# Patient Record
Sex: MALE | Race: WHITE | NOT HISPANIC OR LATINO | Employment: OTHER | URBAN - METROPOLITAN AREA
[De-identification: names, ages, dates, MRNs, and addresses within clinical notes are randomized per-mention and may not be internally consistent; named-entity substitution may affect disease eponyms.]

---

## 2017-01-03 RX ORDER — DIAZEPAM 5 MG/1
TABLET ORAL
Qty: 60 TAB | Refills: 0 | Status: SHIPPED
Start: 2017-01-03 | End: 2017-02-24 | Stop reason: SDUPTHER

## 2017-01-12 ENCOUNTER — OFFICE VISIT (OUTPATIENT)
Dept: MEDICAL GROUP | Facility: CLINIC | Age: 62
End: 2017-01-12
Payer: MEDICARE

## 2017-01-12 VITALS
WEIGHT: 201 LBS | BODY MASS INDEX: 28.77 KG/M2 | HEIGHT: 70 IN | RESPIRATION RATE: 18 BRPM | DIASTOLIC BLOOD PRESSURE: 75 MMHG | HEART RATE: 80 BPM | SYSTOLIC BLOOD PRESSURE: 105 MMHG | TEMPERATURE: 98.1 F | OXYGEN SATURATION: 97 %

## 2017-01-12 DIAGNOSIS — M62.838 MUSCLE SPASM: ICD-10-CM

## 2017-01-12 PROCEDURE — 99213 OFFICE O/P EST LOW 20 MIN: CPT | Performed by: INTERNAL MEDICINE

## 2017-01-12 RX ORDER — CYCLOBENZAPRINE HCL 10 MG
TABLET ORAL
Qty: 90 TAB | Refills: 2 | Status: SHIPPED | OUTPATIENT
Start: 2017-01-12 | End: 2017-06-27 | Stop reason: SDUPTHER

## 2017-01-12 NOTE — MR AVS SNAPSHOT
"        Edgardo Ocampoer   2017 11:20 AM   Office Visit   MRN: [id number]    Department:  [facility]   Dept Phone:  [phone]    Description:  Male : 1955   Provider:  John Lao D.O.           Reason for Visit     Follow-Up           Allergies as of 2017     Allergen Noted Reactions    Penicillins 2008   Hives      You were diagnosed with     Muscle spasm   [521265]         Vital Signs     Blood Pressure Pulse Temperature Respirations Height Weight    105/75 mmHg 80 36.7 °C (98.1 °F) 18 1.778 m (5' 10\") 91.173 kg (201 lb)    Body Mass Index Oxygen Saturation Smoking Status             28.84 kg/m2 97% Never Smoker          Basic Information     Date Of Birth Sex Race Ethnicity Preferred Language    1955 Male White Non- English      Your appointments     2017  9:00 AM   FOLLOW UP with Stas Zabala M.D.   [facility] (--)    [address], [facility] [address]  [facility] [id number] [phone]              Problem List              ICD-10-CM Priority Class Noted - Resolved    Rheumatoid arthritis (HCC) M06.9   2009 - Present    Hypogonadism male E29.1   2009 - Present    Aortic insufficiency I35.1   2011 - Present    Essential hypertension I10   2011 - Present    Dyslipidemia E78.5   2011 - Present    Osteoarthrosis, unspecified whether generalized or localized, pelvic region and thigh M16.9   2013 - Present    Abnormal myocardial perfusion study    1/15/2014 - Present    Tachycardia R00.0   10/20/2014 - Present    Coronary artery disease due to calcified coronary lesion: Mildly cardiac catheterization in  I25.10, I25.84   2014 - Present    Anxiety F41.9   3/31/2015 - Present    Depression (Chronic) F32.9   2015 - Present    Elevated CPK R74.8   2016 - Present      Health Maintenance        Date Due Completion Dates    IMM ZOSTER VACCINE 2028 (Originally 10/25/2016) " ---    COLONOSCOPY 12/22/2021 12/22/2016    IMM DTaP/Tdap/Td Vaccine (2 - Td) 1/28/2023 1/28/2013            Current Immunizations     13-VALENT PCV PREVNAR 10/11/2016 11:27 AM    Influenza TIV (IM) 11/4/2013, 10/10/2012    Influenza Vaccine Quad Inj (Pf) 10/11/2016 12:03 PM    Influenza Vaccine Quad Inj (Preserved) 3/10/2015    Pneumococcal polysaccharide vaccine (PPSV-23) 11/4/2013, 10/15/2009    Tdap Vaccine 1/28/2013      Below and/or attached are the medications your provider expects you to take. Review all of your home medications and newly ordered medications with your provider and/or pharmacist. Follow medication instructions as directed by your provider and/or pharmacist. Please keep your medication list with you and share with your provider. Update the information when medications are discontinued, doses are changed, or new medications (including over-the-counter products) are added; and carry medication information at all times in the event of emergency situations     Allergies:  PENICILLINS - Hives               Medications  Valid as of: January 12, 2017 - 11:52 AM    Generic Name Brand Name Tablet Size Instructions for use    Atorvastatin Calcium (Tab) LIPITOR 40 MG Take 1 Tab by mouth every evening.        Bisoprolol Fumarate (Tab) ZEBETA 10 MG Take 1 Tab by mouth every day.        Cyclobenzaprine HCl (Tab) FLEXERIL 10 MG TAKE 1 TABLET BY MOUTH THREE TIMES DAILY AS NEEDED FOR MILD PAIN        DiazePAM (Tab) VALIUM 5 MG TAKE ONE TABLET BY MOUTH EVERY 12 HOURS AS NEEDED FOR ANXIETY        Folic Acid (Tab) FOLVITE 1 MG Take 3 mg by mouth every day.        Lisinopril (Tab) PRINIVIL 10 MG Take 1 Tab by mouth every day.        Meloxicam (Tab) MOBIC 15 MG TAKE ONE TABLET BY MOUTH ONCE DAILY        Methotrexate Sodium   Take 15 mg by mouth every 7 days.        Multiple Vitamins-Minerals   Take  by mouth.        Omeprazole (CAPSULE DELAYED RELEASE) PRILOSEC 20 MG Take 20 mg by mouth every day.         Oxycodone-Acetaminophen (Tab) PERCOCET  MG 2 Tabs by Oral route Q6HRS.         PARoxetine HCl (Tab) PAXIL 20 MG TAKE 1 TABLET BY MOUTH EVERY DAY        PredniSONE (Tab) DELTASONE 1 MG Take 2 mg by mouth.        RiTUXimab   by Intravenous route. Two every 6 months         Rosuvastatin Calcium (Tab) CRESTOR 10 MG Take 0.5 Tabs by mouth every evening.        Testosterone Cypionate (Solution) DEPO-TESTOSTERONE 200 MG/ML INJECT 1 ML (CC) INTRAMUSCULARLY EVERY 2 WEEKS AS DIRECTED        Vardenafil HCl (Tab) LEVITRA 20 MG Take 20 mg by mouth as needed.        .                 Medicines prescribed today were sent to:     [facility] [address] [place] [address], [place] - [address]    [address] [place] [address]    Phone: [phone] Fax: [phone]    Open 24 Hours?: No      Medication refill instructions:       If your prescription bottle indicates you have medication refills left, it is not necessary to call your provider’s office. Please contact your pharmacy and they will refill your medication.    If your prescription bottle indicates you do not have any refills left, you may request refills at any time through one of the following ways: The online [employer] system (except Urgent Care), by calling your provider’s office, or by asking your pharmacy to contact your provider’s office with a refill request. Medication refills are processed only during regular business hours and may not be available until the next business day. Your provider may request additional information or to have a follow-up visit with you prior to refilling your medication.   *Please Note: Medication refills are assigned a new Rx number when refilled electronically. Your pharmacy may indicate that no refills were authorized even though a new prescription for the same medication is available at the pharmacy. Please request the medicine by name with the pharmacy before contacting your provider for a refill.        Other Notes About Your Plan     Pt uses  [facility]  For PA  [phone]  [facility] ID #  [id number]  Depo-Testosterone  200 ML Pt uses 1 ml every two weeks he gets 1 x 10 ml Vial each time.  Marci good from 12/17/2012 thru 12/12/2013           [employer] Access Code: Activation code not generated  Current [employer] Status: Active

## 2017-01-13 NOTE — PROGRESS NOTES
CC: Edgardo Sims is a 61 y.o. male is suffering from   Chief Complaint   Patient presents with   • Follow-Up         SUBJECTIVE:  1. Muscle spasm  Nicolas's here for follow-up, continues to have problems with muscle skeletal spasm and pain patient is requesting refills of Flexeril. Patient suffers from severe rheumatoid arthritis is immunosuppressed and is receiving his pain medication through Dr. Schwab.         Past social, family, history:  2 daughters  Social History   Substance Use Topics   • Smoking status: Never Smoker    • Smokeless tobacco: Never Used   • Alcohol Use: 3.0 oz/week     6 Cans of beer per week         MEDICATIONS:    Current outpatient prescriptions:   •  cyclobenzaprine (FLEXERIL) 10 MG Tab, TAKE 1 TABLET BY MOUTH THREE TIMES DAILY AS NEEDED FOR MILD PAIN, Disp: 90 Tab, Rfl: 2  •  diazepam (VALIUM) 5 MG Tab, TAKE ONE TABLET BY MOUTH EVERY 12 HOURS AS NEEDED FOR ANXIETY, Disp: 60 Tab, Rfl: 0  •  Multiple Vitamins-Minerals (MULTI COMPLETE PO), Take  by mouth., Disp: , Rfl:   •  rosuvastatin (CRESTOR) 10 MG Tab, Take 0.5 Tabs by mouth every evening., Disp: 30 Tab, Rfl: 11  •  bisoprolol (ZEBETA) 10 MG tablet, Take 1 Tab by mouth every day., Disp: 90 Tab, Rfl: 3  •  testosterone cypionate (DEPO-TESTOSTERONE) 200 MG/ML Solution injection, INJECT 1 ML (CC) INTRAMUSCULARLY EVERY 2 WEEKS AS DIRECTED, Disp: 10 mL, Rfl: 3  •  lisinopril (PRINIVIL) 10 MG Tab, Take 1 Tab by mouth every day., Disp: 90 Tab, Rfl: 3  •  meloxicam (MOBIC) 15 MG tablet, TAKE ONE TABLET BY MOUTH ONCE DAILY, Disp: 30 Tab, Rfl: 6  •  vardenafil (LEVITRA) 20 MG tablet, Take 20 mg by mouth as needed., Disp: , Rfl:   •  paroxetine (PAXIL) 20 MG Tab, TAKE 1 TABLET BY MOUTH EVERY DAY, Disp: 90 Tab, Rfl: 3  •  Methotrexate Sodium (METHOTREXATE PO), Take 15 mg by mouth every 7 days., Disp: , Rfl:   •  folic acid (FOLVITE) 1 MG TABS, Take 3 mg by mouth every day., Disp: , Rfl:   •  omeprazole (PRILOSEC) 20 MG CPDR, Take 20  "mg by mouth every day., Disp: , Rfl:   •  PREDNISONE 1 MG PO TABS, Take 2 mg by mouth., Disp: , Rfl:   •  atorvastatin (LIPITOR) 40 MG Tab, Take 1 Tab by mouth every evening., Disp: 90 Tab, Rfl: 3  •  RiTUXimab (RITUXAN IV), by Intravenous route. Two every 6 months , Disp: , Rfl:   •  PERCOCET  MG PO TABS, 2 Tabs by Oral route Q6HRS. , Disp: , Rfl:     PROBLEMS:  Patient Active Problem List    Diagnosis Date Noted   • Elevated CPK 09/23/2016   • Depression 07/13/2015   • Anxiety 03/31/2015   • Coronary artery disease due to calcified coronary lesion: Mildly cardiac catheterization in 2014 12/05/2014   • Tachycardia 10/20/2014   • Abnormal myocardial perfusion study 01/15/2014   • Osteoarthrosis, unspecified whether generalized or localized, pelvic region and thigh 05/31/2013   • Dyslipidemia 07/12/2011   • Aortic insufficiency 07/05/2011   • Essential hypertension 07/05/2011   • Rheumatoid arthritis (HCC) 05/05/2009   • Hypogonadism male 05/05/2009       REVIEW OF SYSTEMS:  Gen.:  No Nausea, Vomiting, fever, Chills.  Heart: No chest pain.  Lungs:  No shortness of Breath.  Psychological: Kian unusual Anxiety depression     PHYSICAL EXAM   Constitutional: Alert, cooperative, not in acute distress.  Cardiovascular:  Rate Rhythm is regular without murmurs rubs clicks.     Thorax & Lungs: Clear to auscultation, no wheezing, rhonchi, or rales  HENT: Normocephalic, Atraumatic.  Eyes: PERRLA, EOMI, Conjunctiva normal.   Neck: Trachia is midline no swelling of the thyroid.   Lymphatic: No lymphadenopathy noted.   Neurologic: Alert & oriented x 3, cranial nerves II through XII are intact, Normal motor function, Normal sensory function, No focal deficits noted.   Psychiatric: Affect normal, Judgment normal, Mood normal.     VITAL SIGNS:/75 mmHg  Pulse 80  Temp(Src) 36.7 °C (98.1 °F)  Resp 18  Ht 1.778 m (5' 10\")  Wt 91.173 kg (201 lb)  BMI 28.84 kg/m2  SpO2 97%    Labs: Reviewed    Assessment:               "                                       Plan:    1. Muscle spasm  Continue Flexeril  - cyclobenzaprine (FLEXERIL) 10 MG Tab; TAKE 1 TABLET BY MOUTH THREE TIMES DAILY AS NEEDED FOR MILD PAIN  Dispense: 90 Tab; Refill: 2

## 2017-01-19 ENCOUNTER — OFFICE VISIT (OUTPATIENT)
Dept: CARDIOLOGY | Facility: MEDICAL CENTER | Age: 62
End: 2017-01-19
Payer: MEDICARE

## 2017-01-19 VITALS
HEART RATE: 70 BPM | DIASTOLIC BLOOD PRESSURE: 68 MMHG | HEIGHT: 70 IN | BODY MASS INDEX: 29.78 KG/M2 | WEIGHT: 208 LBS | OXYGEN SATURATION: 96 % | SYSTOLIC BLOOD PRESSURE: 122 MMHG

## 2017-01-19 DIAGNOSIS — I25.84 CORONARY ARTERY DISEASE DUE TO CALCIFIED CORONARY LESION: ICD-10-CM

## 2017-01-19 DIAGNOSIS — I10 ESSENTIAL HYPERTENSION: ICD-10-CM

## 2017-01-19 DIAGNOSIS — I25.10 CORONARY ARTERY DISEASE DUE TO CALCIFIED CORONARY LESION: ICD-10-CM

## 2017-01-19 DIAGNOSIS — E78.5 DYSLIPIDEMIA: ICD-10-CM

## 2017-01-19 PROCEDURE — 99214 OFFICE O/P EST MOD 30 MIN: CPT | Performed by: INTERNAL MEDICINE

## 2017-01-19 RX ORDER — ROSUVASTATIN CALCIUM 10 MG/1
10 TABLET, COATED ORAL EVERY EVENING
Qty: 90 TAB | Refills: 3 | Status: SHIPPED | OUTPATIENT
Start: 2017-01-19 | End: 2017-07-19

## 2017-01-19 NOTE — Clinical Note
[facility]   [address]  [place] [address]  Phone: [phone]  Fax: [phone]              Edgardo Sims  1955    Encounter Date: 1/19/2017    Stas Zabala M.D.          PROGRESS NOTE:  Subjective:   Edgardo Sims is a 61 y.o. male who presents today for followup of his hypertension, hyperlipidemia and abnormal myocardial perfusion scan. He underwent cardiac catheterization and had minimal plaque. 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 He had difficulty with an elevated CPK on atorvastatin therapy.    He has been taking low-dose rosuvastatin. He's noted no muscle complaints on this and he has had a follow-up CPK. His CPK from his rheumatologist was elevated at 750 last August. This is not in our chart probably because it was ordered by his rheumatologist.    He does have some mild edema. He denies any chest discomfort, dyspnea, palpitations or lightheadedness.    Past Medical History   Diagnosis Date   • Rheumatoid arthritis(714.0)      severe   • Hypertension    • Aortic insufficiency 7/5/2011   • HTN (hypertension) 7/5/2011   • Dyslipidemia 7/12/2011   • Cancer (CMS-HCC)      skin   • Heart murmur    • Hiatus hernia syndrome    • Pain      RA   • Abnormal myocardial perfusion study 1/15/2014   • Infectious disease    • Heart burn    • Indigestion    • Bronchitis    • Arthritis      RA, and osteo   • Cold      mid January 2014   • Coughing blood      none currently 2014   • Tachycardia 10/20/2014   • CAD (coronary artery disease) mild plaque at cath in 2/14 12/5/2014     Past Surgical History   Procedure Laterality Date   • Knee arthroscopy       right   • Other orthopedic surgery       awaiting right hip surgery   • Hip arthroplasty total  6/20/08     Performed by YONATAN HINSON at SURGERY [facility]   • Vein ligation radio frequency  12/8/2008     Performed by DEREJE MCCULLOUGH at SURGERY SAME DAY  [facility]S   • Knee arthroplasty total  6/1/2009     Performed by YONATAN HINSON at SURGERY [facility] ORS   • Shoulder surgery       RIGHT 01/2011   • Other       varicose vein stripping   • Other       heart murmur   • Lumbar laminectomy diskectomy  2000   • Tmj reconstruction bilateral  1994   • Hip arthroplasty total  5/31/2013     Performed by Burak Valles M.D. at SURGERY [facility] ORS   • Recovery  2/3/2014     Performed by Cath-Recovery Surgery at SURGERY SAME DAY [facility]     Family History   Problem Relation Age of Onset   • Hypertension Mother      History   Smoking status   • Never Smoker    Smokeless tobacco   • Never Used     Allergies   Allergen Reactions   • Penicillins Hives     Outpatient Encounter Prescriptions as of 1/19/2017   Medication Sig Dispense Refill   • cyclobenzaprine (FLEXERIL) 10 MG Tab TAKE 1 TABLET BY MOUTH THREE TIMES DAILY AS NEEDED FOR MILD PAIN 90 Tab 2   • diazepam (VALIUM) 5 MG Tab TAKE ONE TABLET BY MOUTH EVERY 12 HOURS AS NEEDED FOR ANXIETY 60 Tab 0   • Multiple Vitamins-Minerals (MULTI COMPLETE PO) Take  by mouth.     • rosuvastatin (CRESTOR) 10 MG Tab Take 0.5 Tabs by mouth every evening. 30 Tab 11   • bisoprolol (ZEBETA) 10 MG tablet Take 1 Tab by mouth every day. 90 Tab 3   • testosterone cypionate (DEPO-TESTOSTERONE) 200 MG/ML Solution injection INJECT 1 ML (CC) INTRAMUSCULARLY EVERY 2 WEEKS AS DIRECTED 10 mL 3   • lisinopril (PRINIVIL) 10 MG Tab Take 1 Tab by mouth every day. 90 Tab 3   • meloxicam (MOBIC) 15 MG tablet TAKE ONE TABLET BY MOUTH ONCE DAILY 30 Tab 6   • vardenafil (LEVITRA) 20 MG tablet Take 20 mg by mouth as needed.     • paroxetine (PAXIL) 20 MG Tab TAKE 1 TABLET BY MOUTH EVERY DAY 90 Tab 3   • Methotrexate Sodium (METHOTREXATE PO) Take 15 mg by mouth every 7 days.     • folic acid (FOLVITE) 1 MG TABS Take 3 mg by mouth every day.     • omeprazole (PRILOSEC) 20 MG CPDR Take 20 mg by mouth every day.     • RiTUXimab (RITUXAN IV) by  "Intravenous route. Two every 6 months      • PERCOCET  MG PO TABS 2 Tabs by Oral route Q6HRS.      • PREDNISONE 1 MG PO TABS Take 2 mg by mouth.     • atorvastatin (LIPITOR) 40 MG Tab Take 1 Tab by mouth every evening. 90 Tab 3     No facility-administered encounter medications on file as of 1/19/2017.     He is also on bisoprolol 5 mg daily.    ROS       Objective:   /68 mmHg  Pulse 70  Ht 1.778 m (5' 10\")  Wt 94.348 kg (208 lb)  BMI 29.84 kg/m2  SpO2 96%    Physical Exam   Neck: No JVD present.   Cardiovascular: Normal rate and regular rhythm.  Exam reveals no gallop.    Murmur (2-3/6 systolic at the base) heard.  Pulmonary/Chest: Effort normal. He has no rales.   Abdominal: Soft. There is no tenderness.   Musculoskeletal: He exhibits edema (1+ pretibial).         Echocardiogram:  CONCLUSIONS  Technically difficult study.   Normal left ventricular size, thickness, systolic function. Left   ventricular ejection fraction is 60% to 65%. Grade I diastolic   dysfunction - mitral inflow E/A is <1.0.  No pericardial effusion seen. Mild aortic insufficiency was noted.    Holter monitor from October: This showed a normal sinus rhythm with rare atrial and ventricular ectopy. There was no significant tachycardia or bradycardia.    Lab Results   Component Value Date/Time    CHOLESTEROL, 01/10/2017 07:24 AM    LDL 96 01/10/2017 07:24 AM    HDL 59 01/10/2017 07:24 AM    TRIGLYCERIDES 172* 01/10/2017 07:24 AM       Lab Results   Component Value Date/Time    SODIUM 141 09/21/2016 09:21 AM    POTASSIUM 4.7 09/21/2016 09:21 AM    CHLORIDE 99 09/21/2016 09:21 AM    CO2 25 09/21/2016 09:21 AM    GLUCOSE 82 09/21/2016 09:21 AM    BUN 13 09/21/2016 09:21 AM    CREATININE 1.02 09/21/2016 09:21 AM    BUN-CREATININE RATIO 13 09/21/2016 09:21 AM     Lab Results   Component Value Date/Time    ALKALINE PHOSPHATASE 77 01/10/2017 07:24 AM    AST(SGOT) 25 01/10/2017 07:24 AM    ALT(SGPT) 30 01/10/2017 07:24 AM    TOTAL " BILIRUBIN 0.5 01/10/2017 07:24 AM      Lab Results   Component Value Date/Time    B NATRIURETIC PEPTIDE 9.8 12/01/2014 03:13 PM      CPK: 65    Assessment:     1. Coronary artery disease due to calcified coronary lesion: Mildly cardiac catheterization in 2014     2. Essential hypertension     3. Dyslipidemia         Medical Decision Making:  Today's Assessment / Status / Plan:     Mr. Sims has had difficulty with a significant elevation in his CPK. He is now on Crestor instead of Lipitor. His CPK is not elevated. We will increase rosuvastatin to 10 mg daily. He will have a lipid panel, hepatic panel and CPK in about 6 weeks and prior to follow-up in 6 months.        John Lao D.O.  [address]  [address]  [facility] [address]  VIA In Basket

## 2017-01-19 NOTE — MR AVS SNAPSHOT
"        Edgardo Sims   2017 9:00 AM   Office Visit   MRN: [id number]    Department:  [facility]   Dept Phone:  [phone]    Description:  Male : 1955   Provider:  Stas Zabala M.D.           Allergies as of 2017     Allergen Noted Reactions    Penicillins 2008   Hives      You were diagnosed with     Coronary artery disease due to calcified coronary lesion   [2241151]       Essential hypertension   [8475355]       Dyslipidemia   [025359]         Vital Signs     Blood Pressure Pulse Height Weight Body Mass Index Oxygen Saturation    122/68 mmHg 70 1.778 m (5' 10\") 94.348 kg (208 lb) 29.84 kg/m2 96%    Smoking Status                   Never Smoker            Basic Information     Date Of Birth Sex Race Ethnicity Preferred Language    1955 Male White Non- English      Your appointments     2017 10:20 AM   Established Patient with John Lao D.O.   [address]  [facility] [id number] [phone]           You will be receiving a confirmation call a few days before your appointment from our automated call confirmation system.              Problem List              ICD-10-CM Priority Class Noted - Resolved    Rheumatoid arthritis (CMS-HCC) M06.9   2009 - Present    Hypogonadism male E29.1   2009 - Present    Aortic insufficiency I35.1   2011 - Present    Essential hypertension I10   2011 - Present    Dyslipidemia E78.5   2011 - Present    Osteoarthrosis, unspecified whether generalized or localized, pelvic region and thigh M16.9   2013 - Present    Abnormal myocardial perfusion study    1/15/2014 - Present    Tachycardia R00.0   10/20/2014 - Present    Coronary artery disease due to calcified coronary lesion: Mildly cardiac catheterization in  I25.10, I25.84   2014 - Present    Anxiety F41.9   3/31/2015 - Present    Depression (Chronic) F32.9   2015 - Present   "    Elevated CPK R74.8   9/23/2016 - Present      Health Maintenance        Date Due Completion Dates    IMM ZOSTER VACCINE 1/12/2028 (Originally 10/25/2016) ---    COLONOSCOPY 12/22/2021 12/22/2016    IMM DTaP/Tdap/Td Vaccine (2 - Td) 1/28/2023 1/28/2013            Current Immunizations     13-VALENT PCV PREVNAR 10/11/2016 11:27 AM    Influenza TIV (IM) 11/4/2013, 10/10/2012    Influenza Vaccine Quad Inj (Pf) 10/11/2016 12:03 PM    Influenza Vaccine Quad Inj (Preserved) 3/10/2015    Pneumococcal polysaccharide vaccine (PPSV-23) 11/4/2013, 10/15/2009    Tdap Vaccine 1/28/2013      Below and/or attached are the medications your provider expects you to take. Review all of your home medications and newly ordered medications with your provider and/or pharmacist. Follow medication instructions as directed by your provider and/or pharmacist. Please keep your medication list with you and share with your provider. Update the information when medications are discontinued, doses are changed, or new medications (including over-the-counter products) are added; and carry medication information at all times in the event of emergency situations     Allergies:  PENICILLINS - Hives               Medications  Valid as of: January 19, 2017 -  9:28 AM    Generic Name Brand Name Tablet Size Instructions for use    Atorvastatin Calcium (Tab) LIPITOR 40 MG Take 1 Tab by mouth every evening.        Bisoprolol Fumarate (Tab) ZEBETA 10 MG Take 1 Tab by mouth every day.        Cyclobenzaprine HCl (Tab) FLEXERIL 10 MG TAKE 1 TABLET BY MOUTH THREE TIMES DAILY AS NEEDED FOR MILD PAIN        DiazePAM (Tab) VALIUM 5 MG TAKE ONE TABLET BY MOUTH EVERY 12 HOURS AS NEEDED FOR ANXIETY        Folic Acid (Tab) FOLVITE 1 MG Take 3 mg by mouth every day.        Lisinopril (Tab) PRINIVIL 10 MG Take 1 Tab by mouth every day.        Meloxicam (Tab) MOBIC 15 MG TAKE ONE TABLET BY MOUTH ONCE DAILY        Methotrexate Sodium   Take 15 mg by mouth every 7 days.         Multiple Vitamins-Minerals   Take  by mouth.        Omeprazole (CAPSULE DELAYED RELEASE) PRILOSEC 20 MG Take 20 mg by mouth every day.        Oxycodone-Acetaminophen (Tab) PERCOCET  MG 2 Tabs by Oral route Q6HRS.         PARoxetine HCl (Tab) PAXIL 20 MG TAKE 1 TABLET BY MOUTH EVERY DAY        PredniSONE (Tab) DELTASONE 1 MG Take 2 mg by mouth.        RiTUXimab   by Intravenous route. Two every 6 months         Rosuvastatin Calcium (Tab) CRESTOR 10 MG Take 1 Tab by mouth every evening.        Testosterone Cypionate (Solution) DEPO-TESTOSTERONE 200 MG/ML INJECT 1 ML (CC) INTRAMUSCULARLY EVERY 2 WEEKS AS DIRECTED        Vardenafil HCl (Tab) LEVITRA 20 MG Take 20 mg by mouth as needed.        .                 Medicines prescribed today were sent to:     [facility] [address] [place] [address] - [address] [facility] [address]    Phone: [phone] Fax: [phone]    Open 24 Hours?: No      Medication refill instructions:       If your prescription bottle indicates you have medication refills left, it is not necessary to call your provider’s office. Please contact your pharmacy and they will refill your medication.    If your prescription bottle indicates you do not have any refills left, you may request refills at any time through one of the following ways: The online [employer] system (except Urgent Care), by calling your provider’s office, or by asking your pharmacy to contact your provider’s office with a refill request. Medication refills are processed only during regular business hours and may not be available until the next business day. Your provider may request additional information or to have a follow-up visit with you prior to refilling your medication.   *Please Note: Medication refills are assigned a new Rx number when refilled electronically. Your pharmacy may indicate that no refills were authorized even though a new prescription for the same medication is available at the pharmacy.  Please request the medicine by name with the pharmacy before contacting your provider for a refill.        Your To Do List     Future Labs/Procedures Complete By Expires    CREATINE KINASE  As directed 1/19/2018    HEPATIC FUNCTION PANEL  As directed 1/19/2018    HEPATIC FUNCTION PANEL  As directed 1/19/2018    LIPID PROFILE  As directed 1/19/2018    LIPID PROFILE  As directed 1/19/2018      Other Notes About Your Plan     Pt uses [facility]  For PA  [phone]  [facility] ID #  [id number]  Depo-Testosterone  200 ML Pt uses 1 ml every two weeks he gets 1 x 10 ml Vial each time.  Auth good from 12/17/2012 thru 12/12/2013           [employer] Access Code: Activation code not generated  Current [employer] Status: Active

## 2017-01-19 NOTE — PROGRESS NOTES
Subjective:   Edgardo Sims is a 61 y.o. male who presents today for followup of his hypertension, hyperlipidemia and abnormal myocardial perfusion scan. He underwent cardiac catheterization and had minimal plaque. 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 He had difficulty with an elevated CPK on atorvastatin therapy.    He has been taking low-dose rosuvastatin. He's noted no muscle complaints on this and he has had a follow-up CPK. His CPK from his rheumatologist was elevated at 750 last August. This is not in our chart probably because it was ordered by his rheumatologist.    He does have some mild edema. He denies any chest discomfort, dyspnea, palpitations or lightheadedness.    Past Medical History   Diagnosis Date   • Rheumatoid arthritis(714.0)      severe   • Hypertension    • Aortic insufficiency 7/5/2011   • HTN (hypertension) 7/5/2011   • Dyslipidemia 7/12/2011   • Cancer (CMS-HCC)      skin   • Heart murmur    • Hiatus hernia syndrome    • Pain      RA   • Abnormal myocardial perfusion study 1/15/2014   • Infectious disease    • Heart burn    • Indigestion    • Bronchitis    • Arthritis      RA, and osteo   • Cold      mid January 2014   • Coughing blood      none currently 2014   • Tachycardia 10/20/2014   • CAD (coronary artery disease) mild plaque at cath in 2/14 12/5/2014     Past Surgical History   Procedure Laterality Date   • Knee arthroscopy       right   • Other orthopedic surgery       awaiting right hip surgery   • Hip arthroplasty total  6/20/08     Performed by YONATAN HINSON at SURGERY [facility] ORS   • Vein ligation radio frequency  12/8/2008     Performed by DEREJE MCCULLOUGH at SURGERY SAME DAY [facility] ORS   • Knee arthroplasty total  6/1/2009     Performed by YONATAN HINSON at SURGERY [facility] ORS   • Shoulder surgery       RIGHT 01/2011   • Other       varicose vein stripping   • Other       heart murmur   • Lumbar laminectomy diskectomy  2000   • Tmj  reconstruction bilateral  1994   • Hip arthroplasty total  5/31/2013     Performed by Burak Valles M.D. at SURGERY [facility]   • Recovery  2/3/2014     Performed by Cath-Recovery Surgery at SURGERY SAME DAY [facility]     Family History   Problem Relation Age of Onset   • Hypertension Mother      History   Smoking status   • Never Smoker    Smokeless tobacco   • Never Used     Allergies   Allergen Reactions   • Penicillins Hives     Outpatient Encounter Prescriptions as of 1/19/2017   Medication Sig Dispense Refill   • cyclobenzaprine (FLEXERIL) 10 MG Tab TAKE 1 TABLET BY MOUTH THREE TIMES DAILY AS NEEDED FOR MILD PAIN 90 Tab 2   • diazepam (VALIUM) 5 MG Tab TAKE ONE TABLET BY MOUTH EVERY 12 HOURS AS NEEDED FOR ANXIETY 60 Tab 0   • Multiple Vitamins-Minerals (MULTI COMPLETE PO) Take  by mouth.     • rosuvastatin (CRESTOR) 10 MG Tab Take 0.5 Tabs by mouth every evening. 30 Tab 11   • bisoprolol (ZEBETA) 10 MG tablet Take 1 Tab by mouth every day. 90 Tab 3   • testosterone cypionate (DEPO-TESTOSTERONE) 200 MG/ML Solution injection INJECT 1 ML (CC) INTRAMUSCULARLY EVERY 2 WEEKS AS DIRECTED 10 mL 3   • lisinopril (PRINIVIL) 10 MG Tab Take 1 Tab by mouth every day. 90 Tab 3   • meloxicam (MOBIC) 15 MG tablet TAKE ONE TABLET BY MOUTH ONCE DAILY 30 Tab 6   • vardenafil (LEVITRA) 20 MG tablet Take 20 mg by mouth as needed.     • paroxetine (PAXIL) 20 MG Tab TAKE 1 TABLET BY MOUTH EVERY DAY 90 Tab 3   • Methotrexate Sodium (METHOTREXATE PO) Take 15 mg by mouth every 7 days.     • folic acid (FOLVITE) 1 MG TABS Take 3 mg by mouth every day.     • omeprazole (PRILOSEC) 20 MG CPDR Take 20 mg by mouth every day.     • RiTUXimab (RITUXAN IV) by Intravenous route. Two every 6 months      • PERCOCET  MG PO TABS 2 Tabs by Oral route Q6HRS.      • PREDNISONE 1 MG PO TABS Take 2 mg by mouth.     • atorvastatin (LIPITOR) 40 MG Tab Take 1 Tab by mouth every evening. 90 Tab 3     No facility-administered encounter  "medications on file as of 1/19/2017.     He is also on bisoprolol 5 mg daily.    ROS       Objective:   /68 mmHg  Pulse 70  Ht 1.778 m (5' 10\")  Wt 94.348 kg (208 lb)  BMI 29.84 kg/m2  SpO2 96%    Physical Exam   Neck: No JVD present.   Cardiovascular: Normal rate and regular rhythm.  Exam reveals no gallop.    Murmur (2-3/6 systolic at the base) heard.  Pulmonary/Chest: Effort normal. He has no rales.   Abdominal: Soft. There is no tenderness.   Musculoskeletal: He exhibits edema (1+ pretibial).         Echocardiogram:  CONCLUSIONS  Technically difficult study.   Normal left ventricular size, thickness, systolic function. Left   ventricular ejection fraction is 60% to 65%. Grade I diastolic   dysfunction - mitral inflow E/A is <1.0.  No pericardial effusion seen. Mild aortic insufficiency was noted.    Holter monitor from October: This showed a normal sinus rhythm with rare atrial and ventricular ectopy. There was no significant tachycardia or bradycardia.    Lab Results   Component Value Date/Time    CHOLESTEROL, 01/10/2017 07:24 AM    LDL 96 01/10/2017 07:24 AM    HDL 59 01/10/2017 07:24 AM    TRIGLYCERIDES 172* 01/10/2017 07:24 AM       Lab Results   Component Value Date/Time    SODIUM 141 09/21/2016 09:21 AM    POTASSIUM 4.7 09/21/2016 09:21 AM    CHLORIDE 99 09/21/2016 09:21 AM    CO2 25 09/21/2016 09:21 AM    GLUCOSE 82 09/21/2016 09:21 AM    BUN 13 09/21/2016 09:21 AM    CREATININE 1.02 09/21/2016 09:21 AM    BUN-CREATININE RATIO 13 09/21/2016 09:21 AM     Lab Results   Component Value Date/Time    ALKALINE PHOSPHATASE 77 01/10/2017 07:24 AM    AST(SGOT) 25 01/10/2017 07:24 AM    ALT(SGPT) 30 01/10/2017 07:24 AM    TOTAL BILIRUBIN 0.5 01/10/2017 07:24 AM      Lab Results   Component Value Date/Time    B NATRIURETIC PEPTIDE 9.8 12/01/2014 03:13 PM      CPK: 65    Assessment:     1. Coronary artery disease due to calcified coronary lesion: Mildly cardiac catheterization in 2014     2. " Essential hypertension     3. Dyslipidemia         Medical Decision Making:  Today's Assessment / Status / Plan:     Mr. Sims has had difficulty with a significant elevation in his CPK. He is now on Crestor instead of Lipitor. His CPK is not elevated. We will increase rosuvastatin to 10 mg daily. He will have a lipid panel, hepatic panel and CPK in about 6 weeks and prior to follow-up in 6 months.

## 2017-02-24 DIAGNOSIS — F41.9 ANXIETY: ICD-10-CM

## 2017-02-28 RX ORDER — DIAZEPAM 5 MG/1
TABLET ORAL
Qty: 60 TAB | Refills: 0 | Status: SHIPPED
Start: 2017-02-28 | End: 2017-04-14 | Stop reason: SDUPTHER

## 2017-03-09 RX ORDER — DIAZEPAM 5 MG/1
TABLET ORAL
Qty: 60 TAB | Refills: 0 | OUTPATIENT
Start: 2017-03-09

## 2017-03-10 NOTE — TELEPHONE ENCOUNTER
Telephone call return, patient has a follow-up appointment next month, has plenty of Valium until that time.

## 2017-03-15 ENCOUNTER — TELEPHONE (OUTPATIENT)
Dept: MEDICAL GROUP | Facility: CLINIC | Age: 62
End: 2017-03-15

## 2017-03-15 DIAGNOSIS — J06.9 UPPER RESPIRATORY TRACT INFECTION, UNSPECIFIED TYPE: ICD-10-CM

## 2017-03-15 RX ORDER — MOXIFLOXACIN HYDROCHLORIDE 400 MG/1
400 TABLET ORAL DAILY
Qty: 10 TAB | Refills: 0 | Status: SHIPPED | OUTPATIENT
Start: 2017-03-15 | End: 2017-03-15

## 2017-03-15 RX ORDER — FLUTICASONE PROPIONATE 50 MCG
1 SPRAY, SUSPENSION (ML) NASAL 2 TIMES DAILY
Qty: 1 BOTTLE | Refills: 3 | Status: SHIPPED | OUTPATIENT
Start: 2017-03-15 | End: 2017-09-18

## 2017-03-15 NOTE — TELEPHONE ENCOUNTER
1. Caller Name: Edgardo                      Call Back Number: [phone]    2. Message: Pt calling to ask for antibiotics for congestion and cough lastly 3 weeks. He has already taken a Z-pac and felt better but once he finished it he began to feel worse. 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     3. Patient approves office to leave a detailed voicemail/MyChart message: yes

## 2017-03-16 NOTE — TELEPHONE ENCOUNTER
Telephone call return patient is completing Keflex prescribed by his rheumatologist. Still feels he's having problems with excessive runny nose, recommended Clartin over-the-counter, Flonase which as been sent to the pharmacy.

## 2017-04-11 RX ORDER — PAROXETINE HYDROCHLORIDE 20 MG/1
TABLET, FILM COATED ORAL
Qty: 90 TAB | Refills: 3 | Status: SHIPPED | OUTPATIENT
Start: 2017-04-11 | End: 2018-06-04 | Stop reason: SDUPTHER

## 2017-04-12 ENCOUNTER — OFFICE VISIT (OUTPATIENT)
Dept: MEDICAL GROUP | Facility: CLINIC | Age: 62
End: 2017-04-12
Payer: MEDICARE

## 2017-04-12 VITALS
SYSTOLIC BLOOD PRESSURE: 126 MMHG | DIASTOLIC BLOOD PRESSURE: 82 MMHG | RESPIRATION RATE: 18 BRPM | HEART RATE: 76 BPM | TEMPERATURE: 98.8 F | WEIGHT: 199.4 LBS | HEIGHT: 70 IN | BODY MASS INDEX: 28.55 KG/M2 | OXYGEN SATURATION: 99 %

## 2017-04-12 DIAGNOSIS — Z79.891 CHRONIC USE OF OPIATE DRUGS THERAPEUTIC PURPOSES: ICD-10-CM

## 2017-04-12 DIAGNOSIS — J30.89 SEASONAL ALLERGIC RHINITIS DUE TO OTHER ALLERGIC TRIGGER: ICD-10-CM

## 2017-04-12 DIAGNOSIS — M05.9 RHEUMATOID ARTHRITIS WITH POSITIVE RHEUMATOID FACTOR, INVOLVING UNSPECIFIED SITE (HCC): ICD-10-CM

## 2017-04-12 DIAGNOSIS — Z79.899 CHRONIC USE OF BENZODIAZEPINE FOR THERAPEUTIC PURPOSE: ICD-10-CM

## 2017-04-12 PROCEDURE — G8419 CALC BMI OUT NRM PARAM NOF/U: HCPCS | Performed by: INTERNAL MEDICINE

## 2017-04-12 PROCEDURE — 99214 OFFICE O/P EST MOD 30 MIN: CPT | Performed by: INTERNAL MEDICINE

## 2017-04-12 PROCEDURE — G8432 DEP SCR NOT DOC, RNG: HCPCS | Performed by: INTERNAL MEDICINE

## 2017-04-12 PROCEDURE — 1036F TOBACCO NON-USER: CPT | Performed by: INTERNAL MEDICINE

## 2017-04-12 PROCEDURE — G8599 NO ASA/ANTIPLAT THER USE RNG: HCPCS | Performed by: INTERNAL MEDICINE

## 2017-04-12 RX ORDER — OXYCODONE AND ACETAMINOPHEN 10; 325 MG/1; MG/1
1 TABLET ORAL
Qty: 150 TAB | Refills: 0 | Status: SHIPPED | OUTPATIENT
Start: 2017-04-12 | End: 2017-04-12 | Stop reason: SDUPTHER

## 2017-04-12 RX ORDER — OXYCODONE AND ACETAMINOPHEN 10; 325 MG/1; MG/1
1 TABLET ORAL
Qty: 150 TAB | Refills: 0 | Status: SHIPPED | OUTPATIENT
Start: 2017-04-12 | End: 2017-07-13 | Stop reason: SDUPTHER

## 2017-04-12 RX ORDER — GUAIFENESIN 400 MG/1
1 TABLET ORAL 3 TIMES DAILY PRN
Qty: 90 TAB | Refills: 3 | Status: SHIPPED | OUTPATIENT
Start: 2017-04-12 | End: 2017-09-18

## 2017-04-12 RX ORDER — LORATADINE 10 MG/1
CAPSULE, LIQUID FILLED ORAL
COMMUNITY
End: 2017-09-18

## 2017-04-12 RX ORDER — FEXOFENADINE HCL 60 MG/1
60 TABLET, FILM COATED ORAL DAILY
Qty: 30 TAB | Refills: 3 | Status: SHIPPED | OUTPATIENT
Start: 2017-04-12 | End: 2017-09-18

## 2017-04-12 ASSESSMENT — ENCOUNTER SYMPTOMS: DEPRESSION: 1

## 2017-04-12 ASSESSMENT — LIFESTYLE VARIABLES: HISTORY_ALCOHOL_USE: 0

## 2017-04-12 NOTE — PROGRESS NOTES
CC: Edgardo Sims is a 61 y.o. male is suffering from No chief complaint on file.        SUBJECTIVE:  1. Rheumatoid arthritis with positive rheumatoid factor, involving unspecified site (CMS-[facility])  Edgardo is here for follow-up, is requesting that he change rheumatologists, referral written.     2. Chronic use of opiate drugs therapeutic purposes  Patient is using Percocet responsibly has been on this for years, agreed to  his prescription.     3. Chronic use of benzodiazepine for therapeutic purpose  Patient and I have discussed that benzodiazepines have been used in the past, is not using currently.     4. Seasonal allergic rhinitis due to other allergic trigger  Recommend patient use Flonase nasal spray, guaifenesin as a mucolytic, Zyrtec if necessary may use also Claritin/Allegra.     Chronic pain recheck:   Last dose of controlled substance: Today  Chronic pain treated with Percocet taken 5 times a day    He  reports that he drinks about 3.0 oz of alcohol per week.  He  reports that he does not use illicit drugs.    Consequences of Chronic Opiate therapy:  (5 A's)  Analgesia: Compared to no treatment or prior treatment, pain is currently improved  Activity: improved  Adverse Events: He denies constipation, dry mouth, itchy skin, nausea, sedation and none  Aberrant Behaviors: He reports he is taking medication as prescribed and is not veering from agreed treatment regimen. There have been no inappropriate refills or lost/stolen meds reported.   Affect/Mood: Pain is not impacting patient's mood.  Patient denies depression/anxiety.    Nonnarcotic treatments that are being used: muscle relaxers.   Treatment goals discussed.    Last order of CONTROLLED SUBSTANCE TREATMENT AGREEMENT was found on 4/12/2017 from Office Visit on 4/12/2017     UDS Summary     Patient has no health maintenance due at this time        Most recent UDS new  Opioid Risk Score: 1    Interpretation of Opioid Risk Score   Score 0-3  = Low risk of abuse. Do UDS at least once per year.  Score 4-7 = Moderate risk of abuse. Do UDS 1-4 times per year.  Score 8+ = High risk of abuse. Refer to specialist.      I have reviewed the medical records, the Prescription Monitoring Program and I have determined that controlled substance treatment is medically indicated.      Past social, family, history:   Social History   Substance Use Topics   • Smoking status: Never Smoker    • Smokeless tobacco: Never Used   • Alcohol Use: 3.0 oz/week     6 Cans of beer per week         MEDICATIONS:    Current outpatient prescriptions:   •  Loratadine (CLARITIN) 10 MG Cap, Take  by mouth., Disp: , Rfl:   •  oxycodone-acetaminophen (PERCOCET-10)  MG Tab, Take 1 Tab by mouth 5 Times a Day., Disp: 150 Tab, Rfl: 0  •  paroxetine (PAXIL) 20 MG Tab, TAKE ONE TABLET BY MOUTH ONCE DAILY, Disp: 90 Tab, Rfl: 3  •  fluticasone (FLONASE) 50 MCG/ACT nasal spray, Spray 1 Spray in nose 2 times a day., Disp: 1 Bottle, Rfl: 3  •  diazepam (VALIUM) 5 MG Tab, TAKE ONE TABLET BY MOUTH EVERY 12 HOURS AS NEEDED FOR ANXIETY, Disp: 60 Tab, Rfl: 0  •  cyclobenzaprine (FLEXERIL) 10 MG Tab, TAKE 1 TABLET BY MOUTH THREE TIMES DAILY AS NEEDED FOR MILD PAIN, Disp: 90 Tab, Rfl: 2  •  Multiple Vitamins-Minerals (MULTI COMPLETE PO), Take  by mouth., Disp: , Rfl:   •  bisoprolol (ZEBETA) 10 MG tablet, Take 1 Tab by mouth every day., Disp: 90 Tab, Rfl: 3  •  testosterone cypionate (DEPO-TESTOSTERONE) 200 MG/ML Solution injection, INJECT 1 ML (CC) INTRAMUSCULARLY EVERY 2 WEEKS AS DIRECTED, Disp: 10 mL, Rfl: 3  •  lisinopril (PRINIVIL) 10 MG Tab, Take 1 Tab by mouth every day., Disp: 90 Tab, Rfl: 3  •  meloxicam (MOBIC) 15 MG tablet, TAKE ONE TABLET BY MOUTH ONCE DAILY, Disp: 30 Tab, Rfl: 6  •  vardenafil (LEVITRA) 20 MG tablet, Take 20 mg by mouth as needed., Disp: , Rfl:   •  Methotrexate Sodium (METHOTREXATE PO), Take 15 mg by mouth every 7 days., Disp: , Rfl:   •  folic acid (FOLVITE) 1 MG  TABS, Take 3 mg by mouth every day., Disp: , Rfl:   •  omeprazole (PRILOSEC) 20 MG CPDR, Take 20 mg by mouth every day., Disp: , Rfl:   •  PREDNISONE 1 MG PO TABS, Take 2 mg by mouth., Disp: , Rfl:   •  rosuvastatin (CRESTOR) 10 MG Tab, Take 1 Tab by mouth every evening., Disp: 90 Tab, Rfl: 3  •  atorvastatin (LIPITOR) 40 MG Tab, Take 1 Tab by mouth every evening., Disp: 90 Tab, Rfl: 3  •  RiTUXimab (RITUXAN IV), by Intravenous route. Two every 6 months , Disp: , Rfl:     PROBLEMS:  Patient Active Problem List    Diagnosis Date Noted   • Elevated CPK 09/23/2016   • Depression 07/13/2015   • Anxiety 03/31/2015   • Coronary artery disease due to calcified coronary lesion: Mildly cardiac catheterization in 2014 12/05/2014   • Tachycardia 10/20/2014   • Abnormal myocardial perfusion study 01/15/2014   • Osteoarthrosis, unspecified whether generalized or localized, pelvic region and thigh 05/31/2013   • Dyslipidemia 07/12/2011   • Aortic insufficiency 07/05/2011   • Essential hypertension 07/05/2011   • Rheumatoid arthritis (CMS-[facility]) 05/05/2009   • Hypogonadism male 05/05/2009       REVIEW OF SYSTEMS:  Gen.:  No Nausea, Vomiting, fever, Chills.  Heart: No chest pain.  Lungs:  No shortness of Breath.  Psychological: Kian unusual Anxiety depression     PHYSICAL EXAM   Constitutional: Alert, cooperative, not in acute distress.  Cardiovascular:  Rate Rhythm is regular without murmurs rubs clicks.     Thorax & Lungs: Clear to auscultation, no wheezing, rhonchi, or rales  HENT: Normocephalic, Atraumatic.  Eyes: PERRLA, EOMI, Conjunctiva normal.   Neck: Trachia is midline no swelling of the thyroid.   Musculoskeletal: Patient with pain to palpation metacarpophalangeal joints but no obvious ulnar deviation or swelling  Neurologic: Alert & oriented x 3, cranial nerves II through XII are intact, Normal motor function, Normal sensory function, No focal deficits noted.   Psychiatric: Affect normal, Judgment normal, Mood normal.  "    VITAL SIGNS:/82 mmHg  Pulse 76  Temp(Src) 37.1 °C (98.8 °F)  Resp 18  Ht 1.778 m (5' 10\")  Wt 90.447 kg (199 lb 6.4 oz)  BMI 28.61 kg/m2  SpO2 99%    Labs: Reviewed    Assessment:                                                     Plan:    1. Rheumatoid arthritis with positive rheumatoid factor, involving unspecified site (CMS-[facility])  [facility]erral to [facility] rheumatology  - REFERRAL TO RHEUMATOLOGY  - oxycodone-acetaminophen (PERCOCET-10)  MG Tab; Take 1 Tab by mouth 5 Times a Day.  Dispense: 150 Tab; Refill: 0  - Controlled Substance Treatment Agreement  - [facility]IN [facility]NAGEMENT SCREEN; Future    2. Chronic use of opiate drugs therapeutic purposes  Drug agreement signed  - Controlled Substance Treatment Agreement  - [facility] PAIN MANAGEMENT SCREEN; Future    3. Chronic use of benzodiazepine for therapeutic purpose  Valium to be used intermittently    4. Seasonal allergic rhinitis due to other allergic trigger  Start guaifenesin/Allegra  - Guaifenesin 400 MG Tab; Take 1 Tab by mouth 3 times a day as needed.  Dispense: 90 Tab; Refill: 3  - fexofenadine (ALLEGRA) 60 MG Tab; Take 1 Tab by mouth every day.  Dispense: 30 Tab; Refill: 3              "

## 2017-04-12 NOTE — MR AVS SNAPSHOT
"Edgardo Sims   2017 10:20 AM   Office Visit   MRN: [id number]    Department:  [facility]   Dept Phone:  [phone]    Description:  Male : 1955   Provider:  John Lao D.O.           Allergies as of 2017     Allergen Noted Reactions    Penicillins 2008   Hives      You were diagnosed with     Rheumatoid arthritis with positive rheumatoid factor, involving unspecified site (CMS-HCC)   [9439832]       Chronic use of opiate drugs therapeutic purposes   [7668361]       Chronic use of benzodiazepine for therapeutic purpose   [8123634]       Seasonal allergic rhinitis due to other allergic trigger   [8967520]         Vital Signs     Blood Pressure Pulse Temperature Respirations Height Weight    126/82 mmHg 76 37.1 °C (98.8 °F) 18 1.778 m (5' 10\") 90.447 kg (199 lb 6.4 oz)    Body Mass Index Oxygen Saturation Smoking Status             28.61 kg/m2 99% Never Smoker          Basic Information     Date Of Birth Sex Race Ethnicity Preferred Language    1955 Male White Non- English      Your appointments     2017  8:40 AM   Established Patient with John Lao D.O.   [address]  [place] [address] [phone]           You will be receiving a confirmation call a few days before your appointment from our automated call confirmation system.            2017 10:15 AM   FOLLOW UP with Stas Zabala M.D.   [facility] (--)    [address], [facility] [address]  [place] [id number] [phone]              Problem List              ICD-10-CM Priority Class Noted - Resolved    Rheumatoid arthritis (CMS-HCC) M06.9   2009 - Present    Hypogonadism male E29.1   2009 - Present    Aortic insufficiency I35.1   2011 - Present    Essential hypertension I10   2011 - Present    Dyslipidemia E78.5   2011 - Present    Osteoarthrosis, unspecified " whether generalized or localized, pelvic region and thigh M16.9   5/31/2013 - Present    Abnormal myocardial perfusion study    1/15/2014 - Present    Tachycardia R00.0   10/20/2014 - Present    Coronary artery disease due to calcified coronary lesion: Mildly cardiac catheterization in 2014 I25.10, I25.84   12/5/2014 - Present    Anxiety F41.9   3/31/2015 - Present    Depression (Chronic) F32.9   7/13/2015 - Present    Elevated CPK R74.8   9/23/2016 - Present      Health Maintenance        Date Due Completion Dates    IMM ZOSTER VACCINE 1/12/2028 (Originally 10/25/2016) ---    COLONOSCOPY 12/22/2021 12/22/2016    IMM DTaP/Tdap/Td Vaccine (2 - Td) 1/28/2023 1/28/2013            Current Immunizations     13-VALENT PCV PREVNAR 10/11/2016 11:27 AM    Influenza TIV (IM) 11/4/2013, 10/10/2012    Influenza Vaccine Quad Inj (Pf) 10/11/2016 12:03 PM    Influenza Vaccine Quad Inj (Preserved) 3/10/2015    Pneumococcal polysaccharide vaccine (PPSV-23) 11/4/2013, 10/15/2009    Tdap Vaccine 1/28/2013      Below and/or attached are the medications your provider expects you to take. Review all of your home medications and newly ordered medications with your provider and/or pharmacist. Follow medication instructions as directed by your provider and/or pharmacist. Please keep your medication list with you and share with your provider. Update the information when medications are discontinued, doses are changed, or new medications (including over-the-counter products) are added; and carry medication information at all times in the event of emergency situations     Allergies:  PENICILLINS - Hives               Medications  Valid as of: April 12, 2017 - 11:17 AM    Generic Name Brand Name Tablet Size Instructions for use    Atorvastatin Calcium (Tab) LIPITOR 40 MG Take 1 Tab by mouth every evening.        Bisoprolol Fumarate (Tab) ZEBETA 10 MG Take 1 Tab by mouth every day.        Cyclobenzaprine HCl (Tab) FLEXERIL 10 MG TAKE 1 TABLET BY  MOUTH THREE TIMES DAILY AS NEEDED FOR MILD PAIN        DiazePAM (Tab) VALIUM 5 MG TAKE ONE TABLET BY MOUTH EVERY 12 HOURS AS NEEDED FOR ANXIETY        Fluticasone Propionate (Suspension) FLONASE 50 MCG/ACT Spray 1 Spray in nose 2 times a day.        Folic Acid (Tab) FOLVITE 1 MG Take 3 mg by mouth every day.        Lisinopril (Tab) PRINIVIL 10 MG Take 1 Tab by mouth every day.        Loratadine (Cap) Loratadine 10 MG Take  by mouth.        Meloxicam (Tab) MOBIC 15 MG TAKE ONE TABLET BY MOUTH ONCE DAILY        Methotrexate Sodium   Take 15 mg by mouth every 7 days.        Multiple Vitamins-Minerals   Take  by mouth.        Omeprazole (CAPSULE DELAYED RELEASE) PRILOSEC 20 MG Take 20 mg by mouth every day.        Oxycodone-Acetaminophen (Tab) PERCOCET-10  MG Take 1 Tab by mouth 5 Times a Day.        PARoxetine HCl (Tab) PAXIL 20 MG TAKE ONE TABLET BY MOUTH ONCE DAILY        PredniSONE (Tab) DELTASONE 1 MG Take 2 mg by mouth.        RiTUXimab   by Intravenous route. Two every 6 months         Rosuvastatin Calcium (Tab) CRESTOR 10 MG Take 1 Tab by mouth every evening.        Testosterone Cypionate (Solution) DEPO-TESTOSTERONE 200 MG/ML INJECT 1 ML (CC) INTRAMUSCULARLY EVERY 2 WEEKS AS DIRECTED        Vardenafil HCl (Tab) LEVITRA 20 MG Take 20 mg by mouth as needed.        .                 Medicines prescribed today were sent to:     [facility] [address] [place] [address] [address]     [address] [facility] [address]    Phone: [phone] Fax: [phone]    Open 24 Hours?: No      Medication refill instructions:       If your prescription bottle indicates you have medication refills left, it is not necessary to call your provider’s office. Please contact your pharmacy and they will refill your medication.    If your prescription bottle indicates you do not have any refills left, you may request refills at any time through one of the following ways: The online [employer] system (except Urgent Care), by calling your  provider’s office, or by asking your pharmacy to contact your provider’s office with a refill request. Medication refills are processed only during regular business hours and may not be available until the next business day. Your provider may request additional information or to have a follow-up visit with you prior to refilling your medication.   *Please Note: Medication refills are assigned a new Rx number when refilled electronically. Your pharmacy may indicate that no refills were authorized even though a new prescription for the same medication is available at the pharmacy. Please request the medicine by name with the pharmacy before contacting your provider for a refill.        Your To Do List     Future Labs/Procedures Complete By [email or web address] PAIN MANAGEMENT SCREEN  As directed 4/12/2018    Comments:    Current Meds (name, sig, last dose):   Current outpatient prescriptions:   •  Loratadine, Take  by mouth.  •  oxycodone-acetaminophen, 1 Tab, Oral, 5X/DAY  •  paroxetine, TAKE ONE TABLET BY MOUTH ONCE DAILY  •  fluticasone, 1 Spray, Nasal, BID  •  diazepam, TAKE ONE TABLET BY MOUTH EVERY 12 HOURS AS NEEDED FOR ANXIETY  •  cyclobenzaprine, TAKE 1 TABLET BY MOUTH THREE TIMES DAILY AS NEEDED FOR MILD PAIN  •  Multiple Vitamins-Minerals (MULTI COMPLETE PO), Take  by mouth.  •  bisoprolol, 10 mg, Oral, DAILY  •  testosterone cypionate, INJECT 1 ML (CC) INTRAMUSCULARLY EVERY 2 WEEKS AS DIRECTED  •  lisinopril, 10 mg, Oral, QDAY  •  meloxicam, TAKE ONE TABLET BY MOUTH ONCE DAILY  •  vardenafil, 20 mg, Oral, PRN  •  Methotrexate Sodium (METHOTREXATE PO), 15 mg, Oral, Q7 DAYS  •  folic acid, 3 mg, Oral, DAILY  •  omeprazole, 20 mg, Oral, DAILY  •  predniSONE, Take 2 mg by mouth.  •  rosuvastatin, 10 mg, Oral, Q EVENING  •  atorvastatin, 40 mg, Oral, Q EVENING, not taking  •  RiTUXimab (RITUXAN IV), by Intravenous route. Two every 6 months             Referral     A referral request has been sent to our patient  care coordination department. Please allow 3-5 business days for us to process this request and contact you either by phone or mail. If you do not hear from us by the 5th business day, please call us at [phone].        Other Notes About Your Plan     Pt uses [facility]  For PA  [phone]  [facility] Care ID #  [id number]  Depo-Testosterone  200 ML Pt uses 1 ml every two weeks he gets 1 x 10 ml Vial each time.  Auth good from 12/17/2012 thru 12/12/2013           [email or web address] Access Code: Activation code not generated  Current [email or web address] Status: Active

## 2017-04-17 RX ORDER — DIAZEPAM 5 MG/1
TABLET ORAL
Qty: 60 TAB | Refills: 5 | Status: SHIPPED | OUTPATIENT
Start: 2017-04-17 | End: 2018-02-02 | Stop reason: SDUPTHER

## 2017-05-26 ENCOUNTER — OFFICE VISIT (OUTPATIENT)
Dept: RHEUMATOLOGY | Facility: PHYSICIAN GROUP | Age: 62
End: 2017-05-26
Payer: MEDICARE

## 2017-05-26 VITALS
WEIGHT: 204 LBS | HEART RATE: 76 BPM | DIASTOLIC BLOOD PRESSURE: 80 MMHG | RESPIRATION RATE: 16 BRPM | TEMPERATURE: 98.6 F | BODY MASS INDEX: 30.92 KG/M2 | HEIGHT: 68 IN | SYSTOLIC BLOOD PRESSURE: 132 MMHG | OXYGEN SATURATION: 98 %

## 2017-05-26 DIAGNOSIS — M06.00 SERONEGATIVE RHEUMATOID ARTHRITIS (HCC): ICD-10-CM

## 2017-05-26 DIAGNOSIS — Z79.899 ENCOUNTER FOR LONG-TERM (CURRENT) USE OF HIGH-RISK MEDICATION: ICD-10-CM

## 2017-05-26 DIAGNOSIS — G89.29 OTHER CHRONIC PAIN: ICD-10-CM

## 2017-05-26 PROCEDURE — G8419 CALC BMI OUT NRM PARAM NOF/U: HCPCS | Performed by: INTERNAL MEDICINE

## 2017-05-26 PROCEDURE — G8432 DEP SCR NOT DOC, RNG: HCPCS | Performed by: INTERNAL MEDICINE

## 2017-05-26 PROCEDURE — 1036F TOBACCO NON-USER: CPT | Performed by: INTERNAL MEDICINE

## 2017-05-26 PROCEDURE — 99205 OFFICE O/P NEW HI 60 MIN: CPT | Performed by: INTERNAL MEDICINE

## 2017-05-26 PROCEDURE — G8599 NO ASA/ANTIPLAT THER USE RNG: HCPCS | Performed by: INTERNAL MEDICINE

## 2017-05-26 RX ORDER — PREDNISONE 1 MG/1
TABLET ORAL
Qty: 30 TAB | Refills: 0 | Status: SHIPPED | OUTPATIENT
Start: 2017-05-26 | End: 2017-06-23

## 2017-05-26 RX ORDER — GINSENG 100 MG
50 CAPSULE ORAL EVERY MORNING
COMMUNITY

## 2017-05-26 RX ORDER — CYANOCOBALAMIN (VITAMIN B-12) 5000 MCG
1 TABLET,DISINTEGRATING ORAL DAILY
COMMUNITY
End: 2021-06-30

## 2017-05-26 RX ORDER — CETIRIZINE HYDROCHLORIDE 10 MG/1
10 TABLET ORAL DAILY
COMMUNITY
End: 2017-09-18

## 2017-05-26 RX ORDER — NIACIN 500 MG
500 TABLET ORAL DAILY
COMMUNITY
End: 2020-09-29

## 2017-05-26 RX ORDER — FOLIC ACID 1 MG/1
3 TABLET ORAL DAILY
Qty: 90 TAB | Refills: 3 | Status: SHIPPED | OUTPATIENT
Start: 2017-05-26 | End: 2017-06-07 | Stop reason: SDUPTHER

## 2017-05-26 NOTE — MR AVS SNAPSHOT
"        Edgardo Sims   2017 3:00 PM   Office Visit   MRN: [id number]    Department:  [facility]   Dept Phone:  [phone]    Description:  Male : 1955   Provider:  Antonieta Canales M.D.           Reason for Visit     New Patient new patient       Allergies as of 2017     Allergen Noted Reactions    Penicillins 2008   Hives      You were diagnosed with     Seronegative rheumatoid arthritis (CMS-HCC)   [634987]       Encounter for long-term (current) use of high-risk medication   [254291]       Other chronic pain   [338.29.ICD-9-CM]         Vital Signs     Blood Pressure Pulse Temperature Respirations Height Weight    132/80 mmHg 76 37 °C (98.6 °F) 16 1.727 m (5' 8\") 92.534 kg (204 lb)    Body Mass Index Oxygen Saturation Smoking Status             31.03 kg/m2 98% Never Smoker          Basic Information     Date Of Birth Sex Race Ethnicity Preferred Language    1955 Male White Non- English      Your appointments     2017  1:30 PM   Follow Up Visit with Antonieta Canales M.D.   [facility] (--)    [address], [address]  [place] [address] [phone]           You will be receiving a confirmation call a few days before your appointment from our automated call confirmation system.            2017  8:40 AM   Established Patient with John Lao D.O.   [facility] ([facility])    [address] [facility] [address]  [place] [address] [phone]           You will be receiving a confirmation call a few days before your appointment from our automated call confirmation system.            2017 10:15 AM   FOLLOW UP with Stas Zabala M.D.   [facility] (--)    [address], [address]  [place] [address] [phone]              Problem List              ICD-10-CM Priority Class Noted - Resolved    Rheumatoid arthritis (CMS-[facility]) M06.9   2009 - Present    Hypogonadism male E29.1   " 5/5/2009 - Present    Aortic insufficiency I35.1   7/5/2011 - Present    Essential hypertension I10   7/5/2011 - Present    Dyslipidemia E78.5   7/12/2011 - Present    Osteoarthrosis, unspecified whether generalized or localized, pelvic region and thigh M16.9   5/31/2013 - Present    Abnormal myocardial perfusion study    1/15/2014 - Present    Tachycardia R00.0   10/20/2014 - Present    Coronary artery disease due to calcified coronary lesion: Mildly cardiac catheterization in 2014 I25.10, I25.84   12/5/2014 - Present    Anxiety F41.9   3/31/2015 - Present    Depression (Chronic) F32.9   7/13/2015 - Present    Elevated CPK R74.8   9/23/2016 - Present      Health Maintenance        Date Due Completion Dates    IMM ZOSTER VACCINE 1/12/2028 (Originally 10/25/2016) ---    COLONOSCOPY 12/22/2021 12/22/2016    IMM DTaP/Tdap/Td Vaccine (2 - Td) 1/28/2023 1/28/2013            Current Immunizations     13-VALENT PCV PREVNAR 10/11/2016 11:27 AM    Influenza TIV (IM) 11/4/2013, 10/10/2012    Influenza Vaccine Quad Inj (Pf) 10/11/2016 12:03 PM    Influenza Vaccine Quad Inj (Preserved) 3/10/2015    Pneumococcal polysaccharide vaccine (PPSV-23) 11/4/2013, 10/15/2009    Tdap Vaccine 1/28/2013      Below and/or attached are the medications your provider expects you to take. Review all of your home medications and newly ordered medications with your provider and/or pharmacist. Follow medication instructions as directed by your provider and/or pharmacist. Please keep your medication list with you and share with your provider. Update the information when medications are discontinued, doses are changed, or new medications (including over-the-counter products) are added; and carry medication information at all times in the event of emergency situations     Allergies:  PENICILLINS - Hives               Medications  Valid as of: May 26, 2017 -  4:59 PM    Generic Name Brand Name Tablet Size Instructions for use    Atorvastatin Calcium  (Tab) LIPITOR 40 MG Take 1 Tab by mouth every evening.        Bisoprolol Fumarate (Tab) ZEBETA 10 MG Take 1 Tab by mouth every day.        Calcium Carbonate-Vitamin D (Tab) Calcium Carbonate-Vitamin D 600-400 MG-UNIT Take 1 Tab by mouth 2 times a day, with meals.        Cetirizine HCl (Tab) ZYRTEC 10 MG Take 10 mg by mouth every day.        Cyanocobalamin (TABLET DISPERSIBLE) Vitamin B-12 5000 MCG Take  by mouth.        Cyclobenzaprine HCl (Tab) FLEXERIL 10 MG TAKE 1 TABLET BY MOUTH THREE TIMES DAILY AS NEEDED FOR MILD PAIN        DiazePAM (Tab) VALIUM 5 MG TAKE ONE TABLET BY MOUTH EVERY 12 HOURS AS NEEDED FOR ANXIETY        Fexofenadine HCl (Tab) ALLEGRA 60 MG Take 1 Tab by mouth every day.        Fluticasone Propionate (Suspension) FLONASE 50 MCG/ACT Spray 1 Spray in nose 2 times a day.        Folic Acid (Tab) FOLVITE 1 MG Take 3 Tabs by mouth every day.        GuaiFENesin (Tab) Guaifenesin 400 MG Take 1 Tab by mouth 3 times a day as needed.        Lisinopril (Tab) PRINIVIL 10 MG Take 1 Tab by mouth every day.        Loratadine (Cap) Loratadine 10 MG Take  by mouth.        Meloxicam (Tab) MOBIC 15 MG TAKE ONE TABLET BY MOUTH ONCE DAILY        Methotrexate Sodium   Take 15 mg by mouth every 7 days.        Multiple Vitamins-Minerals   Take  by mouth.        Niacin (Tab) niacin 500 MG Take 500 mg by mouth every day.        Omeprazole (CAPSULE DELAYED RELEASE) PRILOSEC 20 MG Take 20 mg by mouth every day.        Oxycodone-Acetaminophen (Tab) PERCOCET-10  MG Take 1 Tab by mouth 5 Times a Day.        PARoxetine HCl (Tab) PAXIL 20 MG TAKE ONE TABLET BY MOUTH ONCE DAILY        PredniSONE (Tab) DELTASONE 1 MG Take 2 mg daily after completing 5 mg prednisone daily        RiTUXimab   by Intravenous route. Two every 6 months         Rosuvastatin Calcium (Tab) CRESTOR 10 MG Take 1 Tab by mouth every evening.        Testosterone Cypionate (Solution) DEPO-TESTOSTERONE 200 MG/ML INJECT 1 ML (CC) INTRAMUSCULARLY EVERY 2  WEEKS AS DIRECTED        Vardenafil HCl (Tab) LEVITRA 20 MG Take 20 mg by mouth as needed.        Zinc (Cap) Zinc 50 MG Take 50 mg by mouth every day.        .                 Medicines prescribed today were sent to:     [facility] - [place], [place] - [address] [place] [address]    Phone: [phone] Fax: [phone]    Open 24 Hours?: No      Medication refill instructions:       If your prescription bottle indicates you have medication refills left, it is not necessary to call your provider’s office. Please contact your pharmacy and they will refill your medication.    If your prescription bottle indicates you do not have any refills left, you may request refills at any time through one of the following ways: The online [employer] system (except Urgent Care), by calling your provider’s office, or by asking your pharmacy to contact your provider’s office with a refill request. Medication refills are processed only during regular business hours and may not be available until the next business day. Your provider may request additional information or to have a follow-up visit with you prior to refilling your medication.   *Please Note: Medication refills are assigned a new Rx number when refilled electronically. Your pharmacy may indicate that no refills were authorized even though a new prescription for the same medication is available at the pharmacy. Please request the medicine by name with the pharmacy before contacting your provider for a refill.        Your To Do List     Future Labs/Procedures Complete By Expires    DX-HAND 3+ LEFT  As directed 5/26/2018    DX-HAND 3+ RIGHT  As directed 5/26/2018    HEP B CORE AB TOTAL  As directed 5/26/2018    HEP B SURFACE ANTIGEN  As directed 5/26/2018    HEP C VIRUS ANTIBODY  As directed 5/26/2018      Referral     A referral request has been sent to our patient care coordination department. Please allow 3-5 business days for us to process this request  and contact you either by phone or mail. If you do not hear from us by the 5th business day, please call us at [phone].        Other Notes About Your Plan     Pt uses [facility]  For PA  [phone]  [facility] Care ID #  [id number]  Depo-Testosterone  200 ML Pt uses 1 ml every two weeks he gets 1 x 10 ml Vial each time.  Auth good from 12/17/2012 thru 12/12/2013           Readmill Access Code: Activation code not generated  Current Readmill Status: Active

## 2017-05-26 NOTE — Clinical Note
[facility]   [address], [address]  [place] [address]  Phone: [phone]  Fax: [phone]              Encounter Date: 5/26/2017    Dear Dr. Lao.    It was a pleasure seeing your patient, Edgardo Sims, on 5/26/2017. Diagnoses of Seronegative rheumatoid arthritis (CMS-[facility]), Encounter for long-term (current) use of high-risk medication, and Other chronic pain were pertinent to this visit.     Please find attached progress note which includes the history I obtained from Mr. Sims, my physical examination findings, my impression and recommendations.      Once again, it was a pleasure participating in your patient's care.  Please feel free to contact me if you have any questions or if I can be of any further assistance to your patients.      Sincerely,    Antonieta Canales M.D.  Electronically Signed          PROGRESS NOTE:  Chief Complaint- seropositive Rheumatoid Arthritis    Chief Complaint   Patient presents with   • New Patient     new patient      Edgardo Sims is a 61 y.o. male here as a new Rheumatology Consult referred by John Lao D.O. for consultation regarding rheumatoid arthritis    HPI:     Mr. Edgardo Sims presents with a history of Rheumatoid Arthritis diagnosed late 1999 and recent RF and CCP were negative.  He was previously a patient of Dr. Mcknight and then followed by a community rheumatology.  He is here today to establish care. In review of Dr. Echavarria that it seems he has been on Rituxan as far back as 2009. At that time he was on a regimen of azathioprine 150 mg methotrexate 15 mg, prednisone 7.5 mg and rituximab with 125 mg of Solu-Medrol he seemed to have been maintained on this regimen and was tapered down on the Imuran to 50 mg twice a day. Notes from May 24, 2012 indicates that he discontinue the Imuran. At that time he was on rituximab and every 6 months 15 mg of methotrexate weekly and 2 mg of prednisone. Notes from June 8,  2015 does remark that his RA was only manageable he's had limited response. He did suffer a consultation with methotrexate developing stomatitis. Methotrexate was held March 6, 2014 and was reinstituted January 5, 2015.     Dr. Echavarria's notes it does comment that he may benefit from rituximab every 5 months however given cost restrictions he can only get it every 6 months. His last infusion was in March 2017 for rituximab.  He reports he is not doing as well on the rituximab.  He feels his activity level is not as active.  He feels increase stiffness.  His feet xrays     Complications to his care include intolerance to medications  And stomatitis with methotrexate.  Methotrexate was stopped briefly and subsequently was restarted but the patient reports it is not working as well as before.     Today he reports morning stiffness 1 hour to all day.  When he gets up he reports feet pain.  He reports hand pain in the different joints particularly the MCP. Of the 2nd and 3rd joint.  He also has held methotrexate x 3 weeks due to concern for infection while he is treating his allergies and a cough.  He has had these symptoms for 2 months.  He is currently taking fluticasone as well as cetrizine with no improvement of his symptoms.  He denies any fevers or chills. He attributes some of the swelling he isn't experiencing as well as the stiffness to being off the methotrexate.      Recent labs to show 1/2017  Metamyelocytes and myelocytes and elevated monocytes and eosinophils and basophils.  MCV was elevated and has been at 106    Medications  Long term was on prednisone 2 mg but has varied in the past.    Previous medications include   azathioprine, methotrexate  Plaquenil and Arava both intolerance not allergies  Remicade  Gold shots (had a bad reaction)  Enbrel  Not sure Humira  Not sure about orencia  Methylprednisolone did not work as well as prednisone  Never been on Xeljanz  rituximab    Chronic Pain  On percocet  ranging from 30-40 mg daily    Past medical HIstory: bilateral hip replacment and left knee replacement, tonsillitis, varicose vein in the left leg, TMJ, tonsillitis in 1964, broken right ankle in 1980, ruptured disc of L5-S1 noted in 2000 with discectomy and laminectomy, rotator cuff with anterior labral repair in 2001, basal cell carcinoma in 2005 from the nose, meniscal tear of the left knee in 2008 with scope repair, scope repair and biceps tendon repair as well as rotator cuff surgery in 2011 of the right shoulder. Dupuytren's contracture right hand little finger, Dupuytren's contracture right foot, arthritis of the right foot, plantar fasciitis bilateral, aortic insufficiency, hypertension, dyslipidemia, heart murmur, tachycardia, coronary artery disease with mild plaque at catheter. Hiatal hernia and GERD, scoliosis, kyphosis of the upper back/compensating lordosis of the neck . Vein ligation radiofrequency      Family HIStory: lymphoma     Social History: light bookeeping mainly disability, NEVER smoker, occasional alcohol (once a week)    He denies headaches, no rashes, no diarrhea or constipation, no eye inflammation, no oral ulcers, no chest pain no shortness of breath.  But sometimes night sweats.   He does have a cough from the allergies.  No fevers no chils and occasional night sweats.  He reports sometimes fingers will turn white or purple (Raynauds) aggravated with cold weather.  He does dry mouth no dry eyes.  He repots significant caviteis he has upper denture and a partial on the lower.  He also reports faitigue.  After TMJ surgery it loosened his front four teeth in braces for few years.     Current medications he's provided a list includes : methotrexate 15 mg weekly, testosterone every 14 days 200 mg IM injection, Levitra as needed 20 mg tablets, rituximab every 6 months 1000 mg ×2, Percocet he takes 10 mg which she reports taking doses of 3-5 per day. Flexeril 10 mg takes 1-3 tablets a day.  Valium 5 mg takes 0-2 a day. Lisinopril takes 10 mg a day. Bisoprolol 10 mg a day. Paxil 20 mg daily. Folic acid 2 mg daily. Prednisone 2 mg daily. Prilosec over-the-counter 20 mg daily. Meloxicam 15 mg daily. Cetirizine 10 mg daily. Zyrtec is on 50 µg 2 sprays to each nostril. B12 5000 µg. Niacin 500 mg. Zinc 50 mg. And one multivitamin    Current medicines (including changes today)  Current Outpatient Prescriptions   Medication Sig Dispense Refill   • cetirizine (ZYRTEC) 10 MG Tab Take 10 mg by mouth every day.     • Cyanocobalamin (VITAMIN B-12) 5000 MCG TABLET DISPERSIBLE Take  by mouth.     • niacin 500 MG Tab Take 500 mg by mouth every day.     • Zinc 50 MG Cap Take 50 mg by mouth every day.     • Calcium Carbonate-Vitamin D (CALCIUM 600 + D) 600-400 MG-UNIT Tab Take 1 Tab by mouth 2 times a day, with meals. 60 Tab 3   • predniSONE (DELTASONE) 1 MG Tab Take 2 mg daily after completing 5 mg prednisone daily 30 Tab 0   • folic acid (FOLVITE) 1 MG Tab Take 3 Tabs by mouth every day. 90 Tab 3   • diazepam (VALIUM) 5 MG Tab TAKE ONE TABLET BY MOUTH EVERY 12 HOURS AS NEEDED FOR ANXIETY 60 Tab 5   • oxycodone-acetaminophen (PERCOCET-10)  MG Tab Take 1 Tab by mouth 5 Times a Day. 150 Tab 0   • paroxetine (PAXIL) 20 MG Tab TAKE ONE TABLET BY MOUTH ONCE DAILY 90 Tab 3   • fluticasone (FLONASE) 50 MCG/ACT nasal spray Spray 1 Spray in nose 2 times a day. 1 Bottle 3   • cyclobenzaprine (FLEXERIL) 10 MG Tab TAKE 1 TABLET BY MOUTH THREE TIMES DAILY AS NEEDED FOR MILD PAIN 90 Tab 2   • Multiple Vitamins-Minerals (MULTI COMPLETE PO) Take  by mouth.     • bisoprolol (ZEBETA) 10 MG tablet Take 1 Tab by mouth every day. 90 Tab 3   • testosterone cypionate (DEPO-TESTOSTERONE) 200 MG/ML Solution injection INJECT 1 ML (CC) INTRAMUSCULARLY EVERY 2 WEEKS AS DIRECTED 10 mL 3   • lisinopril (PRINIVIL) 10 MG Tab Take 1 Tab by mouth every day. 90 Tab 3   • meloxicam (MOBIC) 15 MG tablet TAKE ONE TABLET BY MOUTH ONCE DAILY 30 Tab 6      • vardenafil (LEVITRA) 20 MG tablet Take 20 mg by mouth as needed.     • Methotrexate Sodium (METHOTREXATE PO) Take 15 mg by mouth every 7 days.     • omeprazole (PRILOSEC) 20 MG CPDR Take 20 mg by mouth every day.     • Loratadine (CLARITIN) 10 MG Cap Take  by mouth.     • Guaifenesin 400 MG Tab Take 1 Tab by mouth 3 times a day as needed. 90 Tab 3   • fexofenadine (ALLEGRA) 60 MG Tab Take 1 Tab by mouth every day. 30 Tab 3   • rosuvastatin (CRESTOR) 10 MG Tab Take 1 Tab by mouth every evening. 90 Tab 3   • atorvastatin (LIPITOR) 40 MG Tab Take 1 Tab by mouth every evening. 90 Tab 3   • RiTUXimab (RITUXAN IV) by Intravenous route. Two every 6 months        No current facility-administered medications for this visit.     He  has a past medical history of Rheumatoid arthritis (CMS-HCC); Hypertension; Aortic insufficiency (7/5/2011); HTN (hypertension) (7/5/2011); Dyslipidemia (7/12/2011); Cancer (CMS-HCC); Heart murmur; Hiatus hernia syndrome; Pain; Abnormal myocardial perfusion study (1/15/2014); Infectious disease; Heart burn; Indigestion; Bronchitis; Arthritis; Cold; Coughing blood; Tachycardia (10/20/2014); and CAD (coronary artery disease) mild plaque at cath in 2/14 (12/5/2014).  He  has past surgical history that includes knee arthroscopy; other orthopedic surgery; hip arthroplasty total (6/20/08); vein ligation radio frequency (12/8/2008); knee arthroplasty total (6/1/2009); shoulder surgery; other; other; lumbar laminectomy diskectomy (2000); tmj reconstruction bilateral (1994); hip arthroplasty total (5/31/2013); and recovery (2/3/2014).  Family History   Problem Relation Age of Onset   • Hypertension Mother      No family status information on file.     Social History   Substance Use Topics   • Smoking status: Never Smoker    • Smokeless tobacco: Never Used   • Alcohol Use: 3.0 oz/week     6 Cans of beer per week     Social History     Social History Narrative         ROS  Constitutional ROS: Positive  "for fatigue   Eye ROS: No eye pain, redness, discharge  Ear ROS: No recent change in hearing  Mouth/Throat ROS: No recent change in voice or hoarseness  Neck ROS: No lumps or masses  Pulmonary ROS: Positive for cough productive and rhinitis  Cardiovascular ROS: No chest pain, No shortness of breath  Gastrointestinal ROS: No abdominal pain  Musculoskeletal/Extremities ROS: Positive for arthritis   Hematologic/Lymphatic ROS: sometimes night sweats  Skin/Integumentary ROS: Raynauds  Neurologic ROS: normal       Objective:     Blood pressure 132/80, pulse 76, temperature 37 °C (98.6 °F), resp. rate 16, height 1.727 m (5' 8\"), weight 92.534 kg (204 lb), SpO2 98 %. Body mass index is 31.03 kg/(m^2).  Physical Exam:    Filed Vitals:    05/26/17 1520   BP: 132/80   Pulse: 76   Temp: 37 °C (98.6 °F)   Resp: 16   Height: 1.727 m (5' 8\")   Weight: 92.534 kg (204 lb)   SpO2: 98%    Body mass index is 31.03 kg/(m^2).    General/Constitutional: NAD not diaphoretic, comfortable  Eyes: clear conjunctiva, no scleral icterus, EOMI, PERRL  Ears, Nose, Mouth,Throat: no oral ulcers, good dentition, moist mucous membranes, no discharge from ears bilaterally  Cardiovascular: regular rate and rhythm.  No murmurs, gallops, rubs  Respiratory: normal effort, coughs with deep inspiration On auscultation no wheezes, rales, rhonchi.  Coarse breath sounds.  No crackles.  GI: soft, NTTP no hepatosplenomegaly, nondistended  Musculoskeletal  Axial:  Thoracic: kyphosis  Upper Extremities:  Tenderness in the PIP.  swelliing bilateral wrists L>>R nodule on the palmar side of right ahnd 5th digit.  midl periarituclar swelling of the MCP of the 2nd and 3rd.  Heberbon nodes appreciated at the DIP  Wrists and Elbows have good ROM  Muscle Strength: 5/5 in deltoids, biceps, triceps, finger , wrists extension bilateral  Lower Extremities:  There is bilateral MTP tenderness.  Hammertoe of the left foot 2nd digit.    Gait is normal  Skin: Limited skin " exam.  no rashes, no digital ulcerations, no alopecia, no tophi, no skin thickening, no nodules  Neuro: CN II-XII grossly intact, Alert, Oriented x 3, moves all four extremities  Psych: normal affect, normal mood, judgement appropriate, follows commands, responses are appropritae  Heme/Lymph: no cervical adenopathy        Lab Results   Component Value Date/Time    QUANTIFERON TB GOLD Negative 08/19/2015 03:20 PM     No results found for: HEPBCORTOT, HEPBCORIGM, HEPBSAG  No results found for: HEPCAB  Lab Results   Component Value Date/Time    SODIUM 141 09/21/2016 09:21 AM    POTASSIUM 4.7 09/21/2016 09:21 AM    CHLORIDE 99 09/21/2016 09:21 AM    CO2 25 09/21/2016 09:21 AM    GLUCOSE 82 09/21/2016 09:21 AM    BUN 13 09/21/2016 09:21 AM    CREATININE 1.02 09/21/2016 09:21 AM    BUN-CREATININE RATIO 13 09/21/2016 09:21 AM      Lab Results   Component Value Date/Time    WBC 8.6 08/26/2016 11:19 AM    RBC 4.41 08/26/2016 11:19 AM    HEMOGLOBIN 15.5 08/26/2016 11:19 AM    HEMATOCRIT 46.7 08/26/2016 11:19 AM    * 08/26/2016 11:19 AM    MCH 35.1* 08/26/2016 11:19 AM    MCHC 33.2 08/26/2016 11:19 AM    MPV 9.9 08/19/2015 03:20 PM    NEUTROPHILS-POLYS 61 08/26/2016 11:19 AM    LYMPHOCYTES 18 08/26/2016 11:19 AM    MONOCYTES 13 08/26/2016 11:19 AM    EOSINOPHILS 7 08/26/2016 11:19 AM    BASOPHILS 1 08/26/2016 11:19 AM    HYPOCHROMIA 1+ 03/05/2014 04:43 PM    ANISOCYTOSIS 1+ 01/02/2015 05:02 PM      Lab Results   Component Value Date/Time    CALCIUM 8.8 09/21/2016 09:21 AM    AST(SGOT) 25 01/10/2017 07:24 AM    ALT(SGPT) 30 01/10/2017 07:24 AM    ALKALINE PHOSPHATASE 77 01/10/2017 07:24 AM    TOTAL BILIRUBIN 0.5 01/10/2017 07:24 AM    ALBUMIN 4.5 01/10/2017 07:24 AM    TOTAL PROTEIN 6.9 01/10/2017 07:24 AM     No results found for: URICACID, RHEUMFACTN, CCPANTIBODY, ANTINUCAB  Lab Results   Component Value Date/Time    SED RATE JEFERSON 7 03/05/2014 04:43 PM     No results found for: DENYS WILLAMS  No  results found for: C5KKDDSGPCP, D6IBNCEJGPX, IGGCARDIOLI, IGMCARDIOLI, IGACARDIOLI, CRYOGLOBULIN  No results found for: ANADIRECT, ANTIDNADS, RNPAB, SMITHAB, RVGJPZZ21, SSAROAB, SSBLAAB, CIBAZX8PT, CENTROMBAB  No results found for: ANTIDNADS, DSDNA, AGBMAB, GBMABA, ANCAIGG, [id number], JO1AB, RNPAB, ANTISSASJ, ANTISSBSJ  Lab Results   Component Value Date/Time    COLOR Lt. Yellow 08/19/2015 03:20 PM    SPECIFIC GRAVITY 1.008 08/19/2015 03:20 PM    PH 6.5 08/19/2015 03:20 PM    GLUCOSE Negative 08/19/2015 03:20 PM    KETONES Negative 08/19/2015 03:20 PM    PROTEIN Negative 08/19/2015 03:20 PM     Lab Results   Component Value Date/Time    TOTAL VOLUME 2750 08/18/2015 07:30 AM    TOTAL VOLUME 2750 08/18/2015 07:30 AM     No results found for: SSA60, SSA52  No results found for: HBA1C  Lab Results   Component Value Date/Time    CPK TOTAL 65 01/10/2017 07:24 AM     No results found for: G6PD  No results found for: [id number]  No results found for: ACESERUM  Lab Results   Component Value Date/Time    25-HYDROXY   VITAMIN D 25 42 03/07/2013 11:20 AM     Lab Results   Component Value Date/Time    FREE T-4 1.11 12/01/2014 03:13 PM     No results found for: TSHULTRASEN, FREET4  No results found for: MICROSOMALA, ANTITHYROGL  No results found for: IGGLYMEABS  No results found for: ANTIMITOCHO, FACTIN  No results found for: IGA, TTRANSIGA, ENDOIGA  No results found for: FLTYPE, CRYSTALSBDF  No results found for: ISTATICAL  No results found for: ISTATCREAT  No results found for: CTELOPEP  No results found for: GBMABG  Lab Results   Component Value Date/Time    PTH, INTACT 55 10/10/2008 10:42 AM             Results for orders placed during the hospital encounter of 08/17/11   DS-BONE DENSITY STUDY (DEXA)    Impression According to the World Health Organization classification, bone mineral density of this patient is normal.              Results for orders placed during the hospital encounter of 05/31/13   DX-PELVIS-1 OR 2 VIEWS     Impression Interval performance of a left hip arthroplasty.  Right hip arthroplasty is unchanged.              INTERPRETING LOCATION: [id number] [facility], [facility], [id number]     Results for orders placed during the hospital encounter of 01/30/09   DX-KNEE COMPLETE 4+    Impression IMPRESSION:     1. NEW FINDINGS CONSISTENT WITH OSTEOCHONDROSIS DISSECANS OF THE MEDIAL   FEMORAL CONDYLE.    2. NEW MODERATE JOINT SPACE NARROWING OF THE MEDIAL TIBIOFEMORAL   COMPARTMENT, CONSISTENT WITH CARTILAGE THINNING.    3. NEW MODERATE JOINT EFFUSION.      SST/llw      Read By BLANCA ORTIZ MD on Jan 30 2009  3:39PM  : LLW Transcription Date: Feb 2 2009  6:08AM  THIS DOCUMENT HAS BEEN ELECTRONICALLY SIGNED BY: BLANCA ORTIZ MD on   Feb  3 2009 11:43AM                                   Results for orders placed during the hospital encounter of 10/27/14   Echocardiogram Comp w/o Cont              Results for orders placed during the hospital encounter of 12/21/07   MR-KNEE-W/O    Impression IMPRESSION:    1. NON-DISPLACED OBLIQUE TEAR OF THE POSTERIOR HORN MEDIAL MENISCUS   COMMUNICATING WITH SUPERIOR AND INFERIOR ARTICULAR SURFACES WITHOUT   EVIDENCE OF PARAMENISCAL CYST.  UNDERLYING CHONDROMALACIA IS SEEN WITHIN   THE MEDIAL FEMORAL CONDYLE POSTERIOR WEIGHTBEARING SURFACE OF   NON-FULL-THICKNESS SEVERITY.        2. MENISCAL DEGENERATION AND PARTIAL PERIPHERAL EXTRUSION INVOLVING THE   MID-BODY OF THE MEDIAL MENISCUS.        GE[facility]        Read By KEITH NETTLES MD on Dec 21 2007 12:14PM  : AJAY Transcription Date: Dec 22 2007 10:16AM  THIS DOCUMENT HAS BEEN ELECTRONICALLY SIGNED BY: KEITH NETTLES MD on   Dec 24 2007 12:21PM        Results for orders placed during the hospital encounter of 02/14/05   MR-CERVICAL SPINE-W/O    Impression IMPRESSION:    1. DEGENERATIVE DISC DISEASE C5-6 AND C6-7 WITH SOME MINOR POSTERIOR   SPURRING BUT WITHOUT ANY REAL CANAL STENOSIS AND WITH NO EVIDENCE OF  AN   ACUTE DISC EXTRUSION.  2. MILD TO MODERATE MIDCERVICAL NEURAL FORAMINAL NARROWING DUE TO   UNCINATE AND FACET HYPERTROPHY, WITH MULTILEVEL DISEASE PRESENT AND WITH   SOME MILD INTERVAL PROGRESSION COMPARED WITH THE PRIOR MRI.  THE AXIAL   IMAGES OVERESTIMATE THE DEGREE OF NEURAL FORAMINAL STENOSIS DUE TO   ARTIFACTS, WITH THE SAGITTAL IMAGES DEMONSTRATING MILD NEURAL FORAMINAL   STENOTIC CHANGES IN THE MIDCERVICAL LEVELS.    3. NO EVIDENCE OF ACUTE TRAUMA.         Read By KEITH NETLTES MD on Feb 14 2005  1:10PM  : SHRADDHA Transcription Date: Feb 15 2005 11:24AM  THIS DOCUMENT HAS BEEN ELECTRONICALLY SIGNED BY: KEITH NETTLES MD on   Feb 15 2005 12:06PM        Results for orders placed during the hospital encounter of 02/14/05   DX-CERVICAL SPINE-2 OR 3 VIEWS    Impression IMPRESSION:    MULTILEVEL CERVICAL SPINE DEGENERATIVE CHANGE, WITHOUT INTERVAL CHANGE.          Read By MIKKI RODRIGUEZ MD on Feb 14 2005  3:04PM  : EMMIE Transcription Date: Feb 14 2005  3:10PM  THIS DOCUMENT HAS BEEN ELECTRONICALLY SIGNED BY: MIKKI RODRIGUEZ MD on Feb 14 2005  3:17PM            Assessment and Plan:  Rheumatoid Arthritis, seronegative  On exam today the patient is demonstrating synovitis and tenderness at the PIPs as well as the MCPs. And also synovitis in the wrists and MTP tenderness on squeeze test. He is about 3 months from his last Rituxan dose. He will be due about September 2017.  Difficult to ascertain if the regimen of rituximab +2 mg of prednisone plus methotrexate is working as he's been off methotrexate. Per his history it seems that the rituximab plus methotrexate combination has not been as efficacious for him and he is willing and interested in changing and considering Xelmeganz.  This is my first time meeting Mr. Sims. We will check hepatitis panel and a QuantiFERON. I am open to considering Salagen as it seems that the rituximab may not be as effective even with the addition of  methotrexate. In review of his records it seems in the past he has been on a regimen of methotrexate plus azathioprine plus rituximab. We could consider returning to this regimen as an alternative.    Today he does seem to have mild disease but I am concerned that there may be more than just rhinitis. I agree to hold the methotrexate I will recheck the labs. He has been off methotrexate at this point for 3 weeks. I'll consider a course of prednisone 5 mg to see if this can alleviate some of the joint pains and stiffness.    Rhinitis + cough  Lungs are clear   Will follow-up with blood work  Holding methotrexate  Will consider doxycycline course pending blood work      Abnormal CBC  Will repeat  If abnormal will refer to Hematology  His MCV is elevated.  We will increase his folic acid.    Chronic Pain  Will refer to pain management for assistance in monitoring use of percocet.  He has been well maintained on his regimen.      1. Seronegative rheumatoid arthritis (CMS-HCC)  DX-HAND 3+ LEFT    CBC WITH DIFFERENTIAL    COMP METABOLIC PANEL    HEP B CORE AB TOTAL    HEP B SURFACE ANTIGEN    HEP B SURFACE AB    HEP C VIRUS ANTIBODY    DX-HAND 3+ RIGHT    WESTERGREN SED RATE    CRP HIGH SENSITIVE    VITAMIN D 25-HYDROXY    Calcium Carbonate-Vitamin D (CALCIUM 600 + D) 600-400 MG-UNIT Tab    predniSONE (DELTASONE) 1 MG Tab    folic acid (FOLVITE) 1 MG Tab    QUANTIFERON TB GOLD (IN TUBE)   2. Encounter for long-term (current) use of high-risk medication  VITAMIN D 25-HYDROXY    Calcium Carbonate-Vitamin D (CALCIUM 600 + D) 600-400 MG-UNIT Tab    predniSONE (DELTASONE) 1 MG Tab    folic acid (FOLVITE) 1 MG Tab    QUANTIFERON TB GOLD (IN TUBE)   3. Other chronic pain  REFERRAL TO PAIN CLINIC           Records requested.  Followup: Return in about 3 weeks (around 6/16/2017). or sooner prn  Patient was seen 60 minutes face-to-face (excluding time for procedures)  of which more than 50% the time was spent counseling the patient  regarding  rheumatological conditions and care. Therapy was discussed in detail.  Thank you for this referral.

## 2017-05-26 NOTE — PROGRESS NOTES
Chief Complaint- seropositive Rheumatoid Arthritis    Chief Complaint   Patient presents with   • New Patient     new patient      Edgardo Sims is a 61 y.o. male here as a new Rheumatology Consult referred by John Lao D.O. for consultation regarding rheumatoid arthritis    HPI:     Mr. Edgardo Sims presents with a history of Rheumatoid Arthritis diagnosed late 1999 and recent RF and CCP were negative.  He was previously a patient of Dr. Mcknight and then followed by a community rheumatology.  He is here today to establish care. In review of Dr. Echavarria that it seems he has been on Rituxan as far back as 2009. At that time he was on a regimen of azathioprine 150 mg methotrexate 15 mg, prednisone 7.5 mg and rituximab with 125 mg of Solu-Medrol he seemed to have been maintained on this regimen and was tapered down on the Imuran to 50 mg twice a day. Notes from May 24, 2012 indicates that he discontinue the Imuran. At that time he was on rituximab and every 6 months 15 mg of methotrexate weekly and 2 mg of prednisone. Notes from June 8, 2015 does remark that his RA was only manageable he's had limited response. He did suffer a consultation with methotrexate developing stomatitis. Methotrexate was held March 6, 2014 and was reinstituted January 5, 2015.     Dr. Echavarria's notes it does comment that he may benefit from rituximab every 5 months however given cost restrictions he can only get it every 6 months. His last infusion was in March 2017 for rituximab.  He reports he is not doing as well on the rituximab.  He feels his activity level is not as active.  He feels increase stiffness.  His feet xrays     Complications to his care include intolerance to medications  And stomatitis with methotrexate.  Methotrexate was stopped briefly and subsequently was restarted but the patient reports it is not working as well as before.     Today he reports morning stiffness 1 hour to all day.  When he gets up  he reports feet pain.  He reports hand pain in the different joints particularly the MCP. Of the 2nd and 3rd joint.  He also has held methotrexate x 3 weeks due to concern for infection while he is treating his allergies and a cough.  He has had these symptoms for 2 months.  He is currently taking fluticasone as well as cetrizine with no improvement of his symptoms.  He denies any fevers or chills. He attributes some of the swelling he isn't experiencing as well as the stiffness to being off the methotrexate.      Recent labs to show 1/2017  Metamyelocytes and myelocytes and elevated monocytes and eosinophils and basophils.  MCV was elevated and has been at 106    Medications  Long term was on prednisone 2 mg but has varied in the past.    Previous medications include   azathioprine, methotrexate  Plaquenil and Arava both intolerance not allergies  Remicade  Gold shots (had a bad reaction)  Enbrel  Not sure Humira  Not sure about orencia  Methylprednisolone did not work as well as prednisone  Never been on Xeljanz  rituximab    Chronic Pain  On percocet ranging from 30-40 mg daily    Past medical HIstory: bilateral hip replacment and left knee replacement, tonsillitis, varicose vein in the left leg, TMJ, tonsillitis in 1964, broken right ankle in 1980, ruptured disc of L5-S1 noted in 2000 with discectomy and laminectomy, rotator cuff with anterior labral repair in 2001, basal cell carcinoma in 2005 from the nose, meniscal tear of the left knee in 2008 with scope repair, scope repair and biceps tendon repair as well as rotator cuff surgery in 2011 of the right shoulder. Dupuytren's contracture right hand little finger, Dupuytren's contracture right foot, arthritis of the right foot, plantar fasciitis bilateral, aortic insufficiency, hypertension, dyslipidemia, heart murmur, tachycardia, coronary artery disease with mild plaque at catheter. Hiatal hernia and GERD, scoliosis, kyphosis of the upper back/compensating  lordosis of the neck . Vein ligation radiofrequency      Family HIStory: lymphoma     Social History: light bookeeping mainly disability, NEVER smoker, occasional alcohol (once a week)    He denies headaches, no rashes, no diarrhea or constipation, no eye inflammation, no oral ulcers, no chest pain no shortness of breath.  But sometimes night sweats.   He does have a cough from the allergies.  No fevers no chils and occasional night sweats.  He reports sometimes fingers will turn white or purple (Raynauds) aggravated with cold weather.  He does dry mouth no dry eyes.  He repots significant caviteis he has upper denture and a partial on the lower.  He also reports faitigue.  After TMJ surgery it loosened his front four teeth in braces for few years.     Current medications he's provided a list includes : methotrexate 15 mg weekly, testosterone every 14 days 200 mg IM injection, Levitra as needed 20 mg tablets, rituximab every 6 months 1000 mg ×2, Percocet he takes 10 mg which she reports taking doses of 3-5 per day. Flexeril 10 mg takes 1-3 tablets a day. Valium 5 mg takes 0-2 a day. Lisinopril takes 10 mg a day. Bisoprolol 10 mg a day. Paxil 20 mg daily. Folic acid 2 mg daily. Prednisone 2 mg daily. Prilosec over-the-counter 20 mg daily. Meloxicam 15 mg daily. Cetirizine 10 mg daily. Zyrtec is on 50 µg 2 sprays to each nostril. B12 5000 µg. Niacin 500 mg. Zinc 50 mg. And one multivitamin    Current medicines (including changes today)  Current Outpatient Prescriptions   Medication Sig Dispense Refill   • cetirizine (ZYRTEC) 10 MG Tab Take 10 mg by mouth every day.     • Cyanocobalamin (VITAMIN B-12) 5000 MCG TABLET DISPERSIBLE Take  by mouth.     • niacin 500 MG Tab Take 500 mg by mouth every day.     • Zinc 50 MG Cap Take 50 mg by mouth every day.     • Calcium Carbonate-Vitamin D (CALCIUM 600 + D) 600-400 MG-UNIT Tab Take 1 Tab by mouth 2 times a day, with meals. 60 Tab 3   • predniSONE (DELTASONE) 1 MG Tab Take 2  mg daily after completing 5 mg prednisone daily 30 Tab 0   • folic acid (FOLVITE) 1 MG Tab Take 3 Tabs by mouth every day. 90 Tab 3   • diazepam (VALIUM) 5 MG Tab TAKE ONE TABLET BY MOUTH EVERY 12 HOURS AS NEEDED FOR ANXIETY 60 Tab 5   • oxycodone-acetaminophen (PERCOCET-10)  MG Tab Take 1 Tab by mouth 5 Times a Day. 150 Tab 0   • paroxetine (PAXIL) 20 MG Tab TAKE ONE TABLET BY MOUTH ONCE DAILY 90 Tab 3   • fluticasone (FLONASE) 50 MCG/ACT nasal spray Spray 1 Spray in nose 2 times a day. 1 Bottle 3   • cyclobenzaprine (FLEXERIL) 10 MG Tab TAKE 1 TABLET BY MOUTH THREE TIMES DAILY AS NEEDED FOR MILD PAIN 90 Tab 2   • Multiple Vitamins-Minerals (MULTI COMPLETE PO) Take  by mouth.     • bisoprolol (ZEBETA) 10 MG tablet Take 1 Tab by mouth every day. 90 Tab 3   • testosterone cypionate (DEPO-TESTOSTERONE) 200 MG/ML Solution injection INJECT 1 ML (CC) INTRAMUSCULARLY EVERY 2 WEEKS AS DIRECTED 10 mL 3   • lisinopril (PRINIVIL) 10 MG Tab Take 1 Tab by mouth every day. 90 Tab 3   • meloxicam (MOBIC) 15 MG tablet TAKE ONE TABLET BY MOUTH ONCE DAILY 30 Tab 6   • vardenafil (LEVITRA) 20 MG tablet Take 20 mg by mouth as needed.     • Methotrexate Sodium (METHOTREXATE PO) Take 15 mg by mouth every 7 days.     • omeprazole (PRILOSEC) 20 MG CPDR Take 20 mg by mouth every day.     • Loratadine (CLARITIN) 10 MG Cap Take  by mouth.     • Guaifenesin 400 MG Tab Take 1 Tab by mouth 3 times a day as needed. 90 Tab 3   • fexofenadine (ALLEGRA) 60 MG Tab Take 1 Tab by mouth every day. 30 Tab 3   • rosuvastatin (CRESTOR) 10 MG Tab Take 1 Tab by mouth every evening. 90 Tab 3   • atorvastatin (LIPITOR) 40 MG Tab Take 1 Tab by mouth every evening. 90 Tab 3   • RiTUXimab (RITUXAN IV) by Intravenous route. Two every 6 months        No current facility-administered medications for this visit.     He  has a past medical history of Rheumatoid arthritis (CMS-HCC); Hypertension; Aortic insufficiency (7/5/2011); HTN (hypertension) (7/5/2011);  "Dyslipidemia (7/12/2011); Cancer (CMS-[facility]); Heart murmur; Hiatus hernia syndrome; Pain; Abnormal myocardial perfusion study (1/15/2014); Infectious disease; Heart burn; Indigestion; Bronchitis; Arthritis; Cold; Coughing blood; Tachycardia (10/20/2014); and CAD (coronary artery disease) mild plaque at cath in 2/14 (12/5/2014).  He  has past surgical history that includes knee arthroscopy; other orthopedic surgery; hip arthroplasty total (6/20/08); vein ligation radio frequency (12/8/2008); knee arthroplasty total (6/1/2009); shoulder surgery; other; other; lumbar laminectomy diskectomy (2000); tmj reconstruction bilateral (1994); hip arthroplasty total (5/31/2013); and recovery (2/3/2014).  Family History   Problem Relation Age of Onset   • Hypertension Mother      No family status information on file.     Social History   Substance Use Topics   • Smoking status: Never Smoker    • Smokeless tobacco: Never Used   • Alcohol Use: 3.0 oz/week     6 Cans of beer per week     Social History     Social History Narrative         ROS  Constitutional ROS: Positive for fatigue   Eye ROS: No eye pain, redness, discharge  Ear ROS: No recent change in hearing  Mouth/Throat ROS: No recent change in voice or hoarseness  Neck ROS: No lumps or masses  Pulmonary ROS: Positive for cough productive and rhinitis  Cardiovascular ROS: No chest pain, No shortness of breath  Gastrointestinal ROS: No abdominal pain  Musculoskeletal/Extremities ROS: Positive for arthritis   Hematologic/Lymphatic ROS: sometimes night sweats  Skin/Integumentary ROS: Raynauds  Neurologic ROS: normal       Objective:     Blood pressure 132/80, pulse 76, temperature 37 °C (98.6 °F), resp. rate 16, height 1.727 m (5' 8\"), weight 92.534 kg (204 lb), SpO2 98 %. Body mass index is 31.03 kg/(m^2).  Physical Exam:    Filed Vitals:    05/26/17 1520   BP: 132/80   Pulse: 76   Temp: 37 °C (98.6 °F)   Resp: 16   Height: 1.727 m (5' 8\")   Weight: 92.534 kg (204 lb)   SpO2: 98% "    Body mass index is 31.03 kg/(m^2).    General/Constitutional: NAD not diaphoretic, comfortable  Eyes: clear conjunctiva, no scleral icterus, EOMI, PERRL  Ears, Nose, Mouth,Throat: no oral ulcers, good dentition, moist mucous membranes, no discharge from ears bilaterally  Cardiovascular: regular rate and rhythm.  No murmurs, gallops, rubs  Respiratory: normal effort, coughs with deep inspiration On auscultation no wheezes, rales, rhonchi.  Coarse breath sounds.  No crackles.  GI: soft, NTTP no hepatosplenomegaly, nondistended  Musculoskeletal  Axial:  Thoracic: kyphosis  Upper Extremities:  Tenderness in the PIP.  swelliing bilateral wrists L>>R nodule on the palmar side of right ahnd 5th digit.  midl periarituclar swelling of the MCP of the 2nd and 3rd.  Heberbon nodes appreciated at the DIP  Wrists and Elbows have good ROM  Muscle Strength: 5/5 in deltoids, biceps, triceps, finger , wrists extension bilateral  Lower Extremities:  There is bilateral MTP tenderness.  Hammertoe of the left foot 2nd digit.    Gait is normal  Skin: Limited skin exam.  no rashes, no digital ulcerations, no alopecia, no tophi, no skin thickening, no nodules  Neuro: CN II-XII grossly intact, Alert, Oriented x 3, moves all four extremities  Psych: normal affect, normal mood, judgement appropriate, follows commands, responses are appropritae  Heme/Lymph: no cervical adenopathy        Lab Results   Component Value Date/Time    QUANTIFERON TB GOLD Negative 08/19/2015 03:20 PM     No results found for: HEPBCORTOT, HEPBCORIGM, HEPBSAG  No results found for: HEPCAB  Lab Results   Component Value Date/Time    SODIUM 141 09/21/2016 09:21 AM    POTASSIUM 4.7 09/21/2016 09:21 AM    CHLORIDE 99 09/21/2016 09:21 AM    CO2 25 09/21/2016 09:21 AM    GLUCOSE 82 09/21/2016 09:21 AM    BUN 13 09/21/2016 09:21 AM    CREATININE 1.02 09/21/2016 09:21 AM    BUN-CREATININE RATIO 13 09/21/2016 09:21 AM      Lab Results   Component Value Date/Time    WBC  8.6 08/26/2016 11:19 AM    RBC 4.41 08/26/2016 11:19 AM    HEMOGLOBIN 15.5 08/26/2016 11:19 AM    HEMATOCRIT 46.7 08/26/2016 11:19 AM    * 08/26/2016 11:19 AM    MCH 35.1* 08/26/2016 11:19 AM    MCHC 33.2 08/26/2016 11:19 AM    MPV 9.9 08/19/2015 03:20 PM    NEUTROPHILS-POLYS 61 08/26/2016 11:19 AM    LYMPHOCYTES 18 08/26/2016 11:19 AM    MONOCYTES 13 08/26/2016 11:19 AM    EOSINOPHILS 7 08/26/2016 11:19 AM    BASOPHILS 1 08/26/2016 11:19 AM    HYPOCHROMIA 1+ 03/05/2014 04:43 PM    ANISOCYTOSIS 1+ 01/02/2015 05:02 PM      Lab Results   Component Value Date/Time    CALCIUM 8.8 09/21/2016 09:21 AM    AST(SGOT) 25 01/10/2017 07:24 AM    ALT(SGPT) 30 01/10/2017 07:24 AM    ALKALINE PHOSPHATASE 77 01/10/2017 07:24 AM    TOTAL BILIRUBIN 0.5 01/10/2017 07:24 AM    ALBUMIN 4.5 01/10/2017 07:24 AM    TOTAL PROTEIN 6.9 01/10/2017 07:24 AM     No results found for: URICACID, RHEUMFACTN, CCPANTIBODY, ANTINUCAB  Lab Results   Component Value Date/Time    SED RATE WESTERGREN 7 03/05/2014 04:43 PM     No results found for: RUSSELVIPER, DRVVTINTERP  No results found for: X5HKMZBSTIG, B7VNWQCGGBB, IGGCARDIOLI, IGMCARDIOLI, IGACARDIOLI, CRYOGLOBULIN  No results found for: ANADIRECT, ANTIDNADS, RNPAB, SMITHAB, ERTUHTB60, SSAROAB, SSBLAAB, ZLUPCX0JD, CENTROMBAB  No results found for: ANTIDNADS, DSDNA, AGBMAB, GBMABA, ANCAIGG, X1PHTCVNPAB, JO1AB, RNPAB, ANTISSASJ, ANTISSBSJ  Lab Results   Component Value Date/Time    COLOR Lt. Yellow 08/19/2015 03:20 PM    SPECIFIC GRAVITY 1.008 08/19/2015 03:20 PM    PH 6.5 08/19/2015 03:20 PM    GLUCOSE Negative 08/19/2015 03:20 PM    KETONES Negative 08/19/2015 03:20 PM    PROTEIN Negative 08/19/2015 03:20 PM     Lab Results   Component Value Date/Time    TOTAL VOLUME 2750 08/18/2015 07:30 AM    TOTAL VOLUME 2750 08/18/2015 07:30 AM     No results found for: SSA60, SSA52  No results found for: HBA1C  Lab Results   Component Value Date/Time    CPK TOTAL 65 01/10/2017 07:24 AM     No results  found for: G6PD  No results found for: BVLN76HVAM  No results found for: ACESERUM  Lab Results   Component Value Date/Time    25-HYDROXY   VITAMIN D 25 42 03/07/2013 11:20 AM     Lab Results   Component Value Date/Time    FREE T-4 1.11 12/01/2014 03:13 PM     No results found for: TSHULTRASEN, FREET4  No results found for: MICROSOMALA, ANTITHYROGL  No results found for: IGGLYMEABS  No results found for: ANTIMITOCHO, FACTIN  No results found for: IGA, TTRANSIGA, ENDOIGA  No results found for: FLTYPE, CRYSTALSBDF  No results found for: ISTATICAL  No results found for: ISTATCREAT  No results found for: CTELOPEP  No results found for: GBMABG  Lab Results   Component Value Date/Time    PTH, INTACT 55 10/10/2008 10:42 AM             Results for orders placed during the hospital encounter of 08/17/11   DS-BONE DENSITY STUDY (DEXA)    Impression According to the World Health Organization classification, bone mineral density of this patient is normal.              Results for orders placed during the hospital encounter of 05/31/13   DX-PELVIS-1 OR 2 VIEWS    Impression Interval performance of a left hip arthroplasty.  Right hip arthroplasty is unchanged.              INTERPRETING LOCATION: [address] [place] [address] [place], [facility]     Results for orders placed during the hospital encounter of 01/30/09   DX-KNEE COMPLETE 4+    Impression IMPRESSION:     1. NEW FINDINGS CONSISTENT WITH OSTEOCHONDROSIS DISSECANS OF THE MEDIAL   FEMORAL CONDYLE.    2. NEW MODERATE JOINT SPACE NARROWING OF THE MEDIAL TIBIOFEMORAL   COMPARTMENT, CONSISTENT WITH CARTILAGE THINNING.    3. NEW MODERATE JOINT EFFUSION.      SST/llw      Read By BLANCA ORTIZ MD on Jan 30 2009  3:39PM  : JAXON Transcription Date: Feb 2 2009  6:08AM  THIS DOCUMENT HAS BEEN ELECTRONICALLY SIGNED BY: BLANCA ORTIZ MD on   Feb  3 2009 11:43AM                                   Results for orders placed during the hospital encounter of 10/27/14    Echocardiogram Comp w/o Cont              Results for orders placed during the hospital encounter of 12/21/07   MR-KNEE-W/O    Impression IMPRESSION:    1. NON-DISPLACED OBLIQUE TEAR OF THE POSTERIOR HORN MEDIAL MENISCUS   COMMUNICATING WITH SUPERIOR AND INFERIOR ARTICULAR SURFACES WITHOUT   EVIDENCE OF PARAMENISCAL CYST.  UNDERLYING CHONDROMALACIA IS SEEN WITHIN   THE MEDIAL FEMORAL CONDYLE POSTERIOR WEIGHTBEARING SURFACE OF   NON-FULL-THICKNESS SEVERITY.        2. MENISCAL DEGENERATION AND PARTIAL PERIPHERAL EXTRUSION INVOLVING THE   MID-BODY OF THE MEDIAL MENISCUS.        [facility]        Read By KEITH NETTLES MD on Dec 21 2007 12:14PM  : AJAY Transcription Date: Dec 22 2007 10:16AM  THIS DOCUMENT HAS BEEN ELECTRONICALLY SIGNED BY: KEITH NETTLES MD on   Dec 24 2007 12:21PM        Results for orders placed during the hospital encounter of 02/14/05   MR-CERVICAL SPINE-W/O    Impression IMPRESSION:    1. DEGENERATIVE DISC DISEASE C5-6 AND C6-7 WITH SOME MINOR POSTERIOR   SPURRING BUT WITHOUT ANY REAL CANAL STENOSIS AND WITH NO EVIDENCE OF AN   ACUTE DISC EXTRUSION.  2. MILD TO MODERATE MIDCERVICAL NEURAL FORAMINAL NARROWING DUE TO   UNCINATE AND FACET HYPERTROPHY, WITH MULTILEVEL DISEASE PRESENT AND WITH   SOME MILD INTERVAL PROGRESSION COMPARED WITH THE PRIOR MRI.  THE AXIAL   IMAGES OVERESTIMATE THE DEGREE OF NEURAL FORAMINAL STENOSIS DUE TO   ARTIFACTS, WITH THE SAGITTAL IMAGES DEMONSTRATING MILD NEURAL FORAMINAL   STENOTIC CHANGES IN THE MIDCERVICAL LEVELS.    3. NO EVIDENCE OF ACUTE TRAUMA.         Read By KEITH NETTLES MD on Feb 14 2005  1:10PM  : SHRADDHA Transcription Date: Feb 15 2005 11:24AM  THIS DOCUMENT HAS BEEN ELECTRONICALLY SIGNED BY: KEITH NETTLES MD on   Feb 15 2005 12:06PM        Results for orders placed during the hospital encounter of 02/14/05   DX-CERVICAL SPINE-2 OR 3 VIEWS    Impression IMPRESSION:    MULTILEVEL CERVICAL SPINE DEGENERATIVE CHANGE,  WITHOUT INTERVAL CHANGE.          Read By MIKKI RODRIGUEZ MD on Feb 14 2005  3:04PM  : EMMIE Transcription Date: Feb 14 2005  3:10PM  THIS DOCUMENT HAS BEEN ELECTRONICALLY SIGNED BY: MIKKI RODRIGUEZ MD on Feb 14 2005  3:17PM            Assessment and Plan:  Rheumatoid Arthritis, seronegative  On exam today the patient is demonstrating synovitis and tenderness at the PIPs as well as the MCPs. And also synovitis in the wrists and MTP tenderness on squeeze test. He is about 3 months from his last Rituxan dose. He will be due about September 2017.  Difficult to ascertain if the regimen of rituximab +2 mg of prednisone plus methotrexate is working as he's been off methotrexate. Per his history it seems that the rituximab plus methotrexate combination has not been as efficacious for him and he is willing and interested in changing and considering Xeljanz.  This is my first time meeting Mr. Sims. We will check hepatitis panel and a QuantiFERON. I am open to considering Salagen as it seems that the rituximab may not be as effective even with the addition of methotrexate. In review of his records it seems in the past he has been on a regimen of methotrexate plus azathioprine plus rituximab. We could consider returning to this regimen as an alternative.    Today he does seem to have mild disease but I am concerned that there may be more than just rhinitis. I agree to hold the methotrexate I will recheck the labs. He has been off methotrexate at this point for 3 weeks. I'll consider a course of prednisone 5 mg to see if this can alleviate some of the joint pains and stiffness.    Rhinitis + cough  Lungs are clear   Will follow-up with blood work  Holding methotrexate  Will consider doxycycline course pending blood work      Abnormal CBC  Will repeat  If abnormal will refer to Hematology  His MCV is elevated.  We will increase his folic acid.    Chronic Pain  Will refer to pain management for assistance in  monitoring use of percocet.  He has been well maintained on his regimen.      1. Seronegative rheumatoid arthritis (CMS-HCC)  DX-HAND 3+ LEFT    CBC WITH DIFFERENTIAL    COMP METABOLIC PANEL    HEP B CORE AB TOTAL    HEP B SURFACE ANTIGEN    HEP B SURFACE AB    HEP C VIRUS ANTIBODY    DX-HAND 3+ RIGHT    WESTERGREN SED RATE    CRP HIGH SENSITIVE    VITAMIN D 25-HYDROXY    Calcium Carbonate-Vitamin D (CALCIUM 600 + D) 600-400 MG-UNIT Tab    predniSONE (DELTASONE) 1 MG Tab    folic acid (FOLVITE) 1 MG Tab    QUANTIFERON TB GOLD (IN TUBE)   2. Encounter for long-term (current) use of high-risk medication  VITAMIN D 25-HYDROXY    Calcium Carbonate-Vitamin D (CALCIUM 600 + D) 600-400 MG-UNIT Tab    predniSONE (DELTASONE) 1 MG Tab    folic acid (FOLVITE) 1 MG Tab    QUANTIFERON TB GOLD (IN TUBE)   3. Other chronic pain  REFERRAL TO PAIN CLINIC           Records requested.  Followup: Return in about 3 weeks (around 6/16/2017). or sooner prn  Patient was seen 60 minutes face-to-face (excluding time for procedures)  of which more than 50% the time was spent counseling the patient regarding  rheumatological conditions and care. Therapy was discussed in detail.  Thank you for this referral.

## 2017-05-31 ENCOUNTER — HOSPITAL ENCOUNTER (OUTPATIENT)
Dept: RADIOLOGY | Facility: MEDICAL CENTER | Age: 62
End: 2017-05-31
Attending: INTERNAL MEDICINE
Payer: MEDICARE

## 2017-05-31 ENCOUNTER — TELEPHONE (OUTPATIENT)
Dept: RHEUMATOLOGY | Facility: PHYSICIAN GROUP | Age: 62
End: 2017-05-31

## 2017-05-31 DIAGNOSIS — M06.9 RHEUMATOID ARTHRITIS, INVOLVING UNSPECIFIED SITE, UNSPECIFIED RHEUMATOID FACTOR PRESENCE: ICD-10-CM

## 2017-05-31 DIAGNOSIS — M06.00 SERONEGATIVE RHEUMATOID ARTHRITIS (HCC): ICD-10-CM

## 2017-05-31 PROCEDURE — 73130 X-RAY EXAM OF HAND: CPT | Mod: LT

## 2017-05-31 PROCEDURE — 73130 X-RAY EXAM OF HAND: CPT | Mod: RT

## 2017-05-31 RX ORDER — PREDNISONE 5 MG/1
TABLET ORAL
Qty: 21 TAB | Refills: 0 | Status: SHIPPED | OUTPATIENT
Start: 2017-05-31 | End: 2017-06-23

## 2017-05-31 NOTE — TELEPHONE ENCOUNTER
Patient stated that you wanted him to be on Pred. 5MG tabs, but the only RX that was sent in was for the 1MG tab. If you would like the patient to be on the Prednisone 5MG tabs for a taper will you please send it in, if not let me know and i will call the patient. Thank you!

## 2017-06-01 ENCOUNTER — HOSPITAL ENCOUNTER (OUTPATIENT)
Dept: LAB | Facility: MEDICAL CENTER | Age: 62
End: 2017-06-01
Attending: INTERNAL MEDICINE
Payer: MEDICARE

## 2017-06-01 DIAGNOSIS — M06.00 SERONEGATIVE RHEUMATOID ARTHRITIS (HCC): ICD-10-CM

## 2017-06-01 LAB
25(OH)D3 SERPL-MCNC: 18 NG/ML (ref 30–100)
ALBUMIN SERPL BCP-MCNC: 4.2 G/DL (ref 3.2–4.9)
ALBUMIN/GLOB SERPL: 1.6 G/DL
ALP SERPL-CCNC: 93 U/L (ref 30–99)
ALT SERPL-CCNC: 19 U/L (ref 2–50)
ANION GAP SERPL CALC-SCNC: 9 MMOL/L (ref 0–11.9)
AST SERPL-CCNC: 20 U/L (ref 12–45)
BASOPHILS # BLD AUTO: 1.2 % (ref 0–1.8)
BASOPHILS # BLD: 0.19 K/UL (ref 0–0.12)
BILIRUB SERPL-MCNC: 0.4 MG/DL (ref 0.1–1.5)
BUN SERPL-MCNC: 12 MG/DL (ref 8–22)
CALCIUM SERPL-MCNC: 10 MG/DL (ref 8.5–10.5)
CHLORIDE SERPL-SCNC: 101 MMOL/L (ref 96–112)
CO2 SERPL-SCNC: 29 MMOL/L (ref 20–33)
CREAT SERPL-MCNC: 0.87 MG/DL (ref 0.5–1.4)
CRP SERPL HS-MCNC: 4.5 MG/L (ref 0–7.5)
EOSINOPHIL # BLD AUTO: 0.07 K/UL (ref 0–0.51)
EOSINOPHIL NFR BLD: 0.5 % (ref 0–6.9)
ERYTHROCYTE [DISTWIDTH] IN BLOOD BY AUTOMATED COUNT: 53.1 FL (ref 35.9–50)
ERYTHROCYTE [SEDIMENTATION RATE] IN BLOOD BY WESTERGREN METHOD: 6 MM/HOUR (ref 0–20)
GFR SERPL CREATININE-BSD FRML MDRD: >60 ML/MIN/1.73 M 2
GLOBULIN SER CALC-MCNC: 2.6 G/DL (ref 1.9–3.5)
GLUCOSE SERPL-MCNC: 84 MG/DL (ref 65–99)
HBV CORE AB SERPL QL IA: NEGATIVE
HBV SURFACE AB SERPL IA-ACNC: <3.1 MIU/ML (ref 0–10)
HBV SURFACE AG SER QL: NEGATIVE
HCT VFR BLD AUTO: 44.6 % (ref 42–52)
HCV AB SER QL: NEGATIVE
HGB BLD-MCNC: 15.3 G/DL (ref 14–18)
IMM GRANULOCYTES # BLD AUTO: 0.21 K/UL (ref 0–0.11)
IMM GRANULOCYTES NFR BLD AUTO: 1.4 % (ref 0–0.9)
LYMPHOCYTES # BLD AUTO: 1.2 K/UL (ref 1–4.8)
LYMPHOCYTES NFR BLD: 7.9 % (ref 22–41)
MCH RBC QN AUTO: 33.9 PG (ref 27–33)
MCHC RBC AUTO-ENTMCNC: 34.3 G/DL (ref 33.7–35.3)
MCV RBC AUTO: 98.9 FL (ref 81.4–97.8)
MONOCYTES # BLD AUTO: 1.67 K/UL (ref 0–0.85)
MONOCYTES NFR BLD AUTO: 11 % (ref 0–13.4)
NEUTROPHILS # BLD AUTO: 11.89 K/UL (ref 1.82–7.42)
NEUTROPHILS NFR BLD: 78 % (ref 44–72)
NRBC # BLD AUTO: 0 K/UL
NRBC BLD AUTO-RTO: 0 /100 WBC
PLATELET # BLD AUTO: 284 K/UL (ref 164–446)
PMV BLD AUTO: 10.1 FL (ref 9–12.9)
POTASSIUM SERPL-SCNC: 4.8 MMOL/L (ref 3.6–5.5)
PROT SERPL-MCNC: 6.8 G/DL (ref 6–8.2)
RBC # BLD AUTO: 4.51 M/UL (ref 4.7–6.1)
SODIUM SERPL-SCNC: 139 MMOL/L (ref 135–145)
WBC # BLD AUTO: 15.2 K/UL (ref 4.8–10.8)

## 2017-06-01 PROCEDURE — 86706 HEP B SURFACE ANTIBODY: CPT

## 2017-06-01 PROCEDURE — 87340 HEPATITIS B SURFACE AG IA: CPT

## 2017-06-01 PROCEDURE — 85652 RBC SED RATE AUTOMATED: CPT

## 2017-06-01 PROCEDURE — 86480 TB TEST CELL IMMUN MEASURE: CPT

## 2017-06-01 PROCEDURE — 82306 VITAMIN D 25 HYDROXY: CPT

## 2017-06-01 PROCEDURE — 86141 C-REACTIVE PROTEIN HS: CPT | Mod: GA

## 2017-06-01 PROCEDURE — 86704 HEP B CORE ANTIBODY TOTAL: CPT

## 2017-06-01 PROCEDURE — 85025 COMPLETE CBC W/AUTO DIFF WBC: CPT

## 2017-06-01 PROCEDURE — 80053 COMPREHEN METABOLIC PANEL: CPT

## 2017-06-01 PROCEDURE — 86803 HEPATITIS C AB TEST: CPT

## 2017-06-01 PROCEDURE — 36415 COLL VENOUS BLD VENIPUNCTURE: CPT

## 2017-06-02 LAB
M TB TUBERC IFN-G BLD QL: NEGATIVE
M TB TUBERC IFN-G/MITOGEN IGNF BLD: -0.01
M TB TUBERC IGNF/MITOGEN IGNF CONTROL: 60.76 [IU]/ML
MITOGEN IGNF BCKGRD COR BLD-ACNC: 0.03 [IU]/ML

## 2017-06-07 ENCOUNTER — TELEPHONE (OUTPATIENT)
Dept: RHEUMATOLOGY | Facility: PHYSICIAN GROUP | Age: 62
End: 2017-06-07

## 2017-06-07 DIAGNOSIS — M06.00 SERONEGATIVE RHEUMATOID ARTHRITIS (HCC): ICD-10-CM

## 2017-06-07 DIAGNOSIS — Z79.899 ENCOUNTER FOR LONG-TERM (CURRENT) USE OF HIGH-RISK MEDICATION: ICD-10-CM

## 2017-06-07 RX ORDER — ERGOCALCIFEROL 1.25 MG/1
50000 CAPSULE ORAL
Qty: 12 CAP | Refills: 0 | Status: SHIPPED | OUTPATIENT
Start: 2017-06-07 | End: 2018-02-07

## 2017-06-07 RX ORDER — FOLIC ACID 1 MG/1
3 TABLET ORAL DAILY
Qty: 90 TAB | Refills: 2 | Status: SHIPPED | OUTPATIENT
Start: 2017-06-07 | End: 2018-02-07

## 2017-06-07 NOTE — TELEPHONE ENCOUNTER
Called patient and he is feeling better.  He is not coughing as much.  I did provide a course of prednisone.  He is improving.  We will refill methotrexate at 6 tabs weekly.    Vitamin D is low will send replacement.

## 2017-06-23 ENCOUNTER — OFFICE VISIT (OUTPATIENT)
Dept: RHEUMATOLOGY | Facility: PHYSICIAN GROUP | Age: 62
End: 2017-06-23
Payer: MEDICARE

## 2017-06-23 VITALS
SYSTOLIC BLOOD PRESSURE: 114 MMHG | WEIGHT: 202 LBS | HEART RATE: 84 BPM | BODY MASS INDEX: 30.72 KG/M2 | TEMPERATURE: 98.8 F | DIASTOLIC BLOOD PRESSURE: 68 MMHG | RESPIRATION RATE: 16 BRPM | OXYGEN SATURATION: 98 %

## 2017-06-23 DIAGNOSIS — M06.00 SERONEGATIVE RHEUMATOID ARTHRITIS (HCC): ICD-10-CM

## 2017-06-23 DIAGNOSIS — M05.9 SEROPOSITIVE RHEUMATOID ARTHRITIS (HCC): ICD-10-CM

## 2017-06-23 DIAGNOSIS — Z79.899 ENCOUNTER FOR LONG-TERM (CURRENT) USE OF HIGH-RISK MEDICATION: ICD-10-CM

## 2017-06-23 PROCEDURE — 99214 OFFICE O/P EST MOD 30 MIN: CPT | Performed by: INTERNAL MEDICINE

## 2017-06-23 RX ORDER — PREDNISONE 5 MG/1
5 TABLET ORAL DAILY
Qty: 30 TAB | Refills: 1 | Status: SHIPPED | OUTPATIENT
Start: 2017-06-23 | End: 2017-09-29

## 2017-06-23 ASSESSMENT — ENCOUNTER SYMPTOMS
FEVER: 0
COUGH: 0
CHILLS: 0
BACK PAIN: 1

## 2017-06-23 NOTE — MR AVS SNAPSHOT
Edgardo Sims   2017 1:30 PM   Office Visit   MRN: [id number]    Department:  Rheumatology Med [facility]   Dept Phone:  [phone]    Description:  Male : 1955   Provider:  Antonieta Canales M.D.           Reason for Visit     Follow-Up follow up      Allergies as of 2017     Allergen Noted Reactions    Penicillins 2008   Hives      You were diagnosed with     Seropositive rheumatoid arthritis (CMS-HCC)   [988237]       Seronegative rheumatoid arthritis (CMS-[facility])   [204167]       Encounter for long-term (current) use of high-risk medication   [398367]         Vital Signs     Blood Pressure Pulse Temperature Respirations Weight Oxygen Saturation    114/68 mmHg 84 37.1 °C (98.8 °F) 16 91.627 kg (202 lb) 98%    Smoking Status                   Never Smoker            Basic Information     Date Of Birth Sex Race Ethnicity Preferred Language    1955 Male White Non- English      Your appointments     2017  8:40 AM   Established Patient with John Lao D.O.   [place] [id number] [facility] [address]  [place] [address] [phone]           You will be receiving a confirmation call a few days before your appointment from our automated call confirmation system.            2017 10:15 AM   FOLLOW UP with Stas Zabala M.D.   [facility] (--)    [address] , [address]  [place] [id number] [phone]            Aug 04, 2017 10:00 AM   Follow Up Visit with Antonieta Canales M.D.   [facility] (--)    [address] , [address]  [place] [address] [phone]           You will be receiving a confirmation call a few days before your appointment from our automated call confirmation system.            Oct 23, 2017  1:30 PM   New Patient with Hector Brewer M.D.   [facility] (--)    [address]  [place] [id number] [phone]           Please bring  Photo ID, Insurance Cards, All Medication Bottles and copies of any legal documents (such as Living Will, Power of ) If speaking a language besides English please bring an adult . Please arrive 30 minutes prior for check in and registration. You will be receiving a confirmation call a few days before your appointment from our automated call confirmation system.              Problem List              ICD-10-CM Priority Class Noted - Resolved    Rheumatoid arthritis (CMS-HCC) M06.9   5/5/2009 - Present    Hypogonadism male E29.1   5/5/2009 - Present    Aortic insufficiency I35.1   7/5/2011 - Present    Essential hypertension I10   7/5/2011 - Present    Dyslipidemia E78.5   7/12/2011 - Present    Osteoarthrosis, unspecified whether generalized or localized, pelvic region and thigh M16.9   5/31/2013 - Present    Abnormal myocardial perfusion study    1/15/2014 - Present    Tachycardia R00.0   10/20/2014 - Present    Coronary artery disease due to calcified coronary lesion: Mildly cardiac catheterization in 2014 I25.10, I25.84   12/5/2014 - Present    Anxiety F41.9   3/31/2015 - Present    Depression (Chronic) F32.9   7/13/2015 - Present    Elevated CPK R74.8   9/23/2016 - Present      Health Maintenance        Date Due Completion Dates    IMM ZOSTER VACCINE 1/12/2028 (Originally 10/25/2016) ---    COLONOSCOPY 12/22/2021 12/22/2016    IMM DTaP/Tdap/Td Vaccine (2 - Td) 1/28/2023 1/28/2013            Current Immunizations     13-VALENT PCV PREVNAR 10/11/2016 11:27 AM    Influenza TIV (IM) 11/4/2013, 10/10/2012    Influenza Vaccine Quad Inj (Pf) 10/11/2016 12:03 PM    Influenza Vaccine Quad Inj (Preserved) 3/10/2015    Pneumococcal polysaccharide vaccine (PPSV-23) 11/4/2013, 10/15/2009    Tdap Vaccine 1/28/2013      Below and/or attached are the medications your provider expects you to take. Review all of your home medications and newly ordered medications with your provider and/or pharmacist. Follow  medication instructions as directed by your provider and/or pharmacist. Please keep your medication list with you and share with your provider. Update the information when medications are discontinued, doses are changed, or new medications (including over-the-counter products) are added; and carry medication information at all times in the event of emergency situations     Allergies:  PENICILLINS - Hives               Medications  Valid as of: June 23, 2017 -  2:25 PM    Generic Name Brand Name Tablet Size Instructions for use    Atorvastatin Calcium (Tab) LIPITOR 40 MG Take 1 Tab by mouth every evening.        Bisoprolol Fumarate (Tab) ZEBETA 10 MG Take 1 Tab by mouth every day.        Calcium Carbonate-Vitamin D (Tab) Calcium Carbonate-Vitamin D 600-400 MG-UNIT Take 1 Tab by mouth 2 times a day, with meals.        Cetirizine HCl (Tab) ZYRTEC 10 MG Take 10 mg by mouth every day.        Cyanocobalamin (TABLET DISPERSIBLE) Vitamin B-12 5000 MCG Take  by mouth.        Cyclobenzaprine HCl (Tab) FLEXERIL 10 MG TAKE 1 TABLET BY MOUTH THREE TIMES DAILY AS NEEDED FOR MILD PAIN        DiazePAM (Tab) VALIUM 5 MG TAKE ONE TABLET BY MOUTH EVERY 12 HOURS AS NEEDED FOR ANXIETY        Ergocalciferol (Cap) DRISDOL 87158 UNIT Take 1 Cap by mouth every 7 days.        Fexofenadine HCl (Tab) ALLEGRA 60 MG Take 1 Tab by mouth every day.        Fluticasone Propionate (Suspension) FLONASE 50 MCG/ACT Spray 1 Spray in nose 2 times a day.        Folic Acid (Tab) FOLVITE 1 MG Take 3 Tabs by mouth every day.        GuaiFENesin (Tab) Guaifenesin 400 MG Take 1 Tab by mouth 3 times a day as needed.        Lisinopril (Tab) PRINIVIL 10 MG Take 1 Tab by mouth every day.        Loratadine (Cap) Loratadine 10 MG Take  by mouth.        Meloxicam (Tab) MOBIC 15 MG TAKE ONE TABLET BY MOUTH ONCE DAILY        Methotrexate Sodium (Tab) methotrexate 2.5 MG Take 6 Tabs by mouth every 7 days.        Multiple Vitamins-Minerals   Take  by mouth.        Niacin  (Tab) niacin 500 MG Take 500 mg by mouth every day.        Omeprazole (CAPSULE DELAYED RELEASE) PRILOSEC 20 MG Take 20 mg by mouth every day.        Oxycodone-Acetaminophen (Tab) PERCOCET-10  MG Take 1 Tab by mouth 5 Times a Day.        PARoxetine HCl (Tab) PAXIL 20 MG TAKE ONE TABLET BY MOUTH ONCE DAILY        PredniSONE (Tab) DELTASONE 5 MG Take 1 Tab by mouth every day.        RiTUXimab   by Intravenous route. Two every 6 months         Rosuvastatin Calcium (Tab) CRESTOR 10 MG Take 1 Tab by mouth every evening.        Testosterone Cypionate (Solution) DEPO-TESTOSTERONE 200 MG/ML INJECT 1 ML (CC) INTRAMUSCULARLY EVERY 2 WEEKS AS DIRECTED        Vardenafil HCl (Tab) LEVITRA 20 MG Take 20 mg by mouth as needed.        Zinc (Cap) Zinc 50 MG Take 50 mg by mouth every day.        .                 Medicines prescribed today were sent to:     [facility] [address] [place] [address] - [address]     [address] [facility] [address]    Phone: [phone] Fax: [phone]    Open 24 Hours?: No      Medication refill instructions:       If your prescription bottle indicates you have medication refills left, it is not necessary to call your provider’s office. Please contact your pharmacy and they will refill your medication.    If your prescription bottle indicates you do not have any refills left, you may request refills at any time through one of the following ways: The online [employer] system (except Urgent Care), by calling your provider’s office, or by asking your pharmacy to contact your provider’s office with a refill request. Medication refills are processed only during regular business hours and may not be available until the next business day. Your provider may request additional information or to have a follow-up visit with you prior to refilling your medication.   *Please Note: Medication refills are assigned a new Rx number when refilled electronically. Your pharmacy may indicate that no refills were authorized  even though a new prescription for the same medication is available at the pharmacy. Please request the medicine by name with the pharmacy before contacting your provider for a refill.        Your To Do List     Future Labs/Procedures Complete By Expires    CBC WITH DIFFERENTIAL  As directed 6/23/2018    COMP METABOLIC PANEL  As directed 6/23/2018      Other Notes About Your Plan     Pt uses [facility]  For [place]  [phone]  [facility] Care ID #  [id number]  Depo-Testosterone  200 ML Pt uses 1 ml every two weeks he gets 1 x 10 ml Vial each time.  Auth good from 12/17/2012 thru 12/12/2013           [employer] Access Code: Activation code not generated  Current CompareAway Status: Active

## 2017-06-23 NOTE — PROGRESS NOTES
Subjective:   Date of Consultation:6/23/2017  1:38 PM  Primary care physician:John Lao D.O.      Reason for Consultation:  Mr. Sims  is a pleasant 61 y.o. year old male who presented with follow-up for Rheumatoid Arthritis    Rheumatoid Arthritis   1 hour morning stiffness  He is on methotrexate 6 tabs q Wednesday and restarted this 3 weeks ago. He reports that he has morning stiffness x 1 month and his feet are constantly in pain. Feet pain will wake him up      RHEUM HISTORY  Mr. Edgardo Sims presented with a history of Rheumatoid Arthritis diagnosed late 1999 and recent RF and CCP were negative.  He was previously a patient of Dr. Mcknight and then followed by a community rheumatology.  He is here today to establish care. In review of Dr. Echavarria that it seems he has been on Rituxan as far back as 2009. At that time he was on a regimen of azathioprine 150 mg methotrexate 15 mg, prednisone 7.5 mg and rituximab with 125 mg of Solu-Medrol he seemed to have been maintained on this regimen and was tapered down on the Imuran to 50 mg twice a day. Notes from May 24, 2012 indicates that he discontinue the Imuran. At that time he was on rituximab and every 6 months 15 mg of methotrexate weekly and 2 mg of prednisone. Notes from June 8, 2015 does remark that his RA was only manageable he's had limited response. He did suffer a consultation with methotrexate developing stomatitis. Methotrexate was held March 6, 2014 and was reinstituted January 5, 2015.     Dr. Echavarria's notes it does comment that he may benefit from rituximab every 5 months however given cost restrictions he can only get it every 6 months. His last infusion was in March 2017 for rituximab.  He reports he is not doing as well on the rituximab.  He feels his activity level is not as active.  He feels increase stiffness.  His feet xrays     Complications to his care include intolerance to medications  And stomatitis with methotrexate.   Methotrexate was stopped briefly and subsequently was restarted but the patient reports it is not working as well as before.             Medications  Long term was on prednisone 2 mg but has varied in the past.    Previous medications include   azathioprine, methotrexate  Plaquenil and Arava both intolerance not allergies  Remicade loss efficacyt  Gold shots (had a bad reaction)  Enbrel  Not sure Humira  Not sure about orencia  Methylprednisolone did not work as well as prednisone  Never been on Xeljanz  rituximab    Chronic Pain  On percocet ranging from 30-40 mg daily    Past medical HIstory: bilateral hip replacment and left knee replacement, tonsillitis, varicose vein in the left leg, TMJ, tonsillitis in 1964, broken right ankle in 1980, ruptured disc of L5-S1 noted in 2000 with discectomy and laminectomy, rotator cuff with anterior labral repair in 2001, basal cell carcinoma in 2005 from the nose, meniscal tear of the left knee in 2008 with scope repair, scope repair and biceps tendon repair as well as rotator cuff surgery in 2011 of the right shoulder. Dupuytren's contracture right hand little finger, Dupuytren's contracture right foot, arthritis of the right foot, plantar fasciitis bilateral, aortic insufficiency, hypertension, dyslipidemia, heart murmur, tachycardia, coronary artery disease with mild plaque at catheter. Hiatal hernia and GERD, scoliosis, kyphosis of the upper back/compensating lordosis of the neck . Vein ligation radiofrequency      Family HIStory: lymphoma     Social History: light bookeeping mainly disability, NEVER smoker, occasional alcohol (once a week)    Past Medical History   Diagnosis Date   • Rheumatoid arthritis (CMS-HCC)      severe   • Hypertension    • Aortic insufficiency 7/5/2011   • HTN (hypertension) 7/5/2011   • Dyslipidemia 7/12/2011   • Cancer (CMS-HCC)      skin   • Heart murmur    • Hiatus hernia syndrome    • Pain      RA   • Abnormal myocardial perfusion study  1/15/2014   • Infectious disease    • Heart burn    • Indigestion    • Bronchitis    • Arthritis      RA, and osteo   • Cold      mid January 2014   • Coughing blood      none currently 2014   • Tachycardia 10/20/2014   • CAD (coronary artery disease) mild plaque at cath in 2/14 12/5/2014     Past Surgical History   Procedure Laterality Date   • Knee arthroscopy       right   • Other orthopedic surgery       awaiting right hip surgery   • Hip arthroplasty total  6/20/08     Performed by YONATAN HINSON at SURGERY [facility] ORS   • Vein ligation radio frequency  12/8/2008     Performed by DEREJE MCCULLOUGH at SURGERY SAME DAY [facility] ORS   • Knee arthroplasty total  6/1/2009     Performed by YONATAN HINSON at SURGERY [facility] ORS   • Shoulder surgery       RIGHT 01/2011   • Other       varicose vein stripping   • Other       heart murmur   • Lumbar laminectomy diskectomy  2000   • Tmj reconstruction bilateral  1994   • Hip arthroplasty total  5/31/2013     Performed by Burak Valles M.D. at SURGERY [facility] ORS   • Recovery  2/3/2014     Performed by Cath-Recovery Surgery at SURGERY SAME DAY [facility] ORS     Allergies   Allergen Reactions   • Penicillins Hives     Outpatient Encounter Prescriptions as of 6/23/2017   Medication Sig Dispense Refill   • methotrexate 2.5 MG Tab Take 6 Tabs by mouth every 7 days. 24 Tab 2   • ergocalciferol (DRISDOL) 53992 UNIT capsule Take 1 Cap by mouth every 7 days. 12 Cap 0   • Cyanocobalamin (VITAMIN B-12) 5000 MCG TABLET DISPERSIBLE Take  by mouth.     • niacin 500 MG Tab Take 500 mg by mouth every day.     • Zinc 50 MG Cap Take 50 mg by mouth every day.     • predniSONE (DELTASONE) 1 MG Tab Take 2 mg daily after completing 5 mg prednisone daily 30 Tab 0   • diazepam (VALIUM) 5 MG Tab TAKE ONE TABLET BY MOUTH EVERY 12 HOURS AS NEEDED FOR ANXIETY 60 Tab 5   • oxycodone-acetaminophen (PERCOCET-10)  MG Tab Take 1 Tab by mouth 5 Times a Day. 150 Tab 0   •  paroxetine (PAXIL) 20 MG Tab TAKE ONE TABLET BY MOUTH ONCE DAILY 90 Tab 3   • cyclobenzaprine (FLEXERIL) 10 MG Tab TAKE 1 TABLET BY MOUTH THREE TIMES DAILY AS NEEDED FOR MILD PAIN 90 Tab 2   • Multiple Vitamins-Minerals (MULTI COMPLETE PO) Take  by mouth.     • bisoprolol (ZEBETA) 10 MG tablet Take 1 Tab by mouth every day. 90 Tab 3   • testosterone cypionate (DEPO-TESTOSTERONE) 200 MG/ML Solution injection INJECT 1 ML (CC) INTRAMUSCULARLY EVERY 2 WEEKS AS DIRECTED 10 mL 3   • lisinopril (PRINIVIL) 10 MG Tab Take 1 Tab by mouth every day. 90 Tab 3   • meloxicam (MOBIC) 15 MG tablet TAKE ONE TABLET BY MOUTH ONCE DAILY 30 Tab 6   • vardenafil (LEVITRA) 20 MG tablet Take 20 mg by mouth as needed.     • omeprazole (PRILOSEC) 20 MG CPDR Take 20 mg by mouth every day.     • folic acid (FOLVITE) 1 MG Tab Take 3 Tabs by mouth every day. 90 Tab 2   • predniSONE (DELTASONE) 5 MG Tab Start 10 mg po q day x 7 days then 5 mg po q day x 7 days 21 Tab 0   • cetirizine (ZYRTEC) 10 MG Tab Take 10 mg by mouth every day.     • Calcium Carbonate-Vitamin D (CALCIUM 600 + D) 600-400 MG-UNIT Tab Take 1 Tab by mouth 2 times a day, with meals. 60 Tab 3   • Loratadine (CLARITIN) 10 MG Cap Take  by mouth.     • Guaifenesin 400 MG Tab Take 1 Tab by mouth 3 times a day as needed. 90 Tab 3   • fexofenadine (ALLEGRA) 60 MG Tab Take 1 Tab by mouth every day. 30 Tab 3   • fluticasone (FLONASE) 50 MCG/ACT nasal spray Spray 1 Spray in nose 2 times a day. 1 Bottle 3   • rosuvastatin (CRESTOR) 10 MG Tab Take 1 Tab by mouth every evening. 90 Tab 3   • atorvastatin (LIPITOR) 40 MG Tab Take 1 Tab by mouth every evening. 90 Tab 3   • RiTUXimab (RITUXAN IV) by Intravenous route. Two every 6 months        No facility-administered encounter medications on file as of 6/23/2017.     @[facility]@  Social History     Social History   • Marital Status:      Spouse Name: N/A   • Number of Children: N/A   • Years of Education: N/A     Occupational History   • Not on  file.     Social History Main Topics   • Smoking status: Never Smoker    • Smokeless tobacco: Never Used   • Alcohol Use: 3.0 oz/week     6 Cans of beer per week   • Drug Use: No   • Sexual Activity:     Partners: Female     Other Topics Concern   • Not on file     Social History Narrative      History   Smoking status   • Never Smoker    Smokeless tobacco   • Never Used     History   Alcohol Use   • 3.0 oz/week   • 6 Cans of beer per week     History   Drug Use No         Family History   Problem Relation Age of Onset   • Hypertension Mother        Review of Systems   Constitutional: Negative for fever and chills.   Respiratory: Negative for cough.    Cardiovascular: Negative for chest pain.   Musculoskeletal: Positive for back pain and joint pain.   Skin: Negative for rash.        Objective:   /68 mmHg  Pulse 84  Temp(Src) 37.1 °C (98.8 °F)  Resp 16  Wt 91.627 kg (202 lb)  SpO2 98%    Physical Exam   Constitutional: He is oriented to person, place, and time. He appears well-developed and well-nourished. No distress.   HENT:   Head: Normocephalic and atraumatic.   Right Ear: External ear normal.   Left Ear: External ear normal.   Eyes: Conjunctivae are normal. Right eye exhibits no discharge. Left eye exhibits no discharge. No scleral icterus.   Cardiovascular:   No murmur heard.  Pulmonary/Chest: Effort normal and breath sounds normal. No stridor. No respiratory distress.   Abdominal: Soft. Bowel sounds are normal.   No hepatomegaly   Musculoskeletal: He exhibits no edema.   bialteral MT tenderness  Tenderness and periaricutlar swelling at the MCP of the 2nd and 3rd bilateral R>>L  Right sided dupytren cotnracture   Neurological: He is alert and oriented to person, place, and time.   Skin: Skin is warm and dry. No rash noted. He is not diaphoretic. No erythema. No pallor.   Nodules on feet on right   Psychiatric: He has a normal mood and affect. His behavior is normal. Judgment and thought content  normal.       Assessment:   No diagnosis found.  Labs:  [unfilled]    Lab Results   Component Value Date/Time    QUANTIFERON TB GOLD Negative 06/01/2017 01:13 PM     Lab Results   Component Value Date/Time    HEPATITIS B CCORE AB, TOTAL Negative 06/01/2017 01:13 PM    HEPATITIS B SURFACE ANTIGEN Negative 06/01/2017 01:13 PM     Lab Results   Component Value Date/Time    HEPATITIS C ANTIBODY Negative 06/01/2017 01:13 PM     Lab Results   Component Value Date/Time    SODIUM 139 06/01/2017 01:13 PM    POTASSIUM 4.8 06/01/2017 01:13 PM    CHLORIDE 101 06/01/2017 01:13 PM    CO2 29 06/01/2017 01:13 PM    GLUCOSE 84 06/01/2017 01:13 PM    BUN 12 06/01/2017 01:13 PM    CREATININE 0.87 06/01/2017 01:13 PM    BUN-CREATININE RATIO 13 09/21/2016 09:21 AM      Lab Results   Component Value Date/Time    WBC 15.2* 06/01/2017 01:13 PM    RBC 4.51* 06/01/2017 01:13 PM    HEMOGLOBIN 15.3 06/01/2017 01:13 PM    HEMATOCRIT 44.6 06/01/2017 01:13 PM    MCV 98.9* 06/01/2017 01:13 PM    MCH 33.9* 06/01/2017 01:13 PM    MCHC 34.3 06/01/2017 01:13 PM    MPV 10.1 06/01/2017 01:13 PM    NEUTROPHILS-POLYS 78.00* 06/01/2017 01:13 PM    LYMPHOCYTES 7.90* 06/01/2017 01:13 PM    MONOCYTES 11.00 06/01/2017 01:13 PM    EOSINOPHILS 0.50 06/01/2017 01:13 PM    BASOPHILS 1.20 06/01/2017 01:13 PM    HYPOCHROMIA 1+ 03/05/2014 04:43 PM    ANISOCYTOSIS 1+ 01/02/2015 05:02 PM      Lab Results   Component Value Date/Time    CALCIUM 10.0 06/01/2017 01:13 PM    AST(SGOT) 20 06/01/2017 01:13 PM    ALT(SGPT) 19 06/01/2017 01:13 PM    ALKALINE PHOSPHATASE 93 06/01/2017 01:13 PM    TOTAL BILIRUBIN 0.4 06/01/2017 01:13 PM    ALBUMIN 4.2 06/01/2017 01:13 PM    TOTAL PROTEIN 6.8 06/01/2017 01:13 PM     No results found for: URICACID, RHEUMFACTN, CCPANTIBODY, ANTINUCAB  Lab Results   Component Value Date/Time    SED RATE JEFERSON 6 06/01/2017 01:13 PM     No results found for: RUSSELVIPER, DRVVTINTERP  No results found for: P3SZVTVMLZD, O2XAOASKYZX, IGGCARDIOLI,  IGMCARDIOLI, IGACARDIOLI, CRYOGLOBULIN  No results found for: ANADIRECT, ANTIDNADS, RNPAB, SMITHAB, ZUKWGEL53, SSAROAB, SSBLAAB, MJDSMG1YR, CENTROMBAB  No results found for: ANTIDNADS, DSDNA, AGBMAB, GBMABA, ANCAIGG, K6BUZAFOBZN, JO1AB, RNPAB, ANTISSASJ, ANTISSBSJ  Lab Results   Component Value Date/Time    COLOR Lt. Yellow 08/19/2015 03:20 PM    SPECIFIC GRAVITY 1.008 08/19/2015 03:20 PM    PH 6.5 08/19/2015 03:20 PM    GLUCOSE Negative 08/19/2015 03:20 PM    KETONES Negative 08/19/2015 03:20 PM    PROTEIN Negative 08/19/2015 03:20 PM     Lab Results   Component Value Date/Time    TOTAL VOLUME 2750 08/18/2015 07:30 AM    TOTAL VOLUME 2750 08/18/2015 07:30 AM     No results found for: SSA60, SSA52  No results found for: HBA1C  Lab Results   Component Value Date/Time    CPK TOTAL 65 01/10/2017 07:24 AM     No results found for: G6PD  No results found for: [id number]  No results found for: ACESERUM  Lab Results   Component Value Date/Time    25-HYDROXY   VITAMIN D 25 18* 06/01/2017 01:13 PM     Lab Results   Component Value Date/Time    FREE T-4 1.11 12/01/2014 03:13 PM     No results found for: TSHULTRASEN, FREET4  No results found for: MICROSOMALA, ANTITHYROGL  No results found for: IGGLYMEABS  No results found for: ANTIMITOCHO, FACTIN  No results found for: IGA, TTRANSIGA, ENDOIGA  No results found for: FLTYPE, CRYSTALSBDF  No results found for: ISTATICAL  No results found for: ISTATCREAT  No results found for: CTELOPEP  No results found for: GBMABG  Lab Results   Component Value Date/Time    PTH, INTACT 55 10/10/2008 10:42 AM         Medical Decision Making:  Today's Assessment / Status / Plan:     Rheumatoid Arthritis  Not well controlled  We will increase his prednisone to 5 mg daily. We will continue his methotrexate. We will consider as xeljanz.  I did discuss this with the patient.  I reviewed the side effects including but not limited to bowel perforation, infection, malignancy, anemias and  cytopenias.    The patient did receive information to read and will consider this and discuss. As an alternative to see if he can confirm that he has not tried Orencia this would be a medication that would twice a day L and would have less side effects.    Results for CANDI UMANA (MRN [id number]) as of 6/23/2017 18:44   Ref. Range 6/1/2017 13:13   Hep B Surface Antibody Quant Latest Ref Range: 0.00-10.00 mIU/mL <3.10   Hepatitis B Surface Antigen Latest Ref Range: Negative  Negative   Hepatitis B Ccore Ab, Total Latest Ref Range: Negative  Negative   Hepatitis C Antibody Latest Ref Range: Negative  Negative   Mitogen-Nil Unknown 60.763   Nil Unknown 0.029   Quantiferon TB Gold Latest Ref Range: Negative  Negative   TB Ag-Nil Unknown -0.010       1. Seropositive rheumatoid arthritis (CMS-HCC)  predniSONE (DELTASONE) 5 MG Tab    CBC WITH DIFFERENTIAL    COMP METABOLIC PANEL   2. Seronegative rheumatoid arthritis (CMS-HCC)  Calcium Carbonate-Vitamin D (CALCIUM 600 + D) 600-400 MG-UNIT Tab    CBC WITH DIFFERENTIAL    COMP METABOLIC PANEL   3. Encounter for long-term (current) use of high-risk medication  Calcium Carbonate-Vitamin D (CALCIUM 600 + D) 600-400 MG-UNIT Tab    CBC WITH DIFFERENTIAL    COMP METABOLIC PANEL       I have spent greater than 50% of this 30 minute visit in counseling/coordination of care with the patient regarding a discussion about different types of rheumatoid arthritis treatments and the options that we have left. I also discussed the risks and side effects of the medications options.    He agreed and verbalized his agreement and understanding with the current plan. I answered all questions and concerns he has at this time and advised him to call at any time in there interim with questions or concerns in regards to his care.    Thank you for allowing me to participate in his care, I will continue to follow closely.     Please note that this dictation was created using voice recognition  software. I have made every reasonable attempt to correct obvious errors, but I expect that there are errors of grammar and possibly content that I did not discover before finalizing the note.

## 2017-06-26 DIAGNOSIS — M05.9 RHEUMATOID ARTHRITIS WITH POSITIVE RHEUMATOID FACTOR, INVOLVING UNSPECIFIED SITE (HCC): ICD-10-CM

## 2017-06-26 RX ORDER — MELOXICAM 15 MG/1
TABLET ORAL
Qty: 30 TAB | Refills: 6 | Status: SHIPPED | OUTPATIENT
Start: 2017-06-26 | End: 2018-03-16 | Stop reason: SDUPTHER

## 2017-06-27 RX ORDER — CYCLOBENZAPRINE HCL 10 MG
TABLET ORAL
Qty: 90 TAB | Refills: 5 | Status: ON HOLD | OUTPATIENT
Start: 2017-06-27 | End: 2018-08-04

## 2017-07-13 ENCOUNTER — OFFICE VISIT (OUTPATIENT)
Dept: MEDICAL GROUP | Facility: CLINIC | Age: 62
End: 2017-07-13
Payer: MEDICARE

## 2017-07-13 VITALS
TEMPERATURE: 98.2 F | DIASTOLIC BLOOD PRESSURE: 88 MMHG | WEIGHT: 203.5 LBS | OXYGEN SATURATION: 94 % | HEIGHT: 70 IN | BODY MASS INDEX: 29.13 KG/M2 | SYSTOLIC BLOOD PRESSURE: 138 MMHG | RESPIRATION RATE: 18 BRPM | HEART RATE: 64 BPM

## 2017-07-13 DIAGNOSIS — S40.812A ABRASION OF ARM, LEFT, INITIAL ENCOUNTER: ICD-10-CM

## 2017-07-13 DIAGNOSIS — M24.541 FLEXION CONTRACTURE OF JOINT OF RIGHT HAND: ICD-10-CM

## 2017-07-13 DIAGNOSIS — M05.9 RHEUMATOID ARTHRITIS WITH POSITIVE RHEUMATOID FACTOR, INVOLVING UNSPECIFIED SITE (HCC): ICD-10-CM

## 2017-07-13 PROCEDURE — 99214 OFFICE O/P EST MOD 30 MIN: CPT | Performed by: INTERNAL MEDICINE

## 2017-07-13 RX ORDER — OXYCODONE AND ACETAMINOPHEN 10; 325 MG/1; MG/1
1 TABLET ORAL
Qty: 150 TAB | Refills: 0 | Status: SHIPPED | OUTPATIENT
Start: 2017-07-13 | End: 2017-09-11 | Stop reason: SDUPTHER

## 2017-07-13 RX ORDER — OXYCODONE AND ACETAMINOPHEN 10; 325 MG/1; MG/1
1 TABLET ORAL
Qty: 150 TAB | Refills: 0 | Status: SHIPPED | OUTPATIENT
Start: 2017-07-13 | End: 2017-07-13 | Stop reason: SDUPTHER

## 2017-07-13 RX ORDER — CEPHALEXIN 500 MG/1
500 CAPSULE ORAL 4 TIMES DAILY
Qty: 40 CAP | Refills: 0 | Status: SHIPPED | OUTPATIENT
Start: 2017-07-13 | End: 2018-04-30

## 2017-07-13 NOTE — MR AVS SNAPSHOT
"        Edgardo Platausher   2017 10:00 AM   Office Visit   MRN: [id number]    Department:  [facility]   Dept Phone:  [phone]    Description:  Male : 1955   Provider:  John Lao D.O.           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   [facility] (--)    [address], [address]  [place] [id number] [phone]            Aug 04, 2017 10:00 AM   Follow Up Visit with Antonieta Canales M.D.   [facility] (--)    [address], [address]  [place] [id number] [phone]           You will be receiving a confirmation call a few days before your appointment from our automated call confirmation system.            Oct 18, 2017 10:00 AM   [facility]    [address] [facility] [address]  [place] [id number] [phone]           You will be receiving a confirmation call a few days before your appointment from our automated call confirmation system.            Oct 23, 2017  1:30 PM   New Patient with Hector Brewer M.D.   [facility] (--)    [address]  [facility] [address]   [phone]           Please bring Photo ID, Insurance Cards, All Medication Bottles and copies of any legal documents (such as Living Will, Power of ) If speaking a language besides English please bring an adult . Please arrive 30 minutes prior for check in and registration. You will be receiving a confirmation call a few days before your appointment from our automated call confirmation system.              Problem List              ICD-10-CM Priority Class Noted - Resolved    Rheumatoid arthritis (CMS-HCC) M06.9   5/5/2009 - Present    Hypogonadism male E29.1   5/5/2009 - Present    Aortic insufficiency I35.1   7/5/2011 - Present    Essential hypertension I10   7/5/2011 - Present    Dyslipidemia E78.5   7/12/2011 - Present    Osteoarthrosis, unspecified whether generalized or localized, pelvic region and thigh M16.9   5/31/2013 - Present    Abnormal myocardial perfusion study    1/15/2014 - Present    Tachycardia R00.0   10/20/2014 - Present    Coronary artery disease due to calcified coronary lesion: Mildly cardiac catheterization in 2014 I25.10, I25.84   12/5/2014 - Present    Anxiety F41.9   3/31/2015 - Present    Depression (Chronic) F32.9   7/13/2015 - Present    Elevated CPK R74.8   9/23/2016 - Present      Health Maintenance        Date Due Completion Dates    IMM ZOSTER VACCINE 1/12/2028 (Originally 10/25/2016) ---    IMM INFLUENZA (1) 9/1/2017 10/11/2016, 3/10/2015, 11/4/2013, 10/10/2012    COLONOSCOPY 12/22/2021 12/22/2016    IMM DTaP/Tdap/Td Vaccine (2 - Td) 1/28/2023 1/28/2013            Current Immunizations     13-VALENT PCV PREVNAR 10/11/2016 11:27 AM    Influenza TIV (IM) 11/4/2013, 10/10/2012    Influenza Vaccine Quad Inj (Pf) 10/11/2016 12:03 PM    Influenza Vaccine Quad Inj (Preserved) 3/10/2015    Pneumococcal polysaccharide vaccine (PPSV-23) 11/4/2013, 10/15/2009    Tdap Vaccine 1/28/2013      Below and/or attached are the medications  your provider expects you to take. Review all of your home medications and newly ordered medications with your provider and/or pharmacist. Follow medication instructions as directed by your provider and/or pharmacist. Please keep your medication list with you and share with your provider. Update the information when medications are discontinued, doses are changed, or new medications (including over-the-counter products) are added; and carry medication information at all times in the event of emergency situations     Allergies:  PENICILLINS - Hives               Medications  Valid as of: July 13, 2017 - 10:45 AM    Generic Name Brand Name Tablet Size Instructions for use    Atorvastatin Calcium (Tab) LIPITOR 40 MG Take 1 Tab by mouth every evening.        Bisoprolol Fumarate (Tab) ZEBETA 10 MG Take 1 Tab by mouth every day.        Calcium Carbonate-Vitamin D (Tab) Calcium Carbonate-Vitamin D 600-400 MG-UNIT Take 1 Tab by mouth 2 times a day, with meals.        Cephalexin (Cap) KEFLEX 500 MG Take 1 Cap by mouth 4 times a day.        Cetirizine HCl (Tab) ZYRTEC 10 MG Take 10 mg by mouth every day.        Cyanocobalamin (TABLET DISPERSIBLE) Vitamin B-12 5000 MCG Take  by mouth.        Cyclobenzaprine HCl (Tab) FLEXERIL 10 MG TAKE ONE TABLET BY MOUTH THREE TIMES DAILY AS NEEDED FOR MILD PAIN        DiazePAM (Tab) VALIUM 5 MG TAKE ONE TABLET BY MOUTH EVERY 12 HOURS AS NEEDED FOR ANXIETY        Ergocalciferol (Cap) DRISDOL 78867 UNIT Take 1 Cap by mouth every 7 days.        Fexofenadine HCl (Tab) ALLEGRA 60 MG Take 1 Tab by mouth every day.        Fluticasone Propionate (Suspension) FLONASE 50 MCG/ACT Spray 1 Spray in nose 2 times a day.        Folic Acid (Tab) FOLVITE 1 MG Take 3 Tabs by mouth every day.        GuaiFENesin (Tab) Guaifenesin 400 MG Take 1 Tab by mouth 3 times a day as needed.        Lisinopril (Tab) PRINIVIL 10 MG Take 1 Tab by mouth every day.        Loratadine (Cap) Loratadine 10 MG Take  by mouth.           Meloxicam (Tab) MOBIC 15 MG TAKE ONE TABLET BY MOUTH ONCE DAILY        Methotrexate Sodium (Tab) methotrexate 2.5 MG Take 6 Tabs by mouth every 7 days.        Multiple Vitamins-Minerals   Take  by mouth.        Niacin (Tab) niacin 500 MG Take 500 mg by mouth every day.        Omeprazole (CAPSULE DELAYED RELEASE) PRILOSEC 20 MG Take 20 mg by mouth every day.        Oxycodone-Acetaminophen (Tab) PERCOCET-10  MG Take 1 Tab by mouth 5 Times a Day.        PARoxetine HCl (Tab) PAXIL 20 MG TAKE ONE TABLET BY MOUTH ONCE DAILY        PredniSONE (Tab) DELTASONE 5 MG Take 1 Tab by mouth every day.        RiTUXimab   by Intravenous route. Two every 6 months         Rosuvastatin Calcium (Tab) CRESTOR 10 MG Take 1 Tab by mouth every evening.        Testosterone Cypionate (Solution) DEPO-TESTOSTERONE 200 MG/ML INJECT 1 ML (CC) INTRAMUSCULARLY EVERY 2 WEEKS AS DIRECTED        Vardenafil HCl (Tab) LEVITRA 20 MG Take 20 mg by mouth as needed.        Zinc (Cap) Zinc 50 MG Take 50 mg by mouth every day.        .                 Medicines prescribed today were sent to:     [facility] [address] [place] [address] [address]     [address] [place] [address]    Phone: [phone] Fax: [phone]    Open 24 Hours?: No      Medication refill instructions:       If your prescription bottle indicates you have medication refills left, it is not necessary to call your provider’s office. Please contact your pharmacy and they will refill your medication.    If your prescription bottle indicates you do not have any refills left, you may request refills at any time through one of the following ways: The online [employer] system (except Urgent Care), by calling your provider’s office, or by asking your pharmacy to contact your provider’s office with a refill request. Medication refills are processed only during regular business hours and may not be available until the next business day. Your provider may request additional information or to  have a follow-up visit with you prior to refilling your medication.   *Please Note: Medication refills are assigned a new Rx number when refilled electronically. Your pharmacy may indicate that no refills were authorized even though a new prescription for the same medication is available at the pharmacy. Please request the medicine by name with the pharmacy before contacting your provider for a refill.        Referral     A referral request has been sent to our patient care coordination department. Please allow 3-5 business days for us to process this request and contact you either by phone or mail. If you do not hear from us by the 5th business day, please call us at [phone].        Other Notes About Your Plan     Pt uses [facility]  For [place]  [phone]  [facility] ID #  [id number]  Depo-Testosterone  200 ML Pt uses 1 ml every two weeks he gets 1 x 10 ml Vial each time.  Auth good from 12/17/2012 thru 12/12/2013           [employer] Access Code: Activation code not generated  Current [employer] Status: Active

## 2017-07-13 NOTE — PROGRESS NOTES
CC: Edgardo Sims is a 61 y.o. male is suffering from   Chief Complaint   Patient presents with   • Follow-Up         SUBJECTIVE:  1. Rheumatoid arthritis with positive rheumatoid factor, involving unspecified site (CMS-HCC)  Remnants here for follow-up has a history of seropositive rheumatoid arthritis, is being followed by rheumatology.  Patient is requesting refills of his pain medication previous urine drug screens been completed along with a drug contract.     2. 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 Bro.     3. Abrasion of arm, left, initial encounter  Patient will abrasion of his left arm, patient is on a DMARD, Rituxan, because of his being immunosuppressed patient was given a prescription for Lasix with instructions to use it only should there be signs symptoms of infection        Past social, family, history:   Social History   Substance Use Topics   • Smoking status: Never Smoker    • Smokeless tobacco: Never Used   • Alcohol Use: 3.0 oz/week     6 Cans of beer per week         MEDICATIONS:    Current outpatient prescriptions:   •  oxycodone-acetaminophen (PERCOCET-10)  MG Tab, Take 1 Tab by mouth 5 Times a Day., Disp: 150 Tab, Rfl: 0  •  cephALEXin (KEFLEX) 500 MG Cap, Take 1 Cap by mouth 4 times a day., Disp: 40 Cap, Rfl: 0  •  cyclobenzaprine (FLEXERIL) 10 MG Tab, TAKE ONE TABLET BY MOUTH THREE TIMES DAILY AS NEEDED FOR MILD PAIN, Disp: 90 Tab, Rfl: 5  •  meloxicam (MOBIC) 15 MG tablet, TAKE ONE TABLET BY MOUTH ONCE DAILY, Disp: 30 Tab, Rfl: 6  •  predniSONE (DELTASONE) 5 MG Tab, Take 1 Tab by mouth every day., Disp: 30 Tab, Rfl: 1  •  Calcium Carbonate-Vitamin D (CALCIUM 600 + D) 600-400 MG-UNIT Tab, Take 1 Tab by mouth 2 times a day, with meals., Disp: 60 Tab, Rfl: 3  •  folic acid (FOLVITE) 1 MG Tab, Take 3 Tabs by mouth every day., Disp: 90 Tab, Rfl: 2  •  methotrexate 2.5 MG  Tab, Take 6 Tabs by mouth every 7 days., Disp: 24 Tab, Rfl: 2  •  Cyanocobalamin (VITAMIN B-12) 5000 MCG TABLET DISPERSIBLE, Take  by mouth., Disp: , Rfl:   •  niacin 500 MG Tab, Take 500 mg by mouth every day., Disp: , Rfl:   •  Zinc 50 MG Cap, Take 50 mg by mouth every day., Disp: , Rfl:   •  diazepam (VALIUM) 5 MG Tab, TAKE ONE TABLET BY MOUTH EVERY 12 HOURS AS NEEDED FOR ANXIETY, Disp: 60 Tab, Rfl: 5  •  paroxetine (PAXIL) 20 MG Tab, TAKE ONE TABLET BY MOUTH ONCE DAILY, Disp: 90 Tab, Rfl: 3  •  Multiple Vitamins-Minerals (MULTI COMPLETE PO), Take  by mouth., Disp: , Rfl:   •  bisoprolol (ZEBETA) 10 MG tablet, Take 1 Tab by mouth every day., Disp: 90 Tab, Rfl: 3  •  testosterone cypionate (DEPO-TESTOSTERONE) 200 MG/ML Solution injection, INJECT 1 ML (CC) INTRAMUSCULARLY EVERY 2 WEEKS AS DIRECTED, Disp: 10 mL, Rfl: 3  •  lisinopril (PRINIVIL) 10 MG Tab, Take 1 Tab by mouth every day., Disp: 90 Tab, Rfl: 3  •  vardenafil (LEVITRA) 20 MG tablet, Take 20 mg by mouth as needed., Disp: , Rfl:   •  omeprazole (PRILOSEC) 20 MG CPDR, Take 20 mg by mouth every day., Disp: , Rfl:   •  RiTUXimab (RITUXAN IV), by Intravenous route. Two every 6 months , Disp: , Rfl:   •  ergocalciferol (DRISDOL) 14436 UNIT capsule, Take 1 Cap by mouth every 7 days., Disp: 12 Cap, Rfl: 0  •  cetirizine (ZYRTEC) 10 MG Tab, Take 10 mg by mouth every day., Disp: , Rfl:   •  Loratadine (CLARITIN) 10 MG Cap, Take  by mouth., Disp: , Rfl:   •  Guaifenesin 400 MG Tab, Take 1 Tab by mouth 3 times a day as needed., Disp: 90 Tab, Rfl: 3  •  fexofenadine (ALLEGRA) 60 MG Tab, Take 1 Tab by mouth every day., Disp: 30 Tab, Rfl: 3  •  fluticasone (FLONASE) 50 MCG/ACT nasal spray, Spray 1 Spray in nose 2 times a day., Disp: 1 Bottle, Rfl: 3  •  rosuvastatin (CRESTOR) 10 MG Tab, Take 1 Tab by mouth every evening., Disp: 90 Tab, Rfl: 3  •  atorvastatin (LIPITOR) 40 MG Tab, Take 1 Tab by mouth every evening., Disp: 90 Tab, Rfl: 3    PROBLEMS:  Patient Active  "Problem List    Diagnosis Date Noted   • Elevated CPK 09/23/2016   • Depression 07/13/2015   • Anxiety 03/31/2015   • Coronary artery disease due to calcified coronary lesion: Mildly cardiac catheterization in 2014 12/05/2014   • Tachycardia 10/20/2014   • Abnormal myocardial perfusion study 01/15/2014   • Osteoarthrosis, unspecified whether generalized or localized, pelvic region and thigh 05/31/2013   • Dyslipidemia 07/12/2011   • Aortic insufficiency 07/05/2011   • Essential hypertension 07/05/2011   • Rheumatoid arthritis (CMS-[facility]) 05/05/2009   • Hypogonadism male 05/05/2009       REVIEW OF SYSTEMS:  Gen.:  No Nausea, Vomiting, fever, Chills.  Heart: No chest pain.  Lungs:  No shortness of Breath.  Psychological: Kian unusual Anxiety depression     PHYSICAL EXAM   Constitutional: Alert, cooperative, not in acute distress.  Cardiovascular:  Rate Rhythm is regular without murmurs rubs clicks.     Thorax & Lungs: Clear to auscultation, no wheezing, rhonchi, or rales  HENT: Normocephalic, Atraumatic.  Eyes: PERRLA, EOMI, Conjunctiva normal.   Neck: Trachia is midline no swelling of the thyroid.   Lymphatic: No lymphadenopathy noted.   Musculoskeletal: Abrasion approximately 1.5 cm x 3 cm left lateral elbow no sign symptoms of infection.   Neurologic: Alert & oriented x 3, cranial nerves II through XII are intact, Normal motor function, Normal sensory function, No focal deficits noted.   Psychiatric: Affect normal, Judgment normal, Mood normal.     VITAL SIGNS:/88 mmHg  Pulse 64  Temp(Src) 36.8 °C (98.2 °F)  Resp 18  Ht 1.778 m (5' 10\")  Wt 92.307 kg (203 lb 8 oz)  BMI 29.20 kg/m2  SpO2 94%    Labs: Reviewed    Assessment:                                                     Plan:    1. Rheumatoid arthritis with positive rheumatoid factor, involving unspecified site (CMS-HCC)  Rheumatoid arthritis seropositive continue Percocet  - oxycodone-acetaminophen (PERCOCET-10)  MG Tab; Take 1 Tab by mouth 5 " Times a Day.  Dispense: 150 Tab; Refill: 0    2. 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 Ehsan Lopez  - REFERRAL TO ORTHOPEDICS    3. Abrasion of arm, left, initial encounter  Abrasion left arm Keflex was given to the patient with instructions to use it should there be signs symptoms of infection patient's notify our office immediately if antibiotics are started.   - cephALEXin (KEFLEX) 500 MG Cap; Take 1 Cap by mouth 4 times a day.  Dispense: 40 Cap; Refill: 0

## 2017-07-17 ENCOUNTER — HOSPITAL ENCOUNTER (OUTPATIENT)
Dept: LAB | Facility: MEDICAL CENTER | Age: 62
End: 2017-07-17
Attending: INTERNAL MEDICINE
Payer: MEDICARE

## 2017-07-17 DIAGNOSIS — M05.9 SEROPOSITIVE RHEUMATOID ARTHRITIS (HCC): ICD-10-CM

## 2017-07-17 DIAGNOSIS — E78.5 DYSLIPIDEMIA: ICD-10-CM

## 2017-07-17 DIAGNOSIS — I25.84 CORONARY ARTERY DISEASE DUE TO CALCIFIED CORONARY LESION: ICD-10-CM

## 2017-07-17 DIAGNOSIS — M06.00 SERONEGATIVE RHEUMATOID ARTHRITIS (HCC): ICD-10-CM

## 2017-07-17 DIAGNOSIS — Z79.899 ENCOUNTER FOR LONG-TERM (CURRENT) USE OF HIGH-RISK MEDICATION: ICD-10-CM

## 2017-07-17 DIAGNOSIS — I25.10 CORONARY ARTERY DISEASE DUE TO CALCIFIED CORONARY LESION: ICD-10-CM

## 2017-07-17 LAB
ALBUMIN SERPL BCP-MCNC: 4.3 G/DL (ref 3.2–4.9)
ALBUMIN SERPL BCP-MCNC: 4.4 G/DL (ref 3.2–4.9)
ALBUMIN/GLOB SERPL: 1.9 G/DL
ALP SERPL-CCNC: 65 U/L (ref 30–99)
ALP SERPL-CCNC: 65 U/L (ref 30–99)
ALT SERPL-CCNC: 28 U/L (ref 2–50)
ALT SERPL-CCNC: 29 U/L (ref 2–50)
ANION GAP SERPL CALC-SCNC: 4 MMOL/L (ref 0–11.9)
AST SERPL-CCNC: 21 U/L (ref 12–45)
AST SERPL-CCNC: 23 U/L (ref 12–45)
BASOPHILS # BLD AUTO: 1.6 % (ref 0–1.8)
BASOPHILS # BLD: 0.18 K/UL (ref 0–0.12)
BILIRUB CONJ SERPL-MCNC: 0.1 MG/DL (ref 0.1–0.5)
BILIRUB INDIRECT SERPL-MCNC: 0.5 MG/DL (ref 0–1)
BILIRUB SERPL-MCNC: 0.6 MG/DL (ref 0.1–1.5)
BILIRUB SERPL-MCNC: 0.6 MG/DL (ref 0.1–1.5)
BUN SERPL-MCNC: 21 MG/DL (ref 8–22)
CALCIUM SERPL-MCNC: 9.7 MG/DL (ref 8.5–10.5)
CHLORIDE SERPL-SCNC: 100 MMOL/L (ref 96–112)
CHOLEST SERPL-MCNC: 176 MG/DL (ref 100–199)
CK SERPL-CCNC: 86 U/L (ref 0–154)
CO2 SERPL-SCNC: 31 MMOL/L (ref 20–33)
CREAT SERPL-MCNC: 0.96 MG/DL (ref 0.5–1.4)
EOSINOPHIL # BLD AUTO: 0.11 K/UL (ref 0–0.51)
EOSINOPHIL NFR BLD: 1 % (ref 0–6.9)
ERYTHROCYTE [DISTWIDTH] IN BLOOD BY AUTOMATED COUNT: 49.8 FL (ref 35.9–50)
GFR SERPL CREATININE-BSD FRML MDRD: >60 ML/MIN/1.73 M 2
GLOBULIN SER CALC-MCNC: 2.3 G/DL (ref 1.9–3.5)
GLUCOSE SERPL-MCNC: 100 MG/DL (ref 65–99)
HCT VFR BLD AUTO: 48.7 % (ref 42–52)
HDLC SERPL-MCNC: 60 MG/DL
HGB BLD-MCNC: 16.3 G/DL (ref 14–18)
IMM GRANULOCYTES # BLD AUTO: 0.12 K/UL (ref 0–0.11)
IMM GRANULOCYTES NFR BLD AUTO: 1.1 % (ref 0–0.9)
LDLC SERPL CALC-MCNC: 98 MG/DL
LYMPHOCYTES # BLD AUTO: 1.46 K/UL (ref 1–4.8)
LYMPHOCYTES NFR BLD: 13.1 % (ref 22–41)
MCH RBC QN AUTO: 32.9 PG (ref 27–33)
MCHC RBC AUTO-ENTMCNC: 33.5 G/DL (ref 33.7–35.3)
MCV RBC AUTO: 98.4 FL (ref 81.4–97.8)
MONOCYTES # BLD AUTO: 1.48 K/UL (ref 0–0.85)
MONOCYTES NFR BLD AUTO: 13.3 % (ref 0–13.4)
NEUTROPHILS # BLD AUTO: 7.81 K/UL (ref 1.82–7.42)
NEUTROPHILS NFR BLD: 69.9 % (ref 44–72)
NRBC # BLD AUTO: 0 K/UL
NRBC BLD AUTO-RTO: 0 /100 WBC
PLATELET # BLD AUTO: 263 K/UL (ref 164–446)
PMV BLD AUTO: 10 FL (ref 9–12.9)
POTASSIUM SERPL-SCNC: 5.6 MMOL/L (ref 3.6–5.5)
PROT SERPL-MCNC: 6.7 G/DL (ref 6–8.2)
PROT SERPL-MCNC: 6.8 G/DL (ref 6–8.2)
RBC # BLD AUTO: 4.95 M/UL (ref 4.7–6.1)
SODIUM SERPL-SCNC: 135 MMOL/L (ref 135–145)
TRIGL SERPL-MCNC: 89 MG/DL (ref 0–149)
WBC # BLD AUTO: 11.2 K/UL (ref 4.8–10.8)

## 2017-07-17 PROCEDURE — 82550 ASSAY OF CK (CPK): CPT

## 2017-07-17 PROCEDURE — 80061 LIPID PANEL: CPT

## 2017-07-17 PROCEDURE — 80076 HEPATIC FUNCTION PANEL: CPT

## 2017-07-17 PROCEDURE — 36415 COLL VENOUS BLD VENIPUNCTURE: CPT

## 2017-07-19 ENCOUNTER — OFFICE VISIT (OUTPATIENT)
Dept: CARDIOLOGY | Facility: MEDICAL CENTER | Age: 62
End: 2017-07-19
Payer: MEDICARE

## 2017-07-19 VITALS
BODY MASS INDEX: 29.06 KG/M2 | SYSTOLIC BLOOD PRESSURE: 118 MMHG | HEIGHT: 70 IN | HEART RATE: 67 BPM | WEIGHT: 203 LBS | DIASTOLIC BLOOD PRESSURE: 84 MMHG | OXYGEN SATURATION: 99 %

## 2017-07-19 DIAGNOSIS — I10 ESSENTIAL HYPERTENSION: ICD-10-CM

## 2017-07-19 DIAGNOSIS — I25.10 CORONARY ARTERY DISEASE DUE TO CALCIFIED CORONARY LESION: ICD-10-CM

## 2017-07-19 DIAGNOSIS — I25.84 CORONARY ARTERY DISEASE DUE TO CALCIFIED CORONARY LESION: ICD-10-CM

## 2017-07-19 DIAGNOSIS — E78.5 DYSLIPIDEMIA: ICD-10-CM

## 2017-07-19 PROCEDURE — 99214 OFFICE O/P EST MOD 30 MIN: CPT | Performed by: INTERNAL MEDICINE

## 2017-07-19 RX ORDER — PRAVASTATIN SODIUM 20 MG
20 TABLET ORAL
Qty: 30 TAB | Refills: 11 | Status: SHIPPED | OUTPATIENT
Start: 2017-07-19 | End: 2017-12-20

## 2017-07-19 NOTE — PROGRESS NOTES
Subjective:   Edgardo Sims is a 61 y.o. male who presents today for followup of his hypertension, hyperlipidemia and abnormal myocardial perfusion scan. He underwent cardiac catheterization and had minimal plaque. 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     He has had difficulty with statin therapy. We tried him on low-dose rosuvastatin. He had difficulty with muscle tenderness on this medication and discontinued after a 2nd attempt to use it. He had elevation in his CPK on atorvastatin therapy.    His blood pressures at home and been running from approximately 115-140/70-80. They're probably averaging about 120 systolic.    He's had no chest discomfort, dyspnea, edema, palpitations or lightheadedness.    Past Medical History   Diagnosis Date   • Rheumatoid arthritis (CMS-HCC)      severe   • Hypertension    • Aortic insufficiency 7/5/2011   • HTN (hypertension) 7/5/2011   • Dyslipidemia 7/12/2011   • Cancer (CMS-HCC)      skin   • Heart murmur    • Hiatus hernia syndrome    • Pain      RA   • Abnormal myocardial perfusion study 1/15/2014   • Infectious disease    • Heart burn    • Indigestion    • Bronchitis    • Arthritis      RA, and osteo   • Cold      mid January 2014   • Coughing blood      none currently 2014   • Tachycardia 10/20/2014   • CAD (coronary artery disease) mild plaque at cath in 2/14 12/5/2014     Past Surgical History   Procedure Laterality Date   • Knee arthroscopy       right   • Other orthopedic surgery       awaiting right hip surgery   • Hip arthroplasty total  6/20/08     Performed by YONATAN HINSON at SURGERY [facility] ORS   • Vein ligation radio frequency  12/8/2008     Performed by DEREJE MCCULLOUGH at SURGERY SAME DAY [facility] ORS   • Knee arthroplasty total  6/1/2009     Performed by YONATAN HINSON at SURGERY [facility] ORS   • Shoulder surgery       RIGHT 01/2011   • Other       varicose vein stripping   • Other       heart murmur   • Lumbar laminectomy  diskectomy  2000   • Tmj reconstruction bilateral  1994   • Hip arthroplasty total  5/31/2013     Performed by Burak Valles M.D. at SURGERY [facility]   • Recovery  2/3/2014     Performed by [facility] at SURGERY SAME DAY [facility]     Family History   Problem Relation Age of Onset   • Hypertension Mother      History   Smoking status   • Never Smoker    Smokeless tobacco   • Never Used     Allergies   Allergen Reactions   • Crestor [Rosuvastatin Calcium] Myalgia   • Penicillins Hives     Outpatient Encounter Prescriptions as of 7/19/2017   Medication Sig Dispense Refill   • oxycodone-acetaminophen (PERCOCET-10)  MG Tab Take 1 Tab by mouth 5 Times a Day. 150 Tab 0   • cyclobenzaprine (FLEXERIL) 10 MG Tab TAKE ONE TABLET BY MOUTH THREE TIMES DAILY AS NEEDED FOR MILD PAIN 90 Tab 5   • meloxicam (MOBIC) 15 MG tablet TAKE ONE TABLET BY MOUTH ONCE DAILY 30 Tab 6   • predniSONE (DELTASONE) 5 MG Tab Take 1 Tab by mouth every day. 30 Tab 1   • Calcium Carbonate-Vitamin D (CALCIUM 600 + D) 600-400 MG-UNIT Tab Take 1 Tab by mouth 2 times a day, with meals. 60 Tab 3   • folic acid (FOLVITE) 1 MG Tab Take 3 Tabs by mouth every day. 90 Tab 2   • methotrexate 2.5 MG Tab Take 6 Tabs by mouth every 7 days. 24 Tab 2   • ergocalciferol (DRISDOL) 05202 UNIT capsule Take 1 Cap by mouth every 7 days. 12 Cap 0   • Cyanocobalamin (VITAMIN B-12) 5000 MCG TABLET DISPERSIBLE Take  by mouth.     • niacin 500 MG Tab Take 500 mg by mouth every day.     • Zinc 50 MG Cap Take 50 mg by mouth every day.     • diazepam (VALIUM) 5 MG Tab TAKE ONE TABLET BY MOUTH EVERY 12 HOURS AS NEEDED FOR ANXIETY 60 Tab 5   • paroxetine (PAXIL) 20 MG Tab TAKE ONE TABLET BY MOUTH ONCE DAILY 90 Tab 3   • Multiple Vitamins-Minerals (MULTI COMPLETE PO) Take  by mouth.     • bisoprolol (ZEBETA) 10 MG tablet Take 1 Tab by mouth every day. 90 Tab 3   • testosterone cypionate (DEPO-TESTOSTERONE) 200 MG/ML Solution injection INJECT 1 ML (CC)  "INTRAMUSCULARLY EVERY 2 WEEKS AS DIRECTED 10 mL 3   • lisinopril (PRINIVIL) 10 MG Tab Take 1 Tab by mouth every day. 90 Tab 3   • vardenafil (LEVITRA) 20 MG tablet Take 20 mg by mouth as needed.     • omeprazole (PRILOSEC) 20 MG CPDR Take 20 mg by mouth every day.     • RiTUXimab (RITUXAN IV) by Intravenous route. Two every 6 months      • cephALEXin (KEFLEX) 500 MG Cap Take 1 Cap by mouth 4 times a day. 40 Cap 0   • cetirizine (ZYRTEC) 10 MG Tab Take 10 mg by mouth every day.     • Loratadine (CLARITIN) 10 MG Cap Take  by mouth.     • Guaifenesin 400 MG Tab Take 1 Tab by mouth 3 times a day as needed. 90 Tab 3   • fexofenadine (ALLEGRA) 60 MG Tab Take 1 Tab by mouth every day. 30 Tab 3   • fluticasone (FLONASE) 50 MCG/ACT nasal spray Spray 1 Spray in nose 2 times a day. 1 Bottle 3   • rosuvastatin (CRESTOR) 10 MG Tab Take 1 Tab by mouth every evening. 90 Tab 3   • atorvastatin (LIPITOR) 40 MG Tab Take 1 Tab by mouth every evening. 90 Tab 3     No facility-administered encounter medications on file as of 7/19/2017.     He is also on bisoprolol 5 mg daily.    ROS       Objective:   /84 mmHg  Pulse 67  Ht 1.778 m (5' 10\")  Wt 92.08 kg (203 lb)  BMI 29.13 kg/m2  SpO2 99%    Physical Exam   Neck: No JVD present.   Cardiovascular: Normal rate and regular rhythm.  Exam reveals no gallop.    Murmur (2-3/6 systolic at the base) heard.  Pulmonary/Chest: Effort normal. He has no rales.   Abdominal: Soft. There is no tenderness.   Musculoskeletal: He exhibits no edema.         Echocardiogram:  CONCLUSIONS  Technically difficult study.   Normal left ventricular size, thickness, systolic function. Left   ventricular ejection fraction is 60% to 65%. Grade I diastolic   dysfunction - mitral inflow E/A is <1.0.  No pericardial effusion seen. Mild aortic insufficiency was noted.    Holter monitor from October: This showed a normal sinus rhythm with rare atrial and ventricular ectopy. There was no significant tachycardia " or bradycardia.    Lab Results   Component Value Date/Time    CHOLESTEROL, 07/17/2017 09:34 AM    LDL 98 07/17/2017 09:34 AM    HDL 60 07/17/2017 09:34 AM    TRIGLYCERIDES 89 07/17/2017 09:34 AM       Lab Results   Component Value Date/Time    SODIUM 135 07/17/2017 09:34 AM    POTASSIUM 5.6* 07/17/2017 09:34 AM    CHLORIDE 100 07/17/2017 09:34 AM    CO2 31 07/17/2017 09:34 AM    GLUCOSE 100* 07/17/2017 09:34 AM    BUN 21 07/17/2017 09:34 AM    CREATININE 0.96 07/17/2017 09:34 AM    BUN-CREATININE RATIO 13 09/21/2016 09:21 AM     Lab Results   Component Value Date/Time    ALKALINE PHOSPHATASE 65 07/17/2017 09:34 AM    ALKALINE PHOSPHATASE 65 07/17/2017 09:34 AM    AST(SGOT) 23 07/17/2017 09:34 AM    AST(SGOT) 21 07/17/2017 09:34 AM    ALT(SGPT) 28 07/17/2017 09:34 AM    ALT(SGPT) 29 07/17/2017 09:34 AM    TOTAL BILIRUBIN 0.6 07/17/2017 09:34 AM    TOTAL BILIRUBIN 0.6 07/17/2017 09:34 AM      Lab Results   Component Value Date/Time    B NATRIURETIC PEPTIDE 9.8 12/01/2014 03:13 PM      CPK: 65    Assessment:     1. Coronary artery disease due to calcified coronary lesion: Mildly cardiac catheterization in 2014     2. Essential hypertension     3. Dyslipidemia         Medical Decision Making:  Today's Assessment / Status / Plan:     Mr. Sims is clinically stable. He has had difficulty with both atorvastatin and rosuvastatin. The rosuvastatin caused muscle tenderness. He had an elevated CPK on atorvastatin but no significant tenderness. At this time, we will try him on low-dose pravastatin. If he cannot tolerate that we may need to consider a PCSK-9 inhibitor. He'll have lab work in about 6 weeks and follow-up post. We will obtain a CPK in about a week and prior to follow-up in 6 weeks.

## 2017-07-19 NOTE — Clinical Note
[facility]   [address]  [place] [address]  Phone: [phone]  Fax: [phone]              Edgardo Sims  1955    Encounter Date: 7/19/2017    Stas Zabala M.D.          PROGRESS NOTE:  Subjective:   Edgardo Sims is a 61 y.o. male who presents today for followup of his hypertension, hyperlipidemia and abnormal myocardial perfusion scan. He underwent cardiac catheterization and had minimal plaque. 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     He has had difficulty with statin therapy. We tried him on low-dose rosuvastatin. He had difficulty with muscle tenderness on this medication and discontinued after a 2nd attempt to use it. He had elevation in his CPK on atorvastatin therapy.    His blood pressures at home and been running from approximately 115-140/70-80. They're probably averaging about 120 systolic.    He's had no chest discomfort, dyspnea, edema, palpitations or lightheadedness.    Past Medical History   Diagnosis Date   • Rheumatoid arthritis (CMS-HCC)      severe   • Hypertension    • Aortic insufficiency 7/5/2011   • HTN (hypertension) 7/5/2011   • Dyslipidemia 7/12/2011   • Cancer (CMS-HCC)      skin   • Heart murmur    • Hiatus hernia syndrome    • Pain      RA   • Abnormal myocardial perfusion study 1/15/2014   • Infectious disease    • Heart burn    • Indigestion    • Bronchitis    • Arthritis      RA, and osteo   • Cold      mid January 2014   • Coughing blood      none currently 2014   • Tachycardia 10/20/2014   • CAD (coronary artery disease) mild plaque at cath in 2/14 12/5/2014     Past Surgical History   Procedure Laterality Date   • Knee arthroscopy       right   • Other orthopedic surgery       awaiting right hip surgery   • Hip arthroplasty total  6/20/08     Performed by YONATAN HINSON at SURGERY [facility]   • Vein ligation radio frequency  12/8/2008     Performed by DEREJE MCCULLOUGH  at SURGERY SAME DAY [facility]   • Knee arthroplasty total  6/1/2009     Performed by YONATAN HINSON at SURGERY [facility]   • Shoulder surgery       RIGHT 01/2011   • Other       varicose vein stripping   • Other       heart murmur   • Lumbar laminectomy diskectomy  2000   • Tmj reconstruction bilateral  1994   • Hip arthroplasty total  5/31/2013     Performed by Burak Valles M.D. at SURGERY [facility]S   • Recovery  2/3/2014     Performed by Cath-Recovery Surgery at SURGERY SAME DAY [facility]     Family History   Problem Relation Age of Onset   • Hypertension Mother      History   Smoking status   • Never Smoker    Smokeless tobacco   • Never Used     Allergies   Allergen Reactions   • Crestor [Rosuvastatin Calcium] Myalgia   • Penicillins Hives     Outpatient Encounter Prescriptions as of 7/19/2017   Medication Sig Dispense Refill   • oxycodone-acetaminophen (PERCOCET-10)  MG Tab Take 1 Tab by mouth 5 Times a Day. 150 Tab 0   • cyclobenzaprine (FLEXERIL) 10 MG Tab TAKE ONE TABLET BY MOUTH THREE TIMES DAILY AS NEEDED FOR MILD PAIN 90 Tab 5   • meloxicam (MOBIC) 15 MG tablet TAKE ONE TABLET BY MOUTH ONCE DAILY 30 Tab 6   • predniSONE (DELTASONE) 5 MG Tab Take 1 Tab by mouth every day. 30 Tab 1   • Calcium Carbonate-Vitamin D (CALCIUM 600 + D) 600-400 MG-UNIT Tab Take 1 Tab by mouth 2 times a day, with meals. 60 Tab 3   • folic acid (FOLVITE) 1 MG Tab Take 3 Tabs by mouth every day. 90 Tab 2   • methotrexate 2.5 MG Tab Take 6 Tabs by mouth every 7 days. 24 Tab 2   • ergocalciferol (DRISDOL) 22738 UNIT capsule Take 1 Cap by mouth every 7 days. 12 Cap 0   • Cyanocobalamin (VITAMIN B-12) 5000 MCG TABLET DISPERSIBLE Take  by mouth.     • niacin 500 MG Tab Take 500 mg by mouth every day.     • Zinc 50 MG Cap Take 50 mg by mouth every day.     • diazepam (VALIUM) 5 MG Tab TAKE ONE TABLET BY MOUTH EVERY 12 HOURS AS NEEDED FOR ANXIETY 60 Tab 5   • paroxetine (PAXIL) 20 MG Tab TAKE ONE TABLET BY  "MOUTH ONCE DAILY 90 Tab 3   • Multiple Vitamins-Minerals (MULTI COMPLETE PO) Take  by mouth.     • bisoprolol (ZEBETA) 10 MG tablet Take 1 Tab by mouth every day. 90 Tab 3   • testosterone cypionate (DEPO-TESTOSTERONE) 200 MG/ML Solution injection INJECT 1 ML (CC) INTRAMUSCULARLY EVERY 2 WEEKS AS DIRECTED 10 mL 3   • lisinopril (PRINIVIL) 10 MG Tab Take 1 Tab by mouth every day. 90 Tab 3   • vardenafil (LEVITRA) 20 MG tablet Take 20 mg by mouth as needed.     • omeprazole (PRILOSEC) 20 MG CPDR Take 20 mg by mouth every day.     • RiTUXimab (RITUXAN IV) by Intravenous route. Two every 6 months      • cephALEXin (KEFLEX) 500 MG Cap Take 1 Cap by mouth 4 times a day. 40 Cap 0   • cetirizine (ZYRTEC) 10 MG Tab Take 10 mg by mouth every day.     • Loratadine (CLARITIN) 10 MG Cap Take  by mouth.     • Guaifenesin 400 MG Tab Take 1 Tab by mouth 3 times a day as needed. 90 Tab 3   • fexofenadine (ALLEGRA) 60 MG Tab Take 1 Tab by mouth every day. 30 Tab 3   • fluticasone (FLONASE) 50 MCG/ACT nasal spray Spray 1 Spray in nose 2 times a day. 1 Bottle 3   • rosuvastatin (CRESTOR) 10 MG Tab Take 1 Tab by mouth every evening. 90 Tab 3   • atorvastatin (LIPITOR) 40 MG Tab Take 1 Tab by mouth every evening. 90 Tab 3     No facility-administered encounter medications on file as of 7/19/2017.     He is also on bisoprolol 5 mg daily.    ROS       Objective:   /84 mmHg  Pulse 67  Ht 1.778 m (5' 10\")  Wt 92.08 kg (203 lb)  BMI 29.13 kg/m2  SpO2 99%    Physical Exam   Neck: No JVD present.   Cardiovascular: Normal rate and regular rhythm.  Exam reveals no gallop.    Murmur (2-3/6 systolic at the base) heard.  Pulmonary/Chest: Effort normal. He has no rales.   Abdominal: Soft. There is no tenderness.   Musculoskeletal: He exhibits no edema.         Echocardiogram:  CONCLUSIONS  Technically difficult study.   Normal left ventricular size, thickness, systolic function. Left   ventricular ejection fraction is 60% to 65%. Grade I " diastolic   dysfunction - mitral inflow E/A is <1.0.  No pericardial effusion seen. Mild aortic insufficiency was noted.    Holter monitor from October: This showed a normal sinus rhythm with rare atrial and ventricular ectopy. There was no significant tachycardia or bradycardia.    Lab Results   Component Value Date/Time    CHOLESTEROL, 07/17/2017 09:34 AM    LDL 98 07/17/2017 09:34 AM    HDL 60 07/17/2017 09:34 AM    TRIGLYCERIDES 89 07/17/2017 09:34 AM       Lab Results   Component Value Date/Time    SODIUM 135 07/17/2017 09:34 AM    POTASSIUM 5.6* 07/17/2017 09:34 AM    CHLORIDE 100 07/17/2017 09:34 AM    CO2 31 07/17/2017 09:34 AM    GLUCOSE 100* 07/17/2017 09:34 AM    BUN 21 07/17/2017 09:34 AM    CREATININE 0.96 07/17/2017 09:34 AM    BUN-CREATININE RATIO 13 09/21/2016 09:21 AM     Lab Results   Component Value Date/Time    ALKALINE PHOSPHATASE 65 07/17/2017 09:34 AM    ALKALINE PHOSPHATASE 65 07/17/2017 09:34 AM    AST(SGOT) 23 07/17/2017 09:34 AM    AST(SGOT) 21 07/17/2017 09:34 AM    ALT(SGPT) 28 07/17/2017 09:34 AM    ALT(SGPT) 29 07/17/2017 09:34 AM    TOTAL BILIRUBIN 0.6 07/17/2017 09:34 AM    TOTAL BILIRUBIN 0.6 07/17/2017 09:34 AM      Lab Results   Component Value Date/Time    B NATRIURETIC PEPTIDE 9.8 12/01/2014 03:13 PM      CPK: 65    Assessment:     1. Coronary artery disease due to calcified coronary lesion: Mildly cardiac catheterization in 2014     2. Essential hypertension     3. Dyslipidemia         Medical Decision Making:  Today's Assessment / Status / Plan:     Mr. Sims is clinically stable. He has had difficulty with both atorvastatin and rosuvastatin. The rosuvastatin caused muscle tenderness. He had an elevated CPK on atorvastatin but no significant tenderness. At this time, we will try him on low-dose pravastatin. If he cannot tolerate that we may need to consider a PCSK-9 inhibitor. He'll have lab work in about 6 weeks and follow-up post. We will obtain a CPK in about a  week and prior to follow-up in 6 weeks.          John Lao D.O.  [address]  [address]  [place] [address]  VIA In Basket

## 2017-07-19 NOTE — MR AVS SNAPSHOT
"        Edgardo MURPHY Bethany   2017 10:15 AM   Office Visit   MRN: [id number]    Department:  [facility]   Dept Phone:  [phone]    Description:  Male : 1955   Provider:  Stas Zabala M.D.           Reason for Visit     Follow-Up           Allergies as of 2017     Allergen Noted Reactions    Crestor [Rosuvastatin Calcium] 2017   Myalgia    Penicillins 2008   Hives      You were diagnosed with     Coronary artery disease due to calcified coronary lesion   [2926476]       Essential hypertension   [0182969]       Dyslipidemia   [891119]         Vital Signs     Blood Pressure Pulse Height Weight Body Mass Index Oxygen Saturation    118/84 mmHg 67 1.778 m (5' 10\") 92.08 kg (203 lb) 29.13 kg/m2 99%    Smoking Status                   Never Smoker            Basic Information     Date Of Birth Sex Race Ethnicity Preferred Language    1955 Male White Non- English      Your appointments     Aug 04, 2017 10:00 AM   Follow Up Visit with Antonieta Canales M.D.   [facility] (--)    [address]  [place] [id number] [phone]           You will be receiving a confirmation call a few days before your appointment from our automated call confirmation system.            Oct 18, 2017 10:00 AM   Established Patient with John Lao D.O.   [facility])    [address]  [place] [id number] [phone]           You will be receiving a confirmation call a few days before your appointment from our automated call confirmation system.            Oct 23, 2017  1:30 PM   New Patient with Hector Brewer M.D.   [facility] (--)    [address]  [place] [id number] [phone]           Please bring Photo ID, Insurance Cards, All Medication Bottles and copies of any legal documents (such as Living Will, Power of ) If speaking a language besides English please bring an adult " . Please arrive 30 minutes prior for check in and registration. You will be receiving a confirmation call a few days before your appointment from our automated call confirmation system.              Problem List              ICD-10-CM Priority Class Noted - Resolved    Rheumatoid arthritis (CMS-HCC) M06.9   5/5/2009 - Present    Hypogonadism male E29.1   5/5/2009 - Present    Aortic insufficiency I35.1   7/5/2011 - Present    Essential hypertension I10   7/5/2011 - Present    Dyslipidemia E78.5   7/12/2011 - Present    Osteoarthrosis, unspecified whether generalized or localized, pelvic region and thigh M16.9   5/31/2013 - Present    Abnormal myocardial perfusion study    1/15/2014 - Present    Tachycardia R00.0   10/20/2014 - Present    Coronary artery disease due to calcified coronary lesion: Mildly cardiac catheterization in 2014 I25.10, I25.84   12/5/2014 - Present    Anxiety F41.9   3/31/2015 - Present    Depression (Chronic) F32.9   7/13/2015 - Present    Elevated CPK R74.8   9/23/2016 - Present      Health Maintenance        Date Due Completion Dates    IMM ZOSTER VACCINE 1/12/2028 (Originally 10/25/2016) ---    IMM INFLUENZA (1) 9/1/2017 10/11/2016, 3/10/2015, 11/4/2013, 10/10/2012    COLONOSCOPY 12/22/2021 12/22/2016    IMM DTaP/Tdap/Td Vaccine (2 - Td) 1/28/2023 1/28/2013            Current Immunizations     13-VALENT PCV PREVNAR 10/11/2016 11:27 AM    Influenza TIV (IM) 11/4/2013, 10/10/2012    Influenza Vaccine Quad Inj (Pf) 10/11/2016 12:03 PM    Influenza Vaccine Quad Inj (Preserved) 3/10/2015    Pneumococcal polysaccharide vaccine (PPSV-23) 11/4/2013, 10/15/2009    Tdap Vaccine 1/28/2013      Below and/or attached are the medications your provider expects you to take. Review all of your home medications and newly ordered medications with your provider and/or pharmacist. Follow medication instructions as directed by your provider and/or pharmacist. Please keep your medication list with you  and share with your provider. Update the information when medications are discontinued, doses are changed, or new medications (including over-the-counter products) are added; and carry medication information at all times in the event of emergency situations     Allergies:  CRESTOR - Myalgia     PENICILLINS - Hives               Medications  Valid as of: July 19, 2017 - 10:53 AM    Generic Name Brand Name Tablet Size Instructions for use    Bisoprolol Fumarate (Tab) ZEBETA 10 MG Take 1 Tab by mouth every day.        Calcium Carbonate-Vitamin D (Tab) Calcium Carbonate-Vitamin D 600-400 MG-UNIT Take 1 Tab by mouth 2 times a day, with meals.        Cephalexin (Cap) KEFLEX 500 MG Take 1 Cap by mouth 4 times a day.        Cetirizine HCl (Tab) ZYRTEC 10 MG Take 10 mg by mouth every day.        Cyanocobalamin (TABLET DISPERSIBLE) Vitamin B-12 5000 MCG Take  by mouth.        Cyclobenzaprine HCl (Tab) FLEXERIL 10 MG TAKE ONE TABLET BY MOUTH THREE TIMES DAILY AS NEEDED FOR MILD PAIN        DiazePAM (Tab) VALIUM 5 MG TAKE ONE TABLET BY MOUTH EVERY 12 HOURS AS NEEDED FOR ANXIETY        Ergocalciferol (Cap) DRISDOL 32852 UNIT Take 1 Cap by mouth every 7 days.        Fexofenadine HCl (Tab) ALLEGRA 60 MG Take 1 Tab by mouth every day.        Fluticasone Propionate (Suspension) FLONASE 50 MCG/ACT Spray 1 Spray in nose 2 times a day.        Folic Acid (Tab) FOLVITE 1 MG Take 3 Tabs by mouth every day.        GuaiFENesin (Tab) Guaifenesin 400 MG Take 1 Tab by mouth 3 times a day as needed.        Lisinopril (Tab) PRINIVIL 10 MG Take 1 Tab by mouth every day.        Loratadine (Cap) Loratadine 10 MG Take  by mouth.        Meloxicam (Tab) MOBIC 15 MG TAKE ONE TABLET BY MOUTH ONCE DAILY        Methotrexate Sodium (Tab) methotrexate 2.5 MG Take 6 Tabs by mouth every 7 days.        Multiple Vitamins-Minerals   Take  by mouth.        Niacin (Tab) niacin 500 MG Take 500 mg by mouth every day.        Omeprazole (CAPSULE DELAYED RELEASE)  PRILOSEC 20 MG Take 20 mg by mouth every day.        Oxycodone-Acetaminophen (Tab) PERCOCET-10  MG Take 1 Tab by mouth 5 Times a Day.        PARoxetine HCl (Tab) PAXIL 20 MG TAKE ONE TABLET BY MOUTH ONCE DAILY        Pravastatin Sodium (Tab) PRAVACHOL 20 MG Take 1 Tab by mouth every bedtime.        PredniSONE (Tab) DELTASONE 5 MG Take 1 Tab by mouth every day.        RiTUXimab   by Intravenous route. Two every 6 months         Testosterone Cypionate (Solution) DEPO-TESTOSTERONE 200 MG/ML INJECT 1 ML (CC) INTRAMUSCULARLY EVERY 2 WEEKS AS DIRECTED        Vardenafil HCl (Tab) LEVITRA 20 MG Take 20 mg by mouth as needed.        Zinc (Cap) Zinc 50 MG Take 50 mg by mouth every day.        .                 Medicines prescribed today were sent to:     [facility] [address] [place] [address], [place] - [address] [facility] [address]    Phone: [phone] Fax: [phone]    Open 24 Hours?: No      Medication refill instructions:       If your prescription bottle indicates you have medication refills left, it is not necessary to call your provider’s office. Please contact your pharmacy and they will refill your medication.    If your prescription bottle indicates you do not have any refills left, you may request refills at any time through one of the following ways: The online [employer] system (except Urgent Care), by calling your provider’s office, or by asking your pharmacy to contact your provider’s office with a refill request. Medication refills are processed only during regular business hours and may not be available until the next business day. Your provider may request additional information or to have a follow-up visit with you prior to refilling your medication.   *Please Note: Medication refills are assigned a new Rx number when refilled electronically. Your pharmacy may indicate that no refills were authorized even though a new prescription for the same medication is available at the pharmacy. Please request  the medicine by name with the pharmacy before contacting your provider for a refill.        Your To Do List     Future Labs/Procedures Complete By Expires    COMP METABOLIC PANEL  As directed 7/19/2018    CPK - TOTAL SERUM  As directed 7/19/2018    CPK - TOTAL SERUM  As directed 7/19/2018    LIPID PROFILE  As directed 7/19/2018      Other Notes About Your Plan     Pt uses [facility]  For PA  [phone]  [facility] Care ID #  [id number]  Depo-Testosterone  200 ML Pt uses 1 ml every two weeks he gets 1 x 10 ml Vial each time.  Auth good from 12/17/2012 thru 12/12/2013           [employer] Access Code: Activation code not generated  Current [employer] Status: Active

## 2017-08-04 ENCOUNTER — HOSPITAL ENCOUNTER (OUTPATIENT)
Dept: LAB | Facility: MEDICAL CENTER | Age: 62
End: 2017-08-04
Attending: INTERNAL MEDICINE
Payer: MEDICARE

## 2017-08-04 ENCOUNTER — OFFICE VISIT (OUTPATIENT)
Dept: RHEUMATOLOGY | Facility: PHYSICIAN GROUP | Age: 62
End: 2017-08-04
Payer: MEDICARE

## 2017-08-04 VITALS
OXYGEN SATURATION: 100 % | HEART RATE: 60 BPM | SYSTOLIC BLOOD PRESSURE: 132 MMHG | RESPIRATION RATE: 12 BRPM | TEMPERATURE: 98.2 F | WEIGHT: 203 LBS | DIASTOLIC BLOOD PRESSURE: 72 MMHG | BODY MASS INDEX: 29.13 KG/M2

## 2017-08-04 DIAGNOSIS — R01.1 MURMUR: ICD-10-CM

## 2017-08-04 DIAGNOSIS — M06.9 RHEUMATOID ARTHRITIS INVOLVING MULTIPLE SITES, UNSPECIFIED RHEUMATOID FACTOR PRESENCE: ICD-10-CM

## 2017-08-04 DIAGNOSIS — Z79.52 CURRENT CHRONIC USE OF SYSTEMIC STEROIDS: ICD-10-CM

## 2017-08-04 DIAGNOSIS — I25.84 CORONARY ARTERY DISEASE DUE TO CALCIFIED CORONARY LESION: ICD-10-CM

## 2017-08-04 DIAGNOSIS — Z79.899 ENCOUNTER FOR LONG-TERM (CURRENT) USE OF HIGH-RISK MEDICATION: ICD-10-CM

## 2017-08-04 DIAGNOSIS — L60.8 SPLINTER HEMORRHAGE OF TOENAIL: ICD-10-CM

## 2017-08-04 DIAGNOSIS — M06.9 RHEUMATOID ARTHRITIS FLARE (HCC): ICD-10-CM

## 2017-08-04 DIAGNOSIS — I25.10 CORONARY ARTERY DISEASE DUE TO CALCIFIED CORONARY LESION: ICD-10-CM

## 2017-08-04 DIAGNOSIS — E78.5 DYSLIPIDEMIA: ICD-10-CM

## 2017-08-04 LAB
CK SERPL-CCNC: 62 U/L (ref 0–154)
RHEUMATOID FACT SER IA-ACNC: <10 IU/ML (ref 0–14)

## 2017-08-04 PROCEDURE — 36415 COLL VENOUS BLD VENIPUNCTURE: CPT

## 2017-08-04 PROCEDURE — 86215 DEOXYRIBONUCLEASE ANTIBODY: CPT

## 2017-08-04 PROCEDURE — 99214 OFFICE O/P EST MOD 30 MIN: CPT | Performed by: INTERNAL MEDICINE

## 2017-08-04 PROCEDURE — 86060 ANTISTREPTOLYSIN O TITER: CPT

## 2017-08-04 PROCEDURE — 86431 RHEUMATOID FACTOR QUANT: CPT

## 2017-08-04 PROCEDURE — 82595 ASSAY OF CRYOGLOBULIN: CPT

## 2017-08-04 RX ORDER — PREDNISONE 5 MG/1
TABLET ORAL
Qty: 50 TAB | Refills: 0 | Status: SHIPPED | OUTPATIENT
Start: 2017-08-04 | End: 2017-09-18

## 2017-08-04 ASSESSMENT — ENCOUNTER SYMPTOMS
CHILLS: 0
BACK PAIN: 1
FEVER: 0
COUGH: 0

## 2017-08-04 NOTE — PROGRESS NOTES
Subjective:   Date of Consultation:8/4/2017 10:02 AM  Primary care physician:John Lao D.O.      Reason for Consultation:  Mr. Sims  is a pleasant 61 y.o. year old male who presented with follow-up for Rheumatoid Arthritis    Rheumatoid Arthritis   1 hour morning stiffness and joint swelling  Prednisone 2 mg po q day      Current Medications  Methotrexate 6 tabs q Wednesday       RHEUM HISTORY  Mr. Edgardo Sims presented with a history of Rheumatoid Arthritis diagnosed late 1999 and recent RF and CCP were negative.  He was previously a patient of Dr. Mcknight and then followed by a community rheumatology.  He is here today to establish care. In review of Dr. Echavarria that it seems he has been on Rituxan as far back as 2009. At that time he was on a regimen of azathioprine 150 mg methotrexate 15 mg, prednisone 7.5 mg and rituximab with 125 mg of Solu-Medrol he seemed to have been maintained on this regimen and was tapered down on the Imuran to 50 mg twice a day. Notes from May 24, 2012 indicates that he discontinue the Imuran. At that time he was on rituximab and every 6 months 15 mg of methotrexate weekly and 2 mg of prednisone. Notes from June 8, 2015 does remark that his RA was only manageable he's had limited response. He did suffer a consultation with methotrexate developing stomatitis. Methotrexate was held March 6, 2014 and was reinstituted January 5, 2015.     Dr. Echavarria's notes it does comment that he may benefit from rituximab every 5 months however given cost restrictions he can only get it every 6 months. His last infusion was in March 2017 for rituximab.  He reports he is not doing as well on the rituximab.  He feels his activity level is not as active.  He feels increase stiffness.  His feet xrays     Complications to his care include intolerance to medications  And stomatitis with methotrexate.  Methotrexate was stopped briefly and subsequently was restarted but the patient  reports it is not working as well as before.     Previous medications include   azathioprine, methotrexate  Plaquenil and Arava both intolerance not allergies  Remicade loss efficacyt  Gold shots (had a bad reaction)  Enbrel  Not sure Humira  Methylprednisolone did not work as well as prednisone  Never been on Xeljanz   Has not tried Actemra  rituximab (lost efficacy last infusion was 3/2017)  Orencia (not sure why she stopped)    Chronic Pain  On percocet ranging from 30-40 mg daily    Past medical HIstory: bilateral hip replacment and left knee replacement, tonsillitis, varicose vein in the left leg, TMJ, tonsillitis in 1964, broken right ankle in 1980, ruptured disc of L5-S1 noted in 2000 with discectomy and laminectomy, rotator cuff with anterior labral repair in 2001, basal cell carcinoma in 2005 from the nose, meniscal tear of the left knee in 2008 with scope repair, scope repair and biceps tendon repair as well as rotator cuff surgery in 2011 of the right shoulder. Dupuytren's contracture right hand little finger, Dupuytren's contracture right foot, arthritis of the right foot, plantar fasciitis bilateral, aortic insufficiency, hypertension, dyslipidemia, heart murmur, tachycardia, coronary artery disease with mild plaque at catheter. Hiatal hernia and GERD, scoliosis, kyphosis of the upper back/compensating lordosis of the neck . Vein ligation radiofrequency      Family History: lymphoma     Social History: light bookeeping mainly disability, NEVER smoker, occasional alcohol (once a week)    Past Medical History   Diagnosis Date   • Rheumatoid arthritis (CMS-HCC)      severe   • Hypertension    • Aortic insufficiency 7/5/2011   • HTN (hypertension) 7/5/2011   • Dyslipidemia 7/12/2011   • Cancer (CMS-HCC)      skin   • Heart murmur    • Hiatus hernia syndrome    • Pain      RA   • Abnormal myocardial perfusion study 1/15/2014   • Infectious disease    • Heart burn    • Indigestion    • Bronchitis    •  Arthritis      RA, and osteo   • Cold      mid January 2014   • Coughing blood      none currently 2014   • Tachycardia 10/20/2014   • CAD (coronary artery disease) mild plaque at cath in 2/14 12/5/2014     Past Surgical History   Procedure Laterality Date   • Knee arthroscopy       right   • Other orthopedic surgery       awaiting right hip surgery   • Hip arthroplasty total  6/20/08     Performed by YONATAN HINSON at SURGERY [facility] ORS   • Vein ligation radio frequency  12/8/2008     Performed by DEREJE MCCULLOUGH at SURGERY SAME DAY [facility] ORS   • Knee arthroplasty total  6/1/2009     Performed by YONATAN HINSON at SURGERY [facility] ORS   • Shoulder surgery       RIGHT 01/2011   • Other       varicose vein stripping   • Other       heart murmur   • Lumbar laminectomy diskectomy  2000   • Tmj reconstruction bilateral  1994   • Hip arthroplasty total  5/31/2013     Performed by Burak Valles M.D. at SURGERY [facility] ORS   • Recovery  2/3/2014     Performed by Cath-Recovery Surgery at SURGERY SAME DAY [facility] ORS     Allergies   Allergen Reactions   • Crestor [Rosuvastatin Calcium] Myalgia   • Penicillins Hives     Outpatient Encounter Prescriptions as of 8/4/2017   Medication Sig Dispense Refill   • predniSONE (DELTASONE) 5 MG Tab 20 mg po q day x 5 days and decrease q 5 day by 5 mg utnil 5 mg po q day 50 Tab 0   • Tofacitinib Citrate (XELJANZ) 5 MG Tab Take 5 mg by mouth 2 Times a Day. 60 Tab 0   • pravastatin (PRAVACHOL) 20 MG Tab Take 1 Tab by mouth every bedtime. 30 Tab 11   • oxycodone-acetaminophen (PERCOCET-10)  MG Tab Take 1 Tab by mouth 5 Times a Day. 150 Tab 0   • cyclobenzaprine (FLEXERIL) 10 MG Tab TAKE ONE TABLET BY MOUTH THREE TIMES DAILY AS NEEDED FOR MILD PAIN 90 Tab 5   • meloxicam (MOBIC) 15 MG tablet TAKE ONE TABLET BY MOUTH ONCE DAILY 30 Tab 6   • predniSONE (DELTASONE) 5 MG Tab Take 1 Tab by mouth every day. 30 Tab 1   • Calcium Carbonate-Vitamin D (CALCIUM 600 +  D) 600-400 MG-UNIT Tab Take 1 Tab by mouth 2 times a day, with meals. 60 Tab 3   • folic acid (FOLVITE) 1 MG Tab Take 3 Tabs by mouth every day. 90 Tab 2   • methotrexate 2.5 MG Tab Take 6 Tabs by mouth every 7 days. 24 Tab 2   • ergocalciferol (DRISDOL) 39661 UNIT capsule Take 1 Cap by mouth every 7 days. 12 Cap 0   • Cyanocobalamin (VITAMIN B-12) 5000 MCG TABLET DISPERSIBLE Take  by mouth.     • niacin 500 MG Tab Take 500 mg by mouth every day.     • Zinc 50 MG Cap Take 50 mg by mouth every day.     • diazepam (VALIUM) 5 MG Tab TAKE ONE TABLET BY MOUTH EVERY 12 HOURS AS NEEDED FOR ANXIETY 60 Tab 5   • paroxetine (PAXIL) 20 MG Tab TAKE ONE TABLET BY MOUTH ONCE DAILY 90 Tab 3   • Multiple Vitamins-Minerals (MULTI COMPLETE PO) Take  by mouth.     • bisoprolol (ZEBETA) 10 MG tablet Take 1 Tab by mouth every day. 90 Tab 3   • testosterone cypionate (DEPO-TESTOSTERONE) 200 MG/ML Solution injection INJECT 1 ML (CC) INTRAMUSCULARLY EVERY 2 WEEKS AS DIRECTED 10 mL 3   • lisinopril (PRINIVIL) 10 MG Tab Take 1 Tab by mouth every day. 90 Tab 3   • vardenafil (LEVITRA) 20 MG tablet Take 20 mg by mouth as needed.     • omeprazole (PRILOSEC) 20 MG CPDR Take 20 mg by mouth every day.     • RiTUXimab (RITUXAN IV) by Intravenous route. Two every 6 months      • cephALEXin (KEFLEX) 500 MG Cap Take 1 Cap by mouth 4 times a day. 40 Cap 0   • cetirizine (ZYRTEC) 10 MG Tab Take 10 mg by mouth every day.     • Loratadine (CLARITIN) 10 MG Cap Take  by mouth.     • Guaifenesin 400 MG Tab Take 1 Tab by mouth 3 times a day as needed. 90 Tab 3   • fexofenadine (ALLEGRA) 60 MG Tab Take 1 Tab by mouth every day. 30 Tab 3   • fluticasone (FLONASE) 50 MCG/ACT nasal spray Spray 1 Spray in nose 2 times a day. 1 Bottle 3     No facility-administered encounter medications on file as of 8/4/2017.     @[facility]@  Social History     Social History   • Marital Status:      Spouse Name: N/A   • Number of Children: N/A   • Years of Education: N/A      Occupational History   • Not on file.     Social History Main Topics   • Smoking status: Never Smoker    • Smokeless tobacco: Never Used   • Alcohol Use: 3.0 oz/week     6 Cans of beer per week   • Drug Use: No   • Sexual Activity:     Partners: Female     Other Topics Concern   • Not on file     Social History Narrative      History   Smoking status   • Never Smoker    Smokeless tobacco   • Never Used     History   Alcohol Use   • 3.0 oz/week   • 6 Cans of beer per week     History   Drug Use No         Family History   Problem Relation Age of Onset   • Hypertension Mother        Review of Systems   Constitutional: Negative for fever and chills.   Respiratory: Negative for cough.    Cardiovascular: Negative for chest pain.   Musculoskeletal: Positive for back pain and joint pain.   Skin: Negative for rash.        Objective:   /72 mmHg  Pulse 60  Temp(Src) 36.8 °C (98.2 °F)  Resp 12  Wt 92.08 kg (203 lb)  SpO2 100%    Physical Exam   Constitutional: He is oriented to person, place, and time. He appears well-developed and well-nourished. No distress.   HENT:   Head: Normocephalic and atraumatic.   Right Ear: External ear normal.   Left Ear: External ear normal.   Eyes: Conjunctivae are normal. Right eye exhibits no discharge. Left eye exhibits no discharge. No scleral icterus.   Cardiovascular: Normal rate.    Murmur heard.  Grade III systolic murmur   Pulmonary/Chest: Effort normal and breath sounds normal. No stridor. No respiratory distress. He has no wheezes. He has no rales.   No crackles   Abdominal: Soft. Bowel sounds are normal.   No hepatomegaly   Musculoskeletal: He exhibits no edema.   bialteral MT tenderness  Tenderness and periaricutlar swelling at the MCP of the 2nd and 3rd bilateral R>>L.    On the right hand there is PIP swelling and 4th PIP.  Bilateral wrist swelling.  Hammter toes and mild hallux valgus.    Right sided dupytren cotnracture   Neurological: He is alert and oriented to  person, place, and time.   Skin: Skin is warm and dry. No rash noted. He is not diaphoretic. No erythema. No pallor.   Nodules on feet on right; great toe on the left has splinter hemorrhages   Psychiatric: He has a normal mood and affect. His behavior is normal. Judgment and thought content normal.       Assessment:     1. Current chronic use of systemic steroids  ANTISTREPTOLYSIN O    ANTI-DNASE B AB    RHEUMATOID ARTHRITIS FACTOR    CRYOGLOBULIN QL SERUM RFLX    DS-BONE DENSITY STUDY (DEXA)    predniSONE (DELTASONE) 5 MG Tab    CBC WITH DIFFERENTIAL    HEPATIC FUNCTION PANEL    CREATININE    BUN    LIPID PROFILE    ECHOCARDIOGRAM COMP W/O CONT    Tofacitinib Citrate (XELJANZ) 5 MG Tab   2. Rheumatoid arthritis involving multiple sites, unspecified rheumatoid factor presence (CMS-HCC)  ANTISTREPTOLYSIN O    ANTI-DNASE B AB    RHEUMATOID ARTHRITIS FACTOR    CRYOGLOBULIN QL SERUM RFLX    DS-BONE DENSITY STUDY (DEXA)    predniSONE (DELTASONE) 5 MG Tab    CBC WITH DIFFERENTIAL    HEPATIC FUNCTION PANEL    CREATININE    BUN    LIPID PROFILE    ECHOCARDIOGRAM COMP W/O CONT    Tofacitinib Citrate (XELJANZ) 5 MG Tab   3. Rheumatoid arthritis flare (CMS-HCC)  ANTISTREPTOLYSIN O    ANTI-DNASE B AB    RHEUMATOID ARTHRITIS FACTOR    CRYOGLOBULIN QL SERUM RFLX    DS-BONE DENSITY STUDY (DEXA)    predniSONE (DELTASONE) 5 MG Tab    CBC WITH DIFFERENTIAL    HEPATIC FUNCTION PANEL    CREATININE    BUN    LIPID PROFILE    ECHOCARDIOGRAM COMP W/O CONT    Tofacitinib Citrate (XELJANZ) 5 MG Tab   4. Encounter for long-term (current) use of high-risk medication  ANTISTREPTOLYSIN O    ANTI-DNASE B AB    RHEUMATOID ARTHRITIS FACTOR    CRYOGLOBULIN QL SERUM RFLX    DS-BONE DENSITY STUDY (DEXA)    predniSONE (DELTASONE) 5 MG Tab    CBC WITH DIFFERENTIAL    HEPATIC FUNCTION PANEL    CREATININE    BUN    LIPID PROFILE    ECHOCARDIOGRAM COMP W/O CONT    Tofacitinib Citrate (XELJANZ) 5 MG Tab   5. Murmur  ANTISTREPTOLYSIN O    ANTI-DNASE B AB     RHEUMATOID ARTHRITIS FACTOR    CRYOGLOBULIN QL SERUM RFLX    DS-BONE DENSITY STUDY (DEXA)    predniSONE (DELTASONE) 5 MG Tab    CBC WITH DIFFERENTIAL    HEPATIC FUNCTION PANEL    CREATININE    BUN    LIPID PROFILE    ECHOCARDIOGRAM COMP W/O CONT   6. Splinter hemorrhage of toenail  ANTISTREPTOLYSIN O    ANTI-DNASE B AB    RHEUMATOID ARTHRITIS FACTOR    CRYOGLOBULIN QL SERUM RFLX    DS-BONE DENSITY STUDY (DEXA)    predniSONE (DELTASONE) 5 MG Tab    CBC WITH DIFFERENTIAL    HEPATIC FUNCTION PANEL    CREATININE    BUN    LIPID PROFILE    ECHOCARDIOGRAM COMP W/O CONT     Labs:  [unfilled]    Lab Results   Component Value Date/Time    QUANTIFERON TB GOLD Negative 06/01/2017 01:13 PM     Lab Results   Component Value Date/Time    HEPATITIS B CCORE AB, TOTAL Negative 06/01/2017 01:13 PM    HEPATITIS B SURFACE ANTIGEN Negative 06/01/2017 01:13 PM     Lab Results   Component Value Date/Time    HEPATITIS C ANTIBODY Negative 06/01/2017 01:13 PM     Lab Results   Component Value Date/Time    SODIUM 135 07/17/2017 09:34 AM    POTASSIUM 5.6* 07/17/2017 09:34 AM    CHLORIDE 100 07/17/2017 09:34 AM    CO2 31 07/17/2017 09:34 AM    GLUCOSE 100* 07/17/2017 09:34 AM    BUN 21 07/17/2017 09:34 AM    CREATININE 0.96 07/17/2017 09:34 AM    BUN-CREATININE RATIO 13 09/21/2016 09:21 AM      Lab Results   Component Value Date/Time    WBC 11.2* 07/17/2017 09:34 AM    RBC 4.95 07/17/2017 09:34 AM    HEMOGLOBIN 16.3 07/17/2017 09:34 AM    HEMATOCRIT 48.7 07/17/2017 09:34 AM    MCV 98.4* 07/17/2017 09:34 AM    MCH 32.9 07/17/2017 09:34 AM    MCHC 33.5* 07/17/2017 09:34 AM    MPV 10.0 07/17/2017 09:34 AM    NEUTROPHILS-POLYS 69.90 07/17/2017 09:34 AM    LYMPHOCYTES 13.10* 07/17/2017 09:34 AM    MONOCYTES 13.30 07/17/2017 09:34 AM    EOSINOPHILS 1.00 07/17/2017 09:34 AM    BASOPHILS 1.60 07/17/2017 09:34 AM    HYPOCHROMIA 1+ 03/05/2014 04:43 PM    ANISOCYTOSIS 1+ 01/02/2015 05:02 PM      Lab Results   Component Value Date/Time    CALCIUM 9.7  07/17/2017 09:34 AM    AST(SGOT) 23 07/17/2017 09:34 AM    AST(SGOT) 21 07/17/2017 09:34 AM    ALT(SGPT) 28 07/17/2017 09:34 AM    ALT(SGPT) 29 07/17/2017 09:34 AM    ALKALINE PHOSPHATASE 65 07/17/2017 09:34 AM    ALKALINE PHOSPHATASE 65 07/17/2017 09:34 AM    TOTAL BILIRUBIN 0.6 07/17/2017 09:34 AM    TOTAL BILIRUBIN 0.6 07/17/2017 09:34 AM    ALBUMIN 4.3 07/17/2017 09:34 AM    ALBUMIN 4.4 07/17/2017 09:34 AM    TOTAL PROTEIN 6.8 07/17/2017 09:34 AM    TOTAL PROTEIN 6.7 07/17/2017 09:34 AM     No results found for: URICACID, RHEUMFACTN, CCPANTIBODY, ANTINUCAB  Lab Results   Component Value Date/Time    SED RATE WESTERGREN 6 06/01/2017 01:13 PM     No results found for: RUSSELVIPER, DRVVTINTERP  No results found for: L4MRRILCCQD, Z4UQXTIJYNX, IGGCARDIOLI, IGMCARDIOLI, IGACARDIOLI, CRYOGLOBULIN  No results found for: ANADIRECT, ANTIDNADS, RNPAB, SMITHAB, XKBRYKD59, SSAROAB, SSBLAAB, QRJTPX8IC, CENTROMBAB  No results found for: ANTIDNADS, DSDNA, AGBMAB, GBMABA, ANCAIGG, H8LCOUFRHNC, JO1AB, RNPAB, ANTISSASJ, ANTISSBSJ  Lab Results   Component Value Date/Time    COLOR Lt. Yellow 08/19/2015 03:20 PM    SPECIFIC GRAVITY 1.008 08/19/2015 03:20 PM    PH 6.5 08/19/2015 03:20 PM    GLUCOSE Negative 08/19/2015 03:20 PM    KETONES Negative 08/19/2015 03:20 PM    PROTEIN Negative 08/19/2015 03:20 PM     Lab Results   Component Value Date/Time    TOTAL VOLUME 2750 08/18/2015 07:30 AM    TOTAL VOLUME 2750 08/18/2015 07:30 AM     No results found for: SSA60, SSA52  No results found for: HBA1C  Lab Results   Component Value Date/Time    CPK TOTAL 86 07/17/2017 09:34 AM     No results found for: G6PD  No results found for: WUOB04BKOF  No results found for: ACESERUM  Lab Results   Component Value Date/Time    25-HYDROXY   VITAMIN D 25 18* 06/01/2017 01:13 PM     Lab Results   Component Value Date/Time    FREE T-4 1.11 12/01/2014 03:13 PM     No results found for: TSHULTRASEN, FREET4  No results found for: MICROSOMALA, ANTITHYROGL  No  results found for: IGGLYMEABS  No results found for: ANTIMITOCHO, FACTIN  No results found for: IGA, TTRANSIGA, ENDOIGA  No results found for: FLTYPE, CRYSTALSBDF  No results found for: ISTATICAL  No results found for: ISTATCREAT  No results found for: CTELOPEP  No results found for: GBMABG  Lab Results   Component Value Date/Time    PTH, INTACT 55 10/10/2008 10:42 AM         Medical Decision Making:  Today's Assessment / Status / Plan:     Rheumatoid Arthritis  Not well controlled  We will increase his prednisone to 5 mg daily. We will continue his methotrexate. We will consider as xeljanz.  I did discuss this with the patient.  I reviewed the side effects including but not limited to bowel perforation, infection, malignancy, anemias and cytopenias.    The patient did receive information to read and will consider this and discuss. As an alternative to see if he can confirm that he has not tried Orencia this would be a medication that would twice a day L and would have less side effects.    Results for CANDI UMANA (MRN [id number]) as of 6/23/2017 18:44   Ref. Range 6/1/2017 13:13   Hep B Surface Antibody Quant Latest Ref Range: 0.00-10.00 mIU/mL <3.10   Hepatitis B Surface Antigen Latest Ref Range: Negative  Negative   Hepatitis B Ccore Ab, Total Latest Ref Range: Negative  Negative   Hepatitis C Antibody Latest Ref Range: Negative  Negative   Mitogen-Nil Unknown 60.763   Nil Unknown 0.029   Quantiferon TB Gold Latest Ref Range: Negative  Negative   TB Ag-Nil Unknown -0.010     Splinter hemorrhage noted on left great toe and murmur   Will check ASO, anti streptolysin O, and echocardiogram  Less likely cryo as his lesions don't appear to be nailfold infarcts but will check RF and cryoglobulins.     Chronic steroid use  Calcium and vitamin D  DEXA    1. Current chronic use of systemic steroids  ANTISTREPTOLYSIN O    ANTI-DNASE B AB    RHEUMATOID ARTHRITIS FACTOR    CRYOGLOBULIN QL SERUM RFLX    DS-BONE DENSITY  STUDY (DEXA)    predniSONE (DELTASONE) 5 MG Tab    CBC WITH DIFFERENTIAL    HEPATIC FUNCTION PANEL    CREATININE    BUN    LIPID PROFILE    ECHOCARDIOGRAM COMP W/O CONT    Tofacitinib Citrate (XELJANZ) 5 MG Tab   2. Rheumatoid arthritis involving multiple sites, unspecified rheumatoid factor presence (CMS-HCC)  ANTISTREPTOLYSIN O    ANTI-DNASE B AB    RHEUMATOID ARTHRITIS FACTOR    CRYOGLOBULIN QL SERUM RFLX    DS-BONE DENSITY STUDY (DEXA)    predniSONE (DELTASONE) 5 MG Tab    CBC WITH DIFFERENTIAL    HEPATIC FUNCTION PANEL    CREATININE    BUN    LIPID PROFILE    ECHOCARDIOGRAM COMP W/O CONT    Tofacitinib Citrate (XELJANZ) 5 MG Tab   3. Rheumatoid arthritis flare (CMS-HCC)  ANTISTREPTOLYSIN O    ANTI-DNASE B AB    RHEUMATOID ARTHRITIS FACTOR    CRYOGLOBULIN QL SERUM RFLX    DS-BONE DENSITY STUDY (DEXA)    predniSONE (DELTASONE) 5 MG Tab    CBC WITH DIFFERENTIAL    HEPATIC FUNCTION PANEL    CREATININE    BUN    LIPID PROFILE    ECHOCARDIOGRAM COMP W/O CONT    Tofacitinib Citrate (XELJANZ) 5 MG Tab   4. Encounter for long-term (current) use of high-risk medication  ANTISTREPTOLYSIN O    ANTI-DNASE B AB    RHEUMATOID ARTHRITIS FACTOR    CRYOGLOBULIN QL SERUM RFLX    DS-BONE DENSITY STUDY (DEXA)    predniSONE (DELTASONE) 5 MG Tab    CBC WITH DIFFERENTIAL    HEPATIC FUNCTION PANEL    CREATININE    BUN    LIPID PROFILE    ECHOCARDIOGRAM COMP W/O CONT    Tofacitinib Citrate (XELJANZ) 5 MG Tab   5. Murmur  ANTISTREPTOLYSIN O    ANTI-DNASE B AB    RHEUMATOID ARTHRITIS FACTOR    CRYOGLOBULIN QL SERUM RFLX    DS-BONE DENSITY STUDY (DEXA)    predniSONE (DELTASONE) 5 MG Tab    CBC WITH DIFFERENTIAL    HEPATIC FUNCTION PANEL    CREATININE    BUN    LIPID PROFILE    ECHOCARDIOGRAM COMP W/O CONT   6. Splinter hemorrhage of toenail  ANTISTREPTOLYSIN O    ANTI-DNASE B AB    RHEUMATOID ARTHRITIS FACTOR    CRYOGLOBULIN QL SERUM RFLX    DS-BONE DENSITY STUDY (DEXA)    predniSONE (DELTASONE) 5 MG Tab    CBC WITH DIFFERENTIAL    HEPATIC  FUNCTION PANEL    CREATININE    BUN    LIPID PROFILE    ECHOCARDIOGRAM COMP W/O CONT         Return in about 8 weeks (around 9/29/2017).    I have spent greater than 50% of this 30 minute visit in counseling/coordination of care with the patient regarding a discussion about different types of rheumatoid arthritis treatments and the options that we have left. I also discussed the risks and side effects of the medications options.    He agreed and verbalized his agreement and understanding with the current plan. I answered all questions and concerns he has at this time and advised him to call at any time in there interim with questions or concerns in regards to his care.    Thank you for allowing me to participate in his care, I will continue to follow closely.     Please note that this dictation was created using voice recognition software. I have made every reasonable attempt to correct obvious errors, but I expect that there are errors of grammar and possibly content that I did not discover before finalizing the note.

## 2017-08-04 NOTE — MR AVS SNAPSHOT
Edgardo MURPHY Bethany   2017 10:00 AM   Office Visit   MRN: [id number]    Department:  Rheumatology Med [facility]   Dept Phone:  [phone]    Description:  Male : 1955   Provider:  Antonieta Canales M.D.           Reason for Visit     Follow-Up           Allergies as of 2017     Allergen Noted Reactions    Crestor [Rosuvastatin Calcium] 2017   Myalgia    Penicillins 2008   Hives      You were diagnosed with     Current chronic use of systemic steroids   [5654632]       Rheumatoid arthritis involving multiple sites, unspecified rheumatoid factor presence (CMS-HCC)   [7310716]       Rheumatoid arthritis flare (CMS-HCC)   [911859]       Encounter for long-term (current) use of high-risk medication   [065658]       Murmur   [331376]       Splinter hemorrhage of toenail   [965597]         Vital Signs     Blood Pressure Pulse Temperature Respirations Weight Oxygen Saturation    132/72 mmHg 60 36.8 °C (98.2 °F) 12 92.08 kg (203 lb) 100%    Smoking Status                   Never Smoker            Basic Information     Date Of Birth Sex Race Ethnicity Preferred Language    1955 Male White Non- English      Your appointments     Sep 29, 2017  9:00 AM   Follow Up Visit with Antonieta Canales M.D.   [facility] (--)    [address], [address]  [place] [id number] [phone]           You will be receiving a confirmation call a few days before your appointment from our automated call confirmation system.            Oct 04, 2017  9:45 AM   FOLLOW UP with Stas Zabala M.D.   [facility] (--)    [address], [address]  [place] [id number] [phone]            Oct 18, 2017 10:00 AM   Established Patient with John Lao D.O.   [facility])    [address]  [place] [id number] [phone]           You will be receiving a confirmation call a few days before your appointment from our automated  call confirmation system.            Oct 23, 2017  1:30 PM   New Patient with Hector Brewer M.D.   [facility] (--)    [address]  [place] [address] [phone]           Please bring Photo ID, Insurance Cards, All Medication Bottles and copies of any legal documents (such as Living Will, Power of ) If speaking a language besides English please bring an adult . Please arrive 30 minutes prior for check in and registration. You will be receiving a confirmation call a few days before your appointment from our automated call confirmation system.              Problem List              ICD-10-CM Priority Class Noted - Resolved    Rheumatoid arthritis (CMS-HCC) M06.9   5/5/2009 - Present    Hypogonadism male E29.1   5/5/2009 - Present    Aortic insufficiency I35.1   7/5/2011 - Present    Essential hypertension I10   7/5/2011 - Present    Dyslipidemia E78.5   7/12/2011 - Present    Osteoarthrosis, unspecified whether generalized or localized, pelvic region and thigh M16.9   5/31/2013 - Present    Abnormal myocardial perfusion study    1/15/2014 - Present    Tachycardia R00.0   10/20/2014 - Present    Coronary artery disease due to calcified coronary lesion: Mildly cardiac catheterization in 2014 I25.10, I25.84   12/5/2014 - Present    Anxiety F41.9   3/31/2015 - Present    Depression (Chronic) F32.9   7/13/2015 - Present    Elevated CPK R74.8   9/23/2016 - Present      Health Maintenance        Date Due Completion Dates    IMM ZOSTER VACCINE 1/12/2028 (Originally 10/25/2016) ---    IMM INFLUENZA (1) 9/1/2017 10/11/2016, 3/10/2015, 11/4/2013, 10/10/2012    COLONOSCOPY 12/22/2021 12/22/2016    IMM DTaP/Tdap/Td Vaccine (2 - Td) 1/28/2023 1/28/2013            Current Immunizations     13-VALENT PCV PREVNAR 10/11/2016 11:27 AM    Influenza TIV (IM) 11/4/2013, 10/10/2012    Influenza Vaccine Quad Inj (Pf) 10/11/2016 12:03 PM    Influenza Vaccine Quad Inj (Preserved)  3/10/2015    Pneumococcal polysaccharide vaccine (PPSV-23) 11/4/2013, 10/15/2009    Tdap Vaccine 1/28/2013      Below and/or attached are the medications your provider expects you to take. Review all of your home medications and newly ordered medications with your provider and/or pharmacist. Follow medication instructions as directed by your provider and/or pharmacist. Please keep your medication list with you and share with your provider. Update the information when medications are discontinued, doses are changed, or new medications (including over-the-counter products) are added; and carry medication information at all times in the event of emergency situations     Allergies:  CRESTOR - Myalgia     PENICILLINS - Hives               Medications  Valid as of: August 04, 2017 - 10:38 AM    Generic Name Brand Name Tablet Size Instructions for use    Bisoprolol Fumarate (Tab) ZEBETA 10 MG Take 1 Tab by mouth every day.        Calcium Carbonate-Vitamin D (Tab) Calcium Carbonate-Vitamin D 600-400 MG-UNIT Take 1 Tab by mouth 2 times a day, with meals.        Cephalexin (Cap) KEFLEX 500 MG Take 1 Cap by mouth 4 times a day.        Cetirizine HCl (Tab) ZYRTEC 10 MG Take 10 mg by mouth every day.        Cyanocobalamin (TABLET DISPERSIBLE) Vitamin B-12 5000 MCG Take  by mouth.        Cyclobenzaprine HCl (Tab) FLEXERIL 10 MG TAKE ONE TABLET BY MOUTH THREE TIMES DAILY AS NEEDED FOR MILD PAIN        DiazePAM (Tab) VALIUM 5 MG TAKE ONE TABLET BY MOUTH EVERY 12 HOURS AS NEEDED FOR ANXIETY        Ergocalciferol (Cap) DRISDOL 32364 UNIT Take 1 Cap by mouth every 7 days.        Fexofenadine HCl (Tab) ALLEGRA 60 MG Take 1 Tab by mouth every day.        Fluticasone Propionate (Suspension) FLONASE 50 MCG/ACT Spray 1 Spray in nose 2 times a day.        Folic Acid (Tab) FOLVITE 1 MG Take 3 Tabs by mouth every day.        GuaiFENesin (Tab) Guaifenesin 400 MG Take 1 Tab by mouth 3 times a day as needed.        Lisinopril (Tab) PRINIVIL 10  MG Take 1 Tab by mouth every day.        Loratadine (Cap) Loratadine 10 MG Take  by mouth.        Meloxicam (Tab) MOBIC 15 MG TAKE ONE TABLET BY MOUTH ONCE DAILY        Methotrexate Sodium (Tab) methotrexate 2.5 MG Take 6 Tabs by mouth every 7 days.        Multiple Vitamins-Minerals   Take  by mouth.        Niacin (Tab) niacin 500 MG Take 500 mg by mouth every day.        Omeprazole (CAPSULE DELAYED RELEASE) PRILOSEC 20 MG Take 20 mg by mouth every day.        Oxycodone-Acetaminophen (Tab) PERCOCET-10  MG Take 1 Tab by mouth 5 Times a Day.        PARoxetine HCl (Tab) PAXIL 20 MG TAKE ONE TABLET BY MOUTH ONCE DAILY        Pravastatin Sodium (Tab) PRAVACHOL 20 MG Take 1 Tab by mouth every bedtime.        PredniSONE (Tab) DELTASONE 5 MG Take 1 Tab by mouth every day.        PredniSONE (Tab) DELTASONE 5 MG 20 mg po q day x 5 days and decrease q 5 day by 5 mg utnil 5 mg po q day        RiTUXimab   by Intravenous route. Two every 6 months         Testosterone Cypionate (Solution) DEPO-TESTOSTERONE 200 MG/ML INJECT 1 ML (CC) INTRAMUSCULARLY EVERY 2 WEEKS AS DIRECTED        Tofacitinib Citrate (Tab) Tofacitinib Citrate 5 MG Take 5 mg by mouth 2 Times a Day.        Vardenafil HCl (Tab) LEVITRA 20 MG Take 20 mg by mouth as needed.        Zinc (Cap) Zinc 50 MG Take 50 mg by mouth every day.        .                 Medicines prescribed today were sent to:     [facility] [address] [place] [address] [address] [facility] [address]    Phone: [phone] Fax: [phone]    Open 24 Hours?: No      Medication refill instructions:       If your prescription bottle indicates you have medication refills left, it is not necessary to call your provider’s office. Please contact your pharmacy and they will refill your medication.    If your prescription bottle indicates you do not have any refills left, you may request refills at any time through one of the following ways: The online [employer] system (except Urgent Care),  by calling your provider’s office, or by asking your pharmacy to contact your provider’s office with a refill request. Medication refills are processed only during regular business hours and may not be available until the next business day. Your provider may request additional information or to have a follow-up visit with you prior to refilling your medication.   *Please Note: Medication refills are assigned a new Rx number when refilled electronically. Your pharmacy may indicate that no refills were authorized even though a new prescription for the same medication is available at the pharmacy. Please request the medicine by name with the pharmacy before contacting your provider for a refill.        Your To Do List     Future Labs/Procedures Complete By Expires    ANTI-DNASE B AB  As directed 8/4/2018    ANTISTREPTOLYSIN O  As directed 8/4/2018    BUN  As directed 8/4/2018    CBC WITH DIFFERENTIAL  As directed 8/4/2018    CREATININE  As directed 8/4/2018    CRYOGLOBULIN QL SERUM RFLX  As directed 8/4/2018    DS-BONE DENSITY STUDY (DEXA)  As directed 8/4/2018    ECHOCARDIOGRAM COMP W/O CONT  As directed 8/4/2018    Scheduling Instructions:    61 y.o. male with history of RA.  On exam great toe nail noted concern for splinter hemorrhages and murmur appreciated on exam.    HEPATIC FUNCTION PANEL  As directed 8/4/2018    LIPID PROFILE  As directed 8/4/2018    RHEUMATOID ARTHRITIS FACTOR  As directed 8/4/2018      Other Notes About Your Plan     Pt uses [facility]  For PA  [phone]  [facility] ID #  [id number]  Depo-Testosterone  200 ML Pt uses 1 ml every two weeks he gets 1 x 10 ml Vial each time.  Auth good from 12/17/2012 thru 12/12/2013           MyChart Access Code: Activation code not generated  Current [employer] Status: Active

## 2017-08-04 NOTE — Clinical Note
[facility]   [address], [address]  [place] [address]  Phone: [phone]  Fax: [phone]              Encounter Date: 8/4/2017    Dear Dr. Lao,    It was a pleasure seeing your patient, Edgardo Sims, on 8/4/2017. Diagnoses of Current chronic use of systemic steroids, Rheumatoid arthritis involving multiple sites, unspecified rheumatoid factor presence (CMS-HCC), Rheumatoid arthritis flare (CMS-[facility]), Encounter for long-term (current) use of high-risk medication, Murmur, and Splinter hemorrhage of toenail were pertinent to this visit.     Please find attached progress note which includes the history I obtained from Mr. Sims, my physical examination findings, my impression and recommendations.      Once again, it was a pleasure participating in your patient's care.  Please feel free to contact me if you have any questions or if I can be of any further assistance to your patients.      Sincerely,    Antonieta Canales M.D.  Electronically Signed          PROGRESS NOTE:  Subjective:   Date of Consultation:8/4/2017 10:02 AM  Primary care physician:John Lao D.O.      Reason for Consultation:  Mr. Sims  is a pleasant 61 y.o. year old male who presented with follow-up for Rheumatoid Arthritis    Rheumatoid Arthritis   1 hour morning stiffness and joint swelling  Prednisone 2 mg po q day      Current Medications  Methotrexate 6 tabs q Wednesday       RHEUM HISTORY  Mr. Edgardo Sims presented with a history of Rheumatoid Arthritis diagnosed late 1999 and recent RF and CCP were negative.  He was previously a patient of Dr. Mcknight and then followed by a community rheumatology.  He is here today to establish care. In review of Dr. Echavarria that it seems he has been on Rituxan as far back as 2009. At that time he was on a regimen of azathioprine 150 mg methotrexate 15 mg, prednisone 7.5 mg and rituximab with 125 mg of Solu-Medrol he seemed to have been maintained on  this regimen and was tapered down on the Imuran to 50 mg twice a day. Notes from May 24, 2012 indicates that he discontinue the Imuran. At that time he was on rituximab and every 6 months 15 mg of methotrexate weekly and 2 mg of prednisone. Notes from June 8, 2015 does remark that his RA was only manageable he's had limited response. He did suffer a consultation with methotrexate developing stomatitis. Methotrexate was held March 6, 2014 and was reinstituted January 5, 2015.     Dr. Echavarria's notes it does comment that he may benefit from rituximab every 5 months however given cost restrictions he can only get it every 6 months. His last infusion was in March 2017 for rituximab.  He reports he is not doing as well on the rituximab.  He feels his activity level is not as active.  He feels increase stiffness.  His feet xrays     Complications to his care include intolerance to medications  And stomatitis with methotrexate.  Methotrexate was stopped briefly and subsequently was restarted but the patient reports it is not working as well as before.     Previous medications include   azathioprine, methotrexate  Plaquenil and Arava both intolerance not allergies  Remicade loss efficacyt  Gold shots (had a bad reaction)  Enbrel  Not sure Humira  Methylprednisolone did not work as well as prednisone  Never been on Xeljanz   Has not tried Actemra  rituximab (lost efficacy last infusion was 3/2017)  Orencia (not sure why she stopped)    Chronic Pain  On percocet ranging from 30-40 mg daily    Past medical HIstory: bilateral hip replacment and left knee replacement, tonsillitis, varicose vein in the left leg, TMJ, tonsillitis in 1964, broken right ankle in 1980, ruptured disc of L5-S1 noted in 2000 with discectomy and laminectomy, rotator cuff with anterior labral repair in 2001, basal cell carcinoma in 2005 from the nose, meniscal tear of the left knee in 2008 with scope repair, scope repair and biceps tendon repair as well  as rotator cuff surgery in 2011 of the right shoulder. Dupuytren's contracture right hand little finger, Dupuytren's contracture right foot, arthritis of the right foot, plantar fasciitis bilateral, aortic insufficiency, hypertension, dyslipidemia, heart murmur, tachycardia, coronary artery disease with mild plaque at catheter. Hiatal hernia and GERD, scoliosis, kyphosis of the upper back/compensating lordosis of the neck . Vein ligation radiofrequency      Family History: lymphoma     Social History: light bookeeping mainly disability, NEVER smoker, occasional alcohol (once a week)    Past Medical History   Diagnosis Date   • Rheumatoid arthritis (CMS-HCC)      severe   • Hypertension    • Aortic insufficiency 7/5/2011   • HTN (hypertension) 7/5/2011   • Dyslipidemia 7/12/2011   • Cancer (CMS-HCC)      skin   • Heart murmur    • Hiatus hernia syndrome    • Pain      RA   • Abnormal myocardial perfusion study 1/15/2014   • Infectious disease    • Heart burn    • Indigestion    • Bronchitis    • Arthritis      RA, and osteo   • Cold      mid January 2014   • Coughing blood      none currently 2014   • Tachycardia 10/20/2014   • CAD (coronary artery disease) mild plaque at cath in 2/14 12/5/2014     Past Surgical History   Procedure Laterality Date   • Knee arthroscopy       right   • Other orthopedic surgery       awaiting right hip surgery   • Hip arthroplasty total  6/20/08     Performed by YONATAN HINSON at SURGERY [facility] ORS   • Vein ligation radio frequency  12/8/2008     Performed by DEREJE MCCULLOUGH at SURGERY SAME DAY [facility] ORS   • Knee arthroplasty total  6/1/2009     Performed by YONATAN HINSON at SURGERY [facility] ORS   • Shoulder surgery       RIGHT 01/2011   • Other       varicose vein stripping   • Other       heart murmur   • Lumbar laminectomy diskectomy  2000   • Tmj reconstruction bilateral  1994   • Hip arthroplasty total  5/31/2013     Performed by Burak Valles M.D. at SURGERY  [facility] ORS   • Recovery  2/3/2014     Performed by Cath-Recovery Surgery at SURGERY SAME DAY [facility] ORS     Allergies   Allergen Reactions   • Crestor [Rosuvastatin Calcium] Myalgia   • Penicillins Hives     Outpatient Encounter Prescriptions as of 8/4/2017   Medication Sig Dispense Refill   • predniSONE (DELTASONE) 5 MG Tab 20 mg po q day x 5 days and decrease q 5 day by 5 mg utnil 5 mg po q day 50 Tab 0   • Tofacitinib Citrate (XELJANZ) 5 MG Tab Take 5 mg by mouth 2 Times a Day. 60 Tab 0   • pravastatin (PRAVACHOL) 20 MG Tab Take 1 Tab by mouth every bedtime. 30 Tab 11   • oxycodone-acetaminophen (PERCOCET-10)  MG Tab Take 1 Tab by mouth 5 Times a Day. 150 Tab 0   • cyclobenzaprine (FLEXERIL) 10 MG Tab TAKE ONE TABLET BY MOUTH THREE TIMES DAILY AS NEEDED FOR MILD PAIN 90 Tab 5   • meloxicam (MOBIC) 15 MG tablet TAKE ONE TABLET BY MOUTH ONCE DAILY 30 Tab 6   • predniSONE (DELTASONE) 5 MG Tab Take 1 Tab by mouth every day. 30 Tab 1   • Calcium Carbonate-Vitamin D (CALCIUM 600 + D) 600-400 MG-UNIT Tab Take 1 Tab by mouth 2 times a day, with meals. 60 Tab 3   • folic acid (FOLVITE) 1 MG Tab Take 3 Tabs by mouth every day. 90 Tab 2   • methotrexate 2.5 MG Tab Take 6 Tabs by mouth every 7 days. 24 Tab 2   • ergocalciferol (DRISDOL) 59399 UNIT capsule Take 1 Cap by mouth every 7 days. 12 Cap 0   • Cyanocobalamin (VITAMIN B-12) 5000 MCG TABLET DISPERSIBLE Take  by mouth.     • niacin 500 MG Tab Take 500 mg by mouth every day.     • Zinc 50 MG Cap Take 50 mg by mouth every day.     • diazepam (VALIUM) 5 MG Tab TAKE ONE TABLET BY MOUTH EVERY 12 HOURS AS NEEDED FOR ANXIETY 60 Tab 5   • paroxetine (PAXIL) 20 MG Tab TAKE ONE TABLET BY MOUTH ONCE DAILY 90 Tab 3   • Multiple Vitamins-Minerals (MULTI COMPLETE PO) Take  by mouth.     • bisoprolol (ZEBETA) 10 MG tablet Take 1 Tab by mouth every day. 90 Tab 3   • testosterone cypionate (DEPO-TESTOSTERONE) 200 MG/ML Solution injection INJECT 1 ML (CC) INTRAMUSCULARLY  EVERY 2 WEEKS AS DIRECTED 10 mL 3   • lisinopril (PRINIVIL) 10 MG Tab Take 1 Tab by mouth every day. 90 Tab 3   • vardenafil (LEVITRA) 20 MG tablet Take 20 mg by mouth as needed.     • omeprazole (PRILOSEC) 20 MG CPDR Take 20 mg by mouth every day.     • RiTUXimab (RITUXAN IV) by Intravenous route. Two every 6 months      • cephALEXin (KEFLEX) 500 MG Cap Take 1 Cap by mouth 4 times a day. 40 Cap 0   • cetirizine (ZYRTEC) 10 MG Tab Take 10 mg by mouth every day.     • Loratadine (CLARITIN) 10 MG Cap Take  by mouth.     • Guaifenesin 400 MG Tab Take 1 Tab by mouth 3 times a day as needed. 90 Tab 3   • fexofenadine (ALLEGRA) 60 MG Tab Take 1 Tab by mouth every day. 30 Tab 3   • fluticasone (FLONASE) 50 MCG/ACT nasal spray Spray 1 Spray in nose 2 times a day. 1 Bottle 3     No facility-administered encounter medications on file as of 8/4/2017.     @[facility]@  Social History     Social History   • Marital Status:      Spouse Name: N/A   • Number of Children: N/A   • Years of Education: N/A     Occupational History   • Not on file.     Social History Main Topics   • Smoking status: Never Smoker    • Smokeless tobacco: Never Used   • Alcohol Use: 3.0 oz/week     6 Cans of beer per week   • Drug Use: No   • Sexual Activity:     Partners: Female     Other Topics Concern   • Not on file     Social History Narrative      History   Smoking status   • Never Smoker    Smokeless tobacco   • Never Used     History   Alcohol Use   • 3.0 oz/week   • 6 Cans of beer per week     History   Drug Use No         Family History   Problem Relation Age of Onset   • Hypertension Mother        Review of Systems   Constitutional: Negative for fever and chills.   Respiratory: Negative for cough.    Cardiovascular: Negative for chest pain.   Musculoskeletal: Positive for back pain and joint pain.   Skin: Negative for rash.        Objective:   /72 mmHg  Pulse 60  Temp(Src) 36.8 °C (98.2 °F)  Resp 12  Wt 92.08 kg (203 lb)  SpO2  100%    Physical Exam   Constitutional: He is oriented to person, place, and time. He appears well-developed and well-nourished. No distress.   HENT:   Head: Normocephalic and atraumatic.   Right Ear: External ear normal.   Left Ear: External ear normal.   Eyes: Conjunctivae are normal. Right eye exhibits no discharge. Left eye exhibits no discharge. No scleral icterus.   Cardiovascular: Normal rate.    Murmur heard.  Grade III systolic murmur   Pulmonary/Chest: Effort normal and breath sounds normal. No stridor. No respiratory distress. He has no wheezes. He has no rales.   No crackles   Abdominal: Soft. Bowel sounds are normal.   No hepatomegaly   Musculoskeletal: He exhibits no edema.   bialteral MT tenderness  Tenderness and periaricutlar swelling at the MCP of the 2nd and 3rd bilateral R>>L.    On the right hand there is PIP swelling and 4th PIP.  Bilateral wrist swelling.  Hammter toes and mild hallux valgus.    Right sided dupytren cotnracture   Neurological: He is alert and oriented to person, place, and time.   Skin: Skin is warm and dry. No rash noted. He is not diaphoretic. No erythema. No pallor.   Nodules on feet on right   Psychiatric: He has a normal mood and affect. His behavior is normal. Judgment and thought content normal.       Assessment:     1. Current chronic use of systemic steroids  ANTISTREPTOLYSIN O    ANTI-DNASE B AB    RHEUMATOID ARTHRITIS FACTOR    CRYOGLOBULIN QL SERUM RFLX    DS-BONE DENSITY STUDY (DEXA)    predniSONE (DELTASONE) 5 MG Tab    CBC WITH DIFFERENTIAL    HEPATIC FUNCTION PANEL    CREATININE    BUN    LIPID PROFILE    ECHOCARDIOGRAM COMP W/O CONT    Tofacitinib Citrate (XELJANZ) 5 MG Tab   2. Rheumatoid arthritis involving multiple sites, unspecified rheumatoid factor presence (CMS-HCC)  ANTISTREPTOLYSIN O    ANTI-DNASE B AB    RHEUMATOID ARTHRITIS FACTOR    CRYOGLOBULIN QL SERUM RFLX    DS-BONE DENSITY STUDY (DEXA)    predniSONE (DELTASONE) 5 MG Tab    CBC WITH  DIFFERENTIAL    HEPATIC FUNCTION PANEL    CREATININE    BUN    LIPID PROFILE    ECHOCARDIOGRAM COMP W/O CONT    Tofacitinib Citrate (XELJANZ) 5 MG Tab   3. Rheumatoid arthritis flare (CMS-HCC)  ANTISTREPTOLYSIN O    ANTI-DNASE B AB    RHEUMATOID ARTHRITIS FACTOR    CRYOGLOBULIN QL SERUM RFLX    DS-BONE DENSITY STUDY (DEXA)    predniSONE (DELTASONE) 5 MG Tab    CBC WITH DIFFERENTIAL    HEPATIC FUNCTION PANEL    CREATININE    BUN    LIPID PROFILE    ECHOCARDIOGRAM COMP W/O CONT    Tofacitinib Citrate (XELJANZ) 5 MG Tab   4. Encounter for long-term (current) use of high-risk medication  ANTISTREPTOLYSIN O    ANTI-DNASE B AB    RHEUMATOID ARTHRITIS FACTOR    CRYOGLOBULIN QL SERUM RFLX    DS-BONE DENSITY STUDY (DEXA)    predniSONE (DELTASONE) 5 MG Tab    CBC WITH DIFFERENTIAL    HEPATIC FUNCTION PANEL    CREATININE    BUN    LIPID PROFILE    ECHOCARDIOGRAM COMP W/O CONT    Tofacitinib Citrate (XELJANZ) 5 MG Tab   5. Murmur  ANTISTREPTOLYSIN O    ANTI-DNASE B AB    RHEUMATOID ARTHRITIS FACTOR    CRYOGLOBULIN QL SERUM RFLX    DS-BONE DENSITY STUDY (DEXA)    predniSONE (DELTASONE) 5 MG Tab    CBC WITH DIFFERENTIAL    HEPATIC FUNCTION PANEL    CREATININE    BUN    LIPID PROFILE    ECHOCARDIOGRAM COMP W/O CONT   6. Splinter hemorrhage of toenail  ANTISTREPTOLYSIN O    ANTI-DNASE B AB    RHEUMATOID ARTHRITIS FACTOR    CRYOGLOBULIN QL SERUM RFLX    DS-BONE DENSITY STUDY (DEXA)    predniSONE (DELTASONE) 5 MG Tab    CBC WITH DIFFERENTIAL    HEPATIC FUNCTION PANEL    CREATININE    BUN    LIPID PROFILE    ECHOCARDIOGRAM COMP W/O CONT     Labs:  [unfilled]    Lab Results   Component Value Date/Time    QUANTIFERON TB GOLD Negative 06/01/2017 01:13 PM     Lab Results   Component Value Date/Time    HEPATITIS B CCORE AB, TOTAL Negative 06/01/2017 01:13 PM    HEPATITIS B SURFACE ANTIGEN Negative 06/01/2017 01:13 PM     Lab Results   Component Value Date/Time    HEPATITIS C ANTIBODY Negative 06/01/2017 01:13 PM     Lab Results   Component  Value Date/Time    SODIUM 135 07/17/2017 09:34 AM    POTASSIUM 5.6* 07/17/2017 09:34 AM    CHLORIDE 100 07/17/2017 09:34 AM    CO2 31 07/17/2017 09:34 AM    GLUCOSE 100* 07/17/2017 09:34 AM    BUN 21 07/17/2017 09:34 AM    CREATININE 0.96 07/17/2017 09:34 AM    BUN-CREATININE RATIO 13 09/21/2016 09:21 AM      Lab Results   Component Value Date/Time    WBC 11.2* 07/17/2017 09:34 AM    RBC 4.95 07/17/2017 09:34 AM    HEMOGLOBIN 16.3 07/17/2017 09:34 AM    HEMATOCRIT 48.7 07/17/2017 09:34 AM    MCV 98.4* 07/17/2017 09:34 AM    MCH 32.9 07/17/2017 09:34 AM    MCHC 33.5* 07/17/2017 09:34 AM    MPV 10.0 07/17/2017 09:34 AM    NEUTROPHILS-POLYS 69.90 07/17/2017 09:34 AM    LYMPHOCYTES 13.10* 07/17/2017 09:34 AM    MONOCYTES 13.30 07/17/2017 09:34 AM    EOSINOPHILS 1.00 07/17/2017 09:34 AM    BASOPHILS 1.60 07/17/2017 09:34 AM    HYPOCHROMIA 1+ 03/05/2014 04:43 PM    ANISOCYTOSIS 1+ 01/02/2015 05:02 PM      Lab Results   Component Value Date/Time    CALCIUM 9.7 07/17/2017 09:34 AM    AST(SGOT) 23 07/17/2017 09:34 AM    AST(SGOT) 21 07/17/2017 09:34 AM    ALT(SGPT) 28 07/17/2017 09:34 AM    ALT(SGPT) 29 07/17/2017 09:34 AM    ALKALINE PHOSPHATASE 65 07/17/2017 09:34 AM    ALKALINE PHOSPHATASE 65 07/17/2017 09:34 AM    TOTAL BILIRUBIN 0.6 07/17/2017 09:34 AM    TOTAL BILIRUBIN 0.6 07/17/2017 09:34 AM    ALBUMIN 4.3 07/17/2017 09:34 AM    ALBUMIN 4.4 07/17/2017 09:34 AM    TOTAL PROTEIN 6.8 07/17/2017 09:34 AM    TOTAL PROTEIN 6.7 07/17/2017 09:34 AM     No results found for: URICACID, RHEUMFACTN, CCPANTIBODY, ANTINUCAB  Lab Results   Component Value Date/Time    SED RATE JEFERSON 6 06/01/2017 01:13 PM     No results found for: RUSSELVIPER, DRVVTINTERP  No results found for: C7QISIUJTLW, C0UVCBYNPOR, IGGCARDIOLI, IGMCARDIOLI, IGACARDIOLI, CRYOGLOBULIN  No results found for: ANADIRECT, ANTIDNADS, RNPAB, SMITHAB, BWQJCTN72, SSAROAB, SSBLAAB, QXZDBC2SD, CENTROMBAB  No results found for: ANTIDNADS, DSDNA, AGBMAB, GBMABA,  ANCAIGG, O3KBJDOLWZZ, JO1AB, RNPAB, ANTISSASJ, ANTISSBSJ  Lab Results   Component Value Date/Time    COLOR Lt. Yellow 08/19/2015 03:20 PM    SPECIFIC GRAVITY 1.008 08/19/2015 03:20 PM    PH 6.5 08/19/2015 03:20 PM    GLUCOSE Negative 08/19/2015 03:20 PM    KETONES Negative 08/19/2015 03:20 PM    PROTEIN Negative 08/19/2015 03:20 PM     Lab Results   Component Value Date/Time    TOTAL VOLUME 2750 08/18/2015 07:30 AM    TOTAL VOLUME 2750 08/18/2015 07:30 AM     No results found for: SSA60, SSA52  No results found for: HBA1C  Lab Results   Component Value Date/Time    CPK TOTAL 86 07/17/2017 09:34 AM     No results found for: G6PD  No results found for: [id number]  No results found for: ACESERUM  Lab Results   Component Value Date/Time    25-HYDROXY   VITAMIN D 25 18* 06/01/2017 01:13 PM     Lab Results   Component Value Date/Time    FREE T-4 1.11 12/01/2014 03:13 PM     No results found for: TSHULTRASEN, FREET4  No results found for: MICROSOMALA, ANTITHYROGL  No results found for: IGGLYMEABS  No results found for: ANTIMITOCHO, FACTIN  No results found for: IGA, TTRANSIGA, ENDOIGA  No results found for: FLTYPE, CRYSTALSBDF  No results found for: ISTATICAL  No results found for: ISTATCREAT  No results found for: CTELOPEP  No results found for: GBMABG  Lab Results   Component Value Date/Time    PTH, INTACT 55 10/10/2008 10:42 AM         Medical Decision Making:  Today's Assessment / Status / Plan:     Rheumatoid Arthritis  Not well controlled  We will increase his prednisone to 5 mg daily. We will continue his methotrexate. We will consider as xeljanz.  I did discuss this with the patient.  I reviewed the side effects including but not limited to bowel perforation, infection, malignancy, anemias and cytopenias.    The patient did receive information to read and will consider this and discuss. As an alternative to see if he can confirm that he has not tried Orencia this would be a medication that would twice a day L and  would have less side effects.    Results for CANDI UMANA (MRN [id number]) as of 6/23/2017 18:44   Ref. Range 6/1/2017 13:13   Hep B Surface Antibody Quant Latest Ref Range: 0.00-10.00 mIU/mL <3.10   Hepatitis B Surface Antigen Latest Ref Range: Negative  Negative   Hepatitis B Ccore Ab, Total Latest Ref Range: Negative  Negative   Hepatitis C Antibody Latest Ref Range: Negative  Negative   Mitogen-Nil Unknown 60.763   Nil Unknown 0.029   Quantiferon TB Gold Latest Ref Range: Negative  Negative   TB Ag-Nil Unknown -0.010     Splinter hemorrhage noted on left great toe and murmur   Will check ASO, anti streptolysin O, and echocardiogram  Less likely cryo as his lesions don't appear to be nailfold infarcts but will check RF and cryoglobulins.     Chronic steroid use  Calcium and vitamin D  DEXA    1. Current chronic use of systemic steroids  ANTISTREPTOLYSIN O    ANTI-DNASE B AB    RHEUMATOID ARTHRITIS FACTOR    CRYOGLOBULIN QL SERUM RFLX    DS-BONE DENSITY STUDY (DEXA)    predniSONE (DELTASONE) 5 MG Tab    CBC WITH DIFFERENTIAL    HEPATIC FUNCTION PANEL    CREATININE    BUN    LIPID PROFILE    ECHOCARDIOGRAM COMP W/O CONT    Tofacitinib Citrate (XELJANZ) 5 MG Tab   2. Rheumatoid arthritis involving multiple sites, unspecified rheumatoid factor presence (CMS-HCC)  ANTISTREPTOLYSIN O    ANTI-DNASE B AB    RHEUMATOID ARTHRITIS FACTOR    CRYOGLOBULIN QL SERUM RFLX    DS-BONE DENSITY STUDY (DEXA)    predniSONE (DELTASONE) 5 MG Tab    CBC WITH DIFFERENTIAL    HEPATIC FUNCTION PANEL    CREATININE    BUN    LIPID PROFILE    ECHOCARDIOGRAM COMP W/O CONT    Tofacitinib Citrate (XELJANZ) 5 MG Tab   3. Rheumatoid arthritis flare (CMS-HCC)  ANTISTREPTOLYSIN O    ANTI-DNASE B AB    RHEUMATOID ARTHRITIS FACTOR    CRYOGLOBULIN QL SERUM RFLX    DS-BONE DENSITY STUDY (DEXA)    predniSONE (DELTASONE) 5 MG Tab    CBC WITH DIFFERENTIAL    HEPATIC FUNCTION PANEL    CREATININE    BUN    LIPID PROFILE    ECHOCARDIOGRAM COMP W/O CONT     Tofacitinib Citrate (XELJANZ) 5 MG Tab   4. Encounter for long-term (current) use of high-risk medication  ANTISTREPTOLYSIN O    ANTI-DNASE B AB    RHEUMATOID ARTHRITIS FACTOR    CRYOGLOBULIN QL SERUM RFLX    DS-BONE DENSITY STUDY (DEXA)    predniSONE (DELTASONE) 5 MG Tab    CBC WITH DIFFERENTIAL    HEPATIC FUNCTION PANEL    CREATININE    BUN    LIPID PROFILE    ECHOCARDIOGRAM COMP W/O CONT    Tofacitinib Citrate (XELJANZ) 5 MG Tab   5. Murmur  ANTISTREPTOLYSIN O    ANTI-DNASE B AB    RHEUMATOID ARTHRITIS FACTOR    CRYOGLOBULIN QL SERUM RFLX    DS-BONE DENSITY STUDY (DEXA)    predniSONE (DELTASONE) 5 MG Tab    CBC WITH DIFFERENTIAL    HEPATIC FUNCTION PANEL    CREATININE    BUN    LIPID PROFILE    ECHOCARDIOGRAM COMP W/O CONT   6. Splinter hemorrhage of toenail  ANTISTREPTOLYSIN O    ANTI-DNASE B AB    RHEUMATOID ARTHRITIS FACTOR    CRYOGLOBULIN QL SERUM RFLX    DS-BONE DENSITY STUDY (DEXA)    predniSONE (DELTASONE) 5 MG Tab    CBC WITH DIFFERENTIAL    HEPATIC FUNCTION PANEL    CREATININE    BUN    LIPID PROFILE    ECHOCARDIOGRAM COMP W/O CONT         Return in about 8 weeks (around 9/29/2017).    I have spent greater than 50% of this 30 minute visit in counseling/coordination of care with the patient regarding a discussion about different types of rheumatoid arthritis treatments and the options that we have left. I also discussed the risks and side effects of the medications options.    He agreed and verbalized his agreement and understanding with the current plan. I answered all questions and concerns he has at this time and advised him to call at any time in there interim with questions or concerns in regards to his care.    Thank you for allowing me to participate in his care, I will continue to follow closely.     Please note that this dictation was created using voice recognition software. I have made every reasonable attempt to correct obvious errors, but I expect that there are errors of grammar and possibly  content that I did not discover before finalizing the note.

## 2017-08-06 LAB
ASO AB SERPL-ACNC: 120 IU/ML (ref 0–330)
STREP DNASE B TITR SER: 151 U/ML (ref 0–260)

## 2017-08-09 LAB — CRYOGLOB SER QL 3D COLD INC: NORMAL

## 2017-08-12 DIAGNOSIS — Z79.891 CHRONIC USE OF OPIATE DRUGS THERAPEUTIC PURPOSES: ICD-10-CM

## 2017-08-18 RX ORDER — TESTOSTERONE CYPIONATE 200 MG/ML
INJECTION, SOLUTION INTRAMUSCULAR
Refills: 0 | OUTPATIENT
Start: 2017-08-18

## 2017-08-23 ENCOUNTER — HOSPITAL ENCOUNTER (OUTPATIENT)
Dept: CARDIOLOGY | Facility: MEDICAL CENTER | Age: 62
End: 2017-08-23
Attending: INTERNAL MEDICINE
Payer: MEDICARE

## 2017-08-23 ENCOUNTER — TELEPHONE (OUTPATIENT)
Dept: CARDIOLOGY | Facility: MEDICAL CENTER | Age: 62
End: 2017-08-23

## 2017-08-23 DIAGNOSIS — R01.1 MURMUR: ICD-10-CM

## 2017-08-23 DIAGNOSIS — Z79.899 ENCOUNTER FOR LONG-TERM (CURRENT) USE OF HIGH-RISK MEDICATION: ICD-10-CM

## 2017-08-23 DIAGNOSIS — M06.9 RHEUMATOID ARTHRITIS INVOLVING MULTIPLE SITES, UNSPECIFIED RHEUMATOID FACTOR PRESENCE: ICD-10-CM

## 2017-08-23 DIAGNOSIS — M06.9 RHEUMATOID ARTHRITIS FLARE (HCC): ICD-10-CM

## 2017-08-23 DIAGNOSIS — L60.8 SPLINTER HEMORRHAGE OF TOENAIL: ICD-10-CM

## 2017-08-23 DIAGNOSIS — Z79.52 CURRENT CHRONIC USE OF SYSTEMIC STEROIDS: ICD-10-CM

## 2017-08-23 LAB
LV EJECT FRACT  99904: 65
LV EJECT FRACT MOD 2C 99903: 70.69
LV EJECT FRACT MOD 4C 99902: 67.61
LV EJECT FRACT MOD BP 99901: 67.47

## 2017-08-23 PROCEDURE — 93306 TTE W/DOPPLER COMPLETE: CPT

## 2017-08-23 PROCEDURE — 93306 TTE W/DOPPLER COMPLETE: CPT | Mod: 26 | Performed by: INTERNAL MEDICINE

## 2017-08-23 NOTE — TELEPHONE ENCOUNTER
Dr. Bro at [facility] requesting cardiac clearance for finger dupuytren's release, not yet scheduled. Last seen 7/19. To Dr. Zabala to advise    (fax [phone] attn: Ellen)

## 2017-08-24 ENCOUNTER — TELEPHONE (OUTPATIENT)
Dept: CARDIOLOGY | Facility: MEDICAL CENTER | Age: 62
End: 2017-08-24

## 2017-08-24 NOTE — TELEPHONE ENCOUNTER
Called patient, advised him of abnormal echocardiogram result and Dr. Zabala's note.    Patient scheduled for an appointment with Dr. Zabala on Monday 8/28/2017 at 10:45am.    CHRISTIE WEISS

## 2017-08-24 NOTE — TELEPHONE ENCOUNTER
----- Message from Stas Zabala M.D. sent at 8/24/2017 12:23 PM PDT -----  Please move up appointment, patient may have severe AI.

## 2017-08-28 ENCOUNTER — OFFICE VISIT (OUTPATIENT)
Dept: CARDIOLOGY | Facility: MEDICAL CENTER | Age: 62
End: 2017-08-28
Payer: MEDICARE

## 2017-08-28 VITALS
HEART RATE: 88 BPM | SYSTOLIC BLOOD PRESSURE: 126 MMHG | DIASTOLIC BLOOD PRESSURE: 70 MMHG | HEIGHT: 70 IN | BODY MASS INDEX: 29.35 KG/M2 | WEIGHT: 205 LBS

## 2017-08-28 DIAGNOSIS — E78.5 DYSLIPIDEMIA: ICD-10-CM

## 2017-08-28 DIAGNOSIS — I35.1 NONRHEUMATIC AORTIC VALVE INSUFFICIENCY: ICD-10-CM

## 2017-08-28 DIAGNOSIS — I25.10 CORONARY ARTERY DISEASE DUE TO CALCIFIED CORONARY LESION: ICD-10-CM

## 2017-08-28 DIAGNOSIS — I10 ESSENTIAL HYPERTENSION: ICD-10-CM

## 2017-08-28 DIAGNOSIS — I25.84 CORONARY ARTERY DISEASE DUE TO CALCIFIED CORONARY LESION: ICD-10-CM

## 2017-08-28 PROCEDURE — 99214 OFFICE O/P EST MOD 30 MIN: CPT | Performed by: INTERNAL MEDICINE

## 2017-08-28 NOTE — PROGRESS NOTES
Subjective:   Edgardo Sims is a 61 y.o. male who presents today for followup of his hypertension, hyperlipidemia and abnormal myocardial perfusion scan. He underwent cardiac catheterization and had minimal plaque. 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     He has had difficulty with statin therapy. We tried him on low-dose rosuvastatin. He had difficulty with muscle tenderness on this medication and discontinued after a 2nd attempt to use it. He had elevation in his CPK on atorvastatin therapy.    He's had no chest discomfort, dyspnea, edema, palpitations or lightheadedness.    Past Medical History:   Diagnosis Date   • Abnormal myocardial perfusion study 1/15/2014   • Aortic insufficiency 7/5/2011   • Arthritis     RA, and osteo   • Bronchitis    • CAD (coronary artery disease) mild plaque at cath in 2/14 12/5/2014   • Cancer (CMS-[facility])     skin   • Chronic use of opiate drugs therapeutic purposes 8/12/2017   • Cold     mid January 2014   • Coughing blood     none currently 2014   • Dyslipidemia 7/12/2011   • Heart burn    • Heart murmur    • Hiatus hernia syndrome    • HTN (hypertension) 7/5/2011   • Hypertension    • Indigestion    • Infectious disease    • Pain     RA   • Rheumatoid arthritis (CMS-HCC)     severe   • Tachycardia 10/20/2014     Past Surgical History:   Procedure Laterality Date   • HIP ARTHROPLASTY TOTAL  6/20/08    Performed by OYNATAN HINSON at SURGERY [facility] ORS   • HIP ARTHROPLASTY TOTAL  5/31/2013    Performed by Burak Valles M.D. at SURGERY [facility] ORS   • KNEE ARTHROPLASTY TOTAL  6/1/2009    Performed by YONATAN HINSON at SURGERY [facility] ORS   • KNEE ARTHROSCOPY      right   • LUMBAR LAMINECTOMY DISKECTOMY  2000   • OTHER      varicose vein stripping   • OTHER      heart murmur   • OTHER ORTHOPEDIC SURGERY      awaiting right hip surgery   • RECOVERY  2/3/2014    Performed by Cath-Recovery Surgery at SURGERY SAME DAY [facility] ORS   • SHOULDER  SURGERY      RIGHT 01/2011   • TMJ RECONSTRUCTION BILATERAL  1994   • VEIN LIGATION RADIO FREQUENCY  12/8/2008    Performed by DEREJE MCCULLOUGH at SURGERY SAME DAY [facility] ORS     Family History   Problem Relation Age of Onset   • Hypertension Mother      History   Smoking Status   • Never Smoker   Smokeless Tobacco   • Never Used     Allergies   Allergen Reactions   • Crestor [Rosuvastatin Calcium] Myalgia   • Penicillins Hives     Outpatient Encounter Prescriptions as of 8/28/2017   Medication Sig Dispense Refill   • predniSONE (DELTASONE) 5 MG Tab 20 mg po q day x 5 days and decrease q 5 day by 5 mg utnil 5 mg po q day 50 Tab 0   • Tofacitinib Citrate (XELJANZ) 5 MG Tab Take 5 mg by mouth 2 Times a Day. 60 Tab 0   • pravastatin (PRAVACHOL) 20 MG Tab Take 1 Tab by mouth every bedtime. 30 Tab 11   • oxycodone-acetaminophen (PERCOCET-10)  MG Tab Take 1 Tab by mouth 5 Times a Day. 150 Tab 0   • cyclobenzaprine (FLEXERIL) 10 MG Tab TAKE ONE TABLET BY MOUTH THREE TIMES DAILY AS NEEDED FOR MILD PAIN 90 Tab 5   • meloxicam (MOBIC) 15 MG tablet TAKE ONE TABLET BY MOUTH ONCE DAILY 30 Tab 6   • predniSONE (DELTASONE) 5 MG Tab Take 1 Tab by mouth every day. 30 Tab 1   • Calcium Carbonate-Vitamin D (CALCIUM 600 + D) 600-400 MG-UNIT Tab Take 1 Tab by mouth 2 times a day, with meals. 60 Tab 3   • folic acid (FOLVITE) 1 MG Tab Take 3 Tabs by mouth every day. 90 Tab 2   • methotrexate 2.5 MG Tab Take 6 Tabs by mouth every 7 days. 24 Tab 2   • ergocalciferol (DRISDOL) 61478 UNIT capsule Take 1 Cap by mouth every 7 days. 12 Cap 0   • cetirizine (ZYRTEC) 10 MG Tab Take 10 mg by mouth every day.     • Cyanocobalamin (VITAMIN B-12) 5000 MCG TABLET DISPERSIBLE Take  by mouth.     • niacin 500 MG Tab Take 500 mg by mouth every day.     • Zinc 50 MG Cap Take 50 mg by mouth every day.     • diazepam (VALIUM) 5 MG Tab TAKE ONE TABLET BY MOUTH EVERY 12 HOURS AS NEEDED FOR ANXIETY 60 Tab 5   • Loratadine (CLARITIN) 10 MG Cap Take  by  "mouth.     • Guaifenesin 400 MG Tab Take 1 Tab by mouth 3 times a day as needed. 90 Tab 3   • fexofenadine (ALLEGRA) 60 MG Tab Take 1 Tab by mouth every day. 30 Tab 3   • paroxetine (PAXIL) 20 MG Tab TAKE ONE TABLET BY MOUTH ONCE DAILY 90 Tab 3   • fluticasone (FLONASE) 50 MCG/ACT nasal spray Spray 1 Spray in nose 2 times a day. 1 Bottle 3   • Multiple Vitamins-Minerals (MULTI COMPLETE PO) Take  by mouth.     • bisoprolol (ZEBETA) 10 MG tablet Take 1 Tab by mouth every day. 90 Tab 3   • testosterone cypionate (DEPO-TESTOSTERONE) 200 MG/ML Solution injection INJECT 1 ML (CC) INTRAMUSCULARLY EVERY 2 WEEKS AS DIRECTED 10 mL 3   • lisinopril (PRINIVIL) 10 MG Tab Take 1 Tab by mouth every day. 90 Tab 3   • vardenafil (LEVITRA) 20 MG tablet Take 20 mg by mouth as needed.     • omeprazole (PRILOSEC) 20 MG CPDR Take 20 mg by mouth every day.     • cephALEXin (KEFLEX) 500 MG Cap Take 1 Cap by mouth 4 times a day. 40 Cap 0   • RiTUXimab (RITUXAN IV) by Intravenous route. Two every 6 months        No facility-administered encounter medications on file as of 8/28/2017.      He is also on bisoprolol 5 mg daily.    ROS       Objective:   /70   Pulse 88   Ht 1.778 m (5' 10\")   Wt 93 kg (205 lb)   BMI 29.41 kg/m²     Physical Exam   Neck: No JVD present.   Cardiovascular: Normal rate and regular rhythm.  Exam reveals no gallop.    Murmur (2-3/6 systolic at the base) heard.  Pulmonary/Chest: Effort normal. He has no rales.   Abdominal: Soft. There is no tenderness.   Musculoskeletal: He exhibits no edema.         Echocardiogram:  CONCLUSIONS  Technically difficult study.   Normal left ventricular size, thickness, systolic function. Left   ventricular ejection fraction is 60% to 65%. Grade I diastolic   dysfunction - mitral inflow E/A is <1.0.  No pericardial effusion seen. Mild aortic insufficiency was noted.    Echocardiogram:  CONCLUSIONS  Normal left ventricular systolic function.  Left ventricular ejection fraction is " visually estimated to be 65%.  Normal diastolic function.  The right ventricle was normal in size and function.  Possible severe aortic insufficiency.  Diastolic flow reversal in the descending aorta.  Compared to the images of the prior study done 10- -  there has   been progression of aortic regurgitation, previously mild.    Consider a transesophageal echocardiogram for better characterization   of aortic insufficiency.    Exam Date:         08/23/2017     Lab Results   Component Value Date/Time    CHOLSTRLTOT 176 07/17/2017 09:34 AM    LDL 98 07/17/2017 09:34 AM    HDL 60 07/17/2017 09:34 AM    TRIGLYCERIDE 89 07/17/2017 09:34 AM       Lab Results   Component Value Date/Time    SODIUM 135 07/17/2017 09:34 AM    POTASSIUM 5.6 (H) 07/17/2017 09:34 AM    CHLORIDE 100 07/17/2017 09:34 AM    CO2 31 07/17/2017 09:34 AM    GLUCOSE 100 (H) 07/17/2017 09:34 AM    BUN 21 07/17/2017 09:34 AM    CREATININE 0.96 07/17/2017 09:34 AM    CREATININE 1.1 04/21/2009 03:50 PM    BUNCREATRAT 13 09/21/2016 09:21 AM     Lab Results   Component Value Date/Time    ALKPHOSPHAT 65 07/17/2017 09:34 AM    ALKPHOSPHAT 65 07/17/2017 09:34 AM    ASTSGOT 23 07/17/2017 09:34 AM    ASTSGOT 21 07/17/2017 09:34 AM    ALTSGPT 28 07/17/2017 09:34 AM    ALTSGPT 29 07/17/2017 09:34 AM    TBILIRUBIN 0.6 07/17/2017 09:34 AM    TBILIRUBIN 0.6 07/17/2017 09:34 AM      Lab Results   Component Value Date/Time    BNPBTYPENAT 9.8 12/01/2014 03:13 PM    BNPBTYPENAT 5 11/09/2012 04:17 PM      CPK: 65    Assessment:     1. Coronary artery disease due to calcified coronary lesion: Mildly cardiac catheterization in 2014     2. Dyslipidemia     3. Essential hypertension     4. Nonrheumatic aortic valve insufficiency         Medical Decision Making:  Today's Assessment / Status / Plan:     Mr. Sims is clinically stable. He is asymptomatic from a cardiovascular standpoint. He does have more aortic insufficiency than previously noted. However, it is probably  in the moderate range. In addition, his LV size is normal and his left atrial pressure also appears to be normal based on his normal diastolic function. We will obtain a BNP. However, I feel he can proceed to orthopedic surgery for his Dupuytren's contracture without any further evaluation.

## 2017-08-28 NOTE — LETTER
[facility]   [address]  [place] [address]  Phone: [phone]  Fax: [phone]              Edgardo Sims  1955    Encounter Date: 8/28/2017    Stas Zabala M.D.          PROGRESS NOTE:  Subjective:   Edgardo Sims is a 61 y.o. male who presents today for followup of his hypertension, hyperlipidemia and abnormal myocardial perfusion scan. He underwent cardiac catheterization and had minimal plaque. 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     He has had difficulty with statin therapy. We tried him on low-dose rosuvastatin. He had difficulty with muscle tenderness on this medication and discontinued after a 2nd attempt to use it. He had elevation in his CPK on atorvastatin therapy.    He's had no chest discomfort, dyspnea, edema, palpitations or lightheadedness.    Past Medical History:   Diagnosis Date   • Abnormal myocardial perfusion study 1/15/2014   • Aortic insufficiency 7/5/2011   • Arthritis     RA, and osteo   • Bronchitis    • CAD (coronary artery disease) mild plaque at cath in 2/14 12/5/2014   • Cancer (CMS-[facility])     skin   • Chronic use of opiate drugs therapeutic purposes 8/12/2017   • Cold     mid January 2014   • Coughing blood     none currently 2014   • Dyslipidemia 7/12/2011   • Heart burn    • Heart murmur    • Hiatus hernia syndrome    • HTN (hypertension) 7/5/2011   • Hypertension    • Indigestion    • Infectious disease    • Pain     RA   • Rheumatoid arthritis (CMS-HCC)     severe   • Tachycardia 10/20/2014     Past Surgical History:   Procedure Laterality Date   • HIP ARTHROPLASTY TOTAL  6/20/08    Performed by YONATAN HINSON at SURGERY [facility] ORS   • HIP ARTHROPLASTY TOTAL  5/31/2013    Performed by Burak Valles M.D. at SURGERY [facility] ORS   • KNEE ARTHROPLASTY TOTAL  6/1/2009    Performed by YONATAN HINSON at SURGERY [facility] ORS   • KNEE ARTHROSCOPY      right   • LUMBAR  LAMINECTOMY DISKECTOMY  2000   • OTHER      varicose vein stripping   • OTHER      heart murmur   • OTHER ORTHOPEDIC SURGERY      awaiting right hip surgery   • RECOVERY  2/3/2014    Performed by Cath-Recovery Surgery at SURGERY SAME DAY [facility] ORS   • SHOULDER SURGERY      RIGHT 01/2011   • TMJ RECONSTRUCTION BILATERAL  1994   • VEIN LIGATION RADIO FREQUENCY  12/8/2008    Performed by DEREJE MCCULLOUGH at SURGERY SAME DAY [facility] ORS     Family History   Problem Relation Age of Onset   • Hypertension Mother      History   Smoking Status   • Never Smoker   Smokeless Tobacco   • Never Used     Allergies   Allergen Reactions   • Crestor [Rosuvastatin Calcium] Myalgia   • Penicillins Hives     Outpatient Encounter Prescriptions as of 8/28/2017   Medication Sig Dispense Refill   • predniSONE (DELTASONE) 5 MG Tab 20 mg po q day x 5 days and decrease q 5 day by 5 mg utnil 5 mg po q day 50 Tab 0   • Tofacitinib Citrate (XELJANZ) 5 MG Tab Take 5 mg by mouth 2 Times a Day. 60 Tab 0   • pravastatin (PRAVACHOL) 20 MG Tab Take 1 Tab by mouth every bedtime. 30 Tab 11   • oxycodone-acetaminophen (PERCOCET-10)  MG Tab Take 1 Tab by mouth 5 Times a Day. 150 Tab 0   • cyclobenzaprine (FLEXERIL) 10 MG Tab TAKE ONE TABLET BY MOUTH THREE TIMES DAILY AS NEEDED FOR MILD PAIN 90 Tab 5   • meloxicam (MOBIC) 15 MG tablet TAKE ONE TABLET BY MOUTH ONCE DAILY 30 Tab 6   • predniSONE (DELTASONE) 5 MG Tab Take 1 Tab by mouth every day. 30 Tab 1   • Calcium Carbonate-Vitamin D (CALCIUM 600 + D) 600-400 MG-UNIT Tab Take 1 Tab by mouth 2 times a day, with meals. 60 Tab 3   • folic acid (FOLVITE) 1 MG Tab Take 3 Tabs by mouth every day. 90 Tab 2   • methotrexate 2.5 MG Tab Take 6 Tabs by mouth every 7 days. 24 Tab 2   • ergocalciferol (DRISDOL) 75743 UNIT capsule Take 1 Cap by mouth every 7 days. 12 Cap 0   • cetirizine (ZYRTEC) 10 MG Tab Take 10 mg by mouth every day.     • Cyanocobalamin (VITAMIN B-12) 5000 MCG TABLET DISPERSIBLE Take  by  "mouth.     • niacin 500 MG Tab Take 500 mg by mouth every day.     • Zinc 50 MG Cap Take 50 mg by mouth every day.     • diazepam (VALIUM) 5 MG Tab TAKE ONE TABLET BY MOUTH EVERY 12 HOURS AS NEEDED FOR ANXIETY 60 Tab 5   • Loratadine (CLARITIN) 10 MG Cap Take  by mouth.     • Guaifenesin 400 MG Tab Take 1 Tab by mouth 3 times a day as needed. 90 Tab 3   • fexofenadine (ALLEGRA) 60 MG Tab Take 1 Tab by mouth every day. 30 Tab 3   • paroxetine (PAXIL) 20 MG Tab TAKE ONE TABLET BY MOUTH ONCE DAILY 90 Tab 3   • fluticasone (FLONASE) 50 MCG/ACT nasal spray Spray 1 Spray in nose 2 times a day. 1 Bottle 3   • Multiple Vitamins-Minerals (MULTI COMPLETE PO) Take  by mouth.     • bisoprolol (ZEBETA) 10 MG tablet Take 1 Tab by mouth every day. 90 Tab 3   • testosterone cypionate (DEPO-TESTOSTERONE) 200 MG/ML Solution injection INJECT 1 ML (CC) INTRAMUSCULARLY EVERY 2 WEEKS AS DIRECTED 10 mL 3   • lisinopril (PRINIVIL) 10 MG Tab Take 1 Tab by mouth every day. 90 Tab 3   • vardenafil (LEVITRA) 20 MG tablet Take 20 mg by mouth as needed.     • omeprazole (PRILOSEC) 20 MG CPDR Take 20 mg by mouth every day.     • cephALEXin (KEFLEX) 500 MG Cap Take 1 Cap by mouth 4 times a day. 40 Cap 0   • RiTUXimab (RITUXAN IV) by Intravenous route. Two every 6 months        No facility-administered encounter medications on file as of 8/28/2017.      He is also on bisoprolol 5 mg daily.    ROS       Objective:   /70   Pulse 88   Ht 1.778 m (5' 10\")   Wt 93 kg (205 lb)   BMI 29.41 kg/m²      Physical Exam   Neck: No JVD present.   Cardiovascular: Normal rate and regular rhythm.  Exam reveals no gallop.    Murmur (2-3/6 systolic at the base) heard.  Pulmonary/Chest: Effort normal. He has no rales.   Abdominal: Soft. There is no tenderness.   Musculoskeletal: He exhibits no edema.         Echocardiogram:  CONCLUSIONS  Technically difficult study.   Normal left ventricular size, thickness, systolic function. Left   ventricular ejection " fraction is 60% to 65%. Grade I diastolic   dysfunction - mitral inflow E/A is <1.0.  No pericardial effusion seen. Mild aortic insufficiency was noted.    Echocardiogram:  CONCLUSIONS  Normal left ventricular systolic function.  Left ventricular ejection fraction is visually estimated to be 65%.  Normal diastolic function.  The right ventricle was normal in size and function.  Possible severe aortic insufficiency.  Diastolic flow reversal in the descending aorta.  Compared to the images of the prior study done 10- -  there has   been progression of aortic regurgitation, previously mild.    Consider a transesophageal echocardiogram for better characterization   of aortic insufficiency.    Exam Date:         08/23/2017     Lab Results   Component Value Date/Time    CHOLSTRLTOT 176 07/17/2017 09:34 AM    LDL 98 07/17/2017 09:34 AM    HDL 60 07/17/2017 09:34 AM    TRIGLYCERIDE 89 07/17/2017 09:34 AM       Lab Results   Component Value Date/Time    SODIUM 135 07/17/2017 09:34 AM    POTASSIUM 5.6 (H) 07/17/2017 09:34 AM    CHLORIDE 100 07/17/2017 09:34 AM    CO2 31 07/17/2017 09:34 AM    GLUCOSE 100 (H) 07/17/2017 09:34 AM    BUN 21 07/17/2017 09:34 AM    CREATININE 0.96 07/17/2017 09:34 AM    CREATININE 1.1 04/21/2009 03:50 PM    BUNCREATRAT 13 09/21/2016 09:21 AM     Lab Results   Component Value Date/Time    ALKPHOSPHAT 65 07/17/2017 09:34 AM    ALKPHOSPHAT 65 07/17/2017 09:34 AM    ASTSGOT 23 07/17/2017 09:34 AM    ASTSGOT 21 07/17/2017 09:34 AM    ALTSGPT 28 07/17/2017 09:34 AM    ALTSGPT 29 07/17/2017 09:34 AM    TBILIRUBIN 0.6 07/17/2017 09:34 AM    TBILIRUBIN 0.6 07/17/2017 09:34 AM      Lab Results   Component Value Date/Time    BNPBTYPENAT 9.8 12/01/2014 03:13 PM    BNPBTYPENAT 5 11/09/2012 04:17 PM      CPK: 65    Assessment:     1. Coronary artery disease due to calcified coronary lesion: Mildly cardiac catheterization in 2014     2. Dyslipidemia     3. Essential hypertension     4. Nonrheumatic aortic  valve insufficiency         Medical Decision Making:  Today's Assessment / Status / Plan:     Mr. Sims is clinically stable. He is asymptomatic from a cardiovascular standpoint. He does have more aortic insufficiency than previously noted. However, it is probably in the moderate range. In addition, his LV size is normal and his left atrial pressure also appears to be normal based on his normal diastolic function. We will obtain a BNP. However, I feel he can proceed to orthopedic surgery for his Dupuytren's contracture without any further evaluation.          John Lao D.O.  [address]  [address]  [place] [address]  VIA In Basket     Mk Bro M.D.  [address]  [address]  [place] [address]  VIA Facsimile: [phone]

## 2017-08-31 ENCOUNTER — TELEPHONE (OUTPATIENT)
Dept: MEDICAL GROUP | Facility: CLINIC | Age: 62
End: 2017-08-31

## 2017-08-31 NOTE — TELEPHONE ENCOUNTER
Future Appointments       Provider Department Center    9/1/2017 10:40 AM John Lao D.O. [facility]    9/1/2017 12:30 PM [facility] BD [facility] E [address] [place] [address]    9/29/2017 9:00 AM Antonieta Canales M.D. [facility]     10/4/2017 9:45 AM Stas Zabala M.D. [facility]     10/18/2017 10:00 AM John Lao D.O. [facility]    10/23/2017 1:30 PM Hector Brewer M.D. [facility] PHYSIAT[facility]           ESTABLISHED PATIENT PRE-VISIT PLANNING     Note: Patient will not be contacted if there is no indication to call. PT was not Contacted.    1.    Reviewed note from last office visit with PCP: YES Last office visit: 7/13/17    2.  If any orders were placed at last visit, do we have Results/Consult Notes?        •  Labs - Labs were not ordered at last office visit.        •  Imaging - Imaging was not ordered at last office visit.        •  Referrals - Referral ordered, patient was seen and consult notes are in chart. Care Teams updated  YES. Orthopaedics    3.  Immunizations were updated in Epic using WebIZ? WEB IZ WAS DOWN, UNABLE TO VERIFY        •  Web Iz Recommendations:    4.  Patient is due for the following Health Maintenance Topics:   Health Maintenance Due   Topic Date Due   • Annual Wellness Visit  07/13/2016   • IMM INFLUENZA (1) 09/01/2017           5.  Patient was not informed to arrive 15 min prior to their scheduled appointment and bring in their medication bottles.

## 2017-09-01 ENCOUNTER — APPOINTMENT (OUTPATIENT)
Dept: RADIOLOGY | Facility: MEDICAL CENTER | Age: 62
End: 2017-09-01
Attending: INTERNAL MEDICINE
Payer: MEDICARE

## 2017-09-05 ENCOUNTER — HOSPITAL ENCOUNTER (OUTPATIENT)
Dept: RADIOLOGY | Facility: MEDICAL CENTER | Age: 62
End: 2017-09-05
Attending: INTERNAL MEDICINE
Payer: MEDICARE

## 2017-09-05 DIAGNOSIS — L60.8 SPLINTER HEMORRHAGE OF TOENAIL: ICD-10-CM

## 2017-09-05 DIAGNOSIS — R01.1 MURMUR: ICD-10-CM

## 2017-09-05 DIAGNOSIS — M06.9 RHEUMATOID ARTHRITIS FLARE (HCC): ICD-10-CM

## 2017-09-05 DIAGNOSIS — M06.9 RHEUMATOID ARTHRITIS INVOLVING MULTIPLE SITES, UNSPECIFIED RHEUMATOID FACTOR PRESENCE: ICD-10-CM

## 2017-09-05 DIAGNOSIS — Z79.899 ENCOUNTER FOR LONG-TERM (CURRENT) USE OF HIGH-RISK MEDICATION: ICD-10-CM

## 2017-09-05 DIAGNOSIS — Z79.52 CURRENT CHRONIC USE OF SYSTEMIC STEROIDS: ICD-10-CM

## 2017-09-05 PROCEDURE — 77080 DXA BONE DENSITY AXIAL: CPT

## 2017-09-07 ENCOUNTER — HOSPITAL ENCOUNTER (OUTPATIENT)
Dept: LAB | Facility: MEDICAL CENTER | Age: 62
End: 2017-09-07
Attending: ORTHOPAEDIC SURGERY
Payer: MEDICARE

## 2017-09-07 LAB
ANION GAP SERPL CALC-SCNC: 7 MMOL/L (ref 0–11.9)
BUN SERPL-MCNC: 17 MG/DL (ref 8–22)
CALCIUM SERPL-MCNC: 9.5 MG/DL (ref 8.5–10.5)
CHLORIDE SERPL-SCNC: 106 MMOL/L (ref 96–112)
CO2 SERPL-SCNC: 26 MMOL/L (ref 20–33)
CREAT SERPL-MCNC: 0.96 MG/DL (ref 0.5–1.4)
GFR SERPL CREATININE-BSD FRML MDRD: >60 ML/MIN/1.73 M 2
GLUCOSE SERPL-MCNC: 92 MG/DL (ref 65–99)
POTASSIUM SERPL-SCNC: 5.1 MMOL/L (ref 3.6–5.5)
SODIUM SERPL-SCNC: 139 MMOL/L (ref 135–145)

## 2017-09-07 PROCEDURE — 36415 COLL VENOUS BLD VENIPUNCTURE: CPT

## 2017-09-07 PROCEDURE — 80048 BASIC METABOLIC PNL TOTAL CA: CPT

## 2017-09-08 DIAGNOSIS — E29.1 HYPOGONADISM MALE: ICD-10-CM

## 2017-09-08 RX ORDER — TESTOSTERONE CYPIONATE 200 MG/ML
INJECTION, SOLUTION INTRAMUSCULAR
Qty: 10 ML | Refills: 0 | Status: SHIPPED
Start: 2017-09-08 | End: 2017-09-11 | Stop reason: SDUPTHER

## 2017-09-08 NOTE — TELEPHONE ENCOUNTER
VOICEMAIL  2 messages   1. Caller Name: Edgardo Sims                      Call Back Number: [phone] (home)     2. Message: pt called he has his 35 weeding aniversary this weekend and his wife has been planning this for a long time and not having the testosteron is just ruining the plans. Can you please give him just 1 ml until he comes on Wednesday please I am begging you here. Thank you     3. Patient approves office to leave a detailed voicemail/Senict message: yes                              Was the patient seen in the last year in this department? Yes     Does patient have an active prescription for medications requested? No     Received Request Via: Patient

## 2017-09-11 ENCOUNTER — OFFICE VISIT (OUTPATIENT)
Dept: MEDICAL GROUP | Facility: CLINIC | Age: 62
End: 2017-09-11
Payer: MEDICARE

## 2017-09-11 VITALS
TEMPERATURE: 97.8 F | HEIGHT: 70 IN | RESPIRATION RATE: 18 BRPM | DIASTOLIC BLOOD PRESSURE: 70 MMHG | SYSTOLIC BLOOD PRESSURE: 134 MMHG | OXYGEN SATURATION: 97 % | BODY MASS INDEX: 29.18 KG/M2 | WEIGHT: 203.8 LBS | HEART RATE: 66 BPM

## 2017-09-11 DIAGNOSIS — Z23 NEED FOR IMMUNIZATION AGAINST INFLUENZA: ICD-10-CM

## 2017-09-11 DIAGNOSIS — M05.9 RHEUMATOID ARTHRITIS WITH POSITIVE RHEUMATOID FACTOR, INVOLVING UNSPECIFIED SITE (HCC): ICD-10-CM

## 2017-09-11 PROCEDURE — G0008 ADMIN INFLUENZA VIRUS VAC: HCPCS | Performed by: INTERNAL MEDICINE

## 2017-09-11 PROCEDURE — 99213 OFFICE O/P EST LOW 20 MIN: CPT | Mod: 25 | Performed by: INTERNAL MEDICINE

## 2017-09-11 PROCEDURE — 90686 IIV4 VACC NO PRSV 0.5 ML IM: CPT | Performed by: INTERNAL MEDICINE

## 2017-09-11 RX ORDER — OXYCODONE AND ACETAMINOPHEN 10; 325 MG/1; MG/1
1 TABLET ORAL
Qty: 150 TAB | Refills: 0 | Status: SHIPPED | OUTPATIENT
Start: 2017-09-11 | End: 2017-09-11 | Stop reason: SDUPTHER

## 2017-09-11 RX ORDER — OXYCODONE AND ACETAMINOPHEN 10; 325 MG/1; MG/1
1 TABLET ORAL
Qty: 150 TAB | Refills: 0 | Status: SHIPPED | OUTPATIENT
Start: 2017-09-11 | End: 2017-12-11 | Stop reason: SDUPTHER

## 2017-09-11 ASSESSMENT — PATIENT HEALTH QUESTIONNAIRE - PHQ9: CLINICAL INTERPRETATION OF PHQ2 SCORE: 0

## 2017-09-12 ENCOUNTER — TELEPHONE (OUTPATIENT)
Dept: RHEUMATOLOGY | Facility: PHYSICIAN GROUP | Age: 62
End: 2017-09-12

## 2017-09-12 NOTE — PROGRESS NOTES
CC: Edgardo Sims is a 61 y.o. male is suffering from   Chief Complaint   Patient presents with   • Follow-Up   • Medication Refill         SUBJECTIVE:  1. Rheumatoid arthritis with positive rheumatoid factor, involving unspecified site (CMS-[facility])  Edgardo continues to struggle with rheumatoid arthritis cannot afford his disease modifying drugs outside of methotrexate, [facility] social workers been written    2. Need for immunization against influenza  Vaccination given    Chronic pain recheck:   Last dose of controlled substance: Today  Chronic pain treated with Percocet taken 5 times a day    He  reports that he drinks about 3.0 oz of alcohol per week .  He  reports that he does not use drugs.    Consequences of Chronic Opiate therapy:  (5 A's)  Analgesia: Compared to no treatment or prior treatment, pain is currently improved  Activity: improved  Adverse Events: He denies constipation, dry mouth, itchy skin, nausea, sedation and none  Aberrant Behaviors: He reports he is taking medication as prescribed and is not veering from agreed treatment regimen. There have been no inappropriate refills or lost/stolen meds reported.   Affect/Mood: Pain is not impacting patient's mood.  Patient reports depression/anxiety.    Nonnarcotic treatments that are being used: NSAIDs/CRONIN-2.   Treatment goals discussed.    Last order of CONTROLLED SUBSTANCE TREATMENT AGREEMENT was found on 4/12/2017 from Office Visit on 4/12/2017     UDS Summary                URINE DRUG SCREEN Next Due 4/7/2018      Done 4/12/2017 [facility] PAIN MANAGEMENT SCREEN        Most recent UDS reviewed today and is consistent with prescribed medications.   Opioid Risk Score: 1    Interpretation of Opioid Risk Score   Score 0-3 = Low risk of abuse. Do UDS at least once per year.  Score 4-7 = Moderate risk of abuse. Do UDS 1-4 times per year.  Score 8+ = High risk of abuse. Refer to specialist.      I have reviewed the medical records, the Prescription  Monitoring Program and I have determined that controlled substance treatment is medically indicated.      Past social, family, history:   Social History   Substance Use Topics   • Smoking status: Never Smoker   • Smokeless tobacco: Never Used   • Alcohol use 3.0 oz/week     6 Cans of beer per week         MEDICATIONS:    Current Outpatient Prescriptions:   •  oxycodone-acetaminophen (PERCOCET-10)  MG Tab, Take 1 Tab by mouth 5 Times a Day., Disp: 150 Tab, Rfl: 0  •  methotrexate 2.5 MG Tab, TAKE SIX TABLETS BY MOUTH ONCE A WEEK, Disp: 24 Tab, Rfl: 2  •  pravastatin (PRAVACHOL) 20 MG Tab, Take 1 Tab by mouth every bedtime., Disp: 30 Tab, Rfl: 11  •  cyclobenzaprine (FLEXERIL) 10 MG Tab, TAKE ONE TABLET BY MOUTH THREE TIMES DAILY AS NEEDED FOR MILD PAIN, Disp: 90 Tab, Rfl: 5  •  meloxicam (MOBIC) 15 MG tablet, TAKE ONE TABLET BY MOUTH ONCE DAILY, Disp: 30 Tab, Rfl: 6  •  predniSONE (DELTASONE) 5 MG Tab, Take 1 Tab by mouth every day., Disp: 30 Tab, Rfl: 1  •  Calcium Carbonate-Vitamin D (CALCIUM 600 + D) 600-400 MG-UNIT Tab, Take 1 Tab by mouth 2 times a day, with meals., Disp: 60 Tab, Rfl: 3  •  folic acid (FOLVITE) 1 MG Tab, Take 3 Tabs by mouth every day., Disp: 90 Tab, Rfl: 2  •  ergocalciferol (DRISDOL) 95414 UNIT capsule, Take 1 Cap by mouth every 7 days., Disp: 12 Cap, Rfl: 0  •  Cyanocobalamin (VITAMIN B-12) 5000 MCG TABLET DISPERSIBLE, Take  by mouth., Disp: , Rfl:   •  niacin 500 MG Tab, Take 500 mg by mouth every day., Disp: , Rfl:   •  Zinc 50 MG Cap, Take 50 mg by mouth every day., Disp: , Rfl:   •  diazepam (VALIUM) 5 MG Tab, TAKE ONE TABLET BY MOUTH EVERY 12 HOURS AS NEEDED FOR ANXIETY, Disp: 60 Tab, Rfl: 5  •  paroxetine (PAXIL) 20 MG Tab, TAKE ONE TABLET BY MOUTH ONCE DAILY, Disp: 90 Tab, Rfl: 3  •  Multiple Vitamins-Minerals (MULTI COMPLETE PO), Take  by mouth., Disp: , Rfl:   •  bisoprolol (ZEBETA) 10 MG tablet, Take 1 Tab by mouth every day., Disp: 90 Tab, Rfl: 3  •  lisinopril (PRINIVIL) 10  MG Tab, Take 1 Tab by mouth every day., Disp: 90 Tab, Rfl: 3  •  vardenafil (LEVITRA) 20 MG tablet, Take 20 mg by mouth as needed., Disp: , Rfl:   •  omeprazole (PRILOSEC) 20 MG CPDR, Take 20 mg by mouth every day., Disp: , Rfl:   •  testosterone cypionate (DEPO-TESTOSTERONE) 200 MG/ML Solution injection, INJECT 1 ML (CC) INTRAMUSCULARLY ONCE EVERY TWO WEEKS AS DIRECTED, Disp: 10 mL, Rfl: 0  •  predniSONE (DELTASONE) 5 MG Tab, 20 mg po q day x 5 days and decrease q 5 day by 5 mg utnil 5 mg po q day, Disp: 50 Tab, Rfl: 0  •  Tofacitinib Citrate (XELJANZ) 5 MG Tab, Take 5 mg by mouth 2 Times a Day., Disp: 60 Tab, Rfl: 0  •  cephALEXin (KEFLEX) 500 MG Cap, Take 1 Cap by mouth 4 times a day., Disp: 40 Cap, Rfl: 0  •  cetirizine (ZYRTEC) 10 MG Tab, Take 10 mg by mouth every day., Disp: , Rfl:   •  Loratadine (CLARITIN) 10 MG Cap, Take  by mouth., Disp: , Rfl:   •  Guaifenesin 400 MG Tab, Take 1 Tab by mouth 3 times a day as needed., Disp: 90 Tab, Rfl: 3  •  fexofenadine (ALLEGRA) 60 MG Tab, Take 1 Tab by mouth every day., Disp: 30 Tab, Rfl: 3  •  fluticasone (FLONASE) 50 MCG/ACT nasal spray, Spray 1 Spray in nose 2 times a day., Disp: 1 Bottle, Rfl: 3  •  RiTUXimab (RITUXAN IV), by Intravenous route. Two every 6 months , Disp: , Rfl:     PROBLEMS:  Patient Active Problem List    Diagnosis Date Noted   • Chronic use of opiate drugs therapeutic purposes 08/12/2017   • Elevated CPK 09/23/2016   • Depression 07/13/2015   • Anxiety 03/31/2015   • Coronary artery disease due to calcified coronary lesion: Mildly cardiac catheterization in 2014 12/05/2014   • Tachycardia 10/20/2014   • Abnormal myocardial perfusion study 01/15/2014   • Osteoarthrosis, unspecified whether generalized or localized, pelvic region and thigh 05/31/2013   • Dyslipidemia 07/12/2011   • Aortic insufficiency 07/05/2011   • Essential hypertension 07/05/2011   • Rheumatoid arthritis (CMS-HCC) 05/05/2009   • Hypogonadism male 05/05/2009       REVIEW OF  "SYSTEMS:  Gen.:  No Nausea, Vomiting, fever, Chills.  Heart: No chest pain.  Lungs:  No shortness of Breath.  Psychological: Kian unusual Anxiety depression     PHYSICAL EXAM   Constitutional: Alert, cooperative, not in acute distress.  Cardiovascular:  Rate Rhythm is regular without murmurs rubs clicks.     Thorax & Lungs: Clear to auscultation, no wheezing, rhonchi, or rales  HENT: Normocephalic, Atraumatic.  Eyes: PERRLA, EOMI, Conjunctiva normal.   Neck: Trachia is midline no swelling of the thyroid.   Lymphatic: No lymphadenopathy noted.   Abdomin: Soft non-tender, no rebound, no guarding.   Skin: Warm, Dry, No erythema, No rash.   Extremities: Atraumatic with symmetric distal pulses, No edema, No tenderness, No cyanosis, No clubbing.   Musculoskeletal: Severe joint pain right hand  Neurologic: Alert & oriented x 3, cranial nerves II through XII are intact, Normal motor function, Normal sensory function, No focal deficits noted.   Psychiatric: Affect normal, Judgment normal, Mood normal.     VITAL SIGNS:/70   Pulse 66   Temp 36.6 °C (97.8 °F)   Resp 18   Ht 1.778 m (5' 10\")   Wt 92.4 kg (203 lb 12.8 oz)   SpO2 97%   BMI 29.24 kg/m²     Labs: Reviewed    Assessment:                                                     Plan:    1. Rheumatoid arthritis with positive rheumatoid factor, involving unspecified site (CMS-[facility])  Continue Percocet, referral to social work  - oxycodone-acetaminophen (PERCOCET-10)  MG Tab; Take 1 Tab by mouth 5 Times a Day.  Dispense: 150 Tab; Refill: 0  - REFERRAL TO COMPLEX CARE MANAGEMENT Services Requested: Care Manager to Evaluate and Recommend    2. Need for immunization against influenza  Vaccination given  - Flu Quad Inj >3 Year Pre-Filled (Preservative Free)        "

## 2017-09-12 NOTE — TELEPHONE ENCOUNTER
Pt RYC he wanted you to know that he is having hand surgery next Thursday, and was advised by surgeon to stop all his meds. He can't start something new right now. Please call back.

## 2017-09-13 NOTE — TELEPHONE ENCOUNTER
Called patient back.    Noted that his prednisone 8/4/2017 prescription.  He titrated weekly instead of q 5 days   He is on prednisone 5 mg po q day now.  He has been recommended to stop prednisone for 9/21/2017 with Dr. Mk Bro pre-op coordinator was Sarah  [phone]    He was on prednisone 2 mg for a long period for maintenance.  He has been on prednisone since 1999.   He will change to prednisone 2.5 mg po q other day  Advised patient to start his methotrexate once he was cleared by surgery    -----------------------

## 2017-09-14 ENCOUNTER — PATIENT OUTREACH (OUTPATIENT)
Dept: HEALTH INFORMATION MANAGEMENT | Facility: OTHER | Age: 62
End: 2017-09-14

## 2017-09-14 DIAGNOSIS — Z59.9 INADEQUATE COMMUNITY RESOURCES: ICD-10-CM

## 2017-09-14 PROBLEM — Z75.4 INADEQUATE COMMUNITY RESOURCES: Status: ACTIVE | Noted: 2017-09-14

## 2017-09-14 SDOH — ECONOMIC STABILITY - INCOME SECURITY: PROBLEM RELATED TO HOUSING AND ECONOMIC CIRCUMSTANCES, UNSPECIFIED: Z59.9

## 2017-09-14 NOTE — PROGRESS NOTES
"1st attempt: New referral \"Medicare ACO pt referred to CC by PCP for needing Rx cost assistance for his rheumatological medications. Referred to MICHELLE oLgan for review.\"    Outreach to pt to discuss above referral. Pt reports that his Rituxan and Xeljanz medications are very expensive for him. Pt reports that he is not able to afford any of these medications as their costs are extremely high. Previously, pt's uncle was assisting him in paying for these medications, but he is no longer assisting. Pt reports receiving assistance from the Patient Assistance Network (PAN) around 15 years ago and reports that he may need to do this again. Pt is aware that these funds run out quickly and states he anticipates that this may potentially happen later on in the future. Pt reports that he has not applied for any federal or state programs as he does not believe that he will qualify, due to his age. LSW informed pt that as long as he Medicare he can apply for any of these programs. Pt verbalized understanding.     LSW discussed that there may be some manufacturing assistance applications for these two medications, but that this LSW will research this information and get back to him. Pt agreeable. LSW inquired what pt's gross monthly combined income is, as all programs have income guidelines. Pt unsure of income, but states he will let this LSW know once he has this information.     Inquired if pt had any other social determinants to health LSW could assist with. Pt reports that the only other social determinant pt is hoping to get assistance with is his Dental and Vision costs. Pt reports not having any supplemental plans for Dental/Vision through Medicare. LSW discussed Access to Healthcare and SHIP as potential resources pt could utilize. Pt agreeable to being sent this resources as well.     LSW outreach to pt to inform him of above resources being available to him. Inquired if pt would like to meet with this LSW to complete " above applications. Pt reports that he would like to look over them and then call this LSW if he needs assistance/has any questions for this LSW. LSW inquired if pt would be agreeable to this LSW making a referral to CC PharmD to do a medication reconciliation as he is on 5 or more medications. Pt agreeable to referral being made.     Email to pt as follows:     Chris Reynoso,     Attached are some of the resources we discussed over the phone, as well as some resources that I feel may be beneficial to you.   1) Access to Healthcare: If you qualify, this program will provide discounted dental and vision services. Details about the program are on their website: http://www.accesstohealthcare.org/services-individuals/dental-vision-program  2) SHIP: This program is offered through the [facility]. It is made up of trained individuals who can assist in signing up for Medicare plans (including Vision and Dental coverage) based on your needs and desires for healthcare. (attached is their brochure).  3) State Pharmacy Assistance Program (SPAP): This program is Medicare Gap coverage offered through the [facility]. If you qualify income-wise, this coverage will come into effect, should you ever hit the Medicare Gap or “donut hole.” There is no age limit on this program-you just need to have Medicare. (application attached)  4) Extra Help through the Social Security Administration: This program assists with significantly reducing your out-of-pocket expenses towards medications (as long as they are on their formulary list and you qualify income-wise). This most likely will not save you money on the Rituxan or Xeljanz (they are most likely not on their formulary list); however, it will reduce your out-of-pocket towards any other medications you are on. There is no age limit on this program-you just need to have Medicare. The application to apply is: https://www.ssa.gov/  5) Rituxan Resources: 1) Manufacturing  assistance application and Statement of Medical Necessity for: Rituxan (application attached), Patient Access Network (PAN): Here is the website to apply for assistance through the PAN Network. They potentially may be accepting applications for next year’s funding and it may be a good idea to apply now for next year. https://www.Be Spotted.org/  6) Xeljanz Resources: 1) Manufacturing assistance website for: Xeljanz (provides a phone number to call to see if you qualify for assistance) http://www.Mibio/    Please give me a call if you would like to meet to complete some of the above applications or if you have any questions/concerns. I will be checking in with you monthly to assist as much as I can, unless I hear from you sooner.     Sincerely,   Doreen Corley, LSW, MSW  [phone]    Plan:  LSW to add pt to care team and initiate care plan  Pt to reach out to LSW with any questions/concerns  LSW to make referral to CC PharmD for Medication reconciliation

## 2017-09-14 NOTE — TELEPHONE ENCOUNTER
Called pt. And relayed your message. Pt. Stated that he does need a refill on his 1MG tab Prednisone. Thank you!

## 2017-09-14 NOTE — TELEPHONE ENCOUNTER
Please call patient. I did speak with Sarah and she felt it was fine to taper Mr. Edgardo Sims steroids to his baseline dose.  His baseline dose should be 2 mg po q day of prednisone.  Please confirm and ask him if he needs a refill on 1 mg of prednisone.    I asked him to taper to 5 mg po q other day and if he is tolerating we will decrease to 2 mg q day starting Saturday.

## 2017-09-18 ENCOUNTER — PATIENT OUTREACH (OUTPATIENT)
Dept: HEALTH INFORMATION MANAGEMENT | Facility: OTHER | Age: 62
End: 2017-09-18

## 2017-09-18 NOTE — PROGRESS NOTES
Outbound call to patient for medication reconciliation.  Updated allergy and medication lists.    Patient demonstrates adherence to medication schedule and understanding of indications for medications.    Meds that meet Beer's criteria for potentially inappropriate use in patients over 65 include:  Paroxetine, Mobic, diazepam, Flexeril, and Percocet.  Patient has been taking all medications for a long period of time.  Patient denies any constipation, daytime drowsiness, cognitive impairment, or recent falls.  He does report dry mouth, however he states this is tolerable. Recommend Biotene products; patient is amenable. Omeprazole is also helping for GI protection since patient takes Mobic.  He states he is amenable to discontinuing his Paxil, however I advised him to discuss his symptoms of depression and anxiety with PCP before doing so.      Patient experiences bothersome GI side effects from his methotrexate.  He is able to tolerate the medication by eating a special diet before each weekly dose.  He also states this medication is not working as well as he would like it to for his RA symptoms.  He found the most relief from Rituxan, however his uncle is no longer able to help with copays and patient discontinued medication.    Patient's dose of prednisone has been tapered down due to an upcoming hand surgery.  This may be causing his RA flare and increased discomfort in his joints.  Patient believes his dose of prednisone will be tapered up to prior maintenance dose after surgery.  Will reevaluate medication therapy after surgery.    Has appropriate medication regimen for current disease states.  Patient states his BP is usually around 120s/70s-80s.    Patient feels satisfied with medication regimen.  He states he is doing the best he can to stay positive and deal with one thing at a time.     Patient can afford medications.  He cannot afford to be on Rituxan, which he states worked best for his RA.  CINDI Logan  is working on patient assistance programs as well as qualifying patient for any state funded assistance.  Advised patient that I will call sample pharmacy to see if they have Xeljanz.  Will follow up with patient regarding these medications.    Patient is up to date with his vaccines.  He has received Pneumovax and annual flu shot.  He is not a candidate for Zostavax as he is immunocompromised and it is a live vaccine.    Educated patient on importance of adherence.      Plan:    Will follow up with patient regarding Xeljanz samples.  Collaborate with CCSW regarding Rituxan infusion coverage.

## 2017-09-20 ENCOUNTER — PATIENT MESSAGE (OUTPATIENT)
Dept: HEALTH INFORMATION MANAGEMENT | Facility: OTHER | Age: 62
End: 2017-09-20

## 2017-09-23 ASSESSMENT — ENCOUNTER SYMPTOMS
COUGH: 0
CHILLS: 0
FEVER: 0
BACK PAIN: 1

## 2017-09-23 NOTE — PROGRESS NOTES
Subjective:   Date of Consultation:9/29/2017  8:53 AM  Primary care physician:John Lao D.O.      Reason for Consultation:  Mr. Sims  is a pleasant 61 y.o. year old male who presented with follow-up for Rheumatoid Arthritis      Recent Surgery  Hand surgery 9/21  With [facility]  Will see hand surgeon today  Doing well    Rheumatoid Arthritis   At last visit we agreed to start xeljanz but he was limited by his insurance.  We have maintained him on methotrexate and prednisone but he has uncontrolled disease.  Left hand (nonsurgical hand) notes increase swelling.        Current Medications  Methotrexate 6 tabs q Wednesday   Prednisone 2 mg po q day  (he has been on long term prednisone)      RHEUM HISTORY  Mr. Edgardo Sims presented with a history of Rheumatoid Arthritis diagnosed late 1999 and recent RF and CCP were negative.  He was previously a patient of Dr. Mcknight and then followed by a community rheumatology.  He is here today to establish care. In review of Dr. Echavarria that it seems he has been on Rituxan as far back as 2009. At that time he was on a regimen of azathioprine 150 mg methotrexate 15 mg, prednisone 7.5 mg and rituximab with 125 mg of Solu-Medrol he seemed to have been maintained on this regimen and was tapered down on the Imuran to 50 mg twice a day. Notes from May 24, 2012 indicates that he discontinue the Imuran. At that time he was on rituximab and every 6 months 15 mg of methotrexate weekly and 2 mg of prednisone. Notes from June 8, 2015 does remark that his RA was only manageable he's had limited response. He did suffer a consultation with methotrexate developing stomatitis. Methotrexate was held March 6, 2014 and was reinstituted January 5, 2015.     Dr. Echavarria's notes it does comment that he may benefit from rituximab every 5 months however given cost restrictions he can only get it every 6 months. His last infusion was in March 2017 for rituximab.  He reports he is  not doing as well on the rituximab.  He feels his activity level is not as active.  He feels increase stiffness.  His feet xrays     Complications to his care include intolerance to medications  And stomatitis with methotrexate.  Methotrexate was stopped briefly and subsequently was restarted but the patient reports it is not working as well as before.     Previous medications include   azathioprine, methotrexate  Plaquenil and Arava both intolerance not allergies  Remicade loss efficacyt  Gold shots (had a bad reaction)  Enbrel  Not sure Humira  Methylprednisolone did not work as well as prednisone  Never been on Xeljanz   Has not tried Actemra  rituximab (lost efficacy last infusion was 3/2017)  Orencia (not sure why he stopped)    Chronic Pain  On percocet ranging from 30-40 mg daily    Past medical HIstory: bilateral hip replacment and left knee replacement, tonsillitis, varicose vein in the left leg, TMJ, tonsillitis in 1964, broken right ankle in 1980, ruptured disc of L5-S1 noted in 2000 with discectomy and laminectomy, rotator cuff with anterior labral repair in 2001, basal cell carcinoma in 2005 from the nose, meniscal tear of the left knee in 2008 with scope repair, scope repair and biceps tendon repair as well as rotator cuff surgery in 2011 of the right shoulder. Dupuytren's contracture right hand little finger, Dupuytren's contracture right foot, arthritis of the right foot, plantar fasciitis bilateral, aortic insufficiency, hypertension, dyslipidemia, heart murmur, tachycardia, coronary artery disease with mild plaque at catheter. Hiatal hernia and GERD, scoliosis, kyphosis of the upper back/compensating lordosis of the neck . Vein ligation radiofrequency      Family History: lymphoma     Social History: light bookeeping mainly disability, NEVER smoker, occasional alcohol (once a week)    Past Medical History:   Diagnosis Date   • Chronic use of opiate drugs therapeutic purposes 8/12/2017   • CAD  (coronary artery disease) mild plaque at cath in 2/14 12/5/2014   • Tachycardia 10/20/2014   • Abnormal myocardial perfusion study 1/15/2014   • Dyslipidemia 7/12/2011   • Aortic insufficiency 7/5/2011   • HTN (hypertension) 7/5/2011   • Arthritis     RA, and osteo   • Bronchitis    • Cancer (CMS-HCC)     skin   • Cold     mid January 2014   • Coughing blood     none currently 2014   • Heart burn    • Heart murmur    • Hiatus hernia syndrome    • Hypertension    • Indigestion    • Infectious disease    • Pain     RA   • Rheumatoid arthritis (CMS-HCC)     severe     Past Surgical History:   Procedure Laterality Date   • RECOVERY  2/3/2014    Performed by Cath-Recovery Surgery at SURGERY SAME DAY [facility]   • HIP ARTHROPLASTY TOTAL  5/31/2013    Performed by Burak Valles M.D. at SURGERY [facility]   • KNEE ARTHROPLASTY TOTAL  6/1/2009    Performed by YONATAN HINSON at SURGERY [facility] ORS   • VEIN LIGATION RADIO FREQUENCY  12/8/2008    Performed by DEREJE MCCULLOUGH at SURGERY SAME DAY [facility]   • HIP ARTHROPLASTY TOTAL  6/20/08    Performed by YONATAN HINSON at SURGERY [facility] ORS   • LUMBAR LAMINECTOMY DISKECTOMY  2000   • TMJ RECONSTRUCTION BILATERAL  1994   • KNEE ARTHROSCOPY      right   • OTHER      varicose vein stripping   • OTHER      heart murmur   • OTHER ORTHOPEDIC SURGERY      awaiting right hip surgery   • SHOULDER SURGERY      RIGHT 01/2011     Allergies   Allergen Reactions   • Crestor [Rosuvastatin Calcium] Myalgia   • Penicillins Hives     Outpatient Encounter Prescriptions as of 9/29/2017   Medication Sig Dispense Refill   • predniSONE (DELTASONE) 1 MG Tab 2 mg po q day 60 Tab 1   • oxycodone-acetaminophen (PERCOCET-10)  MG Tab Take 1 Tab by mouth 5 Times a Day. 150 Tab 0   • pravastatin (PRAVACHOL) 20 MG Tab Take 1 Tab by mouth every bedtime. 30 Tab 11   • cyclobenzaprine (FLEXERIL) 10 MG Tab TAKE ONE TABLET BY MOUTH THREE TIMES DAILY AS NEEDED FOR MILD PAIN  90 Tab 5   • meloxicam (MOBIC) 15 MG tablet TAKE ONE TABLET BY MOUTH ONCE DAILY 30 Tab 6   • Calcium Carbonate-Vitamin D (CALCIUM 600 + D) 600-400 MG-UNIT Tab Take 1 Tab by mouth 2 times a day, with meals. 60 Tab 3   • folic acid (FOLVITE) 1 MG Tab Take 3 Tabs by mouth every day. 90 Tab 2   • Cyanocobalamin (VITAMIN B-12) 5000 MCG TABLET DISPERSIBLE Take  by mouth.     • niacin 500 MG Tab Take 500 mg by mouth every day.     • Zinc 50 MG Cap Take 50 mg by mouth every day.     • diazepam (VALIUM) 5 MG Tab TAKE ONE TABLET BY MOUTH EVERY 12 HOURS AS NEEDED FOR ANXIETY 60 Tab 5   • Multiple Vitamins-Minerals (MULTI COMPLETE PO) Take  by mouth.     • bisoprolol (ZEBETA) 10 MG tablet Take 1 Tab by mouth every day. 90 Tab 3   • lisinopril (PRINIVIL) 10 MG Tab Take 1 Tab by mouth every day. 90 Tab 3   • vardenafil (LEVITRA) 20 MG tablet Take 20 mg by mouth as needed.     • omeprazole (PRILOSEC) 20 MG CPDR Take 20 mg by mouth every day.     • testosterone cypionate (DEPO-TESTOSTERONE) 200 MG/ML Solution injection INJECT 1 ML (CC) INTRAMUSCULARLY ONCE EVERY TWO WEEKS AS DIRECTED 10 mL 0   • methotrexate 2.5 MG Tab TAKE SIX TABLETS BY MOUTH ONCE A WEEK 24 Tab 2   • cephALEXin (KEFLEX) 500 MG Cap Take 1 Cap by mouth 4 times a day. 40 Cap 0   • [DISCONTINUED] predniSONE (DELTASONE) 5 MG Tab Take 1 Tab by mouth every day. 30 Tab 1   • ergocalciferol (DRISDOL) 63625 UNIT capsule Take 1 Cap by mouth every 7 days. 12 Cap 0   • paroxetine (PAXIL) 20 MG Tab TAKE ONE TABLET BY MOUTH ONCE DAILY 90 Tab 3   • RiTUXimab (RITUXAN IV) by Intravenous route. Two every 6 months        No facility-administered encounter medications on file as of 9/29/2017.        Social History     Social History   • Marital status:      Spouse name: N/A   • Number of children: N/A   • Years of education: N/A     Occupational History   • Not on file.     Social History Main Topics   • Smoking status: Never Smoker   • Smokeless tobacco: Never Used   •  Alcohol use 3.0 oz/week     6 Cans of beer per week   • Drug use: No   • Sexual activity: Yes     Partners: Female     Other Topics Concern   • Not on file     Social History Narrative   • No narrative on file      History   Smoking Status   • Never Smoker   Smokeless Tobacco   • Never Used     History   Alcohol Use   • 3.0 oz/week   • 6 Cans of beer per week     History   Drug Use No         Family History   Problem Relation Age of Onset   • Hypertension Mother        Review of Systems   Constitutional: Negative for chills and fever.   Respiratory: Negative for cough.    Cardiovascular: Negative for chest pain.   Musculoskeletal: Positive for back pain and joint pain.   Skin: Negative for rash.        Objective:   /70   Pulse 72   Temp 36.9 °C (98.4 °F)   Resp 16   Wt 94.8 kg (209 lb)   SpO2 99%   BMI 29.99 kg/m²     Physical Exam   Constitutional: He is oriented to person, place, and time. He appears well-developed and well-nourished. No distress.   HENT:   Head: Normocephalic and atraumatic.   Right Ear: External ear normal.   Left Ear: External ear normal.   Eyes: Conjunctivae are normal. Right eye exhibits no discharge. Left eye exhibits no discharge. No scleral icterus.   Cardiovascular:   Grade III systolic murmur (not examined today)   Pulmonary/Chest: Effort normal. No stridor. No respiratory distress.   Abdominal: Soft. Bowel sounds are normal.   No hepatomegaly   Musculoskeletal: He exhibits no edema.   Right hand with palmar stiches and some areas of the surgical site are still open.  Bilateral wrist swelling.     Neurological: He is alert and oriented to person, place, and time.   Skin: Skin is warm and dry. No rash noted. He is not diaphoretic. No erythema. No pallor.   Nodules on feet on right; great toe on the left has splinter hemorrhages (not examined today)   Psychiatric: He has a normal mood and affect. His behavior is normal. Judgment and thought content normal.       Assessment:      1. Rheumatoid arthritis involving multiple sites, unspecified rheumatoid factor presence (CMS-[facility])  CBC WITH DIFFERENTIAL    COMP METABOLIC PANEL    CRP QUANTITIVE (NON-CARDIAC)    WESTERGREN SED RATE     Labs:  [unfilled]    Lab Results   Component Value Date/Time    QNTTBGOLD Negative 06/01/2017 01:13 PM     Lab Results   Component Value Date/Time    HEPBCORTOT Negative 06/01/2017 01:13 PM    HEPBSAG Negative 06/01/2017 01:13 PM     Lab Results   Component Value Date/Time    HEPCAB Negative 06/01/2017 01:13 PM     Lab Results   Component Value Date/Time    SODIUM 139 09/07/2017 04:57 PM    POTASSIUM 5.1 09/07/2017 04:57 PM    CHLORIDE 106 09/07/2017 04:57 PM    CO2 26 09/07/2017 04:57 PM    GLUCOSE 92 09/07/2017 04:57 PM    BUN 17 09/07/2017 04:57 PM    CREATININE 0.96 09/07/2017 04:57 PM    CREATININE 1.1 04/21/2009 03:50 PM    BUNCREATRAT 13 09/21/2016 09:21 AM      Lab Results   Component Value Date/Time    WBC 11.2 (H) 07/17/2017 09:34 AM    WBC 7.5 07/01/2011 10:30 AM    RBC 4.95 07/17/2017 09:34 AM    RBC 4.72 07/01/2011 10:30 AM    HEMOGLOBIN 16.3 07/17/2017 09:34 AM    HEMATOCRIT 48.7 07/17/2017 09:34 AM    MCV 98.4 (H) 07/17/2017 09:34 AM     (H) 07/01/2011 10:30 AM    MCH 32.9 07/17/2017 09:34 AM    MCH 36.0 (H) 07/01/2011 10:30 AM    MCHC 33.5 (L) 07/17/2017 09:34 AM    MPV 10.0 07/17/2017 09:34 AM    NEUTSPOLYS 69.90 07/17/2017 09:34 AM    LYMPHOCYTES 13.10 (L) 07/17/2017 09:34 AM    MONOCYTES 13.30 07/17/2017 09:34 AM    EOSINOPHILS 1.00 07/17/2017 09:34 AM    BASOPHILS 1.60 07/17/2017 09:34 AM    HYPOCHROMIA 1+ 03/05/2014 04:43 PM    ANISOCYTOSIS 1+ 01/02/2015 05:02 PM      Lab Results   Component Value Date/Time    CALCIUM 9.5 09/07/2017 04:57 PM    ASTSGOT 23 07/17/2017 09:34 AM    ASTSGOT 21 07/17/2017 09:34 AM    ALTSGPT 28 07/17/2017 09:34 AM    ALTSGPT 29 07/17/2017 09:34 AM    ALKPHOSPHAT 65 07/17/2017 09:34 AM    ALKPHOSPHAT 65 07/17/2017 09:34 AM    TBILIRUBIN 0.6 07/17/2017 09:34  AM    TBILIRUBIN 0.6 07/17/2017 09:34 AM    ALBUMIN 4.3 07/17/2017 09:34 AM    ALBUMIN 4.4 07/17/2017 09:34 AM    TOTPROTEIN 6.8 07/17/2017 09:34 AM    TOTPROTEIN 6.7 07/17/2017 09:34 AM     Lab Results   Component Value Date/Time    RHEUMFACTN <10 08/04/2017 02:32 PM     Lab Results   Component Value Date/Time    SEDRATEWES 6 06/01/2017 01:13 PM     No results found for: RUSSELVIPER, DRVVTINTERP  Lab Results   Component Value Date/Time    CRYOGLOBULIN NEG 72Hour 08/04/2017 02:32 PM     No results found for: ANADIRECT, ANTIDNADS, RNPAB, SMITHAB, EKGEAIT57, SSAROAB, SSBLAAB, APTFEK0RC, CENTROMBAB  No results found for: ANTIDNADS, DSDNA, AGBMAB, GBMABA, ANCAIGG, [id number], JO1AB, RNPAB, ANTISSASJ, ANTISSBSJ  Lab Results   Component Value Date/Time    COLORURINE Lt. Yellow 08/19/2015 03:20 PM    SPECGRAVITY 1.008 08/19/2015 03:20 PM    PHURINE 6.5 08/19/2015 03:20 PM    GLUCOSEUR Negative 08/19/2015 03:20 PM    KETONES Negative 08/19/2015 03:20 PM    PROTEINURIN Negative 08/19/2015 03:20 PM     Lab Results   Component Value Date/Time    TOTALVOLUME 2,750 08/18/2015 07:30 AM    TOTALVOLUME 2,750 08/18/2015 07:30 AM     No results found for: SSA60, SSA52  No results found for: HBA1C  Lab Results   Component Value Date/Time    CPKTOTAL 62 08/04/2017 02:28 PM     No results found for: G6PD  No results found for: HJCB62WERS  No results found for: ACESERUM  Lab Results   Component Value Date/Time    25HYDROXY 18 (L) 06/01/2017 01:13 PM     Lab Results   Component Value Date/Time    FREEDIR 1.11 12/01/2014 03:13 PM     No results found for: TSHULTRASEN, FREET4  No results found for: MICROSOMALA, ANTITHYROGL  No results found for: IGGLYMEABS  No results found for: ANTIMITOCHO, FACTIN  No results found for: IGA, TTRANSIGA, ENDOIGA  No results found for: FLTYPE, CRYSTALSBDF  No results found for: ISTATICAL  No results found for: ISTATCREAT  No results found for: CTELOPEP  No results found for: GBMABG  Lab Results   Component  Value Date/Time    PTHINTACT 55 10/10/2008 10:42 AM         Medical Decision Making:  Today's Assessment / Status / Plan:     Rheumatoid Arthritis  Not well controlled but currently s/p surgery.  He is on predniseon 2 mg po q day.  It's been at least 2 weeks. I discussed within extensively the concern for infection especially if some of his areas of surgical center still open. We will continue prednisone 2 mg daily asked currently he is tolerating this.    As for xeljanz he is going to apply for a different foundation to help support/supplement cost to make this more cost affordable.      Results for CANDI UMANA (MRN [id number]) as of 6/23/2017 18:44   Ref. Range 6/1/2017 13:13   Hep B Surface Antibody Quant Latest Ref Range: 0.00-10.00 mIU/mL <3.10   Hepatitis B Surface Antigen Latest Ref Range: Negative  Negative   Hepatitis B Ccore Ab, Total Latest Ref Range: Negative  Negative   Hepatitis C Antibody Latest Ref Range: Negative  Negative   Mitogen-Nil Unknown 60.763   Nil Unknown 0.029   Quantiferon TB Gold Latest Ref Range: Negative  Negative   TB Ag-Nil Unknown -0.010     Splinter hemorrhage noted on left great toe and murmur   Resolved and work-up is negative.     Aortic insufficiency  Not symptomatic  Followed by cardiology    Chronic steroid use  Calcium and vitamin D  DEXA showed osteopenia  Today we discussed treatment and side effects of alendronate  At next visit we will calculate his FRAX.  Currently prednisone dose is < 7.5mg so we will not need to add a correction factor to the FRAX. Pending the results will start fosamax.      S/p hand surgery  One week from surgery  Will see hand surgeon today  Wounds still open  Advise to hold  methotrexate one more week.    1. Rheumatoid arthritis involving multiple sites, unspecified rheumatoid factor presence (CMS-[facility])  CBC WITH DIFFERENTIAL    COMP METABOLIC PANEL    CRP QUANTITIVE (NON-CARDIAC)    WESTERGREN SED RATE         Return in about 3 months (around  12/29/2017).    I have spent greater than 50% of this 30 minute visit in counseling/coordination of care with the patient regarding a discussion about therapy plan    He agreed and verbalized his agreement and understanding with the current plan. I answered all questions and concerns he has at this time and advised him to call at any time in there interim with questions or concerns in regards to his care.    Thank you for allowing me to participate in his care, I will continue to follow closely.     Please note that this dictation was created using voice recognition software. I have made every reasonable attempt to correct obvious errors, but I expect that there are errors of grammar and possibly content that I did not discover before finalizing the note.

## 2017-09-29 ENCOUNTER — OFFICE VISIT (OUTPATIENT)
Dept: RHEUMATOLOGY | Facility: PHYSICIAN GROUP | Age: 62
End: 2017-09-29
Payer: MEDICARE

## 2017-09-29 VITALS
HEART RATE: 72 BPM | TEMPERATURE: 98.4 F | BODY MASS INDEX: 29.99 KG/M2 | RESPIRATION RATE: 16 BRPM | OXYGEN SATURATION: 99 % | WEIGHT: 209 LBS | DIASTOLIC BLOOD PRESSURE: 70 MMHG | SYSTOLIC BLOOD PRESSURE: 116 MMHG

## 2017-09-29 DIAGNOSIS — M06.9 RHEUMATOID ARTHRITIS INVOLVING MULTIPLE SITES, UNSPECIFIED RHEUMATOID FACTOR PRESENCE: ICD-10-CM

## 2017-09-29 PROCEDURE — 99214 OFFICE O/P EST MOD 30 MIN: CPT | Performed by: INTERNAL MEDICINE

## 2017-09-29 RX ORDER — PREDNISONE 1 MG/1
TABLET ORAL
Qty: 60 TAB | Refills: 1 | Status: SHIPPED | OUTPATIENT
Start: 2017-09-29 | End: 2018-02-26 | Stop reason: SDUPTHER

## 2017-09-29 NOTE — LETTER
[facility]   [address], [address]  [place] [address]  Phone: [phone]  Fax: [phone]              Encounter Date: 9/29/2017    Dear Dr. Matta ref. provider found,    It was a pleasure seeing your patient, Edgardo Sims, on 9/29/2017. The encounter diagnosis was Rheumatoid arthritis involving multiple sites, unspecified rheumatoid factor presence (CMS-[facility]).     Please find attached progress note which includes the history I obtained from Mr. Sims, my physical examination findings, my impression and recommendations.      Once again, it was a pleasure participating in your patient's care.  Please feel free to contact me if you have any questions or if I can be of any further assistance to your patients.      Sincerely,    Antonieta Canales M.D.  Electronically Signed          PROGRESS NOTE:  Subjective:   Date of Consultation:9/29/2017  8:53 AM  Primary care physician:John Lao D.O.      Reason for Consultation:  Mr. Sims  is a pleasant 61 y.o. year old male who presented with follow-up for Rheumatoid Arthritis      Recent Surgery  Hand surgery 9/21  With [facility]  Will see hand surgeon today  Doing well    Rheumatoid Arthritis   At last visit we agreed to start xeljanz but he was limited by his insurance.  We have maintained him on methotrexate and prednisone but he has uncontrolled disease.  Left hand (nonsurgical hand) notes increase swelling.        Current Medications  Methotrexate 6 tabs q Wednesday   Prednisone 2 mg po q day  (he has been on long term prednisone)      RHEUM HISTORY  Mr. Edgardo Sims presented with a history of Rheumatoid Arthritis diagnosed late 1999 and recent RF and CCP were negative.  He was previously a patient of Dr. Mcknight and then followed by a community rheumatology.  He is here today to establish care. In review of Dr. Echavarria that it seems he has been on Rituxan as far back as 2009. At that time he was on a regimen of  azathioprine 150 mg methotrexate 15 mg, prednisone 7.5 mg and rituximab with 125 mg of Solu-Medrol he seemed to have been maintained on this regimen and was tapered down on the Imuran to 50 mg twice a day. Notes from May 24, 2012 indicates that he discontinue the Imuran. At that time he was on rituximab and every 6 months 15 mg of methotrexate weekly and 2 mg of prednisone. Notes from June 8, 2015 does remark that his RA was only manageable he's had limited response. He did suffer a consultation with methotrexate developing stomatitis. Methotrexate was held March 6, 2014 and was reinstituted January 5, 2015.     Dr. Echavarria's notes it does comment that he may benefit from rituximab every 5 months however given cost restrictions he can only get it every 6 months. His last infusion was in March 2017 for rituximab.  He reports he is not doing as well on the rituximab.  He feels his activity level is not as active.  He feels increase stiffness.  His feet xrays     Complications to his care include intolerance to medications  And stomatitis with methotrexate.  Methotrexate was stopped briefly and subsequently was restarted but the patient reports it is not working as well as before.     Previous medications include   azathioprine, methotrexate  Plaquenil and Arava both intolerance not allergies  Remicade loss efficacyt  Gold shots (had a bad reaction)  Enbrel  Not sure Humira  Methylprednisolone did not work as well as prednisone  Never been on Xeljanz   Has not tried Actemra  rituximab (lost efficacy last infusion was 3/2017)  Orencia (not sure why he stopped)    Chronic Pain  On percocet ranging from 30-40 mg daily    Past medical HIstory: bilateral hip replacment and left knee replacement, tonsillitis, varicose vein in the left leg, TMJ, tonsillitis in 1964, broken right ankle in 1980, ruptured disc of L5-S1 noted in 2000 with discectomy and laminectomy, rotator cuff with anterior labral repair in 2001, basal cell  carcinoma in 2005 from the nose, meniscal tear of the left knee in 2008 with scope repair, scope repair and biceps tendon repair as well as rotator cuff surgery in 2011 of the right shoulder. Dupuytren's contracture right hand little finger, Dupuytren's contracture right foot, arthritis of the right foot, plantar fasciitis bilateral, aortic insufficiency, hypertension, dyslipidemia, heart murmur, tachycardia, coronary artery disease with mild plaque at catheter. Hiatal hernia and GERD, scoliosis, kyphosis of the upper back/compensating lordosis of the neck . Vein ligation radiofrequency      Family History: lymphoma     Social History: light bookeeping mainly disability, NEVER smoker, occasional alcohol (once a week)    Past Medical History:   Diagnosis Date   • Chronic use of opiate drugs therapeutic purposes 8/12/2017   • CAD (coronary artery disease) mild plaque at cath in 2/14 12/5/2014   • Tachycardia 10/20/2014   • Abnormal myocardial perfusion study 1/15/2014   • Dyslipidemia 7/12/2011   • Aortic insufficiency 7/5/2011   • HTN (hypertension) 7/5/2011   • Arthritis     RA, and osteo   • Bronchitis    • Cancer (CMS-HCC)     skin   • Cold     mid January 2014   • Coughing blood     none currently 2014   • Heart burn    • Heart murmur    • Hiatus hernia syndrome    • Hypertension    • Indigestion    • Infectious disease    • Pain     RA   • Rheumatoid arthritis (CMS-HCC)     severe     Past Surgical History:   Procedure Laterality Date   • RECOVERY  2/3/2014    Performed by Cath-Recovery Surgery at SURGERY SAME DAY [facility]   • HIP ARTHROPLASTY TOTAL  5/31/2013    Performed by Burak Valles M.D. at SURGERY [facility]   • KNEE ARTHROPLASTY TOTAL  6/1/2009    Performed by YONATAN HINSON at SURGERY [facility]   • VEIN LIGATION RADIO FREQUENCY  12/8/2008    Performed by DEREJE MCCULLOUGH at SURGERY SAME DAY [facility]   • HIP ARTHROPLASTY TOTAL  6/20/08    Performed by YONATAN HINSON at  SURGERY [facility] ORS   • LUMBAR LAMINECTOMY DISKECTOMY  2000   • TMJ RECONSTRUCTION BILATERAL  1994   • KNEE ARTHROSCOPY      right   • OTHER      varicose vein stripping   • OTHER      heart murmur   • OTHER ORTHOPEDIC SURGERY      awaiting right hip surgery   • SHOULDER SURGERY      RIGHT 01/2011     Allergies   Allergen Reactions   • Crestor [Rosuvastatin Calcium] Myalgia   • Penicillins Hives     Outpatient Encounter Prescriptions as of 9/29/2017   Medication Sig Dispense Refill   • predniSONE (DELTASONE) 1 MG Tab 2 mg po q day 60 Tab 1   • oxycodone-acetaminophen (PERCOCET-10)  MG Tab Take 1 Tab by mouth 5 Times a Day. 150 Tab 0   • pravastatin (PRAVACHOL) 20 MG Tab Take 1 Tab by mouth every bedtime. 30 Tab 11   • cyclobenzaprine (FLEXERIL) 10 MG Tab TAKE ONE TABLET BY MOUTH THREE TIMES DAILY AS NEEDED FOR MILD PAIN 90 Tab 5   • meloxicam (MOBIC) 15 MG tablet TAKE ONE TABLET BY MOUTH ONCE DAILY 30 Tab 6   • Calcium Carbonate-Vitamin D (CALCIUM 600 + D) 600-400 MG-UNIT Tab Take 1 Tab by mouth 2 times a day, with meals. 60 Tab 3   • folic acid (FOLVITE) 1 MG Tab Take 3 Tabs by mouth every day. 90 Tab 2   • Cyanocobalamin (VITAMIN B-12) 5000 MCG TABLET DISPERSIBLE Take  by mouth.     • niacin 500 MG Tab Take 500 mg by mouth every day.     • Zinc 50 MG Cap Take 50 mg by mouth every day.     • diazepam (VALIUM) 5 MG Tab TAKE ONE TABLET BY MOUTH EVERY 12 HOURS AS NEEDED FOR ANXIETY 60 Tab 5   • Multiple Vitamins-Minerals (MULTI COMPLETE PO) Take  by mouth.     • bisoprolol (ZEBETA) 10 MG tablet Take 1 Tab by mouth every day. 90 Tab 3   • lisinopril (PRINIVIL) 10 MG Tab Take 1 Tab by mouth every day. 90 Tab 3   • vardenafil (LEVITRA) 20 MG tablet Take 20 mg by mouth as needed.     • omeprazole (PRILOSEC) 20 MG CPDR Take 20 mg by mouth every day.     • testosterone cypionate (DEPO-TESTOSTERONE) 200 MG/ML Solution injection INJECT 1 ML (CC) INTRAMUSCULARLY ONCE EVERY TWO WEEKS AS DIRECTED 10 mL 0   •  methotrexate 2.5 MG Tab TAKE SIX TABLETS BY MOUTH ONCE A WEEK 24 Tab 2   • cephALEXin (KEFLEX) 500 MG Cap Take 1 Cap by mouth 4 times a day. 40 Cap 0   • [DISCONTINUED] predniSONE (DELTASONE) 5 MG Tab Take 1 Tab by mouth every day. 30 Tab 1   • ergocalciferol (DRISDOL) 68505 UNIT capsule Take 1 Cap by mouth every 7 days. 12 Cap 0   • paroxetine (PAXIL) 20 MG Tab TAKE ONE TABLET BY MOUTH ONCE DAILY 90 Tab 3   • RiTUXimab (RITUXAN IV) by Intravenous route. Two every 6 months        No facility-administered encounter medications on file as of 9/29/2017.        Social History     Social History   • Marital status:      Spouse name: N/A   • Number of children: N/A   • Years of education: N/A     Occupational History   • Not on file.     Social History Main Topics   • Smoking status: Never Smoker   • Smokeless tobacco: Never Used   • Alcohol use 3.0 oz/week     6 Cans of beer per week   • Drug use: No   • Sexual activity: Yes     Partners: Female     Other Topics Concern   • Not on file     Social History Narrative   • No narrative on file      History   Smoking Status   • Never Smoker   Smokeless Tobacco   • Never Used     History   Alcohol Use   • 3.0 oz/week   • 6 Cans of beer per week     History   Drug Use No         Family History   Problem Relation Age of Onset   • Hypertension Mother        Review of Systems   Constitutional: Negative for chills and fever.   Respiratory: Negative for cough.    Cardiovascular: Negative for chest pain.   Musculoskeletal: Positive for back pain and joint pain.   Skin: Negative for rash.        Objective:   /70   Pulse 72   Temp 36.9 °C (98.4 °F)   Resp 16   Wt 94.8 kg (209 lb)   SpO2 99%   BMI 29.99 kg/m²      Physical Exam   Constitutional: He is oriented to person, place, and time. He appears well-developed and well-nourished. No distress.   HENT:   Head: Normocephalic and atraumatic.   Right Ear: External ear normal.   Left Ear: External ear normal.   Eyes:  Conjunctivae are normal. Right eye exhibits no discharge. Left eye exhibits no discharge. No scleral icterus.   Cardiovascular:   Grade III systolic murmur (not examined today)   Pulmonary/Chest: Effort normal. No stridor. No respiratory distress.   Abdominal: Soft. Bowel sounds are normal.   No hepatomegaly   Musculoskeletal: He exhibits no edema.   Right hand with palmar stiches and some areas of the surgical site are still open.  Bilateral wrist swelling.     Neurological: He is alert and oriented to person, place, and time.   Skin: Skin is warm and dry. No rash noted. He is not diaphoretic. No erythema. No pallor.   Nodules on feet on right; great toe on the left has splinter hemorrhages (not examined today)   Psychiatric: He has a normal mood and affect. His behavior is normal. Judgment and thought content normal.       Assessment:     1. Rheumatoid arthritis involving multiple sites, unspecified rheumatoid factor presence (CMS-[facility])  CBC WITH DIFFERENTIAL    COMP METABOLIC PANEL    CRP QUANTITIVE (NON-CARDIAC)    WESTERGREN SED RATE     Labs:  [unfilled]    Lab Results   Component Value Date/Time    QNTTBGOLD Negative 06/01/2017 01:13 PM     Lab Results   Component Value Date/Time    HEPBCORTOT Negative 06/01/2017 01:13 PM    HEPBSAG Negative 06/01/2017 01:13 PM     Lab Results   Component Value Date/Time    HEPCAB Negative 06/01/2017 01:13 PM     Lab Results   Component Value Date/Time    SODIUM 139 09/07/2017 04:57 PM    POTASSIUM 5.1 09/07/2017 04:57 PM    CHLORIDE 106 09/07/2017 04:57 PM    CO2 26 09/07/2017 04:57 PM    GLUCOSE 92 09/07/2017 04:57 PM    BUN 17 09/07/2017 04:57 PM    CREATININE 0.96 09/07/2017 04:57 PM    CREATININE 1.1 04/21/2009 03:50 PM    BUNCREATRAT 13 09/21/2016 09:21 AM      Lab Results   Component Value Date/Time    WBC 11.2 (H) 07/17/2017 09:34 AM    WBC 7.5 07/01/2011 10:30 AM    RBC 4.95 07/17/2017 09:34 AM    RBC 4.72 07/01/2011 10:30 AM    HEMOGLOBIN 16.3 07/17/2017 09:34 AM     HEMATOCRIT 48.7 07/17/2017 09:34 AM    MCV 98.4 (H) 07/17/2017 09:34 AM     (H) 07/01/2011 10:30 AM    MCH 32.9 07/17/2017 09:34 AM    MCH 36.0 (H) 07/01/2011 10:30 AM    MCHC 33.5 (L) 07/17/2017 09:34 AM    MPV 10.0 07/17/2017 09:34 AM    NEUTSPOLYS 69.90 07/17/2017 09:34 AM    LYMPHOCYTES 13.10 (L) 07/17/2017 09:34 AM    MONOCYTES 13.30 07/17/2017 09:34 AM    EOSINOPHILS 1.00 07/17/2017 09:34 AM    BASOPHILS 1.60 07/17/2017 09:34 AM    HYPOCHROMIA 1+ 03/05/2014 04:43 PM    ANISOCYTOSIS 1+ 01/02/2015 05:02 PM      Lab Results   Component Value Date/Time    CALCIUM 9.5 09/07/2017 04:57 PM    ASTSGOT 23 07/17/2017 09:34 AM    ASTSGOT 21 07/17/2017 09:34 AM    ALTSGPT 28 07/17/2017 09:34 AM    ALTSGPT 29 07/17/2017 09:34 AM    ALKPHOSPHAT 65 07/17/2017 09:34 AM    ALKPHOSPHAT 65 07/17/2017 09:34 AM    TBILIRUBIN 0.6 07/17/2017 09:34 AM    TBILIRUBIN 0.6 07/17/2017 09:34 AM    ALBUMIN 4.3 07/17/2017 09:34 AM    ALBUMIN 4.4 07/17/2017 09:34 AM    TOTPROTEIN 6.8 07/17/2017 09:34 AM    TOTPROTEIN 6.7 07/17/2017 09:34 AM     Lab Results   Component Value Date/Time    RHEUMFACTN <10 08/04/2017 02:32 PM     Lab Results   Component Value Date/Time    SEDRATEWES 6 06/01/2017 01:13 PM     No results found for: RUSSELVIPER, DRVVTINTERP  Lab Results   Component Value Date/Time    CRYOGLOBULIN NEG 72Hour 08/04/2017 02:32 PM     No results found for: ANADIRECT, ANTIDNADS, RNPAB, SMITHAB, DLDWKWM23, SSAROAB, SSBLAAB, LAPPCH4GE, CENTROMBAB  No results found for: ANTIDNADS, DSDNA, AGBMAB, GBMABA, ANCAIGG, T9DATXDCAIS, JO1AB, RNPAB, ANTISSASJ, ANTISSBSJ  Lab Results   Component Value Date/Time    COLORURINE Lt. Yellow 08/19/2015 03:20 PM    SPECGRAVITY 1.008 08/19/2015 03:20 PM    PHURINE 6.5 08/19/2015 03:20 PM    GLUCOSEUR Negative 08/19/2015 03:20 PM    KETONES Negative 08/19/2015 03:20 PM    PROTEINURIN Negative 08/19/2015 03:20 PM     Lab Results   Component Value Date/Time    TOTALVOLUME 2,750 08/18/2015 07:30 AM     TOTALVOLUME 2,750 08/18/2015 07:30 AM     No results found for: SSA60, SSA52  No results found for: HBA1C  Lab Results   Component Value Date/Time    CPKTOTAL 62 08/04/2017 02:28 PM     No results found for: G6PD  No results found for: EZTX25YIJV  No results found for: ACESERUM  Lab Results   Component Value Date/Time    25HYDROXY 18 (L) 06/01/2017 01:13 PM     Lab Results   Component Value Date/Time    FREEDIR 1.11 12/01/2014 03:13 PM     No results found for: TSHULTRASEN, FREET4  No results found for: MICROSOMALA, ANTITHYROGL  No results found for: IGGLYMEABS  No results found for: ANTIMITOCHO, FACTIN  No results found for: IGA, TTRANSIGA, ENDOIGA  No results found for: FLTYPE, CRYSTALSBDF  No results found for: ISTATICAL  No results found for: ISTATCREAT  No results found for: CTELOPEP  No results found for: GBMABG  Lab Results   Component Value Date/Time    PTHINTACT 55 10/10/2008 10:42 AM         Medical Decision Making:  Today's Assessment / Status / Plan:     Rheumatoid Arthritis  Not well controlled but currently s/p surgery.  He is on predniseon 2 mg po q day.  It's been at least 2 weeks. I discussed within extensively the concern for infection especially if some of his areas of surgical center still open. We will continue prednisone 2 mg daily asked currently he is tolerating this.    As for xeljanz he is going to apply for a different foundation to help support/supplement cost to make this more cost affordable.      Results for CANDI UMANA (MRN [id number]) as of 6/23/2017 18:44   Ref. Range 6/1/2017 13:13   Hep B Surface Antibody Quant Latest Ref Range: 0.00-10.00 mIU/mL <3.10   Hepatitis B Surface Antigen Latest Ref Range: Negative  Negative   Hepatitis B Ccore Ab, Total Latest Ref Range: Negative  Negative   Hepatitis C Antibody Latest Ref Range: Negative  Negative   Mitogen-Nil Unknown 60.763   Nil Unknown 0.029   Quantiferon TB Gold Latest Ref Range: Negative  Negative   TB Ag-Nil Unknown -0.010        Splinter hemorrhage noted on left great toe and murmur   Resolved and work-up is negative.     Aortic insufficiency  Not symptomatic  Followed by cardiology    Chronic steroid use  Calcium and vitamin D  DEXA showed osteopenia  Today we discussed treatment and side effects of alendronate  At next visit we will calculate his FRAX.  Currently prednisone dose is < 7.5mg so we will not need to add a correction factor to the FRAX. Pending the results will start fosamax.      S/p hand surgery  One week from surgery  Will see hand surgeon today  Wounds still open  Advise to hold  methotrexate one more week.    1. Rheumatoid arthritis involving multiple sites, unspecified rheumatoid factor presence (CMS-[facility])  CBC WITH DIFFERENTIAL    COMP METABOLIC PANEL    CRP QUANTITIVE (NON-CARDIAC)    WESTERGREN SED RATE         Return in about 3 months (around 12/29/2017).    I have spent greater than 50% of this 30 minute visit in counseling/coordination of care with the patient regarding a discussion about therapy plan    He agreed and verbalized his agreement and understanding with the current plan. I answered all questions and concerns he has at this time and advised him to call at any time in there interim with questions or concerns in regards to his care.    Thank you for allowing me to participate in his care, I will continue to follow closely.     Please note that this dictation was created using voice recognition software. I have made every reasonable attempt to correct obvious errors, but I expect that there are errors of grammar and possibly content that I did not discover before finalizing the note.

## 2017-10-09 ENCOUNTER — OFFICE VISIT (OUTPATIENT)
Dept: PHYSICAL MEDICINE AND REHAB | Facility: MEDICAL CENTER | Age: 62
End: 2017-10-09
Payer: MEDICARE

## 2017-10-09 VITALS
HEIGHT: 70 IN | BODY MASS INDEX: 29.78 KG/M2 | TEMPERATURE: 97.9 F | WEIGHT: 208 LBS | HEART RATE: 76 BPM | DIASTOLIC BLOOD PRESSURE: 82 MMHG | SYSTOLIC BLOOD PRESSURE: 132 MMHG | OXYGEN SATURATION: 96 %

## 2017-10-09 DIAGNOSIS — M54.9 SPINAL PAIN: ICD-10-CM

## 2017-10-09 DIAGNOSIS — Z98.890 HISTORY OF LUMBAR SURGERY: ICD-10-CM

## 2017-10-09 DIAGNOSIS — M25.50 ARTHRALGIA, UNSPECIFIED JOINT: ICD-10-CM

## 2017-10-09 DIAGNOSIS — Z98.890 HISTORY OF HIP SURGERY: ICD-10-CM

## 2017-10-09 DIAGNOSIS — M79.18 MUSCULOSKELETAL PAIN: ICD-10-CM

## 2017-10-09 DIAGNOSIS — M05.749 RHEUMATOID ARTHRITIS INVOLVING HAND WITH POSITIVE RHEUMATOID FACTOR, UNSPECIFIED LATERALITY (HCC): ICD-10-CM

## 2017-10-09 PROCEDURE — 99204 OFFICE O/P NEW MOD 45 MIN: CPT | Performed by: PHYSICAL MEDICINE & REHABILITATION

## 2017-10-09 RX ORDER — PREDNISONE 5 MG/1
TABLET ORAL
COMMUNITY
Start: 2017-09-08 | End: 2017-12-08

## 2017-10-09 RX ORDER — CLINDAMYCIN HYDROCHLORIDE 300 MG/1
CAPSULE ORAL
COMMUNITY
Start: 2017-09-11 | End: 2018-04-30

## 2017-10-09 ASSESSMENT — ENCOUNTER SYMPTOMS
TINGLING: 1
FEVER: 0
PALPITATIONS: 0
SPUTUM PRODUCTION: 0
EYE PAIN: 0
ORTHOPNEA: 0
NERVOUS/ANXIOUS: 1
BACK PAIN: 1
PHOTOPHOBIA: 0
DEPRESSION: 1
MYALGIAS: 1
SENSORY CHANGE: 1
DIARRHEA: 0
NECK PAIN: 1
INSOMNIA: 1
ABDOMINAL PAIN: 0
HEMOPTYSIS: 0
CHILLS: 0

## 2017-10-09 NOTE — PROGRESS NOTES
Subjective:      Edgardo Sims is a 61 y.o. male who presents with New Patient      Chief complaint: Joint pain        HPI   The patient notes long history of spinal/joints/musculoskeletal pain, diagnosed with rheumatoid arthritis in 1999, rheumatology consulted.     The patient notes he underwent right hand Dupuytren's contracture surgery in 09/2017, with care of orthopedics, AGUILA, notes residual hand pain, improving, although persisting.    The patient notes overall flare of joint/musculoskeletal pain since he has gone off Biologics due to insurance issue. Prior Biologics have been helpful, tolerated. Rheumatology has also been tapering oral steroids, patient notes flare pain associated with this.    The patient notes flares of joint pain, including shoulders, hips, knees. The patient has undergone prior right shoulder surgery ×2, status post bilateral hip replacements, and status post left knee replacement.    The patient notes bilateral foot/ankle pain, primarily in the plantar aspect, right greater than left, this history of plantar fasciitis and flat feet. He has tried adjustments with footwear, orthotics. He is previously seen podiatry.    The patient has had prior treatment with medications. He has been to physical therapy. The patient notes local injections for pain flares, with benefit. No acute changes with bowel/bladder noted. No acute changes with strength noted. He is making an effort with home exercise program as tolerated. The pain flares limit his ability to function.      MEDICAL RECORDS REVIEW/DATA REVIEW: Reviewed in epic.    Records Reviewed: Reviewed referring provider notes. Review primary care provider notes. Reviewed orthopedic records most recently from 8/2017.    I reviewed medications. Pain/symptomatic medications per primary care provider and rheumatology    I reviewed  profile 10/9/2017     I reviewed diagnostic studies:     I reviewed radiographs. Reviewed DEXA 9/2017, revealed  osteopenia. Reviewed bilateral hand x-rays 5/2017. Reviewed right foot x-rays 7/2016. Reviewed right hip x-rays 7/2016. Reviewed lumbar spine x-rays 1/2013.    I reviewed lab studies. Reviewed labs 8/2017, including rheumatologic screen. Reviewed lab 7/2017, cleaning CMP, CBC, CK. Reviewed labs 6/2017 including CRP, ESR.     I reviewed medical issues.     I reviewed family history: No neuromuscular disorders noted.    I reviewed social issues.       PAST MEDICAL HISTORY:   Past Medical History:   Diagnosis Date   • Chronic use of opiate drugs therapeutic purposes 8/12/2017   • CAD (coronary artery disease) mild plaque at cath in 2/14 12/5/2014   • Tachycardia 10/20/2014   • Abnormal myocardial perfusion study 1/15/2014   • Dyslipidemia 7/12/2011   • Aortic insufficiency 7/5/2011   • HTN (hypertension) 7/5/2011   • Arthritis     RA, and osteo   • Bronchitis    • Cancer ([facility])     skin   • Cold     mid January 2014   • Coughing blood     none currently 2014   • Heart burn    • Heart murmur    • Hiatus hernia syndrome    • Hypertension    • Indigestion    • Infectious disease    • Pain     RA   • Rheumatoid arthritis(714.0)     severe       PAST SURGICAL HISTORY:    Past Surgical History:   Procedure Laterality Date   • RECOVERY  2/3/2014    Performed by Cath-Recovery Surgery at SURGERY SAME DAY [facility] ORS   • HIP ARTHROPLASTY TOTAL  5/31/2013    Performed by Burak Valles M.D. at SURGERY [facility] ORS   • KNEE ARTHROPLASTY TOTAL  6/1/2009    Performed by YONATAN HINSON at SURGERY [facility] ORS   • VEIN LIGATION RADIO FREQUENCY  12/8/2008    Performed by DEREJE MCCULLOUGH at SURGERY SAME DAY [facility] ORS   • HIP ARTHROPLASTY TOTAL  6/20/08    Performed by YONATAN HINSON at SURGERY [facility] ORS   • LUMBAR LAMINECTOMY DISKECTOMY  2000   • TMJ RECONSTRUCTION BILATERAL  1994   • KNEE ARTHROSCOPY      right   • OTHER      varicose vein stripping   • OTHER      heart murmur   • OTHER ORTHOPEDIC SURGERY       awaiting right hip surgery   • SHOULDER SURGERY      RIGHT 01/2011       ALLERGIES:  Crestor [rosuvastatin calcium] and Penicillins    MEDICATIONS:    Outpatient Encounter Prescriptions as of 10/9/2017   Medication Sig Dispense Refill   • predniSONE (DELTASONE) 1 MG Tab 2 mg po q day 60 Tab 1   • oxycodone-acetaminophen (PERCOCET-10)  MG Tab Take 1 Tab by mouth 5 Times a Day. 150 Tab 0   • testosterone cypionate (DEPO-TESTOSTERONE) 200 MG/ML Solution injection INJECT 1 ML (CC) INTRAMUSCULARLY ONCE EVERY TWO WEEKS AS DIRECTED 10 mL 0   • methotrexate 2.5 MG Tab TAKE SIX TABLETS BY MOUTH ONCE A WEEK 24 Tab 2   • pravastatin (PRAVACHOL) 20 MG Tab Take 1 Tab by mouth every bedtime. 30 Tab 11   • cyclobenzaprine (FLEXERIL) 10 MG Tab TAKE ONE TABLET BY MOUTH THREE TIMES DAILY AS NEEDED FOR MILD PAIN 90 Tab 5   • meloxicam (MOBIC) 15 MG tablet TAKE ONE TABLET BY MOUTH ONCE DAILY 30 Tab 6   • Calcium Carbonate-Vitamin D (CALCIUM 600 + D) 600-400 MG-UNIT Tab Take 1 Tab by mouth 2 times a day, with meals. 60 Tab 3   • Cyanocobalamin (VITAMIN B-12) 5000 MCG TABLET DISPERSIBLE Take  by mouth.     • niacin 500 MG Tab Take 500 mg by mouth every day.     • Zinc 50 MG Cap Take 50 mg by mouth every day.     • diazepam (VALIUM) 5 MG Tab TAKE ONE TABLET BY MOUTH EVERY 12 HOURS AS NEEDED FOR ANXIETY 60 Tab 5   • paroxetine (PAXIL) 20 MG Tab TAKE ONE TABLET BY MOUTH ONCE DAILY 90 Tab 3   • Multiple Vitamins-Minerals (MULTI COMPLETE PO) Take  by mouth.     • bisoprolol (ZEBETA) 10 MG tablet Take 1 Tab by mouth every day. 90 Tab 3   • lisinopril (PRINIVIL) 10 MG Tab Take 1 Tab by mouth every day. 90 Tab 3   • vardenafil (LEVITRA) 20 MG tablet Take 20 mg by mouth as needed.     • omeprazole (PRILOSEC) 20 MG CPDR Take 20 mg by mouth every day.     • MELOXICAM PO      • clindamycin (CLEOCIN) 300 MG Cap      • predniSONE (DELTASONE) 5 MG Tab      • cephALEXin (KEFLEX) 500 MG Cap Take 1 Cap by mouth 4 times a day. 40 Cap 0   • folic  acid (FOLVITE) 1 MG Tab Take 3 Tabs by mouth every day. 90 Tab 2   • ergocalciferol (DRISDOL) 71688 UNIT capsule Take 1 Cap by mouth every 7 days. 12 Cap 0   • RiTUXimab (RITUXAN IV) by Intravenous route. Two every 6 months        No facility-administered encounter medications on file as of 10/9/2017.        SOCIAL HISTORY:    Social History     Social History   • Marital status:      Spouse name: N/A   • Number of children: N/A   • Years of education: N/A     Social History Main Topics   • Smoking status: Never Smoker   • Smokeless tobacco: Never Used   • Alcohol use 3.0 oz/week     6 Cans of beer per week   • Drug use: No   • Sexual activity: Yes     Partners: Female     Other Topics Concern   •  Service No   • Blood Transfusions No   • Caffeine Concern No   • Occupational Exposure No   • Hobby Hazards Yes     rides motorcyle   • Sleep Concern No   • Stress Concern No   • Weight Concern Yes   • Special Diet Yes     does not eat red meat    • Back Care Yes   • Exercise Yes   • Bike Helmet Yes   • Seat Belt Yes   • Self-Exams Yes     Social History Narrative   • No narrative on file         Review of Systems   Constitutional: Negative for chills and fever.   HENT: Negative for hearing loss and tinnitus.    Eyes: Negative for photophobia and pain.   Respiratory: Negative for hemoptysis and sputum production.    Cardiovascular: Negative for palpitations and orthopnea.   Gastrointestinal: Negative for abdominal pain and diarrhea.   Genitourinary: Negative for frequency and urgency.   Musculoskeletal: Positive for back pain, joint pain, myalgias and neck pain.   Skin: Negative for itching.   Neurological: Positive for tingling and sensory change.   Endo/Heme/Allergies: Negative.    Psychiatric/Behavioral: Positive for depression. The patient is nervous/anxious and has insomnia.    All other systems reviewed and are negative.        Objective:     /82   Pulse 76   Temp 36.6 °C (97.9 °F)   Ht 1.778  "m (5' 10\")   Wt 94.3 kg (208 lb)   SpO2 96%   BMI 29.84 kg/m²      Physical Exam  Constitutional: oriented to person, place, and time, appears well-developed and well-nourished.   HEENT: Normocephalic atraumatic, neck supple, no JVD noted, no masses noted, no meningeal signs noted  Lymphadenopathy: no cervical, supraclavicular, or inguinal lymphadenopathy noted  Cardiovascular: Intact distal pulses, including at wrists and ankles, no limb swelling noted  Pulmonary: No tachypnea noted, no accessory muscle use noted, no dyspnea noted  Abdominal: Soft, nontender, exhibits no distension, no peritoneal signs, no HSM  Musculoskeletal:   Right shoulder: exhibits  tenderness.  pain with range of motion testing  Left shoulder: exhibits tenderness.  pain with range of motion testing  Right hip: exhibits only mild tenderness. Minimal pain with range of motion testing  Left hip: exhibits only mild tenderness. Minimal pain with range of motion testing  Cervical back: exhibits only mild decreased range of motion, mild tenderness and mild pain. Spurling's testing produces mild axial pain, trigger points noted  Lumbar back: exhibits mild decreased range of motion, mild tenderness and mild pain. negative straight leg testing, trigger points noted, healed lumbar scar  Wrist/hand: Right hand with healing scar consistent with relatively recent surgery for Dupuytren's contracture, arthritis deformities noted, mild tennis palpation, mild pain with range of motion testing, negative tinel's at wrist, negative tinel's at elbows  Neurological: oriented to person, place, and time. Cranial nerves grossly intact, normal strength. Sensation intact distally. Reflexes 1+ in upper and lower limbs, Gait mildly antalgic, reciprocal, No upper motor neuron signs evident  Skin: Skin is intact. no rashes or lesions noted  Psychiatric: normal mood and affect. speech is normal and behavior is normal. Judgment and thought content normal. Cognition and " memory are normal.        Assessment/Plan:       ASSESSMENT:    1. Joint/musculoskeletal pain, arthritis, rheumatoid arthritis, rheumatology consulted    - Note patient on prednisone and pending restart of biologics per rheumatology  - Reviewed injection therapy for pain flares, if not responding to more conservative care    2. Status post right hand surgery [id number]/201742 Dupuytren's contracture, with care of orthopedics    3. Status post bilateral total hip arthroplasties, left total knee arthroplasty, prior right shoulder surgeries × 2, prior lumbar spine surgery    4. Bilateral foot/ankle pain, sprain strain, arthritis/rheumatoid arthritis, plantar fasciitis, pes planus    - Reviewed orthotics/footwear    5. Comorbid medical issues, with care per primary care provider      DISCUSSION/PLAN:    - I discussed management options. I reviewed symptomatic care    - I would like to obtain/review additional records, including most recent orthopedic records    - I reviewed home exercise program, with medical precautions, and orthopedic and rheumatology activity/restrictions    - When medically cleared, the patient can consider complementary trials with acupuncture, superficial massage therapy, or TENS unit    - I reviewed medication monitoring. Pain/symptomatic medications per primary care provider and rheumatology. I reviewed further symptomatic medications. I did not prescribe any medications today.    - I reviewed additional diagnostic options, including further/advanced imaging, electrodiagnostic testing, vascular studies, and further lab screen    - I reviewed additional therapeutic options, including injection/interventional therapy and additional consultative input     - I reviewed psychosocial interventions    - Return on an as-needed basis for persisting or worsening pain      Please note that this dictation was created using voice recognition software. I have made every reasonable attempt to correct obvious errors but  there may be errors of grammar and content that I may have overlooked prior to finalization of this note.

## 2017-10-31 ENCOUNTER — PATIENT OUTREACH (OUTPATIENT)
Dept: HEALTH INFORMATION MANAGEMENT | Facility: OTHER | Age: 62
End: 2017-10-31

## 2017-10-31 ENCOUNTER — TELEPHONE (OUTPATIENT)
Dept: HEALTH INFORMATION MANAGEMENT | Facility: OTHER | Age: 62
End: 2017-10-31

## 2017-10-31 NOTE — PROGRESS NOTES
One month follow-up: LSW outreach to pt to follow-up with resources provided to pt via email and to inquire if he had any questions or needed assistance with any resources provided to him.     Edgardo reports that everything is going well. He recently had hand surgery and is reporting that everything is healing well. Inquired if he had any questions on the resources provided to him by this LSW. He reports no questions, but states he has a plan with using some of them. He plans to set up a time to meet with a SHIP counselor to potentially obtain some dental/vision coverage. Edgardo also plans to turn in the manufacturing assistance for Xeljanz soon. He reports that this medication is what his provider would like for pt to be on, so he would like to pursue this manufacturing assistance program. LSW encouraged pt to reach out to this LSW should he have any questions or need assistance with this application. Pt agreeable to plan. He reports that should the manufacturing assistance program for Xeljanz not work out, he will try and obtain funding through the [email or web address].     Inquired if pt was ever provided any samples for the Xeljanz to assist in the mean time, while he is waiting on potential acceptance to the manufacturing assistance program. Pt reports never receiving samples, but expressed interest. LSW informed pt that this LSW would check with CC PharmD to see if she could assist in obtaining samples through [facility] for this medication. Pt agreeable to plan.     Plan:  -Edgardo to reach out to LSW with any questions/concerns   -LSW to reach out to Edgardo in approx one month to inquire if pt is in need of any further resources or assistance

## 2017-10-31 NOTE — TELEPHONE ENCOUNTER
"Dear Dr. Canales,    Med review completed with patient. He is having trouble affording medications. If appropriate, please consider writing a prescription for a month's worth of samples of Xeljanz.     The sample pharmacy is \"[facility]\" located at [address] [place] [address]. Guidelines for sample prescriptions are as follows:    1. Prescription needs to say \"USE SAMPLE\" on SIG.   2. Pharmacy will only dispense one month at a time.    I checked the inventory for Xeljanz 5mg and there is enough in stock to cover this month's supply.    Thanks for your time,  Clint Quevedo, PharmD  Clinical Pharmacist Population Health Management           "

## 2017-11-06 ENCOUNTER — TELEPHONE (OUTPATIENT)
Dept: HEALTH INFORMATION MANAGEMENT | Facility: OTHER | Age: 62
End: 2017-11-06

## 2017-11-06 DIAGNOSIS — M06.9 RHEUMATOID ARTHRITIS INVOLVING MULTIPLE SITES, UNSPECIFIED RHEUMATOID FACTOR PRESENCE: ICD-10-CM

## 2017-11-06 DIAGNOSIS — Z79.52 CURRENT CHRONIC USE OF SYSTEMIC STEROIDS: ICD-10-CM

## 2017-11-06 DIAGNOSIS — Z79.899 ENCOUNTER FOR LONG-TERM (CURRENT) USE OF HIGH-RISK MEDICATION: ICD-10-CM

## 2017-11-06 DIAGNOSIS — M06.9 RHEUMATOID ARTHRITIS FLARE (HCC): ICD-10-CM

## 2017-11-06 NOTE — TELEPHONE ENCOUNTER
"Dr. Canales,     Pt is having a difficult time affording his medications, specifically his Xeljanz. Please consider writing a prescription for a months worth of samples for this medications.     The sample pharmacy is \"[facility]\" located at [address] [place] [address]. Guidelines for sample prescriptions are as follows:     1. Prescription needs to say \"USE SAMPLE\" on SIG.   2. Pharmacy will only dispense one month at a time.    Thanks,   Doreen Corley, LSW, MSW  "

## 2017-11-07 DIAGNOSIS — M06.9 RHEUMATOID ARTHRITIS FLARE (HCC): ICD-10-CM

## 2017-11-07 DIAGNOSIS — M06.9 RHEUMATOID ARTHRITIS INVOLVING MULTIPLE SITES, UNSPECIFIED RHEUMATOID FACTOR PRESENCE: ICD-10-CM

## 2017-11-07 DIAGNOSIS — Z79.52 CURRENT CHRONIC USE OF SYSTEMIC STEROIDS: ICD-10-CM

## 2017-11-07 DIAGNOSIS — Z79.899 ENCOUNTER FOR LONG-TERM (CURRENT) USE OF HIGH-RISK MEDICATION: ICD-10-CM

## 2017-11-07 NOTE — TELEPHONE ENCOUNTER
Patient informed that medication will be at [address]. He has confirmed that he can  the medications today or tomorrow. Thank you.

## 2017-11-07 NOTE — TELEPHONE ENCOUNTER
"Dear Dr. Lao,     Med review completed with patient. He is having trouble affording medications. If appropriate, please consider writing a prescription for a month's worth of samples of Xeljanz.      The sample pharmacy has this medication in stock.     1. Prescription needs to say \"USE SAMPLE\" on SIG.   2. Pharmacy will only dispense one month at a time.    Please advise.     Thanks for your time,  Clint Quevedo, PharmD  Clinical Pharmacist Population Health Management   "

## 2017-11-08 ENCOUNTER — HOSPITAL ENCOUNTER (OUTPATIENT)
Dept: LAB | Facility: MEDICAL CENTER | Age: 62
End: 2017-11-08
Attending: INTERNAL MEDICINE
Payer: MEDICARE

## 2017-11-08 DIAGNOSIS — E78.5 DYSLIPIDEMIA: ICD-10-CM

## 2017-11-08 DIAGNOSIS — Z79.52 CURRENT CHRONIC USE OF SYSTEMIC STEROIDS: ICD-10-CM

## 2017-11-08 DIAGNOSIS — Z79.899 ENCOUNTER FOR LONG-TERM (CURRENT) USE OF HIGH-RISK MEDICATION: ICD-10-CM

## 2017-11-08 DIAGNOSIS — R01.1 MURMUR: ICD-10-CM

## 2017-11-08 DIAGNOSIS — I25.10 CORONARY ARTERY DISEASE DUE TO CALCIFIED CORONARY LESION: ICD-10-CM

## 2017-11-08 DIAGNOSIS — M06.9 RHEUMATOID ARTHRITIS FLARE (HCC): ICD-10-CM

## 2017-11-08 DIAGNOSIS — I35.1 NONRHEUMATIC AORTIC VALVE INSUFFICIENCY: ICD-10-CM

## 2017-11-08 DIAGNOSIS — L60.8 SPLINTER HEMORRHAGE OF TOENAIL: ICD-10-CM

## 2017-11-08 DIAGNOSIS — M06.9 RHEUMATOID ARTHRITIS INVOLVING MULTIPLE SITES, UNSPECIFIED RHEUMATOID FACTOR PRESENCE: ICD-10-CM

## 2017-11-08 DIAGNOSIS — I25.84 CORONARY ARTERY DISEASE DUE TO CALCIFIED CORONARY LESION: ICD-10-CM

## 2017-11-08 LAB
ALBUMIN SERPL BCP-MCNC: 4.1 G/DL (ref 3.2–4.9)
ALBUMIN SERPL BCP-MCNC: 4.2 G/DL (ref 3.2–4.9)
ALBUMIN SERPL BCP-MCNC: 4.3 G/DL (ref 3.2–4.9)
ALBUMIN/GLOB SERPL: 1.5 G/DL
ALBUMIN/GLOB SERPL: 1.6 G/DL
ALP SERPL-CCNC: 59 U/L (ref 30–99)
ALP SERPL-CCNC: 60 U/L (ref 30–99)
ALP SERPL-CCNC: 61 U/L (ref 30–99)
ALT SERPL-CCNC: 38 U/L (ref 2–50)
ALT SERPL-CCNC: 39 U/L (ref 2–50)
ALT SERPL-CCNC: 39 U/L (ref 2–50)
ANION GAP SERPL CALC-SCNC: 6 MMOL/L (ref 0–11.9)
ANION GAP SERPL CALC-SCNC: 8 MMOL/L (ref 0–11.9)
AST SERPL-CCNC: 33 U/L (ref 12–45)
AST SERPL-CCNC: 33 U/L (ref 12–45)
AST SERPL-CCNC: 34 U/L (ref 12–45)
BASOPHILS # BLD AUTO: 1.6 % (ref 0–1.8)
BASOPHILS # BLD: 0.12 K/UL (ref 0–0.12)
BILIRUB CONJ SERPL-MCNC: 0.2 MG/DL (ref 0.1–0.5)
BILIRUB CONJ SERPL-MCNC: 0.2 MG/DL (ref 0.1–0.5)
BILIRUB INDIRECT SERPL-MCNC: 0.6 MG/DL (ref 0–1)
BILIRUB INDIRECT SERPL-MCNC: 0.7 MG/DL (ref 0–1)
BILIRUB SERPL-MCNC: 0.8 MG/DL (ref 0.1–1.5)
BILIRUB SERPL-MCNC: 0.9 MG/DL (ref 0.1–1.5)
BILIRUB SERPL-MCNC: 0.9 MG/DL (ref 0.1–1.5)
BNP SERPL-MCNC: 55 PG/ML (ref 0–100)
BUN SERPL-MCNC: 15 MG/DL (ref 8–22)
CALCIUM SERPL-MCNC: 9.6 MG/DL (ref 8.5–10.5)
CALCIUM SERPL-MCNC: 9.7 MG/DL (ref 8.5–10.5)
CHLORIDE SERPL-SCNC: 101 MMOL/L (ref 96–112)
CHLORIDE SERPL-SCNC: 103 MMOL/L (ref 96–112)
CHOLEST SERPL-MCNC: 167 MG/DL (ref 100–199)
CHOLEST SERPL-MCNC: 180 MG/DL (ref 100–199)
CK SERPL-CCNC: 172 U/L (ref 0–154)
CO2 SERPL-SCNC: 30 MMOL/L (ref 20–33)
CO2 SERPL-SCNC: 30 MMOL/L (ref 20–33)
CREAT SERPL-MCNC: 1.03 MG/DL (ref 0.5–1.4)
CREAT SERPL-MCNC: 1.08 MG/DL (ref 0.5–1.4)
CREAT SERPL-MCNC: 1.11 MG/DL (ref 0.5–1.4)
CRP SERPL HS-MCNC: 0.06 MG/DL (ref 0–0.75)
EOSINOPHIL # BLD AUTO: 0.4 K/UL (ref 0–0.51)
EOSINOPHIL NFR BLD: 5.2 % (ref 0–6.9)
ERYTHROCYTE [DISTWIDTH] IN BLOOD BY AUTOMATED COUNT: 51.5 FL (ref 35.9–50)
ERYTHROCYTE [SEDIMENTATION RATE] IN BLOOD BY WESTERGREN METHOD: 5 MM/HOUR (ref 0–20)
GFR SERPL CREATININE-BSD FRML MDRD: >60 ML/MIN/1.73 M 2
GLOBULIN SER CALC-MCNC: 2.6 G/DL (ref 1.9–3.5)
GLOBULIN SER CALC-MCNC: 2.7 G/DL (ref 1.9–3.5)
GLUCOSE SERPL-MCNC: 86 MG/DL (ref 65–99)
GLUCOSE SERPL-MCNC: 87 MG/DL (ref 65–99)
HCT VFR BLD AUTO: 50.9 % (ref 42–52)
HDLC SERPL-MCNC: 46 MG/DL
HDLC SERPL-MCNC: 46 MG/DL
HGB BLD-MCNC: 16.5 G/DL (ref 14–18)
IMM GRANULOCYTES # BLD AUTO: 0.1 K/UL (ref 0–0.11)
IMM GRANULOCYTES NFR BLD AUTO: 1.3 % (ref 0–0.9)
LDLC SERPL CALC-MCNC: 103 MG/DL
LDLC SERPL CALC-MCNC: 89 MG/DL
LYMPHOCYTES # BLD AUTO: 1.63 K/UL (ref 1–4.8)
LYMPHOCYTES NFR BLD: 21.1 % (ref 22–41)
MCH RBC QN AUTO: 31 PG (ref 27–33)
MCHC RBC AUTO-ENTMCNC: 32.4 G/DL (ref 33.7–35.3)
MCV RBC AUTO: 95.5 FL (ref 81.4–97.8)
MONOCYTES # BLD AUTO: 1.42 K/UL (ref 0–0.85)
MONOCYTES NFR BLD AUTO: 18.4 % (ref 0–13.4)
NEUTROPHILS # BLD AUTO: 4.06 K/UL (ref 1.82–7.42)
NEUTROPHILS NFR BLD: 52.4 % (ref 44–72)
NRBC # BLD AUTO: 0 K/UL
NRBC BLD AUTO-RTO: 0 /100 WBC
PLATELET # BLD AUTO: 212 K/UL (ref 164–446)
PMV BLD AUTO: 11 FL (ref 9–12.9)
POTASSIUM SERPL-SCNC: 5 MMOL/L (ref 3.6–5.5)
POTASSIUM SERPL-SCNC: 5 MMOL/L (ref 3.6–5.5)
PROT SERPL-MCNC: 6.5 G/DL (ref 6–8.2)
PROT SERPL-MCNC: 6.8 G/DL (ref 6–8.2)
PROT SERPL-MCNC: 6.8 G/DL (ref 6–8.2)
RBC # BLD AUTO: 5.33 M/UL (ref 4.7–6.1)
SODIUM SERPL-SCNC: 139 MMOL/L (ref 135–145)
SODIUM SERPL-SCNC: 139 MMOL/L (ref 135–145)
TRIGL SERPL-MCNC: 157 MG/DL (ref 0–149)
TRIGL SERPL-MCNC: 158 MG/DL (ref 0–149)
WBC # BLD AUTO: 7.7 K/UL (ref 4.8–10.8)

## 2017-11-08 PROCEDURE — 80053 COMPREHEN METABOLIC PANEL: CPT

## 2017-11-08 PROCEDURE — 82248 BILIRUBIN DIRECT: CPT

## 2017-11-08 PROCEDURE — 36415 COLL VENOUS BLD VENIPUNCTURE: CPT

## 2017-11-08 PROCEDURE — 85652 RBC SED RATE AUTOMATED: CPT

## 2017-11-08 PROCEDURE — 83880 ASSAY OF NATRIURETIC PEPTIDE: CPT | Mod: GA

## 2017-11-08 PROCEDURE — 80076 HEPATIC FUNCTION PANEL: CPT

## 2017-11-08 PROCEDURE — 84520 ASSAY OF UREA NITROGEN: CPT

## 2017-11-08 PROCEDURE — 80061 LIPID PANEL: CPT | Mod: 91

## 2017-11-08 PROCEDURE — 85025 COMPLETE CBC W/AUTO DIFF WBC: CPT

## 2017-11-08 PROCEDURE — 86140 C-REACTIVE PROTEIN: CPT

## 2017-11-08 PROCEDURE — 82550 ASSAY OF CK (CPK): CPT

## 2017-11-08 PROCEDURE — 80053 COMPREHEN METABOLIC PANEL: CPT | Mod: 91

## 2017-11-08 PROCEDURE — 82565 ASSAY OF CREATININE: CPT

## 2017-11-08 PROCEDURE — 80061 LIPID PANEL: CPT

## 2017-11-09 ENCOUNTER — PATIENT OUTREACH (OUTPATIENT)
Dept: HEALTH INFORMATION MANAGEMENT | Facility: OTHER | Age: 62
End: 2017-11-09

## 2017-11-09 ENCOUNTER — TELEPHONE (OUTPATIENT)
Dept: RHEUMATOLOGY | Facility: PHYSICIAN GROUP | Age: 62
End: 2017-11-09

## 2017-11-09 DIAGNOSIS — Z79.899 ENCOUNTER FOR LONG-TERM (CURRENT) USE OF HIGH-RISK MEDICATION: ICD-10-CM

## 2017-11-09 NOTE — TELEPHONE ENCOUNTER
Patient called in today wanting to know if you plan to order the Xeljanz sample for him, or are you going to change the RX to something different. He had his labs done yesterday. Pt. Stated that he will only be using the samples until [facility] approved for patient assistance. He has a member of his insurance team helping him with that. Thank you! Please advise.

## 2017-11-09 NOTE — PROGRESS NOTES
Outbound call to patient to notify that Rx is ready for  at $0 copay in the [facility] pharmacy. Patient expressed concerns for future fills, stating he was told he needed an advantage plan in order to qualify for  patient assistance programs and for fills to be covered in the future. Advised patient that patient assistance programs are based on income and samples may be filled once per month as long as there is medication in stock. Patient verbalizes understanding.  CCSW to follow up with status of patient assistance program.

## 2017-11-13 NOTE — TELEPHONE ENCOUNTER
1. Caller Name: self                                         Call Back Number: [phone] (home)       Patient approves a detailed voicemail message: N\A    Edgardo called asking if the patient can start taking his medications today, Pt sated he left you a message about this last Thursday please advised

## 2017-11-14 NOTE — TELEPHONE ENCOUNTER
Called patient back.    Results for CANDI UMANA (MRN [id number]) as of 11/13/2017 17:24   Ref. Range 6/1/2017 13:13   Hepatitis B Surface Antigen Latest Ref Range: Negative  Negative   Hepatitis B Ccore Ab, Total Latest Ref Range: Negative  Negative   Hepatitis C Antibody Latest Ref Range: Negative  Negative   Results for CANDI UMANA (MRN [id number]) as of 11/13/2017 17:24   Ref. Range 6/1/2017 13:13   Quantiferon TB Gold Latest Ref Range: Negative  Negative       He has a , Doreen, and is working with the .    Ok to start xeljanz 5 mg BID  And recheck in 4 weeks  CBC CMP lipid profile ordered        I reviewed the risk and side effects of being on xeljanz that include but is not limited to infections, risk of malignancy and GI perforation.

## 2017-11-28 DIAGNOSIS — I10 ESSENTIAL HYPERTENSION: ICD-10-CM

## 2017-11-29 RX ORDER — LISINOPRIL 10 MG/1
TABLET ORAL
Qty: 90 TAB | Refills: 2 | Status: SHIPPED | OUTPATIENT
Start: 2017-11-29 | End: 2018-09-02 | Stop reason: SDUPTHER

## 2017-12-07 ASSESSMENT — ENCOUNTER SYMPTOMS
CHILLS: 0
BACK PAIN: 1
FEVER: 0
COUGH: 0

## 2017-12-08 ENCOUNTER — OFFICE VISIT (OUTPATIENT)
Dept: RHEUMATOLOGY | Facility: PHYSICIAN GROUP | Age: 62
End: 2017-12-08
Payer: MEDICARE

## 2017-12-08 ENCOUNTER — PATIENT OUTREACH (OUTPATIENT)
Dept: HEALTH INFORMATION MANAGEMENT | Facility: OTHER | Age: 62
End: 2017-12-08

## 2017-12-08 VITALS
OXYGEN SATURATION: 96 % | RESPIRATION RATE: 16 BRPM | HEART RATE: 76 BPM | TEMPERATURE: 99.2 F | BODY MASS INDEX: 30.28 KG/M2 | SYSTOLIC BLOOD PRESSURE: 124 MMHG | DIASTOLIC BLOOD PRESSURE: 76 MMHG | WEIGHT: 211 LBS

## 2017-12-08 DIAGNOSIS — Z79.899 ENCOUNTER FOR LONG-TERM (CURRENT) USE OF HIGH-RISK MEDICATION: ICD-10-CM

## 2017-12-08 DIAGNOSIS — M06.9 RHEUMATOID ARTHRITIS INVOLVING MULTIPLE SITES, UNSPECIFIED RHEUMATOID FACTOR PRESENCE: ICD-10-CM

## 2017-12-08 PROCEDURE — 99214 OFFICE O/P EST MOD 30 MIN: CPT | Performed by: INTERNAL MEDICINE

## 2017-12-08 RX ORDER — PREDNISONE 5 MG/1
TABLET ORAL
Qty: 50 TAB | Refills: 0 | Status: SHIPPED | OUTPATIENT
Start: 2017-12-08 | End: 2018-04-30

## 2017-12-08 RX ORDER — VITAMIN E 268 MG
400 CAPSULE ORAL DAILY
Status: ON HOLD | COMMUNITY
End: 2019-04-01

## 2017-12-08 NOTE — PROGRESS NOTES
Subjective:   Date of Consultation:   Primary care physician:John Lao D.O.      Reason for Consultation:  Mr. Sims  is a pleasant 61 y.o. year old male who presented with follow-up for Rheumatoid Arthritis      Recent Surgery  Doing well and recovered    Rheumatoid Arthritis  Since last visit we started xeljanz.  He has been on this for 3 weeks.  He is noticing some improvement and has been able to cut down pain medication.        Osteopenia noted in wrist  Previous fracture: no  Parent fractured hip: no  Current smoking: no  Glucocorticoids use > 3 months: yes  Rheumatoid arthritis: yes  Secondary osteoporosis : no  Alcohol 3 or more units/day: no    DEXA 9/5/2017  The mean bone mineral density for the lumbar spine is 1.196 g/cm2, which corresponds to a T score of -0.2 and a Z score of 0.2.  The distal left forearm has a mean bone mineral density of 0.837 g/cm2, with a T score of -1.5 and a Z score of -1.1.    Current Medications  Methotrexate 6 tabs q Wednesday  (stopped)  xeljanz 5 mg BID  Prednisone 2 mg po q day  (he has been on long term prednisone)      RHEUM HISTORY  Mr. Edgardo Sims presented with a history of Rheumatoid Arthritis diagnosed late 1999 and recent RF and CCP were negative.  He was previously a patient of Dr. Mcknight and then followed by a community rheumatology.  He is here today to establish care. In review of Dr. Echavarria that it seems he has been on Rituxan as far back as 2009. At that time he was on a regimen of azathioprine 150 mg methotrexate 15 mg, prednisone 7.5 mg and rituximab with 125 mg of Solu-Medrol he seemed to have been maintained on this regimen and was tapered down on the Imuran to 50 mg twice a day. Notes from May 24, 2012 indicates that he discontinue the Imuran. At that time he was on rituximab and every 6 months 15 mg of methotrexate weekly and 2 mg of prednisone. Notes from June 8, 2015 does remark that his RA was only manageable he's had limited  response. He did suffer a consultation with methotrexate developing stomatitis. Methotrexate was held March 6, 2014 and was reinstituted January 5, 2015.     Dr. Echavarria's notes it does comment that he may benefit from rituximab every 5 months however given cost restrictions he can only get it every 6 months. His last infusion was in March 2017 for rituximab.  He reports he is not doing as well on the rituximab.  He feels his activity level is not as active.  He feels increase stiffness.  His feet xrays     Complications to his care include intolerance to medications  And stomatitis with methotrexate.  Methotrexate was stopped briefly and subsequently was restarted but the patient reports it is not working as well as before.     Previous medications include   azathioprine, methotrexate  Plaquenil and Arava both intolerance not allergies  Remicade loss efficacyt  Gold shots (had a bad reaction)  Enbrel  Not sure Humira  Methylprednisolone did not work as well as prednisone  Never been on Xeljanz   Has not tried Actemra  rituximab (lost efficacy last infusion was 3/2017)  Orencia (not sure why he stopped)    Chronic Pain  On percocet ranging from 30-40 mg daily    Past medical HIstory: bilateral hip replacment and left knee replacement, tonsillitis, varicose vein in the left leg, TMJ, tonsillitis in 1964, broken right ankle in 1980, ruptured disc of L5-S1 noted in 2000 with discectomy and laminectomy, rotator cuff with anterior labral repair in 2001, basal cell carcinoma in 2005 from the nose, meniscal tear of the left knee in 2008 with scope repair, scope repair and biceps tendon repair as well as rotator cuff surgery in 2011 of the right shoulder. Dupuytren's contracture right hand little finger, Dupuytren's contracture right foot, arthritis of the right foot, plantar fasciitis bilateral, aortic insufficiency, hypertension, dyslipidemia, heart murmur, tachycardia, coronary artery disease with mild plaque at  catheter. Hiatal hernia and GERD, scoliosis, kyphosis of the upper back/compensating lordosis of the neck . Vein ligation radiofrequency      Family History: lymphoma     Social History: light bookeeping mainly disability, NEVER smoker, occasional alcohol (once a week)    Past Medical History:   Diagnosis Date   • Abnormal myocardial perfusion study 1/15/2014   • Aortic insufficiency 7/5/2011   • Arthritis     RA, and osteo   • Bronchitis    • CAD (coronary artery disease) mild plaque at cath in 2/14 12/5/2014   • Cancer (CMS-HCC)     skin   • Chronic use of opiate drugs therapeutic purposes 8/12/2017   • Cold     mid January 2014   • Coughing blood     none currently 2014   • Dyslipidemia 7/12/2011   • Heart burn    • Heart murmur    • Hiatus hernia syndrome    • HTN (hypertension) 7/5/2011   • Hypertension    • Indigestion    • Infectious disease    • Pain     RA   • Rheumatoid arthritis(714.0)     severe   • Tachycardia 10/20/2014     Past Surgical History:   Procedure Laterality Date   • RECOVERY  2/3/2014    Performed by Cath-Recovery Surgery at SURGERY SAME DAY [facility]   • HIP ARTHROPLASTY TOTAL  5/31/2013    Performed by Burak Valles M.D. at SURGERY [facility]   • KNEE ARTHROPLASTY TOTAL  6/1/2009    Performed by YONATAN HINSON at SURGERY [facility] ORS   • VEIN LIGATION RADIO FREQUENCY  12/8/2008    Performed by DEREJE MCCULLOUGH at SURGERY SAME DAY [facility]   • HIP ARTHROPLASTY TOTAL  6/20/08    Performed by YONATAN HINSON at SURGERY [facility] ORS   • LUMBAR LAMINECTOMY DISKECTOMY  2000   • TMJ RECONSTRUCTION BILATERAL  1994   • KNEE ARTHROSCOPY      right   • OTHER      varicose vein stripping   • OTHER      heart murmur   • OTHER ORTHOPEDIC SURGERY      awaiting right hip surgery   • SHOULDER SURGERY      RIGHT 01/2011     Allergies   Allergen Reactions   • Crestor [Rosuvastatin Calcium] Myalgia   • Penicillins Hives     Outpatient Encounter Prescriptions as of 12/8/2017    Medication Sig Dispense Refill   • lisinopril (PRINIVIL) 10 MG Tab TAKE ONE TABLET BY MOUTH ONCE DAILY 90 Tab 2   • Tofacitinib Citrate (XELJANZ) 5 MG Tab Take 5 mg by mouth 2 Times a Day. USE SAMPLES 60 Tab 6   • MELOXICAM PO      • clindamycin (CLEOCIN) 300 MG Cap      • predniSONE (DELTASONE) 5 MG Tab      • predniSONE (DELTASONE) 1 MG Tab 2 mg po q day 60 Tab 1   • oxycodone-acetaminophen (PERCOCET-10)  MG Tab Take 1 Tab by mouth 5 Times a Day. 150 Tab 0   • testosterone cypionate (DEPO-TESTOSTERONE) 200 MG/ML Solution injection INJECT 1 ML (CC) INTRAMUSCULARLY ONCE EVERY TWO WEEKS AS DIRECTED 10 mL 0   • methotrexate 2.5 MG Tab TAKE SIX TABLETS BY MOUTH ONCE A WEEK 24 Tab 2   • pravastatin (PRAVACHOL) 20 MG Tab Take 1 Tab by mouth every bedtime. 30 Tab 11   • cephALEXin (KEFLEX) 500 MG Cap Take 1 Cap by mouth 4 times a day. 40 Cap 0   • cyclobenzaprine (FLEXERIL) 10 MG Tab TAKE ONE TABLET BY MOUTH THREE TIMES DAILY AS NEEDED FOR MILD PAIN 90 Tab 5   • meloxicam (MOBIC) 15 MG tablet TAKE ONE TABLET BY MOUTH ONCE DAILY 30 Tab 6   • Calcium Carbonate-Vitamin D (CALCIUM 600 + D) 600-400 MG-UNIT Tab Take 1 Tab by mouth 2 times a day, with meals. 60 Tab 3   • folic acid (FOLVITE) 1 MG Tab Take 3 Tabs by mouth every day. 90 Tab 2   • ergocalciferol (DRISDOL) 15210 UNIT capsule Take 1 Cap by mouth every 7 days. 12 Cap 0   • Cyanocobalamin (VITAMIN B-12) 5000 MCG TABLET DISPERSIBLE Take  by mouth.     • niacin 500 MG Tab Take 500 mg by mouth every day.     • Zinc 50 MG Cap Take 50 mg by mouth every day.     • diazepam (VALIUM) 5 MG Tab TAKE ONE TABLET BY MOUTH EVERY 12 HOURS AS NEEDED FOR ANXIETY 60 Tab 5   • paroxetine (PAXIL) 20 MG Tab TAKE ONE TABLET BY MOUTH ONCE DAILY 90 Tab 3   • Multiple Vitamins-Minerals (MULTI COMPLETE PO) Take  by mouth.     • bisoprolol (ZEBETA) 10 MG tablet Take 1 Tab by mouth every day. 90 Tab 3   • vardenafil (LEVITRA) 20 MG tablet Take 20 mg by mouth as needed.     • omeprazole  (PRILOSEC) 20 MG CPDR Take 20 mg by mouth every day.     • RiTUXimab (RITUXAN IV) by Intravenous route. Two every 6 months        No facility-administered encounter medications on file as of 12/8/2017.        Social History     Social History   • Marital status:      Spouse name: N/A   • Number of children: N/A   • Years of education: N/A     Occupational History   • Not on file.     Social History Main Topics   • Smoking status: Never Smoker   • Smokeless tobacco: Never Used   • Alcohol use 3.0 oz/week     6 Cans of beer per week   • Drug use: No   • Sexual activity: Yes     Partners: Female     Other Topics Concern   •  Service No   • Blood Transfusions No   • Caffeine Concern No   • Occupational Exposure No   • Hobby Hazards Yes     rides motorcyle   • Sleep Concern No   • Stress Concern No   • Weight Concern Yes   • Special Diet Yes     does not eat red meat    • Back Care Yes   • Exercise Yes   • Bike Helmet Yes   • Seat Belt Yes   • Self-Exams Yes     Social History Narrative   • No narrative on file      History   Smoking Status   • Never Smoker   Smokeless Tobacco   • Never Used     History   Alcohol Use   • 3.0 oz/week   • 6 Cans of beer per week     History   Drug Use No         Family History   Problem Relation Age of Onset   • Hypertension Mother        Review of Systems   Constitutional: Negative for chills and fever.   Respiratory: Negative for cough.    Cardiovascular: Negative for chest pain.   Musculoskeletal: Positive for back pain and joint pain.   Skin: Negative for rash.        Objective:   There were no vitals taken for this visit.    Physical Exam   Constitutional: He is oriented to person, place, and time. He appears well-developed and well-nourished. No distress.   HENT:   Head: Normocephalic and atraumatic.   Right Ear: External ear normal.   Left Ear: External ear normal.   Eyes: Conjunctivae are normal. Right eye exhibits no discharge. Left eye exhibits no discharge. No  scleral icterus.   Cardiovascular: Normal rate, regular rhythm and normal heart sounds.    Grade III systolic murmur   Pulmonary/Chest: Effort normal. No stridor. No respiratory distress. He has no wheezes. He has no rales.   Abdominal: Soft. Bowel sounds are normal.   Musculoskeletal: He exhibits no edema.   Right hand with palmar stiches and some areas of the surgical site are still open.  Right wrist swelling.  Bilateral MTP tenderness on squeeze test.   Neurological: He is alert and oriented to person, place, and time.   Skin: Skin is warm and dry. No rash noted. He is not diaphoretic. No erythema. No pallor.   Psychiatric: He has a normal mood and affect. His behavior is normal. Judgment and thought content normal.       Assessment:     No diagnosis found.  Labs:      Lab Results   Component Value Date/Time    QNTTBGOLD Negative 06/01/2017 01:13 PM     Lab Results   Component Value Date/Time    HEPBCORTOT Negative 06/01/2017 01:13 PM    HEPBSAG Negative 06/01/2017 01:13 PM     Lab Results   Component Value Date/Time    HEPCAB Negative 06/01/2017 01:13 PM     Lab Results   Component Value Date/Time    SODIUM 139 11/08/2017 09:28 AM    POTASSIUM 5.0 11/08/2017 09:28 AM    CHLORIDE 103 11/08/2017 09:28 AM    CO2 30 11/08/2017 09:28 AM    GLUCOSE 87 11/08/2017 09:28 AM    BUN 15 11/08/2017 09:28 AM    CREATININE 1.11 11/08/2017 09:28 AM    CREATININE 1.1 04/21/2009 03:50 PM    BUNCREATRAT 13 09/21/2016 09:21 AM      Lab Results   Component Value Date/Time    WBC 7.7 11/08/2017 09:21 AM    WBC 7.5 07/01/2011 10:30 AM    RBC 5.33 11/08/2017 09:21 AM    RBC 4.72 07/01/2011 10:30 AM    HEMOGLOBIN 16.5 11/08/2017 09:21 AM    HEMATOCRIT 50.9 11/08/2017 09:21 AM    MCV 95.5 11/08/2017 09:21 AM     (H) 07/01/2011 10:30 AM    MCH 31.0 11/08/2017 09:21 AM    MCH 36.0 (H) 07/01/2011 10:30 AM    MCHC 32.4 (L) 11/08/2017 09:21 AM    MPV 11.0 11/08/2017 09:21 AM    NEUTSPOLYS 52.40 11/08/2017 09:21 AM    LYMPHOCYTES 21.10  (L) 11/08/2017 09:21 AM    MONOCYTES 18.40 (H) 11/08/2017 09:21 AM    EOSINOPHILS 5.20 11/08/2017 09:21 AM    BASOPHILS 1.60 11/08/2017 09:21 AM    HYPOCHROMIA 1+ 03/05/2014 04:43 PM    ANISOCYTOSIS 1+ 01/02/2015 05:02 PM      Lab Results   Component Value Date/Time    CALCIUM 9.6 11/08/2017 09:28 AM    ASTSGOT 33 11/08/2017 09:28 AM    ALTSGPT 39 11/08/2017 09:28 AM    ALKPHOSPHAT 59 11/08/2017 09:28 AM    TBILIRUBIN 0.9 11/08/2017 09:28 AM    ALBUMIN 4.1 11/08/2017 09:28 AM    TOTPROTEIN 6.8 11/08/2017 09:28 AM     Lab Results   Component Value Date/Time    RHEUMFACTN <10 08/04/2017 02:32 PM     Lab Results   Component Value Date/Time    SEDRATEWES 5 11/08/2017 09:21 AM    CREACTPROT 0.06 11/08/2017 09:21 AM     No results found for: RUSSELVIPER, DRVVTINTERP  Lab Results   Component Value Date/Time    CRYOGLOBULIN NEG 72Hour 08/04/2017 02:32 PM     No results found for: ANADIRECT, ANTIDNADS, RNPAB, SMITHAB, FOWGORS88, SSAROAB, SSBLAAB, LCNLUM5BN, CENTROMBAB  No results found for: ANTIDNADS, DSDNA, AGBMAB, GBMABA, ANCAIGG, H2VFPNGMIFL, JO1AB, RNPAB, ANTISSASJ, ANTISSBSJ  Lab Results   Component Value Date/Time    COLORURINE Lt. Yellow 08/19/2015 03:20 PM    SPECGRAVITY 1.008 08/19/2015 03:20 PM    PHURINE 6.5 08/19/2015 03:20 PM    GLUCOSEUR Negative 08/19/2015 03:20 PM    KETONES Negative 08/19/2015 03:20 PM    PROTEINURIN Negative 08/19/2015 03:20 PM     Lab Results   Component Value Date/Time    TOTALVOLUME 2,750 08/18/2015 07:30 AM    TOTALVOLUME 2,750 08/18/2015 07:30 AM     No results found for: SSA60, SSA52  No results found for: HBA1C  Lab Results   Component Value Date/Time    CPKTOTAL 172 (H) 11/08/2017 09:28 AM     No results found for: G6PD  No results found for: SRQA04PTBO  No results found for: ACESERUM  Lab Results   Component Value Date/Time    25HYDROXY 18 (L) 06/01/2017 01:13 PM     Lab Results   Component Value Date/Time    FREEDIR 1.11 12/01/2014 03:13 PM     No results found for: TSHULTRASEN,  FREET4  No results found for: MICROSOMALA, ANTITHYROGL  No results found for: IGGLYMEABS  No results found for: ANTIMITOCHO, FACTIN  No results found for: IGA, TTRANSIGA, ENDOIGA  No results found for: FLTYPE, CRYSTALSBDF  No results found for: ISTATICAL  No results found for: ISTATCREAT  No results found for: CTELOPEP  No results found for: GBMABG  Lab Results   Component Value Date/Time    PTHINTACT 55 10/10/2008 10:42 AM         Medical Decision Making:  Today's Assessment / Status / Plan:     Rheumatoid Arthritis  Not well controlled.  We will provide a prednisone course  Continue xeljanz 5 mg BID.  I have provided lab slips to recheck lab at 4 and 8 weeks of therapy.    As for xeljanz he is going to apply for a different foundation to help support/supplement cost to make this more cost affordable. He has tried and has not been able to make headway with [employer].  I have reached out to his  for further assistance and follow-up.        Results for CANDI UMANA KATHERINE (MRN [id number]) as of 6/23/2017 18:44   Ref. Range 6/1/2017 13:13   Hep B Surface Antibody Quant Latest Ref Range: 0.00-10.00 mIU/mL <3.10   Hepatitis B Surface Antigen Latest Ref Range: Negative  Negative   Hepatitis B Ccore Ab, Total Latest Ref Range: Negative  Negative   Hepatitis C Antibody Latest Ref Range: Negative  Negative   Mitogen-Nil Unknown 60.763   Nil Unknown 0.029   Quantiferon TB Gold Latest Ref Range: Negative  Negative   TB Ag-Nil Unknown -0.010       Aortic insufficiency  Not symptomatic  Followed by cardiology    Chronic steroid use  Calcium and vitamin D  DEXA showed osteopenia  Today we discussed treatment and side effects of alendronate  At next visit we will calculate his FRAX.  Currently prednisone dose is < 7.5mg so we will not need to add a correction factor to the FRAX. Pending the results will start fosamax.      S/p hand surgery  Doing well    No diagnosis found.      No Follow-up on file.    I have spent  greater than 50% of this 30 minute visit in counseling/coordination of care with the patient regarding a discussion about therapy plan    He agreed and verbalized his agreement and understanding with the current plan. I answered all questions and concerns he has at this time and advised him to call at any time in there interim with questions or concerns in regards to his care.    Thank you for allowing me to participate in his care, I will continue to follow closely.     Please note that this dictation was created using voice recognition software. I have made every reasonable attempt to correct obvious errors, but I expect that there are errors of grammar and possibly content that I did not discover before finalizing the note.

## 2017-12-08 NOTE — LETTER
[facility]   [address], [address]  [place] [address]  Phone: [phone]  Fax: [phone]              Encounter Date: 12/8/2017    Dear Dr. Lao,      It was a pleasure seeing your patient, Edgardo Sims, on 12/8/2017. Diagnoses of Encounter for long-term (current) use of high-risk medication and Rheumatoid arthritis involving multiple sites, unspecified rheumatoid factor presence (CMS-[facility]) were pertinent to this visit.     Please find attached progress note which includes the history I obtained from Mr. Sims, my physical examination findings, my impression and recommendations.      Once again, it was a pleasure participating in your patient's care.  Please feel free to contact me if you have any questions or if I can be of any further assistance to your patients.      Sincerely,    Antonieta Canales M.D.  Electronically Signed          PROGRESS NOTE:  Subjective:   Date of Consultation:   Primary care physician:John Lao D.O.      Reason for Consultation:  Mr. Sims  is a pleasant 61 y.o. year old male who presented with follow-up for Rheumatoid Arthritis      Recent Surgery  Doing well and recovered    Rheumatoid Arthritis  Since last visit we started xeljanz.  He has been on this for 3 weeks.  He is noticing some improvement and has been able to cut down pain medication.        Osteopenia noted in wrist  Previous fracture: no  Parent fractured hip: no  Current smoking: no  Glucocorticoids use > 3 months: yes  Rheumatoid arthritis: yes  Secondary osteoporosis : no  Alcohol 3 or more units/day: no    DEXA 9/5/2017  The mean bone mineral density for the lumbar spine is 1.196 g/cm2, which corresponds to a T score of -0.2 and a Z score of 0.2.  The distal left forearm has a mean bone mineral density of 0.837 g/cm2, with a T score of -1.5 and a Z score of -1.1.    Current Medications  Methotrexate 6 tabs q Wednesday  (stopped)  xeljanz 5 mg BID  Prednisone 2 mg po  q day  (he has been on long term prednisone)      RHEUM HISTORY  Mr. Edgardo Sims presented with a history of Rheumatoid Arthritis diagnosed late 1999 and recent RF and CCP were negative.  He was previously a patient of Dr. Mcknight and then followed by a community rheumatology.  He is here today to establish care. In review of Dr. Echavarria that it seems he has been on Rituxan as far back as 2009. At that time he was on a regimen of azathioprine 150 mg methotrexate 15 mg, prednisone 7.5 mg and rituximab with 125 mg of Solu-Medrol he seemed to have been maintained on this regimen and was tapered down on the Imuran to 50 mg twice a day. Notes from May 24, 2012 indicates that he discontinue the Imuran. At that time he was on rituximab and every 6 months 15 mg of methotrexate weekly and 2 mg of prednisone. Notes from June 8, 2015 does remark that his RA was only manageable he's had limited response. He did suffer a consultation with methotrexate developing stomatitis. Methotrexate was held March 6, 2014 and was reinstituted January 5, 2015.     Dr. Echavarria's notes it does comment that he may benefit from rituximab every 5 months however given cost restrictions he can only get it every 6 months. His last infusion was in March 2017 for rituximab.  He reports he is not doing as well on the rituximab.  He feels his activity level is not as active.  He feels increase stiffness.  His feet xrays     Complications to his care include intolerance to medications  And stomatitis with methotrexate.  Methotrexate was stopped briefly and subsequently was restarted but the patient reports it is not working as well as before.     Previous medications include   azathioprine, methotrexate  Plaquenil and Arava both intolerance not allergies  Remicade loss efficacyt  Gold shots (had a bad reaction)  Enbrel  Not sure Humira  Methylprednisolone did not work as well as prednisone  Never been on Xeljanz   Has not tried  Actemra  rituximab (lost efficacy last infusion was 3/2017)  Orencia (not sure why he stopped)    Chronic Pain  On percocet ranging from 30-40 mg daily    Past medical HIstory: bilateral hip replacment and left knee replacement, tonsillitis, varicose vein in the left leg, TMJ, tonsillitis in 1964, broken right ankle in 1980, ruptured disc of L5-S1 noted in 2000 with discectomy and laminectomy, rotator cuff with anterior labral repair in 2001, basal cell carcinoma in 2005 from the nose, meniscal tear of the left knee in 2008 with scope repair, scope repair and biceps tendon repair as well as rotator cuff surgery in 2011 of the right shoulder. Dupuytren's contracture right hand little finger, Dupuytren's contracture right foot, arthritis of the right foot, plantar fasciitis bilateral, aortic insufficiency, hypertension, dyslipidemia, heart murmur, tachycardia, coronary artery disease with mild plaque at catheter. Hiatal hernia and GERD, scoliosis, kyphosis of the upper back/compensating lordosis of the neck . Vein ligation radiofrequency      Family History: lymphoma     Social History: light bookeeping mainly disability, NEVER smoker, occasional alcohol (once a week)    Past Medical History:   Diagnosis Date   • Abnormal myocardial perfusion study 1/15/2014   • Aortic insufficiency 7/5/2011   • Arthritis     RA, and osteo   • Bronchitis    • CAD (coronary artery disease) mild plaque at cath in 2/14 12/5/2014   • Cancer (CMS-HCC)     skin   • Chronic use of opiate drugs therapeutic purposes 8/12/2017   • Cold     mid January 2014   • Coughing blood     none currently 2014   • Dyslipidemia 7/12/2011   • Heart burn    • Heart murmur    • Hiatus hernia syndrome    • HTN (hypertension) 7/5/2011   • Hypertension    • Indigestion    • Infectious disease    • Pain     RA   • Rheumatoid arthritis(714.0)     severe   • Tachycardia 10/20/2014     Past Surgical History:   Procedure Laterality Date   • RECOVERY  2/3/2014     Performed by Cath-Recovery Surgery at SURGERY SAME DAY [facility] ORS   • HIP ARTHROPLASTY TOTAL  5/31/2013    Performed by Burak Valles M.D. at SURGERY [facility] ORS   • KNEE ARTHROPLASTY TOTAL  6/1/2009    Performed by YONATAN HINSON at SURGERY [facility] ORS   • VEIN LIGATION RADIO FREQUENCY  12/8/2008    Performed by DEREJE MCCULLOUGH at SURGERY SAME DAY [facility] ORS   • HIP ARTHROPLASTY TOTAL  6/20/08    Performed by YONATAN HINSON at SURGERY [facility] ORS   • LUMBAR LAMINECTOMY DISKECTOMY  2000   • TMJ RECONSTRUCTION BILATERAL  1994   • KNEE ARTHROSCOPY      right   • OTHER      varicose vein stripping   • OTHER      heart murmur   • OTHER ORTHOPEDIC SURGERY      awaiting right hip surgery   • SHOULDER SURGERY      RIGHT 01/2011     Allergies   Allergen Reactions   • Crestor [Rosuvastatin Calcium] Myalgia   • Penicillins Hives     Outpatient Encounter Prescriptions as of 12/8/2017   Medication Sig Dispense Refill   • lisinopril (PRINIVIL) 10 MG Tab TAKE ONE TABLET BY MOUTH ONCE DAILY 90 Tab 2   • Tofacitinib Citrate (XELJANZ) 5 MG Tab Take 5 mg by mouth 2 Times a Day. USE SAMPLES 60 Tab 6   • MELOXICAM PO      • clindamycin (CLEOCIN) 300 MG Cap      • predniSONE (DELTASONE) 5 MG Tab      • predniSONE (DELTASONE) 1 MG Tab 2 mg po q day 60 Tab 1   • oxycodone-acetaminophen (PERCOCET-10)  MG Tab Take 1 Tab by mouth 5 Times a Day. 150 Tab 0   • testosterone cypionate (DEPO-TESTOSTERONE) 200 MG/ML Solution injection INJECT 1 ML (CC) INTRAMUSCULARLY ONCE EVERY TWO WEEKS AS DIRECTED 10 mL 0   • methotrexate 2.5 MG Tab TAKE SIX TABLETS BY MOUTH ONCE A WEEK 24 Tab 2   • pravastatin (PRAVACHOL) 20 MG Tab Take 1 Tab by mouth every bedtime. 30 Tab 11   • cephALEXin (KEFLEX) 500 MG Cap Take 1 Cap by mouth 4 times a day. 40 Cap 0   • cyclobenzaprine (FLEXERIL) 10 MG Tab TAKE ONE TABLET BY MOUTH THREE TIMES DAILY AS NEEDED FOR MILD PAIN 90 Tab 5   • meloxicam (MOBIC) 15 MG tablet TAKE ONE TABLET BY MOUTH  ONCE DAILY 30 Tab 6   • Calcium Carbonate-Vitamin D (CALCIUM 600 + D) 600-400 MG-UNIT Tab Take 1 Tab by mouth 2 times a day, with meals. 60 Tab 3   • folic acid (FOLVITE) 1 MG Tab Take 3 Tabs by mouth every day. 90 Tab 2   • ergocalciferol (DRISDOL) 41575 UNIT capsule Take 1 Cap by mouth every 7 days. 12 Cap 0   • Cyanocobalamin (VITAMIN B-12) 5000 MCG TABLET DISPERSIBLE Take  by mouth.     • niacin 500 MG Tab Take 500 mg by mouth every day.     • Zinc 50 MG Cap Take 50 mg by mouth every day.     • diazepam (VALIUM) 5 MG Tab TAKE ONE TABLET BY MOUTH EVERY 12 HOURS AS NEEDED FOR ANXIETY 60 Tab 5   • paroxetine (PAXIL) 20 MG Tab TAKE ONE TABLET BY MOUTH ONCE DAILY 90 Tab 3   • Multiple Vitamins-Minerals (MULTI COMPLETE PO) Take  by mouth.     • bisoprolol (ZEBETA) 10 MG tablet Take 1 Tab by mouth every day. 90 Tab 3   • vardenafil (LEVITRA) 20 MG tablet Take 20 mg by mouth as needed.     • omeprazole (PRILOSEC) 20 MG CPDR Take 20 mg by mouth every day.     • RiTUXimab (RITUXAN IV) by Intravenous route. Two every 6 months        No facility-administered encounter medications on file as of 12/8/2017.        Social History     Social History   • Marital status:      Spouse name: N/A   • Number of children: N/A   • Years of education: N/A     Occupational History   • Not on file.     Social History Main Topics   • Smoking status: Never Smoker   • Smokeless tobacco: Never Used   • Alcohol use 3.0 oz/week     6 Cans of beer per week   • Drug use: No   • Sexual activity: Yes     Partners: Female     Other Topics Concern   •  Service No   • Blood Transfusions No   • Caffeine Concern No   • Occupational Exposure No   • Hobby Hazards Yes     rides motorcyle   • Sleep Concern No   • Stress Concern No   • Weight Concern Yes   • Special Diet Yes     does not eat red meat    • Back Care Yes   • Exercise Yes   • Bike Helmet Yes   • Seat Belt Yes   • Self-Exams Yes     Social History Narrative   • No narrative on file       History   Smoking Status   • Never Smoker   Smokeless Tobacco   • Never Used     History   Alcohol Use   • 3.0 oz/week   • 6 Cans of beer per week     History   Drug Use No         Family History   Problem Relation Age of Onset   • Hypertension Mother        Review of Systems   Constitutional: Negative for chills and fever.   Respiratory: Negative for cough.    Cardiovascular: Negative for chest pain.   Musculoskeletal: Positive for back pain and joint pain.   Skin: Negative for rash.        Objective:   There were no vitals taken for this visit.    Physical Exam   Constitutional: He is oriented to person, place, and time. He appears well-developed and well-nourished. No distress.   HENT:   Head: Normocephalic and atraumatic.   Right Ear: External ear normal.   Left Ear: External ear normal.   Eyes: Conjunctivae are normal. Right eye exhibits no discharge. Left eye exhibits no discharge. No scleral icterus.   Cardiovascular: Normal rate, regular rhythm and normal heart sounds.    Grade III systolic murmur   Pulmonary/Chest: Effort normal. No stridor. No respiratory distress. He has no wheezes. He has no rales.   Abdominal: Soft. Bowel sounds are normal.   Musculoskeletal: He exhibits no edema.   Right hand with palmar stiches and some areas of the surgical site are still open.  Right wrist swelling.  Bilateral MTP tenderness on squeeze test.   Neurological: He is alert and oriented to person, place, and time.   Skin: Skin is warm and dry. No rash noted. He is not diaphoretic. No erythema. No pallor.   Psychiatric: He has a normal mood and affect. His behavior is normal. Judgment and thought content normal.       Assessment:     No diagnosis found.  Labs:      Lab Results   Component Value Date/Time    QNTTBGOLD Negative 06/01/2017 01:13 PM     Lab Results   Component Value Date/Time    HEPBCORTOT Negative 06/01/2017 01:13 PM    HEPBSAG Negative 06/01/2017 01:13 PM     Lab Results   Component Value Date/Time     HEPCAB Negative 06/01/2017 01:13 PM     Lab Results   Component Value Date/Time    SODIUM 139 11/08/2017 09:28 AM    POTASSIUM 5.0 11/08/2017 09:28 AM    CHLORIDE 103 11/08/2017 09:28 AM    CO2 30 11/08/2017 09:28 AM    GLUCOSE 87 11/08/2017 09:28 AM    BUN 15 11/08/2017 09:28 AM    CREATININE 1.11 11/08/2017 09:28 AM    CREATININE 1.1 04/21/2009 03:50 PM    BUNCREATRAT 13 09/21/2016 09:21 AM      Lab Results   Component Value Date/Time    WBC 7.7 11/08/2017 09:21 AM    WBC 7.5 07/01/2011 10:30 AM    RBC 5.33 11/08/2017 09:21 AM    RBC 4.72 07/01/2011 10:30 AM    HEMOGLOBIN 16.5 11/08/2017 09:21 AM    HEMATOCRIT 50.9 11/08/2017 09:21 AM    MCV 95.5 11/08/2017 09:21 AM     (H) 07/01/2011 10:30 AM    MCH 31.0 11/08/2017 09:21 AM    MCH 36.0 (H) 07/01/2011 10:30 AM    MCHC 32.4 (L) 11/08/2017 09:21 AM    MPV 11.0 11/08/2017 09:21 AM    NEUTSPOLYS 52.40 11/08/2017 09:21 AM    LYMPHOCYTES 21.10 (L) 11/08/2017 09:21 AM    MONOCYTES 18.40 (H) 11/08/2017 09:21 AM    EOSINOPHILS 5.20 11/08/2017 09:21 AM    BASOPHILS 1.60 11/08/2017 09:21 AM    HYPOCHROMIA 1+ 03/05/2014 04:43 PM    ANISOCYTOSIS 1+ 01/02/2015 05:02 PM      Lab Results   Component Value Date/Time    CALCIUM 9.6 11/08/2017 09:28 AM    ASTSGOT 33 11/08/2017 09:28 AM    ALTSGPT 39 11/08/2017 09:28 AM    ALKPHOSPHAT 59 11/08/2017 09:28 AM    TBILIRUBIN 0.9 11/08/2017 09:28 AM    ALBUMIN 4.1 11/08/2017 09:28 AM    TOTPROTEIN 6.8 11/08/2017 09:28 AM     Lab Results   Component Value Date/Time    RHEUMFACTN <10 08/04/2017 02:32 PM     Lab Results   Component Value Date/Time    SEDRATEWES 5 11/08/2017 09:21 AM    CREACTPROT 0.06 11/08/2017 09:21 AM     No results found for: RUSSELVIPER, DRVVTINTERP  Lab Results   Component Value Date/Time    CRYOGLOBULIN NEG 72Hour 08/04/2017 02:32 PM     No results found for: ANADIRECT, ANTIDNADS, RNPAB, SMITHAB, WIZKBEX34, SSAROAB, SSBLAAB, ZDLEME3VK, CENTROMBAB  No results found for: ANTIDNADS, DSDNA, AGBMAB, GBMABA, ANCAIGG,  [id number], JO1AB, RNPAB, ANTISSASJ, ANTISSBSJ  Lab Results   Component Value Date/Time    COLORURINE Lt. Yellow 08/19/2015 03:20 PM    SPECGRAVITY 1.008 08/19/2015 03:20 PM    PHURINE 6.5 08/19/2015 03:20 PM    GLUCOSEUR Negative 08/19/2015 03:20 PM    KETONES Negative 08/19/2015 03:20 PM    PROTEINURIN Negative 08/19/2015 03:20 PM     Lab Results   Component Value Date/Time    TOTALVOLUME 2,750 08/18/2015 07:30 AM    TOTALVOLUME 2,750 08/18/2015 07:30 AM     No results found for: SSA60, SSA52  No results found for: HBA1C  Lab Results   Component Value Date/Time    CPKTOTAL 172 (H) 11/08/2017 09:28 AM     No results found for: G6PD  No results found for: LLAS81MAOW  No results found for: ACESERUM  Lab Results   Component Value Date/Time    25HYDROXY 18 (L) 06/01/2017 01:13 PM     Lab Results   Component Value Date/Time    FREEDIR 1.11 12/01/2014 03:13 PM     No results found for: TSHULTRASEN, FREET4  No results found for: MICROSOMALA, ANTITHYROGL  No results found for: IGGLYMEABS  No results found for: ANTIMITOCHO, FACTIN  No results found for: IGA, TTRANSIGA, ENDOIGA  No results found for: FLTYPE, CRYSTALSBDF  No results found for: ISTATICAL  No results found for: ISTATCREAT  No results found for: CTELOPEP  No results found for: GBMABG  Lab Results   Component Value Date/Time    PTHINTACT 55 10/10/2008 10:42 AM         Medical Decision Making:  Today's Assessment / Status / Plan:     Rheumatoid Arthritis  Not well controlled.  We will provide a prednisone course  Continue xeljanz 5 mg BID.  I have provided lab slips to recheck lab at 4 and 8 weeks of therapy.    As for xeljanz he is going to apply for a different foundation to help support/supplement cost to make this more cost affordable. He has tried and has not been able to make headway with [employer].  I have reached out to his  for further assistance and follow-up.        Results for CANDI UMANA (MRN [id number]) as of 6/23/2017 18:44   Ref.  Range 6/1/2017 13:13   Hep B Surface Antibody Quant Latest Ref Range: 0.00-10.00 mIU/mL <3.10   Hepatitis B Surface Antigen Latest Ref Range: Negative  Negative   Hepatitis B Ccore Ab, Total Latest Ref Range: Negative  Negative   Hepatitis C Antibody Latest Ref Range: Negative  Negative   Mitogen-Nil Unknown 60.763   Nil Unknown 0.029   Quantiferon TB Gold Latest Ref Range: Negative  Negative   TB Ag-Nil Unknown -0.010       Aortic insufficiency  Not symptomatic  Followed by cardiology    Chronic steroid use  Calcium and vitamin D  DEXA showed osteopenia  Today we discussed treatment and side effects of alendronate  At next visit we will calculate his FRAX.  Currently prednisone dose is < 7.5mg so we will not need to add a correction factor to the FRAX. Pending the results will start fosamax.      S/p hand surgery  Doing well    No diagnosis found.      No Follow-up on file.    I have spent greater than 50% of this 30 minute visit in counseling/coordination of care with the patient regarding a discussion about therapy plan    He agreed and verbalized his agreement and understanding with the current plan. I answered all questions and concerns he has at this time and advised him to call at any time in there interim with questions or concerns in regards to his care.    Thank you for allowing me to participate in his care, I will continue to follow closely.     Please note that this dictation was created using voice recognition software. I have made every reasonable attempt to correct obvious errors, but I expect that there are errors of grammar and possibly content that I did not discover before finalizing the note.

## 2017-12-09 NOTE — PROGRESS NOTES
One month follow-up:   3:00pm: Outbound call to pt's Part D prescription plan ([employer]) to inquire what Tier Xeljanz is. Representative reports that Xeljanz is a Tier 5 Medication, which is why this is costing pt so much money. LSW discussed that pt has reported that this medication works best for him compared to similar drugs that are more affordable. Inquired what their Tier Exemption process looks like. Representative reports that pt's provider would need to call [phone] to request Tier Exemption or complete form faxed to this LSW. LSW to start telephone encounter with above information to pt's Rheumatology provider to either complete form or call pt's part D insurance to request a Tier Exemption, if provider deems this medically appropriate.     4:00pm: Outbound call to pt to follow-up with his ability to apply for Xeljanz Manufacturing Assistance program. Pt reports that he received LSW's email with information to apply for program. He reports that there are no forms online to complete, but that they require the patient to call. Pt reports that he has called [email or web address] and they are reporting that there are no forms that pt can download or fill out online. Instead, pt will need to obtain a print-out form from his part D prescription plan that states how much his co-pay amount will be for his Xeljanx along with proof of income. After pt receives this information he is to send it to [email or web address] to process and determine if he qualifies for their assistance program. Pt reports that is not having any difficulty completing this process, but is planning to call his Part D prescription plan to send this information soon. Encouraged pt to call this LSW if he is having any difficulty with this process. Pt is requesting to obtain a sample from the [facility] in the mean time as he is working on completing the manufacturing assistance process for his Xeljanz. Pt reports that he plans to ask  Dr. Lao to write a prescription for this during his Monday apt with him. LSW informed pt that as of today 12/08/17, [facility] in stock as a sample.     LSW also inquired what pt's gross monthly income to determine if he qualifies for any additional assistance, such as Extra Help, as pt was unsure when LSW initially completed assessment. Pt reports him and his wife make around $3,300/monthly. LSW confirmed that pt is over the income guidelines to qualify for Extra Help. Pt verbalized understanding.     Plan:  -LSW to request Rheumatology provider complete Tier Exemption, should provider deem this medically appropriate.   -Pt to continue to work with Xeljanx manufacturing assistance program to potentially obtain this medication free of charge  -Pt to request provider [facility] an [facility] to [facility] a sample of his Xeljanx Rx on 12/11/17  -Pt to reach out to LSW with any questions/concerns

## 2017-12-11 ENCOUNTER — OFFICE VISIT (OUTPATIENT)
Dept: MEDICAL GROUP | Facility: CLINIC | Age: 62
End: 2017-12-11
Payer: MEDICARE

## 2017-12-11 VITALS
SYSTOLIC BLOOD PRESSURE: 128 MMHG | TEMPERATURE: 97.8 F | WEIGHT: 214.5 LBS | OXYGEN SATURATION: 98 % | RESPIRATION RATE: 16 BRPM | DIASTOLIC BLOOD PRESSURE: 72 MMHG | BODY MASS INDEX: 30.71 KG/M2 | HEIGHT: 70 IN | HEART RATE: 80 BPM

## 2017-12-11 DIAGNOSIS — Z79.52 CURRENT CHRONIC USE OF SYSTEMIC STEROIDS: ICD-10-CM

## 2017-12-11 DIAGNOSIS — Z79.899 ENCOUNTER FOR LONG-TERM (CURRENT) USE OF HIGH-RISK MEDICATION: ICD-10-CM

## 2017-12-11 DIAGNOSIS — M06.9 RHEUMATOID ARTHRITIS FLARE (HCC): ICD-10-CM

## 2017-12-11 DIAGNOSIS — M06.9 RHEUMATOID ARTHRITIS INVOLVING MULTIPLE SITES, UNSPECIFIED RHEUMATOID FACTOR PRESENCE: ICD-10-CM

## 2017-12-11 PROCEDURE — 99214 OFFICE O/P EST MOD 30 MIN: CPT | Performed by: INTERNAL MEDICINE

## 2017-12-11 RX ORDER — OXYCODONE AND ACETAMINOPHEN 10; 325 MG/1; MG/1
1 TABLET ORAL
Qty: 150 TAB | Refills: 0 | Status: SHIPPED | OUTPATIENT
Start: 2017-12-11 | End: 2018-03-12

## 2017-12-11 RX ORDER — OXYCODONE AND ACETAMINOPHEN 10; 325 MG/1; MG/1
1 TABLET ORAL
Qty: 150 TAB | Refills: 0 | Status: SHIPPED | OUTPATIENT
Start: 2017-12-11 | End: 2017-12-11 | Stop reason: SDUPTHER

## 2017-12-12 ENCOUNTER — TELEPHONE (OUTPATIENT)
Dept: CARDIOLOGY | Facility: MEDICAL CENTER | Age: 62
End: 2017-12-12

## 2017-12-12 NOTE — TELEPHONE ENCOUNTER
----- Message from Stas Zabala M.D. sent at 12/12/2017  8:27 AM PST -----  Is pt currently taking a statin?

## 2017-12-12 NOTE — TELEPHONE ENCOUNTER
Called patient. He states that he is still taking Pravastatin 20 mg daily, but he is also taking Xeljanz 5mg BID (for arthritis). The patient was told that Xeljanz can increase his cholesterol, and he is going to have his cholesterol checked again on Friday.    The patient states that he is aware that he needs to make another appointment with Dr. Zabala, he states that he will call the office to schedule after he has his labs drawn.    CHRISTIE WEISS

## 2017-12-12 NOTE — PROGRESS NOTES
CC: Edgardo Sims is a 62 y.o. male is suffering from No chief complaint on file.        SUBJECTIVE:  1. Current chronic use of systemic steroids  Patient's here for follow-up is using steroids on a regular basis, we've discussed that this causes significant problems with osteopenia or osteoporosis. Is also risk for adrenal suppression    2. Rheumatoid arthritis involving multiple sites, unspecified rheumatoid factor presence (CMS-Formerly [facility])  Patient with documented rheumatoid arthritis being followed by Dr. Jolley.     3. Encounter for long-term (current) use of high-risk medication  Medication written Xeljanz and hydrocodone.         Past social, family, history:   Social History   Substance Use Topics   • Smoking status: Never Smoker   • Smokeless tobacco: Never Used   • Alcohol use 3.0 oz/week     6 Cans of beer per week         MEDICATIONS:    Current Outpatient Prescriptions:   •  Tofacitinib Citrate (XELJANZ) 5 MG Tab, Take 5 mg by mouth 2 Times a Day. USE SAMPLES, Disp: 60 Tab, Rfl: 6  •  oxycodone-acetaminophen (PERCOCET-10)  MG Tab, Take 1 Tab by mouth 5 Times a Day. ICD -10 M06.9, Disp: 150 Tab, Rfl: 0  •  Garlic 1000 MG Cap, Take  by mouth., Disp: , Rfl:   •  predniSONE (DELTASONE) 5 MG Tab, 20 mg po q day x 5 days and decrease every 5 days by 5 mg when done resume prednisone 2 mg daily, Disp: 50 Tab, Rfl: 0  •  lisinopril (PRINIVIL) 10 MG Tab, TAKE ONE TABLET BY MOUTH ONCE DAILY, Disp: 90 Tab, Rfl: 2  •  testosterone cypionate (DEPO-TESTOSTERONE) 200 MG/ML Solution injection, INJECT 1 ML (CC) INTRAMUSCULARLY ONCE EVERY TWO WEEKS AS DIRECTED, Disp: 10 mL, Rfl: 0  •  pravastatin (PRAVACHOL) 20 MG Tab, Take 1 Tab by mouth every bedtime., Disp: 30 Tab, Rfl: 11  •  cyclobenzaprine (FLEXERIL) 10 MG Tab, TAKE ONE TABLET BY MOUTH THREE TIMES DAILY AS NEEDED FOR MILD PAIN, Disp: 90 Tab, Rfl: 5  •  meloxicam (MOBIC) 15 MG tablet, TAKE ONE TABLET BY MOUTH ONCE DAILY, Disp: 30 Tab, Rfl: 6  •   Cyanocobalamin (VITAMIN B-12) 5000 MCG TABLET DISPERSIBLE, Take  by mouth., Disp: , Rfl:   •  niacin 500 MG Tab, Take 500 mg by mouth every day., Disp: , Rfl:   •  diazepam (VALIUM) 5 MG Tab, TAKE ONE TABLET BY MOUTH EVERY 12 HOURS AS NEEDED FOR ANXIETY, Disp: 60 Tab, Rfl: 5  •  paroxetine (PAXIL) 20 MG Tab, TAKE ONE TABLET BY MOUTH ONCE DAILY, Disp: 90 Tab, Rfl: 3  •  Multiple Vitamins-Minerals (MULTI COMPLETE PO), Take  by mouth., Disp: , Rfl:   •  bisoprolol (ZEBETA) 10 MG tablet, Take 1 Tab by mouth every day., Disp: 90 Tab, Rfl: 3  •  vardenafil (LEVITRA) 20 MG tablet, Take 20 mg by mouth as needed., Disp: , Rfl:   •  vitamin e (VITAMIN E) 400 UNIT Cap, Take 400 Units by mouth every day., Disp: , Rfl:   •  MELOXICAM PO, , Disp: , Rfl:   •  clindamycin (CLEOCIN) 300 MG Cap, , Disp: , Rfl:   •  predniSONE (DELTASONE) 1 MG Tab, 2 mg po q day, Disp: 60 Tab, Rfl: 1  •  methotrexate 2.5 MG Tab, TAKE SIX TABLETS BY MOUTH ONCE A WEEK, Disp: 24 Tab, Rfl: 2  •  cephALEXin (KEFLEX) 500 MG Cap, Take 1 Cap by mouth 4 times a day., Disp: 40 Cap, Rfl: 0  •  Calcium Carbonate-Vitamin D (CALCIUM 600 + D) 600-400 MG-UNIT Tab, Take 1 Tab by mouth 2 times a day, with meals., Disp: 60 Tab, Rfl: 3  •  folic acid (FOLVITE) 1 MG Tab, Take 3 Tabs by mouth every day., Disp: 90 Tab, Rfl: 2  •  ergocalciferol (DRISDOL) 56047 UNIT capsule, Take 1 Cap by mouth every 7 days., Disp: 12 Cap, Rfl: 0  •  Zinc 50 MG Cap, Take 50 mg by mouth every day., Disp: , Rfl:   •  omeprazole (PRILOSEC) 20 MG CPDR, Take 20 mg by mouth every day., Disp: , Rfl:     PROBLEMS:  Patient Active Problem List    Diagnosis Date Noted   • Inadequate community resources 09/14/2017   • Chronic use of opiate drugs therapeutic purposes 08/12/2017   • Elevated CPK 09/23/2016   • Depression 07/13/2015   • Anxiety 03/31/2015   • Coronary artery disease due to calcified coronary lesion: Mildly cardiac catheterization in 2014 12/05/2014   • Tachycardia 10/20/2014   • Abnormal  "myocardial perfusion study 01/15/2014   • Osteoarthrosis, unspecified whether generalized or localized, pelvic region and thigh 05/31/2013   • Dyslipidemia 07/12/2011   • Aortic insufficiency 07/05/2011   • Essential hypertension 07/05/2011   • Rheumatoid arthritis (CMS-HCC) 05/05/2009   • Hypogonadism male 05/05/2009       REVIEW OF SYSTEMS:  Gen.:  No Nausea, Vomiting, fever, Chills.  Heart: No chest pain.  Lungs:  No shortness of Breath.  Psychological: Kian unusual Anxiety depression     PHYSICAL EXAM   Constitutional: Alert, cooperative, not in acute distress.  Cardiovascular:  Rate Rhythm is regular without murmurs rubs clicks.     Thorax & Lungs: Clear to auscultation, no wheezing, rhonchi, or rales  HENT: Normocephalic, Atraumatic.  Eyes: PERRLA, EOMI, Conjunctiva normal.   Neck: Trachia is midline no swelling of the thyroid.   Lymphatic: No lymphadenopathy noted.   Neurologic: Alert & oriented x 3, cranial nerves II through XII are intact, Normal motor function, Normal sensory function, No focal deficits noted.   Psychiatric: Affect normal, Judgment normal, Mood normal.     VITAL SIGNS:/72   Pulse 80   Temp 36.6 °C (97.8 °F)   Resp 16   Ht 1.778 m (5' 10\")   Wt 97.3 kg (214 lb 8 oz)   SpO2 98%   BMI 30.78 kg/m²     Labs: Reviewed    Assessment:                                                     Plan:    1. Current chronic use of systemic steroids  Patient and I have discussed that he is at significant risk for having problems with adrenal insufficiency and osteoporosis or osteopenia because of the use of steroids. At this juncture the patient has very little in the way of  alternatives.  -  2. Rheumatoid arthritis involving multiple sites, unspecified rheumatoid factor presence (CMS-HCC)  Continue Xeljanz followed by rheumatology Dr. Canales.   - Tofacitinib Citrate (XELJANZ) 5 MG Tab; Take 5 mg by mouth 2 Times a Day. USE SAMPLES  Dispense: 60 Tab; Refill: 6    3. Encounter for long-term " (current) use of high-risk medication  Prescriptions written for Xeljanz and Percocet  - Tofacitinib Citrate (XELJANZ) 5 MG Tab; Take 5 mg by mouth 2 Times a Day. USE SAMPLES  Dispense: 60 Tab; Refill: 6  - oxycodone-acetaminophen (PERCOCET-10)  MG Tab; Take 1 Tab by mouth 5 Times a Day. ICD -10 M06.9  Dispense: 150 Tab; Refill: 0

## 2017-12-18 ENCOUNTER — TELEPHONE (OUTPATIENT)
Dept: CARDIOLOGY | Facility: MEDICAL CENTER | Age: 62
End: 2017-12-18

## 2017-12-18 DIAGNOSIS — R74.8 ELEVATED CPK: ICD-10-CM

## 2017-12-18 NOTE — TELEPHONE ENCOUNTER
----- Message -----  From: Stas Zabala M.D.  Sent: 12/12/2017   8:28 PM  To: Carola Mayorga R.N.    DC statin if pt is still taking and repeat CPK in a month.      ========================================================================    Attempted to call pt, no answer, left vm to call back

## 2017-12-19 ENCOUNTER — HOSPITAL ENCOUNTER (OUTPATIENT)
Dept: LAB | Facility: MEDICAL CENTER | Age: 62
End: 2017-12-19
Attending: INTERNAL MEDICINE
Payer: MEDICARE

## 2017-12-19 ENCOUNTER — TELEPHONE (OUTPATIENT)
Dept: RHEUMATOLOGY | Facility: PHYSICIAN GROUP | Age: 62
End: 2017-12-19

## 2017-12-19 DIAGNOSIS — I25.84 CORONARY ARTERY DISEASE DUE TO CALCIFIED CORONARY LESION: ICD-10-CM

## 2017-12-19 DIAGNOSIS — I25.10 CORONARY ARTERY DISEASE DUE TO CALCIFIED CORONARY LESION: ICD-10-CM

## 2017-12-19 DIAGNOSIS — Z79.899 ENCOUNTER FOR LONG-TERM (CURRENT) USE OF HIGH-RISK MEDICATION: ICD-10-CM

## 2017-12-19 DIAGNOSIS — E78.5 DYSLIPIDEMIA: ICD-10-CM

## 2017-12-19 DIAGNOSIS — I10 ESSENTIAL HYPERTENSION: ICD-10-CM

## 2017-12-19 DIAGNOSIS — D72.819 LEUKOPENIA, UNSPECIFIED TYPE: ICD-10-CM

## 2017-12-19 LAB
ALBUMIN SERPL BCP-MCNC: 4.5 G/DL (ref 3.2–4.9)
ALBUMIN SERPL BCP-MCNC: 4.5 G/DL (ref 3.2–4.9)
ALBUMIN/GLOB SERPL: 1.7 G/DL
ALBUMIN/GLOB SERPL: 1.7 G/DL
ALP SERPL-CCNC: 61 U/L (ref 30–99)
ALP SERPL-CCNC: 63 U/L (ref 30–99)
ALT SERPL-CCNC: 28 U/L (ref 2–50)
ALT SERPL-CCNC: 29 U/L (ref 2–50)
ANION GAP SERPL CALC-SCNC: 11 MMOL/L (ref 0–11.9)
ANION GAP SERPL CALC-SCNC: 7 MMOL/L (ref 0–11.9)
AST SERPL-CCNC: 31 U/L (ref 12–45)
AST SERPL-CCNC: 32 U/L (ref 12–45)
BASOPHILS # BLD AUTO: 3.6 % (ref 0–1.8)
BASOPHILS # BLD: 0.08 K/UL (ref 0–0.12)
BILIRUB SERPL-MCNC: 0.6 MG/DL (ref 0.1–1.5)
BILIRUB SERPL-MCNC: 0.6 MG/DL (ref 0.1–1.5)
BUN SERPL-MCNC: 11 MG/DL (ref 8–22)
BUN SERPL-MCNC: 11 MG/DL (ref 8–22)
CALCIUM SERPL-MCNC: 9.4 MG/DL (ref 8.5–10.5)
CALCIUM SERPL-MCNC: 9.5 MG/DL (ref 8.5–10.5)
CHLORIDE SERPL-SCNC: 101 MMOL/L (ref 96–112)
CHLORIDE SERPL-SCNC: 99 MMOL/L (ref 96–112)
CHOLEST SERPL-MCNC: 183 MG/DL (ref 100–199)
CHOLEST SERPL-MCNC: 191 MG/DL (ref 100–199)
CK SERPL-CCNC: 146 U/L (ref 0–154)
CO2 SERPL-SCNC: 29 MMOL/L (ref 20–33)
CO2 SERPL-SCNC: 29 MMOL/L (ref 20–33)
CREAT SERPL-MCNC: 0.97 MG/DL (ref 0.5–1.4)
CREAT SERPL-MCNC: 0.99 MG/DL (ref 0.5–1.4)
CRP SERPL HS-MCNC: 0.07 MG/DL (ref 0–0.75)
EOSINOPHIL # BLD AUTO: 0 K/UL (ref 0–0.51)
EOSINOPHIL NFR BLD: 0 % (ref 0–6.9)
ERYTHROCYTE [DISTWIDTH] IN BLOOD BY AUTOMATED COUNT: 52.4 FL (ref 35.9–50)
ERYTHROCYTE [SEDIMENTATION RATE] IN BLOOD BY WESTERGREN METHOD: 0 MM/HOUR (ref 0–20)
GFR SERPL CREATININE-BSD FRML MDRD: >60 ML/MIN/1.73 M 2
GFR SERPL CREATININE-BSD FRML MDRD: >60 ML/MIN/1.73 M 2
GLOBULIN SER CALC-MCNC: 2.6 G/DL (ref 1.9–3.5)
GLOBULIN SER CALC-MCNC: 2.6 G/DL (ref 1.9–3.5)
GLUCOSE SERPL-MCNC: 117 MG/DL (ref 65–99)
GLUCOSE SERPL-MCNC: 118 MG/DL (ref 65–99)
HCT VFR BLD AUTO: 50.2 % (ref 42–52)
HDLC SERPL-MCNC: 66 MG/DL
HDLC SERPL-MCNC: 67 MG/DL
HGB BLD-MCNC: 16.7 G/DL (ref 14–18)
LDLC SERPL CALC-MCNC: 100 MG/DL
LDLC SERPL CALC-MCNC: 107 MG/DL
LYMPHOCYTES # BLD AUTO: 1.14 K/UL (ref 1–4.8)
LYMPHOCYTES NFR BLD: 51.8 % (ref 22–41)
MANUAL DIFF BLD: NORMAL
MCH RBC QN AUTO: 30.5 PG (ref 27–33)
MCHC RBC AUTO-ENTMCNC: 33.3 G/DL (ref 33.7–35.3)
MCV RBC AUTO: 91.6 FL (ref 81.4–97.8)
METAMYELOCYTES NFR BLD MANUAL: 0.9 %
MONOCYTES # BLD AUTO: 0.8 K/UL (ref 0–0.85)
MONOCYTES NFR BLD AUTO: 36.4 % (ref 0–13.4)
MORPHOLOGY BLD-IMP: NORMAL
NEUTROPHILS # BLD AUTO: 0.16 K/UL (ref 1.82–7.42)
NEUTROPHILS NFR BLD: 7.3 % (ref 44–72)
NRBC # BLD AUTO: 0 K/UL
NRBC BLD-RTO: 0 /100 WBC
PLATELET # BLD AUTO: 232 K/UL (ref 164–446)
PLATELET BLD QL SMEAR: NORMAL
PMV BLD AUTO: 10.4 FL (ref 9–12.9)
POTASSIUM SERPL-SCNC: 4.3 MMOL/L (ref 3.6–5.5)
POTASSIUM SERPL-SCNC: 4.3 MMOL/L (ref 3.6–5.5)
PROT SERPL-MCNC: 7.1 G/DL (ref 6–8.2)
PROT SERPL-MCNC: 7.1 G/DL (ref 6–8.2)
RBC # BLD AUTO: 5.48 M/UL (ref 4.7–6.1)
RBC BLD AUTO: NORMAL
SODIUM SERPL-SCNC: 137 MMOL/L (ref 135–145)
SODIUM SERPL-SCNC: 139 MMOL/L (ref 135–145)
TRIGL SERPL-MCNC: 83 MG/DL (ref 0–149)
TRIGL SERPL-MCNC: 84 MG/DL (ref 0–149)
WBC # BLD AUTO: 2.2 K/UL (ref 4.8–10.8)

## 2017-12-19 PROCEDURE — 85027 COMPLETE CBC AUTOMATED: CPT

## 2017-12-19 PROCEDURE — 86140 C-REACTIVE PROTEIN: CPT

## 2017-12-19 PROCEDURE — 82550 ASSAY OF CK (CPK): CPT

## 2017-12-19 PROCEDURE — 80053 COMPREHEN METABOLIC PANEL: CPT | Mod: 91

## 2017-12-19 PROCEDURE — 80061 LIPID PANEL: CPT | Mod: 91

## 2017-12-19 PROCEDURE — 80053 COMPREHEN METABOLIC PANEL: CPT

## 2017-12-19 PROCEDURE — 80061 LIPID PANEL: CPT

## 2017-12-19 PROCEDURE — 36415 COLL VENOUS BLD VENIPUNCTURE: CPT

## 2017-12-19 PROCEDURE — 85007 BL SMEAR W/DIFF WBC COUNT: CPT

## 2017-12-19 PROCEDURE — 85652 RBC SED RATE AUTOMATED: CPT

## 2017-12-20 NOTE — TELEPHONE ENCOUNTER
Called pt, discussed lab result and Dr Zabala recommendations, pt is just concerned because pt reports he was just recently started on a meds that supposedly could increase his Cholesterol so he would schedule an appt w/ Dr Zabala to discussed this, pt transferred to scheduling dept, direct phone number provided to pt.    MAR updated, repeat CPK ordered, lab slip mailed to pt.

## 2017-12-20 NOTE — TELEPHONE ENCOUNTER
Please notify patient we got the results of his blood tests he did today and it shows that his white blood cell count which is the cells that fight off infection are quite low  This isn't the usual case for this patient, I believe this might be a laboratory error but we need to recheck  I have reordered the CBC in the computer, patient can do when he has time this week, does not need to be fasting

## 2017-12-24 ENCOUNTER — TELEPHONE (OUTPATIENT)
Dept: RHEUMATOLOGY | Facility: PHYSICIAN GROUP | Age: 62
End: 2017-12-24

## 2017-12-26 ENCOUNTER — HOSPITAL ENCOUNTER (OUTPATIENT)
Dept: LAB | Facility: MEDICAL CENTER | Age: 62
End: 2017-12-26
Attending: INTERNAL MEDICINE
Payer: MEDICARE

## 2017-12-26 LAB
BASOPHILS # BLD AUTO: 0.9 % (ref 0–1.8)
BASOPHILS # BLD: 0.09 K/UL (ref 0–0.12)
EOSINOPHIL # BLD AUTO: 0 K/UL (ref 0–0.51)
EOSINOPHIL NFR BLD: 0 % (ref 0–6.9)
ERYTHROCYTE [DISTWIDTH] IN BLOOD BY AUTOMATED COUNT: 51.8 FL (ref 35.9–50)
HCT VFR BLD AUTO: 48.6 % (ref 42–52)
HGB BLD-MCNC: 16.4 G/DL (ref 14–18)
LYMPHOCYTES # BLD AUTO: 4.23 K/UL (ref 1–4.8)
LYMPHOCYTES NFR BLD: 41.5 % (ref 22–41)
MANUAL DIFF BLD: NORMAL
MCH RBC QN AUTO: 30.3 PG (ref 27–33)
MCHC RBC AUTO-ENTMCNC: 33.7 G/DL (ref 33.7–35.3)
MCV RBC AUTO: 89.8 FL (ref 81.4–97.8)
MONOCYTES # BLD AUTO: 1.56 K/UL (ref 0–0.85)
MONOCYTES NFR BLD AUTO: 15.3 % (ref 0–13.4)
MORPHOLOGY BLD-IMP: NORMAL
NEUTROPHILS # BLD AUTO: 4.31 K/UL (ref 1.82–7.42)
NEUTROPHILS NFR BLD: 38.7 % (ref 44–72)
NEUTS BAND NFR BLD MANUAL: 3.6 % (ref 0–10)
NRBC # BLD AUTO: 0.04 K/UL
NRBC BLD-RTO: 0.4 /100 WBC
PLATELET # BLD AUTO: 277 K/UL (ref 164–446)
PLATELET BLD QL SMEAR: NORMAL
PMV BLD AUTO: 10 FL (ref 9–12.9)
RBC # BLD AUTO: 5.41 M/UL (ref 4.7–6.1)
RBC BLD AUTO: NORMAL
WBC # BLD AUTO: 10.2 K/UL (ref 4.8–10.8)

## 2017-12-26 PROCEDURE — 85027 COMPLETE CBC AUTOMATED: CPT

## 2017-12-26 PROCEDURE — 85007 BL SMEAR W/DIFF WBC COUNT: CPT

## 2017-12-26 PROCEDURE — 36415 COLL VENOUS BLD VENIPUNCTURE: CPT

## 2017-12-27 ENCOUNTER — TELEPHONE (OUTPATIENT)
Dept: RHEUMATOLOGY | Facility: PHYSICIAN GROUP | Age: 62
End: 2017-12-27

## 2017-12-29 ENCOUNTER — TELEPHONE (OUTPATIENT)
Dept: CARDIOLOGY | Facility: MEDICAL CENTER | Age: 62
End: 2017-12-29

## 2017-12-29 NOTE — TELEPHONE ENCOUNTER
----- Message -----   From: Stas Zabala M.D.   Sent: 12/28/2017  12:14 PM   To: Carola Mayorga R.N.     Increase pravastatin to 40 mg/d. Lipid/cmp in 6 weeks        =======================================================    Upon chart review Dr Zabala would like to dc the statin on 12/18/17 because of elevated CPK.     To Dr Zabala to please clarify, Thank You.

## 2017-12-29 NOTE — TELEPHONE ENCOUNTER
----- Message from Siena Bishop R.N. sent at 12/29/2017  2:41 PM PST -----      ----- Message -----  From: Stas Zabala M.D.  Sent: 12/28/2017  12:14 PM  To: Carola Mayorga R.N.    Increase pravastatin to 40 mg/d. Lipid/cmp in 6 weeks

## 2018-01-13 DIAGNOSIS — E29.1 HYPOGONADISM MALE: ICD-10-CM

## 2018-01-15 RX ORDER — TESTOSTERONE CYPIONATE 200 MG/ML
200 INJECTION, SOLUTION INTRAMUSCULAR
Qty: 10 ML | Refills: 1 | Status: SHIPPED | OUTPATIENT
Start: 2018-01-15 | End: 2018-01-19 | Stop reason: SDUPTHER

## 2018-01-15 NOTE — TELEPHONE ENCOUNTER
From: Edgardo Sims  Sent: 1/13/2018 11:46 AM PST  Subject: Medication Renewal Request    Edgardo Sims would like a refill of the following medications:     testosterone cypionate (DEPO-TESTOSTERONE) 200 MG/ML Solution injection [John Lao, D.O.]    Preferred pharmacy: Other - [facility]    

## 2018-01-19 DIAGNOSIS — I10 ESSENTIAL HYPERTENSION: ICD-10-CM

## 2018-01-19 DIAGNOSIS — E29.1 HYPOGONADISM MALE: ICD-10-CM

## 2018-01-19 DIAGNOSIS — R00.0 TACHYCARDIA: ICD-10-CM

## 2018-01-19 RX ORDER — TESTOSTERONE CYPIONATE 200 MG/ML
200 INJECTION, SOLUTION INTRAMUSCULAR
Qty: 10 ML | Refills: 1 | Status: SHIPPED
Start: 2018-01-19 | End: 2018-06-20 | Stop reason: SDUPTHER

## 2018-01-19 RX ORDER — BISOPROLOL FUMARATE 10 MG/1
10 TABLET, FILM COATED ORAL DAILY
Qty: 90 TAB | Refills: 1 | Status: SHIPPED | OUTPATIENT
Start: 2018-01-19 | End: 2018-04-30

## 2018-01-22 ENCOUNTER — TELEPHONE (OUTPATIENT)
Dept: RHEUMATOLOGY | Facility: PHYSICIAN GROUP | Age: 63
End: 2018-01-22

## 2018-01-22 DIAGNOSIS — M05.9 RHEUMATOID ARTHRITIS WITH POSITIVE RHEUMATOID FACTOR, INVOLVING UNSPECIFIED SITE (HCC): ICD-10-CM

## 2018-01-22 DIAGNOSIS — Z79.899 ENCOUNTER FOR LONG-TERM (CURRENT) USE OF HIGH-RISK MEDICATION: ICD-10-CM

## 2018-01-26 ENCOUNTER — HOSPITAL ENCOUNTER (OUTPATIENT)
Dept: LAB | Facility: MEDICAL CENTER | Age: 63
End: 2018-01-26
Attending: INTERNAL MEDICINE
Payer: MEDICARE

## 2018-01-26 DIAGNOSIS — M05.9 RHEUMATOID ARTHRITIS WITH POSITIVE RHEUMATOID FACTOR, INVOLVING UNSPECIFIED SITE (HCC): ICD-10-CM

## 2018-01-26 DIAGNOSIS — R74.8 ELEVATED CPK: ICD-10-CM

## 2018-01-26 DIAGNOSIS — E29.1 HYPOGONADISM MALE: ICD-10-CM

## 2018-01-26 DIAGNOSIS — Z79.899 ENCOUNTER FOR LONG-TERM (CURRENT) USE OF HIGH-RISK MEDICATION: ICD-10-CM

## 2018-01-26 LAB
ALBUMIN SERPL BCP-MCNC: 4 G/DL (ref 3.2–4.9)
ALBUMIN/GLOB SERPL: 1.6 G/DL
ALP SERPL-CCNC: 56 U/L (ref 30–99)
ALT SERPL-CCNC: 24 U/L (ref 2–50)
ANION GAP SERPL CALC-SCNC: 7 MMOL/L (ref 0–11.9)
AST SERPL-CCNC: 26 U/L (ref 12–45)
BASOPHILS # BLD AUTO: 0.9 % (ref 0–1.8)
BASOPHILS # BLD: 0.07 K/UL (ref 0–0.12)
BILIRUB SERPL-MCNC: 0.8 MG/DL (ref 0.1–1.5)
BUN SERPL-MCNC: 13 MG/DL (ref 8–22)
CALCIUM SERPL-MCNC: 9.8 MG/DL (ref 8.5–10.5)
CHLORIDE SERPL-SCNC: 100 MMOL/L (ref 96–112)
CHOLEST SERPL-MCNC: 153 MG/DL (ref 100–199)
CK SERPL-CCNC: 141 U/L (ref 0–154)
CO2 SERPL-SCNC: 28 MMOL/L (ref 20–33)
CREAT SERPL-MCNC: 0.93 MG/DL (ref 0.5–1.4)
CRP SERPL HS-MCNC: 0.13 MG/DL (ref 0–0.75)
EOSINOPHIL # BLD AUTO: 0.29 K/UL (ref 0–0.51)
EOSINOPHIL NFR BLD: 3.9 % (ref 0–6.9)
ERYTHROCYTE [DISTWIDTH] IN BLOOD BY AUTOMATED COUNT: 55.8 FL (ref 35.9–50)
ERYTHROCYTE [SEDIMENTATION RATE] IN BLOOD BY WESTERGREN METHOD: 0 MM/HOUR (ref 0–20)
GLOBULIN SER CALC-MCNC: 2.5 G/DL (ref 1.9–3.5)
GLUCOSE SERPL-MCNC: 90 MG/DL (ref 65–99)
HCT VFR BLD AUTO: 46 % (ref 42–52)
HDLC SERPL-MCNC: 56 MG/DL
HGB BLD-MCNC: 15.2 G/DL (ref 14–18)
IMM GRANULOCYTES # BLD AUTO: 0.16 K/UL (ref 0–0.11)
IMM GRANULOCYTES NFR BLD AUTO: 2.2 % (ref 0–0.9)
LDLC SERPL CALC-MCNC: 80 MG/DL
LYMPHOCYTES # BLD AUTO: 1.93 K/UL (ref 1–4.8)
LYMPHOCYTES NFR BLD: 25.9 % (ref 22–41)
MCH RBC QN AUTO: 30.5 PG (ref 27–33)
MCHC RBC AUTO-ENTMCNC: 33 G/DL (ref 33.7–35.3)
MCV RBC AUTO: 92.4 FL (ref 81.4–97.8)
MONOCYTES # BLD AUTO: 1.09 K/UL (ref 0–0.85)
MONOCYTES NFR BLD AUTO: 14.7 % (ref 0–13.4)
NEUTROPHILS # BLD AUTO: 3.9 K/UL (ref 1.82–7.42)
NEUTROPHILS NFR BLD: 52.4 % (ref 44–72)
NRBC # BLD AUTO: 0 K/UL
NRBC BLD-RTO: 0 /100 WBC
PLATELET # BLD AUTO: 197 K/UL (ref 164–446)
PMV BLD AUTO: 10.6 FL (ref 9–12.9)
POTASSIUM SERPL-SCNC: 4.9 MMOL/L (ref 3.6–5.5)
PROT SERPL-MCNC: 6.5 G/DL (ref 6–8.2)
PSA SERPL-MCNC: 3.26 NG/ML (ref 0–4)
RBC # BLD AUTO: 4.98 M/UL (ref 4.7–6.1)
SODIUM SERPL-SCNC: 135 MMOL/L (ref 135–145)
TESTOST SERPL-MCNC: 935 NG/DL (ref 175–781)
TRIGL SERPL-MCNC: 84 MG/DL (ref 0–149)
WBC # BLD AUTO: 7.4 K/UL (ref 4.8–10.8)

## 2018-01-26 PROCEDURE — 84403 ASSAY OF TOTAL TESTOSTERONE: CPT

## 2018-01-26 PROCEDURE — 82550 ASSAY OF CK (CPK): CPT

## 2018-01-26 PROCEDURE — 86140 C-REACTIVE PROTEIN: CPT

## 2018-01-26 PROCEDURE — 85025 COMPLETE CBC W/AUTO DIFF WBC: CPT

## 2018-01-26 PROCEDURE — 85652 RBC SED RATE AUTOMATED: CPT

## 2018-01-26 PROCEDURE — 84153 ASSAY OF PSA TOTAL: CPT | Mod: GA

## 2018-01-26 PROCEDURE — 80053 COMPREHEN METABOLIC PANEL: CPT

## 2018-01-26 PROCEDURE — 80061 LIPID PANEL: CPT

## 2018-01-26 PROCEDURE — 36415 COLL VENOUS BLD VENIPUNCTURE: CPT

## 2018-01-29 ENCOUNTER — PATIENT OUTREACH (OUTPATIENT)
Dept: HEALTH INFORMATION MANAGEMENT | Facility: OTHER | Age: 63
End: 2018-01-29

## 2018-01-29 NOTE — PROGRESS NOTES
One month follow-up scheduled on this date with pt to discuss his ability to get funding for Xeljanz through the manufacturing assistance program. No answer, LVM with contact information requesting TC back.     Plan:  Will attempt to reach pt for one month follow-up 2nd attempt on 02/14/18 at 2:30, unless LSW hears from pt sooner.

## 2018-01-30 ENCOUNTER — PATIENT OUTREACH (OUTPATIENT)
Dept: HEALTH INFORMATION MANAGEMENT | Facility: OTHER | Age: 63
End: 2018-01-30

## 2018-01-30 ENCOUNTER — TELEPHONE (OUTPATIENT)
Dept: RHEUMATOLOGY | Facility: PHYSICIAN GROUP | Age: 63
End: 2018-01-30

## 2018-01-30 DIAGNOSIS — F41.9 ANXIETY: ICD-10-CM

## 2018-01-30 PROBLEM — Z75.4 INADEQUATE COMMUNITY RESOURCES: Status: RESOLVED | Noted: 2017-09-14 | Resolved: 2018-01-30

## 2018-01-30 PROBLEM — Z59.9 INADEQUATE COMMUNITY RESOURCES: Status: RESOLVED | Noted: 2017-09-14 | Resolved: 2018-01-30

## 2018-01-30 NOTE — TELEPHONE ENCOUNTER
X 2 days pt has had a severe sore throat, bilateral ear pain, low grade fever (100.3 today).  He has an Rx for Keflex on hand and is wondering if this would be appropriate.  Please advise.

## 2018-01-30 NOTE — PROGRESS NOTES
Incoming call from pt returning this LSW's VM. Pt reports that he was approved for the Manufacturing Assistance program for his Xeljanz medication. He reports that he was approved through [employer] beginning February 1, 2018 through the rest of the year. Congratulated pt on his hard work in getting approved for this medication.     Pt reports that he has the resources provided to him by this LSW for dental and vision resources as well. He reports that he will look over these things and call this LSW with any questions/concerns. No further social determinants identified during outreach call. LSW discussed graduation with pt as he has met his goals/interventions. Pt agreed. Encouraged him to call with any questions/concerns. Inquired if pt wanted this LSW to inform his providers (Dr. Lao and Dr. Canales) re: approval through manufacturing program. Pt requested LSW notify his providers.     Plan:   LSW to graduate pt from  , due to meeting goals/interventions.

## 2018-01-30 NOTE — TELEPHONE ENCOUNTER
Pt is due to take his Xeljanz today, but he is sick. Pt c/o sorethroat, and his ears hurt. Pt wants to know if he should take or skip his Xeljanz dose for today? Please, call ASAP.

## 2018-02-02 RX ORDER — DIAZEPAM 5 MG/1
5 TABLET ORAL EVERY 12 HOURS PRN
Qty: 60 TAB | Refills: 5 | Status: SHIPPED
Start: 2018-02-02 | End: 2018-03-04

## 2018-02-02 NOTE — TELEPHONE ENCOUNTER
Pt called today complaining of a worsening sore throat.  He cannot eat, it is very hard to swallow.  He is wondering if an Rx of Magic Mouthwash would help him.  Please advise.  Also requesting Rx for Valium.    Was the patient seen in the last year in this department? Yes     Does patient have an active prescription for medications requested? No     Received Request Via: Patient

## 2018-02-07 ENCOUNTER — OFFICE VISIT (OUTPATIENT)
Dept: RHEUMATOLOGY | Facility: PHYSICIAN GROUP | Age: 63
End: 2018-02-07
Payer: MEDICARE

## 2018-02-07 VITALS
RESPIRATION RATE: 14 BRPM | DIASTOLIC BLOOD PRESSURE: 68 MMHG | BODY MASS INDEX: 29.13 KG/M2 | HEART RATE: 90 BPM | TEMPERATURE: 97.2 F | SYSTOLIC BLOOD PRESSURE: 140 MMHG | OXYGEN SATURATION: 97 % | WEIGHT: 203 LBS

## 2018-02-07 DIAGNOSIS — M05.9 RHEUMATOID ARTHRITIS WITH POSITIVE RHEUMATOID FACTOR, INVOLVING UNSPECIFIED SITE (HCC): ICD-10-CM

## 2018-02-07 DIAGNOSIS — Z79.899 ENCOUNTER FOR LONG-TERM (CURRENT) USE OF HIGH-RISK MEDICATION: ICD-10-CM

## 2018-02-07 PROCEDURE — 99214 OFFICE O/P EST MOD 30 MIN: CPT | Performed by: INTERNAL MEDICINE

## 2018-02-07 ASSESSMENT — ENCOUNTER SYMPTOMS
COUGH: 1
LOSS OF CONSCIOUSNESS: 0
SEIZURES: 0
PHOTOPHOBIA: 0
EYE REDNESS: 0
PALPITATIONS: 0
ABDOMINAL PAIN: 0
CHILLS: 1
DEPRESSION: 0
VOMITING: 0
NAUSEA: 0
EYE PAIN: 0
STRIDOR: 0
BACK PAIN: 1
SORE THROAT: 1
SHORTNESS OF BREATH: 0
FEVER: 0
SPUTUM PRODUCTION: 1
WHEEZING: 0
INSOMNIA: 0

## 2018-02-07 NOTE — PROGRESS NOTES
Subjective:   Date of Consultation:2/7/2018  9:06 AM  Primary care physician:John Lao D.O.    Reason for Consultation:  Mr. Sims  is a pleasant 61 y.o. year old male who presented with follow-up for Rheumatoid Arthritis. Is on the xeljanz (tofacitinib) for the past two and a half months. He stopped taking it last week because he was sick [coughing, shortness of breath, chest congestion, temp of ~ 102.6]. Was started on Keflex by his PCP and he will finish by Friday. He feels better now, and he will restart taking xeljanz (tofacitinib) after he completely recovers.  Has morning stiffness lasting  60 minutes in the morning. Mild pain in the left MCP finger 4 & 3. HE did hold his medication when he was ill.    Rheumatoid Arthritis  Since last visit we started xeljanz (tofacitinib).  He has been on this for 2.5 months .  He is noticing some improvement and has been able to cut down pain medication.      Osteopenia noted in wrist  Previous fracture: no  Parent fractured hip: no  Current smoking: no  Glucocorticoids use > 3 months: yes  Rheumatoid arthritis: yes  Secondary osteoporosis : no  Alcohol 3 or more units/day: no    DEXA 9/5/2017  The mean bone mineral density for the lumbar spine is 1.196 g/cm2, which corresponds to a T score of -0.2 and a Z score of 0.2.  The distal left forearm has a mean bone mineral density of 0.837 g/cm2, with a T score of -1.5 and a Z score of -1.1.  FRAX scores are 14% & 2.6%    Current Medications  Methotrexate 6 tabs q Wednesday  (stopped)  xeljanz 5 mg BID  Prednisone 2 mg po q day  (he has been on long term prednisone)      RHEUM HISTORY  Mr. Edgardo Sims presented with a history of Rheumatoid Arthritis diagnosed late 1999 and recent RF and CCP were negative.  He was previously a patient of Dr. Mcknight and then followed by a community rheumatology.  He is here today to establish care. In review of Dr. Echavarria that it seems he has been on Rituxan as far back as  2009. At that time he was on a regimen of azathioprine 150 mg methotrexate 15 mg, prednisone 7.5 mg and rituximab with 125 mg of Solu-Medrol he seemed to have been maintained on this regimen and was tapered down on the Imuran to 50 mg twice a day. Notes from May 24, 2012 indicates that he discontinue the Imuran. At that time he was on rituximab and every 6 months 15 mg of methotrexate weekly and 2 mg of prednisone. Notes from June 8, 2015 does remark that his RA was only manageable he's had limited response. He did suffer a consultation with methotrexate developing stomatitis. Methotrexate was held March 6, 2014 and was reinstituted January 5, 2015.     Dr. Mcknight's notes it does comment that he may benefit from rituximab every 5 months however given cost restrictions he can only get it every 6 months. His last infusion was in March 2017 for rituximab.  He reports he is not doing as well on the rituximab.  He feels his activity level is not as active.  He feels increase stiffness.  His feet xrays     Complications to his care include intolerance to medications  And stomatitis with methotrexate.  Methotrexate was stopped briefly and subsequently was restarted but the patient reports it is not working as well as before.     Previous medications include   azathioprine, methotrexate  Plaquenil and Arava both intolerance not allergies  Remicade loss efficacyt  Gold shots (had a bad reaction)  Enbrel  Not sure Humira  Methylprednisolone did not work as well as prednisone  Never been on Xeljanz   Has not tried Actemra  rituximab (lost efficacy last infusion was 3/2017)  Orencia (not sure why he stopped)    Chronic Pain  On percocet ranging from 30-40 mg daily    Past medical HIstory: bilateral hip replacment and left knee replacement, tonsillitis, varicose vein in the left leg, TMJ, tonsillitis in 1964, broken right ankle in 1980, ruptured disc of L5-S1 noted in 2000 with discectomy and laminectomy, rotator cuff with  anterior labral repair in 2001, basal cell carcinoma in 2005 from the nose, meniscal tear of the left knee in 2008 with scope repair, scope repair and biceps tendon repair as well as rotator cuff surgery in 2011 of the right shoulder. Dupuytren's contracture right hand little finger, Dupuytren's contracture right foot, arthritis of the right foot, plantar fasciitis bilateral, aortic insufficiency, hypertension, dyslipidemia, heart murmur, tachycardia, coronary artery disease with mild plaque at catheter. Hiatal hernia and GERD, scoliosis, kyphosis of the upper back/compensating lordosis of the neck . Vein ligation radiofrequency      Family History: lymphoma     Social History: light bookeeping mainly disability, NEVER smoker, occasional alcohol (once a week)    Past Medical History:   Diagnosis Date   • Abnormal myocardial perfusion study 1/15/2014   • Aortic insufficiency 7/5/2011   • Arthritis     RA, and osteo   • Bronchitis    • CAD (coronary artery disease) mild plaque at cath in 2/14 12/5/2014   • Cancer (CMS-HCC)     skin   • Chronic use of opiate drugs therapeutic purposes 8/12/2017   • Cold     mid January 2014   • Coughing blood     none currently 2014   • Dyslipidemia 7/12/2011   • Heart burn    • Heart murmur    • Hiatus hernia syndrome    • HTN (hypertension) 7/5/2011   • Hypertension    • Indigestion    • Infectious disease    • Pain     RA   • Rheumatoid arthritis(714.0)     severe   • Tachycardia 10/20/2014     Past Surgical History:   Procedure Laterality Date   • RECOVERY  2/3/2014    Performed by Cath-Recovery Surgery at SURGERY SAME DAY [facility] ORS   • HIP ARTHROPLASTY TOTAL  5/31/2013    Performed by Burak Valles M.D. at SURGERY [facility] ORS   • KNEE ARTHROPLASTY TOTAL  6/1/2009    Performed by YONATAN HINSON at SURGERY [facility] ORS   • VEIN LIGATION RADIO FREQUENCY  12/8/2008    Performed by DEREJE MCCULLOUGH at SURGERY SAME DAY [facility] ORS   • HIP ARTHROPLASTY TOTAL  6/20/08     Performed by YONATAN HINSON at SURGERY [facility] ORS   • LUMBAR LAMINECTOMY DISKECTOMY  2000   • TMJ RECONSTRUCTION BILATERAL  1994   • KNEE ARTHROSCOPY      right   • OTHER      varicose vein stripping   • OTHER      heart murmur   • OTHER ORTHOPEDIC SURGERY      awaiting right hip surgery   • SHOULDER SURGERY      RIGHT 01/2011     Allergies   Allergen Reactions   • Crestor [Rosuvastatin Calcium] Myalgia   • Penicillins Hives     Outpatient Encounter Prescriptions as of 2/7/2018   Medication Sig Dispense Refill   • diazePAM (VALIUM) 5 MG Tab Take 1 Tab by mouth every 12 hours as needed for up to 30 days. FOR ANXIETY 60 Tab 5   • testosterone cypionate (DEPO-TESTOSTERONE) 200 MG/ML Solution injection 1 mL by Intramuscular route every 14 days for 130 days. 10 mL 1   • bisoprolol (ZEBETA) 10 MG tablet Take 1 Tab by mouth every day. 90 Tab 1   • oxycodone-acetaminophen (PERCOCET-10)  MG Tab Take 1 Tab by mouth 5 Times a Day. ICD -10 M06.9 150 Tab 0   • Garlic 1000 MG Cap Take  by mouth.     • vitamin e (VITAMIN E) 400 UNIT Cap Take 400 Units by mouth every day.     • lisinopril (PRINIVIL) 10 MG Tab TAKE ONE TABLET BY MOUTH ONCE DAILY 90 Tab 2   • MELOXICAM PO      • predniSONE (DELTASONE) 1 MG Tab 2 mg po q day 60 Tab 1   • cephALEXin (KEFLEX) 500 MG Cap Take 1 Cap by mouth 4 times a day. 40 Cap 0   • cyclobenzaprine (FLEXERIL) 10 MG Tab TAKE ONE TABLET BY MOUTH THREE TIMES DAILY AS NEEDED FOR MILD PAIN 90 Tab 5   • meloxicam (MOBIC) 15 MG tablet TAKE ONE TABLET BY MOUTH ONCE DAILY 30 Tab 6   • Calcium Carbonate-Vitamin D (CALCIUM 600 + D) 600-400 MG-UNIT Tab Take 1 Tab by mouth 2 times a day, with meals. 60 Tab 3   • Cyanocobalamin (VITAMIN B-12) 5000 MCG TABLET DISPERSIBLE Take  by mouth.     • niacin 500 MG Tab Take 500 mg by mouth every day.     • Zinc 50 MG Cap Take 50 mg by mouth every day.     • paroxetine (PAXIL) 20 MG Tab TAKE ONE TABLET BY MOUTH ONCE DAILY 90 Tab 3   • Multiple  Vitamins-Minerals (MULTI COMPLETE PO) Take  by mouth.     • vardenafil (LEVITRA) 20 MG tablet Take 20 mg by mouth as needed.     • omeprazole (PRILOSEC) 20 MG CPDR Take 20 mg by mouth every day.     • Tofacitinib Citrate (XELJANZ) 5 MG Tab Take 5 mg by mouth 2 Times a Day. SAMPLE 60 Tab 0   • predniSONE (DELTASONE) 5 MG Tab 20 mg po q day x 5 days and decrease every 5 days by 5 mg when done resume prednisone 2 mg daily 50 Tab 0   • clindamycin (CLEOCIN) 300 MG Cap      • [DISCONTINUED] methotrexate 2.5 MG Tab TAKE SIX TABLETS BY MOUTH ONCE A WEEK 24 Tab 2   • [DISCONTINUED] folic acid (FOLVITE) 1 MG Tab Take 3 Tabs by mouth every day. 90 Tab 2   • [DISCONTINUED] ergocalciferol (DRISDOL) 96054 UNIT capsule Take 1 Cap by mouth every 7 days. 12 Cap 0     No facility-administered encounter medications on file as of 2/7/2018.        Social History     Social History   • Marital status:      Spouse name: N/A   • Number of children: N/A   • Years of education: N/A     Occupational History   • Not on file.     Social History Main Topics   • Smoking status: Never Smoker   • Smokeless tobacco: Never Used   • Alcohol use 3.0 oz/week     6 Cans of beer per week   • Drug use: No   • Sexual activity: Yes     Partners: Female     Other Topics Concern   •  Service No   • Blood Transfusions No   • Caffeine Concern No   • Occupational Exposure No   • Hobby Hazards Yes     rides motorcyle   • Sleep Concern No   • Stress Concern No   • Weight Concern Yes   • Special Diet Yes     does not eat red meat    • Back Care Yes   • Exercise Yes   • Bike Helmet Yes   • Seat Belt Yes   • Self-Exams Yes     Social History Narrative   • No narrative on file      History   Smoking Status   • Never Smoker   Smokeless Tobacco   • Never Used     History   Alcohol Use   • 3.0 oz/week   • 6 Cans of beer per week     History   Drug Use No         Family History   Problem Relation Age of Onset   • Hypertension Mother        Review of Systems    Constitutional: Positive for chills. Negative for fever.   HENT: Positive for sore throat.    Eyes: Negative for photophobia, pain and redness.   Respiratory: Positive for cough and sputum production (yellow). Negative for shortness of breath, wheezing and stridor.    Cardiovascular: Negative for chest pain and palpitations.   Gastrointestinal: Negative for abdominal pain, nausea and vomiting.   Musculoskeletal: Positive for back pain and joint pain.   Skin: Negative for rash.   Neurological: Negative for seizures and loss of consciousness.   Psychiatric/Behavioral: Negative for depression. The patient does not have insomnia.         Objective:   /68   Pulse 90   Temp 36.2 °C (97.2 °F)   Resp 14   Wt 92.1 kg (203 lb)   SpO2 97%   BMI 29.13 kg/m²     Physical Exam   Constitutional: He is oriented to person, place, and time. He appears well-developed and well-nourished. No distress.   HENT:   Head: Normocephalic and atraumatic.   Right Ear: External ear normal.   Left Ear: External ear normal.   Eyes: Conjunctivae are normal. Right eye exhibits no discharge. Left eye exhibits no discharge. No scleral icterus.   Cardiovascular: Normal rate and regular rhythm.    Murmur heard.  Grade III systolic murmur   Pulmonary/Chest: Effort normal. No stridor. No respiratory distress. He has no wheezes. He has no rales.   Abdominal: Soft. Bowel sounds are normal.   Musculoskeletal: He exhibits no edema.   Kyphoses of the thoracic spine, lordosis of the cervical spine .  Right hand MCP, 3, 4 digits, edema, and mild tenderness   Scare on right little finger for duputren contraction   Neurological: He is alert and oriented to person, place, and time.   Skin: Skin is warm and dry. No rash noted. He is not diaphoretic. No erythema. No pallor.   Psychiatric: He has a normal mood and affect. His behavior is normal. Judgment and thought content normal.       Assessment:     1. Encounter for long-term (current) use of  high-risk medication  CBC WITH DIFFERENTIAL    COMP METABOLIC PANEL    CRP QUANTITIVE (NON-CARDIAC)    WESTERGREN SED RATE   2. Rheumatoid arthritis with positive rheumatoid factor, involving unspecified site (CMS-HCC)       Labs:      Lab Results   Component Value Date/Time    QNTTBGOLD Negative 06/01/2017 01:13 PM     Lab Results   Component Value Date/Time    HEPBCORTOT Negative 06/01/2017 01:13 PM    HEPBSAG Negative 06/01/2017 01:13 PM     Lab Results   Component Value Date/Time    HEPCAB Negative 06/01/2017 01:13 PM     Lab Results   Component Value Date/Time    SODIUM 135 01/26/2018 07:49 AM    POTASSIUM 4.9 01/26/2018 07:49 AM    CHLORIDE 100 01/26/2018 07:49 AM    CO2 28 01/26/2018 07:49 AM    GLUCOSE 90 01/26/2018 07:49 AM    BUN 13 01/26/2018 07:49 AM    CREATININE 0.93 01/26/2018 07:49 AM    CREATININE 1.1 04/21/2009 03:50 PM    BUNCREATRAT 13 09/21/2016 09:21 AM      Lab Results   Component Value Date/Time    WBC 7.4 01/26/2018 07:49 AM    WBC 7.5 07/01/2011 10:30 AM    RBC 4.98 01/26/2018 07:49 AM    RBC 4.72 07/01/2011 10:30 AM    HEMOGLOBIN 15.2 01/26/2018 07:49 AM    HEMATOCRIT 46.0 01/26/2018 07:49 AM    MCV 92.4 01/26/2018 07:49 AM     (H) 07/01/2011 10:30 AM    MCH 30.5 01/26/2018 07:49 AM    MCH 36.0 (H) 07/01/2011 10:30 AM    MCHC 33.0 (L) 01/26/2018 07:49 AM    MPV 10.6 01/26/2018 07:49 AM    NEUTSPOLYS 52.40 01/26/2018 07:49 AM    LYMPHOCYTES 25.90 01/26/2018 07:49 AM    MONOCYTES 14.70 (H) 01/26/2018 07:49 AM    EOSINOPHILS 3.90 01/26/2018 07:49 AM    BASOPHILS 0.90 01/26/2018 07:49 AM    HYPOCHROMIA 1+ 03/05/2014 04:43 PM    ANISOCYTOSIS 1+ 01/02/2015 05:02 PM      Lab Results   Component Value Date/Time    CALCIUM 9.8 01/26/2018 07:49 AM    ASTSGOT 26 01/26/2018 07:49 AM    ALTSGPT 24 01/26/2018 07:49 AM    ALKPHOSPHAT 56 01/26/2018 07:49 AM    TBILIRUBIN 0.8 01/26/2018 07:49 AM    ALBUMIN 4.0 01/26/2018 07:49 AM    TOTPROTEIN 6.5 01/26/2018 07:49 AM     Lab Results   Component  Value Date/Time    RHEUMFACTN <10 08/04/2017 02:32 PM     Lab Results   Component Value Date/Time    SEDRATEWES 0 01/26/2018 07:49 AM    CREACTPROT 0.13 01/26/2018 07:49 AM     No results found for: RUSSELVIPER, DRVVTINTERP  Lab Results   Component Value Date/Time    CRYOGLOBULIN NEG 72Hour 08/04/2017 02:32 PM     No results found for: ANADIRECT, ANTIDNADS, RNPAB, SMITHAB, BFXBRJI21, SSAROAB, SSBLAAB, EYSUHD8DX, CENTROMBAB  No results found for: ANTIDNADS, DSDNA, AGBMAB, GBMABA, ANCAIGG, [id number], JO1AB, RNPAB, ANTISSASJ, ANTISSBSJ  Lab Results   Component Value Date/Time    COLORURINE Lt. Yellow 08/19/2015 03:20 PM    SPECGRAVITY 1.008 08/19/2015 03:20 PM    PHURINE 6.5 08/19/2015 03:20 PM    GLUCOSEUR Negative 08/19/2015 03:20 PM    KETONES Negative 08/19/2015 03:20 PM    PROTEINURIN Negative 08/19/2015 03:20 PM     Lab Results   Component Value Date/Time    TOTALVOLUME 2,750 08/18/2015 07:30 AM    TOTALVOLUME 2,750 08/18/2015 07:30 AM     No results found for: SSA60, SSA52  No results found for: HBA1C  Lab Results   Component Value Date/Time    CPKTOTAL 141 01/26/2018 07:47 AM     No results found for: G6PD  No results found for: [id number]  No results found for: ACESERUM  Lab Results   Component Value Date/Time    25HYDROXY 18 (L) 06/01/2017 01:13 PM     Lab Results   Component Value Date/Time    FREEDIR 1.11 12/01/2014 03:13 PM     No results found for: TSHULTRASEN, FREET4  No results found for: MICROSOMALA, ANTITHYROGL  No results found for: IGGLYMEABS  No results found for: ANTIMITOCHO, FACTIN  No results found for: IGA, TTRANSIGA, ENDOIGA  No results found for: FLTYPE, CRYSTALSBDF  No results found for: ISTATICAL  No results found for: ISTATCREAT  No results found for: CTELOPEP  No results found for: GBMABG  Lab Results   Component Value Date/Time    PTHINTACT 55 10/10/2008 10:42 AM         Medical Decision Making:  Today's Assessment / Status / Plan:     Rheumatoid Arthritis       Restart taking xeljanz  (tofacitinib) after he completely recovers from his acute illness. HOwever he is improved from using xeljanz    Chronic steroid use  DEXA showed osteopenia  FRAX scores are 14% & 2.6%, do not warrant treatment at this point, will repeat DEXA scan in two years. Sep 2019   Recheck lab at 3 months.  Continue Calcium and vitamin D    Results for CANDI UMANA (MRN [id number]) as of 6/23/2017 18:44   Ref. Range 6/1/2017 13:13   Hep B Surface Antibody Quant Latest Ref Range: 0.00-10.00 mIU/mL <3.10   Hepatitis B Surface Antigen Latest Ref Range: Negative  Negative   Hepatitis B Ccore Ab, Total Latest Ref Range: Negative  Negative   Hepatitis C Antibody Latest Ref Range: Negative  Negative   Mitogen-Nil Unknown 60.763   Nil Unknown 0.029   Quantiferon TB Gold Latest Ref Range: Negative  Negative   TB Ag-Nil Unknown -0.010       Aortic insufficiency  Not symptomatic  Followed by cardiology    S/p hand surgery  Doing well    1. Encounter for long-term (current) use of high-risk medication  CBC WITH DIFFERENTIAL    COMP METABOLIC PANEL    CRP QUANTITIVE (NON-CARDIAC)    WESTERGREN SED RATE   2. Rheumatoid arthritis with positive rheumatoid factor, involving unspecified site (CMS-HCC)           No Follow-up on file.

## 2018-02-07 NOTE — LETTER
[facility]   [address], [address]  [place] [address]  Phone: [phone]  Fax: [phone]              Encounter Date: 2/7/2018    Dear Dr. Matta ref. provider found,    It was a pleasure seeing your patient, Edgardo Sims, on 2/7/2018. Diagnoses of Encounter for long-term (current) use of high-risk medication and Rheumatoid arthritis with positive rheumatoid factor, involving unspecified site (CMS-Former[facility]) were pertinent to this visit.     Please find attached progress note which includes the history I obtained from Mr. Sims, my physical examination findings, my impression and recommendations.      Once again, it was a pleasure participating in your patient's care.  Please feel free to contact me if you have any questions or if I can be of any further assistance to your patients.      Sincerely,    Antonieta Canales M.D.  Electronically Signed          PROGRESS NOTE:  Subjective:   Date of Consultation:2/7/2018  9:06 AM  Primary care physician:John Lao D.O.    Reason for Consultation:  Mr. Sims  is a pleasant 61 y.o. year old male who presented with follow-up for Rheumatoid Arthritis. Is on the xeljanz (tofacitinib) for the past two and a half months. He stopped taking it last week because he was sick [coughing, shortness of breath, chest congestion, temp of ~ 102.6]. Was started on Keflex by his PCP and he will finish by Friday. He feels better now, and he will restart taking xeljanz (tofacitinib) after he completely recovers.  Has morning stiffness lasting  60 minutes in the morning. Mild pain in the left MCP finger 4 & 3. HE did hold his medication when he was ill.    Rheumatoid Arthritis  Since last visit we started xeljanz (tofacitinib).  He has been on this for 2.5 months .  He is noticing some improvement and has been able to cut down pain medication.      Osteopenia noted in wrist  Previous fracture: no  Parent fractured hip: no  Current smoking:  no  Glucocorticoids use > 3 months: yes  Rheumatoid arthritis: yes  Secondary osteoporosis : no  Alcohol 3 or more units/day: no    DEXA 9/5/2017  The mean bone mineral density for the lumbar spine is 1.196 g/cm2, which corresponds to a T score of -0.2 and a Z score of 0.2.  The distal left forearm has a mean bone mineral density of 0.837 g/cm2, with a T score of -1.5 and a Z score of -1.1.  FRAX scores are 14% & 2.6%    Current Medications  Methotrexate 6 tabs q Wednesday  (stopped)  xeljanz 5 mg BID  Prednisone 2 mg po q day  (he has been on long term prednisone)      RHEUM HISTORY  Mr. Edgardo Sims presented with a history of Rheumatoid Arthritis diagnosed late 1999 and recent RF and CCP were negative.  He was previously a patient of Dr. Mcknight and then followed by a community rheumatology.  He is here today to establish care. In review of Dr. Echavarria that it seems he has been on Rituxan as far back as 2009. At that time he was on a regimen of azathioprine 150 mg methotrexate 15 mg, prednisone 7.5 mg and rituximab with 125 mg of Solu-Medrol he seemed to have been maintained on this regimen and was tapered down on the Imuran to 50 mg twice a day. Notes from May 24, 2012 indicates that he discontinue the Imuran. At that time he was on rituximab and every 6 months 15 mg of methotrexate weekly and 2 mg of prednisone. Notes from June 8, 2015 does remark that his RA was only manageable he's had limited response. He did suffer a consultation with methotrexate developing stomatitis. Methotrexate was held March 6, 2014 and was reinstituted January 5, 2015.     Dr. Mcknight's notes it does comment that he may benefit from rituximab every 5 months however given cost restrictions he can only get it every 6 months. His last infusion was in March 2017 for rituximab.  He reports he is not doing as well on the rituximab.  He feels his activity level is not as active.  He feels increase stiffness.  His feet xrays      Complications to his care include intolerance to medications  And stomatitis with methotrexate.  Methotrexate was stopped briefly and subsequently was restarted but the patient reports it is not working as well as before.     Previous medications include   azathioprine, methotrexate  Plaquenil and Arava both intolerance not allergies  Remicade loss efficacyt  Gold shots (had a bad reaction)  Enbrel  Not sure Humira  Methylprednisolone did not work as well as prednisone  Never been on Xeljanz   Has not tried Actemra  rituximab (lost efficacy last infusion was 3/2017)  Orencia (not sure why he stopped)    Chronic Pain  On percocet ranging from 30-40 mg daily    Past medical HIstory: bilateral hip replacment and left knee replacement, tonsillitis, varicose vein in the left leg, TMJ, tonsillitis in 1964, broken right ankle in 1980, ruptured disc of L5-S1 noted in 2000 with discectomy and laminectomy, rotator cuff with anterior labral repair in 2001, basal cell carcinoma in 2005 from the nose, meniscal tear of the left knee in 2008 with scope repair, scope repair and biceps tendon repair as well as rotator cuff surgery in 2011 of the right shoulder. Dupuytren's contracture right hand little finger, Dupuytren's contracture right foot, arthritis of the right foot, plantar fasciitis bilateral, aortic insufficiency, hypertension, dyslipidemia, heart murmur, tachycardia, coronary artery disease with mild plaque at catheter. Hiatal hernia and GERD, scoliosis, kyphosis of the upper back/compensating lordosis of the neck . Vein ligation radiofrequency      Family History: lymphoma     Social History: light bookeeping mainly disability, NEVER smoker, occasional alcohol (once a week)    Past Medical History:   Diagnosis Date   • Abnormal myocardial perfusion study 1/15/2014   • Aortic insufficiency 7/5/2011   • Arthritis     RA, and osteo   • Bronchitis    • CAD (coronary artery disease) mild plaque at cath in 2/14 12/5/2014    • Cancer (CMS-HCC)     skin   • Chronic use of opiate drugs therapeutic purposes 8/12/2017   • Cold     mid January 2014   • Coughing blood     none currently 2014   • Dyslipidemia 7/12/2011   • Heart burn    • Heart murmur    • Hiatus hernia syndrome    • HTN (hypertension) 7/5/2011   • Hypertension    • Indigestion    • Infectious disease    • Pain     RA   • Rheumatoid arthritis(714.0)     severe   • Tachycardia 10/20/2014     Past Surgical History:   Procedure Laterality Date   • RECOVERY  2/3/2014    Performed by Cath-Recovery Surgery at SURGERY SAME DAY [facility] ORS   • HIP ARTHROPLASTY TOTAL  5/31/2013    Performed by Burak Valles M.D. at SURGERY [facility] ORS   • KNEE ARTHROPLASTY TOTAL  6/1/2009    Performed by YONATAN HINSON at SURGERY [facility] ORS   • VEIN LIGATION RADIO FREQUENCY  12/8/2008    Performed by DEREJE MCCULLOUGH at SURGERY SAME DAY [facility] ORS   • HIP ARTHROPLASTY TOTAL  6/20/08    Performed by YONATAN HINSON at SURGERY [facility] ORS   • LUMBAR LAMINECTOMY DISKECTOMY  2000   • TMJ RECONSTRUCTION BILATERAL  1994   • KNEE ARTHROSCOPY      right   • OTHER      varicose vein stripping   • OTHER      heart murmur   • OTHER ORTHOPEDIC SURGERY      awaiting right hip surgery   • SHOULDER SURGERY      RIGHT 01/2011     Allergies   Allergen Reactions   • Crestor [Rosuvastatin Calcium] Myalgia   • Penicillins Hives     Outpatient Encounter Prescriptions as of 2/7/2018   Medication Sig Dispense Refill   • diazePAM (VALIUM) 5 MG Tab Take 1 Tab by mouth every 12 hours as needed for up to 30 days. FOR ANXIETY 60 Tab 5   • testosterone cypionate (DEPO-TESTOSTERONE) 200 MG/ML Solution injection 1 mL by Intramuscular route every 14 days for 130 days. 10 mL 1   • bisoprolol (ZEBETA) 10 MG tablet Take 1 Tab by mouth every day. 90 Tab 1   • oxycodone-acetaminophen (PERCOCET-10)  MG Tab Take 1 Tab by mouth 5 Times a Day. ICD -10 M06.9 150 Tab 0   • Garlic 1000 MG Cap Take  by mouth.      • vitamin e (VITAMIN E) 400 UNIT Cap Take 400 Units by mouth every day.     • lisinopril (PRINIVIL) 10 MG Tab TAKE ONE TABLET BY MOUTH ONCE DAILY 90 Tab 2   • MELOXICAM PO      • predniSONE (DELTASONE) 1 MG Tab 2 mg po q day 60 Tab 1   • cephALEXin (KEFLEX) 500 MG Cap Take 1 Cap by mouth 4 times a day. 40 Cap 0   • cyclobenzaprine (FLEXERIL) 10 MG Tab TAKE ONE TABLET BY MOUTH THREE TIMES DAILY AS NEEDED FOR MILD PAIN 90 Tab 5   • meloxicam (MOBIC) 15 MG tablet TAKE ONE TABLET BY MOUTH ONCE DAILY 30 Tab 6   • Calcium Carbonate-Vitamin D (CALCIUM 600 + D) 600-400 MG-UNIT Tab Take 1 Tab by mouth 2 times a day, with meals. 60 Tab 3   • Cyanocobalamin (VITAMIN B-12) 5000 MCG TABLET DISPERSIBLE Take  by mouth.     • niacin 500 MG Tab Take 500 mg by mouth every day.     • Zinc 50 MG Cap Take 50 mg by mouth every day.     • paroxetine (PAXIL) 20 MG Tab TAKE ONE TABLET BY MOUTH ONCE DAILY 90 Tab 3   • Multiple Vitamins-Minerals (MULTI COMPLETE PO) Take  by mouth.     • vardenafil (LEVITRA) 20 MG tablet Take 20 mg by mouth as needed.     • omeprazole (PRILOSEC) 20 MG CPDR Take 20 mg by mouth every day.     • Tofacitinib Citrate (XELJANZ) 5 MG Tab Take 5 mg by mouth 2 Times a Day. SAMPLE 60 Tab 0   • predniSONE (DELTASONE) 5 MG Tab 20 mg po q day x 5 days and decrease every 5 days by 5 mg when done resume prednisone 2 mg daily 50 Tab 0   • clindamycin (CLEOCIN) 300 MG Cap      • [DISCONTINUED] methotrexate 2.5 MG Tab TAKE SIX TABLETS BY MOUTH ONCE A WEEK 24 Tab 2   • [DISCONTINUED] folic acid (FOLVITE) 1 MG Tab Take 3 Tabs by mouth every day. 90 Tab 2   • [DISCONTINUED] ergocalciferol (DRISDOL) 43063 UNIT capsule Take 1 Cap by mouth every 7 days. 12 Cap 0     No facility-administered encounter medications on file as of 2/7/2018.        Social History     Social History   • Marital status:      Spouse name: N/A   • Number of children: N/A   • Years of education: N/A     Occupational History   • Not on file.     Social  History Main Topics   • Smoking status: Never Smoker   • Smokeless tobacco: Never Used   • Alcohol use 3.0 oz/week     6 Cans of beer per week   • Drug use: No   • Sexual activity: Yes     Partners: Female     Other Topics Concern   •  Service No   • Blood Transfusions No   • Caffeine Concern No   • Occupational Exposure No   • Hobby Hazards Yes     rides motorcyle   • Sleep Concern No   • Stress Concern No   • Weight Concern Yes   • Special Diet Yes     does not eat red meat    • Back Care Yes   • Exercise Yes   • Bike Helmet Yes   • Seat Belt Yes   • Self-Exams Yes     Social History Narrative   • No narrative on file      History   Smoking Status   • Never Smoker   Smokeless Tobacco   • Never Used     History   Alcohol Use   • 3.0 oz/week   • 6 Cans of beer per week     History   Drug Use No         Family History   Problem Relation Age of Onset   • Hypertension Mother        Review of Systems   Constitutional: Positive for chills. Negative for fever.   HENT: Positive for sore throat.    Eyes: Negative for photophobia, pain and redness.   Respiratory: Positive for cough and sputum production (yellow). Negative for shortness of breath, wheezing and stridor.    Cardiovascular: Negative for chest pain and palpitations.   Gastrointestinal: Negative for abdominal pain, nausea and vomiting.   Musculoskeletal: Positive for back pain and joint pain.   Skin: Negative for rash.   Neurological: Negative for seizures and loss of consciousness.   Psychiatric/Behavioral: Negative for depression. The patient does not have insomnia.         Objective:   /68   Pulse 90   Temp 36.2 °C (97.2 °F)   Resp 14   Wt 92.1 kg (203 lb)   SpO2 97%   BMI 29.13 kg/m²      Physical Exam   Constitutional: He is oriented to person, place, and time. He appears well-developed and well-nourished. No distress.   HENT:   Head: Normocephalic and atraumatic.   Right Ear: External ear normal.   Left Ear: External ear normal.   Eyes:  Conjunctivae are normal. Right eye exhibits no discharge. Left eye exhibits no discharge. No scleral icterus.   Cardiovascular: Normal rate and regular rhythm.    Murmur heard.  Grade III systolic murmur   Pulmonary/Chest: Effort normal. No stridor. No respiratory distress. He has no wheezes. He has no rales.   Abdominal: Soft. Bowel sounds are normal.   Musculoskeletal: He exhibits no edema.   Kyphoses of the thoracic spine, lordosis of the cervical spine .  Right hand MCP, 3, 4 digits, edema, and mild tenderness   Scare on right little finger for duputren contraction   Neurological: He is alert and oriented to person, place, and time.   Skin: Skin is warm and dry. No rash noted. He is not diaphoretic. No erythema. No pallor.   Psychiatric: He has a normal mood and affect. His behavior is normal. Judgment and thought content normal.       Assessment:     1. Encounter for long-term (current) use of high-risk medication  CBC WITH DIFFERENTIAL    COMP METABOLIC PANEL    CRP QUANTITIVE (NON-CARDIAC)    WESTERGREN SED RATE   2. Rheumatoid arthritis with positive rheumatoid factor, involving unspecified site (CMS-HCC)       Labs:      Lab Results   Component Value Date/Time    QNTTBGOLD Negative 06/01/2017 01:13 PM     Lab Results   Component Value Date/Time    HEPBCORTOT Negative 06/01/2017 01:13 PM    HEPBSAG Negative 06/01/2017 01:13 PM     Lab Results   Component Value Date/Time    HEPCAB Negative 06/01/2017 01:13 PM     Lab Results   Component Value Date/Time    SODIUM 135 01/26/2018 07:49 AM    POTASSIUM 4.9 01/26/2018 07:49 AM    CHLORIDE 100 01/26/2018 07:49 AM    CO2 28 01/26/2018 07:49 AM    GLUCOSE 90 01/26/2018 07:49 AM    BUN 13 01/26/2018 07:49 AM    CREATININE 0.93 01/26/2018 07:49 AM    CREATININE 1.1 04/21/2009 03:50 PM    BUNCREATRAT 13 09/21/2016 09:21 AM      Lab Results   Component Value Date/Time    WBC 7.4 01/26/2018 07:49 AM    WBC 7.5 07/01/2011 10:30 AM    RBC 4.98 01/26/2018 07:49 AM    RBC  4.72 07/01/2011 10:30 AM    HEMOGLOBIN 15.2 01/26/2018 07:49 AM    HEMATOCRIT 46.0 01/26/2018 07:49 AM    MCV 92.4 01/26/2018 07:49 AM     (H) 07/01/2011 10:30 AM    MCH 30.5 01/26/2018 07:49 AM    MCH 36.0 (H) 07/01/2011 10:30 AM    MCHC 33.0 (L) 01/26/2018 07:49 AM    MPV 10.6 01/26/2018 07:49 AM    NEUTSPOLYS 52.40 01/26/2018 07:49 AM    LYMPHOCYTES 25.90 01/26/2018 07:49 AM    MONOCYTES 14.70 (H) 01/26/2018 07:49 AM    EOSINOPHILS 3.90 01/26/2018 07:49 AM    BASOPHILS 0.90 01/26/2018 07:49 AM    HYPOCHROMIA 1+ 03/05/2014 04:43 PM    ANISOCYTOSIS 1+ 01/02/2015 05:02 PM      Lab Results   Component Value Date/Time    CALCIUM 9.8 01/26/2018 07:49 AM    ASTSGOT 26 01/26/2018 07:49 AM    ALTSGPT 24 01/26/2018 07:49 AM    ALKPHOSPHAT 56 01/26/2018 07:49 AM    TBILIRUBIN 0.8 01/26/2018 07:49 AM    ALBUMIN 4.0 01/26/2018 07:49 AM    TOTPROTEIN 6.5 01/26/2018 07:49 AM     Lab Results   Component Value Date/Time    RHEUMFACTN <10 08/04/2017 02:32 PM     Lab Results   Component Value Date/Time    SEDRATEWES 0 01/26/2018 07:49 AM    CREACTPROT 0.13 01/26/2018 07:49 AM     No results found for: RUSSELVIPER, DRVVTINTERP  Lab Results   Component Value Date/Time    CRYOGLOBULIN NEG 72Hour 08/04/2017 02:32 PM     No results found for: ANADIRECT, ANTIDNADS, RNPAB, SMITHAB, FYPQWLE85, SSAROAB, SSBLAAB, QHASEL9WN, CENTROMBAB  No results found for: ANTIDNADS, DSDNA, AGBMAB, GBMABA, ANCAIGG, C4HCSNRBIUQ, JO1AB, RNPAB, ANTISSASJ, ANTISSBSJ  Lab Results   Component Value Date/Time    COLORURINE Lt. Yellow 08/19/2015 03:20 PM    SPECGRAVITY 1.008 08/19/2015 03:20 PM    PHURINE 6.5 08/19/2015 03:20 PM    GLUCOSEUR Negative 08/19/2015 03:20 PM    KETONES Negative 08/19/2015 03:20 PM    PROTEINURIN Negative 08/19/2015 03:20 PM     Lab Results   Component Value Date/Time    TOTALVOLUME 2,750 08/18/2015 07:30 AM    TOTALVOLUME 2,750 08/18/2015 07:30 AM     No results found for: SSA60, SSA52  No results found for: HBA1C  Lab Results      Component Value Date/Time    CPKTOTAL 141 01/26/2018 07:47 AM     No results found for: G6PD  No results found for: QEGZ20QNUJ  No results found for: ACESERUM  Lab Results   Component Value Date/Time    25HYDROXY 18 (L) 06/01/2017 01:13 PM     Lab Results   Component Value Date/Time    FREEDIR 1.11 12/01/2014 03:13 PM     No results found for: TSHULTRASEN, FREET4  No results found for: MICROSOMALA, ANTITHYROGL  No results found for: IGGLYMEABS  No results found for: ANTIMITOCHO, FACTIN  No results found for: IGA, TTRANSIGA, ENDOIGA  No results found for: FLTYPE, CRYSTALSBDF  No results found for: ISTATICAL  No results found for: ISTATCREAT  No results found for: CTELOPEP  No results found for: GBMABG  Lab Results   Component Value Date/Time    PTHINTACT 55 10/10/2008 10:42 AM         Medical Decision Making:  Today's Assessment / Status / Plan:     Rheumatoid Arthritis       Restart taking xeljanz (tofacitinib) after he completely recovers from his acute illness. HOwever he is improved from using xeljanz    Chronic steroid use  DEXA showed osteopenia  FRAX scores are 14% & 2.6%, do not warrant treatment at this point, will repeat DEXA scan in two years. Sep 2019   Recheck lab at 3 months.  Continue Calcium and vitamin D    Results for CANDI UMANA (MRN [id number]) as of 6/23/2017 18:44   Ref. Range 6/1/2017 13:13   Hep B Surface Antibody Quant Latest Ref Range: 0.00-10.00 mIU/mL <3.10   Hepatitis B Surface Antigen Latest Ref Range: Negative  Negative   Hepatitis B Ccore Ab, Total Latest Ref Range: Negative  Negative   Hepatitis C Antibody Latest Ref Range: Negative  Negative   Mitogen-Nil Unknown 60.763   Nil Unknown 0.029   Quantiferon TB Gold Latest Ref Range: Negative  Negative   TB Ag-Nil Unknown -0.010       Aortic insufficiency  Not symptomatic  Followed by cardiology    S/p hand surgery  Doing well    1. Encounter for long-term (current) use of high-risk medication  CBC WITH DIFFERENTIAL    COMP  METABOLIC PANEL    CRP QUANTITIVE (NON-CARDIAC)    WESTERGREN SED RATE   2. Rheumatoid arthritis with positive rheumatoid factor, involving unspecified site (CMS-HCC)           No Follow-up on file.        Attestation signed by Antonieta Canales M.D. at 2/19/2018  6:16 PM:  At the time of the visit, I personally examined the patient and evaluated the patient's medical history, physical examination, laboratory results/studies, and assessment and I discussed the findings and formulated the care plan documented with the resident or fellow physician.

## 2018-02-26 ENCOUNTER — TELEPHONE (OUTPATIENT)
Dept: RHEUMATOLOGY | Facility: PHYSICIAN GROUP | Age: 63
End: 2018-02-26

## 2018-02-26 DIAGNOSIS — M05.9 RHEUMATOID ARTHRITIS WITH POSITIVE RHEUMATOID FACTOR, INVOLVING UNSPECIFIED SITE (HCC): ICD-10-CM

## 2018-02-26 RX ORDER — PREDNISONE 1 MG/1
TABLET ORAL
Qty: 60 TAB | Refills: 0 | Status: SHIPPED | OUTPATIENT
Start: 2018-02-26 | End: 2018-03-26 | Stop reason: SDUPTHER

## 2018-02-26 NOTE — TELEPHONE ENCOUNTER
This is a pt of Dr. Canales's who called to request a refill for his RX. He is completely out of Prednisone-1mg takes (1) po BID #60. Needs it to go to new pharmacy-[facility] on [address]. I explained that Dr. Canales out until Wed. But, that I would send you a message to see if you could do it this one time for him.

## 2018-03-12 ENCOUNTER — OFFICE VISIT (OUTPATIENT)
Dept: MEDICAL GROUP | Facility: CLINIC | Age: 63
End: 2018-03-12
Payer: MEDICARE

## 2018-03-12 VITALS
BODY MASS INDEX: 29.75 KG/M2 | DIASTOLIC BLOOD PRESSURE: 68 MMHG | SYSTOLIC BLOOD PRESSURE: 122 MMHG | WEIGHT: 207.8 LBS | OXYGEN SATURATION: 92 % | HEART RATE: 68 BPM | TEMPERATURE: 97.2 F | RESPIRATION RATE: 14 BRPM | HEIGHT: 70 IN

## 2018-03-12 DIAGNOSIS — Z79.899 ENCOUNTER FOR LONG-TERM (CURRENT) USE OF HIGH-RISK MEDICATION: ICD-10-CM

## 2018-03-12 DIAGNOSIS — J30.2 CHRONIC SEASONAL ALLERGIC RHINITIS, UNSPECIFIED TRIGGER: ICD-10-CM

## 2018-03-12 DIAGNOSIS — M05.9 RHEUMATOID ARTHRITIS WITH POSITIVE RHEUMATOID FACTOR, INVOLVING UNSPECIFIED SITE (HCC): ICD-10-CM

## 2018-03-12 PROCEDURE — 99214 OFFICE O/P EST MOD 30 MIN: CPT | Performed by: INTERNAL MEDICINE

## 2018-03-12 RX ORDER — FLUTICASONE PROPIONATE 50 MCG
2 SPRAY, SUSPENSION (ML) NASAL DAILY
Qty: 16 G | Refills: 11 | Status: SHIPPED | OUTPATIENT
Start: 2018-03-12 | End: 2018-04-30

## 2018-03-12 RX ORDER — DIAZEPAM 5 MG/1
5 TABLET ORAL EVERY 6 HOURS PRN
Status: ON HOLD | COMMUNITY
End: 2018-08-04

## 2018-03-12 RX ORDER — OXYCODONE AND ACETAMINOPHEN 10; 325 MG/1; MG/1
1 TABLET ORAL
Qty: 150 TAB | Refills: 0 | Status: SHIPPED | OUTPATIENT
Start: 2018-04-09 | End: 2018-03-12 | Stop reason: SDUPTHER

## 2018-03-12 RX ORDER — PRAVASTATIN SODIUM 20 MG
20 TABLET ORAL NIGHTLY
COMMUNITY
End: 2018-04-30

## 2018-03-12 RX ORDER — OXYCODONE AND ACETAMINOPHEN 10; 325 MG/1; MG/1
1 TABLET ORAL
Qty: 150 TAB | Refills: 0 | Status: SHIPPED | OUTPATIENT
Start: 2018-05-10 | End: 2018-03-12 | Stop reason: SDUPTHER

## 2018-03-12 RX ORDER — OXYCODONE AND ACETAMINOPHEN 10; 325 MG/1; MG/1
1 TABLET ORAL
Qty: 150 TAB | Refills: 0 | Status: SHIPPED | OUTPATIENT
Start: 2018-06-10 | End: 2018-06-13 | Stop reason: SDUPTHER

## 2018-03-12 RX ORDER — MULTIVIT WITH MINERALS/LUTEIN
1000 TABLET ORAL EVERY MORNING
COMMUNITY

## 2018-03-12 NOTE — PROGRESS NOTES
"CC: Edgardo Sims is a 62 y.o. male is suffering from   Chief Complaint   Patient presents with   • Follow-Up   • Arthritis     \"getting better with Xeljanz\"         SUBJECTIVE:  1. Rheumatoid arthritis with positive rheumatoid factor, involving unspecified site (CMS-HCC)  Patient's here for follow-up has a history of rheumatoid arthritis, is improving with the use of Xeljanz.      2. Encounter for long-term (current) use of high-risk medication  Patient and I have discussed the fact he continues on Percocet. State drug task force urine drug screen sign drug contract all been completed    3. Chronic seasonal allergic rhinitis, unspecified trigger  Patient has a history of seasonal allergic rhinitis etiology is uncertain        Past social, family, history:   Social History   Substance Use Topics   • Smoking status: Never Smoker   • Smokeless tobacco: Never Used   • Alcohol use 3.0 oz/week     6 Cans of beer per week         MEDICATIONS:    Current Outpatient Prescriptions:   •  diazePAM (VALIUM) 5 MG Tab, Take 5 mg by mouth every 6 hours as needed for Anxiety., Disp: , Rfl:   •  pravastatin (PRAVACHOL) 20 MG Tab, Take 20 mg by mouth every evening., Disp: , Rfl:   •  CALCIUM-VITAMIN D PO, Take  by mouth., Disp: , Rfl:   •  ascorbic acid (ASCORBIC ACID) 500 MG Tab, Take 500 mg by mouth every day., Disp: , Rfl:   •  [START ON 6/10/2018] oxyCODONE-acetaminophen (PERCOCET-10)  MG Tab, Take 1 Tab by mouth 5 Times a Day for 30 days., Disp: 150 Tab, Rfl: 0  •  fluticasone (FLONASE) 50 MCG/ACT nasal spray, Spray 2 Sprays in nose every day., Disp: 16 g, Rfl: 11  •  predniSONE (DELTASONE) 1 MG Tab, 2 mg po q day, Disp: 60 Tab, Rfl: 0  •  Tofacitinib Citrate (XELJANZ) 5 MG Tab, Take 5 mg by mouth 2 Times a Day. SAMPLE, Disp: 60 Tab, Rfl: 0  •  bisoprolol (ZEBETA) 10 MG tablet, Take 1 Tab by mouth every day., Disp: 90 Tab, Rfl: 1  •  Garlic 1000 MG Cap, Take  by mouth., Disp: , Rfl:   •  vitamin e (VITAMIN E) " 400 UNIT Cap, Take 400 Units by mouth every day., Disp: , Rfl:   •  lisinopril (PRINIVIL) 10 MG Tab, TAKE ONE TABLET BY MOUTH ONCE DAILY, Disp: 90 Tab, Rfl: 2  •  cyclobenzaprine (FLEXERIL) 10 MG Tab, TAKE ONE TABLET BY MOUTH THREE TIMES DAILY AS NEEDED FOR MILD PAIN, Disp: 90 Tab, Rfl: 5  •  meloxicam (MOBIC) 15 MG tablet, TAKE ONE TABLET BY MOUTH ONCE DAILY, Disp: 30 Tab, Rfl: 6  •  Cyanocobalamin (VITAMIN B-12) 5000 MCG TABLET DISPERSIBLE, Take  by mouth., Disp: , Rfl:   •  niacin 500 MG Tab, Take 500 mg by mouth every day., Disp: , Rfl:   •  Zinc 50 MG Cap, Take 50 mg by mouth every day., Disp: , Rfl:   •  paroxetine (PAXIL) 20 MG Tab, TAKE ONE TABLET BY MOUTH ONCE DAILY, Disp: 90 Tab, Rfl: 3  •  Multiple Vitamins-Minerals (MULTI COMPLETE PO), Take  by mouth., Disp: , Rfl:   •  omeprazole (PRILOSEC) 20 MG CPDR, Take 20 mg by mouth every day., Disp: , Rfl:   •  testosterone cypionate (DEPO-TESTOSTERONE) 200 MG/ML Solution injection, 1 mL by Intramuscular route every 14 days for 130 days., Disp: 10 mL, Rfl: 1  •  predniSONE (DELTASONE) 5 MG Tab, 20 mg po q day x 5 days and decrease every 5 days by 5 mg when done resume prednisone 2 mg daily, Disp: 50 Tab, Rfl: 0  •  MELOXICAM PO, , Disp: , Rfl:   •  clindamycin (CLEOCIN) 300 MG Cap, , Disp: , Rfl:   •  cephALEXin (KEFLEX) 500 MG Cap, Take 1 Cap by mouth 4 times a day., Disp: 40 Cap, Rfl: 0  •  Calcium Carbonate-Vitamin D (CALCIUM 600 + D) 600-400 MG-UNIT Tab, Take 1 Tab by mouth 2 times a day, with meals., Disp: 60 Tab, Rfl: 3  •  vardenafil (LEVITRA) 20 MG tablet, Take 20 mg by mouth as needed., Disp: , Rfl:     PROBLEMS:  Patient Active Problem List    Diagnosis Date Noted   • Chronic use of opiate drugs therapeutic purposes 08/12/2017   • Elevated CPK 09/23/2016   • Depression 07/13/2015   • Anxiety 03/31/2015   • Coronary artery disease due to calcified coronary lesion: Mildly cardiac catheterization in 2014 12/05/2014   • Tachycardia 10/20/2014   • Abnormal  "myocardial perfusion study 01/15/2014   • Osteoarthrosis, unspecified whether generalized or localized, pelvic region and thigh 05/31/2013   • Dyslipidemia 07/12/2011   • Aortic insufficiency 07/05/2011   • Essential hypertension 07/05/2011   • Rheumatoid arthritis (CMS-HCC) 05/05/2009   • Hypogonadism male 05/05/2009       REVIEW OF SYSTEMS:  Gen.:  No Nausea, Vomiting, fever, Chills.  Heart: No chest pain.  Lungs:  No shortness of Breath.  Psychological: Kian unusual Anxiety depression     PHYSICAL EXAM   Constitutional: Alert, cooperative, not in acute distress.  Cardiovascular:  Rate Rhythm is regular without murmurs rubs clicks.     Thorax & Lungs: Clear to auscultation, no wheezing, rhonchi, or rales  HENT: Normocephalic, Atraumatic.  Eyes: PERRLA, EOMI, Conjunctiva normal.   Neck: Trachia is midline no swelling of the thyroid.   Lymphatic: No lymphadenopathy noted.   Musculoskeletal: Ongoing complaints of musculoskeletal pain  Neurologic: Alert & oriented x 3, cranial nerves II through XII are intact, Normal motor function, Normal sensory function, No focal deficits noted.   Psychiatric: Affect normal, Judgment normal, Mood normal.     VITAL SIGNS:/68   Pulse 68   Temp 36.2 °C (97.2 °F)   Resp 14   Ht 1.778 m (5' 10\")   Wt 94.3 kg (207 lb 12.8 oz)   SpO2 92%   BMI 29.82 kg/m²     Labs: Reviewed    Assessment:                                                     Plan:    1. Rheumatoid arthritis with positive rheumatoid factor, involving unspecified site (CMS-HCC)  Patient with improvement with use of DMARD for his rheumatoid arthritis  - [facility] PAIN MANAGEMENT SCREEN; Future  - CONSENT FOR OPIATE PRESCRIPTION  - oxyCODONE-acetaminophen (PERCOCET-10)  MG Tab; Take 1 Tab by mouth 5 Times a Day for 30 days.  Dispense: 150 Tab; Refill: 0    2. Encounter for long-term (current) use of high-risk medication  Patient continues on Percocet  - CONSENT FOR OPIATE PRESCRIPTION  - " oxyCODONE-acetaminophen (PERCOCET-10)  MG Tab; Take 1 Tab by mouth 5 Times a Day for 30 days.  Dispense: 150 Tab; Refill: 0    3. Chronic seasonal allergic rhinitis, unspecified trigger  Ongoing issues with seasonal allergies is to continue on Flonase nasal spray  - fluticasone (FLONASE) 50 MCG/ACT nasal spray; Spray 2 Sprays in nose every day.  Dispense: 16 g; Refill: 11

## 2018-03-16 DIAGNOSIS — M05.9 RHEUMATOID ARTHRITIS WITH POSITIVE RHEUMATOID FACTOR, INVOLVING UNSPECIFIED SITE (HCC): ICD-10-CM

## 2018-03-16 RX ORDER — MELOXICAM 15 MG/1
TABLET ORAL
Qty: 30 TAB | Refills: 11 | Status: SHIPPED | OUTPATIENT
Start: 2018-03-16 | End: 2019-01-26 | Stop reason: CLARIF

## 2018-04-26 ENCOUNTER — HOSPITAL ENCOUNTER (OUTPATIENT)
Dept: LAB | Facility: MEDICAL CENTER | Age: 63
End: 2018-04-26
Attending: INTERNAL MEDICINE
Payer: MEDICARE

## 2018-04-26 DIAGNOSIS — Z79.899 ENCOUNTER FOR LONG-TERM (CURRENT) USE OF HIGH-RISK MEDICATION: ICD-10-CM

## 2018-04-26 LAB
ALBUMIN SERPL BCP-MCNC: 4.2 G/DL (ref 3.2–4.9)
ALBUMIN/GLOB SERPL: 1.4 G/DL
ALP SERPL-CCNC: 88 U/L (ref 30–99)
ALT SERPL-CCNC: 19 U/L (ref 2–50)
ANION GAP SERPL CALC-SCNC: 6 MMOL/L (ref 0–11.9)
AST SERPL-CCNC: 24 U/L (ref 12–45)
BASOPHILS # BLD AUTO: 0.8 % (ref 0–1.8)
BASOPHILS # BLD: 0.13 K/UL (ref 0–0.12)
BILIRUB SERPL-MCNC: 0.6 MG/DL (ref 0.1–1.5)
BUN SERPL-MCNC: 10 MG/DL (ref 8–22)
CALCIUM SERPL-MCNC: 9.3 MG/DL (ref 8.5–10.5)
CHLORIDE SERPL-SCNC: 99 MMOL/L (ref 96–112)
CO2 SERPL-SCNC: 27 MMOL/L (ref 20–33)
CREAT SERPL-MCNC: 1.02 MG/DL (ref 0.5–1.4)
CRP SERPL HS-MCNC: 5.73 MG/DL (ref 0–0.75)
EOSINOPHIL # BLD AUTO: 0.31 K/UL (ref 0–0.51)
EOSINOPHIL NFR BLD: 1.9 % (ref 0–6.9)
ERYTHROCYTE [DISTWIDTH] IN BLOOD BY AUTOMATED COUNT: 50.6 FL (ref 35.9–50)
ERYTHROCYTE [SEDIMENTATION RATE] IN BLOOD BY WESTERGREN METHOD: 13 MM/HOUR (ref 0–20)
GLOBULIN SER CALC-MCNC: 2.9 G/DL (ref 1.9–3.5)
GLUCOSE SERPL-MCNC: 119 MG/DL (ref 65–99)
HCT VFR BLD AUTO: 46.3 % (ref 42–52)
HGB BLD-MCNC: 15.7 G/DL (ref 14–18)
IMM GRANULOCYTES # BLD AUTO: 0.3 K/UL (ref 0–0.11)
IMM GRANULOCYTES NFR BLD AUTO: 1.8 % (ref 0–0.9)
LYMPHOCYTES # BLD AUTO: 2.16 K/UL (ref 1–4.8)
LYMPHOCYTES NFR BLD: 13 % (ref 22–41)
MCH RBC QN AUTO: 33.3 PG (ref 27–33)
MCHC RBC AUTO-ENTMCNC: 33.9 G/DL (ref 33.7–35.3)
MCV RBC AUTO: 98.3 FL (ref 81.4–97.8)
MONOCYTES # BLD AUTO: 1.83 K/UL (ref 0–0.85)
MONOCYTES NFR BLD AUTO: 11.1 % (ref 0–13.4)
NEUTROPHILS # BLD AUTO: 11.83 K/UL (ref 1.82–7.42)
NEUTROPHILS NFR BLD: 71.4 % (ref 44–72)
NRBC # BLD AUTO: 0 K/UL
NRBC BLD-RTO: 0 /100 WBC
PLATELET # BLD AUTO: 317 K/UL (ref 164–446)
PMV BLD AUTO: 9.9 FL (ref 9–12.9)
POTASSIUM SERPL-SCNC: 4.7 MMOL/L (ref 3.6–5.5)
PROT SERPL-MCNC: 7.1 G/DL (ref 6–8.2)
RBC # BLD AUTO: 4.71 M/UL (ref 4.7–6.1)
SODIUM SERPL-SCNC: 132 MMOL/L (ref 135–145)
WBC # BLD AUTO: 16.6 K/UL (ref 4.8–10.8)

## 2018-04-26 PROCEDURE — 85652 RBC SED RATE AUTOMATED: CPT

## 2018-04-26 PROCEDURE — 85025 COMPLETE CBC W/AUTO DIFF WBC: CPT

## 2018-04-26 PROCEDURE — 36415 COLL VENOUS BLD VENIPUNCTURE: CPT

## 2018-04-26 PROCEDURE — 80053 COMPREHEN METABOLIC PANEL: CPT

## 2018-04-26 PROCEDURE — 86140 C-REACTIVE PROTEIN: CPT

## 2018-04-30 ENCOUNTER — HOSPITAL ENCOUNTER (INPATIENT)
Facility: MEDICAL CENTER | Age: 63
LOS: 3 days | DRG: 689 | End: 2018-05-03
Attending: EMERGENCY MEDICINE | Admitting: INTERNAL MEDICINE
Payer: MEDICARE

## 2018-04-30 ENCOUNTER — APPOINTMENT (OUTPATIENT)
Dept: RADIOLOGY | Facility: MEDICAL CENTER | Age: 63
DRG: 689 | End: 2018-04-30
Attending: EMERGENCY MEDICINE
Payer: MEDICARE

## 2018-04-30 ENCOUNTER — TELEPHONE (OUTPATIENT)
Dept: MEDICAL GROUP | Facility: CLINIC | Age: 63
End: 2018-04-30

## 2018-04-30 ENCOUNTER — OFFICE VISIT (OUTPATIENT)
Dept: CARDIOLOGY | Facility: MEDICAL CENTER | Age: 63
End: 2018-04-30
Payer: MEDICARE

## 2018-04-30 VITALS
BODY MASS INDEX: 30.64 KG/M2 | SYSTOLIC BLOOD PRESSURE: 128 MMHG | WEIGHT: 214 LBS | HEART RATE: 98 BPM | OXYGEN SATURATION: 96 % | DIASTOLIC BLOOD PRESSURE: 70 MMHG | HEIGHT: 70 IN

## 2018-04-30 DIAGNOSIS — E78.5 DYSLIPIDEMIA: ICD-10-CM

## 2018-04-30 DIAGNOSIS — I25.10 CORONARY ARTERY DISEASE DUE TO CALCIFIED CORONARY LESION: ICD-10-CM

## 2018-04-30 DIAGNOSIS — K85.90 ACUTE PANCREATITIS, UNSPECIFIED COMPLICATION STATUS, UNSPECIFIED PANCREATITIS TYPE: ICD-10-CM

## 2018-04-30 DIAGNOSIS — N12 PYELONEPHRITIS: ICD-10-CM

## 2018-04-30 DIAGNOSIS — A41.9 SEPSIS, DUE TO UNSPECIFIED ORGANISM: ICD-10-CM

## 2018-04-30 DIAGNOSIS — I35.1 NONRHEUMATIC AORTIC VALVE INSUFFICIENCY: ICD-10-CM

## 2018-04-30 DIAGNOSIS — I10 ESSENTIAL HYPERTENSION: ICD-10-CM

## 2018-04-30 DIAGNOSIS — I25.84 CORONARY ARTERY DISEASE DUE TO CALCIFIED CORONARY LESION: ICD-10-CM

## 2018-04-30 PROBLEM — D72.829 LEUKOCYTOSIS: Status: ACTIVE | Noted: 2018-04-30

## 2018-04-30 PROBLEM — K85.00 IDIOPATHIC ACUTE PANCREATITIS: Status: ACTIVE | Noted: 2018-04-30

## 2018-04-30 PROBLEM — E87.20 LACTIC ACIDOSIS: Status: ACTIVE | Noted: 2018-04-30

## 2018-04-30 PROBLEM — N10 ACUTE PYELONEPHRITIS: Status: ACTIVE | Noted: 2018-04-30

## 2018-04-30 LAB
ALBUMIN SERPL BCP-MCNC: 4.5 G/DL (ref 3.2–4.9)
ALBUMIN/GLOB SERPL: 1.3 G/DL
ALP SERPL-CCNC: 96 U/L (ref 30–99)
ALT SERPL-CCNC: 32 U/L (ref 2–50)
ANION GAP SERPL CALC-SCNC: 8 MMOL/L (ref 0–11.9)
APPEARANCE UR: ABNORMAL
AST SERPL-CCNC: 34 U/L (ref 12–45)
BACTERIA #/AREA URNS HPF: ABNORMAL /HPF
BASOPHILS # BLD AUTO: 0.7 % (ref 0–1.8)
BASOPHILS # BLD: 0.1 K/UL (ref 0–0.12)
BILIRUB SERPL-MCNC: 0.8 MG/DL (ref 0.1–1.5)
BILIRUB UR QL STRIP.AUTO: NEGATIVE
BNP SERPL-MCNC: 61 PG/ML (ref 0–100)
BUN SERPL-MCNC: 10 MG/DL (ref 8–22)
CALCIUM SERPL-MCNC: 10 MG/DL (ref 8.4–10.2)
CHLORIDE SERPL-SCNC: 98 MMOL/L (ref 96–112)
CO2 SERPL-SCNC: 28 MMOL/L (ref 20–33)
COLOR UR: YELLOW
CREAT SERPL-MCNC: 0.94 MG/DL (ref 0.5–1.4)
EKG IMPRESSION: NORMAL
EOSINOPHIL # BLD AUTO: 0.34 K/UL (ref 0–0.51)
EOSINOPHIL NFR BLD: 2.3 % (ref 0–6.9)
EPI CELLS #/AREA URNS HPF: ABNORMAL /HPF
ERYTHROCYTE [DISTWIDTH] IN BLOOD BY AUTOMATED COUNT: 47.5 FL (ref 35.9–50)
GLOBULIN SER CALC-MCNC: 3.6 G/DL (ref 1.9–3.5)
GLUCOSE SERPL-MCNC: 76 MG/DL (ref 65–99)
GLUCOSE UR STRIP.AUTO-MCNC: NEGATIVE MG/DL
HCT VFR BLD AUTO: 48.9 % (ref 42–52)
HGB BLD-MCNC: 17 G/DL (ref 14–18)
IMM GRANULOCYTES # BLD AUTO: 0.18 K/UL (ref 0–0.11)
IMM GRANULOCYTES NFR BLD AUTO: 1.2 % (ref 0–0.9)
KETONES UR STRIP.AUTO-MCNC: NEGATIVE MG/DL
LACTATE BLD-SCNC: 0.86 MMOL/L (ref 0.5–2)
LACTATE BLD-SCNC: 3.18 MMOL/L (ref 0.5–2)
LEUKOCYTE ESTERASE UR QL STRIP.AUTO: ABNORMAL
LIPASE SERPL-CCNC: 78 U/L (ref 7–58)
LYMPHOCYTES # BLD AUTO: 1.66 K/UL (ref 1–4.8)
LYMPHOCYTES NFR BLD: 11.2 % (ref 22–41)
MCH RBC QN AUTO: 33.4 PG (ref 27–33)
MCHC RBC AUTO-ENTMCNC: 34.8 G/DL (ref 33.7–35.3)
MCV RBC AUTO: 96.1 FL (ref 81.4–97.8)
MICRO URNS: ABNORMAL
MONOCYTES # BLD AUTO: 2.41 K/UL (ref 0–0.85)
MONOCYTES NFR BLD AUTO: 16.2 % (ref 0–13.4)
NEUTROPHILS # BLD AUTO: 10.15 K/UL (ref 1.82–7.42)
NEUTROPHILS NFR BLD: 68.4 % (ref 44–72)
NITRITE UR QL STRIP.AUTO: NEGATIVE
NRBC # BLD AUTO: 0 K/UL
NRBC BLD-RTO: 0 /100 WBC
PH UR STRIP.AUTO: 7.5 [PH]
PLATELET # BLD AUTO: 325 K/UL (ref 164–446)
PMV BLD AUTO: 8.7 FL (ref 9–12.9)
POTASSIUM SERPL-SCNC: 5 MMOL/L (ref 3.6–5.5)
PROT SERPL-MCNC: 8.1 G/DL (ref 6–8.2)
PROT UR QL STRIP: NEGATIVE MG/DL
RBC # BLD AUTO: 5.09 M/UL (ref 4.7–6.1)
RBC # URNS HPF: ABNORMAL /HPF
RBC UR QL AUTO: ABNORMAL
SODIUM SERPL-SCNC: 134 MMOL/L (ref 135–145)
SP GR UR STRIP.AUTO: <=1.005
TROPONIN I SERPL-MCNC: <0.02 NG/ML (ref 0–0.04)
WBC # BLD AUTO: 14.8 K/UL (ref 4.8–10.8)
WBC #/AREA URNS HPF: ABNORMAL /HPF

## 2018-04-30 PROCEDURE — 93005 ELECTROCARDIOGRAM TRACING: CPT | Performed by: EMERGENCY MEDICINE

## 2018-04-30 PROCEDURE — 99222 1ST HOSP IP/OBS MODERATE 55: CPT | Mod: AI | Performed by: INTERNAL MEDICINE

## 2018-04-30 PROCEDURE — 99285 EMERGENCY DEPT VISIT HI MDM: CPT

## 2018-04-30 PROCEDURE — 99214 OFFICE O/P EST MOD 30 MIN: CPT | Performed by: INTERNAL MEDICINE

## 2018-04-30 PROCEDURE — 36415 COLL VENOUS BLD VENIPUNCTURE: CPT

## 2018-04-30 PROCEDURE — 83880 ASSAY OF NATRIURETIC PEPTIDE: CPT

## 2018-04-30 PROCEDURE — 700102 HCHG RX REV CODE 250 W/ 637 OVERRIDE(OP): Performed by: INTERNAL MEDICINE

## 2018-04-30 PROCEDURE — 700105 HCHG RX REV CODE 258: Performed by: EMERGENCY MEDICINE

## 2018-04-30 PROCEDURE — 83605 ASSAY OF LACTIC ACID: CPT

## 2018-04-30 PROCEDURE — 87086 URINE CULTURE/COLONY COUNT: CPT

## 2018-04-30 PROCEDURE — 700105 HCHG RX REV CODE 258: Performed by: INTERNAL MEDICINE

## 2018-04-30 PROCEDURE — A9270 NON-COVERED ITEM OR SERVICE: HCPCS | Performed by: INTERNAL MEDICINE

## 2018-04-30 PROCEDURE — 84484 ASSAY OF TROPONIN QUANT: CPT

## 2018-04-30 PROCEDURE — 83690 ASSAY OF LIPASE: CPT

## 2018-04-30 PROCEDURE — 770006 HCHG ROOM/CARE - MED/SURG/GYN SEMI*

## 2018-04-30 PROCEDURE — 74177 CT ABD & PELVIS W/CONTRAST: CPT

## 2018-04-30 PROCEDURE — 85025 COMPLETE CBC W/AUTO DIFF WBC: CPT

## 2018-04-30 PROCEDURE — 700111 HCHG RX REV CODE 636 W/ 250 OVERRIDE (IP): Performed by: EMERGENCY MEDICINE

## 2018-04-30 PROCEDURE — 80053 COMPREHEN METABOLIC PANEL: CPT

## 2018-04-30 PROCEDURE — 81001 URINALYSIS AUTO W/SCOPE: CPT

## 2018-04-30 PROCEDURE — 700117 HCHG RX CONTRAST REV CODE 255: Performed by: EMERGENCY MEDICINE

## 2018-04-30 PROCEDURE — 96365 THER/PROPH/DIAG IV INF INIT: CPT

## 2018-04-30 RX ORDER — SODIUM CHLORIDE 9 MG/ML
INJECTION, SOLUTION INTRAVENOUS CONTINUOUS
Status: DISCONTINUED | OUTPATIENT
Start: 2018-04-30 | End: 2018-05-03 | Stop reason: HOSPADM

## 2018-04-30 RX ORDER — CYCLOBENZAPRINE HCL 10 MG
5 TABLET ORAL EVERY EVENING
Status: DISCONTINUED | OUTPATIENT
Start: 2018-04-30 | End: 2018-05-03 | Stop reason: HOSPADM

## 2018-04-30 RX ORDER — PROMETHAZINE HYDROCHLORIDE 25 MG/1
12.5-25 TABLET ORAL EVERY 4 HOURS PRN
Status: DISCONTINUED | OUTPATIENT
Start: 2018-04-30 | End: 2018-05-03 | Stop reason: HOSPADM

## 2018-04-30 RX ORDER — BISACODYL 10 MG
10 SUPPOSITORY, RECTAL RECTAL
Status: DISCONTINUED | OUTPATIENT
Start: 2018-04-30 | End: 2018-05-03 | Stop reason: HOSPADM

## 2018-04-30 RX ORDER — AMOXICILLIN 250 MG
2 CAPSULE ORAL 2 TIMES DAILY
Status: DISCONTINUED | OUTPATIENT
Start: 2018-04-30 | End: 2018-05-03 | Stop reason: HOSPADM

## 2018-04-30 RX ORDER — SODIUM CHLORIDE, SODIUM LACTATE, POTASSIUM CHLORIDE, CALCIUM CHLORIDE 600; 310; 30; 20 MG/100ML; MG/100ML; MG/100ML; MG/100ML
INJECTION, SOLUTION INTRAVENOUS ONCE
Status: ACTIVE | OUTPATIENT
Start: 2018-04-30 | End: 2018-05-01

## 2018-04-30 RX ORDER — PREDNISONE 1 MG/1
2 TABLET ORAL DAILY
Status: DISCONTINUED | OUTPATIENT
Start: 2018-04-30 | End: 2018-05-03 | Stop reason: HOSPADM

## 2018-04-30 RX ORDER — CEFTRIAXONE 2 G/1
2 INJECTION, POWDER, FOR SOLUTION INTRAMUSCULAR; INTRAVENOUS ONCE
Status: COMPLETED | OUTPATIENT
Start: 2018-04-30 | End: 2018-04-30

## 2018-04-30 RX ORDER — ONDANSETRON 2 MG/ML
4 INJECTION INTRAMUSCULAR; INTRAVENOUS EVERY 4 HOURS PRN
Status: DISCONTINUED | OUTPATIENT
Start: 2018-04-30 | End: 2018-05-03 | Stop reason: HOSPADM

## 2018-04-30 RX ORDER — OXYCODONE AND ACETAMINOPHEN 10; 325 MG/1; MG/1
1 TABLET ORAL EVERY 6 HOURS PRN
Status: DISCONTINUED | OUTPATIENT
Start: 2018-04-30 | End: 2018-05-03 | Stop reason: HOSPADM

## 2018-04-30 RX ORDER — FLUTICASONE PROPIONATE 50 MCG
1 SPRAY, SUSPENSION (ML) NASAL PRN
COMMUNITY
End: 2018-07-26

## 2018-04-30 RX ORDER — ONDANSETRON 4 MG/1
4 TABLET, ORALLY DISINTEGRATING ORAL EVERY 4 HOURS PRN
Status: DISCONTINUED | OUTPATIENT
Start: 2018-04-30 | End: 2018-05-03 | Stop reason: HOSPADM

## 2018-04-30 RX ORDER — PRAVASTATIN SODIUM 40 MG
40 TABLET ORAL DAILY
Qty: 30 TAB | Refills: 11 | Status: ON HOLD | OUTPATIENT
Start: 2018-04-30 | End: 2019-04-01

## 2018-04-30 RX ORDER — DIAZEPAM 5 MG/1
5 TABLET ORAL EVERY 6 HOURS PRN
Status: DISCONTINUED | OUTPATIENT
Start: 2018-04-30 | End: 2018-05-03 | Stop reason: HOSPADM

## 2018-04-30 RX ORDER — PAROXETINE HYDROCHLORIDE 20 MG/1
20 TABLET, FILM COATED ORAL DAILY
Status: DISCONTINUED | OUTPATIENT
Start: 2018-04-30 | End: 2018-05-03 | Stop reason: HOSPADM

## 2018-04-30 RX ORDER — PROMETHAZINE HYDROCHLORIDE 25 MG/1
12.5-25 SUPPOSITORY RECTAL EVERY 4 HOURS PRN
Status: DISCONTINUED | OUTPATIENT
Start: 2018-04-30 | End: 2018-05-03 | Stop reason: HOSPADM

## 2018-04-30 RX ORDER — OMEPRAZOLE 20 MG/1
20 CAPSULE, DELAYED RELEASE ORAL DAILY
Status: DISCONTINUED | OUTPATIENT
Start: 2018-05-01 | End: 2018-05-03 | Stop reason: HOSPADM

## 2018-04-30 RX ORDER — HEPARIN SODIUM 5000 [USP'U]/ML
5000 INJECTION, SOLUTION INTRAVENOUS; SUBCUTANEOUS EVERY 8 HOURS
Status: DISCONTINUED | OUTPATIENT
Start: 2018-04-30 | End: 2018-05-03 | Stop reason: HOSPADM

## 2018-04-30 RX ORDER — LISINOPRIL 5 MG/1
10 TABLET ORAL
Status: DISCONTINUED | OUTPATIENT
Start: 2018-05-01 | End: 2018-05-03 | Stop reason: HOSPADM

## 2018-04-30 RX ORDER — SODIUM CHLORIDE 9 MG/ML
1000 INJECTION, SOLUTION INTRAVENOUS ONCE
Status: COMPLETED | OUTPATIENT
Start: 2018-04-30 | End: 2018-04-30

## 2018-04-30 RX ORDER — PRAVASTATIN SODIUM 20 MG
40 TABLET ORAL NIGHTLY
Status: DISCONTINUED | OUTPATIENT
Start: 2018-04-30 | End: 2018-05-03 | Stop reason: HOSPADM

## 2018-04-30 RX ORDER — MELOXICAM 7.5 MG/1
15 TABLET ORAL DAILY
Status: DISCONTINUED | OUTPATIENT
Start: 2018-04-30 | End: 2018-05-03 | Stop reason: HOSPADM

## 2018-04-30 RX ORDER — POLYETHYLENE GLYCOL 3350 17 G/17G
1 POWDER, FOR SOLUTION ORAL
Status: DISCONTINUED | OUTPATIENT
Start: 2018-04-30 | End: 2018-05-03 | Stop reason: HOSPADM

## 2018-04-30 RX ADMIN — IOHEXOL 100 ML: 350 INJECTION, SOLUTION INTRAVENOUS at 11:54

## 2018-04-30 RX ADMIN — OXYCODONE HYDROCHLORIDE AND ACETAMINOPHEN 1 TABLET: 10; 325 TABLET ORAL at 21:43

## 2018-04-30 RX ADMIN — CEFTRIAXONE SODIUM 2 G: 2 INJECTION, POWDER, FOR SOLUTION INTRAMUSCULAR; INTRAVENOUS at 13:32

## 2018-04-30 RX ADMIN — SODIUM CHLORIDE: 9 INJECTION, SOLUTION INTRAVENOUS at 15:35

## 2018-04-30 RX ADMIN — SODIUM CHLORIDE 1000 ML: 9 INJECTION, SOLUTION INTRAVENOUS at 10:57

## 2018-04-30 RX ADMIN — SODIUM CHLORIDE: 9 INJECTION, SOLUTION INTRAVENOUS at 22:56

## 2018-04-30 RX ADMIN — CYCLOBENZAPRINE HYDROCHLORIDE 5 MG: 10 TABLET, FILM COATED ORAL at 21:08

## 2018-04-30 RX ADMIN — OXYCODONE HYDROCHLORIDE AND ACETAMINOPHEN 1 TABLET: 10; 325 TABLET ORAL at 15:34

## 2018-04-30 RX ADMIN — PRAVASTATIN SODIUM 40 MG: 20 TABLET ORAL at 21:08

## 2018-04-30 ASSESSMENT — ENCOUNTER SYMPTOMS
HEADACHES: 0
COUGH: 0
INSOMNIA: 0
EYE DISCHARGE: 0
FLANK PAIN: 1
ABDOMINAL PAIN: 1
PALPITATIONS: 0
WEIGHT LOSS: 0
SPUTUM PRODUCTION: 0
HEARTBURN: 0
SEIZURES: 0
NAUSEA: 0
DIZZINESS: 0
BACK PAIN: 0
FEVER: 0
ORTHOPNEA: 0
DIARRHEA: 0
SHORTNESS OF BREATH: 0
NECK PAIN: 0
STRIDOR: 0
EYE REDNESS: 0
VOMITING: 0
NERVOUS/ANXIOUS: 0
FOCAL WEAKNESS: 0
MYALGIAS: 0
CHILLS: 0
EYE PAIN: 0
BLURRED VISION: 0
DEPRESSION: 0

## 2018-04-30 ASSESSMENT — PAIN SCALES - GENERAL
PAINLEVEL_OUTOF10: 3
PAINLEVEL_OUTOF10: 5
PAINLEVEL_OUTOF10: 6
PAINLEVEL_OUTOF10: 0

## 2018-04-30 ASSESSMENT — COPD QUESTIONNAIRES
IN THE PAST 12 MONTHS DO YOU DO LESS THAN YOU USED TO BECAUSE OF YOUR BREATHING PROBLEMS: DISAGREE/UNSURE
HAVE YOU SMOKED AT LEAST 100 CIGARETTES IN YOUR ENTIRE LIFE: NO/DON'T KNOW
DO YOU EVER COUGH UP ANY MUCUS OR PHLEGM?: NO/ONLY WITH OCCASIONAL COLDS OR INFECTIONS
DURING THE PAST 4 WEEKS HOW MUCH DID YOU FEEL SHORT OF BREATH: NONE/LITTLE OF THE TIME
COPD SCREENING SCORE: 2

## 2018-04-30 ASSESSMENT — PATIENT HEALTH QUESTIONNAIRE - PHQ9
SUM OF ALL RESPONSES TO PHQ9 QUESTIONS 1 AND 2: 0
2. FEELING DOWN, DEPRESSED, IRRITABLE, OR HOPELESS: NOT AT ALL
1. LITTLE INTEREST OR PLEASURE IN DOING THINGS: NOT AT ALL

## 2018-04-30 ASSESSMENT — LIFESTYLE VARIABLES
EVER_SMOKED: NEVER
ALCOHOL_USE: NO

## 2018-04-30 NOTE — PROGRESS NOTES
Chief Complaint   Patient presents with   • Coronary Artery Disease       Subjective:   Edgardo Sims is a 62 y.o. male who presents today for followup of his hypertension, hyperlipidemia and abnormal myocardial perfusion scan. He underwent cardiac catheterization and had minimal plaque. 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     He has had difficulty with statin therapy. We tried him on low-dose rosuvastatin. He had difficulty with muscle tenderness on this medication and discontinued after a 2nd attempt to use it. He had elevation in his CPK on atorvastatin therapy.    He has had a few episodes of chest discomfort in the left pectoral region.  This is fairly localized in about a 3 cm diameter area.  He describes it as a tightness which lasts a minute or so.  It is nonexertional.    He has noted no dyspnea on exertion, PND, orthopnea or edema.  He has had no palpitations or lightheadedness.    He is feeling some cramping type sensation in his back, flank area.  In addition, he notices that his urine is cloudy and that he has some mild dysuria.  This all started a few days ago.  He is on immunosuppressive drugs for his rheumatoid arthritis.  He has had no fevers or chills.    Past Medical History:   Diagnosis Date   • Abnormal myocardial perfusion study 1/15/2014   • Aortic insufficiency 7/5/2011   • Arthritis     RA, and osteo   • Bronchitis    • CAD (coronary artery disease) mild plaque at cath in 2/14 12/5/2014   • Cancer (HCC)     skin   • Chronic use of opiate drugs therapeutic purposes 8/12/2017   • Cold     mid January 2014   • Coughing blood     none currently 2014   • Dyslipidemia 7/12/2011   • Heart burn    • Heart murmur    • Hiatus hernia syndrome    • HTN (hypertension) 7/5/2011   • Hypertension    • Indigestion    • Infectious disease    • Pain     RA   • Rheumatoid arthritis(714.0)     severe   • Tachycardia 10/20/2014     Past Surgical History:   Procedure Laterality Date   •  RECOVERY  2/3/2014    Performed by Cath-Recovery Surgery at SURGERY SAME DAY [facility] ORS   • HIP ARTHROPLASTY TOTAL  5/31/2013    Performed by Burak Valles M.D. at SURGERY [facility] ORS   • KNEE ARTHROPLASTY TOTAL  6/1/2009    Performed by YONATAN HINSON at SURGERY [facility] ORS   • VEIN LIGATION RADIO FREQUENCY  12/8/2008    Performed by DEREJE MCCULLOUGH at SURGERY SAME DAY [facility] ORS   • HIP ARTHROPLASTY TOTAL  6/20/08    Performed by YONATAN HINSON at SURGERY [facility] ORS   • LUMBAR LAMINECTOMY DISKECTOMY  2000   • TMJ RECONSTRUCTION BILATERAL  1994   • KNEE ARTHROSCOPY      right   • OTHER      varicose vein stripping   • OTHER      heart murmur   • OTHER ORTHOPEDIC SURGERY      awaiting right hip surgery   • SHOULDER SURGERY      RIGHT 01/2011     Family History   Problem Relation Age of Onset   • Hypertension Mother      Social History     Social History   • Marital status:      Spouse name: N/A   • Number of children: N/A   • Years of education: N/A     Occupational History   • Not on file.     Social History Main Topics   • Smoking status: Never Smoker   • Smokeless tobacco: Never Used   • Alcohol use 3.0 oz/week     6 Cans of beer per week   • Drug use: No   • Sexual activity: Yes     Partners: Female     Other Topics Concern   •  Service No   • Blood Transfusions No   • Caffeine Concern No   • Occupational Exposure No   • Hobby Hazards Yes     rides motorcyle   • Sleep Concern No   • Stress Concern No   • Weight Concern Yes   • Special Diet Yes     does not eat red meat    • Back Care Yes   • Exercise Yes   • Bike Helmet Yes   • Seat Belt Yes   • Self-Exams Yes     Social History Narrative   • No narrative on file     Allergies   Allergen Reactions   • Crestor [Rosuvastatin Calcium] Myalgia   • Penicillins Hives     Outpatient Encounter Prescriptions as of 4/30/2018   Medication Sig Dispense Refill   • predniSONE (DELTASONE) 1 MG Tab TAKE 2 TABLETS BY MOUTH EVERY DAY  60 Tab 1   • meloxicam (MOBIC) 15 MG tablet TAKE 1 TABLET BY MOUTH EVERY DAY 30 Tab 11   • diazePAM (VALIUM) 5 MG Tab Take 5 mg by mouth every 6 hours as needed for Anxiety.     • pravastatin (PRAVACHOL) 20 MG Tab Take 20 mg by mouth every evening.     • CALCIUM-VITAMIN D PO Take  by mouth.     • ascorbic acid (ASCORBIC ACID) 500 MG Tab Take 500 mg by mouth every day.     • [START ON 6/10/2018] oxyCODONE-acetaminophen (PERCOCET-10)  MG Tab Take 1 Tab by mouth 5 Times a Day for 30 days. 150 Tab 0   • fluticasone (FLONASE) 50 MCG/ACT nasal spray Spray 2 Sprays in nose every day. 16 g 11   • Tofacitinib Citrate (XELJANZ) 5 MG Tab Take 5 mg by mouth 2 Times a Day. SAMPLE 60 Tab 0   • testosterone cypionate (DEPO-TESTOSTERONE) 200 MG/ML Solution injection 1 mL by Intramuscular route every 14 days for 130 days. 10 mL 1   • bisoprolol (ZEBETA) 10 MG tablet Take 1 Tab by mouth every day. 90 Tab 1   • Garlic 1000 MG Cap Take  by mouth.     • vitamin e (VITAMIN E) 400 UNIT Cap Take 400 Units by mouth every day.     • lisinopril (PRINIVIL) 10 MG Tab TAKE ONE TABLET BY MOUTH ONCE DAILY 90 Tab 2   • cyclobenzaprine (FLEXERIL) 10 MG Tab TAKE ONE TABLET BY MOUTH THREE TIMES DAILY AS NEEDED FOR MILD PAIN 90 Tab 5   • Calcium Carbonate-Vitamin D (CALCIUM 600 + D) 600-400 MG-UNIT Tab Take 1 Tab by mouth 2 times a day, with meals. 60 Tab 3   • Cyanocobalamin (VITAMIN B-12) 5000 MCG TABLET DISPERSIBLE Take  by mouth.     • niacin 500 MG Tab Take 500 mg by mouth every day.     • Zinc 50 MG Cap Take 50 mg by mouth every day.     • paroxetine (PAXIL) 20 MG Tab TAKE ONE TABLET BY MOUTH ONCE DAILY 90 Tab 3   • Multiple Vitamins-Minerals (MULTI COMPLETE PO) Take  by mouth.     • vardenafil (LEVITRA) 20 MG tablet Take 20 mg by mouth as needed.     • omeprazole (PRILOSEC) 20 MG CPDR Take 20 mg by mouth every day.     • predniSONE (DELTASONE) 5 MG Tab 20 mg po q day x 5 days and decrease every 5 days by 5 mg when done resume prednisone 2  "mg daily 50 Tab 0   • MELOXICAM PO      • clindamycin (CLEOCIN) 300 MG Cap      • cephALEXin (KEFLEX) 500 MG Cap Take 1 Cap by mouth 4 times a day. 40 Cap 0     No facility-administered encounter medications on file as of 4/30/2018.      ROS     Objective:   /70   Pulse 98   Ht 1.778 m (5' 10\")   Wt 97.1 kg (214 lb)   SpO2 96%   BMI 30.71 kg/m²     Physical Exam   Constitutional: He appears well-developed and well-nourished.   Neck: No JVD present.   Cardiovascular: Normal rate and regular rhythm.    No murmur heard.  Pulmonary/Chest: Effort normal and breath sounds normal. He has no rales.   Abdominal: Soft. There is no tenderness.   Musculoskeletal: He exhibits no edema.     Lab Results   Component Value Date/Time    CHOLSTRLTOT 153 01/26/2018 07:49 AM    LDL 80 01/26/2018 07:49 AM    HDL 56 01/26/2018 07:49 AM    TRIGLYCERIDE 84 01/26/2018 07:49 AM       Lab Results   Component Value Date/Time    SODIUM 132 (L) 04/26/2018 03:03 PM    POTASSIUM 4.7 04/26/2018 03:03 PM    CHLORIDE 99 04/26/2018 03:03 PM    CO2 27 04/26/2018 03:03 PM    GLUCOSE 119 (H) 04/26/2018 03:03 PM    BUN 10 04/26/2018 03:03 PM    CREATININE 1.02 04/26/2018 03:03 PM    CREATININE 1.1 04/21/2009 03:50 PM    BUNCREATRAT 13 09/21/2016 09:21 AM     Lab Results   Component Value Date/Time    ALKPHOSPHAT 88 04/26/2018 03:03 PM    ASTSGOT 24 04/26/2018 03:03 PM    ALTSGPT 19 04/26/2018 03:03 PM    TBILIRUBIN 0.6 04/26/2018 03:03 PM      Lab Results   Component Value Date/Time    BNPBTYPENAT 55 11/08/2017 09:28 AM      Results for CANDI UMNAA (MRN [id number]) as of 4/30/2018 08:57   Ref. Range 4/26/2018 15:03   WBC Latest Ref Range: 4.8 - 10.8 K/uL 16.6 (H)   RBC Latest Ref Range: 4.70 - 6.10 M/uL 4.71   Hemoglobin Latest Ref Range: 14.0 - 18.0 g/dL 15.7   Hematocrit Latest Ref Range: 42.0 - 52.0 % 46.3   MCV Latest Ref Range: 81.4 - 97.8 fL 98.3 (H)   MCH Latest Ref Range: 27.0 - 33.0 pg 33.3 (H)   MCHC Latest Ref Range: 33.7 - " 35.3 g/dL 33.9   RDW Latest Ref Range: 35.9 - 50.0 fL 50.6 (H)   Platelet Count Latest Ref Range: 164 - 446 K/uL 317   MPV Latest Ref Range: 9.0 - 12.9 fL 9.9       Assessment:     1. Coronary artery disease due to calcified coronary lesion: Mildly cardiac catheterization in 2014     2. Essential hypertension     3. Nonrheumatic aortic valve insufficiency     4. Dyslipidemia         Medical Decision Making:  Today's Assessment / Status / Plan:     Mr. Sims is clinically stable from a cardiovascular standpoint.  However, his lipid status is not under optimal control.  He has been intolerant of other statins but is tolerating low-dose pravastatin.  We will try increasing this to 40 mg daily.  We could also try adding ezetimibe if this is not efficacious.    At the present time, he may have pyelonephritis.  He is on immunosuppressive therapy.  He will be referred to the emergency room for further evaluation.

## 2018-04-30 NOTE — ED NOTES
Assist primary RN with pt care. Pt's IV site changed. Initial IV site clogged and will not flush. Rocephin infusing without difficulty. Pt with call light in reach and altagraciarney in low position for pt safety. Pt given warm blanket for comfort.

## 2018-04-30 NOTE — TELEPHONE ENCOUNTER
1. Caller Name: Kwasi                                         Call Back Number: [phone]      Patient approves a detailed voicemail message: N\A    Pt called this morning with stomach cramps, low back pain, dysuria.  He thought it might be from some jason lettuce he ate.  No diarrhea.  I asked him to come to urgent care because there was no regular openings.  He went to his cardiologist for a follow up today and that doc suggested him to go straight to ER.  Kwasi is being admitted now at [facility] for IV abx for UTI.

## 2018-04-30 NOTE — ED NOTES
Report called to ABHISHEK Shay.  Aware transport is on the way and that I am available for any questions/concerns regarding pt on arrival to floor.  No change in condition at this time.  To be transferred via gurney.

## 2018-04-30 NOTE — ASSESSMENT & PLAN NOTE
Continue IV ceftriaxone  Follow cultures negative still to date  Pain control, pain is improving  Continue IV fluids

## 2018-04-30 NOTE — ED NOTES
Med rec updated and complete  Allergies reviewed  Pt reports no antibiotics in the last 30 days.  Pt reports that he takes XELJANZ 5MG and MELOXICAM 15MG every day.

## 2018-04-30 NOTE — ED NOTES
"Chief Complaint   Patient presents with   • Abdominal Cramps     pt c/o abd cramps x several days. pt states 1 day of diarrhea 2 days ago. pt c/o painful urination.    • Painful Urination     /74   Pulse 87   Temp 36.8 °C (98.3 °F)   Resp 18   Ht 1.778 m (5' 10\")   Wt 91.8 kg (202 lb 6.1 oz)   SpO2 97%   BMI 29.04 kg/m²     "

## 2018-04-30 NOTE — ED PROVIDER NOTES
ED Provider Note    CHIEF COMPLAINT  Chief Complaint   Patient presents with   • Abdominal Cramps     pt c/o abd cramps x several days. pt states 1 day of diarrhea 2 days ago. pt c/o painful urination.    • Painful Urination        HPI  Edgardo Sims is a 62 y.o. male who presents to the ED with complaints of abdominal cramps, dysuria and frequency. Patient states that 2 days ago started having a bout of some abdominal cramps with diarrhea. He did have some Stuart lettuce so is concerned it may have been tainted Stuart lettuce, but then last night started having more frequency of urine and today when he urinates. He started noticing cloudy urine and I can a stone when he was urinating. Patient describes the abdominal pain is more crampy type pain with radiation to his back on both sides. Patient denies any overt fevers, chills, vomiting, had one bout of diarrhea yesterday, but now describes frequency and now increasing pain, pretty much in the upper abdominal and right back area but bilateral it seems. Patient does have a history of rheumatoid arthritis, is currently on    REVIEW OF SYSTEMS  See HPI for further details. All other systems are negative.     PAST MEDICAL HISTORY  Past Medical History:   Diagnosis Date   • Abnormal myocardial perfusion study 1/15/2014   • Aortic insufficiency 7/5/2011   • Arthritis     RA, and osteo   • Bronchitis    • CAD (coronary artery disease) mild plaque at cath in 2/14 12/5/2014   • Cancer (HCC)     skin   • Chronic use of opiate drugs therapeutic purposes 8/12/2017   • Cold     mid January 2014   • Coughing blood     none currently 2014   • Dyslipidemia 7/12/2011   • Heart burn    • Heart murmur    • Hiatus hernia syndrome    • HTN (hypertension) 7/5/2011   • Hypertension    • Indigestion    • Infectious disease    • Pain     RA   • Rheumatoid arthritis(714.0)     severe   • Tachycardia 10/20/2014       FAMILY HISTORY  Family History   Problem Relation Age of Onset   •  Hypertension Mother      Patient's family history has been discussed and is been found to be noncontributory to his present illness  SOCIAL HISTORY  Social History     Social History   • Marital status:      Spouse name: N/A   • Number of children: N/A   • Years of education: N/A     Social History Main Topics   • Smoking status: Never Smoker   • Smokeless tobacco: Never Used   • Alcohol use 3.0 oz/week     6 Cans of beer per week   • Drug use: No   • Sexual activity: Yes     Partners: Female     Other Topics Concern   •  Service No   • Blood Transfusions No   • Caffeine Concern No   • Occupational Exposure No   • Hobby Hazards Yes     rides motorcyle   • Sleep Concern No   • Stress Concern No   • Weight Concern Yes   • Special Diet Yes     does not eat red meat    • Back Care Yes   • Exercise Yes   • Bike Helmet Yes   • Seat Belt Yes   • Self-Exams Yes     Social History Narrative   • No narrative on file      John Lao D.O.  The patient denies any significant tobacco, alcohol or drug use      SURGICAL HISTORY  Past Surgical History:   Procedure Laterality Date   • RECOVERY  2/3/2014    Performed by Cath-Recovery Surgery at SURGERY SAME DAY [facility] ORS   • HIP ARTHROPLASTY TOTAL  5/31/2013    Performed by Burak Valles M.D. at SURGERY [facility]   • KNEE ARTHROPLASTY TOTAL  6/1/2009    Performed by YONATAN HINSON at SURGERY [facility]   • VEIN LIGATION RADIO FREQUENCY  12/8/2008    Performed by DEREJE MCCULLOUGH at SURGERY SAME DAY [facility] ORS   • HIP ARTHROPLASTY TOTAL  6/20/08    Performed by YONATAN HINSON at SURGERY [facility]S   • LUMBAR LAMINECTOMY DISKECTOMY  2000   • TMJ RECONSTRUCTION BILATERAL  1994   • KNEE ARTHROSCOPY      right   • OTHER      varicose vein stripping   • OTHER      heart murmur   • OTHER ORTHOPEDIC SURGERY      awaiting right hip surgery   • SHOULDER SURGERY      RIGHT 01/2011       CURRENT MEDICATIONS  Home Medications     Reviewed by  Kelli Cortez R.N. (Registered Nurse) on 04/30/18 at 1521  Med List Status: Complete   Medication Last Dose Status   ascorbic acid (ASCORBIC ACID) 500 MG Tab 4/29/2018 Active   CALCIUM-VITAMIN D PO 4/29/2018 Active   Cyanocobalamin (VITAMIN B-12) 5000 MCG TABLET DISPERSIBLE 4/29/2018 Active   cyclobenzaprine (FLEXERIL) 10 MG Tab 4/30/2018 Active   diazePAM (VALIUM) 5 MG Tab 4/29/2018 Active   fluticasone (FLONASE) 50 MCG/ACT nasal spray > 1 week Active   Garlic 1000 MG Cap 4/29/2018 Active   lisinopril (PRINIVIL) 10 MG Tab 4/30/2018 Active   meloxicam (MOBIC) 15 MG tablet 4/28/2018 Active   Multiple Vitamins-Minerals (MULTI COMPLETE PO) 4/29/2018 Active   niacin 500 MG Tab 4/29/2018 Active   omeprazole (PRILOSEC) 20 MG CPDR 4/30/2018 Active   oxyCODONE-acetaminophen (PERCOCET-10)  MG Tab 4/30/2018 Active   paroxetine (PAXIL) 20 MG Tab 4/29/2018 Active   pravastatin (PRAVACHOL) 40 MG tablet 4/28/2018 Active   predniSONE (DELTASONE) 1 MG Tab 4/29/2018 Active   testosterone cypionate (DEPO-TESTOSTERONE) 200 MG/ML Solution injection 4/25/2018 Active   Tofacitinib Citrate (XELJANZ) 5 MG Tab 4/27/2018 Active   vardenafil (LEVITRA) 20 MG tablet > 1 week Active   vitamin e (VITAMIN E) 400 UNIT Cap 4/29/2018 Active   Zinc 50 MG Cap 4/29/2018 Active              No current facility-administered medications on file prior to encounter.      Current Outpatient Prescriptions on File Prior to Encounter   Medication Sig Dispense Refill   • pravastatin (PRAVACHOL) 40 MG tablet Take 1 Tab by mouth every day. 30 Tab 11   • predniSONE (DELTASONE) 1 MG Tab TAKE 2 TABLETS BY MOUTH EVERY DAY 60 Tab 1   • meloxicam (MOBIC) 15 MG tablet TAKE 1 TABLET BY MOUTH EVERY DAY 30 Tab 11   • diazePAM (VALIUM) 5 MG Tab Take 5 mg by mouth every 6 hours as needed for Anxiety or Sleep.     • CALCIUM-VITAMIN D PO Take 1 Tab by mouth 2 Times a Day.     • ascorbic acid (ASCORBIC ACID) 500 MG Tab Take 500 mg by mouth every day.     • [START ON  "6/10/2018] oxyCODONE-acetaminophen (PERCOCET-10)  MG Tab Take 1 Tab by mouth 5 Times a Day for 30 days. 150 Tab 0   • Tofacitinib Citrate (XELJANZ) 5 MG Tab Take 5 mg by mouth 2 Times a Day. SAMPLE 60 Tab 0   • testosterone cypionate (DEPO-TESTOSTERONE) 200 MG/ML Solution injection 1 mL by Intramuscular route every 14 days for 130 days. 10 mL 1   • Garlic 1000 MG Cap Take 1 Cap by mouth every day.     • vitamin e (VITAMIN E) 400 UNIT Cap Take 400 Units by mouth every day.     • lisinopril (PRINIVIL) 10 MG Tab TAKE ONE TABLET BY MOUTH ONCE DAILY 90 Tab 2   • cyclobenzaprine (FLEXERIL) 10 MG Tab TAKE ONE TABLET BY MOUTH THREE TIMES DAILY AS NEEDED FOR MILD PAIN 90 Tab 5   • Cyanocobalamin (VITAMIN B-12) 5000 MCG TABLET DISPERSIBLE Take 1 Tab by mouth every day.     • niacin 500 MG Tab Take 500 mg by mouth every day.     • Zinc 50 MG Cap Take 50 mg by mouth every day.     • paroxetine (PAXIL) 20 MG Tab TAKE ONE TABLET BY MOUTH ONCE DAILY 90 Tab 3   • Multiple Vitamins-Minerals (MULTI COMPLETE PO) Take 1 Tab by mouth every day.     • vardenafil (LEVITRA) 20 MG tablet Take 20 mg by mouth as needed.     • omeprazole (PRILOSEC) 20 MG CPDR Take 20 mg by mouth every day.         ALLERGIES  Allergies   Allergen Reactions   • Crestor [Rosuvastatin Calcium] Myalgia   • Penicillins Hives, Itching and Vomiting       PHYSICAL EXAM  VITAL SIGNS: /76   Pulse 88   Temp 36.5 °C (97.7 °F)   Resp 20   Ht 1.778 m (5' 10\")   Wt 89.4 kg (197 lb 1.5 oz)   SpO2 100%   BMI 28.28 kg/m²    Pulse Oximetry was obtained. It showed a reading of Pulse Oximetry: 97 %.  I interpreted this as nonhypoxic.     Constitutional: Well developed, Well nourished, No acute distress, Non-toxic appearance.   HENT: Normocephalic, Atraumatic, Bilateral external ears normal, bilateral tympanic membranes normal, Oropharynx mucous membranes, No oral exudates, Nose normal.   Eyes: Pupils are equal round and react to light, extraocular motions are " intact, conjunctiva is normal, there are no signs of exudate.   Neck: Supple, no cervical lymphadenopathy, no meningeal signs..   Lymphatic: No lymphadenopathy noted.   Cardiovascular: Regular rate and rhythm without murmurs gallops or rubs.   Thorax & Lungs: Lungs are clear to auscultation bilaterally, there are no wheezes no rales. Chest wall is nontender.  Abdomen: Soft, mild tenderness diffusely, but essentially benign examination. No rebound tenderness  Skin: Warm, Dry, No erythema,   Back: No tenderness, No CVA tenderness.   Extremities: Intact distal pulses, no clubbing, no cyanosis, no edema, nontender.  Neurologic: Alert & oriented x 3, Normal motor function, Normal sensory function, No focal deficits noted.     Twelve-lead EKG interpreted by myself below    RADIOLOGY/PROCEDURES  CT-ABDOMEN-PELVIS WITH   Final Result      1.  Bladder distention and wall thickening with some trabeculation indicating likely chronic outlet obstruction. Superimposed cystitis is not excluded      2.  Prostate gland is obscured due to shadowing from bilateral hip arthroplasties      3.  No acute bowel or other abnormality is detected      4.  Moderate to severe atherosclerosis      US-ABDOMEN COMPLETE SURVEY    (Results Pending)       Results for orders placed or performed during the hospital encounter of 04/30/18   CBC WITH DIFFERENTIAL   Result Value Ref Range    WBC 14.8 (H) 4.8 - 10.8 K/uL    RBC 5.09 4.70 - 6.10 M/uL    Hemoglobin 17.0 14.0 - 18.0 g/dL    Hematocrit 48.9 42.0 - 52.0 %    MCV 96.1 81.4 - 97.8 fL    MCH 33.4 (H) 27.0 - 33.0 pg    MCHC 34.8 33.7 - 35.3 g/dL    RDW 47.5 35.9 - 50.0 fL    Platelet Count 325 164 - 446 K/uL    MPV 8.7 (L) 9.0 - 12.9 fL    Neutrophils-Polys 68.40 44.00 - 72.00 %    Lymphocytes 11.20 (L) 22.00 - 41.00 %    Monocytes 16.20 (H) 0.00 - 13.40 %    Eosinophils 2.30 0.00 - 6.90 %    Basophils 0.70 0.00 - 1.80 %    Immature Granulocytes 1.20 (H) 0.00 - 0.90 %    Nucleated RBC 0.00 /100 WBC     Neutrophils (Absolute) 10.15 (H) 1.82 - 7.42 K/uL    Lymphs (Absolute) 1.66 1.00 - 4.80 K/uL    Monos (Absolute) 2.41 (H) 0.00 - 0.85 K/uL    Eos (Absolute) 0.34 0.00 - 0.51 K/uL    Baso (Absolute) 0.10 0.00 - 0.12 K/uL    Immature Granulocytes (abs) 0.18 (H) 0.00 - 0.11 K/uL    NRBC (Absolute) 0.00 K/uL   COMP METABOLIC PANEL   Result Value Ref Range    Sodium 134 (L) 135 - 145 mmol/L    Potassium 5.0 3.6 - 5.5 mmol/L    Chloride 98 96 - 112 mmol/L    Co2 28 20 - 33 mmol/L    Anion Gap 8.0 0.0 - 11.9    Glucose 76 65 - 99 mg/dL    Bun 10 8 - 22 mg/dL    Creatinine 0.94 0.50 - 1.40 mg/dL    Calcium 10.0 8.4 - 10.2 mg/dL    AST(SGOT) 34 12 - 45 U/L    ALT(SGPT) 32 2 - 50 U/L    Alkaline Phosphatase 96 30 - 99 U/L    Total Bilirubin 0.8 0.1 - 1.5 mg/dL    Albumin 4.5 3.2 - 4.9 g/dL    Total Protein 8.1 6.0 - 8.2 g/dL    Globulin 3.6 (H) 1.9 - 3.5 g/dL    A-G Ratio 1.3 g/dL   LIPASE   Result Value Ref Range    Lipase 78 (H) 7 - 58 U/L   TROPONIN   Result Value Ref Range    Troponin I <0.02 0.00 - 0.04 ng/mL   BTYPE NATRIURETIC PEPTIDE   Result Value Ref Range    B Natriuretic Peptide 61 0 - 100 pg/mL   LACTIC ACID   Result Value Ref Range    Lactic Acid 3.18 (H) 0.50 - 2.00 mmol/L   URINALYSIS CULTURE, IF INDICATED   Result Value Ref Range    Color Yellow     Character Cloudy (A)     Specific Gravity <=1.005 <1.035    Ph 7.5 5.0 - 8.0    Glucose Negative Negative mg/dL    Ketones Negative Negative mg/dL    Protein Negative Negative mg/dL    Bilirubin Negative Negative    Nitrite Negative Negative    Leukocyte Esterase Moderate (A) Negative    Occult Blood Moderate (A) Negative    Micro Urine Req Microscopic    ESTIMATED GFR   Result Value Ref Range    GFR If African American >60 >60 mL/min/1.73 m 2    GFR If Non African American >60 >60 mL/min/1.73 m 2   URINE MICROSCOPIC (W/UA)   Result Value Ref Range    WBC Packed (A) /hpf    RBC 10-20 (A) /hpf    Bacteria Moderate (A) None /hpf    Epithelial Cells Rare Few /hpf    LACTIC ACID   Result Value Ref Range    Lactic Acid 0.86 0.50 - 2.00 mmol/L   EKG (NOW)   Result Value Ref Range    Report       [facility]    Test Date:  2018  Pt Name:    CANDI UMANA              Department: [facility]  MRN:        [id number]                      [facility]  Gender:     Male                         Technician: CAROLYN  :        1955                   Requested By:ALEXY VILLALOBOS  Order #:    [id number]                    Reading MD: ALEXY VILLALOBOS MD    Measurements  Intervals                                Axis  Rate:       77                           P:          51  LA:         172                          QRS:        27  QRSD:       76                           T:          49  QT:         352  QTc:        399    Interpretive Statements  SINUS RHYTHM  EARLY PRECORDIAL R/S TRANSITION  Compared to ECG 10/09/2015 10:49:54  ST (T wave) deviation no longer present  otherwise normal appearing ECG  Electronically Signed On 2018 11:46:50 PDT by ALEXY VILLALOBOS MD           COURSE & MEDICAL DECISION MAKING  Pertinent Labs & Imaging studies reviewed. (See chart for details)  Presents for evaluation. Clinically, the patient does appear well, but describes above symptoms. IV was established. Urine output was obtained. Does appear the patient does have urinary tract infection clinically difficult to pyelonephritis. Patient has been on immunomodulating drugs for his rheumatoid arthritis. I'm concerned about sepsis that is not being visualized. The patient's lactic acid was elevated, so I started the patient with a 2 L bolus of normal saline and is started on Rocephin empirically for this infection. At this point because of his history, I do feel the patient should be admitted the hospital for overnight IV antibiotics and observation. I spoke to hospitalist for this admission.    FINAL IMPRESSION  1. Pyelonephritis    2. Sepsis, due to  unspecified organism (HCC)    3. Acute pancreatitis, unspecified complication status, unspecified pancreatitis type            Electronically signed by: Pedro Cuellar, 4/30/2018 10:30 AM

## 2018-04-30 NOTE — LETTER
[facility]   [address]  [place] [address]  Phone: [phone]  Fax: [phone]              Edgardo Sims  1955    Encounter Date: 4/30/2018    Stas Zabala M.D.          PROGRESS NOTE:  Chief Complaint   Patient presents with   • Coronary Artery Disease       Subjective:   Edgardo Sims is a 62 y.o. male who presents today for followup of his hypertension, hyperlipidemia and abnormal myocardial perfusion scan. He underwent cardiac catheterization and had minimal plaque. 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     He has had difficulty with statin therapy. We tried him on low-dose rosuvastatin. He had difficulty with muscle tenderness on this medication and discontinued after a 2nd attempt to use it. He had elevation in his CPK on atorvastatin therapy.    He has had a few episodes of chest discomfort in the left pectoral region.  This is fairly localized in about a 3 cm diameter area.  He describes it as a tightness which lasts a minute or so.  It is nonexertional.    He has noted no dyspnea on exertion, PND, orthopnea or edema.  He has had no palpitations or lightheadedness.    He is feeling some cramping type sensation in his back flank area.  In addition, he notices that his urine is cloudy and that he has some mild dysuria.  This all started a few days ago.  He is on immunosuppressive drugs for his rheumatoid arthritis.  He has had no fevers or chills.    Past Medical History:   Diagnosis Date   • Abnormal myocardial perfusion study 1/15/2014   • Aortic insufficiency 7/5/2011   • Arthritis     RA, and osteo   • Bronchitis    • CAD (coronary artery disease) mild plaque at cath in 2/14 12/5/2014   • Cancer (HCC)     skin   • Chronic use of opiate drugs therapeutic purposes 8/12/2017   • Cold     mid January 2014   • Coughing blood     none currently 2014   • Dyslipidemia 7/12/2011   • Heart burn    • Heart murmur     • Hiatus hernia syndrome    • HTN (hypertension) 7/5/2011   • Hypertension    • Indigestion    • Infectious disease    • Pain     RA   • Rheumatoid arthritis(714.0)     severe   • Tachycardia 10/20/2014     Past Surgical History:   Procedure Laterality Date   • RECOVERY  2/3/2014    Performed by Cath-Recovery Surgery at SURGERY SAME DAY [facility] ORS   • HIP ARTHROPLASTY TOTAL  5/31/2013    Performed by Burak Valles M.D. at SURGERY [facility] ORS   • KNEE ARTHROPLASTY TOTAL  6/1/2009    Performed by YONATAN HINSON at SURGERY [facility] ORS   • VEIN LIGATION RADIO FREQUENCY  12/8/2008    Performed by DEREJE MCCULLOUGH at SURGERY SAME DAY [facility] ORS   • HIP ARTHROPLASTY TOTAL  6/20/08    Performed by YONATAN HINSON at SURGERY [facility] ORS   • LUMBAR LAMINECTOMY DISKECTOMY  2000   • TMJ RECONSTRUCTION BILATERAL  1994   • KNEE ARTHROSCOPY      right   • OTHER      varicose vein stripping   • OTHER      heart murmur   • OTHER ORTHOPEDIC SURGERY      awaiting right hip surgery   • SHOULDER SURGERY      RIGHT 01/2011     Family History   Problem Relation Age of Onset   • Hypertension Mother      Social History     Social History   • Marital status:      Spouse name: N/A   • Number of children: N/A   • Years of education: N/A     Occupational History   • Not on file.     Social History Main Topics   • Smoking status: Never Smoker   • Smokeless tobacco: Never Used   • Alcohol use 3.0 oz/week     6 Cans of beer per week   • Drug use: No   • Sexual activity: Yes     Partners: Female     Other Topics Concern   •  Service No   • Blood Transfusions No   • Caffeine Concern No   • Occupational Exposure No   • Hobby Hazards Yes     rides motorcyle   • Sleep Concern No   • Stress Concern No   • Weight Concern Yes   • Special Diet Yes     does not eat red meat    • Back Care Yes   • Exercise Yes   • Bike Helmet Yes   • Seat Belt Yes   • Self-Exams Yes     Social History Narrative   • No narrative on  file     Allergies   Allergen Reactions   • Crestor [Rosuvastatin Calcium] Myalgia   • Penicillins Hives     Outpatient Encounter Prescriptions as of 4/30/2018   Medication Sig Dispense Refill   • predniSONE (DELTASONE) 1 MG Tab TAKE 2 TABLETS BY MOUTH EVERY DAY 60 Tab 1   • meloxicam (MOBIC) 15 MG tablet TAKE 1 TABLET BY MOUTH EVERY DAY 30 Tab 11   • diazePAM (VALIUM) 5 MG Tab Take 5 mg by mouth every 6 hours as needed for Anxiety.     • pravastatin (PRAVACHOL) 20 MG Tab Take 20 mg by mouth every evening.     • CALCIUM-VITAMIN D PO Take  by mouth.     • ascorbic acid (ASCORBIC ACID) 500 MG Tab Take 500 mg by mouth every day.     • [START ON 6/10/2018] oxyCODONE-acetaminophen (PERCOCET-10)  MG Tab Take 1 Tab by mouth 5 Times a Day for 30 days. 150 Tab 0   • fluticasone (FLONASE) 50 MCG/ACT nasal spray Spray 2 Sprays in nose every day. 16 g 11   • Tofacitinib Citrate (XELJANZ) 5 MG Tab Take 5 mg by mouth 2 Times a Day. SAMPLE 60 Tab 0   • testosterone cypionate (DEPO-TESTOSTERONE) 200 MG/ML Solution injection 1 mL by Intramuscular route every 14 days for 130 days. 10 mL 1   • bisoprolol (ZEBETA) 10 MG tablet Take 1 Tab by mouth every day. 90 Tab 1   • Garlic 1000 MG Cap Take  by mouth.     • vitamin e (VITAMIN E) 400 UNIT Cap Take 400 Units by mouth every day.     • lisinopril (PRINIVIL) 10 MG Tab TAKE ONE TABLET BY MOUTH ONCE DAILY 90 Tab 2   • cyclobenzaprine (FLEXERIL) 10 MG Tab TAKE ONE TABLET BY MOUTH THREE TIMES DAILY AS NEEDED FOR MILD PAIN 90 Tab 5   • Calcium Carbonate-Vitamin D (CALCIUM 600 + D) 600-400 MG-UNIT Tab Take 1 Tab by mouth 2 times a day, with meals. 60 Tab 3   • Cyanocobalamin (VITAMIN B-12) 5000 MCG TABLET DISPERSIBLE Take  by mouth.     • niacin 500 MG Tab Take 500 mg by mouth every day.     • Zinc 50 MG Cap Take 50 mg by mouth every day.     • paroxetine (PAXIL) 20 MG Tab TAKE ONE TABLET BY MOUTH ONCE DAILY 90 Tab 3   • Multiple Vitamins-Minerals (MULTI COMPLETE PO) Take  by mouth.      "  • vardenafil (LEVITRA) 20 MG tablet Take 20 mg by mouth as needed.     • omeprazole (PRILOSEC) 20 MG CPDR Take 20 mg by mouth every day.     • predniSONE (DELTASONE) 5 MG Tab 20 mg po q day x 5 days and decrease every 5 days by 5 mg when done resume prednisone 2 mg daily 50 Tab 0   • MELOXICAM PO      • clindamycin (CLEOCIN) 300 MG Cap      • cephALEXin (KEFLEX) 500 MG Cap Take 1 Cap by mouth 4 times a day. 40 Cap 0     No facility-administered encounter medications on file as of 4/30/2018.      ROS     Objective:   /70   Pulse 98   Ht 1.778 m (5' 10\")   Wt 97.1 kg (214 lb)   SpO2 96%   BMI 30.71 kg/m²      Physical Exam   Constitutional: He appears well-developed and well-nourished.   Neck: No JVD present.   Cardiovascular: Normal rate and regular rhythm.    No murmur heard.  Pulmonary/Chest: Effort normal and breath sounds normal. He has no rales.   Abdominal: Soft. There is no tenderness.   Musculoskeletal: He exhibits no edema.     Lab Results   Component Value Date/Time    CHOLSTRLTOT 153 01/26/2018 07:49 AM    LDL 80 01/26/2018 07:49 AM    HDL 56 01/26/2018 07:49 AM    TRIGLYCERIDE 84 01/26/2018 07:49 AM       Lab Results   Component Value Date/Time    SODIUM 132 (L) 04/26/2018 03:03 PM    POTASSIUM 4.7 04/26/2018 03:03 PM    CHLORIDE 99 04/26/2018 03:03 PM    CO2 27 04/26/2018 03:03 PM    GLUCOSE 119 (H) 04/26/2018 03:03 PM    BUN 10 04/26/2018 03:03 PM    CREATININE 1.02 04/26/2018 03:03 PM    CREATININE 1.1 04/21/2009 03:50 PM    BUNCREATRAT 13 09/21/2016 09:21 AM     Lab Results   Component Value Date/Time    ALKPHOSPHAT 88 04/26/2018 03:03 PM    ASTSGOT 24 04/26/2018 03:03 PM    ALTSGPT 19 04/26/2018 03:03 PM    TBILIRUBIN 0.6 04/26/2018 03:03 PM      Lab Results   Component Value Date/Time    BNPBTYPENAT 55 11/08/2017 09:28 AM      Results for DONGCANDIOY (MRN [id number]) as of 4/30/2018 08:57   Ref. Range 4/26/2018 15:03   WBC Latest Ref Range: 4.8 - 10.8 K/uL 16.6 (H)   RBC Latest " Ref Range: 4.70 - 6.10 M/uL 4.71   Hemoglobin Latest Ref Range: 14.0 - 18.0 g/dL 15.7   Hematocrit Latest Ref Range: 42.0 - 52.0 % 46.3   MCV Latest Ref Range: 81.4 - 97.8 fL 98.3 (H)   MCH Latest Ref Range: 27.0 - 33.0 pg 33.3 (H)   MCHC Latest Ref Range: 33.7 - 35.3 g/dL 33.9   RDW Latest Ref Range: 35.9 - 50.0 fL 50.6 (H)   Platelet Count Latest Ref Range: 164 - 446 K/uL 317   MPV Latest Ref Range: 9.0 - 12.9 fL 9.9       Assessment:     1. Coronary artery disease due to calcified coronary lesion: Mildly cardiac catheterization in 2014     2. Essential hypertension     3. Nonrheumatic aortic valve insufficiency     4. Dyslipidemia         Medical Decision Making:  Today's Assessment / Status / Plan:     Mr. Sims is clinically stable from a cardiovascular standpoint.  However, his lipid status is not under optimal control.  He has been intolerant of other statins but is tolerating low-dose pravastatin.  We will try increasing this to 40 mg daily.  We could also try adding acetamide if this is not efficacious.    At the present time, he may have pyelonephritis.  He is on immunosuppressive therapy.  He will be referred to the emergency room for further evaluation.         John Lao D.O.  [address]  [place] [address]  VIA In Basket

## 2018-04-30 NOTE — ED NOTES
Labs/imaging in process.  EDT to do EKG.  Aware urine sample needed asap.  Call light in reach, aware to call for any needs/changes.

## 2018-04-30 NOTE — H&P
Hospital Medicine History and Physical      Date of Service  4/30/2018    Chief Complaint  Chief Complaint   Patient presents with   • Abdominal Cramps     pt c/o abd cramps x several days. pt states 1 day of diarrhea 2 days ago. pt c/o painful urination.    • Painful Urination       History of Presenting Illness  Bethany is a 62 y.o. male PMH of rheumatoid arthritis, who presents with abdominal cramps and dysuria since Friday. He stated that over the past day or so he started having bilateral flank pain as well. In the ER he was found to have acute pyelonephritis. CT scan of abdomen was done without any acute finding except bladder wall thickening. The patient denied any fever, chills. He said that he stopped taking rheumatoid arthritis medication except prednisone.    Primary Care Physician  John Lao D.O.      Code Status  Full code    Review of Systems  Review of Systems   Constitutional: Negative for chills, fever and weight loss.   HENT: Negative for congestion and nosebleeds.    Eyes: Negative for blurred vision, pain, discharge and redness.   Respiratory: Negative for cough, sputum production, shortness of breath and stridor.    Cardiovascular: Negative for chest pain, palpitations and orthopnea.   Gastrointestinal: Positive for abdominal pain. Negative for diarrhea, heartburn, nausea and vomiting.   Genitourinary: Positive for dysuria and flank pain. Negative for frequency and urgency.   Musculoskeletal: Negative for back pain, myalgias and neck pain.   Skin: Negative for itching and rash.   Neurological: Negative for dizziness, focal weakness, seizures and headaches.   Psychiatric/Behavioral: Negative for depression. The patient is not nervous/anxious and does not have insomnia.      Please see HPI, all other systems were reviewed and are negative (AMA/CMS criteria)     Past Medical History  Past Medical History:   Diagnosis Date   • Chronic use of opiate drugs therapeutic purposes 8/12/2017   •  CAD (coronary artery disease) mild plaque at cath in 2/14 12/5/2014   • Tachycardia 10/20/2014   • Abnormal myocardial perfusion study 1/15/2014   • Dyslipidemia 7/12/2011   • Aortic insufficiency 7/5/2011   • HTN (hypertension) 7/5/2011   • Arthritis     RA, and osteo   • Bronchitis    • Cancer (HCC)     skin   • Cold     mid January 2014   • Coughing blood     none currently 2014   • Heart burn    • Heart murmur    • Hiatus hernia syndrome    • Hypertension    • Indigestion    • Infectious disease    • Pain     RA   • Rheumatoid arthritis(714.0)     severe       Surgical History  Past Surgical History:   Procedure Laterality Date   • RECOVERY  2/3/2014    Performed by Cath-Recovery Surgery at SURGERY SAME DAY [facility] ORS   • HIP ARTHROPLASTY TOTAL  5/31/2013    Performed by Burak Valles M.D. at SURGERY [facility] ORS   • KNEE ARTHROPLASTY TOTAL  6/1/2009    Performed by YONATAN HINSON at SURGERY [facility] ORS   • VEIN LIGATION RADIO FREQUENCY  12/8/2008    Performed by DEREJE MCCULLOUGH at SURGERY SAME DAY [facility] ORS   • HIP ARTHROPLASTY TOTAL  6/20/08    Performed by YONATAN HINSON at SURGERY [facility] ORS   • LUMBAR LAMINECTOMY DISKECTOMY  2000   • TMJ RECONSTRUCTION BILATERAL  1994   • KNEE ARTHROSCOPY      right   • OTHER      varicose vein stripping   • OTHER      heart murmur   • OTHER ORTHOPEDIC SURGERY      awaiting right hip surgery   • SHOULDER SURGERY      RIGHT 01/2011       Medications  No current facility-administered medications on file prior to encounter.      Current Outpatient Prescriptions on File Prior to Encounter   Medication Sig Dispense Refill   • pravastatin (PRAVACHOL) 40 MG tablet Take 1 Tab by mouth every day. 30 Tab 11   • predniSONE (DELTASONE) 1 MG Tab TAKE 2 TABLETS BY MOUTH EVERY DAY 60 Tab 1   • meloxicam (MOBIC) 15 MG tablet TAKE 1 TABLET BY MOUTH EVERY DAY 30 Tab 11   • diazePAM (VALIUM) 5 MG Tab Take 5 mg by mouth every 6 hours as needed for Anxiety or  Sleep.     • CALCIUM-VITAMIN D PO Take 1 Tab by mouth 2 Times a Day.     • ascorbic acid (ASCORBIC ACID) 500 MG Tab Take 500 mg by mouth every day.     • [START ON 6/10/2018] oxyCODONE-acetaminophen (PERCOCET-10)  MG Tab Take 1 Tab by mouth 5 Times a Day for 30 days. 150 Tab 0   • Tofacitinib Citrate (XELJANZ) 5 MG Tab Take 5 mg by mouth 2 Times a Day. SAMPLE 60 Tab 0   • testosterone cypionate (DEPO-TESTOSTERONE) 200 MG/ML Solution injection 1 mL by Intramuscular route every 14 days for 130 days. 10 mL 1   • Garlic 1000 MG Cap Take 1 Cap by mouth every day.     • vitamin e (VITAMIN E) 400 UNIT Cap Take 400 Units by mouth every day.     • lisinopril (PRINIVIL) 10 MG Tab TAKE ONE TABLET BY MOUTH ONCE DAILY 90 Tab 2   • cyclobenzaprine (FLEXERIL) 10 MG Tab TAKE ONE TABLET BY MOUTH THREE TIMES DAILY AS NEEDED FOR MILD PAIN 90 Tab 5   • Cyanocobalamin (VITAMIN B-12) 5000 MCG TABLET DISPERSIBLE Take 1 Tab by mouth every day.     • niacin 500 MG Tab Take 500 mg by mouth every day.     • Zinc 50 MG Cap Take 50 mg by mouth every day.     • paroxetine (PAXIL) 20 MG Tab TAKE ONE TABLET BY MOUTH ONCE DAILY 90 Tab 3   • Multiple Vitamins-Minerals (MULTI COMPLETE PO) Take 1 Tab by mouth every day.     • vardenafil (LEVITRA) 20 MG tablet Take 20 mg by mouth as needed.     • omeprazole (PRILOSEC) 20 MG CPDR Take 20 mg by mouth every day.       Family History  Family History   Problem Relation Age of Onset   • Hypertension Mother          Social History  Social History   Substance Use Topics   • Smoking status: Never Smoker   • Smokeless tobacco: Never Used   • Alcohol use 3.0 oz/week     6 Cans of beer per week       Allergies  Allergies   Allergen Reactions   • Crestor [Rosuvastatin Calcium] Myalgia   • Penicillins Hives, Itching and Vomiting        Physical Exam  Laboratory   Hemodynamics  Temp (24hrs), Av.8 °C (98.3 °F), Min:36.8 °C (98.3 °F), Max:36.8 °C (98.3 °F)   Temperature: 36.8 °C (98.3 °F)  Pulse  Av.6   Min: 83  Max: 87    Blood Pressure: 134/74, NIBP: 148/79      Respiratory      Respiration: 18, Pulse Oximetry: 97 %             Physical Exam   Constitutional: He is oriented to person, place, and time. No distress.   HENT:   Head: Normocephalic and atraumatic.   Mouth/Throat: Oropharynx is clear and moist.   Eyes: Conjunctivae and EOM are normal. Pupils are equal, round, and reactive to light.   Neck: Normal range of motion. Neck supple. No tracheal deviation present. No thyromegaly present.   Cardiovascular: Normal rate and regular rhythm.    No murmur heard.  Pulmonary/Chest: Effort normal and breath sounds normal. No respiratory distress. He has no wheezes.   Abdominal: Soft. Bowel sounds are normal. He exhibits no distension. There is tenderness.   Musculoskeletal: He exhibits no edema or tenderness.   Neurological: He is alert and oriented to person, place, and time. No cranial nerve deficit.   Skin: Skin is warm and dry. He is not diaphoretic. No erythema.   Psychiatric: He has a normal mood and affect. His behavior is normal. Thought content normal.       Recent Labs      04/30/18   1052   WBC  14.8*   RBC  5.09   HEMOGLOBIN  17.0   HEMATOCRIT  48.9   MCV  96.1   MCH  33.4*   MCHC  34.8   RDW  47.5   PLATELETCT  325   MPV  8.7*     Recent Labs      04/30/18   1052   SODIUM  134*   POTASSIUM  5.0   CHLORIDE  98   CO2  28   GLUCOSE  76   BUN  10   CREATININE  0.94   CALCIUM  10.0     Recent Labs      04/30/18   1052   ALTSGPT  32   ASTSGOT  34   ALKPHOSPHAT  96   TBILIRUBIN  0.8   LIPASE  78*   GLUCOSE  76         Recent Labs      04/30/18   1052   BNPBTYPENAT  61         Lab Results   Component Value Date    TROPONINI <0.02 04/30/2018       Imaging  CT-ABDOMEN-PELVIS WITH   Final Result      1.  Bladder distention and wall thickening with some trabeculation indicating likely chronic outlet obstruction. Superimposed cystitis is not excluded      2.  Prostate gland is obscured due to shadowing from bilateral  hip arthroplasties      3.  No acute bowel or other abnormality is detected      4.  Moderate to severe atherosclerosis             Assessment/Plan     I anticipate this patient will require at least two midnights for appropriate medical management, necessitating inpatient admission.    Acute pyelonephritis- (present on admission)   Assessment & Plan    On IV ceftriaxone  Follow culture  Pain control        Idiopathic acute pancreatitis   Assessment & Plan    Will get ultrasound abdomen  Clear liquid diet, advance slowly as tolerated  Pain control        Lactic acidosis- (present on admission)   Assessment & Plan    On IV fluids  Follow lactic        Leukocytosis- (present on admission)   Assessment & Plan    From above        Rheumatoid arthritis (HCC)- (present on admission)   Assessment & Plan    Currently only taking prednisone  Follow up with rheumatologist            Prophylaxis:  heparin

## 2018-05-01 ENCOUNTER — APPOINTMENT (OUTPATIENT)
Dept: RADIOLOGY | Facility: MEDICAL CENTER | Age: 63
DRG: 689 | End: 2018-05-01
Attending: INTERNAL MEDICINE
Payer: MEDICARE

## 2018-05-01 PROBLEM — N32.0 BLADDER OUTLET OBSTRUCTION: Status: ACTIVE | Noted: 2018-05-01

## 2018-05-01 LAB
ANION GAP SERPL CALC-SCNC: 5 MMOL/L (ref 0–11.9)
BUN SERPL-MCNC: 7 MG/DL (ref 8–22)
CALCIUM SERPL-MCNC: 8.6 MG/DL (ref 8.4–10.2)
CHLORIDE SERPL-SCNC: 104 MMOL/L (ref 96–112)
CO2 SERPL-SCNC: 27 MMOL/L (ref 20–33)
CREAT SERPL-MCNC: 0.72 MG/DL (ref 0.5–1.4)
ERYTHROCYTE [DISTWIDTH] IN BLOOD BY AUTOMATED COUNT: 48.8 FL (ref 35.9–50)
GLUCOSE SERPL-MCNC: 99 MG/DL (ref 65–99)
HCT VFR BLD AUTO: 42.5 % (ref 42–52)
HGB BLD-MCNC: 14.8 G/DL (ref 14–18)
MCH RBC QN AUTO: 33.6 PG (ref 27–33)
MCHC RBC AUTO-ENTMCNC: 34.8 G/DL (ref 33.7–35.3)
MCV RBC AUTO: 96.4 FL (ref 81.4–97.8)
PLATELET # BLD AUTO: 256 K/UL (ref 164–446)
PMV BLD AUTO: 9.4 FL (ref 9–12.9)
POTASSIUM SERPL-SCNC: 4.6 MMOL/L (ref 3.6–5.5)
RBC # BLD AUTO: 4.41 M/UL (ref 4.7–6.1)
SODIUM SERPL-SCNC: 136 MMOL/L (ref 135–145)
WBC # BLD AUTO: 10.1 K/UL (ref 4.8–10.8)

## 2018-05-01 PROCEDURE — 85027 COMPLETE CBC AUTOMATED: CPT

## 2018-05-01 PROCEDURE — 700101 HCHG RX REV CODE 250: Performed by: INTERNAL MEDICINE

## 2018-05-01 PROCEDURE — 76705 ECHO EXAM OF ABDOMEN: CPT

## 2018-05-01 PROCEDURE — 700111 HCHG RX REV CODE 636 W/ 250 OVERRIDE (IP): Performed by: INTERNAL MEDICINE

## 2018-05-01 PROCEDURE — 770006 HCHG ROOM/CARE - MED/SURG/GYN SEMI*

## 2018-05-01 PROCEDURE — 80048 BASIC METABOLIC PNL TOTAL CA: CPT

## 2018-05-01 PROCEDURE — 700105 HCHG RX REV CODE 258: Performed by: INTERNAL MEDICINE

## 2018-05-01 PROCEDURE — 99232 SBSQ HOSP IP/OBS MODERATE 35: CPT | Performed by: HOSPITALIST

## 2018-05-01 PROCEDURE — A9270 NON-COVERED ITEM OR SERVICE: HCPCS | Performed by: INTERNAL MEDICINE

## 2018-05-01 PROCEDURE — 700102 HCHG RX REV CODE 250 W/ 637 OVERRIDE(OP): Performed by: INTERNAL MEDICINE

## 2018-05-01 RX ADMIN — CYCLOBENZAPRINE HYDROCHLORIDE 5 MG: 10 TABLET, FILM COATED ORAL at 20:39

## 2018-05-01 RX ADMIN — CEFTRIAXONE SODIUM 2 G: 10 INJECTION, POWDER, FOR SOLUTION INTRAVENOUS at 09:28

## 2018-05-01 RX ADMIN — PAROXETINE HYDROCHLORIDE 20 MG: 20 TABLET, FILM COATED ORAL at 09:30

## 2018-05-01 RX ADMIN — MELOXICAM 15 MG: 7.5 TABLET ORAL at 14:44

## 2018-05-01 RX ADMIN — PRAVASTATIN SODIUM 40 MG: 20 TABLET ORAL at 20:38

## 2018-05-01 RX ADMIN — OXYCODONE HYDROCHLORIDE AND ACETAMINOPHEN 1 TABLET: 10; 325 TABLET ORAL at 11:49

## 2018-05-01 RX ADMIN — OXYCODONE HYDROCHLORIDE AND ACETAMINOPHEN 1 TABLET: 10; 325 TABLET ORAL at 05:11

## 2018-05-01 RX ADMIN — OXYCODONE HYDROCHLORIDE AND ACETAMINOPHEN 1 TABLET: 10; 325 TABLET ORAL at 18:03

## 2018-05-01 RX ADMIN — PREDNISONE 2 MG: 1 TABLET ORAL at 09:43

## 2018-05-01 RX ADMIN — OMEPRAZOLE 20 MG: 20 CAPSULE, DELAYED RELEASE ORAL at 09:28

## 2018-05-01 RX ADMIN — LISINOPRIL 10 MG: 5 TABLET ORAL at 09:28

## 2018-05-01 RX ADMIN — SODIUM CHLORIDE: 9 INJECTION, SOLUTION INTRAVENOUS at 07:42

## 2018-05-01 RX ADMIN — SODIUM CHLORIDE: 9 INJECTION, SOLUTION INTRAVENOUS at 16:16

## 2018-05-01 ASSESSMENT — ENCOUNTER SYMPTOMS
DIZZINESS: 0
WEIGHT LOSS: 0
NERVOUS/ANXIOUS: 0
BACK PAIN: 0
SHORTNESS OF BREATH: 0
VOMITING: 0
FEVER: 0
CARDIOVASCULAR NEGATIVE: 1
NAUSEA: 1
LOSS OF CONSCIOUSNESS: 0
NEUROLOGICAL NEGATIVE: 1
COUGH: 0
FLANK PAIN: 1
MYALGIAS: 0
CHILLS: 0
CONSTITUTIONAL NEGATIVE: 1
PSYCHIATRIC NEGATIVE: 1
WEAKNESS: 0
BRUISES/BLEEDS EASILY: 0
RESPIRATORY NEGATIVE: 1
DEPRESSION: 0
ABDOMINAL PAIN: 0

## 2018-05-01 ASSESSMENT — PAIN SCALES - GENERAL
PAINLEVEL_OUTOF10: 3
PAINLEVEL_OUTOF10: 2
PAINLEVEL_OUTOF10: 1
PAINLEVEL_OUTOF10: 1
PAINLEVEL_OUTOF10: 3

## 2018-05-01 ASSESSMENT — PATIENT HEALTH QUESTIONNAIRE - PHQ9
1. LITTLE INTEREST OR PLEASURE IN DOING THINGS: NOT AT ALL
2. FEELING DOWN, DEPRESSED, IRRITABLE, OR HOPELESS: NOT AT ALL
SUM OF ALL RESPONSES TO PHQ9 QUESTIONS 1 AND 2: 0

## 2018-05-01 NOTE — CARE PLAN
Problem: Infection  Goal: Will remain free from infection  Outcome: PROGRESSING AS EXPECTED  Discussed hand hygiene and others measures to prevent infection. Pt's wbc is within normal ranges and remains fever free.     Problem: Pain Management  Goal: Pain level will decrease to patient's comfort goal  Outcome: PROGRESSING AS EXPECTED  Pt explains pain is in lower back area. Pain control per MAR. Medications for pain have been given.

## 2018-05-01 NOTE — PROGRESS NOTES
Pt pain level 3/10. Medication per MAR given. Pt resting in bed with safety precautions in place.

## 2018-05-01 NOTE — PROGRESS NOTES
1500Report received, plan of care reviewed and discussed, assessment complete, oriented to room, bed alarm on, nonskid socks applied, advised to call for assistance.   1700 Discussed plan of care, encouraged and answered all questions, will continue to monitor.  1900 Report given to next shift.

## 2018-05-01 NOTE — CARE PLAN
Problem: Knowledge Deficit  Goal: Knowledge of the prescribed therapeutic regimen will improve  Outcome: PROGRESSING AS EXPECTED    Intervention: Discuss information regarding therpeutic regimen and document in education  Discuss information regarding therapeutic regimen and document in education.  Implement medication teaching.  Pt verbalize understanding.      Problem: Pain Management  Goal: Pain level will decrease to patient's comfort goal  Outcome: PROGRESSING AS EXPECTED    Intervention: Follow pain managment plan developed in collaboration with patient and Interdisciplinary Team  Pain assessment q4h or q2h after medication intervention.  Encourage patient to report pain, verbalize understanding. Medicate PRN

## 2018-05-01 NOTE — ASSESSMENT & PLAN NOTE
Most likely secondary to enlarged prostate.  Recommend that he see urology as an outpatient after completing treatment of the current pyelonephritis.

## 2018-05-01 NOTE — PROGRESS NOTES
[facility] Hospitalist Progress Note    Date of Service: 2018    Chief Complaint  62 y.o. male admitted 2018 with pyelonephritis and mild pancreatitis.    Interval Problem Update  Cultures remain pending. He feels queasy but does want to advance his diet, the abdominal pain is improved, persistent back pain. He feels generally weak.  Complains of dysuria as well.  He has no history of prostate problems and has never had to see a urologist.  Consultants/Specialty  none    Disposition  tbd        Review of Systems   Constitutional: Negative.  Negative for chills, fever, malaise/fatigue and weight loss.   HENT: Negative.    Respiratory: Negative.  Negative for cough and shortness of breath.    Cardiovascular: Negative.  Negative for chest pain and leg swelling.   Gastrointestinal: Positive for nausea. Negative for abdominal pain and vomiting.   Genitourinary: Positive for flank pain and urgency. Negative for dysuria, frequency and hematuria.   Musculoskeletal: Negative for back pain and myalgias.   Neurological: Negative.  Negative for dizziness, loss of consciousness and weakness.   Endo/Heme/Allergies: Negative.  Does not bruise/bleed easily.   Psychiatric/Behavioral: Negative.  Negative for depression. The patient is not nervous/anxious.    All other systems reviewed and are negative.     Physical Exam  Laboratory/Imaging   Hemodynamics  Temp (24hrs), Av °C (98.6 °F), Min:36.8 °C (98.3 °F), Max:37.3 °C (99.1 °F)   Temperature: 36.8 °C (98.3 °F)  Pulse  Av.8  Min: 79  Max: 88    Blood Pressure: 120/67      Respiratory      Respiration: 18, Pulse Oximetry: 97 %        RUL Breath Sounds: Clear, RML Breath Sounds: Clear, RLL Breath Sounds: Diminished, JEANNE Breath Sounds: Clear, LLL Breath Sounds: Diminished    Fluids    Intake/Output Summary (Last 24 hours) at 18 1444  Last data filed at 18 1200   Gross per 24 hour   Intake             1715 ml   Output                0 ml   Net             1715         Nutrition  Orders Placed This Encounter   Procedures   • Diet Order     Standing Status:   Standing     Number of Occurrences:   1     Order Specific Question:   Diet:     Answer:   Low Fiber(GI Soft) [2]     Physical Exam   Constitutional: He appears well-developed and well-nourished. No distress.   HENT:   Nose: Nose normal.   Mouth/Throat: Oropharynx is clear and moist. No oropharyngeal exudate.   Eyes: Conjunctivae are normal. Right eye exhibits no discharge. Left eye exhibits no discharge. No scleral icterus.   Neck: No JVD present. No tracheal deviation present.   Cardiovascular: Normal rate, regular rhythm and normal heart sounds.    Pulmonary/Chest: Effort normal and breath sounds normal. No stridor. No respiratory distress. He has no wheezes. He has no rales. He exhibits no tenderness.   Abdominal: Soft. Bowel sounds are normal. He exhibits no distension. There is no tenderness.   Musculoskeletal: He exhibits no edema or tenderness.   Neurological: He is alert. No cranial nerve deficit. He exhibits normal muscle tone.   Skin: Skin is warm and dry. He is not diaphoretic. There is pallor.   Psychiatric: He has a normal mood and affect. His behavior is normal.   Nursing note and vitals reviewed.      Recent Labs      04/30/18   1052  05/01/18   0502   WBC  14.8*  10.1   RBC  5.09  4.41*   HEMOGLOBIN  17.0  14.8   HEMATOCRIT  48.9  42.5   MCV  96.1  96.4   MCH  33.4*  33.6*   MCHC  34.8  34.8   RDW  47.5  48.8   PLATELETCT  325  256   MPV  8.7*  9.4     Recent Labs      04/30/18   1052  05/01/18   0503   SODIUM  134*  136   POTASSIUM  5.0  4.6   CHLORIDE  98  104   CO2  28  27   GLUCOSE  76  99   BUN  10  7*   CREATININE  0.94  0.72   CALCIUM  10.0  8.6         Recent Labs      04/30/18   1052   BNPBTYPENAT  61              Assessment/Plan     Acute pyelonephritis- (present on admission)   Assessment & Plan    Continue IV ceftriaxone  Follow cultures  Pain control  Continue IV fluids        Bladder  outlet obstruction- (present on admission)   Assessment & Plan    Most likely secondary to enlarged prostate.  Recommend that he see urology as an outpatient after completing treatment of the current pyelonephritis.        Idiopathic acute pancreatitis- (present on admission)   Assessment & Plan    ultrasound abdomen benign  Advance to GI soft diet  Pain control, pain improved        Lactic acidosis- (present on admission)   Assessment & Plan    On IV fluids  Follow lactic        Leukocytosis- (present on admission)   Assessment & Plan    From above        Rheumatoid arthritis (HCC)- (present on admission)   Assessment & Plan    Currently only taking prednisone  Follow up with rheumatologist          Quality-Core Measures

## 2018-05-01 NOTE — PROGRESS NOTES
Pt is A&Ox4, resting comfortably in bed. Assessment completed and pain level 1/10. Due medication have been given. Pt is up for meal, bed is in lowest postion, and side rails up x2, pt calls when needs assistance and call light within reach.

## 2018-05-01 NOTE — PROGRESS NOTES
Received report from Tenzin WEISS. Discussed plan of care and safety measures in place. No further needs at this time.

## 2018-05-02 LAB
BACTERIA UR CULT: NORMAL
SIGNIFICANT IND 70042: NORMAL
SITE SITE: NORMAL
SOURCE SOURCE: NORMAL

## 2018-05-02 PROCEDURE — 700105 HCHG RX REV CODE 258: Performed by: INTERNAL MEDICINE

## 2018-05-02 PROCEDURE — 99232 SBSQ HOSP IP/OBS MODERATE 35: CPT | Performed by: HOSPITALIST

## 2018-05-02 PROCEDURE — A9270 NON-COVERED ITEM OR SERVICE: HCPCS | Performed by: INTERNAL MEDICINE

## 2018-05-02 PROCEDURE — 700101 HCHG RX REV CODE 250: Performed by: INTERNAL MEDICINE

## 2018-05-02 PROCEDURE — 770006 HCHG ROOM/CARE - MED/SURG/GYN SEMI*

## 2018-05-02 PROCEDURE — 700111 HCHG RX REV CODE 636 W/ 250 OVERRIDE (IP): Performed by: INTERNAL MEDICINE

## 2018-05-02 PROCEDURE — 700102 HCHG RX REV CODE 250 W/ 637 OVERRIDE(OP): Performed by: INTERNAL MEDICINE

## 2018-05-02 RX ORDER — BISOPROLOL FUMARATE 10 MG/1
10 TABLET, FILM COATED ORAL DAILY
Status: DISCONTINUED | OUTPATIENT
Start: 2018-05-02 | End: 2018-05-03 | Stop reason: HOSPADM

## 2018-05-02 RX ORDER — BISOPROLOL FUMARATE 10 MG/1
10 TABLET, FILM COATED ORAL DAILY
COMMUNITY
End: 2019-03-27

## 2018-05-02 RX ADMIN — OXYCODONE HYDROCHLORIDE AND ACETAMINOPHEN 1 TABLET: 10; 325 TABLET ORAL at 00:12

## 2018-05-02 RX ADMIN — STANDARDIZED SENNA CONCENTRATE AND DOCUSATE SODIUM 1 TABLET: 8.6; 5 TABLET, FILM COATED ORAL at 08:20

## 2018-05-02 RX ADMIN — OMEPRAZOLE 20 MG: 20 CAPSULE, DELAYED RELEASE ORAL at 08:20

## 2018-05-02 RX ADMIN — OXYCODONE HYDROCHLORIDE AND ACETAMINOPHEN 1 TABLET: 10; 325 TABLET ORAL at 06:44

## 2018-05-02 RX ADMIN — MELOXICAM 15 MG: 7.5 TABLET ORAL at 08:21

## 2018-05-02 RX ADMIN — PRAVASTATIN SODIUM 40 MG: 20 TABLET ORAL at 20:31

## 2018-05-02 RX ADMIN — STANDARDIZED SENNA CONCENTRATE AND DOCUSATE SODIUM 2 TABLET: 8.6; 5 TABLET, FILM COATED ORAL at 20:31

## 2018-05-02 RX ADMIN — OXYCODONE HYDROCHLORIDE AND ACETAMINOPHEN 1 TABLET: 10; 325 TABLET ORAL at 12:47

## 2018-05-02 RX ADMIN — PAROXETINE HYDROCHLORIDE 20 MG: 20 TABLET, FILM COATED ORAL at 08:21

## 2018-05-02 RX ADMIN — CYCLOBENZAPRINE HYDROCHLORIDE 5 MG: 10 TABLET, FILM COATED ORAL at 20:30

## 2018-05-02 RX ADMIN — CEFTRIAXONE SODIUM 2 G: 10 INJECTION, POWDER, FOR SOLUTION INTRAVENOUS at 08:19

## 2018-05-02 RX ADMIN — SODIUM CHLORIDE: 9 INJECTION, SOLUTION INTRAVENOUS at 16:20

## 2018-05-02 RX ADMIN — PREDNISONE 2 MG: 1 TABLET ORAL at 08:20

## 2018-05-02 RX ADMIN — SODIUM CHLORIDE: 9 INJECTION, SOLUTION INTRAVENOUS at 00:13

## 2018-05-02 RX ADMIN — SODIUM CHLORIDE: 9 INJECTION, SOLUTION INTRAVENOUS at 06:45

## 2018-05-02 RX ADMIN — OXYCODONE HYDROCHLORIDE AND ACETAMINOPHEN 1 TABLET: 10; 325 TABLET ORAL at 19:14

## 2018-05-02 RX ADMIN — LISINOPRIL 10 MG: 5 TABLET ORAL at 08:19

## 2018-05-02 ASSESSMENT — ENCOUNTER SYMPTOMS
MYALGIAS: 0
LOSS OF CONSCIOUSNESS: 0
CARDIOVASCULAR NEGATIVE: 1
BACK PAIN: 0
NAUSEA: 0
WEIGHT LOSS: 0
WEAKNESS: 0
BRUISES/BLEEDS EASILY: 0
VOMITING: 0
SHORTNESS OF BREATH: 0
COUGH: 0
FLANK PAIN: 1
CHILLS: 0
CONSTITUTIONAL NEGATIVE: 1
RESPIRATORY NEGATIVE: 1
NEUROLOGICAL NEGATIVE: 1
FEVER: 0
ABDOMINAL PAIN: 0
NERVOUS/ANXIOUS: 0
PSYCHIATRIC NEGATIVE: 1
DIZZINESS: 0
DEPRESSION: 0

## 2018-05-02 ASSESSMENT — PAIN SCALES - GENERAL
PAINLEVEL_OUTOF10: 3
PAINLEVEL_OUTOF10: 1
PAINLEVEL_OUTOF10: 4
PAINLEVEL_OUTOF10: 3

## 2018-05-02 ASSESSMENT — PATIENT HEALTH QUESTIONNAIRE - PHQ9
2. FEELING DOWN, DEPRESSED, IRRITABLE, OR HOPELESS: NOT AT ALL
SUM OF ALL RESPONSES TO PHQ9 QUESTIONS 1 AND 2: 0
1. LITTLE INTEREST OR PLEASURE IN DOING THINGS: NOT AT ALL

## 2018-05-02 NOTE — CARE PLAN
Problem: Knowledge Deficit  Goal: Knowledge of disease process/condition, treatment plan, diagnostic tests, and medications will improve  Outcome: PROGRESSING AS EXPECTED  Pt updated on POC. IV fluids/abx and pain management. All questions answered. Pt verbalized understanding.    Problem: Pain Management  Goal: Pain level will decrease to patient's comfort goal  Outcome: PROGRESSING AS EXPECTED  Pain assessed throughout shift. Pt utilizing 0-10 pain scale. PRN pain medication and nonpharmacologic pain interventions in use.

## 2018-05-02 NOTE — PROGRESS NOTES
[facility] Hospitalist Progress Note    Date of Service: 2018    Chief Complaint  62 y.o. male admitted 2018 with pyelonephritis and mild pancreatitis.    Interval Problem Update  Back pain only on left side now, feels better and reports urine is clearer, no fevers or chills.  Tolerating food no vomiting  Consultants/Specialty  none    Disposition  tbd        Review of Systems   Constitutional: Negative.  Negative for chills, fever, malaise/fatigue and weight loss.   HENT: Negative.    Respiratory: Negative.  Negative for cough and shortness of breath.    Cardiovascular: Negative.  Negative for chest pain and leg swelling.   Gastrointestinal: Negative for abdominal pain, nausea and vomiting.   Genitourinary: Positive for flank pain. Negative for dysuria, frequency, hematuria and urgency.   Musculoskeletal: Negative for back pain and myalgias.   Neurological: Negative.  Negative for dizziness, loss of consciousness and weakness.   Endo/Heme/Allergies: Negative.  Does not bruise/bleed easily.   Psychiatric/Behavioral: Negative.  Negative for depression. The patient is not nervous/anxious.    All other systems reviewed and are negative.     Physical Exam  Laboratory/Imaging   Hemodynamics  Temp (24hrs), Av.6 °C (97.8 °F), Min:36.3 °C (97.3 °F), Max:36.9 °C (98.4 °F)   Temperature: 36.9 °C (98.4 °F)  Pulse  Av.3  Min: 79  Max: 101    Blood Pressure: 152/88      Respiratory      Respiration: 18, Pulse Oximetry: 97 %        RUL Breath Sounds: Clear, RML Breath Sounds: Clear, RLL Breath Sounds: Diminished, JEANNE Breath Sounds: Clear, LLL Breath Sounds: Diminished    Fluids    Intake/Output Summary (Last 24 hours) at 18 1547  Last data filed at 18 0845   Gross per 24 hour   Intake             3480 ml   Output                0 ml   Net             3480 ml       Nutrition  Orders Placed This Encounter   Procedures   • Diet Order     Standing Status:   Standing     Number of Occurrences:   1     Order  Specific Question:   Diet:     Answer:   Low Fiber(GI Soft) [2]     Physical Exam   Constitutional: He appears well-developed and well-nourished. No distress.   HENT:   Nose: Nose normal.   Mouth/Throat: Oropharynx is clear and moist. No oropharyngeal exudate.   Eyes: Conjunctivae are normal. Right eye exhibits no discharge. Left eye exhibits no discharge. No scleral icterus.   Neck: No JVD present. No tracheal deviation present.   Cardiovascular: Normal rate, regular rhythm and normal heart sounds.    Pulmonary/Chest: Effort normal and breath sounds normal. No stridor. No respiratory distress. He has no wheezes. He has no rales. He exhibits no tenderness.   Abdominal: Soft. Bowel sounds are normal. He exhibits no distension. There is no tenderness.   Musculoskeletal: He exhibits no edema or tenderness.   Neurological: He is alert. No cranial nerve deficit. He exhibits normal muscle tone.   Skin: Skin is warm and dry. He is not diaphoretic. There is pallor.   Psychiatric: He has a normal mood and affect. His behavior is normal.   Nursing note and vitals reviewed.      Recent Labs      04/30/18   1052  05/01/18   0502   WBC  14.8*  10.1   RBC  5.09  4.41*   HEMOGLOBIN  17.0  14.8   HEMATOCRIT  48.9  42.5   MCV  96.1  96.4   MCH  33.4*  33.6*   MCHC  34.8  34.8   RDW  47.5  48.8   PLATELETCT  325  256   MPV  8.7*  9.4     Recent Labs      04/30/18   1052  05/01/18   0503   SODIUM  134*  136   POTASSIUM  5.0  4.6   CHLORIDE  98  104   CO2  28  27   GLUCOSE  76  99   BUN  10  7*   CREATININE  0.94  0.72   CALCIUM  10.0  8.6         Recent Labs      04/30/18   1052   BNPBTYPENAT  61              Assessment/Plan     Acute pyelonephritis- (present on admission)   Assessment & Plan    Continue IV ceftriaxone  Follow cultures negative still to date  Pain control, pain is improving  Continue IV fluids        Bladder outlet obstruction- (present on admission)   Assessment & Plan    Most likely secondary to enlarged  prostate.  Recommend that he see urology as an outpatient after completing treatment of the current pyelonephritis.        Idiopathic acute pancreatitis- (present on admission)   Assessment & Plan    ultrasound abdomen benign  Tolerating GI soft diet  Pain control, pain improved        Lactic acidosis- (present on admission)   Assessment & Plan    On IV fluids  Follow lactic        Leukocytosis- (present on admission)   Assessment & Plan    From above        Rheumatoid arthritis (HCC)- (present on admission)   Assessment & Plan    Currently only taking prednisone  Follow up with rheumatologist          Quality-Core Measures

## 2018-05-02 NOTE — PROGRESS NOTES
Received report from Sarah/Jakob WEISS. Discussed plan of care and safety measures in place. No further needs at this time.

## 2018-05-02 NOTE — PROGRESS NOTES
Bedside report given to Jadyn WEISS/ Rhonda WEISS. POC discussed. Pt sleeping, unlabored breathing.

## 2018-05-02 NOTE — PROGRESS NOTES
Pain med given per MAR. Resting in bed watching Tv.Pt concerned about Bp and heart meds. MD updated. No other needs at this time.

## 2018-05-02 NOTE — PROGRESS NOTES
Pt is A&Ox4, resting comfortably in bed. Assessment completed and pain level 3/10. Due medication have been given. Pt is up for meal, bed is in lowest postion, and side rails up x2, pt calls when needs assistance and call light within reach.

## 2018-05-02 NOTE — PROGRESS NOTES
Bedside report received from day shift RNRhonda/Jadyn. POC discussed. Safety precautions in place.

## 2018-05-03 ENCOUNTER — TELEPHONE (OUTPATIENT)
Dept: MEDICAL GROUP | Facility: CLINIC | Age: 63
End: 2018-05-03

## 2018-05-03 VITALS
SYSTOLIC BLOOD PRESSURE: 160 MMHG | RESPIRATION RATE: 18 BRPM | TEMPERATURE: 97.7 F | OXYGEN SATURATION: 98 % | HEIGHT: 70 IN | WEIGHT: 197.09 LBS | DIASTOLIC BLOOD PRESSURE: 82 MMHG | HEART RATE: 84 BPM | BODY MASS INDEX: 28.22 KG/M2

## 2018-05-03 DIAGNOSIS — N40.1 BENIGN PROSTATIC HYPERPLASIA WITH INCOMPLETE BLADDER EMPTYING: ICD-10-CM

## 2018-05-03 DIAGNOSIS — R39.14 BENIGN PROSTATIC HYPERPLASIA WITH INCOMPLETE BLADDER EMPTYING: ICD-10-CM

## 2018-05-03 PROCEDURE — 700111 HCHG RX REV CODE 636 W/ 250 OVERRIDE (IP): Performed by: INTERNAL MEDICINE

## 2018-05-03 PROCEDURE — 700102 HCHG RX REV CODE 250 W/ 637 OVERRIDE(OP): Performed by: INTERNAL MEDICINE

## 2018-05-03 PROCEDURE — A9270 NON-COVERED ITEM OR SERVICE: HCPCS | Performed by: INTERNAL MEDICINE

## 2018-05-03 PROCEDURE — 99239 HOSP IP/OBS DSCHRG MGMT >30: CPT | Performed by: HOSPITALIST

## 2018-05-03 PROCEDURE — 700105 HCHG RX REV CODE 258: Performed by: INTERNAL MEDICINE

## 2018-05-03 PROCEDURE — A9270 NON-COVERED ITEM OR SERVICE: HCPCS | Performed by: HOSPITALIST

## 2018-05-03 PROCEDURE — 700102 HCHG RX REV CODE 250 W/ 637 OVERRIDE(OP): Performed by: HOSPITALIST

## 2018-05-03 PROCEDURE — 700101 HCHG RX REV CODE 250: Performed by: INTERNAL MEDICINE

## 2018-05-03 RX ORDER — CIPROFLOXACIN 500 MG/1
500 TABLET, FILM COATED ORAL 2 TIMES DAILY
Qty: 14 TAB | Refills: 0 | Status: SHIPPED | OUTPATIENT
Start: 2018-05-03 | End: 2018-05-10

## 2018-05-03 RX ADMIN — SODIUM CHLORIDE: 9 INJECTION, SOLUTION INTRAVENOUS at 09:04

## 2018-05-03 RX ADMIN — SODIUM CHLORIDE: 9 INJECTION, SOLUTION INTRAVENOUS at 00:30

## 2018-05-03 RX ADMIN — PREDNISONE 2 MG: 1 TABLET ORAL at 09:04

## 2018-05-03 RX ADMIN — MELOXICAM 15 MG: 7.5 TABLET ORAL at 09:05

## 2018-05-03 RX ADMIN — LISINOPRIL 10 MG: 5 TABLET ORAL at 09:04

## 2018-05-03 RX ADMIN — CEFTRIAXONE SODIUM 2 G: 10 INJECTION, POWDER, FOR SOLUTION INTRAVENOUS at 09:04

## 2018-05-03 RX ADMIN — PAROXETINE HYDROCHLORIDE 20 MG: 20 TABLET, FILM COATED ORAL at 09:05

## 2018-05-03 RX ADMIN — OXYCODONE HYDROCHLORIDE AND ACETAMINOPHEN 1 TABLET: 10; 325 TABLET ORAL at 03:42

## 2018-05-03 RX ADMIN — BISOPROLOL FUMARATE 10 MG: 10 TABLET, COATED ORAL at 09:05

## 2018-05-03 RX ADMIN — OMEPRAZOLE 20 MG: 20 CAPSULE, DELAYED RELEASE ORAL at 09:05

## 2018-05-03 ASSESSMENT — PATIENT HEALTH QUESTIONNAIRE - PHQ9
1. LITTLE INTEREST OR PLEASURE IN DOING THINGS: NOT AT ALL
SUM OF ALL RESPONSES TO PHQ9 QUESTIONS 1 AND 2: 0
2. FEELING DOWN, DEPRESSED, IRRITABLE, OR HOPELESS: NOT AT ALL

## 2018-05-03 ASSESSMENT — PAIN SCALES - GENERAL
PAINLEVEL_OUTOF10: 0
PAINLEVEL_OUTOF10: 4

## 2018-05-03 NOTE — PROGRESS NOTES
Discharged paperwork and education completed. Pt has all personal belongings. Pt is being taken down with Mariela WYATT.

## 2018-05-03 NOTE — CARE PLAN
Problem: Safety  Goal: Will remain free from injury  Outcome: PROGRESSING AS EXPECTED  Remind patient to use call light and provide assistance. Bed in low position, bed locked, and appropriate alarms set. Patient wearing non-slip socks. Call light and personal belongings are within reach.     Problem: Infection  Goal: Will remain free from infection  Outcome: PROGRESSING AS EXPECTED  Collaborate with patient and interdisciplinary team to manage pain and keep near or at comfortable level goal. Educate on and incorporate non-pharmacologic interventions to reduce pain.

## 2018-05-03 NOTE — PROGRESS NOTES
Gave bedside report to ABHISHEK Ellis. Plan of care discussed. Safety precautions in place. Personal belongings and call light are with in reach. Patient has no additional needs at this time.

## 2018-05-03 NOTE — DISCHARGE SUMMARY
CHIEF COMPLAINT ON ADMISSION  Chief Complaint   Patient presents with   • Abdominal Cramps     pt c/o abd cramps x several days. pt states 1 day of diarrhea 2 days ago. pt c/o painful urination.    • Painful Urination       CODE STATUS  Full Code    HPI & HOSPITAL COURSE  This is a 62 y.o. male here with pyelonephritis. He presented with low back pain and painful urination, the urine was packed with white blood cells. CT scan of the kidneys showed evidence of bladder outlet obstruction probably by the prostate gland. The patient has never been seen by a urologist in the past. He has no known history of prostate issues. I treated him with ceftriaxone 2 g IV daily with resolution of his symptoms. At the time of this dictation the urine cultures remain negative. It is possible that his pyuria and discomfort may have be secondary to a bladder tumor or abscess therefore he is going to follow-up with urology. He is very spoken with his primary care who is arranging for a rapid referral to urology.    The patient met 2-midnight criteria for an inpatient stay at the time of discharge.    Therefore, he is discharged in guarded and stable condition with close outpatient follow-up.    SPECIFIC OUTPATIENT FOLLOW-UP  Primary care.  Urology.    DISCHARGE PROBLEM LIST  Active Problems:    Acute pyelonephritis POA: Yes    Rheumatoid arthritis (HCC) POA: Yes      Overview: ICD-10 transition    Leukocytosis POA: Yes    Lactic acidosis POA: Yes    Idiopathic acute pancreatitis POA: Yes    Bladder outlet obstruction POA: Yes  Resolved Problems:    * No resolved hospital problems. *      FOLLOW UP  Future Appointments  Date Time Provider Department Center   5/7/2018 8:20 AM CARE MANAGER [facility]   5/8/2018 1:00 PM MELI Jarvis [facility]   5/9/2018 1:30 PM VIRAL [place] None   6/13/2018 9:20 AM John Lao D.O. [facility]   9/19/2018 11:15 AM VIRAL [place] None     No follow-up provider  specified.    MEDICATIONS ON DISCHARGE   Edgardo Sims   Home Medication Instructions [id number]    Printed on:05/03/18 6447   Medication Information                      ascorbic acid (ASCORBIC ACID) 500 MG Tab  Take 500 mg by mouth every day.             bisoprolol (ZEBETA) 10 MG tablet  Take 10 mg by mouth every day.             CALCIUM-VITAMIN D PO  Take 1 Tab by mouth 2 Times a Day.             ciprofloxacin (CIPRO) 500 MG Tab  Take 1 Tab by mouth 2 times a day for 7 days.             Cyanocobalamin (VITAMIN B-12) 5000 MCG TABLET DISPERSIBLE  Take 1 Tab by mouth every day.             cyclobenzaprine (FLEXERIL) 10 MG Tab  TAKE ONE TABLET BY MOUTH THREE TIMES DAILY AS NEEDED FOR MILD PAIN             diazePAM (VALIUM) 5 MG Tab  Take 5 mg by mouth every 6 hours as needed for Anxiety or Sleep.             fluticasone (FLONASE) 50 MCG/ACT nasal spray  Spray 1 Spray in nose as needed.             Garlic 1000 MG Cap  Take 1 Cap by mouth every day.             lisinopril (PRINIVIL) 10 MG Tab  TAKE ONE TABLET BY MOUTH ONCE DAILY             meloxicam (MOBIC) 15 MG tablet  TAKE 1 TABLET BY MOUTH EVERY DAY             Multiple Vitamins-Minerals (MULTI COMPLETE PO)  Take 1 Tab by mouth every day.             niacin 500 MG Tab  Take 500 mg by mouth every day.             omeprazole (PRILOSEC) 20 MG CPDR  Take 20 mg by mouth every day.             oxyCODONE-acetaminophen (PERCOCET-10)  MG Tab  Take 1 Tab by mouth 5 Times a Day for 30 days.             paroxetine (PAXIL) 20 MG Tab  TAKE ONE TABLET BY MOUTH ONCE DAILY             pravastatin (PRAVACHOL) 40 MG tablet  Take 1 Tab by mouth every day.             predniSONE (DELTASONE) 1 MG Tab  TAKE 2 TABLETS BY MOUTH EVERY DAY             testosterone cypionate (DEPO-TESTOSTERONE) 200 MG/ML Solution injection  1 mL by Intramuscular route every 14 days for 130 days.             Tofacitinib Citrate (XELJANZ) 5 MG Tab  Take 5 mg by mouth 2 Times a Day. SAMPLE              vardenafil (LEVITRA) 20 MG tablet  Take 20 mg by mouth as needed.             vitamin e (VITAMIN E) 400 UNIT Cap  Take 400 Units by mouth every day.             Zinc 50 MG Cap  Take 50 mg by mouth every day.                 DIET  Orders Placed This Encounter   Procedures   • Diet Order     Standing Status:   Standing     Number of Occurrences:   1     Order Specific Question:   Diet:     Answer:   Low Fiber(GI Soft) [2]       ACTIVITY  As tolerated.  Weight bearing as tolerated      CONSULTATIONS  None.    PROCEDURES  None.    LABORATORY  Lab Results   Component Value Date/Time    SODIUM 136 05/01/2018 05:03 AM    POTASSIUM 4.6 05/01/2018 05:03 AM    CHLORIDE 104 05/01/2018 05:03 AM    CO2 27 05/01/2018 05:03 AM    GLUCOSE 99 05/01/2018 05:03 AM    BUN 7 (L) 05/01/2018 05:03 AM    CREATININE 0.72 05/01/2018 05:03 AM    CREATININE 1.1 04/21/2009 03:50 PM        Lab Results   Component Value Date/Time    WBC 10.1 05/01/2018 05:02 AM    WBC 7.5 07/01/2011 10:30 AM    HEMOGLOBIN 14.8 05/01/2018 05:02 AM    HEMATOCRIT 42.5 05/01/2018 05:02 AM    PLATELETCT 256 05/01/2018 05:02 AM        Total time of the discharge process exceeds 32 minutes

## 2018-05-03 NOTE — PROGRESS NOTES
Received report from [facility]. Discussed plan of care and safety measures in place. No further needs at this time.

## 2018-05-03 NOTE — PROGRESS NOTES
Completed assessment and administered PRN medication - see MAR. Bed in low position, locked, and appropriate alarms set. Personal belongings and call light are within reach. Patient has no additional needs at this time.

## 2018-05-03 NOTE — PROGRESS NOTES
Pt is A&Ox4, resting comfortably in bed. Assessment completed and denies any pain at this time. Due medication have been given. Pt is up for meal, bed is in lowest postion, and side rails up x2, pt calls when needs assistance and call light within reach.

## 2018-05-03 NOTE — PROGRESS NOTES
Received bedside report from ABHISHEK Ellis. Plan of care discussed. Safety precautions in place. Call light and personal belongings within reach. Patient has no needs at this time.

## 2018-05-03 NOTE — TELEPHONE ENCOUNTER
1. Caller Name: Kwasi                                         Call Back Number: [phone]      Patient approves a detailed voicemail message: yes    Kwasi is still in the hospital for a severe UTI.  The hospital ist is wanting him to see a urologist very soon because of an enlarged prostate.  Can you place an urgent referral?  He is due to be released from the hospital today.

## 2018-05-03 NOTE — DISCHARGE INSTRUCTIONS
Discharge Instructions    Discharged to home by car with self. Discharged via walking, hospital escort: Yes.  Special equipment needed: Not Applicable    Be sure to schedule a follow-up appointment with your primary care doctor or any specialists as instructed.     Discharge Plan:   Diet Plan: Discussed  Activity Level: Discussed  Confirmed Follow up Appointment: Appointment Scheduled  Confirmed Symptoms Management: Discussed  Medication Reconciliation Updated: Yes  Influenza Vaccine Indication: Not indicated: Previously immunized this influenza season and > 8 years of age    I understand that a diet low in cholesterol, fat, and sodium is recommended for good health. Unless I have been given specific instructions below for another diet, I accept this instruction as my diet prescription.   Other diet: Regular    Special Instructions: None    · Is patient discharged on Warfarin / Coumadin?   No       Pyelonephritis, Adult  Introduction  Pyelonephritis is a kidney infection. The kidneys are organs that help clean your blood by moving waste out of your blood and into your pee (urine). This infection can happen quickly, or it can last for a long time. In most cases, it clears up with treatment and does not cause other problems.  Follow these instructions at home:  Medicines  · Take over-the-counter and prescription medicines only as told by your doctor.  · Take your antibiotic medicine as told by your doctor. Do not stop taking the medicine even if you start to feel better.  General instructions  · Drink enough fluid to keep your pee clear or pale yellow.  · Avoid caffeine, tea, and carbonated drinks.  · Pee (urinate) often. Avoid holding in pee for long periods of time.  · Pee before and after sex.  · After pooping (having a bowel movement), women should wipe from front to back. Use each tissue only once.  · Keep all follow-up visits as told by your doctor. This is important.  Contact a doctor if:  · You do not feel  better after 2 days.  · Your symptoms get worse.  · You have a fever.  Get help right away if:  · You cannot take your medicine or drink fluids as told.  · You have chills and shaking.  · You throw up (vomit).  · You have very bad pain in your side (flank) or back.  · You feel very weak or you pass out (faint).  This information is not intended to replace advice given to you by your health care provider. Make sure you discuss any questions you have with your health care provider.  Document Released: 01/25/2006 Document Revised: 05/25/2017 Document Reviewed: 04/11/2016 © 2017 Elsevier    Urinary Tract Infection, Adult  Introduction  A urinary tract infection (UTI) is an infection of any part of the urinary tract. The urinary tract includes the:  · Kidneys.  · Ureters.  · Bladder.  · Urethra.  These organs make, store, and get rid of pee (urine) in the body.  Follow these instructions at home:  · Take over-the-counter and prescription medicines only as told by your doctor.  · If you were prescribed an antibiotic medicine, take it as told by your doctor. Do not stop taking the antibiotic even if you start to feel better.  · Avoid the following drinks:  ¨ Alcohol.  ¨ Caffeine.  ¨ Tea.  ¨ Carbonated drinks.  · Drink enough fluid to keep your pee clear or pale yellow.  · Keep all follow-up visits as told by your doctor. This is important.  · Make sure to:  ¨ Empty your bladder often and completely. Do not to hold pee for long periods of time.  ¨ Empty your bladder before and after sex.  ¨ Wipe from front to back after a bowel movement if you are female. Use each tissue one time when you wipe.  Contact a doctor if:  · You have back pain.  · You have a fever.  · You feel sick to your stomach (nauseous).  · You throw up (vomit).  · Your symptoms do not get better after 3 days.  · Your symptoms go away and then come back.  Get help right away if:  · You have very bad back pain.  · You have very bad lower belly (abdominal)  pain.  · You are throwing up and cannot keep down any medicines or water.  This information is not intended to replace advice given to you by your health care provider. Make sure you discuss any questions you have with your health care provider.  Document Released: 06/05/2009 Document Revised: 05/25/2017 Document Reviewed: 11/07/2016  © 2017 Elsevier    Ciprofloxacin tablets  What is this medicine?  CIPROFLOXACIN (sip lillian FLOX a sin) is a quinolone antibiotic. It is used to treat certain kinds of bacterial infections. It will not work for colds, flu, or other viral infections.  This medicine may be used for other purposes; ask your health care provider or pharmacist if you have questions.  COMMON BRAND NAME(S): Cipro  What should I tell my health care provider before I take this medicine?  They need to know if you have any of these conditions:  -bone problems  -history of low levels of potassium in the blood  -joint problems  -irregular heartbeat  -kidney disease  -myasthenia gravis  -seizures  -tendon problems  -tingling of the fingers or toes, or other nerve disorder  -an unusual or allergic reaction to ciprofloxacin, other antibiotics or medicines, foods, dyes, or preservatives  -pregnant or trying to get pregnant  -breast-feeding  How should I use this medicine?  Take this medicine by mouth with a glass of water. Follow the directions on the prescription label. Take your medicine at regular intervals. Do not take your medicine more often than directed. Take all of your medicine as directed even if you think your are better. Do not skip doses or stop your medicine early.  You can take this medicine with food or on an empty stomach. It can be taken with a meal that contains dairy or calcium, but do not take it alone with a dairy product, like milk or yogurt or calcium-fortified juice.  A special MedGuide will be given to you by the pharmacist with each prescription and refill. Be sure to read this information  carefully each time.  Talk to your pediatrician regarding the use of this medicine in children. Special care may be needed.  Overdosage: If you think you have taken too much of this medicine contact a poison control center or emergency room at once.  NOTE: This medicine is only for you. Do not share this medicine with others.  What if I miss a dose?  If you miss a dose, take it as soon as you can. If it is almost time for your next dose, take only that dose. Do not take double or extra doses.  What may interact with this medicine?  Do not take this medicine with any of the following medications:  -cisapride  -dofetilide  -dronedarone  -flibanserin  -lomitapide  -pimozide  -thioridazine  -tizanidine  -ziprasidone  This medicine may also interact with the following medications:  -antacids  -birth control pills  -caffeine  -certain medicines for diabetes, like glipizide or glyburide  -certain medicines that treat or prevent blood clots like warfarin  -clozapine  -cyclosporine  -didanosine (ddI) buffered tablets or powder  -duloxetine  -lanthanum carbonate  -lidocaine  -methotrexate  -multivitamins  -NSAIDS, medicines for pain and inflammation, like ibuprofen or naproxen  -olanzapine  -omeprazole  -other medicines that prolong the QT interval (cause an abnormal heart rhythm)  -phenytoin  -probenecid  -ropinirole  -sevelamer  -sildenafil  -sucralfate  -theophylline  -zolpidem  This list may not describe all possible interactions. Give your health care provider a list of all the medicines, herbs, non-prescription drugs, or dietary supplements you use. Also tell them if you smoke, drink alcohol, or use illegal drugs. Some items may interact with your medicine.  What should I watch for while using this medicine?  Tell your doctor or health care professional if your symptoms do not improve.  Do not treat diarrhea with over the counter products. Contact your doctor if you have diarrhea that lasts more than 2 days or if it is  severe and watery.  You may get drowsy or dizzy. Do not drive, use machinery, or do anything that needs mental alertness until you know how this medicine affects you. Do not stand or sit up quickly, especially if you are an older patient. This reduces the risk of dizzy or fainting spells.  This medicine can make you more sensitive to the sun. Keep out of the sun. If you cannot avoid being in the sun, wear protective clothing and use sunscreen. Do not use sun lamps or tanning beds/booths.  Avoid antacids, aluminum, calcium, iron, magnesium, and zinc products for 6 hours before and 2 hours after taking a dose of this medicine.  What side effects may I notice from receiving this medicine?  Side effects that you should report to your doctor or health care professional as soon as possible:  -allergic reactions like skin rash or hives, swelling of the face, lips, or tongue  -anxious  -confusion  -depressed mood  -diarrhea  -fast, irregular heartbeat  -hallucination, loss of contact with reality  -joint, muscle, or tendon pain or swelling  -pain, tingling, numbness in the hands or feet  -suicidal thoughts or other mood changes  -sunburn  -unusually weak or tired  Side effects that usually do not require medical attention (report to your doctor or health care professional if they continue or are bothersome):  -dry mouth  -headache  -nausea  -trouble sleeping  This list may not describe all possible side effects. Call your doctor for medical advice about side effects. You may report side effects to FDA at 3-818-FDA-1604.  Where should I keep my medicine?  Keep out of the reach of children.  Store at room temperature below 30 degrees C (86 degrees F). Keep container tightly closed. Throw away any unused medicine after the expiration date.  NOTE: This sheet is a summary. It may not cover all possible information. If you have questions about this medicine, talk to your doctor, pharmacist, or health care provider.  © 2018  [employer] (2017-07-28 14:42:02)      Depression / Suicide Risk    As you are discharged from this [facility] facility, it is important to learn how to keep safe from harming yourself.    Recognize the warning signs:  · Abrupt changes in personality, positive or negative- including increase in energy   · Giving away possessions  · Change in eating patterns- significant weight changes-  positive or negative  · Change in sleeping patterns- unable to sleep or sleeping all the time   · Unwillingness or inability to communicate  · Depression  · Unusual sadness, discouragement and loneliness  · Talk of wanting to die  · Neglect of personal appearance   · Rebelliousness- reckless behavior  · Withdrawal from people/activities they love  · Confusion- inability to concentrate     If you or a loved one observes any of these behaviors or has concerns about self-harm, here's what you can do:  · Talk about it- your feelings and reasons for harming yourself  · Remove any means that you might use to hurt yourself (examples: pills, rope, extension cords, firearm)  · Get professional help from the community (Mental Health, Substance Abuse, psychological counseling)  · Do not be alone:Call your Safe Contact- someone whom you trust who will be there for you.  · Call your local CRISIS HOTLINE 400-7071 or 686-185-1911  · Call your local Children's Mobile Crisis Response Team [place] (459) 171-9308 or www.[employer]  · Call the toll free National Suicide Prevention Hotlines   · National Suicide Prevention Lifeline 933-235-MVCE (6643)  · National Hope Line Network 800-SUICIDE (429-1808)

## 2018-05-04 ENCOUNTER — TELEPHONE (OUTPATIENT)
Dept: RHEUMATOLOGY | Facility: PHYSICIAN GROUP | Age: 63
End: 2018-05-04

## 2018-05-04 ENCOUNTER — TELEPHONE (OUTPATIENT)
Dept: MEDICAL GROUP | Facility: CLINIC | Age: 63
End: 2018-05-04

## 2018-05-04 ENCOUNTER — PATIENT OUTREACH (OUTPATIENT)
Dept: HEALTH INFORMATION MANAGEMENT | Facility: OTHER | Age: 63
End: 2018-05-04

## 2018-05-04 NOTE — TELEPHONE ENCOUNTER
I would recommend the patient delay any testosterone injections, fighting an infection is on a disease modifying drug in the past. As a class of drugs steroids decreased the ability to fight off infection.

## 2018-05-04 NOTE — TELEPHONE ENCOUNTER
Pt. Notified.  He is scheduled for Thursday.  He is asking if he should take his testosterone inj, will any of his meds interact with the cipro he's on?

## 2018-05-04 NOTE — TELEPHONE ENCOUNTER
Pt was in hospital for a week with a severe Urinary Tract Infection. Pt was sent home with antibiotics and referral to Urology.     Spoke with Pt per MD advised to stop the Xeljanz until done with antibiotics and infection and symptoms have resolved.     Any questions advised to call us

## 2018-05-04 NOTE — TELEPHONE ENCOUNTER
1. Caller Name: Kwasi                                         Call Back Number: [phone]      Patient approves a detailed voicemail message: yes

## 2018-05-07 ENCOUNTER — PATIENT OUTREACH (OUTPATIENT)
Dept: HEALTH INFORMATION MANAGEMENT | Facility: OTHER | Age: 63
End: 2018-05-07

## 2018-05-07 NOTE — TELEPHONE ENCOUNTER
Spoke with the patient and he is holding xeljanz.  He has an appointment with me next week as well as Urology the day after.  We will discuss our plan at the next office visit.

## 2018-05-08 ENCOUNTER — PATIENT OUTREACH (OUTPATIENT)
Dept: HEALTH INFORMATION MANAGEMENT | Facility: OTHER | Age: 63
End: 2018-05-08

## 2018-05-09 ENCOUNTER — OFFICE VISIT (OUTPATIENT)
Dept: RHEUMATOLOGY | Facility: PHYSICIAN GROUP | Age: 63
End: 2018-05-09
Payer: MEDICARE

## 2018-05-09 VITALS
TEMPERATURE: 98 F | OXYGEN SATURATION: 98 % | HEART RATE: 68 BPM | BODY MASS INDEX: 28.55 KG/M2 | RESPIRATION RATE: 14 BRPM | WEIGHT: 199 LBS | SYSTOLIC BLOOD PRESSURE: 140 MMHG | DIASTOLIC BLOOD PRESSURE: 90 MMHG

## 2018-05-09 DIAGNOSIS — N39.0 URINARY TRACT INFECTION WITHOUT HEMATURIA, SITE UNSPECIFIED: ICD-10-CM

## 2018-05-09 DIAGNOSIS — Z79.899 ENCOUNTER FOR LONG-TERM (CURRENT) USE OF HIGH-RISK MEDICATION: ICD-10-CM

## 2018-05-09 DIAGNOSIS — M05.9 RHEUMATOID ARTHRITIS WITH POSITIVE RHEUMATOID FACTOR, INVOLVING UNSPECIFIED SITE (HCC): ICD-10-CM

## 2018-05-09 DIAGNOSIS — N40.0 ENLARGED PROSTATE: ICD-10-CM

## 2018-05-09 PROCEDURE — 99214 OFFICE O/P EST MOD 30 MIN: CPT | Performed by: INTERNAL MEDICINE

## 2018-05-09 ASSESSMENT — ENCOUNTER SYMPTOMS
DEPRESSION: 0
PHOTOPHOBIA: 0
EYE REDNESS: 0
SHORTNESS OF BREATH: 0
VOMITING: 0
PALPITATIONS: 0
INSOMNIA: 0
ABDOMINAL PAIN: 0
NAUSEA: 0
SEIZURES: 0
BACK PAIN: 1
CHILLS: 0
LOSS OF CONSCIOUSNESS: 0
FEVER: 0
SORE THROAT: 0
STRIDOR: 0
EYE PAIN: 0
WHEEZING: 0

## 2018-05-09 ASSESSMENT — PAIN SCALES - GENERAL: PAINLEVEL: NO PAIN

## 2018-05-09 NOTE — LETTER
[facility]   [address], [address]  [place] [address]  Phone: [phone]  Fax: [phone]              Encounter Date: 5/9/2018    Dear Dr. Matta ref. provider found,    It was a pleasure seeing your patient, Edgardo Sims, on 5/9/2018. Diagnoses of Encounter for long-term (current) use of high-risk medication, Rheumatoid arthritis with positive rheumatoid factor, involving unspecified site (HCC), Urinary tract infection without hematuria, site unspecified, and Enlarged prostate were pertinent to this visit.     Please find attached progress note which includes the history I obtained from Mr. Sims, my physical examination findings, my impression and recommendations.      Once again, it was a pleasure participating in your patient's care.  Please feel free to contact me if you have any questions or if I can be of any further assistance to your patients.      Sincerely,    Antonieta Canales M.D.  Electronically Signed          PROGRESS NOTE:  Subjective:   Date of Consultation:5/9/2018  1:32 PM  Primary care physician:John Lao D.O.    Reason for Consultation:  Mr. Sims  is a pleasant 61 y.o. year old male who presented with follow-up for Rheumatoid Arthritis. Is on the xeljanz (tofacitinib) for the past two and a half months. He stopped taking it last week because he was sick [coughing, shortness of breath, chest congestion, temp of ~ 102.6]. Was started on Keflex by his PCP and he will finish by Friday. He feels better now, and he will restart taking xeljanz (tofacitinib) after he completely recovers.  Has morning stiffness lasting  60 minutes in the morning. Mild pain in the left MCP finger 4 & 3. HE did hold his medication when he was ill.        Rheumatoid Arthritis  He developed a UTI from an enlarged prostate and has been of the xeljanz for 2 weeks at least.  He notes increasing pain in his feet. While on xeljanz he experience a positive result with his joints.  He  was able to take off his ring.    Enlarged prostate  Noted to found this  Will see     Osteopenia noted in wrist  Previous fracture: no  Parent fractured hip: no  Current smoking: no  Glucocorticoids use > 3 months: yes  Rheumatoid arthritis: yes  Secondary osteoporosis : no  Alcohol 3 or more units/day: no    DEXA 9/5/2017  The mean bone mineral density for the lumbar spine is 1.196 g/cm2, which corresponds to a T score of -0.2 and a Z score of 0.2.  The distal left forearm has a mean bone mineral density of 0.837 g/cm2, with a T score of -1.5 and a Z score of -1.1.  FRAX scores are 14% & 2.6%    Current Medications  Methotrexate 6 tabs q Wednesday  (stopped)  xeljanz 5 mg BID  Prednisone 2 mg po q day  (he has been on long term prednisone)  meloxicam      RHEUM HISTORY  Mr. Edgardo Sims presented with a history of Rheumatoid Arthritis diagnosed late 1999 and recent RF and CCP were negative.  He was previously a patient of Dr. Mcknight and then followed by a community rheumatology.  He is here today to establish care. In review of Dr. Echavarria that it seems he has been on Rituxan as far back as 2009. At that time he was on a regimen of azathioprine 150 mg methotrexate 15 mg, prednisone 7.5 mg and rituximab with 125 mg of Solu-Medrol he seemed to have been maintained on this regimen and was tapered down on the Imuran to 50 mg twice a day. Notes from May 24, 2012 indicates that he discontinue the Imuran. At that time he was on rituximab and every 6 months 15 mg of methotrexate weekly and 2 mg of prednisone. Notes from June 8, 2015 does remark that his RA was only manageable he's had limited response. He did suffer a consultation with methotrexate developing stomatitis. Methotrexate was held March 6, 2014 and was reinstituted January 5, 2015.     Dr. Mcknight's notes it does comment that he may benefit from rituximab every 5 months however given cost restrictions he can only get it every 6 months. His last  infusion was in March 2017 for rituximab.  He reports he is not doing as well on the rituximab.  He feels his activity level is not as active.  He feels increase stiffness.  His feet xrays     Complications to his care include intolerance to medications  And stomatitis with methotrexate.  Methotrexate was stopped briefly and subsequently was restarted but the patient reports it is not working as well as before.     Previous medications include   azathioprine, methotrexate  Plaquenil and Arava both intolerance not allergies  Remicade loss efficacyt  Gold shots (had a bad reaction)  Enbrel  Not sure Humira  Methylprednisolone did not work as well as prednisone  Never been on Xeljanz   Has not tried Actemra  rituximab (lost efficacy last infusion was 3/2017)  Orencia (not sure why he stopped)    Chronic Pain  On percocet ranging from 30-40 mg daily    Past medical HIstory: bilateral hip replacment and left knee replacement, tonsillitis, varicose vein in the left leg, TMJ, tonsillitis in 1964, broken right ankle in 1980, ruptured disc of L5-S1 noted in 2000 with discectomy and laminectomy, rotator cuff with anterior labral repair in 2001, basal cell carcinoma in 2005 from the nose, meniscal tear of the left knee in 2008 with scope repair, scope repair and biceps tendon repair as well as rotator cuff surgery in 2011 of the right shoulder. Dupuytren's contracture right hand little finger, Dupuytren's contracture right foot, arthritis of the right foot, plantar fasciitis bilateral, aortic insufficiency, hypertension, dyslipidemia, heart murmur, tachycardia, coronary artery disease with mild plaque at catheter. Hiatal hernia and GERD, scoliosis, kyphosis of the upper back/compensating lordosis of the neck . Vein ligation radiofrequency      Family History: lymphoma     Social History: light bookeeping mainly disability, NEVER smoker, occasional alcohol (once a week)    Past Medical History:   Diagnosis Date   • Abnormal  myocardial perfusion study 1/15/2014   • Aortic insufficiency 7/5/2011   • Arthritis     RA, and osteo   • Bronchitis    • CAD (coronary artery disease) mild plaque at cath in 2/14 12/5/2014   • Cancer (HCC)     skin   • Chronic use of opiate drugs therapeutic purposes 8/12/2017   • Cold     mid January 2014   • Coughing blood     none currently 2014   • Dyslipidemia 7/12/2011   • Heart burn    • Heart murmur    • Hiatus hernia syndrome    • HTN (hypertension) 7/5/2011   • Hypertension    • Indigestion    • Infectious disease    • Pain     RA   • Rheumatoid arthritis(714.0)     severe   • Tachycardia 10/20/2014     Past Surgical History:   Procedure Laterality Date   • RECOVERY  2/3/2014    Performed by Cath-Recovery Surgery at SURGERY SAME DAY [facility] ORS   • HIP ARTHROPLASTY TOTAL  5/31/2013    Performed by Burak Valles M.D. at SURGERY [facility] ORS   • KNEE ARTHROPLASTY TOTAL  6/1/2009    Performed by YONATAN HINSON at SURGERY [facility] ORS   • VEIN LIGATION RADIO FREQUENCY  12/8/2008    Performed by DEREJE MCCULLOUGH at SURGERY SAME DAY [facility] ORS   • HIP ARTHROPLASTY TOTAL  6/20/08    Performed by YONATAN HINSON at SURGERY [facility] ORS   • LUMBAR LAMINECTOMY DISKECTOMY  2000   • TMJ RECONSTRUCTION BILATERAL  1994   • KNEE ARTHROSCOPY      right   • OTHER      varicose vein stripping   • OTHER      heart murmur   • OTHER ORTHOPEDIC SURGERY      awaiting right hip surgery   • SHOULDER SURGERY      RIGHT 01/2011     Allergies   Allergen Reactions   • Crestor [Rosuvastatin Calcium] Myalgia   • Penicillins Hives, Itching and Vomiting     Outpatient Encounter Prescriptions as of 5/9/2018   Medication Sig Dispense Refill   • ciprofloxacin (CIPRO) 500 MG Tab Take 1 Tab by mouth 2 times a day for 7 days. 14 Tab 0   • bisoprolol (ZEBETA) 10 MG tablet Take 10 mg by mouth every day.     • pravastatin (PRAVACHOL) 40 MG tablet Take 1 Tab by mouth every day. 30 Tab 11   • fluticasone (FLONASE) 50 MCG/ACT  nasal spray Spray 1 Spray in nose as needed.     • predniSONE (DELTASONE) 1 MG Tab TAKE 2 TABLETS BY MOUTH EVERY DAY 60 Tab 1   • meloxicam (MOBIC) 15 MG tablet TAKE 1 TABLET BY MOUTH EVERY DAY 30 Tab 11   • diazePAM (VALIUM) 5 MG Tab Take 5 mg by mouth every 6 hours as needed for Anxiety or Sleep.     • CALCIUM-VITAMIN D PO Take 1 Tab by mouth 2 Times a Day.     • ascorbic acid (ASCORBIC ACID) 500 MG Tab Take 500 mg by mouth every day.     • [START ON 6/10/2018] oxyCODONE-acetaminophen (PERCOCET-10)  MG Tab Take 1 Tab by mouth 5 Times a Day for 30 days. 150 Tab 0   • Garlic 1000 MG Cap Take 1 Cap by mouth every day.     • vitamin e (VITAMIN E) 400 UNIT Cap Take 400 Units by mouth every day.     • lisinopril (PRINIVIL) 10 MG Tab TAKE ONE TABLET BY MOUTH ONCE DAILY 90 Tab 2   • cyclobenzaprine (FLEXERIL) 10 MG Tab TAKE ONE TABLET BY MOUTH THREE TIMES DAILY AS NEEDED FOR MILD PAIN 90 Tab 5   • Cyanocobalamin (VITAMIN B-12) 5000 MCG TABLET DISPERSIBLE Take 1 Tab by mouth every day.     • niacin 500 MG Tab Take 500 mg by mouth every day.     • paroxetine (PAXIL) 20 MG Tab TAKE ONE TABLET BY MOUTH ONCE DAILY 90 Tab 3   • Multiple Vitamins-Minerals (MULTI COMPLETE PO) Take 1 Tab by mouth every day.     • vardenafil (LEVITRA) 20 MG tablet Take 20 mg by mouth as needed.     • Tofacitinib Citrate (XELJANZ) 5 MG Tab Take 5 mg by mouth 2 Times a Day. SAMPLE 60 Tab 0   • testosterone cypionate (DEPO-TESTOSTERONE) 200 MG/ML Solution injection 1 mL by Intramuscular route every 14 days for 130 days. 10 mL 1   • Zinc 50 MG Cap Take 50 mg by mouth every day.     • omeprazole (PRILOSEC) 20 MG CPDR Take 20 mg by mouth every day.       No facility-administered encounter medications on file as of 5/9/2018.        Social History     Social History   • Marital status:      Spouse name: N/A   • Number of children: N/A   • Years of education: N/A     Occupational History   • Not on file.     Social History Main Topics   •  Smoking status: Never Smoker   • Smokeless tobacco: Never Used   • Alcohol use 3.0 oz/week     6 Cans of beer per week   • Drug use: No   • Sexual activity: Yes     Partners: Female     Other Topics Concern   •  Service No   • Blood Transfusions No   • Caffeine Concern No   • Occupational Exposure No   • Hobby Hazards Yes     rides motorcyle   • Sleep Concern No   • Stress Concern No   • Weight Concern Yes   • Special Diet Yes     does not eat red meat    • Back Care Yes   • Exercise Yes   • Bike Helmet Yes   • Seat Belt Yes   • Self-Exams Yes     Social History Narrative   • No narrative on file      History   Smoking Status   • Never Smoker   Smokeless Tobacco   • Never Used     History   Alcohol Use   • 3.0 oz/week   • 6 Cans of beer per week     History   Drug Use No         Family History   Problem Relation Age of Onset   • Hypertension Mother        Review of Systems   Constitutional: Negative for chills and fever.   HENT: Negative for sore throat.    Eyes: Negative for photophobia, pain and redness.   Respiratory: Negative for shortness of breath, wheezing and stridor.    Cardiovascular: Negative for chest pain and palpitations.   Gastrointestinal: Negative for abdominal pain, nausea and vomiting.   Musculoskeletal: Positive for back pain and joint pain.   Skin: Negative for rash.   Neurological: Negative for seizures and loss of consciousness.   Psychiatric/Behavioral: Negative for depression. The patient does not have insomnia.         Objective:   /90   Pulse 68   Temp 36.7 °C (98 °F)   Resp 14   Wt 90.3 kg (199 lb)   SpO2 98%   BMI 28.55 kg/m²      Physical Exam   Constitutional: He is oriented to person, place, and time. He appears well-developed and well-nourished. No distress.   HENT:   Head: Normocephalic and atraumatic.   Right Ear: External ear normal.   Left Ear: External ear normal.   Eyes: Conjunctivae are normal. Right eye exhibits no discharge. Left eye exhibits no  discharge. No scleral icterus.   Cardiovascular: Normal rate and regular rhythm.    Murmur heard.  Grade III systolic murmur   Pulmonary/Chest: Effort normal. No stridor. No respiratory distress. He has no wheezes. He has no rales.   Abdominal: Soft.   Musculoskeletal: He exhibits no edema.   Kyphoses of the thoracic spine, lordosis of the cervical spine .increase tenderness of the MTP bilateral.  Mild bogginess of the MCP with tenderness to palpation.  Scare on right little finger for duputren contraction   Lymphadenopathy:     He has no cervical adenopathy.   Neurological: He is alert and oriented to person, place, and time.   Skin: Skin is warm and dry. No rash noted. He is not diaphoretic. No erythema. No pallor.   Psychiatric: He has a normal mood and affect. His behavior is normal. Judgment and thought content normal.       Assessment:     1. Encounter for long-term (current) use of high-risk medication  CBC WITH DIFFERENTIAL    COMP METABOLIC PANEL    CRP QUANTITIVE (NON-CARDIAC)    WESTERGREN SED RATE    LIPID PROFILE   2. Rheumatoid arthritis with positive rheumatoid factor, involving unspecified site (HCC)     3. Urinary tract infection without hematuria, site unspecified     4. Enlarged prostate       Labs:      Lab Results   Component Value Date/Time    QNTTBGOLD Negative 06/01/2017 01:13 PM     Lab Results   Component Value Date/Time    HEPBCORTOT Negative 06/01/2017 01:13 PM    HEPBSAG Negative 06/01/2017 01:13 PM     Lab Results   Component Value Date/Time    HEPCAB Negative 06/01/2017 01:13 PM     Lab Results   Component Value Date/Time    SODIUM 136 05/01/2018 05:03 AM    POTASSIUM 4.6 05/01/2018 05:03 AM    CHLORIDE 104 05/01/2018 05:03 AM    CO2 27 05/01/2018 05:03 AM    GLUCOSE 99 05/01/2018 05:03 AM    BUN 7 (L) 05/01/2018 05:03 AM    CREATININE 0.72 05/01/2018 05:03 AM    CREATININE 1.1 04/21/2009 03:50 PM    BUNCREATRAT 13 09/21/2016 09:21 AM      Lab Results   Component Value Date/Time    WBC  10.1 05/01/2018 05:02 AM    WBC 7.5 07/01/2011 10:30 AM    RBC 4.41 (L) 05/01/2018 05:02 AM    RBC 4.72 07/01/2011 10:30 AM    HEMOGLOBIN 14.8 05/01/2018 05:02 AM    HEMATOCRIT 42.5 05/01/2018 05:02 AM    MCV 96.4 05/01/2018 05:02 AM     (H) 07/01/2011 10:30 AM    MCH 33.6 (H) 05/01/2018 05:02 AM    MCH 36.0 (H) 07/01/2011 10:30 AM    MCHC 34.8 05/01/2018 05:02 AM    MPV 9.4 05/01/2018 05:02 AM    NEUTSPOLYS 68.40 04/30/2018 10:52 AM    LYMPHOCYTES 11.20 (L) 04/30/2018 10:52 AM    MONOCYTES 16.20 (H) 04/30/2018 10:52 AM    EOSINOPHILS 2.30 04/30/2018 10:52 AM    BASOPHILS 0.70 04/30/2018 10:52 AM    HYPOCHROMIA 1+ 03/05/2014 04:43 PM    ANISOCYTOSIS 1+ 01/02/2015 05:02 PM      Lab Results   Component Value Date/Time    CALCIUM 8.6 05/01/2018 05:03 AM    ASTSGOT 34 04/30/2018 10:52 AM    ALTSGPT 32 04/30/2018 10:52 AM    ALKPHOSPHAT 96 04/30/2018 10:52 AM    TBILIRUBIN 0.8 04/30/2018 10:52 AM    ALBUMIN 4.5 04/30/2018 10:52 AM    TOTPROTEIN 8.1 04/30/2018 10:52 AM     Lab Results   Component Value Date/Time    RHEUMFACTN <10 08/04/2017 02:32 PM     Lab Results   Component Value Date/Time    SEDRATEWES 13 04/26/2018 03:03 PM    CREACTPROT 5.73 (H) 04/26/2018 03:03 PM     No results found for: RUSSELVIPER, DRVVTINTERP  Lab Results   Component Value Date/Time    CRYOGLOBULIN NEG 72Hour 08/04/2017 02:32 PM     No results found for: ANADIRECT, ANTIDNADS, RNPAB, SMITHAB, VBYCLDT99, SSAROAB, SSBLAAB, TCWNHA8FY, CENTROMBAB  No results found for: ANTIDNADS, DSDNA, AGBMAB, GBMABA, ANCAIGG, Q6QVPVYCWBM, JO1AB, RNPAB, ANTISSASJ, ANTISSBSJ  Lab Results   Component Value Date/Time    COLORURINE Yellow 04/30/2018 12:03 PM    SPECGRAVITY <=1.005 04/30/2018 12:03 PM    PHURINE 7.5 04/30/2018 12:03 PM    GLUCOSEUR Negative 04/30/2018 12:03 PM    KETONES Negative 04/30/2018 12:03 PM    PROTEINURIN Negative 04/30/2018 12:03 PM     Lab Results   Component Value Date/Time    TOTALVOLUME 2,750 08/18/2015 07:30 AM    TOTALVOLUME  2,750 08/18/2015 07:30 AM     No results found for: SSA60, SSA52  No results found for: HBA1C  Lab Results   Component Value Date/Time    CPKTOTAL 141 01/26/2018 07:47 AM     No results found for: G6PD  No results found for: IXNM83DBCQ  No results found for: ACESERUM  Lab Results   Component Value Date/Time    25HYDROXY 18 (L) 06/01/2017 01:13 PM     Lab Results   Component Value Date/Time    FREEDIR 1.11 12/01/2014 03:13 PM     No results found for: TSHULTRASEN, FREET4  No results found for: MICROSOMALA, ANTITHYROGL  No results found for: IGGLYMEABS  No results found for: ANTIMITOCHO, FACTIN  No results found for: IGA, TTRANSIGA, ENDOIGA  No results found for: FLTYPE, CRYSTALSBDF  No results found for: ISTATICAL  No results found for: ISTATCREAT  No results found for: CTELOPEP  No results found for: GBMABG  Lab Results   Component Value Date/Time    PTHINTACT 55 10/10/2008 10:42 AM         Medical Decision Making:  Today's Assessment / Status / Plan:     Rheumatoid Arthritis  Hold xeljanz until antibiotics are completed.  We will see what urology wants to do and if there is any concern for ongoing infection.  If not will restart xelajnz.  If there is a reason to hold xeljanz then consider increasing the prednisone to 5mg since his joints are beginning to flare.    Chronic steroid use  DEXA showed osteopenia  FRAX scores are 14% & 2.6%, do not warrant treatment at this point, will repeat DEXA scan in two years. Sep 2019   Recheck lab at 3 months.  Continue Calcium and vitamin D    Results for CANDI UMANA (MRN [id number]) as of 6/23/2017 18:44   Ref. Range 6/1/2017 13:13   Hep B Surface Antibody Quant Latest Ref Range: 0.00-10.00 mIU/mL <3.10   Hepatitis B Surface Antigen Latest Ref Range: Negative  Negative   Hepatitis B Ccore Ab, Total Latest Ref Range: Negative  Negative   Hepatitis C Antibody Latest Ref Range: Negative  Negative   Mitogen-Nil Unknown 60.763   Nil Unknown 0.029   Quantiferon TB Gold Latest Ref  Range: Negative  Negative   TB Ag-Nil Unknown -0.010     Enlarged prostate  Will see urology    Aortic insufficiency  Not symptomatic  Followed by cardiology    S/p hand surgery  Doing well    1. Encounter for long-term (current) use of high-risk medication  CBC WITH DIFFERENTIAL    COMP METABOLIC PANEL    CRP QUANTITIVE (NON-CARDIAC)    WESTERGREN SED RATE    LIPID PROFILE   2. Rheumatoid arthritis with positive rheumatoid factor, involving unspecified site (HCC)     3. Urinary tract infection without hematuria, site unspecified     4. Enlarged prostate           Return in about 3 months (around 8/9/2018).         Please note that this dictation was created using voice recognition software. I have made every reasonable attempt to correct obvious errors, but I expect that there are errors of grammar and possibly content that I did not discover before finalizing the note.

## 2018-05-11 NOTE — DOCUMENTATION QUERY
DOCUMENTATION QUERY    PROVIDERS: Please select “Cosign w/ note”to reply to query.    To better represent the severity of illness of your patient, please review the following information and exercise your independent professional judgment in responding to this query.     This patient was diagnosed with acute pyelonephritis, sepsis is documented by the ED provider and then never mentioned again in the rest of the chart. Can the sepsis be clarified?    -Sepsis ruled in  -Sepsis ruled out  -Other (please specify)  -Unable to determine      The medical record reflects the following:   Clinical Findings  acute pyelonephritis    Treatment  Continue IV ceftriaxone  Follow cultures  Pain control  Continue IV fluids   Risk Factors     Location within medical record  multiple notes, sepsis in ED note     Thank you,   Baylee Zabala

## 2018-05-11 NOTE — ADDENDUM NOTE
Encounter addended by: Baylee Zabala on: 5/11/2018  8:16 AM<BR>    Actions taken: Pend clinical note

## 2018-05-11 NOTE — ADDENDUM NOTE
Encounter addended by: Baylee Zabala on: 5/11/2018  9:08 AM<BR>    Actions taken: Sign clinical note

## 2018-05-15 NOTE — ADDENDUM NOTE
Encounter addended by: Mona Escobedo M.D. on: 5/15/2018  7:29 AM<BR>    Actions taken: Cosign clinical note with attestation

## 2018-05-16 ENCOUNTER — APPOINTMENT (OUTPATIENT)
Dept: RADIOLOGY | Facility: MEDICAL CENTER | Age: 63
DRG: 699 | End: 2018-05-16
Attending: UROLOGY
Payer: MEDICARE

## 2018-05-16 ENCOUNTER — HOSPITAL ENCOUNTER (INPATIENT)
Facility: MEDICAL CENTER | Age: 63
LOS: 5 days | DRG: 699 | End: 2018-05-24
Attending: UROLOGY | Admitting: UROLOGY
Payer: MEDICARE

## 2018-05-16 LAB
APTT PPP: 34.7 SEC (ref 24.7–36)
GLUCOSE BLD-MCNC: 91 MG/DL (ref 65–99)
INR PPP: 1.02 (ref 0.87–1.13)
PROTHROMBIN TIME: 13.1 SEC (ref 12–14.6)

## 2018-05-16 PROCEDURE — 85610 PROTHROMBIN TIME: CPT

## 2018-05-16 PROCEDURE — 160035 HCHG PACU - 1ST 60 MINS PHASE I: Performed by: UROLOGY

## 2018-05-16 PROCEDURE — 160048 HCHG OR STATISTICAL LEVEL 1-5: Performed by: UROLOGY

## 2018-05-16 PROCEDURE — 0TCB8ZZ EXTIRPATION OF MATTER FROM BLADDER, VIA NATURAL OR ARTIFICIAL OPENING ENDOSCOPIC: ICD-10-PCS | Performed by: UROLOGY

## 2018-05-16 PROCEDURE — 700101 HCHG RX REV CODE 250

## 2018-05-16 PROCEDURE — 71045 X-RAY EXAM CHEST 1 VIEW: CPT

## 2018-05-16 PROCEDURE — 500242 HCHG CATH, HEYMAN FOLLOWER: Performed by: UROLOGY

## 2018-05-16 PROCEDURE — 160002 HCHG RECOVERY MINUTES (STAT): Performed by: UROLOGY

## 2018-05-16 PROCEDURE — 160039 HCHG SURGERY MINUTES - EA ADDL 1 MIN LEVEL 3: Performed by: UROLOGY

## 2018-05-16 PROCEDURE — 85730 THROMBOPLASTIN TIME PARTIAL: CPT

## 2018-05-16 PROCEDURE — 500880 HCHG PACK, CYSTO W/SEP LEGGINGS: Performed by: UROLOGY

## 2018-05-16 PROCEDURE — 160028 HCHG SURGERY MINUTES - 1ST 30 MINS LEVEL 3: Performed by: UROLOGY

## 2018-05-16 PROCEDURE — 700111 HCHG RX REV CODE 636 W/ 250 OVERRIDE (IP)

## 2018-05-16 PROCEDURE — C1769 GUIDE WIRE: HCPCS | Performed by: UROLOGY

## 2018-05-16 PROCEDURE — 82962 GLUCOSE BLOOD TEST: CPT

## 2018-05-16 PROCEDURE — 0T7D8ZZ DILATION OF URETHRA, VIA NATURAL OR ARTIFICIAL OPENING ENDOSCOPIC: ICD-10-PCS | Performed by: UROLOGY

## 2018-05-16 PROCEDURE — A4357 BEDSIDE DRAINAGE BAG: HCPCS | Performed by: UROLOGY

## 2018-05-16 PROCEDURE — A4338 INDWELLING CATHETER LATEX: HCPCS | Performed by: UROLOGY

## 2018-05-16 PROCEDURE — 160009 HCHG ANES TIME/MIN: Performed by: UROLOGY

## 2018-05-16 PROCEDURE — G0378 HOSPITAL OBSERVATION PER HR: HCPCS

## 2018-05-16 PROCEDURE — 501329 HCHG SET, CYSTO IRRIG Y TUR: Performed by: UROLOGY

## 2018-05-16 PROCEDURE — A6404 STERILE GAUZE > 48 SQ IN: HCPCS | Performed by: UROLOGY

## 2018-05-16 RX ORDER — LIDOCAINE HYDROCHLORIDE 10 MG/ML
0.5 INJECTION, SOLUTION INFILTRATION; PERINEURAL
Status: ACTIVE | OUTPATIENT
Start: 2018-05-16 | End: 2018-05-17

## 2018-05-16 RX ORDER — LISINOPRIL 10 MG/1
10 TABLET ORAL
Status: DISCONTINUED | OUTPATIENT
Start: 2018-05-17 | End: 2018-05-24 | Stop reason: HOSPADM

## 2018-05-16 RX ORDER — CYCLOBENZAPRINE HCL 10 MG
10 TABLET ORAL 3 TIMES DAILY
Status: DISCONTINUED | OUTPATIENT
Start: 2018-05-16 | End: 2018-05-24 | Stop reason: HOSPADM

## 2018-05-16 RX ORDER — ONDANSETRON 2 MG/ML
4 INJECTION INTRAMUSCULAR; INTRAVENOUS EVERY 6 HOURS PRN
Status: DISCONTINUED | OUTPATIENT
Start: 2018-05-16 | End: 2018-05-24 | Stop reason: HOSPADM

## 2018-05-16 RX ORDER — PREDNISONE 1 MG/1
1 TABLET ORAL DAILY
Status: DISCONTINUED | OUTPATIENT
Start: 2018-05-17 | End: 2018-05-24 | Stop reason: HOSPADM

## 2018-05-16 RX ORDER — PRAVASTATIN SODIUM 20 MG
40 TABLET ORAL
Status: DISCONTINUED | OUTPATIENT
Start: 2018-05-17 | End: 2018-05-24 | Stop reason: HOSPADM

## 2018-05-16 RX ORDER — ACETAMINOPHEN 325 MG/1
650 TABLET ORAL EVERY 6 HOURS
Status: COMPLETED | OUTPATIENT
Start: 2018-05-17 | End: 2018-05-21

## 2018-05-16 RX ORDER — OMEPRAZOLE 20 MG/1
20 CAPSULE, DELAYED RELEASE ORAL DAILY
Status: DISCONTINUED | OUTPATIENT
Start: 2018-05-17 | End: 2018-05-24 | Stop reason: HOSPADM

## 2018-05-16 RX ORDER — MELOXICAM 7.5 MG/1
15 TABLET ORAL DAILY
Status: DISCONTINUED | OUTPATIENT
Start: 2018-05-17 | End: 2018-05-22

## 2018-05-16 RX ORDER — BISOPROLOL FUMARATE 5 MG/1
10 TABLET, FILM COATED ORAL DAILY
Status: DISCONTINUED | OUTPATIENT
Start: 2018-05-17 | End: 2018-05-24 | Stop reason: HOSPADM

## 2018-05-16 RX ORDER — MEPERIDINE HYDROCHLORIDE 50 MG/ML
INJECTION INTRAMUSCULAR; INTRAVENOUS; SUBCUTANEOUS
Status: COMPLETED
Start: 2018-05-16 | End: 2018-05-16

## 2018-05-16 RX ORDER — ATROPA BELLADONNA AND OPIUM 16.2; 6 MG/1; MG/1
60 SUPPOSITORY RECTAL EVERY 12 HOURS PRN
Status: DISCONTINUED | OUTPATIENT
Start: 2018-05-16 | End: 2018-05-24 | Stop reason: HOSPADM

## 2018-05-16 RX ORDER — DIPHENHYDRAMINE HCL 25 MG
25 TABLET ORAL NIGHTLY PRN
Status: DISCONTINUED | OUTPATIENT
Start: 2018-05-16 | End: 2018-05-24 | Stop reason: HOSPADM

## 2018-05-16 RX ORDER — CIPROFLOXACIN 500 MG/1
500 TABLET, FILM COATED ORAL EVERY 12 HOURS
Status: DISCONTINUED | OUTPATIENT
Start: 2018-05-16 | End: 2018-05-24 | Stop reason: HOSPADM

## 2018-05-16 RX ORDER — PAROXETINE HYDROCHLORIDE 20 MG/1
20 TABLET, FILM COATED ORAL DAILY
Status: DISCONTINUED | OUTPATIENT
Start: 2018-05-17 | End: 2018-05-24 | Stop reason: HOSPADM

## 2018-05-16 RX ORDER — HYDROMORPHONE HYDROCHLORIDE 2 MG/1
2 TABLET ORAL
Status: DISCONTINUED | OUTPATIENT
Start: 2018-05-16 | End: 2018-05-24 | Stop reason: HOSPADM

## 2018-05-16 RX ORDER — SODIUM CHLORIDE, SODIUM LACTATE, POTASSIUM CHLORIDE, CALCIUM CHLORIDE 600; 310; 30; 20 MG/100ML; MG/100ML; MG/100ML; MG/100ML
INJECTION, SOLUTION INTRAVENOUS CONTINUOUS
Status: DISCONTINUED | OUTPATIENT
Start: 2018-05-16 | End: 2018-05-19

## 2018-05-16 RX ORDER — DIAZEPAM 5 MG/1
5 TABLET ORAL EVERY 6 HOURS PRN
Status: DISCONTINUED | OUTPATIENT
Start: 2018-05-16 | End: 2018-05-24 | Stop reason: HOSPADM

## 2018-05-16 RX ADMIN — MEPERIDINE HYDROCHLORIDE 25 MG: 50 INJECTION INTRAMUSCULAR; INTRAVENOUS; SUBCUTANEOUS at 19:00

## 2018-05-16 ASSESSMENT — PAIN SCALES - GENERAL
PAINLEVEL_OUTOF10: 0
PAINLEVEL_OUTOF10: 0
PAINLEVEL_OUTOF10: 2
PAINLEVEL_OUTOF10: 0
PAINLEVEL_OUTOF10: 2
PAINLEVEL_OUTOF10: 0
PAINLEVEL_OUTOF10: 2
PAINLEVEL_OUTOF10: 5

## 2018-05-17 PROCEDURE — 700111 HCHG RX REV CODE 636 W/ 250 OVERRIDE (IP): Performed by: UROLOGY

## 2018-05-17 PROCEDURE — 700105 HCHG RX REV CODE 258: Performed by: UROLOGY

## 2018-05-17 PROCEDURE — 700102 HCHG RX REV CODE 250 W/ 637 OVERRIDE(OP): Performed by: UROLOGY

## 2018-05-17 PROCEDURE — G0378 HOSPITAL OBSERVATION PER HR: HCPCS

## 2018-05-17 PROCEDURE — A9270 NON-COVERED ITEM OR SERVICE: HCPCS | Performed by: UROLOGY

## 2018-05-17 RX ADMIN — HYDROMORPHONE HYDROCHLORIDE 2 MG: 2 TABLET ORAL at 06:19

## 2018-05-17 RX ADMIN — PAROXETINE HYDROCHLORIDE 20 MG: 20 TABLET, FILM COATED ORAL at 08:53

## 2018-05-17 RX ADMIN — ACETAMINOPHEN 650 MG: 325 TABLET, FILM COATED ORAL at 17:20

## 2018-05-17 RX ADMIN — BISOPROLOL FUMARATE 10 MG: 5 TABLET ORAL at 10:34

## 2018-05-17 RX ADMIN — SODIUM CHLORIDE, POTASSIUM CHLORIDE, SODIUM LACTATE AND CALCIUM CHLORIDE: 600; 310; 30; 20 INJECTION, SOLUTION INTRAVENOUS at 10:35

## 2018-05-17 RX ADMIN — CIPROFLOXACIN HYDROCHLORIDE 500 MG: 500 TABLET, FILM COATED ORAL at 08:53

## 2018-05-17 RX ADMIN — SODIUM CHLORIDE, POTASSIUM CHLORIDE, SODIUM LACTATE AND CALCIUM CHLORIDE: 600; 310; 30; 20 INJECTION, SOLUTION INTRAVENOUS at 17:20

## 2018-05-17 RX ADMIN — PREDNISONE 1 MG: 1 TABLET ORAL at 10:34

## 2018-05-17 RX ADMIN — MELOXICAM 15 MG: 7.5 TABLET ORAL at 08:53

## 2018-05-17 RX ADMIN — CYCLOBENZAPRINE 10 MG: 10 TABLET, FILM COATED ORAL at 14:48

## 2018-05-17 RX ADMIN — LISINOPRIL 10 MG: 10 TABLET ORAL at 08:53

## 2018-05-17 RX ADMIN — CIPROFLOXACIN HYDROCHLORIDE 500 MG: 500 TABLET, FILM COATED ORAL at 20:25

## 2018-05-17 RX ADMIN — HYDROMORPHONE HYDROCHLORIDE 2 MG: 2 TABLET ORAL at 00:12

## 2018-05-17 RX ADMIN — CIPROFLOXACIN HYDROCHLORIDE 500 MG: 500 TABLET, FILM COATED ORAL at 00:12

## 2018-05-17 RX ADMIN — ACETAMINOPHEN 650 MG: 325 TABLET, FILM COATED ORAL at 00:12

## 2018-05-17 RX ADMIN — CYCLOBENZAPRINE 10 MG: 10 TABLET, FILM COATED ORAL at 20:25

## 2018-05-17 RX ADMIN — SODIUM CHLORIDE, POTASSIUM CHLORIDE, SODIUM LACTATE AND CALCIUM CHLORIDE: 600; 310; 30; 20 INJECTION, SOLUTION INTRAVENOUS at 00:13

## 2018-05-17 RX ADMIN — HYDROMORPHONE HYDROCHLORIDE 2 MG: 2 TABLET ORAL at 14:48

## 2018-05-17 RX ADMIN — CYCLOBENZAPRINE 10 MG: 10 TABLET, FILM COATED ORAL at 08:53

## 2018-05-17 RX ADMIN — PRAVASTATIN SODIUM 40 MG: 20 TABLET ORAL at 00:12

## 2018-05-17 RX ADMIN — ACETAMINOPHEN 650 MG: 325 TABLET, FILM COATED ORAL at 06:19

## 2018-05-17 RX ADMIN — PRAVASTATIN SODIUM 40 MG: 20 TABLET ORAL at 20:25

## 2018-05-17 RX ADMIN — CYCLOBENZAPRINE 10 MG: 10 TABLET, FILM COATED ORAL at 00:12

## 2018-05-17 RX ADMIN — OMEPRAZOLE 20 MG: 20 CAPSULE, DELAYED RELEASE ORAL at 08:53

## 2018-05-17 RX ADMIN — ACETAMINOPHEN 650 MG: 325 TABLET, FILM COATED ORAL at 12:21

## 2018-05-17 ASSESSMENT — PAIN SCALES - GENERAL
PAINLEVEL_OUTOF10: 5
PAINLEVEL_OUTOF10: 2
PAINLEVEL_OUTOF10: 3
PAINLEVEL_OUTOF10: 5
PAINLEVEL_OUTOF10: 2
PAINLEVEL_OUTOF10: 5
PAINLEVEL_OUTOF10: 2

## 2018-05-17 ASSESSMENT — LIFESTYLE VARIABLES
TOTAL SCORE: 0
EVER FELT BAD OR GUILTY ABOUT YOUR DRINKING: NO
ON A TYPICAL DAY WHEN YOU DRINK ALCOHOL HOW MANY DRINKS DO YOU HAVE: 2
EVER_SMOKED: NEVER
ALCOHOL_USE: YES
HOW MANY TIMES IN THE PAST YEAR HAVE YOU HAD 5 OR MORE DRINKS IN A DAY: 6
EVER HAD A DRINK FIRST THING IN THE MORNING TO STEADY YOUR NERVES TO GET RID OF A HANGOVER: NO
HAVE YOU EVER FELT YOU SHOULD CUT DOWN ON YOUR DRINKING: NO
TOTAL SCORE: 0
CONSUMPTION TOTAL: POSITIVE
AVERAGE NUMBER OF DAYS PER WEEK YOU HAVE A DRINK CONTAINING ALCOHOL: 1
HAVE PEOPLE ANNOYED YOU BY CRITICIZING YOUR DRINKING: NO
TOTAL SCORE: 0

## 2018-05-17 ASSESSMENT — COPD QUESTIONNAIRES
COPD SCREENING SCORE: 2
HAVE YOU SMOKED AT LEAST 100 CIGARETTES IN YOUR ENTIRE LIFE: NO/DON'T KNOW
DO YOU EVER COUGH UP ANY MUCUS OR PHLEGM?: NO/ONLY WITH OCCASIONAL COLDS OR INFECTIONS
IN THE PAST 12 MONTHS DO YOU DO LESS THAN YOU USED TO BECAUSE OF YOUR BREATHING PROBLEMS: DISAGREE/UNSURE
DURING THE PAST 4 WEEKS HOW MUCH DID YOU FEEL SHORT OF BREATH: NONE/LITTLE OF THE TIME

## 2018-05-17 ASSESSMENT — ENCOUNTER SYMPTOMS
ABDOMINAL PAIN: 0
FEVER: 0

## 2018-05-17 NOTE — CARE PLAN
Problem: Nutritional:  Goal: Achieve adequate nutritional intake  Patient will consume 50% of meals and supplements  Outcome: PROGRESSING AS EXPECTED

## 2018-05-17 NOTE — PROGRESS NOTES
Surgical Progress Note    Author: Javi Chapman Date & Time created: 2018   11:56 AM     Interval Events:   POD #1  Patient seen and examined  Hematuria persists with intermittent clot passage. No pain.     Review of Systems   Constitutional: Negative for fever.   Gastrointestinal: Negative for abdominal pain.   Genitourinary: Positive for hematuria.     Hemodynamics:  Temp (24hrs), Av.7 °C (98.1 °F), Min:36.4 °C (97.5 °F), Max:36.9 °C (98.5 °F)  Temperature: 36.9 °C (98.5 °F)  Pulse  Av.3  Min: 62  Max: 81Heart Rate (Monitored): 77  Blood Pressure: 122/70, NIBP: 103/65     Respiratory:    Respiration: 16, Pulse Oximetry: 96 %           Neuro:  GCS       Fluids:    Intake/Output Summary (Last 24 hours) at 18 1156  Last data filed at 18 0800   Gross per 24 hour   Intake             8820 ml   Output             8975 ml   Net             -155 ml     Weight: 92.8 kg (204 lb 9.4 oz)  Current Diet Order   Procedures   • DIET ORDER     Physical Exam   Constitutional: He is oriented to person, place, and time. He appears well-developed and well-nourished. No distress.   Pulmonary/Chest: Effort normal.   Abdominal: Soft. He exhibits no distension. There is no tenderness.   Genitourinary:   Genitourinary Comments: 3 way willett in place draining watermelon colored urine.   Neurological: He is alert and oriented to person, place, and time.   Skin: Skin is warm and dry.   Psychiatric: He has a normal mood and affect.   Nursing note and vitals reviewed.    Labs:  Recent Results (from the past 24 hour(s))   Prothrombin Time (INR) (plasma)    Collection Time: 18  4:45 PM   Result Value Ref Range    PT 13.1 12.0 - 14.6 sec    INR 1.02 0.87 - 1.13   aPTT    Collection Time: 18  4:45 PM   Result Value Ref Range    APTT 34.7 24.7 - 36.0 sec   ACCU-CHEK GLUCOSE    Collection Time: 18  5:17 PM   Result Value Ref Range    Glucose - Accu-Ck 91 65 - 99 mg/dL     Medical Decision Making, by  Problem:  Urethral stricture and urethral false passage with gross hematuria s/p clot evacuation and complex catheterization 5/17/18      Plan:  Continue CBI at rate to keep urine clear to light pink  Hand irrigate willett prn clot obstruction  CBC in am    Quality Measures:  Quality-Core Measures   Reviewed items::  Labs reviewed and Medications reviewed  Willett catheter::  Gross Hematuria with Clots      Discussed patient condition with Patient and urology-Dr. Romo

## 2018-05-17 NOTE — OR SURGEON
Immediate Post OP Note    PreOp Diagnosis: Urine clot retetnion    PostOp Diagnosis: Urethral stricture; Traumatic catheter injury to urethra    Procedure(s):  CYSTOSCOPY-CLOT EVAC - Wound Class: Clean Contaminated  Dilation of urethral stricture - Wound Class: Clean Contaminated    Surgeon(s):  Jose Romo M.D.    Anesthesiologist/Type of Anesthesia:  Anesthesiologist: Haris Carreon Jr., M.D./General    Surgical Staff:  Circulator: Mercy Lewis R.N.  Relief Scrub: Natasha Orourke  Scrub Person: Tc Blackmon R.N.    Specimens removed if any:  * No specimens in log *    Estimated Blood Loss: 50 cc    Findings: Bulbar urethral stricture tight with severe cath trauma at stricture with urethral false passage.  Minimal clot in bladder; cystitis    Complications: none        5/16/2018 6:38 PM Jose Romo M.D.

## 2018-05-17 NOTE — PROGRESS NOTES
Bedside Report received   Assumed care of Mr Reynoso at 0700.    Pt is A&O x4.  Pain denied at this time  Nausea denied  Tolerating Regular Diet   CBI in place and running at high rate into high-output bag. Output clear, pinkish in color. Minimal clots noted. +6000 mL emptied between 0700 and 0900. Pt states slight discomfort, but no swelling noted to luis enrique-area.  + Urine output  - BM (PTA)    + Flatus  Pt currently on bed-rest.  SCD's in place.   Bed in lowest position and locked.  Bed alarm NA. Pt calls appropriately.   Pt resting comfortably now.  Review plan of care with patient  Call light within reach  Hourly rounds in place  All needs met at this time

## 2018-05-17 NOTE — CARE PLAN
Problem: Safety  Goal: Will remain free from injury  Outcome: PROGRESSING AS EXPECTED  Patient free from accidental injury at this time. Safety precautions in place. Hourly rounding in place.    Problem: Pain Management  Goal: Pain level will decrease to patient's comfort goal  Outcome: PROGRESSING AS EXPECTED  Patient experiencing pain relief with MAR medication. Patient encouraged to call if pain increases. Hourly rounding in place.

## 2018-05-17 NOTE — OR NURSING
Pt's willett patent.  CBI patent and draining.  At slower rate output is pale yellow/tan, no clots or pink tinge.  Pt tolerating.

## 2018-05-17 NOTE — OP REPORT
DATE OF SERVICE:  05/16/2018    PREOPERATIVE DIAGNOSIS:  Urinary clot retention.    POSTOPERATIVE DIAGNOSES:  1.  Bulbar urethral stricture.  2.  Catheter-inducted urethral trauma at the site of stricture.  3.  Urinary clot retention.    PROCEDURE:  Cystoscopy with dilation of stricture, evacuation of clots and   insertion of 3-way Villa catheter over a wire.    ANESTHESIA:  General.    ANESTHESIOLOGIST:  Haris Carreon MD    SURGEON:  Jose Romo MD    BRIEF HISTORY:  This 62-year-old male was seen in the office several weeks ago   with gross hematuria secondary to UTI.  That was corrected; however, he   started developing gross hematuria again couple days ago.  An attempt to get a   Villa catheter into his bladder in the office was unsuccessful, and after   ongoing problems with this, it was elected to bring him to the operating room   and do this acutely.  Plan will be to do cystoscopy with clot evacuation and   TUR of a possible bladder tumor.    REPORT OF OPERATION:  Under general anesthesia with the patient in the   lithotomy position, the genitalia were prepped and draped in a sterile manner.    The 22-Polish cystoscope was then passed into the mid urethra.  The distal   urethra did require the meatus to be dilated to 26 Polish with Lenard   sounds.  These went in easily.  The cystoscope was then passed into the   proximal urethra where there was a large amount of damage to the urethra.    There was one area that did show what looked like the true passage and it was   very tight.  There was a very large false passage in this area.  At this   point, I was able to slip a guidewire up through this narrow opening and it   did seem to go up into the bladder fairly easily.  Once done, I then used   Avtar dilators to dilate this up to 16 Polish.  Once done, I then was able to   get the 20-Polish cystoscope in through this opening up into the bladder.    Once in the bladder, a large amount of bloody  "urine was drained.  Once the   bladder had been irrigated multiple times, I did see a few fairly small clots   in the bladder that were then subsequently irrigated out using a Suzie   syringe.  At this point, the bladder was allowed to irrigate for a while and   eventually we did get all the clots out of the bladder.  Manipulation did   allow for dilation of this stricture.  At this point, I did create a small   hole in a 20-Belarusian 3-way Villa catheter and this was able to slide over the   guidewire fairly easily.  At this point, the cystoscope was removed and the   20-Belarusian 3-way catheter was then passed over the guidewire and this did go up   into the bladder fairly easily.  Once in the bladder, 30 mL of water was   placed in the balloon.  The wire was removed.  We did get clear drainage out   of the bladder.  I then hooked him up to continuous irrigation with saline and   the urine did remain clear.  In examining the bladder, I did not find any   evidence of any tumor, stones or other abnormalities.  The bladder did appear   to be very large and \"floppy.\"  There did appear to be some cystitis in the   bladder.  At this point, after the patient was cleaned up, he was woken up and   transferred to the recovery room in stable condition.       ____________________________________     MD JAZZ CHA / AMBREEN    DD:  05/16/2018 18:47:28  DT:  05/16/2018 21:49:41    D#:  [id number]  Job#:  [id number]  "

## 2018-05-17 NOTE — DIETARY
"Nutrition services: Day 0 of admit.  Edgardo Sims is a 62 y.o. male with admitting DX of gross hematuria.  Consult received for poor PO intake and supplements.  Pt appeared well nourished.  I was able to speak with pt and family at bedside.  Pt reports his appetite has been poor a few days leading up to admit - it remains poor.  Pt denies any current n/v/d.  Pt is unsure of his UBW however believes he has gained wt if anything.  Pt typically drinks Muscle Milk at home - [facility] does not carry Muscle Milk however RD asked pt if he would be interested in trying Boost while he is inpatient, which he is.  RD relayed this information to the RN.  Reiterated the importance of adequate nutrition to maintain wt and promote healing - pt understands.    Assessment:  Height: 177.8 cm (5' 10\")  Weight: 92.8 kg (204 lb 9.4 oz)  Body mass index is 29.36 kg/m².  - overweight  Diet/Intake: Regular.  Per ADLs, 1 meal 50-75% consumed.    Evaluation:   1. Pt noted with pyelonephritis, acute pancreatitis, CAD, osteoarthritis, and dyslipidemia.  2. Meds: Prednisone  3. Last BM: 5/16    Recommendations/Plan:  1. Encourage intake of meals and supplements  2. Document intake of all meals and supplements as % taken in ADL's to provide interdisciplinary communication across all shifts.   3. Monitor weight.  4. Nutrition rep will continue to see patient for ongoing meal and snack preferences.     RD following           "

## 2018-05-17 NOTE — PROGRESS NOTES
A/Ox 4.  VSS.  Reports moderate penile and HA pain 5 /10, medicated per MAR, ice and heat applied.    Three way willett in place, CBI.    + BARNES, denies numbness and tingling.  RA, satting >90%, denies SOB or difficulty breathing, IS used appropriately.  SCDs in place.  + normoactive BSx4, + flatus, no BM this shift, LBM pta, denies n/v.  Tolerated regular diet well.  + void, willett.  Bed rest, tolerates activity well.  Repositions self frequently.  IVF to PIV, PO intake encouraged.    Call light within reach, calls appropriately.  Bed in lowest locked position.

## 2018-05-17 NOTE — PROGRESS NOTES
Urology  Pt in office today with 2 days of gross hematuria and clot retention.  Unable to pass willett.  To Or emergently for cystoscopy, clot evacuation and possible TURBT if tumor cause and fulguration of bleeding areas.  Alejandra GOMEZ done.Patient agrees.    Jose Molina MD

## 2018-05-17 NOTE — PROGRESS NOTES
2 RN skin check completed:    B/l feet calloused.  Ingrown hair on groin.  Lt ear redness due to glasses, blanching.    No other signs of skin breakdown.

## 2018-05-17 NOTE — OR NURSING
Pt on room air.  No c/o nausea, tolerating PO fluids.  No c/o pain just discomfort and irritation due to the 3-way willett for the CBI.  Willett patent, adequately draining.  Clear drainage.  VSS, afebrile.  A/O x4.  Family at bedside.

## 2018-05-18 LAB
BASOPHILS # BLD AUTO: 1 % (ref 0–1.8)
BASOPHILS # BLD: 0.11 K/UL (ref 0–0.12)
EOSINOPHIL # BLD AUTO: 0.29 K/UL (ref 0–0.51)
EOSINOPHIL NFR BLD: 2.7 % (ref 0–6.9)
ERYTHROCYTE [DISTWIDTH] IN BLOOD BY AUTOMATED COUNT: 46.4 FL (ref 35.9–50)
HCT VFR BLD AUTO: 36.1 % (ref 42–52)
HGB BLD-MCNC: 12.2 G/DL (ref 14–18)
IMM GRANULOCYTES # BLD AUTO: 0.13 K/UL (ref 0–0.11)
IMM GRANULOCYTES NFR BLD AUTO: 1.2 % (ref 0–0.9)
LYMPHOCYTES # BLD AUTO: 2.07 K/UL (ref 1–4.8)
LYMPHOCYTES NFR BLD: 19.2 % (ref 22–41)
MCH RBC QN AUTO: 32.3 PG (ref 27–33)
MCHC RBC AUTO-ENTMCNC: 33.8 G/DL (ref 33.7–35.3)
MCV RBC AUTO: 95.5 FL (ref 81.4–97.8)
MONOCYTES # BLD AUTO: 1.13 K/UL (ref 0–0.85)
MONOCYTES NFR BLD AUTO: 10.5 % (ref 0–13.4)
NEUTROPHILS # BLD AUTO: 7.04 K/UL (ref 1.82–7.42)
NEUTROPHILS NFR BLD: 65.4 % (ref 44–72)
NRBC # BLD AUTO: 0 K/UL
NRBC BLD-RTO: 0 /100 WBC
PLATELET # BLD AUTO: 200 K/UL (ref 164–446)
PMV BLD AUTO: 9.7 FL (ref 9–12.9)
RBC # BLD AUTO: 3.78 M/UL (ref 4.7–6.1)
WBC # BLD AUTO: 10.8 K/UL (ref 4.8–10.8)

## 2018-05-18 PROCEDURE — 700105 HCHG RX REV CODE 258: Performed by: UROLOGY

## 2018-05-18 PROCEDURE — 85025 COMPLETE CBC W/AUTO DIFF WBC: CPT

## 2018-05-18 PROCEDURE — 700102 HCHG RX REV CODE 250 W/ 637 OVERRIDE(OP): Performed by: UROLOGY

## 2018-05-18 PROCEDURE — 36415 COLL VENOUS BLD VENIPUNCTURE: CPT

## 2018-05-18 PROCEDURE — A9270 NON-COVERED ITEM OR SERVICE: HCPCS | Performed by: UROLOGY

## 2018-05-18 PROCEDURE — 700111 HCHG RX REV CODE 636 W/ 250 OVERRIDE (IP): Performed by: UROLOGY

## 2018-05-18 PROCEDURE — G0378 HOSPITAL OBSERVATION PER HR: HCPCS

## 2018-05-18 RX ADMIN — ACETAMINOPHEN 650 MG: 325 TABLET, FILM COATED ORAL at 23:06

## 2018-05-18 RX ADMIN — MELOXICAM 15 MG: 7.5 TABLET ORAL at 08:02

## 2018-05-18 RX ADMIN — ACETAMINOPHEN 650 MG: 325 TABLET, FILM COATED ORAL at 06:22

## 2018-05-18 RX ADMIN — OMEPRAZOLE 20 MG: 20 CAPSULE, DELAYED RELEASE ORAL at 08:02

## 2018-05-18 RX ADMIN — ACETAMINOPHEN 650 MG: 325 TABLET, FILM COATED ORAL at 11:40

## 2018-05-18 RX ADMIN — SODIUM CHLORIDE, POTASSIUM CHLORIDE, SODIUM LACTATE AND CALCIUM CHLORIDE: 600; 310; 30; 20 INJECTION, SOLUTION INTRAVENOUS at 23:07

## 2018-05-18 RX ADMIN — PREDNISONE 1 MG: 1 TABLET ORAL at 08:02

## 2018-05-18 RX ADMIN — PRAVASTATIN SODIUM 40 MG: 20 TABLET ORAL at 19:52

## 2018-05-18 RX ADMIN — CIPROFLOXACIN HYDROCHLORIDE 500 MG: 500 TABLET, FILM COATED ORAL at 19:52

## 2018-05-18 RX ADMIN — SODIUM CHLORIDE, POTASSIUM CHLORIDE, SODIUM LACTATE AND CALCIUM CHLORIDE: 600; 310; 30; 20 INJECTION, SOLUTION INTRAVENOUS at 12:22

## 2018-05-18 RX ADMIN — PAROXETINE HYDROCHLORIDE 20 MG: 20 TABLET, FILM COATED ORAL at 08:03

## 2018-05-18 RX ADMIN — HYDROMORPHONE HYDROCHLORIDE 2 MG: 2 TABLET ORAL at 09:54

## 2018-05-18 RX ADMIN — CYCLOBENZAPRINE 10 MG: 10 TABLET, FILM COATED ORAL at 19:52

## 2018-05-18 RX ADMIN — LISINOPRIL 10 MG: 10 TABLET ORAL at 08:02

## 2018-05-18 RX ADMIN — ACETAMINOPHEN 650 MG: 325 TABLET, FILM COATED ORAL at 03:24

## 2018-05-18 RX ADMIN — HYDROMORPHONE HYDROCHLORIDE 2 MG: 2 TABLET ORAL at 21:06

## 2018-05-18 RX ADMIN — CYCLOBENZAPRINE 10 MG: 10 TABLET, FILM COATED ORAL at 08:02

## 2018-05-18 RX ADMIN — CIPROFLOXACIN HYDROCHLORIDE 500 MG: 500 TABLET, FILM COATED ORAL at 08:02

## 2018-05-18 RX ADMIN — SODIUM CHLORIDE, POTASSIUM CHLORIDE, SODIUM LACTATE AND CALCIUM CHLORIDE: 600; 310; 30; 20 INJECTION, SOLUTION INTRAVENOUS at 03:24

## 2018-05-18 RX ADMIN — ACETAMINOPHEN 650 MG: 325 TABLET, FILM COATED ORAL at 17:18

## 2018-05-18 RX ADMIN — CYCLOBENZAPRINE 10 MG: 10 TABLET, FILM COATED ORAL at 16:00

## 2018-05-18 RX ADMIN — BISOPROLOL FUMARATE 10 MG: 5 TABLET ORAL at 09:54

## 2018-05-18 ASSESSMENT — PAIN SCALES - GENERAL
PAINLEVEL_OUTOF10: 2
PAINLEVEL_OUTOF10: 6
PAINLEVEL_OUTOF10: 2
PAINLEVEL_OUTOF10: 0

## 2018-05-18 ASSESSMENT — ENCOUNTER SYMPTOMS
VOMITING: 0
NAUSEA: 0
FEVER: 0
ABDOMINAL PAIN: 0
CHILLS: 0

## 2018-05-18 NOTE — PROGRESS NOTES
Assume pt care. Pt a/o x3, VSS. Pt rounds Q1-2hr with assessment of 4p's. T/P Q2h if necessary, protocol, or risk of skin breakdown/present skin breakdown.  Villa care given if present. IV assessment and care given. Pt education provided base on admission, care plan, current medications, and PRN. Pt has CBI running s/p TURP and urethral trauma. titrate to pink, bags running at a high rate. Irrigated for clots present.     No recent H/H other than 5/1 Hgb 14.8  CBC ordered for morning.     Hgb 12.2

## 2018-05-18 NOTE — PROGRESS NOTES
Bedside report received.  Assessment complete.  A&O x 4. Patient calls appropriately.  Patient up with stand by assist and walker assist.   Patient denies pain medication at this time.  Denies N&V. Tolerating diet.  CBI running at high flow, urine color pink/red, no clots visible.  + flatus  Patient denies SOB.  SCD's in place.  Review plan with of care with patient. Call light and personal belongings with in reach. Hourly rounding in place. All needs met at this time.

## 2018-05-18 NOTE — PROGRESS NOTES
Surgical Progress Note    Author: Javi Chapman Date & Time created: 2018   9:37 AM     Interval Events:   POD #2  Patient seen and examined.  Clot obstruction last night. CBI maintaining urine to watermelon color otherwise. Denies pain. H/H stable.    Review of Systems   Constitutional: Negative for chills and fever.   Gastrointestinal: Negative for abdominal pain, nausea and vomiting.   Genitourinary: Positive for hematuria.     Hemodynamics:  Temp (24hrs), Av °C (98.6 °F), Min:36.4 °C (97.6 °F), Max:37.3 °C (99.2 °F)  Temperature: 36.9 °C (98.4 °F)  Pulse  Av.3  Min: 62  Max: 86   Blood Pressure: 136/71     Respiratory:    Respiration: 18, Pulse Oximetry: 97 %           Neuro:  GCS       Fluids:    Intake/Output Summary (Last 24 hours) at 18 0937  Last data filed at 18 0800   Gross per 24 hour   Intake             4900 ml   Output             5900 ml   Net            -1000 ml        Current Diet Order   Procedures   • DIET ORDER     Physical Exam   Constitutional: He is oriented to person, place, and time. He appears well-developed and well-nourished. No distress.   Pulmonary/Chest: Effort normal.   Abdominal: Soft. He exhibits no distension. There is no tenderness.   Genitourinary:   Genitourinary Comments: 3 way willett with CBI on moderate rate. Urine watermelon colored.   Neurological: He is alert and oriented to person, place, and time.   Skin: Skin is warm.   Psychiatric: He has a normal mood and affect.   Nursing note and vitals reviewed.    Labs:  Recent Results (from the past 24 hour(s))   CBC WITH DIFFERENTIAL    Collection Time: 18  2:58 AM   Result Value Ref Range    WBC 10.8 4.8 - 10.8 K/uL    RBC 3.78 (L) 4.70 - 6.10 M/uL    Hemoglobin 12.2 (L) 14.0 - 18.0 g/dL    Hematocrit 36.1 (L) 42.0 - 52.0 %    MCV 95.5 81.4 - 97.8 fL    MCH 32.3 27.0 - 33.0 pg    MCHC 33.8 33.7 - 35.3 g/dL    RDW 46.4 35.9 - 50.0 fL    Platelet Count 200 164 - 446 K/uL    MPV 9.7 9.0 - 12.9  fL    Neutrophils-Polys 65.40 44.00 - 72.00 %    Lymphocytes 19.20 (L) 22.00 - 41.00 %    Monocytes 10.50 0.00 - 13.40 %    Eosinophils 2.70 0.00 - 6.90 %    Basophils 1.00 0.00 - 1.80 %    Immature Granulocytes 1.20 (H) 0.00 - 0.90 %    Nucleated RBC 0.00 /100 WBC    Neutrophils (Absolute) 7.04 1.82 - 7.42 K/uL    Lymphs (Absolute) 2.07 1.00 - 4.80 K/uL    Monos (Absolute) 1.13 (H) 0.00 - 0.85 K/uL    Eos (Absolute) 0.29 0.00 - 0.51 K/uL    Baso (Absolute) 0.11 0.00 - 0.12 K/uL    Immature Granulocytes (abs) 0.13 (H) 0.00 - 0.11 K/uL    NRBC (Absolute) 0.00 K/uL     Medical Decision Making, by Problem:  Urethral stricture and urethral false passage with gross hematuria s/p clot evacuation and complex catheterization 5/17/18      Plan:  Continue CBI and wean when hematuria improves  Hand irrigate prn obstruction  CBC in am    Quality Measures:  Quality-Core Measures    Discussed patient condition with Patient and urology-Dr. Romo

## 2018-05-19 LAB
BASOPHILS # BLD AUTO: 1.2 % (ref 0–1.8)
BASOPHILS # BLD: 0.12 K/UL (ref 0–0.12)
EOSINOPHIL # BLD AUTO: 0.67 K/UL (ref 0–0.51)
EOSINOPHIL NFR BLD: 6.9 % (ref 0–6.9)
ERYTHROCYTE [DISTWIDTH] IN BLOOD BY AUTOMATED COUNT: 45.5 FL (ref 35.9–50)
HCT VFR BLD AUTO: 35.1 % (ref 42–52)
HGB BLD-MCNC: 12.1 G/DL (ref 14–18)
IMM GRANULOCYTES # BLD AUTO: 0.1 K/UL (ref 0–0.11)
IMM GRANULOCYTES NFR BLD AUTO: 1 % (ref 0–0.9)
LYMPHOCYTES # BLD AUTO: 2.51 K/UL (ref 1–4.8)
LYMPHOCYTES NFR BLD: 25.7 % (ref 22–41)
MCH RBC QN AUTO: 32.4 PG (ref 27–33)
MCHC RBC AUTO-ENTMCNC: 34.5 G/DL (ref 33.7–35.3)
MCV RBC AUTO: 94.1 FL (ref 81.4–97.8)
MONOCYTES # BLD AUTO: 1.29 K/UL (ref 0–0.85)
MONOCYTES NFR BLD AUTO: 13.2 % (ref 0–13.4)
NEUTROPHILS # BLD AUTO: 5.09 K/UL (ref 1.82–7.42)
NEUTROPHILS NFR BLD: 52 % (ref 44–72)
NRBC # BLD AUTO: 0 K/UL
NRBC BLD-RTO: 0 /100 WBC
PLATELET # BLD AUTO: 202 K/UL (ref 164–446)
PMV BLD AUTO: 9.8 FL (ref 9–12.9)
RBC # BLD AUTO: 3.73 M/UL (ref 4.7–6.1)
WBC # BLD AUTO: 9.8 K/UL (ref 4.8–10.8)

## 2018-05-19 PROCEDURE — 770006 HCHG ROOM/CARE - MED/SURG/GYN SEMI*

## 2018-05-19 PROCEDURE — 36415 COLL VENOUS BLD VENIPUNCTURE: CPT

## 2018-05-19 PROCEDURE — 700102 HCHG RX REV CODE 250 W/ 637 OVERRIDE(OP): Performed by: UROLOGY

## 2018-05-19 PROCEDURE — 85025 COMPLETE CBC W/AUTO DIFF WBC: CPT

## 2018-05-19 PROCEDURE — 700105 HCHG RX REV CODE 258: Performed by: UROLOGY

## 2018-05-19 PROCEDURE — 700111 HCHG RX REV CODE 636 W/ 250 OVERRIDE (IP): Performed by: UROLOGY

## 2018-05-19 PROCEDURE — A9270 NON-COVERED ITEM OR SERVICE: HCPCS | Performed by: UROLOGY

## 2018-05-19 RX ADMIN — CYCLOBENZAPRINE 10 MG: 10 TABLET, FILM COATED ORAL at 15:29

## 2018-05-19 RX ADMIN — OMEPRAZOLE 20 MG: 20 CAPSULE, DELAYED RELEASE ORAL at 08:33

## 2018-05-19 RX ADMIN — ACETAMINOPHEN 650 MG: 325 TABLET, FILM COATED ORAL at 06:09

## 2018-05-19 RX ADMIN — PRAVASTATIN SODIUM 40 MG: 20 TABLET ORAL at 20:27

## 2018-05-19 RX ADMIN — HYDROMORPHONE HYDROCHLORIDE 2 MG: 2 TABLET ORAL at 08:33

## 2018-05-19 RX ADMIN — HYDROMORPHONE HYDROCHLORIDE 2 MG: 2 TABLET ORAL at 20:29

## 2018-05-19 RX ADMIN — CYCLOBENZAPRINE 10 MG: 10 TABLET, FILM COATED ORAL at 08:33

## 2018-05-19 RX ADMIN — CIPROFLOXACIN HYDROCHLORIDE 500 MG: 500 TABLET, FILM COATED ORAL at 08:33

## 2018-05-19 RX ADMIN — LISINOPRIL 10 MG: 10 TABLET ORAL at 08:33

## 2018-05-19 RX ADMIN — BISOPROLOL FUMARATE 10 MG: 5 TABLET ORAL at 08:33

## 2018-05-19 RX ADMIN — SODIUM CHLORIDE, POTASSIUM CHLORIDE, SODIUM LACTATE AND CALCIUM CHLORIDE: 600; 310; 30; 20 INJECTION, SOLUTION INTRAVENOUS at 08:37

## 2018-05-19 RX ADMIN — HYDROMORPHONE HYDROCHLORIDE 2 MG: 2 TABLET ORAL at 01:31

## 2018-05-19 RX ADMIN — HYDROMORPHONE HYDROCHLORIDE 2 MG: 2 TABLET ORAL at 13:25

## 2018-05-19 RX ADMIN — ACETAMINOPHEN 650 MG: 325 TABLET, FILM COATED ORAL at 13:25

## 2018-05-19 RX ADMIN — ACETAMINOPHEN 650 MG: 325 TABLET, FILM COATED ORAL at 17:58

## 2018-05-19 RX ADMIN — PREDNISONE 1 MG: 1 TABLET ORAL at 08:33

## 2018-05-19 RX ADMIN — MELOXICAM 15 MG: 7.5 TABLET ORAL at 08:33

## 2018-05-19 RX ADMIN — CIPROFLOXACIN HYDROCHLORIDE 500 MG: 500 TABLET, FILM COATED ORAL at 20:27

## 2018-05-19 RX ADMIN — CYCLOBENZAPRINE 10 MG: 10 TABLET, FILM COATED ORAL at 20:27

## 2018-05-19 RX ADMIN — PAROXETINE HYDROCHLORIDE 20 MG: 20 TABLET, FILM COATED ORAL at 08:33

## 2018-05-19 RX ADMIN — ACETAMINOPHEN 650 MG: 325 TABLET, FILM COATED ORAL at 23:55

## 2018-05-19 ASSESSMENT — PAIN SCALES - GENERAL
PAINLEVEL_OUTOF10: 9
PAINLEVEL_OUTOF10: 7
PAINLEVEL_OUTOF10: 2
PAINLEVEL_OUTOF10: 5

## 2018-05-19 NOTE — PROGRESS NOTES
"Urology Progress Note    Post op Day #     Traumatic catheter injury to urethra   CYSTOSCOPY-CLOT EVAC - Wound Class: Clean Contaminated  Dilation of urethral stricture - Wound Class: Clean Contaminated    Interval Events: pt states he is feeling better however he feels at times that his bladder is not emptying.     S: No fevers, chills, nausea or vomiting.  +flatus, \"feel like sometimes my bladder is not emptying into Willett\"    O:   Blood pressure 145/87, pulse 76, temperature 36.3 °C (97.3 °F), resp. rate 17, height 1.778 m (5' 10\"), weight 92.8 kg (204 lb 9.4 oz), SpO2 97 %.      Recent Labs      05/18/18   0258  05/19/18   0205   WBC  10.8  9.8   RBC  3.78*  3.73*   HEMOGLOBIN  12.2*  12.1*   HEMATOCRIT  36.1*  35.1*   MCV  95.5  94.1   MCH  32.3  32.4   MCHC  33.8  34.5   RDW  46.4  45.5   PLATELETCT  200  202   MPV  9.7  9.8         Intake/Output Summary (Last 24 hours) at 05/19/18 1226  Last data filed at 05/19/18 0900   Gross per 24 hour   Intake             9690 ml   Output             2500 ml   Net             7190 ml       Physical Exam   Constitutional: He is well-developed, well-nourished, and in no distress. No distress.   HENT:   Head: Normocephalic and atraumatic.   Eyes: Pupils are equal, round, and reactive to light.   Neck: Normal range of motion. Neck supple.   Cardiovascular: Normal rate and regular rhythm.    No murmur heard.  Pulmonary/Chest: Effort normal and breath sounds normal. No respiratory distress. He has no wheezes.   Abdominal: Soft. Bowel sounds are normal. There is no rebound and no guarding.   Slight distended   Genitourinary:   Genitourinary Comments: 3-way willett to down drain. reddish urine.    Musculoskeletal: Normal range of motion.   Neurological: He is alert.   Skin: Skin is warm. He is not diaphoretic.          Medical Decision Making, by Problem:  Urethral stricture and urethral false passage with gross hematuria s/p clot evacuation and complex catheterization " 5/17/18        Plan:  Continue CBI at rate to keep urine clear to light pink  Hand irrigate willett prn clot obstruction  CBC stable  Bladder scan to assess for any retention.    Ambulate, IS.    Discussed patient condition with Patient, RN.  Care guided by  MÓNICA Avendano.

## 2018-05-19 NOTE — PROGRESS NOTES
Assume pt care. Pt a/o x3, VSS. Pt rounds Q1-2hr with assessment of 4p's. T/P Q2h if necessary, protocol, or risk of skin breakdown/present skin breakdown.  Villa care given if present. IV assessment and care given. Pt education provided base on admission, care plan, current medications, and PRN. Pt has CBI running s/p TURP and urethral trauma. titrate to pink, bags running at a moderate rate. Irrigated PRN.        Hgb 12.2

## 2018-05-19 NOTE — PROGRESS NOTES
Pt alert and oriented  Medicated with dilaudid po for jaw pain, pt had TMJ surgery and it always hurts after getting intubated  Iv patent to right forearm  hrr, no edema, pulses 2+  Lungs clear, room air  Bs+, no n/v, +flatus, no bm  Villa to dd with CBI running, turned up a little due to turning reddish in color  Felt he needed to be irrigated, irrigated but no clots noted, checked down drain bag and it was not draining correctly, noted the clamp had created a vacuum seal, released clamp and immediate return of urine noted  Up with standby assist, up to chair  Tolerating diet  VSS

## 2018-05-19 NOTE — CARE PLAN
Problem: Safety  Goal: Will remain free from injury  Up with assist, calls for assistance as needed    Problem: Pain Management  Goal: Pain level will decrease to patient's comfort goal  Po dilaudid as needed for jaw pain    Problem: Mobility  Goal: Risk for activity intolerance will decrease  Steady on feet, calls for assistance as needed

## 2018-05-19 NOTE — PROGRESS NOTES
Patient called RN into room, IV had pulled out on accident. Patient also stated that he felt his catheter was clogging again, so he irrigated it himself and now it is flowing great. Instructed the patient to call prior next time.

## 2018-05-20 PROCEDURE — 770006 HCHG ROOM/CARE - MED/SURG/GYN SEMI*

## 2018-05-20 PROCEDURE — A9270 NON-COVERED ITEM OR SERVICE: HCPCS | Performed by: NURSE PRACTITIONER

## 2018-05-20 PROCEDURE — 302108 TAPE SECURITY OSTOMY (PINK): Performed by: UROLOGY

## 2018-05-20 PROCEDURE — 700102 HCHG RX REV CODE 250 W/ 637 OVERRIDE(OP): Performed by: UROLOGY

## 2018-05-20 PROCEDURE — 700102 HCHG RX REV CODE 250 W/ 637 OVERRIDE(OP): Performed by: NURSE PRACTITIONER

## 2018-05-20 PROCEDURE — 700111 HCHG RX REV CODE 636 W/ 250 OVERRIDE (IP): Performed by: UROLOGY

## 2018-05-20 PROCEDURE — A9270 NON-COVERED ITEM OR SERVICE: HCPCS | Performed by: UROLOGY

## 2018-05-20 PROCEDURE — 700112 HCHG RX REV CODE 229: Performed by: NURSE PRACTITIONER

## 2018-05-20 RX ORDER — POLYETHYLENE GLYCOL 3350 17 G/17G
1 POWDER, FOR SOLUTION ORAL DAILY
Status: DISCONTINUED | OUTPATIENT
Start: 2018-05-20 | End: 2018-05-24 | Stop reason: HOSPADM

## 2018-05-20 RX ORDER — DOCUSATE SODIUM 100 MG/1
100 CAPSULE, LIQUID FILLED ORAL 2 TIMES DAILY
Status: DISCONTINUED | OUTPATIENT
Start: 2018-05-20 | End: 2018-05-24 | Stop reason: HOSPADM

## 2018-05-20 RX ADMIN — MELOXICAM 15 MG: 7.5 TABLET ORAL at 08:43

## 2018-05-20 RX ADMIN — ACETAMINOPHEN 650 MG: 325 TABLET, FILM COATED ORAL at 06:08

## 2018-05-20 RX ADMIN — LISINOPRIL 10 MG: 10 TABLET ORAL at 08:43

## 2018-05-20 RX ADMIN — ATROPA BELLADONNA AND OPIUM 60 MG: 16.2; 6 SUPPOSITORY RECTAL at 15:40

## 2018-05-20 RX ADMIN — HYDROMORPHONE HYDROCHLORIDE 2 MG: 2 TABLET ORAL at 15:37

## 2018-05-20 RX ADMIN — PRAVASTATIN SODIUM 40 MG: 20 TABLET ORAL at 20:52

## 2018-05-20 RX ADMIN — HYDROMORPHONE HYDROCHLORIDE 2 MG: 2 TABLET ORAL at 11:51

## 2018-05-20 RX ADMIN — OMEPRAZOLE 20 MG: 20 CAPSULE, DELAYED RELEASE ORAL at 08:43

## 2018-05-20 RX ADMIN — CYCLOBENZAPRINE 10 MG: 10 TABLET, FILM COATED ORAL at 20:52

## 2018-05-20 RX ADMIN — CYCLOBENZAPRINE 10 MG: 10 TABLET, FILM COATED ORAL at 15:37

## 2018-05-20 RX ADMIN — POLYETHYLENE GLYCOL 3350 1 PACKET: 17 POWDER, FOR SOLUTION ORAL at 15:37

## 2018-05-20 RX ADMIN — ACETAMINOPHEN 650 MG: 325 TABLET, FILM COATED ORAL at 11:51

## 2018-05-20 RX ADMIN — CYCLOBENZAPRINE 10 MG: 10 TABLET, FILM COATED ORAL at 08:43

## 2018-05-20 RX ADMIN — DOCUSATE SODIUM 100 MG: 100 CAPSULE ORAL at 20:52

## 2018-05-20 RX ADMIN — PAROXETINE HYDROCHLORIDE 20 MG: 20 TABLET, FILM COATED ORAL at 08:43

## 2018-05-20 RX ADMIN — PREDNISONE 1 MG: 1 TABLET ORAL at 08:43

## 2018-05-20 RX ADMIN — CIPROFLOXACIN HYDROCHLORIDE 500 MG: 500 TABLET, FILM COATED ORAL at 08:43

## 2018-05-20 RX ADMIN — BISOPROLOL FUMARATE 10 MG: 5 TABLET ORAL at 08:43

## 2018-05-20 RX ADMIN — ACETAMINOPHEN 650 MG: 325 TABLET, FILM COATED ORAL at 18:34

## 2018-05-20 RX ADMIN — HYDROMORPHONE HYDROCHLORIDE 2 MG: 2 TABLET ORAL at 06:08

## 2018-05-20 RX ADMIN — CIPROFLOXACIN HYDROCHLORIDE 500 MG: 500 TABLET, FILM COATED ORAL at 20:52

## 2018-05-20 ASSESSMENT — PAIN SCALES - GENERAL
PAINLEVEL_OUTOF10: 3
PAINLEVEL_OUTOF10: 4
PAINLEVEL_OUTOF10: 4
PAINLEVEL_OUTOF10: 7
PAINLEVEL_OUTOF10: 4
PAINLEVEL_OUTOF10: 3

## 2018-05-20 NOTE — PROGRESS NOTES
Pt alert and oriented  No complaints of pain at this time, dilaudid po in use for pain  No iv access  hrr, no edema, pulses 2+  Lungs clear, room air  Bs+, no n/v, +flatus, no bm  cbi to down drain willett bag with pinkish urine, small flecks noted, no blood  Up ad alda, steady on feet, calls for assistance as needed   Tolerating diet  VSS

## 2018-05-20 NOTE — CARE PLAN
Problem: Safety  Goal: Will remain free from injury  Updated about POC. Reinforce call light use. Pt acknowledged understanding    Problem: Bowel/Gastric:  Goal: Normal bowel function is maintained or improved  No bvm yet. +flatus.     Problem: Pain Management  Goal: Pain level will decrease to patient's comfort goal  Pain controlled with dilaudid 2 mg po prn

## 2018-05-20 NOTE — CARE PLAN
Problem: Safety  Goal: Will remain free from injury  Up ad alda, steady on feet, calls for assistance as needed    Problem: Pain Management  Goal: Pain level will decrease to patient's comfort goal  Po dilaudid as needed for pain    Problem: Mobility  Goal: Risk for activity intolerance will decrease  Up self, steady on feet

## 2018-05-20 NOTE — PROGRESS NOTES
Assumed care at 1845. Pt resting in bed. A&ox 4  Ambulating independently  Tolerating reg diet.   CBi infusing. Clots noted. Irrigate as needed  Pt able to irrigate own willett  No bm yet since admit. Will get bowel protocol in AM if no bm through the night  Pain controlled. Hx of jaw pain.   O2 on Ra.   Call light within reach. Hourly rounding in place

## 2018-05-20 NOTE — PROGRESS NOTES
"Urology Progress Note    Post op Day # 4  Traumatic catheter injury to urethra  CYSTOSCOPY-CLOT EVAC -   Dilation of urethral stricture -        Interval Events: Pt denies pain.     S: No fevers, chills, nausea or vomiting.  + flatus.  Denies pain.    O:   Blood pressure 148/90, pulse 92, temperature 36.9 °C (98.4 °F), resp. rate 17, height 1.778 m (5' 10\"), weight 92.8 kg (204 lb 9.4 oz), SpO2 98 %.      Recent Labs      05/18/18   0258  05/19/18   0205   WBC  10.8  9.8   RBC  3.78*  3.73*   HEMOGLOBIN  12.2*  12.1*   HEMATOCRIT  36.1*  35.1*   MCV  95.5  94.1   MCH  32.3  32.4   MCHC  33.8  34.5   RDW  46.4  45.5   PLATELETCT  200  202   MPV  9.7  9.8         Intake/Output Summary (Last 24 hours) at 05/20/18 1435  Last data filed at 05/20/18 1200   Gross per 24 hour   Intake              820 ml   Output             7500 ml   Net            -6680 ml       Physical Exam   Constitutional:  well-developed, well-nourished, and in no distress.   HENT:   Head: Normocephalic and atraumatic.   Eyes: Pupils are equal, round, and reactive to light.   Neck: Normal range of motion. Neck supple.   Cardiovascular: Normal rate and regular rhythm.    No murmur heard.  Pulmonary/Chest: Effort normal and breath sounds normal. No respiratory distress. He has no wheezes.   Abdominal: Soft. Bowel sounds are normal. There is no rebound and no guarding. No distention   Genitourinary Comments: 3-way willett to down drain. Red urine with clots   Musculoskeletal: Normal range of motion.   Neurological: He is alert.   Skin: Skin is warm. He is not diaphoretic.      Willett hadn irrigated with NSS approx 1000 Liters. Pt experienced some  pain with irrigation.  Medium  clots removed. Bladder scan done, showed no retention.  CBI restarted.  Catheter placed to traction with tape to catheter and leg     A/P: Gross hematuria  Traumatic catheter injury to urethra  CYSTOSCOPY-CLOT EVAC -   Dilation of urethral stricture -     Stable.   Ambulate, " IS.  Continue CBI at rate to keep urine clear to light pink  Hand irrigate willett prn clot obstruction  CBC and BNP ordered for AM  Bladder scan to assess for any retention.  Willett placed to slight traction         Discussed patient condition with Patient, RN. And MD  Care guided by  Dr. Romo and MD Gonzales.   MÓNICA Zambrano.    MÓNICA Zambrano.

## 2018-05-21 LAB
ANION GAP SERPL CALC-SCNC: 4 MMOL/L (ref 0–11.9)
BASOPHILS # BLD AUTO: 1.1 % (ref 0–1.8)
BASOPHILS # BLD: 0.12 K/UL (ref 0–0.12)
BUN SERPL-MCNC: 10 MG/DL (ref 8–22)
CALCIUM SERPL-MCNC: 9 MG/DL (ref 8.5–10.5)
CHLORIDE SERPL-SCNC: 103 MMOL/L (ref 96–112)
CO2 SERPL-SCNC: 29 MMOL/L (ref 20–33)
CREAT SERPL-MCNC: 0.87 MG/DL (ref 0.5–1.4)
EOSINOPHIL # BLD AUTO: 0.86 K/UL (ref 0–0.51)
EOSINOPHIL NFR BLD: 8 % (ref 0–6.9)
ERYTHROCYTE [DISTWIDTH] IN BLOOD BY AUTOMATED COUNT: 46.5 FL (ref 35.9–50)
GLUCOSE SERPL-MCNC: 97 MG/DL (ref 65–99)
HCT VFR BLD AUTO: 37.1 % (ref 42–52)
HGB BLD-MCNC: 12.6 G/DL (ref 14–18)
IMM GRANULOCYTES # BLD AUTO: 0.1 K/UL (ref 0–0.11)
IMM GRANULOCYTES NFR BLD AUTO: 0.9 % (ref 0–0.9)
LYMPHOCYTES # BLD AUTO: 2.62 K/UL (ref 1–4.8)
LYMPHOCYTES NFR BLD: 24.4 % (ref 22–41)
MCH RBC QN AUTO: 32.4 PG (ref 27–33)
MCHC RBC AUTO-ENTMCNC: 34 G/DL (ref 33.7–35.3)
MCV RBC AUTO: 95.4 FL (ref 81.4–97.8)
MONOCYTES # BLD AUTO: 1.52 K/UL (ref 0–0.85)
MONOCYTES NFR BLD AUTO: 14.2 % (ref 0–13.4)
NEUTROPHILS # BLD AUTO: 5.52 K/UL (ref 1.82–7.42)
NEUTROPHILS NFR BLD: 51.4 % (ref 44–72)
NRBC # BLD AUTO: 0 K/UL
NRBC BLD-RTO: 0 /100 WBC
PLATELET # BLD AUTO: 233 K/UL (ref 164–446)
PMV BLD AUTO: 9.9 FL (ref 9–12.9)
POTASSIUM SERPL-SCNC: 4.4 MMOL/L (ref 3.6–5.5)
RBC # BLD AUTO: 3.89 M/UL (ref 4.7–6.1)
SODIUM SERPL-SCNC: 136 MMOL/L (ref 135–145)
WBC # BLD AUTO: 10.7 K/UL (ref 4.8–10.8)

## 2018-05-21 PROCEDURE — A9270 NON-COVERED ITEM OR SERVICE: HCPCS | Performed by: UROLOGY

## 2018-05-21 PROCEDURE — 700102 HCHG RX REV CODE 250 W/ 637 OVERRIDE(OP): Performed by: UROLOGY

## 2018-05-21 PROCEDURE — 700111 HCHG RX REV CODE 636 W/ 250 OVERRIDE (IP): Performed by: UROLOGY

## 2018-05-21 PROCEDURE — 700102 HCHG RX REV CODE 250 W/ 637 OVERRIDE(OP): Performed by: NURSE PRACTITIONER

## 2018-05-21 PROCEDURE — 770006 HCHG ROOM/CARE - MED/SURG/GYN SEMI*

## 2018-05-21 PROCEDURE — 700112 HCHG RX REV CODE 229: Performed by: NURSE PRACTITIONER

## 2018-05-21 PROCEDURE — A9270 NON-COVERED ITEM OR SERVICE: HCPCS | Performed by: NURSE PRACTITIONER

## 2018-05-21 PROCEDURE — 85025 COMPLETE CBC W/AUTO DIFF WBC: CPT

## 2018-05-21 PROCEDURE — 36415 COLL VENOUS BLD VENIPUNCTURE: CPT

## 2018-05-21 PROCEDURE — 80048 BASIC METABOLIC PNL TOTAL CA: CPT

## 2018-05-21 RX ADMIN — PAROXETINE HYDROCHLORIDE 20 MG: 20 TABLET, FILM COATED ORAL at 07:31

## 2018-05-21 RX ADMIN — HYDROMORPHONE HYDROCHLORIDE 2 MG: 2 TABLET ORAL at 13:01

## 2018-05-21 RX ADMIN — CYCLOBENZAPRINE 10 MG: 10 TABLET, FILM COATED ORAL at 20:12

## 2018-05-21 RX ADMIN — ACETAMINOPHEN 650 MG: 325 TABLET, FILM COATED ORAL at 00:14

## 2018-05-21 RX ADMIN — ACETAMINOPHEN 650 MG: 325 TABLET, FILM COATED ORAL at 06:25

## 2018-05-21 RX ADMIN — ACETAMINOPHEN 650 MG: 325 TABLET, FILM COATED ORAL at 11:35

## 2018-05-21 RX ADMIN — HYDROMORPHONE HYDROCHLORIDE 2 MG: 2 TABLET ORAL at 07:29

## 2018-05-21 RX ADMIN — CYCLOBENZAPRINE 10 MG: 10 TABLET, FILM COATED ORAL at 07:32

## 2018-05-21 RX ADMIN — DIAZEPAM 5 MG: 5 TABLET ORAL at 20:13

## 2018-05-21 RX ADMIN — CIPROFLOXACIN HYDROCHLORIDE 500 MG: 500 TABLET, FILM COATED ORAL at 07:31

## 2018-05-21 RX ADMIN — OMEPRAZOLE 20 MG: 20 CAPSULE, DELAYED RELEASE ORAL at 07:31

## 2018-05-21 RX ADMIN — BISOPROLOL FUMARATE 10 MG: 5 TABLET ORAL at 07:33

## 2018-05-21 RX ADMIN — LISINOPRIL 10 MG: 10 TABLET ORAL at 07:32

## 2018-05-21 RX ADMIN — MELOXICAM 15 MG: 7.5 TABLET ORAL at 07:32

## 2018-05-21 RX ADMIN — POLYETHYLENE GLYCOL 3350 1 PACKET: 17 POWDER, FOR SOLUTION ORAL at 07:31

## 2018-05-21 RX ADMIN — DOCUSATE SODIUM 100 MG: 100 CAPSULE ORAL at 07:32

## 2018-05-21 RX ADMIN — CIPROFLOXACIN HYDROCHLORIDE 500 MG: 500 TABLET, FILM COATED ORAL at 20:13

## 2018-05-21 RX ADMIN — ACETAMINOPHEN 650 MG: 325 TABLET, FILM COATED ORAL at 18:20

## 2018-05-21 RX ADMIN — CYCLOBENZAPRINE 10 MG: 10 TABLET, FILM COATED ORAL at 14:22

## 2018-05-21 RX ADMIN — DOCUSATE SODIUM 100 MG: 100 CAPSULE ORAL at 20:13

## 2018-05-21 RX ADMIN — PRAVASTATIN SODIUM 40 MG: 20 TABLET ORAL at 20:13

## 2018-05-21 RX ADMIN — PREDNISONE 1 MG: 1 TABLET ORAL at 07:33

## 2018-05-21 ASSESSMENT — PAIN SCALES - GENERAL
PAINLEVEL_OUTOF10: 5
PAINLEVEL_OUTOF10: 6
PAINLEVEL_OUTOF10: 2
PAINLEVEL_OUTOF10: 3
PAINLEVEL_OUTOF10: 7
PAINLEVEL_OUTOF10: 3
PAINLEVEL_OUTOF10: 1
PAINLEVEL_OUTOF10: 3

## 2018-05-21 NOTE — PROGRESS NOTES
Bedside report received.  Assessment complete.  A&O x 4. Patient calls appropriately.  Patient up with no assist. Bed alarm NI.   Patient has 7/10 pain. Medicated.  Denies N&V. Tolerating refular diet.  CBI irrigated, bag changed.  + void to CBI, + flatus  Patient denies SOB.  SCD's in place.  Review plan with of care with patient. Call light and personal belongings with in reach. Hourly rounding in place. All needs met at this time.

## 2018-05-21 NOTE — CARE PLAN
Problem: Safety  Goal: Will remain free from injury  Updated about POC. Reinforce call light use. Pt acknowledged understanding     Problem: Bowel/Gastric:  Goal: Normal bowel function is maintained or improved  No bm yet. +flatus. Stool softener and miralax scheduled.      Problem: Pain Management  Goal: Pain level will decrease to patient's comfort goal  Pain controlled with dilaudid 2 mg po prn

## 2018-05-21 NOTE — PROGRESS NOTES
"Urology Progress Note    Post op Day # 5    Overnight Events: None    S: No fevers, chills, nausea or vomiting.  +flatus.  No BM    O:   Blood pressure 127/86, pulse 79, temperature 36.7 °C (98 °F), resp. rate 16, height 1.778 m (5' 10\"), weight 92.8 kg (204 lb 9.4 oz), SpO2 98 %.  Recent Labs      05/21/18   0300   SODIUM  136   POTASSIUM  4.4   CHLORIDE  103   CO2  29   GLUCOSE  97   BUN  10   CREATININE  0.87   CALCIUM  9.0     Recent Labs      05/19/18   0205  05/21/18   0300   WBC  9.8  10.7   RBC  3.73*  3.89*   HEMOGLOBIN  12.1*  12.6*   HEMATOCRIT  35.1*  37.1*   MCV  94.1  95.4   MCH  32.4  32.4   MCHC  34.5  34.0   RDW  45.5  46.5   PLATELETCT  202  233   MPV  9.8  9.9         Intake/Output Summary (Last 24 hours) at 05/21/18 1230  Last data filed at 05/21/18 1200   Gross per 24 hour   Intake              780 ml   Output             3950 ml   Net            -3170 ml       Exam:  Abdomen soft, benign.     Urine: pink tinged on drip CBI      A/P:  There are no active hospital problems to display for this patient.      PLAN:  1.  I trial clamped CBI and urine returned to lanza color.  Wean CBI to clear to pink tinged urine.    2.  Repeat am labs  3.  Continue willett x2 weeks.  "

## 2018-05-21 NOTE — PROGRESS NOTES
Villa catheter hand irrigated with approx 300 ml of NS. Pt expressed pain with irrigation. Moderate amount of clots removed. Villa draining and patent. CBi restarted

## 2018-05-21 NOTE — CARE PLAN
Problem: Safety  Goal: Will remain free from injury  Outcome: PROGRESSING AS EXPECTED  Room near nursing station    Problem: Fluid Volume:  Goal: Will maintain balanced intake and output    Intervention: Monitor, educate, and encourage compliance with therapeutic intake of liquids  I/O closely monitored      Problem: Mobility  Goal: Risk for activity intolerance will decrease  Outcome: PROGRESSING AS EXPECTED

## 2018-05-21 NOTE — CARE PLAN
Problem: Safety  Goal: Will remain free from injury  Outcome: PROGRESSING AS EXPECTED  Bed alarm on   Intervention: Collaborate with Interdisciplinary Team for safe transfer and mobilization techniques  Room close to the nursing station

## 2018-05-21 NOTE — PROGRESS NOTES
Assumed care at 1845. Pt resting in bed. A&ox 4  Ambulating independently  Tolerating reg diet.   3 way willett in place with CBI infusing. Pink tinged clear output  Pt able to irrigate own willett. irrigate qshift and as needed  No bm yet since admit. Stool softener given.  Pain controlled. Hx of jaw pain.   O2 on Ra.   Call light within reach. Hourly rounding in place

## 2018-05-22 LAB
HCT VFR BLD AUTO: 37.4 % (ref 42–52)
HGB BLD-MCNC: 12.5 G/DL (ref 14–18)

## 2018-05-22 PROCEDURE — 36415 COLL VENOUS BLD VENIPUNCTURE: CPT

## 2018-05-22 PROCEDURE — 85018 HEMOGLOBIN: CPT

## 2018-05-22 PROCEDURE — A9270 NON-COVERED ITEM OR SERVICE: HCPCS | Performed by: UROLOGY

## 2018-05-22 PROCEDURE — 700102 HCHG RX REV CODE 250 W/ 637 OVERRIDE(OP): Performed by: NURSE PRACTITIONER

## 2018-05-22 PROCEDURE — A9270 NON-COVERED ITEM OR SERVICE: HCPCS | Performed by: NURSE PRACTITIONER

## 2018-05-22 PROCEDURE — 700111 HCHG RX REV CODE 636 W/ 250 OVERRIDE (IP): Performed by: UROLOGY

## 2018-05-22 PROCEDURE — 700102 HCHG RX REV CODE 250 W/ 637 OVERRIDE(OP): Performed by: UROLOGY

## 2018-05-22 PROCEDURE — 85014 HEMATOCRIT: CPT

## 2018-05-22 PROCEDURE — 770006 HCHG ROOM/CARE - MED/SURG/GYN SEMI*

## 2018-05-22 PROCEDURE — 700112 HCHG RX REV CODE 229: Performed by: NURSE PRACTITIONER

## 2018-05-22 RX ORDER — OXYCODONE AND ACETAMINOPHEN 7.5; 325 MG/1; MG/1
1 TABLET ORAL EVERY 4 HOURS PRN
Status: DISCONTINUED | OUTPATIENT
Start: 2018-05-22 | End: 2018-05-24 | Stop reason: HOSPADM

## 2018-05-22 RX ADMIN — OXYCODONE HYDROCHLORIDE AND ACETAMINOPHEN 1 TABLET: 7.5; 325 TABLET ORAL at 20:21

## 2018-05-22 RX ADMIN — OXYCODONE HYDROCHLORIDE AND ACETAMINOPHEN 1 TABLET: 7.5; 325 TABLET ORAL at 14:41

## 2018-05-22 RX ADMIN — POLYETHYLENE GLYCOL 3350 1 PACKET: 17 POWDER, FOR SOLUTION ORAL at 09:44

## 2018-05-22 RX ADMIN — PRAVASTATIN SODIUM 40 MG: 20 TABLET ORAL at 20:21

## 2018-05-22 RX ADMIN — CYCLOBENZAPRINE 10 MG: 10 TABLET, FILM COATED ORAL at 20:20

## 2018-05-22 RX ADMIN — PREDNISONE 1 MG: 1 TABLET ORAL at 09:44

## 2018-05-22 RX ADMIN — CYCLOBENZAPRINE 10 MG: 10 TABLET, FILM COATED ORAL at 09:44

## 2018-05-22 RX ADMIN — PAROXETINE HYDROCHLORIDE 20 MG: 20 TABLET, FILM COATED ORAL at 09:44

## 2018-05-22 RX ADMIN — CYCLOBENZAPRINE 10 MG: 10 TABLET, FILM COATED ORAL at 14:41

## 2018-05-22 RX ADMIN — DOCUSATE SODIUM 100 MG: 100 CAPSULE ORAL at 20:20

## 2018-05-22 RX ADMIN — CIPROFLOXACIN HYDROCHLORIDE 500 MG: 500 TABLET, FILM COATED ORAL at 09:44

## 2018-05-22 RX ADMIN — MELOXICAM 15 MG: 7.5 TABLET ORAL at 09:44

## 2018-05-22 RX ADMIN — BISOPROLOL FUMARATE 10 MG: 5 TABLET ORAL at 09:43

## 2018-05-22 RX ADMIN — HYDROMORPHONE HYDROCHLORIDE 1 MG: 2 TABLET ORAL at 09:51

## 2018-05-22 RX ADMIN — OMEPRAZOLE 20 MG: 20 CAPSULE, DELAYED RELEASE ORAL at 09:44

## 2018-05-22 RX ADMIN — LISINOPRIL 10 MG: 10 TABLET ORAL at 09:44

## 2018-05-22 RX ADMIN — CIPROFLOXACIN HYDROCHLORIDE 500 MG: 500 TABLET, FILM COATED ORAL at 20:21

## 2018-05-22 ASSESSMENT — PAIN SCALES - GENERAL
PAINLEVEL_OUTOF10: 4
PAINLEVEL_OUTOF10: 5
PAINLEVEL_OUTOF10: 6
PAINLEVEL_OUTOF10: 5
PAINLEVEL_OUTOF10: 3
PAINLEVEL_OUTOF10: 4
PAINLEVEL_OUTOF10: 1

## 2018-05-22 NOTE — PROGRESS NOTES
Pt aao x 4, on room air, up ambulating around unit ad alda with steady gait. Villa to dd with CBI infusing at slow rate. Light pink urine noted. No clots at this time. Tolerating regular diet. Pain controlled. Plan of care discussed. Call light within reach.

## 2018-05-22 NOTE — PROGRESS NOTES
"Patient states PRN pain medication Dilaudid is \"too strong\" and \"makes him loopy\" and he is \"unable to function on them.\" Patient states Percoset 10mg has worked for him in the past and he is able to function appropriately on that specific pain medication.     Patient has NOT had a bowel movement since 5/16/18.   "

## 2018-05-22 NOTE — CARE PLAN
Problem: Nutritional:  Goal: Achieve adequate nutritional intake  Patient will consume 50% of meals and supplements   Outcome: MET Date Met: 05/22/18    Intervention: Monitor PO intake, weights, and laboratory values  Per review of ADLs pt appears to be consistently consuming >50% of meals, with occasional intake of >50% of Boost Plus supplement.   No further nutrition concerns at this time, please re-consult prn.     RD to monitor per dept policy

## 2018-05-22 NOTE — CARE PLAN
Problem: Bowel/Gastric:  Goal: Normal bowel function is maintained or improved  Pt with +BM today    Problem: Knowledge Deficit  Goal: Knowledge of disease process/condition, treatment plan, diagnostic tests, and medications will improve  Plan of care for shift discussed with pt. Pt receptive.    Problem: Pain Management  Goal: Pain level will decrease to patient's comfort goal  Pt requesting to be switched back to his home pain medication regime of Percocet 10/325. Pt stating dilaudid is too strong. Pt provided with half a tablet per his request.

## 2018-05-22 NOTE — PROGRESS NOTES
"Urology Progress Note    Post op Day # 6    Overnight Events: None    S: No fevers, chills, nausea or vomiting.  +flatus. +BM.  Urine still bloody with CBI clamped.  Clears with irrigation.    O:   Blood pressure 131/82, pulse 94, temperature 36.4 °C (97.6 °F), resp. rate 14, height 1.778 m (5' 10\"), weight 92.8 kg (204 lb 9.4 oz), SpO2 98 %.  Recent Labs      05/21/18   0300   SODIUM  136   POTASSIUM  4.4   CHLORIDE  103   CO2  29   GLUCOSE  97   BUN  10   CREATININE  0.87   CALCIUM  9.0     Recent Labs      05/21/18   0300  05/22/18   0353   WBC  10.7   --    RBC  3.89*   --    HEMOGLOBIN  12.6*  12.5*   HEMATOCRIT  37.1*  37.4*   MCV  95.4   --    MCH  32.4   --    MCHC  34.0   --    RDW  46.5   --    PLATELETCT  233   --    MPV  9.9   --          Intake/Output Summary (Last 24 hours) at 05/22/18 1329  Last data filed at 05/22/18 1148   Gross per 24 hour   Intake              800 ml   Output             1430 ml   Net             -630 ml       Exam:  Abdomen soft, benign.   Urine: bloody clear without clot     A/P:  There are no active hospital problems to display for this patient.      PLAN:  1.  Continue CBI titrate to light pink/clear  2.  NPO p breakfast  3.  Consent for cystoscopy clot evacuation with fulguration of bleed to be done 5/23 with Dr Romo.  "

## 2018-05-22 NOTE — PROGRESS NOTES
Report received from night shift RN, assumed Care.   Patient is AOx4, responds appropriately.      Pain controlled at this time with PO pain medication per MAR.   Patient is tolerating regular diet, denies nausea/vomiting. + flatus  Up self with steady gait.  3 way CBI in place with red tinged clear output.   Bowel sounds active x4 quads, no bowel movement during this admission, stool softener given per MAR.  Plan of care discussed, all questions answered.    Educated on use of call light. Pt verbalizes understanding.    Call light and belongings within reach, treaded slipper socks on, SCDs refused (pt ambulates frequently), bed in lowest locked position.  Hourly rounding in place, all needs met at this time.

## 2018-05-23 LAB
HCT VFR BLD AUTO: 40 % (ref 42–52)
HGB BLD-MCNC: 13.4 G/DL (ref 14–18)

## 2018-05-23 PROCEDURE — 700102 HCHG RX REV CODE 250 W/ 637 OVERRIDE(OP): Performed by: UROLOGY

## 2018-05-23 PROCEDURE — 85018 HEMOGLOBIN: CPT

## 2018-05-23 PROCEDURE — A9270 NON-COVERED ITEM OR SERVICE: HCPCS | Performed by: UROLOGY

## 2018-05-23 PROCEDURE — 700111 HCHG RX REV CODE 636 W/ 250 OVERRIDE (IP): Performed by: UROLOGY

## 2018-05-23 PROCEDURE — 700112 HCHG RX REV CODE 229: Performed by: NURSE PRACTITIONER

## 2018-05-23 PROCEDURE — 85014 HEMATOCRIT: CPT

## 2018-05-23 PROCEDURE — 36415 COLL VENOUS BLD VENIPUNCTURE: CPT

## 2018-05-23 PROCEDURE — 700111 HCHG RX REV CODE 636 W/ 250 OVERRIDE (IP)

## 2018-05-23 PROCEDURE — 770006 HCHG ROOM/CARE - MED/SURG/GYN SEMI*

## 2018-05-23 PROCEDURE — A9270 NON-COVERED ITEM OR SERVICE: HCPCS | Performed by: NURSE PRACTITIONER

## 2018-05-23 RX ADMIN — CYCLOBENZAPRINE 10 MG: 10 TABLET, FILM COATED ORAL at 08:34

## 2018-05-23 RX ADMIN — CYCLOBENZAPRINE 10 MG: 10 TABLET, FILM COATED ORAL at 14:46

## 2018-05-23 RX ADMIN — OXYCODONE HYDROCHLORIDE AND ACETAMINOPHEN 1 TABLET: 7.5; 325 TABLET ORAL at 21:33

## 2018-05-23 RX ADMIN — DOCUSATE SODIUM 100 MG: 100 CAPSULE ORAL at 21:32

## 2018-05-23 RX ADMIN — CIPROFLOXACIN HYDROCHLORIDE 500 MG: 500 TABLET, FILM COATED ORAL at 08:34

## 2018-05-23 RX ADMIN — PRAVASTATIN SODIUM 40 MG: 20 TABLET ORAL at 21:32

## 2018-05-23 RX ADMIN — CIPROFLOXACIN HYDROCHLORIDE 500 MG: 500 TABLET, FILM COATED ORAL at 21:33

## 2018-05-23 RX ADMIN — OXYCODONE HYDROCHLORIDE AND ACETAMINOPHEN 1 TABLET: 7.5; 325 TABLET ORAL at 12:34

## 2018-05-23 RX ADMIN — LISINOPRIL 10 MG: 10 TABLET ORAL at 08:34

## 2018-05-23 RX ADMIN — PREDNISONE 1 MG: 1 TABLET ORAL at 08:34

## 2018-05-23 RX ADMIN — OMEPRAZOLE 20 MG: 20 CAPSULE, DELAYED RELEASE ORAL at 08:34

## 2018-05-23 RX ADMIN — CYCLOBENZAPRINE 10 MG: 10 TABLET, FILM COATED ORAL at 21:33

## 2018-05-23 RX ADMIN — BISOPROLOL FUMARATE 10 MG: 5 TABLET ORAL at 08:34

## 2018-05-23 RX ADMIN — DOCUSATE SODIUM 100 MG: 100 CAPSULE ORAL at 08:34

## 2018-05-23 RX ADMIN — OXYCODONE HYDROCHLORIDE AND ACETAMINOPHEN 1 TABLET: 7.5; 325 TABLET ORAL at 06:33

## 2018-05-23 RX ADMIN — PAROXETINE HYDROCHLORIDE 20 MG: 20 TABLET, FILM COATED ORAL at 08:34

## 2018-05-23 ASSESSMENT — PAIN SCALES - GENERAL
PAINLEVEL_OUTOF10: 5
PAINLEVEL_OUTOF10: 1
PAINLEVEL_OUTOF10: 3
PAINLEVEL_OUTOF10: 6
PAINLEVEL_OUTOF10: 5
PAINLEVEL_OUTOF10: 2
PAINLEVEL_OUTOF10: 3

## 2018-05-23 NOTE — PROGRESS NOTES
"Urology Progress Note    Overnight Events: willett irrigated with small clots removed.    S: No fevers, chills, nausea or vomiting.  +flatus.  +BM.  Ambulating.  He is scheduled for cysto clot evacuation and fulguration this evening with Dr Romo.    O:   Blood pressure 125/85, pulse 96, temperature 36.6 °C (97.8 °F), resp. rate 14, height 1.778 m (5' 10\"), weight 92.8 kg (204 lb 9.4 oz), SpO2 97 %.  Recent Labs      05/21/18   0300   SODIUM  136   POTASSIUM  4.4   CHLORIDE  103   CO2  29   GLUCOSE  97   BUN  10   CREATININE  0.87   CALCIUM  9.0     Recent Labs      05/21/18   0300  05/22/18   0353  05/23/18   0938   WBC  10.7   --    --    RBC  3.89*   --    --    HEMOGLOBIN  12.6*  12.5*  13.4*   HEMATOCRIT  37.1*  37.4*  40.0*   MCV  95.4   --    --    MCH  32.4   --    --    MCHC  34.0   --    --    RDW  46.5   --    --    PLATELETCT  233   --    --    MPV  9.9   --    --          Intake/Output Summary (Last 24 hours) at 05/23/18 1011  Last data filed at 05/23/18 0845   Gross per 24 hour   Intake             1080 ml   Output             2650 ml   Net            -1570 ml       Exam:  Abdomen soft, benign.    Urine: pink tinged on drip CBI      A/P:  There are no active hospital problems to display for this patient.      PLAN:   Ambulate, IS.  NPO for surgery this evening  "

## 2018-05-23 NOTE — PROGRESS NOTES
Bedside report completed.  A&O x4. Resting comfortably in bed.  VSS.  Ambulating with no assistance.  C/o 5/10 pain, medicated per MAR.  CBI, clear yellow output.  Small amount of sediment in tubing.  CBI turns light pink with ambulation.   + BM, + flatus.  Call light and personal belongings within reach.  POC discussed and all questions answered.  No additional needs at this time.

## 2018-05-23 NOTE — CARE PLAN
Problem: Knowledge Deficit  Goal: Knowledge of disease process/condition, treatment plan, diagnostic tests, and medications will improve  Plan of care discussed with pt this am. Plan for surgery tonight at 1900.    Problem: Pain Management  Goal: Pain level will decrease to patient's comfort goal  Percocet prn pain     Problem: Mobility  Goal: Risk for activity intolerance will decrease  Pt up ambulating frequently around the unit.

## 2018-05-24 VITALS
OXYGEN SATURATION: 99 % | SYSTOLIC BLOOD PRESSURE: 129 MMHG | RESPIRATION RATE: 18 BRPM | WEIGHT: 204.59 LBS | HEIGHT: 70 IN | HEART RATE: 75 BPM | TEMPERATURE: 97.5 F | BODY MASS INDEX: 29.29 KG/M2 | DIASTOLIC BLOOD PRESSURE: 82 MMHG

## 2018-05-24 PROCEDURE — 700112 HCHG RX REV CODE 229: Performed by: NURSE PRACTITIONER

## 2018-05-24 PROCEDURE — 700111 HCHG RX REV CODE 636 W/ 250 OVERRIDE (IP): Performed by: UROLOGY

## 2018-05-24 PROCEDURE — A9270 NON-COVERED ITEM OR SERVICE: HCPCS | Performed by: UROLOGY

## 2018-05-24 PROCEDURE — A9270 NON-COVERED ITEM OR SERVICE: HCPCS | Performed by: NURSE PRACTITIONER

## 2018-05-24 PROCEDURE — 700102 HCHG RX REV CODE 250 W/ 637 OVERRIDE(OP): Performed by: UROLOGY

## 2018-05-24 RX ORDER — CIPROFLOXACIN 500 MG/1
500 TABLET, FILM COATED ORAL EVERY 12 HOURS
Qty: 20 TAB | Refills: 0 | Status: SHIPPED | OUTPATIENT
Start: 2018-05-24 | End: 2018-07-26

## 2018-05-24 RX ADMIN — CYCLOBENZAPRINE 10 MG: 10 TABLET, FILM COATED ORAL at 07:39

## 2018-05-24 RX ADMIN — OXYCODONE HYDROCHLORIDE AND ACETAMINOPHEN 1 TABLET: 7.5; 325 TABLET ORAL at 07:39

## 2018-05-24 RX ADMIN — OMEPRAZOLE 20 MG: 20 CAPSULE, DELAYED RELEASE ORAL at 07:39

## 2018-05-24 RX ADMIN — BISOPROLOL FUMARATE 10 MG: 5 TABLET ORAL at 07:39

## 2018-05-24 RX ADMIN — CIPROFLOXACIN HYDROCHLORIDE 500 MG: 500 TABLET, FILM COATED ORAL at 07:39

## 2018-05-24 RX ADMIN — LISINOPRIL 10 MG: 10 TABLET ORAL at 07:39

## 2018-05-24 RX ADMIN — PAROXETINE HYDROCHLORIDE 20 MG: 20 TABLET, FILM COATED ORAL at 07:39

## 2018-05-24 RX ADMIN — DOCUSATE SODIUM 100 MG: 100 CAPSULE ORAL at 07:39

## 2018-05-24 RX ADMIN — PREDNISONE 1 MG: 1 TABLET ORAL at 07:39

## 2018-05-24 ASSESSMENT — PAIN SCALES - GENERAL
PAINLEVEL_OUTOF10: 6
PAINLEVEL_OUTOF10: 3

## 2018-05-24 NOTE — PROGRESS NOTES
Pre-op called and reported that surgery was cancelled because patient has no hematuria. Patient returned to unit via bed. Villa in place draining to gravity clear yellow urine.

## 2018-05-24 NOTE — PROGRESS NOTES
Bedside report received.  Assessment complete.  A&O x 4. Patient calls appropriately.  Patient up self and steady.   Patient has 6/10 pain. Medicated per MAR.  Denies N&V. Tolerating regular diet.  Villa catheter in place, urine in pale yellow to straw colored.  + flatus  Patient denies SOB.  SCD's in place.  Patient anticipating discharge.  Review plan with of care with patient. Call light and personal belongings with in reach. Hourly rounding in place. All needs met at this time.

## 2018-05-24 NOTE — PROGRESS NOTES
Patient discharged to home. All lines removed. Discharge instructions and prescription reviewed and understanding verbalized. Patient states they will fill prescriptions. Patient states they will return to emergency if discussed symptoms arise. Patient left walking with wife. Medications from drawer removed and sent giordano to pharmacy or disposed of per protocol. Chart given to unit clerk.

## 2018-05-24 NOTE — DISCHARGE SUMMARY
CHIEF COMPLAINT ON ADMISSION  No chief complaint on file.      CODE STATUS  Full Code    HPI & HOSPITAL COURSE  This is a 62 y.o. male here with urinary clot retention.  He underwent Cystoscopy with dilation of stricture, evacuation of clots and insertion of 3-way Willett catheter over a wire on 5/16/18.  He has been on CBI with persistent hematuria that finally resolved on 5/23/18.  CBI has been clamped for 24hrs and urine remains clear.     Therefore, he is discharged in good and stable condition with willett catheter in place.    SPECIFIC OUTPATIENT FOLLOW-UP      DISCHARGE PROBLEM LIST  Active Problems:    Acute pyelonephritis POA: Yes    Bladder outlet obstruction POA: Yes  Resolved Problems:    * No resolved hospital problems. *      FOLLOW UP  Future Appointments  Date Time Provider Department Center   6/13/2018 9:20 AM MEGHA Guevara   8/9/2018 1:00 PM VIRAL [facility] None   9/19/2018 11:15 AM Stas Zabala M.D. [facility] None     Dr Romo's office will contact patient to arrange f/u in 2 weeks for VT in clinic.       MEDICATIONS ON DISCHARGE   Edgardo Sims   Home Medication Instructions UYE:[id number]    Printed on:05/24/18 1118   Medication Information                      ascorbic acid (ASCORBIC ACID) 500 MG Tab  Take 500 mg by mouth every day.             bisoprolol (ZEBETA) 10 MG tablet  Take 10 mg by mouth every day.             CALCIUM-VITAMIN D PO  Take 1 Tab by mouth 2 Times a Day.             ciprofloxacin (CIPRO) 500 MG Tab  Take 1 Tab by mouth every 12 hours.             Cyanocobalamin (VITAMIN B-12) 5000 MCG TABLET DISPERSIBLE  Take 1 Tab by mouth every day.             cyclobenzaprine (FLEXERIL) 10 MG Tab  TAKE ONE TABLET BY MOUTH THREE TIMES DAILY AS NEEDED FOR MILD PAIN             diazePAM (VALIUM) 5 MG Tab  Take 5 mg by mouth every 6 hours as needed for Anxiety or Sleep.             fluticasone (FLONASE) 50 MCG/ACT nasal spray  Spray 1 Spray in nose as  needed.             Garlic 1000 MG Cap  Take 1 Cap by mouth every day.             lisinopril (PRINIVIL) 10 MG Tab  TAKE ONE TABLET BY MOUTH ONCE DAILY             meloxicam (MOBIC) 15 MG tablet  TAKE 1 TABLET BY MOUTH EVERY DAY             Multiple Vitamins-Minerals (MULTI COMPLETE PO)  Take 1 Tab by mouth every day.             niacin 500 MG Tab  Take 500 mg by mouth every day.             omeprazole (PRILOSEC) 20 MG CPDR  Take 20 mg by mouth every day.             oxyCODONE-acetaminophen (PERCOCET-10)  MG Tab  Take 1 Tab by mouth 5 Times a Day for 30 days.             paroxetine (PAXIL) 20 MG Tab  TAKE ONE TABLET BY MOUTH ONCE DAILY             pravastatin (PRAVACHOL) 40 MG tablet  Take 1 Tab by mouth every day.             predniSONE (DELTASONE) 1 MG Tab  TAKE 2 TABLETS BY MOUTH EVERY DAY             testosterone cypionate (DEPO-TESTOSTERONE) 200 MG/ML Solution injection  1 mL by Intramuscular route every 14 days for 130 days.             Tofacitinib Citrate (XELJANZ) 5 MG Tab  Take 5 mg by mouth 2 Times a Day. SAMPLE             vardenafil (LEVITRA) 20 MG tablet  Take 20 mg by mouth as needed.             vitamin e (VITAMIN E) 400 UNIT Cap  Take 400 Units by mouth every day.             Zinc 50 MG Cap  Take 50 mg by mouth every day.                 DIET  Orders Placed This Encounter   Procedures   • DIET ORDER     Standing Status:   Standing     Number of Occurrences:   1     Order Specific Question:   Diet:     Answer:   Regular [1]       ACTIVITY  Light duty.        CONSULTATIONS  none    PROCEDURES  Cystoscopy with dilation of stricture, evacuation of clots and   insertion of 3-way Villa catheter over a wire    LABORATORY  Lab Results   Component Value Date/Time    SODIUM 136 05/21/2018 03:00 AM    POTASSIUM 4.4 05/21/2018 03:00 AM    CHLORIDE 103 05/21/2018 03:00 AM    CO2 29 05/21/2018 03:00 AM    GLUCOSE 97 05/21/2018 03:00 AM    BUN 10 05/21/2018 03:00 AM    CREATININE 0.87 05/21/2018 03:00 AM     CREATININE 1.1 04/21/2009 03:50 PM        Lab Results   Component Value Date/Time    WBC 10.7 05/21/2018 03:00 AM    WBC 7.5 07/01/2011 10:30 AM    HEMOGLOBIN 13.4 (L) 05/23/2018 09:38 AM    HEMATOCRIT 40.0 (L) 05/23/2018 09:38 AM    PLATELETCT 233 05/21/2018 03:00 AM        Total time of the discharge process exceeds 31 minutes

## 2018-05-24 NOTE — PROGRESS NOTES
URology    Urine now clear after no CBI for 2 hours.  Pt reports clear urine all day even when ambulating.  Surgery cancelled.    Will hold CBI and if clear in am then home with willett. Also home on antibiotics. F/U in 2 weeks with Dr Romo for void trial.    Jose Romo MD

## 2018-05-24 NOTE — CARE PLAN
Problem: Safety  Goal: Will remain free from injury  Outcome: PROGRESSING AS EXPECTED  Patient free from accidental injury at this time. Safety precautions in place. Hourly rounding in place.     Problem: Pain Management  Goal: Pain level will decrease to patient's comfort goal  Outcome: PROGRESSING AS EXPECTED  Patient experiencing pain relief with MAR medication. Patient encouraged to call if pain worsens. Hourly rounding in place.

## 2018-05-24 NOTE — CARE PLAN
Problem: Knowledge Deficit  Goal: Knowledge of disease process/condition, treatment plan, diagnostic tests, and medications will improve  Outcome: PROGRESSING AS EXPECTED  Patient educated on treatment plan.    Problem: Pain Management  Goal: Pain level will decrease to patient's comfort goal  Outcome: PROGRESSING AS EXPECTED  Prn pain medication administered for pain.

## 2018-05-24 NOTE — PROGRESS NOTES
"Urology Progress Note    Overnight Events: None    S: No fevers, chills, nausea or vomiting.  No complaints.  Surgery was cancelled as urine remained clear off CBI.    O:   Blood pressure 129/82, pulse 75, temperature 36.4 °C (97.5 °F), resp. rate 18, height 1.778 m (5' 10\"), weight 92.8 kg (204 lb 9.4 oz), SpO2 99 %.      Recent Labs      05/22/18   0353  05/23/18   0938   HEMOGLOBIN  12.5*  13.4*   HEMATOCRIT  37.4*  40.0*         Intake/Output Summary (Last 24 hours) at 05/24/18 1111  Last data filed at 05/24/18 0900   Gross per 24 hour   Intake             1940 ml   Output             3950 ml   Net            -2010 ml       Exam:  Abdomen soft, benign.     Urine: clear with CBI clamped      A/P:  There are no active hospital problems to display for this patient.      PLAN:  1.  DC home with willett and Cipro 500mg BID #20  2.  F/u with Dr Romo in 2 weeks for VT.   "

## 2018-05-24 NOTE — DISCHARGE INSTRUCTIONS
Discharge Instructions    Discharged to home by car with relative. Discharged via wheelchair, hospital escort: Yes.  Special equipment needed: Not Applicable    Be sure to schedule a follow-up appointment with your primary care doctor or any specialists as instructed.     Discharge Plan:   Influenza Vaccine Indication: Patient Refuses    I understand that a diet low in cholesterol, fat, and sodium is recommended for good health. Unless I have been given specific instructions below for another diet, I accept this instruction as my diet prescription.   Other diet: Regular    Special Instructions: None    · Is patient discharged on Warfarin / Coumadin?   No     Villa Catheter Care, Adult  A Villa catheter is a soft, flexible tube. This tube is placed into your bladder to drain pee (urine). If you go home with this catheter in place, follow the instructions below.  TAKING CARE OF THE CATHETER  1. Wash your hands with soap and water.  2. Put soap and water on a clean washcloth.  ¨ Clean the skin where the tube goes into your body.  § Clean away from the tube site.  § Never wipe toward the tube.  § Clean the area using a circular motion.  ¨ Remove all the soap. Pat the area dry with a clean towel. For males, reposition the skin that covers the end of the penis (foreskin).  3. Attach the tube to your leg with tape or a leg strap. Do not stretch the tube tight. If you are using tape, remove any stickiness left behind by past tape you used.  4. Keep the drainage bag below your hips. Keep it off the floor.  5. Check your tube during the day. Make sure it is working and draining. Make sure the tube does not curl, twist, or bend.  6. Do not pull on the tube or try to take it out.  TAKING CARE OF THE DRAINAGE BAGS  You will have a large overnight drainage bag and a small leg bag. You may wear the overnight bag any time. Never wear the small bag at night. Follow the directions below.  Emptying the Drainage Bag  Empty your  drainage bag when it is  -½ full or at least 2-3 times a day.  1. Wash your hands with soap and water.  2. Keep the drainage bag below your hips.  3. Hold the dirty bag over the toilet or clean container.  4. Open the pour spout at the bottom of the bag. Empty the pee into the toilet or container. Do not let the pour spout touch anything.  5. Clean the pour spout with a gauze pad or cotton ball that has rubbing alcohol on it.  6. Close the pour spout.  7. Attach the bag to your leg with tape or a leg strap.  8. Wash your hands well.  Changing the Drainage Bag  Change your bag once a month or sooner if it starts to smell or look dirty.   1. Wash your hands with soap and water.  2. Pinch the rubber tube so that pee does not spill out.  3. Disconnect the catheter tube from the drainage tube at the connection valve. Do not let the tubes touch anything.  4. Clean the end of the catheter tube with an alcohol wipe. Clean the end of a the drainage tube with a different alcohol wipe.  5. Connect the catheter tube to the drainage tube of the clean drainage bag.  6. Attach the new bag to the leg with tape or a leg strap. Avoid attaching the new bag too tightly.  7. Wash your hands well.  Cleaning the Drainage Bag  2. Wash your hands with soap and water.  3. Wash the bag in warm, soapy water.  4. Rinse the bag with warm water.  5. Fill the bag with a mixture of white vinegar and water (1 cup vinegar to 1 quart warm water [.2 liter vinegar to 1 liter warm water]). Close the bag and soak it for 30 minutes in the solution.  6. Rinse the bag with warm water.  7. Hang the bag to dry with the pour spout open and hanging downward.  8. Store the clean bag (once it is dry) in a clean plastic bag.  9. Wash your hands well.  PREVENT INFECTION  · Wash your hands before and after touching your tube.  · Take showers every day. Wash the skin where the tube enters your body. Do not take baths. Replace wet leg straps with dry ones, if this  applies.  · Do not use powders, sprays, or lotions on the genital area. Only use creams, lotions, or ointments as told by your doctor.  · For females, wipe from front to back after going to the bathroom.  · Drink enough fluids to keep your pee clear or pale yellow unless you are told not to have too much fluid (fluid restriction).  · Do not let the drainage bag or tubing touch or lie on the floor.  · Wear cotton underwear to keep the area dry.  GET HELP IF:  · Your pee is cloudy or smells unusually bad.  · Your tube becomes clogged.  · You are not draining pee into the bag or your bladder feels full.  · Your tube starts to leak.  GET HELP RIGHT AWAY IF:  · You have pain, puffiness (swelling), redness, or yellowish-white fluid (pus) where the tube enters the body.  · You have pain in the belly (abdomen), legs, lower back, or bladder.  · You have a fever.  · You see blood fill the tube, or your pee is pink or red.  · You feel sick to your stomach (nauseous), throw up (vomit), or have chills.  · Your tube gets pulled out.  MAKE SURE YOU:   · Understand these instructions.  · Will watch your condition.  · Will get help right away if you are not doing well or get worse.     This information is not intended to replace advice given to you by your health care provider. Make sure you discuss any questions you have with your health care provider.     Document Released: 04/14/2014 Document Revised: 01/08/2016 Document Reviewed: 04/14/2014  [employer] Patient Education ©2016 [employer]      Depression / Suicide Risk    As you are discharged from this [facility] facility, it is important to learn how to keep safe from harming yourself.    Recognize the warning signs:  · Abrupt changes in personality, positive or negative- including increase in energy   · Giving away possessions  · Change in eating patterns- significant weight changes-  positive or negative  · Change in sleeping patterns- unable to sleep or  sleeping all the time   · Unwillingness or inability to communicate  · Depression  · Unusual sadness, discouragement and loneliness  · Talk of wanting to die  · Neglect of personal appearance   · Rebelliousness- reckless behavior  · Withdrawal from people/activities they love  · Confusion- inability to concentrate     If you or a loved one observes any of these behaviors or has concerns about self-harm, here's what you can do:  · Talk about it- your feelings and reasons for harming yourself  · Remove any means that you might use to hurt yourself (examples: pills, rope, extension cords, firearm)  · Get professional help from the community (Mental Health, Substance Abuse, psychological counseling)  · Do not be alone:Call your Safe Contact- someone whom you trust who will be there for you.  · Call your local CRISIS HOTLINE 861-1924 or 780-995-3255  · Call your local Children's Mobile Crisis Response Team [place] (505) 416-6282 or www.[employer]  · Call the toll free National Suicide Prevention Hotlines   · National Suicide Prevention Lifeline 748-359-XDAQ (6794)  · National Hope Line Network 800-SUICIDE (874-6206)

## 2018-05-24 NOTE — PROGRESS NOTES
Education provided to patient regarding leg bag, willett care. Supplies provided to patient. Piston syringe and sterile irrigation given to patient in the event of a clot, patient to call doctor immediately if suspicion of clot occurs.

## 2018-05-25 ENCOUNTER — PATIENT OUTREACH (OUTPATIENT)
Dept: HEALTH INFORMATION MANAGEMENT | Facility: OTHER | Age: 63
End: 2018-05-25

## 2018-06-04 RX ORDER — PAROXETINE HYDROCHLORIDE 20 MG/1
TABLET, FILM COATED ORAL
Qty: 90 TAB | Refills: 0 | Status: SHIPPED | OUTPATIENT
Start: 2018-06-04 | End: 2018-09-03 | Stop reason: SDUPTHER

## 2018-06-07 ENCOUNTER — TELEPHONE (OUTPATIENT)
Dept: RHEUMATOLOGY | Facility: PHYSICIAN GROUP | Age: 63
End: 2018-06-07

## 2018-06-07 DIAGNOSIS — Z79.899 ENCOUNTER FOR LONG-TERM (CURRENT) USE OF HIGH-RISK MEDICATION: ICD-10-CM

## 2018-06-07 DIAGNOSIS — M05.9 RHEUMATOID ARTHRITIS WITH POSITIVE RHEUMATOID FACTOR, INVOLVING UNSPECIFIED SITE (HCC): ICD-10-CM

## 2018-06-07 NOTE — TELEPHONE ENCOUNTER
Was released from the hospital due to urinary clot retention and had a cystoscopy with dilation of stricture.  He suffered a complication with a perforated uretha.   He was jsut discharged.  WIll have catheter removed tomorrow.      Needs xelsource prescription and need labs

## 2018-06-07 NOTE — TELEPHONE ENCOUNTER
Pt got released from hospital wants to know when or if its ok to start Xeljanz again    Please advice

## 2018-06-13 ENCOUNTER — OFFICE VISIT (OUTPATIENT)
Dept: MEDICAL GROUP | Facility: CLINIC | Age: 63
End: 2018-06-13
Payer: MEDICARE

## 2018-06-13 VITALS
SYSTOLIC BLOOD PRESSURE: 118 MMHG | WEIGHT: 202 LBS | HEIGHT: 70 IN | BODY MASS INDEX: 28.92 KG/M2 | OXYGEN SATURATION: 98 % | HEART RATE: 70 BPM | RESPIRATION RATE: 12 BRPM | DIASTOLIC BLOOD PRESSURE: 68 MMHG | TEMPERATURE: 98.4 F

## 2018-06-13 DIAGNOSIS — Z79.899 ENCOUNTER FOR LONG-TERM (CURRENT) USE OF HIGH-RISK MEDICATION: ICD-10-CM

## 2018-06-13 DIAGNOSIS — M05.9 RHEUMATOID ARTHRITIS WITH POSITIVE RHEUMATOID FACTOR, INVOLVING UNSPECIFIED SITE (HCC): ICD-10-CM

## 2018-06-13 DIAGNOSIS — N30.00 ACUTE CYSTITIS WITHOUT HEMATURIA: ICD-10-CM

## 2018-06-13 PROCEDURE — 99214 OFFICE O/P EST MOD 30 MIN: CPT | Performed by: INTERNAL MEDICINE

## 2018-06-13 RX ORDER — OXYCODONE AND ACETAMINOPHEN 10; 325 MG/1; MG/1
1 TABLET ORAL
Qty: 150 TAB | Refills: 0 | Status: SHIPPED | OUTPATIENT
Start: 2018-07-11 | End: 2018-06-13 | Stop reason: SDUPTHER

## 2018-06-13 RX ORDER — OXYCODONE AND ACETAMINOPHEN 10; 325 MG/1; MG/1
1 TABLET ORAL
Qty: 150 TAB | Refills: 0 | Status: ON HOLD | OUTPATIENT
Start: 2018-09-09 | End: 2018-08-03

## 2018-06-13 RX ORDER — OXYCODONE AND ACETAMINOPHEN 10; 325 MG/1; MG/1
1 TABLET ORAL
Qty: 150 TAB | Refills: 0 | Status: SHIPPED | OUTPATIENT
Start: 2018-08-10 | End: 2018-06-13 | Stop reason: SDUPTHER

## 2018-06-13 NOTE — PROGRESS NOTES
CC: Edgardo Sims is a 62 y.o. male is suffering from   Chief Complaint   Patient presents with   • Follow-Up     3 months         SUBJECTIVE:  1. Acute cystitis without hematuria  Edgardo is here for follow-up after being hospitalized because of a urinary tract infection.  He appears to be doing better continues on antibiotics is being followed by urology.    2. Rheumatoid arthritis with positive rheumatoid factor, involving unspecified site (HCC)  Patient on disease modifying medication prior to hospitalization this was stopped.  Patient's DMARD has not been restarted.      3. Encounter for long-term (current) use of high-risk medication  Patient and I have discussed that he continues on Percocet for pain        Past social, family, history:   Social History   Substance Use Topics   • Smoking status: Never Smoker   • Smokeless tobacco: Never Used   • Alcohol use 3.0 oz/week     6 Cans of beer per week      Comment: sometimes         MEDICATIONS:    Current Outpatient Prescriptions:   •  [START ON 9/9/2018] oxyCODONE-acetaminophen (PERCOCET-10)  MG Tab, Take 1 Tab by mouth 5 Times a Day for 30 days., Disp: 150 Tab, Rfl: 0  •  bisoprolol (ZEBETA) 10 MG tablet, Take 10 mg by mouth every day., Disp: , Rfl:   •  pravastatin (PRAVACHOL) 40 MG tablet, Take 1 Tab by mouth every day., Disp: 30 Tab, Rfl: 11  •  fluticasone (FLONASE) 50 MCG/ACT nasal spray, Spray 1 Spray in nose as needed., Disp: , Rfl:   •  meloxicam (MOBIC) 15 MG tablet, TAKE 1 TABLET BY MOUTH EVERY DAY, Disp: 30 Tab, Rfl: 11  •  diazePAM (VALIUM) 5 MG Tab, Take 5 mg by mouth every 6 hours as needed for Anxiety or Sleep., Disp: , Rfl:   •  CALCIUM-VITAMIN D PO, Take 1 Tab by mouth 2 Times a Day., Disp: , Rfl:   •  ascorbic acid (ASCORBIC ACID) 500 MG Tab, Take 500 mg by mouth every day., Disp: , Rfl:   •  Garlic 1000 MG Cap, Take 1 Cap by mouth every day., Disp: , Rfl:   •  vitamin e (VITAMIN E) 400 UNIT Cap, Take 400 Units by mouth every  day., Disp: , Rfl:   •  lisinopril (PRINIVIL) 10 MG Tab, TAKE ONE TABLET BY MOUTH ONCE DAILY, Disp: 90 Tab, Rfl: 2  •  cyclobenzaprine (FLEXERIL) 10 MG Tab, TAKE ONE TABLET BY MOUTH THREE TIMES DAILY AS NEEDED FOR MILD PAIN, Disp: 90 Tab, Rfl: 5  •  Cyanocobalamin (VITAMIN B-12) 5000 MCG TABLET DISPERSIBLE, Take 1 Tab by mouth every day., Disp: , Rfl:   •  niacin 500 MG Tab, Take 500 mg by mouth every day., Disp: , Rfl:   •  Zinc 50 MG Cap, Take 50 mg by mouth every day., Disp: , Rfl:   •  Multiple Vitamins-Minerals (MULTI COMPLETE PO), Take 1 Tab by mouth every day., Disp: , Rfl:   •  omeprazole (PRILOSEC) 20 MG CPDR, Take 20 mg by mouth every day., Disp: , Rfl:   •  Tofacitinib Citrate (XELJANZ) 5 MG Tab, Take 5 mg by mouth 2 Times a Day., Disp: 60 Tab, Rfl: 0  •  PARoxetine (PAXIL) 20 MG Tab, TAKE 1 TABLET BY MOUTH EVERY DAY, Disp: 90 Tab, Rfl: 0  •  predniSONE (DELTASONE) 1 MG Tab, TAKE 2 TABLETS BY MOUTH EVERY DAY, Disp: 60 Tab, Rfl: 2  •  ciprofloxacin (CIPRO) 500 MG Tab, Take 1 Tab by mouth every 12 hours., Disp: 20 Tab, Rfl: 0  •  vardenafil (LEVITRA) 20 MG tablet, Take 20 mg by mouth as needed., Disp: , Rfl:     PROBLEMS:  Patient Active Problem List    Diagnosis Date Noted   • Acute pyelonephritis 04/30/2018     Priority: High   • Bladder outlet obstruction 05/01/2018   • Leukocytosis 04/30/2018   • Lactic acidosis 04/30/2018   • Idiopathic acute pancreatitis 04/30/2018   • Chronic use of opiate drugs therapeutic purposes 08/12/2017   • Elevated CPK 09/23/2016   • Depression 07/13/2015   • Anxiety 03/31/2015   • Coronary artery disease due to calcified coronary lesion: Mildly cardiac catheterization in 2014 12/05/2014   • Tachycardia 10/20/2014   • Abnormal myocardial perfusion study 01/15/2014   • Osteoarthrosis, unspecified whether generalized or localized, pelvic region and thigh 05/31/2013   • Dyslipidemia 07/12/2011   • Aortic insufficiency 07/05/2011   • Essential hypertension 07/05/2011   •  "Rheumatoid arthritis (HCC) 05/05/2009   • Hypogonadism male 05/05/2009       REVIEW OF SYSTEMS:  Gen.:  No Nausea, Vomiting, fever, Chills.  Heart: No chest pain.  Lungs:  No shortness of Breath.  Psychological: Kian unusual Anxiety depression     PHYSICAL EXAM   Constitutional: Alert, cooperative, not in acute distress.  Cardiovascular:  Rate Rhythm is regular without murmurs rubs clicks.     Thorax & Lungs: Clear to auscultation, no wheezing, rhonchi, or rales  HENT: Normocephalic, Atraumatic.  Eyes: PERRLA, EOMI, Conjunctiva normal.   Neck: Trachia is midline no swelling of the thyroid.   neurologic: Alert & oriented x 3, cranial nerves II through XII are intact, Normal motor function, Normal sensory function, No focal deficits noted.   Psychiatric: Affect normal, Judgment normal, Mood normal.     VITAL SIGNS:/68   Pulse 70   Temp 36.9 °C (98.4 °F)   Resp 12   Ht 1.778 m (5' 10\")   Wt 91.6 kg (202 lb)   SpO2 98%   BMI 28.98 kg/m²     Labs: Reviewed    Assessment:                                                     Plan:    1. Acute cystitis without hematuria  Recheck urinalysis 1 week after completing antibiotics  - URINALYSIS,CULTURE IF INDICATED; Future    2. Rheumatoid arthritis with positive rheumatoid factor, involving unspecified site ([facility])  Clinically stable continue on oxycodone  - oxyCODONE-acetaminophen (PERCOCET-10)  MG Tab; Take 1 Tab by mouth 5 Times a Day for 30 days.  Dispense: 150 Tab; Refill: 0  - CONTROLLED SUBSTANCE TREATMENT AGREEMENT    3. Encounter for long-term (current) use of high-risk medication  State drug task force urine drug screen all reviewed  - oxyCODONE-acetaminophen (PERCOCET-10)  MG Tab; Take 1 Tab by mouth 5 Times a Day for 30 days.  Dispense: 150 Tab; Refill: 0  - CONTROLLED SUBSTANCE TREATMENT AGREEMENT        "

## 2018-06-20 ENCOUNTER — PATIENT MESSAGE (OUTPATIENT)
Dept: MEDICAL GROUP | Facility: CLINIC | Age: 63
End: 2018-06-20

## 2018-06-20 DIAGNOSIS — E29.1 HYPOGONADISM MALE: ICD-10-CM

## 2018-06-20 RX ORDER — TESTOSTERONE CYPIONATE 200 MG/ML
200 INJECTION, SOLUTION INTRAMUSCULAR
Qty: 10 ML | Refills: 1 | Status: SHIPPED | OUTPATIENT
Start: 2018-06-20 | End: 2018-07-26

## 2018-07-09 ENCOUNTER — PATIENT OUTREACH (OUTPATIENT)
Dept: HEALTH INFORMATION MANAGEMENT | Facility: OTHER | Age: 63
End: 2018-07-09

## 2018-07-09 NOTE — PROGRESS NOTES
Patient Edgardo Sims admitted for urinary clot retention on 5/16/18, and was discharged on 5/24/18. [facility] Patient Advocate assisted with discharge orders including one urology appointment with Dr. Romo and one PCP appointment with Dr. Lao. Patient is scheduled for a rheumatology appointment on 8/9/18, a urology appointment on 8/22/18, a PCP appointment on 9/13/18, and a cardiology appointment with Dr. Zabala on 9/19/18. PPS score is 90%.

## 2018-07-10 ENCOUNTER — TELEPHONE (OUTPATIENT)
Dept: MEDICAL GROUP | Facility: CLINIC | Age: 63
End: 2018-07-10

## 2018-07-10 NOTE — TELEPHONE ENCOUNTER
1. Caller Name: Ray                                         Call Back Number: [phone]      Patient approves a detailed voicemail message: N\A    Ray is having a left knee arthroscopy with Dr Michael Rodriguez.  He is needing a pre op clearance please.

## 2018-07-11 NOTE — TELEPHONE ENCOUNTER
Telephone call returned, advised the patient stop Xeljanz and testosterone 2 weeks before his surgery. I've explained to testosterone theoretically increases his risk of clotting, and with lower extremities surgery this increases this significantly. Patient to have an appointment in our office as soon as possible.

## 2018-07-12 ENCOUNTER — OFFICE VISIT (OUTPATIENT)
Dept: MEDICAL GROUP | Facility: CLINIC | Age: 63
End: 2018-07-12
Payer: MEDICARE

## 2018-07-12 VITALS
TEMPERATURE: 99 F | WEIGHT: 202 LBS | SYSTOLIC BLOOD PRESSURE: 120 MMHG | DIASTOLIC BLOOD PRESSURE: 74 MMHG | OXYGEN SATURATION: 97 % | HEART RATE: 100 BPM | BODY MASS INDEX: 28.92 KG/M2 | RESPIRATION RATE: 12 BRPM | HEIGHT: 70 IN

## 2018-07-12 DIAGNOSIS — M25.562 CHRONIC PAIN OF LEFT KNEE: ICD-10-CM

## 2018-07-12 DIAGNOSIS — G89.29 CHRONIC PAIN OF LEFT KNEE: ICD-10-CM

## 2018-07-12 PROCEDURE — 93000 ELECTROCARDIOGRAM COMPLETE: CPT | Performed by: INTERNAL MEDICINE

## 2018-07-12 PROCEDURE — 99213 OFFICE O/P EST LOW 20 MIN: CPT | Mod: 25 | Performed by: INTERNAL MEDICINE

## 2018-07-12 NOTE — LETTER
July 12, 2018        Patient: Edgardo Sims   YOB: 1955   Date of Visit: 7/12/2018     Dr. Michael Rodriguez    Dear Dr. Rodriguez:    It is my opinion that Edgardo Sims is medically cleared for left knee surgery August 2, 2018.    If you have any questions or concerns, please don't hesitate to call.        Sincerely,          John Lao D.O.  Board Certified General Internal Medicine.     [address] [id number] [phone] (Phone)  [phone] (Fax)

## 2018-07-12 NOTE — PROGRESS NOTES
CC: Edgardo Sims is a 62 y.o. male is suffering from   Chief Complaint   Patient presents with   • Pre-op Exam     left artificial knee problem         SUBJECTIVE:  1. Chronic pain of left knee  Edgardo is here for follow-up, has a history of a malfunctioning left prosthetic knee joint. Patient states these been evaluated by Dr. Michael Rodriguez who has recommended that he undergo a spacer replacement if possible if not a complete redo of his left total knee.    Patient denies any history of any chest pain or tightness, no history of myocardial infarction, has no history of strokes or seizures or renal failure. Patient is not a diabetic.  Edgardo does suffer from rheumatoid arthritis        Past social, family, history:   Social History   Substance Use Topics   • Smoking status: Never Smoker   • Smokeless tobacco: Never Used   • Alcohol use 3.0 oz/week     6 Cans of beer per week      Comment: sometimes         MEDICATIONS:    Current Outpatient Prescriptions:   •  bisoprolol (ZEBETA) 10 MG tablet, Take 10 mg by mouth every day., Disp: , Rfl:   •  pravastatin (PRAVACHOL) 40 MG tablet, Take 1 Tab by mouth every day., Disp: 30 Tab, Rfl: 11  •  fluticasone (FLONASE) 50 MCG/ACT nasal spray, Spray 1 Spray in nose as needed., Disp: , Rfl:   •  meloxicam (MOBIC) 15 MG tablet, TAKE 1 TABLET BY MOUTH EVERY DAY, Disp: 30 Tab, Rfl: 11  •  diazePAM (VALIUM) 5 MG Tab, Take 5 mg by mouth every 6 hours as needed for Anxiety or Sleep., Disp: , Rfl:   •  CALCIUM-VITAMIN D PO, Take 1 Tab by mouth 2 Times a Day., Disp: , Rfl:   •  ascorbic acid (ASCORBIC ACID) 500 MG Tab, Take 500 mg by mouth every day., Disp: , Rfl:   •  Garlic 1000 MG Cap, Take 1 Cap by mouth every day., Disp: , Rfl:   •  vitamin e (VITAMIN E) 400 UNIT Cap, Take 400 Units by mouth every day., Disp: , Rfl:   •  lisinopril (PRINIVIL) 10 MG Tab, TAKE ONE TABLET BY MOUTH ONCE DAILY, Disp: 90 Tab, Rfl: 2  •  cyclobenzaprine (FLEXERIL) 10 MG Tab, TAKE ONE TABLET BY  MOUTH THREE TIMES DAILY AS NEEDED FOR MILD PAIN, Disp: 90 Tab, Rfl: 5  •  Cyanocobalamin (VITAMIN B-12) 5000 MCG TABLET DISPERSIBLE, Take 1 Tab by mouth every day., Disp: , Rfl:   •  niacin 500 MG Tab, Take 500 mg by mouth every day., Disp: , Rfl:   •  Zinc 50 MG Cap, Take 50 mg by mouth every day., Disp: , Rfl:   •  Multiple Vitamins-Minerals (MULTI COMPLETE PO), Take 1 Tab by mouth every day., Disp: , Rfl:   •  omeprazole (PRILOSEC) 20 MG CPDR, Take 20 mg by mouth every day., Disp: , Rfl:   •  testosterone cypionate (DEPO-TESTOSTERONE) 200 MG/ML Solution injection, 1 mL by Intramuscular route every 14 days for 130 days., Disp: 10 mL, Rfl: 1  •  [START ON 9/9/2018] oxyCODONE-acetaminophen (PERCOCET-10)  MG Tab, Take 1 Tab by mouth 5 Times a Day for 30 days., Disp: 150 Tab, Rfl: 0  •  Tofacitinib Citrate (XELJANZ) 5 MG Tab, Take 5 mg by mouth 2 Times a Day., Disp: 60 Tab, Rfl: 0  •  PARoxetine (PAXIL) 20 MG Tab, TAKE 1 TABLET BY MOUTH EVERY DAY, Disp: 90 Tab, Rfl: 0  •  predniSONE (DELTASONE) 1 MG Tab, TAKE 2 TABLETS BY MOUTH EVERY DAY, Disp: 60 Tab, Rfl: 2  •  ciprofloxacin (CIPRO) 500 MG Tab, Take 1 Tab by mouth every 12 hours., Disp: 20 Tab, Rfl: 0  •  vardenafil (LEVITRA) 20 MG tablet, Take 20 mg by mouth as needed., Disp: , Rfl:     PROBLEMS:  Patient Active Problem List    Diagnosis Date Noted   • Acute pyelonephritis 04/30/2018     Priority: High   • Bladder outlet obstruction 05/01/2018   • Leukocytosis 04/30/2018   • Lactic acidosis 04/30/2018   • Idiopathic acute pancreatitis 04/30/2018   • Chronic use of opiate drugs therapeutic purposes 08/12/2017   • Elevated CPK 09/23/2016   • Depression 07/13/2015   • Anxiety 03/31/2015   • Coronary artery disease due to calcified coronary lesion: Mildly cardiac catheterization in 2014 12/05/2014   • Tachycardia 10/20/2014   • Abnormal myocardial perfusion study 01/15/2014   • Osteoarthrosis, unspecified whether generalized or localized, pelvic region and thigh  "05/31/2013   • Dyslipidemia 07/12/2011   • Aortic insufficiency 07/05/2011   • Essential hypertension 07/05/2011   • Rheumatoid arthritis (HCC) 05/05/2009   • Hypogonadism male 05/05/2009       REVIEW OF SYSTEMS:  Gen.:  No Nausea, Vomiting, fever, Chills.  Heart: No chest pain.  Lungs:  No shortness of Breath.  Psychological: Kian unusual Anxiety depression     PHYSICAL EXAM   Constitutional: Alert, cooperative, not in acute distress.  Cardiovascular:  Rate Rhythm is regular without murmurs rubs clicks.     Thorax & Lungs: Clear to auscultation, no wheezing, rhonchi, or rales  HENT: Normocephalic, Atraumatic.  Eyes: PERRLA, EOMI, Conjunctiva normal.   Neck: Trachia is midline no swelling of the thyroid.   Lymphatic: No lymphadenopathy noted.   Musculoskeletal: Complaints of malfunctioning left knee prosthesis with crepitance  Neurologic: Alert & oriented x 3, cranial nerves II through XII are intact, Normal motor function, Normal sensory function, No focal deficits noted.   Psychiatric: Affect normal, Judgment normal, Mood normal.     VITAL SIGNS:/74   Pulse 100   Temp 37.2 °C (99 °F)   Resp 12   Ht 1.778 m (5' 10\")   Wt 91.6 kg (202 lb)   SpO2 97%   BMI 28.98 kg/m²     Labs: Reviewed    Assessment:                                                     Plan:    1. Chronic pain of left knee  Left knee total joint malfunction, need spacer replacement. States that his knee was originally placed in 2009. Surgical clearance was dictated today and faxed to Dr. Rodriguez office.        "

## 2018-07-26 ENCOUNTER — HOSPITAL ENCOUNTER (OUTPATIENT)
Dept: RADIOLOGY | Facility: MEDICAL CENTER | Age: 63
DRG: 465 | End: 2018-07-26
Attending: ORTHOPAEDIC SURGERY
Payer: MEDICARE

## 2018-07-26 DIAGNOSIS — Z01.812 PRE-OPERATIVE LABORATORY EXAMINATION: ICD-10-CM

## 2018-07-26 DIAGNOSIS — Z01.818 PREOP EXAMINATION: ICD-10-CM

## 2018-07-26 LAB
ABO GROUP BLD: NORMAL
ANION GAP SERPL CALC-SCNC: 7 MMOL/L (ref 0–11.9)
APTT PPP: 26.2 SEC (ref 24.7–36)
BUN SERPL-MCNC: 13 MG/DL (ref 8–22)
CALCIUM SERPL-MCNC: 9.3 MG/DL (ref 8.5–10.5)
CHLORIDE SERPL-SCNC: 101 MMOL/L (ref 96–112)
CO2 SERPL-SCNC: 27 MMOL/L (ref 20–33)
CREAT SERPL-MCNC: 1.06 MG/DL (ref 0.5–1.4)
ERYTHROCYTE [DISTWIDTH] IN BLOOD BY AUTOMATED COUNT: 55.4 FL (ref 35.9–50)
GLUCOSE SERPL-MCNC: 75 MG/DL (ref 65–99)
HCT VFR BLD AUTO: 42.6 % (ref 42–52)
HGB BLD-MCNC: 13.7 G/DL (ref 14–18)
INR PPP: 0.95 (ref 0.87–1.13)
MCH RBC QN AUTO: 29.3 PG (ref 27–33)
MCHC RBC AUTO-ENTMCNC: 32.2 G/DL (ref 33.7–35.3)
MCV RBC AUTO: 91.2 FL (ref 81.4–97.8)
PLATELET # BLD AUTO: 242 K/UL (ref 164–446)
PMV BLD AUTO: 10.3 FL (ref 9–12.9)
POTASSIUM SERPL-SCNC: 4.8 MMOL/L (ref 3.6–5.5)
PROTHROMBIN TIME: 12.4 SEC (ref 12–14.6)
RBC # BLD AUTO: 4.67 M/UL (ref 4.7–6.1)
RH BLD: NORMAL
SCCMEC + MECA PNL NOSE NAA+PROBE: NEGATIVE
SCCMEC + MECA PNL NOSE NAA+PROBE: NEGATIVE
SODIUM SERPL-SCNC: 135 MMOL/L (ref 135–145)
WBC # BLD AUTO: 9.4 K/UL (ref 4.8–10.8)

## 2018-07-26 PROCEDURE — 85027 COMPLETE CBC AUTOMATED: CPT

## 2018-07-26 PROCEDURE — 87641 MR-STAPH DNA AMP PROBE: CPT

## 2018-07-26 PROCEDURE — 86900 BLOOD TYPING SEROLOGIC ABO: CPT

## 2018-07-26 PROCEDURE — 87640 STAPH A DNA AMP PROBE: CPT

## 2018-07-26 PROCEDURE — 80048 BASIC METABOLIC PNL TOTAL CA: CPT

## 2018-07-26 PROCEDURE — 85610 PROTHROMBIN TIME: CPT

## 2018-07-26 PROCEDURE — 86901 BLOOD TYPING SEROLOGIC RH(D): CPT

## 2018-07-26 PROCEDURE — 83036 HEMOGLOBIN GLYCOSYLATED A1C: CPT

## 2018-07-26 PROCEDURE — 36415 COLL VENOUS BLD VENIPUNCTURE: CPT

## 2018-07-26 PROCEDURE — 72040 X-RAY EXAM NECK SPINE 2-3 VW: CPT

## 2018-07-26 PROCEDURE — 85730 THROMBOPLASTIN TIME PARTIAL: CPT

## 2018-07-26 RX ORDER — VARDENAFIL HYDROCHLORIDE 20 MG/1
20 TABLET ORAL PRN
COMMUNITY
End: 2018-07-26

## 2018-07-26 NOTE — DISCHARGE PLANNING
DISCHARGE PLANNING NOTE - TOTAL JOINT     Procedure: Procedure(s):  KNEE REVISION TOTAL  Procedure Date: 8/3/2018  Insurance:  Payor: MEDICARE / Plan: MEDICARE PART A & B / Product Type: *No Product type* /   Equipment currently available at home? front-wheel walker  Steps into the home? 0  Steps within the home? 2  Toilet height? Standard  Type of shower? walk-in shower  Who will be with you during your recovery? spouse  Is Outpatient Physical Therapy set up after surgery? Yes  Did you take the Total Joint Class and where? No     Plan: Prior left knee replacement in 2009

## 2018-07-26 NOTE — OR NURSING
Hx and meds reviewed, pre op instructions given. Pt aware may take the listed meds morning of surgery; zebeta, omeprazole, prednisone and oxycodone and valium if needed . Anesthesia fasting guidelines reviewed with pt. Total joint instructions given.

## 2018-07-27 LAB
EST. AVERAGE GLUCOSE BLD GHB EST-MCNC: 105 MG/DL
HBA1C MFR BLD: 5.3 % (ref 0–5.6)

## 2018-08-03 ENCOUNTER — HOSPITAL ENCOUNTER (INPATIENT)
Facility: MEDICAL CENTER | Age: 63
LOS: 1 days | DRG: 465 | End: 2018-08-04
Attending: ORTHOPAEDIC SURGERY | Admitting: ORTHOPAEDIC SURGERY
Payer: MEDICARE

## 2018-08-03 DIAGNOSIS — M05.9 RHEUMATOID ARTHRITIS WITH POSITIVE RHEUMATOID FACTOR, INVOLVING UNSPECIFIED SITE (HCC): ICD-10-CM

## 2018-08-03 DIAGNOSIS — F41.9 ANXIETY: ICD-10-CM

## 2018-08-03 DIAGNOSIS — Z79.899 ENCOUNTER FOR LONG-TERM (CURRENT) USE OF HIGH-RISK MEDICATION: ICD-10-CM

## 2018-08-03 DIAGNOSIS — T84.093D FAILED TOTAL KNEE, LEFT, SUBSEQUENT ENCOUNTER: ICD-10-CM

## 2018-08-03 PROCEDURE — A9270 NON-COVERED ITEM OR SERVICE: HCPCS | Performed by: ORTHOPAEDIC SURGERY

## 2018-08-03 PROCEDURE — 87205 SMEAR GRAM STAIN: CPT | Mod: 91

## 2018-08-03 PROCEDURE — 160035 HCHG PACU - 1ST 60 MINS PHASE I: Performed by: ORTHOPAEDIC SURGERY

## 2018-08-03 PROCEDURE — 770001 HCHG ROOM/CARE - MED/SURG/GYN PRIV*

## 2018-08-03 PROCEDURE — 87015 SPECIMEN INFECT AGNT CONCNTJ: CPT | Mod: 91

## 2018-08-03 PROCEDURE — 700111 HCHG RX REV CODE 636 W/ 250 OVERRIDE (IP)

## 2018-08-03 PROCEDURE — 700102 HCHG RX REV CODE 250 W/ 637 OVERRIDE(OP): Performed by: ORTHOPAEDIC SURGERY

## 2018-08-03 PROCEDURE — 160029 HCHG SURGERY MINUTES - 1ST 30 MINS LEVEL 4: Performed by: ORTHOPAEDIC SURGERY

## 2018-08-03 PROCEDURE — 502579 HCHG PACK, TOTAL KNEE: Performed by: ORTHOPAEDIC SURGERY

## 2018-08-03 PROCEDURE — 160009 HCHG ANES TIME/MIN: Performed by: ORTHOPAEDIC SURGERY

## 2018-08-03 PROCEDURE — 700101 HCHG RX REV CODE 250

## 2018-08-03 PROCEDURE — 700111 HCHG RX REV CODE 636 W/ 250 OVERRIDE (IP): Performed by: ORTHOPAEDIC SURGERY

## 2018-08-03 PROCEDURE — 94760 N-INVAS EAR/PLS OXIMETRY 1: CPT

## 2018-08-03 PROCEDURE — 500002 HCHG ADHESIVE, DERMABOND: Performed by: ORTHOPAEDIC SURGERY

## 2018-08-03 PROCEDURE — 160022 HCHG BLOCK: Performed by: ORTHOPAEDIC SURGERY

## 2018-08-03 PROCEDURE — 700112 HCHG RX REV CODE 229: Performed by: ORTHOPAEDIC SURGERY

## 2018-08-03 PROCEDURE — 700105 HCHG RX REV CODE 258

## 2018-08-03 PROCEDURE — 160048 HCHG OR STATISTICAL LEVEL 1-5: Performed by: ORTHOPAEDIC SURGERY

## 2018-08-03 PROCEDURE — 160041 HCHG SURGERY MINUTES - EA ADDL 1 MIN LEVEL 4: Performed by: ORTHOPAEDIC SURGERY

## 2018-08-03 PROCEDURE — A9270 NON-COVERED ITEM OR SERVICE: HCPCS

## 2018-08-03 PROCEDURE — 87070 CULTURE OTHR SPECIMN AEROBIC: CPT | Mod: 91

## 2018-08-03 PROCEDURE — 0SPD09Z REMOVAL OF LINER FROM LEFT KNEE JOINT, OPEN APPROACH: ICD-10-PCS | Performed by: ORTHOPAEDIC SURGERY

## 2018-08-03 PROCEDURE — 700105 HCHG RX REV CODE 258: Performed by: ORTHOPAEDIC SURGERY

## 2018-08-03 PROCEDURE — 87075 CULTR BACTERIA EXCEPT BLOOD: CPT | Mod: 91

## 2018-08-03 PROCEDURE — 0SUD09Z SUPPLEMENT LEFT KNEE JOINT WITH LINER, OPEN APPROACH: ICD-10-PCS | Performed by: ORTHOPAEDIC SURGERY

## 2018-08-03 PROCEDURE — 160002 HCHG RECOVERY MINUTES (STAT): Performed by: ORTHOPAEDIC SURGERY

## 2018-08-03 PROCEDURE — 0SBD0ZZ EXCISION OF LEFT KNEE JOINT, OPEN APPROACH: ICD-10-PCS | Performed by: ORTHOPAEDIC SURGERY

## 2018-08-03 PROCEDURE — 501838 HCHG SUTURE GENERAL: Performed by: ORTHOPAEDIC SURGERY

## 2018-08-03 PROCEDURE — 700102 HCHG RX REV CODE 250 W/ 637 OVERRIDE(OP)

## 2018-08-03 PROCEDURE — 502000 HCHG MISC OR IMPLANTS RC 0278: Performed by: ORTHOPAEDIC SURGERY

## 2018-08-03 DEVICE — IMPLANTABLE DEVICE: Type: IMPLANTABLE DEVICE | Site: KNEE | Status: FUNCTIONAL

## 2018-08-03 RX ORDER — AMOXICILLIN 250 MG
1 CAPSULE ORAL NIGHTLY
Status: DISCONTINUED | OUTPATIENT
Start: 2018-08-03 | End: 2018-08-04 | Stop reason: HOSPADM

## 2018-08-03 RX ORDER — GABAPENTIN 300 MG/1
CAPSULE ORAL
Status: COMPLETED
Start: 2018-08-03 | End: 2018-08-03

## 2018-08-03 RX ORDER — ROPIVACAINE HYDROCHLORIDE 5 MG/ML
INJECTION, SOLUTION EPIDURAL; INFILTRATION; PERINEURAL
Status: DISCONTINUED | OUTPATIENT
Start: 2018-08-03 | End: 2018-08-03 | Stop reason: HOSPADM

## 2018-08-03 RX ORDER — CELECOXIB 200 MG/1
200 CAPSULE ORAL 2 TIMES DAILY WITH MEALS
Status: DISCONTINUED | OUTPATIENT
Start: 2018-08-05 | End: 2018-08-04 | Stop reason: HOSPADM

## 2018-08-03 RX ORDER — DEXAMETHASONE SODIUM PHOSPHATE 4 MG/ML
4 INJECTION, SOLUTION INTRA-ARTICULAR; INTRALESIONAL; INTRAMUSCULAR; INTRAVENOUS; SOFT TISSUE
Status: DISCONTINUED | OUTPATIENT
Start: 2018-08-03 | End: 2018-08-04 | Stop reason: HOSPADM

## 2018-08-03 RX ORDER — PAROXETINE HYDROCHLORIDE 20 MG/1
20 TABLET, FILM COATED ORAL DAILY
Status: DISCONTINUED | OUTPATIENT
Start: 2018-08-04 | End: 2018-08-04 | Stop reason: HOSPADM

## 2018-08-03 RX ORDER — KETOROLAC TROMETHAMINE 30 MG/ML
30 INJECTION, SOLUTION INTRAMUSCULAR; INTRAVENOUS EVERY 6 HOURS
Status: DISCONTINUED | OUTPATIENT
Start: 2018-08-03 | End: 2018-08-04 | Stop reason: HOSPADM

## 2018-08-03 RX ORDER — DIPHENHYDRAMINE HYDROCHLORIDE 50 MG/ML
25 INJECTION INTRAMUSCULAR; INTRAVENOUS EVERY 6 HOURS PRN
Status: DISCONTINUED | OUTPATIENT
Start: 2018-08-03 | End: 2018-08-04 | Stop reason: HOSPADM

## 2018-08-03 RX ORDER — HYDROMORPHONE HYDROCHLORIDE 2 MG/ML
0.5 INJECTION, SOLUTION INTRAMUSCULAR; INTRAVENOUS; SUBCUTANEOUS
Status: DISCONTINUED | OUTPATIENT
Start: 2018-08-03 | End: 2018-08-04 | Stop reason: HOSPADM

## 2018-08-03 RX ORDER — OMEPRAZOLE 20 MG/1
20 CAPSULE, DELAYED RELEASE ORAL DAILY
Status: DISCONTINUED | OUTPATIENT
Start: 2018-08-04 | End: 2018-08-04 | Stop reason: HOSPADM

## 2018-08-03 RX ORDER — OXYCODONE HYDROCHLORIDE 5 MG/1
5 TABLET ORAL
Status: DISCONTINUED | OUTPATIENT
Start: 2018-08-03 | End: 2018-08-04 | Stop reason: HOSPADM

## 2018-08-03 RX ORDER — KETOROLAC TROMETHAMINE 30 MG/ML
INJECTION, SOLUTION INTRAMUSCULAR; INTRAVENOUS
Status: COMPLETED
Start: 2018-08-03 | End: 2018-08-03

## 2018-08-03 RX ORDER — SODIUM CHLORIDE, SODIUM LACTATE, POTASSIUM CHLORIDE, CALCIUM CHLORIDE 600; 310; 30; 20 MG/100ML; MG/100ML; MG/100ML; MG/100ML
INJECTION, SOLUTION INTRAVENOUS CONTINUOUS
Status: DISCONTINUED | OUTPATIENT
Start: 2018-08-03 | End: 2018-08-04 | Stop reason: HOSPADM

## 2018-08-03 RX ORDER — ACETAMINOPHEN 500 MG
TABLET ORAL
Status: COMPLETED
Start: 2018-08-03 | End: 2018-08-03

## 2018-08-03 RX ORDER — ENEMA 19; 7 G/133ML; G/133ML
1 ENEMA RECTAL
Status: DISCONTINUED | OUTPATIENT
Start: 2018-08-03 | End: 2018-08-04 | Stop reason: HOSPADM

## 2018-08-03 RX ORDER — SODIUM CHLORIDE, SODIUM LACTATE, POTASSIUM CHLORIDE, CALCIUM CHLORIDE 600; 310; 30; 20 MG/100ML; MG/100ML; MG/100ML; MG/100ML
1000 INJECTION, SOLUTION INTRAVENOUS
Status: DISCONTINUED | OUTPATIENT
Start: 2018-08-03 | End: 2018-08-03

## 2018-08-03 RX ORDER — OXYCODONE AND ACETAMINOPHEN 10; 325 MG/1; MG/1
1-2 TABLET ORAL EVERY 4 HOURS PRN
Qty: 60 TAB | Refills: 0 | Status: SHIPPED | OUTPATIENT
Start: 2018-08-03 | End: 2018-08-08

## 2018-08-03 RX ORDER — TRAMADOL HYDROCHLORIDE 50 MG/1
50-100 TABLET ORAL EVERY 4 HOURS PRN
Qty: 80 TAB | Refills: 0 | Status: SHIPPED | OUTPATIENT
Start: 2018-08-03 | End: 2018-08-13

## 2018-08-03 RX ORDER — OXYCODONE HYDROCHLORIDE 10 MG/1
10 TABLET ORAL
Status: DISCONTINUED | OUTPATIENT
Start: 2018-08-03 | End: 2018-08-04 | Stop reason: HOSPADM

## 2018-08-03 RX ORDER — BISACODYL 10 MG
10 SUPPOSITORY, RECTAL RECTAL
Status: DISCONTINUED | OUTPATIENT
Start: 2018-08-03 | End: 2018-08-04 | Stop reason: HOSPADM

## 2018-08-03 RX ORDER — AMOXICILLIN 250 MG
1 CAPSULE ORAL
Status: DISCONTINUED | OUTPATIENT
Start: 2018-08-03 | End: 2018-08-04 | Stop reason: HOSPADM

## 2018-08-03 RX ORDER — ACETAMINOPHEN 500 MG
1000 TABLET ORAL EVERY 6 HOURS
Status: DISCONTINUED | OUTPATIENT
Start: 2018-08-03 | End: 2018-08-04 | Stop reason: HOSPADM

## 2018-08-03 RX ORDER — TRAMADOL HYDROCHLORIDE 50 MG/1
50-100 TABLET ORAL EVERY 4 HOURS PRN
Qty: 80 TAB | Refills: 0 | Status: SHIPPED | OUTPATIENT
Start: 2018-08-03 | End: 2018-08-03

## 2018-08-03 RX ORDER — POLYETHYLENE GLYCOL 3350 17 G/17G
1 POWDER, FOR SOLUTION ORAL 2 TIMES DAILY PRN
Status: DISCONTINUED | OUTPATIENT
Start: 2018-08-03 | End: 2018-08-04 | Stop reason: HOSPADM

## 2018-08-03 RX ORDER — HALOPERIDOL 5 MG/ML
1 INJECTION INTRAMUSCULAR EVERY 6 HOURS PRN
Status: DISCONTINUED | OUTPATIENT
Start: 2018-08-03 | End: 2018-08-04 | Stop reason: HOSPADM

## 2018-08-03 RX ORDER — ONDANSETRON 2 MG/ML
4 INJECTION INTRAMUSCULAR; INTRAVENOUS EVERY 4 HOURS PRN
Status: DISCONTINUED | OUTPATIENT
Start: 2018-08-03 | End: 2018-08-04 | Stop reason: HOSPADM

## 2018-08-03 RX ORDER — TRAMADOL HYDROCHLORIDE 50 MG/1
50 TABLET ORAL EVERY 4 HOURS PRN
Status: DISCONTINUED | OUTPATIENT
Start: 2018-08-03 | End: 2018-08-04 | Stop reason: HOSPADM

## 2018-08-03 RX ORDER — MELOXICAM 7.5 MG/1
7.5 TABLET ORAL DAILY
Qty: 30 TAB | Refills: 0 | Status: SHIPPED | OUTPATIENT
Start: 2018-08-03 | End: 2018-08-09

## 2018-08-03 RX ORDER — KETOROLAC TROMETHAMINE 30 MG/ML
INJECTION, SOLUTION INTRAMUSCULAR; INTRAVENOUS
Status: DISCONTINUED | OUTPATIENT
Start: 2018-08-03 | End: 2018-08-03 | Stop reason: HOSPADM

## 2018-08-03 RX ORDER — DIAZEPAM 5 MG/1
5 TABLET ORAL EVERY 6 HOURS PRN
Status: DISCONTINUED | OUTPATIENT
Start: 2018-08-03 | End: 2018-08-04 | Stop reason: HOSPADM

## 2018-08-03 RX ORDER — CELECOXIB 200 MG/1
CAPSULE ORAL
Status: COMPLETED
Start: 2018-08-03 | End: 2018-08-03

## 2018-08-03 RX ORDER — CYCLOBENZAPRINE HCL 10 MG
10 TABLET ORAL 3 TIMES DAILY PRN
Status: DISCONTINUED | OUTPATIENT
Start: 2018-08-03 | End: 2018-08-04 | Stop reason: HOSPADM

## 2018-08-03 RX ORDER — SCOLOPAMINE TRANSDERMAL SYSTEM 1 MG/1
1 PATCH, EXTENDED RELEASE TRANSDERMAL
Status: DISCONTINUED | OUTPATIENT
Start: 2018-08-03 | End: 2018-08-04 | Stop reason: HOSPADM

## 2018-08-03 RX ORDER — PRAVASTATIN SODIUM 20 MG
40 TABLET ORAL
Status: DISCONTINUED | OUTPATIENT
Start: 2018-08-03 | End: 2018-08-04 | Stop reason: HOSPADM

## 2018-08-03 RX ORDER — DOCUSATE SODIUM 100 MG/1
100 CAPSULE, LIQUID FILLED ORAL 2 TIMES DAILY
Status: DISCONTINUED | OUTPATIENT
Start: 2018-08-03 | End: 2018-08-04 | Stop reason: HOSPADM

## 2018-08-03 RX ORDER — DEXAMETHASONE SODIUM PHOSPHATE 4 MG/ML
10 INJECTION, SOLUTION INTRA-ARTICULAR; INTRALESIONAL; INTRAMUSCULAR; INTRAVENOUS; SOFT TISSUE ONCE
Status: COMPLETED | OUTPATIENT
Start: 2018-08-04 | End: 2018-08-04

## 2018-08-03 RX ORDER — LISINOPRIL 5 MG/1
10 TABLET ORAL
Status: DISCONTINUED | OUTPATIENT
Start: 2018-08-03 | End: 2018-08-04 | Stop reason: HOSPADM

## 2018-08-03 RX ORDER — PREDNISONE 1 MG/1
1 TABLET ORAL DAILY
Status: DISCONTINUED | OUTPATIENT
Start: 2018-08-03 | End: 2018-08-04 | Stop reason: HOSPADM

## 2018-08-03 RX ORDER — BISOPROLOL FUMARATE 10 MG/1
10 TABLET, FILM COATED ORAL DAILY
Status: DISCONTINUED | OUTPATIENT
Start: 2018-08-04 | End: 2018-08-04 | Stop reason: HOSPADM

## 2018-08-03 RX ADMIN — PRAVASTATIN SODIUM 40 MG: 20 TABLET ORAL at 21:04

## 2018-08-03 RX ADMIN — ACETAMINOPHEN 1000 MG: 500 TABLET, COATED ORAL at 13:15

## 2018-08-03 RX ADMIN — DOCUSATE SODIUM AND SENNOSIDES 1 TABLET: 8.6; 5 TABLET, FILM COATED ORAL at 21:04

## 2018-08-03 RX ADMIN — HYDROCODONE BITARTRATE AND ACETAMINOPHEN 15 ML: 7.5; 325 SOLUTION ORAL at 16:14

## 2018-08-03 RX ADMIN — OXYCODONE HYDROCHLORIDE 10 MG: 10 TABLET ORAL at 17:57

## 2018-08-03 RX ADMIN — KETOROLAC TROMETHAMINE 30 MG: 30 INJECTION, SOLUTION INTRAMUSCULAR at 21:04

## 2018-08-03 RX ADMIN — LISINOPRIL 10 MG: 5 TABLET ORAL at 17:56

## 2018-08-03 RX ADMIN — FENTANYL CITRATE 25 MCG: 50 INJECTION, SOLUTION INTRAMUSCULAR; INTRAVENOUS at 16:15

## 2018-08-03 RX ADMIN — PREDNISONE 1 MG: 1 TABLET ORAL at 17:56

## 2018-08-03 RX ADMIN — CELECOXIB 200 MG: 200 CAPSULE ORAL at 13:15

## 2018-08-03 RX ADMIN — SODIUM CHLORIDE, POTASSIUM CHLORIDE, SODIUM LACTATE AND CALCIUM CHLORIDE: 600; 310; 30; 20 INJECTION, SOLUTION INTRAVENOUS at 17:56

## 2018-08-03 RX ADMIN — SODIUM CHLORIDE, SODIUM LACTATE, POTASSIUM CHLORIDE, CALCIUM CHLORIDE 1000 ML: 600; 310; 30; 20 INJECTION, SOLUTION INTRAVENOUS at 13:35

## 2018-08-03 RX ADMIN — SODIUM CHLORIDE 2 G: 9 INJECTION, SOLUTION INTRAVENOUS at 23:56

## 2018-08-03 RX ADMIN — DOCUSATE SODIUM 100 MG: 100 CAPSULE, LIQUID FILLED ORAL at 17:56

## 2018-08-03 RX ADMIN — GABAPENTIN 300 MG: 300 CAPSULE ORAL at 13:15

## 2018-08-03 RX ADMIN — KETOROLAC TROMETHAMINE 15 MG: 30 INJECTION, SOLUTION INTRAMUSCULAR at 16:15

## 2018-08-03 RX ADMIN — ACETAMINOPHEN 1000 MG: 500 TABLET, FILM COATED ORAL at 23:56

## 2018-08-03 RX ADMIN — ACETAMINOPHEN 1000 MG: 500 TABLET, FILM COATED ORAL at 17:57

## 2018-08-03 ASSESSMENT — LIFESTYLE VARIABLES
EVER HAD A DRINK FIRST THING IN THE MORNING TO STEADY YOUR NERVES TO GET RID OF A HANGOVER: NO
TOTAL SCORE: 0
TOTAL SCORE: 0
HAVE PEOPLE ANNOYED YOU BY CRITICIZING YOUR DRINKING: NO
ON A TYPICAL DAY WHEN YOU DRINK ALCOHOL HOW MANY DRINKS DO YOU HAVE: 1
ALCOHOL_USE: YES
HAVE YOU EVER FELT YOU SHOULD CUT DOWN ON YOUR DRINKING: NO
CONSUMPTION TOTAL: NEGATIVE
HOW MANY TIMES IN THE PAST YEAR HAVE YOU HAD 5 OR MORE DRINKS IN A DAY: 0
TOTAL SCORE: 0
EVER_SMOKED: NEVER
EVER FELT BAD OR GUILTY ABOUT YOUR DRINKING: NO
AVERAGE NUMBER OF DAYS PER WEEK YOU HAVE A DRINK CONTAINING ALCOHOL: 3
EVER_SMOKED: NEVER

## 2018-08-03 ASSESSMENT — PAIN SCALES - GENERAL
PAINLEVEL_OUTOF10: 5
PAINLEVEL_OUTOF10: 5
PAINLEVEL_OUTOF10: 4
PAINLEVEL_OUTOF10: 5
PAINLEVEL_OUTOF10: 7
PAINLEVEL_OUTOF10: 4

## 2018-08-03 ASSESSMENT — COGNITIVE AND FUNCTIONAL STATUS - GENERAL
SUGGESTED CMS G CODE MODIFIER DAILY ACTIVITY: CH
SUGGESTED CMS G CODE MODIFIER MOBILITY: CH
DAILY ACTIVITIY SCORE: 24
MOBILITY SCORE: 24

## 2018-08-03 NOTE — OR NURSING
1557 Arrived to PACU via bed awake and alert, resp unlabored and spontaneous, left knee dressing c/d/i and noted vac on and in place to suction. Pillow under left foot, pedal pulses strong bilaterally and cap refill q 3 sec return both feet. Pt stated has slight discomfort but tolerable in left knee at this time.   1610 pt c/o pain in left knee  1615 medicated pain hydrocodone oral 7.5 and fent 25 mcg iv and tordol 15 mg iv.   1630 pt states more comfortable, tolerating water without incident, awaiting a room at [facility] as pt meets criteria for dc pacu.   1649 To room

## 2018-08-03 NOTE — OR NURSING
1300 Pt. Allergies and NPO status verified, home medications reconciled. Belongings secured. Pt. Verbalizes understanding of pain scale, expected course of stay and plan of care. Surgical site verified with pt. Pt. And family given home care instructions for discharge planning. IV access established. Sequentials placed on legs.

## 2018-08-03 NOTE — OP REPORT
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 Michael Rodriguez MD  Assistant: Na Sanchez PA-C  Anesthesia: Dr. Chance.  General + Adductor canal  Estimated Blood Loss: 50cc  Drains: None  Complications: None  Tourniquet time: 23 minutes    Implants removed: [employer] NexGen size 5 x10mm CR poly  Implants placed: [employer] NexGen size 5 x14mm anterior lipped CR poly       Indications: He is a 62 year old male who had his left knee replaced by Dr. Chatterjee about 10 years ago.  For the past six months, he's had lateral knee popping and mechanical symptoms and some giving way of the left knee without any identifiable aggravating factor.  On exam, he had lateral knee laxity and some anterior translation with a small effusion and excellent range of motion.  He had a workup negative for infection.  Imaging showed significant wear of the lateral poly without any evidence of osteolysis or implant loosening.  He had significant pain and debility from this. We discussed the options and he was indicated for a revision knee replacement.  I told him we'd need to change at least the poly, but potentially additional components based on intraoperative findings.  We discussed the risks including bleeding, transfusion, pain, neurovascular injury, stiffness, fracture, infection, wound complication, loosening of the parts over time, blood clots, and medical complication.  He elected to proceed.       He was identified in the preoperative holding area and informed consent and site marking were confirmed.  An adductor canal block had been performed by the anesthesiologist.  He was then brought back to the OR where anesthesia was performed.  He was positioned supine with all bony prominences well padded with a padded tourniquet on the thigh.  The extremity was prepped and draped in the usual sterile fashion. I  performed a pre-procedure timeout to confirm we had the correct patient, side, site, procedure, and presence of necessary personal and equipment.  I confirmed that Ancef and TXA had been given.  The tourniquet was then inflated.    His midline incision was opened and a parapatellar arthrotomy performed.  There was a small clear yellow effusion present without any purulence or sign of infection.  There was some synovitis present consistent with poly wear.  A synovectomy was performed and tissue sent for culture.  The knee was flexed up and the poly removed.  The poly was well fixed within the locking mechanism.  The posterior lateral poly was essentially worn through with cracking and delamination of the poly.  The femoral and tibial and patellar implants were inspected closely.  They were well fixed and there was no evidence of significant metal on metal wear or mechanical complication that would compromise the locking mechanism.  The PCL was intact as were the collaterals.  Trial polys were inserted.  The knee was a bit lax on the lateral side.  The knee was flexed up and the medial and posterior medial soft tissues were released off the tibia.  This improved the balance of the gaps significantly.  A 14 mm trial poly gave excellent restoration of the gaps, came to full extension, and flexed fully with good AP stability.  The tourniquet was let down and hemostasis ensured.  The knee was irrigated and the real poly was inserted.  It fit solidly without any motion.  Stability and patellar tracking were excellent. The knee was then copiously irrigated. Periarticular tissues were injected with local cocktail.  A betadine soak was performed.  Vancomycin powder was left in the wound.  The arthrotomy was closed with number 2 running barbed suture, and the wound was closed in layers.  A pravena dressing was applied and the patient was turned over to anesthesia in stable condition without any apparent intraoperative  complications.     Plan:  WBAT, PT/OT evaluation, Aspirin 81mg BID for 4 weeks for DVT prophylaxis, Leave dressing in place until followup.

## 2018-08-04 VITALS
HEART RATE: 85 BPM | HEIGHT: 70 IN | BODY MASS INDEX: 29.45 KG/M2 | SYSTOLIC BLOOD PRESSURE: 164 MMHG | TEMPERATURE: 98.2 F | RESPIRATION RATE: 18 BRPM | WEIGHT: 205.69 LBS | DIASTOLIC BLOOD PRESSURE: 79 MMHG | OXYGEN SATURATION: 99 %

## 2018-08-04 LAB
ANION GAP SERPL CALC-SCNC: 5 MMOL/L (ref 0–11.9)
BUN SERPL-MCNC: 17 MG/DL (ref 8–22)
CALCIUM SERPL-MCNC: 8.2 MG/DL (ref 8.4–10.2)
CHLORIDE SERPL-SCNC: 104 MMOL/L (ref 96–112)
CO2 SERPL-SCNC: 24 MMOL/L (ref 20–33)
CREAT SERPL-MCNC: 0.99 MG/DL (ref 0.5–1.4)
GLUCOSE SERPL-MCNC: 109 MG/DL (ref 65–99)
GRAM STN SPEC: NORMAL
GRAM STN SPEC: NORMAL
HCT VFR BLD AUTO: 34.2 % (ref 42–52)
HGB BLD-MCNC: 11.3 G/DL (ref 14–18)
POTASSIUM SERPL-SCNC: 4.3 MMOL/L (ref 3.6–5.5)
SIGNIFICANT IND 70042: NORMAL
SIGNIFICANT IND 70042: NORMAL
SITE SITE: NORMAL
SITE SITE: NORMAL
SODIUM SERPL-SCNC: 133 MMOL/L (ref 135–145)
SOURCE SOURCE: NORMAL
SOURCE SOURCE: NORMAL

## 2018-08-04 PROCEDURE — 80048 BASIC METABOLIC PNL TOTAL CA: CPT

## 2018-08-04 PROCEDURE — 700112 HCHG RX REV CODE 229: Performed by: ORTHOPAEDIC SURGERY

## 2018-08-04 PROCEDURE — A9270 NON-COVERED ITEM OR SERVICE: HCPCS | Performed by: ORTHOPAEDIC SURGERY

## 2018-08-04 PROCEDURE — 36415 COLL VENOUS BLD VENIPUNCTURE: CPT

## 2018-08-04 PROCEDURE — 700105 HCHG RX REV CODE 258: Performed by: ORTHOPAEDIC SURGERY

## 2018-08-04 PROCEDURE — 97165 OT EVAL LOW COMPLEX 30 MIN: CPT

## 2018-08-04 PROCEDURE — 97161 PT EVAL LOW COMPLEX 20 MIN: CPT

## 2018-08-04 PROCEDURE — G8978 MOBILITY CURRENT STATUS: HCPCS | Mod: CI

## 2018-08-04 PROCEDURE — 85014 HEMATOCRIT: CPT

## 2018-08-04 PROCEDURE — 700102 HCHG RX REV CODE 250 W/ 637 OVERRIDE(OP): Performed by: ORTHOPAEDIC SURGERY

## 2018-08-04 PROCEDURE — G8988 SELF CARE GOAL STATUS: HCPCS | Mod: CI

## 2018-08-04 PROCEDURE — G8989 SELF CARE D/C STATUS: HCPCS | Mod: CI

## 2018-08-04 PROCEDURE — G8987 SELF CARE CURRENT STATUS: HCPCS | Mod: CI

## 2018-08-04 PROCEDURE — 85018 HEMOGLOBIN: CPT

## 2018-08-04 PROCEDURE — 700111 HCHG RX REV CODE 636 W/ 250 OVERRIDE (IP): Performed by: ORTHOPAEDIC SURGERY

## 2018-08-04 PROCEDURE — G8979 MOBILITY GOAL STATUS: HCPCS | Mod: CI

## 2018-08-04 RX ORDER — DIAZEPAM 5 MG/1
5 TABLET ORAL EVERY 6 HOURS PRN
Qty: 30 TAB | Refills: 0 | Status: SHIPPED | OUTPATIENT
Start: 2018-08-04 | End: 2018-08-14

## 2018-08-04 RX ORDER — CYCLOBENZAPRINE HCL 10 MG
TABLET ORAL
Qty: 90 TAB | Refills: 5 | Status: SHIPPED | OUTPATIENT
Start: 2018-08-04 | End: 2018-08-20 | Stop reason: SDUPTHER

## 2018-08-04 RX ADMIN — DEXAMETHASONE SODIUM PHOSPHATE 10 MG: 4 INJECTION, SOLUTION INTRAMUSCULAR; INTRAVENOUS at 06:08

## 2018-08-04 RX ADMIN — BISOPROLOL FUMARATE 10 MG: 10 TABLET, COATED ORAL at 06:15

## 2018-08-04 RX ADMIN — DOCUSATE SODIUM 100 MG: 100 CAPSULE, LIQUID FILLED ORAL at 06:04

## 2018-08-04 RX ADMIN — LISINOPRIL 10 MG: 5 TABLET ORAL at 06:05

## 2018-08-04 RX ADMIN — PAROXETINE HYDROCHLORIDE 20 MG: 20 TABLET, FILM COATED ORAL at 06:04

## 2018-08-04 RX ADMIN — SODIUM CHLORIDE 2 G: 9 INJECTION, SOLUTION INTRAVENOUS at 06:09

## 2018-08-04 RX ADMIN — PREDNISONE 1 MG: 1 TABLET ORAL at 06:05

## 2018-08-04 RX ADMIN — OXYCODONE HYDROCHLORIDE 10 MG: 10 TABLET ORAL at 08:34

## 2018-08-04 RX ADMIN — OMEPRAZOLE 20 MG: 20 CAPSULE, DELAYED RELEASE ORAL at 06:04

## 2018-08-04 RX ADMIN — KETOROLAC TROMETHAMINE 30 MG: 30 INJECTION, SOLUTION INTRAMUSCULAR at 11:12

## 2018-08-04 RX ADMIN — KETOROLAC TROMETHAMINE 30 MG: 30 INJECTION, SOLUTION INTRAMUSCULAR at 06:08

## 2018-08-04 RX ADMIN — ACETAMINOPHEN 500 MG: 500 TABLET, FILM COATED ORAL at 11:12

## 2018-08-04 RX ADMIN — ASPIRIN 81 MG: 81 TABLET, COATED ORAL at 04:08

## 2018-08-04 ASSESSMENT — PAIN SCALES - GENERAL
PAINLEVEL_OUTOF10: 4
PAINLEVEL_OUTOF10: 2

## 2018-08-04 ASSESSMENT — GAIT ASSESSMENTS
GAIT LEVEL OF ASSIST: SUPERVISED
DISTANCE (FEET): 150
ASSISTIVE DEVICE: FRONT WHEEL WALKER

## 2018-08-04 ASSESSMENT — ACTIVITIES OF DAILY LIVING (ADL): TOILETING: INDEPENDENT

## 2018-08-04 NOTE — PROGRESS NOTES
" Subjective:      Patient reports doing well. Patient denies chest pain, calf pain, shortness of breath.  Pain is currently under control. Patient is ambulating well with the use of an assistive device.    Objective:    Blood pressure 126/66, pulse 75, temperature 36.5 °C (97.7 °F), resp. rate 20, height 1.778 m (5' 10\"), weight 93.3 kg (205 lb 11 oz), SpO2 98 %.    Recent Labs      08/04/18   0500   HEMOGLOBIN  11.3*   HEMATOCRIT  34.2*     Recent Labs      08/04/18   0500   SODIUM  133*   POTASSIUM  4.3   CHLORIDE  104   CO2  24   GLUCOSE  109*   BUN  17   CREATININE  0.99   CALCIUM  8.2*         Intake/Output Summary (Last 24 hours) at 08/04/18 0847  Last data filed at 08/04/18 0400   Gross per 24 hour   Intake             1920 ml   Output              770 ml   Net             1150 ml       Comfortable, no distress  Neurologically and vascularly intact with palpable pedal pulses bilaterally.  Dressing C/D/I      Assessment:    Doing well s/p total knee arthroplasty for valgus deformity.    Plan:      Diet as tolerated  Mobilize today - WBAT  OT/PT  DVT ppx - ASA BID + Sequential Compression Devices  Plan to discharge to home  Follow-up with Dr. Rodriguez' office approximately 2 weeks post-op.    "

## 2018-08-04 NOTE — DISCHARGE SUMMARY
Patient was admitted for a total knee arthroplasty.  There were no complications during the surgery. Did well post-operatively.     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     Medication List      START taking these medications      Instructions   tramadol 50 MG Tabs  Commonly known as:  ULTRAM   Take 1-2 Tabs by mouth every four hours as needed (Moderate Pain (NRS Pain Scale 4-6; CPOT Pain Scale 3-5) if opiates not ordered or tolerated) for up to 10 days.  Dose:   mg        CHANGE how you take these medications      Instructions   * meloxicam 15 MG tablet  What changed:  Another medication with the same name was added. Make sure you understand how and when to take each.  Commonly known as:  MOBIC   TAKE 1 TABLET BY MOUTH EVERY DAY     * meloxicam 7.5 MG Tabs  What changed:  You were already taking a medication with the same name, and this prescription was added. Make sure you understand how and when to take each.  Commonly known as:  MOBIC   Take 1 Tab by mouth every day for 30 days.  Dose:  7.5 mg     oxyCODONE-acetaminophen  MG Tabs  What changed:  · how much to take  · when to take this  · reasons to take this  Commonly known as:  PERCOCET-10   Take 1-2 Tabs by mouth every four hours as needed for Severe Pain for up to 5 days.  Dose:  1-2 Tab        * This list has 2 medication(s) that are the same as other medications prescribed for you. Read the directions carefully, and ask your doctor or other care provider to review them with you.            CONTINUE taking these medications      Instructions   ascorbic acid 500 MG Tabs  Commonly known as:  ascorbic acid   Take 500 mg by  mouth every day.  Dose:  500 mg     bisoprolol 10 MG tablet  Commonly known as:  ZEBETA   Take 10 mg by mouth every day.  Dose:  10 mg     CALCIUM-VITAMIN D PO   Take 1 Tab by mouth 2 Times a Day. 1200/1000 mg  Dose:  1 Tab     cyclobenzaprine 10 MG Tabs  Commonly known as:  FLEXERIL   TAKE ONE TABLET BY MOUTH THREE TIMES DAILY AS NEEDED FOR MILD PAIN     diazePAM 5 MG Tabs  Commonly known as:  VALIUM   Take 5 mg by mouth every 6 hours as needed for Anxiety or Sleep. 5-10 mg  Dose:  5 mg     Garlic 1000 MG Caps   Take 1 Cap by mouth every day.  Dose:  1 Cap     lisinopril 10 MG Tabs  Commonly known as:  PRINIVIL   TAKE ONE TABLET BY MOUTH ONCE DAILY     MULTI COMPLETE PO   Take 1 Tab by mouth every day.  Dose:  1 Tab     niacin 500 MG Tabs   Take 500 mg by mouth every day.  Dose:  500 mg     omeprazole 20 MG delayed-release capsule  Commonly known as:  PRILOSEC   Take 20 mg by mouth every day.  Dose:  20 mg     PARoxetine 20 MG Tabs  Commonly known as:  PAXIL   TAKE 1 TABLET BY MOUTH EVERY DAY     pravastatin 40 MG tablet  Commonly known as:  PRAVACHOL   Take 1 Tab by mouth every day.  Dose:  40 mg     predniSONE 1 MG Tabs  Commonly known as:  DELTASONE   TAKE 2 TABLETS BY MOUTH EVERY DAY     Tofacitinib Citrate 5 MG Tabs  Commonly known as:  XELJANZ   Take 5 mg by mouth 2 Times a Day.  Dose:  5 mg     vardenafil 20 MG tablet  Commonly known as:  LEVITRA   Take 20 mg by mouth as needed.  Dose:  20 mg     Vitamin B-12 5000 MCG Tbdp   Take 1 Tab by mouth every day.  Dose:  1 Tab     vitamin e 400 UNIT Caps  Commonly known as:  VITAMIN E   Take 400 Units by mouth every day.  Dose:  400 Units     Zinc 50 MG Caps   Take 50 mg by mouth every day.  Dose:  50 mg              Patient will be discharged home and follow up with Dr. Rodriguez in 2 weeks, for which the patient already has scheduled. [facility] office phone number is [phone].  Patient to begin Physical Therapy as currently scheduled.  Encourage ROM of the knee.      Instructions:  -Leave the bandage in place until your followup appointment in 2 weeks.  -Call if there is drainage beneath the bandage  -Use ice and elevation frequently to help with pain and swelling control  -Put as much weight as comfortable on the operative leg.  Use a walker to assist with balance.  -Take your blood clot prevention medication for 4 weeks after surgery.  -FOCUS ON GETTING THE KNEE STRAIGHT.  THERE SHOULD BE A COUPLE PILLOWS BEHIND YOUR ANKLE AT ALL TIMES WHEN SEDENTARY TO HELP GET THE KNEE STRAIGHT.

## 2018-08-04 NOTE — PROGRESS NOTES
Per Dr. Rodriguez, pt does not need RX for valium or flexeril, pt should have these at home, RX shredded.

## 2018-08-04 NOTE — THERAPY
"Physical Therapy Evaluation completed.   Bed Mobility:  Supine to Sit: Independent  Transfers: Sit to Stand: Supervised  Gait: Level Of Assist: Supervised with Front-Wheel Walker       Plan of Care: Patient with no further skilled PT needs in the acute care setting at this time  Discharge Recommendations: Equipment: No Equipment Needed. Post-acute therapy Discharge to home with outpatient or home health for additional skilled therapy services.    See \"Rehab Therapy-Acute\" Patient Summary Report for complete documentation.   Pt is a 62 y.o.m. referred to PT s/p left TKA revision.  Pt had his original TKA 9 years ago.  Pt has had bilat AUTUMN's.  Pt is alert and able to follow commands.  Pt educated in ice and elevation at home.  Pt educated in HEP and handout provided.  Pt eduated in gait on level surfaces and on stairs.  Pt demonstrated safe gait and veralized understanding of HEP.  No further skilled PT indicated in acute care setting.  "

## 2018-08-04 NOTE — PROGRESS NOTES
Discharge instructions given and discussed. Pt discharged home in stable condition, RX given. Pt has FWW for home use.

## 2018-08-04 NOTE — PROGRESS NOTES
Patient arrived to unit on hospital bed.  VSS.  AOx4.  Reports 4/10 pain at this time, will medicate per MAR.  Safety precautions in place.  Skin check and admit complete.  No needs expressed.  Will continue to monitor.

## 2018-08-04 NOTE — DISCHARGE INSTRUCTIONS
Discharge Instructions  Instructions:  -Leave the bandage in place until your followup appointment in 2 weeks.  -Call if there is drainage beneath the bandage  -Use ice and elevation frequently to help with pain and swelling control  -Put as much weight as comfortable on the operative leg.  Use a walker to assist with balance.  -Take your blood clot prevention medication for 4 weeks after surgery.  -FOCUS ON GETTING THE KNEE STRAIGHT.  THERE SHOULD BE A COUPLE PILLOWS BEHIND YOUR ANKLE AT ALL TIMES WHEN SEDENTARY TO HELP GET THE KNEE STRAIGHT.    Discharged to home by car with relative. Discharged via wheelchair, hospital escort: Yes.  Special equipment needed: Walker    Be sure to schedule a follow-up appointment with your primary care doctor or any specialists as instructed.     Discharge Plan:   Diet Plan: Discussed  Activity Level: Discussed  Confirmed Follow up Appointment: Patient to Call and Schedule Appointment  Confirmed Symptoms Management: Discussed  Medication Reconciliation Updated: Yes  Influenza Vaccine Indication: Not indicated: Previously immunized this influenza season and > 8 years of age    I understand that a diet low in cholesterol, fat, and sodium is recommended for good health. Unless I have been given specific instructions below for another diet, I accept this instruction as my diet prescription.   Other diet: regular    Special Instructions: Discharge instructions for the Orthopedic Patient    Follow up with Primary Care Physician within 2 weeks of discharge to home, regarding:  Review of medications and diagnostic testing.  Surveillance for medical complications.  Workup and treatment of osteoporosis, if appropriate.     -Is this a Joint Replacement patient? Yes Total Joint Knee Replacement Discharge Instructions    Pain  - The goal is to slowly wean off the prescription pain medicine.  - Ice can be used for pain control.  20 minutes at a time is recommended, and never directly against  your skin or incision.  - Most patients are off the pain pills by 3 weeks; others may require a low level of pain medications for many months.  If your pain continues to be severe, follow up with your physician.  Infection    Knee joint infections; occur in fewer than 2% of patients. The most common causes of infection following total knee replacement surgery are from bacteria that enter the bloodstream during dental procedures, urinary tract infections, or skin infections. These bacteria can lodge around your knee replacement and cause an infection.  - Keep the incision as clean and dry as possible.  - Always wash your hands before touching your incision.  - Skin infections tend to develop around 7-10 days after surgery; most can be treated with oral antibiotics.  - Dental Care should be delayed for 3 months after surgery, your surgeon recommends taking a dose of antibiotics 1 hour prior to any dental procedure. After 2 years, most surgeons recommend antibiotics only before an extensive procedure.  Ask your surgeon what he recommends.  - Signs and symptoms of infection can include:  low grade fever, redness, pain, swelling and drainage from your incision.  Notify your surgeon immediately if you develop any of these symptoms.  Other instructions  - Bowel habits - constipation is extremely common and is caused by a combination of anesthesia, lack of mobility and pain medicine.  Use stool softeners or laxatives if necessary. It is important not to ignore this problem, as bowel obstructions can be a serious complication after joint replacement surgery.  - Mood/Energy Level - Many patients experience a lack of energy and endurance for up to 2-3 months after surgery.  Some may also feel down and can even become depressed.  This is likely due to the postoperative anemia, change in activity level, lack of sleep, pain medicine and just the emotional reaction to the surgery itself that is a big disruption in a person’s life.   This usually passes.  If symptoms persist, follow up with your primary physician.  - Returning to work - Your surgeon will give you more specific instructions. Depending on the type of activities you perform, it may be 6 to 8 weeks before you return to work.   Generally, if you work a sedentary job requiring little standing or walking, most patients may return within 2-6 weeks.  Manual labor jobs involving walking, lifting and standing may take longer. Your surgeon’s office can provide a release to part-time or light duty work early on in your recovery and progress you to full duty as able.    - Driving - If your left knee was replaced and you have an automatic transmission, you may be able to begin driving in a week or so, provided you are no longer taking narcotic pain medication. If your right knee was replaced, avoid driving for 6 to 8 weeks. Remember that your reflexes may not be as sharp as before your surgery. Ask your surgeon for specific instructions.   - Avoiding falls - A fall during the first few weeks after surgery can damage your new knee and may result in a need for further surgery.   throw rugs and tack down loose carpeting.  Be aware of floor hazards such as pets, small objects or uneven surfaces.    - [employer] Metal Detectors - The sensitivity of metal detectors varies and it is likely that your prosthesis will cause an alarm.  Inform the  of your artificial joint.  Diet  - Resume your normal diet as tolerated.  - It is important to achieve a healthy nutritional status by eating a well balanced diet on a regular basis.  - Your physician may recommend that you take iron and vitamin supplements.   - Continue to drink plenty of fluids.  Shower/Bathing  - You may shower as soon as you get home from the hospital unless otherwise instructed.  - Keep your incision out of water.  To keep the incision dry when showering, cover it with a plastic bag or plastic wrap.  - Pat incision dry  if it gets wet.  Don’t rub.  - Do not submerge in a bath until staples are out and the incision is completely healed. (Approximately 6-8 weeks)  Dressing Change:  Procedure (if recommended by your physician)  - Wash hands.  - Open all new dressing change materials.  - Remove old dressing and discard.  - Inspect incision for redness, increase in clear drainage, yellow/green drainage, odor and surrounding skin hot to touch.  -  ABD (large gauze) pad or “island dressing” by one corner and lay over the incision.  Be careful not to touch the inside of the dressing that will lay over the incision.  - Secure in place as instructed (Ace wrap or tape).    Swelling/Bruising    - Swelling can last from 3-6 months.  - Elevate your leg higher than your heart while reclining.   The first week you are home you should elevate your leg an equal amount of time, as you are active.    - Anti-inflammatory pills can be taken once you have stopped the blood thinners.  - The swelling is usually worse after you go home since you are upright for longer periods of time.  - Bruising is common and can involve the entire leg including the thigh, calf and even foot. Bruising often does not appear until after you arrive home and it can be quite dramatic- purple, black, and green.  The bruising you can see is not usually concerning and will subside without any treatment.      Blood Clot Prevention  Blood clots in the legs and the less common, but frightening, clots that travel to the lungs are a real focus of our preventative. Most patients are at standard risk for them, but those patients who are at higher risk include people who have had previous clots, a family history of clotting, smoking, diabetes, obesity, advanced age, use of estrogen and a sedentary lifestyle.    - Signs of blood clots in legs - Swelling in thigh, calf or ankle that does not go down with elevation.  Pain, heat and tenderness in calf, back of calf or groin area.   NOTE: blood clots can occur in either leg.  - You have been receiving anticoagulant therapy (blood thinners) in the hospital and you may be instructed to continue at home depending on your risk factors.  - Your risk for developing a clot continues for up to 2-3 months after surgery.  You should avoid prolonged sitting and dehydration during that time (long air trips and car trips).  If you do take a trip during this time, please get up and move around every 1- 1.5 hours.  - If you are prescribed blood-thinning medication for home, follow instructions as directed. (Handouts provided if applicable).      Activity  Once home, you should continue to stay active. The key is to remember not to overdo it! While you can expect some good days and some bad days, you should notice a gradual improvement and a gradual increase in your endurance over the next 6 to 12 months. Exercise is a critical component of home care, particularly during the first few weeks after surgery.     - Normal activities of daily living You should be able to resume most within 3 to 6 weeks following surgery. Some pain with activity and at night is common for several weeks after surgery  -  Physical Therapy Exercises - Continue to do the exercises prescribed for at least two months after surgery. Riding a stationary bicycle can help maintain muscle tone and keep your knee flexible. Try to achieve the maximum degree of bending and extension possible. (handout provided by Therapist).  - Sexual Activity -. Your surgeon can tell you when it safe to resume sexual activity.    - Sleeping Positions - You can safely sleep on your back, on either side, or on your stomach.   - Other Activities - Walk as much as you like, but remember that walking is no substitute for the exercises your doctor and physical therapist will prescribe. Lower impact activities are preferred.  If you have specific questions, consult your Surgeon.    When to Call the Doctor   Call the  physician if:   - Fever over 100.5? F  - Increased pain, drainage, redness, odor or heat around the incision area  - Shaking chills  - Increased knee pain with activity and rest  - Increased pain in calf, tenderness or redness above or below the knee  - Increased swelling of calf, ankle, foot  - Sudden increased shortness of breath, sudden onset of chest pain, localized chest pain with coughing  - Incision opening  Or, if there are any questions or concerns about medications or care.       -Is this patient being discharged with medication to prevent blood clots?  Yes, Aspirin Aspirin, ASA oral tablets  What is this medicine?  ASPIRIN (AS pir in) is a pain reliever. It is used to treat mild pain and fever. This medicine is also used as directed by a doctor to prevent and to treat heart attacks, to prevent strokes, and to treat arthritis or inflammation.  This medicine may be used for other purposes; ask your health care provider or pharmacist if you have questions.  COMMON BRAND NAME(S): Aspir-Low, Aspir-Ivone, Aspirtab, Hillary Advanced Aspirin, Hillary Aspirin, Hillary Aspirin Extra Strength, Hillary Aspirin Plus, Hillary Extra Strength, Hillary Extra Strength Plus, Hillary Genuine Aspirin, Hillary Womens Aspirin, Bufferin, Bufferin Extra Strength, Bufferin Low Dose  What should I tell my health care provider before I take this medicine?  They need to know if you have any of these conditions:  -anemia  -asthma  -bleeding problems  -child with chickenpox, the flu, or other viral infection  -diabetes  -gout  -if you frequently drink alcohol containing drinks  -kidney disease  -liver disease  -low level of vitamin K  -lupus  -smoke tobacco  -stomach ulcers or other problems  -an unusual or allergic reaction to aspirin, tartrazine dye, other medicines, dyes, or preservatives  -pregnant or trying to get pregnant  -breast-feeding  How should I use this medicine?  Take this medicine by mouth with a glass of water. Follow the directions on  the package or prescription label. You can take this medicine with or without food. If it upsets your stomach, take it with food. Do not take your medicine more often than directed.  Talk to your pediatrician regarding the use of this medicine in children. While this drug may be prescribed for children as young as 12 years of age for selected conditions, precautions do apply. Children and teenagers should not use this medicine to treat chicken pox or flu symptoms unless directed by a doctor.  Patients over 65 years old may have a stronger reaction and need a smaller dose.  Overdosage: If you think you have taken too much of this medicine contact a poison control center or emergency room at once.  NOTE: This medicine is only for you. Do not share this medicine with others.  What if I miss a dose?  If you are taking this medicine on a regular schedule and miss a dose, take it as soon as you can. If it is almost time for your next dose, take only that dose. Do not take double or extra doses.  What may interact with this medicine?  Do not take this medicine with any of the following medications:  -cidofovir  -ketorolac  -probenecid  This medicine may also interact with the following medications:  -alcohol  -alendronate  -bismuth subsalicylate  -flavocoxid  -herbal supplements like feverfew, garlic, markus, ginkgo biloba, horse chestnut  -medicines for diabetes or glaucoma like acetazolamide, methazolamide  -medicines for gout  -medicines that treat or prevent blood clots like enoxaparin, heparin, ticlopidine, warfarin  -other aspirin and aspirin-like medicines  -NSAIDs, medicines for pain and inflammation, like ibuprofen or naproxen  -pemetrexed  -sulfinpyrazone  -varicella live vaccine  This list may not describe all possible interactions. Give your health care provider a list of all the medicines, herbs, non-prescription drugs, or dietary supplements you use. Also tell them if you smoke, drink alcohol, or use illegal  drugs. Some items may interact with your medicine.  What should I watch for while using this medicine?  If you are treating yourself for pain, tell your doctor or health care professional if the pain lasts more than 10 days, if it gets worse, or if there is a new or different kind of pain. Tell your doctor if you see redness or swelling. Also, check with your doctor if you have a fever that lasts for more than 3 days. Only take this medicine to prevent heart attacks or blood clotting if prescribed by your doctor or health care professional.  Do not take aspirin or aspirin-like medicines with this medicine. Too much aspirin can be dangerous. Always read the labels carefully.  This medicine can irritate your stomach or cause bleeding problems. Do not smoke cigarettes or drink alcohol while taking this medicine. Do not lie down for 30 minutes after taking this medicine to prevent irritation to your throat.  If you are scheduled for any medical or dental procedure, tell your healthcare provider that you are taking this medicine. You may need to stop taking this medicine before the procedure.  This medicine may be used to treat migraines. If you take migraine medicines for 10 or more days a month, your migraines may get worse. Keep a diary of headache days and medicine use. Contact your healthcare professional if your migraine attacks occur more frequently.  What side effects may I notice from receiving this medicine?  Side effects that you should report to your doctor or health care professional as soon as possible:  -allergic reactions like skin rash, itching or hives, swelling of the face, lips, or tongue  -breathing problems  -changes in hearing, ringing in the ears  -confusion  -general ill feeling or flu-like symptoms  -pain on swallowing  -redness, blistering, peeling or loosening of the skin, including inside the mouth or nose  -signs and symptoms of bleeding such as bloody or black, tarry stools; red or  dark-brown urine; spitting up blood or brown material that looks like coffee grounds; red spots on the skin; unusual bruising or bleeding from the eye, gums, or nose  -trouble passing urine or change in the amount of urine  -unusually weak or tired  -yellowing of the eyes or skin  Side effects that usually do not require medical attention (report to your doctor or health care professional if they continue or are bothersome):  -diarrhea or constipation  -headache  -nausea, vomiting  -stomach gas, heartburn  This list may not describe all possible side effects. Call your doctor for medical advice about side effects. You may report side effects to FDA at 6-747-FDA-2284.  Where should I keep my medicine?  Keep out of the reach of children.  Store at room temperature between 15 and 30 degrees C (59 and 86 degrees F). Protect from heat and moisture. Do not use this medicine if it has a strong vinegar smell. Throw away any unused medicine after the expiration date.  NOTE: This sheet is a summary. It may not cover all possible information. If you have questions about this medicine, talk to your doctor, pharmacist, or health care provider.  © 2018 [employer] (2014-08-19 11:30:31)      · Is patient discharged on Warfarin / Coumadin?   No     Oxycodone tablets or capsules  What is this medicine?  OXYCODONE (ox i KOE done) is a pain reliever. It is used to treat moderate to severe pain.  This medicine may be used for other purposes; ask your health care provider or pharmacist if you have questions.  COMMON BRAND NAME(S): Dazidox, Endocodone, Oxaydo, OXECTA, OxyIR, Percolone, Roxicodone, ROXYBOND  What should I tell my health care provider before I take this medicine?  They need to know if you have any of these conditions:  -Rakesh's disease  -brain tumor  -head injury  -heart disease  -history of drug or alcohol abuse problem  -if you often drink alcohol  -kidney disease  -liver disease  -lung or breathing disease,  like asthma  -mental illness  -pancreatic disease  -seizures  -thyroid disease  -an unusual or allergic reaction to oxycodone, codeine, hydrocodone, morphine, other medicines, foods, dyes, or preservatives  -pregnant or trying to get pregnant  -breast-feeding  How should I use this medicine?  Take this medicine by mouth with a glass of water. Follow the directions on the prescription label. You can take it with or without food. If it upsets your stomach, take it with food. Take your medicine at regular intervals. Do not take it more often than directed. Do not stop taking except on your doctor's advice.  Some brands of this medicine, like Oxecta, have special instructions. Ask your doctor or pharmacist if these directions are for you: Do not cut, crush or chew this medicine. Swallow only one tablet at a time. Do not wet, soak, or lick the tablet before you take it.  A special MedGuide will be given to you by the pharmacist with each prescription and refill. Be sure to read this information carefully each time.  Talk to your pediatrician regarding the use of this medicine in children. Special care may be needed.  Overdosage: If you think you have taken too much of this medicine contact a poison control center or emergency room at once.  NOTE: This medicine is only for you. Do not share this medicine with others.  What if I miss a dose?  If you miss a dose, take it as soon as you can. If it is almost time for your next dose, take only that dose. Do not take double or extra doses.  What may interact with this medicine?  This medicine may interact with the following medications:  -alcohol  -antihistamines for allergy, cough and cold  -antiviral medicines for HIV or AIDS  -atropine  -certain antibiotics like clarithromycin, erythromycin, linezolid, rifampin  -certain medicines for anxiety or sleep  -certain medicines for bladder problems like oxybutynin, tolterodine  -certain medicines for depression like amitriptyline,  fluoxetine, sertraline  -certain medicines for fungal infections like ketoconazole, itraconazole, voriconazole  -certain medicines for migraine headache like almotriptan, eletriptan, frovatriptan, naratriptan, rizatriptan, sumatriptan, zolmitriptan  -certain medicines for nausea or vomiting like dolasetron, ondansetron, palonosetron  -certain medicines for Parkinson's disease like benztropine, trihexyphenidyl  -certain medicines for seizures like phenobarbital, phenytoin, primidone  -certain medicines for stomach problems like dicyclomine, hyoscyamine  -certain medicines for travel sickness like scopolamine  -diuretics  -general anesthetics like halothane, isoflurane, methoxyflurane, propofol  -ipratropium  -local anesthetics like lidocaine, pramoxine, tetracaine  -MAOIs like Carbex, Eldepryl, Marplan, Nardil, and Parnate  -medicines that relax muscles for surgery  -methylene blue  -nilotinib  -other narcotic medicines for pain or cough  -phenothiazines like chlorpromazine, mesoridazine, prochlorperazine, thioridazine  This list may not describe all possible interactions. Give your health care provider a list of all the medicines, herbs, non-prescription drugs, or dietary supplements you use. Also tell them if you smoke, drink alcohol, or use illegal drugs. Some items may interact with your medicine.  What should I watch for while using this medicine?  Tell your doctor or health care professional if your pain does not go away, if it gets worse, or if you have new or a different type of pain. You may develop tolerance to the medicine. Tolerance means that you will need a higher dose of the medicine for pain relief. Tolerance is normal and is expected if you take this medicine for a long time.  Do not suddenly stop taking your medicine because you may develop a severe reaction. Your body becomes used to the medicine. This does NOT mean you are addicted. Addiction is a behavior related to getting and using a drug for a  non-medical reason. If you have pain, you have a medical reason to take pain medicine. Your doctor will tell you how much medicine to take. If your doctor wants you to stop the medicine, the dose will be slowly lowered over time to avoid any side effects.  There are different types of narcotic medicines (opiates). If you take more than one type at the same time or if you are taking another medicine that also causes drowsiness, you may have more side effects. Give your health care provider a list of all medicines you use. Your doctor will tell you how much medicine to take. Do not take more medicine than directed. Call emergency for help if you have problems breathing or unusual sleepiness.  You may get drowsy or dizzy. Do not drive, use machinery, or do anything that needs mental alertness until you know how the medicine affects you. Do not stand or sit up quickly, especially if you are an older patient. This reduces the risk of dizzy or fainting spells. Alcohol may interfere with the effect of this medicine. Avoid alcoholic drinks.  This medicine will cause constipation. Try to have a bowel movement at least every 2 to 3 days. If you do not have a bowel movement for 3 days, call your doctor or health care professional.  Your mouth may get dry. Chewing sugarless gum or sucking hard candy, and drinking plenty of water may help. Contact your doctor if the problem does not go away or is severe.  What side effects may I notice from receiving this medicine?  Side effects that you should report to your doctor or health care professional as soon as possible:  -allergic reactions like skin rash, itching or hives, swelling of the face, lips, or tongue  -breathing problems  -confusion  -signs and symptoms of low blood pressure like dizziness; feeling faint or lightheaded, falls; unusually weak or tired  -trouble passing urine or change in the amount of urine  -trouble swallowing  Side effects that usually do not require  medical attention (report to your doctor or health care professional if they continue or are bothersome):  -constipation  -dry mouth  -nausea, vomiting  -tiredness  This list may not describe all possible side effects. Call your doctor for medical advice about side effects. You may report side effects to FDA at 0-930-FDA-5145.  Where should I keep my medicine?  Keep out of the reach of children. This medicine can be abused. Keep your medicine in a safe place to protect it from theft. Do not share this medicine with anyone. Selling or giving away this medicine is dangerous and against the law.  Store at room temperature between 15 and 30 degrees C (59 and 86 degrees F). Protect from light. Keep container tightly closed.  This medicine may cause accidental overdose and death if it is taken by other adults, children, or pets. Flush any unused medicine down the toilet to reduce the chance of harm. Do not use the medicine after the expiration date.  NOTE: This sheet is a summary. It may not cover all possible information. If you have questions about this medicine, talk to your doctor, pharmacist, or health care provider.  © 2018 [employer]/Gold Standard (2016-11-01 16:55:57)  Tramadol tablets  What is this medicine?  TRAMADOL (TRA ma dole) is a pain reliever. It is used to treat moderate to severe pain in adults.  This medicine may be used for other purposes; ask your health care provider or pharmacist if you have questions.  COMMON BRAND NAME(S): Ultram  What should I tell my health care provider before I take this medicine?  They need to know if you have any of these conditions:  -brain tumor  -depression  -drug abuse or addiction  -head injury  -if you frequently drink alcohol containing drinks  -kidney disease or trouble passing urine  -liver disease  -lung disease, asthma, or breathing problems  -seizures or epilepsy  -suicidal thoughts, plans, or attempt; a previous suicide attempt by you or a family member  -an  unusual or allergic reaction to tramadol, codeine, other medicines, foods, dyes, or preservatives  -pregnant or trying to get pregnant  -breast-feeding  How should I use this medicine?  Take this medicine by mouth with a full glass of water. Follow the directions on the prescription label. You can take it with or without food. If it upsets your stomach, take it with food. Do not take your medicine more often than directed.  A special MedGuide will be given to you by the pharmacist with each prescription and refill. Be sure to read this information carefully each time.  Talk to your pediatrician regarding the use of this medicine in children. Special care may be needed.  Overdosage: If you think you have taken too much of this medicine contact a poison control center or emergency room at once.  NOTE: This medicine is only for you. Do not share this medicine with others.  What if I miss a dose?  If you miss a dose, take it as soon as you can. If it is almost time for your next dose, take only that dose. Do not take double or extra doses.  What may interact with this medicine?  Do not take this medication with any of the following medicines:  -MAOIs like Carbex, Eldepryl, Marplan, Nardil, and Parnate  This medicine may also interact with the following medications:  -alcohol  -antihistamines for allergy, cough and cold  -certain medicines for anxiety or sleep  -certain medicines for depression like amitriptyline, fluoxetine, sertraline  -certain medicines for migraine headache like almotriptan, eletriptan, frovatriptan, naratriptan, rizatriptan, sumatriptan, zolmitriptan  -certain medicines for seizures like carbamazepine, oxcarbazepine, phenobarbital, primidone  -certain medicines that treat or prevent blood clots like warfarin  -digoxin  -furazolidone  -general anesthetics like halothane, isoflurane, methoxyflurane, propofol  -linezolid  -local anesthetics like lidocaine, pramoxine, tetracaine  -medicines that relax  muscles for surgery  -other narcotic medicines for pain or cough  -phenothiazines like chlorpromazine, mesoridazine, prochlorperazine, thioridazine  -procarbazine  This list may not describe all possible interactions. Give your health care provider a list of all the medicines, herbs, non-prescription drugs, or dietary supplements you use. Also tell them if you smoke, drink alcohol, or use illegal drugs. Some items may interact with your medicine.  What should I watch for while using this medicine?  Tell your doctor or health care professional if your pain does not go away, if it gets worse, or if you have new or a different type of pain. You may develop tolerance to the medicine. Tolerance means that you will need a higher dose of the medicine for pain relief. Tolerance is normal and is expected if you take this medicine for a long time.  Do not suddenly stop taking your medicine because you may develop a severe reaction. Your body becomes used to the medicine. This does NOT mean you are addicted. Addiction is a behavior related to getting and using a drug for a non-medical reason. If you have pain, you have a medical reason to take pain medicine. Your doctor will tell you how much medicine to take. If your doctor wants you to stop the medicine, the dose will be slowly lowered over time to avoid any side effects.  There are different types of narcotic medicines (opiates). If you take more than one type at the same time or if you are taking another medicine that also causes drowsiness, you may have more side effects. Give your health care provider a list of all medicines you use. Your doctor will tell you how much medicine to take. Do not take more medicine than directed. Call emergency for help if you have problems breathing or unusual sleepiness.  You may get drowsy or dizzy. Do not drive, use machinery, or do anything that needs mental alertness until you know how this medicine affects you. Do not stand or sit up  quickly, especially if you are an older patient. This reduces the risk of dizzy or fainting spells. Alcohol can increase or decrease the effects of this medicine. Avoid alcoholic drinks.  You may have constipation. Try to have a bowel movement at least every 2 to 3 days. If you do not have a bowel movement for 3 days, call your doctor or health care professional.  Your mouth may get dry. Chewing sugarless gum or sucking hard candy, and drinking plenty of water may help. Contact your doctor if the problem does not go away or is severe.  What side effects may I notice from receiving this medicine?  Side effects that you should report to your doctor or health care professional as soon as possible:  -allergic reactions like skin rash, itching or hives, swelling of the face, lips, or tongue  -breathing problems  -confusion  -seizures  -signs and symptoms of low blood pressure like dizziness; feeling faint or lightheaded, falls; unusually weak or tired  -trouble passing urine or change in the amount of urine  Side effects that usually do not require medical attention (report to your doctor or health care professional if they continue or are bothersome):  -constipation  -dry mouth  -nausea, vomiting  -tiredness  This list may not describe all possible side effects. Call your doctor for medical advice about side effects. You may report side effects to FDA at 7-698-FDA-5183.  Where should I keep my medicine?  Keep out of the reach of children.  This medicine may cause accidental overdose and death if it taken by other adults, children, or pets. Mix any unused medicine with a substance like cat litter or coffee grounds. Then throw the medicine away in a sealed container like a sealed bag or a coffee can with a lid. Do not use the medicine after the expiration date.  Store at room temperature between 15 and 30 degrees C (59 and 86 degrees F).  NOTE: This sheet is a summary. It may not cover all possible information. If you  have questions about this medicine, talk to your doctor, pharmacist, or health care provider.  © 2018 [employer]/Gold Standard (2016-09-11 09:00:04)          Depression / Suicide Risk    As you are discharged from this [facility] facility, it is important to learn how to keep safe from harming yourself.    Recognize the warning signs:  · Abrupt changes in personality, positive or negative- including increase in energy   · Giving away possessions  · Change in eating patterns- significant weight changes-  positive or negative  · Change in sleeping patterns- unable to sleep or sleeping all the time   · Unwillingness or inability to communicate  · Depression  · Unusual sadness, discouragement and loneliness  · Talk of wanting to die  · Neglect of personal appearance   · Rebelliousness- reckless behavior  · Withdrawal from people/activities they love  · Confusion- inability to concentrate     If you or a loved one observes any of these behaviors or has concerns about self-harm, here's what you can do:  · Talk about it- your feelings and reasons for harming yourself  · Remove any means that you might use to hurt yourself (examples: pills, rope, extension cords, firearm)  · Get professional help from the community (Mental Health, Substance Abuse, psychological counseling)  · Do not be alone:Call your Safe Contact- someone whom you trust who will be there for you.  · Call your local CRISIS HOTLINE 068-9262 or 102-607-7964  · Call your local Children's Mobile Crisis Response Team [place] (244) 277-6776 or www.[employer]  · Call the toll free National Suicide Prevention Hotlines   · National Suicide Prevention Lifeline 214-101-TLYS (4830)  · National Hope Line Network 800-SUICIDE (014-8040)

## 2018-08-04 NOTE — CARE PLAN
Problem: Safety  Goal: Will remain free from falls  Outcome: PROGRESSING AS EXPECTED    Intervention: Implement fall precautions   08/03/18 1841   OTHER   Environmental Precautions Treaded Slipper Socks on Patient;Personal Belongings, Wastebasket, Call [employer] etc. in Easy Reach;Report Given to Other Health Care Providers Regarding Fall Risk;Bed in Low Position;Communication Sign for Patients & Families   IV Pole on Same Side of Bed as Bathroom Yes   Bedrails Bedrails Closest to Bathroom Down       Patient educated and understands the fall precautions in place to prevent falls.  Bed alarm is off, patient calls appropriately, IV pole closest to bathroom, treaded slipper socks on, and bedrails closest to bathroom down.  Patient also educated and understands the use of the call button for any assistance with mobility.      Problem: Venous Thromboembolism (VTW)/Deep Vein Thrombosis (DVT) Prevention:  Goal: Patient will participate in Venous Thrombosis (VTE)/Deep Vein Thrombosis (DVT)Prevention Measures  Outcome: PROGRESSING AS EXPECTED    Intervention: Ensure patient wears graduated elastic stockings (AUGUSTIN hose) and/or SCDs, if ordered, when in bed or chair (Remove at least once per shift for skin check)   08/03/18 1833   Mechanical/VTE Prophylaxis   Mechanical Prophylaxis  SCDs, Sequential Compression Device   SCDs, Sequential Compression Device On     Patient is wearing SCDs and will begin ASA therapy 12 hours post op.  Educated and understands the importance of DVT prophylaxis.

## 2018-08-04 NOTE — FLOWSHEET NOTE
08/03/18 1900   Oxygen   Home O2 Use Prior To Admission? No   Pulse Oximetry 92 %   O2 (LPM) 0   O2 Daily Delivery Respiratory  Room Air with O2 Available

## 2018-08-04 NOTE — FLOWSHEET NOTE
08/03/18 1722   Events/Summary/Plan   Events/Summary/Plan IS and 02   Interdisciplinary Plan of Care-Goals (Indications)   Hyperinflation Protocol Indications (post surgery. Repair to left femur)   Interdisciplinary Plan of Care-Outcomes    Hyperinflation Protocol Goals/Outcome Stable Vital Capacity x24 hrs and Patient Understands / uses I.S.   Education   Education Yes - Pt. / Family has been Instructed in use of Respiratory Equipment   Incentive Spirometry Group   Incentive Spirometry Instruction Yes   Breathing Exercises Yes   Incentive Spirometer Volume 3250 mL   Respiratory WDL   Respiratory (WDL) X   Chest Exam   Work Of Breathing / Effort Mild   Respiration 18   Pulse 85   Heart Rate (Monitored) 85   Breath Sounds   Pre/Post Intervention Post Intervention Assessment   RUL Breath Sounds Clear   RML Breath Sounds Clear   RLL Breath Sounds Diminished   JEANNE Breath Sounds Clear   LLL Breath Sounds Diminished   Secretions   Cough Dry   Oximetry   #Pulse Oximetry (Single Determination) Yes   Oxygen   Home O2 Use Prior To Admission? No   Pulse Oximetry 96 %   O2 (LPM) 0   O2 Daily Delivery Respiratory  Room Air with O2 Available

## 2018-08-06 ENCOUNTER — PATIENT OUTREACH (OUTPATIENT)
Dept: HEALTH INFORMATION MANAGEMENT | Facility: OTHER | Age: 63
End: 2018-08-06

## 2018-08-06 LAB
BACTERIA TISS AEROBE CULT: NORMAL
BACTERIA TISS AEROBE CULT: NORMAL
GRAM STN SPEC: NORMAL
GRAM STN SPEC: NORMAL
SIGNIFICANT IND 70042: NORMAL
SIGNIFICANT IND 70042: NORMAL
SITE SITE: NORMAL
SITE SITE: NORMAL
SOURCE SOURCE: NORMAL
SOURCE SOURCE: NORMAL

## 2018-08-08 LAB
BACTERIA SPEC ANAEROBE CULT: NORMAL
BACTERIA SPEC ANAEROBE CULT: NORMAL
SIGNIFICANT IND 70042: NORMAL
SIGNIFICANT IND 70042: NORMAL
SITE SITE: NORMAL
SITE SITE: NORMAL
SOURCE SOURCE: NORMAL
SOURCE SOURCE: NORMAL

## 2018-08-09 ENCOUNTER — OFFICE VISIT (OUTPATIENT)
Dept: RHEUMATOLOGY | Facility: PHYSICIAN GROUP | Age: 63
End: 2018-08-09
Payer: MEDICARE

## 2018-08-09 VITALS
DIASTOLIC BLOOD PRESSURE: 50 MMHG | WEIGHT: 212 LBS | HEART RATE: 74 BPM | BODY MASS INDEX: 30.42 KG/M2 | SYSTOLIC BLOOD PRESSURE: 90 MMHG | RESPIRATION RATE: 14 BRPM | OXYGEN SATURATION: 97 % | TEMPERATURE: 96 F

## 2018-08-09 DIAGNOSIS — M05.9 RHEUMATOID ARTHRITIS WITH POSITIVE RHEUMATOID FACTOR, INVOLVING UNSPECIFIED SITE (HCC): ICD-10-CM

## 2018-08-09 DIAGNOSIS — Z79.52 CURRENT CHRONIC USE OF SYSTEMIC STEROIDS: ICD-10-CM

## 2018-08-09 DIAGNOSIS — Z79.622 LONG-TERM CURRENT USE OF TOFACITINIB: ICD-10-CM

## 2018-08-09 PROCEDURE — 99213 OFFICE O/P EST LOW 20 MIN: CPT | Performed by: INTERNAL MEDICINE

## 2018-08-09 ASSESSMENT — ENCOUNTER SYMPTOMS
SORE THROAT: 0
EYE REDNESS: 0
ABDOMINAL PAIN: 0
DEPRESSION: 0
STRIDOR: 0
SEIZURES: 0
PALPITATIONS: 0
CHILLS: 0
BACK PAIN: 1
LOSS OF CONSCIOUSNESS: 0
WHEEZING: 0
PHOTOPHOBIA: 0
FEVER: 0
NAUSEA: 0
INSOMNIA: 0
SHORTNESS OF BREATH: 0
VOMITING: 0
EYE PAIN: 0

## 2018-08-09 NOTE — PROGRESS NOTES
Subjective:   Date of Consultation:8/9/2018  1:00 PM  Primary care physician:John Lao D.O.    Reason for Consultation:  Mr. Sims  is a pleasant 61 y.o. year old male who presented with follow-up for Rheumatoid Arthritis. Is on the xeljanz (tofacitinib) for the past two and a half months. He stopped taking it last week because he was sick [coughing, shortness of breath, chest congestion, temp of ~ 102.6]. Was started on Keflex by his PCP and he will finish by Friday. He feels better now, and he will restart taking xeljanz (tofacitinib) after he completely recovers.  Has morning stiffness lasting  60 minutes in the morning. Mild pain in the left MCP finger 4 & 3. HE did hold his medication when he was ill.        Rheumatoid Arthritis  He has been off xeljanz since  April.  He had a UTI then followed by a knee surgry.  JACKIE ruiz his follow-up with surgery  August 15th.    Had knee surgery August 4th  He notes joints are swollen and tight.  Still on prednisone 2 mg daily    Enlarged prostate  Has a follow-up with urology    Osteopenia noted in wrist  Previous fracture: no  Parent fractured hip: no  Current smoking: no  Glucocorticoids use > 3 months: yes  Rheumatoid arthritis: yes  Secondary osteoporosis : no  Alcohol 3 or more units/day: no    DEXA 9/5/2017  The mean bone mineral density for the lumbar spine is 1.196 g/cm2, which corresponds to a T score of -0.2 and a Z score of 0.2.  The distal left forearm has a mean bone mineral density of 0.837 g/cm2, with a T score of -1.5 and a Z score of -1.1.  FRAX scores are 14% & 2.6%    Current Medications  Methotrexate 6 tabs q Wednesday  (stopped)  xeljanz 5 mg BID (stopped)  Prednisone 2 mg po q day  (he has been on long term prednisone)  meloxicam      RHEUM HISTORY  Mr. Edgardo Sims presented with a history of Rheumatoid Arthritis diagnosed late 1999 and recent RF and CCP were negative.  He was previously a patient of Dr. Mcknight and then followed by a  community rheumatology.  He is here today to establish care. In review of Dr. Echavarria that it seems he has been on Rituxan as far back as 2009. At that time he was on a regimen of azathioprine 150 mg methotrexate 15 mg, prednisone 7.5 mg and rituximab with 125 mg of Solu-Medrol he seemed to have been maintained on this regimen and was tapered down on the Imuran to 50 mg twice a day. Notes from May 24, 2012 indicates that he discontinue the Imuran. At that time he was on rituximab and every 6 months 15 mg of methotrexate weekly and 2 mg of prednisone. Notes from June 8, 2015 does remark that his RA was only manageable he's had limited response. He did suffer a consultation with methotrexate developing stomatitis. Methotrexate was held March 6, 2014 and was reinstituted January 5, 2015.     Dr. Mcknight's notes it does comment that he may benefit from rituximab every 5 months however given cost restrictions he can only get it every 6 months. His last infusion was in March 2017 for rituximab.  He reports he is not doing as well on the rituximab.  He feels his activity level is not as active.  He feels increase stiffness.  His feet xrays     Complications to his care include intolerance to medications  And stomatitis with methotrexate.  Methotrexate was stopped briefly and subsequently was restarted but the patient reports it is not working as well as before.     Previous medications include   azathioprine, methotrexate  Plaquenil and Arava both intolerance not allergies  Remicade loss efficacyt  Gold shots (had a bad reaction)  Enbrel  Not sure Humira  Methylprednisolone did not work as well as prednisone  Never been on Xeljanz   Has not tried Actemra  rituximab (lost efficacy last infusion was 3/2017)  Orencia (not sure why he stopped)    Chronic Pain  On percocet ranging from 30-40 mg daily    Past medical HIstory: bilateral hip replacment and left knee replacement, tonsillitis, varicose vein in the left leg, TMJ,  tonsillitis in 1964, broken right ankle in 1980, ruptured disc of L5-S1 noted in 2000 with discectomy and laminectomy, rotator cuff with anterior labral repair in 2001, basal cell carcinoma in 2005 from the nose, meniscal tear of the left knee in 2008 with scope repair, scope repair and biceps tendon repair as well as rotator cuff surgery in 2011 of the right shoulder. Dupuytren's contracture right hand little finger, Dupuytren's contracture right foot, arthritis of the right foot, plantar fasciitis bilateral, aortic insufficiency, hypertension, dyslipidemia, heart murmur, tachycardia, coronary artery disease with mild plaque at catheter. Hiatal hernia and GERD, scoliosis, kyphosis of the upper back/compensating lordosis of the neck . Vein ligation radiofrequency      Family History: lymphoma     Social History: light bookeeping mainly disability, NEVER smoker, occasional alcohol (once a week)    Past Medical History:   Diagnosis Date   • Abnormal myocardial perfusion study 1/15/2014   • Aortic insufficiency 7/5/2011   • Arthritis     RA, and osteo   • Bronchitis    • CAD (coronary artery disease) mild plaque at cath in 2/14 12/5/2014   • Cancer (HCC)     skin   • Chronic use of opiate drugs therapeutic purposes 8/12/2017   • Dyslipidemia 7/12/2011   • Heart burn    • Heart murmur    • Hiatus hernia syndrome    • High cholesterol    • HTN (hypertension) 7/5/2011   • Hypertension    • Indigestion    • Infectious disease    • Pain     RA   • Pain     hands, feet and jaw   • Rheumatoid arthritis(714.0)     severe   • Tachycardia 10/20/2014     Past Surgical History:   Procedure Laterality Date   • KNEE REVISION TOTAL Left 8/3/2018    Procedure: KNEE REVISION TOTAL;  Surgeon: Michael Rodriguez M.D.;  Location: SURGERY [facility];  Service: Orthopedics   • CYSTOSCOPY  5/16/2018    Procedure: CYSTOSCOPY-CLOT EVAC;  Surgeon: Jose Romo M.D.;  Location: SURGERY [facility];  Service: Urology   • TRANS  URETHRAL RESECTION BLADDER  5/16/2018    Procedure: TRANS URETHRAL RESECTION BLADDER;  Surgeon: Jose Romo M.D.;  Location: SURGERY [facility];  Service: Urology   • RECOVERY  2/3/2014    Performed by Cath-Recovery Surgery at SURGERY SAME DAY [facility]   • HIP ARTHROPLASTY TOTAL  5/31/2013    Performed by Burak Valles M.D. at SURGERY [facility]   • ROTATOR CUFF REPAIR Right 2011    x 2   • KNEE ARTHROPLASTY TOTAL  6/1/2009    LEFT-Performed by YONATAN HINSON at SURGERY [facility]   • VEIN LIGATION RADIO FREQUENCY  12/8/2008    LEFT leg-Performed by DEREJE MCCULLOUGH at SURGERY SAME DAY [facility]   • HIP ARTHROPLASTY TOTAL  6/20/08    Performed by YONATAN HINSON at SURGERY [facility]   • LUMBAR LAMINECTOMY DISKECTOMY  2000   • TMJ RECONSTRUCTION BILATERAL  1994   • ANKLE ORIF Right    • KNEE ARTHROSCOPY Left    • VEIN STRIPPING Left      Allergies   Allergen Reactions   • Crestor [Rosuvastatin Calcium] Myalgia   • Penicillins Hives, Itching and Vomiting     Outpatient Encounter Prescriptions as of 8/9/2018   Medication Sig Dispense Refill   • aspirin EC (ECOTRIN) 81 MG Tablet Delayed Response Take 81 mg by mouth every day.     • diazePAM (VALIUM) 5 MG Tab Take 1 Tab by mouth every 6 hours as needed for Anxiety or Sleep for up to 10 days. 5-10 mg 30 Tab 0   • cyclobenzaprine (FLEXERIL) 10 MG Tab TAKE ONE TABLET BY MOUTH THREE TIMES DAILY AS NEEDED FOR MILD PAIN 90 Tab 5   • tramadol (ULTRAM) 50 MG Tab Take 1-2 Tabs by mouth every four hours as needed (Moderate Pain (NRS Pain Scale 4-6; CPOT Pain Scale 3-5) if opiates not ordered or tolerated) for up to 10 days. 80 Tab 0   • PARoxetine (PAXIL) 20 MG Tab TAKE 1 TABLET BY MOUTH EVERY DAY 90 Tab 0   • predniSONE (DELTASONE) 1 MG Tab TAKE 2 TABLETS BY MOUTH EVERY DAY 60 Tab 2   • bisoprolol (ZEBETA) 10 MG tablet Take 10 mg by mouth every day.     • pravastatin (PRAVACHOL) 40 MG tablet Take 1 Tab by mouth every day. 30 Tab  11   • meloxicam (MOBIC) 15 MG tablet TAKE 1 TABLET BY MOUTH EVERY DAY 30 Tab 11   • CALCIUM-VITAMIN D PO Take 1 Tab by mouth 2 Times a Day. 1200/1000 mg     • ascorbic acid (ASCORBIC ACID) 500 MG Tab Take 500 mg by mouth every day.     • Garlic 1000 MG Cap Take 1 Cap by mouth every day.     • vitamin e (VITAMIN E) 400 UNIT Cap Take 400 Units by mouth every day.     • lisinopril (PRINIVIL) 10 MG Tab TAKE ONE TABLET BY MOUTH ONCE DAILY 90 Tab 2   • Cyanocobalamin (VITAMIN B-12) 5000 MCG TABLET DISPERSIBLE Take 1 Tab by mouth every day.     • niacin 500 MG Tab Take 500 mg by mouth every day.     • Zinc 50 MG Cap Take 50 mg by mouth every day.     • Multiple Vitamins-Minerals (MULTI COMPLETE PO) Take 1 Tab by mouth every day.     • vardenafil (LEVITRA) 20 MG tablet Take 20 mg by mouth as needed.     • omeprazole (PRILOSEC) 20 MG CPDR Take 20 mg by mouth every day.     • [DISCONTINUED] meloxicam (MOBIC) 7.5 MG Tab Take 1 Tab by mouth every day for 30 days. 30 Tab 0   • Tofacitinib Citrate (XELJANZ) 5 MG Tab Take 5 mg by mouth 2 Times a Day. 60 Tab 0     No facility-administered encounter medications on file as of 8/9/2018.        Social History     Social History   • Marital status:      Spouse name: N/A   • Number of children: N/A   • Years of education: N/A     Occupational History   • Not on file.     Social History Main Topics   • Smoking status: Never Smoker   • Smokeless tobacco: Never Used   • Alcohol use 1.8 oz/week     3 Cans of beer per week      Comment: 3 per week   • Drug use: No   • Sexual activity: Yes     Partners: Female     Other Topics Concern   •  Service No   • Blood Transfusions No   • Caffeine Concern No   • Occupational Exposure No   • Hobby Hazards Yes     rides motorcyle   • Sleep Concern No   • Stress Concern No   • Weight Concern Yes   • Special Diet Yes     does not eat red meat    • Back Care Yes   • Exercise Yes   • Bike Helmet Yes   • Seat Belt Yes   • Self-Exams Yes      Social History Narrative   • No narrative on file      History   Smoking Status   • Never Smoker   Smokeless Tobacco   • Never Used     History   Alcohol Use   • 1.8 oz/week   • 3 Cans of beer per week     Comment: 3 per week     History   Drug Use No         Family History   Problem Relation Age of Onset   • Hypertension Mother        Review of Systems   Constitutional: Negative for chills and fever.   HENT: Negative for sore throat.    Eyes: Negative for photophobia, pain and redness.   Respiratory: Negative for shortness of breath, wheezing and stridor.    Cardiovascular: Negative for chest pain and palpitations.   Gastrointestinal: Negative for abdominal pain, nausea and vomiting.   Musculoskeletal: Positive for back pain and joint pain.   Skin: Negative for rash.   Neurological: Negative for seizures and loss of consciousness.   Psychiatric/Behavioral: Negative for depression. The patient does not have insomnia.         Objective:   BP (!) 90/50   Pulse 74   Temp (!) 35.6 °C (96 °F)   Resp 14   Wt 96.2 kg (212 lb)   SpO2 97%   BMI 30.42 kg/m²     Physical Exam   Constitutional: He is oriented to person, place, and time. He appears well-developed and well-nourished. No distress.   HENT:   Head: Normocephalic and atraumatic.   Right Ear: External ear normal.   Left Ear: External ear normal.   Eyes: Conjunctivae are normal. Right eye exhibits no discharge. Left eye exhibits no discharge. No scleral icterus.   Cardiovascular:   No murmur heard.  Pulmonary/Chest: Effort normal. No stridor. No respiratory distress.   Musculoskeletal: He exhibits no edema.   R foot send toe active synovbitis.  bialteral hand MCP synovitis aidn low grade synovitis in the wrists and PIP  Kyphoses of the thoracic spine, lordosis of the cervical spine.   Lymphadenopathy:     He has no cervical adenopathy.   Neurological: He is alert and oriented to person, place, and time.   Skin: Skin is warm and dry. No rash noted. He is not  diaphoretic. No erythema. No pallor.   Psychiatric: He has a normal mood and affect. His behavior is normal. Judgment and thought content normal.   Vitals reviewed.      Assessment:     1. Long-term current use of tofacitinib  LIPID PROFILE    HEPATIC FUNCTION PANEL   2. Rheumatoid arthritis with positive rheumatoid factor, involving unspecified site (HCC)     3. Current chronic use of systemic steroids       Labs:      Lab Results   Component Value Date/Time    QNTTBGOLD Negative 06/01/2017 01:13 PM     Lab Results   Component Value Date/Time    HEPBCORTOT Negative 06/01/2017 01:13 PM    HEPBSAG Negative 06/01/2017 01:13 PM     Lab Results   Component Value Date/Time    HEPCAB Negative 06/01/2017 01:13 PM     Lab Results   Component Value Date/Time    SODIUM 133 (L) 08/04/2018 05:00 AM    POTASSIUM 4.3 08/04/2018 05:00 AM    CHLORIDE 104 08/04/2018 05:00 AM    CO2 24 08/04/2018 05:00 AM    GLUCOSE 109 (H) 08/04/2018 05:00 AM    BUN 17 08/04/2018 05:00 AM    CREATININE 0.99 08/04/2018 05:00 AM    CREATININE 1.1 04/21/2009 03:50 PM    BUNCREATRAT 13 09/21/2016 09:21 AM      Lab Results   Component Value Date/Time    WBC 9.4 07/26/2018 11:19 AM    WBC 7.5 07/01/2011 10:30 AM    RBC 4.67 (L) 07/26/2018 11:19 AM    RBC 4.72 07/01/2011 10:30 AM    HEMOGLOBIN 11.3 (L) 08/04/2018 05:00 AM    HEMATOCRIT 34.2 (L) 08/04/2018 05:00 AM    MCV 91.2 07/26/2018 11:19 AM     (H) 07/01/2011 10:30 AM    MCH 29.3 07/26/2018 11:19 AM    MCH 36.0 (H) 07/01/2011 10:30 AM    MCHC 32.2 (L) 07/26/2018 11:19 AM    MPV 10.3 07/26/2018 11:19 AM    NEUTSPOLYS 51.40 05/21/2018 03:00 AM    LYMPHOCYTES 24.40 05/21/2018 03:00 AM    MONOCYTES 14.20 (H) 05/21/2018 03:00 AM    EOSINOPHILS 8.00 (H) 05/21/2018 03:00 AM    BASOPHILS 1.10 05/21/2018 03:00 AM    HYPOCHROMIA 1+ 03/05/2014 04:43 PM    ANISOCYTOSIS 1+ 01/02/2015 05:02 PM      Lab Results   Component Value Date/Time    CALCIUM 8.2 (L) 08/04/2018 05:00 AM    ASTSGOT 34 04/30/2018 10:52  AM    ALTSGPT 32 04/30/2018 10:52 AM    ALKPHOSPHAT 96 04/30/2018 10:52 AM    TBILIRUBIN 0.8 04/30/2018 10:52 AM    ALBUMIN 4.5 04/30/2018 10:52 AM    TOTPROTEIN 8.1 04/30/2018 10:52 AM     Lab Results   Component Value Date/Time    RHEUMFACTN <10 08/04/2017 02:32 PM     Lab Results   Component Value Date/Time    SEDRATEWES 13 04/26/2018 03:03 PM    CREACTPROT 5.73 (H) 04/26/2018 03:03 PM     No results found for: RUSSELVIPER, DRVVTINTERP  Lab Results   Component Value Date/Time    CRYOGLOBULIN NEG 72Hour 08/04/2017 02:32 PM     No results found for: ANADIRECT, ANTIDNADS, RNPAB, SMITHAB, WKPURYP68, SSAROAB, SSBLAAB, SPVUCP3LI, CENTROMBAB  No results found for: ANTIDNADS, DSDNA, AGBMAB, GBMABA, ANCAIGG, [id number], JO1AB, RNPAB, ANTISSASJ, ANTISSBSJ  Lab Results   Component Value Date/Time    COLORURINE Yellow 04/30/2018 12:03 PM    SPECGRAVITY <=1.005 04/30/2018 12:03 PM    PHURINE 7.5 04/30/2018 12:03 PM    GLUCOSEUR Negative 04/30/2018 12:03 PM    KETONES Negative 04/30/2018 12:03 PM    PROTEINURIN Negative 04/30/2018 12:03 PM     Lab Results   Component Value Date/Time    TOTALVOLUME 2,750 08/18/2015 07:30 AM    TOTALVOLUME 2,750 08/18/2015 07:30 AM     No results found for: SSA60, SSA52  Lab Results   Component Value Date/Time    HBA1C 5.3 07/26/2018 11:19 AM     Lab Results   Component Value Date/Time    CPKTOTAL 141 01/26/2018 07:47 AM     No results found for: G6PD  No results found for: BBHS14BNMO  No results found for: ACESERUM  Lab Results   Component Value Date/Time    25HYDROXY 18 (L) 06/01/2017 01:13 PM     Lab Results   Component Value Date/Time    FREEDIR 1.11 12/01/2014 03:13 PM     No results found for: TSHULTRASEN, FREET4  No results found for: MICROSOMALA, ANTITHYROGL  No results found for: IGGLYMEABS  No results found for: ANTIMITOCHO, FACTIN  No results found for: IGA, TTRANSIGA, ENDOIGA  No results found for: FLTYPE, CRYSTALSBDF  No results found for: ISTATICAL  No results found for:  ISTATCREAT  No results found for: CTELOPEP  No results found for: GBMABG  Lab Results   Component Value Date/Time    PTHINTACT 55 10/10/2008 10:42 AM         Medical Decision Making:  Today's Assessment / Status / Plan:     Rheumatoid Arthritis  When cleared by surgery restart xeljanz and get baseline hepatic panel and lipid panel.  Will restart.  Will need to then write labs for 4 weeks and 8 weeks      Chronic steroid use  DEXA showed osteopenia  FRAX scores are 14% & 2.6%, do not warrant treatment at this point, will repeat DEXA scan in two years. Sep 2019   Recheck lab at 3 months.  Continue Calcium and vitamin D    Results for CANDI UMANA (MRN [id number]) as of 6/23/2017 18:44   Ref. Range 6/1/2017 13:13   Hep B Surface Antibody Quant Latest Ref Range: 0.00-10.00 mIU/mL <3.10   Hepatitis B Surface Antigen Latest Ref Range: Negative  Negative   Hepatitis B Ccore Ab, Total Latest Ref Range: Negative  Negative   Hepatitis C Antibody Latest Ref Range: Negative  Negative   Mitogen-Nil Unknown 60.763   Nil Unknown 0.029   Quantiferon TB Gold Latest Ref Range: Negative  Negative   TB Ag-Nil Unknown -0.010       Aortic insufficiency  Not symptomatic  Followed by cardiology    S/p knee surgery  Will follow-up Friday  Advise patient to get clearance from surgeon specifically no signs of infection, open wounds or any concerns regarding compromise of healing.  I would like our patient to wait at least 2 weeks post surgery before restarting.      1. Long-term current use of tofacitinib  LIPID PROFILE    HEPATIC FUNCTION PANEL   2. Rheumatoid arthritis with positive rheumatoid factor, involving unspecified site (HCC)     3. Current chronic use of systemic steroids           Return in about 3 months (around 11/9/2018).         Please note that this dictation was created using voice recognition software. I have made every reasonable attempt to correct obvious errors, but I expect that there are errors of grammar and  possibly content that I did not discover before finalizing the note.

## 2018-08-09 NOTE — LETTER
[facility]   [address], [address]  [place] [address]  Phone: [phone]  Fax: [phone]              Encounter Date: 8/9/2018    Dear Dr. Lao,    It was a pleasure seeing your patient, Edgardo Sims, on 8/9/2018. Diagnoses of Long-term current use of tofacitinib, Rheumatoid arthritis with positive rheumatoid factor, involving unspecified site (HCC), and Current chronic use of systemic steroids were pertinent to this visit.     Please find attached progress note which includes the history I obtained from Mr. Sims, my physical examination findings, my impression and recommendations.      Once again, it was a pleasure participating in your patient's care.  Please feel free to contact me if you have any questions or if I can be of any further assistance to your patients.      Sincerely,    Antonieta Canales M.D.  Electronically Signed          PROGRESS NOTE:  Subjective:   Date of Consultation:8/9/2018  1:00 PM  Primary care physician:John Lao D.O.    Reason for Consultation:  Mr. Sims  is a pleasant 61 y.o. year old male who presented with follow-up for Rheumatoid Arthritis. Is on the xeljanz (tofacitinib) for the past two and a half months. He stopped taking it last week because he was sick [coughing, shortness of breath, chest congestion, temp of ~ 102.6]. Was started on Keflex by his PCP and he will finish by Friday. He feels better now, and he will restart taking xeljanz (tofacitinib) after he completely recovers.  Has morning stiffness lasting  60 minutes in the morning. Mild pain in the left MCP finger 4 & 3. HE did hold his medication when he was ill.        Rheumatoid Arthritis  He has been off xeljanz since  April.  He had a UTI then followed by a knee surgry.  JACKIE ruiz his follow-up with surgery  August 15th.    Had knee surgery August 4th  He notes joints are swollen and tight.  Still on prednisone 2 mg daily    Enlarged prostate  Has a follow-up with  urology    Osteopenia noted in wrist  Previous fracture: no  Parent fractured hip: no  Current smoking: no  Glucocorticoids use > 3 months: yes  Rheumatoid arthritis: yes  Secondary osteoporosis : no  Alcohol 3 or more units/day: no    DEXA 9/5/2017  The mean bone mineral density for the lumbar spine is 1.196 g/cm2, which corresponds to a T score of -0.2 and a Z score of 0.2.  The distal left forearm has a mean bone mineral density of 0.837 g/cm2, with a T score of -1.5 and a Z score of -1.1.  FRAX scores are 14% & 2.6%    Current Medications  Methotrexate 6 tabs q Wednesday  (stopped)  xeljanz 5 mg BID (stopped)  Prednisone 2 mg po q day  (he has been on long term prednisone)  meloxicam      RHEUM HISTORY  Mr. Edgardo Sims presented with a history of Rheumatoid Arthritis diagnosed late 1999 and recent RF and CCP were negative.  He was previously a patient of Dr. Mcknight and then followed by a community rheumatology.  He is here today to establish care. In review of Dr. Echavarria that it seems he has been on Rituxan as far back as 2009. At that time he was on a regimen of azathioprine 150 mg methotrexate 15 mg, prednisone 7.5 mg and rituximab with 125 mg of Solu-Medrol he seemed to have been maintained on this regimen and was tapered down on the Imuran to 50 mg twice a day. Notes from May 24, 2012 indicates that he discontinue the Imuran. At that time he was on rituximab and every 6 months 15 mg of methotrexate weekly and 2 mg of prednisone. Notes from June 8, 2015 does remark that his RA was only manageable he's had limited response. He did suffer a consultation with methotrexate developing stomatitis. Methotrexate was held March 6, 2014 and was reinstituted January 5, 2015.     Dr. Mcknight's notes it does comment that he may benefit from rituximab every 5 months however given cost restrictions he can only get it every 6 months. His last infusion was in March 2017 for rituximab.  He reports he is not doing  as well on the rituximab.  He feels his activity level is not as active.  He feels increase stiffness.  His feet xrays     Complications to his care include intolerance to medications  And stomatitis with methotrexate.  Methotrexate was stopped briefly and subsequently was restarted but the patient reports it is not working as well as before.     Previous medications include   azathioprine, methotrexate  Plaquenil and Arava both intolerance not allergies  Remicade loss efficacyt  Gold shots (had a bad reaction)  Enbrel  Not sure Humira  Methylprednisolone did not work as well as prednisone  Never been on Xeljanz   Has not tried Actemra  rituximab (lost efficacy last infusion was 3/2017)  Orencia (not sure why he stopped)    Chronic Pain  On percocet ranging from 30-40 mg daily    Past medical HIstory: bilateral hip replacment and left knee replacement, tonsillitis, varicose vein in the left leg, TMJ, tonsillitis in 1964, broken right ankle in 1980, ruptured disc of L5-S1 noted in 2000 with discectomy and laminectomy, rotator cuff with anterior labral repair in 2001, basal cell carcinoma in 2005 from the nose, meniscal tear of the left knee in 2008 with scope repair, scope repair and biceps tendon repair as well as rotator cuff surgery in 2011 of the right shoulder. Dupuytren's contracture right hand little finger, Dupuytren's contracture right foot, arthritis of the right foot, plantar fasciitis bilateral, aortic insufficiency, hypertension, dyslipidemia, heart murmur, tachycardia, coronary artery disease with mild plaque at catheter. Hiatal hernia and GERD, scoliosis, kyphosis of the upper back/compensating lordosis of the neck . Vein ligation radiofrequency      Family History: lymphoma     Social History: light bookeeping mainly disability, NEVER smoker, occasional alcohol (once a week)    Past Medical History:   Diagnosis Date   • Abnormal myocardial perfusion study 1/15/2014   • Aortic insufficiency 7/5/2011      • Arthritis     RA, and osteo   • Bronchitis    • CAD (coronary artery disease) mild plaque at cath in 2/14 12/5/2014   • Cancer (HCC)     skin   • Chronic use of opiate drugs therapeutic purposes 8/12/2017   • Dyslipidemia 7/12/2011   • Heart burn    • Heart murmur    • Hiatus hernia syndrome    • High cholesterol    • HTN (hypertension) 7/5/2011   • Hypertension    • Indigestion    • Infectious disease    • Pain     RA   • Pain     hands, feet and jaw   • Rheumatoid arthritis(714.0)     severe   • Tachycardia 10/20/2014     Past Surgical History:   Procedure Laterality Date   • KNEE REVISION TOTAL Left 8/3/2018    Procedure: KNEE REVISION TOTAL;  Surgeon: Michael Rodriguez M.D.;  Location: SURGERY [facility];  Service: Orthopedics   • CYSTOSCOPY  5/16/2018    Procedure: CYSTOSCOPY-CLOT EVAC;  Surgeon: Jose Romo M.D.;  Location: SURGERY [facility];  Service: Urology   • TRANS URETHRAL RESECTION BLADDER  5/16/2018    Procedure: TRANS URETHRAL RESECTION BLADDER;  Surgeon: Jose Romo M.D.;  Location: SURGERY [facility];  Service: Urology   • RECOVERY  2/3/2014    Performed by Cath-Recovery Surgery at SURGERY SAME DAY [facility] [place]   • HIP ARTHROPLASTY TOTAL  5/31/2013    Performed by Burak Valles M.D. at SURGERY [facility]   • ROTATOR CUFF REPAIR Right 2011    x 2   • KNEE ARTHROPLASTY TOTAL  6/1/2009    LEFT-Performed by YONATAN HINSON at SURGERY [facility]   • VEIN LIGATION RADIO FREQUENCY  12/8/2008    LEFT leg-Performed by DEREJE MCCULLOUGH at SURGERY SAME DAY [facility] [place]   • HIP ARTHROPLASTY TOTAL  6/20/08    Performed by YONATAN HINSON at SURGERY [facility]   • LUMBAR LAMINECTOMY DISKECTOMY  2000   • TMJ RECONSTRUCTION BILATERAL  1994   • ANKLE ORIF Right    • KNEE ARTHROSCOPY Left    • VEIN STRIPPING Left      Allergies   Allergen Reactions   • Crestor [Rosuvastatin Calcium] Myalgia   • Penicillins Hives, Itching and Vomiting     Outpatient  Encounter Prescriptions as of 8/9/2018   Medication Sig Dispense Refill   • aspirin EC (ECOTRIN) 81 MG Tablet Delayed Response Take 81 mg by mouth every day.     • diazePAM (VALIUM) 5 MG Tab Take 1 Tab by mouth every 6 hours as needed for Anxiety or Sleep for up to 10 days. 5-10 mg 30 Tab 0   • cyclobenzaprine (FLEXERIL) 10 MG Tab TAKE ONE TABLET BY MOUTH THREE TIMES DAILY AS NEEDED FOR MILD PAIN 90 Tab 5   • tramadol (ULTRAM) 50 MG Tab Take 1-2 Tabs by mouth every four hours as needed (Moderate Pain (NRS Pain Scale 4-6; CPOT Pain Scale 3-5) if opiates not ordered or tolerated) for up to 10 days. 80 Tab 0   • PARoxetine (PAXIL) 20 MG Tab TAKE 1 TABLET BY MOUTH EVERY DAY 90 Tab 0   • predniSONE (DELTASONE) 1 MG Tab TAKE 2 TABLETS BY MOUTH EVERY DAY 60 Tab 2   • bisoprolol (ZEBETA) 10 MG tablet Take 10 mg by mouth every day.     • pravastatin (PRAVACHOL) 40 MG tablet Take 1 Tab by mouth every day. 30 Tab 11   • meloxicam (MOBIC) 15 MG tablet TAKE 1 TABLET BY MOUTH EVERY DAY 30 Tab 11   • CALCIUM-VITAMIN D PO Take 1 Tab by mouth 2 Times a Day. 1200/1000 mg     • ascorbic acid (ASCORBIC ACID) 500 MG Tab Take 500 mg by mouth every day.     • Garlic 1000 MG Cap Take 1 Cap by mouth every day.     • vitamin e (VITAMIN E) 400 UNIT Cap Take 400 Units by mouth every day.     • lisinopril (PRINIVIL) 10 MG Tab TAKE ONE TABLET BY MOUTH ONCE DAILY 90 Tab 2   • Cyanocobalamin (VITAMIN B-12) 5000 MCG TABLET DISPERSIBLE Take 1 Tab by mouth every day.     • niacin 500 MG Tab Take 500 mg by mouth every day.     • Zinc 50 MG Cap Take 50 mg by mouth every day.     • Multiple Vitamins-Minerals (MULTI COMPLETE PO) Take 1 Tab by mouth every day.     • vardenafil (LEVITRA) 20 MG tablet Take 20 mg by mouth as needed.     • omeprazole (PRILOSEC) 20 MG CPDR Take 20 mg by mouth every day.     • [DISCONTINUED] meloxicam (MOBIC) 7.5 MG Tab Take 1 Tab by mouth every day for 30 days. 30 Tab 0   • Tofacitinib Citrate (XELJANZ) 5 MG Tab Take 5 mg by  mouth 2 Times a Day. 60 Tab 0     No facility-administered encounter medications on file as of 8/9/2018.        Social History     Social History   • Marital status:      Spouse name: N/A   • Number of children: N/A   • Years of education: N/A     Occupational History   • Not on file.     Social History Main Topics   • Smoking status: Never Smoker   • Smokeless tobacco: Never Used   • Alcohol use 1.8 oz/week     3 Cans of beer per week      Comment: 3 per week   • Drug use: No   • Sexual activity: Yes     Partners: Female     Other Topics Concern   •  Service No   • Blood Transfusions No   • Caffeine Concern No   • Occupational Exposure No   • Hobby Hazards Yes     rides motorcyle   • Sleep Concern No   • Stress Concern No   • Weight Concern Yes   • Special Diet Yes     does not eat red meat    • Back Care Yes   • Exercise Yes   • Bike Helmet Yes   • Seat Belt Yes   • Self-Exams Yes     Social History Narrative   • No narrative on file      History   Smoking Status   • Never Smoker   Smokeless Tobacco   • Never Used     History   Alcohol Use   • 1.8 oz/week   • 3 Cans of beer per week     Comment: 3 per week     History   Drug Use No         Family History   Problem Relation Age of Onset   • Hypertension Mother        Review of Systems   Constitutional: Negative for chills and fever.   HENT: Negative for sore throat.    Eyes: Negative for photophobia, pain and redness.   Respiratory: Negative for shortness of breath, wheezing and stridor.    Cardiovascular: Negative for chest pain and palpitations.   Gastrointestinal: Negative for abdominal pain, nausea and vomiting.   Musculoskeletal: Positive for back pain and joint pain.   Skin: Negative for rash.   Neurological: Negative for seizures and loss of consciousness.   Psychiatric/Behavioral: Negative for depression. The patient does not have insomnia.         Objective:   BP (!) 90/50   Pulse 74   Temp (!) 35.6 °C (96 °F)   Resp 14   Wt 96.2 kg (212  lb)   SpO2 97%   BMI 30.42 kg/m²      Physical Exam   Constitutional: He is oriented to person, place, and time. He appears well-developed and well-nourished. No distress.   HENT:   Head: Normocephalic and atraumatic.   Right Ear: External ear normal.   Left Ear: External ear normal.   Eyes: Conjunctivae are normal. Right eye exhibits no discharge. Left eye exhibits no discharge. No scleral icterus.   Cardiovascular:   No murmur heard.  Pulmonary/Chest: Effort normal. No stridor. No respiratory distress.   Musculoskeletal: He exhibits no edema.   R foot send toe active synovbitis.  bialteral hand MCP synovitis aidn low grade synovitis in the wrists and PIP  Kyphoses of the thoracic spine, lordosis of the cervical spine.   Lymphadenopathy:     He has no cervical adenopathy.   Neurological: He is alert and oriented to person, place, and time.   Skin: Skin is warm and dry. No rash noted. He is not diaphoretic. No erythema. No pallor.   Psychiatric: He has a normal mood and affect. His behavior is normal. Judgment and thought content normal.   Vitals reviewed.      Assessment:     1. Long-term current use of tofacitinib  LIPID PROFILE    HEPATIC FUNCTION PANEL   2. Rheumatoid arthritis with positive rheumatoid factor, involving unspecified site (HCC)     3. Current chronic use of systemic steroids       Labs:      Lab Results   Component Value Date/Time    QNTTBGOLD Negative 06/01/2017 01:13 PM     Lab Results   Component Value Date/Time    HEPBCORTOT Negative 06/01/2017 01:13 PM    HEPBSAG Negative 06/01/2017 01:13 PM     Lab Results   Component Value Date/Time    HEPCAB Negative 06/01/2017 01:13 PM     Lab Results   Component Value Date/Time    SODIUM 133 (L) 08/04/2018 05:00 AM    POTASSIUM 4.3 08/04/2018 05:00 AM    CHLORIDE 104 08/04/2018 05:00 AM    CO2 24 08/04/2018 05:00 AM    GLUCOSE 109 (H) 08/04/2018 05:00 AM    BUN 17 08/04/2018 05:00 AM    CREATININE 0.99 08/04/2018 05:00 AM    CREATININE 1.1 04/21/2009  03:50 PM    BUNCREATRAT 13 09/21/2016 09:21 AM      Lab Results   Component Value Date/Time    WBC 9.4 07/26/2018 11:19 AM    WBC 7.5 07/01/2011 10:30 AM    RBC 4.67 (L) 07/26/2018 11:19 AM    RBC 4.72 07/01/2011 10:30 AM    HEMOGLOBIN 11.3 (L) 08/04/2018 05:00 AM    HEMATOCRIT 34.2 (L) 08/04/2018 05:00 AM    MCV 91.2 07/26/2018 11:19 AM     (H) 07/01/2011 10:30 AM    MCH 29.3 07/26/2018 11:19 AM    MCH 36.0 (H) 07/01/2011 10:30 AM    MCHC 32.2 (L) 07/26/2018 11:19 AM    MPV 10.3 07/26/2018 11:19 AM    NEUTSPOLYS 51.40 05/21/2018 03:00 AM    LYMPHOCYTES 24.40 05/21/2018 03:00 AM    MONOCYTES 14.20 (H) 05/21/2018 03:00 AM    EOSINOPHILS 8.00 (H) 05/21/2018 03:00 AM    BASOPHILS 1.10 05/21/2018 03:00 AM    HYPOCHROMIA 1+ 03/05/2014 04:43 PM    ANISOCYTOSIS 1+ 01/02/2015 05:02 PM      Lab Results   Component Value Date/Time    CALCIUM 8.2 (L) 08/04/2018 05:00 AM    ASTSGOT 34 04/30/2018 10:52 AM    ALTSGPT 32 04/30/2018 10:52 AM    ALKPHOSPHAT 96 04/30/2018 10:52 AM    TBILIRUBIN 0.8 04/30/2018 10:52 AM    ALBUMIN 4.5 04/30/2018 10:52 AM    TOTPROTEIN 8.1 04/30/2018 10:52 AM     Lab Results   Component Value Date/Time    RHEUMFACTN <10 08/04/2017 02:32 PM     Lab Results   Component Value Date/Time    SEDRATEWES 13 04/26/2018 03:03 PM    CREACTPROT 5.73 (H) 04/26/2018 03:03 PM     No results found for: RUSSELVIPER, DRVVTINTERP  Lab Results   Component Value Date/Time    CRYOGLOBULIN NEG 72Hour 08/04/2017 02:32 PM     No results found for: ANADIRECT, ANTIDNADS, RNPAB, SMITHAB, TOYZRNO70, SSAROAB, SSBLAAB, WDAPHO1OA, CENTROMBAB  No results found for: ANTIDNADS, DSDNA, AGBMAB, GBMABA, ANCAIGG, Y2UFJMUBXUZ, JO1AB, RNPAB, ANTISSASJ, ANTISSBSJ  Lab Results   Component Value Date/Time    COLORURINE Yellow 04/30/2018 12:03 PM    SPECGRAVITY <=1.005 04/30/2018 12:03 PM    PHURINE 7.5 04/30/2018 12:03 PM    GLUCOSEUR Negative 04/30/2018 12:03 PM    KETONES Negative 04/30/2018 12:03 PM    PROTEINURIN Negative 04/30/2018  12:03 PM     Lab Results   Component Value Date/Time    TOTALVOLUME 2,750 08/18/2015 07:30 AM    TOTALVOLUME 2,750 08/18/2015 07:30 AM     No results found for: SSA60, SSA52  Lab Results   Component Value Date/Time    HBA1C 5.3 07/26/2018 11:19 AM     Lab Results   Component Value Date/Time    CPKTOTAL 141 01/26/2018 07:47 AM     No results found for: G6PD  No results found for: RCEI64VLSQ  No results found for: ACESERUM  Lab Results   Component Value Date/Time    25HYDROXY 18 (L) 06/01/2017 01:13 PM     Lab Results   Component Value Date/Time    FREEDIR 1.11 12/01/2014 03:13 PM     No results found for: TSHULTRASEN, FREET4  No results found for: MICROSOMALA, ANTITHYROGL  No results found for: IGGLYMEABS  No results found for: ANTIMITOCHO, FACTIN  No results found for: IGA, TTRANSIGA, ENDOIGA  No results found for: FLTYPE, CRYSTALSBDF  No results found for: ISTATICAL  No results found for: ISTATCREAT  No results found for: CTELOPEP  No results found for: GBMABG  Lab Results   Component Value Date/Time    PTHINTACT 55 10/10/2008 10:42 AM         Medical Decision Making:  Today's Assessment / Status / Plan:     Rheumatoid Arthritis  When cleared by surgery restart xeljanz and get baseline hepatic panel and lipid panel.  Will restart.  Will need to then write labs for 4 weeks and 8 weeks      Chronic steroid use  DEXA showed osteopenia  FRAX scores are 14% & 2.6%, do not warrant treatment at this point, will repeat DEXA scan in two years. Sep 2019   Recheck lab at 3 months.  Continue Calcium and vitamin D    Results for CANDI UMANA (MRN [id number]) as of 6/23/2017 18:44   Ref. Range 6/1/2017 13:13   Hep B Surface Antibody Quant Latest Ref Range: 0.00-10.00 mIU/mL <3.10   Hepatitis B Surface Antigen Latest Ref Range: Negative  Negative   Hepatitis B Ccore Ab, Total Latest Ref Range: Negative  Negative   Hepatitis C Antibody Latest Ref Range: Negative  Negative   Mitogen-Nil Unknown 60.763   Nil Unknown 0.029      Quantiferon TB Gold Latest Ref Range: Negative  Negative   TB Ag-Nil Unknown -0.010       Aortic insufficiency  Not symptomatic  Followed by cardiology    S/p knee surgery  Will follow-up Friday  Advise patient to get clearance from surgeon specifically no signs of infection, open wounds or any concerns regarding compromise of healing.  I would like our patient to wait at least 2 weeks post surgery before restarting.      1. Long-term current use of tofacitinib  LIPID PROFILE    HEPATIC FUNCTION PANEL   2. Rheumatoid arthritis with positive rheumatoid factor, involving unspecified site (HCC)     3. Current chronic use of systemic steroids           Return in about 3 months (around 11/9/2018).         Please note that this dictation was created using voice recognition software. I have made every reasonable attempt to correct obvious errors, but I expect that there are errors of grammar and possibly content that I did not discover before finalizing the note.

## 2018-08-20 RX ORDER — CYCLOBENZAPRINE HCL 10 MG
TABLET ORAL
Refills: 2 | OUTPATIENT
Start: 2018-08-20

## 2018-08-20 RX ORDER — CYCLOBENZAPRINE HCL 10 MG
TABLET ORAL
Qty: 90 TAB | Refills: 5 | Status: SHIPPED | OUTPATIENT
Start: 2018-08-20 | End: 2019-06-10 | Stop reason: SDUPTHER

## 2018-08-20 NOTE — TELEPHONE ENCOUNTER
Was the patient seen in the last year in this department? Yes    Does patient have an active prescription for medications requested? No     Received Request Via: Patient     Pt states that he never received an Rx from Dr Michael Rodriguez for this, also the pharmacy never got anything either.  Can you fill this for him please?

## 2018-08-24 ENCOUNTER — PHYSICAL THERAPY (OUTPATIENT)
Dept: PHYSICAL THERAPY | Facility: REHABILITATION | Age: 63
End: 2018-08-24
Attending: ORTHOPAEDIC SURGERY
Payer: MEDICARE

## 2018-08-24 DIAGNOSIS — Z96.652 S/P REVISION OF TOTAL KNEE, LEFT: ICD-10-CM

## 2018-08-24 PROCEDURE — G8979 MOBILITY GOAL STATUS: HCPCS | Mod: CJ

## 2018-08-24 PROCEDURE — 97116 GAIT TRAINING THERAPY: CPT

## 2018-08-24 PROCEDURE — G8978 MOBILITY CURRENT STATUS: HCPCS | Mod: CL

## 2018-08-24 PROCEDURE — 97162 PT EVAL MOD COMPLEX 30 MIN: CPT

## 2018-08-24 PROCEDURE — 97110 THERAPEUTIC EXERCISES: CPT

## 2018-08-24 ASSESSMENT — ENCOUNTER SYMPTOMS
PAIN SCALE: 2
PAIN LOCATION: L. KNEE
PAIN SCALE AT LOWEST: 0
PAIN SCALE AT HIGHEST: 7

## 2018-08-24 NOTE — OP THERAPY EVALUATION
Outpatient Physical Therapy  INITIAL EVALUATION    [facility] [place] [address] [facility], [address]  [place] [address]  Phone:  [phone]    Date of Evaluation: 2018    Patient: Edgardo Sims  YOB: 1955  MRN: [id number]     Referring Provider: Michael Rodriguez M.D.  [address] [place] [address]   Referring Diagnosis Presence of left artificial knee joint [Z96.652]     Time Calculation  Start time: 0900  Stop time: 1008 Time Calculation (min): 68 minutes     Physical Therapy Occurrence Codes    Date of onset of impairment:  18   Date physical therapy care plan established or reviewed:  18   Date physical therapy treatment started:  18          Chief Complaint: No chief complaint on file.    Visit Diagnoses     ICD-10-CM   1. S/P revision of total knee, left Z96.652         Subjective:   History of Present Illness:     Date of surgery:  8/3/2018    Mechanism of injury:   original TKA, revision/replacement of spacer but did not have to put new hardware into bone on 8/3/18. Hx of RA with extensive surgical history for AUTUMN bilateral hips, Rot. Cuff repair on R. With residual complaint of mm spasm R. Shoulder. Hx of ORIF R. Ankle that he reports healed well without ongoing impairment, and lumbar laminectomy but currently has no complaint of LBP. Reporting a low level of pain in knee today but has taken pain medication this A.M. Only 2 steps at home. Has a home where some gardening/landscaping performed.   Pain:     Current pain ratin    At best pain ratin    At worst pain ratin    Location:  L. knee  Patient Goals:     Patient goals for therapy:  Improved balance, increased motion and return to sport/leisure activities    Other patient goals:  Get back motorcycle ()      Past Medical History:   Diagnosis Date   • Abnormal myocardial perfusion study 1/15/2014   • Aortic insufficiency 2011   • Arthritis     RA, and osteo   •  Bronchitis    • CAD (coronary artery disease) mild plaque at cath in 2/14 12/5/2014   • Cancer (HCC)     skin   • Chronic use of opiate drugs therapeutic purposes 8/12/2017   • Dyslipidemia 7/12/2011   • Heart burn    • Heart murmur    • Hiatus hernia syndrome    • High cholesterol    • HTN (hypertension) 7/5/2011   • Hypertension    • Indigestion    • Infectious disease    • Pain     RA   • Pain     hands, feet and jaw   • Rheumatoid arthritis(714.0)     severe   • Tachycardia 10/20/2014     Past Surgical History:   Procedure Laterality Date   • KNEE REVISION TOTAL Left 8/3/2018    Procedure: KNEE REVISION TOTAL;  Surgeon: Michael Rodriguez M.D.;  Location: SURGERY [facility];  Service: Orthopedics   • CYSTOSCOPY  5/16/2018    Procedure: CYSTOSCOPY-CLOT EVAC;  Surgeon: Jose Romo M.D.;  Location: SURGERY [facility];  Service: Urology   • TRANS URETHRAL RESECTION BLADDER  5/16/2018    Procedure: TRANS URETHRAL RESECTION BLADDER;  Surgeon: Jose Romo M.D.;  Location: SURGERY [facility];  Service: Urology   • RECOVERY  2/3/2014    Performed by Cath-Recovery Surgery at SURGERY SAME DAY [facility]   • HIP ARTHROPLASTY TOTAL  5/31/2013    Performed by Burak Valles M.D. at SURGERY [facility]   • ROTATOR CUFF REPAIR Right 2011    x 2   • KNEE ARTHROPLASTY TOTAL  6/1/2009    LEFT-Performed by YONATAN HINSON at SURGERY [facility]   • VEIN LIGATION RADIO FREQUENCY  12/8/2008    LEFT leg-Performed by DEREJE MCCULLOUGH at SURGERY SAME DAY [facility]   • HIP ARTHROPLASTY TOTAL  6/20/08    Performed by YONATAN HINSON at SURGERY [facility]   • LUMBAR LAMINECTOMY DISKECTOMY  2000   • TMJ RECONSTRUCTION BILATERAL  1994   • ANKLE ORIF Right    • KNEE ARTHROSCOPY Left    • VEIN STRIPPING Left      Social History   Substance Use Topics   • Smoking status: Never Smoker   • Smokeless tobacco: Never Used   • Alcohol use 1.8 oz/week     3 Cans of beer per week       Comment: 3 per week     Family and Occupational History     Social History   • Marital status:      Spouse name: N/A   • Number of children: N/A   • Years of education: N/A       Objective     Active Range of Motion   Left Knee   Flexion: 132 degrees   Extension: 0 degrees     Passive Range of Motion   Left Knee   Normal passive range of motion    Strength:      Left Knee   Flexion: 4-  Extension: 4-    Left Ankle/Foot   Eversion: 4-  Great toe flexion: 5    Right Ankle/Foot   Plantar flexion: 4-    Tests   Left Ankle/Foot   Positive for anterior drawer.     Right Ankle/Foot   Positive for anterior drawer.   Ambulation     Comments   R. Ankle lateral instability, repeated excessive inversion in initial contact to midstance        Therapeutic Exercises (CPT 57119):     1. Shuttle squat, 4 bands 4 min    2. Step up, 10 7'' step    3. Step down, 10 4'' step    4. Heel raise with eversion, 2 x 10    5. Bike, HEP     6. Sit to stand    Therapeutic Treatments and Modalities:     1. Manual Therapy (CPT 12465), R. ankle    Therapeutic Treatment and Modalities Summary: Taping for proprioception, lateral ankle facilitation     Time-based treatments/modalities:  Manual therapy minutes (CPT 66903): 5 minutes  Gait training minutes (CPT 45530): 10 minutes  Therapeutic exercise minutes (CPT 08183): 10 minutes  Functional training, self care minutes (CPT 78043): 5 minutes       Assessment, Response and Plan:   Assessment details:  Patient referred to PT S/P L. TKA revision of spacer in hardware. He has full knee flexion and extension without complaint of pain. Slight edema in lower extremity. Ambulating without an assistive device safely on level terrain, but he should continue with a SPC for balance assist on uneven terrain. Tolerance for weight bearing is good. He did have an abnormal gait pattern but it is due to an old R. Ankle ORIF, showing signs of significant lateral ankle instability. Patient will benefit from PT  for functional strength and balance training.   Prognosis: good    Goals:   Short Term Goals:   Squat to 60 deg. Knee flexion With good form  Step up and down standard step height for 1 flight of stairs, good balance without hand rail  Demo independence for HEP    Short term goal time span:  2-4 weeks      Long Term Goals:    LEFS score showing 30% or less disability   Able to squat 90 deg. Knee flexion good form and non symptomatic  Demo independence for Hep and all progressions  Long term goal time span:  4-6 weeks    Plan:   Planned therapy interventions:  E Stim Unattended (CPT 70140), Functional Training, Self Care (CPT 91231), Gait Training (CPT 66963), Manual Therapy (CPT 40372), Neuromuscular Re-education (CPT 56281), Therapeutic Activities (CPT 70286) and Therapeutic Exercise (CPT 41510)  Frequency:  2x week  Duration in weeks:  6  Discussed with:  Patient      Functional Limitation G-Codes and Severity Modifiers  PT Functional Assessment Tool Used: LEFS  PT Functional Assessment Score: 24  Lower Extremity Functional Scale Total: 23.75   Current: Mobility: Current (): CL   Goal: Mobility: Goal (): CJ     Referring provider co-signature:  I have reviewed this plan of care and my co-signature certifies the need for services.  Certification Dates:   From 8/24/18     To 10/15/18    Physician Signature: ________________________________ Date: ______________

## 2018-08-27 ENCOUNTER — PHYSICAL THERAPY (OUTPATIENT)
Dept: PHYSICAL THERAPY | Facility: REHABILITATION | Age: 63
End: 2018-08-27
Attending: ORTHOPAEDIC SURGERY
Payer: MEDICARE

## 2018-08-27 DIAGNOSIS — Z96.652 S/P REVISION OF TOTAL KNEE, LEFT: ICD-10-CM

## 2018-08-27 PROCEDURE — 97110 THERAPEUTIC EXERCISES: CPT

## 2018-08-27 PROCEDURE — 97112 NEUROMUSCULAR REEDUCATION: CPT

## 2018-08-27 NOTE — OP THERAPY DAILY TREATMENT
Outpatient Physical Therapy  DAILY TREATMENT     [facility] [address] [facility], [address]  [place] [address]  Phone:  [phone]    Date: 08/27/2018    Patient: Edgardo Sims  YOB: 1955  MRN: [id number]     Time Calculation  Start time: 0200  Stop time: 0245 Time Calculation (min): 30 minutes     Chief Complaint: No chief complaint on file.    Visit #: 2    SUBJECTIVE:  Taping ankle did not help too much, I think just being aware of the issue is enough and I will replace insoles and shoes in near future.     OBJECTIVE:  Current objective measures:           Therapeutic Exercises (CPT 30641):     1. Shuttle squat, 4 bands 4 min    2. 3 way hip , no time    3. Bridging , no time    4. Buble balance, no time    5. Heel raise w/ eversion, 2 x 15    6. Band resisted eversion    7. Step up, 15 w/ balance bilateral    8. Lateral step ups, 15 laps    9. Tandem balance eyes closed doorway, 10'' X 10, HEP    10. BIKE  , low resistance 15 min, not observed beyond 5 min by PT      Time-based treatments/modalities:  Therapeutic exercise minutes (CPT 86225): 25 minutes  Neuromusc re-ed, balance, coor, post minutes (CPT 16873): 10 minutes       ASSESSMENT  Response to treatment: Tolerated tx well.     PLAN/RECOMMENDATIONS:   Plan for treatment: therapy treatment to continue next visit.  Planned interventions for next visit: continue with current treatment.

## 2018-08-28 ENCOUNTER — APPOINTMENT (OUTPATIENT)
Dept: PHYSICAL THERAPY | Facility: REHABILITATION | Age: 63
End: 2018-08-28
Attending: ORTHOPAEDIC SURGERY
Payer: MEDICARE

## 2018-08-30 ENCOUNTER — PHYSICAL THERAPY (OUTPATIENT)
Dept: PHYSICAL THERAPY | Facility: REHABILITATION | Age: 63
End: 2018-08-30
Attending: ORTHOPAEDIC SURGERY
Payer: MEDICARE

## 2018-08-30 DIAGNOSIS — Z96.652 S/P REVISION OF TOTAL KNEE, LEFT: ICD-10-CM

## 2018-08-30 PROCEDURE — 97110 THERAPEUTIC EXERCISES: CPT

## 2018-08-30 PROCEDURE — 97140 MANUAL THERAPY 1/> REGIONS: CPT

## 2018-08-30 NOTE — OP THERAPY DAILY TREATMENT
Outpatient Physical Therapy  DAILY TREATMENT     [facility] [address], [address]  [place] [address]  Phone:  [phone]    Date: 08/30/2018    Patient: Edgardo Sims  YOB: 1955  MRN: [id number]     Time Calculation  Start time: 1130  Stop time: 1210 Time Calculation (min): 40 minutes     Chief Complaint: No chief complaint on file.    Visit #: 3    SUBJECTIVE:  Was up on feet a lot yesterday, knee is more warm, swollen and sore today.     OBJECTIVE:  Current objective measures:   Full knee AROM  Pitting edema in leg  Increased warmth around knee  Tape came off of incision site, it's healing well.           Therapeutic Exercises (CPT 34951):     1. Supine knee to chest on ball, 3 min    2. Supine knee extension on ball, 3 min    3. Gait analysis    4. Shuttle squat, 5 bands 5 min      Time-based treatments/modalities:  Manual therapy minutes (CPT 29000): 15 minutes  Therapeutic exercise minutes (CPT 95401): 15 minutes         ASSESSMENT:   Response to treatment: Reduction in edema and knee pain post tx.     PLAN/RECOMMENDATIONS:   Plan for treatment: therapy treatment to continue next visit.  Planned interventions for next visit: continue with current treatment.

## 2018-09-02 DIAGNOSIS — I10 ESSENTIAL HYPERTENSION: ICD-10-CM

## 2018-09-04 ENCOUNTER — PATIENT OUTREACH (OUTPATIENT)
Dept: HEALTH INFORMATION MANAGEMENT | Facility: OTHER | Age: 63
End: 2018-09-04

## 2018-09-04 ENCOUNTER — APPOINTMENT (OUTPATIENT)
Dept: PHYSICAL THERAPY | Facility: REHABILITATION | Age: 63
End: 2018-09-04
Attending: ORTHOPAEDIC SURGERY
Payer: MEDICARE

## 2018-09-04 RX ORDER — PAROXETINE HYDROCHLORIDE 20 MG/1
TABLET, FILM COATED ORAL
Qty: 90 TAB | Refills: 3 | Status: SHIPPED | OUTPATIENT
Start: 2018-09-04 | End: 2018-09-05

## 2018-09-04 RX ORDER — LISINOPRIL 10 MG/1
TABLET ORAL
Qty: 90 TAB | Refills: 3 | Status: SHIPPED | OUTPATIENT
Start: 2018-09-04 | End: 2019-09-28 | Stop reason: SDUPTHER

## 2018-09-05 RX ORDER — PAROXETINE HYDROCHLORIDE 20 MG/1
TABLET, FILM COATED ORAL
Qty: 90 TAB | Refills: 3 | Status: SHIPPED | OUTPATIENT
Start: 2018-09-05 | End: 2019-10-09 | Stop reason: SDUPTHER

## 2018-09-06 ENCOUNTER — PHYSICAL THERAPY (OUTPATIENT)
Dept: PHYSICAL THERAPY | Facility: REHABILITATION | Age: 63
End: 2018-09-06
Attending: ORTHOPAEDIC SURGERY
Payer: MEDICARE

## 2018-09-06 DIAGNOSIS — Z96.652 S/P REVISION OF TOTAL KNEE, LEFT: ICD-10-CM

## 2018-09-06 PROCEDURE — 97112 NEUROMUSCULAR REEDUCATION: CPT

## 2018-09-06 PROCEDURE — 97110 THERAPEUTIC EXERCISES: CPT

## 2018-09-06 NOTE — OP THERAPY DAILY TREATMENT
Outpatient Physical Therapy  DAILY TREATMENT     [facility] [place] [address] [facility], [address]  [place] [address]  Phone:  [phone]    Date: 09/06/2018    Patient: Edgardo Sims  YOB: 1955  MRN: [id number]     Time Calculation  Start time: 0235  Stop time: 0315 Time Calculation (min): 40 minutes     Chief Complaint: No chief complaint on file.    Visit #: 4    SUBJECTIVE:  L. Knee is starting to feel the same as R. Knee. Working on exercises independently. Slight pain in knee today, it spikes up higher if I descend stairs the wrong way.     OBJECTIVE:  Current objective measures:           Therapeutic Exercises (CPT 38042):     1. Shuttle squat, 5 bands 4 min    2. Standing hip abd, 15 bilat    3. Plank counter, w/ leg raise, 15 bilat    4. Thoracic ext str chair, 2 min, timed w/ breathing     5. Heel raise w/ eversion, 2 x 15 shuttle, 5 bands    6. Band resisted eversion, no time    7. Step up, 15 w/ balance bilateral    8. Lateral step ups, 15 laps    9. Tandem balance eyes closed corner, 10'' X 10, HEP    10. One step warm up, 5 min    11. Calf stretch, 1 min    12. HS stretch w/ strap supine, 1.5 min bilat      Time-based treatments/modalities:  Therapeutic exercise minutes (CPT 19776): 32 minutes  Neuromusc re-ed, balance, coor, post minutes (CPT 58870): 8 minutes         ASSESSMENT:   Response to treatment: Focus of tx was improving functional leg strength and balance with emphasis on lateral ankle stability. Tolerated tx well.     PLAN/RECOMMENDATIONS:   Plan for treatment: therapy treatment to continue next visit.  Planned interventions for next visit: continue with current treatment.

## 2018-09-10 ENCOUNTER — APPOINTMENT (OUTPATIENT)
Dept: PHYSICAL THERAPY | Facility: REHABILITATION | Age: 63
End: 2018-09-10
Attending: ORTHOPAEDIC SURGERY
Payer: MEDICARE

## 2018-09-11 ENCOUNTER — APPOINTMENT (OUTPATIENT)
Dept: PHYSICAL THERAPY | Facility: REHABILITATION | Age: 63
End: 2018-09-11
Attending: ORTHOPAEDIC SURGERY
Payer: MEDICARE

## 2018-09-12 ENCOUNTER — TELEPHONE (OUTPATIENT)
Dept: MEDICAL GROUP | Facility: LAB | Age: 63
End: 2018-09-12

## 2018-09-12 NOTE — TELEPHONE ENCOUNTER
ESTABLISHED PATIENT PRE-VISIT PLANNING     Note: Patient will not be contacted if there is no indication to call.     1.  Reviewed notes from the last few office visits within the medical group: Yes    2.  If any orders were placed at last visit or intended to be done for this visit (i.e. 6 mos follow-up), do we have Results/Consult Notes?        •  Labs - Labs were not ordered at last office visit.       •  Imaging - Imaging was not ordered at last office visit.       •  Referrals - No referrals were ordered at last office visit.    3. Is this appointment scheduled as a Hospital Follow-Up? No    4.  Immunizations were updated in Epic using WebIZ?: Epic matches WebIZ       •  Web Iz Recommendations: FLU and ZOSTAVAX (Shingles)    5.  Patient is due for the following Health Maintenance Topics:   Health Maintenance Due   Topic Date Due   • Annual Wellness Visit  07/13/2016   • IMM INFLUENZA (1) 09/01/2018       - Patient has completed PNEUMOVAX (PPSV23), PREVNAR (PCV13)  and TDAP Immunization(s) per WebIZ. Chart has been updated.    6.  MDX printed for Provider? NO    7.  Patient was NOT informed to arrive 15 min prior to their scheduled appointment and bring in their medication bottles.

## 2018-09-12 NOTE — PROGRESS NOTES
Patient Edgardo Sims discharged on 8/4/18. [facility] Patient Advocate assisted with discharge orders including one rheumatology appointment, and one orthopedics appointment. Patient is scheduled for an orthopedics appointment on 9/12/18, a PCP appointment on 9/13/18, a cardiology appointment on 9/19/18, and a rheumatology appointment on 11/15/18. Patient is also currently attending [facility]s outpatient physical therapy twice per week. Patient discharged with a high LACE score, therefore, a PPS screening was conducted and the patient scored 90%.

## 2018-09-13 ENCOUNTER — PHYSICAL THERAPY (OUTPATIENT)
Dept: PHYSICAL THERAPY | Facility: REHABILITATION | Age: 63
End: 2018-09-13
Attending: ORTHOPAEDIC SURGERY
Payer: MEDICARE

## 2018-09-13 ENCOUNTER — OFFICE VISIT (OUTPATIENT)
Dept: MEDICAL GROUP | Facility: LAB | Age: 63
End: 2018-09-13
Payer: MEDICARE

## 2018-09-13 VITALS
HEIGHT: 70 IN | SYSTOLIC BLOOD PRESSURE: 128 MMHG | WEIGHT: 208.11 LBS | BODY MASS INDEX: 29.79 KG/M2 | OXYGEN SATURATION: 100 % | HEART RATE: 76 BPM | DIASTOLIC BLOOD PRESSURE: 78 MMHG | TEMPERATURE: 98.6 F | RESPIRATION RATE: 12 BRPM

## 2018-09-13 DIAGNOSIS — S83.105D DISLOCATION OF LEFT KNEE, SUBSEQUENT ENCOUNTER: ICD-10-CM

## 2018-09-13 DIAGNOSIS — R33.9 URINARY RETENTION: ICD-10-CM

## 2018-09-13 DIAGNOSIS — Z96.652 S/P REVISION OF TOTAL KNEE, LEFT: ICD-10-CM

## 2018-09-13 DIAGNOSIS — M05.9 RHEUMATOID ARTHRITIS WITH POSITIVE RHEUMATOID FACTOR, INVOLVING UNSPECIFIED SITE (HCC): ICD-10-CM

## 2018-09-13 DIAGNOSIS — M40.14 OTHER SECONDARY KYPHOSIS, THORACIC REGION: ICD-10-CM

## 2018-09-13 DIAGNOSIS — Z79.899 ENCOUNTER FOR LONG-TERM (CURRENT) USE OF HIGH-RISK MEDICATION: ICD-10-CM

## 2018-09-13 DIAGNOSIS — Z23 NEED FOR IMMUNIZATION AGAINST INFLUENZA: ICD-10-CM

## 2018-09-13 PROCEDURE — 99214 OFFICE O/P EST MOD 30 MIN: CPT | Mod: 25 | Performed by: INTERNAL MEDICINE

## 2018-09-13 PROCEDURE — 97110 THERAPEUTIC EXERCISES: CPT

## 2018-09-13 PROCEDURE — 97014 ELECTRIC STIMULATION THERAPY: CPT

## 2018-09-13 PROCEDURE — 90686 IIV4 VACC NO PRSV 0.5 ML IM: CPT | Performed by: INTERNAL MEDICINE

## 2018-09-13 PROCEDURE — G0008 ADMIN INFLUENZA VIRUS VAC: HCPCS | Performed by: INTERNAL MEDICINE

## 2018-09-13 PROCEDURE — 97140 MANUAL THERAPY 1/> REGIONS: CPT

## 2018-09-13 RX ORDER — OXYCODONE AND ACETAMINOPHEN 10; 325 MG/1; MG/1
1 TABLET ORAL
Qty: 150 TAB | Refills: 0 | Status: SHIPPED | OUTPATIENT
Start: 2018-10-09 | End: 2018-09-13 | Stop reason: SDUPTHER

## 2018-09-13 RX ORDER — OXYCODONE AND ACETAMINOPHEN 10; 325 MG/1; MG/1
1 TABLET ORAL
Qty: 150 TAB | Refills: 0 | Status: SHIPPED | OUTPATIENT
Start: 2018-11-08 | End: 2018-09-13 | Stop reason: SDUPTHER

## 2018-09-13 RX ORDER — OXYCODONE AND ACETAMINOPHEN 10; 325 MG/1; MG/1
1 TABLET ORAL
Qty: 150 TAB | Refills: 0 | Status: SHIPPED | OUTPATIENT
Start: 2018-12-08 | End: 2018-12-19 | Stop reason: SDUPTHER

## 2018-09-13 ASSESSMENT — PAIN SCALES - GENERAL: PAINLEVEL: 2=MINIMAL-SLIGHT

## 2018-09-13 NOTE — OP THERAPY DAILY TREATMENT
Outpatient Physical Therapy  DAILY TREATMENT     [facility] [address], [address]  [place] [address]  Phone:  [phone]    Date: 09/13/2018    Patient: Edgardo Sims  YOB: 1955  MRN: [id number]     Time Calculation  Start time: 0230  Stop time: 0315 Time Calculation (min): 45 minutes     Chief Complaint: No chief complaint on file.    Visit #: 5    SUBJECTIVE:  Went to stay at an old residence, had steep stairs, flared up knee pain and swelling going up and downs stairs. Walked with SPC one day, it's starting to get better but knee is still sore and stiff at knee cap.     OBJECTIVE:  Current objective measures:           Therapeutic Exercises (CPT 78822):     1. Shuttle squats    2. Standing calf stretch    3. Supine HS stretch    Therapeutic Treatments and Modalities:     1. Manual Therapy (CPT 26946), L knee    Therapeutic Treatment and Modalities Summary: STW for edema reduction, scar tissue mobilization, and pain inhibition.     Time-based treatments/modalities:  Manual therapy minutes (CPT 74637): 20 minutes  Therapeutic exercise minutes (CPT 03261): 10 minutes       ASSESSMENT:   Response to treatment: Patient had signs of increased inflammation at knee so emphasis of tx was edema reduction, pain inhibition, stretching, and ROM based exercise. Tolerated tx well.     PLAN/RECOMMENDATIONS:   Plan for treatment: therapy treatment to continue next visit.  Planned interventions for next visit: continue with current treatment.

## 2018-09-14 ENCOUNTER — HOSPITAL ENCOUNTER (OUTPATIENT)
Dept: LAB | Facility: MEDICAL CENTER | Age: 63
End: 2018-09-14
Attending: INTERNAL MEDICINE
Payer: MEDICARE

## 2018-09-14 DIAGNOSIS — E78.5 DYSLIPIDEMIA: ICD-10-CM

## 2018-09-14 DIAGNOSIS — I25.84 CORONARY ARTERY DISEASE DUE TO CALCIFIED CORONARY LESION: ICD-10-CM

## 2018-09-14 DIAGNOSIS — I10 ESSENTIAL HYPERTENSION: ICD-10-CM

## 2018-09-14 DIAGNOSIS — I25.10 CORONARY ARTERY DISEASE DUE TO CALCIFIED CORONARY LESION: ICD-10-CM

## 2018-09-14 LAB
ALBUMIN SERPL BCP-MCNC: 4.1 G/DL (ref 3.2–4.9)
ALBUMIN/GLOB SERPL: 1.7 G/DL
ALP SERPL-CCNC: 91 U/L (ref 30–99)
ALT SERPL-CCNC: 13 U/L (ref 2–50)
ANION GAP SERPL CALC-SCNC: 7 MMOL/L (ref 0–11.9)
AST SERPL-CCNC: 17 U/L (ref 12–45)
BILIRUB SERPL-MCNC: 0.4 MG/DL (ref 0.1–1.5)
BUN SERPL-MCNC: 13 MG/DL (ref 8–22)
CALCIUM SERPL-MCNC: 9 MG/DL (ref 8.5–10.5)
CHLORIDE SERPL-SCNC: 102 MMOL/L (ref 96–112)
CHOLEST SERPL-MCNC: 166 MG/DL (ref 100–199)
CO2 SERPL-SCNC: 29 MMOL/L (ref 20–33)
CREAT SERPL-MCNC: 0.9 MG/DL (ref 0.5–1.4)
FASTING STATUS PATIENT QL REPORTED: NORMAL
GLOBULIN SER CALC-MCNC: 2.4 G/DL (ref 1.9–3.5)
GLUCOSE SERPL-MCNC: 90 MG/DL (ref 65–99)
HDLC SERPL-MCNC: 51 MG/DL
LDLC SERPL CALC-MCNC: 77 MG/DL
POTASSIUM SERPL-SCNC: 4.7 MMOL/L (ref 3.6–5.5)
PROT SERPL-MCNC: 6.5 G/DL (ref 6–8.2)
SODIUM SERPL-SCNC: 138 MMOL/L (ref 135–145)
TRIGL SERPL-MCNC: 188 MG/DL (ref 0–149)

## 2018-09-14 PROCEDURE — 80061 LIPID PANEL: CPT

## 2018-09-14 PROCEDURE — 80053 COMPREHEN METABOLIC PANEL: CPT

## 2018-09-14 PROCEDURE — 36415 COLL VENOUS BLD VENIPUNCTURE: CPT

## 2018-09-14 NOTE — PROGRESS NOTES
CC: Edgardo Sims is a 62 y.o. male is suffering from No chief complaint on file.        SUBJECTIVE:  1. Dislocation of left knee, subsequent encounter  Edgardo is here for follow-up, underwent an orthopedic procedure to go ahead and replace a broken worn out plastic  spacer.    2. Rheumatoid arthritis with positive rheumatoid factor, involving unspecified site (HCC)  Patient has a history of rheumatoid arthritis, is off of his disease modifying drug of recommended he restart it.    3. Urinary retention  Patient with a history of urinary retention etiology is uncertain.    4. Other secondary kyphosis, thoracic region  X-rays ordered    5. Encounter for long-term (current) use of high-risk medication  Discussed with the patient the risks benefits of restarting DMARD    6. Need for immunization against influenza  Vaccination given        Past social, family, history:   Social History   Substance Use Topics   • Smoking status: Never Smoker   • Smokeless tobacco: Never Used   • Alcohol use 1.8 oz/week     3 Cans of beer per week      Comment: 3 per week         MEDICATIONS:    Current Outpatient Prescriptions:   •  [START ON 12/8/2018] oxyCODONE-acetaminophen (PERCOCET-10)  MG Tab, Take 1 Tab by mouth 5 Times a Day for 30 days., Disp: 150 Tab, Rfl: 0  •  predniSONE (DELTASONE) 1 MG Tab, TAKE 2 TABLETS BY MOUTH EVERY DAY, Disp: 60 Tab, Rfl: 2  •  PARoxetine (PAXIL) 20 MG Tab, TAKE 1 TABLET BY MOUTH EVERY DAY, Disp: 90 Tab, Rfl: 3  •  lisinopril (PRINIVIL) 10 MG Tab, TAKE 1 TABLET BY MOUTH EVERY DAY, Disp: 90 Tab, Rfl: 3  •  cyclobenzaprine (FLEXERIL) 10 MG Tab, TAKE ONE TABLET BY MOUTH THREE TIMES DAILY AS NEEDED FOR MILD PAIN, Disp: 90 Tab, Rfl: 5  •  aspirin EC (ECOTRIN) 81 MG Tablet Delayed Response, Take 81 mg by mouth every day., Disp: , Rfl:   •  Tofacitinib Citrate (XELJANZ) 5 MG Tab, Take 5 mg by mouth 2 Times a Day., Disp: 60 Tab, Rfl: 0  •  bisoprolol (ZEBETA) 10 MG tablet, Take 10 mg by  mouth every day., Disp: , Rfl:   •  pravastatin (PRAVACHOL) 40 MG tablet, Take 1 Tab by mouth every day., Disp: 30 Tab, Rfl: 11  •  meloxicam (MOBIC) 15 MG tablet, TAKE 1 TABLET BY MOUTH EVERY DAY, Disp: 30 Tab, Rfl: 11  •  CALCIUM-VITAMIN D PO, Take 1 Tab by mouth 2 Times a Day. 1200/1000 mg, Disp: , Rfl:   •  ascorbic acid (ASCORBIC ACID) 500 MG Tab, Take 500 mg by mouth every day., Disp: , Rfl:   •  Garlic 1000 MG Cap, Take 1 Cap by mouth every day., Disp: , Rfl:   •  vitamin e (VITAMIN E) 400 UNIT Cap, Take 400 Units by mouth every day., Disp: , Rfl:   •  Cyanocobalamin (VITAMIN B-12) 5000 MCG TABLET DISPERSIBLE, Take 1 Tab by mouth every day., Disp: , Rfl:   •  niacin 500 MG Tab, Take 500 mg by mouth every day., Disp: , Rfl:   •  Zinc 50 MG Cap, Take 50 mg by mouth every day., Disp: , Rfl:   •  Multiple Vitamins-Minerals (MULTI COMPLETE PO), Take 1 Tab by mouth every day., Disp: , Rfl:   •  vardenafil (LEVITRA) 20 MG tablet, Take 20 mg by mouth as needed., Disp: , Rfl:   •  omeprazole (PRILOSEC) 20 MG CPDR, Take 20 mg by mouth every day., Disp: , Rfl:     PROBLEMS:  Patient Active Problem List    Diagnosis Date Noted   • Acute pyelonephritis 04/30/2018     Priority: High   • Failed total knee, left, subsequent encounter 08/03/2018   • Bladder outlet obstruction 05/01/2018   • Leukocytosis 04/30/2018   • Lactic acidosis 04/30/2018   • Idiopathic acute pancreatitis 04/30/2018   • Chronic use of opiate drugs therapeutic purposes 08/12/2017   • Elevated CPK 09/23/2016   • Depression 07/13/2015   • Anxiety 03/31/2015   • Coronary artery disease due to calcified coronary lesion: Mildly cardiac catheterization in 2014 12/05/2014   • Tachycardia 10/20/2014   • Abnormal myocardial perfusion study 01/15/2014   • Osteoarthrosis, unspecified whether generalized or localized, pelvic region and thigh 05/31/2013   • Dyslipidemia 07/12/2011   • Aortic insufficiency 07/05/2011   • Essential hypertension 07/05/2011   •  "Rheumatoid arthritis (HCC) 05/05/2009   • Hypogonadism male 05/05/2009       REVIEW OF SYSTEMS:  Gen.:  No Nausea, Vomiting, fever, Chills.  Heart: No chest pain.  Lungs:  No shortness of Breath.  Psychological: Kian unusual Anxiety depression     PHYSICAL EXAM   Constitutional: Alert, cooperative, not in acute distress.  Cardiovascular:  Rate Rhythm is regular without murmurs rubs clicks.     Thorax & Lungs: Clear to auscultation, no wheezing, rhonchi, or rales  HENT: Normocephalic, Atraumatic.  Eyes: PERRLA, EOMI, Conjunctiva normal.   Neck: Trachia is midline no swelling of the thyroid.   Lymphatic: No lymphadenopathy noted.   Abdomin: Soft non-tender, no rebound, no guarding.   Skin: Warm, Dry, No erythema, No rash.   Extremities: Atraumatic with symmetric distal pulses, No edema, No tenderness, No cyanosis, No clubbing.   Musculoskeletal:  well-healed incision left knee  Neurologic: Alert & oriented x 3, cranial nerves II through XII are intact, Normal motor function, Normal sensory function, No focal deficits noted.   Psychiatric: Affect normal, Judgment normal, Mood normal.     VITAL SIGNS:/78   Pulse 76   Temp 37 °C (98.6 °F)   Resp 12   Ht 1.778 m (5' 10\")   Wt 94.4 kg (208 lb 1.8 oz)   SpO2 100%   BMI 29.86 kg/m²     Labs: Reviewed    Assessment:                                                     Plan:    1. Dislocation of left knee, subsequent encounter  Surgically treated    2. Rheumatoid arthritis with positive rheumatoid factor, involving unspecified site ([facility])  Continue Percocet  - oxyCODONE-acetaminophen (PERCOCET-10)  MG Tab; Take 1 Tab by mouth 5 Times a Day for 30 days.  Dispense: 150 Tab; Refill: 0    3. Urinary retention  No change in medical therapy    4. Other secondary kyphosis, thoracic region  X-ray ordered  - DX-THORACIC SPINE-2 VIEWS; Future    5. Encounter for long-term (current) use of high-risk medication  Stable  - oxyCODONE-acetaminophen (PERCOCET-10)  MG " Tab; Take 1 Tab by mouth 5 Times a Day for 30 days.  Dispense: 150 Tab; Refill: 0    6. Need for immunization against influenza  Vaccination given  - INFLUENZA VACCINE QUAD INJ >3Y(PF)

## 2018-09-17 ENCOUNTER — PHYSICAL THERAPY (OUTPATIENT)
Dept: PHYSICAL THERAPY | Facility: REHABILITATION | Age: 63
End: 2018-09-17
Attending: ORTHOPAEDIC SURGERY
Payer: MEDICARE

## 2018-09-17 DIAGNOSIS — Z96.652 S/P REVISION OF TOTAL KNEE, LEFT: ICD-10-CM

## 2018-09-17 PROCEDURE — 97112 NEUROMUSCULAR REEDUCATION: CPT

## 2018-09-17 PROCEDURE — 97110 THERAPEUTIC EXERCISES: CPT

## 2018-09-17 NOTE — OP THERAPY DAILY TREATMENT
Outpatient Physical Therapy  DAILY TREATMENT     [facility] Outpatient Physical Therapy [address] [facility], [address]  [place] [address]  Phone:  [phone]    Date: 09/17/2018    Patient: Edgardo Sims  YOB: 1955  MRN: [id number]     Time Calculation  Start time: 0130  Stop time: 0200 Time Calculation (min): 30 minutes     Chief Complaint: No chief complaint on file.    Visit #: 6    SUBJECTIVE:  Sore, fatigued and a little stiff today due to walking around [employer] for an afternoon. No occurrences of sharp pain, no falls.     OBJECTIVE:  Current objective measures:     Slight edema and warmth to L. Knee  Decreased balance L. LE  Increased L. Lateral foot and ankle instability      Therapeutic Exercises (CPT 25514):     1. Shuttle squat, 3 bands, 3 x 12 single leg, bilateral    3. Plank counter, w/ leg raise, 15 bilat    4. Thoracic ext str chair, 2 min, timed w/ breathing     5. Heel raise w/ eversion, 2 x 15 shuttle, 5 bands, no time    7. Step up, 15 w/ balance bilateral    8. Lateral step ups, 15 laps    9. Tandem balance eyes closed corner, skipped, substituted 1 leg stance, HEP    10. Bike warm up, 5 min    11. Calf stretch, 1 min    12. HS stretch w/ strap supine, 1.5 min bilat    13. Single leg stance, 1/2 clocks taps, 4 bilat, L less stable than R.       Time-based treatments/modalities:  Therapeutic exercise minutes (CPT 57913): 15 minutes  Neuromusc re-ed, balance, coor, post minutes (CPT 21500): 15 minutes       Pain rating before treatment: 0  Pain rating after treatment: 0    ASSESSMENT:   Response to treatment: Slight lack of knee extension likely due to edema. Progressing knee strength and balance.     PLAN/RECOMMENDATIONS:   Plan for treatment: therapy treatment to continue next visit.  Planned interventions for next visit: continue with current treatment.

## 2018-09-19 ENCOUNTER — OFFICE VISIT (OUTPATIENT)
Dept: CARDIOLOGY | Facility: MEDICAL CENTER | Age: 63
End: 2018-09-19
Payer: MEDICARE

## 2018-09-19 VITALS
HEART RATE: 66 BPM | SYSTOLIC BLOOD PRESSURE: 140 MMHG | OXYGEN SATURATION: 97 % | DIASTOLIC BLOOD PRESSURE: 78 MMHG | WEIGHT: 209 LBS | HEIGHT: 70 IN | BODY MASS INDEX: 29.92 KG/M2

## 2018-09-19 DIAGNOSIS — I35.1 NONRHEUMATIC AORTIC VALVE INSUFFICIENCY: ICD-10-CM

## 2018-09-19 DIAGNOSIS — I25.10 CORONARY ARTERY DISEASE DUE TO CALCIFIED CORONARY LESION: ICD-10-CM

## 2018-09-19 DIAGNOSIS — I25.84 CORONARY ARTERY DISEASE DUE TO CALCIFIED CORONARY LESION: ICD-10-CM

## 2018-09-19 DIAGNOSIS — E78.5 DYSLIPIDEMIA: ICD-10-CM

## 2018-09-19 DIAGNOSIS — I10 ESSENTIAL HYPERTENSION: ICD-10-CM

## 2018-09-19 PROCEDURE — 99214 OFFICE O/P EST MOD 30 MIN: CPT | Performed by: INTERNAL MEDICINE

## 2018-09-19 RX ORDER — MELOXICAM 7.5 MG/1
7.5 TABLET ORAL
Refills: 0 | COMMUNITY
Start: 2018-09-04 | End: 2019-02-21 | Stop reason: SDUPTHER

## 2018-09-19 RX ORDER — TAMSULOSIN HYDROCHLORIDE 0.4 MG/1
0.4 CAPSULE ORAL
Refills: 12 | Status: ON HOLD | COMMUNITY
Start: 2018-09-17 | End: 2019-04-01

## 2018-09-19 RX ORDER — EZETIMIBE 10 MG/1
10 TABLET ORAL DAILY
Qty: 30 TAB | Refills: 11 | Status: ON HOLD | OUTPATIENT
Start: 2018-09-19 | End: 2019-04-01

## 2018-09-19 RX ORDER — FLUTICASONE PROPIONATE 50 MCG
SPRAY, SUSPENSION (ML) NASAL
Refills: 11 | Status: ON HOLD | COMMUNITY
Start: 2018-09-10 | End: 2019-04-01

## 2018-09-19 RX ORDER — NITROFURANTOIN MACROCRYSTALS 50 MG/1
50 CAPSULE ORAL
Refills: 3 | COMMUNITY
Start: 2018-07-11 | End: 2018-11-15

## 2018-09-19 NOTE — LETTER
[facility]   [address]  [place] [address]  Phone: [phone]  Fax: [phone]              Edgardo Sims  1955    Encounter Date: 9/19/2018    Stas Zabala M.D.          PROGRESS NOTE:  Chief Complaint   Patient presents with   • HTN (Controlled)       Subjective:   Edgardo Sims is a 62 y.o. male who presents today for followup of his hypertension, hyperlipidemia and abnormal myocardial perfusion scan. He underwent cardiac catheterization and had minimal plaque. 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     He has had difficulty with statin therapy. We tried him on low-dose rosuvastatin. He had difficulty with muscle tenderness on this medication and discontinued after a 2nd attempt to use it. He had elevation in his CPK on atorvastatin therapy.    He has been doing well clinically though he was hospitalized on a couple occasions for urinary tract infections.  He is being followed by urology.    He is trying to get back into his regular exercise program.  He denies any chest discomfort, dyspnea, palpitations or lightheadedness.  He does have some difficulty with mild dependent edema.    He brings in a record of his blood pressures and they are generally under excellent control running from approximately 110-130/65-80.  He had 1 blood pressure 145 systolic but that was the only elevated blood pressure.    Past Medical History:   Diagnosis Date   • Abnormal myocardial perfusion study 1/15/2014   • Aortic insufficiency 7/5/2011   • Arthritis     RA, and osteo   • Bronchitis    • CAD (coronary artery disease) mild plaque at cath in 2/14 12/5/2014   • Cancer (HCC)     skin   • Chronic use of opiate drugs therapeutic purposes 8/12/2017   • Dyslipidemia 7/12/2011   • Heart burn    • Heart murmur    • Hiatus hernia syndrome    • High cholesterol    • HTN (hypertension) 7/5/2011   • Hypertension    • Indigestion    •  Infectious disease    • Pain     RA   • Pain     hands, feet and jaw   • Rheumatoid arthritis(714.0)     severe   • Tachycardia 10/20/2014     Past Surgical History:   Procedure Laterality Date   • KNEE REVISION TOTAL Left 8/3/2018    Procedure: KNEE REVISION TOTAL;  Surgeon: Michael Rodriguez M.D.;  Location: [facility];  Service: Orthopedics   • CYSTOSCOPY  5/16/2018    Procedure: CYSTOSCOPY-CLOT EVAC;  Surgeon: Jose Romo M.D.;  Location: SURGERY [facility];  Service: Urology   • TRANS URETHRAL RESECTION BLADDER  5/16/2018    Procedure: TRANS URETHRAL RESECTION BLADDER;  Surgeon: Jose Romo M.D.;  Location: SURGERY [facility];  Service: Urology   • RECOVERY  2/3/2014    Performed by Cath-Recovery Surgery at SURGERY SAME DAY [facility]   • HIP ARTHROPLASTY TOTAL  5/31/2013    Performed by Burak Valles M.D. at [facility]   • ROTATOR CUFF REPAIR Right 2011    x 2   • KNEE ARTHROPLASTY TOTAL  6/1/2009    LEFT-Performed by YONATAN HINSON at SURGERY [facility]   • VEIN LIGATION RADIO FREQUENCY  12/8/2008    LEFT leg-Performed by DEREJE MCCULLOUGH at SURGERY SAME DAY [facility]   • HIP ARTHROPLASTY TOTAL  6/20/08    Performed by YONATAN HINSON at SURGERY [facility]   • LUMBAR LAMINECTOMY DISKECTOMY  2000   • TMJ RECONSTRUCTION BILATERAL  1994   • ANKLE ORIF Right    • KNEE ARTHROSCOPY Left    • VEIN STRIPPING Left      Family History   Problem Relation Age of Onset   • Hypertension Mother      Social History     Social History   • Marital status:      Spouse name: N/A   • Number of children: N/A   • Years of education: N/A     Occupational History   • Not on file.     Social History Main Topics   • Smoking status: Never Smoker   • Smokeless tobacco: Never Used   • Alcohol use 1.8 oz/week     3 Cans of beer per week      Comment: 3 per week   • Drug use: No   • Sexual activity: Yes     Partners: Female     Other Topics Concern   •   Service No   • Blood Transfusions No   • Caffeine Concern No   • Occupational Exposure No   • Hobby Hazards Yes     rides motorcyle   • Sleep Concern No   • Stress Concern No   • Weight Concern Yes   • Special Diet Yes     does not eat red meat    • Back Care Yes   • Exercise Yes   • Bike Helmet Yes   • Seat Belt Yes   • Self-Exams Yes     Social History Narrative   • No narrative on file     Allergies   Allergen Reactions   • Crestor [Rosuvastatin Calcium] Myalgia   • Penicillins Hives, Itching and Vomiting     Outpatient Encounter Prescriptions as of 9/19/2018   Medication Sig Dispense Refill   • tamsulosin (FLOMAX) 0.4 MG capsule Take 0.4 mg by mouth every day.  12   • fluticasone (FLONASE) 50 MCG/ACT nasal spray SPRAY 2 SPRAYS IN NOSE EVERY DAY.  11   • [START ON 12/8/2018] oxyCODONE-acetaminophen (PERCOCET-10)  MG Tab Take 1 Tab by mouth 5 Times a Day for 30 days. 150 Tab 0   • predniSONE (DELTASONE) 1 MG Tab TAKE 2 TABLETS BY MOUTH EVERY DAY 60 Tab 2   • PARoxetine (PAXIL) 20 MG Tab TAKE 1 TABLET BY MOUTH EVERY DAY 90 Tab 3   • lisinopril (PRINIVIL) 10 MG Tab TAKE 1 TABLET BY MOUTH EVERY DAY 90 Tab 3   • cyclobenzaprine (FLEXERIL) 10 MG Tab TAKE ONE TABLET BY MOUTH THREE TIMES DAILY AS NEEDED FOR MILD PAIN 90 Tab 5   • bisoprolol (ZEBETA) 10 MG tablet Take 10 mg by mouth every day.     • pravastatin (PRAVACHOL) 40 MG tablet Take 1 Tab by mouth every day. 30 Tab 11   • meloxicam (MOBIC) 15 MG tablet TAKE 1 TABLET BY MOUTH EVERY DAY 30 Tab 11   • CALCIUM-VITAMIN D PO Take 1 Tab by mouth 2 Times a Day. 1200/1000 mg     • ascorbic acid (ASCORBIC ACID) 500 MG Tab Take 500 mg by mouth every day.     • Garlic 1000 MG Cap Take 1 Cap by mouth every day.     • vitamin e (VITAMIN E) 400 UNIT Cap Take 400 Units by mouth every day.     • Cyanocobalamin (VITAMIN B-12) 5000 MCG TABLET DISPERSIBLE Take 1 Tab by mouth every day.     • niacin 500 MG Tab Take 500 mg by mouth every day.     • Zinc 50 MG Cap Take 50  "mg by mouth every day.     • Multiple Vitamins-Minerals (MULTI COMPLETE PO) Take 1 Tab by mouth every day.     • omeprazole (PRILOSEC) 20 MG CPDR Take 20 mg by mouth every day.     • nitrofurantoin (MACRODANTIN) 50 MG Cap Take 50 mg by mouth.  3   • meloxicam (MOBIC) 7.5 MG Tab Take 7.5 mg by mouth every day.  0   • aspirin EC (ECOTRIN) 81 MG Tablet Delayed Response Take 81 mg by mouth every day.     • Tofacitinib Citrate (XELJANZ) 5 MG Tab Take 5 mg by mouth 2 Times a Day. 60 Tab 0   • vardenafil (LEVITRA) 20 MG tablet Take 20 mg by mouth as needed.       No facility-administered encounter medications on file as of 9/19/2018.      ROS       Objective:   /78 (BP Location: Left arm, Patient Position: Sitting, BP Cuff Size: Adult)   Pulse 66   Ht 1.778 m (5' 10\")   Wt 94.8 kg (209 lb)   SpO2 97%   BMI 29.99 kg/m²      Physical Exam   Constitutional: He appears well-developed and well-nourished.   Neck: No JVD present.   Cardiovascular: Normal rate and regular rhythm.    Murmur (1/6 to 2/6 diastolic murmur noted at the base) heard.  Pulmonary/Chest: Effort normal and breath sounds normal. He has no rales.   Abdominal: Soft. There is no tenderness.   Musculoskeletal: He exhibits edema (Trace left pretibial).     Lab Results   Component Value Date/Time    CHOLSTRLTOT 166 09/14/2018 08:48 AM    LDL 77 09/14/2018 08:48 AM    HDL 51 09/14/2018 08:48 AM    TRIGLYCERIDE 188 (H) 09/14/2018 08:48 AM       Lab Results   Component Value Date/Time    SODIUM 138 09/14/2018 08:48 AM    POTASSIUM 4.7 09/14/2018 08:48 AM    CHLORIDE 102 09/14/2018 08:48 AM    CO2 29 09/14/2018 08:48 AM    GLUCOSE 90 09/14/2018 08:48 AM    BUN 13 09/14/2018 08:48 AM    CREATININE 0.90 09/14/2018 08:48 AM    CREATININE 1.1 04/21/2009 03:50 PM    BUNCREATRAT 13 09/21/2016 09:21 AM     Lab Results   Component Value Date/Time    ALKPHOSPHAT 91 09/14/2018 08:48 AM    ASTSGOT 17 09/14/2018 08:48 AM    ALTSGPT 13 09/14/2018 08:48 AM    TBILIRUBIN " 0.4 09/14/2018 08:48 AM      Lab Results   Component Value Date/Time    BNPBTYPENAT 61 04/30/2018 10:52 AM      Transthoracic  Echo Report    CONCLUSIONS  Normal left ventricular systolic function.  Left ventricular ejection fraction is visually estimated to be 65%.  Normal diastolic function.  The right ventricle was normal in size and function.  Possible severe aortic insufficiency.  Diastolic flow reversal in the descending aorta.  Compared to the images of the prior study done 10- -  there has   been progression of aortic regurgitation, previously mild.    Consider a transesophageal echocardiogram for better characterization   of aortic insufficiency.    These findings were communicated to the ordering physician and the   patient's cardiologist.     CANDI SIMS  Exam Date:         08/23/2017     Assessment:     1. Coronary artery disease due to calcified coronary lesion: Mildly cardiac catheterization in 2014     2. Dyslipidemia     3. Essential hypertension     4. Nonrheumatic aortic valve insufficiency         Medical Decision Making:  Today's Assessment / Status / Plan:     Mr. Sims is clinically stable from a cardiovascular standpoint.  His lipid status is still not under optimal control.  At this time, we will add ezetimibe 10 mg daily.  His blood pressure is under excellent control.  He does have significant aortic insufficiency.  He is asymptomatic from this at the present time.  He will follow-up in about 6 months with an echocardiogram and lab work prior.          John Lao D.O.  [id number] [address]  VIA In Basket

## 2018-09-19 NOTE — PROGRESS NOTES
Chief Complaint   Patient presents with   • HTN (Controlled)       Subjective:   Edgardo Sims is a 62 y.o. male who presents today for followup of his hypertension, hyperlipidemia and abnormal myocardial perfusion scan. He underwent cardiac catheterization and had minimal plaque. 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     He has had difficulty with statin therapy. We tried him on low-dose rosuvastatin. He had difficulty with muscle tenderness on this medication and discontinued after a 2nd attempt to use it. He had elevation in his CPK on atorvastatin therapy.    He has been doing well clinically though he was hospitalized on a couple occasions for urinary tract infections.  He is being followed by urology.    He is trying to get back into his regular exercise program.  He denies any chest discomfort, dyspnea, palpitations or lightheadedness.  He does have some difficulty with mild dependent edema.    He brings in a record of his blood pressures and they are generally under excellent control running from approximately 110-130/65-80.  He had 1 blood pressure 145 systolic but that was the only elevated blood pressure.    Past Medical History:   Diagnosis Date   • Abnormal myocardial perfusion study 1/15/2014   • Aortic insufficiency 7/5/2011   • Arthritis     RA, and osteo   • Bronchitis    • CAD (coronary artery disease) mild plaque at cath in 2/14 12/5/2014   • Cancer (HCC)     skin   • Chronic use of opiate drugs therapeutic purposes 8/12/2017   • Dyslipidemia 7/12/2011   • Heart burn    • Heart murmur    • Hiatus hernia syndrome    • High cholesterol    • HTN (hypertension) 7/5/2011   • Hypertension    • Indigestion    • Infectious disease    • Pain     RA   • Pain     hands, feet and jaw   • Rheumatoid arthritis(714.0)     severe   • Tachycardia 10/20/2014     Past Surgical History:   Procedure Laterality Date   • KNEE REVISION TOTAL Left 8/3/2018    Procedure: KNEE REVISION TOTAL;  Surgeon:  Michael Rodriguez M.D.;  Location: SURGERY [facility];  Service: Orthopedics   • CYSTOSCOPY  5/16/2018    Procedure: CYSTOSCOPY-CLOT EVAC;  Surgeon: Jose Romo M.D.;  Location: SURGERY [facility];  Service: Urology   • TRANS URETHRAL RESECTION BLADDER  5/16/2018    Procedure: TRANS URETHRAL RESECTION BLADDER;  Surgeon: Jose Romo M.D.;  Location: SURGERY [facility];  Service: Urology   • RECOVERY  2/3/2014    Performed by Cath-Recovery Surgery at SURGERY SAME DAY [facility]   • HIP ARTHROPLASTY TOTAL  5/31/2013    Performed by Burak Valles M.D. at SURGERY [facility]   • ROTATOR CUFF REPAIR Right 2011    x 2   • KNEE ARTHROPLASTY TOTAL  6/1/2009    LEFT-Performed by YONATAN HINSON at SURGERY [facility]   • VEIN LIGATION RADIO FREQUENCY  12/8/2008    LEFT leg-Performed by DEREJE MCCULLOUGH at SURGERY SAME DAY [facility]   • HIP ARTHROPLASTY TOTAL  6/20/08    Performed by YONATAN HINSON at SURGERY [facility]   • LUMBAR LAMINECTOMY DISKECTOMY  2000   • TMJ RECONSTRUCTION BILATERAL  1994   • ANKLE ORIF Right    • KNEE ARTHROSCOPY Left    • VEIN STRIPPING Left      Family History   Problem Relation Age of Onset   • Hypertension Mother      Social History     Social History   • Marital status:      Spouse name: N/A   • Number of children: N/A   • Years of education: N/A     Occupational History   • Not on file.     Social History Main Topics   • Smoking status: Never Smoker   • Smokeless tobacco: Never Used   • Alcohol use 1.8 oz/week     3 Cans of beer per week      Comment: 3 per week   • Drug use: No   • Sexual activity: Yes     Partners: Female     Other Topics Concern   •  Service No   • Blood Transfusions No   • Caffeine Concern No   • Occupational Exposure No   • Hobby Hazards Yes     rides motorcyle   • Sleep Concern No   • Stress Concern No   • Weight Concern Yes   • Special Diet Yes     does not eat red meat    • Back Care Yes   •  Exercise Yes   • Bike Helmet Yes   • Seat Belt Yes   • Self-Exams Yes     Social History Narrative   • No narrative on file     Allergies   Allergen Reactions   • Crestor [Rosuvastatin Calcium] Myalgia   • Penicillins Hives, Itching and Vomiting     Outpatient Encounter Prescriptions as of 9/19/2018   Medication Sig Dispense Refill   • tamsulosin (FLOMAX) 0.4 MG capsule Take 0.4 mg by mouth every day.  12   • fluticasone (FLONASE) 50 MCG/ACT nasal spray SPRAY 2 SPRAYS IN NOSE EVERY DAY.  11   • [START ON 12/8/2018] oxyCODONE-acetaminophen (PERCOCET-10)  MG Tab Take 1 Tab by mouth 5 Times a Day for 30 days. 150 Tab 0   • predniSONE (DELTASONE) 1 MG Tab TAKE 2 TABLETS BY MOUTH EVERY DAY 60 Tab 2   • PARoxetine (PAXIL) 20 MG Tab TAKE 1 TABLET BY MOUTH EVERY DAY 90 Tab 3   • lisinopril (PRINIVIL) 10 MG Tab TAKE 1 TABLET BY MOUTH EVERY DAY 90 Tab 3   • cyclobenzaprine (FLEXERIL) 10 MG Tab TAKE ONE TABLET BY MOUTH THREE TIMES DAILY AS NEEDED FOR MILD PAIN 90 Tab 5   • bisoprolol (ZEBETA) 10 MG tablet Take 10 mg by mouth every day.     • pravastatin (PRAVACHOL) 40 MG tablet Take 1 Tab by mouth every day. 30 Tab 11   • meloxicam (MOBIC) 15 MG tablet TAKE 1 TABLET BY MOUTH EVERY DAY 30 Tab 11   • CALCIUM-VITAMIN D PO Take 1 Tab by mouth 2 Times a Day. 1200/1000 mg     • ascorbic acid (ASCORBIC ACID) 500 MG Tab Take 500 mg by mouth every day.     • Garlic 1000 MG Cap Take 1 Cap by mouth every day.     • vitamin e (VITAMIN E) 400 UNIT Cap Take 400 Units by mouth every day.     • Cyanocobalamin (VITAMIN B-12) 5000 MCG TABLET DISPERSIBLE Take 1 Tab by mouth every day.     • niacin 500 MG Tab Take 500 mg by mouth every day.     • Zinc 50 MG Cap Take 50 mg by mouth every day.     • Multiple Vitamins-Minerals (MULTI COMPLETE PO) Take 1 Tab by mouth every day.     • omeprazole (PRILOSEC) 20 MG CPDR Take 20 mg by mouth every day.     • nitrofurantoin (MACRODANTIN) 50 MG Cap Take 50 mg by mouth.  3   • meloxicam (MOBIC) 7.5 MG  "Tab Take 7.5 mg by mouth every day.  0   • aspirin EC (ECOTRIN) 81 MG Tablet Delayed Response Take 81 mg by mouth every day.     • Tofacitinib Citrate (XELJANZ) 5 MG Tab Take 5 mg by mouth 2 Times a Day. 60 Tab 0   • vardenafil (LEVITRA) 20 MG tablet Take 20 mg by mouth as needed.       No facility-administered encounter medications on file as of 9/19/2018.      ROS       Objective:   /78 (BP Location: Left arm, Patient Position: Sitting, BP Cuff Size: Adult)   Pulse 66   Ht 1.778 m (5' 10\")   Wt 94.8 kg (209 lb)   SpO2 97%   BMI 29.99 kg/m²     Physical Exam   Constitutional: He appears well-developed and well-nourished.   Neck: No JVD present.   Cardiovascular: Normal rate and regular rhythm.    Murmur (1/6 to 2/6 diastolic murmur noted at the base) heard.  Pulmonary/Chest: Effort normal and breath sounds normal. He has no rales.   Abdominal: Soft. There is no tenderness.   Musculoskeletal: He exhibits edema (Trace left pretibial).     Lab Results   Component Value Date/Time    CHOLSTRLTOT 166 09/14/2018 08:48 AM    LDL 77 09/14/2018 08:48 AM    HDL 51 09/14/2018 08:48 AM    TRIGLYCERIDE 188 (H) 09/14/2018 08:48 AM       Lab Results   Component Value Date/Time    SODIUM 138 09/14/2018 08:48 AM    POTASSIUM 4.7 09/14/2018 08:48 AM    CHLORIDE 102 09/14/2018 08:48 AM    CO2 29 09/14/2018 08:48 AM    GLUCOSE 90 09/14/2018 08:48 AM    BUN 13 09/14/2018 08:48 AM    CREATININE 0.90 09/14/2018 08:48 AM    CREATININE 1.1 04/21/2009 03:50 PM    BUNCREATRAT 13 09/21/2016 09:21 AM     Lab Results   Component Value Date/Time    ALKPHOSPHAT 91 09/14/2018 08:48 AM    ASTSGOT 17 09/14/2018 08:48 AM    ALTSGPT 13 09/14/2018 08:48 AM    TBILIRUBIN 0.4 09/14/2018 08:48 AM      Lab Results   Component Value Date/Time    BNPBTYPENAT 61 04/30/2018 10:52 AM      Transthoracic  Echo Report    CONCLUSIONS  Normal left ventricular systolic function.  Left ventricular ejection fraction is visually estimated to be 65%.  Normal " diastolic function.  The right ventricle was normal in size and function.  Possible severe aortic insufficiency.  Diastolic flow reversal in the descending aorta.  Compared to the images of the prior study done 10- -  there has   been progression of aortic regurgitation, previously mild.    Consider a transesophageal echocardiogram for better characterization   of aortic insufficiency.    These findings were communicated to the ordering physician and the   patient's cardiologist.     CANDI SIMS  Exam Date:         08/23/2017     Assessment:     1. Coronary artery disease due to calcified coronary lesion: Mildly cardiac catheterization in 2014     2. Dyslipidemia     3. Essential hypertension     4. Nonrheumatic aortic valve insufficiency         Medical Decision Making:  Today's Assessment / Status / Plan:     Mr. Sims is clinically stable from a cardiovascular standpoint.  His lipid status is still not under optimal control.  At this time, we will add ezetimibe 10 mg daily.  His blood pressure is under excellent control.  He does have significant aortic insufficiency.  He is asymptomatic from this at the present time.  He will follow-up in about 6 months with an echocardiogram and lab work prior.

## 2018-09-20 ENCOUNTER — PHYSICAL THERAPY (OUTPATIENT)
Dept: PHYSICAL THERAPY | Facility: REHABILITATION | Age: 63
End: 2018-09-20
Attending: ORTHOPAEDIC SURGERY
Payer: MEDICARE

## 2018-09-20 DIAGNOSIS — Z96.652 S/P REVISION OF TOTAL KNEE, LEFT: ICD-10-CM

## 2018-09-20 PROCEDURE — 97110 THERAPEUTIC EXERCISES: CPT

## 2018-09-20 PROCEDURE — 97140 MANUAL THERAPY 1/> REGIONS: CPT

## 2018-09-20 PROCEDURE — 97014 ELECTRIC STIMULATION THERAPY: CPT

## 2018-09-20 NOTE — OP THERAPY DAILY TREATMENT
Outpatient Physical Therapy  DAILY TREATMENT     [facility] [address] [facility], [address]  [place] [address]  Phone:  [phone]    Date: 09/20/2018    Patient: Edgardo Sims  YOB: 1955  MRN: [id number]     Time Calculation  Start time: 0215  Stop time: 0300 Time Calculation (min): 45 minutes     Chief Complaint: No chief complaint on file.    Visit #: 7    SUBJECTIVE:  Complaint of pressure and tightness front of knee when descending stairs. Will be replacing clutch, requires getting on a 14 inch stool and scooting around garage.     OBJECTIVE:  Current objective measures:   0 deg. Knee extension  Knee warm and swollen  Myofascial restriction at distal quad, improved but not entirely removed with manual tx.           Therapeutic Exercises (CPT 90358):     1. Shuttle squat, 5 bands, 3 x 12 single leg, bilateral    3. Plank counter, w/ leg raise, 15 bilat    5. Heel raise w/ eversion, no time, no time    7. Step up, 15 w/ balance bilateral    8. Lateral step ups, 15 laps    9. Tandem balance eyes closed corner, no time, HEP    10. Bike warm up, 5 min    11. Calf stretch, no time    12. HS stretch w/ strap supine, no time    13. Single leg stance, 1/2 clocks taps, 4 bilat, L less stable than R.       Time-based treatments/modalities:  Manual therapy minutes (CPT 99081): 15 minutes  Therapeutic exercise minutes (CPT 35639): 10 minutes  Neuromusc re-ed, balance, coor, post minutes (CPT 73916): 5 minutes         ASSESSMENT:   Response to treatment: Lack of knee stability on affected side, loss of balance causes some report of pain. Significant myofascial restriction identified at distal quad, STW was effective in reducing but not entirely removing this restriction.     PLAN/RECOMMENDATIONS:   Plan for treatment: therapy treatment to continue next visit.  Planned interventions for next visit: continue with current treatment.

## 2018-09-27 ENCOUNTER — PHYSICAL THERAPY (OUTPATIENT)
Dept: PHYSICAL THERAPY | Facility: REHABILITATION | Age: 63
End: 2018-09-27
Attending: ORTHOPAEDIC SURGERY
Payer: MEDICARE

## 2018-09-27 DIAGNOSIS — Z96.652 S/P REVISION OF TOTAL KNEE, LEFT: ICD-10-CM

## 2018-09-27 PROCEDURE — 97014 ELECTRIC STIMULATION THERAPY: CPT

## 2018-09-27 PROCEDURE — 97140 MANUAL THERAPY 1/> REGIONS: CPT

## 2018-09-27 PROCEDURE — 97110 THERAPEUTIC EXERCISES: CPT

## 2018-09-27 NOTE — OP THERAPY DAILY TREATMENT
Outpatient Physical Therapy  DAILY TREATMENT     [facility] [address], [address]  [place] [address]  Phone:  [phone]    Date: 09/27/2018    Patient: Edgardo Sims  YOB: 1955  MRN: [id number]     Time Calculation  Start time: 0200  Stop time: 0300 Time Calculation (min): 60 minutes     Chief Complaint: No chief complaint on file.    Visit #: 8    SUBJECTIVE:  Knee is feeling swollen and warm today.     OBJECTIVE:  Current objective measures:     Myofascial restriction distal quad  Warm and swollen knee      Therapeutic Exercises (CPT 53906):     1. Step ups fwd and lateral, 15B    2. Blake test, 5 min     3. Prone hip flexor str, HEP    4. Reverse lunge, L, knee restriction for bending    Therapeutic Treatments and Modalities:     1. Manual Therapy (CPT 11669), L. knee    Therapeutic Treatment and Modalities Summary: STW for edema reduction and distal quad release    Time-based treatments/modalities:  Manual therapy minutes (CPT 28471): 15 minutes  Therapeutic exercise minutes (CPT 78703): 10 minutes  Functional training, self care minutes (CPT 38880): 5 minutes         ASSESSMENT:   Response to treatment: Quad restriction identified L > R, limiting tolerance for lunge and squat positions. Added prone hip flexor stretch into HEP.     PLAN/RECOMMENDATIONS:   Continue plan of care

## 2018-10-03 ENCOUNTER — PHYSICAL THERAPY (OUTPATIENT)
Dept: PHYSICAL THERAPY | Facility: REHABILITATION | Age: 63
End: 2018-10-03
Attending: ORTHOPAEDIC SURGERY
Payer: MEDICARE

## 2018-10-03 DIAGNOSIS — Z96.652 S/P REVISION OF TOTAL KNEE, LEFT: ICD-10-CM

## 2018-10-03 PROCEDURE — 97535 SELF CARE MNGMENT TRAINING: CPT

## 2018-10-03 PROCEDURE — 97110 THERAPEUTIC EXERCISES: CPT

## 2018-10-03 PROCEDURE — 97014 ELECTRIC STIMULATION THERAPY: CPT

## 2018-10-03 NOTE — OP THERAPY DAILY TREATMENT
"  Outpatient Physical Therapy  DAILY TREATMENT     [facility] Outpatient Physical Therapy [address], [address]  [place] [address]  Phone:  [phone]    Date: 10/03/2018    Patient: Edgardo Sims  YOB: 1955  MRN: [id number]     Time Calculation  Start time: 0800  Stop time: 0845 Time Calculation (min): 45 minutes     Chief Complaint: No chief complaint on file.    Visit #: 9    SUBJECTIVE:  No issues with knee this week. Knee swelling has gone down. Not experiencing any knee pain. I still need to be careful of \"over doing it\" and having knee become sore and swollen.     OBJECTIVE:  Current objective measures:           Therapeutic Exercises (CPT 91591):     1. Step ups fwd and lateral, 15B    2. Blake Str w/ STW quads/ITB, 4 min    3. Prone hip flexor str, HEP    4. Reverse lunge, 3 reps, decreased balance, pain, good strength for controlling depth    5. Heel raise w/ eversion, no time    8. Lateral step ups, 15 laps    9. Tandem balance eyes open, 10 sh flexion; 10 lateral reaches, HEP    10. Bike warm up, 5 min    11. Calf stretch, no time    12. HS stretch w/ strap supine, 1 min bilateral    13. Single leg stance, 1/2 clocks taps, 4 bilat, L less stable than R.     Therapeutic Treatments and Modalities:     1. Manual Therapy (CPT 21831), L. knee    Therapeutic Treatment and Modalities Summary: STW for edema reduction and distal quad release    Time-based treatments/modalities:  Manual therapy minutes (CPT 89763): 5 minutes  Therapeutic exercise minutes (CPT 45529): 20 minutes  Functional training, self care minutes (CPT 98009): 5 minutes       Pain rating before treatment: 0  Pain rating after treatment: 0    ASSESSMENT:   Response to treatment: Less edema at knee today than last week. Full knee AROM. Complaint of knee pain with lunge, functionally significant for getting off the floor.     PLAN/RECOMMENDATIONS:   Plan for treatment: therapy treatment to continue next " visit.  Planned interventions for next visit: continue with current treatment.

## 2018-10-05 ENCOUNTER — PHYSICAL THERAPY (OUTPATIENT)
Dept: PHYSICAL THERAPY | Facility: REHABILITATION | Age: 63
End: 2018-10-05
Attending: ORTHOPAEDIC SURGERY
Payer: MEDICARE

## 2018-10-05 DIAGNOSIS — Z96.652 S/P REVISION OF TOTAL KNEE, LEFT: ICD-10-CM

## 2018-10-05 PROCEDURE — G8979 MOBILITY GOAL STATUS: HCPCS | Mod: CI

## 2018-10-05 PROCEDURE — 97110 THERAPEUTIC EXERCISES: CPT

## 2018-10-05 PROCEDURE — G8978 MOBILITY CURRENT STATUS: HCPCS | Mod: CI

## 2018-10-05 PROCEDURE — 97535 SELF CARE MNGMENT TRAINING: CPT

## 2018-10-05 PROCEDURE — G8980 MOBILITY D/C STATUS: HCPCS | Mod: CI

## 2018-10-05 NOTE — OP THERAPY DAILY TREATMENT
Outpatient Physical Therapy  DAILY TREATMENT     [facility] Outpatient Physical Therapy [address], [address]  [place] [address]  Phone:  [phone]    Date: 10/05/2018    Patient: Edgardo Sims  YOB: 1955  MRN: [id number]     Time Calculation  Start time: 1100  Stop time: 1130 Time Calculation (min): 30 minutes     Chief Complaint: No chief complaint on file.    Visit #: 10    SUBJECTIVE:  I feel like warmth and edema in my leg is still and issue. Overall feel like knee is 85% normal.     OBJECTIVE:  Current objective measures:   Slightly warm and swollen   Myofascial restriction distal quad  Knee flexion 140  Knee extension 0           Therapeutic Exercises (CPT 88696):     1. Flora stretch    2. Prone quad stretch    3. Ball wall squat    4. Reverse lung doorway    5. Single leg stance, clock steps      Time-based treatments/modalities:  Therapeutic exercise minutes (CPT 14235): 15 minutes  Functional training, self care minutes (CPT 09498): 15 minutes       Pain rating before treatment: 0  Pain rating after treatment: 0    ASSESSMENT:   Response to treatment:     PLAN/RECOMMENDATIONS:   Plan for treatment: discharge patient due to accomplished goals.  Planned interventions for next visit: discharged from PT.

## 2018-10-11 ENCOUNTER — TELEPHONE (OUTPATIENT)
Dept: RHEUMATOLOGY | Facility: MEDICAL CENTER | Age: 63
End: 2018-10-11

## 2018-11-15 ENCOUNTER — OFFICE VISIT (OUTPATIENT)
Dept: RHEUMATOLOGY | Facility: MEDICAL CENTER | Age: 63
End: 2018-11-15
Payer: MEDICARE

## 2018-11-15 VITALS
OXYGEN SATURATION: 96 % | HEART RATE: 94 BPM | TEMPERATURE: 97.6 F | DIASTOLIC BLOOD PRESSURE: 80 MMHG | WEIGHT: 206 LBS | BODY MASS INDEX: 29.56 KG/M2 | RESPIRATION RATE: 16 BRPM | SYSTOLIC BLOOD PRESSURE: 150 MMHG

## 2018-11-15 DIAGNOSIS — I10 ESSENTIAL HYPERTENSION: ICD-10-CM

## 2018-11-15 DIAGNOSIS — I25.84 CORONARY ARTERY DISEASE DUE TO CALCIFIED CORONARY LESION: ICD-10-CM

## 2018-11-15 DIAGNOSIS — M05.9 RHEUMATOID ARTHRITIS WITH POSITIVE RHEUMATOID FACTOR, INVOLVING UNSPECIFIED SITE (HCC): ICD-10-CM

## 2018-11-15 DIAGNOSIS — I35.1 NONRHEUMATIC AORTIC VALVE INSUFFICIENCY: ICD-10-CM

## 2018-11-15 DIAGNOSIS — I25.10 CORONARY ARTERY DISEASE DUE TO CALCIFIED CORONARY LESION: ICD-10-CM

## 2018-11-15 PROCEDURE — 99214 OFFICE O/P EST MOD 30 MIN: CPT | Performed by: INTERNAL MEDICINE

## 2018-11-15 NOTE — LETTER
[facility]   [address] [place] [address], [address]  [place] [address]  Phone: [phone]  Fax: [phone]              Encounter Date: 11/15/2018    Dear Dr. Matta ref. provider found,    It was a pleasure seeing your patient, Edgardo Sims, on 11/15/2018. Diagnoses of Rheumatoid arthritis with positive rheumatoid factor, involving unspecified site (HCC), Coronary artery disease due to calcified coronary lesion: Mildly cardiac catheterization in 2014, Nonrheumatic aortic valve insufficiency, and Essential hypertension were pertinent to this visit.     Please find attached progress note which includes the history I obtained from Mr. Sims, my physical examination findings, my impression and recommendations.      Once again, it was a pleasure participating in your patient's care.  Please feel free to contact me if you have any questions or if I can be of any further assistance to your patients.      Sincerely,    Ju Azul M.D.  Electronically Signed          PROGRESS NOTE:  No notes on file

## 2018-11-16 NOTE — PROGRESS NOTES
Chief Complaint- joint pain    Subjective:   Edgardo Sims is a 63 y.o. male here today for follow up of rheumatological issues    This is a follow-up visit  for this patient for rheumatoid arthritis to this clinic, brand-new patient to me, previously followed by Dr. Canales.  Patient is currently on Xeljanz at 5 mg p.o. twice daily which is subsidized by  [employer] and continues to be on low-dose prednisone at 2 mg p.o. daily.  Patient states that he has been told that he cannot come off of prednisone because he has adrenal insufficiency.  Patient has had some problems in the past with a recurrent bladder infection but that seems to have resolved by now.  Patient states that they think that the recurrent bladder infections may be secondary to BPH and some urine retention which is currently getting worked up by urology. Otherwise,  Patient denies any side effects from the medication, denies any unexplained weight loss, denies any fevers of unknown etiology, denies any GI upset, denies any rashes, denies any new joint swelling, denies recurrent infections.     Additional comorbidities include osteopenia, BPH,    Bilateral AUTUMN  Left TKA    S/p Remicade-ineffective  S/p Orencia-ineffective  S/p Enbrel-ineffective  S/p humira-ineffective  S/p MTX-oral ulcers  S/p arava-bad reaction but patient doesn't recall specifics  S/p rituximab-helped but patient stopped because of methotrexate side effects per patient report....    Cryoglobulin neg 8/2017  RF neg 8/2017  HBsAg IgM/HBcAb neg 6/2017  HCV neg 6/2017  Quantiferon Gold neg 6/2017  DEXA 9/5/2017 T scores -0.2, -1.5  FRAX 9/5/2017 not done  Hand x-rays 5/2017-indicates erosive arthritis  Feet x-rays 5/2017-indicates erosive arthritis      Current medicines (including changes today)  Current Outpatient Prescriptions   Medication Sig Dispense Refill   • bisoprolol (ZEBETA) 10 MG tablet TAKE 1 TABLET BY MOUTH EVERY DAY 90 Tab 1   • meloxicam (MOBIC) 7.5 MG Tab Take 7.5  mg by mouth every day.  0   • ezetimibe (ZETIA) 10 MG Tab Take 1 Tab by mouth every day. 30 Tab 11   • Tofacitinib Citrate (XELJANZ) 5 MG Tab Take 5 mg by mouth 2 Times a Day. 60 Tab 0   • tamsulosin (FLOMAX) 0.4 MG capsule Take 0.4 mg by mouth every day.  12   • fluticasone (FLONASE) 50 MCG/ACT nasal spray SPRAY 2 SPRAYS IN NOSE EVERY DAY.  11   • [START ON 12/8/2018] oxyCODONE-acetaminophen (PERCOCET-10)  MG Tab Take 1 Tab by mouth 5 Times a Day for 30 days. 150 Tab 0   • predniSONE (DELTASONE) 1 MG Tab TAKE 2 TABLETS BY MOUTH EVERY DAY 60 Tab 2   • PARoxetine (PAXIL) 20 MG Tab TAKE 1 TABLET BY MOUTH EVERY DAY 90 Tab 3   • lisinopril (PRINIVIL) 10 MG Tab TAKE 1 TABLET BY MOUTH EVERY DAY 90 Tab 3   • cyclobenzaprine (FLEXERIL) 10 MG Tab TAKE ONE TABLET BY MOUTH THREE TIMES DAILY AS NEEDED FOR MILD PAIN 90 Tab 5   • aspirin EC (ECOTRIN) 81 MG Tablet Delayed Response Take 81 mg by mouth every day.     • bisoprolol (ZEBETA) 10 MG tablet Take 10 mg by mouth every day.     • pravastatin (PRAVACHOL) 40 MG tablet Take 1 Tab by mouth every day. 30 Tab 11   • meloxicam (MOBIC) 15 MG tablet TAKE 1 TABLET BY MOUTH EVERY DAY 30 Tab 11   • CALCIUM-VITAMIN D PO Take 1 Tab by mouth 2 Times a Day. 1200/1000 mg     • ascorbic acid (ASCORBIC ACID) 500 MG Tab Take 500 mg by mouth every day.     • Garlic 1000 MG Cap Take 1 Cap by mouth every day.     • vitamin e (VITAMIN E) 400 UNIT Cap Take 400 Units by mouth every day.     • Cyanocobalamin (VITAMIN B-12) 5000 MCG TABLET DISPERSIBLE Take 1 Tab by mouth every day.     • niacin 500 MG Tab Take 500 mg by mouth every day.     • Zinc 50 MG Cap Take 50 mg by mouth every day.     • Multiple Vitamins-Minerals (MULTI COMPLETE PO) Take 1 Tab by mouth every day.     • vardenafil (LEVITRA) 20 MG tablet Take 20 mg by mouth as needed.     • omeprazole (PRILOSEC) 20 MG CPDR Take 20 mg by mouth every day.       No current facility-administered medications for this visit.      He  has a past  medical history of Abnormal myocardial perfusion study (1/15/2014); Aortic insufficiency (7/5/2011); Arthritis; Bronchitis; CAD (coronary artery disease) mild plaque at cath in 2/14 (12/5/2014); Cancer (HCC); Chronic use of opiate drugs therapeutic purposes (8/12/2017); Dyslipidemia (7/12/2011); Heart burn; Heart murmur; Hiatus hernia syndrome; High cholesterol; HTN (hypertension) (7/5/2011); Hypertension; Indigestion; Infectious disease; Pain; Pain; Rheumatoid arthritis(714.0); and Tachycardia (10/20/2014).    ROS   Other than what is mentioned in HPI or physical exam, there is no history of headaches, double vision or blurred vision. No temporal tenderness or jaw claudication. No history of cataracts or glaucoma. No trouble swallowing difficulties or sore throats.  No chest complaints including chest pain, cough or sputum production. No GI complaints including nausea, vomiting, change in bowel habits, or past peptic ulcer disease. No history of blood in the stools. No urinary complaints including dysuria or frequency. No history of alopecia, photosensitivity, oral ulcerations, Raynaud's phenomena.       Objective:     Blood pressure 150/80, pulse 94, temperature 36.4 °C (97.6 °F), temperature source Temporal, resp. rate 16, weight 93.4 kg (206 lb), SpO2 96 %. Body mass index is 29.56 kg/m².   Physical Exam:    Constitutional: Alert and oriented X3, patient is talkative with good eye contact.Skin: Warm, dry, good turgor, no rashes in visible areas, no psoriatic lesions, no petechial lesions, no malar rashes  .Eye: Equal, round and reactive, conjunctiva clear, lids normal EOM intactENMT: Lips without lesions, good dentition, no oropharyngeal ulcers, moist buccal mucosa, pinna without deformityNeck: Trachea midline, no masses, no thyromegaly.Lymph:  No cervical lymphadenopathy, no axillary lymphadenopathy, no supraclavicular lymphadenopathyRespiratory: Unlabored respiratory effort, lungs clear to auscultation, no  wheezes, no ronchi.Cardiovascular: Normal S1, S2, no murmur, no edema.Abdomen: Soft, non-tender, no masses, no hepatosplenomegaly.Psych: Alert and oriented x3, normal affect and mood.Neuro: Cranial nerves 2-12 are grossly intact, no loss of sensation LEExt:no joint laxity noted in bilateral arms, no joint laxity noted in bilateral legs, there is some PIP joint hypertrophy on some of the fingers of both hands, there are bony hypertrophy and effusions on bilateral second and third MCP joints with right worse than left, there is some mild carpometacarpal subluxation both hands with right worse than left, there is crossover toes right second toe over great toe, gait without antalgia and without foot drop    Lab Results   Component Value Date/Time    QNTTBGOLD Negative 06/01/2017 01:13 PM     Lab Results   Component Value Date/Time    HEPBCORTOT Negative 06/01/2017 01:13 PM    HEPBSAG Negative 06/01/2017 01:13 PM     Lab Results   Component Value Date/Time    HEPCAB Negative 06/01/2017 01:13 PM     Lab Results   Component Value Date/Time    SODIUM 138 09/14/2018 08:48 AM    POTASSIUM 4.7 09/14/2018 08:48 AM    CHLORIDE 102 09/14/2018 08:48 AM    CO2 29 09/14/2018 08:48 AM    GLUCOSE 90 09/14/2018 08:48 AM    BUN 13 09/14/2018 08:48 AM    CREATININE 0.90 09/14/2018 08:48 AM    CREATININE 1.1 04/21/2009 03:50 PM    BUNCREATRAT 13 09/21/2016 09:21 AM      Lab Results   Component Value Date/Time    WBC 9.4 07/26/2018 11:19 AM    WBC 7.5 07/01/2011 10:30 AM    RBC 4.67 (L) 07/26/2018 11:19 AM    RBC 4.72 07/01/2011 10:30 AM    HEMOGLOBIN 11.3 (L) 08/04/2018 05:00 AM    HEMATOCRIT 34.2 (L) 08/04/2018 05:00 AM    MCV 91.2 07/26/2018 11:19 AM     (H) 07/01/2011 10:30 AM    MCH 29.3 07/26/2018 11:19 AM    MCH 36.0 (H) 07/01/2011 10:30 AM    MCHC 32.2 (L) 07/26/2018 11:19 AM    MPV 10.3 07/26/2018 11:19 AM    NEUTSPOLYS 51.40 05/21/2018 03:00 AM    LYMPHOCYTES 24.40 05/21/2018 03:00 AM    MONOCYTES 14.20 (H) 05/21/2018  03:00 AM    EOSINOPHILS 8.00 (H) 05/21/2018 03:00 AM    BASOPHILS 1.10 05/21/2018 03:00 AM    HYPOCHROMIA 1+ 03/05/2014 04:43 PM    ANISOCYTOSIS 1+ 01/02/2015 05:02 PM      Lab Results   Component Value Date/Time    CALCIUM 9.0 09/14/2018 08:48 AM    ASTSGOT 17 09/14/2018 08:48 AM    ALTSGPT 13 09/14/2018 08:48 AM    ALKPHOSPHAT 91 09/14/2018 08:48 AM    TBILIRUBIN 0.4 09/14/2018 08:48 AM    ALBUMIN 4.1 09/14/2018 08:48 AM    TOTPROTEIN 6.5 09/14/2018 08:48 AM     Lab Results   Component Value Date/Time    RHEUMFACTN <10 08/04/2017 02:32 PM     Lab Results   Component Value Date/Time    CRYOGLOBULIN NEG 72Hour 08/04/2017 02:32 PM     Lab Results   Component Value Date/Time    SEDRATEWES 13 04/26/2018 03:03 PM     Lab Results   Component Value Date/Time    HBA1C 5.3 07/26/2018 11:19 AM     Lab Results   Component Value Date/Time    CPKTOTAL 141 01/26/2018 07:47 AM     Lab Results   Component Value Date/Time    PTHINTACT 55 10/10/2008 10:42 AM     Results for orders placed during the hospital encounter of 09/05/17   DS-BONE DENSITY STUDY (DEXA)    Impression According to the World Health Organization classification, bone mineral density of this patient is osteopenic.        FRAX score not obtained on this patient.        INTERPRETING LOCATION:  [address] [place] [address], [place], [address]     Results for orders placed during the hospital encounter of 05/31/13   DX-PELVIS-1 OR 2 VIEWS    Impression Interval performance of a left hip arthroplasty.  Right hip arthroplasty is unchanged.              INTERPRETING LOCATION: [address], [place], [address]     Results for orders placed during the hospital encounter of 05/31/13   DX-PELVIS-1 OR 2 VIEWS    Impression Interval performance of a left hip arthroplasty.  Right hip arthroplasty is unchanged.              INTERPRETING LOCATION: [address], [place], [address]     Results for orders placed during the hospital encounter of 01/30/09   DX-KNEE COMPLETE 4+    Impression  IMPRESSION:     1. NEW FINDINGS CONSISTENT WITH OSTEOCHONDROSIS DISSECANS OF THE MEDIAL   FEMORAL CONDYLE.    2. NEW MODERATE JOINT SPACE NARROWING OF THE MEDIAL TIBIOFEMORAL   COMPARTMENT, CONSISTENT WITH CARTILAGE THINNING.    3. NEW MODERATE JOINT EFFUSION.      SST/llw      Read By BLANCA ORTIZ MD on Jan 30 2009  3:39PM  : [facility] Transcription Date: Feb 2 2009  6:08AM  THIS DOCUMENT HAS BEEN ELECTRONICALLY SIGNED BY: BLANCA ORTIZ MD on   Feb  3 2009 11:43AM        Results for orders placed during the hospital encounter of 05/31/17   DX-HAND 3+ RIGHT    Impression 1.  Question of small erosions or subchondral cysts in the 2nd and 3rd metacarpal heads.     Results for orders placed during the hospital encounter of 12/21/07   MR-KNEE-W/O    Impression IMPRESSION:    1. NON-DISPLACED OBLIQUE TEAR OF THE POSTERIOR HORN MEDIAL MENISCUS   COMMUNICATING WITH SUPERIOR AND INFERIOR ARTICULAR SURFACES WITHOUT   EVIDENCE OF PARAMENISCAL CYST.  UNDERLYING CHONDROMALACIA IS SEEN WITHIN   THE MEDIAL FEMORAL CONDYLE POSTERIOR WEIGHTBEARING SURFACE OF   NON-FULL-THICKNESS SEVERITY.        2. MENISCAL DEGENERATION AND PARTIAL PERIPHERAL EXTRUSION INVOLVING THE   MID-BODY OF THE MEDIAL MENISCUS.        GEK:cl        Read By KEITH NETTLES MD on Dec 21 2007 12:14PM  : [facility] Transcription Date: Dec 22 2007 10:16AM  THIS DOCUMENT HAS BEEN ELECTRONICALLY SIGNED BY: KEITH NETTLES MD on   Dec 24 2007 12:21PM     Results for orders placed during the hospital encounter of 02/14/05   MR-CERVICAL SPINE-W/O    Impression IMPRESSION:    1. DEGENERATIVE DISC DISEASE C5-6 AND C6-7 WITH SOME MINOR POSTERIOR   SPURRING BUT WITHOUT ANY REAL CANAL STENOSIS AND WITH NO EVIDENCE OF AN   ACUTE DISC EXTRUSION.  2. MILD TO MODERATE MIDCERVICAL NEURAL FORAMINAL NARROWING DUE TO   UNCINATE AND FACET HYPERTROPHY, WITH MULTILEVEL DISEASE PRESENT AND WITH   SOME MILD INTERVAL PROGRESSION COMPARED WITH THE PRIOR  MRI.  THE AXIAL   IMAGES OVERESTIMATE THE DEGREE OF NEURAL FORAMINAL STENOSIS DUE TO   ARTIFACTS, WITH THE SAGITTAL IMAGES DEMONSTRATING MILD NEURAL FORAMINAL   STENOTIC CHANGES IN THE MIDCERVICAL LEVELS.    3. NO EVIDENCE OF ACUTE TRAUMA.         Read By KEITH NETTLES MD on Feb 14 2005  1:10PM  : [facility] Transcription Date: Feb 15 2005 11:24AM  THIS DOCUMENT HAS BEEN ELECTRONICALLY SIGNED BY: KEITH NETTLES MD on   Feb 15 2005 12:06PM      Results for orders placed during the hospital encounter of 02/14/05   DX-CERVICAL SPINE-2 OR 3 VIEWS    Impression IMPRESSION:    MULTILEVEL CERVICAL SPINE DEGENERATIVE CHANGE, WITHOUT INTERVAL CHANGE.          Read By MIKKI RODRIGUEZ MD on Feb 14 2005  3:04PM  : [facility] Transcription Date: Feb 14 2005  3:10PM  THIS DOCUMENT HAS BEEN ELECTRONICALLY SIGNED BY: MIKKI RODRIGUEZ MD on Feb 14 2005  3:17PM       Assessment and Plan:     1. Rheumatoid arthritis with positive rheumatoid factor, involving unspecified site (HCC)  Patient currently on Xeljanz at 5 mg p.o. twice daily and low-dose prednisone at 2 mg p.o. daily, at this point patient doing well, we will check inflammatory markers and also recheck rheumatoid serologies as well as uric acid level to assure no complicated rheumatoid arthritis, labs due February 2019, labs ordered for patient  - COMP METABOLIC PANEL; Future  - CBC WITH DIFFERENTIAL; Future  - WESTERGREN SED RATE; Future  - RHEUMATOID ARTHRITIS FACTOR; Future  - CCP ANTIBODY; Future  - URIC ACID, SERUM    2. Coronary artery disease due to calcified coronary lesion: Mildly cardiac catheterization in 2014  Patient followed by cardiology  - COMP METABOLIC PANEL; Future  - CBC WITH DIFFERENTIAL; Future  - WESTERGREN SED RATE; Future  - RHEUMATOID ARTHRITIS FACTOR; Future  - CCP ANTIBODY; Future  - URIC ACID, SERUM    3. Nonrheumatic aortic valve insufficiency  Patient followed by cardiology  - COMP METABOLIC PANEL; Future  - CBC WITH  DIFFERENTIAL; Future  - WESTERGREN SED RATE; Future  - RHEUMATOID ARTHRITIS FACTOR; Future  - CCP ANTIBODY; Future  - URIC ACID, SERUM    4. Essential hypertension  May impact the type of medications we can use for this patient's arthritis. We will have to keep this under advisement.  - COMP METABOLIC PANEL; Future  - CBC WITH DIFFERENTIAL; Future  - WESTERGREN SED RATE; Future  - RHEUMATOID ARTHRITIS FACTOR; Future  - CCP ANTIBODY; Future  - URIC ACID, SERUM    Followup: Return in about 3 months (around 2/15/2019). or sooner peyman Sims was seen 30 minutes face-to-face of which more than 50% of the time was spent counseling the patient (excluding time for procedures)  regarding  rheumatological condition and care. Therapy was discussed in detail.    Please note that this dictation was created using voice recognition software. I have made every reasonable attempt to correct obvious errors, but I expect that there are errors of grammar and possibly content that I did not discover before finalizing the note.

## 2018-12-12 ENCOUNTER — HOSPITAL ENCOUNTER (OUTPATIENT)
Dept: RADIOLOGY | Facility: MEDICAL CENTER | Age: 63
End: 2018-12-12
Attending: INTERNAL MEDICINE
Payer: MEDICARE

## 2018-12-12 DIAGNOSIS — M40.14 OTHER SECONDARY KYPHOSIS, THORACIC REGION: ICD-10-CM

## 2018-12-12 PROCEDURE — 72072 X-RAY EXAM THORAC SPINE 3VWS: CPT

## 2018-12-18 ENCOUNTER — TELEPHONE (OUTPATIENT)
Dept: MEDICAL GROUP | Facility: LAB | Age: 63
End: 2018-12-18

## 2018-12-18 NOTE — TELEPHONE ENCOUNTER
ESTABLISHED PATIENT PRE-VISIT PLANNING     Patient was NOT contacted to complete PVP.     Note: Patient will not be contacted if there is no indication to call.     1.  Reviewed notes from the last few office visits within the medical group: Yes    2.  If any orders were placed at last visit or intended to be done for this visit (i.e. 6 mos follow-up), do we have Results/Consult Notes?        •  Labs - Labs were not ordered at last office visit.       •  Imaging - Imaging ordered, completed and results are in chart.       •  Referrals - No referrals were ordered at last office visit.    3. Is this appointment scheduled as a Hospital Follow-Up? No    4.  Immunizations were updated in Epic using WebIZ?: Epic matches WebIZ       •  Web Iz Recommendations: SHINGRIX (Shingles)    5.  Patient is due for the following Health Maintenance Topics:   Health Maintenance Due   Topic Date Due   • Annual Wellness Visit  07/13/2016   • URINE DRUG SCREEN  03/07/2019       - Patient has completed FLU, PNEUMOVAX (PPSV23), PREVNAR (PCV13)  and TDAP Immunization(s) per WebIZ. Chart has been updated.    6.  MDX printed for Provider? NO    7.  Patient was NOT informed to arrive 15 min prior to their scheduled appointment and bring in their medication bottles.

## 2018-12-19 ENCOUNTER — OFFICE VISIT (OUTPATIENT)
Dept: MEDICAL GROUP | Facility: LAB | Age: 63
End: 2018-12-19
Payer: MEDICARE

## 2018-12-19 ENCOUNTER — APPOINTMENT (RX ONLY)
Dept: URBAN - METROPOLITAN AREA CLINIC 4 | Facility: CLINIC | Age: 63
Setting detail: DERMATOLOGY
End: 2018-12-19

## 2018-12-19 VITALS
DIASTOLIC BLOOD PRESSURE: 60 MMHG | HEIGHT: 70 IN | TEMPERATURE: 97.8 F | OXYGEN SATURATION: 96 % | BODY MASS INDEX: 30.06 KG/M2 | SYSTOLIC BLOOD PRESSURE: 104 MMHG | WEIGHT: 210 LBS | HEART RATE: 71 BPM | RESPIRATION RATE: 12 BRPM

## 2018-12-19 DIAGNOSIS — L81.4 OTHER MELANIN HYPERPIGMENTATION: ICD-10-CM

## 2018-12-19 DIAGNOSIS — D18.0 HEMANGIOMA: ICD-10-CM

## 2018-12-19 DIAGNOSIS — Z87.440 HISTORY OF UTI: ICD-10-CM

## 2018-12-19 DIAGNOSIS — Z79.899 ENCOUNTER FOR LONG-TERM (CURRENT) USE OF HIGH-RISK MEDICATION: ICD-10-CM

## 2018-12-19 DIAGNOSIS — M40.04 POSTURAL KYPHOSIS OF THORACIC REGION: ICD-10-CM

## 2018-12-19 DIAGNOSIS — M05.9 RHEUMATOID ARTHRITIS WITH POSITIVE RHEUMATOID FACTOR, INVOLVING UNSPECIFIED SITE (HCC): ICD-10-CM

## 2018-12-19 DIAGNOSIS — L57.0 ACTINIC KERATOSIS: ICD-10-CM

## 2018-12-19 DIAGNOSIS — L82.1 OTHER SEBORRHEIC KERATOSIS: ICD-10-CM

## 2018-12-19 DIAGNOSIS — D22 MELANOCYTIC NEVI: ICD-10-CM

## 2018-12-19 DIAGNOSIS — Z85.828 PERSONAL HISTORY OF OTHER MALIGNANT NEOPLASM OF SKIN: ICD-10-CM

## 2018-12-19 PROBLEM — D22.5 MELANOCYTIC NEVI OF TRUNK: Status: ACTIVE | Noted: 2018-12-19

## 2018-12-19 PROBLEM — D18.01 HEMANGIOMA OF SKIN AND SUBCUTANEOUS TISSUE: Status: ACTIVE | Noted: 2018-12-19

## 2018-12-19 PROCEDURE — ? DIAGNOSIS COMMENT

## 2018-12-19 PROCEDURE — 99214 OFFICE O/P EST MOD 30 MIN: CPT | Performed by: INTERNAL MEDICINE

## 2018-12-19 PROCEDURE — 99213 OFFICE O/P EST LOW 20 MIN: CPT | Mod: 25

## 2018-12-19 PROCEDURE — ? LIQUID NITROGEN

## 2018-12-19 PROCEDURE — 17000 DESTRUCT PREMALG LESION: CPT

## 2018-12-19 RX ORDER — DIAZEPAM 5 MG/1
5 TABLET ORAL EVERY 6 HOURS PRN
COMMUNITY
End: 2018-12-20 | Stop reason: SDUPTHER

## 2018-12-19 RX ORDER — TESTOSTERONE CYPIONATE 200 MG/ML
INJECTION, SOLUTION INTRAMUSCULAR
Refills: 1 | COMMUNITY
Start: 2018-10-06 | End: 2019-02-07 | Stop reason: SDUPTHER

## 2018-12-19 RX ORDER — SULFAMETHOXAZOLE AND TRIMETHOPRIM 800; 160 MG/1; MG/1
1 TABLET ORAL 2 TIMES DAILY
Qty: 20 TAB | Refills: 0 | Status: SHIPPED | OUTPATIENT
Start: 2018-12-19 | End: 2019-01-26 | Stop reason: CLARIF

## 2018-12-19 RX ORDER — OXYCODONE AND ACETAMINOPHEN 10; 325 MG/1; MG/1
1 TABLET ORAL
Qty: 150 TAB | Refills: 0 | Status: SHIPPED | OUTPATIENT
Start: 2019-01-10 | End: 2018-12-19 | Stop reason: SDUPTHER

## 2018-12-19 RX ORDER — OXYCODONE AND ACETAMINOPHEN 10; 325 MG/1; MG/1
1 TABLET ORAL
Qty: 150 TAB | Refills: 0 | Status: SHIPPED | OUTPATIENT
Start: 2019-02-09 | End: 2018-12-19 | Stop reason: SDUPTHER

## 2018-12-19 RX ORDER — OXYCODONE AND ACETAMINOPHEN 10; 325 MG/1; MG/1
1 TABLET ORAL
Qty: 150 TAB | Refills: 0 | Status: SHIPPED | OUTPATIENT
Start: 2019-03-11 | End: 2019-01-26 | Stop reason: CLARIF

## 2018-12-19 ASSESSMENT — PATIENT HEALTH QUESTIONNAIRE - PHQ9
7. TROUBLE CONCENTRATING ON THINGS, SUCH AS READING THE NEWSPAPER OR WATCHING TELEVISION: NOT AT ALL
3. TROUBLE FALLING OR STAYING ASLEEP OR SLEEPING TOO MUCH: NOT AT ALL
6. FEELING BAD ABOUT YOURSELF - OR THAT YOU ARE A FAILURE OR HAVE LET YOURSELF OR YOUR FAMILY DOWN: NOT AL ALL
SUM OF ALL RESPONSES TO PHQ QUESTIONS 1-9: 1
1. LITTLE INTEREST OR PLEASURE IN DOING THINGS: NOT AT ALL
SUM OF ALL RESPONSES TO PHQ9 QUESTIONS 1 AND 2: 0
9. THOUGHTS THAT YOU WOULD BE BETTER OFF DEAD, OR OF HURTING YOURSELF: NOT AT ALL
8. MOVING OR SPEAKING SO SLOWLY THAT OTHER PEOPLE COULD HAVE NOTICED. OR THE OPPOSITE, BEING SO FIGETY OR RESTLESS THAT YOU HAVE BEEN MOVING AROUND A LOT MORE THAN USUAL: NOT AT ALL
2. FEELING DOWN, DEPRESSED, IRRITABLE, OR HOPELESS: NOT AT ALL
4. FEELING TIRED OR HAVING LITTLE ENERGY: NOT AT ALL
5. POOR APPETITE OR OVEREATING: SEVERAL DAYS

## 2018-12-19 ASSESSMENT — LOCATION DETAILED DESCRIPTION DERM
LOCATION DETAILED: INFERIOR THORACIC SPINE
LOCATION DETAILED: NASAL DORSUM
LOCATION DETAILED: RIGHT SUPERIOR MEDIAL MIDBACK
LOCATION DETAILED: RIGHT SUPERIOR MEDIAL LOWER BACK
LOCATION DETAILED: RIGHT LATERAL EYEBROW
LOCATION DETAILED: RIGHT SUPERIOR MEDIAL UPPER BACK

## 2018-12-19 ASSESSMENT — LOCATION ZONE DERM
LOCATION ZONE: TRUNK
LOCATION ZONE: FACE
LOCATION ZONE: NOSE

## 2018-12-19 ASSESSMENT — LOCATION SIMPLE DESCRIPTION DERM
LOCATION SIMPLE: RIGHT LOWER BACK
LOCATION SIMPLE: NOSE
LOCATION SIMPLE: RIGHT UPPER BACK
LOCATION SIMPLE: UPPER BACK
LOCATION SIMPLE: RIGHT EYEBROW

## 2018-12-19 NOTE — PROGRESS NOTES
CC: Edgardo Sims is a 63 y.o. male is suffering from   Chief Complaint   Patient presents with   • Follow-Up     3 months         SUBJECTIVE:  1. Rheumatoid arthritis with positive rheumatoid factor, involving unspecified site (HCC)  Edgardo is here for follow-up, continues to have rheumatoid arthritis.  Is being treated with a biologic and is therefore immunosuppressed.    2. Encounter for long-term (current) use of high-risk medication  Patient with a history of pyelonephritis which led to his being hospitalized for approximately 7 days.  Is having difficulty with getting into urology for some testing.    3. History of UTI  Patient with a history of UTI leading to pyelonephritis, will have the patient repeat a complete urinalysis today    4. Postural kyphosis of thoracic region  Patient and I have reviewed his x-ray of his kyphotic curve in his back.  Patient with questionable old compression fractures, status post DEXA scan with Dr. Canales        Past social, family, history:   Social History   Substance Use Topics   • Smoking status: Never Smoker   • Smokeless tobacco: Never Used   • Alcohol use 1.8 oz/week     3 Cans of beer per week      Comment: 3 per week         MEDICATIONS:    Current Outpatient Prescriptions:   •  testosterone cypionate (DEPO-TESTOSTERONE) 200 MG/ML Solution injection, INJECT 1ML INTRAMUSCULARLY EVERY 14DAYS [id number], Disp: , Rfl: 1  •  diazePAM (VALIUM) 5 MG Tab, Take 5 mg by mouth every 6 hours as needed for Anxiety., Disp: , Rfl:   •  [START ON 3/11/2019] oxyCODONE-acetaminophen (PERCOCET-10)  MG Tab, Take 1 Tab by mouth 5 Times a Day for 30 days., Disp: 150 Tab, Rfl: 0  •  sulfamethoxazole-trimethoprim (BACTRIM DS) 800-160 MG tablet, Take 1 Tab by mouth 2 times a day., Disp: 20 Tab, Rfl: 0  •  bisoprolol (ZEBETA) 10 MG tablet, TAKE 1 TABLET BY MOUTH EVERY DAY, Disp: 90 Tab, Rfl: 1  •  tamsulosin (FLOMAX) 0.4 MG capsule, Take 0.4 mg by mouth every day., Disp:  , Rfl: 12  •  meloxicam (MOBIC) 7.5 MG Tab, Take 7.5 mg by mouth every day., Disp: , Rfl: 0  •  ezetimibe (ZETIA) 10 MG Tab, Take 1 Tab by mouth every day., Disp: 30 Tab, Rfl: 11  •  predniSONE (DELTASONE) 1 MG Tab, TAKE 2 TABLETS BY MOUTH EVERY DAY, Disp: 60 Tab, Rfl: 2  •  PARoxetine (PAXIL) 20 MG Tab, TAKE 1 TABLET BY MOUTH EVERY DAY, Disp: 90 Tab, Rfl: 3  •  lisinopril (PRINIVIL) 10 MG Tab, TAKE 1 TABLET BY MOUTH EVERY DAY, Disp: 90 Tab, Rfl: 3  •  cyclobenzaprine (FLEXERIL) 10 MG Tab, TAKE ONE TABLET BY MOUTH THREE TIMES DAILY AS NEEDED FOR MILD PAIN, Disp: 90 Tab, Rfl: 5  •  Tofacitinib Citrate (XELJANZ) 5 MG Tab, Take 5 mg by mouth 2 Times a Day., Disp: 60 Tab, Rfl: 0  •  pravastatin (PRAVACHOL) 40 MG tablet, Take 1 Tab by mouth every day., Disp: 30 Tab, Rfl: 11  •  meloxicam (MOBIC) 15 MG tablet, TAKE 1 TABLET BY MOUTH EVERY DAY, Disp: 30 Tab, Rfl: 11  •  CALCIUM-VITAMIN D PO, Take 1 Tab by mouth 2 Times a Day. 1200/1000 mg, Disp: , Rfl:   •  ascorbic acid (ASCORBIC ACID) 500 MG Tab, Take 500 mg by mouth every day., Disp: , Rfl:   •  Garlic 1000 MG Cap, Take 1 Cap by mouth every day., Disp: , Rfl:   •  vitamin e (VITAMIN E) 400 UNIT Cap, Take 400 Units by mouth every day., Disp: , Rfl:   •  Cyanocobalamin (VITAMIN B-12) 5000 MCG TABLET DISPERSIBLE, Take 1 Tab by mouth every day., Disp: , Rfl:   •  niacin 500 MG Tab, Take 500 mg by mouth every day., Disp: , Rfl:   •  Zinc 50 MG Cap, Take 50 mg by mouth every day., Disp: , Rfl:   •  Multiple Vitamins-Minerals (MULTI COMPLETE PO), Take 1 Tab by mouth every day., Disp: , Rfl:   •  vardenafil (LEVITRA) 20 MG tablet, Take 20 mg by mouth as needed., Disp: , Rfl:   •  fluticasone (FLONASE) 50 MCG/ACT nasal spray, SPRAY 2 SPRAYS IN NOSE EVERY DAY., Disp: , Rfl: 11  •  aspirin EC (ECOTRIN) 81 MG Tablet Delayed Response, Take 81 mg by mouth every day., Disp: , Rfl:   •  bisoprolol (ZEBETA) 10 MG tablet, Take 10 mg by mouth every day., Disp: , Rfl:   •  omeprazole  (PRILOSEC) 20 MG CPDR, Take 20 mg by mouth every day., Disp: , Rfl:     PROBLEMS:  Patient Active Problem List    Diagnosis Date Noted   • Acute pyelonephritis 04/30/2018     Priority: High   • Failed total knee, left, subsequent encounter 08/03/2018   • Bladder outlet obstruction 05/01/2018   • Leukocytosis 04/30/2018   • Lactic acidosis 04/30/2018   • Idiopathic acute pancreatitis 04/30/2018   • Chronic use of opiate drugs therapeutic purposes 08/12/2017   • Elevated CPK 09/23/2016   • Depression 07/13/2015   • Anxiety 03/31/2015   • Coronary artery disease due to calcified coronary lesion: Mildly cardiac catheterization in 2014 12/05/2014   • Tachycardia 10/20/2014   • Abnormal myocardial perfusion study 01/15/2014   • Osteoarthrosis, unspecified whether generalized or localized, pelvic region and thigh 05/31/2013   • Dyslipidemia 07/12/2011   • Aortic insufficiency 07/05/2011   • Essential hypertension 07/05/2011   • Rheumatoid arthritis (HCC) 05/05/2009   • Hypogonadism male 05/05/2009       REVIEW OF SYSTEMS:  Gen.:  No Nausea, Vomiting, fever, Chills.  Heart: No chest pain.  Lungs:  No shortness of Breath.  Psychological: Kian unusual Anxiety depression     PHYSICAL EXAM   Constitutional: Alert, cooperative, not in acute distress.  Cardiovascular:  Rate Rhythm is regular without murmurs rubs clicks.     Thorax & Lungs: Clear to auscultation, no wheezing, rhonchi, or rales  HENT: Normocephalic, Atraumatic.  Eyes: PERRLA, EOMI, Conjunctiva normal.   Neck: Trachia is midline no swelling of the thyroid.   Lymphatic: No lymphadenopathy noted.   Musculoskeletal: Significant an exaggerated kyphotic curve, x-rays of the thorax reviewed with the patient  Neurologic: Alert & oriented x 3, cranial nerves II through XII are intact, Normal motor function, Normal sensory function, No focal deficits noted.   Psychiatric: Affect normal, Judgment normal, Mood normal.     VITAL SIGNS:/60   Pulse 71   Temp 36.6 °C  "(97.8 °F) (Temporal)   Resp 12   Ht 1.778 m (5' 10\")   Wt 95.3 kg (210 lb)   SpO2 96%   BMI 30.13 kg/m²     Labs: Reviewed    Assessment:                                                     Plan:    1. Rheumatoid arthritis with positive rheumatoid factor, involving unspecified site (HCC)  Continue Percocet  - oxyCODONE-acetaminophen (PERCOCET-10)  MG Tab; Take 1 Tab by mouth 5 Times a Day for 30 days.  Dispense: 150 Tab; Refill: 0    2. Encounter for long-term (current) use of high-risk medication  Urine drug screen state drug task force reviewed  - oxyCODONE-acetaminophen (PERCOCET-10)  MG Tab; Take 1 Tab by mouth 5 Times a Day for 30 days.  Dispense: 150 Tab; Refill: 0    3. History of UTI  Patient is to have available a prescription for Bactrim DS, if he experiences recurrent urinary tract symptoms or upper respiratory tract symptoms he is to start antibiotics and notify our office as soon as practical  - URINALYSIS,CULTURE IF INDICATED; Standing  - sulfamethoxazole-trimethoprim (BACTRIM DS) 800-160 MG tablet; Take 1 Tab by mouth 2 times a day.  Dispense: 20 Tab; Refill: 0  - REFERRAL TO UROLOGY    4. Postural kyphosis of thoracic region  's significant kyphotic curve increasing patient's risk for significant issues associated with his neck, patient's last DEXA scan was reviewed and appears to be consistent with osteopenia.    "

## 2018-12-19 NOTE — PROCEDURE: LIQUID NITROGEN
Consent: The patient's consent was obtained including but not limited to risks of crusting, scabbing, blistering, scarring, darker or lighter pigmentary change, recurrence, incomplete removal and infection.
Detail Level: Detailed
Render Post-Care Instructions In Note?: no
Number Of Freeze-Thaw Cycles: 1 freeze-thaw cycle
Post-Care Instructions: I reviewed with the patient in detail post-care instructions. Patient is to wear sunprotection, and avoid picking at any of the treated lesions. Pt may apply Vaseline to crusted or scabbing areas.
Duration Of Freeze Thaw-Cycle (Seconds): 5

## 2018-12-20 DIAGNOSIS — T84.093D FAILED TOTAL KNEE, LEFT, SUBSEQUENT ENCOUNTER: ICD-10-CM

## 2018-12-20 DIAGNOSIS — M06.9 RHEUMATOID ARTHRITIS, INVOLVING UNSPECIFIED SITE, UNSPECIFIED RHEUMATOID FACTOR PRESENCE: ICD-10-CM

## 2018-12-20 RX ORDER — DIAZEPAM 5 MG/1
TABLET ORAL
Qty: 60 TAB | Refills: 3 | Status: SHIPPED
Start: 2018-12-20 | End: 2019-01-19

## 2018-12-20 NOTE — TELEPHONE ENCOUNTER
Was the patient seen in the last year in this department? Yes [facility] 8/4/18 #60    Does patient have an active prescription for medications requested? No     Received Request Via: Pharmacy

## 2018-12-21 DIAGNOSIS — M05.9 RHEUMATOID ARTHRITIS WITH POSITIVE RHEUMATOID FACTOR, INVOLVING UNSPECIFIED SITE (HCC): Primary | ICD-10-CM

## 2018-12-21 NOTE — TELEPHONE ENCOUNTER
Was the patient seen in the last year in this department? Yes 11/15/18, next 2/21/19.  Labs 9/14/18.  Does patient have an active prescription for medications requested? Yes    Received Request Via: Pharmacy     Routed to Dr. Azul for review

## 2018-12-24 RX ORDER — PREDNISONE 1 MG/1
TABLET ORAL
Qty: 180 TAB | Refills: 0 | Status: SHIPPED | OUTPATIENT
Start: 2018-12-24 | End: 2019-03-23 | Stop reason: SDUPTHER

## 2019-01-26 ENCOUNTER — HOSPITAL ENCOUNTER (INPATIENT)
Facility: MEDICAL CENTER | Age: 64
LOS: 2 days | DRG: 862 | End: 2019-01-28
Attending: EMERGENCY MEDICINE | Admitting: HOSPITALIST
Payer: MEDICARE

## 2019-01-26 DIAGNOSIS — N32.89 BLADDER SPASM: ICD-10-CM

## 2019-01-26 DIAGNOSIS — N12 PYELONEPHRITIS: ICD-10-CM

## 2019-01-26 PROBLEM — N31.9 NEUROGENIC BLADDER: Status: ACTIVE | Noted: 2019-01-26

## 2019-01-26 PROBLEM — N39.0 COMPLICATED UTI (URINARY TRACT INFECTION): Status: ACTIVE | Noted: 2019-01-26

## 2019-01-26 PROBLEM — A41.9 SEPSIS (HCC): Status: ACTIVE | Noted: 2019-01-26

## 2019-01-26 LAB
ALBUMIN SERPL BCP-MCNC: 4.4 G/DL (ref 3.2–4.9)
ALBUMIN/GLOB SERPL: 1.5 G/DL
ALP SERPL-CCNC: 65 U/L (ref 30–99)
ALT SERPL-CCNC: 36 U/L (ref 2–50)
ANION GAP SERPL CALC-SCNC: 10 MMOL/L (ref 0–11.9)
APPEARANCE UR: ABNORMAL
APTT PPP: 29.4 SEC (ref 24.7–36)
AST SERPL-CCNC: 50 U/L (ref 12–45)
BACTERIA #/AREA URNS HPF: ABNORMAL /HPF
BASOPHILS # BLD AUTO: 0.3 % (ref 0–1.8)
BASOPHILS # BLD: 0.06 K/UL (ref 0–0.12)
BILIRUB SERPL-MCNC: 1.1 MG/DL (ref 0.1–1.5)
BILIRUB UR QL STRIP.AUTO: NEGATIVE
BUN SERPL-MCNC: 11 MG/DL (ref 8–22)
CALCIUM SERPL-MCNC: 9.1 MG/DL (ref 8.4–10.2)
CHLORIDE SERPL-SCNC: 97 MMOL/L (ref 96–112)
CO2 SERPL-SCNC: 21 MMOL/L (ref 20–33)
COLOR UR: ABNORMAL
CREAT SERPL-MCNC: 0.93 MG/DL (ref 0.5–1.4)
EOSINOPHIL # BLD AUTO: 0.06 K/UL (ref 0–0.51)
EOSINOPHIL NFR BLD: 0.3 % (ref 0–6.9)
ERYTHROCYTE [DISTWIDTH] IN BLOOD BY AUTOMATED COUNT: 53.6 FL (ref 35.9–50)
GLOBULIN SER CALC-MCNC: 2.9 G/DL (ref 1.9–3.5)
GLUCOSE SERPL-MCNC: 92 MG/DL (ref 65–99)
GLUCOSE UR STRIP.AUTO-MCNC: NEGATIVE MG/DL
HCT VFR BLD AUTO: 45.3 % (ref 42–52)
HGB BLD-MCNC: 15.5 G/DL (ref 14–18)
IMM GRANULOCYTES # BLD AUTO: 0.17 K/UL (ref 0–0.11)
IMM GRANULOCYTES NFR BLD AUTO: 0.9 % (ref 0–0.9)
INR PPP: 0.99 (ref 0.87–1.13)
KETONES UR STRIP.AUTO-MCNC: ABNORMAL MG/DL
LACTATE BLD-SCNC: 0.8 MMOL/L (ref 0.5–2)
LACTATE BLD-SCNC: 1.2 MMOL/L (ref 0.5–2)
LACTATE BLD-SCNC: 1.2 MMOL/L (ref 0.5–2)
LEUKOCYTE ESTERASE UR QL STRIP.AUTO: ABNORMAL
LYMPHOCYTES # BLD AUTO: 1.14 K/UL (ref 1–4.8)
LYMPHOCYTES NFR BLD: 6.3 % (ref 22–41)
MCH RBC QN AUTO: 30.5 PG (ref 27–33)
MCHC RBC AUTO-ENTMCNC: 34.2 G/DL (ref 33.7–35.3)
MCV RBC AUTO: 89.2 FL (ref 81.4–97.8)
MICRO URNS: ABNORMAL
MONOCYTES # BLD AUTO: 2.37 K/UL (ref 0–0.85)
MONOCYTES NFR BLD AUTO: 13 % (ref 0–13.4)
NEUTROPHILS # BLD AUTO: 14.38 K/UL (ref 1.82–7.42)
NEUTROPHILS NFR BLD: 79.2 % (ref 44–72)
NITRITE UR QL STRIP.AUTO: POSITIVE
NRBC # BLD AUTO: 0 K/UL
NRBC BLD-RTO: 0 /100 WBC
PH UR STRIP.AUTO: 8 [PH]
PLATELET # BLD AUTO: 179 K/UL (ref 164–446)
PMV BLD AUTO: 9.5 FL (ref 9–12.9)
POTASSIUM SERPL-SCNC: 4.2 MMOL/L (ref 3.6–5.5)
PROT SERPL-MCNC: 7.3 G/DL (ref 6–8.2)
PROT UR QL STRIP: >=300 MG/DL
PROTHROMBIN TIME: 13 SEC (ref 12–14.6)
RBC # BLD AUTO: 5.08 M/UL (ref 4.7–6.1)
RBC # URNS HPF: >150 /HPF
RBC UR QL AUTO: ABNORMAL
SODIUM SERPL-SCNC: 128 MMOL/L (ref 135–145)
SP GR UR STRIP.AUTO: 1.02
WBC # BLD AUTO: 18.2 K/UL (ref 4.8–10.8)
WBC #/AREA URNS HPF: ABNORMAL /HPF

## 2019-01-26 PROCEDURE — 700105 HCHG RX REV CODE 258: Performed by: HOSPITALIST

## 2019-01-26 PROCEDURE — 770006 HCHG ROOM/CARE - MED/SURG/GYN SEMI*

## 2019-01-26 PROCEDURE — 85025 COMPLETE CBC W/AUTO DIFF WBC: CPT

## 2019-01-26 PROCEDURE — 87086 URINE CULTURE/COLONY COUNT: CPT

## 2019-01-26 PROCEDURE — 700111 HCHG RX REV CODE 636 W/ 250 OVERRIDE (IP): Performed by: HOSPITALIST

## 2019-01-26 PROCEDURE — 87040 BLOOD CULTURE FOR BACTERIA: CPT | Mod: 91

## 2019-01-26 PROCEDURE — 80053 COMPREHEN METABOLIC PANEL: CPT

## 2019-01-26 PROCEDURE — 85730 THROMBOPLASTIN TIME PARTIAL: CPT

## 2019-01-26 PROCEDURE — 83605 ASSAY OF LACTIC ACID: CPT

## 2019-01-26 PROCEDURE — 36415 COLL VENOUS BLD VENIPUNCTURE: CPT

## 2019-01-26 PROCEDURE — 81001 URINALYSIS AUTO W/SCOPE: CPT

## 2019-01-26 PROCEDURE — 700102 HCHG RX REV CODE 250 W/ 637 OVERRIDE(OP): Performed by: HOSPITALIST

## 2019-01-26 PROCEDURE — 51702 INSERT TEMP BLADDER CATH: CPT

## 2019-01-26 PROCEDURE — 303105 HCHG CATHETER EXTRA

## 2019-01-26 PROCEDURE — A9270 NON-COVERED ITEM OR SERVICE: HCPCS | Performed by: HOSPITALIST

## 2019-01-26 PROCEDURE — 99285 EMERGENCY DEPT VISIT HI MDM: CPT

## 2019-01-26 PROCEDURE — 99223 1ST HOSP IP/OBS HIGH 75: CPT | Performed by: HOSPITALIST

## 2019-01-26 PROCEDURE — 85610 PROTHROMBIN TIME: CPT

## 2019-01-26 RX ORDER — SODIUM CHLORIDE 9 MG/ML
30 INJECTION, SOLUTION INTRAVENOUS
Status: DISCONTINUED | OUTPATIENT
Start: 2019-01-26 | End: 2019-01-26

## 2019-01-26 RX ORDER — SODIUM CHLORIDE 9 MG/ML
1000 INJECTION, SOLUTION INTRAVENOUS
Status: DISCONTINUED | OUTPATIENT
Start: 2019-01-26 | End: 2019-01-26

## 2019-01-26 RX ORDER — ASCORBIC ACID 500 MG
500 TABLET ORAL DAILY
Status: DISCONTINUED | OUTPATIENT
Start: 2019-01-27 | End: 2019-01-28 | Stop reason: HOSPADM

## 2019-01-26 RX ORDER — CYCLOBENZAPRINE HCL 10 MG
10 TABLET ORAL 3 TIMES DAILY PRN
Status: DISCONTINUED | OUTPATIENT
Start: 2019-01-26 | End: 2019-01-28 | Stop reason: HOSPADM

## 2019-01-26 RX ORDER — BISOPROLOL FUMARATE 10 MG/1
10 TABLET, FILM COATED ORAL DAILY
Status: DISCONTINUED | OUTPATIENT
Start: 2019-01-27 | End: 2019-01-28 | Stop reason: HOSPADM

## 2019-01-26 RX ORDER — FLUTICASONE PROPIONATE 50 MCG
2 SPRAY, SUSPENSION (ML) NASAL DAILY
Status: DISCONTINUED | OUTPATIENT
Start: 2019-01-26 | End: 2019-01-28 | Stop reason: HOSPADM

## 2019-01-26 RX ORDER — OMEPRAZOLE 20 MG/1
20 CAPSULE, DELAYED RELEASE ORAL DAILY
Status: DISCONTINUED | OUTPATIENT
Start: 2019-01-27 | End: 2019-01-28 | Stop reason: HOSPADM

## 2019-01-26 RX ORDER — ONDANSETRON 4 MG/1
4 TABLET, ORALLY DISINTEGRATING ORAL EVERY 4 HOURS PRN
Status: DISCONTINUED | OUTPATIENT
Start: 2019-01-26 | End: 2019-01-28 | Stop reason: HOSPADM

## 2019-01-26 RX ORDER — TAMSULOSIN HYDROCHLORIDE 0.4 MG/1
0.4 CAPSULE ORAL DAILY
Status: DISCONTINUED | OUTPATIENT
Start: 2019-01-27 | End: 2019-01-28 | Stop reason: HOSPADM

## 2019-01-26 RX ORDER — BISACODYL 10 MG
10 SUPPOSITORY, RECTAL RECTAL
Status: DISCONTINUED | OUTPATIENT
Start: 2019-01-26 | End: 2019-01-28 | Stop reason: HOSPADM

## 2019-01-26 RX ORDER — ZINC SULFATE 50(220)MG
220 CAPSULE ORAL DAILY
Status: DISCONTINUED | OUTPATIENT
Start: 2019-01-26 | End: 2019-01-28 | Stop reason: HOSPADM

## 2019-01-26 RX ORDER — SULFAMETHOXAZOLE AND TRIMETHOPRIM 800; 160 MG/1; MG/1
1 TABLET ORAL 2 TIMES DAILY
Status: ON HOLD | COMMUNITY
Start: 2019-01-25 | End: 2019-01-28

## 2019-01-26 RX ORDER — PROMETHAZINE HYDROCHLORIDE 25 MG/1
12.5-25 TABLET ORAL EVERY 4 HOURS PRN
Status: DISCONTINUED | OUTPATIENT
Start: 2019-01-26 | End: 2019-01-28 | Stop reason: HOSPADM

## 2019-01-26 RX ORDER — M-VIT,TX,IRON,MINS/CALC/FOLIC 27MG-0.4MG
1 TABLET ORAL DAILY
Status: DISCONTINUED | OUTPATIENT
Start: 2019-01-27 | End: 2019-01-28 | Stop reason: HOSPADM

## 2019-01-26 RX ORDER — AMOXICILLIN 250 MG
2 CAPSULE ORAL 2 TIMES DAILY
Status: DISCONTINUED | OUTPATIENT
Start: 2019-01-26 | End: 2019-01-28 | Stop reason: HOSPADM

## 2019-01-26 RX ORDER — MELOXICAM 7.5 MG/1
7.5 TABLET ORAL DAILY
Status: DISCONTINUED | OUTPATIENT
Start: 2019-01-27 | End: 2019-01-28 | Stop reason: HOSPADM

## 2019-01-26 RX ORDER — SODIUM CHLORIDE 9 MG/ML
30 INJECTION, SOLUTION INTRAVENOUS
Status: DISCONTINUED | OUTPATIENT
Start: 2019-01-26 | End: 2019-01-28 | Stop reason: HOSPADM

## 2019-01-26 RX ORDER — OXYCODONE AND ACETAMINOPHEN 10; 325 MG/1; MG/1
1-2 TABLET ORAL EVERY 4 HOURS PRN
COMMUNITY
End: 2019-03-20 | Stop reason: SDUPTHER

## 2019-01-26 RX ORDER — PROMETHAZINE HYDROCHLORIDE 25 MG/1
12.5-25 SUPPOSITORY RECTAL EVERY 4 HOURS PRN
Status: DISCONTINUED | OUTPATIENT
Start: 2019-01-26 | End: 2019-01-28 | Stop reason: HOSPADM

## 2019-01-26 RX ORDER — SODIUM CHLORIDE 9 MG/ML
INJECTION, SOLUTION INTRAVENOUS CONTINUOUS
Status: DISCONTINUED | OUTPATIENT
Start: 2019-01-26 | End: 2019-01-28 | Stop reason: HOSPADM

## 2019-01-26 RX ORDER — EZETIMIBE 10 MG/1
10 TABLET ORAL DAILY
Status: DISCONTINUED | OUTPATIENT
Start: 2019-01-27 | End: 2019-01-28 | Stop reason: HOSPADM

## 2019-01-26 RX ORDER — LISINOPRIL 5 MG/1
10 TABLET ORAL DAILY
Status: DISCONTINUED | OUTPATIENT
Start: 2019-01-26 | End: 2019-01-28 | Stop reason: HOSPADM

## 2019-01-26 RX ORDER — OXYCODONE AND ACETAMINOPHEN 10; 325 MG/1; MG/1
1-2 TABLET ORAL EVERY 4 HOURS PRN
Status: DISCONTINUED | OUTPATIENT
Start: 2019-01-26 | End: 2019-01-28 | Stop reason: HOSPADM

## 2019-01-26 RX ORDER — PRAVASTATIN SODIUM 20 MG
40 TABLET ORAL DAILY
Status: DISCONTINUED | OUTPATIENT
Start: 2019-01-27 | End: 2019-01-28 | Stop reason: HOSPADM

## 2019-01-26 RX ORDER — ONDANSETRON 2 MG/ML
4 INJECTION INTRAMUSCULAR; INTRAVENOUS EVERY 4 HOURS PRN
Status: DISCONTINUED | OUTPATIENT
Start: 2019-01-26 | End: 2019-01-28 | Stop reason: HOSPADM

## 2019-01-26 RX ORDER — POLYETHYLENE GLYCOL 3350 17 G/17G
1 POWDER, FOR SOLUTION ORAL
Status: DISCONTINUED | OUTPATIENT
Start: 2019-01-26 | End: 2019-01-28 | Stop reason: HOSPADM

## 2019-01-26 RX ORDER — NIACIN 500 MG
500 TABLET ORAL DAILY
Status: DISCONTINUED | OUTPATIENT
Start: 2019-01-27 | End: 2019-01-28 | Stop reason: HOSPADM

## 2019-01-26 RX ORDER — PAROXETINE HYDROCHLORIDE 20 MG/1
20 TABLET, FILM COATED ORAL DAILY
Status: DISCONTINUED | OUTPATIENT
Start: 2019-01-27 | End: 2019-01-28 | Stop reason: HOSPADM

## 2019-01-26 RX ORDER — PREDNISONE 1 MG/1
2 TABLET ORAL DAILY
Status: DISCONTINUED | OUTPATIENT
Start: 2019-01-27 | End: 2019-01-28 | Stop reason: HOSPADM

## 2019-01-26 RX ORDER — SODIUM CHLORIDE 9 MG/ML
500 INJECTION, SOLUTION INTRAVENOUS
Status: DISCONTINUED | OUTPATIENT
Start: 2019-01-26 | End: 2019-01-28 | Stop reason: HOSPADM

## 2019-01-26 RX ADMIN — Medication 220 MG: at 15:20

## 2019-01-26 RX ADMIN — OXYCODONE HYDROCHLORIDE AND ACETAMINOPHEN 2 TABLET: 10; 325 TABLET ORAL at 19:55

## 2019-01-26 RX ADMIN — CEFTRIAXONE SODIUM 2 G: 2 INJECTION, POWDER, FOR SOLUTION INTRAMUSCULAR; INTRAVENOUS at 15:20

## 2019-01-26 RX ADMIN — CYCLOBENZAPRINE HYDROCHLORIDE 10 MG: 10 TABLET, FILM COATED ORAL at 19:55

## 2019-01-26 RX ADMIN — SODIUM CHLORIDE: 9 INJECTION, SOLUTION INTRAVENOUS at 18:45

## 2019-01-26 ASSESSMENT — ENCOUNTER SYMPTOMS
RESPIRATORY NEGATIVE: 1
FLANK PAIN: 0
FEVER: 0
DEPRESSION: 0
LOSS OF CONSCIOUSNESS: 0
MUSCULOSKELETAL NEGATIVE: 1
NAUSEA: 0
WEIGHT LOSS: 0
BRUISES/BLEEDS EASILY: 0
BACK PAIN: 0
COUGH: 0
PSYCHIATRIC NEGATIVE: 1
GASTROINTESTINAL NEGATIVE: 1
DIZZINESS: 0
VOMITING: 0
NERVOUS/ANXIOUS: 0
SHORTNESS OF BREATH: 0
CHILLS: 0
ABDOMINAL PAIN: 0
CONSTITUTIONAL NEGATIVE: 1
CARDIOVASCULAR NEGATIVE: 1
WEAKNESS: 0
MYALGIAS: 0
NEUROLOGICAL NEGATIVE: 1

## 2019-01-26 ASSESSMENT — PAIN DESCRIPTION - DESCRIPTORS
DESCRIPTORS: ACHING;BURNING
DESCRIPTORS: ACHING;BURNING

## 2019-01-26 ASSESSMENT — LIFESTYLE VARIABLES
TOTAL SCORE: 0
ALCOHOL_USE: YES
TOTAL SCORE: 0
HAVE PEOPLE ANNOYED YOU BY CRITICIZING YOUR DRINKING: NO
HOW MANY TIMES IN THE PAST YEAR HAVE YOU HAD 5 OR MORE DRINKS IN A DAY: 0
HAVE YOU EVER FELT YOU SHOULD CUT DOWN ON YOUR DRINKING: NO
EVER FELT BAD OR GUILTY ABOUT YOUR DRINKING: NO
CONSUMPTION TOTAL: NEGATIVE
EVER HAD A DRINK FIRST THING IN THE MORNING TO STEADY YOUR NERVES TO GET RID OF A HANGOVER: NO
AVERAGE NUMBER OF DAYS PER WEEK YOU HAVE A DRINK CONTAINING ALCOHOL: 2
ON A TYPICAL DAY WHEN YOU DRINK ALCOHOL HOW MANY DRINKS DO YOU HAVE: 1
TOTAL SCORE: 0

## 2019-01-26 ASSESSMENT — COGNITIVE AND FUNCTIONAL STATUS - GENERAL
MOBILITY SCORE: 24
DAILY ACTIVITIY SCORE: 24
SUGGESTED CMS G CODE MODIFIER MOBILITY: CH
SUGGESTED CMS G CODE MODIFIER DAILY ACTIVITY: CH

## 2019-01-26 ASSESSMENT — PAIN SCALES - WONG BAKER: WONGBAKER_NUMERICALRESPONSE: HURTS A LITTLE MORE

## 2019-01-26 NOTE — ED NOTES
"Pt presents with a hx of recurring UTIs.  He C/O dysuria, and perineal pain worsening for the past 3 to 4 days.  He has observed cloudy urine with hematuria.   Chief Complaint   Patient presents with   • UTI   • Painful Urination     /74   Pulse 94   Temp 37.2 °C (98.9 °F) (Temporal)   Resp 18   Ht 1.778 m (5' 10\")   Wt 96 kg (211 lb 10.3 oz)   SpO2 99%   BMI 30.37 kg/m²     "

## 2019-01-26 NOTE — ED NOTES
Med Rec completed per patient  Allergies reviewed    Pt started a 10 day course of Bactrim DS last night 1/25/19

## 2019-01-26 NOTE — ED PROVIDER NOTES
ED Provider Note    CHIEF COMPLAINT  Chief Complaint   Patient presents with   • UTI   • Painful Urination       HPI  Edgardo Sims is a 63 y.o. male who presents for evaluation of painful urination.  Patient's had problems with recurrent urinary tract infections.  Patient is currently under the care of Dr. Romo.  Patient did undergo a recent procedure to evaluate his bladder function on Thursday which entailed inserting a catheter in his bladder as well as his rectum.  Patient is now developed hematuria and some dysuria.  Patient denies: Fever, chills, URI symptoms, cardiorespiratory symptoms, gastrointestinal symptoms.  No other acute symptomatology or complaints.    REVIEW OF SYSTEMS  See HPI for further details. All other systems negative.    PAST MEDICAL HISTORY  Past Medical History:   Diagnosis Date   • Abnormal myocardial perfusion study 1/15/2014   • Aortic insufficiency 7/5/2011   • Arthritis     RA, and osteo   • Bronchitis    • CAD (coronary artery disease) mild plaque at cath in 2/14 12/5/2014   • Cancer (HCC)     skin   • Chronic use of opiate drugs therapeutic purposes 8/12/2017   • Dyslipidemia 7/12/2011   • Heart burn    • Heart murmur    • Hiatus hernia syndrome    • High cholesterol    • HTN (hypertension) 7/5/2011   • Hypertension    • Indigestion    • Infectious disease    • Pain     RA   • Pain     hands, feet and jaw   • Rheumatoid arthritis(714.0)     severe   • Tachycardia 10/20/2014       FAMILY HISTORY  Family History   Problem Relation Age of Onset   • Hypertension Mother        SOCIAL HISTORY  Non-smoker; positive alcohol use;    SURGICAL HISTORY  Past Surgical History:   Procedure Laterality Date   • KNEE REVISION TOTAL Left 8/3/2018    Procedure: KNEE REVISION TOTAL;  Surgeon: Michael Rodriguez M.D.;  Location: SURGERY [facility];  Service: Orthopedics   • CYSTOSCOPY  5/16/2018    Procedure: CYSTOSCOPY-CLOT EVAC;  Surgeon: Jose Romo M.D.;  Location:  "SURGERY [facility];  Service: Urology   • TRANS URETHRAL RESECTION BLADDER  5/16/2018    Procedure: TRANS URETHRAL RESECTION BLADDER;  Surgeon: Jose Romo M.D.;  Location: [facility];  Service: Urology   • RECOVERY  2/3/2014    Performed by Cath-Recovery Surgery at SURGERY SAME DAY [facility]   • HIP ARTHROPLASTY TOTAL  5/31/2013    Performed by Burak Valles M.D. at SURGERY [facility]   • ROTATOR CUFF REPAIR Right 2011    x 2   • KNEE ARTHROPLASTY TOTAL  6/1/2009    LEFT-Performed by YONATAN HINSON at SURGERY [facility]   • VEIN LIGATION RADIO FREQUENCY  12/8/2008    LEFT leg-Performed by DEREJE MCCULLOUGH at SURGERY SAME DAY [facility]   • HIP ARTHROPLASTY TOTAL  6/20/08    Performed by YONATAN HINSON at SURGERY [facility]S   • LUMBAR LAMINECTOMY DISKECTOMY  2000   • TMJ RECONSTRUCTION BILATERAL  1994   • ANKLE ORIF Right    • KNEE ARTHROSCOPY Left    • VEIN STRIPPING Left        CURRENT MEDICATIONS  See nurse's note    ALLERGIES  Allergies   Allergen Reactions   • Crestor [Rosuvastatin Calcium] Myalgia   • Penicillins Hives, Itching and Vomiting       PHYSICAL EXAM  VITAL SIGNS: /74   Pulse 94   Temp 37.2 °C (98.9 °F) (Temporal)   Resp 18   Ht 1.778 m (5' 10\")   Wt 96 kg (211 lb 10.3 oz)   SpO2 99%   BMI 30.37 kg/m²    Constitutional: 63-year-old male, fairly well developed, Well nourished, No acute distress, Non-toxic appearance.   HENT: ,Atraumatic, Bilateral external ears normal, tympanic membranes clear, Oropharynx moist, No oral exudates, Nose normal.   Eyes: PERRL, EOMI, Conjunctiva normal, No discharge.   Neck: Normal range of motion, No tenderness, Supple, No stridor.   Lymphatic: No lymphadenopathy noted.   Cardiovascular: Normal heart rate, Normal rhythm, No murmurs, No rubs, No gallops.   Thorax & Lungs: Normal Equal breath sounds, No respiratory distress, No wheezing, no stridor, no rales. No chest tenderness.   Abdomen: Soft, " nontender, nondistended, no organomegaly, positive bowel sounds normal in quality. No guarding or rebound.  Skin: Good skin turgor, pink, warm, dry. No rashes, petechiae, purpura. Normal capillary refill.   Back: No tenderness, No CVA tenderness.   Genitalia: External genitalia appear normal, No masses or lesions. No discharge. Nontender  Extremities: Intact distal pulses, No edema, No tenderness, No cyanosis, No clubbing. Vascular: Pulses are 2+, symmetric in the upper and lower extremities.  Musculoskeletal: Diffuse arthritic changes. No tenderness to palpation or major deformities noted.   Neurologic: Alert & oriented x 3, Normal motor function, Normal sensory function, No gross focal deficits noted.   Psychiatric: Affect normal, Judgment normal, Mood normal.     COURSE & MEDICAL DECISION MAKING  Pertinent Labs & Imaging studies reviewed. (See chart for details)  1.  Saline lock  2.  Ceftriaxone    Bladder scan revealed post void residual of approximately 180 cc;    Laboratory studies: CBC shows white count of 18.2, 79% neutrophils, 6% lymphocytes, 13% monocytes, 0.17% immature granulocytes, hemoglobin 15.5, hematocrit 45.3; coags within normal; CMP shows sodium 128 AST 50 otherwise within normal; urinalysis positive for nitrite and leukocyte esterase, WBCs packed, RBCs greater than 150, bacteria moderate; lactic acid 1.2;    Discussion/consultation: At this time, the patient presents with recurrent urinary tract infection.  The patient does have significant leukocytosis with clear evidence of urinary tract infection.  Patient's been on 2 doses of oral antibiotics over the last 24 hours and has had worsening of symptoms.  Based on the findings the patient will need to be admitted for IV antibiotic therapy as he has had problems with recurrent urinary tract infections in the past requiring extensive evaluations.  I spoke with the hospitalist on call.  I also spoke with urology on call.  With patient's history of  gross hematuria as well as previous problems with change possible neurogenic bladder and retention, he recommended placement of Villa catheter.  A coudé catheter was placed.  Right calf the patient will be admitted for further monitoring, treatment, and care.    FINAL IMPRESSION  1. Pyelonephritis        PLAN  1.  Patient will be admitted for further monitoring, treatment, and care.    Electronically signed by: Guy G Gansert, 1/26/2019 11:33 AM

## 2019-01-27 PROBLEM — R31.9 HEMATURIA: Status: ACTIVE | Noted: 2019-01-27

## 2019-01-27 PROBLEM — E87.1 HYPONATREMIA: Status: ACTIVE | Noted: 2019-01-27

## 2019-01-27 LAB
ANION GAP SERPL CALC-SCNC: 6 MMOL/L (ref 0–11.9)
BASOPHILS # BLD AUTO: 0.6 % (ref 0–1.8)
BASOPHILS # BLD: 0.07 K/UL (ref 0–0.12)
BUN SERPL-MCNC: 8 MG/DL (ref 8–22)
CALCIUM SERPL-MCNC: 8.4 MG/DL (ref 8.4–10.2)
CHLORIDE SERPL-SCNC: 101 MMOL/L (ref 96–112)
CO2 SERPL-SCNC: 26 MMOL/L (ref 20–33)
CREAT SERPL-MCNC: 0.97 MG/DL (ref 0.5–1.4)
EOSINOPHIL # BLD AUTO: 0.11 K/UL (ref 0–0.51)
EOSINOPHIL NFR BLD: 0.9 % (ref 0–6.9)
ERYTHROCYTE [DISTWIDTH] IN BLOOD BY AUTOMATED COUNT: 57.9 FL (ref 35.9–50)
GLUCOSE SERPL-MCNC: 94 MG/DL (ref 65–99)
HCT VFR BLD AUTO: 43.4 % (ref 42–52)
HGB BLD-MCNC: 14.5 G/DL (ref 14–18)
IMM GRANULOCYTES # BLD AUTO: 0.11 K/UL (ref 0–0.11)
IMM GRANULOCYTES NFR BLD AUTO: 0.9 % (ref 0–0.9)
LACTATE BLD-SCNC: 1.2 MMOL/L (ref 0.5–2)
LACTATE BLD-SCNC: 1.2 MMOL/L (ref 0.5–2)
LYMPHOCYTES # BLD AUTO: 1.11 K/UL (ref 1–4.8)
LYMPHOCYTES NFR BLD: 9.6 % (ref 22–41)
MCH RBC QN AUTO: 30.5 PG (ref 27–33)
MCHC RBC AUTO-ENTMCNC: 33.4 G/DL (ref 33.7–35.3)
MCV RBC AUTO: 91.4 FL (ref 81.4–97.8)
MONOCYTES # BLD AUTO: 1.34 K/UL (ref 0–0.85)
MONOCYTES NFR BLD AUTO: 11.6 % (ref 0–13.4)
NEUTROPHILS # BLD AUTO: 8.84 K/UL (ref 1.82–7.42)
NEUTROPHILS NFR BLD: 76.4 % (ref 44–72)
NRBC # BLD AUTO: 0 K/UL
NRBC BLD-RTO: 0 /100 WBC
PLATELET # BLD AUTO: 162 K/UL (ref 164–446)
PMV BLD AUTO: 10 FL (ref 9–12.9)
POTASSIUM SERPL-SCNC: 4 MMOL/L (ref 3.6–5.5)
RBC # BLD AUTO: 4.75 M/UL (ref 4.7–6.1)
SODIUM SERPL-SCNC: 133 MMOL/L (ref 135–145)
WBC # BLD AUTO: 11.6 K/UL (ref 4.8–10.8)

## 2019-01-27 PROCEDURE — 700102 HCHG RX REV CODE 250 W/ 637 OVERRIDE(OP): Performed by: HOSPITALIST

## 2019-01-27 PROCEDURE — 700111 HCHG RX REV CODE 636 W/ 250 OVERRIDE (IP): Performed by: HOSPITALIST

## 2019-01-27 PROCEDURE — A9270 NON-COVERED ITEM OR SERVICE: HCPCS

## 2019-01-27 PROCEDURE — 85025 COMPLETE CBC W/AUTO DIFF WBC: CPT

## 2019-01-27 PROCEDURE — 83605 ASSAY OF LACTIC ACID: CPT

## 2019-01-27 PROCEDURE — 80048 BASIC METABOLIC PNL TOTAL CA: CPT

## 2019-01-27 PROCEDURE — A9270 NON-COVERED ITEM OR SERVICE: HCPCS | Performed by: HOSPITALIST

## 2019-01-27 PROCEDURE — 99232 SBSQ HOSP IP/OBS MODERATE 35: CPT | Performed by: INTERNAL MEDICINE

## 2019-01-27 PROCEDURE — 36415 COLL VENOUS BLD VENIPUNCTURE: CPT

## 2019-01-27 PROCEDURE — 700102 HCHG RX REV CODE 250 W/ 637 OVERRIDE(OP)

## 2019-01-27 PROCEDURE — 770006 HCHG ROOM/CARE - MED/SURG/GYN SEMI*

## 2019-01-27 PROCEDURE — 700105 HCHG RX REV CODE 258: Performed by: HOSPITALIST

## 2019-01-27 RX ORDER — DIPHENHYDRAMINE HCL 25 MG
25 TABLET ORAL ONCE
Status: COMPLETED | OUTPATIENT
Start: 2019-01-27 | End: 2019-01-27

## 2019-01-27 RX ORDER — DIPHENHYDRAMINE HCL 25 MG
TABLET ORAL
Status: COMPLETED
Start: 2019-01-27 | End: 2019-01-27

## 2019-01-27 RX ORDER — DIPHENHYDRAMINE HCL 25 MG
25 TABLET ORAL EVERY 6 HOURS PRN
Status: DISCONTINUED | OUTPATIENT
Start: 2019-01-27 | End: 2019-01-28 | Stop reason: HOSPADM

## 2019-01-27 RX ADMIN — MELOXICAM 7.5 MG: 7.5 TABLET ORAL at 05:44

## 2019-01-27 RX ADMIN — OXYCODONE HYDROCHLORIDE AND ACETAMINOPHEN 500 MG: 500 TABLET ORAL at 07:48

## 2019-01-27 RX ADMIN — OXYCODONE HYDROCHLORIDE AND ACETAMINOPHEN 1 TABLET: 10; 325 TABLET ORAL at 15:05

## 2019-01-27 RX ADMIN — MULTIPLE VITAMINS W/ MINERALS TAB 1 TABLET: TAB at 07:48

## 2019-01-27 RX ADMIN — OXYCODONE HYDROCHLORIDE AND ACETAMINOPHEN 2 TABLET: 10; 325 TABLET ORAL at 19:21

## 2019-01-27 RX ADMIN — FLUTICASONE PROPIONATE 100 MCG: 50 SPRAY, METERED NASAL at 05:45

## 2019-01-27 RX ADMIN — CEFTRIAXONE SODIUM 2 G: 2 INJECTION, POWDER, FOR SOLUTION INTRAMUSCULAR; INTRAVENOUS at 05:45

## 2019-01-27 RX ADMIN — DIPHENHYDRAMINE HCL 25 MG: 25 TABLET ORAL at 06:15

## 2019-01-27 RX ADMIN — OXYCODONE HYDROCHLORIDE AND ACETAMINOPHEN 2 TABLET: 10; 325 TABLET ORAL at 05:57

## 2019-01-27 RX ADMIN — PREDNISONE 2 MG: 1 TABLET ORAL at 05:44

## 2019-01-27 RX ADMIN — LISINOPRIL 10 MG: 5 TABLET ORAL at 05:44

## 2019-01-27 RX ADMIN — Medication 25 MG: at 06:15

## 2019-01-27 RX ADMIN — PAROXETINE HYDROCHLORIDE 20 MG: 20 TABLET, FILM COATED ORAL at 05:44

## 2019-01-27 RX ADMIN — EZETIMIBE 10 MG: 10 TABLET ORAL at 05:44

## 2019-01-27 RX ADMIN — CYCLOBENZAPRINE HYDROCHLORIDE 10 MG: 10 TABLET, FILM COATED ORAL at 19:21

## 2019-01-27 RX ADMIN — Medication 500 MG: at 05:44

## 2019-01-27 RX ADMIN — BISOPROLOL FUMARATE 10 MG: 10 TABLET ORAL at 05:44

## 2019-01-27 RX ADMIN — OMEPRAZOLE 20 MG: 20 CAPSULE, DELAYED RELEASE ORAL at 05:44

## 2019-01-27 RX ADMIN — CYCLOBENZAPRINE HYDROCHLORIDE 10 MG: 10 TABLET, FILM COATED ORAL at 05:56

## 2019-01-27 RX ADMIN — TAMSULOSIN HYDROCHLORIDE 0.4 MG: 0.4 CAPSULE ORAL at 05:44

## 2019-01-27 RX ADMIN — Medication 220 MG: at 07:48

## 2019-01-27 ASSESSMENT — ENCOUNTER SYMPTOMS
FALLS: 0
ABDOMINAL PAIN: 0
DIARRHEA: 0
SPUTUM PRODUCTION: 0
DIZZINESS: 0
HEADACHES: 0
VOMITING: 0
TINGLING: 0
NAUSEA: 0
CONSTIPATION: 0
FEVER: 0
MYALGIAS: 0
WEAKNESS: 0
SHORTNESS OF BREATH: 0
LOSS OF CONSCIOUSNESS: 0
STRIDOR: 0
COUGH: 0
PALPITATIONS: 0
CHILLS: 0
DEPRESSION: 0

## 2019-01-27 NOTE — CONSULTS
DATE OF SERVICE:  01/26/2019    UROLOGY CONSULTATION    REQUESTING PHYSICIAN:  Guy G. Gansert, MD, from the emergency room.    CONSULTING PHYSICIAN:  Michael Hamlin MD    REASON FOR CONSULTATION:  Urinary retention and urinary tract infection.    HISTORY OF PRESENT ILLNESS:  The patient is a 63-year-old gentleman, a patient   of Dr. Room with a history of urethral stricture and BPH.  Also, with   chronic incomplete emptying of the bladder.  The patient had increasing   difficulty urinating overnight and presented to the emergency room for further   evaluation.  Patient states that the urine flow diminished quite a bit after   he had urodynamic test done in the office just 2 days prior.    PAST MEDICAL HISTORY:  Significant for urethral stricture and BPH.  Also,   coronary artery disease, heartburn, high cholesterol, and rheumatoid   arthritis.    PAST SURGICAL HISTORY:  He had knee revision and also a transurethral   resection of the bladder, hip arthroplasty, lumbar laminectomy, and ankle   ORIF.    ALLERGIES:  TO CRESTOR AND PENICILLIN.    MEDICATIONS:  On admission, see list in the EMR.    REVIEW OF SYSTEMS:  See HPI, otherwise complete review of systems is negative.    PHYSICAL EXAMINATION:  VITAL SIGNS:  Showed temperature of 36.9, pulse 100, blood pressure 99/56,   respiratory rate 18, and saturation 94% on room air.  GENERAL:  Patient is alert.  He is warm to the touch.  His breathing is   nonlabored.  Pulse is tachycardic, but regular.  ABDOMEN:  Soft, nontender, nondistended.  No CVA tenderness.  GENITOURINARY:  The patient has a Villa catheter in place draining light pink   urine with no clots.    LABORATORY DATA:  Show white blood cell count of 18.2, hemoglobin of 15.5,   hematocrit 45%, and platelets 179.  Sodium 128, potassium 4.2, chloride 97,   bicarbonate 21, BUN 11, and creatinine 0.93 with a glucose of 92.  Urinalysis   shows positive nitrites and large leukocyte esterase as well as blood.   No   recent abdominal imaging.    ASSESSMENT AND PLAN:  This is a 63-year-old male with urinary tract infection   as well as incomplete bladder emptying.    RECOMMENDATIONS:  Continue Villa catheter.  Treat the urinary tract infection   with broad spectrum antibiotics.  Follow up urine cultures.       ____________________________________     MD RADHA Abraham / AMBREEN    DD:  01/26/2019 15:33:22  DT:  01/26/2019 16:10:48    D#:  [id number]  Job#:  [id number]

## 2019-01-27 NOTE — PROGRESS NOTES
Received report from NOC RN; assumed pt care. Urine bright red, flushed catheter with sterile water, no clots noted, urine now yellow. Pt notified to call for assistance, call light within reach.

## 2019-01-27 NOTE — CARE PLAN
Problem: Knowledge Deficit  Goal: Knowledge of disease process/condition, treatment plan, diagnostic tests, and medications will improve    Intervention: Explain information regarding disease process/condition, treatment plan, diagnostic tests, and medications and document in education  1630 admitted to room 203 from ER with willett draining large amts clear eliezer urine no clots no c/o bladder spasms nor fever/chills. Enc po fluids, monitor VS and lactic acids, and BC done and IV antibiotics initiated. Support with IV Hydration. Wife at bedside and pt and she very familiar with hospital protocol and treatment plan, as has had similar s/s over past month.

## 2019-01-27 NOTE — ASSESSMENT & PLAN NOTE
-This is sepsis (without associated acute organ dysfunction).   -Due to urinary tract infection  -Continue Rocephin  -Await urinary culture  -Continue IV fluids  -Lactic acid is normal  -Hemodynamically stable

## 2019-01-27 NOTE — ASSESSMENT & PLAN NOTE
-Unknown etiology  -Continue outpatient follow-up with urology  -At this time, will just try to get that hematuria under control, will likely need to go home with Villa

## 2019-01-27 NOTE — H&P
Hospital Medicine History & Physical Note    Date of Service  1/26/2019    Primary Care Physician  John Lao D.O.    Consultants  Urology, Dr. Hamlin.    Code Status  Full code.    Chief Complaint  Pain with urination.    History of Presenting Illness  63 y.o. male who presented 1/26/2019 with painful urination, incomplete bladder emptying and hematuria.  2 days ago he had urodynamic testing done in the office at [facility] [place].  Since that time he has had increasing difficulty urinating and now with pain and hematuria.  In the past year he was hospitalized repeatedly for urinary retention and had an episode of hematuria lasting about 9 days which required prolonged continuous bladder irrigation.  He was compliant with all medications given to him after the urodynamics including Septra.  He denies fever but he has been chills.    Review of Systems  Review of Systems   Constitutional: Negative.  Negative for chills, fever, malaise/fatigue and weight loss.   HENT: Negative.    Respiratory: Negative.  Negative for cough and shortness of breath.    Cardiovascular: Negative.  Negative for chest pain and leg swelling.   Gastrointestinal: Negative.  Negative for abdominal pain, nausea and vomiting.   Genitourinary: Positive for dysuria, frequency, hematuria and urgency. Negative for flank pain.   Musculoskeletal: Negative.  Negative for back pain and myalgias.   Neurological: Negative.  Negative for dizziness, loss of consciousness and weakness.   Endo/Heme/Allergies: Negative.  Does not bruise/bleed easily.   Psychiatric/Behavioral: Negative.  Negative for depression. The patient is not nervous/anxious.    All other systems reviewed and are negative.      Past Medical History   has a past medical history of Abnormal myocardial perfusion study (1/15/2014); Aortic insufficiency (7/5/2011); Arthritis; Bronchitis; CAD (coronary artery disease) mild plaque at cath in 2/14 (12/5/2014); Cancer (HCC); Chronic use of  opiate drugs therapeutic purposes (8/12/2017); Dyslipidemia (7/12/2011); Heart burn; Heart murmur; Hiatus hernia syndrome; High cholesterol; HTN (hypertension) (7/5/2011); Hypertension; Indigestion; Infectious disease; Pain; Pain; Rheumatoid arthritis(714.0); and Tachycardia (10/20/2014).    Surgical History   has a past surgical history that includes knee arthroscopy (Left); hip arthroplasty total (6/20/08); vein ligation radio frequency (12/8/2008); knee arthroplasty total (6/1/2009); lumbar laminectomy diskectomy (2000); tmj reconstruction bilateral (1994); hip arthroplasty total (5/31/2013); recovery (2/3/2014); cystoscopy (5/16/2018); trans urethral resection bladder (5/16/2018); rotator cuff repair (Right, 2011); vein stripping (Left); ankle orif (Right); and knee revision total (Left, 8/3/2018).     Family History  family history includes Hypertension in his mother.     Social History   reports that he has never smoked. He has never used smokeless tobacco. He reports that he drinks about 1.8 oz of alcohol per week . He reports that he does not use drugs.    Allergies  Allergies   Allergen Reactions   • Crestor [Rosuvastatin Calcium] Myalgia   • Penicillins Hives, Itching and Vomiting       Medications  Prior to Admission Medications   Prescriptions Last Dose Informant Patient Reported? Taking?   CALCIUM-VITAMIN D PO 1/26/2019 at AM Patient Yes No   Sig: Take 1 Tab by mouth 2 Times a Day.   Cyanocobalamin (VITAMIN B-12) 5000 MCG TABLET DISPERSIBLE 1/26/2019 at AM Patient Yes No   Sig: Take 1 Tab by mouth every day.   Multiple Vitamins-Minerals (MULTI COMPLETE PO) 1/26/2019 at AM Patient Yes No   Sig: Take 1 Tab by mouth every day.   PARoxetine (PAXIL) 20 MG Tab 1/26/2019 at AM Patient No No   Sig: TAKE 1 TABLET BY MOUTH EVERY DAY   Tofacitinib Citrate (XELJANZ) 5 MG Tab 1/25/2019 at AM Patient No No   Sig: Take 5 mg by mouth 2 Times a Day.   Zinc 50 MG Cap 1/26/2019 at AM Patient Yes No   Sig: Take 50 mg by  mouth every day.   ascorbic acid (ASCORBIC ACID) 500 MG Tab 1/26/2019 at AM Patient Yes No   Sig: Take 500 mg by mouth every day.   bisoprolol (ZEBETA) 10 MG tablet 1/26/2019 at AM Patient Yes No   Sig: Take 10 mg by mouth every day.   cyclobenzaprine (FLEXERIL) 10 MG Tab PRN at PRN Patient No No   Sig: TAKE ONE TABLET BY MOUTH THREE TIMES DAILY AS NEEDED FOR MILD PAIN   ezetimibe (ZETIA) 10 MG Tab 1/26/2019 at AM Patient No No   Sig: Take 1 Tab by mouth every day.   fluticasone (FLONASE) 50 MCG/ACT nasal spray PRN at PRN Patient Yes No   Sig: SPRAY 2 SPRAYS IN NOSE EVERY DAY AS NEEDED   lisinopril (PRINIVIL) 10 MG Tab 1/26/2019 at AM Patient No No   Sig: TAKE 1 TABLET BY MOUTH EVERY DAY   meloxicam (MOBIC) 7.5 MG Tab 1/26/2019 at AM Patient Yes No   Sig: Take 7.5 mg by mouth every day.   niacin 500 MG Tab 1/26/2019 at AM Patient Yes No   Sig: Take 500 mg by mouth every day.   omeprazole (PRILOSEC) 20 MG CPDR 1/26/2019 at AM Patient Yes No   Sig: Take 20 mg by mouth every day.   oxyCODONE-acetaminophen (PERCOCET-10)  MG Tab PRN at PRN Patient Yes Yes   Sig: Take 1-2 Tabs by mouth every four hours as needed for Severe Pain.   pravastatin (PRAVACHOL) 40 MG tablet 1/26/2019 at AM Patient No No   Sig: Take 1 Tab by mouth every day.   predniSONE (DELTASONE) 1 MG Tab 1/26/2019 at AM Patient No No   Sig: TAKE 2 TABLETS BY MOUTH EVERY DAY   sulfamethoxazole-trimethoprim (BACTRIM DS) 800-160 MG tablet 1/26/2019 at AM Patient Yes Yes   Sig: Take 1 Tab by mouth 2 times a day. 10 day course   tamsulosin (FLOMAX) 0.4 MG capsule 1/26/2019 at AM Patient Yes No   Sig: Take 0.4 mg by mouth every day.   testosterone cypionate (DEPO-TESTOSTERONE) 200 MG/ML Solution injection 1/23/2019 at AM Patient Yes No   Sig: INJECT 1ML INTRAMUSCULARLY EVERY 14DAYS [id number]--S92.1   vardenafil (LEVITRA) 20 MG tablet PRN at PRN Patient Yes No   Sig: Take 20 mg by mouth as needed.   vitamin e (VITAMIN E) 400 UNIT Cap 1/26/2019 at AM Patient Yes  No   Sig: Take 400 Units by mouth every day.      Facility-Administered Medications: None       Physical Exam  Temp:  [36.9 °C (98.5 °F)-37.2 °C (98.9 °F)] 36.9 °C (98.5 °F)  Pulse:  [] 87  Resp:  [18] 18  BP: ()/(56-74) 116/68  SpO2:  [94 %-99 %] 94 %    Physical Exam   Constitutional: He is oriented to person, place, and time. He appears well-developed and well-nourished. No distress.   Plan of care discussed with bedside RN.   HENT:   Nose: Nose normal.   Mouth/Throat: Oropharynx is clear and moist. No oropharyngeal exudate.   Eyes: Conjunctivae are normal. Right eye exhibits no discharge. Left eye exhibits no discharge. No scleral icterus.   Neck: No JVD present. No tracheal deviation present.   Cardiovascular: Normal rate, regular rhythm and normal heart sounds.    Pulmonary/Chest: Effort normal and breath sounds normal. No stridor. No respiratory distress. He has no wheezes. He has no rales. He exhibits no tenderness.   Abdominal: Soft. Bowel sounds are normal. He exhibits no distension. There is no tenderness.   Genitourinary:   Genitourinary Comments: Light red urine in willett bag, no clots   Musculoskeletal: He exhibits no edema or tenderness.   Neurological: He is alert and oriented to person, place, and time. No cranial nerve deficit. He exhibits normal muscle tone.   Skin: Skin is warm and dry. He is not diaphoretic. There is pallor.   Psychiatric: He has a normal mood and affect. His behavior is normal. Judgment and thought content normal.   Nursing note and vitals reviewed.      Laboratory:  Recent Labs      01/26/19   1148   WBC  18.2*   RBC  5.08   HEMOGLOBIN  15.5   HEMATOCRIT  45.3   MCV  89.2   MCH  30.5   MCHC  34.2   RDW  53.6*   PLATELETCT  179   MPV  9.5     Recent Labs      01/26/19   1148   SODIUM  128*   POTASSIUM  4.2   CHLORIDE  97   CO2  21   GLUCOSE  92   BUN  11   CREATININE  0.93   CALCIUM  9.1     Recent Labs      01/26/19   1148   ALTSGPT  36   ASTSGOT  50*   ALKPHOSPHAT   65   TBILIRUBIN  1.1   GLUCOSE  92     Recent Labs      01/26/19   1148   APTT  29.4   INR  0.99             No results for input(s): TROPONINI in the last 72 hours.    Urinalysis:    Recent Labs      01/26/19   1150   SPECGRAVITY  1.020   GLUCOSEUR  Negative   KETONES  Trace*   NITRITE  Positive*   LEUKESTERAS  Large*   WBCURINE  Packed*   RBCURINE  >150*   BACTERIA  Moderate*        Imaging:  No orders to display         Assessment/Plan:  I anticipate this patient will require at least two midnights for appropriate medical management, necessitating inpatient admission.    Neurogenic bladder- (present on admission)   Assessment & Plan    Unclear cause.  Being worked up by [facility] [place].  Possibly enlarged prostate but this was possibly ruled out by cystoscopy.  Urology consulting, Villa catheter placed.  Query whether he will need permanent Villa placement such as a suprapubic catheter.     Complicated UTI (urinary tract infection)- (present on admission)   Assessment & Plan    Secondary to neurogenic bladder and recent urodynamics procedure with hematuria.  Failed outpatient therapy.  With sepsis.  Place Villa catheter for continuous drainage.  Urology consultation, discussed with Dr. garza who has kindly seen the patient.  Start ceftriaxone 2 g IV daily.  Patient working with urology to develop a plan to treat his neurogenic bladder for the long-term.         Sepsis (HCC)   Assessment & Plan    This is sepsis (without associated acute organ dysfunction).   Due to urinary tract infection.  Sepsis order set completed.  Boluses as needed, follow lactic acid level.  Ceftriaxone to treat urinary tract infection.  Blood cultures done in the ER.  Urine culture sent as well.     Chronic use of opiate drugs therapeutic purposes- (present on admission)   Assessment & Plan    Continue home dosages of percocet and mobic.     Depression- (present on admission)   Assessment & Plan    Continue paroxetine     Rheumatoid  arthritis (HCC)- (present on admission)   Assessment & Plan    Continue cyclobenzaprine as needed, meloxicam and prednisone 2 mg daily.  Hold tofacitinib.         VTE prophylaxis: SCDs

## 2019-01-27 NOTE — PROGRESS NOTES
[facility] Medicine Daily Progress Note    Date of Service  1/27/2019    Chief Complaint  63 y.o. male admitted 1/26/2019 with pain with urination.    Hospital Course    Patient does have a history of urinary retention as well as hematuria, was hospitalized for this previously.  He does follow with urology as an outpatient, has been consulted during the stay to.  2 days prior to admission he did have urodynamic testing done, shortly thereafter he began having difficulty urinating, noted pain and then hematuria.      Interval Problem Update  Patient continues to have profound hematuria Via Villa.    Consultants/Specialty  Urology    Code Status  Full    Disposition  Patient requires additional treatment in the hospital, home at the time of discharge    Review of Systems  Review of Systems   Constitutional: Negative for chills, fever and malaise/fatigue.   HENT: Negative for congestion.    Respiratory: Negative for cough, sputum production, shortness of breath and stridor.    Cardiovascular: Negative for chest pain, palpitations and leg swelling.   Gastrointestinal: Negative for abdominal pain, constipation, diarrhea, nausea and vomiting.   Genitourinary: Positive for dysuria and hematuria. Negative for urgency.   Musculoskeletal: Negative for falls and myalgias.   Neurological: Negative for dizziness, tingling, loss of consciousness, weakness and headaches.   Psychiatric/Behavioral: Negative for depression and suicidal ideas.   All other systems reviewed and are negative.       Physical Exam  Temp:  [36.6 °C (97.9 °F)-37.2 °C (98.9 °F)] 36.6 °C (97.9 °F)  Pulse:  [] 78  Resp:  [18-20] 18  BP: ()/(56-76) 110/65  SpO2:  [94 %-99 %] 96 %    Physical Exam   Constitutional: He is oriented to person, place, and time. He appears well-developed and well-nourished.  Non-toxic appearance. No distress.   HENT:   Head: Normocephalic and atraumatic. Not macrocephalic and not microcephalic. Head is without raccoon's eyes  and without Hopkins's sign.   Right Ear: External ear normal.   Left Ear: External ear normal.   Mouth/Throat: Oropharynx is clear and moist. No oropharyngeal exudate.   Eyes: Conjunctivae are normal. Right eye exhibits no discharge. Left eye exhibits no discharge. No scleral icterus.   Neck: Normal range of motion. Neck supple. No tracheal deviation, no edema and no erythema present.   Cardiovascular: Normal rate, regular rhythm, normal heart sounds and intact distal pulses.  Exam reveals no gallop, no friction rub and no decreased pulses.    No murmur heard.  Pulmonary/Chest: Effort normal and breath sounds normal. No stridor. No respiratory distress. He has no decreased breath sounds. He has no wheezes. He has no rhonchi. He has no rales. He exhibits no tenderness.   Abdominal: Soft. Bowel sounds are normal. He exhibits no distension. There is no splenomegaly or hepatomegaly. There is no tenderness. There is no rebound and no guarding.   Genitourinary:   Genitourinary Comments: Hematuria    Musculoskeletal: Normal range of motion. He exhibits no edema, tenderness or deformity.   Lymphadenopathy:     He has no cervical adenopathy.   Neurological: He is alert and oriented to person, place, and time. No cranial nerve deficit. Coordination normal.   Skin: Skin is warm and dry. No rash noted. He is not diaphoretic. No cyanosis or erythema. No pallor. Nails show no clubbing.   Psychiatric: He has a normal mood and affect. His speech is normal and behavior is normal. Judgment and thought content normal. Cognition and memory are normal.   Nursing note and vitals reviewed.      Fluids    Intake/Output Summary (Last 24 hours) at 01/27/19 0732  Last data filed at 01/27/19 0300   Gross per 24 hour   Intake             2150 ml   Output             3500 ml   Net            -1350 ml       Laboratory  Recent Labs      01/26/19   1148  01/27/19   0549   WBC  18.2*  11.6*   RBC  5.08  4.75   HEMOGLOBIN  15.5  14.5   HEMATOCRIT   45.3  43.4   MCV  89.2  91.4   MCH  30.5  30.5   MCHC  34.2  33.4*   RDW  53.6*  57.9*   PLATELETCT  179  162*   MPV  9.5  10.0     Recent Labs      01/26/19   1148  01/27/19   0549   SODIUM  128*  133*   POTASSIUM  4.2  4.0   CHLORIDE  97  101   CO2  21  26   GLUCOSE  92  94   BUN  11  8   CREATININE  0.93  0.97   CALCIUM  9.1  8.4     Recent Labs      01/26/19   1148   APTT  29.4   INR  0.99               Imaging  No orders to display        Assessment/Plan  Sepsis (HCC)   Assessment & Plan    -This is sepsis (without associated acute organ dysfunction).   -Due to urinary tract infection  -Continue Rocephin  -Await urinary culture  -Continue IV fluids  -Lactic acid is normal  -Hemodynamically stable     Neurogenic bladder- (present on admission)   Assessment & Plan    -Unknown etiology  -Continue outpatient follow-up with urology  -At this time, will just try to get that hematuria under control, will likely need to go home with Villa     Complicated UTI (urinary tract infection)- (present on admission)   Assessment & Plan    -Due to urinary retention  -Continue Rocephin  -Await culture result         Hematuria   Assessment & Plan    -Profound, will initiate aggressive flushes  -Patient is at risk for requiring CBI, if that is required, will remove current Villa in place a three-way Villa  -I did discuss this with the patient and he is aware of this possibility     Hyponatremia   Assessment & Plan    -Worsening, increase IV fluids  -Repeat a BMP in the morning  -Patient is not fluid overloaded     Chronic use of opiate drugs therapeutic purposes- (present on admission)   Assessment & Plan    -Chronic, no significant pain at this time  -No increase in his home narcotics     Depression- (present on admission)   Assessment & Plan    -Continue Paxil  -Not depressed currently, frustrated with recurrence     Essential hypertension- (present on admission)   Assessment & Plan    -Controlled on lisinopril and  bisoprolol  -Adjust meds as needed     Rheumatoid arthritis (HCC)- (present on admission)   Assessment & Plan    -Continue home meds          VTE prophylaxis: SCDs

## 2019-01-27 NOTE — PROGRESS NOTES
At around 0545 pt was given his antibiotic, 15 mins later he was complaining of hot flushes. Pt was warm and red upon assessment,  Antibiotics was stopped, paged hospitalist, pt was given 25 mg of benadryl.     0615: Pt reports feeling much better. Will continue to monitor.

## 2019-01-27 NOTE — PROGRESS NOTES
"S: Pt feeling better - less confusion/weakness.  Urine was bloody this am, but clear yellow after irrigation.    O: Blood pressure 110/65, pulse 78, temperature 36.6 °C (97.9 °F), temperature source Oral, resp. rate 18, height 1.778 m (5' 10\"), weight 96 kg (211 lb 10.3 oz), SpO2 96 %.    Intake/Output Summary (Last 24 hours) at 01/27/19 1326  Last data filed at 01/27/19 1300   Gross per 24 hour   Intake             2510 ml   Output             5150 ml   Net            -2640 ml     Pt alert, NAD  abd soft  Urine clear yellow in willett    Recent Labs      01/26/19   1148  01/27/19   0549   WBC  18.2*  11.6*   RBC  5.08  4.75   HEMOGLOBIN  15.5  14.5   HEMATOCRIT  45.3  43.4   MCV  89.2  91.4   MCH  30.5  30.5   MCHC  34.2  33.4*   RDW  53.6*  57.9*   PLATELETCT  179  162*   MPV  9.5  10.0     Recent Labs      01/26/19   1148  01/27/19   0549   SODIUM  128*  133*   POTASSIUM  4.2  4.0   CHLORIDE  97  101   CO2  21  26   GLUCOSE  92  94   BUN  11  8   CREATININE  0.93  0.97   CALCIUM  9.1  8.4       Assessment:   64 yo M with chronic incomplete bladder emptying - admitted with UTI 2 days after urodynamics test - improving with abx.    Plan:   1.  Cont abx; f/u urine cx - abx per sensitivities  2.  Keep willett on admission - f/u with Dr. Romo as outpatient for further management  "

## 2019-01-28 ENCOUNTER — PATIENT OUTREACH (OUTPATIENT)
Dept: HEALTH INFORMATION MANAGEMENT | Facility: OTHER | Age: 64
End: 2019-01-28

## 2019-01-28 VITALS
SYSTOLIC BLOOD PRESSURE: 130 MMHG | OXYGEN SATURATION: 92 % | WEIGHT: 211.64 LBS | DIASTOLIC BLOOD PRESSURE: 71 MMHG | RESPIRATION RATE: 18 BRPM | BODY MASS INDEX: 30.3 KG/M2 | HEIGHT: 70 IN | TEMPERATURE: 97.3 F | HEART RATE: 81 BPM

## 2019-01-28 PROBLEM — A41.9 SEPSIS (HCC): Status: RESOLVED | Noted: 2019-01-26 | Resolved: 2019-01-28

## 2019-01-28 PROBLEM — R31.9 HEMATURIA: Status: RESOLVED | Noted: 2019-01-27 | Resolved: 2019-01-28

## 2019-01-28 PROBLEM — E87.1 HYPONATREMIA: Status: RESOLVED | Noted: 2019-01-27 | Resolved: 2019-01-28

## 2019-01-28 PROBLEM — N39.0 COMPLICATED UTI (URINARY TRACT INFECTION): Status: RESOLVED | Noted: 2019-01-26 | Resolved: 2019-01-28

## 2019-01-28 LAB
ANION GAP SERPL CALC-SCNC: 5 MMOL/L (ref 0–11.9)
BACTERIA UR CULT: NORMAL
BASOPHILS # BLD AUTO: 0.4 % (ref 0–1.8)
BASOPHILS # BLD: 0.04 K/UL (ref 0–0.12)
BUN SERPL-MCNC: 6 MG/DL (ref 8–22)
CALCIUM SERPL-MCNC: 8.2 MG/DL (ref 8.4–10.2)
CHLORIDE SERPL-SCNC: 106 MMOL/L (ref 96–112)
CO2 SERPL-SCNC: 25 MMOL/L (ref 20–33)
CREAT SERPL-MCNC: 0.86 MG/DL (ref 0.5–1.4)
EOSINOPHIL # BLD AUTO: 0.26 K/UL (ref 0–0.51)
EOSINOPHIL NFR BLD: 2.8 % (ref 0–6.9)
ERYTHROCYTE [DISTWIDTH] IN BLOOD BY AUTOMATED COUNT: 59.2 FL (ref 35.9–50)
GLUCOSE SERPL-MCNC: 91 MG/DL (ref 65–99)
HCT VFR BLD AUTO: 39.3 % (ref 42–52)
HGB BLD-MCNC: 13.2 G/DL (ref 14–18)
IMM GRANULOCYTES # BLD AUTO: 0.07 K/UL (ref 0–0.11)
IMM GRANULOCYTES NFR BLD AUTO: 0.7 % (ref 0–0.9)
LYMPHOCYTES # BLD AUTO: 1.02 K/UL (ref 1–4.8)
LYMPHOCYTES NFR BLD: 10.8 % (ref 22–41)
MCH RBC QN AUTO: 31 PG (ref 27–33)
MCHC RBC AUTO-ENTMCNC: 33.6 G/DL (ref 33.7–35.3)
MCV RBC AUTO: 92.3 FL (ref 81.4–97.8)
MONOCYTES # BLD AUTO: 1.19 K/UL (ref 0–0.85)
MONOCYTES NFR BLD AUTO: 12.6 % (ref 0–13.4)
NEUTROPHILS # BLD AUTO: 6.87 K/UL (ref 1.82–7.42)
NEUTROPHILS NFR BLD: 72.7 % (ref 44–72)
NRBC # BLD AUTO: 0 K/UL
NRBC BLD-RTO: 0 /100 WBC
PLATELET # BLD AUTO: 150 K/UL (ref 164–446)
PMV BLD AUTO: 10.2 FL (ref 9–12.9)
POTASSIUM SERPL-SCNC: 4.1 MMOL/L (ref 3.6–5.5)
RBC # BLD AUTO: 4.26 M/UL (ref 4.7–6.1)
SIGNIFICANT IND 70042: NORMAL
SITE SITE: NORMAL
SODIUM SERPL-SCNC: 136 MMOL/L (ref 135–145)
SOURCE SOURCE: NORMAL
WBC # BLD AUTO: 9.5 K/UL (ref 4.8–10.8)

## 2019-01-28 PROCEDURE — 700111 HCHG RX REV CODE 636 W/ 250 OVERRIDE (IP): Performed by: HOSPITALIST

## 2019-01-28 PROCEDURE — 700105 HCHG RX REV CODE 258: Performed by: HOSPITALIST

## 2019-01-28 PROCEDURE — 80048 BASIC METABOLIC PNL TOTAL CA: CPT

## 2019-01-28 PROCEDURE — 99239 HOSP IP/OBS DSCHRG MGMT >30: CPT | Performed by: INTERNAL MEDICINE

## 2019-01-28 PROCEDURE — 700102 HCHG RX REV CODE 250 W/ 637 OVERRIDE(OP): Performed by: HOSPITALIST

## 2019-01-28 PROCEDURE — 36415 COLL VENOUS BLD VENIPUNCTURE: CPT

## 2019-01-28 PROCEDURE — A9270 NON-COVERED ITEM OR SERVICE: HCPCS | Performed by: INTERNAL MEDICINE

## 2019-01-28 PROCEDURE — A9270 NON-COVERED ITEM OR SERVICE: HCPCS | Performed by: HOSPITALIST

## 2019-01-28 PROCEDURE — 700102 HCHG RX REV CODE 250 W/ 637 OVERRIDE(OP): Performed by: INTERNAL MEDICINE

## 2019-01-28 PROCEDURE — 85025 COMPLETE CBC W/AUTO DIFF WBC: CPT

## 2019-01-28 RX ORDER — CIPROFLOXACIN 500 MG/1
500 TABLET, FILM COATED ORAL 2 TIMES DAILY
Qty: 8 TAB | Refills: 0 | Status: SHIPPED | OUTPATIENT
Start: 2019-01-29 | End: 2019-02-02

## 2019-01-28 RX ADMIN — MELOXICAM 7.5 MG: 7.5 TABLET ORAL at 05:59

## 2019-01-28 RX ADMIN — MULTIPLE VITAMINS W/ MINERALS TAB 1 TABLET: TAB at 07:59

## 2019-01-28 RX ADMIN — CYCLOBENZAPRINE HYDROCHLORIDE 10 MG: 10 TABLET, FILM COATED ORAL at 05:58

## 2019-01-28 RX ADMIN — TAMSULOSIN HYDROCHLORIDE 0.4 MG: 0.4 CAPSULE ORAL at 06:01

## 2019-01-28 RX ADMIN — LISINOPRIL 10 MG: 5 TABLET ORAL at 06:01

## 2019-01-28 RX ADMIN — OMEPRAZOLE 20 MG: 20 CAPSULE, DELAYED RELEASE ORAL at 06:00

## 2019-01-28 RX ADMIN — EZETIMIBE 10 MG: 10 TABLET ORAL at 05:59

## 2019-01-28 RX ADMIN — PREDNISONE 2 MG: 1 TABLET ORAL at 06:01

## 2019-01-28 RX ADMIN — CEFTRIAXONE SODIUM 2 G: 2 INJECTION, POWDER, FOR SOLUTION INTRAMUSCULAR; INTRAVENOUS at 05:57

## 2019-01-28 RX ADMIN — OXYCODONE HYDROCHLORIDE AND ACETAMINOPHEN 1 TABLET: 10; 325 TABLET ORAL at 05:58

## 2019-01-28 RX ADMIN — DIPHENHYDRAMINE HCL 25 MG: 25 TABLET ORAL at 05:30

## 2019-01-28 RX ADMIN — FLUTICASONE PROPIONATE 100 MCG: 50 SPRAY, METERED NASAL at 06:08

## 2019-01-28 RX ADMIN — PAROXETINE HYDROCHLORIDE 20 MG: 20 TABLET, FILM COATED ORAL at 06:00

## 2019-01-28 RX ADMIN — PRAVASTATIN SODIUM 40 MG: 20 TABLET ORAL at 06:00

## 2019-01-28 RX ADMIN — Medication 220 MG: at 07:59

## 2019-01-28 RX ADMIN — BISOPROLOL FUMARATE 10 MG: 10 TABLET ORAL at 05:59

## 2019-01-28 RX ADMIN — OXYCODONE HYDROCHLORIDE AND ACETAMINOPHEN 500 MG: 500 TABLET ORAL at 07:59

## 2019-01-28 RX ADMIN — DOCUSATE SODIUM AND SENNOSIDES 2 TABLET: 8.6; 5 TABLET, FILM COATED ORAL at 06:00

## 2019-01-28 RX ADMIN — Medication 500 MG: at 05:59

## 2019-01-28 NOTE — DISCHARGE SUMMARY
Discharge Summary    CHIEF COMPLAINT ON ADMISSION  Chief Complaint   Patient presents with   • UTI   • Painful Urination       Reason for Admission  bladder infection     Admission Date  1/26/2019    CODE STATUS  Prior    HPI & HOSPITAL COURSE  This is a 63 y.o. male here with pain with urination.    Patient does have a history of urinary retention as well as hematuria, was hospitalized for this previously.  He does follow with urology as an outpatient, has been consulted during the stay to.  2 days prior to admission he did have urodynamic testing done, shortly thereafter he began having difficulty urinating, noted pain and then hematuria.    Initially patient continued to have profound hematuria via the Villa, this was flushed frequently with resolution of his hematuria.  Patient has been seen by urology, cleared for discharge with Villa, continue antibiotics.  Patient at this point in time is back to his baseline except for now he has a Villa again.  Leg bag will be given.  Patient currently has no pain, has been having some bladder spasms, was requesting a B and O suppository as an outpatient.  We did find a pharmacy that would have them on Wednesday, patient will be a prescription for this if he still needs them.  He does have Valium at home for similar symptoms in the past.    Therefore, he is discharged in good and stable condition to home with close outpatient follow-up.    The patient met 2-midnight criteria for an inpatient stay at the time of discharge.    Discharge Date  1/28/2019    FOLLOW UP ITEMS POST DISCHARGE  Follow-up with primary care provider within 1 week  Follow-up with urology as scheduled  Emergency department or 911 in case of emergency    DISCHARGE DIAGNOSES  Active Problems:    Neurogenic bladder POA: Yes    Rheumatoid arthritis (HCC) POA: Yes      Overview: ICD-10 transition    Essential hypertension POA: Yes    Depression (Chronic) POA: Yes    Chronic use of opiate drugs therapeutic  purposes POA: Yes  Resolved Problems:    Sepsis (HCC) POA: Unknown    Complicated UTI (urinary tract infection) POA: Yes    Hyponatremia POA: Unknown    Hematuria POA: Unknown      FOLLOW UP  Future Appointments  Date Time Provider Department Center   2/1/2019 3:20 PM John Lao D.O. [facility] None   2/21/2019 1:30 PM Ju Azul M.D. [facility] None   3/20/2019 10:40 AM John Lao D.O. [facility] None     Michael Hamlin M.D.  [address]  [place] [address]  [phone]      Follow up with [facility] [place] as scheduled.    John Lao D.O.  [address] [facility] [address]  [place] [id number] [phone]    In 1 week        MEDICATIONS ON DISCHARGE     Medication List      START taking these medications      Instructions   ciprofloxacin 500 MG Tabs  Start taking on:  1/29/2019  Commonly known as:  CIPRO   Take 1 Tab by mouth 2 times a day for 4 days.  Dose:  500 mg     opium-belladonna 16.2-30 MG Supp  Commonly known as:  B&O SUPPRETTES   Insert 1 Suppository in rectum 2 times a day as needed for up to 5 days.  Dose:  1 Suppository        CONTINUE taking these medications      Instructions   ascorbic acid 500 MG Tabs  Commonly known as:  ascorbic acid   Take 500 mg by mouth every day.  Dose:  500 mg     bisoprolol 10 MG tablet  Commonly known as:  ZEBETA   Take 10 mg by mouth every day.  Dose:  10 mg     CALCIUM-VITAMIN D PO   Take 1 Tab by mouth 2 Times a Day.  Dose:  1 Tab     cyclobenzaprine 10 MG Tabs  Commonly known as:  FLEXERIL   TAKE ONE TABLET BY MOUTH THREE TIMES DAILY AS NEEDED FOR MILD PAIN     ezetimibe 10 MG Tabs  Commonly known as:  ZETIA   Take 1 Tab by mouth every day.  Dose:  10 mg     fluticasone 50 MCG/ACT nasal spray  Commonly known as:  FLONASE   SPRAY 2 SPRAYS IN NOSE EVERY DAY AS NEEDED     lisinopril 10 MG Tabs  Commonly known as:  PRINIVIL   TAKE 1 TABLET BY MOUTH EVERY DAY     meloxicam 7.5 MG Tabs  Commonly known as:  MOBIC   Take 7.5 mg by mouth every day.  Dose:  7.5  mg     MULTI COMPLETE PO   Take 1 Tab by mouth every day.  Dose:  1 Tab     niacin 500 MG Tabs   Take 500 mg by mouth every day.  Dose:  500 mg     omeprazole 20 MG delayed-release capsule  Commonly known as:  PRILOSEC   Take 20 mg by mouth every day.  Dose:  20 mg     oxyCODONE-acetaminophen  MG Tabs  Commonly known as:  PERCOCET-10   Take 1-2 Tabs by mouth every four hours as needed for Severe Pain.  Dose:  1-2 Tab     PARoxetine 20 MG Tabs  Commonly known as:  PAXIL   TAKE 1 TABLET BY MOUTH EVERY DAY     pravastatin 40 MG tablet  Commonly known as:  PRAVACHOL   Take 1 Tab by mouth every day.  Dose:  40 mg     predniSONE 1 MG Tabs  Commonly known as:  DELTASONE   TAKE 2 TABLETS BY MOUTH EVERY DAY     tamsulosin 0.4 MG capsule  Commonly known as:  FLOMAX   Take 0.4 mg by mouth every day.  Dose:  0.4 mg     testosterone cypionate 200 MG/ML Soln injection  Commonly known as:  DEPO-TESTOSTERONE   INJECT 1ML INTRAMUSCULARLY EVERY 14DAYS [id number]     Tofacitinib Citrate 5 MG Tabs  Commonly known as:  XELJANZ   Take 5 mg by mouth 2 Times a Day.  Dose:  5 mg     vardenafil 20 MG tablet  Commonly known as:  LEVITRA   Take 20 mg by mouth as needed.  Dose:  20 mg     Vitamin B-12 5000 MCG Tbdp   Take 1 Tab by mouth every day.  Dose:  1 Tab     vitamin e 400 UNIT Caps  Commonly known as:  VITAMIN E   Take 400 Units by mouth every day.  Dose:  400 Units     Zinc 50 MG Caps   Take 50 mg by mouth every day.  Dose:  50 mg        STOP taking these medications    sulfamethoxazole-trimethoprim 800-160 MG tablet  Commonly known as:  BACTRIM DS            Allergies  Allergies   Allergen Reactions   • Crestor [Rosuvastatin Calcium] Myalgia   • Penicillins Hives, Itching and Vomiting   • Rocephin [Ceftriaxone] Rash     Redness and flushing all over body       DIET  No orders of the defined types were placed in this encounter.      ACTIVITY  As tolerated.  Weight bearing as  tolerated    CONSULTATIONS  Urology    PROCEDURES  None    LABORATORY  Lab Results   Component Value Date    SODIUM 136 01/28/2019    POTASSIUM 4.1 01/28/2019    CHLORIDE 106 01/28/2019    CO2 25 01/28/2019    GLUCOSE 91 01/28/2019    BUN 6 (L) 01/28/2019    CREATININE 0.86 01/28/2019    CREATININE 1.1 04/21/2009        Lab Results   Component Value Date    WBC 9.5 01/28/2019    WBC 7.5 07/01/2011    HEMOGLOBIN 13.2 (L) 01/28/2019    HEMATOCRIT 39.3 (L) 01/28/2019    PLATELETCT 150 (L) 01/28/2019        Total time of the discharge process exceeds 39 minutes.

## 2019-01-28 NOTE — PROGRESS NOTES
Pt discharged into care of spouse. Discussed discharge meds, activity, follow up and when to call his PCP, Urologist or return to the ED. Pt states understanding and asks no further questions. Escorted to private transportation via .

## 2019-01-28 NOTE — PROGRESS NOTES
Received report, alert and oriented, Discussed POC, catheter was flushed with eliezer/pink urine, no clots noted. Safety precautions in place, educated to use call light for assistance. Will continue to monitor.

## 2019-01-28 NOTE — PROGRESS NOTES
Pt resting in bed. Occasional bursts of blood in urine, no clots. No pain. No signs of distress noted. VSS. IV intact. Discussed POC for the day.

## 2019-01-28 NOTE — DISCHARGE INSTRUCTIONS
Discharge Instructions    Discharged to home by car with relative. Discharged via wheelchair, hospital escort: Yes.  Special equipment needed: Not Applicable    Be sure to schedule a follow-up appointment with your primary care doctor or any specialists as instructed.     Discharge Plan:   Influenza Vaccine Indication: Patient Refuses    I understand that a diet low in cholesterol, fat, and sodium is recommended for good health. Unless I have been given specific instructions below for another diet, I accept this instruction as my diet prescription.   Other diet: regular    Special Instructions: Villa Catheter Care, Adult  A Villa catheter is a soft, flexible tube that is placed into the bladder to drain urine. A Villa catheter may be inserted if:  · You leak urine or are not able to control when you urinate (urinary incontinence).  · You are not able to urinate when you need to (urinary retention).  · You had prostate surgery or surgery on the genitals.  · You have certain medical conditions, such as multiple sclerosis, dementia, or a spinal cord injury.  If you are going home with a Villa catheter in place, follow the instructions below.  TAKING CARE OF THE CATHETER  1. Wash your hands with soap and water.  2. Using mild soap and warm water on a clean washcloth:  ¨ Clean the area on your body closest to the catheter insertion site using a circular motion, moving away from the catheter. Never wipe toward the catheter because this could sweep bacteria up into the urethra and cause infection.  ¨ Remove all traces of soap. Pat the area dry with a clean towel. For males, reposition the foreskin.  3. Attach the catheter to your leg so there is no tension on the catheter. Use adhesive tape or a leg strap. If you are using adhesive tape, remove any sticky residue left behind by the previous tape you used.  4. Keep the drainage bag below the level of the bladder, but keep it off the floor.  5. Check throughout the day to be  sure the catheter is working and urine is draining freely. Make sure the tubing does not become kinked.  6. Do not pull on the catheter or try to remove it. Pulling could damage internal tissues.  TAKING CARE OF THE DRAINAGE BAGS  You will be given two drainage bags to take home. One is a large overnight drainage bag, and the other is a smaller leg bag that fits underneath clothing. You may wear the overnight bag at any time, but you should never wear the smaller leg bag at night. Follow the instructions below for how to empty, change, and clean your drainage bags.  Emptying the Drainage Bag  You must empty your drainage bag when it is  -½ full or at least 2-3 times a day.  1. Wash your hands with soap and water.  2. Keep the drainage bag below your hips, below the level of your bladder. This stops urine from going back into the tubing and into your bladder.  3. Hold the dirty bag over the toilet or a clean container.  4. Open the pour spout at the bottom of the bag and empty the urine into the toilet or container. Do not let the pour spout touch the toilet, container, or any other surface. Doing so can place bacteria on the bag, which can cause an infection.  5. Clean the pour spout with a gauze pad or cotton ball that has rubbing alcohol on it.  6. Close the pour spout.  7. Attach the bag to your leg with adhesive tape or a leg strap.  8. Wash your hands well.  Changing the Drainage Bag  Change your drainage bag once a month or sooner if it starts to smell bad or look dirty. Below are steps to follow when changing the drainage bag.  1. Wash your hands with soap and water.  2. Pinch off the rubber catheter so that urine does not spill out.  3. Disconnect the catheter tube from the drainage tube at the connection valve. Do not let the tubes touch any surface.  4. Clean the end of the catheter tube with an alcohol wipe. Use a different alcohol wipe to clean the end of the drainage tube.  5. Connect the catheter tube  to the drainage tube of the clean drainage bag.  6. Attach the new bag to the leg with adhesive tape or a leg strap. Avoid attaching the new bag too tightly.  7. Wash your hands well.  Cleaning the Drainage Bag  1. Wash your hands with soap and water.  2. Wash the bag in warm, soapy water.  3. Rinse the bag thoroughly with warm water.  4. Fill the bag with a solution of white vinegar and water (1 cup vinegar to 1 qt warm water [.2 L vinegar to 1 L warm water]). Close the bag and soak it for 30 minutes in the solution.  5. Rinse the bag with warm water.  6. Hang the bag to dry with the pour spout open and hanging downward.  7. Store the clean bag (once it is dry) in a clean plastic bag.  8. Wash your hands well.  PREVENTING INFECTION  · Wash your hands before and after handling your catheter.  · Take showers daily and wash the area where the catheter enters your body. Do not take baths. Replace wet leg straps with dry ones, if this applies.  · Do not use powders, sprays, or lotions on the genital area. Only use creams, lotions, or ointments as directed by your caregiver.  · For females, wipe from front to back after each bowel movement.  · Drink enough fluids to keep your urine clear or pale yellow unless you have a fluid restriction.  · Do not let the drainage bag or tubing touch or lie on the floor.  · Wear cotton underwear to absorb moisture and to keep your .  SEEK MEDICAL CARE IF:   · Your urine is cloudy or smells unusually bad.  · Your catheter becomes clogged.  · You are not draining urine into the bag or your bladder feels full.  · Your catheter starts to leak.  SEEK IMMEDIATE MEDICAL CARE IF:   · You have pain, swelling, redness, or pus where the catheter enters the body.  · You have pain in the abdomen, legs, lower back, or bladder.  · You have a fever.  · You see blood fill the catheter, or your urine is pink or red.  · You have nausea, vomiting, or chills.  · Your catheter gets pulled  out.  MAKE SURE YOU:   · Understand these instructions.  · Will watch your condition.  · Will get help right away if you are not doing well or get worse.     This information is not intended to replace advice given to you by your health care provider. Make sure you discuss any questions you have with your health care provider.     Document Released: 12/18/2006 Document Revised: 05/04/2015 Document Reviewed: 12/09/2013  [employer] Patient Education ©2016 [employer]      · Is patient discharged on Warfarin / Coumadin?   No     Depression / Suicide Risk    As you are discharged from this [facility] facility, it is important to learn how to keep safe from harming yourself.    Recognize the warning signs:  · Abrupt changes in personality, positive or negative- including increase in energy   · Giving away possessions  · Change in eating patterns- significant weight changes-  positive or negative  · Change in sleeping patterns- unable to sleep or sleeping all the time   · Unwillingness or inability to communicate  · Depression  · Unusual sadness, discouragement and loneliness  · Talk of wanting to die  · Neglect of personal appearance   · Rebelliousness- reckless behavior  · Withdrawal from people/activities they love  · Confusion- inability to concentrate     If you or a loved one observes any of these behaviors or has concerns about self-harm, here's what you can do:  · Talk about it- your feelings and reasons for harming yourself  · Remove any means that you might use to hurt yourself (examples: pills, rope, extension cords, firearm)  · Get professional help from the community (Mental Health, Substance Abuse, psychological counseling)  · Do not be alone:Call your Safe Contact- someone whom you trust who will be there for you.  · Call your local CRISIS HOTLINE 248-0252 or 281-019-6687  · Call your local Children's Mobile Crisis Response Team [place] (275) 662-7435 or www.Company  · Call the  toll free National Suicide Prevention Hotlines   · National Suicide Prevention Lifeline 357-687-YYWQ (1138)  · National Hope Line Network 800-SUICIDE (083-4049)

## 2019-01-28 NOTE — ASSESSMENT & PLAN NOTE
-Profound, will initiate aggressive flushes  -Patient is at risk for requiring CBI, if that is required, will remove current Villa in place a three-way Villa  -I did discuss this with the patient and he is aware of this possibility

## 2019-01-29 ENCOUNTER — PATIENT OUTREACH (OUTPATIENT)
Dept: HEALTH INFORMATION MANAGEMENT | Facility: OTHER | Age: 64
End: 2019-01-29

## 2019-01-31 LAB
BACTERIA BLD CULT: NORMAL
BACTERIA BLD CULT: NORMAL
SIGNIFICANT IND 70042: NORMAL
SIGNIFICANT IND 70042: NORMAL
SITE SITE: NORMAL
SITE SITE: NORMAL
SOURCE SOURCE: NORMAL
SOURCE SOURCE: NORMAL

## 2019-02-01 ENCOUNTER — OFFICE VISIT (OUTPATIENT)
Dept: MEDICAL GROUP | Facility: LAB | Age: 64
End: 2019-02-01
Payer: MEDICARE

## 2019-02-01 VITALS
SYSTOLIC BLOOD PRESSURE: 116 MMHG | WEIGHT: 212 LBS | HEART RATE: 80 BPM | OXYGEN SATURATION: 98 % | HEIGHT: 70 IN | RESPIRATION RATE: 14 BRPM | BODY MASS INDEX: 30.35 KG/M2 | DIASTOLIC BLOOD PRESSURE: 62 MMHG | TEMPERATURE: 97.3 F

## 2019-02-01 DIAGNOSIS — N32.89 BLADDER SPASM: ICD-10-CM

## 2019-02-01 DIAGNOSIS — R31.9 URINARY TRACT INFECTION WITH HEMATURIA, SITE UNSPECIFIED: ICD-10-CM

## 2019-02-01 DIAGNOSIS — M05.9 RHEUMATOID ARTHRITIS WITH POSITIVE RHEUMATOID FACTOR, INVOLVING UNSPECIFIED SITE (HCC): ICD-10-CM

## 2019-02-01 DIAGNOSIS — N39.0 URINARY TRACT INFECTION WITH HEMATURIA, SITE UNSPECIFIED: ICD-10-CM

## 2019-02-01 PROCEDURE — 99213 OFFICE O/P EST LOW 20 MIN: CPT | Performed by: INTERNAL MEDICINE

## 2019-02-01 RX ORDER — NITROFURANTOIN 25; 75 MG/1; MG/1
100 CAPSULE ORAL 2 TIMES DAILY
Qty: 10 CAP | Refills: 3 | Status: ON HOLD | OUTPATIENT
Start: 2019-02-01 | End: 2019-04-01

## 2019-02-02 NOTE — PROGRESS NOTES
CC: Edgardo Sims is a 63 y.o. male is suffering from   Chief Complaint   Patient presents with   • Hospital Follow-up     recurrent UTI's         SUBJECTIVE:  1. Urinary tract infection with hematuria, site unspecified  Edgardo is here for follow-up had a urinary tract infection leading to urosepsis.  Patient was hospitalized, hospital records reviewed.    2. Rheumatoid arthritis with positive rheumatoid factor, involving unspecified site (HCC)  Patient is to repeat a urinalysis on Wednesday of next week if this is negative then needs to restart his disease modifying drugs for treating his rheumatoid arthritis        Past social, family, history:   Social History   Substance Use Topics   • Smoking status: Never Smoker   • Smokeless tobacco: Never Used   • Alcohol use 1.8 oz/week     3 Cans of beer per week      Comment: 3 per week         MEDICATIONS:    Current Outpatient Prescriptions:   •  nitrofurantoin monohydr macro (MACROBID) 100 MG Cap, Take 1 Cap by mouth 2 times a day. (prn urinary tract infections), Disp: 10 Cap, Rfl: 3  •  ciprofloxacin (CIPRO) 500 MG Tab, Take 1 Tab by mouth 2 times a day for 4 days., Disp: 8 Tab, Rfl: 0  •  oxyCODONE-acetaminophen (PERCOCET-10)  MG Tab, Take 1-2 Tabs by mouth every four hours as needed for Severe Pain., Disp: , Rfl:   •  testosterone cypionate (DEPO-TESTOSTERONE) 200 MG/ML Solution injection, INJECT 1ML INTRAMUSCULARLY EVERY 14DAYS [id number]--E93.1, Disp: , Rfl: 1  •  tamsulosin (FLOMAX) 0.4 MG capsule, Take 0.4 mg by mouth every day., Disp: , Rfl: 12  •  meloxicam (MOBIC) 7.5 MG Tab, Take 7.5 mg by mouth every day., Disp: , Rfl: 0  •  fluticasone (FLONASE) 50 MCG/ACT nasal spray, SPRAY 2 SPRAYS IN NOSE EVERY DAY AS NEEDED, Disp: , Rfl: 11  •  ezetimibe (ZETIA) 10 MG Tab, Take 1 Tab by mouth every day., Disp: 30 Tab, Rfl: 11  •  PARoxetine (PAXIL) 20 MG Tab, TAKE 1 TABLET BY MOUTH EVERY DAY, Disp: 90 Tab, Rfl: 3  •  lisinopril (PRINIVIL) 10 MG Tab,  TAKE 1 TABLET BY MOUTH EVERY DAY, Disp: 90 Tab, Rfl: 3  •  cyclobenzaprine (FLEXERIL) 10 MG Tab, TAKE ONE TABLET BY MOUTH THREE TIMES DAILY AS NEEDED FOR MILD PAIN, Disp: 90 Tab, Rfl: 5  •  bisoprolol (ZEBETA) 10 MG tablet, Take 10 mg by mouth every day., Disp: , Rfl:   •  pravastatin (PRAVACHOL) 40 MG tablet, Take 1 Tab by mouth every day., Disp: 30 Tab, Rfl: 11  •  CALCIUM-VITAMIN D PO, Take 1 Tab by mouth 2 Times a Day., Disp: , Rfl:   •  ascorbic acid (ASCORBIC ACID) 500 MG Tab, Take 500 mg by mouth every day., Disp: , Rfl:   •  vitamin e (VITAMIN E) 400 UNIT Cap, Take 400 Units by mouth every day., Disp: , Rfl:   •  Cyanocobalamin (VITAMIN B-12) 5000 MCG TABLET DISPERSIBLE, Take 1 Tab by mouth every day., Disp: , Rfl:   •  niacin 500 MG Tab, Take 500 mg by mouth every day., Disp: , Rfl:   •  Zinc 50 MG Cap, Take 50 mg by mouth every day., Disp: , Rfl:   •  Multiple Vitamins-Minerals (MULTI COMPLETE PO), Take 1 Tab by mouth every day., Disp: , Rfl:   •  omeprazole (PRILOSEC) 20 MG CPDR, Take 20 mg by mouth every day., Disp: , Rfl:   •  opium-belladonna (B&O SUPPRETTES) 16.2-30 MG Suppos, Insert 1 Suppository in rectum 2 times a day as needed for up to 5 days., Disp: 10 Suppository, Rfl: 0  •  predniSONE (DELTASONE) 1 MG Tab, TAKE 2 TABLETS BY MOUTH EVERY DAY, Disp: 180 Tab, Rfl: 0  •  Tofacitinib Citrate (XELJANZ) 5 MG Tab, Take 5 mg by mouth 2 Times a Day., Disp: 60 Tab, Rfl: 0  •  vardenafil (LEVITRA) 20 MG tablet, Take 20 mg by mouth as needed., Disp: , Rfl:     PROBLEMS:  Patient Active Problem List    Diagnosis Date Noted   • Acute pyelonephritis 04/30/2018     Priority: High   • Neurogenic bladder 01/26/2019     Priority: Medium   • Failed total knee, left, subsequent encounter 08/03/2018   • Bladder outlet obstruction 05/01/2018   • Leukocytosis 04/30/2018   • Lactic acidosis 04/30/2018   • Idiopathic acute pancreatitis 04/30/2018   • Chronic use of opiate drugs therapeutic purposes 08/12/2017   •  "Elevated CPK 09/23/2016   • Depression 07/13/2015   • Anxiety 03/31/2015   • Coronary artery disease due to calcified coronary lesion: Mildly cardiac catheterization in 2014 12/05/2014   • Tachycardia 10/20/2014   • Abnormal myocardial perfusion study 01/15/2014   • Osteoarthrosis, unspecified whether generalized or localized, pelvic region and thigh 05/31/2013   • Dyslipidemia 07/12/2011   • Aortic insufficiency 07/05/2011   • Essential hypertension 07/05/2011   • Rheumatoid arthritis (HCC) 05/05/2009   • Hypogonadism male 05/05/2009       REVIEW OF SYSTEMS:  Gen.:  No Nausea, Vomiting, fever, Chills.  Heart: No chest pain.  Lungs:  No shortness of Breath.  Psychological: Kian unusual Anxiety depression     PHYSICAL EXAM   Constitutional: Alert, cooperative, not in acute distress.  Cardiovascular:  Rate Rhythm is regular without murmurs rubs clicks.     Thorax & Lungs: Clear to auscultation, no wheezing, rhonchi, or rales  HENT: Normocephalic, Atraumatic.  Eyes: PERRLA, EOMI, Conjunctiva normal.   Neck: Trachia is midline no swelling of the thyroid.   Lymphatic: No lymphadenopathy noted.   Neurologic: Alert & oriented x 3, cranial nerves II through XII are intact, Normal motor function, Normal sensory function, No focal deficits noted.   Psychiatric: Affect normal, Judgment normal, Mood normal.     VITAL SIGNS:/62   Pulse 80   Temp 36.3 °C (97.3 °F) (Temporal)   Resp 14   Ht 1.778 m (5' 10\")   Wt 96.2 kg (212 lb)   SpO2 98%   BMI 30.42 kg/m²     Labs: Reviewed    Assessment:                                                     Plan:    1. Urinary tract infection with hematuria, site unspecified  Patient with recurrent urinary tract infection after instrumentation.  Patient's to have Macrobid at home is to have standing orders for urinalysis  - URINALYSIS,CULTURE IF INDICATED; Standing  - nitrofurantoin monohydr macro (MACROBID) 100 MG Cap; Take 1 Cap by mouth 2 times a day. (prn urinary tract " infections)  Dispense: 10 Cap; Refill: 3    2. Rheumatoid arthritis with positive rheumatoid factor, involving unspecified site (HCC)  Rheumatoid arthritis patient is currently holding his disease modifying medication.  Is to repeat a urinalysis on this upcoming Wednesday if negative is to restart medication.

## 2019-02-02 NOTE — TELEPHONE ENCOUNTER
Received a call from Mary ([facility]) states that patient never took to the pharmacy opium-belladonna (B&O SUPPRETTES) 16.2-30 MG Suppos and wants to know if you would prescribe this for him?  Please advise

## 2019-02-04 ENCOUNTER — TELEPHONE (OUTPATIENT)
Dept: MEDICAL GROUP | Facility: LAB | Age: 64
End: 2019-02-04

## 2019-02-04 DIAGNOSIS — N32.89 BLADDER SPASM: ICD-10-CM

## 2019-02-04 NOTE — TELEPHONE ENCOUNTER
1. Caller Name: [facility]                                         Call Back Number: [phone]      Patient approves a detailed voicemail message: N\A    B and O Spuprettes Rx is unavailable from the .  Please change to different med.

## 2019-02-05 NOTE — TELEPHONE ENCOUNTER
Telephone call to Sarah pharmacist at [facility], discussed replacement for B and O suppositories will try hyoscyolime and atropine orally to see if this works

## 2019-02-19 ENCOUNTER — HOSPITAL ENCOUNTER (OUTPATIENT)
Dept: LAB | Facility: MEDICAL CENTER | Age: 64
End: 2019-02-19
Attending: INTERNAL MEDICINE
Payer: MEDICARE

## 2019-02-19 DIAGNOSIS — I25.10 CORONARY ARTERY DISEASE DUE TO CALCIFIED CORONARY LESION: ICD-10-CM

## 2019-02-19 DIAGNOSIS — I25.84 CORONARY ARTERY DISEASE DUE TO CALCIFIED CORONARY LESION: ICD-10-CM

## 2019-02-19 DIAGNOSIS — E78.5 DYSLIPIDEMIA: ICD-10-CM

## 2019-02-19 DIAGNOSIS — I10 ESSENTIAL HYPERTENSION: ICD-10-CM

## 2019-02-19 DIAGNOSIS — I35.1 NONRHEUMATIC AORTIC VALVE INSUFFICIENCY: ICD-10-CM

## 2019-02-19 DIAGNOSIS — M05.9 RHEUMATOID ARTHRITIS WITH POSITIVE RHEUMATOID FACTOR, INVOLVING UNSPECIFIED SITE (HCC): ICD-10-CM

## 2019-02-19 LAB
ALBUMIN SERPL BCP-MCNC: 4.3 G/DL (ref 3.2–4.9)
ALBUMIN SERPL BCP-MCNC: 4.5 G/DL (ref 3.2–4.9)
ALBUMIN/GLOB SERPL: 1.9 G/DL
ALBUMIN/GLOB SERPL: 2.1 G/DL
ALP SERPL-CCNC: 56 U/L (ref 30–99)
ALP SERPL-CCNC: 57 U/L (ref 30–99)
ALT SERPL-CCNC: 24 U/L (ref 2–50)
ALT SERPL-CCNC: 25 U/L (ref 2–50)
ANION GAP SERPL CALC-SCNC: 8 MMOL/L (ref 0–11.9)
ANION GAP SERPL CALC-SCNC: 8 MMOL/L (ref 0–11.9)
AST SERPL-CCNC: 28 U/L (ref 12–45)
AST SERPL-CCNC: 29 U/L (ref 12–45)
BASOPHILS # BLD AUTO: 1.1 % (ref 0–1.8)
BASOPHILS # BLD: 0.1 K/UL (ref 0–0.12)
BILIRUB SERPL-MCNC: 0.5 MG/DL (ref 0.1–1.5)
BILIRUB SERPL-MCNC: 0.5 MG/DL (ref 0.1–1.5)
BUN SERPL-MCNC: 17 MG/DL (ref 8–22)
BUN SERPL-MCNC: 18 MG/DL (ref 8–22)
CALCIUM SERPL-MCNC: 9 MG/DL (ref 8.5–10.5)
CALCIUM SERPL-MCNC: 9.1 MG/DL (ref 8.5–10.5)
CHLORIDE SERPL-SCNC: 101 MMOL/L (ref 96–112)
CHLORIDE SERPL-SCNC: 103 MMOL/L (ref 96–112)
CHOLEST SERPL-MCNC: 140 MG/DL (ref 100–199)
CO2 SERPL-SCNC: 28 MMOL/L (ref 20–33)
CO2 SERPL-SCNC: 28 MMOL/L (ref 20–33)
CREAT SERPL-MCNC: 0.91 MG/DL (ref 0.5–1.4)
CREAT SERPL-MCNC: 0.94 MG/DL (ref 0.5–1.4)
EOSINOPHIL # BLD AUTO: 0.27 K/UL (ref 0–0.51)
EOSINOPHIL NFR BLD: 2.9 % (ref 0–6.9)
ERYTHROCYTE [DISTWIDTH] IN BLOOD BY AUTOMATED COUNT: 53.8 FL (ref 35.9–50)
ERYTHROCYTE [SEDIMENTATION RATE] IN BLOOD BY WESTERGREN METHOD: 1 MM/HOUR (ref 0–20)
FASTING STATUS PATIENT QL REPORTED: NORMAL
GLOBULIN SER CALC-MCNC: 2.1 G/DL (ref 1.9–3.5)
GLOBULIN SER CALC-MCNC: 2.3 G/DL (ref 1.9–3.5)
GLUCOSE SERPL-MCNC: 91 MG/DL (ref 65–99)
GLUCOSE SERPL-MCNC: 91 MG/DL (ref 65–99)
HCT VFR BLD AUTO: 47.2 % (ref 42–52)
HDLC SERPL-MCNC: 45 MG/DL
HGB BLD-MCNC: 15.3 G/DL (ref 14–18)
IMM GRANULOCYTES # BLD AUTO: 0.1 K/UL (ref 0–0.11)
IMM GRANULOCYTES NFR BLD AUTO: 1.1 % (ref 0–0.9)
LDLC SERPL CALC-MCNC: 74 MG/DL
LYMPHOCYTES # BLD AUTO: 2.67 K/UL (ref 1–4.8)
LYMPHOCYTES NFR BLD: 28.7 % (ref 22–41)
MCH RBC QN AUTO: 30.3 PG (ref 27–33)
MCHC RBC AUTO-ENTMCNC: 32.4 G/DL (ref 33.7–35.3)
MCV RBC AUTO: 93.5 FL (ref 81.4–97.8)
MONOCYTES # BLD AUTO: 1.23 K/UL (ref 0–0.85)
MONOCYTES NFR BLD AUTO: 13.2 % (ref 0–13.4)
NEUTROPHILS # BLD AUTO: 4.93 K/UL (ref 1.82–7.42)
NEUTROPHILS NFR BLD: 53 % (ref 44–72)
NRBC # BLD AUTO: 0 K/UL
NRBC BLD-RTO: 0 /100 WBC
PLATELET # BLD AUTO: 248 K/UL (ref 164–446)
PMV BLD AUTO: 10.4 FL (ref 9–12.9)
POTASSIUM SERPL-SCNC: 4.3 MMOL/L (ref 3.6–5.5)
POTASSIUM SERPL-SCNC: 4.5 MMOL/L (ref 3.6–5.5)
PROT SERPL-MCNC: 6.6 G/DL (ref 6–8.2)
PROT SERPL-MCNC: 6.6 G/DL (ref 6–8.2)
RBC # BLD AUTO: 5.05 M/UL (ref 4.7–6.1)
RHEUMATOID FACT SER IA-ACNC: 10 IU/ML (ref 0–14)
SODIUM SERPL-SCNC: 137 MMOL/L (ref 135–145)
SODIUM SERPL-SCNC: 139 MMOL/L (ref 135–145)
TRIGL SERPL-MCNC: 105 MG/DL (ref 0–149)
URATE SERPL-MCNC: 4.5 MG/DL (ref 2.5–8.3)
WBC # BLD AUTO: 9.3 K/UL (ref 4.8–10.8)

## 2019-02-19 PROCEDURE — 80053 COMPREHEN METABOLIC PANEL: CPT | Mod: 91

## 2019-02-19 PROCEDURE — 86200 CCP ANTIBODY: CPT

## 2019-02-19 PROCEDURE — 36415 COLL VENOUS BLD VENIPUNCTURE: CPT

## 2019-02-19 PROCEDURE — 80061 LIPID PANEL: CPT

## 2019-02-19 PROCEDURE — 85652 RBC SED RATE AUTOMATED: CPT

## 2019-02-19 PROCEDURE — 84550 ASSAY OF BLOOD/URIC ACID: CPT

## 2019-02-19 PROCEDURE — 86431 RHEUMATOID FACTOR QUANT: CPT

## 2019-02-19 PROCEDURE — 80053 COMPREHEN METABOLIC PANEL: CPT

## 2019-02-19 PROCEDURE — 85025 COMPLETE CBC W/AUTO DIFF WBC: CPT

## 2019-02-20 LAB — CCP IGG SERPL-ACNC: 2 UNITS (ref 0–19)

## 2019-02-21 ENCOUNTER — OFFICE VISIT (OUTPATIENT)
Dept: RHEUMATOLOGY | Facility: MEDICAL CENTER | Age: 64
End: 2019-02-21
Payer: MEDICARE

## 2019-02-21 VITALS
WEIGHT: 216 LBS | OXYGEN SATURATION: 98 % | BODY MASS INDEX: 30.99 KG/M2 | TEMPERATURE: 97.7 F | HEART RATE: 70 BPM | DIASTOLIC BLOOD PRESSURE: 80 MMHG | RESPIRATION RATE: 14 BRPM | SYSTOLIC BLOOD PRESSURE: 148 MMHG

## 2019-02-21 DIAGNOSIS — Z79.52 LONG TERM CURRENT USE OF SYSTEMIC STEROIDS: ICD-10-CM

## 2019-02-21 DIAGNOSIS — M05.9 RHEUMATOID ARTHRITIS WITH POSITIVE RHEUMATOID FACTOR, INVOLVING UNSPECIFIED SITE (HCC): ICD-10-CM

## 2019-02-21 DIAGNOSIS — I25.84 CORONARY ARTERY DISEASE DUE TO CALCIFIED CORONARY LESION: ICD-10-CM

## 2019-02-21 DIAGNOSIS — Z79.1 ENCOUNTER FOR LONG-TERM (CURRENT) USE OF NSAIDS: ICD-10-CM

## 2019-02-21 DIAGNOSIS — Z79.622 LONG-TERM CURRENT USE OF TOFACITINIB: ICD-10-CM

## 2019-02-21 DIAGNOSIS — I35.1 NONRHEUMATIC AORTIC VALVE INSUFFICIENCY: ICD-10-CM

## 2019-02-21 DIAGNOSIS — I25.10 CORONARY ARTERY DISEASE DUE TO CALCIFIED CORONARY LESION: ICD-10-CM

## 2019-02-21 DIAGNOSIS — I10 ESSENTIAL HYPERTENSION: ICD-10-CM

## 2019-02-21 PROCEDURE — 99214 OFFICE O/P EST MOD 30 MIN: CPT | Performed by: INTERNAL MEDICINE

## 2019-02-21 RX ORDER — DIAZEPAM 5 MG/1
5 TABLET ORAL EVERY 6 HOURS PRN
COMMUNITY
End: 2019-06-10 | Stop reason: SDUPTHER

## 2019-02-21 RX ORDER — MELOXICAM 7.5 MG/1
TABLET ORAL
Qty: 180 TAB | Refills: 0 | Status: ON HOLD | OUTPATIENT
Start: 2019-02-21 | End: 2019-04-01

## 2019-02-21 NOTE — LETTER
[facility]   [address] [facility], [address]  [place] [address]  Phone: [phone]  Fax: [phone]              Encounter Date: 2/21/2019    Dear Dr. Matta ref. provider found,    It was a pleasure seeing your patient, Edgardo Sims, on 2/21/2019. Diagnoses of Rheumatoid arthritis with positive rheumatoid factor, involving unspecified site (HCC), Long-term current use of tofacitinib, Long term current use of systemic steroids, Encounter for long-term (current) use of NSAIDs, Coronary artery disease due to calcified coronary lesion: Mildly cardiac catheterization in 2014, Essential hypertension, and Nonrheumatic aortic valve insufficiency were pertinent to this visit.     Please find attached progress note which includes the history I obtained from Mr. Sims, my physical examination findings, my impression and recommendations.      Once again, it was a pleasure participating in your patient's care.  Please feel free to contact me if you have any questions or if I can be of any further assistance to your patients.      Sincerely,    Ju Azul M.D.  Electronically Signed          PROGRESS NOTE:  Chief Complaint- joint pain    Subjective:   Edgardo Sims is a 63 y.o. male here today for follow up of rheumatological issues    This is a follow-up visit  for this patient for rheumatoid arthritis to this clinic, brand-new patient to me, previously followed by Dr. Canales.  Patient is currently on Xeljanz at 5 mg p.o. twice daily which is subsidized by  [employer] and continues to be on low-dose prednisone at 2 mg p.o. daily.  Patient states that he has been told that he cannot come off of prednisone because he has adrenal insufficiency.  Patient has had some problems in the past with a recurrent bladder infection but that seems to have resolved by now.  Patient states that they think that the recurrent bladder infections may be secondary to BPH and some urine retention  which is currently getting worked up by urology. Otherwise,  Patient denies any side effects from the medication, denies any unexplained weight loss, denies any fevers of unknown etiology, denies any GI upset, denies any rashes, denies any new joint swelling, denies recurrent infections.      Additional comorbidities include osteopenia, BPH,     Bilateral AUTUMN  Left TKA     S/p Remicade-ineffective  S/p Orencia-ineffective  S/p Enbrel-ineffective  S/p humira-ineffective  S/p MTX-oral ulcers  S/p arava-bad reaction but patient doesn't recall specifics  S/p rituximab-helped but patient stopped because of methotrexate side effects per patient report....     Cryoglobulin neg 8/2017  RF neg 8/2017  HBsAg IgM/HBcAb neg 6/2017  HCV neg 6/2017  Quantiferon Gold neg 6/2017  DEXA 9/5/2017 T scores -0.2, -1.5  FRAX 9/5/2017 not done  Hand x-rays 5/2017-indicates erosive arthritis  Feet x-rays 5/2017-indicates erosive arthritis     Current medicines (including changes today)  Current Outpatient Prescriptions   Medication Sig Dispense Refill   • diazePAM (VALIUM) 5 MG Tab Take 5 mg by mouth every 6 hours as needed for Anxiety.     • meloxicam (MOBIC) 7.5 MG Tab 1-2 tabs po qday for joint pain 180 Tab 0   • testosterone cypionate (DEPO-TESTOSTERONE) 200 MG/ML Solution injection INJECT 1ML INTRAMUSCULARLY EVERY 14DAYS U176EMNA--S48.1 10 mL 3   • PB-Hyoscy-Atropine-Scopol ER 48.6 MG Tab CR Take 1 Tab by mouth 4 times a day. 120 Tab 1   • oxyCODONE-acetaminophen (PERCOCET-10)  MG Tab Take 1-2 Tabs by mouth every four hours as needed for Severe Pain.     • predniSONE (DELTASONE) 1 MG Tab TAKE 2 TABLETS BY MOUTH EVERY  Tab 0   • tamsulosin (FLOMAX) 0.4 MG capsule Take 0.4 mg by mouth every day.  12   • fluticasone (FLONASE) 50 MCG/ACT nasal spray SPRAY 2 SPRAYS IN NOSE EVERY DAY AS NEEDED  11   • ezetimibe (ZETIA) 10 MG Tab Take 1 Tab by mouth every day. 30 Tab 11   • PARoxetine (PAXIL) 20 MG Tab TAKE 1 TABLET BY MOUTH  EVERY DAY 90 Tab 3   • lisinopril (PRINIVIL) 10 MG Tab TAKE 1 TABLET BY MOUTH EVERY DAY 90 Tab 3   • cyclobenzaprine (FLEXERIL) 10 MG Tab TAKE ONE TABLET BY MOUTH THREE TIMES DAILY AS NEEDED FOR MILD PAIN 90 Tab 5   • Tofacitinib Citrate (XELJANZ) 5 MG Tab Take 5 mg by mouth 2 Times a Day. 60 Tab 0   • bisoprolol (ZEBETA) 10 MG tablet Take 10 mg by mouth every day.     • pravastatin (PRAVACHOL) 40 MG tablet Take 1 Tab by mouth every day. 30 Tab 11   • CALCIUM-VITAMIN D PO Take 1 Tab by mouth 2 Times a Day.     • ascorbic acid (ASCORBIC ACID) 500 MG Tab Take 500 mg by mouth every day.     • vitamin e (VITAMIN E) 400 UNIT Cap Take 400 Units by mouth every day.     • Cyanocobalamin (VITAMIN B-12) 5000 MCG TABLET DISPERSIBLE Take 1 Tab by mouth every day.     • niacin 500 MG Tab Take 500 mg by mouth every day.     • Zinc 50 MG Cap Take 50 mg by mouth every day.     • Multiple Vitamins-Minerals (MULTI COMPLETE PO) Take 1 Tab by mouth every day.     • omeprazole (PRILOSEC) 20 MG CPDR Take 20 mg by mouth every day.     • nitrofurantoin monohydr macro (MACROBID) 100 MG Cap Take 1 Cap by mouth 2 times a day. (prn urinary tract infections) 10 Cap 3   • vardenafil (LEVITRA) 20 MG tablet Take 20 mg by mouth as needed.       No current facility-administered medications for this visit.      He  has a past medical history of Abnormal myocardial perfusion study (1/15/2014); Aortic insufficiency (7/5/2011); Arthritis; Bronchitis; CAD (coronary artery disease) mild plaque at cath in 2/14 (12/5/2014); Cancer (HCC); Chronic use of opiate drugs therapeutic purposes (8/12/2017); Dyslipidemia (7/12/2011); Heart burn; Heart murmur; Hiatus hernia syndrome; High cholesterol; HTN (hypertension) (7/5/2011); Hypertension; Indigestion; Infectious disease; Pain; Pain; Rheumatoid arthritis(714.0); and Tachycardia (10/20/2014).    ROS   Other than what is mentioned in HPI or physical exam, there is no history of headaches, double vision or blurred  vision. No temporal tenderness or jaw claudication. No history of cataracts or glaucoma. No trouble swallowing difficulties or sore throats.  No chest complaints including chest pain, cough or sputum production. No GI complaints including nausea, vomiting, change in bowel habits, or past peptic ulcer disease. No history of blood in the stools. No urinary complaints including dysuria or frequency. No history of alopecia, photosensitivity, oral ulcerations, Raynaud's phenomena.       Objective:     Blood pressure 148/80, pulse 70, temperature 36.5 °C (97.7 °F), temperature source Temporal, resp. rate 14, weight 98 kg (216 lb), SpO2 98 %. Body mass index is 30.99 kg/m².   Physical Exam:    Constitutional: Alert and oriented X3, patient is talkative with good eye contact.Skin: Warm, dry, good turgor, no rashes in visible areas.Eye: Equal, round and reactive, conjunctiva clear, lids normal EOM intactENMT: Lips without lesions, good dentition, no oropharyngeal ulcers, moist buccal mucosa, pinna without deformityNeck: Trachea midline, no masses, no thyromegaly.Lymph:  No cervical lymphadenopathy, no axillary lymphadenopathy, no supraclavicular lymphadenopathyRespiratory: Unlabored respiratory effort, lungs clear to auscultation, no wheezes, no ronchi.Cardiovascular: Normal S1, S2, no murmur, no edema.Abdomen: Soft, non-tender, no masses, no hepatosplenomegaly.Psych: Alert and oriented x3, normal affect and mood.Neuro: Cranial nerves 2-12 are grossly intact, no loss of sensation LEExt:no joint laxity noted in bilateral arms, no joint laxity noted in bilateral legs    Lab Results   Component Value Date/Time    QNTTBGOLD Negative 06/01/2017 01:13 PM     Lab Results   Component Value Date/Time    HEPBCORTOT Negative 06/01/2017 01:13 PM    HEPBSAG Negative 06/01/2017 01:13 PM     Lab Results   Component Value Date/Time    HEPCAB Negative 06/01/2017 01:13 PM     Lab Results   Component Value Date/Time    SODIUM 137 02/19/2019  08:36 AM    SODIUM 139 02/19/2019 08:36 AM    POTASSIUM 4.3 02/19/2019 08:36 AM    POTASSIUM 4.5 02/19/2019 08:36 AM    CHLORIDE 101 02/19/2019 08:36 AM    CHLORIDE 103 02/19/2019 08:36 AM    CO2 28 02/19/2019 08:36 AM    CO2 28 02/19/2019 08:36 AM    GLUCOSE 91 02/19/2019 08:36 AM    GLUCOSE 91 02/19/2019 08:36 AM    BUN 17 02/19/2019 08:36 AM    BUN 18 02/19/2019 08:36 AM    CREATININE 0.91 02/19/2019 08:36 AM    CREATININE 0.94 02/19/2019 08:36 AM    CREATININE 1.1 04/21/2009 03:50 PM    BUNCREATRAT 13 09/21/2016 09:21 AM      Lab Results   Component Value Date/Time    WBC 9.3 02/19/2019 08:36 AM    WBC 7.5 07/01/2011 10:30 AM    RBC 5.05 02/19/2019 08:36 AM    RBC 4.72 07/01/2011 10:30 AM    HEMOGLOBIN 15.3 02/19/2019 08:36 AM    HEMATOCRIT 47.2 02/19/2019 08:36 AM    MCV 93.5 02/19/2019 08:36 AM     (H) 07/01/2011 10:30 AM    MCH 30.3 02/19/2019 08:36 AM    MCH 36.0 (H) 07/01/2011 10:30 AM    MCHC 32.4 (L) 02/19/2019 08:36 AM    MPV 10.4 02/19/2019 08:36 AM    NEUTSPOLYS 53.00 02/19/2019 08:36 AM    LYMPHOCYTES 28.70 02/19/2019 08:36 AM    MONOCYTES 13.20 02/19/2019 08:36 AM    EOSINOPHILS 2.90 02/19/2019 08:36 AM    BASOPHILS 1.10 02/19/2019 08:36 AM    HYPOCHROMIA 1+ 03/05/2014 04:43 PM    ANISOCYTOSIS 1+ 01/02/2015 05:02 PM      Lab Results   Component Value Date/Time    CALCIUM 9.1 02/19/2019 08:36 AM    CALCIUM 9.0 02/19/2019 08:36 AM    ASTSGOT 29 02/19/2019 08:36 AM    ASTSGOT 28 02/19/2019 08:36 AM    ALTSGPT 25 02/19/2019 08:36 AM    ALTSGPT 24 02/19/2019 08:36 AM    ALKPHOSPHAT 57 02/19/2019 08:36 AM    ALKPHOSPHAT 56 02/19/2019 08:36 AM    TBILIRUBIN 0.5 02/19/2019 08:36 AM    TBILIRUBIN 0.5 02/19/2019 08:36 AM    ALBUMIN 4.5 02/19/2019 08:36 AM    ALBUMIN 4.3 02/19/2019 08:36 AM    TOTPROTEIN 6.6 02/19/2019 08:36 AM    TOTPROTEIN 6.6 02/19/2019 08:36 AM     Lab Results   Component Value Date/Time    URICACID 4.5 02/19/2019 08:36 AM    RHEUMFACTN 10 02/19/2019 08:36 AM    CCPANTIBODY 2  02/19/2019 08:36 AM     Lab Results   Component Value Date/Time    CRYOGLOBULIN NEG 72Hour 08/04/2017 02:32 PM     Lab Results   Component Value Date/Time    SEDRATEWES 1 02/19/2019 08:36 AM     Lab Results   Component Value Date/Time    HBA1C 5.3 07/26/2018 11:19 AM     Lab Results   Component Value Date/Time    CPKTOTAL 141 01/26/2018 07:47 AM     Lab Results   Component Value Date/Time    PTHINTACT 55 10/10/2008 10:42 AM     Results for orders placed during the hospital encounter of 09/05/17   DS-BONE DENSITY STUDY (DEXA)    Impression According to the World Health Organization classification, bone mineral density of this patient is osteopenic.        FRAX score not obtained on this patient.        INTERPRETING LOCATION:  [address] [place] [address], [place], [address]     Results for orders placed during the hospital encounter of 05/31/13   DX-PELVIS-1 OR 2 VIEWS    Impression Interval performance of a left hip arthroplasty.  Right hip arthroplasty is unchanged.              INTERPRETING LOCATION: [address], [place], [address]     Results for orders placed during the hospital encounter of 05/31/13   DX-PELVIS-1 OR 2 VIEWS    Impression Interval performance of a left hip arthroplasty.  Right hip arthroplasty is unchanged.              INTERPRETING LOCATION: [address], [place], [address]     Results for orders placed during the hospital encounter of 01/30/09   DX-KNEE COMPLETE 4+    Impression IMPRESSION:     1. NEW FINDINGS CONSISTENT WITH OSTEOCHONDROSIS DISSECANS OF THE MEDIAL   FEMORAL CONDYLE.    2. NEW MODERATE JOINT SPACE NARROWING OF THE MEDIAL TIBIOFEMORAL   COMPARTMENT, CONSISTENT WITH CARTILAGE THINNING.    3. NEW MODERATE JOINT EFFUSION.      SST/llw      Read By BLANCA ORTIZ MD on Jan 30 2009  3:39PM  : JAXON Transcription Date: Feb 2 2009  6:08AM  THIS DOCUMENT HAS BEEN ELECTRONICALLY SIGNED BY: BLANCA ORTIZ MD on   Feb  3 2009 11:43AM        Results for orders placed during the  hospital encounter of 05/31/17   DX-HAND 3+ RIGHT    Impression 1.  Question of small erosions or subchondral cysts in the 2nd and 3rd metacarpal heads.     Results for orders placed during the hospital encounter of 12/12/18   DX-THORACIC SPINE-WITH SWIMMERS VIEW    Impression 1.  Moderate to severe multilevel degenerative change of thoracic spine.  2.  Multilevel anterior wedge compression deformities with associated thoracic kyphosis, similar to prior exam.  3.  No gross acute fracture or segmental malalignment.     Results for orders placed during the hospital encounter of 12/21/07   MR-KNEE-W/O    Impression IMPRESSION:    1. NON-DISPLACED OBLIQUE TEAR OF THE POSTERIOR HORN MEDIAL MENISCUS   COMMUNICATING WITH SUPERIOR AND INFERIOR ARTICULAR SURFACES WITHOUT   EVIDENCE OF PARAMENISCAL CYST.  UNDERLYING CHONDROMALACIA IS SEEN WITHIN   THE MEDIAL FEMORAL CONDYLE POSTERIOR WEIGHTBEARING SURFACE OF   NON-FULL-THICKNESS SEVERITY.        2. MENISCAL DEGENERATION AND PARTIAL PERIPHERAL EXTRUSION INVOLVING THE   MID-BODY OF THE MEDIAL MENISCUS.        [facility]        Read By KEITH NETTLES MD on Dec 21 2007 12:14PM  : [facility] Transcription Date: Dec 22 2007 10:16AM  THIS DOCUMENT HAS BEEN ELECTRONICALLY SIGNED BY: KEITH NETTLES MD on   Dec 24 2007 12:21PM        Results for orders placed during the hospital encounter of 02/14/05   MR-CERVICAL SPINE-W/O    Impression IMPRESSION:    1. DEGENERATIVE DISC DISEASE C5-6 AND C6-7 WITH SOME MINOR POSTERIOR   SPURRING BUT WITHOUT ANY REAL CANAL STENOSIS AND WITH NO EVIDENCE OF AN   ACUTE DISC EXTRUSION.  2. MILD TO MODERATE MIDCERVICAL NEURAL FORAMINAL NARROWING DUE TO   UNCINATE AND FACET HYPERTROPHY, WITH MULTILEVEL DISEASE PRESENT AND WITH   SOME MILD INTERVAL PROGRESSION COMPARED WITH THE PRIOR MRI.  THE AXIAL   IMAGES OVERESTIMATE THE DEGREE OF NEURAL FORAMINAL STENOSIS DUE TO   ARTIFACTS, WITH THE SAGITTAL IMAGES DEMONSTRATING MILD NEURAL FORAMINAL      STENOTIC CHANGES IN THE MIDCERVICAL LEVELS.    3. NO EVIDENCE OF ACUTE TRAUMA.         Read By KEITH NETTLES MD on Feb 14 2005  1:10PM  : SHRADDHA Transcription Date: Feb 15 2005 11:24AM  THIS DOCUMENT HAS BEEN ELECTRONICALLY SIGNED BY: KEITH NETTLES MD on   Feb 15 2005 12:06PM     Results for orders placed during the hospital encounter of 02/14/05   DX-CERVICAL SPINE-2 OR 3 VIEWS    Impression IMPRESSION:    MULTILEVEL CERVICAL SPINE DEGENERATIVE CHANGE, WITHOUT INTERVAL CHANGE.          Read By MIKKI RODRIGUEZ MD on Feb 14 2005  3:04PM  : EMMIE Transcription Date: Feb 14 2005  3:10PM  THIS DOCUMENT HAS BEEN ELECTRONICALLY SIGNED BY: MIKKI RODRIGUEZ MD on Feb 14 2005  3:17PM     Assessment and Plan:     1. Rheumatoid arthritis with positive rheumatoid factor, involving unspecified site (HCC)  ***  - REFERRAL TO ENDOCRINOLOGY  - meloxicam (MOBIC) 7.5 MG Tab; 1-2 tabs po qday for joint pain  Dispense: 180 Tab; Refill: 0  - CBC WITH DIFFERENTIAL; Future  - Comp Metabolic Panel; Future  - WESTERGREN SED RATE; Future    2. Long-term current use of tofacitinib  ***  - REFERRAL TO ENDOCRINOLOGY  - meloxicam (MOBIC) 7.5 MG Tab; 1-2 tabs po qday for joint pain  Dispense: 180 Tab; Refill: 0  - CBC WITH DIFFERENTIAL; Future  - Comp Metabolic Panel; Future  - WESTERGREN SED RATE; Future    3. Long term current use of systemic steroids  ***  - REFERRAL TO ENDOCRINOLOGY  - meloxicam (MOBIC) 7.5 MG Tab; 1-2 tabs po qday for joint pain  Dispense: 180 Tab; Refill: 0  - CBC WITH DIFFERENTIAL; Future  - Comp Metabolic Panel; Future  - WESTERGREN SED RATE; Future    4. Encounter for long-term (current) use of NSAIDs  ***  - REFERRAL TO ENDOCRINOLOGY  - meloxicam (MOBIC) 7.5 MG Tab; 1-2 tabs po qday for joint pain  Dispense: 180 Tab; Refill: 0  - CBC WITH DIFFERENTIAL; Future  - Comp Metabolic Panel; Future  - WESTERGREN SED RATE; Future    5. Coronary artery disease due to calcified coronary lesion:  Mildly cardiac catheterization in 2014  ***    6. Essential hypertension  ***    7. Nonrheumatic aortic valve insufficiency  ***    Followup: No Follow-up on file. or sooner peyman Sims was seen 30 minutes face-to-face of which more than 50% of the time was spent counseling the patient (excluding time for procedures)  regarding  rheumatological condition and care. Therapy was discussed in detail.    Please note that this dictation was created using voice recognition software. I have made every reasonable attempt to correct obvious errors, but I expect that there are errors of grammar and possibly content that I did not discover before finalizing the note.

## 2019-02-21 NOTE — LETTER
[facility]   [address] [place] [address], [address]  [facility] [place] [address]  Phone: [phone]  Fax: [phone]              Encounter Date: 2/21/2019    Dear Dr. Matta ref. provider found,    It was a pleasure seeing your patient, Edgardo Sims, on 2/21/2019. Diagnoses of Rheumatoid arthritis with positive rheumatoid factor, involving unspecified site (HCC), Long-term current use of tofacitinib, Long term current use of systemic steroids, Encounter for long-term (current) use of NSAIDs, Coronary artery disease due to calcified coronary lesion: Mildly cardiac catheterization in 2014, Essential hypertension, and Nonrheumatic aortic valve insufficiency were pertinent to this visit.     Please find attached progress note which includes the history I obtained from Mr. Sims, my physical examination findings, my impression and recommendations.      Once again, it was a pleasure participating in your patient's care.  Please feel free to contact me if you have any questions or if I can be of any further assistance to your patients.      Sincerely,    Ju Azul M.D.  Electronically Signed          PROGRESS NOTE:  Chief Complaint- joint pain    Subjective:   Edgardo Sims is a 63 y.o. male here today for follow up of rheumatological issues    This is a follow-up visit for this patient who we see in this clinic for rheumatoid arthritis, this is a second visit with me previously followed by Dr. Canales.patient is currently on Xeljanz at 5 mg p.o. twice daily which is subsidized by [employer] and continues to be on low-dose prednisone at 2 mg p.o. daily because patient was told in the remote past that he was adrenal deficient and needed to take the prednisone.  Patient denies any complaints states he feels really quite good is happy with his current regiment.   Patient denies any side effects from the medication, denies any unexplained weight loss, denies any fevers of unknown etiology,  denies any GI upset, denies any rashes, denies any new joint swelling, denies recurrent infections.  Of note recent sedimentation rate equals 1 February 2019    Additional comorbidities include ureteral blockage status post catheterization that did result in a urinary tract infection for which patient has recovered.  Patient states that they think that the recurrent bladder infections may be secondary to BPH and some urine retention which is currently getting worked up by urology.      Additional comorbidities include osteopenia     Bilateral AUTUMN  Left TKA     S/p Remicade-ineffective  S/p Orencia-ineffective  S/p Enbrel-ineffective  S/p humira-ineffective  S/p MTX-oral ulcers  S/p arava-bad reaction but patient doesn't recall specifics  S/p rituximab-helped but patient stopped because of methotrexate side effects per patient report....      Uric acid 4.5 nl 2/2019   Cryoglobulin neg 8/2017  RF neg 8/2017, RF neg 2/2019  CCP neg 2/2019  HBsAg IgM/HBcAb neg 6/2017  HCV neg 6/2017  Quantiferon Gold neg 6/2017  DEXA 9/5/2017 T scores -0.2, -1.5  FRAX 9/5/2017 not done  Hand x-rays 5/2017-indicates erosive arthritis  Feet x-rays 5/2017-indicates erosive arthritis   Corticosteroid Therapy Informed Consent signed 2/21/2019-copy given to patient      Current medicines (including changes today)  Current Outpatient Prescriptions   Medication Sig Dispense Refill   • diazePAM (VALIUM) 5 MG Tab Take 5 mg by mouth every 6 hours as needed for Anxiety.     • meloxicam (MOBIC) 7.5 MG Tab 1-2 tabs po qday for joint pain 180 Tab 0   • testosterone cypionate (DEPO-TESTOSTERONE) 200 MG/ML Solution injection INJECT 1ML INTRAMUSCULARLY EVERY 14DAYS [id number]--V57.1 10 mL 3   • PB-Hyoscy-Atropine-Scopol ER 48.6 MG Tab CR Take 1 Tab by mouth 4 times a day. 120 Tab 1   • oxyCODONE-acetaminophen (PERCOCET-10)  MG Tab Take 1-2 Tabs by mouth every four hours as needed for Severe Pain.     • predniSONE (DELTASONE) 1 MG Tab TAKE 2 TABLETS  BY MOUTH EVERY  Tab 0   • tamsulosin (FLOMAX) 0.4 MG capsule Take 0.4 mg by mouth every day.  12   • fluticasone (FLONASE) 50 MCG/ACT nasal spray SPRAY 2 SPRAYS IN NOSE EVERY DAY AS NEEDED  11   • ezetimibe (ZETIA) 10 MG Tab Take 1 Tab by mouth every day. 30 Tab 11   • PARoxetine (PAXIL) 20 MG Tab TAKE 1 TABLET BY MOUTH EVERY DAY 90 Tab 3   • lisinopril (PRINIVIL) 10 MG Tab TAKE 1 TABLET BY MOUTH EVERY DAY 90 Tab 3   • cyclobenzaprine (FLEXERIL) 10 MG Tab TAKE ONE TABLET BY MOUTH THREE TIMES DAILY AS NEEDED FOR MILD PAIN 90 Tab 5   • Tofacitinib Citrate (XELJANZ) 5 MG Tab Take 5 mg by mouth 2 Times a Day. 60 Tab 0   • bisoprolol (ZEBETA) 10 MG tablet Take 10 mg by mouth every day.     • pravastatin (PRAVACHOL) 40 MG tablet Take 1 Tab by mouth every day. 30 Tab 11   • CALCIUM-VITAMIN D PO Take 1 Tab by mouth 2 Times a Day.     • ascorbic acid (ASCORBIC ACID) 500 MG Tab Take 500 mg by mouth every day.     • vitamin e (VITAMIN E) 400 UNIT Cap Take 400 Units by mouth every day.     • Cyanocobalamin (VITAMIN B-12) 5000 MCG TABLET DISPERSIBLE Take 1 Tab by mouth every day.     • niacin 500 MG Tab Take 500 mg by mouth every day.     • Zinc 50 MG Cap Take 50 mg by mouth every day.     • Multiple Vitamins-Minerals (MULTI COMPLETE PO) Take 1 Tab by mouth every day.     • omeprazole (PRILOSEC) 20 MG CPDR Take 20 mg by mouth every day.     • nitrofurantoin monohydr macro (MACROBID) 100 MG Cap Take 1 Cap by mouth 2 times a day. (prn urinary tract infections) 10 Cap 3   • vardenafil (LEVITRA) 20 MG tablet Take 20 mg by mouth as needed.       No current facility-administered medications for this visit.      He  has a past medical history of Abnormal myocardial perfusion study (1/15/2014); Aortic insufficiency (7/5/2011); Arthritis; Bronchitis; CAD (coronary artery disease) mild plaque at cath in 2/14 (12/5/2014); Cancer (HCC); Chronic use of opiate drugs therapeutic purposes (8/12/2017); Dyslipidemia (7/12/2011); Heart  burn; Heart murmur; Hiatus hernia syndrome; High cholesterol; HTN (hypertension) (7/5/2011); Hypertension; Indigestion; Infectious disease; Pain; Pain; Rheumatoid arthritis(714.0); and Tachycardia (10/20/2014).    ROS   Other than what is mentioned in HPI or physical exam, there is no history of headaches, double vision or blurred vision. No temporal tenderness or jaw claudication. No history of cataracts or glaucoma. No trouble swallowing difficulties or sore throats.  No chest complaints including chest pain, cough or sputum production. No GI complaints including nausea, vomiting, change in bowel habits, or past peptic ulcer disease. No history of blood in the stools. No urinary complaints including dysuria or frequency. No history of alopecia, photosensitivity, oral ulcerations, Raynaud's phenomena.       Objective:     Blood pressure 148/80, pulse 70, temperature 36.5 °C (97.7 °F), temperature source Temporal, resp. rate 14, weight 98 kg (216 lb), SpO2 98 %. Body mass index is 30.99 kg/m².   Physical Exam:    Constitutional: Alert and oriented X3, patient is talkative with good eye contact.Skin: Warm, dry, good turgor, no rashes in visible areas.Eye: Equal, round and reactive, conjunctiva clear, lids normal EOM intactENMT: Lips without lesions, good dentition, no oropharyngeal ulcers, moist buccal mucosa, pinna without deformityNeck: Trachea midline, no masses, no thyromegaly.Lymph:  No cervical lymphadenopathy, no axillary lymphadenopathy, no supraclavicular lymphadenopathyRespiratory: Unlabored respiratory effort, lungs clear to auscultation, no wheezes, no ronchi.Cardiovascular: Normal S1, S2, no murmur, no edema.Abdomen: Soft, non-tender, no masses, no hepatosplenomegaly.Psych: Alert and oriented x3, normal affect and mood.Neuro: Cranial nerves 2-12 are grossly intact, no loss of sensation LEExt:no joint laxity noted in bilateral arms, no joint laxity noted in bilateral legs, there is some PIP joint  hypertrophy on some of the fingers of both hands, no lolita effusions no sausage digits no dactylitis there is crossover toes of the right second toe over great toe, ankles without effusions no Achilles inflammation gait without antalgia and without foot drop shoulders full range of motion including internal and external rotation    Lab Results   Component Value Date/Time    QNTTBGOLD Negative 06/01/2017 01:13 PM     Lab Results   Component Value Date/Time    HEPBCORTOT Negative 06/01/2017 01:13 PM    HEPBSAG Negative 06/01/2017 01:13 PM     Lab Results   Component Value Date/Time    HEPCAB Negative 06/01/2017 01:13 PM     Lab Results   Component Value Date/Time    SODIUM 137 02/19/2019 08:36 AM    SODIUM 139 02/19/2019 08:36 AM    POTASSIUM 4.3 02/19/2019 08:36 AM    POTASSIUM 4.5 02/19/2019 08:36 AM    CHLORIDE 101 02/19/2019 08:36 AM    CHLORIDE 103 02/19/2019 08:36 AM    CO2 28 02/19/2019 08:36 AM    CO2 28 02/19/2019 08:36 AM    GLUCOSE 91 02/19/2019 08:36 AM    GLUCOSE 91 02/19/2019 08:36 AM    BUN 17 02/19/2019 08:36 AM    BUN 18 02/19/2019 08:36 AM    CREATININE 0.91 02/19/2019 08:36 AM    CREATININE 0.94 02/19/2019 08:36 AM    CREATININE 1.1 04/21/2009 03:50 PM    BUNCREATRAT 13 09/21/2016 09:21 AM      Lab Results   Component Value Date/Time    WBC 9.3 02/19/2019 08:36 AM    WBC 7.5 07/01/2011 10:30 AM    RBC 5.05 02/19/2019 08:36 AM    RBC 4.72 07/01/2011 10:30 AM    HEMOGLOBIN 15.3 02/19/2019 08:36 AM    HEMATOCRIT 47.2 02/19/2019 08:36 AM    MCV 93.5 02/19/2019 08:36 AM     (H) 07/01/2011 10:30 AM    MCH 30.3 02/19/2019 08:36 AM    MCH 36.0 (H) 07/01/2011 10:30 AM    MCHC 32.4 (L) 02/19/2019 08:36 AM    MPV 10.4 02/19/2019 08:36 AM    NEUTSPOLYS 53.00 02/19/2019 08:36 AM    LYMPHOCYTES 28.70 02/19/2019 08:36 AM    MONOCYTES 13.20 02/19/2019 08:36 AM    EOSINOPHILS 2.90 02/19/2019 08:36 AM    BASOPHILS 1.10 02/19/2019 08:36 AM    HYPOCHROMIA 1+ 03/05/2014 04:43 PM    ANISOCYTOSIS 1+ 01/02/2015  05:02 PM      Lab Results   Component Value Date/Time    CALCIUM 9.1 02/19/2019 08:36 AM    CALCIUM 9.0 02/19/2019 08:36 AM    ASTSGOT 29 02/19/2019 08:36 AM    ASTSGOT 28 02/19/2019 08:36 AM    ALTSGPT 25 02/19/2019 08:36 AM    ALTSGPT 24 02/19/2019 08:36 AM    ALKPHOSPHAT 57 02/19/2019 08:36 AM    ALKPHOSPHAT 56 02/19/2019 08:36 AM    TBILIRUBIN 0.5 02/19/2019 08:36 AM    TBILIRUBIN 0.5 02/19/2019 08:36 AM    ALBUMIN 4.5 02/19/2019 08:36 AM    ALBUMIN 4.3 02/19/2019 08:36 AM    TOTPROTEIN 6.6 02/19/2019 08:36 AM    TOTPROTEIN 6.6 02/19/2019 08:36 AM     Lab Results   Component Value Date/Time    URICACID 4.5 02/19/2019 08:36 AM    RHEUMFACTN 10 02/19/2019 08:36 AM    CCPANTIBODY 2 02/19/2019 08:36 AM     Lab Results   Component Value Date/Time    CRYOGLOBULIN NEG 72Hour 08/04/2017 02:32 PM     Lab Results   Component Value Date/Time    SEDRATEWES 1 02/19/2019 08:36 AM     Lab Results   Component Value Date/Time    HBA1C 5.3 07/26/2018 11:19 AM     Lab Results   Component Value Date/Time    CPKTOTAL 141 01/26/2018 07:47 AM     Lab Results   Component Value Date/Time    PTHINTACT 55 10/10/2008 10:42 AM     Results for orders placed during the hospital encounter of 09/05/17   DS-BONE DENSITY STUDY (DEXA)    Impression According to the World Health Organization classification, bone mineral density of this patient is osteopenic.        FRAX score not obtained on this patient.        INTERPRETING LOCATION:  [address] [address], [place], [address]     Results for orders placed during the hospital encounter of 05/31/13   DX-PELVIS-1 OR 2 VIEWS    Impression Interval performance of a left hip arthroplasty.  Right hip arthroplasty is unchanged.              INTERPRETING LOCATION: [address], [place], [address]     Results for orders placed during the hospital encounter of 05/31/13   DX-PELVIS-1 OR 2 VIEWS    Impression Interval performance of a left hip arthroplasty.  Right hip arthroplasty is  unchanged.              INTERPRETING LOCATION: [address] [place], [address]     Results for orders placed during the hospital encounter of 01/30/09   DX-KNEE COMPLETE 4+    Impression IMPRESSION:     1. NEW FINDINGS CONSISTENT WITH OSTEOCHONDROSIS DISSECANS OF THE MEDIAL   FEMORAL CONDYLE.    2. NEW MODERATE JOINT SPACE NARROWING OF THE MEDIAL TIBIOFEMORAL   COMPARTMENT, CONSISTENT WITH CARTILAGE THINNING.    3. NEW MODERATE JOINT EFFUSION.      SST/llw      Read By BLANCA ORITZ MD on Jan 30 2009  3:39PM  : LLW Transcription Date: Feb 2 2009  6:08AM  THIS DOCUMENT HAS BEEN ELECTRONICALLY SIGNED BY: BLANCA ORTIZ MD on   Feb  3 2009 11:43AM        Results for orders placed during the hospital encounter of 05/31/17   DX-HAND 3+ RIGHT    Impression 1.  Question of small erosions or subchondral cysts in the 2nd and 3rd metacarpal heads.     Results for orders placed during the hospital encounter of 12/12/18   DX-THORACIC SPINE-WITH SWIMMERS VIEW    Impression 1.  Moderate to severe multilevel degenerative change of thoracic spine.  2.  Multilevel anterior wedge compression deformities with associated thoracic kyphosis, similar to prior exam.  3.  No gross acute fracture or segmental malalignment.     Results for orders placed during the hospital encounter of 12/21/07   MR-KNEE-W/O    Impression IMPRESSION:    1. NON-DISPLACED OBLIQUE TEAR OF THE POSTERIOR HORN MEDIAL MENISCUS   COMMUNICATING WITH SUPERIOR AND INFERIOR ARTICULAR SURFACES WITHOUT   EVIDENCE OF PARAMENISCAL CYST.  UNDERLYING CHONDROMALACIA IS SEEN WITHIN   THE MEDIAL FEMORAL CONDYLE POSTERIOR WEIGHTBEARING SURFACE OF   NON-FULL-THICKNESS SEVERITY.        2. MENISCAL DEGENERATION AND PARTIAL PERIPHERAL EXTRUSION INVOLVING THE   MID-BODY OF THE MEDIAL MENISCUS.        [facility]        Read By KEITH NETTLES MD on Dec 21 2007 12:14PM  : AGUSTIN Transcription Date: Dec 22 2007 10:16AM  THIS DOCUMENT HAS BEEN  ELECTRONICALLY SIGNED BY: KEITH NETTLES MD on   Dec 24 2007 12:21PM        Results for orders placed during the hospital encounter of 02/14/05   MR-CERVICAL SPINE-W/O    Impression IMPRESSION:    1. DEGENERATIVE DISC DISEASE C5-6 AND C6-7 WITH SOME MINOR POSTERIOR   SPURRING BUT WITHOUT ANY REAL CANAL STENOSIS AND WITH NO EVIDENCE OF AN   ACUTE DISC EXTRUSION.  2. MILD TO MODERATE MIDCERVICAL NEURAL FORAMINAL NARROWING DUE TO   UNCINATE AND FACET HYPERTROPHY, WITH MULTILEVEL DISEASE PRESENT AND WITH   SOME MILD INTERVAL PROGRESSION COMPARED WITH THE PRIOR MRI.  THE AXIAL   IMAGES OVERESTIMATE THE DEGREE OF NEURAL FORAMINAL STENOSIS DUE TO   ARTIFACTS, WITH THE SAGITTAL IMAGES DEMONSTRATING MILD NEURAL FORAMINAL   STENOTIC CHANGES IN THE MIDCERVICAL LEVELS.    3. NO EVIDENCE OF ACUTE TRAUMA.         Read By KEITH NETTLES MD on Feb 14 2005  1:10PM  : SHRADDHA Transcription Date: Feb 15 2005 11:24AM  THIS DOCUMENT HAS BEEN ELECTRONICALLY SIGNED BY: KEITH NETTLES MD on   Feb 15 2005 12:06PM     Results for orders placed during the hospital encounter of 02/14/05   DX-CERVICAL SPINE-2 OR 3 VIEWS    Impression IMPRESSION:    MULTILEVEL CERVICAL SPINE DEGENERATIVE CHANGE, WITHOUT INTERVAL CHANGE.          Read By MIKKI RODRIGUEZ MD on Feb 14 2005  3:04PM  : EMMIE Transcription Date: Feb 14 2005  3:10PM  THIS DOCUMENT HAS BEEN ELECTRONICALLY SIGNED BY: MIKKI RODRIGUEZ MD on Feb 14 2005  3:17PM     Assessment and Plan:     1. Rheumatoid arthritis with positive rheumatoid factor, involving unspecified site (HCC)  Currently on Xeljanz at 5 mg p.o. twice daily and prednisone at 2 mg p.o. daily, patient doing well with such, as patient has not had an official evaluation for adrenal insufficiency will refer patient to endocrinology for evaluation of adrenal insufficiency and if not adrenal insufficient then consider tapering off the prednisone.  - REFERRAL TO ENDOCRINOLOGY  - meloxicam (MOBIC)  7.5 MG Tab; 1-2 tabs po qday for joint pain  Dispense: 180 Tab; Refill: 0  - CBC WITH DIFFERENTIAL; Future  - Comp Metabolic Panel; Future  - WESTERGREN SED RATE; Future    2. Long-term current use of tofacitinib  Of note screening labs are up-to-date, patient will need monitoring labs every 6 months, next monitoring labs will be due August 2019.  Labs ordered for patient  - REFERRAL TO ENDOCRINOLOGY  - meloxicam (MOBIC) 7.5 MG Tab; 1-2 tabs po qday for joint pain  Dispense: 180 Tab; Refill: 0  - CBC WITH DIFFERENTIAL; Future  - Comp Metabolic Panel; Future  - WESTERGREN SED RATE; Future    3. Long term current use of systemic steroids  Patient continues to be on prednisone 2 mg p.o. daily, for presumed adrenal insufficiency L the patient has not had a true cortisol check, will refer to endocrinology for evaluation of true adrenal insufficiency if patient not adrenal insufficient then consider tapering off the prednisone.  Corticosteroid Therapy Informed Consent signed 2/21/2019-copy given to patient  - REFERRAL TO ENDOCRINOLOGY  - meloxicam (MOBIC) 7.5 MG Tab; 1-2 tabs po qday for joint pain  Dispense: 180 Tab; Refill: 0  - CBC WITH DIFFERENTIAL; Future  - Comp Metabolic Panel; Future  - WESTERGREN SED RATE; Future    4. Encounter for long-term (current) use of NSAIDs  Patient uses meloxicam as needed, will refill meloxicam, patient understands the risk of peptic ulcer disease/gastritis combination with the prednisone  - REFERRAL TO ENDOCRINOLOGY  - meloxicam (MOBIC) 7.5 MG Tab; 1-2 tabs po qday for joint pain  Dispense: 180 Tab; Refill: 0  - CBC WITH DIFFERENTIAL; Future  - Comp Metabolic Panel; Future  - WESTERGREN SED RATE; Future    5. Coronary artery disease due to calcified coronary lesion: Mildly cardiac catheterization in 2014  Patient followed by cardiology    6. Essential hypertension  May impact the type of medications we can use for this patient's arthritis. We will have to keep this under  advisement.    7. Nonrheumatic aortic valve insufficiency  Patient followed by cardiology    Followup: Return in about 6 months (around 8/21/2019). or sooner peyman Sims was seen 30 minutes face-to-face of which more than 50% of the time was spent counseling the patient (excluding time for procedures)  regarding  rheumatological condition and care. Therapy was discussed in detail.    Please note that this dictation was created using voice recognition software. I have made every reasonable attempt to correct obvious errors, but I expect that there are errors of grammar and possibly content that I did not discover before finalizing the note.

## 2019-02-21 NOTE — PROGRESS NOTES
Chief Complaint- joint pain    Subjective:   Edgardo Sims is a 63 y.o. male here today for follow up of rheumatological issues    This is a follow-up visit for this patient who we see in this clinic for rheumatoid arthritis, this is a second visit with me previously followed by Dr. Canales.patient is currently on Xeljanz at 5 mg p.o. twice daily which is subsidized by [employer] and continues to be on low-dose prednisone at 2 mg p.o. daily because patient was told in the remote past that he was adrenal deficient and needed to take the prednisone.  Patient denies any complaints states he feels really quite good is happy with his current regiment.   Patient denies any side effects from the medication, denies any unexplained weight loss, denies any fevers of unknown etiology, denies any GI upset, denies any rashes, denies any new joint swelling, denies recurrent infections.  Of note recent sedimentation rate equals 1 February 2019    Additional comorbidities include ureteral blockage status post catheterization that did result in a urinary tract infection for which patient has recovered.  Patient states that they think that the recurrent bladder infections may be secondary to BPH and some urine retention which is currently getting worked up by urology.      Additional comorbidities include osteopenia     Bilateral AUTUMN  Left TKA     S/p Remicade-ineffective  S/p Orencia-ineffective  S/p Enbrel-ineffective  S/p humira-ineffective  S/p MTX-oral ulcers  S/p arava-bad reaction but patient doesn't recall specifics  S/p rituximab-helped but patient stopped because of methotrexate side effects per patient report....      Uric acid 4.5 nl 2/2019   Cryoglobulin neg 8/2017  RF neg 8/2017, RF neg 2/2019  CCP neg 2/2019  HBsAg IgM/HBcAb neg 6/2017  HCV neg 6/2017  Quantiferon Gold neg 6/2017  DEXA 9/5/2017 T scores -0.2, -1.5  FRAX 9/5/2017 not done  Hand x-rays 5/2017-indicates erosive arthritis  Feet x-rays 5/2017-indicates  erosive arthritis   Corticosteroid Therapy Informed Consent signed 2/21/2019-copy given to patient      Current medicines (including changes today)  Current Outpatient Prescriptions   Medication Sig Dispense Refill   • diazePAM (VALIUM) 5 MG Tab Take 5 mg by mouth every 6 hours as needed for Anxiety.     • meloxicam (MOBIC) 7.5 MG Tab 1-2 tabs po qday for joint pain 180 Tab 0   • testosterone cypionate (DEPO-TESTOSTERONE) 200 MG/ML Solution injection INJECT 1ML INTRAMUSCULARLY EVERY 14DAYS [id number] 10 mL 3   • PB-Hyoscy-Atropine-Scopol ER 48.6 MG Tab CR Take 1 Tab by mouth 4 times a day. 120 Tab 1   • oxyCODONE-acetaminophen (PERCOCET-10)  MG Tab Take 1-2 Tabs by mouth every four hours as needed for Severe Pain.     • predniSONE (DELTASONE) 1 MG Tab TAKE 2 TABLETS BY MOUTH EVERY  Tab 0   • tamsulosin (FLOMAX) 0.4 MG capsule Take 0.4 mg by mouth every day.  12   • fluticasone (FLONASE) 50 MCG/ACT nasal spray SPRAY 2 SPRAYS IN NOSE EVERY DAY AS NEEDED  11   • ezetimibe (ZETIA) 10 MG Tab Take 1 Tab by mouth every day. 30 Tab 11   • PARoxetine (PAXIL) 20 MG Tab TAKE 1 TABLET BY MOUTH EVERY DAY 90 Tab 3   • lisinopril (PRINIVIL) 10 MG Tab TAKE 1 TABLET BY MOUTH EVERY DAY 90 Tab 3   • cyclobenzaprine (FLEXERIL) 10 MG Tab TAKE ONE TABLET BY MOUTH THREE TIMES DAILY AS NEEDED FOR MILD PAIN 90 Tab 5   • Tofacitinib Citrate (XELJANZ) 5 MG Tab Take 5 mg by mouth 2 Times a Day. 60 Tab 0   • bisoprolol (ZEBETA) 10 MG tablet Take 10 mg by mouth every day.     • pravastatin (PRAVACHOL) 40 MG tablet Take 1 Tab by mouth every day. 30 Tab 11   • CALCIUM-VITAMIN D PO Take 1 Tab by mouth 2 Times a Day.     • ascorbic acid (ASCORBIC ACID) 500 MG Tab Take 500 mg by mouth every day.     • vitamin e (VITAMIN E) 400 UNIT Cap Take 400 Units by mouth every day.     • Cyanocobalamin (VITAMIN B-12) 5000 MCG TABLET DISPERSIBLE Take 1 Tab by mouth every day.     • niacin 500 MG Tab Take 500 mg by mouth every day.     • Zinc 50  MG Cap Take 50 mg by mouth every day.     • Multiple Vitamins-Minerals (MULTI COMPLETE PO) Take 1 Tab by mouth every day.     • omeprazole (PRILOSEC) 20 MG CPDR Take 20 mg by mouth every day.     • nitrofurantoin monohydr macro (MACROBID) 100 MG Cap Take 1 Cap by mouth 2 times a day. (prn urinary tract infections) 10 Cap 3   • vardenafil (LEVITRA) 20 MG tablet Take 20 mg by mouth as needed.       No current facility-administered medications for this visit.      He  has a past medical history of Abnormal myocardial perfusion study (1/15/2014); Aortic insufficiency (7/5/2011); Arthritis; Bronchitis; CAD (coronary artery disease) mild plaque at cath in 2/14 (12/5/2014); Cancer (HCC); Chronic use of opiate drugs therapeutic purposes (8/12/2017); Dyslipidemia (7/12/2011); Heart burn; Heart murmur; Hiatus hernia syndrome; High cholesterol; HTN (hypertension) (7/5/2011); Hypertension; Indigestion; Infectious disease; Pain; Pain; Rheumatoid arthritis(714.0); and Tachycardia (10/20/2014).    ROS   Other than what is mentioned in HPI or physical exam, there is no history of headaches, double vision or blurred vision. No temporal tenderness or jaw claudication. No history of cataracts or glaucoma. No trouble swallowing difficulties or sore throats.  No chest complaints including chest pain, cough or sputum production. No GI complaints including nausea, vomiting, change in bowel habits, or past peptic ulcer disease. No history of blood in the stools. No urinary complaints including dysuria or frequency. No history of alopecia, photosensitivity, oral ulcerations, Raynaud's phenomena.       Objective:     Blood pressure 148/80, pulse 70, temperature 36.5 °C (97.7 °F), temperature source Temporal, resp. rate 14, weight 98 kg (216 lb), SpO2 98 %. Body mass index is 30.99 kg/m².   Physical Exam:    Constitutional: Alert and oriented X3, patient is talkative with good eye contact.Skin: Warm, dry, good turgor, no rashes in visible  areas.Eye: Equal, round and reactive, conjunctiva clear, lids normal EOM intactENMT: Lips without lesions, good dentition, no oropharyngeal ulcers, moist buccal mucosa, pinna without deformityNeck: Trachea midline, no masses, no thyromegaly.Lymph:  No cervical lymphadenopathy, no axillary lymphadenopathy, no supraclavicular lymphadenopathyRespiratory: Unlabored respiratory effort, lungs clear to auscultation, no wheezes, no ronchi.Cardiovascular: Normal S1, S2, no murmur, no edema.Abdomen: Soft, non-tender, no masses, no hepatosplenomegaly.Psych: Alert and oriented x3, normal affect and mood.Neuro: Cranial nerves 2-12 are grossly intact, no loss of sensation LEExt:no joint laxity noted in bilateral arms, no joint laxity noted in bilateral legs, there is some PIP joint hypertrophy on some of the fingers of both hands, no lolita effusions no sausage digits no dactylitis there is crossover toes of the right second toe over great toe, ankles without effusions no Achilles inflammation gait without antalgia and without foot drop shoulders full range of motion including internal and external rotation    Lab Results   Component Value Date/Time    QNTTBGOLD Negative 06/01/2017 01:13 PM     Lab Results   Component Value Date/Time    HEPBCORTOT Negative 06/01/2017 01:13 PM    HEPBSAG Negative 06/01/2017 01:13 PM     Lab Results   Component Value Date/Time    HEPCAB Negative 06/01/2017 01:13 PM     Lab Results   Component Value Date/Time    SODIUM 137 02/19/2019 08:36 AM    SODIUM 139 02/19/2019 08:36 AM    POTASSIUM 4.3 02/19/2019 08:36 AM    POTASSIUM 4.5 02/19/2019 08:36 AM    CHLORIDE 101 02/19/2019 08:36 AM    CHLORIDE 103 02/19/2019 08:36 AM    CO2 28 02/19/2019 08:36 AM    CO2 28 02/19/2019 08:36 AM    GLUCOSE 91 02/19/2019 08:36 AM    GLUCOSE 91 02/19/2019 08:36 AM    BUN 17 02/19/2019 08:36 AM    BUN 18 02/19/2019 08:36 AM    CREATININE 0.91 02/19/2019 08:36 AM    CREATININE 0.94 02/19/2019 08:36 AM    CREATININE 1.1  04/21/2009 03:50 PM    BUNCREATRAT 13 09/21/2016 09:21 AM      Lab Results   Component Value Date/Time    WBC 9.3 02/19/2019 08:36 AM    WBC 7.5 07/01/2011 10:30 AM    RBC 5.05 02/19/2019 08:36 AM    RBC 4.72 07/01/2011 10:30 AM    HEMOGLOBIN 15.3 02/19/2019 08:36 AM    HEMATOCRIT 47.2 02/19/2019 08:36 AM    MCV 93.5 02/19/2019 08:36 AM     (H) 07/01/2011 10:30 AM    MCH 30.3 02/19/2019 08:36 AM    MCH 36.0 (H) 07/01/2011 10:30 AM    MCHC 32.4 (L) 02/19/2019 08:36 AM    MPV 10.4 02/19/2019 08:36 AM    NEUTSPOLYS 53.00 02/19/2019 08:36 AM    LYMPHOCYTES 28.70 02/19/2019 08:36 AM    MONOCYTES 13.20 02/19/2019 08:36 AM    EOSINOPHILS 2.90 02/19/2019 08:36 AM    BASOPHILS 1.10 02/19/2019 08:36 AM    HYPOCHROMIA 1+ 03/05/2014 04:43 PM    ANISOCYTOSIS 1+ 01/02/2015 05:02 PM      Lab Results   Component Value Date/Time    CALCIUM 9.1 02/19/2019 08:36 AM    CALCIUM 9.0 02/19/2019 08:36 AM    ASTSGOT 29 02/19/2019 08:36 AM    ASTSGOT 28 02/19/2019 08:36 AM    ALTSGPT 25 02/19/2019 08:36 AM    ALTSGPT 24 02/19/2019 08:36 AM    ALKPHOSPHAT 57 02/19/2019 08:36 AM    ALKPHOSPHAT 56 02/19/2019 08:36 AM    TBILIRUBIN 0.5 02/19/2019 08:36 AM    TBILIRUBIN 0.5 02/19/2019 08:36 AM    ALBUMIN 4.5 02/19/2019 08:36 AM    ALBUMIN 4.3 02/19/2019 08:36 AM    TOTPROTEIN 6.6 02/19/2019 08:36 AM    TOTPROTEIN 6.6 02/19/2019 08:36 AM     Lab Results   Component Value Date/Time    URICACID 4.5 02/19/2019 08:36 AM    RHEUMFACTN 10 02/19/2019 08:36 AM    CCPANTIBODY 2 02/19/2019 08:36 AM     Lab Results   Component Value Date/Time    CRYOGLOBULIN NEG 72Hour 08/04/2017 02:32 PM     Lab Results   Component Value Date/Time    SEDRATEWES 1 02/19/2019 08:36 AM     Lab Results   Component Value Date/Time    HBA1C 5.3 07/26/2018 11:19 AM     Lab Results   Component Value Date/Time    CPKTOTAL 141 01/26/2018 07:47 AM     Lab Results   Component Value Date/Time    PTHINTACT 55 10/10/2008 10:42 AM     Results for orders placed during the hospital  encounter of 09/05/17   DS-BONE DENSITY STUDY (DEXA)    Impression According to the World Health Organization classification, bone mineral density of this patient is osteopenic.        FRAX score not obtained on this patient.        INTERPRETING LOCATION:  [address] [place], [address]     Results for orders placed during the hospital encounter of 05/31/13   DX-PELVIS-1 OR 2 VIEWS    Impression Interval performance of a left hip arthroplasty.  Right hip arthroplasty is unchanged.              INTERPRETING LOCATION: [address], [place], [address]     Results for orders placed during the hospital encounter of 05/31/13   DX-PELVIS-1 OR 2 VIEWS    Impression Interval performance of a left hip arthroplasty.  Right hip arthroplasty is unchanged.              INTERPRETING LOCATION: [address], [place], [address]     Results for orders placed during the hospital encounter of 01/30/09   DX-KNEE COMPLETE 4+    Impression IMPRESSION:     1. NEW FINDINGS CONSISTENT WITH OSTEOCHONDROSIS DISSECANS OF THE MEDIAL   FEMORAL CONDYLE.    2. NEW MODERATE JOINT SPACE NARROWING OF THE MEDIAL TIBIOFEMORAL   COMPARTMENT, CONSISTENT WITH CARTILAGE THINNING.    3. NEW MODERATE JOINT EFFUSION.      SST/llw      Read By BLANCA ORTIZ MD on Jan 30 2009  3:39PM  : JAXON Transcription Date: Feb 2 2009  6:08AM  THIS DOCUMENT HAS BEEN ELECTRONICALLY SIGNED BY: BLANCA ORTIZ MD on   Feb  3 2009 11:43AM        Results for orders placed during the hospital encounter of 05/31/17   DX-HAND 3+ RIGHT    Impression 1.  Question of small erosions or subchondral cysts in the 2nd and 3rd metacarpal heads.     Results for orders placed during the hospital encounter of 12/12/18   DX-THORACIC SPINE-WITH SWIMMERS VIEW    Impression 1.  Moderate to severe multilevel degenerative change of thoracic spine.  2.  Multilevel anterior wedge compression deformities with associated thoracic kyphosis, similar to prior exam.  3.  No gross  acute fracture or segmental malalignment.     Results for orders placed during the hospital encounter of 12/21/07   MR-KNEE-W/O    Impression IMPRESSION:    1. NON-DISPLACED OBLIQUE TEAR OF THE POSTERIOR HORN MEDIAL MENISCUS   COMMUNICATING WITH SUPERIOR AND INFERIOR ARTICULAR SURFACES WITHOUT   EVIDENCE OF PARAMENISCAL CYST.  UNDERLYING CHONDROMALACIA IS SEEN WITHIN   THE MEDIAL FEMORAL CONDYLE POSTERIOR WEIGHTBEARING SURFACE OF   NON-FULL-THICKNESS SEVERITY.        2. MENISCAL DEGENERATION AND PARTIAL PERIPHERAL EXTRUSION INVOLVING THE   MID-BODY OF THE MEDIAL MENISCUS.        GEK:sloan        Read By KEITH NETTLES MD on Dec 21 2007 12:14PM  : AJAY Transcription Date: Dec 22 2007 10:16AM  THIS DOCUMENT HAS BEEN ELECTRONICALLY SIGNED BY: KEITH NETTLES MD on   Dec 24 2007 12:21PM        Results for orders placed during the hospital encounter of 02/14/05   MR-CERVICAL SPINE-W/O    Impression IMPRESSION:    1. DEGENERATIVE DISC DISEASE C5-6 AND C6-7 WITH SOME MINOR POSTERIOR   SPURRING BUT WITHOUT ANY REAL CANAL STENOSIS AND WITH NO EVIDENCE OF AN   ACUTE DISC EXTRUSION.  2. MILD TO MODERATE MIDCERVICAL NEURAL FORAMINAL NARROWING DUE TO   UNCINATE AND FACET HYPERTROPHY, WITH MULTILEVEL DISEASE PRESENT AND WITH   SOME MILD INTERVAL PROGRESSION COMPARED WITH THE PRIOR MRI.  THE AXIAL   IMAGES OVERESTIMATE THE DEGREE OF NEURAL FORAMINAL STENOSIS DUE TO   ARTIFACTS, WITH THE SAGITTAL IMAGES DEMONSTRATING MILD NEURAL FORAMINAL   STENOTIC CHANGES IN THE MIDCERVICAL LEVELS.    3. NO EVIDENCE OF ACUTE TRAUMA.         Read By KEITH NETTLES MD on Feb 14 2005  1:10PM  : SHRADDHA Transcription Date: Feb 15 2005 11:24AM  THIS DOCUMENT HAS BEEN ELECTRONICALLY SIGNED BY: KEITH NETTLES MD on   Feb 15 2005 12:06PM     Results for orders placed during the hospital encounter of 02/14/05   DX-CERVICAL SPINE-2 OR 3 VIEWS    Impression IMPRESSION:    MULTILEVEL CERVICAL SPINE DEGENERATIVE CHANGE,  WITHOUT INTERVAL CHANGE.          Read By MIKKI RODRIGUEZ MD on Feb 14 2005  3:04PM  : EMMIE Transcription Date: Feb 14 2005  3:10PM  THIS DOCUMENT HAS BEEN ELECTRONICALLY SIGNED BY: MIKKI RODRIGUEZ MD on Feb 14 2005  3:17PM     Assessment and Plan:     1. Rheumatoid arthritis with positive rheumatoid factor, involving unspecified site (HCC)  Currently on Xeljanz at 5 mg p.o. twice daily and prednisone at 2 mg p.o. daily, patient doing well with such, as patient has not had an official evaluation for adrenal insufficiency will refer patient to endocrinology for evaluation of adrenal insufficiency and if not adrenal insufficient then consider tapering off the prednisone.  - REFERRAL TO ENDOCRINOLOGY  - meloxicam (MOBIC) 7.5 MG Tab; 1-2 tabs po qday for joint pain  Dispense: 180 Tab; Refill: 0  - CBC WITH DIFFERENTIAL; Future  - Comp Metabolic Panel; Future  - WESTERGREN SED RATE; Future    2. Long-term current use of tofacitinib  Of note screening labs are up-to-date, patient will need monitoring labs every 6 months, next monitoring labs will be due August 2019.  Labs ordered for patient  - REFERRAL TO ENDOCRINOLOGY  - meloxicam (MOBIC) 7.5 MG Tab; 1-2 tabs po qday for joint pain  Dispense: 180 Tab; Refill: 0  - CBC WITH DIFFERENTIAL; Future  - Comp Metabolic Panel; Future  - WESTERGREN SED RATE; Future    3. Long term current use of systemic steroids  Patient continues to be on prednisone 2 mg p.o. daily, for presumed adrenal insufficiency L the patient has not had a true cortisol check, will refer to endocrinology for evaluation of true adrenal insufficiency if patient not adrenal insufficient then consider tapering off the prednisone.  Corticosteroid Therapy Informed Consent signed 2/21/2019-copy given to patient  - REFERRAL TO ENDOCRINOLOGY  - meloxicam (MOBIC) 7.5 MG Tab; 1-2 tabs po qday for joint pain  Dispense: 180 Tab; Refill: 0  - CBC WITH DIFFERENTIAL; Future  - Comp Metabolic Panel; Future  -  WESTERGREN SED RATE; Future    4. Encounter for long-term (current) use of NSAIDs  Patient uses meloxicam as needed, will refill meloxicam, patient understands the risk of peptic ulcer disease/gastritis combination with the prednisone  - REFERRAL TO ENDOCRINOLOGY  - meloxicam (MOBIC) 7.5 MG Tab; 1-2 tabs po qday for joint pain  Dispense: 180 Tab; Refill: 0  - CBC WITH DIFFERENTIAL; Future  - Comp Metabolic Panel; Future  - WESTERGREN SED RATE; Future    5. Coronary artery disease due to calcified coronary lesion: Mildly cardiac catheterization in 2014  Patient followed by cardiology    6. Essential hypertension  May impact the type of medications we can use for this patient's arthritis. We will have to keep this under advisement.    7. Nonrheumatic aortic valve insufficiency  Patient followed by cardiology    Followup: Return in about 6 months (around 8/21/2019). or sooner peyman Sims was seen 30 minutes face-to-face of which more than 50% of the time was spent counseling the patient (excluding time for procedures)  regarding  rheumatological condition and care. Therapy was discussed in detail.    Please note that this dictation was created using voice recognition software. I have made every reasonable attempt to correct obvious errors, but I expect that there are errors of grammar and possibly content that I did not discover before finalizing the note.

## 2019-03-04 ENCOUNTER — TELEPHONE (OUTPATIENT)
Dept: MEDICAL GROUP | Facility: LAB | Age: 64
End: 2019-03-04

## 2019-03-04 NOTE — TELEPHONE ENCOUNTER
Patient called stating that his anxiety level was elevated and that the valium was not helping with this. (over his upcoming prostate surgery in April) patient was offered appt at 1pm tomorrow but states that he could not make it. Was hoping that you could give him something to help.

## 2019-03-05 NOTE — TELEPHONE ENCOUNTER
Telephone call returned to the patient, is anxious but appears that he understands that he needs to go through with the surgery.  He is to call back if there are any problems with the date

## 2019-03-18 DIAGNOSIS — Z01.812 PRE-PROCEDURAL LABORATORY EXAMINATION: ICD-10-CM

## 2019-03-18 DIAGNOSIS — Z01.810 PRE-OPERATIVE CARDIOVASCULAR EXAMINATION: ICD-10-CM

## 2019-03-18 LAB
ALBUMIN SERPL BCP-MCNC: 4.2 G/DL (ref 3.2–4.9)
ALBUMIN/GLOB SERPL: 1.6 G/DL
ALP SERPL-CCNC: 69 U/L (ref 30–99)
ALT SERPL-CCNC: 22 U/L (ref 2–50)
ANION GAP SERPL CALC-SCNC: 6 MMOL/L (ref 0–11.9)
APPEARANCE UR: CLEAR
APTT PPP: 30.3 SEC (ref 24.7–36)
AST SERPL-CCNC: 27 U/L (ref 12–45)
BASOPHILS # BLD AUTO: 1.1 % (ref 0–1.8)
BASOPHILS # BLD: 0.1 K/UL (ref 0–0.12)
BILIRUB SERPL-MCNC: 0.6 MG/DL (ref 0.1–1.5)
BILIRUB UR QL STRIP.AUTO: NEGATIVE
BUN SERPL-MCNC: 12 MG/DL (ref 8–22)
CALCIUM SERPL-MCNC: 9 MG/DL (ref 8.5–10.5)
CHLORIDE SERPL-SCNC: 98 MMOL/L (ref 96–112)
CO2 SERPL-SCNC: 29 MMOL/L (ref 20–33)
COLOR UR: YELLOW
CREAT SERPL-MCNC: 1.02 MG/DL (ref 0.5–1.4)
EKG IMPRESSION: NORMAL
EOSINOPHIL # BLD AUTO: 0.38 K/UL (ref 0–0.51)
EOSINOPHIL NFR BLD: 4.2 % (ref 0–6.9)
ERYTHROCYTE [DISTWIDTH] IN BLOOD BY AUTOMATED COUNT: 51.6 FL (ref 35.9–50)
GLOBULIN SER CALC-MCNC: 2.7 G/DL (ref 1.9–3.5)
GLUCOSE SERPL-MCNC: 90 MG/DL (ref 65–99)
GLUCOSE UR STRIP.AUTO-MCNC: NEGATIVE MG/DL
HCT VFR BLD AUTO: 41.8 % (ref 42–52)
HGB BLD-MCNC: 13.6 G/DL (ref 14–18)
IMM GRANULOCYTES # BLD AUTO: 0.1 K/UL (ref 0–0.11)
IMM GRANULOCYTES NFR BLD AUTO: 1.1 % (ref 0–0.9)
INR PPP: 0.98 (ref 0.87–1.13)
KETONES UR STRIP.AUTO-MCNC: NEGATIVE MG/DL
LEUKOCYTE ESTERASE UR QL STRIP.AUTO: NEGATIVE
LYMPHOCYTES # BLD AUTO: 1.53 K/UL (ref 1–4.8)
LYMPHOCYTES NFR BLD: 16.7 % (ref 22–41)
MCH RBC QN AUTO: 29.9 PG (ref 27–33)
MCHC RBC AUTO-ENTMCNC: 32.5 G/DL (ref 33.7–35.3)
MCV RBC AUTO: 91.9 FL (ref 81.4–97.8)
MICRO URNS: NORMAL
MONOCYTES # BLD AUTO: 1.68 K/UL (ref 0–0.85)
MONOCYTES NFR BLD AUTO: 18.4 % (ref 0–13.4)
NEUTROPHILS # BLD AUTO: 5.35 K/UL (ref 1.82–7.42)
NEUTROPHILS NFR BLD: 58.5 % (ref 44–72)
NITRITE UR QL STRIP.AUTO: NEGATIVE
NRBC # BLD AUTO: 0 K/UL
NRBC BLD-RTO: 0 /100 WBC
PH UR STRIP.AUTO: 7 [PH]
PLATELET # BLD AUTO: 234 K/UL (ref 164–446)
PMV BLD AUTO: 9.8 FL (ref 9–12.9)
POTASSIUM SERPL-SCNC: 4.2 MMOL/L (ref 3.6–5.5)
PROT SERPL-MCNC: 6.9 G/DL (ref 6–8.2)
PROT UR QL STRIP: NEGATIVE MG/DL
PROTHROMBIN TIME: 13.1 SEC (ref 12–14.6)
RBC # BLD AUTO: 4.55 M/UL (ref 4.7–6.1)
RBC UR QL AUTO: NEGATIVE
SODIUM SERPL-SCNC: 133 MMOL/L (ref 135–145)
SP GR UR STRIP.AUTO: 1.01
UROBILINOGEN UR STRIP.AUTO-MCNC: 0.2 MG/DL
WBC # BLD AUTO: 9.1 K/UL (ref 4.8–10.8)

## 2019-03-18 PROCEDURE — 85025 COMPLETE CBC W/AUTO DIFF WBC: CPT

## 2019-03-18 PROCEDURE — 80053 COMPREHEN METABOLIC PANEL: CPT

## 2019-03-18 PROCEDURE — 87086 URINE CULTURE/COLONY COUNT: CPT

## 2019-03-18 PROCEDURE — 93005 ELECTROCARDIOGRAM TRACING: CPT

## 2019-03-18 PROCEDURE — 85730 THROMBOPLASTIN TIME PARTIAL: CPT

## 2019-03-18 PROCEDURE — 85610 PROTHROMBIN TIME: CPT

## 2019-03-18 PROCEDURE — 81003 URINALYSIS AUTO W/O SCOPE: CPT

## 2019-03-18 PROCEDURE — 36415 COLL VENOUS BLD VENIPUNCTURE: CPT

## 2019-03-18 PROCEDURE — 93010 ELECTROCARDIOGRAM REPORT: CPT | Performed by: INTERNAL MEDICINE

## 2019-03-20 ENCOUNTER — OFFICE VISIT (OUTPATIENT)
Dept: MEDICAL GROUP | Facility: LAB | Age: 64
End: 2019-03-20
Payer: MEDICARE

## 2019-03-20 VITALS
DIASTOLIC BLOOD PRESSURE: 74 MMHG | HEART RATE: 78 BPM | OXYGEN SATURATION: 98 % | BODY MASS INDEX: 30.64 KG/M2 | WEIGHT: 214 LBS | RESPIRATION RATE: 18 BRPM | TEMPERATURE: 7.5 F | HEIGHT: 70 IN | SYSTOLIC BLOOD PRESSURE: 130 MMHG

## 2019-03-20 DIAGNOSIS — N40.1 BENIGN PROSTATIC HYPERPLASIA WITH LOWER URINARY TRACT SYMPTOMS, SYMPTOM DETAILS UNSPECIFIED: ICD-10-CM

## 2019-03-20 DIAGNOSIS — E87.1 HYPONATREMIA: ICD-10-CM

## 2019-03-20 DIAGNOSIS — M05.9 RHEUMATOID ARTHRITIS WITH POSITIVE RHEUMATOID FACTOR, INVOLVING UNSPECIFIED SITE (HCC): ICD-10-CM

## 2019-03-20 LAB
BACTERIA UR CULT: NORMAL
SIGNIFICANT IND 70042: NORMAL
SITE SITE: NORMAL
SOURCE SOURCE: NORMAL

## 2019-03-20 PROCEDURE — 99214 OFFICE O/P EST MOD 30 MIN: CPT | Performed by: INTERNAL MEDICINE

## 2019-03-20 RX ORDER — OXYCODONE AND ACETAMINOPHEN 10; 325 MG/1; MG/1
1-2 TABLET ORAL EVERY 4 HOURS PRN
Qty: 150 TAB | Refills: 0 | Status: SHIPPED | OUTPATIENT
Start: 2019-05-19 | End: 2019-05-08 | Stop reason: SDUPTHER

## 2019-03-20 RX ORDER — OXYCODONE AND ACETAMINOPHEN 10; 325 MG/1; MG/1
1-2 TABLET ORAL EVERY 4 HOURS PRN
Qty: 150 TAB | Refills: 0 | Status: SHIPPED | OUTPATIENT
Start: 2019-04-19 | End: 2019-03-20 | Stop reason: SDUPTHER

## 2019-03-20 RX ORDER — OXYCODONE AND ACETAMINOPHEN 10; 325 MG/1; MG/1
1-2 TABLET ORAL EVERY 4 HOURS PRN
Qty: 150 TAB | Refills: 0 | Status: SHIPPED | OUTPATIENT
Start: 2019-03-20 | End: 2019-03-20 | Stop reason: SDUPTHER

## 2019-03-21 NOTE — PROGRESS NOTES
CC: Egdardo Sims is a 63 y.o. male is suffering from   Chief Complaint   Patient presents with   • UTI     3 mos fv   • Arthritis     3 mos fv         SUBJECTIVE:  1. Rheumatoid arthritis with positive rheumatoid factor, involving unspecified site (HCC)  Patient is here for follow-up has a history of rheumatoid arthritis.  Is pending surgery.  We discussed cutting down on his Percocet.     2. Benign prostatic hyperplasia with lower urinary tract symptoms, symptom details unspecified  Patient with BPH is pending TURP with Dr. Jose Cifuentes    3. Hyponatremia  History of mild hyponatremia no change in medical therapy        Past social, family, history: , disabled  Social History   Substance Use Topics   • Smoking status: Never Smoker   • Smokeless tobacco: Never Used   • Alcohol use 1.8 oz/week     3 Cans of beer per week      Comment: 3 per week         MEDICATIONS:    Current Outpatient Prescriptions:   •  [START ON 5/19/2019] oxyCODONE-acetaminophen (PERCOCET-10)  MG Tab, Take 1-2 Tabs by mouth every four hours as needed for Severe Pain for up to 30 days., Disp: 150 Tab, Rfl: 0  •  diazePAM (VALIUM) 5 MG Tab, Take 5 mg by mouth every 6 hours as needed for Anxiety., Disp: , Rfl:   •  nitrofurantoin monohydr macro (MACROBID) 100 MG Cap, Take 1 Cap by mouth 2 times a day. (prn urinary tract infections), Disp: 10 Cap, Rfl: 3  •  predniSONE (DELTASONE) 1 MG Tab, TAKE 2 TABLETS BY MOUTH EVERY DAY, Disp: 180 Tab, Rfl: 0  •  tamsulosin (FLOMAX) 0.4 MG capsule, Take 0.4 mg by mouth every day., Disp: , Rfl: 12  •  ezetimibe (ZETIA) 10 MG Tab, Take 1 Tab by mouth every day. (Patient taking differently: Take 10 mg by mouth every bedtime.), Disp: 30 Tab, Rfl: 11  •  PARoxetine (PAXIL) 20 MG Tab, TAKE 1 TABLET BY MOUTH EVERY DAY, Disp: 90 Tab, Rfl: 3  •  lisinopril (PRINIVIL) 10 MG Tab, TAKE 1 TABLET BY MOUTH EVERY DAY, Disp: 90 Tab, Rfl: 3  •  cyclobenzaprine (FLEXERIL) 10 MG Tab, TAKE ONE TABLET BY  MOUTH THREE TIMES DAILY AS NEEDED FOR MILD PAIN, Disp: 90 Tab, Rfl: 5  •  bisoprolol (ZEBETA) 10 MG tablet, Take 10 mg by mouth every day., Disp: , Rfl:   •  pravastatin (PRAVACHOL) 40 MG tablet, Take 1 Tab by mouth every day. (Patient taking differently: Take 40 mg by mouth every bedtime.), Disp: 30 Tab, Rfl: 11  •  vardenafil (LEVITRA) 20 MG tablet, Take 20 mg by mouth as needed., Disp: , Rfl:   •  omeprazole (PRILOSEC) 20 MG CPDR, Take 20 mg by mouth every day., Disp: , Rfl:   •  meloxicam (MOBIC) 7.5 MG Tab, 1-2 tabs po qday for joint pain (Patient not taking: Reported on 3/20/2019), Disp: 180 Tab, Rfl: 0  •  fluticasone (FLONASE) 50 MCG/ACT nasal spray, SPRAY 2 SPRAYS IN NOSE EVERY DAY AS NEEDED, Disp: , Rfl: 11  •  Tofacitinib Citrate (XELJANZ) 5 MG Tab, Take 5 mg by mouth 2 Times a Day. (Patient not taking: Reported on 3/20/2019), Disp: 60 Tab, Rfl: 0  •  CALCIUM-VITAMIN D PO, Take 1 Tab by mouth 2 Times a Day., Disp: , Rfl:   •  ascorbic acid (ASCORBIC ACID) 500 MG Tab, Take 500 mg by mouth every day., Disp: , Rfl:   •  vitamin e (VITAMIN E) 400 UNIT Cap, Take 400 Units by mouth every day., Disp: , Rfl:   •  Cyanocobalamin (VITAMIN B-12) 5000 MCG TABLET DISPERSIBLE, Take 1 Tab by mouth every day., Disp: , Rfl:   •  niacin 500 MG Tab, Take 500 mg by mouth every day., Disp: , Rfl:   •  Zinc 50 MG Cap, Take 50 mg by mouth every day., Disp: , Rfl:   •  Multiple Vitamins-Minerals (MULTI COMPLETE PO), Take 1 Tab by mouth every day., Disp: , Rfl:     PROBLEMS:  Patient Active Problem List    Diagnosis Date Noted   • Acute pyelonephritis 04/30/2018     Priority: High   • Neurogenic bladder 01/26/2019     Priority: Medium   • Failed total knee, left, subsequent encounter 08/03/2018   • Bladder outlet obstruction 05/01/2018   • Leukocytosis 04/30/2018   • Lactic acidosis 04/30/2018   • Idiopathic acute pancreatitis 04/30/2018   • Chronic use of opiate drugs therapeutic purposes 08/12/2017   • Depression 07/13/2015   •  "Anxiety 03/31/2015   • Coronary artery disease due to calcified coronary lesion: Mildly cardiac catheterization in 2014 12/05/2014   • Tachycardia 10/20/2014   • Abnormal myocardial perfusion study 01/15/2014   • Osteoarthrosis, unspecified whether generalized or localized, pelvic region and thigh 05/31/2013   • Dyslipidemia 07/12/2011   • Aortic insufficiency 07/05/2011   • Essential hypertension 07/05/2011   • Rheumatoid arthritis (HCC) 05/05/2009   • Hypogonadism male 05/05/2009       REVIEW OF SYSTEMS:  Gen.:  No Nausea, Vomiting, fever, Chills.  Heart: No chest pain.  Lungs:  No shortness of Breath.  Psychological: Kian unusual Anxiety depression     PHYSICAL EXAM   Constitutional: Alert, cooperative, not in acute distress.  Cardiovascular:  Rate Rhythm is regular without murmurs rubs clicks.     Thorax & Lungs: Clear to auscultation, no wheezing, rhonchi, or rales  HENT: Normocephalic, Atraumatic.  Eyes: PERRLA, EOMI, Conjunctiva normal.   Neck: Trachia is midline no swelling of the thyroid.   Lymphatic: No lymphadenopathy noted.   Musculoskeletal: Exaggerated thoracic kyphosis  Neurologic: Alert & oriented x 3, cranial nerves II through XII are intact, Normal motor function, Normal sensory function, No focal deficits noted.   Psychiatric: Affect normal, Judgment normal, Mood normal.     VITAL SIGNS:/74 (BP Location: Left arm, Patient Position: Sitting, BP Cuff Size: Adult)   Pulse 78   Temp (!) -13.6 °C (7.5 °F) (Temporal)   Resp 18   Ht 1.778 m (5' 10\")   Wt 97.1 kg (214 lb)   SpO2 98%   BMI 30.71 kg/m²     Labs: Reviewed    Assessment:                                                     Plan:    1. Rheumatoid arthritis with positive rheumatoid factor, involving unspecified site (HCC)  Rheumatoid arthritis continue Percocet cut down on dosage prior to surgery  - oxyCODONE-acetaminophen (PERCOCET-10)  MG Tab; Take 1-2 Tabs by mouth every four hours as needed for Severe Pain for up to 30 " days.  Dispense: 150 Tab; Refill: 0  - Pain Management Screen; Future    2. Benign prostatic hyperplasia with lower urinary tract symptoms, symptom details unspecified  Medically stable for surgery    3. Hyponatremia  Recheck basic metabolic panel approximately 1 month  - Basic Metabolic Panel; Future

## 2019-03-27 RX ORDER — METOPROLOL TARTRATE 50 MG/1
50 TABLET, FILM COATED ORAL 2 TIMES DAILY
Qty: 60 TAB | Refills: 1 | Status: SHIPPED | OUTPATIENT
Start: 2019-03-27 | End: 2019-06-16 | Stop reason: SDUPTHER

## 2019-04-01 ENCOUNTER — ANESTHESIA (OUTPATIENT)
Dept: SURGERY | Facility: MEDICAL CENTER | Age: 64
End: 2019-04-01
Payer: MEDICARE

## 2019-04-01 ENCOUNTER — ANESTHESIA EVENT (OUTPATIENT)
Dept: SURGERY | Facility: MEDICAL CENTER | Age: 64
End: 2019-04-01
Payer: MEDICARE

## 2019-04-01 ENCOUNTER — HOSPITAL ENCOUNTER (OUTPATIENT)
Facility: MEDICAL CENTER | Age: 64
End: 2019-04-01
Attending: UROLOGY | Admitting: UROLOGY
Payer: MEDICARE

## 2019-04-01 VITALS
SYSTOLIC BLOOD PRESSURE: 138 MMHG | WEIGHT: 204.81 LBS | HEART RATE: 67 BPM | BODY MASS INDEX: 29.32 KG/M2 | DIASTOLIC BLOOD PRESSURE: 70 MMHG | RESPIRATION RATE: 18 BRPM | HEIGHT: 70 IN | OXYGEN SATURATION: 96 % | TEMPERATURE: 97.9 F

## 2019-04-01 PROCEDURE — 160041 HCHG SURGERY MINUTES - EA ADDL 1 MIN LEVEL 4: Performed by: UROLOGY

## 2019-04-01 PROCEDURE — 160035 HCHG PACU - 1ST 60 MINS PHASE I: Performed by: UROLOGY

## 2019-04-01 PROCEDURE — A4346 CATH INDW FOLEY 3 WAY: HCPCS | Performed by: UROLOGY

## 2019-04-01 PROCEDURE — 700111 HCHG RX REV CODE 636 W/ 250 OVERRIDE (IP): Performed by: ANESTHESIOLOGY

## 2019-04-01 PROCEDURE — 502240 HCHG MISC OR SUPPLY RC 0272: Performed by: UROLOGY

## 2019-04-01 PROCEDURE — 160048 HCHG OR STATISTICAL LEVEL 1-5: Performed by: UROLOGY

## 2019-04-01 PROCEDURE — 700101 HCHG RX REV CODE 250

## 2019-04-01 PROCEDURE — C1769 GUIDE WIRE: HCPCS | Performed by: UROLOGY

## 2019-04-01 PROCEDURE — 700102 HCHG RX REV CODE 250 W/ 637 OVERRIDE(OP): Performed by: ANESTHESIOLOGY

## 2019-04-01 PROCEDURE — 500880 HCHG PACK, CYSTO W/SEP LEGGINGS: Performed by: UROLOGY

## 2019-04-01 PROCEDURE — 160009 HCHG ANES TIME/MIN: Performed by: UROLOGY

## 2019-04-01 PROCEDURE — 501329 HCHG SET, CYSTO IRRIG Y TUR: Performed by: UROLOGY

## 2019-04-01 PROCEDURE — 700111 HCHG RX REV CODE 636 W/ 250 OVERRIDE (IP)

## 2019-04-01 PROCEDURE — A9270 NON-COVERED ITEM OR SERVICE: HCPCS | Performed by: UROLOGY

## 2019-04-01 PROCEDURE — 160046 HCHG PACU - 1ST 60 MINS PHASE II: Performed by: UROLOGY

## 2019-04-01 PROCEDURE — A4357 BEDSIDE DRAINAGE BAG: HCPCS | Performed by: UROLOGY

## 2019-04-01 PROCEDURE — 700101 HCHG RX REV CODE 250: Performed by: ANESTHESIOLOGY

## 2019-04-01 PROCEDURE — 700102 HCHG RX REV CODE 250 W/ 637 OVERRIDE(OP): Performed by: UROLOGY

## 2019-04-01 PROCEDURE — 160047 HCHG PACU  - EA ADDL 30 MINS PHASE II: Performed by: UROLOGY

## 2019-04-01 PROCEDURE — 160029 HCHG SURGERY MINUTES - 1ST 30 MINS LEVEL 4: Performed by: UROLOGY

## 2019-04-01 PROCEDURE — 501138 HCHG PLUG, FOLEY CATH: Performed by: UROLOGY

## 2019-04-01 PROCEDURE — 160002 HCHG RECOVERY MINUTES (STAT): Performed by: UROLOGY

## 2019-04-01 PROCEDURE — A9270 NON-COVERED ITEM OR SERVICE: HCPCS | Performed by: ANESTHESIOLOGY

## 2019-04-01 PROCEDURE — 88305 TISSUE EXAM BY PATHOLOGIST: CPT

## 2019-04-01 PROCEDURE — 160036 HCHG PACU - EA ADDL 30 MINS PHASE I: Performed by: UROLOGY

## 2019-04-01 PROCEDURE — 160025 RECOVERY II MINUTES (STATS): Performed by: UROLOGY

## 2019-04-01 RX ORDER — DIPHENHYDRAMINE HYDROCHLORIDE 50 MG/ML
12.5 INJECTION INTRAMUSCULAR; INTRAVENOUS
Status: DISCONTINUED | OUTPATIENT
Start: 2019-04-01 | End: 2019-04-01 | Stop reason: HOSPADM

## 2019-04-01 RX ORDER — EZETIMIBE 10 MG/1
10 TABLET ORAL EVERY EVENING
COMMUNITY
End: 2020-09-29 | Stop reason: SDUPTHER

## 2019-04-01 RX ORDER — HALOPERIDOL 5 MG/ML
1 INJECTION INTRAMUSCULAR
Status: DISCONTINUED | OUTPATIENT
Start: 2019-04-01 | End: 2019-04-01 | Stop reason: HOSPADM

## 2019-04-01 RX ORDER — OXYCODONE HCL 5 MG/5 ML
10 SOLUTION, ORAL ORAL
Status: COMPLETED | OUTPATIENT
Start: 2019-04-01 | End: 2019-04-01

## 2019-04-01 RX ORDER — OXYCODONE HCL 5 MG/5 ML
5 SOLUTION, ORAL ORAL
Status: COMPLETED | OUTPATIENT
Start: 2019-04-01 | End: 2019-04-01

## 2019-04-01 RX ORDER — SODIUM CHLORIDE, SODIUM LACTATE, POTASSIUM CHLORIDE, CALCIUM CHLORIDE 600; 310; 30; 20 MG/100ML; MG/100ML; MG/100ML; MG/100ML
INJECTION, SOLUTION INTRAVENOUS CONTINUOUS
Status: DISCONTINUED | OUTPATIENT
Start: 2019-04-01 | End: 2019-04-01 | Stop reason: HOSPADM

## 2019-04-01 RX ORDER — ATROPA BELLADONNA AND OPIUM 16.2; 6 MG/1; MG/1
60 SUPPOSITORY RECTAL
Status: COMPLETED | OUTPATIENT
Start: 2019-04-01 | End: 2019-04-01

## 2019-04-01 RX ORDER — LIDOCAINE HYDROCHLORIDE 10 MG/ML
INJECTION, SOLUTION INFILTRATION; PERINEURAL
Status: COMPLETED
Start: 2019-04-01 | End: 2019-04-01

## 2019-04-01 RX ORDER — DEXAMETHASONE SODIUM PHOSPHATE 4 MG/ML
INJECTION, SOLUTION INTRA-ARTICULAR; INTRALESIONAL; INTRAMUSCULAR; INTRAVENOUS; SOFT TISSUE PRN
Status: DISCONTINUED | OUTPATIENT
Start: 2019-04-01 | End: 2019-04-01 | Stop reason: SURG

## 2019-04-01 RX ORDER — ACETAMINOPHEN 500 MG
1000 TABLET ORAL ONCE
Status: COMPLETED | OUTPATIENT
Start: 2019-04-01 | End: 2019-04-01

## 2019-04-01 RX ORDER — MAGNESIUM HYDROXIDE 1200 MG/15ML
LIQUID ORAL
Status: COMPLETED | OUTPATIENT
Start: 2019-04-01 | End: 2019-04-01

## 2019-04-01 RX ORDER — CIPROFLOXACIN 2 MG/ML
INJECTION, SOLUTION INTRAVENOUS PRN
Status: DISCONTINUED | OUTPATIENT
Start: 2019-04-01 | End: 2019-04-01 | Stop reason: SURG

## 2019-04-01 RX ORDER — PRAVASTATIN SODIUM 40 MG
40 TABLET ORAL NIGHTLY
COMMUNITY
End: 2019-04-16

## 2019-04-01 RX ORDER — CEFAZOLIN SODIUM 1 G/3ML
INJECTION, POWDER, FOR SOLUTION INTRAMUSCULAR; INTRAVENOUS PRN
Status: DISCONTINUED | OUTPATIENT
Start: 2019-04-01 | End: 2019-04-01

## 2019-04-01 RX ORDER — ONDANSETRON 2 MG/ML
4 INJECTION INTRAMUSCULAR; INTRAVENOUS
Status: DISCONTINUED | OUTPATIENT
Start: 2019-04-01 | End: 2019-04-01 | Stop reason: HOSPADM

## 2019-04-01 RX ORDER — MELOXICAM 7.5 MG/1
7.5 TABLET ORAL DAILY
Status: ON HOLD | COMMUNITY
End: 2019-04-01

## 2019-04-01 RX ORDER — ONDANSETRON 2 MG/ML
INJECTION INTRAMUSCULAR; INTRAVENOUS PRN
Status: DISCONTINUED | OUTPATIENT
Start: 2019-04-01 | End: 2019-04-01 | Stop reason: SURG

## 2019-04-01 RX ORDER — MEPERIDINE HYDROCHLORIDE 25 MG/ML
6.25 INJECTION INTRAMUSCULAR; INTRAVENOUS; SUBCUTANEOUS
Status: DISCONTINUED | OUTPATIENT
Start: 2019-04-01 | End: 2019-04-01 | Stop reason: HOSPADM

## 2019-04-01 RX ADMIN — OXYCODONE HYDROCHLORIDE 10 MG: 5 SOLUTION ORAL at 18:34

## 2019-04-01 RX ADMIN — DEXAMETHASONE SODIUM PHOSPHATE 4 MG: 4 INJECTION, SOLUTION INTRAMUSCULAR; INTRAVENOUS at 17:09

## 2019-04-01 RX ADMIN — FENTANYL CITRATE 50 MCG: 50 INJECTION, SOLUTION INTRAMUSCULAR; INTRAVENOUS at 18:34

## 2019-04-01 RX ADMIN — CIPROFLOXACIN 400 MG: 2 INJECTION INTRAVENOUS at 17:09

## 2019-04-01 RX ADMIN — SODIUM CHLORIDE, SODIUM LACTATE, POTASSIUM CHLORIDE, CALCIUM CHLORIDE: 600; 310; 30; 20 INJECTION, SOLUTION INTRAVENOUS at 14:56

## 2019-04-01 RX ADMIN — ONDANSETRON 4 MG: 2 INJECTION INTRAMUSCULAR; INTRAVENOUS at 17:09

## 2019-04-01 RX ADMIN — ACETAMINOPHEN 1000 MG: 500 TABLET, FILM COATED ORAL at 14:52

## 2019-04-01 RX ADMIN — ATROPA BELLADONNA AND OPIUM 60 MG: 16.2; 6 SUPPOSITORY RECTAL at 19:27

## 2019-04-01 RX ADMIN — PROPOFOL 130 MG: 10 INJECTION, EMULSION INTRAVENOUS at 17:05

## 2019-04-01 RX ADMIN — FENTANYL CITRATE 100 MCG: 50 INJECTION, SOLUTION INTRAMUSCULAR; INTRAVENOUS at 17:09

## 2019-04-01 RX ADMIN — SODIUM CHLORIDE, SODIUM LACTATE, POTASSIUM CHLORIDE, CALCIUM CHLORIDE: 600; 310; 30; 20 INJECTION, SOLUTION INTRAVENOUS at 17:05

## 2019-04-01 RX ADMIN — MIDAZOLAM HYDROCHLORIDE 2 MG: 1 INJECTION, SOLUTION INTRAMUSCULAR; INTRAVENOUS at 17:09

## 2019-04-01 ASSESSMENT — PAIN SCALES - GENERAL: PAIN_LEVEL: 0

## 2019-04-01 NOTE — ANESTHESIA PREPROCEDURE EVALUATION
Relevant Problems   (+) Aortic insufficiency   (+) Coronary artery disease due to calcified coronary lesion: Mildly cardiac catheterization in 2014   (+) Essential hypertension   (+) Rheumatoid arthritis (HCC)       Physical Exam    Airway   Mallampati: II  TM distance: >3 FB  Neck ROM: full       Cardiovascular - normal exam  Rhythm: regular  Rate: normal  (-) murmur     Dental - normal exam         Pulmonary - normal exam  Breath sounds clear to auscultation     Abdominal    Neurological - normal exam                 Anesthesia Plan    ASA 3   ASA physical status 3 criteria: CAD/stents (> 3 months)    Plan - general       Airway plan will be LMA        Induction: intravenous    Postoperative Plan: Postoperative administration of opioids is intended.    Pertinent diagnostic labs and testing reviewed    Informed Consent:    Anesthetic plan and risks discussed with patient.    Use of blood products discussed with: patient whom consented to blood products.

## 2019-04-02 LAB — PATHOLOGY CONSULT NOTE: NORMAL

## 2019-04-02 NOTE — ANESTHESIA POSTPROCEDURE EVALUATION
Patient: Edgardo Sims    Procedure Summary     Date:  04/01/19 Room / Location:  [facility] 18 / SURGERY [facility]    Anesthesia Start:  1705 Anesthesia Stop:      Procedures:       BIPOLAR TRANSURETHRAL RESECTION OF PROSTATE (Penis)      CYSTOSCOPY (Bladder)      DIRECT VISION INTERNAL URETHROTOMY (Urethra) Diagnosis:  (BENIGN PROSTATIC HYPERPLASIA WITH OBSTRUCTION)    Surgeon:  Jose Romo M.D. Responsible Provider:  Andre Valentin M.D.    Anesthesia Type:  general ASA Status:  3          Final Anesthesia Type: general  Last vitals  BP   Blood Pressure: 143/73, NIBP: 160/81    Temp   36.5 °C (97.7 °F)    Pulse   Pulse: 99, Heart Rate (Monitored): 99   Resp   15    SpO2   88 %      Anesthesia Post Evaluation    Patient location during evaluation: PACU  Patient participation: complete - patient participated  Level of consciousness: awake and alert  Pain score: 0    Airway patency: patent  Anesthetic complications: no  Cardiovascular status: hemodynamically stable  Respiratory status: acceptable  Hydration status: euvolemic    PONV: none           Nurse Pain Score: 0 (NPRS)

## 2019-04-02 NOTE — DISCHARGE INSTRUCTIONS
ACTIVITY: Rest and take it easy for the first 24 hours.  A responsible adult is recommended to remain with you during that time.  It is normal to feel sleepy.  We encourage you to not do anything that requires balance, judgment or coordination.    MILD FLU-LIKE SYMPTOMS ARE NORMAL. YOU MAY EXPERIENCE GENERALIZED MUSCLE ACHES, THROAT IRRITATION, HEADACHE AND/OR SOME NAUSEA.    FOR 24 HOURS DO NOT:  Drive, operate machinery or run household appliances.  Drink beer or alcoholic beverages.   Make important decisions or sign legal documents.    SPECIAL INSTRUCTIONS: Home with [place]    DIET: To avoid nausea, slowly advance diet as tolerated, avoiding spicy or greasy foods for the first day.  Add more substantial food to your diet according to your physician's instructions.  Babies can be fed formula or breast milk as soon as they are hungry.  INCREASE FLUIDS AND FIBER TO AVOID CONSTIPATION.    SURGICAL DRESSING/BATHING: May shower. No hot tubs, tub baths or pools until cleared by MD.    FOLLOW-UP APPOINTMENT:  A follow-up appointment should be arranged with your doctor in 1-2 weeks; call to schedule.    You should CALL YOUR PHYSICIAN if you develop:  Fever greater than 101 degrees F.  Pain not relieved by medication, or persistent nausea or vomiting.  Excessive bleeding (blood soaking through dressing) or unexpected drainage from the wound.  Extreme redness or swelling around the incision site, drainage of pus or foul smelling drainage.  Inability to urinate or empty your bladder within 8 hours.  Problems with breathing or chest pain.    You should call 911 if you develop problems with breathing or chest pain.  If you are unable to contact your doctor or surgical center, you should go to the nearest emergency room or urgent care center.  Physician's telephone #: [phone]    If any questions arise, call your doctor.  If your doctor is not available, please feel free to call the Surgical Center at [phone].  The  Center is open Monday through Friday from 7AM to 7PM.  You can also call the HEALTH HOTLINE open 24 hours/day, 7 days/week and speak to a nurse at [phone], or toll free at [phone].    A registered nurse may call you a few days after your surgery to see how you are doing after your procedure.    MEDICATIONS: Resume taking daily medication.  Take prescribed pain medication with food.  If no medication is prescribed, you may take non-aspirin pain medication if needed.  PAIN MEDICATION CAN BE VERY CONSTIPATING.  Take a stool softener or laxative such as senokot, pericolace, or milk of magnesia if needed.      Last pain medication given at 6:34 pm.    If your physician has prescribed pain medication that includes Acetaminophen (Tylenol), do not take additional Acetaminophen (Tylenol) while taking the prescribed medication.    Depression / Suicide Risk    As you are discharged from this [facility] facility, it is important to learn how to keep safe from harming yourself.    Recognize the warning signs:  · Abrupt changes in personality, positive or negative- including increase in energy   · Giving away possessions  · Change in eating patterns- significant weight changes-  positive or negative  · Change in sleeping patterns- unable to sleep or sleeping all the time   · Unwillingness or inability to communicate  · Depression  · Unusual sadness, discouragement and loneliness  · Talk of wanting to die  · Neglect of personal appearance   · Rebelliousness- reckless behavior  · Withdrawal from people/activities they love  · Confusion- inability to concentrate     If you or a loved one observes any of these behaviors or has concerns about self-harm, here's what you can do:  · Talk about it- your feelings and reasons for harming yourself  · Remove any means that you might use to hurt yourself (examples: pills, rope, extension cords, firearm)  · Get professional help from the community (Mental Health, Substance  Abuse, psychological counseling)  · Do not be alone:Call your Safe Contact- someone whom you trust who will be there for you.  · Call your local CRISIS HOTLINE 477-5072 or 069-233-9519  · Call your local Children's Mobile Crisis Response Team [place] (351) 297-0645 or www.Basic-Fit  · Call the toll free National Suicide Prevention Hotlines   · National Suicide Prevention Lifeline 233-996-ANTU (9429)  · National Hope Line Network 800-SUICIDE (295-4146)

## 2019-04-02 NOTE — OR NURSING
Dr Dorinda Cifuentes updated on cherry red urine. MD at bedside, observed urine and cleared him for discharge home tonight. Pt instructed to drink lots of water and call with any concerns. States understanding.

## 2019-04-02 NOTE — ANESTHESIA QCDR
2019 [facility] Clinical Data Registry (for Quality Improvement)     Postoperative nausea/vomiting risk protocol (Adult = 18 yrs and Pediatric 3-17 yrs)- (430 and 463)  General inhalation anesthetic (NOT TIVA) with PONV risk factors: Yes  Provision of anti-emetic therapy with at least 2 different classes of agents: Yes   Patient DID NOT receive anti-emetic therapy and reason is documented in Medical Record:  N/A    Multimodal Pain Management- (AQI59)  Patient undergoing Elective Surgery (i.e. Outpatient, or ASC, or Prescheduled Surgery prior to Hospital Admission): Yes  Use of Multimodal Pain Management, two or more drugs and/or interventions, NOT including systemic opioids: Yes   Exception: Documented allergy to multiple classes of analgesics:  N/A    PACU assessment of acute postoperative pain prior to Anesthesia Care End- Applies to Patients Age = 18- (ABG7)  Initial PACU pain score is which of the following: < 7/10  Patient unable to report pain score: N/A    Post-anesthetic transfer of care checklist/protocol to PACU/ICU- (426 and 427)  Upon conclusion of case, patient transferred to which of the following locations: PACU/Non-ICU  Use of transfer checklist/protocol: Yes  Exclusion: Service Performed in Patient Hospital Room (and thus did not require transfer): N/A    PACU Reintubation- (AQI31)  General anesthesia requiring endotracheal intubation (ETT) along with subsequent extubation in OR or PACU: No  Required reintubation in the PACU: N/A  Extubation was a planned trial documented in the medical record prior to removal of the original airway device: N/A    Unplanned admission to ICU related to anesthesia service up through end of PACU care- (MD51)  Unplanned admission to ICU (not initially anticipated at anesthesia start time): No

## 2019-04-02 NOTE — OR SURGEON
Immediate Post OP Note    PreOp Diagnosis: BPH with urine retention    PostOp Diagnosis: same; Bulbar urethral stricture    Procedure(s):  BIPOLAR TRANSURETHRAL RESECTION OF PROSTATE - Wound Class: Clean Contaminated  CYSTOSCOPY - Wound Class: Clean Contaminated  DIRECT VISION INTERNAL URETHROTOMY - Wound Class: Clean Contaminated    Surgeon(s):  Jose Romo M.D.    Anesthesiologist/Type of Anesthesia:  Anesthesiologist: Andre Valentin M.D./General    Surgical Staff:  Circulator: Nela Orourke R.N.  Relief Circulator: Zina Redding R.N.  Scrub Person: Tc Blackmon R.N.    Specimens removed if any:  ID Type Source Tests Collected by Time Destination   A : PROSTATE CHIPS Tissue Prostate PATHOLOGY SPECIMEN Jose Romo M.D. 4/1/2019  6:03 PM        Estimated Blood Loss: minimal    Findings: tight bulbar urethral stricture; moderate BPH    Complications: none.  Minimal bleeding to PACU.  Home with [place]        4/1/2019 6:14 PM Jose Romo M.D.

## 2019-04-02 NOTE — OR NURSING
Daisy in place.     Assumed care of patient at approx 1952.  Patient alert and oriented x 4. See flowsheets for VS.  Pain is rated 0/10.     Call light and personal belongings within reach. Marlon in lowest position. Monitor alarms set appropriately.

## 2019-04-02 NOTE — ANESTHESIA TIME REPORT
Anesthesia Start and Stop Event Times     Date Time Event    4/1/2019 1705 Anesthesia Start     1823 Anesthesia Stop        Responsible Staff  04/01/19    Name Role Begin End    Andre Valentin M.D. Anesth 1705 1823        Preop Diagnosis (Free Text):  Pre-op Diagnosis     BENIGN PROSTATIC HYPERPLASIA WITH OBSTRUCTION        Preop Diagnosis (Codes):  Diagnosis Information     Diagnosis Code(s):         Post op Diagnosis  Benign prostatic hyperplasia      Premium Reason  A. 3PM - 7AM    Comments:

## 2019-04-02 NOTE — OP REPORT
DATE OF SERVICE:  04/01/2019    PREOPERATIVE DIAGNOSES:  Possible bulbar stricture and benign prostatic   hyperplasia.    POSTOPERATIVE DIAGNOSES:  Bulbar urethral stricture and benign prostatic   hyperplasia of prostate.    PROCEDURES:  Cystoscopy with direct visual internal urethrotomy and   transurethral resection of prostate.    ANESTHESIA:  General.    ANESTHESIOLOGIST:  Andre Valentin MD    SURGEON:  Jose Romo MD    BRIEF HISTORY:  This 63-year-old male presented with urinary retention.  He   has been doing intermittent catheterization, but also had a possible stricture   in his urethra on cystoscopy.  He had CMG that did show contractility of the   bladder with filling and during a voiding phase of that test.  He now presents   for possible direct visual internal urethrotomy with transurethral resection   of the prostate.    REPORT OF OPERATION:  Under general anesthesia with the patient in the   lithotomy position, the genitalia were prepped and draped in a sterile manner.    The 26-C[place]ican resectoscope was passed into the urethra after dilation of the   meatus to 28-C[place]ican.  It was a little tight getting the scope in through the   meatus, but once in, I was able to get up to the bulbar urethra where there   was a moderately tight stricture.  At this point, I removed the resectoscope   sheath.  I then got the internal urethrotome, and under direct vision, I did   pass a zebra wire up into the bladder without difficulty.  Once this was done,   I then used the blade on the urethrotome and incised the stricture at the 12   o'clock position.  I did get down to healthy fresh tissue.  Once this was   done, I was able to get into the bladder without difficulty.  The prostatic   urethra did show moderate BPH in the lateral lobes and a moderate to severe   trabeculation of the bladder.  At this point, the bladder was drained and the   urethrotome was removed.  I then passed the resectoscope under direct  vision   into the bladder without much difficulty.  Using the bipolar resectoscope, I   then resected the bladder neck circumferentially.  I cauterized areas of   bleeding.  I then resected lateral lobes with the distal margin of resection   being the verumontanum.  I did resect tissue on the floor of the prostatic   urethra and at the dome.  At the end of the procedure, the channel was wide   open.  All chips were removed from the bladder using the Ellik .    There was no active bleeding at the end of the procedure.  At this point, I   put a 22-Arabic 3-way catheter into the bladder using a stylet.  Once in,   drainage was clear.  A 30 mL of water placed in the balloon.  At this point, I   put a plug in the inflow port of the 3-way catheter and the catheter was then   hooked up to bag drainage.  He was woken up and transferred to the PACU in   stable condition.       ____________________________________     MD JAZZ CHA / AMBREEN    DD:  04/01/2019 18:20:30  DT:  04/01/2019 20:56:45    D#:  [id number]  Job#:  [id number]

## 2019-04-02 NOTE — ANESTHESIA PROCEDURE NOTES
Airway  Date/Time: 4/1/2019 5:10 PM  Performed by: BRIAN MANCIA  Authorized by: BRIAN MANCIA     Location:  OR  Urgency:  Elective  Indications for Airway Management:  Anesthesia  Spontaneous Ventilation: absent    Sedation Level:  Deep  Preoxygenated: Yes    Final Airway Type:  Supraglottic airway  Final Supraglottic Airway:  Standard LMA  SGA Size:  4  Cuff Pressure (cm H2O):  13  Number of Attempts at Approach:  1

## 2019-04-16 DIAGNOSIS — E78.5 DYSLIPIDEMIA: Primary | ICD-10-CM

## 2019-04-16 RX ORDER — PRAVASTATIN SODIUM 40 MG
TABLET ORAL
Qty: 90 TAB | Refills: 0 | Status: SHIPPED | OUTPATIENT
Start: 2019-04-16 | End: 2019-05-24

## 2019-05-08 ENCOUNTER — OFFICE VISIT (OUTPATIENT)
Dept: MEDICAL GROUP | Facility: LAB | Age: 64
End: 2019-05-08
Payer: MEDICARE

## 2019-05-08 VITALS
OXYGEN SATURATION: 99 % | WEIGHT: 202.2 LBS | HEIGHT: 70 IN | SYSTOLIC BLOOD PRESSURE: 138 MMHG | RESPIRATION RATE: 20 BRPM | TEMPERATURE: 98.3 F | HEART RATE: 87 BPM | DIASTOLIC BLOOD PRESSURE: 62 MMHG | BODY MASS INDEX: 28.95 KG/M2

## 2019-05-08 DIAGNOSIS — M40.294 OTHER KYPHOSIS OF THORACIC REGION: ICD-10-CM

## 2019-05-08 DIAGNOSIS — N31.9 NEUROGENIC BLADDER: ICD-10-CM

## 2019-05-08 DIAGNOSIS — M05.9 RHEUMATOID ARTHRITIS WITH POSITIVE RHEUMATOID FACTOR, INVOLVING UNSPECIFIED SITE (HCC): ICD-10-CM

## 2019-05-08 PROCEDURE — 99214 OFFICE O/P EST MOD 30 MIN: CPT | Performed by: INTERNAL MEDICINE

## 2019-05-08 RX ORDER — OXYCODONE AND ACETAMINOPHEN 10; 325 MG/1; MG/1
1-2 TABLET ORAL EVERY 4 HOURS PRN
Qty: 150 TAB | Refills: 0 | Status: SHIPPED | OUTPATIENT
Start: 2019-06-18 | End: 2019-05-08 | Stop reason: SDUPTHER

## 2019-05-08 RX ORDER — OXYCODONE AND ACETAMINOPHEN 10; 325 MG/1; MG/1
1-2 TABLET ORAL EVERY 4 HOURS PRN
Qty: 150 TAB | Refills: 0 | Status: SHIPPED | OUTPATIENT
Start: 2019-07-18 | End: 2019-08-14

## 2019-05-08 NOTE — PROGRESS NOTES
CC: Edgardo Sims is a 63 y.o. male is suffering from   Chief Complaint   Patient presents with   • Follow-Up     6 week FV         SUBJECTIVE:  1. Neurogenic bladder  Edgardo is here for follow-up has a neurogenic bladder after undergoing surgery, continues to cath himself 4 times a day.  Standing order for complete urinalysis is been written, patient has antibiotics at home.  Patient has been instructed to restart his disease modifying drug    2. Rheumatoid arthritis with positive rheumatoid factor, involving unspecified site (HCC)  Patient is being followed by rheumatology is to restart Xeljanz.         Past social, family, history:   Social History   Substance Use Topics   • Smoking status: Never Smoker   • Smokeless tobacco: Never Used   • Alcohol use 1.8 oz/week     3 Cans of beer per week      Comment: 3 per week         MEDICATIONS:    Current Outpatient Prescriptions:   •  [START ON 7/18/2019] oxyCODONE-acetaminophen (PERCOCET-10)  MG Tab, Take 1-2 Tabs by mouth every four hours as needed for Severe Pain for up to 30 days., Disp: 150 Tab, Rfl: 0  •  pravastatin (PRAVACHOL) 40 MG tablet, TAKE 1 TABLET BY MOUTH EVERY DAY, Disp: 90 Tab, Rfl: 0  •  meloxicam (MOBIC) 7.5 MG Tab, TAKE 1 TABLET BY MOUTH EVERY DAY, Disp: 90 Tab, Rfl: 0  •  ezetimibe (ZETIA) 10 MG Tab, Take 10 mg by mouth every evening., Disp: , Rfl:   •  metoprolol (LOPRESSOR) 50 MG Tab, Take 1 Tab by mouth 2 times a day., Disp: 60 Tab, Rfl: 1  •  predniSONE (DELTASONE) 1 MG Tab, TAKE 2 TABLETS BY MOUTH EVERY DAY, Disp: 180 Tab, Rfl: 0  •  diazePAM (VALIUM) 5 MG Tab, Take 5 mg by mouth every 6 hours as needed for Anxiety., Disp: , Rfl:   •  PARoxetine (PAXIL) 20 MG Tab, TAKE 1 TABLET BY MOUTH EVERY DAY, Disp: 90 Tab, Rfl: 3  •  lisinopril (PRINIVIL) 10 MG Tab, TAKE 1 TABLET BY MOUTH EVERY DAY, Disp: 90 Tab, Rfl: 3  •  cyclobenzaprine (FLEXERIL) 10 MG Tab, TAKE ONE TABLET BY MOUTH THREE TIMES DAILY AS NEEDED FOR MILD PAIN, Disp:  90 Tab, Rfl: 5  •  omeprazole (PRILOSEC) 20 MG CPDR, Take 20 mg by mouth every day., Disp: , Rfl:   •  Tofacitinib Citrate (XELJANZ) 5 MG Tab, Take 5 mg by mouth 2 Times a Day. (Patient not taking: Reported on 5/8/2019), Disp: 60 Tab, Rfl: 0  •  CALCIUM-VITAMIN D PO, Take 1 Tab by mouth 2 Times a Day., Disp: , Rfl:   •  ascorbic acid (ASCORBIC ACID) 500 MG Tab, Take 500 mg by mouth every day., Disp: , Rfl:   •  Cyanocobalamin (VITAMIN B-12) 5000 MCG TABLET DISPERSIBLE, Take 1 Tab by mouth every day., Disp: , Rfl:   •  niacin 500 MG Tab, Take 500 mg by mouth every day., Disp: , Rfl:   •  Zinc 50 MG Cap, Take 50 mg by mouth every day., Disp: , Rfl:     PROBLEMS:  Patient Active Problem List    Diagnosis Date Noted   • Acute pyelonephritis 04/30/2018     Priority: High   • Neurogenic bladder 01/26/2019     Priority: Medium   • Failed total knee, left, subsequent encounter 08/03/2018   • Bladder outlet obstruction 05/01/2018   • Leukocytosis 04/30/2018   • Lactic acidosis 04/30/2018   • Idiopathic acute pancreatitis 04/30/2018   • Chronic use of opiate drugs therapeutic purposes 08/12/2017   • Depression 07/13/2015   • Anxiety 03/31/2015   • Coronary artery disease due to calcified coronary lesion: Mildly cardiac catheterization in 2014 12/05/2014   • Tachycardia 10/20/2014   • Abnormal myocardial perfusion study 01/15/2014   • Osteoarthrosis, unspecified whether generalized or localized, pelvic region and thigh 05/31/2013   • Dyslipidemia 07/12/2011   • Aortic insufficiency 07/05/2011   • Essential hypertension 07/05/2011   • Rheumatoid arthritis (HCC) 05/05/2009   • Hypogonadism male 05/05/2009       REVIEW OF SYSTEMS:  Gen.:  No Nausea, Vomiting, fever, Chills.  Heart: No chest pain.  Lungs:  No shortness of Breath.  Psychological: Kian unusual Anxiety depression     PHYSICAL EXAM   Constitutional: Alert, cooperative, not in acute distress.  Cardiovascular:  Rate Rhythm is regular without murmurs rubs clicks.  "    Thorax & Lungs: Clear to auscultation, no wheezing, rhonchi, or rales  HENT: Normocephalic, Atraumatic.  Eyes: PERRLA, EOMI, Conjunctiva normal.   Neck: Trachia is midline no swelling of the thyroid.   Lymphatic: No lymphadenopathy noted.   Abdomin: Soft non-tender, no rebound, no guarding.   Skin: Warm, Dry, No erythema, No rash.   Extremities: Atraumatic with symmetric distal pulses, No edema, No tenderness, No cyanosis, No clubbing.   Musculoskeletal: Significant kyphotic curve thoracic spine  Neurologic: Alert & oriented x 3, cranial nerves II through XII are intact, Normal motor function, Normal sensory function, No focal deficits noted.   Psychiatric: Affect normal, Judgment normal, Mood normal.     VITAL SIGNS:/62 (BP Location: Left arm, Patient Position: Sitting, BP Cuff Size: Adult)   Pulse 87   Temp 36.8 °C (98.3 °F) (Temporal)   Resp 20   Ht 1.778 m (5' 10\")   Wt 91.7 kg (202 lb 3.2 oz)   SpO2 99%   BMI 29.01 kg/m²     Labs: Reviewed    Assessment:                                                     Plan:  1. Neurogenic bladder  Patient is to continue to self cath, prescription written for as needed UAs, containers given to the patient    2. Rheumatoid arthritis with positive rheumatoid factor, involving unspecified site (HCC)  History of rheumatoid arthritis instructed patient restart his disease modifying medication  - oxyCODONE-acetaminophen (PERCOCET-10)  MG Tab; Take 1-2 Tabs by mouth every four hours as needed for Severe Pain for up to 30 days.  Dispense: 150 Tab; Refill: 0    3. Other kyphosis of thoracic region  X-rays reviewed regarding patient's significant kyphotic curve associate with thoracic spine!.       "

## 2019-05-24 ENCOUNTER — OFFICE VISIT (OUTPATIENT)
Dept: CARDIOLOGY | Facility: MEDICAL CENTER | Age: 64
End: 2019-05-24
Payer: MEDICARE

## 2019-05-24 VITALS
SYSTOLIC BLOOD PRESSURE: 142 MMHG | BODY MASS INDEX: 29.49 KG/M2 | OXYGEN SATURATION: 98 % | WEIGHT: 206 LBS | HEIGHT: 70 IN | HEART RATE: 70 BPM | DIASTOLIC BLOOD PRESSURE: 80 MMHG

## 2019-05-24 DIAGNOSIS — E78.5 DYSLIPIDEMIA: ICD-10-CM

## 2019-05-24 DIAGNOSIS — I25.84 CORONARY ARTERY DISEASE DUE TO CALCIFIED CORONARY LESION: ICD-10-CM

## 2019-05-24 DIAGNOSIS — I25.10 CORONARY ARTERY DISEASE DUE TO CALCIFIED CORONARY LESION: ICD-10-CM

## 2019-05-24 DIAGNOSIS — I35.1 NONRHEUMATIC AORTIC VALVE INSUFFICIENCY: ICD-10-CM

## 2019-05-24 DIAGNOSIS — I10 ESSENTIAL HYPERTENSION: ICD-10-CM

## 2019-05-24 PROCEDURE — 99214 OFFICE O/P EST MOD 30 MIN: CPT | Performed by: INTERNAL MEDICINE

## 2019-05-24 RX ORDER — PRAVASTATIN SODIUM 80 MG/1
80 TABLET ORAL DAILY
Qty: 30 TAB | Refills: 11 | Status: SHIPPED | OUTPATIENT
Start: 2019-05-24 | End: 2020-04-03 | Stop reason: SDUPTHER

## 2019-05-24 NOTE — LETTER
[facility]   [address], [address]  [place] [address]  Phone: [phone]  Fax: [phone]              Edgardo Sims  1955    Encounter Date: 5/24/2019    Stas Zabala M.D.          PROGRESS NOTE:  Chief Complaint   Patient presents with   • Coronary Artery Disease       Subjective:   Edgardo Sims is a 63 y.o. male who presents today today for followup of his hypertension, hyperlipidemia and abnormal myocardial perfusion scan. He underwent cardiac catheterization and had minimal plaque. 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    He has had no chest discomfort, dyspnea, edema, palpitations or lightheadedness.    His blood pressures have been running about 120/60-70.  He feels it was up today because we had a patient had a seizure in the waiting room.    Past Medical History:   Diagnosis Date   • Abnormal myocardial perfusion study 1/15/2014   • Aortic insufficiency 7/5/2011   • Arthritis     RA, and osteo   • Bronchitis    • CAD (coronary artery disease) mild plaque at cath in 2/14 12/5/2014   • Cancer (HCC)     skin   • Chronic use of opiate drugs therapeutic purposes 8/12/2017   • Dental disorder     Upper dentures.   • Dyslipidemia 7/12/2011   • Heart burn    • Heart murmur    • Hiatus hernia syndrome    • High cholesterol    • HTN (hypertension) 7/5/2011   • Hypertension    • Indigestion    • Infectious disease    • Pain     RA   • Pain     hands, feet and jaw   • Rheumatoid arthritis(714.0)     severe   • Tachycardia 10/20/2014     Past Surgical History:   Procedure Laterality Date   • PB TRANSURETHRAL ELEC-SURG PBOSTATECTOM  4/1/2019    Procedure: BIPOLAR TRANSURETHRAL RESECTION OF PROSTATE;  Surgeon: Jose Romo M.D.;  Location: [facility];  Service: Urology   • CYSTOSCOPY  4/1/2019    Procedure: CYSTOSCOPY;  Surgeon: Jose Romo M.D.;  Location: [facility];  Service:  Urology   • URETHROTOMY INTERNAL  4/1/2019    Procedure: DIRECT VISION INTERNAL URETHROTOMY;  Surgeon: Jose Romo M.D.;  [place] [facility]  Service: Urology   • KNEE REVISION TOTAL Left 8/3/2018    Procedure: KNEE REVISION TOTAL;  Surgeon: Michael Rodriguez M.D.;  Location: SURGERY [facility];  Service: Orthopedics   • CYSTOSCOPY  5/16/2018    Procedure: CYSTOSCOPY-CLOT EVAC;  Surgeon: Jose Romo M.D.;  [place] [facility]  Service: Urology   • TRANS URETHRAL RESECTION BLADDER  5/16/2018    Procedure: TRANS URETHRAL RESECTION BLADDER;  Surgeon: Jose Romo M.D.;  [place] [facility]  Service: Urology   • RECOVERY  2/3/2014    Performed by Cath-Recovery Surgery at SURGERY SAME DAY [facility]   • HIP ARTHROPLASTY TOTAL  5/31/2013    Performed by Burak Valles M.D. at SURGERY [facility]   • ROTATOR CUFF REPAIR Right 2011    x 2   • KNEE ARTHROPLASTY TOTAL  6/1/2009    LEFT-Performed by YONATAN HINSON at [place] [facility]   • VEIN LIGATION RADIO FREQUENCY  12/8/2008    LEFT leg-Performed by DEREJE MCUCLLOUGH at SURGERY SAME DAY [facility]   • HIP ARTHROPLASTY TOTAL  6/20/08    Performed by YONATAN HINSON [place] [facility]   • LUMBAR LAMINECTOMY DISKECTOMY  2000   • TMJ RECONSTRUCTION BILATERAL  1994   • ANKLE ORIF Right    • KNEE ARTHROSCOPY Left    • VEIN STRIPPING Left      Family History   Problem Relation Age of Onset   • Hypertension Mother      Social History     Social History   • Marital status:      Spouse name: N/A   • Number of children: N/A   • Years of education: N/A     Occupational History   • Not on file.     Social History Main Topics   • Smoking status: Never Smoker   • Smokeless tobacco: Never Used   • Alcohol use 1.8 oz/week     3 Cans of beer per week      Comment: 3 per week   • Drug use: No   • Sexual activity: Yes     Partners: Female     Other Topics Concern   •   Service No   • Blood Transfusions No   • Caffeine Concern No   • Occupational Exposure No   • Hobby Hazards Yes     rides motorcyle   • Sleep Concern No   • Stress Concern No   • Weight Concern Yes   • Special Diet Yes     does not eat red meat    • Back Care Yes   • Exercise Yes   • Bike Helmet Yes   • Seat Belt Yes   • Self-Exams Yes     Social History Narrative   • No narrative on file     Allergies   Allergen Reactions   • Crestor [Rosuvastatin Calcium] Myalgia   • Penicillins Hives, Itching and Vomiting   • Rocephin [Ceftriaxone] Rash     Redness and flushing all over body     Outpatient Encounter Prescriptions as of 5/24/2019   Medication Sig Dispense Refill   • [START ON 7/18/2019] oxyCODONE-acetaminophen (PERCOCET-10)  MG Tab Take 1-2 Tabs by mouth every four hours as needed for Severe Pain for up to 30 days. 150 Tab 0   • pravastatin (PRAVACHOL) 40 MG tablet TAKE 1 TABLET BY MOUTH EVERY DAY 90 Tab 0   • meloxicam (MOBIC) 7.5 MG Tab TAKE 1 TABLET BY MOUTH EVERY DAY 90 Tab 0   • ezetimibe (ZETIA) 10 MG Tab Take 10 mg by mouth every evening.     • metoprolol (LOPRESSOR) 50 MG Tab Take 1 Tab by mouth 2 times a day. 60 Tab 1   • predniSONE (DELTASONE) 1 MG Tab TAKE 2 TABLETS BY MOUTH EVERY  Tab 0   • PARoxetine (PAXIL) 20 MG Tab TAKE 1 TABLET BY MOUTH EVERY DAY 90 Tab 3   • lisinopril (PRINIVIL) 10 MG Tab TAKE 1 TABLET BY MOUTH EVERY DAY 90 Tab 3   • cyclobenzaprine (FLEXERIL) 10 MG Tab TAKE ONE TABLET BY MOUTH THREE TIMES DAILY AS NEEDED FOR MILD PAIN 90 Tab 5   • Tofacitinib Citrate (XELJANZ) 5 MG Tab Take 5 mg by mouth 2 Times a Day. 60 Tab 0   • CALCIUM-VITAMIN D PO Take 1 Tab by mouth 2 Times a Day.     • ascorbic acid (ASCORBIC ACID) 500 MG Tab Take 500 mg by mouth every day.     • Cyanocobalamin (VITAMIN B-12) 5000 MCG TABLET DISPERSIBLE Take 1 Tab by mouth every day.     • niacin 500 MG Tab Take 500 mg by mouth every day.     • Zinc 50 MG Cap Take 50 mg by mouth every day.     • omeprazole  "(PRILOSEC) 20 MG CPDR Take 20 mg by mouth every day.     • diazePAM (VALIUM) 5 MG Tab Take 5 mg by mouth every 6 hours as needed for Anxiety.       No facility-administered encounter medications on file as of 5/24/2019.      ROS     Objective:   /80 (BP Location: Left arm, Patient Position: Sitting, BP Cuff Size: Adult)   Pulse 70   Ht 1.778 m (5' 10\")   Wt 93.4 kg (206 lb)   SpO2 98%   BMI 29.56 kg/m²      Physical Exam   Constitutional: He appears well-developed and well-nourished.   Neck: No JVD present.   Cardiovascular: Normal rate and regular rhythm.    No murmur heard.  Pulmonary/Chest: Effort normal and breath sounds normal. He has no rales.   Abdominal: Soft. There is no tenderness.   Musculoskeletal: He exhibits no edema.     Lab Results   Component Value Date/Time    CHOLSTRLTOT 140 02/19/2019 08:36 AM    LDL 74 02/19/2019 08:36 AM    HDL 45 02/19/2019 08:36 AM    TRIGLYCERIDE 105 02/19/2019 08:36 AM       Lab Results   Component Value Date/Time    SODIUM 133 (L) 03/18/2019 11:20 AM    POTASSIUM 4.2 03/18/2019 11:20 AM    CHLORIDE 98 03/18/2019 11:20 AM    CO2 29 03/18/2019 11:20 AM    GLUCOSE 90 03/18/2019 11:20 AM    BUN 12 03/18/2019 11:20 AM    CREATININE 1.02 03/18/2019 11:20 AM    CREATININE 1.1 04/21/2009 03:50 PM    BUNCREATRAT 13 09/21/2016 09:21 AM     Lab Results   Component Value Date/Time    ALKPHOSPHAT 69 03/18/2019 11:20 AM    ASTSGOT 27 03/18/2019 11:20 AM    ALTSGPT 22 03/18/2019 11:20 AM    TBILIRUBIN 0.6 03/18/2019 11:20 AM      Lab Results   Component Value Date/Time    BNPBTYPENAT 61 04/30/2018 10:52 AM      Echocardiography Laboratory    CONCLUSIONS  Normal left ventricular systolic function.  Left ventricular ejection fraction is visually estimated to be 65%.  Normal diastolic function.  The right ventricle was normal in size and function.  Possible severe aortic insufficiency.  Diastolic flow reversal in the descending aorta.  Compared to the images of the prior study " done 10- -  there has   been progression of aortic regurgitation, previously mild.    Consider a transesophageal echocardiogram for better characterization   of aortic insufficiency.    These findings were communicated to the ordering physician and the   patient's cardiologist.     CANDI SIMS  Exam Date:         08/23/2017    Assessment:     1. Coronary artery disease due to calcified coronary lesion: Mildly cardiac catheterization in 2014     2. Essential hypertension     3. Nonrheumatic aortic valve insufficiency     4. Dyslipidemia         Medical Decision Making:  Today's Assessment / Status / Plan:     Mr. Sims is clinically stable.  He is asymptomatic from a cardiovascular standpoint.  His blood pressures have been under good control.  He does have significant aortic insufficiency but his LV size and BNP have been normal.  His lipid status is not under optimal control.  He has had difficulty with atorvastatin and rosuvastatin in the past.  We will try increasing pravastatin to 80 mg daily.  He will have lab work in about 6 weeks.  We will reevaluate his aortic insufficiency in about 6 months and he will follow-up post.      John Lao, KYLEO.  [id number] [address]  VIA In Basket

## 2019-05-24 NOTE — PROGRESS NOTES
Chief Complaint   Patient presents with   • Coronary Artery Disease       Subjective:   Edgardo Sims is a 63 y.o. male who presents today today for followup of his hypertension, hyperlipidemia and abnormal myocardial perfusion scan. He underwent cardiac catheterization in 2014 and had minimal plaque. 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    He has had no chest discomfort, dyspnea, edema, palpitations or lightheadedness.    His blood pressures have been running about 120/60-70.  He feels it was up today because we had a patient had a seizure in the waiting room.    Past Medical History:   Diagnosis Date   • Abnormal myocardial perfusion study 1/15/2014   • Aortic insufficiency 7/5/2011   • Arthritis     RA, and osteo   • Bronchitis    • CAD (coronary artery disease) mild plaque at cath in 2/14 12/5/2014   • Cancer (HCC)     skin   • Chronic use of opiate drugs therapeutic purposes 8/12/2017   • Dental disorder     Upper dentures.   • Dyslipidemia 7/12/2011   • Heart burn    • Heart murmur    • Hiatus hernia syndrome    • High cholesterol    • HTN (hypertension) 7/5/2011   • Hypertension    • Indigestion    • Infectious disease    • Pain     RA   • Pain     hands, feet and jaw   • Rheumatoid arthritis(714.0)     severe   • Tachycardia 10/20/2014     Past Surgical History:   Procedure Laterality Date   • PB TRANSURETHRAL ELEC-SURG PBOSTATECTOM  4/1/2019    Procedure: BIPOLAR TRANSURETHRAL RESECTION OF PROSTATE;  Surgeon: Jose Romo M.D.;  Location: [facility];  Service: Urology   • CYSTOSCOPY  4/1/2019    Procedure: CYSTOSCOPY;  Surgeon: Jose Romo M.D.;  Location: SURGERY [facility];  Service: Urology   • URETHROTOMY INTERNAL  4/1/2019    Procedure: DIRECT VISION INTERNAL URETHROTOMY;  Surgeon: Jose Romo M.D.;  Location: SURGERY [facility];  Service: Urology   • KNEE REVISION TOTAL Left 8/3/2018    Procedure: KNEE REVISION TOTAL;   Surgeon: Michael Rodriguez M.D.;  Location: SURGERY [facility];  Service: Orthopedics   • CYSTOSCOPY  5/16/2018    Procedure: CYSTOSCOPY-CLOT EVAC;  Surgeon: Jose Romo M.D.;  Location: SURGERY [facility];  Service: Urology   • TRANS URETHRAL RESECTION BLADDER  5/16/2018    Procedure: TRANS URETHRAL RESECTION BLADDER;  Surgeon: Jose Romo M.D.;  Location: SURGERY [facility];  Service: Urology   • RECOVERY  2/3/2014    Performed by Cath-Recovery Surgery at SURGERY SAME DAY [facility]   • HIP ARTHROPLASTY TOTAL  5/31/2013    Performed by Burak Valles M.D. at SURGERY [facility]   • ROTATOR CUFF REPAIR Right 2011    x 2   • KNEE ARTHROPLASTY TOTAL  6/1/2009    LEFT-Performed by YONATAN HINSON at SURGERY [facility]   • VEIN LIGATION RADIO FREQUENCY  12/8/2008    LEFT leg-Performed by DEREJE MCCULLOUGH at SURGERY SAME DAY [facility]   • HIP ARTHROPLASTY TOTAL  6/20/08    Performed by YONATAN HINSON at SURGERY [facility]   • LUMBAR LAMINECTOMY DISKECTOMY  2000   • TMJ RECONSTRUCTION BILATERAL  1994   • ANKLE ORIF Right    • KNEE ARTHROSCOPY Left    • VEIN STRIPPING Left      Family History   Problem Relation Age of Onset   • Hypertension Mother      Social History     Social History   • Marital status:      Spouse name: N/A   • Number of children: N/A   • Years of education: N/A     Occupational History   • Not on file.     Social History Main Topics   • Smoking status: Never Smoker   • Smokeless tobacco: Never Used   • Alcohol use 1.8 oz/week     3 Cans of beer per week      Comment: 3 per week   • Drug use: No   • Sexual activity: Yes     Partners: Female     Other Topics Concern   •  Service No   • Blood Transfusions No   • Caffeine Concern No   • Occupational Exposure No   • Hobby Hazards Yes     rides motorcyle   • Sleep Concern No   • Stress Concern No   • Weight Concern Yes   • Special Diet Yes     does not eat red meat    • Back Care  Yes   • Exercise Yes   • Bike Helmet Yes   • Seat Belt Yes   • Self-Exams Yes     Social History Narrative   • No narrative on file     Allergies   Allergen Reactions   • Crestor [Rosuvastatin Calcium] Myalgia   • Penicillins Hives, Itching and Vomiting   • Rocephin [Ceftriaxone] Rash     Redness and flushing all over body     Outpatient Encounter Prescriptions as of 5/24/2019   Medication Sig Dispense Refill   • [START ON 7/18/2019] oxyCODONE-acetaminophen (PERCOCET-10)  MG Tab Take 1-2 Tabs by mouth every four hours as needed for Severe Pain for up to 30 days. 150 Tab 0   • pravastatin (PRAVACHOL) 40 MG tablet TAKE 1 TABLET BY MOUTH EVERY DAY 90 Tab 0   • meloxicam (MOBIC) 7.5 MG Tab TAKE 1 TABLET BY MOUTH EVERY DAY 90 Tab 0   • ezetimibe (ZETIA) 10 MG Tab Take 10 mg by mouth every evening.     • metoprolol (LOPRESSOR) 50 MG Tab Take 1 Tab by mouth 2 times a day. 60 Tab 1   • predniSONE (DELTASONE) 1 MG Tab TAKE 2 TABLETS BY MOUTH EVERY  Tab 0   • PARoxetine (PAXIL) 20 MG Tab TAKE 1 TABLET BY MOUTH EVERY DAY 90 Tab 3   • lisinopril (PRINIVIL) 10 MG Tab TAKE 1 TABLET BY MOUTH EVERY DAY 90 Tab 3   • cyclobenzaprine (FLEXERIL) 10 MG Tab TAKE ONE TABLET BY MOUTH THREE TIMES DAILY AS NEEDED FOR MILD PAIN 90 Tab 5   • Tofacitinib Citrate (XELJANZ) 5 MG Tab Take 5 mg by mouth 2 Times a Day. 60 Tab 0   • CALCIUM-VITAMIN D PO Take 1 Tab by mouth 2 Times a Day.     • ascorbic acid (ASCORBIC ACID) 500 MG Tab Take 500 mg by mouth every day.     • Cyanocobalamin (VITAMIN B-12) 5000 MCG TABLET DISPERSIBLE Take 1 Tab by mouth every day.     • niacin 500 MG Tab Take 500 mg by mouth every day.     • Zinc 50 MG Cap Take 50 mg by mouth every day.     • omeprazole (PRILOSEC) 20 MG CPDR Take 20 mg by mouth every day.     • diazePAM (VALIUM) 5 MG Tab Take 5 mg by mouth every 6 hours as needed for Anxiety.       No facility-administered encounter medications on file as of 5/24/2019.      ROS     Objective:   /80 (BP  "Location: Left arm, Patient Position: Sitting, BP Cuff Size: Adult)   Pulse 70   Ht 1.778 m (5' 10\")   Wt 93.4 kg (206 lb)   SpO2 98%   BMI 29.56 kg/m²     Physical Exam   Constitutional: He appears well-developed and well-nourished.   Neck: No JVD present.   Cardiovascular: Normal rate and regular rhythm.    No murmur heard.  Pulmonary/Chest: Effort normal and breath sounds normal. He has no rales.   Abdominal: Soft. There is no tenderness.   Musculoskeletal: He exhibits no edema.     Lab Results   Component Value Date/Time    CHOLSTRLTOT 140 02/19/2019 08:36 AM    LDL 74 02/19/2019 08:36 AM    HDL 45 02/19/2019 08:36 AM    TRIGLYCERIDE 105 02/19/2019 08:36 AM       Lab Results   Component Value Date/Time    SODIUM 133 (L) 03/18/2019 11:20 AM    POTASSIUM 4.2 03/18/2019 11:20 AM    CHLORIDE 98 03/18/2019 11:20 AM    CO2 29 03/18/2019 11:20 AM    GLUCOSE 90 03/18/2019 11:20 AM    BUN 12 03/18/2019 11:20 AM    CREATININE 1.02 03/18/2019 11:20 AM    CREATININE 1.1 04/21/2009 03:50 PM    BUNCREATRAT 13 09/21/2016 09:21 AM     Lab Results   Component Value Date/Time    ALKPHOSPHAT 69 03/18/2019 11:20 AM    ASTSGOT 27 03/18/2019 11:20 AM    ALTSGPT 22 03/18/2019 11:20 AM    TBILIRUBIN 0.6 03/18/2019 11:20 AM      Lab Results   Component Value Date/Time    BNPBTYPENAT 61 04/30/2018 10:52 AM      Echocardiography Laboratory    CONCLUSIONS  Normal left ventricular systolic function.  Left ventricular ejection fraction is visually estimated to be 65%.  Normal diastolic function.  The right ventricle was normal in size and function.  Possible severe aortic insufficiency.  Diastolic flow reversal in the descending aorta.  Compared to the images of the prior study done 10- -  there has   been progression of aortic regurgitation, previously mild.    Consider a transesophageal echocardiogram for better characterization   of aortic insufficiency.    These findings were communicated to the ordering physician and the "   patient's cardiologist.     CANDI SIMS  Exam Date:         08/23/2017    Assessment:     1. Coronary artery disease due to calcified coronary lesion: Mildly cardiac catheterization in 2014     2. Essential hypertension     3. Nonrheumatic aortic valve insufficiency     4. Dyslipidemia         Medical Decision Making:  Today's Assessment / Status / Plan:     Mr. Sims is clinically stable.  He is asymptomatic from a cardiovascular standpoint.  His blood pressures have been under good control.  He does have significant aortic insufficiency but his LV size and BNP have been normal.  His lipid status is not under optimal control.  He has had difficulty with atorvastatin and rosuvastatin in the past.  We will try increasing pravastatin to 80 mg daily.  He will have lab work in about 6 weeks.  We will reevaluate his aortic insufficiency in about 6 months with an echocardiogram and he will follow-up post.

## 2019-06-16 DIAGNOSIS — I10 ESSENTIAL HYPERTENSION: Primary | ICD-10-CM

## 2019-06-18 RX ORDER — METOPROLOL TARTRATE 50 MG/1
TABLET, FILM COATED ORAL
Qty: 180 TAB | Refills: 3 | Status: SHIPPED | OUTPATIENT
Start: 2019-06-18 | End: 2020-08-14 | Stop reason: SDUPTHER

## 2019-07-11 DIAGNOSIS — E78.5 DYSLIPIDEMIA: Primary | ICD-10-CM

## 2019-07-11 RX ORDER — EZETIMIBE 10 MG/1
TABLET ORAL
Qty: 90 TAB | Refills: 3 | Status: SHIPPED | OUTPATIENT
Start: 2019-07-11 | End: 2019-12-04

## 2019-08-08 ENCOUNTER — APPOINTMENT (OUTPATIENT)
Dept: MEDICAL GROUP | Facility: LAB | Age: 64
End: 2019-08-08
Payer: MEDICARE

## 2019-08-14 ENCOUNTER — OFFICE VISIT (OUTPATIENT)
Dept: MEDICAL GROUP | Facility: LAB | Age: 64
End: 2019-08-14
Payer: MEDICARE

## 2019-08-14 VITALS
HEART RATE: 64 BPM | HEIGHT: 70 IN | TEMPERATURE: 98 F | SYSTOLIC BLOOD PRESSURE: 150 MMHG | OXYGEN SATURATION: 99 % | WEIGHT: 210 LBS | DIASTOLIC BLOOD PRESSURE: 65 MMHG | BODY MASS INDEX: 30.06 KG/M2 | RESPIRATION RATE: 12 BRPM

## 2019-08-14 DIAGNOSIS — M05.9 RHEUMATOID ARTHRITIS WITH POSITIVE RHEUMATOID FACTOR, INVOLVING UNSPECIFIED SITE (HCC): ICD-10-CM

## 2019-08-14 DIAGNOSIS — R33.9 URINARY RETENTION: ICD-10-CM

## 2019-08-14 PROCEDURE — 99213 OFFICE O/P EST LOW 20 MIN: CPT | Performed by: INTERNAL MEDICINE

## 2019-08-14 RX ORDER — OXYCODONE AND ACETAMINOPHEN 10; 325 MG/1; MG/1
1-2 TABLET ORAL EVERY 4 HOURS PRN
Qty: 150 TAB | Refills: 0 | Status: SHIPPED | OUTPATIENT
Start: 2019-09-20 | End: 2019-08-14 | Stop reason: SDUPTHER

## 2019-08-14 RX ORDER — OXYCODONE AND ACETAMINOPHEN 10; 325 MG/1; MG/1
1-2 TABLET ORAL EVERY 4 HOURS PRN
Qty: 150 TAB | Refills: 0 | Status: SHIPPED | OUTPATIENT
Start: 2019-10-20 | End: 2019-11-13 | Stop reason: SDUPTHER

## 2019-08-14 RX ORDER — OXYCODONE AND ACETAMINOPHEN 10; 325 MG/1; MG/1
1-2 TABLET ORAL EVERY 4 HOURS PRN
Qty: 150 TAB | Refills: 0 | Status: SHIPPED | OUTPATIENT
Start: 2019-08-14 | End: 2019-08-14

## 2019-08-14 RX ORDER — OXYCODONE AND ACETAMINOPHEN 10; 325 MG/1; MG/1
1-2 TABLET ORAL EVERY 4 HOURS PRN
Qty: 150 TAB | Refills: 0 | Status: SHIPPED | OUTPATIENT
Start: 2019-08-21 | End: 2019-08-14 | Stop reason: SDUPTHER

## 2019-08-15 NOTE — PROGRESS NOTES
CC: Edgardo Sims is a 63 y.o. male is suffering from   Chief Complaint   Patient presents with   • Other     3 months follow up         SUBJECTIVE:  1. Rheumatoid arthritis with positive rheumatoid factor, involving unspecified site (HCC)  Ray is here for follow-up has a history of rheumatoid arthritis, is in need of urine drug screening because of narcotics    2. 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  Patient now is needing to self cath himself        Past social, family, history:   Social History     Tobacco Use   • Smoking status: Never Smoker   • Smokeless tobacco: Never Used   Substance Use Topics   • Alcohol use: Yes     Alcohol/week: 1.8 oz     Types: 3 Cans of beer per week     Comment: 3 per week   • Drug use: No         MEDICATIONS:    Current Outpatient Medications:   •  [START ON 10/20/2019] oxyCODONE-acetaminophen (PERCOCET-10)  MG Tab, Take 1-2 Tabs by mouth every four hours as needed for Severe Pain for up to 30 days., Disp: 150 Tab, Rfl: 0  •  meloxicam (MOBIC) 7.5 MG Tab, 1 tab po qday prn severe joint pin, taking in combination significantly increases the risk of gastriv ulcers, Disp: 90 Tab, Rfl: 0  •  predniSONE (DELTASONE) 1 MG Tab, TAKE 2 TABLETS BY MOUTH EVERY DAY, Disp: 180 Tab, Rfl: 0  •  ezetimibe (ZETIA) 10 MG Tab, TAKE 1 TABLET BY MOUTH EVERY DAY, Disp: 90 Tab, Rfl: 3  •  metoprolol (LOPRESSOR) 50 MG Tab, TAKE 1 TABLET BY MOUTH TWICE A DAY, Disp: 180 Tab, Rfl: 3  •  cyclobenzaprine (FLEXERIL) 10 MG Tab, TAKE ONE TABLET BY MOUTH THREE TIMES DAILY AS NEEDED FOR MILD PAIN, Disp: 90 Tab, Rfl: 5  •  pravastatin (PRAVACHOL) 80 MG tablet, Take 1 Tab by mouth every day., Disp: 30 Tab, Rfl: 11  •  ezetimibe (ZETIA) 10 MG Tab, Take 10 mg by mouth every evening., Disp: , Rfl:   •  PARoxetine (PAXIL) 20 MG Tab, TAKE 1 TABLET BY MOUTH EVERY DAY, Disp: 90 Tab, Rfl: 3  •  lisinopril (PRINIVIL) 10  MG Tab, TAKE 1 TABLET BY MOUTH EVERY DAY, Disp: 90 Tab, Rfl: 3  •  Tofacitinib Citrate (XELJANZ) 5 MG Tab, Take 5 mg by mouth 2 Times a Day., Disp: 60 Tab, Rfl: 0  •  CALCIUM-VITAMIN D PO, Take 1 Tab by mouth 2 Times a Day., Disp: , Rfl:   •  ascorbic acid (ASCORBIC ACID) 500 MG Tab, Take 500 mg by mouth every day., Disp: , Rfl:   •  Cyanocobalamin (VITAMIN B-12) 5000 MCG TABLET DISPERSIBLE, Take 1 Tab by mouth every day., Disp: , Rfl:   •  niacin 500 MG Tab, Take 500 mg by mouth every day., Disp: , Rfl:   •  Zinc 50 MG Cap, Take 50 mg by mouth every day., Disp: , Rfl:   •  omeprazole (PRILOSEC) 20 MG CPDR, Take 20 mg by mouth every day., Disp: , Rfl:     PROBLEMS:  Patient Active Problem List    Diagnosis Date Noted   • Acute pyelonephritis 04/30/2018     Priority: High   • Neurogenic bladder 01/26/2019     Priority: Medium   • Failed total knee, left, subsequent encounter 08/03/2018   • Bladder outlet obstruction 05/01/2018   • Leukocytosis 04/30/2018   • Lactic acidosis 04/30/2018   • Idiopathic acute pancreatitis 04/30/2018   • Chronic use of opiate drugs therapeutic purposes 08/12/2017   • Depression 07/13/2015   • Anxiety 03/31/2015   • Coronary artery disease due to calcified coronary lesion: Mildly cardiac catheterization in 2014 12/05/2014   • Tachycardia 10/20/2014   • Abnormal myocardial perfusion study 01/15/2014   • Osteoarthrosis, unspecified whether generalized or localized, pelvic region and thigh 05/31/2013   • Dyslipidemia 07/12/2011   • Aortic insufficiency 07/05/2011   • Essential hypertension 07/05/2011   • Rheumatoid arthritis (HCC) 05/05/2009   • Hypogonadism male 05/05/2009       REVIEW OF SYSTEMS:  Gen.:  No Nausea, Vomiting, fever, Chills.  Heart: No chest pain.  Lungs:  No shortness of Breath.  Psychological: Kian unusual Anxiety depression     PHYSICAL EXAM   Constitutional: Alert, cooperative, not in acute distress.  Cardiovascular:  Rate Rhythm is regular without murmurs rubs clicks.  "    Thorax & Lungs: Clear to auscultation, no wheezing, rhonchi, or rales  HENT: Normocephalic, Atraumatic.  Eyes: PERRLA, EOMI, Conjunctiva normal.   Neck: Trachia is midline no swelling of the thyroid.   Lymphatic: No lymphadenopathy noted.   Neurologic: Alert & oriented x 3, cranial nerves II through XII are intact, Normal motor function, Normal sensory function, No focal deficits noted.   Psychiatric: Affect normal, Judgment normal, Mood normal.     VITAL SIGNS:/65   Pulse 64   Temp 36.7 °C (98 °F) (Temporal)   Resp 12   Ht 1.778 m (5' 10\")   Wt 95.3 kg (210 lb)   SpO2 99%   BMI 30.13 kg/m²     Labs: Reviewed    Assessment:                                                     Plan:    1. Rheumatoid arthritis with positive rheumatoid factor, involving unspecified site (HCC)  Continue Percocet state drug task force urine drug screen completed  - oxyCODONE-acetaminophen (PERCOCET-10)  MG Tab; Take 1-2 Tabs by mouth every four hours as needed for Severe Pain for up to 30 days.  Dispense: 150 Tab; Refill: 0  - Pain Management Screen; Future    2. Urinary retention  Urine retention followed by urology        "

## 2019-08-19 ENCOUNTER — APPOINTMENT (OUTPATIENT)
Dept: RHEUMATOLOGY | Facility: MEDICAL CENTER | Age: 64
End: 2019-08-19
Payer: MEDICARE

## 2019-08-20 DIAGNOSIS — M05.9 RHEUMATOID ARTHRITIS WITH POSITIVE RHEUMATOID FACTOR, INVOLVING UNSPECIFIED SITE (HCC): ICD-10-CM

## 2019-08-20 RX ORDER — DIAZEPAM 5 MG/1
TABLET ORAL
Qty: 60 TAB | Refills: 1 | Status: CANCELLED
Start: 2019-08-20

## 2019-08-20 NOTE — TELEPHONE ENCOUNTER
Was the patient seen in the last year in this department? Yes8/14/2019    Does patient have an active prescription for medications requested? No     Received Request Via: Patient

## 2019-08-21 ENCOUNTER — TELEPHONE (OUTPATIENT)
Dept: RHEUMATOLOGY | Facility: MEDICAL CENTER | Age: 64
End: 2019-08-21

## 2019-08-21 NOTE — TELEPHONE ENCOUNTER
Please notify patient that we refilled 1 month of his Medrol, patient states he takes Medrol because he has adrenal insufficiency, patient has not had endocrinology evaluation for adrenal insufficiency, referral to endocrine was submitted February 2019, does not look like patient followed through.  Please notify patient that we will not be able to refill Medrol any further until we can see endocrinology to confirm adrenal insufficiency.

## 2019-08-22 ENCOUNTER — HOSPITAL ENCOUNTER (OUTPATIENT)
Dept: LAB | Facility: MEDICAL CENTER | Age: 64
End: 2019-08-22
Attending: INTERNAL MEDICINE
Payer: MEDICARE

## 2019-08-22 DIAGNOSIS — I25.10 CORONARY ARTERY DISEASE DUE TO CALCIFIED CORONARY LESION: ICD-10-CM

## 2019-08-22 DIAGNOSIS — M05.9 RHEUMATOID ARTHRITIS WITH POSITIVE RHEUMATOID FACTOR, INVOLVING UNSPECIFIED SITE (HCC): ICD-10-CM

## 2019-08-22 DIAGNOSIS — I10 ESSENTIAL HYPERTENSION: ICD-10-CM

## 2019-08-22 DIAGNOSIS — Z79.622 LONG-TERM CURRENT USE OF TOFACITINIB: ICD-10-CM

## 2019-08-22 DIAGNOSIS — Z79.52 LONG TERM CURRENT USE OF SYSTEMIC STEROIDS: ICD-10-CM

## 2019-08-22 DIAGNOSIS — Z79.1 ENCOUNTER FOR LONG-TERM (CURRENT) USE OF NSAIDS: ICD-10-CM

## 2019-08-22 DIAGNOSIS — I25.84 CORONARY ARTERY DISEASE DUE TO CALCIFIED CORONARY LESION: ICD-10-CM

## 2019-08-22 DIAGNOSIS — E78.5 DYSLIPIDEMIA: ICD-10-CM

## 2019-08-22 LAB
ALBUMIN SERPL BCP-MCNC: 4.5 G/DL (ref 3.2–4.9)
ALBUMIN SERPL BCP-MCNC: 4.6 G/DL (ref 3.2–4.9)
ALBUMIN/GLOB SERPL: 1.8 G/DL
ALBUMIN/GLOB SERPL: 1.9 G/DL
ALP SERPL-CCNC: 65 U/L (ref 30–99)
ALP SERPL-CCNC: 66 U/L (ref 30–99)
ALT SERPL-CCNC: 30 U/L (ref 2–50)
ALT SERPL-CCNC: 31 U/L (ref 2–50)
ANION GAP SERPL CALC-SCNC: 4 MMOL/L (ref 0–11.9)
ANION GAP SERPL CALC-SCNC: 7 MMOL/L (ref 0–11.9)
AST SERPL-CCNC: 29 U/L (ref 12–45)
AST SERPL-CCNC: 30 U/L (ref 12–45)
BASOPHILS # BLD AUTO: 0.9 % (ref 0–1.8)
BASOPHILS # BLD: 0.1 K/UL (ref 0–0.12)
BILIRUB SERPL-MCNC: 0.9 MG/DL (ref 0.1–1.5)
BILIRUB SERPL-MCNC: 0.9 MG/DL (ref 0.1–1.5)
BUN SERPL-MCNC: 13 MG/DL (ref 8–22)
BUN SERPL-MCNC: 14 MG/DL (ref 8–22)
CALCIUM SERPL-MCNC: 10 MG/DL (ref 8.5–10.5)
CALCIUM SERPL-MCNC: 9.8 MG/DL (ref 8.5–10.5)
CHLORIDE SERPL-SCNC: 102 MMOL/L (ref 96–112)
CHLORIDE SERPL-SCNC: 102 MMOL/L (ref 96–112)
CHOLEST SERPL-MCNC: 136 MG/DL (ref 100–199)
CO2 SERPL-SCNC: 29 MMOL/L (ref 20–33)
CO2 SERPL-SCNC: 30 MMOL/L (ref 20–33)
CREAT SERPL-MCNC: 1.01 MG/DL (ref 0.5–1.4)
CREAT SERPL-MCNC: 1.02 MG/DL (ref 0.5–1.4)
EOSINOPHIL # BLD AUTO: 0.37 K/UL (ref 0–0.51)
EOSINOPHIL NFR BLD: 3.2 % (ref 0–6.9)
ERYTHROCYTE [DISTWIDTH] IN BLOOD BY AUTOMATED COUNT: 51.6 FL (ref 35.9–50)
ERYTHROCYTE [SEDIMENTATION RATE] IN BLOOD BY WESTERGREN METHOD: 1 MM/HOUR (ref 0–20)
GLOBULIN SER CALC-MCNC: 2.4 G/DL (ref 1.9–3.5)
GLOBULIN SER CALC-MCNC: 2.5 G/DL (ref 1.9–3.5)
GLUCOSE SERPL-MCNC: 80 MG/DL (ref 65–99)
GLUCOSE SERPL-MCNC: 80 MG/DL (ref 65–99)
HCT VFR BLD AUTO: 49.3 % (ref 42–52)
HDLC SERPL-MCNC: 45 MG/DL
HGB BLD-MCNC: 15.5 G/DL (ref 14–18)
IMM GRANULOCYTES # BLD AUTO: 0.13 K/UL (ref 0–0.11)
IMM GRANULOCYTES NFR BLD AUTO: 1.1 % (ref 0–0.9)
LDLC SERPL CALC-MCNC: 73 MG/DL
LYMPHOCYTES # BLD AUTO: 1.91 K/UL (ref 1–4.8)
LYMPHOCYTES NFR BLD: 16.6 % (ref 22–41)
MCH RBC QN AUTO: 29.4 PG (ref 27–33)
MCHC RBC AUTO-ENTMCNC: 31.4 G/DL (ref 33.7–35.3)
MCV RBC AUTO: 93.5 FL (ref 81.4–97.8)
MONOCYTES # BLD AUTO: 1.52 K/UL (ref 0–0.85)
MONOCYTES NFR BLD AUTO: 13.2 % (ref 0–13.4)
NEUTROPHILS # BLD AUTO: 7.51 K/UL (ref 1.82–7.42)
NEUTROPHILS NFR BLD: 65 % (ref 44–72)
NRBC # BLD AUTO: 0 K/UL
NRBC BLD-RTO: 0 /100 WBC
PLATELET # BLD AUTO: 234 K/UL (ref 164–446)
PMV BLD AUTO: 10.1 FL (ref 9–12.9)
POTASSIUM SERPL-SCNC: 4.7 MMOL/L (ref 3.6–5.5)
POTASSIUM SERPL-SCNC: 4.7 MMOL/L (ref 3.6–5.5)
PROT SERPL-MCNC: 6.9 G/DL (ref 6–8.2)
PROT SERPL-MCNC: 7.1 G/DL (ref 6–8.2)
RBC # BLD AUTO: 5.27 M/UL (ref 4.7–6.1)
SODIUM SERPL-SCNC: 136 MMOL/L (ref 135–145)
SODIUM SERPL-SCNC: 138 MMOL/L (ref 135–145)
TRIGL SERPL-MCNC: 91 MG/DL (ref 0–149)
WBC # BLD AUTO: 11.5 K/UL (ref 4.8–10.8)

## 2019-08-22 PROCEDURE — 36415 COLL VENOUS BLD VENIPUNCTURE: CPT

## 2019-08-22 PROCEDURE — 85025 COMPLETE CBC W/AUTO DIFF WBC: CPT

## 2019-08-22 PROCEDURE — 85652 RBC SED RATE AUTOMATED: CPT

## 2019-08-22 PROCEDURE — 80061 LIPID PANEL: CPT

## 2019-08-22 PROCEDURE — 80053 COMPREHEN METABOLIC PANEL: CPT | Mod: 91

## 2019-08-22 PROCEDURE — 80053 COMPREHEN METABOLIC PANEL: CPT

## 2019-08-28 ENCOUNTER — OFFICE VISIT (OUTPATIENT)
Dept: RHEUMATOLOGY | Facility: MEDICAL CENTER | Age: 64
End: 2019-08-28
Payer: MEDICARE

## 2019-08-28 VITALS
HEART RATE: 74 BPM | WEIGHT: 210.98 LBS | TEMPERATURE: 97.5 F | DIASTOLIC BLOOD PRESSURE: 70 MMHG | RESPIRATION RATE: 14 BRPM | SYSTOLIC BLOOD PRESSURE: 142 MMHG | OXYGEN SATURATION: 97 % | BODY MASS INDEX: 30.27 KG/M2

## 2019-08-28 DIAGNOSIS — I25.10 CORONARY ARTERY DISEASE DUE TO CALCIFIED CORONARY LESION: ICD-10-CM

## 2019-08-28 DIAGNOSIS — I25.84 CORONARY ARTERY DISEASE DUE TO CALCIFIED CORONARY LESION: ICD-10-CM

## 2019-08-28 DIAGNOSIS — M05.9 RHEUMATOID ARTHRITIS WITH POSITIVE RHEUMATOID FACTOR, INVOLVING UNSPECIFIED SITE (HCC): ICD-10-CM

## 2019-08-28 DIAGNOSIS — Z79.622 LONG-TERM CURRENT USE OF TOFACITINIB: ICD-10-CM

## 2019-08-28 DIAGNOSIS — I10 ESSENTIAL HYPERTENSION: ICD-10-CM

## 2019-08-28 DIAGNOSIS — Z79.1 ENCOUNTER FOR LONG-TERM (CURRENT) USE OF NSAIDS: ICD-10-CM

## 2019-08-28 DIAGNOSIS — Z79.52 LONG TERM CURRENT USE OF SYSTEMIC STEROIDS: ICD-10-CM

## 2019-08-28 PROCEDURE — 99214 OFFICE O/P EST MOD 30 MIN: CPT | Performed by: INTERNAL MEDICINE

## 2019-09-06 ENCOUNTER — HOSPITAL ENCOUNTER (OUTPATIENT)
Dept: LAB | Facility: MEDICAL CENTER | Age: 64
End: 2019-09-06
Attending: INTERNAL MEDICINE
Payer: MEDICARE

## 2019-09-06 DIAGNOSIS — Z79.622 LONG-TERM CURRENT USE OF TOFACITINIB: ICD-10-CM

## 2019-09-06 DIAGNOSIS — Z79.52 LONG TERM CURRENT USE OF SYSTEMIC STEROIDS: ICD-10-CM

## 2019-09-06 DIAGNOSIS — M05.9 RHEUMATOID ARTHRITIS WITH POSITIVE RHEUMATOID FACTOR, INVOLVING UNSPECIFIED SITE (HCC): ICD-10-CM

## 2019-09-06 LAB
HBV CORE IGM SER QL: NEGATIVE
HBV SURFACE AG SER QL: NEGATIVE
HCV AB SER QL: NEGATIVE

## 2019-09-06 PROCEDURE — 36415 COLL VENOUS BLD VENIPUNCTURE: CPT

## 2019-09-06 PROCEDURE — 87340 HEPATITIS B SURFACE AG IA: CPT | Mod: GA

## 2019-09-06 PROCEDURE — 86705 HEP B CORE ANTIBODY IGM: CPT

## 2019-09-06 PROCEDURE — 86480 TB TEST CELL IMMUN MEASURE: CPT

## 2019-09-06 PROCEDURE — 86803 HEPATITIS C AB TEST: CPT

## 2019-09-08 LAB
GAMMA INTERFERON BACKGROUND BLD IA-ACNC: 0.02 IU/ML
M TB IFN-G BLD-IMP: NEGATIVE
M TB IFN-G CD4+ BCKGRND COR BLD-ACNC: 0 IU/ML
MITOGEN IGNF BCKGRD COR BLD-ACNC: >10 IU/ML
QFT TB2 - NIL TBQ2: -0.01 IU/ML

## 2019-09-15 DIAGNOSIS — E29.1 HYPOGONADISM MALE: ICD-10-CM

## 2019-09-16 RX ORDER — TESTOSTERONE CYPIONATE 200 MG/ML
INJECTION, SOLUTION INTRAMUSCULAR
Qty: 6 ML | Refills: 0 | Status: SHIPPED
Start: 2019-09-16 | End: 2019-10-09 | Stop reason: SDUPTHER

## 2019-09-16 NOTE — TELEPHONE ENCOUNTER
Was the patient seen in the last year in this department? Yes lov 8/14/19    Does patient have an active prescription for medications requested? No     Received Request Via: Pharmacy

## 2019-09-28 DIAGNOSIS — I10 ESSENTIAL HYPERTENSION: ICD-10-CM

## 2019-09-30 RX ORDER — LISINOPRIL 10 MG/1
TABLET ORAL
Qty: 90 TAB | Refills: 3 | Status: SHIPPED | OUTPATIENT
Start: 2019-09-30 | End: 2020-09-29 | Stop reason: SDUPTHER

## 2019-10-09 ENCOUNTER — OFFICE VISIT (OUTPATIENT)
Dept: MEDICAL GROUP | Facility: LAB | Age: 64
End: 2019-10-09
Payer: MEDICARE

## 2019-10-09 VITALS
WEIGHT: 208 LBS | RESPIRATION RATE: 12 BRPM | BODY MASS INDEX: 29.78 KG/M2 | HEART RATE: 66 BPM | HEIGHT: 70 IN | OXYGEN SATURATION: 95 % | TEMPERATURE: 97.9 F | DIASTOLIC BLOOD PRESSURE: 80 MMHG | SYSTOLIC BLOOD PRESSURE: 145 MMHG

## 2019-10-09 DIAGNOSIS — F32.A DEPRESSION, UNSPECIFIED DEPRESSION TYPE: Chronic | ICD-10-CM

## 2019-10-09 DIAGNOSIS — I35.1 NONRHEUMATIC AORTIC VALVE INSUFFICIENCY: ICD-10-CM

## 2019-10-09 DIAGNOSIS — F41.9 ANXIETY: ICD-10-CM

## 2019-10-09 DIAGNOSIS — M05.9 RHEUMATOID ARTHRITIS WITH POSITIVE RHEUMATOID FACTOR, INVOLVING UNSPECIFIED SITE (HCC): ICD-10-CM

## 2019-10-09 DIAGNOSIS — Z23 NEED FOR VACCINATION: ICD-10-CM

## 2019-10-09 DIAGNOSIS — E29.1 HYPOGONADISM MALE: ICD-10-CM

## 2019-10-09 PROBLEM — T84.093D FAILED TOTAL KNEE, LEFT, SUBSEQUENT ENCOUNTER: Status: RESOLVED | Noted: 2018-08-03 | Resolved: 2019-10-09

## 2019-10-09 PROBLEM — N10 ACUTE PYELONEPHRITIS: Status: RESOLVED | Noted: 2018-04-30 | Resolved: 2019-10-09

## 2019-10-09 PROCEDURE — G0008 ADMIN INFLUENZA VIRUS VAC: HCPCS | Performed by: INTERNAL MEDICINE

## 2019-10-09 PROCEDURE — 90686 IIV4 VACC NO PRSV 0.5 ML IM: CPT | Performed by: INTERNAL MEDICINE

## 2019-10-09 PROCEDURE — 99214 OFFICE O/P EST MOD 30 MIN: CPT | Mod: 25 | Performed by: INTERNAL MEDICINE

## 2019-10-09 RX ORDER — TESTOSTERONE CYPIONATE 200 MG/ML
200 INJECTION, SOLUTION INTRAMUSCULAR ONCE
Qty: 10 ML | Refills: 1 | Status: SHIPPED | OUTPATIENT
Start: 2019-10-09 | End: 2019-10-09

## 2019-10-09 RX ORDER — PAROXETINE HYDROCHLORIDE 20 MG/1
20 TABLET, FILM COATED ORAL
Qty: 90 TAB | Refills: 3 | Status: SHIPPED | OUTPATIENT
Start: 2019-10-09 | End: 2020-09-29 | Stop reason: SDUPTHER

## 2019-10-09 ASSESSMENT — PATIENT HEALTH QUESTIONNAIRE - PHQ9: CLINICAL INTERPRETATION OF PHQ2 SCORE: 0

## 2019-10-09 ASSESSMENT — ENCOUNTER SYMPTOMS: GENERAL WELL-BEING: FAIR

## 2019-10-09 ASSESSMENT — ACTIVITIES OF DAILY LIVING (ADL): BATHING_REQUIRES_ASSISTANCE: 0

## 2019-10-09 NOTE — PROGRESS NOTES
Chief Complaint   Patient presents with   • Annual Exam         HPI:  Kwasi is a 63 y.o. here for Medicare Annual Wellness Visit    Also discussed today her issues associate with his rheumatoid arthritis    Patient Active Problem List    Diagnosis Date Noted   • Acute pyelonephritis 04/30/2018     Priority: High   • Neurogenic bladder 01/26/2019     Priority: Medium   • Failed total knee, left, subsequent encounter 08/03/2018   • Bladder outlet obstruction 05/01/2018   • Leukocytosis 04/30/2018   • Lactic acidosis 04/30/2018   • Idiopathic acute pancreatitis 04/30/2018   • Chronic use of opiate drugs therapeutic purposes 08/12/2017   • Depression 07/13/2015   • Anxiety 03/31/2015   • Coronary artery disease due to calcified coronary lesion: Mildly cardiac catheterization in 2014 12/05/2014   • Tachycardia 10/20/2014   • Abnormal myocardial perfusion study 01/15/2014   • Osteoarthrosis, unspecified whether generalized or localized, pelvic region and thigh 05/31/2013   • Dyslipidemia 07/12/2011   • Aortic insufficiency 07/05/2011   • Essential hypertension 07/05/2011   • Rheumatoid arthritis (HCC) 05/05/2009   • Hypogonadism male 05/05/2009       Current Outpatient Medications   Medication Sig Dispense Refill   • lisinopril (PRINIVIL) 10 MG Tab TAKE 1 TABLET BY MOUTH EVERY DAY 90 Tab 3   • testosterone cypionate (DEPO-TESTOSTERONE) 200 MG/ML Solution injection INJECT 1ML INTRAMUSCULARLY EVERY 14DAYS [id number] 6 mL 0   • [START ON 10/20/2019] oxyCODONE-acetaminophen (PERCOCET-10)  MG Tab Take 1-2 Tabs by mouth every four hours as needed for Severe Pain for up to 30 days. 150 Tab 0   • meloxicam (MOBIC) 7.5 MG Tab 1 tab po qday prn severe joint pin, taking in combination significantly increases the risk of gastriv ulcers 90 Tab 0   • predniSONE (DELTASONE) 1 MG Tab TAKE 2 TABLETS BY MOUTH EVERY  Tab 0   • metoprolol (LOPRESSOR) 50 MG Tab TAKE 1 TABLET BY MOUTH TWICE A  Tab 3   • cyclobenzaprine  (FLEXERIL) 10 MG Tab TAKE ONE TABLET BY MOUTH THREE TIMES DAILY AS NEEDED FOR MILD PAIN 90 Tab 5   • pravastatin (PRAVACHOL) 80 MG tablet Take 1 Tab by mouth every day. 30 Tab 11   • ezetimibe (ZETIA) 10 MG Tab Take 10 mg by mouth every evening.     • PARoxetine (PAXIL) 20 MG Tab TAKE 1 TABLET BY MOUTH EVERY DAY 90 Tab 3   • Tofacitinib Citrate (XELJANZ) 5 MG Tab Take 5 mg by mouth 2 Times a Day. 60 Tab 0   • CALCIUM-VITAMIN D PO Take 1 Tab by mouth 2 Times a Day.     • ascorbic acid (ASCORBIC ACID) 500 MG Tab Take 500 mg by mouth every day.     • Cyanocobalamin (VITAMIN B-12) 5000 MCG TABLET DISPERSIBLE Take 1 Tab by mouth every day.     • niacin 500 MG Tab Take 500 mg by mouth every day.     • Zinc 50 MG Cap Take 50 mg by mouth every day.     • omeprazole (PRILOSEC) 20 MG CPDR Take 20 mg by mouth every day.     • meloxicam (MOBIC) 7.5 MG Tab TAKE 1 TABLET BY MOUTH EVERY DAY 30 Tab 0   • predniSONE (DELTASONE) 1 MG Tab TAKE 2 TABLETS BY MOUTH EVERY DAY 60 Tab 0   • ezetimibe (ZETIA) 10 MG Tab TAKE 1 TABLET BY MOUTH EVERY DAY (Patient not taking: Reported on 8/28/2019) 90 Tab 3     No current facility-administered medications for this visit.         Patient is taking medications as noted in medication list.  Current supplements as per medication list.     Allergies: Crestor [rosuvastatin calcium]; Penicillins; and Rocephin [ceftriaxone]    Current social contact/activities: ride motorcycles, play with grand kids, shooting firearms      Is patient current with immunizations? No, due for FLU. Patient is interested in receiving FLU today.    He  reports that he has never smoked. He has never used smokeless tobacco. He reports that he drinks about 1.8 oz of alcohol per week. He reports that he does not use drugs.  Counseling given: Not Answered        DPA/Advanced directive: Patient has Advanced Directive on file.     ROS:    Gait: Uses no assistive device   Ostomy: No   Other tubes: No   Amputations: No   Chronic  oxygen use No   Last eye exam 2019   Wears hearing aids: No   : Denies any urinary leakage during the last 6 months      Screening:         Depression Screening    Little interest or pleasure in doing things?  0 - not at all  Feeling down, depressed, or hopeless? 0 - not at all  Patient Health Questionnaire Score: 0    If depressive symptoms identified deferred to follow up visit unless specifically addressed in assessment and plan.    Interpretation of PHQ-9 Total Score   Score Severity   1-4 No Depression   5-9 Mild Depression   10-14 Moderate Depression   15-19 Moderately Severe Depression   20-27 Severe Depression    Screening for Cognitive Impairment    Three Minute Recall (village, kitchen, baby)  2/3    Iván clock face with all 12 numbers and set the hands to show 10 past 10.  Yes    If cognitive concerns identified, deferred for follow up unless specifically addressed in assessment and plan.    Fall Risk Assessment    Has the patient had two or more falls in the last year or any fall with injury in the last year?  No  If fall risk identified, deferred for follow up unless specifically addressed in assessment and plan.    Safety Assessment    Throw rugs on floor.  Yes  Handrails on all stairs.  No  Good lighting in all hallways.  Yes  Difficulty hearing.  No  Patient counseled about all safety risks that were identified.    Functional Assessment ADLs    Are there any barriers preventing you from cooking for yourself or meeting nutritional needs?  No.    Are there any barriers preventing you from driving safely or obtaining transportation?  No.    Are there any barriers preventing you from using a telephone or calling for help?  No.    Are there any barriers preventing you from shopping?  No.    Are there any barriers preventing you from taking care of your own finances?  No.    Are there any barriers preventing you from managing your medications?  No.    Are there any barriers preventing you from showering,  bathing or dressing yourself?  No.    Are you currently engaging in any exercise or physical activity?  Yes.     What is your perception of your health?  Fair.    Health Maintenance Summary                Annual Wellness Visit Overdue 7/13/2016      Done 7/13/2015 Visit Dx: Medicare annual wellness visit, subsequent    IMM INFLUENZA Overdue 9/1/2019      Done 9/13/2018 Imm Admin: Influenza Vaccine Quad Inj (Pf)     Patient has more history with this topic...    COLONOSCOPY Next Due 12/22/2021      Done 12/22/2016 REFERRAL TO GI FOR COLONOSCOPY    IMM DTaP/Tdap/Td Vaccine Next Due 1/28/2023      Done 1/28/2013 Imm Admin: Tdap Vaccine          Patient Care Team:  John Lao D.O. as PCP - General  Mk Bro M.D. as Consulting Physician (Orthopaedics)  Kanu Pride, PT as Physical Therapy (Physical Therapy)    Social History     Tobacco Use   • Smoking status: Never Smoker   • Smokeless tobacco: Never Used   Substance Use Topics   • Alcohol use: Yes     Alcohol/week: 1.8 oz     Types: 3 Cans of beer per week     Comment: 3 per week   • Drug use: No     Family History   Problem Relation Age of Onset   • Hypertension Mother      He  has a past medical history of Abnormal myocardial perfusion study (1/15/2014), Aortic insufficiency (7/5/2011), Arthritis, Bronchitis, CAD (coronary artery disease) mild plaque at cath in 2/14 (12/5/2014), Cancer (HCC), Chronic use of opiate drugs therapeutic purposes (8/12/2017), Dental disorder, Dyslipidemia (7/12/2011), Heart burn, Heart murmur, Hiatus hernia syndrome, High cholesterol, HTN (hypertension) (7/5/2011), Hypertension, Indigestion, Infectious disease, Pain, Pain, Rheumatoid arthritis(714.0), and Tachycardia (10/20/2014).   Past Surgical History:   Procedure Laterality Date   • PB TRANSURETHRAL ELEC-SURG PBOSTATECTOM  4/1/2019    Procedure: BIPOLAR TRANSURETHRAL RESECTION OF PROSTATE;  Surgeon: Jose Romo M.D.;  Location: SURGERY [facility];   "Service: Urology   • CYSTOSCOPY  4/1/2019    Procedure: CYSTOSCOPY;  Surgeon: Jose Romo M.D.;  Location: SURGERY [facility];  Service: Urology   • URETHROTOMY INTERNAL  4/1/2019    Procedure: DIRECT VISION INTERNAL URETHROTOMY;  Surgeon: Jose Romo M.D.;  Location: SURGERY [facility];  Service: Urology   • KNEE REVISION TOTAL Left 8/3/2018    Procedure: KNEE REVISION TOTAL;  Surgeon: Michael Rodriguez M.D.;  Location: [facility];  Service: Orthopedics   • CYSTOSCOPY  5/16/2018    Procedure: CYSTOSCOPY-CLOT EVAC;  Surgeon: Jose Romo M.D.;  Location: [facility];  Service: Urology   • TRANS URETHRAL RESECTION BLADDER  5/16/2018    Procedure: TRANS URETHRAL RESECTION BLADDER;  Surgeon: Jose Romo M.D.;  Location: [facility];  Service: Urology   • RECOVERY  2/3/2014    Performed by Cath-Recovery Surgery at SURGERY SAME DAY [facility]   • HIP ARTHROPLASTY TOTAL  5/31/2013    Performed by Burak Valles M.D. at [facility]   • ROTATOR CUFF REPAIR Right 2011    x 2   • KNEE ARTHROPLASTY TOTAL  6/1/2009    LEFT-Performed by YONATAN HINSON at [facility]   • VEIN LIGATION RADIO FREQUENCY  12/8/2008    LEFT leg-Performed by DEREJE MCCULLOUGH at SURGERY SAME DAY [facility]   • HIP ARTHROPLASTY TOTAL  6/20/08    Performed by YONATAN HINSON at [facility]   • LUMBAR LAMINECTOMY DISKECTOMY  2000   • TMJ RECONSTRUCTION BILATERAL  1994   • ANKLE ORIF Right    • KNEE ARTHROSCOPY Left    • VEIN STRIPPING Left            Exam:     /60   Pulse 66   Temp 36.6 °C (97.9 °F) (Temporal)   Resp 12   Ht 1.778 m (5' 10\")   Wt 94.3 kg (208 lb)   SpO2 95%  Body mass index is 29.84 kg/m².    Hearing good.    Dentition not asked  Alert, oriented in no acute distress.  Eye contact is good, speech goal directed, affect calm     Assessment and Plan. The following treatment and monitoring plan is " recommended:     1. Need for vaccination  Vaccination given  - Influenza Vaccine Quad Injection (PF)    2. Rheumatoid arthritis with positive rheumatoid factor, involving unspecified site (HCC)  Ongoing issues with rheumatoid arthritis, is being followed by rheumatology.  May need to change in his biologic medication.  Has concerns regarding the cost    3. Depression, unspecified depression type  Ongoing treated    4. Anxiety  Ongoing treated    5. Nonrheumatic aortic valve insufficiency  Check B type natruretic peptide  - proBrain Natriuretic Peptide, NT; Future    6. Failed total knee, left, subsequent encounter  Stable, status post surgery    7. Hypogonadism male  Continue testosterone  - testosterone cypionate (DEPO-TESTOSTERONE) 200 MG/ML Solution injection; 1 mL by Intramuscular route Once for 1 dose. Inject every 14 days.  Dispense: 10 mL; Refill: 1  - CBC WITH DIFFERENTIAL; Future      Services suggested: No services needed at this time  Health Care Screening recommendations as per orders if indicated.  Referrals offered: PT/OT/Nutrition counseling/Behavioral Health/Smoking cessation as per orders if indicated.    Discussion today about general wellness and lifestyle habits:    · Prevent falls and reduce trip hazards; Cautioned about securing or removing rugs.  · Have a working fire alarm and carbon monoxide detector;   · Engage in regular physical activity and social activities       Follow-up: No follow-ups on file.   CC: Edgardo Sims is a 63 y.o. male is suffering from   Chief Complaint   Patient presents with   • Annual Exam         SUBJECTIVE:  1. Need for vaccination  Edgardo is here for annual Medicare exam, we discussed his need for influenza vaccination which was given    2. Rheumatoid arthritis with positive rheumatoid factor, involving unspecified site (HCC)  Patient continues to struggle with rheumatoid arthritis, is being followed by Dr. Tucker, is looking at possibly having a change  in his biologic that is being used.  Patient is concerned about the extreme cost    3. Depression, unspecified depression type  Ongoing issues with depression, treated with Paxil    4. Anxiety  Treated as above ongoing    5. Nonrheumatic aortic valve insufficiency  Patient with a previous echocardiogram demonstrating aortic valve insufficiency, is being followed yearly by cardiology.  Recheck B type natruretic peptide    6. Failed total knee, left, subsequent encounter  Status post surgery, issues resolved    7. Hypogonadism male  Ongoing, continue testosterone        Past social, family, history:   Social History     Tobacco Use   • Smoking status: Never Smoker   • Smokeless tobacco: Never Used   Substance Use Topics   • Alcohol use: Yes     Alcohol/week: 1.8 oz     Types: 3 Cans of beer per week     Comment: 3 per week   • Drug use: No         MEDICATIONS:    Current Outpatient Medications:   •  PARoxetine (PAXIL) 20 MG Tab, Take 1 Tab by mouth every day., Disp: 90 Tab, Rfl: 3  •  testosterone cypionate (DEPO-TESTOSTERONE) 200 MG/ML Solution injection, 1 mL by Intramuscular route Once for 1 dose. Inject every 14 days., Disp: 10 mL, Rfl: 1  •  lisinopril (PRINIVIL) 10 MG Tab, TAKE 1 TABLET BY MOUTH EVERY DAY, Disp: 90 Tab, Rfl: 3  •  [START ON 10/20/2019] oxyCODONE-acetaminophen (PERCOCET-10)  MG Tab, Take 1-2 Tabs by mouth every four hours as needed for Severe Pain for up to 30 days., Disp: 150 Tab, Rfl: 0  •  predniSONE (DELTASONE) 1 MG Tab, TAKE 2 TABLETS BY MOUTH EVERY DAY, Disp: 180 Tab, Rfl: 0  •  metoprolol (LOPRESSOR) 50 MG Tab, TAKE 1 TABLET BY MOUTH TWICE A DAY, Disp: 180 Tab, Rfl: 3  •  cyclobenzaprine (FLEXERIL) 10 MG Tab, TAKE ONE TABLET BY MOUTH THREE TIMES DAILY AS NEEDED FOR MILD PAIN, Disp: 90 Tab, Rfl: 5  •  pravastatin (PRAVACHOL) 80 MG tablet, Take 1 Tab by mouth every day., Disp: 30 Tab, Rfl: 11  •  ezetimibe (ZETIA) 10 MG Tab, Take 10 mg by mouth every evening., Disp: , Rfl:   •   Tofacitinib Citrate (XELJANZ) 5 MG Tab, Take 5 mg by mouth 2 Times a Day., Disp: 60 Tab, Rfl: 0  •  CALCIUM-VITAMIN D PO, Take 1 Tab by mouth 2 Times a Day., Disp: , Rfl:   •  ascorbic acid (ASCORBIC ACID) 500 MG Tab, Take 500 mg by mouth every day., Disp: , Rfl:   •  Cyanocobalamin (VITAMIN B-12) 5000 MCG TABLET DISPERSIBLE, Take 1 Tab by mouth every day., Disp: , Rfl:   •  niacin 500 MG Tab, Take 500 mg by mouth every day., Disp: , Rfl:   •  Zinc 50 MG Cap, Take 50 mg by mouth every day., Disp: , Rfl:   •  omeprazole (PRILOSEC) 20 MG CPDR, Take 20 mg by mouth every day., Disp: , Rfl:   •  predniSONE (DELTASONE) 1 MG Tab, TAKE 2 TABLETS BY MOUTH EVERY DAY, Disp: 60 Tab, Rfl: 0  •  ezetimibe (ZETIA) 10 MG Tab, TAKE 1 TABLET BY MOUTH EVERY DAY (Patient not taking: Reported on 8/28/2019), Disp: 90 Tab, Rfl: 3    PROBLEMS:  Patient Active Problem List    Diagnosis Date Noted   • Neurogenic bladder 01/26/2019     Priority: Medium   • Bladder outlet obstruction 05/01/2018   • Leukocytosis 04/30/2018   • Lactic acidosis 04/30/2018   • Idiopathic acute pancreatitis 04/30/2018   • Chronic use of opiate drugs therapeutic purposes 08/12/2017   • Depression 07/13/2015   • Anxiety 03/31/2015   • Coronary artery disease due to calcified coronary lesion: Mildly cardiac catheterization in 2014 12/05/2014   • Tachycardia 10/20/2014   • Abnormal myocardial perfusion study 01/15/2014   • Osteoarthrosis, unspecified whether generalized or localized, pelvic region and thigh 05/31/2013   • Dyslipidemia 07/12/2011   • Aortic insufficiency 07/05/2011   • Essential hypertension 07/05/2011   • Rheumatoid arthritis (HCC) 05/05/2009   • Hypogonadism male 05/05/2009       REVIEW OF SYSTEMS:  Gen.:  No Nausea, Vomiting, fever, Chills.  Heart: No chest pain.  Lungs:  No shortness of Breath.  Psychological: Kian unusual Anxiety depression     PHYSICAL EXAM   Constitutional: Alert, cooperative, not in acute distress.  Cardiovascular:  Rate Rhythm  "is regular without murmurs rubs clicks.     Thorax & Lungs: Clear to auscultation, no wheezing, rhonchi, or rales  HENT: Normocephalic, Atraumatic.  Eyes: PERRLA, EOMI, Conjunctiva normal.   Neck: Trachia is midline no swelling of the thyroid.   Lymphatic: No lymphadenopathy noted.   Neurologic: Alert & oriented x 3, cranial nerves II through XII are intact, Normal motor function, Normal sensory function, No focal deficits noted.   Psychiatric: Affect normal, Judgment normal, Mood normal.     VITAL SIGNS:/80   Pulse 66   Temp 36.6 °C (97.9 °F) (Temporal)   Resp 12   Ht 1.778 m (5' 10\")   Wt 94.3 kg (208 lb)   SpO2 95%   BMI 29.84 kg/m²     Labs: Reviewed    Assessment:                                                     Plan:  CC: Edgardo Sims is a 63 y.o. male is suffering from   Chief Complaint   Patient presents with   • Annual Exam         SUBJECTIVE:  1. Need for vaccination  Vaccination given    2. Rheumatoid arthritis with positive rheumatoid factor, involving unspecified site (HCC)  Continue close follow-up with rheumatology.  Significant concerns about the ability to afford possible change in biologic medication.    3. Depression, unspecified depression type  Ongoing depression treated with Paxil    4. Anxiety  Associated with financial issues    5. Nonrheumatic aortic valve insufficiency  Recheck B type natruretic peptide    6. Hypogonadism male  Testosterone rewritten        Past social, family, history:   Social History     Tobacco Use   • Smoking status: Never Smoker   • Smokeless tobacco: Never Used   Substance Use Topics   • Alcohol use: Yes     Alcohol/week: 1.8 oz     Types: 3 Cans of beer per week     Comment: 3 per week   • Drug use: No         MEDICATIONS:    Current Outpatient Medications:   •  PARoxetine (PAXIL) 20 MG Tab, Take 1 Tab by mouth every day., Disp: 90 Tab, Rfl: 3  •  testosterone cypionate (DEPO-TESTOSTERONE) 200 MG/ML Solution injection, 1 mL by Intramuscular " route Once for 1 dose. Inject every 14 days., Disp: 10 mL, Rfl: 1  •  lisinopril (PRINIVIL) 10 MG Tab, TAKE 1 TABLET BY MOUTH EVERY DAY, Disp: 90 Tab, Rfl: 3  •  [START ON 10/20/2019] oxyCODONE-acetaminophen (PERCOCET-10)  MG Tab, Take 1-2 Tabs by mouth every four hours as needed for Severe Pain for up to 30 days., Disp: 150 Tab, Rfl: 0  •  predniSONE (DELTASONE) 1 MG Tab, TAKE 2 TABLETS BY MOUTH EVERY DAY, Disp: 180 Tab, Rfl: 0  •  metoprolol (LOPRESSOR) 50 MG Tab, TAKE 1 TABLET BY MOUTH TWICE A DAY, Disp: 180 Tab, Rfl: 3  •  cyclobenzaprine (FLEXERIL) 10 MG Tab, TAKE ONE TABLET BY MOUTH THREE TIMES DAILY AS NEEDED FOR MILD PAIN, Disp: 90 Tab, Rfl: 5  •  pravastatin (PRAVACHOL) 80 MG tablet, Take 1 Tab by mouth every day., Disp: 30 Tab, Rfl: 11  •  ezetimibe (ZETIA) 10 MG Tab, Take 10 mg by mouth every evening., Disp: , Rfl:   •  Tofacitinib Citrate (XELJANZ) 5 MG Tab, Take 5 mg by mouth 2 Times a Day., Disp: 60 Tab, Rfl: 0  •  CALCIUM-VITAMIN D PO, Take 1 Tab by mouth 2 Times a Day., Disp: , Rfl:   •  ascorbic acid (ASCORBIC ACID) 500 MG Tab, Take 500 mg by mouth every day., Disp: , Rfl:   •  Cyanocobalamin (VITAMIN B-12) 5000 MCG TABLET DISPERSIBLE, Take 1 Tab by mouth every day., Disp: , Rfl:   •  niacin 500 MG Tab, Take 500 mg by mouth every day., Disp: , Rfl:   •  Zinc 50 MG Cap, Take 50 mg by mouth every day., Disp: , Rfl:   •  omeprazole (PRILOSEC) 20 MG CPDR, Take 20 mg by mouth every day., Disp: , Rfl:   •  predniSONE (DELTASONE) 1 MG Tab, TAKE 2 TABLETS BY MOUTH EVERY DAY, Disp: 60 Tab, Rfl: 0  •  ezetimibe (ZETIA) 10 MG Tab, TAKE 1 TABLET BY MOUTH EVERY DAY (Patient not taking: Reported on 8/28/2019), Disp: 90 Tab, Rfl: 3    PROBLEMS:  Patient Active Problem List    Diagnosis Date Noted   • Neurogenic bladder 01/26/2019     Priority: Medium   • Bladder outlet obstruction 05/01/2018   • Leukocytosis 04/30/2018   • Lactic acidosis 04/30/2018   • Idiopathic acute pancreatitis 04/30/2018   • Chronic  "use of opiate drugs therapeutic purposes 08/12/2017   • Depression 07/13/2015   • Anxiety 03/31/2015   • Coronary artery disease due to calcified coronary lesion: Mildly cardiac catheterization in 2014 12/05/2014   • Tachycardia 10/20/2014   • Abnormal myocardial perfusion study 01/15/2014   • Osteoarthrosis, unspecified whether generalized or localized, pelvic region and thigh 05/31/2013   • Dyslipidemia 07/12/2011   • Aortic insufficiency 07/05/2011   • Essential hypertension 07/05/2011   • Rheumatoid arthritis (HCC) 05/05/2009   • Hypogonadism male 05/05/2009       REVIEW OF SYSTEMS:  Gen.:  No Nausea, Vomiting, fever, Chills.  Heart: No chest pain.  Lungs:  No shortness of Breath.  Psychological: Kian unusual Anxiety depression     PHYSICAL EXAM   Constitutional: Alert, cooperative, not in acute distress.  Cardiovascular:  Rate Rhythm is regular without murmurs rubs clicks.     Thorax & Lungs: Clear to auscultation, no wheezing, rhonchi, or rales  HENT: Normocephalic, Atraumatic.  Eyes: PERRLA, EOMI, Conjunctiva normal.   Neck: Trachia is midline no swelling of the thyroid.   Lymphatic: No lymphadenopathy noted.   Neurologic: Alert & oriented x 3, cranial nerves II through XII are intact, Normal motor function, Normal sensory function, No focal deficits noted.   Psychiatric: Affect normal, Judgment normal, Mood normal.     VITAL SIGNS:/80   Pulse 66   Temp 36.6 °C (97.9 °F) (Temporal)   Resp 12   Ht 1.778 m (5' 10\")   Wt 94.3 kg (208 lb)   SpO2 95%   BMI 29.84 kg/m²     Labs: Reviewed    Assessment:                                                     Plan:    1. Need for vaccination  Vaccination given  - Influenza Vaccine Quad Injection (PF)    2. Rheumatoid arthritis with positive rheumatoid factor, involving unspecified site (HCC)  Follow-up with rheumatology    3. Depression, unspecified depression type  Treated with Paxil    4. Anxiety  Stable    5. Nonrheumatic aortic valve " insufficiency  Check B type natruretic peptide  - proBrain Natriuretic Peptide, NT; Future    6. Hypogonadism male  Recheck testosterone as needed refilled patient's testosterone check CBC.  - testosterone cypionate (DEPO-TESTOSTERONE) 200 MG/ML Solution injection; 1 mL by Intramuscular route Once for 1 dose. Inject every 14 days.  Dispense: 10 mL; Refill: 1  - CBC WITH DIFFERENTIAL; Future

## 2019-10-16 ENCOUNTER — HOSPITAL ENCOUNTER (OUTPATIENT)
Dept: LAB | Facility: MEDICAL CENTER | Age: 64
End: 2019-10-16
Attending: INTERNAL MEDICINE
Payer: MEDICARE

## 2019-10-16 ENCOUNTER — OFFICE VISIT (OUTPATIENT)
Dept: ENDOCRINOLOGY | Facility: MEDICAL CENTER | Age: 64
End: 2019-10-16
Payer: MEDICARE

## 2019-10-16 VITALS
DIASTOLIC BLOOD PRESSURE: 76 MMHG | BODY MASS INDEX: 30.35 KG/M2 | WEIGHT: 212 LBS | HEIGHT: 70 IN | SYSTOLIC BLOOD PRESSURE: 120 MMHG | OXYGEN SATURATION: 97 % | HEART RATE: 76 BPM

## 2019-10-16 DIAGNOSIS — Z79.52 CURRENT CHRONIC USE OF SYSTEMIC STEROIDS: ICD-10-CM

## 2019-10-16 DIAGNOSIS — Z79.899 HIGH RISK MEDICATION USE: ICD-10-CM

## 2019-10-16 DIAGNOSIS — Z86.39 HISTORY OF ADRENAL INSUFFICIENCY: ICD-10-CM

## 2019-10-16 LAB
ALBUMIN SERPL BCP-MCNC: 4 G/DL (ref 3.2–4.9)
ALBUMIN/GLOB SERPL: 1.7 G/DL
ALP SERPL-CCNC: 58 U/L (ref 30–99)
ALT SERPL-CCNC: 28 U/L (ref 2–50)
ANION GAP SERPL CALC-SCNC: 7 MMOL/L (ref 0–11.9)
AST SERPL-CCNC: 32 U/L (ref 12–45)
BILIRUB SERPL-MCNC: 0.5 MG/DL (ref 0.1–1.5)
BUN SERPL-MCNC: 14 MG/DL (ref 8–22)
CALCIUM SERPL-MCNC: 9 MG/DL (ref 8.5–10.5)
CHLORIDE SERPL-SCNC: 102 MMOL/L (ref 96–112)
CO2 SERPL-SCNC: 23 MMOL/L (ref 20–33)
CREAT SERPL-MCNC: 1.09 MG/DL (ref 0.5–1.4)
GLOBULIN SER CALC-MCNC: 2.3 G/DL (ref 1.9–3.5)
GLUCOSE SERPL-MCNC: 79 MG/DL (ref 65–99)
POTASSIUM SERPL-SCNC: 4.5 MMOL/L (ref 3.6–5.5)
PROT SERPL-MCNC: 6.3 G/DL (ref 6–8.2)
SODIUM SERPL-SCNC: 132 MMOL/L (ref 135–145)

## 2019-10-16 PROCEDURE — 80053 COMPREHEN METABOLIC PANEL: CPT

## 2019-10-16 PROCEDURE — 36415 COLL VENOUS BLD VENIPUNCTURE: CPT

## 2019-10-16 PROCEDURE — 83519 RIA NONANTIBODY: CPT

## 2019-10-16 PROCEDURE — 99204 OFFICE O/P NEW MOD 45 MIN: CPT | Performed by: INTERNAL MEDICINE

## 2019-10-16 RX ORDER — TESTOSTERONE CYPIONATE 200 MG/ML
100 INJECTION, SOLUTION INTRAMUSCULAR
COMMUNITY
End: 2020-01-06

## 2019-10-16 SDOH — HEALTH STABILITY: MENTAL HEALTH: HOW MANY STANDARD DRINKS CONTAINING ALCOHOL DO YOU HAVE ON A TYPICAL DAY?: 1 OR 2

## 2019-10-16 SDOH — HEALTH STABILITY: MENTAL HEALTH: HOW OFTEN DO YOU HAVE A DRINK CONTAINING ALCOHOL?: 2-3 TIMES A WEEK

## 2019-10-16 NOTE — PATIENT INSTRUCTIONS
Please expect a call from the [facility] center     I want you to have a test to rule out adrenal insufficiency

## 2019-10-16 NOTE — PROGRESS NOTES
Chief Complaint: Consult requested by Thuy Azul MD for evaluation of history of adrenal insufficiency and current chronic use of systemic steroids.    HPI:     Edgardo Sims is a 63 y.o. male with here for initial evaluation of the above medical issues.  He was referred by her rheumatologist Dr. Thuy Azul.      She has a history of rheumatoid arthritis which was previously followed by his now retired rheumatologist, Dr. John Mcknight.   According to the patient he was started on steroids by his previous rheumatologist 20 years ago and was told that he has adrenal insufficiency.  He has never tried to taper his prednisone. He denies pulse steroid therapy.  He denies hospitalization for addisonian crises.    His primary care physician Dr. Lao is following him for osteopenia.   He states that he is currently taking vitamin D and calcium supplements but is very vague on details.  Dr. Lao is also managing his hypogonadism and he is receiving androgen replacement therapy with testosterone injections 100 mg every 2 weeks.      He is currently on prednisone 2 mg daily which has been his medication for the past 20 years.  He denies a history of diabetes, fragility fractures, cataracts, glaucoma, uncontrolled hypertension and other adverse effects from chronic steroid therapy.      He currently reports that his joint pains are well controlled and he is receiving biologic agents for his rheumatoid arthritis.  He has no hand deformities.   He denies experiencing unexplained weight loss, lack of appetite, dizziness, lightheadedness, and severe joint pains.  He denies having any severe depression or psychological dependence on steroid therapy.        He also denies a history of other autoimmune endocrine disorders and denies a history of vitiligo.    Patient's medications, allergies, and social histories were reviewed and updated as appropriate.      ROS:      CONS:     No fever, no chills,  no weight loss, no fatigue   EYES:      No diplopia, no blurry vision, no redness of eyes, no swelling of eyelids   ENT:    No hearing loss, No ear pain, No sore throat, no dysphagia, no neck swelling   CV:     No chest pain, no palpitations, no claudication, no orthopnea, no PND   PULM:    No SOB, no cough, no hemoptysis, no wheezing    GI:   No nausea, no vomiting, no diarrhea, no constipation, no bloody stools   :  Passing urine well, no dysuria, no hematuria   ENDO:   No polyuria, no polydipsia, no heat intolerance, no cold intolerance   NEURO: No headaches, no dizziness, no convulsions, no tremors   MUSC:  No joint swellings, reports mild arthralgias, no myalgias, no weakness   SKIN:   No rash, no ulcers, no dry skin   PSYCH:   No depression, no anxiety, no difficulty sleeping       Past Medical History:  Patient Active Problem List    Diagnosis Date Noted   • Neurogenic bladder 01/26/2019     Priority: Medium   • Current chronic use of systemic steroids 10/16/2019   • High risk medication use 10/16/2019   • History of adrenal insufficiency 10/16/2019   • Bladder outlet obstruction 05/01/2018   • Leukocytosis 04/30/2018   • Lactic acidosis 04/30/2018   • Idiopathic acute pancreatitis 04/30/2018   • Chronic use of opiate drugs therapeutic purposes 08/12/2017   • Depression 07/13/2015   • Anxiety 03/31/2015   • Coronary artery disease due to calcified coronary lesion: Mildly cardiac catheterization in 2014 12/05/2014   • Tachycardia 10/20/2014   • Abnormal myocardial perfusion study 01/15/2014   • Osteoarthrosis, unspecified whether generalized or localized, pelvic region and thigh 05/31/2013   • Dyslipidemia 07/12/2011   • Aortic insufficiency 07/05/2011   • Essential hypertension 07/05/2011   • Rheumatoid arthritis (HCC) 05/05/2009   • Hypogonadism male 05/05/2009       Past Surgical History:  Past Surgical History:   Procedure Laterality Date   • PB TRANSURETHRAL ELEC-SURG PBOSTATECTOM  4/1/2019     Procedure: BIPOLAR TRANSURETHRAL RESECTION OF PROSTATE;  Surgeon: Jose Romo M.D.;  Location: SURGERY [facility];  Service: Urology   • CYSTOSCOPY  4/1/2019    Procedure: CYSTOSCOPY;  Surgeon: Jose Romo M.D.;  Location: SURGERY [facility];  Service: Urology   • URETHROTOMY INTERNAL  4/1/2019    Procedure: DIRECT VISION INTERNAL URETHROTOMY;  Surgeon: Jose Romo M.D.;  Location: SURGERY [facility];  Service: Urology   • KNEE REVISION TOTAL Left 8/3/2018    Procedure: KNEE REVISION TOTAL;  Surgeon: Michael Rodriguez M.D.;  Location: [facility];  Service: Orthopedics   • CYSTOSCOPY  5/16/2018    Procedure: CYSTOSCOPY-CLOT EVAC;  Surgeon: Jose Romo M.D.;  Location: SURGERY [facility];  Service: Urology   • TRANS URETHRAL RESECTION BLADDER  5/16/2018    Procedure: TRANS URETHRAL RESECTION BLADDER;  Surgeon: Jose Romo M.D.;  Location: SURGERY [facility];  Service: Urology   • RECOVERY  2/3/2014    Performed by Cath-Recovery Surgery at SURGERY SAME DAY [facility]   • HIP ARTHROPLASTY TOTAL  5/31/2013    Performed by Burak Valles M.D. at [facility]   • ROTATOR CUFF REPAIR Right 2011    x 2   • KNEE ARTHROPLASTY TOTAL  6/1/2009    LEFT-Performed by YONATAN HINSON at SURGERY [facility]   • VEIN LIGATION RADIO FREQUENCY  12/8/2008    LEFT leg-Performed by DEREJE MCCULLOUGH at SURGERY SAME DAY [facility]   • HIP ARTHROPLASTY TOTAL  6/20/08    Performed by YONATAN HINSON at SURGERY [facility]   • LUMBAR LAMINECTOMY DISKECTOMY  2000   • TMJ RECONSTRUCTION BILATERAL  1994   • ANKLE ORIF Right    • KNEE ARTHROSCOPY Left    • VEIN STRIPPING Left         Allergies:  Crestor [rosuvastatin calcium]; Penicillins; and Rocephin [ceftriaxone]     Current Medications:    Current Outpatient Medications:   •  testosterone cypionate (DEPO-TESTOSTERONE) 200 MG/ML Solution injection, 100 mg by Intramuscular  route every 14 days., Disp: , Rfl:   •  diazePAM (VALIUM) 5 MG Tab, TAKE 1 TABLET BY MOUTH EVERY 12 HOURS AS NEEDED FOR UP TO 30 DAYS M06.9, Disp: 60 Tab, Rfl: 1  •  PARoxetine (PAXIL) 20 MG Tab, Take 1 Tab by mouth every day., Disp: 90 Tab, Rfl: 3  •  lisinopril (PRINIVIL) 10 MG Tab, TAKE 1 TABLET BY MOUTH EVERY DAY, Disp: 90 Tab, Rfl: 3  •  predniSONE (DELTASONE) 1 MG Tab, TAKE 2 TABLETS BY MOUTH EVERY DAY, Disp: 60 Tab, Rfl: 0  •  [START ON 10/20/2019] oxyCODONE-acetaminophen (PERCOCET-10)  MG Tab, Take 1-2 Tabs by mouth every four hours as needed for Severe Pain for up to 30 days., Disp: 150 Tab, Rfl: 0  •  metoprolol (LOPRESSOR) 50 MG Tab, TAKE 1 TABLET BY MOUTH TWICE A DAY, Disp: 180 Tab, Rfl: 3  •  cyclobenzaprine (FLEXERIL) 10 MG Tab, TAKE ONE TABLET BY MOUTH THREE TIMES DAILY AS NEEDED FOR MILD PAIN, Disp: 90 Tab, Rfl: 5  •  pravastatin (PRAVACHOL) 80 MG tablet, Take 1 Tab by mouth every day., Disp: 30 Tab, Rfl: 11  •  ezetimibe (ZETIA) 10 MG Tab, Take 10 mg by mouth every evening., Disp: , Rfl:   •  Tofacitinib Citrate (XELJANZ) 5 MG Tab, Take 5 mg by mouth 2 Times a Day., Disp: 60 Tab, Rfl: 0  •  CALCIUM-VITAMIN D PO, Take 1 Tab by mouth 2 Times a Day., Disp: , Rfl:   •  ascorbic acid (ASCORBIC ACID) 500 MG Tab, Take 500 mg by mouth every day., Disp: , Rfl:   •  Cyanocobalamin (VITAMIN B-12) 5000 MCG TABLET DISPERSIBLE, Take 1 Tab by mouth every day., Disp: , Rfl:   •  niacin 500 MG Tab, Take 500 mg by mouth every day., Disp: , Rfl:   •  Zinc 50 MG Cap, Take 50 mg by mouth every day., Disp: , Rfl:   •  omeprazole (PRILOSEC) 20 MG CPDR, Take 20 mg by mouth every day., Disp: , Rfl:   •  predniSONE (DELTASONE) 1 MG Tab, TAKE 1 TABLETS BY MOUTH EVERY DAY (Patient not taking: Reported on 10/16/2019), Disp: 90 Tab, Rfl: 0  •  ezetimibe (ZETIA) 10 MG Tab, TAKE 1 TABLET BY MOUTH EVERY DAY (Patient not taking: Reported on 8/28/2019), Disp: 90 Tab, Rfl: 3    Social History:  Social History     Socioeconomic  History   • Marital status:      Spouse name: Not on file   • Number of children: Not on file   • Years of education: Not on file   • Highest education level: Not on file   Occupational History   • Not on file   Social Needs   • Financial resource strain: Not on file   • Food insecurity:     Worry: Not on file     Inability: Not on file   • Transportation needs:     Medical: Not on file     Non-medical: Not on file   Tobacco Use   • Smoking status: Never Smoker   • Smokeless tobacco: Never Used   Substance and Sexual Activity   • Alcohol use: Yes     Alcohol/week: 1.8 oz     Types: 3 Cans of beer per week     Frequency: 2-3 times a week     Drinks per session: 1 or 2     Comment: 3 per week   • Drug use: No   • Sexual activity: Yes     Partners: Female   Lifestyle   • Physical activity:     Days per week: Not on file     Minutes per session: Not on file   • Stress: Not on file   Relationships   • Social connections:     Talks on phone: Not on file     Gets together: Not on file     Attends Rastafari service: Not on file     Active member of club or organization: Not on file     Attends meetings of clubs or organizations: Not on file     Relationship status: Not on file   • Intimate partner violence:     Fear of current or ex partner: Not on file     Emotionally abused: Not on file     Physically abused: Not on file     Forced sexual activity: Not on file   Other Topics Concern   •  Service No   • Blood Transfusions No   • Caffeine Concern No   • Occupational Exposure No   • Hobby Hazards Yes     Comment: rides motorcyle   • Sleep Concern No   • Stress Concern No   • Weight Concern Yes   • Special Diet Yes     Comment: does not eat red meat    • Back Care Yes   • Exercise Yes   • Bike Helmet Yes   • Seat Belt Yes   • Self-Exams Yes   Social History Narrative   • Not on file        Family History:   Family History   Problem Relation Age of Onset   • Hypertension Mother          PHYSICAL EXAM:   Vital  "signs: /76 (BP Location: Left arm, Patient Position: Sitting)   Pulse 76   Ht 1.778 m (5' 10\")   Wt 96.2 kg (212 lb)   SpO2 97%   BMI 30.42 kg/m²   GENERAL: Well-developed, well-nourished  in no apparent distress.   HEENT: Normocephalic, atraumatic. PERRL. Anicteric. Pink, moist mucous membranes. No exophthalmos or lidlag  NECK: Supple. Trachea midline.  Thyroid is not enlarged and there are no palpable nodules  CARDIOVASCULAR: Regular rate and rhythm. No murmurs, rubs, or gallops.   LUNGS: Clear to auscultation bilaterally   BACK: Marked kyphoscoliosis noted  ABDOMEN: Soft, nontender with positive bowel sounds.   EXTREMITIES: No clubbing, cyanosis, or edema.   NEUROLOGICAL: Cranial nerves II-XII are grossly intact   Symmetric reflexes at the patella no proximal muscle weakness, No visible tremor with both outstretched hands  LYMPH: No cervical, supraclavicular,  adenopathy palpated.   SKIN: No rashes, lesions. Turgor is normal.      Labs:  Lab Results   Component Value Date/Time    WBC 11.5 (H) 08/22/2019 08:56 AM    WBC 7.5 07/01/2011 10:30 AM    RBC 5.27 08/22/2019 08:56 AM    RBC 4.72 07/01/2011 10:30 AM    HEMOGLOBIN 15.5 08/22/2019 08:56 AM    MCV 93.5 08/22/2019 08:56 AM     (H) 07/01/2011 10:30 AM    MCH 29.4 08/22/2019 08:56 AM    MCH 36.0 (H) 07/01/2011 10:30 AM    MCHC 31.4 (L) 08/22/2019 08:56 AM    RDW 51.6 (H) 08/22/2019 08:56 AM    RDW 14.0 07/01/2011 10:30 AM    MPV 10.1 08/22/2019 08:56 AM       Lab Results   Component Value Date/Time    SODIUM 136 08/22/2019 09:02 AM    POTASSIUM 4.7 08/22/2019 09:02 AM    CHLORIDE 102 08/22/2019 09:02 AM    CO2 30 08/22/2019 09:02 AM    ANION 4.0 08/22/2019 09:02 AM    GLUCOSE 80 08/22/2019 09:02 AM    BUN 13 08/22/2019 09:02 AM    CREATININE 1.01 08/22/2019 09:02 AM    CREATININE 1.1 04/21/2009 03:50 PM    CALCIUM 10.0 08/22/2019 09:02 AM    ASTSGOT 29 08/22/2019 09:02 AM    ALTSGPT 30 08/22/2019 09:02 AM    TBILIRUBIN 0.9 08/22/2019 09:02 AM    " ALBUMIN 4.5 2019 09:02 AM    TOTPROTEIN 6.9 2019 09:02 AM    GLOBULIN 2.4 2019 09:02 AM    AGRATIO 1.9 2019 09:02 AM         Imagin2017 8:16 AM    HISTORY/REASON FOR EXAM:  History of osteoporosis, chronic steroids use, scoliosis, lumbar spine surgery, rheumatoid arthritis, hip replacement.      TECHNIQUE/EXAM DESCRIPTION AND NUMBER OF VIEWS:  Dual x-ray bone densitometry was performed from the L1 through L4 levels and from the distal left forearm utilizing the GE Prodigy unit.    COMPARISON:   None    FINDINGS:  The mean bone mineral density for the lumbar spine is 1.196 g/cm2, which corresponds to a T score of -0.2 and a Z score of 0.2.    The distal left forearm has a mean bone mineral density of 0.837 g/cm2, with a T score of -1.5 and a Z score of -1.1.      Impression       According to the World Health Organization classification, bone mineral density of this patient is osteopenic.        FRAX score not obtained on this patient.           ASSESSMENT/PLAN:     1. Current chronic use of systemic steroids  Reviewed the risk of secondary adrenal insufficiency and suppression of the hypothalamic pituitary adrenal axis with chronic steroid therapy.  Reviewed adverse effects of chronic steroid therapy such as the risk of bone loss, uncontrolled diabetes and uncontrolled hypertension among others.    I recommend conducting a low-dose ACTH stimulation test or Cosyntropin stimulation test to evaluate his adrenal function and rule out adrenal insufficiency.  I have a low degree of suspicion that he has autoimmune adrenal insufficiency or Pierce's disease based upon his physical exam and history.  But I am going to obtain a 21 hydroxylase antibody level today and a renin and aldosterone level to be sure.    We will plan for follow-up in a few weeks to go over the results of his Cosyntropin stimulation test.    If his tests shows no evidence of adrenal insufficiency, then we will plan for a  very gradual taper of his prednisone until he can be off steroids since his rheumatoid arthritis appears to be stable.        2. History of adrenal insufficiency  Patient has a reported history of adrenal insufficiency which was a diagnosis made by another provider however the work-up for this particular diagnosis is not available for review.  As discussed above we are evaluating him for adrenal insufficiency and confirming if he has this disorder.    3. High risk medication use  Patient is taking glucocorticoids or steroids which is a high risk medication       Return in about 3 weeks (around 11/6/2019).        Thank you kindly for allowing me to participate in the endocrine care plan for this patient.    Hector Cedillo MD, FACE, [facility]  10/16/19    CC:   John Lao D.O.

## 2019-10-19 LAB — TEST NAME 95000: NORMAL

## 2019-11-05 ENCOUNTER — TELEPHONE (OUTPATIENT)
Dept: MEDICAL GROUP | Facility: LAB | Age: 64
End: 2019-11-05

## 2019-11-05 DIAGNOSIS — R05.9 COUGH: ICD-10-CM

## 2019-11-05 RX ORDER — PROMETHAZINE HYDROCHLORIDE AND CODEINE PHOSPHATE 6.25; 1 MG/5ML; MG/5ML
5 SYRUP ORAL 4 TIMES DAILY PRN
Qty: 120 ML | Refills: 0 | Status: SHIPPED
Start: 2019-11-05 | End: 2019-11-11

## 2019-11-05 NOTE — TELEPHONE ENCOUNTER
Patient complaining of cough.  He states he is currently taking an antibiotic and doesn't believe it is working.  He is requesting a prescription for his cough. Please advise

## 2019-11-06 NOTE — TELEPHONE ENCOUNTER
Telephone call to the patient, states that he feels like he is getting better is having real problems with neck pain also jaw pain because of all the coughing.  States he has had problems with coughing over the past week.  Will fax over Phenergan with codeine.

## 2019-11-11 ENCOUNTER — HOSPITAL ENCOUNTER (OUTPATIENT)
Dept: CARDIOLOGY | Facility: MEDICAL CENTER | Age: 64
End: 2019-11-11
Attending: INTERNAL MEDICINE
Payer: MEDICARE

## 2019-11-11 DIAGNOSIS — I35.1 NONRHEUMATIC AORTIC VALVE INSUFFICIENCY: ICD-10-CM

## 2019-11-11 PROCEDURE — 93306 TTE W/DOPPLER COMPLETE: CPT

## 2019-11-11 PROCEDURE — 93306 TTE W/DOPPLER COMPLETE: CPT | Mod: 26 | Performed by: INTERNAL MEDICINE

## 2019-11-12 DIAGNOSIS — Z86.39 HISTORY OF ADRENAL INSUFFICIENCY: ICD-10-CM

## 2019-11-12 DIAGNOSIS — Z79.899 HIGH RISK MEDICATION USE: ICD-10-CM

## 2019-11-12 DIAGNOSIS — Z79.52 CURRENT CHRONIC USE OF SYSTEMIC STEROIDS: ICD-10-CM

## 2019-11-12 LAB
LV EJECT FRACT  99904: 65
LV EJECT FRACT MOD 2C 99903: 52.62
LV EJECT FRACT MOD 4C 99902: 64.11
LV EJECT FRACT MOD BP 99901: 59.69

## 2019-11-12 NOTE — PROGRESS NOTES
Please call patient and tell him I placed new orders for his labs      Do you mind calling the lab as well and find out why they did not draw the labs we ordered ?

## 2019-11-13 ENCOUNTER — HOSPITAL ENCOUNTER (OUTPATIENT)
Dept: LAB | Facility: MEDICAL CENTER | Age: 64
End: 2019-11-13
Attending: INTERNAL MEDICINE
Payer: MEDICARE

## 2019-11-13 ENCOUNTER — OFFICE VISIT (OUTPATIENT)
Dept: MEDICAL GROUP | Facility: LAB | Age: 64
End: 2019-11-13
Payer: MEDICARE

## 2019-11-13 VITALS
SYSTOLIC BLOOD PRESSURE: 140 MMHG | HEART RATE: 91 BPM | DIASTOLIC BLOOD PRESSURE: 80 MMHG | WEIGHT: 198.2 LBS | OXYGEN SATURATION: 100 % | RESPIRATION RATE: 14 BRPM | HEIGHT: 70 IN | BODY MASS INDEX: 28.37 KG/M2 | TEMPERATURE: 98.7 F

## 2019-11-13 DIAGNOSIS — Z79.899 HIGH RISK MEDICATION USE: ICD-10-CM

## 2019-11-13 DIAGNOSIS — Z79.52 CURRENT CHRONIC USE OF SYSTEMIC STEROIDS: ICD-10-CM

## 2019-11-13 DIAGNOSIS — Z86.39 HISTORY OF ADRENAL INSUFFICIENCY: ICD-10-CM

## 2019-11-13 DIAGNOSIS — M05.9 RHEUMATOID ARTHRITIS WITH POSITIVE RHEUMATOID FACTOR, INVOLVING UNSPECIFIED SITE (HCC): ICD-10-CM

## 2019-11-13 LAB
ALBUMIN SERPL BCP-MCNC: 4.4 G/DL (ref 3.2–4.9)
ALBUMIN/GLOB SERPL: 1.6 G/DL
ALP SERPL-CCNC: 85 U/L (ref 30–99)
ALT SERPL-CCNC: 25 U/L (ref 2–50)
ANION GAP SERPL CALC-SCNC: 10 MMOL/L (ref 0–11.9)
AST SERPL-CCNC: 25 U/L (ref 12–45)
BILIRUB SERPL-MCNC: 0.4 MG/DL (ref 0.1–1.5)
BUN SERPL-MCNC: 10 MG/DL (ref 8–22)
CALCIUM SERPL-MCNC: 9.5 MG/DL (ref 8.5–10.5)
CHLORIDE SERPL-SCNC: 97 MMOL/L (ref 96–112)
CO2 SERPL-SCNC: 28 MMOL/L (ref 20–33)
CORTIS SERPL-MCNC: 11.8 UG/DL (ref 0–23)
CREAT SERPL-MCNC: 0.92 MG/DL (ref 0.5–1.4)
GLOBULIN SER CALC-MCNC: 2.7 G/DL (ref 1.9–3.5)
GLUCOSE SERPL-MCNC: 84 MG/DL (ref 65–99)
POTASSIUM SERPL-SCNC: 4.2 MMOL/L (ref 3.6–5.5)
PROT SERPL-MCNC: 7.1 G/DL (ref 6–8.2)
SODIUM SERPL-SCNC: 135 MMOL/L (ref 135–145)

## 2019-11-13 PROCEDURE — 80053 COMPREHEN METABOLIC PANEL: CPT

## 2019-11-13 PROCEDURE — 82533 TOTAL CORTISOL: CPT

## 2019-11-13 PROCEDURE — 99213 OFFICE O/P EST LOW 20 MIN: CPT | Performed by: INTERNAL MEDICINE

## 2019-11-13 PROCEDURE — 82626 DEHYDROEPIANDROSTERONE: CPT

## 2019-11-13 PROCEDURE — 36415 COLL VENOUS BLD VENIPUNCTURE: CPT

## 2019-11-13 PROCEDURE — 84143 ASSAY OF 17-HYDROXYPREGNENO: CPT

## 2019-11-13 PROCEDURE — 82024 ASSAY OF ACTH: CPT

## 2019-11-13 PROCEDURE — 82157 ASSAY OF ANDROSTENEDIONE: CPT

## 2019-11-13 PROCEDURE — 84244 ASSAY OF RENIN: CPT

## 2019-11-13 PROCEDURE — 83498 ASY HYDROXYPROGESTERONE 17-D: CPT

## 2019-11-13 PROCEDURE — 82088 ASSAY OF ALDOSTERONE: CPT

## 2019-11-13 RX ORDER — OXYCODONE AND ACETAMINOPHEN 10; 325 MG/1; MG/1
1-2 TABLET ORAL EVERY 4 HOURS PRN
Qty: 150 TAB | Refills: 0 | Status: SHIPPED | OUTPATIENT
Start: 2019-12-23 | End: 2019-11-13 | Stop reason: SDUPTHER

## 2019-11-13 RX ORDER — OXYCODONE AND ACETAMINOPHEN 10; 325 MG/1; MG/1
1-2 TABLET ORAL EVERY 4 HOURS PRN
Qty: 150 TAB | Refills: 0 | Status: SHIPPED | OUTPATIENT
Start: 2019-11-23 | End: 2019-11-13 | Stop reason: SDUPTHER

## 2019-11-13 RX ORDER — OXYCODONE AND ACETAMINOPHEN 10; 325 MG/1; MG/1
1-2 TABLET ORAL EVERY 4 HOURS PRN
Qty: 150 TAB | Refills: 0 | Status: SHIPPED | OUTPATIENT
Start: 2020-01-22 | End: 2020-02-21

## 2019-11-14 NOTE — PROGRESS NOTES
CC: Edgardo Sims is a 64 y.o. male is suffering from   Chief Complaint   Patient presents with   • Memory Loss   • Other     follow up after seeing Dr Cedillo         SUBJECTIVE:  1. Rheumatoid arthritis with positive rheumatoid factor, involving unspecified site (HCC)  Patient with a history of rheumatoid arthritis continue narcotic analgesics        Past social, family, history: , questions that the wife has written have been answered for the patient  Social History     Tobacco Use   • Smoking status: Never Smoker   • Smokeless tobacco: Never Used   Substance Use Topics   • Alcohol use: Yes     Alcohol/week: 1.8 oz     Types: 3 Cans of beer per week     Frequency: 2-3 times a week     Drinks per session: 1 or 2     Comment: 3 per week   • Drug use: No         MEDICATIONS:    Current Outpatient Medications:   •  [START ON 1/22/2020] oxyCODONE-acetaminophen (PERCOCET-10)  MG Tab, Take 1-2 Tabs by mouth every four hours as needed for Severe Pain for up to 30 days., Disp: 150 Tab, Rfl: 0  •  meloxicam (MOBIC) 7.5 MG Tab, TAKE 1 TABLET BY MOUTH DAILY AS NEEDED FOR SEVERE JOINT PAIN, Disp: 90 Tab, Rfl: 1  •  testosterone cypionate (DEPO-TESTOSTERONE) 200 MG/ML Solution injection, 100 mg by Intramuscular route every 14 days., Disp: , Rfl:   •  PARoxetine (PAXIL) 20 MG Tab, Take 1 Tab by mouth every day., Disp: 90 Tab, Rfl: 3  •  lisinopril (PRINIVIL) 10 MG Tab, TAKE 1 TABLET BY MOUTH EVERY DAY, Disp: 90 Tab, Rfl: 3  •  predniSONE (DELTASONE) 1 MG Tab, TAKE 2 TABLETS BY MOUTH EVERY DAY, Disp: 60 Tab, Rfl: 0  •  metoprolol (LOPRESSOR) 50 MG Tab, TAKE 1 TABLET BY MOUTH TWICE A DAY, Disp: 180 Tab, Rfl: 3  •  cyclobenzaprine (FLEXERIL) 10 MG Tab, TAKE ONE TABLET BY MOUTH THREE TIMES DAILY AS NEEDED FOR MILD PAIN, Disp: 90 Tab, Rfl: 5  •  pravastatin (PRAVACHOL) 80 MG tablet, Take 1 Tab by mouth every day., Disp: 30 Tab, Rfl: 11  •  ezetimibe (ZETIA) 10 MG Tab, Take 10 mg by mouth every evening., Disp: ,  Rfl:   •  Tofacitinib Citrate (XELJANZ) 5 MG Tab, Take 5 mg by mouth 2 Times a Day., Disp: 60 Tab, Rfl: 0  •  CALCIUM-VITAMIN D PO, Take 1 Tab by mouth 2 Times a Day., Disp: , Rfl:   •  ascorbic acid (ASCORBIC ACID) 500 MG Tab, Take 500 mg by mouth every day., Disp: , Rfl:   •  Cyanocobalamin (VITAMIN B-12) 5000 MCG TABLET DISPERSIBLE, Take 1 Tab by mouth every day., Disp: , Rfl:   •  niacin 500 MG Tab, Take 500 mg by mouth every day., Disp: , Rfl:   •  Zinc 50 MG Cap, Take 50 mg by mouth every day., Disp: , Rfl:   •  omeprazole (PRILOSEC) 20 MG CPDR, Take 20 mg by mouth every day., Disp: , Rfl:   •  ezetimibe (ZETIA) 10 MG Tab, TAKE 1 TABLET BY MOUTH EVERY DAY (Patient not taking: Reported on 8/28/2019), Disp: 90 Tab, Rfl: 3    PROBLEMS:  Patient Active Problem List    Diagnosis Date Noted   • Neurogenic bladder 01/26/2019     Priority: Medium   • Current chronic use of systemic steroids 10/16/2019   • High risk medication use 10/16/2019   • History of adrenal insufficiency 10/16/2019   • Bladder outlet obstruction 05/01/2018   • Leukocytosis 04/30/2018   • Lactic acidosis 04/30/2018   • Idiopathic acute pancreatitis 04/30/2018   • Chronic use of opiate drugs therapeutic purposes 08/12/2017   • Depression 07/13/2015   • Anxiety 03/31/2015   • Coronary artery disease due to calcified coronary lesion: Mildly cardiac catheterization in 2014 12/05/2014   • Tachycardia 10/20/2014   • Abnormal myocardial perfusion study 01/15/2014   • Osteoarthrosis, unspecified whether generalized or localized, pelvic region and thigh 05/31/2013   • Dyslipidemia 07/12/2011   • Aortic insufficiency 07/05/2011   • Essential hypertension 07/05/2011   • Rheumatoid arthritis (HCC) 05/05/2009   • Hypogonadism male 05/05/2009       REVIEW OF SYSTEMS:  Gen.:  No Nausea, Vomiting, fever, Chills.  Heart: No chest pain.  Lungs:  No shortness of Breath.  Psychological: Kian unusual Anxiety depression     PHYSICAL EXAM   Constitutional: Alert,  "cooperative, not in acute distress.  Cardiovascular:  Rate Rhythm is regular without murmurs rubs clicks.     Thorax & Lungs: Clear to auscultation, no wheezing, rhonchi, or rales  HENT: Normocephalic, Atraumatic.  Eyes: PERRLA, EOMI, Conjunctiva normal.   Neck: Trachia is midline no swelling of the thyroid.   Lymphatic: No lymphadenopathy noted.     Neurologic: Alert & oriented x 3, cranial nerves II through XII are intact, Normal motor function, Normal sensory function, No focal deficits noted.   Psychiatric: Affect normal, Judgment normal, Mood normal.     VITAL SIGNS:/80   Pulse 91   Temp 37.1 °C (98.7 °F) (Temporal)   Resp 14   Ht 1.778 m (5' 10\")   Wt 89.9 kg (198 lb 3.2 oz)   SpO2 100%   BMI 28.44 kg/m²     Labs: Reviewed    Assessment:                                                     Plan:    1. Rheumatoid arthritis with positive rheumatoid factor, involving unspecified site (HCC)  State drug task force urine drug screen reviewed continue Percocet  - oxyCODONE-acetaminophen (PERCOCET-10)  MG Tab; Take 1-2 Tabs by mouth every four hours as needed for Severe Pain for up to 30 days.  Dispense: 150 Tab; Refill: 0        "

## 2019-11-15 ENCOUNTER — OFFICE VISIT (OUTPATIENT)
Dept: CARDIOLOGY | Facility: MEDICAL CENTER | Age: 64
End: 2019-11-15
Payer: MEDICARE

## 2019-11-15 VITALS
SYSTOLIC BLOOD PRESSURE: 118 MMHG | WEIGHT: 199 LBS | DIASTOLIC BLOOD PRESSURE: 70 MMHG | OXYGEN SATURATION: 98 % | BODY MASS INDEX: 28.49 KG/M2 | HEART RATE: 80 BPM | HEIGHT: 70 IN

## 2019-11-15 DIAGNOSIS — I25.10 CORONARY ARTERY DISEASE DUE TO CALCIFIED CORONARY LESION: ICD-10-CM

## 2019-11-15 DIAGNOSIS — I35.1 NONRHEUMATIC AORTIC VALVE INSUFFICIENCY: ICD-10-CM

## 2019-11-15 DIAGNOSIS — I25.84 CORONARY ARTERY DISEASE DUE TO CALCIFIED CORONARY LESION: ICD-10-CM

## 2019-11-15 DIAGNOSIS — E78.5 DYSLIPIDEMIA: ICD-10-CM

## 2019-11-15 LAB
ACTH PLAS-MCNC: 32.2 PG/ML (ref 7.2–63.3)
ALDOST SERPL-MCNC: 9.2 NG/DL
RENIN PLAS-CCNC: 0.9 NG/ML/HR

## 2019-11-15 PROCEDURE — 99214 OFFICE O/P EST MOD 30 MIN: CPT | Performed by: INTERNAL MEDICINE

## 2019-11-15 NOTE — PROGRESS NOTES
"Chief Complaint   Patient presents with   • Coronary Artery Disease     F/V: 6MO       Subjective:   Edgardo Sims is a 64 y.o. male who presents today today for followup of his hypertension, hyperlipidemia and abnormal myocardial perfusion scan. He underwent cardiac catheterization in 2014 and had minimal plaque. 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    He has been under significant stress since he needs to take care of his daughter\"s children.  They are 1 and 4 years of age.  Yesterday, he had some chest discomfort.  This was a tightness across the anterior chest which he rates is about 3/10.  This discomfort lasted about an hour and was relieved with diazepam.  It did not radiate and was not associated with any nausea, vomiting, diaphoresis or shortness of breath.  It was not exertional in nature.    He is trying to ride his bike and exercise about 3 times a week for 90 minutes.    He has had no dyspnea on exertion, PND, orthopnea or edema.  He has had some lightheadedness associated with episodes of coughing with a recent upper respiratory infection.  He is noted no palpitations.    He brings in a record of his blood pressures and they have been under good control.  They generally range from approximately 110-130/65-80.    Past Medical History:   Diagnosis Date   • Abnormal myocardial perfusion study 1/15/2014   • Aortic insufficiency 7/5/2011   • Arthritis     RA, and osteo   • Bronchitis    • CAD (coronary artery disease) mild plaque at cath in 2/14 12/5/2014   • Cancer (HCC)     skin   • Chronic use of opiate drugs therapeutic purposes 8/12/2017   • Dental disorder     Upper dentures.   • Dyslipidemia 7/12/2011   • Heart burn    • Heart murmur    • Hiatus hernia syndrome    • High cholesterol    • HTN (hypertension) 7/5/2011   • Hypertension    • Indigestion    • Infectious disease    • Pain     RA   • Pain     hands, feet and jaw   • Rheumatoid arthritis(714.0)     severe   • Tachycardia " 10/20/2014     Past Surgical History:   Procedure Laterality Date   • PB TRANSURETHRAL ELEC-SURG PBOSTATECTOM  4/1/2019    Procedure: BIPOLAR TRANSURETHRAL RESECTION OF PROSTATE;  Surgeon: Jose Romo M.D.;  Location: SURGERY [facility];  Service: Urology   • CYSTOSCOPY  4/1/2019    Procedure: CYSTOSCOPY;  Surgeon: Jose Romo M.D.;  Location: SURGERY [facility];  Service: Urology   • URETHROTOMY INTERNAL  4/1/2019    Procedure: DIRECT VISION INTERNAL URETHROTOMY;  Surgeon: Jose Romo M.D.;  Location: SURGERY [facility];  Service: Urology   • KNEE REVISION TOTAL Left 8/3/2018    Procedure: KNEE REVISION TOTAL;  Surgeon: Michael Rodriguez M.D.;  Location: [facility];  Service: Orthopedics   • CYSTOSCOPY  5/16/2018    Procedure: CYSTOSCOPY-CLOT EVAC;  Surgeon: Jose Romo M.D.;  Location: SURGERY [facility];  Service: Urology   • TRANS URETHRAL RESECTION BLADDER  5/16/2018    Procedure: TRANS URETHRAL RESECTION BLADDER;  Surgeon: Jose Romo M.D.;  Location: SURGERY [facility];  Service: Urology   • RECOVERY  2/3/2014    Performed by Cath-Recovery Surgery at SURGERY SAME DAY [facility]   • HIP ARTHROPLASTY TOTAL  5/31/2013    Performed by Burak Valles M.D. at [facility]   • ROTATOR CUFF REPAIR Right 2011    x 2   • KNEE ARTHROPLASTY TOTAL  6/1/2009    LEFT-Performed by YONATAN HINSON at SURGERY [facility]   • VEIN LIGATION RADIO FREQUENCY  12/8/2008    LEFT leg-Performed by DEREJE MCCULLOUGH at SURGERY SAME DAY [facility]   • HIP ARTHROPLASTY TOTAL  6/20/08    Performed by YONATAN HINSON at SURGERY [facility]   • LUMBAR LAMINECTOMY DISKECTOMY  2000   • TMJ RECONSTRUCTION BILATERAL  1994   • ANKLE ORIF Right    • KNEE ARTHROSCOPY Left    • VEIN STRIPPING Left      Family History   Problem Relation Age of Onset   • Hypertension Mother      Social History     Socioeconomic History   • Marital  status:      Spouse name: Not on file   • Number of children: Not on file   • Years of education: Not on file   • Highest education level: Not on file   Occupational History   • Not on file   Social Needs   • Financial resource strain: Not on file   • Food insecurity:     Worry: Not on file     Inability: Not on file   • Transportation needs:     Medical: Not on file     Non-medical: Not on file   Tobacco Use   • Smoking status: Never Smoker   • Smokeless tobacco: Never Used   Substance and Sexual Activity   • Alcohol use: Yes     Alcohol/week: 1.8 oz     Types: 3 Cans of beer per week     Frequency: 2-3 times a week     Drinks per session: 1 or 2     Comment: 3 per week   • Drug use: No   • Sexual activity: Yes     Partners: Female   Lifestyle   • Physical activity:     Days per week: Not on file     Minutes per session: Not on file   • Stress: Not on file   Relationships   • Social connections:     Talks on phone: Not on file     Gets together: Not on file     Attends [employer] service: Not on file     Active member of club or organization: Not on file     Attends meetings of clubs or organizations: Not on file     Relationship status: Not on file   • Intimate partner violence:     Fear of current or ex partner: Not on file     Emotionally abused: Not on file     Physically abused: Not on file     Forced sexual activity: Not on file   Other Topics Concern   •  Service No   • Blood Transfusions No   • Caffeine Concern No   • Occupational Exposure No   • Hobby Hazards Yes     Comment: rides motorcyle   • Sleep Concern No   • Stress Concern No   • Weight Concern Yes   • Special Diet Yes     Comment: does not eat red meat    • Back Care Yes   • Exercise Yes   • Bike Helmet Yes   • Seat Belt Yes   • Self-Exams Yes   Social History Narrative   • Not on file     Allergies   Allergen Reactions   • Crestor [Rosuvastatin Calcium] Myalgia   • Penicillins Hives, Itching and Vomiting   • Rocephin [Ceftriaxone]  "Rash     Redness and flushing all over body     Outpatient Encounter Medications as of 11/15/2019   Medication Sig Dispense Refill   • [START ON 1/22/2020] oxyCODONE-acetaminophen (PERCOCET-10)  MG Tab Take 1-2 Tabs by mouth every four hours as needed for Severe Pain for up to 30 days. 150 Tab 0   • meloxicam (MOBIC) 7.5 MG Tab TAKE 1 TABLET BY MOUTH DAILY AS NEEDED FOR SEVERE JOINT PAIN 90 Tab 1   • testosterone cypionate (DEPO-TESTOSTERONE) 200 MG/ML Solution injection 100 mg by Intramuscular route every 14 days.     • PARoxetine (PAXIL) 20 MG Tab Take 1 Tab by mouth every day. 90 Tab 3   • lisinopril (PRINIVIL) 10 MG Tab TAKE 1 TABLET BY MOUTH EVERY DAY 90 Tab 3   • predniSONE (DELTASONE) 1 MG Tab TAKE 2 TABLETS BY MOUTH EVERY DAY 60 Tab 0   • metoprolol (LOPRESSOR) 50 MG Tab TAKE 1 TABLET BY MOUTH TWICE A  Tab 3   • cyclobenzaprine (FLEXERIL) 10 MG Tab TAKE ONE TABLET BY MOUTH THREE TIMES DAILY AS NEEDED FOR MILD PAIN 90 Tab 5   • pravastatin (PRAVACHOL) 80 MG tablet Take 1 Tab by mouth every day. 30 Tab 11   • ezetimibe (ZETIA) 10 MG Tab Take 10 mg by mouth every evening.     • Tofacitinib Citrate (XELJANZ) 5 MG Tab Take 5 mg by mouth 2 Times a Day. 60 Tab 0   • CALCIUM-VITAMIN D PO Take 1 Tab by mouth 2 Times a Day.     • ascorbic acid (ASCORBIC ACID) 500 MG Tab Take 500 mg by mouth every day.     • Cyanocobalamin (VITAMIN B-12) 5000 MCG TABLET DISPERSIBLE Take 1 Tab by mouth every day.     • niacin 500 MG Tab Take 500 mg by mouth every day.     • Zinc 50 MG Cap Take 50 mg by mouth every day.     • omeprazole (PRILOSEC) 20 MG CPDR Take 20 mg by mouth every day.     • ezetimibe (ZETIA) 10 MG Tab TAKE 1 TABLET BY MOUTH EVERY DAY (Patient not taking: Reported on 8/28/2019) 90 Tab 3     No facility-administered encounter medications on file as of 11/15/2019.      ROS       Objective:   /70 (BP Location: Left arm, Patient Position: Sitting, BP Cuff Size: Adult)   Pulse 80   Ht 1.778 m (5' 10\")  "  Wt 90.3 kg (199 lb)   SpO2 98%   BMI 28.55 kg/m²     Physical Exam   Constitutional: He appears well-developed and well-nourished.   Neck: No JVD present.   Cardiovascular: Normal rate and regular rhythm.   No murmur heard.  Pulmonary/Chest: Effort normal and breath sounds normal. He has no rales.   Abdominal: Soft. There is no tenderness.   Musculoskeletal:         General: No edema.     Lab Results   Component Value Date/Time    CHOLSTRLTOT 136 08/22/2019 09:02 AM    LDL 73 08/22/2019 09:02 AM    HDL 45 08/22/2019 09:02 AM    TRIGLYCERIDE 91 08/22/2019 09:02 AM       Lab Results   Component Value Date/Time    SODIUM 135 11/13/2019 01:48 PM    POTASSIUM 4.2 11/13/2019 01:48 PM    CHLORIDE 97 11/13/2019 01:48 PM    CO2 28 11/13/2019 01:48 PM    GLUCOSE 84 11/13/2019 01:48 PM    BUN 10 11/13/2019 01:48 PM    CREATININE 0.92 11/13/2019 01:48 PM    CREATININE 1.1 04/21/2009 03:50 PM    BUNCREATRAT 13 09/21/2016 09:21 AM     Lab Results   Component Value Date/Time    ALKPHOSPHAT 85 11/13/2019 01:48 PM    ASTSGOT 25 11/13/2019 01:48 PM    ALTSGPT 25 11/13/2019 01:48 PM    TBILIRUBIN 0.4 11/13/2019 01:48 PM      Lab Results   Component Value Date/Time    BNPBTYPENAT 61 04/30/2018 10:52 AM      Echocardiography Laboratory    CONCLUSIONS  Normal left ventricular systolic function.  Left ventricular ejection fraction is visually estimated to be 65%.  Normal diastolic function.  The right ventricle was normal in size and function.  Possible severe aortic insufficiency.  Diastolic flow reversal in the descending aorta.  Compared to the images of the prior study done 10- -  there has   been progression of aortic regurgitation, previously mild.    Consider a transesophageal echocardiogram for better characterization   of aortic insufficiency.    These findings were communicated to the ordering physician and the   patient's cardiologist.     CANDI UMANA  Exam Date:         08/23/2017    Echocardiography  Laboratory     CONCLUSIONS  Normal left ventricular chamber size. Normal left ventricular wall   thickness. Normal left ventricular systolic function. Left ventricular   ejection fraction is visually estimated to be 65%. Normal regional wall   motion. Normal diastolic function.   Moderate aortic insufficiency.  Compared to the images of the study done 8/2017 - there has been no   significant change.          CANDI SIMS  Exam Date:         11/11/2019    Assessment:     1. Coronary artery disease due to calcified coronary lesion: Mildly cardiac catheterization in 2014     2. Dyslipidemia     3. Nonrheumatic aortic valve insufficiency         Medical Decision Making:  Today's Assessment / Status / Plan:     Mr. Sims is clinically stable.  He has had difficulty with statin therapy in the past with both rosuvastatin and atorvastatin.  At this time, we discussed a Mediterranean-style diet.  He does have moderate aortic insufficiency but is asymptomatic from this and has had no left ventricular dilatation.  He will probably need a repeat echocardiogram in about 2 years.  He will follow-up in about a year with a lipid and CMP.

## 2019-11-22 LAB
17OH-PREG SERPL-MCNC: 33 NG/DL
17OHP SERPL-MCNC: 30.6 NG/DL
ANDROST SERPL-MCNC: 1.33 NG/ML (ref 0.23–0.89)
DHEA SERPL-MCNC: 0.23 NG/ML (ref 0.63–4.7)

## 2019-12-04 ENCOUNTER — OFFICE VISIT (OUTPATIENT)
Dept: RHEUMATOLOGY | Facility: MEDICAL CENTER | Age: 64
End: 2019-12-04
Payer: MEDICARE

## 2019-12-04 VITALS
HEART RATE: 72 BPM | SYSTOLIC BLOOD PRESSURE: 140 MMHG | OXYGEN SATURATION: 99 % | WEIGHT: 205 LBS | TEMPERATURE: 97.8 F | RESPIRATION RATE: 12 BRPM | BODY MASS INDEX: 29.41 KG/M2 | DIASTOLIC BLOOD PRESSURE: 70 MMHG

## 2019-12-04 DIAGNOSIS — Z79.1 ENCOUNTER FOR LONG-TERM (CURRENT) USE OF NSAIDS: ICD-10-CM

## 2019-12-04 DIAGNOSIS — Z79.622 LONG-TERM CURRENT USE OF TOFACITINIB: ICD-10-CM

## 2019-12-04 DIAGNOSIS — M05.9 RHEUMATOID ARTHRITIS WITH POSITIVE RHEUMATOID FACTOR, INVOLVING UNSPECIFIED SITE (HCC): ICD-10-CM

## 2019-12-04 DIAGNOSIS — M25.512 CHRONIC LEFT SHOULDER PAIN: ICD-10-CM

## 2019-12-04 DIAGNOSIS — I25.10 CORONARY ARTERY DISEASE DUE TO CALCIFIED CORONARY LESION: ICD-10-CM

## 2019-12-04 DIAGNOSIS — Z79.52 LONG TERM CURRENT USE OF SYSTEMIC STEROIDS: ICD-10-CM

## 2019-12-04 DIAGNOSIS — I25.84 CORONARY ARTERY DISEASE DUE TO CALCIFIED CORONARY LESION: ICD-10-CM

## 2019-12-04 DIAGNOSIS — M81.0 OSTEOPOROSIS WITHOUT CURRENT PATHOLOGICAL FRACTURE, UNSPECIFIED OSTEOPOROSIS TYPE: ICD-10-CM

## 2019-12-04 DIAGNOSIS — G89.29 CHRONIC LEFT SHOULDER PAIN: ICD-10-CM

## 2019-12-04 DIAGNOSIS — I10 ESSENTIAL HYPERTENSION: ICD-10-CM

## 2019-12-04 PROCEDURE — 99214 OFFICE O/P EST MOD 30 MIN: CPT | Performed by: INTERNAL MEDICINE

## 2019-12-04 RX ORDER — VARDENAFIL HYDROCHLORIDE 20 MG/1
20 TABLET ORAL PRN
COMMUNITY
End: 2023-07-29

## 2019-12-04 ASSESSMENT — JOINT PAIN
TOTAL NUMBER OF TENDER JOINTS: 4
TOTAL NUMBER OF SWOLLEN JOINTS: 0

## 2019-12-04 NOTE — PROGRESS NOTES
Chief Complaint- joint pain     Subjective:   Edgardo Sims is a 64 y.o. male here today for follow up of rheumatological issues    This is a follow-up visit for this patient who is seen in this clinic for rheumatoid arthritis, patient is previously followed by Dr. Canales.  Patient is currently on Xeljanz at 5 mg p.o. twice daily subsidized by [employer].  Patient also continues to be on low-dose prednisone at 2 mg p.o. daily because his previous rheumatologist Dr. Saez told patient he was adrenal deficient and needed to continue the prednisone.  Patient in the process of getting evaluated by endocrinology for adrenal insufficiency.  Of note last sedimentation rate equals 1 August 2019.  Patient also takes meloxicam intermittently.    Of note, patient also meloxicam, patient understands the risks of peptic ulcer disease and gastrointestinal bleeding using NSAIDs in combination with prednisone.  Patient states he understands the risks and wants to continue the use of meloxicam as he has been on meloxicam for years with benefit    Additionally today patient is complaining of catching in the right shoulder, patient does have a history of surgeries x2 for rotator cuff pathology in the right shoulder, wonders if there may be additional pathology occurring.     Additional comorbidities include ureteral blockage and BPH.  Patient also with a history of Dupuytren's contracture with release, also history of hypercholesterolemia.  Patient also with a history of osteopenia and aortic valve insufficiency followed periodically by echocardiograms.  Patient also with rotator cuff tear repairs x2 right shoulder.     Bilateral AUTUMN  Left TKA     S/p Remicade-ineffective  S/p Orencia-ineffective  S/p Enbrel-ineffective  S/p humira-ineffective  S/p MTX-oral ulcers  S/p arava-bad reaction but patient doesn't recall specifics  S/p rituximab-helped but patient stopped because of methotrexate side effects per patient  report....        Uric acid 4.5 nl 2/2019   Cryoglobulin neg 8/2017  RF neg 8/2017, RF neg 2/2019  CCP neg 2/2019  HBsAg IgM/HBcAb neg 9/2019  HCV neg 9/2019  Quantiferon Gold neg 9/2019  DEXA 9/5/2017 T scores -0.2, -1.5  FRAX 9/5/2017 not done  Hand x-rays 5/2017-indicates erosive arthritis  Feet x-rays 5/2017-indicates erosive arthritis   Corticosteroid Therapy Informed Consent signed 2/21/2019-copy given to patient     Current medicines (including changes today)  Current Outpatient Medications   Medication Sig Dispense Refill   • vardenafil (LEVITRA) 20 MG tablet Take 20 mg by mouth as needed.     • [START ON 1/22/2020] oxyCODONE-acetaminophen (PERCOCET-10)  MG Tab Take 1-2 Tabs by mouth every four hours as needed for Severe Pain for up to 30 days. 150 Tab 0   • meloxicam (MOBIC) 7.5 MG Tab TAKE 1 TABLET BY MOUTH DAILY AS NEEDED FOR SEVERE JOINT PAIN 90 Tab 1   • testosterone cypionate (DEPO-TESTOSTERONE) 200 MG/ML Solution injection 100 mg by Intramuscular route every 14 days.     • PARoxetine (PAXIL) 20 MG Tab Take 1 Tab by mouth every day. 90 Tab 3   • lisinopril (PRINIVIL) 10 MG Tab TAKE 1 TABLET BY MOUTH EVERY DAY 90 Tab 3   • predniSONE (DELTASONE) 1 MG Tab TAKE 2 TABLETS BY MOUTH EVERY DAY 60 Tab 0   • metoprolol (LOPRESSOR) 50 MG Tab TAKE 1 TABLET BY MOUTH TWICE A  Tab 3   • cyclobenzaprine (FLEXERIL) 10 MG Tab TAKE ONE TABLET BY MOUTH THREE TIMES DAILY AS NEEDED FOR MILD PAIN 90 Tab 5   • pravastatin (PRAVACHOL) 80 MG tablet Take 1 Tab by mouth every day. 30 Tab 11   • ezetimibe (ZETIA) 10 MG Tab Take 10 mg by mouth every evening.     • Tofacitinib Citrate (XELJANZ) 5 MG Tab Take 5 mg by mouth 2 Times a Day. 60 Tab 0   • CALCIUM-VITAMIN D PO Take 1 Tab by mouth 2 Times a Day.     • ascorbic acid (ASCORBIC ACID) 500 MG Tab Take 500 mg by mouth every day.     • Cyanocobalamin (VITAMIN B-12) 5000 MCG TABLET DISPERSIBLE Take 1 Tab by mouth every day.     • niacin 500 MG Tab Take 500 mg by mouth  every day.     • Zinc 50 MG Cap Take 50 mg by mouth every day.     • omeprazole (PRILOSEC) 20 MG CPDR Take 20 mg by mouth every day.       No current facility-administered medications for this visit.      He  has a past medical history of Abnormal myocardial perfusion study (1/15/2014), Aortic insufficiency (7/5/2011), Arthritis, Bronchitis, CAD (coronary artery disease) mild plaque at cath in 2/14 (12/5/2014), Cancer (HCC), Chronic use of opiate drugs therapeutic purposes (8/12/2017), Dental disorder, Dyslipidemia (7/12/2011), Heart burn, Heart murmur, Hiatus hernia syndrome, High cholesterol, HTN (hypertension) (7/5/2011), Hypertension, Indigestion, Infectious disease, Pain, Pain, Rheumatoid arthritis(714.0), and Tachycardia (10/20/2014).    ROS   Other than what is mentioned in HPI or physical exam, there is no history of headaches, double vision or blurred vision. No temporal tenderness or jaw claudication. No trouble swallowing difficulties or sore throats.  No chest complaints including chest pain, cough or sputum production. No GI complaints including nausea, vomiting, change in bowel habits, or past peptic ulcer disease. No history of blood in the stools. No urinary complaints including dysuria or frequency. No history of alopecia, photosensitivity, oral ulcerations, Raynaud's phenomena.       Objective:     /70   Pulse 72   Temp 36.6 °C (97.8 °F) (Temporal)   Resp 12   Wt 93 kg (205 lb)   SpO2 99%  Body mass index is 29.41 kg/m².   Physical Exam:    Constitutional: Alert and oriented X3, patient is talkative with good eye contact.Skin: Warm, dry, good turgor, no rashes in visible areas.Eye: Equal, round and reactive, conjunctiva clear, lids normal EOM intactENMT: Lips without lesions, good dentition, no oropharyngeal ulcers, moist buccal mucosa, pinna without deformityNeck: Trachea midline, no masses, no thyromegaly.Lymph:  No cervical lymphadenopathy, no axillary lymphadenopathy, no  supraclavicular lymphadenopathyRespiratory: Unlabored respiratory effort, lungs clear to auscultation, no wheezes, no ronchi.Cardiovascular: Normal S1, S2, no murmur, no edema.Abdomen: Soft, non-tender, no masses, no hepatosplenomegaly.Psych: Alert and oriented x3, normal affect and mood.Neuro: Cranial nerves 2-12 are grossly intact, no loss of sensation LEExt:no joint laxity noted in bilateral arms, no joint laxity noted in bilateral legs, patient does have bony hypertrophy of the right second and third MCP joint, patient has replaced left knee with well-healed TKA incision site            Lab Results   Component Value Date/Time    QNTTBGOLD Negative 06/01/2017 01:13 PM     Lab Results   Component Value Date/Time    HEPBCORTOT Negative 06/01/2017 01:13 PM    HEPBCORIGM Negative 09/06/2019 04:00 PM    HEPBSAG Negative 09/06/2019 04:00 PM     Lab Results   Component Value Date/Time    HEPCAB Negative 09/06/2019 04:00 PM     Lab Results   Component Value Date/Time    SODIUM 135 11/13/2019 01:48 PM    POTASSIUM 4.2 11/13/2019 01:48 PM    CHLORIDE 97 11/13/2019 01:48 PM    CO2 28 11/13/2019 01:48 PM    GLUCOSE 84 11/13/2019 01:48 PM    BUN 10 11/13/2019 01:48 PM    CREATININE 0.92 11/13/2019 01:48 PM    CREATININE 1.1 04/21/2009 03:50 PM    BUNCREATRAT 13 09/21/2016 09:21 AM      Lab Results   Component Value Date/Time    WBC 11.5 (H) 08/22/2019 08:56 AM    WBC 7.5 07/01/2011 10:30 AM    RBC 5.27 08/22/2019 08:56 AM    RBC 4.72 07/01/2011 10:30 AM    HEMOGLOBIN 15.5 08/22/2019 08:56 AM    HEMATOCRIT 49.3 08/22/2019 08:56 AM    MCV 93.5 08/22/2019 08:56 AM     (H) 07/01/2011 10:30 AM    MCH 29.4 08/22/2019 08:56 AM    MCH 36.0 (H) 07/01/2011 10:30 AM    MCHC 31.4 (L) 08/22/2019 08:56 AM    MPV 10.1 08/22/2019 08:56 AM    NEUTSPOLYS 65.00 08/22/2019 08:56 AM    LYMPHOCYTES 16.60 (L) 08/22/2019 08:56 AM    MONOCYTES 13.20 08/22/2019 08:56 AM    EOSINOPHILS 3.20 08/22/2019 08:56 AM    BASOPHILS 0.90 08/22/2019 08:56  AM    HYPOCHROMIA 1+ 03/05/2014 04:43 PM    ANISOCYTOSIS 1+ 01/02/2015 05:02 PM      Lab Results   Component Value Date/Time    CALCIUM 9.5 11/13/2019 01:48 PM    ASTSGOT 25 11/13/2019 01:48 PM    ALTSGPT 25 11/13/2019 01:48 PM    ALKPHOSPHAT 85 11/13/2019 01:48 PM    TBILIRUBIN 0.4 11/13/2019 01:48 PM    ALBUMIN 4.4 11/13/2019 01:48 PM    TOTPROTEIN 7.1 11/13/2019 01:48 PM     Lab Results   Component Value Date/Time    URICACID 4.5 02/19/2019 08:36 AM    RHEUMFACTN 10 02/19/2019 08:36 AM    CCPANTIBODY 2 02/19/2019 08:36 AM     Lab Results   Component Value Date/Time    CRYOGLOBULIN NEG 72Hour 08/04/2017 02:32 PM     Lab Results   Component Value Date/Time    SEDRATEWES 1 08/22/2019 08:56 AM     Lab Results   Component Value Date/Time    HBA1C 5.3 07/26/2018 11:19 AM     Lab Results   Component Value Date/Time    CPKTOTAL 141 01/26/2018 07:47 AM     Lab Results   Component Value Date/Time    PTHINTACT 55 10/10/2008 10:42 AM     Results for orders placed during the hospital encounter of 09/05/17   DS-BONE DENSITY STUDY (DEXA)    Impression According to the World Health Organization classification, bone mineral density of this patient is osteopenic.        FRAX score not obtained on this patient.        INTERPRETING LOCATION:  [address] [place] [address], [place], [address]     Results for orders placed during the hospital encounter of 05/31/13   DX-PELVIS-1 OR 2 VIEWS    Impression Interval performance of a left hip arthroplasty.  Right hip arthroplasty is unchanged.              INTERPRETING LOCATION: [address], [place], [address]     Results for orders placed during the hospital encounter of 05/31/13   DX-PELVIS-1 OR 2 VIEWS    Impression Interval performance of a left hip arthroplasty.  Right hip arthroplasty is unchanged.              INTERPRETING LOCATION: [address], [place], [address]     Results for orders placed during the hospital encounter of 01/30/09   DX-KNEE COMPLETE 4+    Impression IMPRESSION:     1.  NEW FINDINGS CONSISTENT WITH OSTEOCHONDROSIS DISSECANS OF THE MEDIAL   FEMORAL CONDYLE.    2. NEW MODERATE JOINT SPACE NARROWING OF THE MEDIAL TIBIOFEMORAL   COMPARTMENT, CONSISTENT WITH CARTILAGE THINNING.    3. NEW MODERATE JOINT EFFUSION.      SST/llw      Read By BLANCA ORTIZ MD on Jan 30 2009  3:39PM  : [facility] Transcription Date: Feb 2 2009  6:08AM  THIS DOCUMENT HAS BEEN ELECTRONICALLY SIGNED BY: BLANCA ORTIZ MD on   Feb  3 2009 11:43AM        Results for orders placed during the hospital encounter of 05/31/17   DX-HAND 3+ RIGHT    Impression 1.  Question of small erosions or subchondral cysts in the 2nd and 3rd metacarpal heads.     Results for orders placed during the hospital encounter of 12/12/18   DX-THORACIC SPINE-WITH SWIMMERS VIEW    Impression 1.  Moderate to severe multilevel degenerative change of thoracic spine.  2.  Multilevel anterior wedge compression deformities with associated thoracic kyphosis, similar to prior exam.  3.  No gross acute fracture or segmental malalignment.     Results for orders placed during the hospital encounter of 12/21/07   MR-KNEE-W/O    Impression IMPRESSION:    1. NON-DISPLACED OBLIQUE TEAR OF THE POSTERIOR HORN MEDIAL MENISCUS   COMMUNICATING WITH SUPERIOR AND INFERIOR ARTICULAR SURFACES WITHOUT   EVIDENCE OF PARAMENISCAL CYST.  UNDERLYING CHONDROMALACIA IS SEEN WITHIN   THE MEDIAL FEMORAL CONDYLE POSTERIOR WEIGHTBEARING SURFACE OF   NON-FULL-THICKNESS SEVERITY.        2. MENISCAL DEGENERATION AND PARTIAL PERIPHERAL EXTRUSION INVOLVING THE   MID-BODY OF THE MEDIAL MENISCUS.        [facility]        Read By KEITH NETTLES MD on Dec 21 2007 12:14PM  : [facility] Transcription Date: Dec 22 2007 10:16AM  THIS DOCUMENT HAS BEEN ELECTRONICALLY SIGNED BY: KEITH NETTLES MD on   Dec 24 2007 12:21PM     Results for orders placed during the hospital encounter of 02/14/05   MR-CERVICAL SPINE-W/O    Impression IMPRESSION:    1. DEGENERATIVE DISC  DISEASE C5-6 AND C6-7 WITH SOME MINOR POSTERIOR   SPURRING BUT WITHOUT ANY REAL CANAL STENOSIS AND WITH NO EVIDENCE OF AN   ACUTE DISC EXTRUSION.  2. MILD TO MODERATE MIDCERVICAL NEURAL FORAMINAL NARROWING DUE TO   UNCINATE AND FACET HYPERTROPHY, WITH MULTILEVEL DISEASE PRESENT AND WITH   SOME MILD INTERVAL PROGRESSION COMPARED WITH THE PRIOR MRI.  THE AXIAL   IMAGES OVERESTIMATE THE DEGREE OF NEURAL FORAMINAL STENOSIS DUE TO   ARTIFACTS, WITH THE SAGITTAL IMAGES DEMONSTRATING MILD NEURAL FORAMINAL   STENOTIC CHANGES IN THE MIDCERVICAL LEVELS.    3. NO EVIDENCE OF ACUTE TRAUMA.         Read By KIETH NETTLES MD on Feb 14 2005  1:10PM  : [facility] Transcription Date: Feb 15 2005 11:24AM  THIS DOCUMENT HAS BEEN ELECTRONICALLY SIGNED BY: KEITH NETTLES MD on   Feb 15 2005 12:06PM     Results for orders placed during the hospital encounter of 02/14/05   DX-CERVICAL SPINE-2 OR 3 VIEWS    Impression IMPRESSION:    MULTILEVEL CERVICAL SPINE DEGENERATIVE CHANGE, WITHOUT INTERVAL CHANGE.          Read By MIKKI RODRIGUEZ MD on Feb 14 2005  3:04PM  : [facility] Transcription Date: Feb 14 2005  3:10PM  THIS DOCUMENT HAS BEEN ELECTRONICALLY SIGNED BY: MIKKI RODRIGUEZ MD on Feb 14 2005  3:17PM     Assessment and Plan:     1. Rheumatoid arthritis with positive rheumatoid factor, involving unspecified site (HCC)  Continue Xeljanz at 5 mg p.o. twice daily, patient also continues prednisone 2 mg p.o. daily whilst getting evaluated for adrenal insufficiency by endocrinology.  If you are able to stop the prednisone we may add Plaquenil  - DS-BONE DENSITY STUDY (DEXA); Future  - CBC WITH DIFFERENTIAL; Future  - Comp Metabolic Panel; Future  - WESTERGREN SED RATE; Future    2. Long-term current use of tofacitinib  On Xeljanz 5 mg p.o. twice daily, of note screening labs are up-to-date, patient needs monitoring labs every 6 months next labs due February 2020, labs ordered for patient  We reviewed risks of biological  medications with patient including hematological pathology, cancer risks, neurological and infection issues  - CBC WITH DIFFERENTIAL; Future  - Comp Metabolic Panel; Future  - WESTERGREN SED RATE; Future    3. Long term current use of systemic steroids  Currently on prednisone at 2 mg p.o. daily currently being evaluated for adrenal insufficiency by endocrinology, if deemed not adrenally insufficient then we will try to taper off the prednisone  - DS-BONE DENSITY STUDY (DEXA); Future  - CBC WITH DIFFERENTIAL; Future  - Comp Metabolic Panel; Future  - WESTERGREN SED RATE; Future    4. Encounter for long-term (current) use of NSAIDs  Patient takes meloxicam intermittently, very long discussion with patient regarding prednisone meloxicam causing peptic ulcer disease, patient states he understands the risks and wants to continue the use of meloxicam, patient states he been on meloxicam for years with benefit  Patient will need monitoring labs every 6 months next labs due about February 2020, labs ordered for patient    5. Osteoporosis without current pathological fracture, unspecified osteoporosis type  Last DEXA September 2017, patient due for DEXA while on chronic prednisone, DEXA ordered for patient   continue calcium citrate 1200 mg by mouth daily and vitamin D about 2000 units by mouth daily and magnesium 200 mg by mouth daily  - DS-BONE DENSITY STUDY (DEXA); Future    6. Essential hypertension  May impact the type of medications we can use for this patient's arthritis. We will have to keep this under advisement.    7. Coronary artery disease due to calcified coronary lesion: Mildly cardiac catheterization in 2014  Also carries a diagnosis of aortic insufficiency, followed by cardiology gets regular echocardiograms for monitoring.    8. Chronic left shoulder pain  History of chronic left shoulder pain status post rotator cuff injury with surgical intervention x2, seems to be re-exacerbating we will do a left  shoulder x-ray for further evaluation    Followup: Return in about 6 months (around 6/4/2020). or sooner peyman Reynoso Randy Sims  was seen 30 minutes face-to-face of which more than 50% of the time was spent counseling the patient (excluding time for procedures)  regarding  rheumatological condition and care. Therapy was discussed in detail.      Please note that this dictation was created using voice recognition software. I have made every reasonable attempt to correct obvious errors, but I expect that there are errors of grammar and possibly content that I did not discover before finalizing the note.             Addendum 1/13/2020  Note by endocrinology-patient found to be adrenally insufficient, does NOT recommend weaning off of prednisone-recommends continuing prednisone at 2 mg p.o. daily  ASSESSMENT/PLAN:         1. Secondary adrenal insufficiency (HCC)  Work-up is compatible with secondary adrenal insufficiency and not primary adrenal insufficiency  However because of his persistently low ACTH and cortisol levels I do not recommend weaning prednisone at this time  Recommend for patient to continue prednisone 2 mg daily for now  Reminded patient to take extra doses of steroids for any acute illness     We will plan for follow-up in 6 months with labs     2. Current chronic use of systemic steroids  Patient is on chronic steroids because of chronic adrenal insufficiency     3. High risk medication use  Patient is taking steroids which are high risk medication     4. Rheumatoid arthritis with positive rheumatoid factor, involving unspecified site (HCC)  We will discuss current results of ACTH stim test with rheumatology.  At this time I do not think that it is feasible to wean him off prednisone he may have to continue Xeljanz for now  Continue follow-up with rheumatology        Return in about 6 months (around 6/12/2020).        Thank you kindly for allowing me to participate in the endocrine care plan for  this patient.     Hector Cedillo MD, FACE, NU  12/13/19

## 2019-12-06 DIAGNOSIS — Z79.899 ENCOUNTER FOR LONG-TERM (CURRENT) USE OF HIGH-RISK MEDICATION: ICD-10-CM

## 2019-12-06 DIAGNOSIS — M05.9 RHEUMATOID ARTHRITIS WITH POSITIVE RHEUMATOID FACTOR, INVOLVING UNSPECIFIED SITE (HCC): ICD-10-CM

## 2019-12-06 NOTE — TELEPHONE ENCOUNTER
Was the patient seen in the last year in this department? Yes some November labs    Does patient have an active prescription for medications requested? No     Received Request Via: Pharmacy

## 2019-12-10 ENCOUNTER — OUTPATIENT INFUSION SERVICES (OUTPATIENT)
Dept: ONCOLOGY | Facility: MEDICAL CENTER | Age: 64
End: 2019-12-10
Attending: INTERNAL MEDICINE
Payer: MEDICARE

## 2019-12-10 VITALS
SYSTOLIC BLOOD PRESSURE: 145 MMHG | TEMPERATURE: 98 F | BODY MASS INDEX: 31.05 KG/M2 | WEIGHT: 209.66 LBS | DIASTOLIC BLOOD PRESSURE: 80 MMHG | HEIGHT: 69 IN | HEART RATE: 82 BPM | OXYGEN SATURATION: 96 % | RESPIRATION RATE: 18 BRPM

## 2019-12-10 DIAGNOSIS — Z79.52 CURRENT CHRONIC USE OF SYSTEMIC STEROIDS: ICD-10-CM

## 2019-12-10 DIAGNOSIS — E29.1 HYPOGONADISM MALE: ICD-10-CM

## 2019-12-10 LAB
CORTIS SERPL-MCNC: 12.7 UG/DL (ref 0–23)
CORTIS SERPL-MCNC: 14.4 UG/DL (ref 0–23)
CORTIS SERPL-MCNC: 5.3 UG/DL (ref 0–23)

## 2019-12-10 PROCEDURE — 96374 THER/PROPH/DIAG INJ IV PUSH: CPT

## 2019-12-10 PROCEDURE — 36415 COLL VENOUS BLD VENIPUNCTURE: CPT

## 2019-12-10 PROCEDURE — 82533 TOTAL CORTISOL: CPT

## 2019-12-10 PROCEDURE — 700111 HCHG RX REV CODE 636 W/ 250 OVERRIDE (IP): Performed by: INTERNAL MEDICINE

## 2019-12-10 PROCEDURE — 82024 ASSAY OF ACTH: CPT

## 2019-12-10 RX ORDER — COSYNTROPIN 0.25 MG/ML
0.25 INJECTION, POWDER, FOR SOLUTION INTRAMUSCULAR; INTRAVENOUS ONCE
Status: COMPLETED | OUTPATIENT
Start: 2019-12-10 | End: 2019-12-10

## 2019-12-10 RX ADMIN — COSYNTROPIN 250 MCG: 0.25 INJECTION, POWDER, LYOPHILIZED, FOR SOLUTION INTRAVENOUS at 10:40

## 2019-12-10 NOTE — PROGRESS NOTES
Arrives for ACTH stim test.  Reviewed purpose and potential side effects as well as procedure.  Baseline labs, tx and post labs completed without issue.  DC to self care.

## 2019-12-11 LAB — ACTH PLAS-MCNC: 4 PG/ML (ref 7.2–63.3)

## 2019-12-12 ENCOUNTER — OFFICE VISIT (OUTPATIENT)
Dept: ENDOCRINOLOGY | Facility: MEDICAL CENTER | Age: 64
End: 2019-12-12
Payer: MEDICARE

## 2019-12-12 VITALS
HEIGHT: 69 IN | OXYGEN SATURATION: 96 % | DIASTOLIC BLOOD PRESSURE: 74 MMHG | SYSTOLIC BLOOD PRESSURE: 126 MMHG | WEIGHT: 204 LBS | HEART RATE: 74 BPM | BODY MASS INDEX: 30.21 KG/M2

## 2019-12-12 DIAGNOSIS — Z79.899 HIGH RISK MEDICATION USE: ICD-10-CM

## 2019-12-12 DIAGNOSIS — E27.49 SECONDARY ADRENAL INSUFFICIENCY (HCC): ICD-10-CM

## 2019-12-12 DIAGNOSIS — Z79.52 CURRENT CHRONIC USE OF SYSTEMIC STEROIDS: ICD-10-CM

## 2019-12-12 DIAGNOSIS — M05.9 RHEUMATOID ARTHRITIS WITH POSITIVE RHEUMATOID FACTOR, INVOLVING UNSPECIFIED SITE (HCC): ICD-10-CM

## 2019-12-12 PROCEDURE — 99214 OFFICE O/P EST MOD 30 MIN: CPT | Performed by: INTERNAL MEDICINE

## 2019-12-12 RX ORDER — CYCLOBENZAPRINE HCL 10 MG
TABLET ORAL
Qty: 90 TAB | Refills: 5 | Status: SHIPPED | OUTPATIENT
Start: 2019-12-12 | End: 2020-11-30 | Stop reason: SDUPTHER

## 2019-12-12 RX ORDER — PREDNISONE 1 MG/1
TABLET ORAL
Qty: 60 TAB | Refills: 5 | Status: SHIPPED | OUTPATIENT
Start: 2019-12-12 | End: 2020-01-13

## 2019-12-12 NOTE — TELEPHONE ENCOUNTER
Was the patient seen in the last year in this department? Yes  11/13/19  Does patient have an active prescription for medications requested? No     Received Request Via: Pharmacy

## 2019-12-14 NOTE — PROGRESS NOTES
Chief Complaint: Follow up for secondary adrenal insufficiency and current chronic use of systemic steroids    HPI:     Edgardo Sims is a 64 y.o. male here for follow up of the above medical issues    I initially saw him on October 2019 as a referral from Dr. Azul for evaluation of chronic secondary adrenal insufficiency secondary to chronic steroid use in the background of rheumatoid arthritis.  He was started on steroids by his previous rheumatologist over 20 years ago.  He has been stable on prednisone 2 mg daily    He has osteopenia managed by his primary care physician and also has hypogonadism managed by his primary care physician    He was referred by Dr. Azul because of plans to potentially taper his prednisone and wean him off    I scheduled him for a cosyntropin stimulation test and work-up to rule out Rakesh's disease however his follow-up and testing was delayed because of scheduling issues      He was finally able to complete the cosyntropin stim test on December 2019 with positive results of adrenal insufficiency his baseline cortisol was 5 and his 1 hour stimulated cortisol was low at 14    His baseline labs showed a low ACTH of 4 and low cortisol level of 5 compatible with secondary adrenal insufficiency    I ordered a 21-hydroxylase antibodies but the lab did not draw the test but juana another test which is the 17 hydroxy progesterone test    His baseline aldosterone was normal at 9.2 and renin was normal at 0.9 which is not compatible with primary adrenal insufficiency      On follow-up his last ESR was low at 10 in August 2019 he is taking meloxicam chronically and denies having any complications.  He is also taking Xeljanz 5 mg twice a day along with the prednisone for his rheumatoid arthritis.  His rheumatologist is considering adding Plaquenil later on if he is able to wean off prednisone.    He denies interval falls and fractures and he is taking Caltrate and vitamin D 2000  units daily        Patient's medications, allergies, and social histories were reviewed and updated as appropriate.      ROS:       CONS:     No fever, no chills   EYES:     No diplopia, no blurry vision   CV:           No chest pain, no palpitations   PULM:     No SOB, no cough, no hemoptysis.   GI:            No nausea, no vomiting, no diarrhea, no constipation   ENDO:     No polyuria, no polydipsia, no heat intolerance, no cold intolerance     Past Medical History:  Problem List:  2019-12: Secondary adrenal insufficiency (HCC)  2019-10: Current chronic use of systemic steroids  2019-10: High risk medication use  2019-10: History of adrenal insufficiency  2019-01: Hyponatremia  2019-01: Hematuria  2019-01: Complicated UTI (urinary tract infection)  2019-01: Neurogenic bladder  2019-01: Sepsis (HCC)  2018-08: Failed total knee, left, subsequent encounter  2018-05: Bladder outlet obstruction  2018-04: Acute pyelonephritis  2018-04: Leukocytosis  2018-04: Lactic acidosis  2018-04: Idiopathic acute pancreatitis  2017-09: Inadequate community resources  2017-08: Chronic use of opiate drugs therapeutic purposes  2016-09: Elevated CPK  2015-07: Depression  2015-03: Anxiety  2014-12: Coronary artery disease due to calcified coronary lesion:   Mildly cardiac catheterization in 2014  2014-10: Tachycardia  2014-01: Abnormal myocardial perfusion study  2013-05: Osteoarthrosis, unspecified whether generalized or localized,  pelvic region and thigh  2011-07: Dyslipidemia  2011-07: Aortic insufficiency  2011-07: Essential hypertension  2009-10: Need for pneumococcal vaccination  2009-05: Left knee pain  2009-05: Rheumatoid arthritis (HCC)  2009-05: Hypogonadism male      Past Surgical History:  Past Surgical History:   Procedure Laterality Date   • PB TRANSURETHRAL ELEC-SURG PBOSTATECTOM  4/1/2019    Procedure: BIPOLAR TRANSURETHRAL RESECTION OF PROSTATE;  Surgeon: Jose Romo M.D.;  Location: SURGERY [facility];   Service: Urology   • CYSTOSCOPY  4/1/2019    Procedure: CYSTOSCOPY;  Surgeon: Jose Romo M.D.;  Location: SURGERY [facility];  Service: Urology   • URETHROTOMY INTERNAL  4/1/2019    Procedure: DIRECT VISION INTERNAL URETHROTOMY;  Surgeon: Jose Romo M.D.;  Location: SURGERY [facility];  Service: Urology   • KNEE REVISION TOTAL Left 8/3/2018    Procedure: KNEE REVISION TOTAL;  Surgeon: Michael Rodriguez M.D.;  Location: SURGERY [facility];  Service: Orthopedics   • CYSTOSCOPY  5/16/2018    Procedure: CYSTOSCOPY-CLOT EVAC;  Surgeon: Jose Romo M.D.;  Location: SURGERY [facility];  Service: Urology   • TRANS URETHRAL RESECTION BLADDER  5/16/2018    Procedure: TRANS URETHRAL RESECTION BLADDER;  Surgeon: Jose Romo M.D.;  Location: SURGERY [facility];  Service: Urology   • RECOVERY  2/3/2014    Performed by Cath-Recovery Surgery at SURGERY SAME DAY [facility]   • HIP ARTHROPLASTY TOTAL  5/31/2013    Performed by Burak Valles M.D. at [facility]   • ROTATOR CUFF REPAIR Right 2011    x 2   • KNEE ARTHROPLASTY TOTAL  6/1/2009    LEFT-Performed by YONATAN HINSON at SURGERY [facility]   • VEIN LIGATION RADIO FREQUENCY  12/8/2008    LEFT leg-Performed by DEREJE MCCULLOUGH at SURGERY SAME DAY [facility]   • HIP ARTHROPLASTY TOTAL  6/20/08    Performed by YONATAN HINSON at SURGERY [facility]   • LUMBAR LAMINECTOMY DISKECTOMY  2000   • TMJ RECONSTRUCTION BILATERAL  1994   • ANKLE ORIF Right    • KNEE ARTHROSCOPY Left    • VEIN STRIPPING Left         Allergies:  Crestor [rosuvastatin calcium]; Penicillins; and Rocephin [ceftriaxone]     Social History:  Social History     Tobacco Use   • Smoking status: Never Smoker   • Smokeless tobacco: Never Used   Substance Use Topics   • Alcohol use: Yes     Alcohol/week: 1.8 oz     Types: 3 Cans of beer per week     Frequency: 2-3 times a week     Drinks per session: 1 or 2      "Comment: 3 per week   • Drug use: No        Family History:   family history includes Hypertension in his mother.      PHYSICAL EXAM:   Vital signs: /74 (BP Location: Left arm, Patient Position: Sitting, BP Cuff Size: Adult)   Pulse 74   Ht 1.745 m (5' 8.7\")   Wt 92.5 kg (204 lb)   SpO2 96%   BMI 30.39 kg/m²   GENERAL: Well-developed, well-nourished in no apparent distress.   EYE:  No ocular asymmetry, PERRLA  HENT: Pink, moist mucous membranes.    NECK: No thyromegaly.   CARDIOVASCULAR:  No murmurs  LUNGS: Clear breath sounds  ABDOMEN: Soft, nontender   EXTREMITIES: No clubbing, cyanosis, or edema.   NEUROLOGICAL: No gross focal motor abnormalities   LYMPH: No cervical adenopathy palpated.   SKIN: No rashes, lesions.       Labs:  Lab Results   Component Value Date/Time    WBC 11.5 (H) 08/22/2019 08:56 AM    WBC 7.5 07/01/2011 10:30 AM    RBC 5.27 08/22/2019 08:56 AM    RBC 4.72 07/01/2011 10:30 AM    HEMOGLOBIN 15.5 08/22/2019 08:56 AM    MCV 93.5 08/22/2019 08:56 AM     (H) 07/01/2011 10:30 AM    MCH 29.4 08/22/2019 08:56 AM    MCH 36.0 (H) 07/01/2011 10:30 AM    MCHC 31.4 (L) 08/22/2019 08:56 AM    RDW 51.6 (H) 08/22/2019 08:56 AM    RDW 14.0 07/01/2011 10:30 AM    MPV 10.1 08/22/2019 08:56 AM       Lab Results   Component Value Date/Time    SODIUM 135 11/13/2019 01:48 PM    POTASSIUM 4.2 11/13/2019 01:48 PM    CHLORIDE 97 11/13/2019 01:48 PM    CO2 28 11/13/2019 01:48 PM    ANION 10.0 11/13/2019 01:48 PM    GLUCOSE 84 11/13/2019 01:48 PM    BUN 10 11/13/2019 01:48 PM    CREATININE 0.92 11/13/2019 01:48 PM    CREATININE 1.1 04/21/2009 03:50 PM    CALCIUM 9.5 11/13/2019 01:48 PM    ASTSGOT 25 11/13/2019 01:48 PM    ALTSGPT 25 11/13/2019 01:48 PM    TBILIRUBIN 0.4 11/13/2019 01:48 PM    ALBUMIN 4.4 11/13/2019 01:48 PM    TOTPROTEIN 7.1 11/13/2019 01:48 PM    GLOBULIN 2.7 11/13/2019 01:48 PM    AGRATIO 1.6 11/13/2019 01:48 PM       No results found for: TSHULTRASEN  Lab Results   Component Value " Date/Time    FREEDIR 1.11 12/01/2014 1513     No results found for: FREET3  No results found for: THYSTIMIG      Imaging:    ASSESSMENT/PLAN:       1. Secondary adrenal insufficiency (HCC)  Work-up is compatible with secondary adrenal insufficiency and not primary adrenal insufficiency  However because of his persistently low ACTH and cortisol levels I do not recommend weaning prednisone at this time  Recommend for patient to continue prednisone 2 mg daily for now  Reminded patient to take extra doses of steroids for any acute illness    We will plan for follow-up in 6 months with labs    2. Current chronic use of systemic steroids  Patient is on chronic steroids because of chronic adrenal insufficiency    3. High risk medication use  Patient is taking steroids which are high risk medication    4. Rheumatoid arthritis with positive rheumatoid factor, involving unspecified site (HCC)  We will discuss current results of ACTH stim test with rheumatology.  At this time I do not think that it is feasible to wean him off prednisone he may have to continue Xeljanz for now  Continue follow-up with rheumatology      Return in about 6 months (around 6/12/2020).      Thank you kindly for allowing me to participate in the endocrine care plan for this patient.    Hector Cedillo MD, FACE, [facility]  12/13/19    CC:   John Lao D.O.

## 2020-01-04 DIAGNOSIS — E29.1 HYPOGONADISM MALE: ICD-10-CM

## 2020-01-06 RX ORDER — TESTOSTERONE CYPIONATE 200 MG/ML
INJECTION, SOLUTION INTRAMUSCULAR
Qty: 2 ML | Refills: 2 | Status: SHIPPED
Start: 2020-01-06 | End: 2020-02-03

## 2020-01-06 NOTE — TELEPHONE ENCOUNTER
Was the patient seen in the last year in this department? Yes  11/13/19  11/29/19  Does patient have an active prescription for medications requested? No     Received Request Via: Pharmacy

## 2020-02-14 ENCOUNTER — OFFICE VISIT (OUTPATIENT)
Dept: MEDICAL GROUP | Facility: LAB | Age: 65
End: 2020-02-14
Payer: MEDICARE

## 2020-02-14 VITALS
BODY MASS INDEX: 31.83 KG/M2 | TEMPERATURE: 98.4 F | WEIGHT: 210 LBS | HEIGHT: 68 IN | DIASTOLIC BLOOD PRESSURE: 72 MMHG | SYSTOLIC BLOOD PRESSURE: 130 MMHG | OXYGEN SATURATION: 99 % | HEART RATE: 71 BPM

## 2020-02-14 DIAGNOSIS — M05.9 RHEUMATOID ARTHRITIS WITH POSITIVE RHEUMATOID FACTOR, INVOLVING UNSPECIFIED SITE (HCC): ICD-10-CM

## 2020-02-14 PROCEDURE — 99213 OFFICE O/P EST LOW 20 MIN: CPT | Performed by: INTERNAL MEDICINE

## 2020-02-14 RX ORDER — OXYCODONE AND ACETAMINOPHEN 10; 325 MG/1; MG/1
1-2 TABLET ORAL EVERY 4 HOURS PRN
Qty: 150 TAB | Refills: 0 | Status: SHIPPED | OUTPATIENT
Start: 2020-02-21 | End: 2020-02-14 | Stop reason: SDUPTHER

## 2020-02-14 RX ORDER — OXYCODONE AND ACETAMINOPHEN 10; 325 MG/1; MG/1
1-2 TABLET ORAL EVERY 4 HOURS PRN
Qty: 150 TAB | Refills: 0 | Status: SHIPPED | OUTPATIENT
Start: 2020-04-21 | End: 2020-05-22 | Stop reason: SDUPTHER

## 2020-02-14 RX ORDER — OXYCODONE AND ACETAMINOPHEN 10; 325 MG/1; MG/1
1-2 TABLET ORAL EVERY 4 HOURS PRN
Qty: 150 TAB | Refills: 0 | Status: SHIPPED | OUTPATIENT
Start: 2020-03-22 | End: 2020-02-14 | Stop reason: SDUPTHER

## 2020-02-14 NOTE — PROGRESS NOTES
CC: Edgardo Sims is a 64 y.o. male is suffering from   Chief Complaint   Patient presents with   • Follow-Up         SUBJECTIVE:  1. Rheumatoid arthritis with positive rheumatoid factor, involving unspecified site (HCC)  Edgardo is here for follow-up has a history of rheumatoid arthritis.  Recently he has undergone dental extraction has been off his Xeljanz, which was restarted earlier this week feels that starting to work.  Continues on narcotic analgesics for pain.        Past social, family, history:   Social History     Tobacco Use   • Smoking status: Never Smoker   • Smokeless tobacco: Never Used   Substance Use Topics   • Alcohol use: Yes     Alcohol/week: 1.8 oz     Types: 3 Cans of beer per week     Frequency: 2-3 times a week     Drinks per session: 1 or 2     Comment: 3 per week   • Drug use: No         MEDICATIONS:    Current Outpatient Medications:   •  [START ON 4/21/2020] oxyCODONE-acetaminophen (PERCOCET-10)  MG Tab, Take 1-2 Tabs by mouth every four hours as needed for Severe Pain for up to 30 days., Disp: 150 Tab, Rfl: 0  •  predniSONE (DELTASONE) 1 MG Tab, TAKE 2 TABLET BY MOUTH EVERY DAY, Disp: 180 Tab, Rfl: 1  •  cyclobenzaprine (FLEXERIL) 10 MG Tab, TAKE ONE TABLET BY MOUTH THREE TIMES DAILY AS NEEDED FOR MILD PAIN, Disp: 90 Tab, Rfl: 5  •  Tofacitinib Citrate (XELJANZ) 5 MG Tab, Take 5 mg by mouth 2 Times a Day., Disp: 180 Tab, Rfl: 0  •  vardenafil (LEVITRA) 20 MG tablet, Take 20 mg by mouth as needed., Disp: , Rfl:   •  oxyCODONE-acetaminophen (PERCOCET-10)  MG Tab, Take 1-2 Tabs by mouth every four hours as needed for Severe Pain for up to 30 days., Disp: 150 Tab, Rfl: 0  •  meloxicam (MOBIC) 7.5 MG Tab, TAKE 1 TABLET BY MOUTH DAILY AS NEEDED FOR SEVERE JOINT PAIN, Disp: 90 Tab, Rfl: 1  •  PARoxetine (PAXIL) 20 MG Tab, Take 1 Tab by mouth every day., Disp: 90 Tab, Rfl: 3  •  lisinopril (PRINIVIL) 10 MG Tab, TAKE 1 TABLET BY MOUTH EVERY DAY, Disp: 90 Tab, Rfl: 3  •   metoprolol (LOPRESSOR) 50 MG Tab, TAKE 1 TABLET BY MOUTH TWICE A DAY, Disp: 180 Tab, Rfl: 3  •  pravastatin (PRAVACHOL) 80 MG tablet, Take 1 Tab by mouth every day., Disp: 30 Tab, Rfl: 11  •  ezetimibe (ZETIA) 10 MG Tab, Take 10 mg by mouth every evening., Disp: , Rfl:   •  CALCIUM-VITAMIN D PO, Take 1 Tab by mouth 2 Times a Day., Disp: , Rfl:   •  ascorbic acid (ASCORBIC ACID) 500 MG Tab, Take 500 mg by mouth every day., Disp: , Rfl:   •  Cyanocobalamin (VITAMIN B-12) 5000 MCG TABLET DISPERSIBLE, Take 1 Tab by mouth every day., Disp: , Rfl:   •  niacin 500 MG Tab, Take 500 mg by mouth every day., Disp: , Rfl:   •  Zinc 50 MG Cap, Take 50 mg by mouth every day., Disp: , Rfl:   •  omeprazole (PRILOSEC) 20 MG CPDR, Take 20 mg by mouth every day., Disp: , Rfl:     PROBLEMS:  Patient Active Problem List    Diagnosis Date Noted   • Neurogenic bladder 01/26/2019     Priority: Medium   • Secondary adrenal insufficiency (HCC) 12/12/2019   • Current chronic use of systemic steroids 10/16/2019   • High risk medication use 10/16/2019   • History of adrenal insufficiency 10/16/2019   • Bladder outlet obstruction 05/01/2018   • Leukocytosis 04/30/2018   • Lactic acidosis 04/30/2018   • Idiopathic acute pancreatitis 04/30/2018   • Chronic use of opiate drug for therapeutic purpose 08/12/2017   • Depression 07/13/2015   • Anxiety 03/31/2015   • Coronary artery disease due to calcified coronary lesion: Mildly cardiac catheterization in 2014 12/05/2014   • Tachycardia 10/20/2014   • Abnormal myocardial perfusion study 01/15/2014   • Osteoarthrosis, unspecified whether generalized or localized, pelvic region and thigh 05/31/2013   • Dyslipidemia 07/12/2011   • Aortic insufficiency 07/05/2011   • Essential hypertension 07/05/2011   • Rheumatoid arthritis (HCC) 05/05/2009   • Hypogonadism male 05/05/2009       REVIEW OF SYSTEMS:  Gen.:  No Nausea, Vomiting, fever, Chills.  Heart: No chest pain.  Lungs:  No shortness of  "Breath.  Psychological: Kian unusual Anxiety depression     PHYSICAL EXAM   Constitutional: Alert, cooperative, not in acute distress.  Cardiovascular:  Rate Rhythm is regular without murmurs rubs clicks.     Thorax & Lungs: Clear to auscultation, no wheezing, rhonchi, or rales  HENT: Normocephalic, Atraumatic.  Eyes: PERRLA, EOMI, Conjunctiva normal.   Neck: Trachia is midline no swelling of the thyroid.   Lymphatic: No lymphadenopathy noted.   Neurologic: Alert & oriented x 3, cranial nerves II through XII are intact, Normal motor function, Normal sensory function, No focal deficits noted.   Psychiatric: Affect normal, Judgment normal, Mood normal.     VITAL SIGNS:/72 (BP Location: Left arm, Patient Position: Sitting, BP Cuff Size: Adult)   Pulse 71   Temp 36.9 °C (98.4 °F) (Temporal)   Ht 1.727 m (5' 8\")   Wt 95.3 kg (210 lb)   SpO2 99%   BMI 31.93 kg/m²     Labs: Reviewed    Assessment:                                                     Plan:    1. Rheumatoid arthritis with positive rheumatoid factor, involving unspecified site (HCC)  State drug task force urine drug screen reviewed, encourage patient to cut down if possible.  Explained it may be interesting to see if his dental extractions and false teeth decreases overall pain level.  - oxyCODONE-acetaminophen (PERCOCET-10)  MG Tab; Take 1-2 Tabs by mouth every four hours as needed for Severe Pain for up to 30 days.  Dispense: 150 Tab; Refill: 0        "

## 2020-02-27 ENCOUNTER — HOSPITAL ENCOUNTER (OUTPATIENT)
Dept: LAB | Facility: MEDICAL CENTER | Age: 65
End: 2020-02-27
Attending: INTERNAL MEDICINE
Payer: MEDICARE

## 2020-02-27 DIAGNOSIS — Z79.52 LONG TERM CURRENT USE OF SYSTEMIC STEROIDS: ICD-10-CM

## 2020-02-27 DIAGNOSIS — Z79.622 LONG-TERM CURRENT USE OF TOFACITINIB: ICD-10-CM

## 2020-02-27 DIAGNOSIS — M05.9 RHEUMATOID ARTHRITIS WITH POSITIVE RHEUMATOID FACTOR, INVOLVING UNSPECIFIED SITE (HCC): ICD-10-CM

## 2020-02-27 LAB
ALBUMIN SERPL BCP-MCNC: 4.5 G/DL (ref 3.2–4.9)
ALBUMIN/GLOB SERPL: 1.6 G/DL
ALP SERPL-CCNC: 70 U/L (ref 30–99)
ALT SERPL-CCNC: 27 U/L (ref 2–50)
ANION GAP SERPL CALC-SCNC: 10 MMOL/L (ref 0–11.9)
AST SERPL-CCNC: 32 U/L (ref 12–45)
BASOPHILS # BLD AUTO: 0.8 % (ref 0–1.8)
BASOPHILS # BLD: 0.1 K/UL (ref 0–0.12)
BILIRUB SERPL-MCNC: 0.8 MG/DL (ref 0.1–1.5)
BUN SERPL-MCNC: 14 MG/DL (ref 8–22)
CALCIUM SERPL-MCNC: 9.7 MG/DL (ref 8.5–10.5)
CHLORIDE SERPL-SCNC: 97 MMOL/L (ref 96–112)
CO2 SERPL-SCNC: 27 MMOL/L (ref 20–33)
CREAT SERPL-MCNC: 1.05 MG/DL (ref 0.5–1.4)
EOSINOPHIL # BLD AUTO: 0.27 K/UL (ref 0–0.51)
EOSINOPHIL NFR BLD: 2.2 % (ref 0–6.9)
ERYTHROCYTE [DISTWIDTH] IN BLOOD BY AUTOMATED COUNT: 47.5 FL (ref 35.9–50)
ERYTHROCYTE [SEDIMENTATION RATE] IN BLOOD BY WESTERGREN METHOD: 2 MM/HOUR (ref 0–20)
GLOBULIN SER CALC-MCNC: 2.8 G/DL (ref 1.9–3.5)
GLUCOSE SERPL-MCNC: 73 MG/DL (ref 65–99)
HCT VFR BLD AUTO: 45.3 % (ref 42–52)
HGB BLD-MCNC: 14.9 G/DL (ref 14–18)
IMM GRANULOCYTES # BLD AUTO: 0.09 K/UL (ref 0–0.11)
IMM GRANULOCYTES NFR BLD AUTO: 0.7 % (ref 0–0.9)
LYMPHOCYTES # BLD AUTO: 3.05 K/UL (ref 1–4.8)
LYMPHOCYTES NFR BLD: 24.8 % (ref 22–41)
MCH RBC QN AUTO: 29.3 PG (ref 27–33)
MCHC RBC AUTO-ENTMCNC: 32.9 G/DL (ref 33.7–35.3)
MCV RBC AUTO: 89.2 FL (ref 81.4–97.8)
MONOCYTES # BLD AUTO: 1.82 K/UL (ref 0–0.85)
MONOCYTES NFR BLD AUTO: 14.8 % (ref 0–13.4)
NEUTROPHILS # BLD AUTO: 6.95 K/UL (ref 1.82–7.42)
NEUTROPHILS NFR BLD: 56.7 % (ref 44–72)
NRBC # BLD AUTO: 0 K/UL
NRBC BLD-RTO: 0 /100 WBC
PLATELET # BLD AUTO: 230 K/UL (ref 164–446)
PMV BLD AUTO: 10.6 FL (ref 9–12.9)
POTASSIUM SERPL-SCNC: 5.2 MMOL/L (ref 3.6–5.5)
PROT SERPL-MCNC: 7.3 G/DL (ref 6–8.2)
RBC # BLD AUTO: 5.08 M/UL (ref 4.7–6.1)
SODIUM SERPL-SCNC: 134 MMOL/L (ref 135–145)
WBC # BLD AUTO: 12.3 K/UL (ref 4.8–10.8)

## 2020-02-27 PROCEDURE — 85025 COMPLETE CBC W/AUTO DIFF WBC: CPT

## 2020-02-27 PROCEDURE — 80053 COMPREHEN METABOLIC PANEL: CPT

## 2020-02-27 PROCEDURE — 36415 COLL VENOUS BLD VENIPUNCTURE: CPT

## 2020-02-27 PROCEDURE — 85652 RBC SED RATE AUTOMATED: CPT

## 2020-03-09 DIAGNOSIS — M05.9 RHEUMATOID ARTHRITIS WITH POSITIVE RHEUMATOID FACTOR, INVOLVING UNSPECIFIED SITE (HCC): ICD-10-CM

## 2020-03-09 DIAGNOSIS — F41.9 ANXIETY: ICD-10-CM

## 2020-03-09 RX ORDER — DIAZEPAM 5 MG/1
TABLET ORAL
Qty: 60 TAB | Refills: 1 | Status: SHIPPED
Start: 2020-03-09 | End: 2020-04-08

## 2020-03-09 NOTE — TELEPHONE ENCOUNTER
Received request via: Patient    Was the patient seen in the last year in this department? Yes    2/14/2020  Does the patient have an active prescription (recently filled or refills available) for medication(s) requested? No    12/28/2019 #60

## 2020-04-03 DIAGNOSIS — E78.5 DYSLIPIDEMIA: Primary | ICD-10-CM

## 2020-04-03 RX ORDER — PRAVASTATIN SODIUM 80 MG/1
80 TABLET ORAL DAILY
Qty: 90 TAB | Refills: 2 | Status: SHIPPED | OUTPATIENT
Start: 2020-04-03 | End: 2020-09-29 | Stop reason: SDUPTHER

## 2020-04-12 DIAGNOSIS — E29.1 HYPOGONADISM MALE: ICD-10-CM

## 2020-04-13 RX ORDER — TESTOSTERONE CYPIONATE 200 MG/ML
INJECTION, SOLUTION INTRAMUSCULAR
Qty: 2 ML | Refills: 2 | Status: SHIPPED
Start: 2020-04-13 | End: 2020-05-11

## 2020-05-22 ENCOUNTER — OFFICE VISIT (OUTPATIENT)
Dept: MEDICAL GROUP | Facility: LAB | Age: 65
End: 2020-05-22
Payer: MEDICARE

## 2020-05-22 ENCOUNTER — HOSPITAL ENCOUNTER (OUTPATIENT)
Dept: RADIOLOGY | Facility: MEDICAL CENTER | Age: 65
End: 2020-05-22
Attending: UROLOGY
Payer: MEDICARE

## 2020-05-22 VITALS
OXYGEN SATURATION: 96 % | BODY MASS INDEX: 29.46 KG/M2 | HEIGHT: 70 IN | RESPIRATION RATE: 12 BRPM | SYSTOLIC BLOOD PRESSURE: 145 MMHG | WEIGHT: 205.8 LBS | DIASTOLIC BLOOD PRESSURE: 80 MMHG | TEMPERATURE: 96.8 F | HEART RATE: 76 BPM

## 2020-05-22 DIAGNOSIS — R33.9 RETENTION OF URINE, UNSPECIFIED: ICD-10-CM

## 2020-05-22 DIAGNOSIS — M05.9 RHEUMATOID ARTHRITIS WITH POSITIVE RHEUMATOID FACTOR, INVOLVING UNSPECIFIED SITE (HCC): ICD-10-CM

## 2020-05-22 PROCEDURE — 99213 OFFICE O/P EST LOW 20 MIN: CPT | Performed by: INTERNAL MEDICINE

## 2020-05-22 PROCEDURE — 76775 US EXAM ABDO BACK WALL LIM: CPT

## 2020-05-22 RX ORDER — OXYCODONE AND ACETAMINOPHEN 10; 325 MG/1; MG/1
1-2 TABLET ORAL EVERY 4 HOURS PRN
Qty: 150 TAB | Refills: 0 | Status: SHIPPED | OUTPATIENT
Start: 2020-06-21 | End: 2020-05-22 | Stop reason: SDUPTHER

## 2020-05-22 RX ORDER — OXYCODONE AND ACETAMINOPHEN 10; 325 MG/1; MG/1
1-2 TABLET ORAL EVERY 4 HOURS PRN
Qty: 150 TAB | Refills: 0 | Status: SHIPPED | OUTPATIENT
Start: 2020-07-21 | End: 2020-08-31 | Stop reason: SDUPTHER

## 2020-05-22 RX ORDER — OXYCODONE AND ACETAMINOPHEN 10; 325 MG/1; MG/1
1-2 TABLET ORAL EVERY 4 HOURS PRN
Qty: 150 TAB | Refills: 0 | Status: SHIPPED | OUTPATIENT
Start: 2020-05-22 | End: 2020-05-22 | Stop reason: SDUPTHER

## 2020-05-22 ASSESSMENT — FIBROSIS 4 INDEX: FIB4 SCORE: 1.69

## 2020-05-22 NOTE — PROGRESS NOTES
CC: Edgardo Sims is a 64 y.o. male is suffering from   Chief Complaint   Patient presents with   • Chronic Opiate Therapy     3 months follow up   • Hypertension     3 months follow up         SUBJECTIVE:  1. Rheumatoid arthritis with positive rheumatoid factor, involving unspecified site (HCC)  Ray is here for follow-up has a history of rheumatoid arthritis is on chronic narcotic analgesics.  Prescriptions rewritten, state drug task force reviewed.        Past social, family, history:   Social History     Tobacco Use   • Smoking status: Never Smoker   • Smokeless tobacco: Never Used   Substance Use Topics   • Alcohol use: Yes     Alcohol/week: 1.8 oz     Types: 3 Cans of beer per week     Frequency: 2-3 times a week     Drinks per session: 1 or 2     Comment: 3 per week   • Drug use: No         MEDICATIONS:    Current Outpatient Medications:   •  [START ON 7/21/2020] oxyCODONE-acetaminophen (PERCOCET-10)  MG Tab, Take 1-2 Tabs by mouth every four hours as needed for Severe Pain for up to 30 days., Disp: 150 Tab, Rfl: 0  •  meloxicam (MOBIC) 7.5 MG Tab, TAKE 1 TABLET BY MOUTH DAILY AS NEEDED FOR SEVERE JOINT PAIN, Disp: 90 Tab, Rfl: 0  •  pravastatin (PRAVACHOL) 80 MG tablet, Take 1 Tab by mouth every day., Disp: 90 Tab, Rfl: 2  •  predniSONE (DELTASONE) 1 MG Tab, TAKE 2 TABLET BY MOUTH EVERY DAY, Disp: 180 Tab, Rfl: 1  •  cyclobenzaprine (FLEXERIL) 10 MG Tab, TAKE ONE TABLET BY MOUTH THREE TIMES DAILY AS NEEDED FOR MILD PAIN, Disp: 90 Tab, Rfl: 5  •  Tofacitinib Citrate (XELJANZ) 5 MG Tab, Take 5 mg by mouth 2 Times a Day., Disp: 180 Tab, Rfl: 0  •  vardenafil (LEVITRA) 20 MG tablet, Take 20 mg by mouth as needed., Disp: , Rfl:   •  PARoxetine (PAXIL) 20 MG Tab, Take 1 Tab by mouth every day., Disp: 90 Tab, Rfl: 3  •  lisinopril (PRINIVIL) 10 MG Tab, TAKE 1 TABLET BY MOUTH EVERY DAY, Disp: 90 Tab, Rfl: 3  •  metoprolol (LOPRESSOR) 50 MG Tab, TAKE 1 TABLET BY MOUTH TWICE A DAY, Disp: 180 Tab,  Rfl: 3  •  ezetimibe (ZETIA) 10 MG Tab, Take 10 mg by mouth every evening., Disp: , Rfl:   •  CALCIUM-VITAMIN D PO, Take 1 Tab by mouth 2 Times a Day., Disp: , Rfl:   •  ascorbic acid (ASCORBIC ACID) 500 MG Tab, Take 500 mg by mouth every day., Disp: , Rfl:   •  Cyanocobalamin (VITAMIN B-12) 5000 MCG TABLET DISPERSIBLE, Take 1 Tab by mouth every day., Disp: , Rfl:   •  niacin 500 MG Tab, Take 500 mg by mouth every day., Disp: , Rfl:   •  Zinc 50 MG Cap, Take 50 mg by mouth every day., Disp: , Rfl:   •  omeprazole (PRILOSEC) 20 MG CPDR, Take 20 mg by mouth every day., Disp: , Rfl:     PROBLEMS:  Patient Active Problem List    Diagnosis Date Noted   • Neurogenic bladder 01/26/2019     Priority: Medium   • Secondary adrenal insufficiency (HCC) 12/12/2019   • Current chronic use of systemic steroids 10/16/2019   • High risk medication use 10/16/2019   • History of adrenal insufficiency 10/16/2019   • Bladder outlet obstruction 05/01/2018   • Leukocytosis 04/30/2018   • Lactic acidosis 04/30/2018   • Idiopathic acute pancreatitis 04/30/2018   • Chronic use of opiate drug for therapeutic purpose 08/12/2017   • Depression 07/13/2015   • Anxiety 03/31/2015   • Coronary artery disease due to calcified coronary lesion: Mildly cardiac catheterization in 2014 12/05/2014   • Tachycardia 10/20/2014   • Abnormal myocardial perfusion study 01/15/2014   • Osteoarthrosis, unspecified whether generalized or localized, pelvic region and thigh 05/31/2013   • Dyslipidemia 07/12/2011   • Aortic insufficiency 07/05/2011   • Essential hypertension 07/05/2011   • Rheumatoid arthritis (HCC) 05/05/2009   • Hypogonadism male 05/05/2009       REVIEW OF SYSTEMS:  Gen.:  No Nausea, Vomiting, fever, Chills.  Heart: No chest pain.  Lungs:  No shortness of Breath.  Psychological: Kian unusual Anxiety depression     PHYSICAL EXAM   Constitutional: Alert, cooperative, not in acute distress.  Cardiovascular:  Rate Rhythm is regular without murmurs rubs  "clicks.     Thorax & Lungs: Clear to auscultation, no wheezing, rhonchi, or rales  HENT: Normocephalic, Atraumatic.  Eyes: PERRLA, EOMI, Conjunctiva normal.   Neck: Trachia is midline no swelling of the thyroid.   Musculoskeletal: Pain to palpation metacarpophalangeal joints hands bilaterally  Neurologic: Alert & oriented x 3, cranial nerves II through XII are intact, Normal motor function, Normal sensory function, No focal deficits noted.   Psychiatric: Affect normal, Judgment normal, Mood normal.     VITAL SIGNS:/80   Pulse 76   Temp 36 °C (96.8 °F) (Temporal)   Resp 12   Ht 1.778 m (5' 10\")   Wt 93.4 kg (205 lb 12.8 oz)   SpO2 96%   BMI 29.53 kg/m²     Labs: Reviewed    Assessment:                                                     Plan:    1. Rheumatoid arthritis with positive rheumatoid factor, involving unspecified site (HCC)  State drug task force urine drug screen reviewed  - oxyCODONE-acetaminophen (PERCOCET-10)  MG Tab; Take 1-2 Tabs by mouth every four hours as needed for Severe Pain for up to 30 days.  Dispense: 150 Tab; Refill: 0        "

## 2020-05-28 DIAGNOSIS — F41.9 ANXIETY: ICD-10-CM

## 2020-05-28 RX ORDER — DIAZEPAM 5 MG/1
TABLET ORAL
Qty: 60 TAB | Refills: 1 | Status: SHIPPED
Start: 2020-05-28 | End: 2020-10-01 | Stop reason: SDUPTHER

## 2020-05-28 NOTE — TELEPHONE ENCOUNTER
Received request via: Pharmacy   3/10/20  Was the patient seen in the last year in this department? Yes  5/22/20  Does the patient have an active prescription (recently filled or refills available) for medication(s) requested? No

## 2020-06-02 ENCOUNTER — TELEPHONE (OUTPATIENT)
Dept: RHEUMATOLOGY | Facility: MEDICAL CENTER | Age: 65
End: 2020-06-02

## 2020-06-02 ENCOUNTER — HOSPITAL ENCOUNTER (OUTPATIENT)
Dept: LAB | Facility: MEDICAL CENTER | Age: 65
End: 2020-06-02
Attending: INTERNAL MEDICINE
Payer: MEDICARE

## 2020-06-02 DIAGNOSIS — I25.84 CORONARY ARTERY DISEASE DUE TO CALCIFIED CORONARY LESION: ICD-10-CM

## 2020-06-02 DIAGNOSIS — M05.9 RHEUMATOID ARTHRITIS WITH POSITIVE RHEUMATOID FACTOR, INVOLVING UNSPECIFIED SITE (HCC): ICD-10-CM

## 2020-06-02 DIAGNOSIS — M25.511 CHRONIC RIGHT SHOULDER PAIN: ICD-10-CM

## 2020-06-02 DIAGNOSIS — I25.10 CORONARY ARTERY DISEASE DUE TO CALCIFIED CORONARY LESION: ICD-10-CM

## 2020-06-02 DIAGNOSIS — E78.5 DYSLIPIDEMIA: ICD-10-CM

## 2020-06-02 DIAGNOSIS — E27.49 SECONDARY ADRENAL INSUFFICIENCY (HCC): ICD-10-CM

## 2020-06-02 DIAGNOSIS — Z79.52 CURRENT CHRONIC USE OF SYSTEMIC STEROIDS: ICD-10-CM

## 2020-06-02 DIAGNOSIS — G89.29 CHRONIC RIGHT SHOULDER PAIN: ICD-10-CM

## 2020-06-02 DIAGNOSIS — Z79.899 HIGH RISK MEDICATION USE: ICD-10-CM

## 2020-06-02 LAB
ALBUMIN SERPL BCP-MCNC: 4.5 G/DL (ref 3.2–4.9)
ALBUMIN SERPL BCP-MCNC: 4.5 G/DL (ref 3.2–4.9)
ALBUMIN/GLOB SERPL: 1.9 G/DL
ALBUMIN/GLOB SERPL: 2 G/DL
ALP SERPL-CCNC: 67 U/L (ref 30–99)
ALP SERPL-CCNC: 68 U/L (ref 30–99)
ALT SERPL-CCNC: 31 U/L (ref 2–50)
ALT SERPL-CCNC: 31 U/L (ref 2–50)
ANION GAP SERPL CALC-SCNC: 10 MMOL/L (ref 7–16)
ANION GAP SERPL CALC-SCNC: 11 MMOL/L (ref 7–16)
AST SERPL-CCNC: 25 U/L (ref 12–45)
AST SERPL-CCNC: 28 U/L (ref 12–45)
BASOPHILS # BLD AUTO: 0.9 % (ref 0–1.8)
BASOPHILS # BLD: 0.1 K/UL (ref 0–0.12)
BILIRUB SERPL-MCNC: 0.6 MG/DL (ref 0.1–1.5)
BILIRUB SERPL-MCNC: 0.6 MG/DL (ref 0.1–1.5)
BUN SERPL-MCNC: 12 MG/DL (ref 8–22)
BUN SERPL-MCNC: 12 MG/DL (ref 8–22)
CALCIUM SERPL-MCNC: 9.3 MG/DL (ref 8.5–10.5)
CALCIUM SERPL-MCNC: 9.3 MG/DL (ref 8.5–10.5)
CHLORIDE SERPL-SCNC: 102 MMOL/L (ref 96–112)
CHLORIDE SERPL-SCNC: 102 MMOL/L (ref 96–112)
CHOLEST SERPL-MCNC: 150 MG/DL (ref 100–199)
CO2 SERPL-SCNC: 26 MMOL/L (ref 20–33)
CO2 SERPL-SCNC: 26 MMOL/L (ref 20–33)
CREAT SERPL-MCNC: 0.86 MG/DL (ref 0.5–1.4)
CREAT SERPL-MCNC: 0.89 MG/DL (ref 0.5–1.4)
EOSINOPHIL # BLD AUTO: 0.31 K/UL (ref 0–0.51)
EOSINOPHIL NFR BLD: 2.9 % (ref 0–6.9)
ERYTHROCYTE [DISTWIDTH] IN BLOOD BY AUTOMATED COUNT: 59.3 FL (ref 35.9–50)
FASTING STATUS PATIENT QL REPORTED: NORMAL
GLOBULIN SER CALC-MCNC: 2.3 G/DL (ref 1.9–3.5)
GLOBULIN SER CALC-MCNC: 2.4 G/DL (ref 1.9–3.5)
GLUCOSE SERPL-MCNC: 87 MG/DL (ref 65–99)
GLUCOSE SERPL-MCNC: 88 MG/DL (ref 65–99)
HCT VFR BLD AUTO: 46.4 % (ref 42–52)
HDLC SERPL-MCNC: 51 MG/DL
HGB BLD-MCNC: 15.4 G/DL (ref 14–18)
IMM GRANULOCYTES # BLD AUTO: 0.07 K/UL (ref 0–0.11)
IMM GRANULOCYTES NFR BLD AUTO: 0.7 % (ref 0–0.9)
LDLC SERPL CALC-MCNC: 76 MG/DL
LYMPHOCYTES # BLD AUTO: 2.47 K/UL (ref 1–4.8)
LYMPHOCYTES NFR BLD: 23 % (ref 22–41)
MCH RBC QN AUTO: 29.9 PG (ref 27–33)
MCHC RBC AUTO-ENTMCNC: 33.2 G/DL (ref 33.7–35.3)
MCV RBC AUTO: 90.1 FL (ref 81.4–97.8)
MONOCYTES # BLD AUTO: 1.29 K/UL (ref 0–0.85)
MONOCYTES NFR BLD AUTO: 12 % (ref 0–13.4)
NEUTROPHILS # BLD AUTO: 6.49 K/UL (ref 1.82–7.42)
NEUTROPHILS NFR BLD: 60.5 % (ref 44–72)
NRBC # BLD AUTO: 0 K/UL
NRBC BLD-RTO: 0 /100 WBC
PLATELET # BLD AUTO: 154 K/UL (ref 164–446)
PMV BLD AUTO: 10.1 FL (ref 9–12.9)
POTASSIUM SERPL-SCNC: 4.9 MMOL/L (ref 3.6–5.5)
POTASSIUM SERPL-SCNC: 5 MMOL/L (ref 3.6–5.5)
PROT SERPL-MCNC: 6.8 G/DL (ref 6–8.2)
PROT SERPL-MCNC: 6.9 G/DL (ref 6–8.2)
RBC # BLD AUTO: 5.15 M/UL (ref 4.7–6.1)
SODIUM SERPL-SCNC: 138 MMOL/L (ref 135–145)
SODIUM SERPL-SCNC: 139 MMOL/L (ref 135–145)
T4 FREE SERPL-MCNC: 1.07 NG/DL (ref 0.93–1.7)
TRIGL SERPL-MCNC: 113 MG/DL (ref 0–149)
TSH SERPL DL<=0.005 MIU/L-ACNC: 1.9 UIU/ML (ref 0.38–5.33)
WBC # BLD AUTO: 10.7 K/UL (ref 4.8–10.8)

## 2020-06-02 PROCEDURE — 80053 COMPREHEN METABOLIC PANEL: CPT | Mod: 91

## 2020-06-02 PROCEDURE — 85025 COMPLETE CBC W/AUTO DIFF WBC: CPT

## 2020-06-02 PROCEDURE — 80061 LIPID PANEL: CPT

## 2020-06-02 PROCEDURE — 80053 COMPREHEN METABOLIC PANEL: CPT

## 2020-06-02 PROCEDURE — 36415 COLL VENOUS BLD VENIPUNCTURE: CPT

## 2020-06-02 PROCEDURE — 84443 ASSAY THYROID STIM HORMONE: CPT

## 2020-06-02 PROCEDURE — 84439 ASSAY OF FREE THYROXINE: CPT

## 2020-06-02 NOTE — TELEPHONE ENCOUNTER
Dr Azul,       You placed an order for his L shoulder to be X-Rayed. He stated it should've been his R shoulder. He doesn't have issues with his L shoulder but his R shoulder is bad and needs the xray. Please change the order to R shoulder X-Ray. Thank you

## 2020-06-04 ENCOUNTER — OFFICE VISIT (OUTPATIENT)
Dept: RHEUMATOLOGY | Facility: MEDICAL CENTER | Age: 65
End: 2020-06-04
Payer: MEDICARE

## 2020-06-04 VITALS
DIASTOLIC BLOOD PRESSURE: 70 MMHG | BODY MASS INDEX: 29.27 KG/M2 | RESPIRATION RATE: 12 BRPM | TEMPERATURE: 98.2 F | SYSTOLIC BLOOD PRESSURE: 140 MMHG | HEART RATE: 76 BPM | OXYGEN SATURATION: 97 % | WEIGHT: 204 LBS

## 2020-06-04 DIAGNOSIS — E27.40 ADRENAL INSUFFICIENCY (HCC): ICD-10-CM

## 2020-06-04 DIAGNOSIS — I10 ESSENTIAL HYPERTENSION: ICD-10-CM

## 2020-06-04 DIAGNOSIS — M05.9 RHEUMATOID ARTHRITIS WITH POSITIVE RHEUMATOID FACTOR, INVOLVING UNSPECIFIED SITE (HCC): ICD-10-CM

## 2020-06-04 DIAGNOSIS — Z79.1 ENCOUNTER FOR LONG-TERM (CURRENT) USE OF NSAIDS: ICD-10-CM

## 2020-06-04 DIAGNOSIS — Z79.899 LONG-TERM USE OF PLAQUENIL: ICD-10-CM

## 2020-06-04 DIAGNOSIS — I25.84 CORONARY ARTERY DISEASE DUE TO CALCIFIED CORONARY LESION: ICD-10-CM

## 2020-06-04 DIAGNOSIS — I25.10 CORONARY ARTERY DISEASE DUE TO CALCIFIED CORONARY LESION: ICD-10-CM

## 2020-06-04 DIAGNOSIS — Z79.622 LONG-TERM CURRENT USE OF TOFACITINIB: ICD-10-CM

## 2020-06-04 DIAGNOSIS — M85.89 OSTEOPENIA OF MULTIPLE SITES: ICD-10-CM

## 2020-06-04 PROCEDURE — 99214 OFFICE O/P EST MOD 30 MIN: CPT | Performed by: INTERNAL MEDICINE

## 2020-06-04 RX ORDER — HYDROXYCHLOROQUINE SULFATE 200 MG/1
TABLET, FILM COATED ORAL
Qty: 180 TAB | Refills: 1 | Status: SHIPPED | OUTPATIENT
Start: 2020-06-04 | End: 2020-12-21

## 2020-06-04 ASSESSMENT — JOINT PAIN
TOTAL NUMBER OF SWOLLEN JOINTS: 3
TOTAL NUMBER OF TENDER JOINTS: 7

## 2020-06-04 ASSESSMENT — FIBROSIS 4 INDEX: FIB4 SCORE: 2.06

## 2020-06-04 NOTE — PROGRESS NOTES
Chief Complaint- joint pain     Subjective:   Edgardo Sims is a 64 y.o. male here today for follow up of rheumatological issues    This is a follow-up visit for this patient who we see in this clinic for rheumatoid arthritis.  Patient is currently on Xeljanz at 5 mg p.o. twice daily subsidized by [employer].  Patient is also on meloxicam 15 mg p.o. daily and also on low-dose prednisone at 2 mg p.o. daily because of adrenal insufficiency found by patient's endocrinologist Dr. Juarez December 2019.  Patient states that overall he feels he is okay but is still having some stiffness in his hands bilaterally with left worse than right.     Of note, patient also meloxicam, patient understands the risks of peptic ulcer disease and gastrointestinal bleeding using NSAIDs in combination with prednisone.  Patient states he understands the risks and wants to continue the use of meloxicam as he has been on meloxicam for years with benefit     Additionally today patient is complaining of catching in the right shoulder, patient does have a history of surgeries x2 for rotator cuff pathology in the right shoulder, wonders if there may be additional pathology occurring, patient has an x-ray pending     Additional comorbidities include ureteral blockage and BPH.  Patient also with a history of Dupuytren's contracture with release, also history of hypercholesterolemia.  Patient also with a history of osteopenia and aortic valve insufficiency followed periodically by echocardiograms.  Patient also with rotator cuff tear repairs x2 right shoulder.     Bilateral AUTUMN  Left TKA     S/p Remicade-ineffective  S/p Orencia-ineffective  S/p Enbrel-ineffective  S/p humira-ineffective  S/p MTX-oral ulcers  S/p arava-bad reaction but patient doesn't recall specifics  S/p rituximab-helped but patient stopped because of methotrexate side effects per patient report....    DEXA 6/15/2020  DEXA 6/12/2020 T scores 0.3, -1.2  FRAX 6/12/2020 not  done    Addendum 6/22/2020  G6PD 12.9 nl 6/2020        Uric acid 4.5 nl 2/2019   Cryoglobulin neg 8/2017  RF neg 8/2017, RF neg 2/2019  CCP neg 2/2019  HBsAg IgM/HBcAb neg 9/2019  HCV neg 9/2019  Quantiferon Gold neg 9/2019  DEXA 9/5/2017 T scores -0.2, -1.5  FRAX 9/5/2017 not done  Hand x-rays 5/2017-indicates erosive arthritis  Feet x-rays 5/2017-indicates erosive arthritis   Corticosteroid Therapy Informed Consent signed 2/21/2019-copy given to patient         Current medicines (including changes today)  Current Outpatient Medications   Medication Sig Dispense Refill   • hydroxychloroquine (PLAQUENIL) 200 MG Tab 1 tab po bid 180 Tab 1   • diazePAM (VALIUM) 5 MG Tab TAKE 1 TABLET BY MOUTH EVERY 12 HOURS AS NEEDED FOR UP TO 30 DAYS M06.9 60 Tab 1   • [START ON 7/21/2020] oxyCODONE-acetaminophen (PERCOCET-10)  MG Tab Take 1-2 Tabs by mouth every four hours as needed for Severe Pain for up to 30 days. 150 Tab 0   • meloxicam (MOBIC) 7.5 MG Tab TAKE 1 TABLET BY MOUTH DAILY AS NEEDED FOR SEVERE JOINT PAIN 90 Tab 0   • pravastatin (PRAVACHOL) 80 MG tablet Take 1 Tab by mouth every day. 90 Tab 2   • predniSONE (DELTASONE) 1 MG Tab TAKE 2 TABLET BY MOUTH EVERY  Tab 1   • cyclobenzaprine (FLEXERIL) 10 MG Tab TAKE ONE TABLET BY MOUTH THREE TIMES DAILY AS NEEDED FOR MILD PAIN 90 Tab 5   • Tofacitinib Citrate (XELJANZ) 5 MG Tab Take 5 mg by mouth 2 Times a Day. 180 Tab 0   • vardenafil (LEVITRA) 20 MG tablet Take 20 mg by mouth as needed.     • PARoxetine (PAXIL) 20 MG Tab Take 1 Tab by mouth every day. 90 Tab 3   • lisinopril (PRINIVIL) 10 MG Tab TAKE 1 TABLET BY MOUTH EVERY DAY 90 Tab 3   • metoprolol (LOPRESSOR) 50 MG Tab TAKE 1 TABLET BY MOUTH TWICE A  Tab 3   • ezetimibe (ZETIA) 10 MG Tab Take 10 mg by mouth every evening.     • CALCIUM-VITAMIN D PO Take 1 Tab by mouth 2 Times a Day.     • ascorbic acid (ASCORBIC ACID) 500 MG Tab Take 500 mg by mouth every day.     • Cyanocobalamin (VITAMIN B-12) 5000  MCG TABLET DISPERSIBLE Take 1 Tab by mouth every day.     • niacin 500 MG Tab Take 500 mg by mouth every day.     • Zinc 50 MG Cap Take 50 mg by mouth every day.     • omeprazole (PRILOSEC) 20 MG CPDR Take 20 mg by mouth every day.       No current facility-administered medications for this visit.      He  has a past medical history of Abnormal myocardial perfusion study (1/15/2014), Aortic insufficiency (7/5/2011), Arthritis, Bronchitis, CAD (coronary artery disease) mild plaque at cath in 2/14 (12/5/2014), Cancer (HCC), Chronic use of opiate drugs therapeutic purposes (8/12/2017), Dental disorder, Dyslipidemia (7/12/2011), Heart burn, Heart murmur, Hiatus hernia syndrome, High cholesterol, HTN (hypertension) (7/5/2011), Hypertension, Indigestion, Infectious disease, Pain, Pain, Rheumatoid arthritis(714.0), and Tachycardia (10/20/2014).    ROS   Other than what is mentioned in HPI or physical exam, there is no history of headaches, double vision or blurred vision. No temporal tenderness or jaw claudication. No trouble swallowing difficulties or sore throats.  No chest complaints including chest pain, cough or sputum production. No GI complaints including nausea, vomiting, change in bowel habits, or past peptic ulcer disease. No history of blood in the stools. No urinary complaints including dysuria or frequency. No history of alopecia, photosensitivity, oral ulcerations, Raynaud's phenomena.       Objective:     /70   Pulse 76   Temp 36.8 °C (98.2 °F) (Temporal)   Resp 12   Wt 92.5 kg (204 lb)   SpO2 97%  Body mass index is 29.27 kg/m².   Physical Exam:    Constitutional: Alert and oriented X3, patient is talkative with good eye contact.Skin: Warm, dry, good turgor, no rashes in visible areas.Eye: Equal, round and reactive, conjunctiva clear, lids normal EOM intactENMT: Lips without lesions, good dentition, no oropharyngeal ulcers, moist buccal mucosa, pinna without deformityNeck: Trachea midline, no  masses, no thyromegaly.Lymph:  No cervical lymphadenopathy, no axillary lymphadenopathy, no supraclavicular lymphadenopathyRespiratory: Unlabored respiratory effort, lungs clear to auscultation, no wheezes, no ronchi.Cardiovascular: Normal S1, S2, no murmur, no edema.Abdomen: Soft, non-tender, no masses, no hepatosplenomegaly, mild positive central obesity.Psych: Alert and oriented x3, normal affect and mood.Neuro: Cranial nerves 2-12 are grossly intact, no loss of sensation LEExt:no joint laxity noted in bilateral arms, no joint laxity noted in bilateral legs          Lab Results   Component Value Date/Time    QNTTBGOLD Negative 06/01/2017 01:13 PM     Lab Results   Component Value Date/Time    HEPBCORTOT Negative 06/01/2017 01:13 PM    HEPBCORIGM Negative 09/06/2019 04:00 PM    HEPBSAG Negative 09/06/2019 04:00 PM     Lab Results   Component Value Date/Time    HEPCAB Negative 09/06/2019 04:00 PM     Lab Results   Component Value Date/Time    SODIUM 139 06/02/2020 07:25 AM    POTASSIUM 4.9 06/02/2020 07:25 AM    CHLORIDE 102 06/02/2020 07:25 AM    CO2 26 06/02/2020 07:25 AM    GLUCOSE 87 06/02/2020 07:25 AM    BUN 12 06/02/2020 07:25 AM    CREATININE 0.86 06/02/2020 07:25 AM    CREATININE 1.1 04/21/2009 03:50 PM    BUNCREATRAT 13 09/21/2016 09:21 AM      Lab Results   Component Value Date/Time    WBC 10.7 06/02/2020 07:22 AM    WBC 7.5 07/01/2011 10:30 AM    RBC 5.15 06/02/2020 07:22 AM    RBC 4.72 07/01/2011 10:30 AM    HEMOGLOBIN 15.4 06/02/2020 07:22 AM    HEMATOCRIT 46.4 06/02/2020 07:22 AM    MCV 90.1 06/02/2020 07:22 AM     (H) 07/01/2011 10:30 AM    MCH 29.9 06/02/2020 07:22 AM    MCH 36.0 (H) 07/01/2011 10:30 AM    MCHC 33.2 (L) 06/02/2020 07:22 AM    MPV 10.1 06/02/2020 07:22 AM    NEUTSPOLYS 60.50 06/02/2020 07:22 AM    LYMPHOCYTES 23.00 06/02/2020 07:22 AM    MONOCYTES 12.00 06/02/2020 07:22 AM    EOSINOPHILS 2.90 06/02/2020 07:22 AM    BASOPHILS 0.90 06/02/2020 07:22 AM    HYPOCHROMIA 1+  03/05/2014 04:43 PM    ANISOCYTOSIS 1+ 01/02/2015 05:02 PM      Lab Results   Component Value Date/Time    CALCIUM 9.3 06/02/2020 07:25 AM    ASTSGOT 28 06/02/2020 07:25 AM    ALTSGPT 31 06/02/2020 07:25 AM    ALKPHOSPHAT 67 06/02/2020 07:25 AM    TBILIRUBIN 0.6 06/02/2020 07:25 AM    ALBUMIN 4.5 06/02/2020 07:25 AM    TOTPROTEIN 6.8 06/02/2020 07:25 AM     Lab Results   Component Value Date/Time    URICACID 4.5 02/19/2019 08:36 AM    RHEUMFACTN 10 02/19/2019 08:36 AM    CCPANTIBODY 2 02/19/2019 08:36 AM     Lab Results   Component Value Date/Time    CRYOGLOBULIN NEG 72Hour 08/04/2017 02:32 PM     Lab Results   Component Value Date/Time    SEDRATEWES 2 02/27/2020 02:16 PM     Lab Results   Component Value Date/Time    HBA1C 5.3 07/26/2018 11:19 AM     Lab Results   Component Value Date/Time    CPKTOTAL 141 01/26/2018 07:47 AM     Lab Results   Component Value Date/Time    PTHINTACT 55 10/10/2008 10:42 AM     Results for orders placed during the hospital encounter of 09/05/17   DS-BONE DENSITY STUDY (DEXA)    Impression According to the World Health Organization classification, bone mineral density of this patient is osteopenic.        FRAX score not obtained on this patient.        INTERPRETING LOCATION:  [address] [place] [address], [place], [address]     Results for orders placed during the hospital encounter of 05/31/13   DX-PELVIS-1 OR 2 VIEWS    Impression Interval performance of a left hip arthroplasty.  Right hip arthroplasty is unchanged.              INTERPRETING LOCATION: [address], [facility] [place], [address]     Results for orders placed during the hospital encounter of 05/31/13   DX-PELVIS-1 OR 2 VIEWS    Impression Interval performance of a left hip arthroplasty.  Right hip arthroplasty is unchanged.              INTERPRETING LOCATION: [address], [place], [address]     Results for orders placed during the hospital encounter of 01/30/09   DX-KNEE COMPLETE 4+    Impression IMPRESSION:     1. NEW FINDINGS CONSISTENT  WITH OSTEOCHONDROSIS DISSECANS OF THE MEDIAL   FEMORAL CONDYLE.    2. NEW MODERATE JOINT SPACE NARROWING OF THE MEDIAL TIBIOFEMORAL   COMPARTMENT, CONSISTENT WITH CARTILAGE THINNING.    3. NEW MODERATE JOINT EFFUSION.      SST/llw      Read By BLANCA ORTIZ MD on Jan 30 2009  3:39PM  : [facility] Transcription Date: Feb 2 2009  6:08AM  THIS DOCUMENT HAS BEEN ELECTRONICALLY SIGNED BY: BLANCA ORTIZ MD on   Feb  3 2009 11:43AM        Results for orders placed during the hospital encounter of 05/31/17   DX-HAND 3+ RIGHT    Impression 1.  Question of small erosions or subchondral cysts in the 2nd and 3rd metacarpal heads.     Results for orders placed during the hospital encounter of 12/12/18   DX-THORACIC SPINE-WITH SWIMMERS VIEW    Impression 1.  Moderate to severe multilevel degenerative change of thoracic spine.  2.  Multilevel anterior wedge compression deformities with associated thoracic kyphosis, similar to prior exam.  3.  No gross acute fracture or segmental malalignment.     Results for orders placed during the hospital encounter of 12/21/07   MR-KNEE-W/O    Impression IMPRESSION:    1. NON-DISPLACED OBLIQUE TEAR OF THE POSTERIOR HORN MEDIAL MENISCUS   COMMUNICATING WITH SUPERIOR AND INFERIOR ARTICULAR SURFACES WITHOUT   EVIDENCE OF PARAMENISCAL CYST.  UNDERLYING CHONDROMALACIA IS SEEN WITHIN   THE MEDIAL FEMORAL CONDYLE POSTERIOR WEIGHTBEARING SURFACE OF   NON-FULL-THICKNESS SEVERITY.        2. MENISCAL DEGENERATION AND PARTIAL PERIPHERAL EXTRUSION INVOLVING THE   MID-BODY OF THE MEDIAL MENISCUS.        [facility]        Read By KEITH NETTLES MD on Dec 21 2007 12:14PM  : [facility] Transcription Date: Dec 22 2007 10:16AM  THIS DOCUMENT HAS BEEN ELECTRONICALLY SIGNED BY: KEITH NETTLES MD on   Dec 24 2007 12:21PM     Results for orders placed during the hospital encounter of 02/14/05   MR-CERVICAL SPINE-W/O    Impression IMPRESSION:    1. DEGENERATIVE DISC DISEASE C5-6 AND C6-7  WITH SOME MINOR POSTERIOR   SPURRING BUT WITHOUT ANY REAL CANAL STENOSIS AND WITH NO EVIDENCE OF AN   ACUTE DISC EXTRUSION.  2. MILD TO MODERATE MIDCERVICAL NEURAL FORAMINAL NARROWING DUE TO   UNCINATE AND FACET HYPERTROPHY, WITH MULTILEVEL DISEASE PRESENT AND WITH   SOME MILD INTERVAL PROGRESSION COMPARED WITH THE PRIOR MRI.  THE AXIAL   IMAGES OVERESTIMATE THE DEGREE OF NEURAL FORAMINAL STENOSIS DUE TO   ARTIFACTS, WITH THE SAGITTAL IMAGES DEMONSTRATING MILD NEURAL FORAMINAL   STENOTIC CHANGES IN THE MIDCERVICAL LEVELS.    3. NO EVIDENCE OF ACUTE TRAUMA.         Read By KEITH NETTLES MD on Feb 14 2005  1:10PM  : [facility] Transcription Date: Feb 15 2005 11:24AM  THIS DOCUMENT HAS BEEN ELECTRONICALLY SIGNED BY: KEITH NETTLES MD on   Feb 15 2005 12:06PM     Results for orders placed during the hospital encounter of 02/14/05   DX-CERVICAL SPINE-2 OR 3 VIEWS    Impression IMPRESSION:    MULTILEVEL CERVICAL SPINE DEGENERATIVE CHANGE, WITHOUT INTERVAL CHANGE.          Read By MIKKI RODRIGUEZ MD on Feb 14 2005  3:04PM  : [facility]MIE Transcription Date: Feb 14 2005  3:10PM  THIS DOCUMENT HAS BEEN ELECTRONICALLY SIGNED BY: MIKKI RODRIGUEZ MD on Feb 14 2005  3:17PM     Assessment and Plan:     1. Rheumatoid arthritis with positive rheumatoid factor, involving unspecified site (HCC)  Currently on Xeljanz at 5 mg p.o. twice daily, prednisone at 2 mg p.o. daily, exam and patient complaints do indicate some still some activity we will do a trial of Plaquenil at 200 mg p.o. twice daily in combination with the Xeljanz and prednisone  - hydroxychloroquine (PLAQUENIL) 200 MG Tab; 1 tab po bid  Dispense: 180 Tab; Refill: 1  - G6PD QUANT + RBC; Future    2. Long-term current use of tofacitinib  On Xeljanz 5 mg p.o. twice daily of note screening labs are up-to-date, patient needs monitoring labs about every 6 months, next labs will be due out December 2020, we will order at next visit  - hydroxychloroquine  (PLAQUENIL) 200 MG Tab; 1 tab po bid  Dispense: 180 Tab; Refill: 1  - G6PD QUANT + RBC; Future    3. Long-term use of Plaquenil  Start Plaquenil 200 mg p.o. twice daily  Risks reviewed with patient  Today check G6PD levels  Patient does get his eyes examined about every year with Dr. Lacy, last ophthalmology evaluation about December 2019.  - hydroxychloroquine (PLAQUENIL) 200 MG Tab; 1 tab po bid  Dispense: 180 Tab; Refill: 1  - G6PD QUANT + RBC; Future    4. Adrenal insufficiency (HCC)  Per endocrinology Dr. Juarez progress note from December 2019  Note by endocrinology-patient found to be adrenally insufficient, does NOT recommend weaning off of prednisone-recommends continuing prednisone at 2 mg p.o. daily  ASSESSMENT/PLAN:         1. Secondary adrenal insufficiency (HCC)  Work-up is compatible with secondary adrenal insufficiency and not primary adrenal insufficiency  However because of his persistently low ACTH and cortisol levels I do not recommend weaning prednisone at this time  Recommend for patient to continue prednisone 2 mg daily for now  Reminded patient to take extra doses of steroids for any acute illness     We will plan for follow-up in 6 months with labs     2. Current chronic use of systemic steroids  Patient is on chronic steroids because of chronic adrenal insufficiency     3. High risk medication use  Patient is taking steroids which are high risk medication     4. Rheumatoid arthritis with positive rheumatoid factor, involving unspecified site (HCC)  We will discuss current results of ACTH stim test with rheumatology.  At this time I do not think that it is feasible to wean him off prednisone he may have to continue Xeljanz for now  Continue follow-up with rheumatology        Return in about 6 months (around 6/12/2020).        Thank you kindly for allowing me to participate in the endocrine care plan for this patient.     Hector Cedillo MD, FACE, [facility]  12/13/19      5. Encounter for  long-term (current) use of NSAIDs  Patient on meloxicam 15 mg p.o. daily  Patient needs monitoring labs every 6 months next labs due about December 2020 will order at next visit    6. Osteopenia of multiple sites  Last DEXA over 2 years ago, patient had a DEXA ordered December 4, 2019 patient's not done DEXA as of yet, patient states he has an appointment to do it next week   continue calcium citrate 1200 mg by mouth daily and vitamin D about 2000 units by mouth daily and magnesium 200 mg by mouth daily    7. Essential hypertension  May impact the type of medications we can use for this patient's arthritis. We will have to keep this under advisement.    8. Coronary artery disease due to calcified coronary lesion: Mildly cardiac catheterization in 2014  Followed by cardiology    Followup: Return in about 3 months (around 9/4/2020). or sooner peyman Sims  was seen 30 minutes face-to-face of which more than 50% of the time was spent counseling the patient (excluding time for procedures)  regarding  rheumatological condition and care. Therapy was discussed in detail.      Please note that this dictation was created using voice recognition software. I have made every reasonable attempt to correct obvious errors, but I expect that there are errors of grammar and possibly content that I did not discover before finalizing the note.

## 2020-06-12 ENCOUNTER — HOSPITAL ENCOUNTER (OUTPATIENT)
Dept: RADIOLOGY | Facility: MEDICAL CENTER | Age: 65
End: 2020-06-12
Attending: INTERNAL MEDICINE
Payer: MEDICARE

## 2020-06-12 DIAGNOSIS — Z79.52 LONG TERM CURRENT USE OF SYSTEMIC STEROIDS: ICD-10-CM

## 2020-06-12 DIAGNOSIS — M05.9 RHEUMATOID ARTHRITIS WITH POSITIVE RHEUMATOID FACTOR, INVOLVING UNSPECIFIED SITE (HCC): ICD-10-CM

## 2020-06-12 DIAGNOSIS — M81.0 OSTEOPOROSIS WITHOUT CURRENT PATHOLOGICAL FRACTURE, UNSPECIFIED OSTEOPOROSIS TYPE: ICD-10-CM

## 2020-06-12 PROCEDURE — 77080 DXA BONE DENSITY AXIAL: CPT

## 2020-06-15 ENCOUNTER — TELEMEDICINE (OUTPATIENT)
Dept: ENDOCRINOLOGY | Facility: MEDICAL CENTER | Age: 65
End: 2020-06-15
Payer: MEDICARE

## 2020-06-15 DIAGNOSIS — Z79.52 CURRENT CHRONIC USE OF SYSTEMIC STEROIDS: ICD-10-CM

## 2020-06-15 DIAGNOSIS — E55.9 VITAMIN D DEFICIENCY: ICD-10-CM

## 2020-06-15 DIAGNOSIS — E27.49 SECONDARY ADRENAL INSUFFICIENCY (HCC): ICD-10-CM

## 2020-06-15 DIAGNOSIS — Z79.899 HIGH RISK MEDICATION USE: ICD-10-CM

## 2020-06-15 PROCEDURE — 99214 OFFICE O/P EST MOD 30 MIN: CPT | Mod: 95,CR | Performed by: INTERNAL MEDICINE

## 2020-06-15 NOTE — PROGRESS NOTES
Chief Complaint: Follow up for secondary adrenal insufficiency and current chronic use of systemic steroids  Patient was presented for a telehealth consultation via secure and encrypted videoconferencing technology. This encounter was conducted via Zoom . Verbal consent was obtained. Patient's identity was verified.    HPI:     Edgardo Sims is a 64 y.o. male here for follow up of the above medical issues    I initially saw him on October 2019 as a referral from Dr. Azul for evaluation of chronic secondary adrenal insufficiency secondary to chronic steroid use in the background of rheumatoid arthritis.  He was started on steroids by his previous rheumatologist over 20 years ago.  He has been stable on prednisone 2 mg daily.   He has osteopenia managed by his primary care physician and also has hypogonadism managed by his primary care physician    His cosyntropin stim test on December 2019 with positive results of adrenal insufficiency   His baseline cortisol was 5 and his 1 hour stimulated cortisol was low at 14  His baseline labs showed a low ACTH of 4 and low cortisol level of 5 compatible with secondary adrenal insufficiency.   I ordered a 21-hydroxylase antibodies but the lab did not draw the test  His baseline aldosterone was normal at 9.2 and renin was normal at 0.9 which is not compatible with primary adrenal insufficiency    When I last saw him he was concerned about tapering the prednisone because he was concerned about flareup of his rheumatoid arthritis    On follow-up he reports that he is now on Hydroxychloroquine plus he is also taking Xeljanz 5 mg twice a day along with the prednisone for his rheumatoid arthritis.  He denies interval falls and fractures and he is taking Caltrate and vitamin D 2000 units daily    He had a bone density recently on June 2020 which showed osteopenia with no evidence of bone loss when compared to his previous bone density.  The lowest T score was -1.2 for the  left forearm.   He is currently not on any bisphosphonates      Patient's medications, allergies, and social histories were reviewed and updated as appropriate.      ROS:       CONS:     No fever, no chills   EYES:     No diplopia, no blurry vision   CV:           No chest pain, no palpitations   PULM:     No SOB, no cough, no hemoptysis.   GI:            No nausea, no vomiting, no diarrhea, no constipation   ENDO:     No polyuria, no polydipsia, no heat intolerance, no cold intolerance     Past Medical History:  Problem List:  2019-12: Secondary adrenal insufficiency (HCC)  2019-10: Current chronic use of systemic steroids  2019-10: High risk medication use  2019-10: History of adrenal insufficiency  2019-01: Hyponatremia  2019-01: Hematuria  2019-01: Complicated UTI (urinary tract infection)  2019-01: Neurogenic bladder  2019-01: Sepsis (HCC)  2018-08: Failed total knee, left, subsequent encounter  2018-05: Bladder outlet obstruction  2018-04: Acute pyelonephritis  2018-04: Leukocytosis  2018-04: Lactic acidosis  2018-04: Idiopathic acute pancreatitis  2017-09: Inadequate community resources  2017-08: Chronic use of opiate drug for therapeutic purpose  2016-09: Elevated CPK  2015-07: Depression  2015-03: Anxiety  2014-12: Coronary artery disease due to calcified coronary lesion:   Mildly cardiac catheterization in 2014  2014-10: Tachycardia  2014-01: Abnormal myocardial perfusion study  2013-05: Osteoarthrosis, unspecified whether generalized or localized,  pelvic region and thigh  2011-07: Dyslipidemia  2011-07: Aortic insufficiency  2011-07: Essential hypertension  2009-10: Need for pneumococcal vaccination  2009-05: Left knee pain  2009-05: Rheumatoid arthritis (HCC)  2009-05: Hypogonadism male      Past Surgical History:  Past Surgical History:   Procedure Laterality Date   • PB TRANSURETHRAL ELEC-SURG PBOSTATECTOM  4/1/2019    Procedure: BIPOLAR TRANSURETHRAL RESECTION OF PROSTATE;  Surgeon: Jose SOMERS  VIRAL Romo;  Location: [facility];  Service: Urology   • CYSTOSCOPY  4/1/2019    Procedure: CYSTOSCOPY;  Surgeon: Jose Romo M.D.;  Location: SURGERY [facility];  Service: Urology   • URETHROTOMY INTERNAL  4/1/2019    Procedure: DIRECT VISION INTERNAL URETHROTOMY;  Surgeon: Jose Romo M.D.;  Location: SURGERY [facility];  Service: Urology   • KNEE REVISION TOTAL Left 8/3/2018    Procedure: KNEE REVISION TOTAL;  Surgeon: Michael Rodriguez M.D.;  Location: [facility];  Service: Orthopedics   • CYSTOSCOPY  5/16/2018    Procedure: CYSTOSCOPY-CLOT EVAC;  Surgeon: Jose Romo M.D.;  Location: [facility];  Service: Urology   • TRANS URETHRAL RESECTION BLADDER  5/16/2018    Procedure: TRANS URETHRAL RESECTION BLADDER;  Surgeon: Jose Romo M.D.;  Location: [facility];  Service: Urology   • RECOVERY  2/3/2014    Performed by Cath-Recovery Surgery at SURGERY SAME DAY [facility]   • HIP ARTHROPLASTY TOTAL  5/31/2013    Performed by Burak Valles M.D. at [facility]   • ROTATOR CUFF REPAIR Right 2011    x 2   • KNEE ARTHROPLASTY TOTAL  6/1/2009    LEFT-Performed by YONATAN HINSON at SURGERY [facility]   • VEIN LIGATION RADIO FREQUENCY  12/8/2008    LEFT leg-Performed by DEREJE MCCULLOUGH at SURGERY SAME DAY [facility]   • HIP ARTHROPLASTY TOTAL  6/20/08    Performed by YONATAN HINSON at SURGERY [facility]   • LUMBAR LAMINECTOMY DISKECTOMY  2000   • TMJ RECONSTRUCTION BILATERAL  1994   • ANKLE ORIF Right    • KNEE ARTHROSCOPY Left    • VEIN STRIPPING Left         Allergies:  Crestor [rosuvastatin calcium]; Penicillins; and Rocephin [ceftriaxone]     Social History:  Social History     Tobacco Use   • Smoking status: Never Smoker   • Smokeless tobacco: Never Used   Substance Use Topics   • Alcohol use: Yes     Alcohol/week: 1.8 oz     Types: 3 Cans of beer per week     Frequency: 2-3  times a week     Drinks per session: 1 or 2     Comment: 3 per week   • Drug use: No        Family History:   family history includes Hypertension in his mother.      PHYSICAL EXAM:   Vital signs: There were no vitals taken for this visit.  GENERAL: Well-developed, well-nourished in no apparent distress.   EYE:  No ocular asymmetry, PERRLA  HENT: Pink, moist mucous membranes.    NECK: No thyromegaly.   CARDIOVASCULAR:  No murmurs  LUNGS: Clear breath sounds  ABDOMEN: Soft, nontender   EXTREMITIES: No clubbing, cyanosis, or edema.   NEUROLOGICAL: No gross focal motor abnormalities   LYMPH: No cervical adenopathy palpated.   SKIN: No rashes, lesions.       Labs:  Lab Results   Component Value Date/Time    WBC 10.7 06/02/2020 07:22 AM    WBC 7.5 07/01/2011 10:30 AM    RBC 5.15 06/02/2020 07:22 AM    RBC 4.72 07/01/2011 10:30 AM    HEMOGLOBIN 15.4 06/02/2020 07:22 AM    MCV 90.1 06/02/2020 07:22 AM     (H) 07/01/2011 10:30 AM    MCH 29.9 06/02/2020 07:22 AM    MCH 36.0 (H) 07/01/2011 10:30 AM    MCHC 33.2 (L) 06/02/2020 07:22 AM    RDW 59.3 (H) 06/02/2020 07:22 AM    RDW 14.0 07/01/2011 10:30 AM    MPV 10.1 06/02/2020 07:22 AM       Lab Results   Component Value Date/Time    SODIUM 139 06/02/2020 07:25 AM    POTASSIUM 4.9 06/02/2020 07:25 AM    CHLORIDE 102 06/02/2020 07:25 AM    CO2 26 06/02/2020 07:25 AM    ANION 11.0 06/02/2020 07:25 AM    GLUCOSE 87 06/02/2020 07:25 AM    BUN 12 06/02/2020 07:25 AM    CREATININE 0.86 06/02/2020 07:25 AM    CREATININE 1.1 04/21/2009 03:50 PM    CALCIUM 9.3 06/02/2020 07:25 AM    ASTSGOT 28 06/02/2020 07:25 AM    ALTSGPT 31 06/02/2020 07:25 AM    TBILIRUBIN 0.6 06/02/2020 07:25 AM    ALBUMIN 4.5 06/02/2020 07:25 AM    TOTPROTEIN 6.8 06/02/2020 07:25 AM    GLOBULIN 2.3 06/02/2020 07:25 AM    AGRATIO 2.0 06/02/2020 07:25 AM       No results found for: TSHULTRASEN  Lab Results   Component Value Date/Time    FREEDIR 1.11 12/01/2014 1513     No results found for: FREET3  No results  found for: THYSTIMIG      Imaging:    ASSESSMENT/PLAN:       1. Secondary adrenal insufficiency (HCC)  After much discussion there was a mutual agreement to try to wean his prednisone down to 1 mg daily to see if he can tolerate this  I did warn him that he may notice a flare of joint symptoms  Fortunately he is now on Plaquenil  After he has been stable on 1 mg of prednisone we will try to consider another cosyntropin stimulation test  I will see him again in 3 months    2. Current chronic use of systemic steroids  Patient is on chronic steroids because of chronic adrenal insufficiency    3. High risk medication use  Patient is taking steroids which are high risk medication    4. Vitamin D deficiency  Unstable  I just noted that his vitamin D has not been checked in a long time and going to check a calcium and 25-hydroxy vitamin D level with his next labs in 3 months      Return in about 3 months (around 9/15/2020).      Thank you kindly for allowing me to participate in the endocrine care plan for this patient.    Hector Cedillo MD, [facility], [facility]  12/13/19    CC:   John Lao D.O.

## 2020-06-18 ENCOUNTER — HOSPITAL ENCOUNTER (OUTPATIENT)
Dept: LAB | Facility: MEDICAL CENTER | Age: 65
End: 2020-06-18
Attending: INTERNAL MEDICINE
Payer: MEDICARE

## 2020-06-18 ENCOUNTER — OFFICE VISIT (OUTPATIENT)
Dept: MEDICAL GROUP | Facility: LAB | Age: 65
End: 2020-06-18
Payer: MEDICARE

## 2020-06-18 VITALS
HEART RATE: 70 BPM | DIASTOLIC BLOOD PRESSURE: 60 MMHG | SYSTOLIC BLOOD PRESSURE: 124 MMHG | WEIGHT: 204.8 LBS | RESPIRATION RATE: 14 BRPM | OXYGEN SATURATION: 97 % | BODY MASS INDEX: 29.32 KG/M2 | HEIGHT: 70 IN | TEMPERATURE: 98 F

## 2020-06-18 DIAGNOSIS — M05.9 RHEUMATOID ARTHRITIS WITH POSITIVE RHEUMATOID FACTOR, INVOLVING UNSPECIFIED SITE (HCC): ICD-10-CM

## 2020-06-18 DIAGNOSIS — Z79.899 LONG-TERM USE OF PLAQUENIL: ICD-10-CM

## 2020-06-18 DIAGNOSIS — M25.512 ACUTE PAIN OF LEFT SHOULDER: ICD-10-CM

## 2020-06-18 DIAGNOSIS — Z79.899 HIGH RISK MEDICATION USE: ICD-10-CM

## 2020-06-18 DIAGNOSIS — Z79.622 LONG-TERM CURRENT USE OF TOFACITINIB: ICD-10-CM

## 2020-06-18 PROCEDURE — 82955 ASSAY OF G6PD ENZYME: CPT

## 2020-06-18 PROCEDURE — 20610 DRAIN/INJ JOINT/BURSA W/O US: CPT | Mod: LT | Performed by: INTERNAL MEDICINE

## 2020-06-18 PROCEDURE — 36415 COLL VENOUS BLD VENIPUNCTURE: CPT

## 2020-06-18 PROCEDURE — 99214 OFFICE O/P EST MOD 30 MIN: CPT | Mod: 25 | Performed by: INTERNAL MEDICINE

## 2020-06-18 RX ADMIN — LIDOCAINE HYDROCHLORIDE 1 ML: 20 INJECTION, SOLUTION EPIDURAL; INFILTRATION; INTRACAUDAL; PERINEURAL at 15:19

## 2020-06-18 RX ADMIN — TRIAMCINOLONE ACETONIDE 40 MG: 40 INJECTION, SUSPENSION INTRA-ARTICULAR; INTRAMUSCULAR at 15:21

## 2020-06-18 ASSESSMENT — FIBROSIS 4 INDEX: FIB4 SCORE: 2.06

## 2020-06-19 ENCOUNTER — APPOINTMENT (OUTPATIENT)
Dept: RADIOLOGY | Facility: MEDICAL CENTER | Age: 65
End: 2020-06-19
Attending: INTERNAL MEDICINE
Payer: MEDICARE

## 2020-06-19 NOTE — PROGRESS NOTES
CC: Edgardo Sims is a 64 y.o. male is suffering from   Chief Complaint   Patient presents with   • Shoulder Pain     left shoulder pain x 5 days         SUBJECTIVE:  1. Acute pain of left shoulder  Ray is here for follow-up has had problems with left shoulder pain over the last 5 days.  In talking with the patient he states he did some sanding also installed some cabinets.  Denies any traumatic injury.  Patient's history is significant for previous right rotator cuff tears with repair    2. Rheumatoid arthritis with positive rheumatoid factor, involving unspecified site (HCC)  History of rheumatoid arthritis is immunosuppressed    3. High risk medication use  Patient and I have discussed that injection in the office increases risk for possible septic joint.        Past social, family, history:   Social History     Tobacco Use   • Smoking status: Never Smoker   • Smokeless tobacco: Never Used   Substance Use Topics   • Alcohol use: Yes     Alcohol/week: 1.8 oz     Types: 3 Cans of beer per week     Frequency: 2-3 times a week     Drinks per session: 1 or 2     Comment: 3 per week   • Drug use: No         MEDICATIONS:    Current Outpatient Medications:   •  hydroxychloroquine (PLAQUENIL) 200 MG Tab, 1 tab po bid, Disp: 180 Tab, Rfl: 1  •  diazePAM (VALIUM) 5 MG Tab, TAKE 1 TABLET BY MOUTH EVERY 12 HOURS AS NEEDED FOR UP TO 30 DAYS M06.9, Disp: 60 Tab, Rfl: 1  •  [START ON 7/21/2020] oxyCODONE-acetaminophen (PERCOCET-10)  MG Tab, Take 1-2 Tabs by mouth every four hours as needed for Severe Pain for up to 30 days., Disp: 150 Tab, Rfl: 0  •  meloxicam (MOBIC) 7.5 MG Tab, TAKE 1 TABLET BY MOUTH DAILY AS NEEDED FOR SEVERE JOINT PAIN, Disp: 90 Tab, Rfl: 0  •  pravastatin (PRAVACHOL) 80 MG tablet, Take 1 Tab by mouth every day., Disp: 90 Tab, Rfl: 2  •  predniSONE (DELTASONE) 1 MG Tab, TAKE 2 TABLET BY MOUTH EVERY DAY, Disp: 180 Tab, Rfl: 1  •  cyclobenzaprine (FLEXERIL) 10 MG Tab, TAKE ONE TABLET BY  MOUTH THREE TIMES DAILY AS NEEDED FOR MILD PAIN, Disp: 90 Tab, Rfl: 5  •  Tofacitinib Citrate (XELJANZ) 5 MG Tab, Take 5 mg by mouth 2 Times a Day., Disp: 180 Tab, Rfl: 0  •  vardenafil (LEVITRA) 20 MG tablet, Take 20 mg by mouth as needed., Disp: , Rfl:   •  PARoxetine (PAXIL) 20 MG Tab, Take 1 Tab by mouth every day., Disp: 90 Tab, Rfl: 3  •  lisinopril (PRINIVIL) 10 MG Tab, TAKE 1 TABLET BY MOUTH EVERY DAY, Disp: 90 Tab, Rfl: 3  •  metoprolol (LOPRESSOR) 50 MG Tab, TAKE 1 TABLET BY MOUTH TWICE A DAY, Disp: 180 Tab, Rfl: 3  •  ezetimibe (ZETIA) 10 MG Tab, Take 10 mg by mouth every evening., Disp: , Rfl:   •  CALCIUM-VITAMIN D PO, Take 1 Tab by mouth 2 Times a Day., Disp: , Rfl:   •  ascorbic acid (ASCORBIC ACID) 500 MG Tab, Take 500 mg by mouth every day., Disp: , Rfl:   •  Cyanocobalamin (VITAMIN B-12) 5000 MCG TABLET DISPERSIBLE, Take 1 Tab by mouth every day., Disp: , Rfl:   •  niacin 500 MG Tab, Take 500 mg by mouth every day., Disp: , Rfl:   •  Zinc 50 MG Cap, Take 50 mg by mouth every day., Disp: , Rfl:   •  omeprazole (PRILOSEC) 20 MG CPDR, Take 20 mg by mouth every day., Disp: , Rfl:     PROBLEMS:  Patient Active Problem List    Diagnosis Date Noted   • Neurogenic bladder 01/26/2019     Priority: Medium   • Secondary adrenal insufficiency (HCC) 12/12/2019   • Current chronic use of systemic steroids 10/16/2019   • High risk medication use 10/16/2019   • History of adrenal insufficiency 10/16/2019   • Bladder outlet obstruction 05/01/2018   • Leukocytosis 04/30/2018   • Lactic acidosis 04/30/2018   • Idiopathic acute pancreatitis 04/30/2018   • Chronic use of opiate drug for therapeutic purpose 08/12/2017   • Depression 07/13/2015   • Anxiety 03/31/2015   • Coronary artery disease due to calcified coronary lesion: Mildly cardiac catheterization in 2014 12/05/2014   • Tachycardia 10/20/2014   • Abnormal myocardial perfusion study 01/15/2014   • Osteoarthrosis, unspecified whether generalized or localized,  "pelvic region and thigh 05/31/2013   • Dyslipidemia 07/12/2011   • Aortic insufficiency 07/05/2011   • Essential hypertension 07/05/2011   • Rheumatoid arthritis (HCC) 05/05/2009   • Hypogonadism male 05/05/2009       REVIEW OF SYSTEMS:  Gen.:  No Nausea, Vomiting, fever, Chills.  Heart: No chest pain.  Lungs:  No shortness of Breath.  Psychological: Kian unusual Anxiety depression     PHYSICAL EXAM   Constitutional: Alert, cooperative, not in acute distress.  Cardiovascular:  Rate Rhythm is regular without murmurs rubs clicks.     Thorax & Lungs: Clear to auscultation, no wheezing, rhonchi, or rales  HENT: Normocephalic, Atraumatic.  Eyes: PERRLA, EOMI, Conjunctiva normal.   Neck: Trachia is midline no swelling of the thyroid.   Musculoskeletal: Pain significant loss of range of motion left shoulder  Neurologic: Alert & oriented x 3, cranial nerves II through XII are intact, Normal motor function, Normal sensory function, No focal deficits noted.   Psychiatric: Affect normal, Judgment normal, Mood normal.     VITAL SIGNS:/60   Pulse 70   Temp 36.7 °C (98 °F) (Temporal)   Resp 14   Ht 1.778 m (5' 10\")   Wt 92.9 kg (204 lb 12.8 oz)   SpO2 97%   BMI 29.39 kg/m²     Labs: Reviewed    Assessment:                                                     Plan:    1. Acute pain of left shoulder  X-ray then MRI left shoulder  - DX-SHOULDER 2+ LEFT; Future  - MR-SHOULDER-W/O LEFT; Future  - triamcinolone acetonide (KENALOG-40) injection 40 mg    2. Rheumatoid arthritis with positive rheumatoid factor, involving unspecified site (HCC)  Positive rheumatoid arthritis is followed by rheumatology service [facility]    3. High risk medication use  Discussed with the patient injection into the left shoulder potentially could be diagnostic.  If significant reduction in pain would be assistance in lead towards tendinitis, because of injection and the use of high-risk medication patient is at risk for " possible joint infection.      Procedure note: Injection left shoulder  Consents: Patient was consented regarding risk benefits including infection, neurological damage, tendon rupture, all questions were answered.  Procedure: Patients left shoulder was marked at the left and lateral acromial borders.  A master was made 2 cm below the intersection.  40 mg of Kenalog with 1 cc of 2% Xylocaine was injected  Patient condition: Patient tolerated procedure well but with minimal improvement in his pain.  Discharge instructions: Patient is to notify a physician immediately should he have pain discharge from the left shoulder injection site.  Patient's not to do any heavy lifting or pulling should his shoulder start feeling better.

## 2020-06-21 ENCOUNTER — HOSPITAL ENCOUNTER (OUTPATIENT)
Dept: RADIOLOGY | Facility: MEDICAL CENTER | Age: 65
End: 2020-06-21
Attending: INTERNAL MEDICINE
Payer: MEDICARE

## 2020-06-21 DIAGNOSIS — M25.512 ACUTE PAIN OF LEFT SHOULDER: ICD-10-CM

## 2020-06-21 LAB — G6PD RBC-CCNC: 12.9 U/G HB (ref 9.9–16.6)

## 2020-06-21 PROCEDURE — 73221 MRI JOINT UPR EXTREM W/O DYE: CPT | Mod: LT

## 2020-06-22 DIAGNOSIS — S46.012D TRAUMATIC COMPLETE TEAR OF LEFT ROTATOR CUFF, SUBSEQUENT ENCOUNTER: ICD-10-CM

## 2020-06-25 DIAGNOSIS — Z79.899 ENCOUNTER FOR LONG-TERM (CURRENT) USE OF HIGH-RISK MEDICATION: ICD-10-CM

## 2020-06-25 DIAGNOSIS — M05.9 RHEUMATOID ARTHRITIS WITH POSITIVE RHEUMATOID FACTOR, INVOLVING UNSPECIFIED SITE (HCC): ICD-10-CM

## 2020-06-25 RX ORDER — TOFACITINIB 5 MG/1
5 TABLET, FILM COATED ORAL 2 TIMES DAILY
Qty: 180 TAB | Refills: 1 | Status: SHIPPED | OUTPATIENT
Start: 2020-06-25 | End: 2020-10-05 | Stop reason: SDUPTHER

## 2020-06-25 NOTE — TELEPHONE ENCOUNTER
Received request via: Pharmacy  [facility]  Was the patient seen in the last year in this department? Yes    Does the patient have an active prescription (recently filled or refills available) for medication(s) requested? No

## 2020-07-02 ENCOUNTER — APPOINTMENT (OUTPATIENT)
Dept: MEDICAL GROUP | Facility: LAB | Age: 65
End: 2020-07-02
Payer: MEDICARE

## 2020-07-03 RX ORDER — TRIAMCINOLONE ACETONIDE 40 MG/ML
40 INJECTION, SUSPENSION INTRA-ARTICULAR; INTRAMUSCULAR ONCE
OUTPATIENT
Start: 2020-07-03 | End: 2020-07-04

## 2020-07-10 RX ORDER — TRIAMCINOLONE ACETONIDE 40 MG/ML
40 INJECTION, SUSPENSION INTRA-ARTICULAR; INTRAMUSCULAR ONCE
OUTPATIENT
Start: 2020-07-10 | End: 2020-07-11

## 2020-07-13 ENCOUNTER — TELEPHONE (OUTPATIENT)
Dept: MEDICAL GROUP | Facility: LAB | Age: 65
End: 2020-07-13

## 2020-07-13 NOTE — LETTER
July 13, 2020        Patient: Edgardo Sims   YOB: 1955   Date of Visit: 7/13/2020   Emanuel Vance MD  [facility]  [facility] [address].         Dear Dr. Vance:    It is my medical opinion that Edgardo Sims is medically cleared for left shoulder surgery.    If you have any questions or concerns, please don't hesitate to call.        Sincerely,          John Lao D.O.  Board Certified General Internal Medicine.      [facility] - [address] [id number] [phone] (Phone)  [phone] (Fax)

## 2020-07-23 ENCOUNTER — TELEPHONE (OUTPATIENT)
Dept: RHEUMATOLOGY | Facility: MEDICAL CENTER | Age: 65
End: 2020-07-23

## 2020-07-23 NOTE — TELEPHONE ENCOUNTER
Please notify patient he needs to stop the Xeljanz 1 day prior to surgery, and patient can restart the Xeljanz once his surgeon clears him for any risk of infection at his surgical site.    Patient needs to stop the meloxicam 1 week prior to his surgery and can restart the meloxicam once his surgeon clears him for no risk of bleeding.

## 2020-07-23 NOTE — TELEPHONE ENCOUNTER
Kwasi is having shoulder surgery on 8/5/20. He is wanting to know what medications he needs to stop taking? Please advise and thank you

## 2020-08-03 DIAGNOSIS — Z01.810 PRE-OPERATIVE CARDIOVASCULAR EXAMINATION: ICD-10-CM

## 2020-08-03 DIAGNOSIS — Z01.812 PRE-OPERATIVE LABORATORY EXAMINATION: ICD-10-CM

## 2020-08-03 LAB
ANION GAP SERPL CALC-SCNC: 4 MMOL/L (ref 7–16)
BUN SERPL-MCNC: 10 MG/DL (ref 8–22)
CALCIUM SERPL-MCNC: 9.6 MG/DL (ref 8.4–10.2)
CHLORIDE SERPL-SCNC: 101 MMOL/L (ref 96–112)
CO2 SERPL-SCNC: 31 MMOL/L (ref 20–33)
COVID ORDER STATUS COVID19: NORMAL
CREAT SERPL-MCNC: 0.94 MG/DL (ref 0.5–1.4)
EKG IMPRESSION: NORMAL
GLUCOSE SERPL-MCNC: 94 MG/DL (ref 65–99)
POTASSIUM SERPL-SCNC: 5.4 MMOL/L (ref 3.6–5.5)
SARS-COV-2 RNA RESP QL NAA+PROBE: NOTDETECTED
SODIUM SERPL-SCNC: 136 MMOL/L (ref 135–145)
SPECIMEN SOURCE: NORMAL

## 2020-08-03 PROCEDURE — 93005 ELECTROCARDIOGRAM TRACING: CPT

## 2020-08-03 PROCEDURE — 93010 ELECTROCARDIOGRAM REPORT: CPT | Performed by: INTERNAL MEDICINE

## 2020-08-03 PROCEDURE — 80048 BASIC METABOLIC PNL TOTAL CA: CPT

## 2020-08-03 PROCEDURE — 36415 COLL VENOUS BLD VENIPUNCTURE: CPT

## 2020-08-03 ASSESSMENT — FIBROSIS 4 INDEX: FIB4 SCORE: 2.06

## 2020-08-03 NOTE — OR NURSING
"Preadmit appointment: \" Preparing for your Procedure information\" sheet given to patient with verbal and written instructions. Patient instructed to continue prescribed medications through the day before surgery, instructed to take the following medications the day of surgery per anesthesia protocol: PERCOSET IF NEEDED, METOPROLOL, OMEPRAZOLE.  "

## 2020-08-05 ENCOUNTER — HOSPITAL ENCOUNTER (OUTPATIENT)
Facility: MEDICAL CENTER | Age: 65
End: 2020-08-05
Attending: ORTHOPAEDIC SURGERY | Admitting: ORTHOPAEDIC SURGERY
Payer: MEDICARE

## 2020-08-05 ENCOUNTER — ANESTHESIA (OUTPATIENT)
Dept: SURGERY | Facility: MEDICAL CENTER | Age: 65
End: 2020-08-05
Payer: MEDICARE

## 2020-08-05 ENCOUNTER — ANESTHESIA EVENT (OUTPATIENT)
Dept: SURGERY | Facility: MEDICAL CENTER | Age: 65
End: 2020-08-05
Payer: MEDICARE

## 2020-08-05 VITALS
HEART RATE: 70 BPM | DIASTOLIC BLOOD PRESSURE: 78 MMHG | WEIGHT: 194 LBS | SYSTOLIC BLOOD PRESSURE: 128 MMHG | TEMPERATURE: 97.2 F | RESPIRATION RATE: 16 BRPM | BODY MASS INDEX: 27.77 KG/M2 | OXYGEN SATURATION: 97 % | HEIGHT: 70 IN

## 2020-08-05 PROBLEM — T88.4XXA DIFFICULT INTUBATION: Status: ACTIVE | Noted: 2020-08-05

## 2020-08-05 PROBLEM — K21.9 GASTROESOPHAGEAL REFLUX DISEASE: Status: ACTIVE | Noted: 2020-08-05

## 2020-08-05 PROBLEM — G89.18 POSTOPERATIVE PAIN: Status: ACTIVE | Noted: 2020-08-05

## 2020-08-05 PROCEDURE — 160041 HCHG SURGERY MINUTES - EA ADDL 1 MIN LEVEL 4: Performed by: ORTHOPAEDIC SURGERY

## 2020-08-05 PROCEDURE — 160046 HCHG PACU - 1ST 60 MINS PHASE II: Performed by: ORTHOPAEDIC SURGERY

## 2020-08-05 PROCEDURE — 502581 HCHG PACK, SHOULDER ARTHROSCOPY: Performed by: ORTHOPAEDIC SURGERY

## 2020-08-05 PROCEDURE — 160048 HCHG OR STATISTICAL LEVEL 1-5: Performed by: ORTHOPAEDIC SURGERY

## 2020-08-05 PROCEDURE — 700105 HCHG RX REV CODE 258: Performed by: ORTHOPAEDIC SURGERY

## 2020-08-05 PROCEDURE — 160025 RECOVERY II MINUTES (STATS): Performed by: ORTHOPAEDIC SURGERY

## 2020-08-05 PROCEDURE — 502000 HCHG MISC OR IMPLANTS RC 0278: Performed by: ORTHOPAEDIC SURGERY

## 2020-08-05 PROCEDURE — A9270 NON-COVERED ITEM OR SERVICE: HCPCS | Performed by: ANESTHESIOLOGY

## 2020-08-05 PROCEDURE — 160002 HCHG RECOVERY MINUTES (STAT): Performed by: ORTHOPAEDIC SURGERY

## 2020-08-05 PROCEDURE — 500152 HCHG CANNULA, TIB TUNNEL: Performed by: ORTHOPAEDIC SURGERY

## 2020-08-05 PROCEDURE — 160009 HCHG ANES TIME/MIN: Performed by: ORTHOPAEDIC SURGERY

## 2020-08-05 PROCEDURE — 160029 HCHG SURGERY MINUTES - 1ST 30 MINS LEVEL 4: Performed by: ORTHOPAEDIC SURGERY

## 2020-08-05 PROCEDURE — 700111 HCHG RX REV CODE 636 W/ 250 OVERRIDE (IP): Performed by: ANESTHESIOLOGY

## 2020-08-05 PROCEDURE — 700101 HCHG RX REV CODE 250: Performed by: ANESTHESIOLOGY

## 2020-08-05 PROCEDURE — 501838 HCHG SUTURE GENERAL: Performed by: ORTHOPAEDIC SURGERY

## 2020-08-05 PROCEDURE — 700102 HCHG RX REV CODE 250 W/ 637 OVERRIDE(OP)

## 2020-08-05 PROCEDURE — C1713 ANCHOR/SCREW BN/BN,TIS/BN: HCPCS | Performed by: ORTHOPAEDIC SURGERY

## 2020-08-05 PROCEDURE — 64415 NJX AA&/STRD BRCH PLXS IMG: CPT | Performed by: ORTHOPAEDIC SURGERY

## 2020-08-05 PROCEDURE — 160035 HCHG PACU - 1ST 60 MINS PHASE I: Performed by: ORTHOPAEDIC SURGERY

## 2020-08-05 PROCEDURE — A9270 NON-COVERED ITEM OR SERVICE: HCPCS

## 2020-08-05 PROCEDURE — 700101 HCHG RX REV CODE 250: Performed by: ORTHOPAEDIC SURGERY

## 2020-08-05 PROCEDURE — 700102 HCHG RX REV CODE 250 W/ 637 OVERRIDE(OP): Performed by: ANESTHESIOLOGY

## 2020-08-05 PROCEDURE — 500028 HCHG ARTHROWAND TURBOVAC 3.5/90 SUCT.: Performed by: ORTHOPAEDIC SURGERY

## 2020-08-05 DEVICE — ANCHOR 4.75 MM SUTURE HEALICOIL: Type: IMPLANTABLE DEVICE | Site: SHOULDER | Status: FUNCTIONAL

## 2020-08-05 DEVICE — IMPLANTABLE DEVICE: Type: IMPLANTABLE DEVICE | Site: SHOULDER | Status: FUNCTIONAL

## 2020-08-05 RX ORDER — SUCCINYLCHOLINE/SOD CL,ISO/PF 200MG/10ML
SYRINGE (ML) INTRAVENOUS PRN
Status: DISCONTINUED | OUTPATIENT
Start: 2020-08-05 | End: 2020-08-05 | Stop reason: SURG

## 2020-08-05 RX ORDER — LIDOCAINE HYDROCHLORIDE 20 MG/ML
INJECTION, SOLUTION EPIDURAL; INFILTRATION; INTRACAUDAL; PERINEURAL PRN
Status: DISCONTINUED | OUTPATIENT
Start: 2020-08-05 | End: 2020-08-05 | Stop reason: SURG

## 2020-08-05 RX ORDER — ACETAMINOPHEN 500 MG
1000 TABLET ORAL ONCE
Status: COMPLETED | OUTPATIENT
Start: 2020-08-05 | End: 2020-08-05

## 2020-08-05 RX ORDER — CELECOXIB 200 MG/1
200 CAPSULE ORAL ONCE
Status: COMPLETED | OUTPATIENT
Start: 2020-08-05 | End: 2020-08-05

## 2020-08-05 RX ORDER — HALOPERIDOL 5 MG/ML
1 INJECTION INTRAMUSCULAR
Status: DISCONTINUED | OUTPATIENT
Start: 2020-08-05 | End: 2020-08-05 | Stop reason: HOSPADM

## 2020-08-05 RX ORDER — ROCURONIUM BROMIDE 10 MG/ML
INJECTION, SOLUTION INTRAVENOUS PRN
Status: DISCONTINUED | OUTPATIENT
Start: 2020-08-05 | End: 2020-08-05 | Stop reason: SURG

## 2020-08-05 RX ORDER — BUPIVACAINE HYDROCHLORIDE AND EPINEPHRINE 2.5; 5 MG/ML; UG/ML
INJECTION, SOLUTION EPIDURAL; INFILTRATION; INTRACAUDAL; PERINEURAL
Status: DISCONTINUED | OUTPATIENT
Start: 2020-08-05 | End: 2020-08-05 | Stop reason: HOSPADM

## 2020-08-05 RX ORDER — GABAPENTIN 300 MG/1
300 CAPSULE ORAL ONCE
Status: COMPLETED | OUTPATIENT
Start: 2020-08-05 | End: 2020-08-05

## 2020-08-05 RX ORDER — DEXAMETHASONE SODIUM PHOSPHATE 4 MG/ML
INJECTION, SOLUTION INTRA-ARTICULAR; INTRALESIONAL; INTRAMUSCULAR; INTRAVENOUS; SOFT TISSUE PRN
Status: DISCONTINUED | OUTPATIENT
Start: 2020-08-05 | End: 2020-08-05 | Stop reason: SURG

## 2020-08-05 RX ORDER — OXYCODONE HCL 5 MG/5 ML
10 SOLUTION, ORAL ORAL
Status: DISCONTINUED | OUTPATIENT
Start: 2020-08-05 | End: 2020-08-05 | Stop reason: HOSPADM

## 2020-08-05 RX ORDER — OXYCODONE HCL 5 MG/5 ML
5 SOLUTION, ORAL ORAL
Status: DISCONTINUED | OUTPATIENT
Start: 2020-08-05 | End: 2020-08-05 | Stop reason: HOSPADM

## 2020-08-05 RX ORDER — SODIUM CHLORIDE, SODIUM LACTATE, POTASSIUM CHLORIDE, CALCIUM CHLORIDE 600; 310; 30; 20 MG/100ML; MG/100ML; MG/100ML; MG/100ML
INJECTION, SOLUTION INTRAVENOUS CONTINUOUS
Status: DISCONTINUED | OUTPATIENT
Start: 2020-08-05 | End: 2020-08-05 | Stop reason: HOSPADM

## 2020-08-05 RX ORDER — CLINDAMYCIN PHOSPHATE 150 MG/ML
INJECTION, SOLUTION INTRAVENOUS PRN
Status: DISCONTINUED | OUTPATIENT
Start: 2020-08-05 | End: 2020-08-05 | Stop reason: SURG

## 2020-08-05 RX ORDER — HYDROMORPHONE HYDROCHLORIDE 1 MG/ML
0.2 INJECTION, SOLUTION INTRAMUSCULAR; INTRAVENOUS; SUBCUTANEOUS
Status: DISCONTINUED | OUTPATIENT
Start: 2020-08-05 | End: 2020-08-05 | Stop reason: HOSPADM

## 2020-08-05 RX ORDER — BUPIVACAINE HYDROCHLORIDE 5 MG/ML
INJECTION, SOLUTION EPIDURAL; INTRACAUDAL PRN
Status: DISCONTINUED | OUTPATIENT
Start: 2020-08-05 | End: 2020-08-05 | Stop reason: SURG

## 2020-08-05 RX ORDER — MEPERIDINE HYDROCHLORIDE 25 MG/ML
12.5 INJECTION INTRAMUSCULAR; INTRAVENOUS; SUBCUTANEOUS
Status: DISCONTINUED | OUTPATIENT
Start: 2020-08-05 | End: 2020-08-05 | Stop reason: HOSPADM

## 2020-08-05 RX ORDER — SODIUM CHLORIDE, SODIUM LACTATE, POTASSIUM CHLORIDE, CALCIUM CHLORIDE 600; 310; 30; 20 MG/100ML; MG/100ML; MG/100ML; MG/100ML
1000 INJECTION, SOLUTION INTRAVENOUS CONTINUOUS
Status: DISCONTINUED | OUTPATIENT
Start: 2020-08-05 | End: 2020-08-05 | Stop reason: HOSPADM

## 2020-08-05 RX ORDER — MIDAZOLAM HYDROCHLORIDE 1 MG/ML
INJECTION INTRAMUSCULAR; INTRAVENOUS PRN
Status: DISCONTINUED | OUTPATIENT
Start: 2020-08-05 | End: 2020-08-05 | Stop reason: SURG

## 2020-08-05 RX ORDER — PHENYLEPHRINE HCL IN 0.9% NACL 0.5 MG/5ML
SYRINGE (ML) INTRAVENOUS PRN
Status: DISCONTINUED | OUTPATIENT
Start: 2020-08-05 | End: 2020-08-05 | Stop reason: SURG

## 2020-08-05 RX ORDER — HYDROMORPHONE HYDROCHLORIDE 1 MG/ML
0.1 INJECTION, SOLUTION INTRAMUSCULAR; INTRAVENOUS; SUBCUTANEOUS
Status: DISCONTINUED | OUTPATIENT
Start: 2020-08-05 | End: 2020-08-05 | Stop reason: HOSPADM

## 2020-08-05 RX ORDER — ONDANSETRON 2 MG/ML
4 INJECTION INTRAMUSCULAR; INTRAVENOUS
Status: DISCONTINUED | OUTPATIENT
Start: 2020-08-05 | End: 2020-08-05 | Stop reason: HOSPADM

## 2020-08-05 RX ORDER — HYDROMORPHONE HYDROCHLORIDE 1 MG/ML
0.4 INJECTION, SOLUTION INTRAMUSCULAR; INTRAVENOUS; SUBCUTANEOUS
Status: DISCONTINUED | OUTPATIENT
Start: 2020-08-05 | End: 2020-08-05 | Stop reason: HOSPADM

## 2020-08-05 RX ADMIN — SODIUM CHLORIDE, POTASSIUM CHLORIDE, SODIUM LACTATE AND CALCIUM CHLORIDE 1000 ML: 600; 310; 30; 20 INJECTION, SOLUTION INTRAVENOUS at 06:01

## 2020-08-05 RX ADMIN — Medication 100 MCG: at 07:22

## 2020-08-05 RX ADMIN — CELECOXIB 200 MG: 200 CAPSULE ORAL at 05:54

## 2020-08-05 RX ADMIN — Medication 100 MCG: at 08:01

## 2020-08-05 RX ADMIN — ACETAMINOPHEN 500 MG: 500 TABLET, FILM COATED ORAL at 05:52

## 2020-08-05 RX ADMIN — LIDOCAINE HYDROCHLORIDE 5 ML: 20 INJECTION, SOLUTION EPIDURAL; INFILTRATION; INTRACAUDAL; PERINEURAL at 07:02

## 2020-08-05 RX ADMIN — DEXAMETHASONE SODIUM PHOSPHATE 8 MG: 4 INJECTION, SOLUTION INTRAMUSCULAR; INTRAVENOUS at 07:08

## 2020-08-05 RX ADMIN — CLINDAMYCIN PHOSPHATE 900 MG: 150 INJECTION, SOLUTION INTRAMUSCULAR; INTRAVENOUS at 07:02

## 2020-08-05 RX ADMIN — Medication 90 MG: at 07:02

## 2020-08-05 RX ADMIN — BUPIVACAINE HYDROCHLORIDE 20 ML: 5 INJECTION, SOLUTION EPIDURAL; INTRACAUDAL; PERINEURAL at 06:43

## 2020-08-05 RX ADMIN — Medication 200 MCG: at 07:29

## 2020-08-05 RX ADMIN — MIDAZOLAM HYDROCHLORIDE 2 MG: 1 INJECTION, SOLUTION INTRAMUSCULAR; INTRAVENOUS at 06:56

## 2020-08-05 RX ADMIN — FENTANYL CITRATE 150 MCG: 50 INJECTION, SOLUTION INTRAMUSCULAR; INTRAVENOUS at 07:02

## 2020-08-05 RX ADMIN — GABAPENTIN 300 MG: 300 CAPSULE ORAL at 05:53

## 2020-08-05 RX ADMIN — ROCURONIUM BROMIDE 10 MG: 10 INJECTION, SOLUTION INTRAVENOUS at 07:02

## 2020-08-05 RX ADMIN — PROPOFOL 150 MG: 10 INJECTION, EMULSION INTRAVENOUS at 07:02

## 2020-08-05 RX ADMIN — POVIDONE-IODINE 15 ML: 10 SOLUTION TOPICAL at 05:58

## 2020-08-05 RX ADMIN — Medication 200 MCG: at 07:43

## 2020-08-05 RX ADMIN — Medication 100 MCG: at 07:18

## 2020-08-05 RX ADMIN — SODIUM CHLORIDE, POTASSIUM CHLORIDE, SODIUM LACTATE AND CALCIUM CHLORIDE: 600; 310; 30; 20 INJECTION, SOLUTION INTRAVENOUS at 08:07

## 2020-08-05 ASSESSMENT — PAIN SCALES - GENERAL: PAIN_LEVEL: 0

## 2020-08-05 NOTE — ANESTHESIA PROCEDURE NOTES
Airway    Date/Time: 8/5/2020 7:03 AM  Performed by: Bekah Brito M.D.  Authorized by: Bekah Brito M.D.     Location:  OR  Urgency:  Elective  Indications for Airway Management:  Anesthesia      Spontaneous Ventilation: absent    Sedation Level:  Deep  Preoxygenated: Yes    Patient Position:  Sniffing  Mask Difficulty Assessment:  0 - not attempted  Final Airway Type:  Endotracheal airway  Final Endotracheal Airway:  ETT  Cuffed: Yes    Technique Used for Successful ETT Placement:  Video laryngoscopy    Insertion Site:  Oral  Blade Type:  Mariam  Laryngoscope Blade/Videolaryngoscope Blade Size:  3  ETT Size (mm):  7.5  Measured from:  Teeth  ETT to Teeth (cm):  21  Placement Verified by: capnometry and palpation of cuff    Cormack-Lehane Classification:  Grade I - full view of glottis  Number of Attempts at Approach:  1

## 2020-08-05 NOTE — ANESTHESIA TIME REPORT
Anesthesia Start and Stop Event Times     Date Time Event    8/5/2020 0644 Ready for Procedure     0656 Anesthesia Start     0823 Anesthesia Stop        Responsible Staff  08/05/20    Name Role Begin End    Bekah Brito M.D. Anesth 0656 0823        Preop Diagnosis (Free Text):  Pre-op Diagnosis     STRAIN OF MUSCLES AND TENDONS OF ROTATOR CUFF LEFT SHOULDER, DJD OF LEFT ACROMIOCLAVICULAR JOINT, SHOULDER IMPINGEMENT SYNDROME LEF        Preop Diagnosis (Codes):    Post op Diagnosis  Left rotator cuff tear      Premium Reason  A. 3PM - 7AM    Comments: block pre 7am

## 2020-08-05 NOTE — ANESTHESIA QCDR
2019 [facility] Clinical Data Registry (for Quality Improvement)     Postoperative nausea/vomiting risk protocol (Adult = 18 yrs and Pediatric 3-17 yrs)- (430 and 463)  General inhalation anesthetic (NOT TIVA) with PONV risk factors: No  Provision of anti-emetic therapy with at least 2 different classes of agents: N/A  Patient DID NOT receive anti-emetic therapy and reason is documented in Medical Record: N/A    Multimodal Pain Management- (477)  Non-emergent surgery AND patient age >= 18: Yes  Use of Multimodal Pain Management, two or more drugs and/or interventions, NOT including systemic opioids: Yes  Exception: Documented allergy to multiple classes of analgesics: N/A    Smoking Abstinence (404)  Patient is current smoker (cigarette, pipe, e-cig, marijuanna): No  Elective Surgery:   Abstinence instructions provided prior to day of surgery:   Patient abstained from smoking on day of surgery:     Pre-Op Beta-Blocker in Isolated CABG (44)  Isolated CABG AND patient age >= 18: No  Beta-blocker admin within 24 hours of surgical incision:   Exception:of medical reason(s) for not administering beta blocker within 24 hours prior to surgical incision (e.g., not  indicated,other medical reason):     PACU assessment of acute postoperative pain prior to Anesthesia Care End- Applies to Patients Age = 18- (ABG7)  Initial PACU pain score is which of the following: < 7/10  Patient unable to report pain score: N/A    Post-anesthetic transfer of care checklist/protocol to PACU/ICU- (426 and 427)  Upon conclusion of case, patient transferred to which of the following locations: PACU/Non-ICU  Use of transfer checklist/protocol: Yes  Exclusion: Service Performed in Patient Hospital Room (and thus did not require transfer): N/A  Unplanned admission to ICU related to anesthesia service up through end of PACU care- (MD51)  Unplanned admission to ICU (not initially anticipated at anesthesia start time): No

## 2020-08-05 NOTE — ANESTHESIA PREPROCEDURE EVALUATION
63 yo w/left sdhoulder pain    Relevant Problems   ANESTHESIA   (+) Difficult intubation      CARDIAC   (+) Aortic insufficiency   (+) Coronary artery disease due to calcified coronary lesion: Mildly cardiac catheterization in 2014   (+) Essential hypertension      GI   (+) Gastroesophageal reflux disease      Other   (+) Chronic use of opiate drug for therapeutic purpose   (+) Osteoarthrosis, unspecified whether generalized or localized, pelvic region and thigh   (+) Rheumatoid arthritis (HCC)     Denies CVA, DM, LUNG/LIVER/KIDNEY DZ, URI    Physical Exam    Airway   Mallampati: II  TM distance: >3 FB  Neck ROM: full    Comments: Small mouth opening   Cardiovascular   Rhythm: regular  Rate: normal  (+) murmur     Dental - normal exam  (+) upper dentures, lower dentures        Facial Hair   Pulmonary   Breath sounds clear to auscultation     Abdominal    Neurological - normal exam                 Anesthesia Plan    ASA 2       Plan - general and peripheral nerve block     Peripheral nerve block will be post-op pain control  Airway plan will be ETT        Induction: intravenous    Postoperative Plan: Postoperative administration of opioids is intended.    Pertinent diagnostic labs and testing reviewed    Informed Consent:    Anesthetic plan and risks discussed with patient.

## 2020-08-05 NOTE — OP REPORT
DATE OF SERVICE:  08/05/2020    SURGEON:  Emanuel Vance MD    ASSISTANT:  Christian Pardo PA-C    PREOPERATIVE DIAGNOSES:  1.  Left shoulder rotator cuff tear.  2.  Left shoulder subacromial impingement.  3.  Left shoulder acromioclavicular joint arthrosis.    POSTOPERATIVE DIAGNOSES:  1.  Left shoulder rotator cuff tear.  2.  Left shoulder subacromial impingement.  3.  Left shoulder acromioclavicular joint arthrosis.  4.  Left shoulder biceps tendon tear.  5.  Left shoulder superior labral anterior to posterior tear.  6.  Left shoulder chondromalacia, glenohumeral joint.  7.  Left shoulder partial-thickness subscapularis tear.    PROCEDURES:  1.  Left shoulder arthroscopy with rotator cuff repair.  2.  Left shoulder arthroscopy with biceps tenodesis.  3.  Left shoulder arthroscopy with subacromial decompression.  4.  Left shoulder arthroscopy with distal clavicle excision.  5.  Left shoulder arthroscopy with extensive debridement of glenohumeral   joint.    ANESTHESIOLOGIST:  Bekah Brito MD    ANESTHETIC:  General endotracheal anesthesia along with interscalene block for   postoperative pain control.    INDICATIONS:  Patient has had left shoulder pain for quite some time, has   failed nonoperative treatment, elected to proceed with operative intervention.    He was explained the risks, benefits, alternatives to procedure and recovery   in detail.  All questions were answered, informed consent was obtained.  Site   verification per protocol was done.  Patient received appropriate   preoperative antibiotics.    OPERATION:  After successful anesthesia, patient's left shoulder was examined.    He had good range of motion.  No evidence of instability.  He was then   carefully placed in modified beach chair position.  All bony prominences were   well padded and supported.  Head and neck were maintained in neutral position.    Eyes were protected throughout.  His arm was prepped and draped in the usual    sterile fashion.  Posterior portal was established with knife and blunt   trocar in the glenohumeral space.  He had just some partial-thickness fraying   in the superior fibers of the subscapularis.  This was not really structural.    This was gently debrided with a shaver, felt to be intact.  Biceps had some   early fraying and he had a significant SLAP tear that really stabilized it.  A   tenotomy was done for later tenodesis.  The joint had some early   chondromalacia on the humeral head and the glenoid.  This was gently smoothed   out with sang and portals from both anterior and posterior portals.  The   labrum was also debrided anteriorly and posteriorly where it was degenerative.    He had a full-thickness tear of the supraspinatus with some fairly shredded   tissue.  I debrided the underside of the rotator cuff.  I then lavaged the   joint out with the subacromial space.  He had a hooked acromion.    Acromioplasty was done with 5.5 resector, leveled out to a type 1 from   posterior and lateral portals.  I also took 8 mm of distal clavicle   circumferentially preserving the posterior and superior capsule.  Next, I   focused my attention to the biceps.  I roughened up the bicipital groove.  I   placed a Healicoil 4.75 into the bicipital groove, did running locking stitch   with right tension, removed the remainder of the biceps and secured that into   position.  He had a crescent-shaped tear with some poor tissue, a little bit   medialized, but it was fairly easily mobilized.  I was able to roughen the   tuberosity to bleeding bone and elected to do some medial anchors, 2 more 4.75   Healicoil with tape and suture, did 4 simple sutures, tied those securely,   which pulled it on to the medial half of the footprint quite well.  I did some   lateral marrow venting.  I then probed the rotator cuff.  I was happy with   the repair.  At that point, I was happy with the procedure.  I lavaged out the   joint,  suctioned out the fluid, closed the portals with 3-0 interrupted   fashion.  Xeroform, 4x4's, Medipore tape and UltraSling was applied.    ESTIMATED BLOOD LOSS:  Minimal.    COMPLICATIONS:  None.    COMPONENTS USED:  Three [employer] 4.75 Healicoils.    Christian Pardo PA-C, was medically necessary for the case.  He helped with   instrumentation, retraction, and positioning.  Without his help, the case   would not have been as technically successful.       ____________________________________     MD RUFINO JO / AMBREEN    DD:  08/05/2020 08:20:05  DT:  08/05/2020 09:34:15    D#:  [id number]  Job#:  [id number]

## 2020-08-05 NOTE — ANESTHESIA POSTPROCEDURE EVALUATION
Patient: Edgardo Sims    Procedure Summary     Date: 08/05/20 Room / Location:  OR 06 / SURGERY [facility]    Anesthesia Start: 0656 Anesthesia Stop: 0823    Procedures:       DECOMPRESSION, SHOULDER, ARTHROSCOPIC - SUBACROMIAL (Left Shoulder)      EXCISION, CLAVICLE, DISTAL - WITH EXTENSIVE DEBRIDEMENT (Left Shoulder)      ARTHROSCOPY, SHOULDER, WITH ROTATOR CUFF REPAIR - AND DALAL (Left Shoulder) Diagnosis: (STRAIN OF MUSCLES AND TENDONS OF ROTATOR CUFF LEFT SHOULDER, DJD OF LEFT ACROMIOCLAVICULAR JOINT, SHOULDER IMPINGEMENT SYNDROME LEF)    Surgeon: Emanuel Vance M.D. Responsible Provider: Bekah Brito M.D.    Anesthesia Type: general, peripheral nerve block ASA Status: 2          Final Anesthesia Type: general, peripheral nerve block  Last vitals  BP   Blood Pressure: 135/78    Temp   36.6 °C (97.9 °F)    Pulse   Pulse: 73   Resp   16    SpO2   99 %      Anesthesia Post Evaluation    Patient location during evaluation: PACU  Patient participation: complete - patient participated  Level of consciousness: awake  Pain score: 0    Airway patency: patent  Anesthetic complications: no  Cardiovascular status: adequate  Respiratory status: acceptable  Hydration status: acceptable    PONV: none           Nurse Pain Score: 0 (NPRS)

## 2020-08-05 NOTE — DISCHARGE INSTRUCTIONS
ACTIVITY: Rest and take it easy for the first 24 hours.  A responsible adult is recommended to remain with you during that time.  It is normal to feel sleepy.  We encourage you to not do anything that requires balance, judgment or coordination.    MILD FLU-LIKE SYMPTOMS ARE NORMAL. YOU MAY EXPERIENCE GENERALIZED MUSCLE ACHES, THROAT IRRITATION, HEADACHE AND/OR SOME NAUSEA.    FOR 24 HOURS DO NOT:  Drive, operate machinery or run household appliances.  Drink beer or alcoholic beverages.   Make important decisions or sign legal documents.    SPECIAL INSTRUCTIONS: May remove dressings Post op Day #2 (Friday) and Shower with wound uncovered.  Apply bandaids after shower.  Do not soak or submerge incisions for two weeks. Ice (20 mins on and 20 mins off) and elevate extremity. Follow up 7-10 days    DIET: To avoid nausea, slowly advance diet as tolerated, avoiding spicy or greasy foods for the first day.  Add more substantial food to your diet according to your physician's instructions.   INCREASE FLUIDS AND FIBER TO AVOID CONSTIPATION.      FOLLOW-UP APPOINTMENT:  A follow-up appointment should be arranged with your doctor in office or as previously scheduled; call to schedule.    You should CALL YOUR PHYSICIAN if you develop:  Fever greater than 101 degrees F.  Pain not relieved by medication, or persistent nausea or vomiting.  Excessive bleeding (blood soaking through dressing) or unexpected drainage from the wound.  Extreme redness or swelling around the incision site, drainage of pus or foul smelling drainage.  Inability to urinate or empty your bladder within 8 hours.  Problems with breathing or chest pain.    You should call 911 if you develop problems with breathing or chest pain.  If you are unable to contact your doctor or surgical center, you should go to the nearest emergency room or urgent care center.  Physician's telephone #: [phone]    If any questions arise, call your doctor.  If your doctor is not  available, please feel free to call the Surgical Center at [phone].  The Center is open Monday through Friday from 7AM to 7PM.  You can also call the HEALTH HOTLINE open 24 hours/day, 7 days/week and speak to a nurse at [phone], or toll free at [phone].    A registered nurse may call you a few days after your surgery to see how you are doing after your procedure.    MEDICATIONS: Resume taking daily medication.  Take prescribed pain medication with food.  If no medication is prescribed, you may take non-aspirin pain medication if needed.  PAIN MEDICATION CAN BE VERY CONSTIPATING.  Take a stool softener or laxative such as senokot, pericolace, or milk of magnesia if needed.    Prescription given for HOME.  Last pain medication given at NONE    If your physician has prescribed pain medication that includes Acetaminophen (Tylenol), do not take additional Acetaminophen (Tylenol) while taking the prescribed medication.    Peripheral Nerve Block Discharge Instructions from Same Day Surgery and Inpatient :    What to Expect - Upper Extremity  · You may experience numbness and weakness in shoulder, arm and hand  on the same side as your surgery  · This is normal. For some people, this may be an unpleasant sensation. Be very careful with your numb limb  · Ask for help when you need it  Shoulder Surgery Side Effects  · In addition to numbness and weakness you may experience other symptoms  · Other nerves that are close to those nerves injected can also be affected by local anesthesia  · You may experience a hoarseness in your voice  · Your breathing may feel different  · You may also notice drooping of your eyelid, pupil constriction, and decreased sweating, on the side of your surgery  · All of these side effects are normal and will resolve when the local anesthetic wears off   Prevent Injury  · Protect the limb like a baby  · Beware of exposing your limb to extreme heat or cold or trauma  · The limb may  "be injured without you noticing because it is numb  · Keep the limb elevated whenever possible  · Do not sleep on the limb  · Change the position of the limb regularly  · Avoid putting pressure on your surgical limb  Pain Control  · The initial block on the day of surgery will make your extremity feel \"numb\"  · Any consecutive injection including prior to discharge from the hospital will make your extremity feel \"numb\"  · You may feel an aching or burning when the local anesthesia starts to wear off  · Take pain pills as prescribed by your surgeon  · Call your surgeon or anesthesiologist if you do not have adequate pain control    Depression / Suicide Risk    As you are discharged from this [facility], it is important to learn how to keep safe from harming yourself.    Recognize the warning signs:  · Abrupt changes in personality, positive or negative- including increase in energy   · Giving away possessions  · Change in eating patterns- significant weight changes-  positive or negative  · Change in sleeping patterns- unable to sleep or sleeping all the time   · Unwillingness or inability to communicate  · Depression  · Unusual sadness, discouragement and loneliness  · Talk of wanting to die  · Neglect of personal appearance   · Rebelliousness- reckless behavior  · Withdrawal from people/activities they love  · Confusion- inability to concentrate     If you or a loved one observes any of these behaviors or has concerns about self-harm, here's what you can do:  · Talk about it- your feelings and reasons for harming yourself  · Remove any means that you might use to hurt yourself (examples: pills, rope, extension cords, firearm)  · Get professional help from the community (Mental Health, Substance Abuse, psychological counseling)  · Do not be alone:Call your Safe Contact- someone whom you trust who will be there for you.  · Call your local CRISIS HOTLINE 882-1093 or 887-497-3873  · Call your local " Children's Mobile Crisis Response Team [place] (694) 201-0030 or www.[employer]  · Call the toll free National Suicide Prevention Hotlines   · National Suicide Prevention Lifeline 620-034-QVJL (4707)  National [employer] Network 800-SUICIDE (716-4924)    ·

## 2020-08-05 NOTE — OR NURSING
0821 Pt arrived from OR, s/p Left arthroscopic shoulder decompression.  Report received from anesthesia and OR RN. Nasal cannula to 2L O2 in place, breath sounds clear bilaterally. Surgical site to left shoulder clean, dry and intact. Ice applied to site. Cap refill <3, radial pulse +2, +cms. SCDs in place and machine functional. Pt arousing to voice at this time, denies pain or nausea. appears comfortable with unlabored breathing.   0835 Pt denies pain or nausea, tolerating sips of water.   0850 Wife called, message left with update. Pt continues to deny pain or nausea.   0905 Pt states pain is controlled and tolerable, denies nausea. Pt tolerating PO fluids. Meets stage II criteria, Report to Kymberly WEISS. Pt transferred to Phase II

## 2020-08-05 NOTE — ANESTHESIA PROCEDURE NOTES
Peripheral Block    Date/Time: 8/5/2020 6:38 AM  Performed by: Bekah Brito M.D.  Authorized by: Bekah Brito M.D.     Patient Location:  Pre-op  Start Time:  8/5/2020 6:38 AM  End Time:  8/5/2020 6:43 AM  Reason for Block: at surgeon's request and post-op pain management    patient identified, IV checked, site marked, risks and benefits discussed, surgical consent, monitors and equipment checked, pre-op evaluation and timeout performed    Patient Position:  Supine  Prep: ChloraPrep    Monitoring:  Heart rate, continuous pulse ox and cardiac monitor  Block Region:  Upper Extremity  Upper Extremity - Block Type:  BRACHIAL PLEXUS block, Interscalene approach    Laterality:  Left  Procedures: ultrasound guided  Image captured, interpreted and electronically stored.  Local Infiltration:  Lidocaine  Strength:  1 %  Dose:  3 ml  Block Type:  Single-shot  Needle Length:  50mm  Needle Gauge:  22 G  Needle Localization:  Ultrasound guidance  Injection Assessment:  Negative aspiration for heme, no paresthesia on injection, incremental injection and local visualized surrounding nerve on ultrasound   US Guided Interscalene Brachial Plexus Block   US transducer placed on the neck in oblique plane approximately at the level of C6.  Anterior and Middle Scalene (MSM) muscles identified with nerve trunks identified between the muscles.  Needle inserted lateral to probe and advanced under direct visualization through the MSM into a perineural position.  After negative aspiration, LA injected with ease and visualized surrounding the nerve trunks.

## 2020-08-13 ENCOUNTER — PHYSICAL THERAPY (OUTPATIENT)
Dept: PHYSICAL THERAPY | Facility: REHABILITATION | Age: 65
End: 2020-08-13
Attending: ORTHOPAEDIC SURGERY
Payer: MEDICARE

## 2020-08-13 ENCOUNTER — TELEPHONE (OUTPATIENT)
Dept: PHYSICAL THERAPY | Facility: REHABILITATION | Age: 65
End: 2020-08-13

## 2020-08-13 DIAGNOSIS — S46.012A STRAIN OF MUSCLE(S) AND TENDON(S) OF THE ROTATOR CUFF OF LEFT SHOULDER, INITIAL ENCOUNTER: ICD-10-CM

## 2020-08-13 DIAGNOSIS — M75.42 IMPINGEMENT SYNDROME OF LEFT SHOULDER: ICD-10-CM

## 2020-08-13 DIAGNOSIS — M19.012 PRIMARY OSTEOARTHRITIS, LEFT SHOULDER: ICD-10-CM

## 2020-08-13 PROCEDURE — 97110 THERAPEUTIC EXERCISES: CPT

## 2020-08-13 PROCEDURE — 97014 ELECTRIC STIMULATION THERAPY: CPT

## 2020-08-13 PROCEDURE — 97161 PT EVAL LOW COMPLEX 20 MIN: CPT

## 2020-08-13 ASSESSMENT — ENCOUNTER SYMPTOMS
PAIN SCALE: 3
PAIN LOCATION: LATERAL L SHOULDER
PAIN TIMING: CONSTANT

## 2020-08-13 NOTE — OP THERAPY EVALUATION
"  Outpatient Physical Therapy  INITIAL EVALUATION    [facility] Physical Therapy [address]  [address]  [place] [address]  Phone:  [phone]  Fax:  [phone]    Date of Evaluation: 08/13/2020    Patient: Edgardo Sims  YOB: 1955  MRN: [id number]     Referring Provider: Emanuel Vance M.D.  [address] [place] [address]   Referring Diagnosis Primary osteoarthritis, left shoulder [M19.012];Impingement syndrome of left shoulder [M75.42];Strain of muscle(s) and tendon(s) of the rotator cuff of left shoulder, initial encounter [S46.012A]     Time Calculation    Start time: 1431  Stop time: 1529 Time Calculation (min): 58 minutes         Chief Complaint: Shoulder Problem    Visit Diagnoses     ICD-10-CM   1. Primary osteoarthritis, left shoulder  M19.012   2. Impingement syndrome of left shoulder  M75.42   3. Strain of muscle(s) and tendon(s) of the rotator cuff of left shoulder, initial encounter  S46.012A         Subjective:   History of Present Illness:     Date of onset:  7/6/2020    Date of surgery:  8/5/2020    Mechanism of injury:  Patient is a 64 year old male with a PMH including: Depression, neurogenic bladder, RA, aortic insufficicecy, HTN, OA pelvic region and thigh, tachycardia, CAD, anxiety, lactic acidosis, leukocytosis, bladder outlet obstruction, GERD. Has extensive surgical history; Patient's history is significant for previous right rotator cuff tears with repair x2.    Pt presents to therapy in a sling/abduction pillow s/p Left shoulder arthroscopy with rotator cuff repair, biceps tenodesis, subacromial decompression, distal clavicle excision, with extensive debridement of glenohumeral Joint (8/5/20). Review of surgical procedure note detailing repair of L full thickness tear supraspinatus, partial tear subscapularis, L biceps tear and L SLAP lesion. Pt underwent surgery after \"wear and tear over the years,\" pt denies trauma and only had \"6 weeks of " "shoulder pain\" with injection into L shoulder capsule (no relief) before undergoing surgery after MRI results. Pt endorses L thumb numbness since surgery; which he believes may be attributed to a \"blood blister\" from his sling \"being wrapped too tight.\" Denies hx of n/t in L arm.    Next visit with MD: 9/5/20 \"I think\"              Prior level of function:  Retired  Sleep disturbance:  Not disrupted (Pt has recling bed; pt endorses sleeping with sling)  Pain:     Current pain rating:  3    Location:  Lateral L shoulder    Pain timing:  Constant    Pain Comments::  Pain management: Percocet (from PCP)  Social Support:     Lives in:  One-story house    Lives with:  Spouse  Hand dominance:  Right  Diagnostic Tests:     Diagnostic Tests Comments:  Shoulder MRI 6/21/20:  ROTATOR CUFF:     There is a fluid-filled gap in the expected position of the distal supraspinatus tendon representing a full-thickness tear. It is identified on T2 sagittal images 13-16 and coronal images 5-8. The tear with is 19 mm and retraction is 20 mm     The infraspinatus tendon is intact.     The subscapularis tendon is intact.     The muscles of the rotator cuff are normal in signal intensity and morphology.     IMPRESSION:     1.  Full-thickness left supraspinatus tendon tear measuring 19 mm in width and retracted 20 mm     2.  Normal appearance of the glenohumeral joint     3.  Moderate acromioclavicular joint osteoarthritis     4.  Fluid within the subacromial/subdeltoid bursa related to rotator cuff tear  Treatments:     Treatment Comments:  Steroid injection into capsule (no relief) (June 2020)  Activities of Daily Living:     Patient reported ADL status: Patient's current daily routine includes:  ADL's: Indep with self care, dressing, preparing meals.   Work: Retired  Hobbies: Riding motorcycle, modifications on firearms  Exercise: No current exercise routine in place              Patient Goals:     Other patient goals:  \"regain use of " "shoulder\"      Past Medical History:   Diagnosis Date   • Abnormal myocardial perfusion study 1/15/2014   • Aortic insufficiency 7/5/2011   • Arthritis     RA, and osteo   • Bronchitis    • CAD (coronary artery disease) mild plaque at cath in 2/14 12/5/2014   • Cancer (HCC)     skin   • Chronic use of opiate drugs therapeutic purposes 8/12/2017   • Dental disorder     Upper dentures.   • Dyslipidemia 7/12/2011   • Heart burn    • Heart murmur    • Hiatus hernia syndrome    • High cholesterol    • HTN (hypertension) 7/5/2011   • Hypertension    • Indigestion    • Infectious disease    • Pain     RA   • Pain     hands, feet and jaw   • Rheumatoid arthritis(714.0)     severe   • Tachycardia 10/20/2014     Past Surgical History:   Procedure Laterality Date   • PB SHLDR ARTHROSCOP,PART ACROMIOPLAS Left 8/5/2020    Procedure: DECOMPRESSION, SHOULDER, ARTHROSCOPIC - SUBACROMIAL;  Surgeon: Emanuel Vance M.D.;  Location: [facility];  Service: Orthopedics   • PB SHLDR ARTHROSCOP,SURG,W/ROTAT CUFF REPB Left 8/5/2020    Procedure: ARTHROSCOPY, SHOULDER, WITH ROTATOR CUFF REPAIR - AND DALAL;  Surgeon: Emanuel Vance M.D.;  Location: [facility];  Service: Orthopedics   • CLAVICLE DISTAL EXCISION Left 8/5/2020    Procedure: EXCISION, CLAVICLE, DISTAL - WITH EXTENSIVE DEBRIDEMENT;  Surgeon: Emanuel Vance M.D.;  Location: [facility];  Service: Orthopedics   • PB TRANSURETHRAL ELEC-SURG PBOSTATECTOM  4/1/2019    Procedure: BIPOLAR TRANSURETHRAL RESECTION OF PROSTATE;  Surgeon: Jose Romo M.D.;  Location: [facility];  Service: Urology   • CYSTOSCOPY  4/1/2019    Procedure: CYSTOSCOPY;  Surgeon: Jose Romo M.D.;  Location: [facility];  Service: Urology   • URETHROTOMY INTERNAL  4/1/2019    Procedure: DIRECT VISION INTERNAL URETHROTOMY;  Surgeon: Jose Romo M.D.;  Location: [facility];  Service: Urology   • KNEE " REVISION TOTAL Left 8/3/2018    Procedure: KNEE REVISION TOTAL;  Surgeon: Michael Rodriguez M.D.;  Location: [facility];  Service: Orthopedics   • CYSTOSCOPY  5/16/2018    Procedure: CYSTOSCOPY-CLOT EVAC;  Surgeon: Jose Romo M.D.;  Location: SURGERY [facility];  Service: Urology   • TRANS URETHRAL RESECTION BLADDER  5/16/2018    Procedure: TRANS URETHRAL RESECTION BLADDER;  Surgeon: Jose Romo M.D.;  Location: SURGERY [facility];  Service: Urology   • RECOVERY  2/3/2014    Performed by Cath-Recovery Surgery at SURGERY [facility]   • HIP ARTHROPLASTY TOTAL  5/31/2013    Performed by Burak Valles M.D. at [facility]   • ROTATOR CUFF REPAIR Right 2011    x 2   • KNEE ARTHROPLASTY TOTAL  6/1/2009    LEFT-Performed by YONATAN HINSON at SURGERY [facility]   • VEIN LIGATION RADIO FREQUENCY  12/8/2008    LEFT leg-Performed by DEREJE MCCULLOUGH at [facility]ERY [facility]   • HIP ARTHROPLASTY TOTAL  6/20/08    Performed by YONATAN HINSON at SURGERY [facility]   • LUMBAR LAMINECTOMY DISKECTOMY  2000   • TMJ RECONSTRUCTION BILATERAL  1994   • ANKLE ORIF Right    • KNEE ARTHROSCOPY Left    • VEIN STRIPPING Left      Social History     Tobacco Use   • Smoking status: Never Smoker   • Smokeless tobacco: Never Used   Substance Use Topics   • Alcohol use: Yes     Alcohol/week: 1.8 oz     Types: 3 Cans of beer per week     Frequency: 2-3 times a week     Drinks per session: 1 or 2     Comment: 3 per week     Family and Occupational History     Socioeconomic History   • Marital status:      Spouse name: Not on file   • Number of children: Not on file   • Years of education: Not on file   • Highest education level: Not on file   Occupational History   • Not on file       Objective     Postural Observations  Standing posture: poor    Additional Postural Observation Details  Forward head and rounded shoulders; sig kyphosis     Bruising  "noted at L shoulder; incisions covered by steri strips. No abnormal swelling noted today at LUE.    Pt has red marking on R thumb where reports numbness; reports \"someone cranked on the splint and maybe that's why my thumb is numb.\"    Incision on R hand from hx of dupuytren's contracture    Neurological Testing     Dermatome testing   Cervical (left)   All left cervical dermatomes intact    Cervical (right)   All right cervical dermatomes intact    Additional Neurological Details  Pt reports has L thumb numbness on plantar surface; noted R lesion on plantar surace of L thumb  Near DIP joint; pt reports this is a blood blister from \"someone wrapping my sling too tight\" \"maybe that is why I have numbness.\"      Reports hx of L lateral LE to L lateral foot numbness/tingling due to old L/s surgery    Active Range of Motion     Cervical Spine   Flexion: within functional limits  Extension: within functional limits  Left lateral flexion: decreased  Right lateral flexion: decreased  Left rotation: within functional limits  Right rotation: decreased    Additional Active Range of Motion Details  Shoulder and elbow AROM not assessed today due to recent surgery precuations    Scapular Mobility   Left Shoulder   Scapular mobility: fair    Right Shoulder   Scapular mobility: fair    Additional Scapular Mobility Details  Pt has significant kyphosis deformity    General Comments     Spine Comments   Vitals: (On room air; assessed in sitting   HR: 114   SpO2: 97%  *No signs of distress    Capillary refill intact bilat <2 sec     Shoulder Comments             Therapeutic Exercises (CPT 29925):     1. Pt education, educated to avoid all elbow movements due to biceps tenodesis; anatomy picture demonstrating biceps musculature    2. Ball squeeze, pt edu to perform every hour to avoid blood clots, added to hep    3. Chin nods seated, x10, hep    4. Finger opposition, x10, hep    5. Posture education, x2 min, hep      Therapeutic " "Exercise Summary: Pt performed these exercises with instruction and SPV.  Provided handout with these exercises for daily HEP.      Therapeutic Treatments and Modalities:     1. E Stim Unattended (CPT 27869), IFC and ice to L shoulder in sitting with 2 pillows to support L shoulder for comfort x 15 min    Time-based treatments/modalities:    Physical Therapy Timed Treatment Charges  Therapeutic exercise minutes (CPT 43355): 15 minutes      Assessment, Response and Plan:   Impairments: abnormal or restricted ROM, activity intolerance, impaired physical strength, lacks appropriate home exercise program, limited ADL's and pain with function    Assessment details:  Patient is a 64 year old male with extensive medical and surgical history; Patient's history is significant for previous right rotator cuff tears with repair x2.    Pt presents to therapy in a sling s/p Left shoulder arthroscopy with rotator cuff repair, biceps tenodesis, subacromial decompression, distal clavicle excision, with extensive debridement of glenohumeral Joint (8/5/20). Review of surgical procedure note detailing repair of full thickness tear supraspinatus, partial tear subscapularis, L biceps tear and L SLAP lesion. Pt underwent surgery after \"wear and tear over the years,\" pt denies trauma. Pt reports he only had 6 weeks of shoulder pain prior to undergoing surgery. Pt endorses L thumb numbness since surgery. Denies hx of n/t in L arm.    Pt will benefit from skilled physical therapy in order to address above impairments in order to improve QOL and return to reported ADL's.     Next visit with MD: 9/5/20 per pt report          Barriers to therapy:  Comorbidities and age  Prognosis details:  Fair/good  Goals:   Short Term Goals:   1. Pt will be independent with written HEP several reps per day.  2. Pt will be compliant and able to verbalize shoulder precautions.   3. Pt will demonstrate postural improvements with VC's in clinic.       Short term " goal time span:  4-6 weeks      Long Term Goals:    1. Pt will be independent with written HEP.  2. Pt will have a  Sig improvement in Quickdash score  (eval: 61.36)  3. Pt will have L GH AROM within 10 deg of uninvolved shoulder and progress to strengthening protocol per MD recommendations/precuations.    Long term goal time span:  2-4 months    Plan:   Therapy options:  Physical therapy treatment to continue  Planned therapy interventions:  Neuromuscular Re-education (CPT 00333), Manual Therapy (CPT 39616), E Stim Unattended (CPT 46061), Therapeutic Exercise (CPT 36396) and Therapeutic Activities (CPT 56454)  Frequency:  3x week  Duration in weeks:  16  Duration in visits:  20  Discussed with:  Patient  Plan details:  UPOC: 12/4/20    *Pt will progress to reduced frequency of sessions per week as he progresses with independent hep.            Functional Assessment Used  Quickdash General Total Score: 61.36     Referring provider co-signature:  I have reviewed this plan of care and my co-signature certifies the need for services.    Certification Period: 08/13/2020 to 12/4/20    Physician Signature: ________________________________ Date: ______________

## 2020-08-14 DIAGNOSIS — I10 ESSENTIAL HYPERTENSION: ICD-10-CM

## 2020-08-14 RX ORDER — METOPROLOL TARTRATE 50 MG/1
TABLET, FILM COATED ORAL
Qty: 60 TAB | Refills: 0 | Status: SHIPPED | OUTPATIENT
Start: 2020-08-14 | End: 2020-09-08

## 2020-08-14 NOTE — TELEPHONE ENCOUNTER
Pt LVM asking to refill his medication. He usually gets this from the cardiologist but her retired.

## 2020-08-14 NOTE — OP THERAPY DAILY TREATMENT
Outpatient Physical Therapy  DAILY TREATMENT     [facility] Physical [address].  [address]  [place] [address]  Phone:  [phone]  Fax:  [phone]    Date: 08/17/2020    Patient: Edgardo Sims  YOB: 1955  MRN: [id number]     Time Calculation    Start time: 1102  Stop time: 1143 Time Calculation (min): 41 minutes         Chief Complaint: Shoulder Problem    Visit #: 2    SUBJECTIVE:  My hands have been hurting; I think I'm having a flare up and it hurts when I do the ball squeezes. Can I use my seated bike?    Pt reporting continued numbness in plantar surface of R thumb since being placed in slign post surgery.      OBJECTIVE:  Current objective measures:           Therapeutic Exercises (CPT 26613):     1. Pt education, reviewed safety including precuations with forearm rotation and no use of biceps; discussed reducing gripping ball from 20>10x per hour due to complaints    2. Pt education, Edu pt that it is safe to use nustepper/bike for LE's only for aerobic exercise    3. Reviewed hep, pendelums reviewed; cont VC's to perform without activating GH muscles, able to demonstrate safely with increased VC's      Therapeutic Exercise Summary:       Therapeutic Treatments and Modalities:     1. E Stim Unattended (CPT 44715), IFC and ice to L shoulder in sitting with 2 pillows to support L shoulder for comfort x 15 min    2. Manual Therapy (CPT 88628), see below    Therapeutic Treatment and Modalities Summary: PROM L elbow flexion, PROM to L GH flexion with elbow at 90 deg, GH ER (<30 deg), GH abd; maintained in scapular plane; pt positioned long stiting on plinth at relatively 45 deg incline with pillows at axilla and post to GH for increased comfort. Then B first rib depression and STM to B upp traps and c/s paraspinals.     Time-based treatments/modalities:    Physical Therapy Timed Treatment Charges  Manual therapy minutes (CPT 20421): 21 minutes  Therapeutic  exercise minutes (CPT 51650): 5 minutes      Pain rating (1-10) before treatment: 2.5-3/10      ASSESSMENT:   Response to treatment:   Hypomobility noted at B 1st rib with depression mobs; increased tone B upper traps. Good tolerance to manual today with min pain complaints. Slight muscle juddering noted with PROM to L elbow which improved with increased repetition. Improved quality and PROM L GH flexion after L GH ER.      PLAN/RECOMMENDATIONS:   Plan for treatment: therapy treatment to continue next visit.  Planned interventions for next visit: continue with current treatment. Continue manual

## 2020-08-17 ENCOUNTER — PHYSICAL THERAPY (OUTPATIENT)
Dept: PHYSICAL THERAPY | Facility: REHABILITATION | Age: 65
End: 2020-08-17
Attending: ORTHOPAEDIC SURGERY
Payer: MEDICARE

## 2020-08-17 DIAGNOSIS — S46.012A STRAIN OF MUSCLE(S) AND TENDON(S) OF THE ROTATOR CUFF OF LEFT SHOULDER, INITIAL ENCOUNTER: ICD-10-CM

## 2020-08-17 DIAGNOSIS — M75.42 IMPINGEMENT SYNDROME OF LEFT SHOULDER: ICD-10-CM

## 2020-08-17 DIAGNOSIS — M19.012 PRIMARY OSTEOARTHRITIS, LEFT SHOULDER: ICD-10-CM

## 2020-08-17 PROCEDURE — 97140 MANUAL THERAPY 1/> REGIONS: CPT

## 2020-08-17 PROCEDURE — 97014 ELECTRIC STIMULATION THERAPY: CPT

## 2020-08-18 NOTE — OP THERAPY DAILY TREATMENT
"  Outpatient Physical Therapy  DAILY TREATMENT     [facility] Physical [address].  [address]  [place] [address]  Phone:  [phone]  Fax:  [phone]    Date: 08/19/2020    Patient: Edgardo Sims  YOB: 1955  MRN: [id number]     Time Calculation    Start time: 1031  Stop time: 1113 Time Calculation (min): 42 minutes         Chief Complaint: Shoulder Problem    Visit #: 3    SUBJECTIVE:  I am feeling okay. My hands are a little aggravated still but decreasing the squeezes has helped. I am doing okay today; only about a 3 pain in the shoulder.      OBJECTIVE:  Current objective measures:           Therapeutic Exercises (CPT 48211):     1. Nu stepper, x5 min LE's only, warm up/cardiovascular endurance      Therapeutic Exercise Summary:       Therapeutic Treatments and Modalities:     1. E Stim Unattended (CPT 68422), IFC and ice to L shoulder in sitting with 2 pillows to support L shoulder for comfort x 15 min    2. Manual Therapy (CPT 11673), see below    Therapeutic Treatment and Modalities Summary: B first rib depression and STM to B upp traps and c/s paraspinals// increased muscle juddering with pressure to bilat upp traps; pt reporting has \"trigger points.\" Maintained hold in areas of trigger points with gradual decrease in muscle tremoring.  Followed by PROM L elbow flexion/extension, PROM to L GH flexion with elbow at 90 deg, GH ER (<30 deg), GH abd; maintained in scapular plane; pt positioned long sitting on plinth at relatively 30 deg incline with pillows at axilla and post to GH for increased comfort.     Time-based treatments/modalities:    Physical Therapy Timed Treatment Charges  Manual therapy minutes (CPT 82984): 22 minutes  Therapeutic exercise minutes (CPT 15205): 5 minutes      Pain rating (1-10) before treatment: 3/10 L shoulder       ASSESSMENT:   Response to treatment:   Pt is 2 weeks s/p Left shoulder arthroscopy with rotator cuff repair, biceps " tenodesis, subacromial decompression, distal clavicle excision, with extensive debridement of glenohumeral Joint (8/5/20).     Excellent tolerance to manual with pain well controlled. Allowing increased PROM from last session; Improved quality of movement. Able to increase GH flexion (in scapular plane) to 90 deg today. Increased GH abd to 65 deg (scapular plane). Taut muscle fibers identified which cuased sig muscle juddering with STM to B upp traps observed during session.     PLAN/RECOMMENDATIONS:   Plan for treatment: therapy treatment to continue next visit.  Planned interventions for next visit: continue with current treatment. Continue manual

## 2020-08-19 ENCOUNTER — PHYSICAL THERAPY (OUTPATIENT)
Dept: PHYSICAL THERAPY | Facility: REHABILITATION | Age: 65
End: 2020-08-19
Attending: ORTHOPAEDIC SURGERY
Payer: MEDICARE

## 2020-08-19 DIAGNOSIS — S46.012A STRAIN OF MUSCLE(S) AND TENDON(S) OF THE ROTATOR CUFF OF LEFT SHOULDER, INITIAL ENCOUNTER: ICD-10-CM

## 2020-08-19 DIAGNOSIS — M19.012 PRIMARY OSTEOARTHRITIS, LEFT SHOULDER: ICD-10-CM

## 2020-08-19 DIAGNOSIS — M75.42 IMPINGEMENT SYNDROME OF LEFT SHOULDER: ICD-10-CM

## 2020-08-19 PROCEDURE — 97014 ELECTRIC STIMULATION THERAPY: CPT

## 2020-08-19 PROCEDURE — 97140 MANUAL THERAPY 1/> REGIONS: CPT

## 2020-08-24 ENCOUNTER — APPOINTMENT (OUTPATIENT)
Dept: PHYSICAL THERAPY | Facility: REHABILITATION | Age: 65
End: 2020-08-24
Attending: ORTHOPAEDIC SURGERY
Payer: MEDICARE

## 2020-08-24 DIAGNOSIS — M19.012 PRIMARY OSTEOARTHRITIS, LEFT SHOULDER: ICD-10-CM

## 2020-08-24 DIAGNOSIS — M75.42 IMPINGEMENT SYNDROME OF LEFT SHOULDER: ICD-10-CM

## 2020-08-24 DIAGNOSIS — S46.012A STRAIN OF MUSCLE(S) AND TENDON(S) OF THE ROTATOR CUFF OF LEFT SHOULDER, INITIAL ENCOUNTER: ICD-10-CM

## 2020-08-24 DIAGNOSIS — Z96.652 S/P REVISION OF TOTAL KNEE, LEFT: ICD-10-CM

## 2020-08-24 PROCEDURE — 97014 ELECTRIC STIMULATION THERAPY: CPT

## 2020-08-24 PROCEDURE — 97140 MANUAL THERAPY 1/> REGIONS: CPT

## 2020-08-24 NOTE — OP THERAPY DAILY TREATMENT
Outpatient Physical Therapy  DAILY TREATMENT     [facility] Physical [address]  [address]  [place] [address]  Phone:  [phone]  Fax:  [phone]    Date: 08/24/2020    Patient: Edgardo Sims  YOB: 1955  MRN: [id number]     Time Calculation    Start time: 1102  Stop time: 1148 Time Calculation (min): 46 minutes         Chief Complaint: Shoulder Problem    Visit #: 4    SUBJECTIVE:  I'm feeling pretty good; about a 0/10 pain right now. My hands have also been feeling better.       OBJECTIVE:  Current objective measures:           Therapeutic Exercises (CPT 20276):     1. Nu stepper, x5 min LE's only, warm up/cardiovascular endurance      Therapeutic Exercise Summary: MD protocol:   Sling for 5- 6 weeks.   PROM for 5 weeks then AAROM, then AROM; (Pulleys at 5-6 weeks)  Strengthening at 10-12 weeks     Therapeutic Treatments and Modalities:     1. E Stim Unattended (CPT 67840), IFC and ice to L shoulder in sitting with 2 pillows to support L shoulder for comfort x 15 min, pt req ice pack over mhp    2. Manual Therapy (CPT 39091), see below    Therapeutic Treatment and Modalities Summary: B first rib depression and STM to B upp traps and c/s paraspinals// slight increase TP activity; reduced compared to last session  Followed by PROM L elbow flexion/extension, GH abd, GH ER (<30 deg) in various planes of GH abd, PROM to L GH flexion with elbow at 90 deg; (maintained GH in scapular plane; pt positioned long sitting on plinth at relatively 30 deg incline with pillows at axilla and post to GH for increased comfort during manual txt).    Increased GH flexion to 105 deg PROM     Time-based treatments/modalities:    Physical Therapy Timed Treatment Charges  Manual therapy minutes (CPT 45645): 25 minutes  Therapeutic exercise minutes (CPT 47095): 5 minutes      Pain rating (1-10) before treatment: 3/10 L shoulder       ASSESSMENT:   Response to treatment:   Pt is 3 weeks  "s/p Left shoulder arthroscopy with rotator cuff repair, biceps tenodesis, subacromial decompression, distal clavicle excision, with extensive debridement of glenohumeral Joint (8/5/20).     Good progression with PROM. GH flexion to 105 deg PROM (scapular plane) achieved today. No increase in pain during or after manual treatment. Pt discussed able to \"increase pendulums at home;\" strict pt education to perform small circles using body without activation of shoulder musculature. Pt verbalized understanding. Continue PROM next two weeks then progress to AAROM (per MD protocol).     PLAN/RECOMMENDATIONS:   Plan for treatment: therapy treatment to continue next visit.  Planned interventions for next visit: continue with current treatment. Continue manual and progress per MD protocol (see above).        "

## 2020-08-26 ENCOUNTER — PHYSICAL THERAPY (OUTPATIENT)
Dept: PHYSICAL THERAPY | Facility: REHABILITATION | Age: 65
End: 2020-08-26
Attending: ORTHOPAEDIC SURGERY
Payer: MEDICARE

## 2020-08-26 DIAGNOSIS — S46.012A STRAIN OF MUSCLE(S) AND TENDON(S) OF THE ROTATOR CUFF OF LEFT SHOULDER, INITIAL ENCOUNTER: ICD-10-CM

## 2020-08-26 DIAGNOSIS — M19.012 PRIMARY OSTEOARTHRITIS, LEFT SHOULDER: ICD-10-CM

## 2020-08-26 DIAGNOSIS — M75.42 IMPINGEMENT SYNDROME OF LEFT SHOULDER: ICD-10-CM

## 2020-08-26 PROCEDURE — 97014 ELECTRIC STIMULATION THERAPY: CPT

## 2020-08-26 PROCEDURE — 97140 MANUAL THERAPY 1/> REGIONS: CPT

## 2020-08-26 NOTE — OP THERAPY DAILY TREATMENT
Outpatient Physical Therapy  DAILY TREATMENT     [facility] Physical Therapy [address].  [address]  [place] [address]  Phone:  [phone]  Fax:  [phone]    Date: 08/26/2020    Patient: Edgardo Sims  YOB: 1955  MRN: [id number]     Time Calculation    Start time: 1132  Stop time: 1215 Time Calculation (min): 43 minutes         Chief Complaint: Shoulder Problem    Visit #: 5    SUBJECTIVE:  I'm okay, I'm about a 3/10 right now.      OBJECTIVE:  Current objective measures:       Pt observed bending involved elbow while washing hands at sink; educated re: precautions and no active elbow flexion due to tenodesis.    Therapeutic Exercises (CPT 59293):     1. Nu stepper, x5 min LE's only, warm up/cardiovascular endurance    2. Education re precuations post surgery, x2 min      Therapeutic Exercise Summary: MD protocol:   Sling for 5- 6 weeks.   PROM for 5 weeks then AAROM, then AROM; (Pulleys at 5-6 weeks)  Strengthening at 10-12 weeks     Therapeutic Treatments and Modalities:     1. E Stim Unattended (CPT 53949), IFC and ice to L shoulder in sitting with 2 pillows to support L shoulder for comfort x 15 min, pt req ice pack over mhp    2. Manual Therapy (CPT 84251), see below    Therapeutic Treatment and Modalities Summary: B first rib depression and STM to B upp traps and c/s paraspinals// slight increase TP activity and tremoring involving trunk and LLE.   Followed by PROM L elbow flexion/extension, GH abd, GH ER (<30 deg) in various planes of GH abd, PROM to L GH flexion with elbow at 90 deg; (maintained GH in scapular plane; pt positioned long sitting on plinth at relatively 30 deg incline with pillows at axilla and post to GH for increased comfort during manual txt).    Maintained increased GH flexion to 105 deg PROM     Time-based treatments/modalities:    Physical Therapy Timed Treatment Charges  Manual therapy minutes (CPT 08327): 28 minutes       Pain rating (1-10)  "before treatment: 3/10 L shoulder       ASSESSMENT:   Response to treatment:   Pt is 3 weeks s/p Left shoulder arthroscopy with rotator cuff repair, biceps tenodesis, subacromial decompression, distal clavicle excision, with extensive debridement of glenohumeral Joint (8/5/20).     Pt observed bending involved elbow while washing hands at sink; reviewed pt educated re: precautions and no active elbow flexion due to tenodesis. Maintained GH PROM AROM from last session. Continues to present with tremoring involving trunk and LE's with palpation to \"trigger points\" in upper traps per pt report.  Continue PROM next two weeks then progress to AAROM (per MD protocol).     PLAN/RECOMMENDATIONS:   Plan for treatment: therapy treatment to continue next visit.  Planned interventions for next visit: continue with current treatment. Continue manual and progress per MD protocol (see above).        "

## 2020-08-28 ENCOUNTER — PHYSICAL THERAPY (OUTPATIENT)
Dept: PHYSICAL THERAPY | Facility: REHABILITATION | Age: 65
End: 2020-08-28
Attending: ORTHOPAEDIC SURGERY
Payer: MEDICARE

## 2020-08-28 DIAGNOSIS — M19.012 PRIMARY OSTEOARTHRITIS, LEFT SHOULDER: ICD-10-CM

## 2020-08-28 DIAGNOSIS — M75.42 IMPINGEMENT SYNDROME OF LEFT SHOULDER: ICD-10-CM

## 2020-08-28 DIAGNOSIS — S46.012A STRAIN OF MUSCLE(S) AND TENDON(S) OF THE ROTATOR CUFF OF LEFT SHOULDER, INITIAL ENCOUNTER: ICD-10-CM

## 2020-08-28 DIAGNOSIS — Z96.652 S/P REVISION OF TOTAL KNEE, LEFT: ICD-10-CM

## 2020-08-28 PROCEDURE — 97140 MANUAL THERAPY 1/> REGIONS: CPT

## 2020-08-28 PROCEDURE — 97014 ELECTRIC STIMULATION THERAPY: CPT

## 2020-08-28 NOTE — OP THERAPY DAILY TREATMENT
Outpatient Physical Therapy  DAILY TREATMENT     [facility] Physical [address].  [address]  [place] [address]  Phone:  [phone]  Fax:  [phone]    Date: 08/28/2020    Patient: Edgardo Sims  YOB: 1955  MRN: [id number]     Time Calculation    Start time: 1402  Stop time: 1446 Time Calculation (min): 44 minutes         Chief Complaint: Shoulder Problem    Visit #: 6    SUBJECTIVE:  I'm okay, I'm about a 3/10 right now.      OBJECTIVE:  Current objective measures:       Pt observed bending involved elbow while washing hands at sink; educated re: precautions and no active elbow flexion due to tenodesis.    Therapeutic Exercises (CPT 28416):     1. Nu stepper, x3 min LE's only, warm up/cardiovascular endurance      Therapeutic Exercise Summary: MD protocol:   Sling for 5- 6 weeks.   PROM for 5 weeks then AAROM, then AROM; (Pulleys at 5-6 weeks)  Strengthening at 10-12 weeks     Therapeutic Treatments and Modalities:     1. E Stim Unattended (CPT 40779), IFC and ice to L shoulder in sitting with 2 pillows to support L shoulder for comfort x 15 min, pt requiring ice over mhp    2. Manual Therapy (CPT 22382), see below    Therapeutic Treatment and Modalities Summary: Scapular mobilization grade III and IV with pt in R sidelying; pillow b/w pt and therapist for barrier and pillow underneath L GH for incr comfort; incr in trunk tremoring initially with scapular abduction, improved with increased mobs  Followed by STM to B upp traps and c/s paraspinals// slight increase TP activity and tremoring involving trunk and LLE.   Followed by PROM L elbow flexion/extension, GH abd, GH ER (<30 deg) in various planes of GH abd, PROM to L GH flexion with elbow at 90 deg; (maintained GH in scapular plane; pt positioned long sitting on plinth at relatively 30 deg incline with pillows at axilla and post to GH for increased comfort during manual txt).    Increased GH flexion to 110 deg  PROM   Increased GH abduction to 80 deg PROM     Time-based treatments/modalities:    Physical Therapy Timed Treatment Charges  Manual therapy minutes (CPT 60014): 26 minutes  Therapeutic exercise minutes (CPT 66770): 3 minutes       Pain rating (1-10) before treatment: 3/10 L shoulder       ASSESSMENT:   Response to treatment:   Pt is 3 weeks s/p Left shoulder arthroscopy with rotator cuff repair, biceps tenodesis, subacromial decompression, distal clavicle excision, with extensive debridement of glenohumeral Joint (8/5/20).     Pt demonstrating steady improvements with minimal pain complaints; good progression overall. Able to attian 110 deg GH flexion and 80 deg GH abd PROM.  Continue PROM next two weeks then progress to AAROM (per MD protocol).     PLAN/RECOMMENDATIONS:   Plan for treatment: therapy treatment to continue next visit.  Planned interventions for next visit: continue with current treatment. Continue manual and progress per MD protocol (see above). Add gentle joint mobilizations next week (week 4)

## 2020-08-31 ENCOUNTER — OFFICE VISIT (OUTPATIENT)
Dept: MEDICAL GROUP | Facility: LAB | Age: 65
End: 2020-08-31
Payer: MEDICARE

## 2020-08-31 ENCOUNTER — PHYSICAL THERAPY (OUTPATIENT)
Dept: PHYSICAL THERAPY | Facility: REHABILITATION | Age: 65
End: 2020-08-31
Attending: ORTHOPAEDIC SURGERY
Payer: MEDICARE

## 2020-08-31 VITALS
WEIGHT: 202.82 LBS | TEMPERATURE: 97.2 F | OXYGEN SATURATION: 97 % | BODY MASS INDEX: 29.04 KG/M2 | DIASTOLIC BLOOD PRESSURE: 70 MMHG | HEIGHT: 70 IN | HEART RATE: 67 BPM | SYSTOLIC BLOOD PRESSURE: 130 MMHG

## 2020-08-31 DIAGNOSIS — M05.9 RHEUMATOID ARTHRITIS WITH POSITIVE RHEUMATOID FACTOR, INVOLVING UNSPECIFIED SITE (HCC): ICD-10-CM

## 2020-08-31 DIAGNOSIS — S46.012A STRAIN OF MUSCLE(S) AND TENDON(S) OF THE ROTATOR CUFF OF LEFT SHOULDER, INITIAL ENCOUNTER: ICD-10-CM

## 2020-08-31 DIAGNOSIS — M25.512 CHRONIC LEFT SHOULDER PAIN: ICD-10-CM

## 2020-08-31 DIAGNOSIS — M75.42 IMPINGEMENT SYNDROME OF LEFT SHOULDER: ICD-10-CM

## 2020-08-31 DIAGNOSIS — Z96.652 S/P REVISION OF TOTAL KNEE, LEFT: ICD-10-CM

## 2020-08-31 DIAGNOSIS — G89.29 CHRONIC LEFT SHOULDER PAIN: ICD-10-CM

## 2020-08-31 DIAGNOSIS — M19.012 PRIMARY OSTEOARTHRITIS, LEFT SHOULDER: ICD-10-CM

## 2020-08-31 PROCEDURE — 97014 ELECTRIC STIMULATION THERAPY: CPT

## 2020-08-31 PROCEDURE — 99213 OFFICE O/P EST LOW 20 MIN: CPT | Performed by: INTERNAL MEDICINE

## 2020-08-31 RX ORDER — OXYCODONE AND ACETAMINOPHEN 10; 325 MG/1; MG/1
1-2 TABLET ORAL EVERY 4 HOURS PRN
Qty: 150 TAB | Refills: 0 | Status: SHIPPED | OUTPATIENT
Start: 2020-08-31 | End: 2020-08-31 | Stop reason: SDUPTHER

## 2020-08-31 RX ORDER — OXYCODONE AND ACETAMINOPHEN 10; 325 MG/1; MG/1
1-2 TABLET ORAL EVERY 4 HOURS PRN
Qty: 150 TAB | Refills: 0 | Status: SHIPPED | OUTPATIENT
Start: 2020-10-30 | End: 2020-11-30 | Stop reason: SDUPTHER

## 2020-08-31 RX ORDER — OXYCODONE AND ACETAMINOPHEN 10; 325 MG/1; MG/1
1-2 TABLET ORAL EVERY 4 HOURS PRN
Qty: 150 TAB | Refills: 0 | Status: SHIPPED | OUTPATIENT
Start: 2020-09-30 | End: 2020-08-31 | Stop reason: SDUPTHER

## 2020-08-31 ASSESSMENT — FIBROSIS 4 INDEX: FIB4 SCORE: 2.06

## 2020-08-31 NOTE — OP THERAPY DAILY TREATMENT
Outpatient Physical Therapy  DAILY TREATMENT     [facility] Physical Therapy [address].  [address]  [place] [address]  Phone:  [phone]  Fax:  [phone]    Date: 08/31/2020    Patient: Edgardo Sims  YOB: 1955  MRN: [id number]     Time Calculation    Start time: 1430  Stop time: 1513 Time Calculation (min): 43 minutes         Chief Complaint: Shoulder Problem    Visit #: 7    SUBJECTIVE:  I'm okay, I'm about a 3/10 right now. My shoulder has been in and out of the sling today so a little sore. I had a PCP visit this morning.      OBJECTIVE:  Current objective measures:           Therapeutic Exercises (CPT 24352):     1. Nu stepper, x3 min LE's only, warm up/cardiovascular endurance      Therapeutic Exercise Summary: MD protocol:   Sling for 5- 6 weeks.   PROM for 5 weeks then AAROM, then AROM; (Pulleys at 5-6 weeks)  Strengthening at 10-12 weeks     Therapeutic Treatments and Modalities:     1. E Stim Unattended (CPT 25127), IFC and ice to L shoulder in sitting with 2 pillows to support L shoulder for comfort x 15 min, pt requiring ice over mhp    2. Manual Therapy (CPT 75889), see below, x25 min    Therapeutic Treatment and Modalities Summary: GH PA's and inferior glides grade III with increasing GH elevation and ER.  Followed by PROM L elbow flexion/extension, GH abd, GH ER (<30 deg) in various planes of GH abd, PROM to L GH flexion with elbow at 90 deg.  (maintained GH in scapular plane; pt positioned long sitting on plinth at relatively 30 deg incline with pillows at axilla and post to GH for increased comfort during manual txt).        Time-based treatments/modalities:    Physical Therapy Timed Treatment Charges  Manual therapy minutes (CPT 12510): 25 minutes  Therapeutic exercise minutes (CPT 56392): 3 minutes       Pain rating (1-10) before treatment: 3/10 L shoulder       ASSESSMENT:   Response to treatment:   Pt is 4 weeks s/p Left shoulder arthroscopy with  rotator cuff repair, biceps tenodesis, subacromial decompression, distal clavicle excision, with extensive debridement of glenohumeral Joint (8/5/20).     Pt demonstrating steady improvements with minimal pain complaints; good progression overall. Initiated GH mobilizations today with slight increase in soreness post session today; slight increase with stiffness inferior glides>PA's. Of note, pt's shoulder musculature beings to judder when close to 90 GH abduction despite in scapular plane.    Continue PROM x5 weeks, then AAROM starting week 6 per MD protocol.    PLAN/RECOMMENDATIONS:   Plan for treatment: therapy treatment to continue next visit.  Planned interventions for next visit: continue with current treatment. Continue manual and progress per MD protocol (see above). Add gentle joint mobilizations next week (week 4)

## 2020-08-31 NOTE — PROGRESS NOTES
CC: Edgardo Sims is a 64 y.o. male is suffering from   Chief Complaint   Patient presents with   • Follow-Up         SUBJECTIVE:  1. Rheumatoid arthritis with positive rheumatoid factor, involving unspecified site (HCC)  Edgardo is here for follow-up has a history of rheumatoid arthritis, appears to be stable.    2. Chronic left shoulder pain  Patient is requested operative report for left shoulder surgery which was given to the patient.        Past social, family, history:   Social History     Tobacco Use   • Smoking status: Never Smoker   • Smokeless tobacco: Never Used   Substance Use Topics   • Alcohol use: Yes     Alcohol/week: 1.8 oz     Types: 3 Cans of beer per week     Frequency: 2-3 times a week     Drinks per session: 1 or 2     Comment: 3 per week   • Drug use: No         MEDICATIONS:    Current Outpatient Medications:   •  [START ON 10/30/2020] oxyCODONE-acetaminophen (PERCOCET-10)  MG Tab, Take 1-2 Tabs by mouth every four hours as needed for Severe Pain for up to 30 days., Disp: 150 Tab, Rfl: 0  •  metoprolol (LOPRESSOR) 50 MG Tab, TAKE 1 TABLET BY MOUTH TWICE A DAY, Disp: 60 Tab, Rfl: 0  •  meloxicam (MOBIC) 7.5 MG Tab, TAKE 1 TABLET BY MOUTH DAILY AS NEEDED FOR SEVERE JOINT PAIN, Disp: 90 Tab, Rfl: 1  •  Tofacitinib Citrate (XELJANZ) 5 MG Tab, Take 5 mg by mouth 2 Times a Day., Disp: 180 Tab, Rfl: 1  •  hydroxychloroquine (PLAQUENIL) 200 MG Tab, 1 tab po bid, Disp: 180 Tab, Rfl: 1  •  pravastatin (PRAVACHOL) 80 MG tablet, Take 1 Tab by mouth every day., Disp: 90 Tab, Rfl: 2  •  predniSONE (DELTASONE) 1 MG Tab, TAKE 2 TABLET BY MOUTH EVERY DAY, Disp: 180 Tab, Rfl: 1  •  cyclobenzaprine (FLEXERIL) 10 MG Tab, TAKE ONE TABLET BY MOUTH THREE TIMES DAILY AS NEEDED FOR MILD PAIN, Disp: 90 Tab, Rfl: 5  •  vardenafil (LEVITRA) 20 MG tablet, Take 20 mg by mouth as needed., Disp: , Rfl:   •  PARoxetine (PAXIL) 20 MG Tab, Take 1 Tab by mouth every day., Disp: 90 Tab, Rfl: 3  •  lisinopril  (PRINIVIL) 10 MG Tab, TAKE 1 TABLET BY MOUTH EVERY DAY, Disp: 90 Tab, Rfl: 3  •  ezetimibe (ZETIA) 10 MG Tab, Take 10 mg by mouth every evening., Disp: , Rfl:   •  CALCIUM-VITAMIN D PO, Take 1 Tab by mouth 2 Times a Day., Disp: , Rfl:   •  ascorbic acid (ASCORBIC ACID) 500 MG Tab, Take 500 mg by mouth every day., Disp: , Rfl:   •  Cyanocobalamin (VITAMIN B-12) 5000 MCG TABLET DISPERSIBLE, Take 1 Tab by mouth every day., Disp: , Rfl:   •  niacin 500 MG Tab, Take 500 mg by mouth every day., Disp: , Rfl:   •  Zinc 50 MG Cap, Take 50 mg by mouth every day., Disp: , Rfl:   •  omeprazole (PRILOSEC) 20 MG CPDR, Take 20 mg by mouth every day., Disp: , Rfl:     PROBLEMS:  Patient Active Problem List    Diagnosis Date Noted   • Neurogenic bladder 01/26/2019     Priority: Medium   • Postoperative pain 08/05/2020   • Difficult intubation 08/05/2020   • Gastroesophageal reflux disease 08/05/2020   • Secondary adrenal insufficiency (HCC) 12/12/2019   • Current chronic use of systemic steroids 10/16/2019   • High risk medication use 10/16/2019   • History of adrenal insufficiency 10/16/2019   • Bladder outlet obstruction 05/01/2018   • Leukocytosis 04/30/2018   • Lactic acidosis 04/30/2018   • Idiopathic acute pancreatitis 04/30/2018   • Chronic use of opiate drug for therapeutic purpose 08/12/2017   • Depression 07/13/2015   • Anxiety 03/31/2015   • Coronary artery disease due to calcified coronary lesion: Mildly cardiac catheterization in 2014 12/05/2014   • Tachycardia 10/20/2014   • Abnormal myocardial perfusion study 01/15/2014   • Osteoarthrosis, unspecified whether generalized or localized, pelvic region and thigh 05/31/2013   • Dyslipidemia 07/12/2011   • Aortic insufficiency 07/05/2011   • Essential hypertension 07/05/2011   • Rheumatoid arthritis (HCC) 05/05/2009   • Hypogonadism male 05/05/2009       REVIEW OF SYSTEMS:  Gen.:  No Nausea, Vomiting, fever, Chills.  Heart: No chest pain.  Lungs:  No shortness of  "Breath.  Psychological: Kian unusual Anxiety depression     PHYSICAL EXAM   Constitutional: Alert, cooperative, not in acute distress.  Cardiovascular:  Rate Rhythm is regular without murmurs rubs clicks.     Thorax & Lungs: Clear to auscultation, no wheezing, rhonchi, or rales  HENT: Normocephalic, Atraumatic.  Eyes: PERRLA, EOMI, Conjunctiva normal.   Neck: Trachia is midline no swelling of the thyroid.   Lymphatic: No lymphadenopathy noted.   Musculoskeletal: Left shoulder, incisions appear to be clean dry, patient with mild numbness left thumb  Neurologic: Alert & oriented x 3, cranial nerves II through XII are intact, Normal motor function, Normal sensory function, No focal deficits noted.   Psychiatric: Affect normal, Judgment normal, Mood normal.     VITAL SIGNS:/70 (BP Location: Right arm, Patient Position: Sitting, BP Cuff Size: Large adult)   Pulse 67   Temp 36.2 °C (97.2 °F) (Temporal)   Ht 1.778 m (5' 10\")   Wt 92 kg (202 lb 13.2 oz)   SpO2 97%   BMI 29.10 kg/m²     Labs: Reviewed    Assessment:                                                     Plan:    1. Rheumatoid arthritis with positive rheumatoid factor, involving unspecified site (HCC)  Continue Percocet, state drug task force reviewed signed drug contract  - oxyCODONE-acetaminophen (PERCOCET-10)  MG Tab; Take 1-2 Tabs by mouth every four hours as needed for Severe Pain for up to 30 days.  Dispense: 150 Tab; Refill: 0  - [facility] SCREEN; Future  - Controlled Substance Treatment Agreement    2. Chronic left shoulder pain  Continue rehab left shoulder, copy of surgical report given to the patient.          "

## 2020-09-01 RX ORDER — TRIAMCINOLONE ACETONIDE 40 MG/ML
40 INJECTION, SUSPENSION INTRA-ARTICULAR; INTRAMUSCULAR ONCE
Status: COMPLETED | OUTPATIENT
Start: 2020-09-01 | End: 2020-06-18

## 2020-09-01 RX ORDER — LIDOCAINE HYDROCHLORIDE 20 MG/ML
1 INJECTION, SOLUTION EPIDURAL; INFILTRATION; INTRACAUDAL; PERINEURAL ONCE
Status: COMPLETED | OUTPATIENT
Start: 2020-09-01 | End: 2020-06-18

## 2020-09-02 ENCOUNTER — PHYSICAL THERAPY (OUTPATIENT)
Dept: PHYSICAL THERAPY | Facility: REHABILITATION | Age: 65
End: 2020-09-02
Attending: ORTHOPAEDIC SURGERY
Payer: MEDICARE

## 2020-09-02 ENCOUNTER — HOSPITAL ENCOUNTER (OUTPATIENT)
Dept: LAB | Facility: MEDICAL CENTER | Age: 65
End: 2020-09-02
Attending: INTERNAL MEDICINE
Payer: MEDICARE

## 2020-09-02 DIAGNOSIS — Z96.652 S/P REVISION OF TOTAL KNEE, LEFT: ICD-10-CM

## 2020-09-02 DIAGNOSIS — M19.012 PRIMARY OSTEOARTHRITIS, LEFT SHOULDER: ICD-10-CM

## 2020-09-02 DIAGNOSIS — M05.9 RHEUMATOID ARTHRITIS WITH POSITIVE RHEUMATOID FACTOR, INVOLVING UNSPECIFIED SITE (HCC): ICD-10-CM

## 2020-09-02 DIAGNOSIS — S46.012A STRAIN OF MUSCLE(S) AND TENDON(S) OF THE ROTATOR CUFF OF LEFT SHOULDER, INITIAL ENCOUNTER: ICD-10-CM

## 2020-09-02 DIAGNOSIS — M75.42 IMPINGEMENT SYNDROME OF LEFT SHOULDER: ICD-10-CM

## 2020-09-02 PROCEDURE — 97014 ELECTRIC STIMULATION THERAPY: CPT

## 2020-09-02 PROCEDURE — 97140 MANUAL THERAPY 1/> REGIONS: CPT

## 2020-09-02 NOTE — OP THERAPY DAILY TREATMENT
Outpatient Physical Therapy  DAILY TREATMENT     [facility] Physical Therapy [address].  [address]  [place] [address]  Phone:  [phone]  Fax:  [phone]    Date: 09/02/2020    Patient: Edgardo Sims  YOB: 1955  MRN: [id number]     Time Calculation    Start time: 1031  Stop time: 1113 Time Calculation (min): 42 minutes         Chief Complaint: Shoulder Problem    Visit #: 8    SUBJECTIVE:  I'm doing well, only about a 1/10 today. I woke up and my neck was really stiff.      OBJECTIVE:  Current objective measures:           Therapeutic Exercises (CPT 16903):     1. Nu stepper, x3 min LE's only, warm up/cardiovascular endurance      Therapeutic Exercise Summary: MD protocol:   Sling for 5- 6 weeks.   PROM for 5 weeks then AAROM, then AROM; (Pulleys at 5-6 weeks)  Strengthening at 10-12 weeks     Therapeutic Treatments and Modalities:     1. E Stim Unattended (CPT 19549), IFC and ice to L shoulder in sitting with 2 pillows to support L shoulder for comfort x 15 min, pt requiring ice over mhp    2. Manual Therapy (CPT 35048), see below, x27 min    Therapeutic Treatment and Modalities Summary: GH PA's and inferior glides grade III with increasing GH elevation and ER. Improved tolerance to mobilizations today.  Followed by PROM L elbow flexion/extension, GH abd, GH ER (<30 deg) in various planes of GH abd, PROM to L GH flexion with elbow at 90 deg.  (maintained GH in scapular plane; pt positioned long sitting on plinth at relatively 30 deg incline with pillows at axilla and post to GH for increased comfort during manual txt).        Time-based treatments/modalities:    Physical Therapy Timed Treatment Charges  Manual therapy minutes (CPT 40481): 27 minutes       Pain rating (1-10) before treatment: 3/10 L shoulder       ASSESSMENT:   Response to treatment:   Pt is 4 weeks s/p Left shoulder arthroscopy with rotator cuff repair, biceps tenodesis, subacromial decompression,  distal clavicle excision, with extensive debridement of glenohumeral Joint (8/5/20).     Pt continuing to progress well with increased tolerance and no pain complaints after joint mobility today. Increasing GH PROM attained in scapular plane.    Continue PROM, then AAROM starting week 6 per MD protocol.    PLAN/RECOMMENDATIONS:   Plan for treatment: therapy treatment to continue next visit.  Planned interventions for next visit: continue with current treatment. Continue manual and progress per MD protocol (see above). Continue gentle joint mobilizations.

## 2020-09-05 DIAGNOSIS — I10 ESSENTIAL HYPERTENSION: ICD-10-CM

## 2020-09-08 RX ORDER — METOPROLOL TARTRATE 50 MG/1
TABLET, FILM COATED ORAL
Qty: 60 TAB | Refills: 0 | Status: SHIPPED | OUTPATIENT
Start: 2020-09-08 | End: 2020-09-29 | Stop reason: SDUPTHER

## 2020-09-08 NOTE — TELEPHONE ENCOUNTER
Received request via: Pharmacy    Was the patient seen in the last year in this department? Yes  8/31/20  Does the patient have an active prescription (recently filled or refills available) for medication(s) requested? No

## 2020-09-08 NOTE — OP THERAPY DAILY TREATMENT
Outpatient Physical Therapy  DAILY TREATMENT     [facility] Physical Therapy [address].  [address]  [place] [address]  Phone:  [phone]  Fax:  [phone]    Date: 09/09/2020    Patient: Edgardo Sims  YOB: 1955  MRN: [id number]     Time Calculation    Start time: 1503  Stop time: 1543 Time Calculation (min): 40 minutes         Chief Complaint: Shoulder Problem    Visit #: 9    SUBJECTIVE:  I saw my surgeon and he thinks I'm where I should be. I have a new PT prescription; I'll bring it in next time. My shoulder hurts a little bit today 4/10; I think it's because I got cold last night.      OBJECTIVE:  Current objective measures:           Therapeutic Exercises (CPT 33331):     1. Nu stepper, x3 min LE's only, warm up/cardiovascular endurance      Therapeutic Exercise Summary: MD protocol:   Sling for 5- 6 weeks.   PROM for 5 weeks then AAROM, then AROM; (Pulleys at 5-6 weeks)  Strengthening at 10-12 weeks     Therapeutic Treatments and Modalities:     1. E Stim Unattended (CPT 28217), IFC and mhp to L shoulder in sitting with 2 pillows to support L shoulder for comfort x 15 min    2. Manual Therapy (CPT 48711), see below, x22 min    Therapeutic Treatment and Modalities Summary: STM to c/s paraspinals B followed by B first rib depression then GH PA's and inferior glides grade III with increasing GH elevation and ER. Improved tolerance to mobilizations today.  Followed by PROM L elbow flexion/extension, GH abd, GH ER (<30 deg) in various planes of GH abd, PROM to L GH flexion with elbow at 90 deg.  (maintained GH in scapular plane; pt positioned long sitting on plinth at relatively 30 deg incline with pillows at axilla and post to GH for increased comfort during manual txt).        Time-based treatments/modalities:    Physical Therapy Timed Treatment Charges  Manual therapy minutes (CPT 41718): 22 minutes  Therapeutic exercise minutes (CPT 28054): 3 minutes       Pain  rating (1-10) before treatment: 4/10 L shoulder       ASSESSMENT:   Response to treatment:   Pt is 5 weeks s/p Left shoulder arthroscopy with rotator cuff repair, biceps tenodesis, subacromial decompression, distal clavicle excision, with extensive debridement of glenohumeral Joint (8/5/20).     Pt presenting to therapy with increased pain complaints today. Able to decrease symptoms after manual. Pt recently met with surgeon and reports good prognosis per pt report. Pt has new PT prescription from MD (will bring to next session). Pt continuing to progress well according to schedule with good allowance to manual therapy and PROM. Responds well to PA's; some reported discomfort with inferior glides  limiting GH elevation.      PLAN/RECOMMENDATIONS:   Plan for treatment: therapy treatment to continue next visit.  Planned interventions for next visit: continue with current treatment.   Update with new prescription. Wean to sling; begin Pulleys and AAROM at week 6 per MD protocol.  Progress note next session

## 2020-09-09 ENCOUNTER — PHYSICAL THERAPY (OUTPATIENT)
Dept: PHYSICAL THERAPY | Facility: REHABILITATION | Age: 65
End: 2020-09-09
Attending: ORTHOPAEDIC SURGERY
Payer: MEDICARE

## 2020-09-09 ENCOUNTER — TELEPHONE (OUTPATIENT)
Dept: MEDICAL GROUP | Facility: LAB | Age: 65
End: 2020-09-09

## 2020-09-09 ENCOUNTER — OFFICE VISIT (OUTPATIENT)
Dept: RHEUMATOLOGY | Facility: MEDICAL CENTER | Age: 65
End: 2020-09-09
Payer: MEDICARE

## 2020-09-09 VITALS
RESPIRATION RATE: 14 BRPM | BODY MASS INDEX: 29.7 KG/M2 | OXYGEN SATURATION: 97 % | WEIGHT: 207 LBS | DIASTOLIC BLOOD PRESSURE: 70 MMHG | TEMPERATURE: 96.7 F | SYSTOLIC BLOOD PRESSURE: 130 MMHG | HEART RATE: 94 BPM

## 2020-09-09 DIAGNOSIS — M85.89 OSTEOPENIA OF MULTIPLE SITES: ICD-10-CM

## 2020-09-09 DIAGNOSIS — S46.012A STRAIN OF MUSCLE(S) AND TENDON(S) OF THE ROTATOR CUFF OF LEFT SHOULDER, INITIAL ENCOUNTER: ICD-10-CM

## 2020-09-09 DIAGNOSIS — Z79.899 LONG-TERM USE OF PLAQUENIL: ICD-10-CM

## 2020-09-09 DIAGNOSIS — M75.42 IMPINGEMENT SYNDROME OF LEFT SHOULDER: ICD-10-CM

## 2020-09-09 DIAGNOSIS — M19.012 PRIMARY OSTEOARTHRITIS, LEFT SHOULDER: ICD-10-CM

## 2020-09-09 DIAGNOSIS — E27.40 ADRENAL INSUFFICIENCY (HCC): ICD-10-CM

## 2020-09-09 DIAGNOSIS — Z79.622 LONG-TERM CURRENT USE OF TOFACITINIB: ICD-10-CM

## 2020-09-09 DIAGNOSIS — I25.10 CORONARY ARTERY DISEASE DUE TO CALCIFIED CORONARY LESION: ICD-10-CM

## 2020-09-09 DIAGNOSIS — Z96.652 S/P REVISION OF TOTAL KNEE, LEFT: ICD-10-CM

## 2020-09-09 DIAGNOSIS — I25.84 CORONARY ARTERY DISEASE DUE TO CALCIFIED CORONARY LESION: ICD-10-CM

## 2020-09-09 DIAGNOSIS — M05.9 RHEUMATOID ARTHRITIS WITH POSITIVE RHEUMATOID FACTOR, INVOLVING UNSPECIFIED SITE (HCC): ICD-10-CM

## 2020-09-09 DIAGNOSIS — Z79.1 ENCOUNTER FOR LONG-TERM (CURRENT) USE OF NSAIDS: ICD-10-CM

## 2020-09-09 DIAGNOSIS — I10 ESSENTIAL HYPERTENSION: ICD-10-CM

## 2020-09-09 PROCEDURE — 97014 ELECTRIC STIMULATION THERAPY: CPT

## 2020-09-09 PROCEDURE — 97140 MANUAL THERAPY 1/> REGIONS: CPT

## 2020-09-09 PROCEDURE — 99214 OFFICE O/P EST MOD 30 MIN: CPT | Performed by: INTERNAL MEDICINE

## 2020-09-09 ASSESSMENT — FIBROSIS 4 INDEX: FIB4 SCORE: 2.06

## 2020-09-09 NOTE — PROGRESS NOTES
Chief Complaint- joint pain     Subjective:   Edgardo Sims is a 64 y.o. male here today for follow up of rheumatological issues    This is a follow-up visit for this patient who we see in this clinic for rheumatoid arthritis.  Patient is currently on Xeljanz at 5 mg p.o. twice daily subsidized by  [employer] and Plaquenil 200 mg p.o. twice daily.  Patient also takes meloxicam 15 mg p.o. daily, also on low-dose prednisone at 1 mg p.o. daily because of adrenal insufficiency found by patient's endocrinologist Dr. Juarez December 2019.  Patient states he thinks that he is doing well.  Of note, patient's ophthalmology evaluation pending, last ophthalmology evaluation about 1 year ago.  Patient denies any side effects from the medication, denies any unexplained weight loss, denies any fevers of unknown etiology, denies any GI upset, denies any rashes, denies any new joint swelling, denies recurrent infections.       Of note we reviewed the risks of taking NSAIDs in combination with prednisone i.e. peptic ulcer disease and gastrointestinal bleeding, patient states he understands, understands the risks and wants to continue the use of NSAIDs i.e. meloxicam.    Since last visit patient now status post left shoulder rotator cuff tear repair currently undergoing physical therapy.       Additional comorbidities include ureteral blockage and BPH.  Patient also with a history of Dupuytren's contracture with release, also history of hypercholesterolemia.  Patient also with a history of osteopenia and aortic valve insufficiency followed periodically by echocardiograms.       Bilateral AUTUMN  Left TKA     S/p Remicade-ineffective  S/p Orencia-ineffective  S/p Enbrel-ineffective  S/p humira-ineffective  S/p MTX-oral ulcers  S/p arava-bad reaction but patient doesn't recall specifics  S/p rituximab-helped but patient stopped because of methotrexate side effects per patient report....      G6PD 12.9 nl 6/2020   Uric acid 4.5 nl  2/2019   Cryoglobulin neg 8/2017  RF neg 8/2017, RF neg 2/2019  CCP neg 2/2019  HBsAg IgM/HBcAb neg 9/2019  HCV neg 9/2019  Quantiferon Gold neg 9/2019  DEXA 9/5/2017 T scores -0.2, -1.5  FRAX 9/5/2017 not done  DEXA 6/12/2020 T scores 0.3, -1.2  FRAX 6/12/2020 not done   Hand x-rays 5/2017-indicates erosive arthritis  Feet x-rays 5/2017-indicates erosive arthritis   Corticosteroid Therapy Informed Consent signed 2/21/2019-copy given to patient          Current medicines (including changes today)  Current Outpatient Medications   Medication Sig Dispense Refill   • metoprolol (LOPRESSOR) 50 MG Tab TAKE 1 TABLET BY MOUTH TWICE A DAY 60 Tab 0   • [START ON 10/30/2020] oxyCODONE-acetaminophen (PERCOCET-10)  MG Tab Take 1-2 Tabs by mouth every four hours as needed for Severe Pain for up to 30 days. 150 Tab 0   • meloxicam (MOBIC) 7.5 MG Tab TAKE 1 TABLET BY MOUTH DAILY AS NEEDED FOR SEVERE JOINT PAIN 90 Tab 1   • Tofacitinib Citrate (XELJANZ) 5 MG Tab Take 5 mg by mouth 2 Times a Day. 180 Tab 1   • hydroxychloroquine (PLAQUENIL) 200 MG Tab 1 tab po bid 180 Tab 1   • pravastatin (PRAVACHOL) 80 MG tablet Take 1 Tab by mouth every day. 90 Tab 2   • predniSONE (DELTASONE) 1 MG Tab TAKE 2 TABLET BY MOUTH EVERY  Tab 1   • cyclobenzaprine (FLEXERIL) 10 MG Tab TAKE ONE TABLET BY MOUTH THREE TIMES DAILY AS NEEDED FOR MILD PAIN 90 Tab 5   • vardenafil (LEVITRA) 20 MG tablet Take 20 mg by mouth as needed.     • PARoxetine (PAXIL) 20 MG Tab Take 1 Tab by mouth every day. 90 Tab 3   • lisinopril (PRINIVIL) 10 MG Tab TAKE 1 TABLET BY MOUTH EVERY DAY 90 Tab 3   • ezetimibe (ZETIA) 10 MG Tab Take 10 mg by mouth every evening.     • CALCIUM-VITAMIN D PO Take 1 Tab by mouth 2 Times a Day.     • ascorbic acid (ASCORBIC ACID) 500 MG Tab Take 500 mg by mouth every day.     • Cyanocobalamin (VITAMIN B-12) 5000 MCG TABLET DISPERSIBLE Take 1 Tab by mouth every day.     • niacin 500 MG Tab Take 500 mg by mouth every day.     • Zinc  50 MG Cap Take 50 mg by mouth every day.     • omeprazole (PRILOSEC) 20 MG CPDR Take 20 mg by mouth every day.       No current facility-administered medications for this visit.      He  has a past medical history of Abnormal myocardial perfusion study (1/15/2014), Aortic insufficiency (7/5/2011), Arthritis, Bronchitis, CAD (coronary artery disease) mild plaque at cath in 2/14 (12/5/2014), Cancer (HCC), Chronic use of opiate drugs therapeutic purposes (8/12/2017), Dental disorder, Dyslipidemia (7/12/2011), Heart burn, Heart murmur, Hiatus hernia syndrome, High cholesterol, HTN (hypertension) (7/5/2011), Hypertension, Indigestion, Infectious disease, Pain, Pain, Rheumatoid arthritis(714.0), and Tachycardia (10/20/2014).    ROS   Other than what is mentioned in HPI or physical exam, there is no history of headaches, double vision or blurred vision. No temporal tenderness or jaw claudication. No trouble swallowing difficulties or sore throats.  No chest complaints including chest pain, cough or sputum production. No GI complaints including nausea, vomiting, change in bowel habits, or past peptic ulcer disease. No history of blood in the stools. No urinary complaints including dysuria or frequency. No history of alopecia, photosensitivity, oral ulcerations, Raynaud's phenomena.       Objective:     /70   Pulse 94   Temp 35.9 °C (96.7 °F) (Temporal)   Resp 14   Wt 93.9 kg (207 lb)   SpO2 97%  Body mass index is 29.7 kg/m².   Physical Exam:    Constitutional: Alert and oriented X3, patient is talkative with good eye contact.Skin: Warm, dry, good turgor, no rashes in visible areas.Eye: Equal, round and reactive, conjunctiva clear, lids normal EOM intactENMT: Lips without lesions, good dentition, no oropharyngeal ulcers, moist buccal mucosa, pinna without deformityNeck: Trachea midline, no masses, no thyromegaly.Lymph:  No cervical lymphadenopathy, no axillary lymphadenopathy, no supraclavicular  lymphadenopathyRespiratory: Unlabored respiratory effort, lungs clear to auscultation, no wheezes, no ronchi.Cardiovascular: Normal S1, S2, no murmur, no edema.Abdomen: Soft, non-tender, no masses, no hepatosplenomegaly, positive central obesity.Psych: Alert and oriented x3, normal affect and mood.Neuro: Cranial nerves 2-12 are grossly intact, no loss of sensation LEExt:no joint laxity noted in bilateral arms, no joint laxity noted in bilateral legs, left shoulder in cast secondary to rotator cuff tear surgical intervention, patient does have crossover toe of the right second toe over great toe, but otherwise no lolita synovitis no swan-neck or boutonniere deformities no sausage digits no dactylitis,    Lab Results   Component Value Date/Time    QNTTBGOLD Negative 06/01/2017 01:13 PM     Lab Results   Component Value Date/Time    HEPBCORTOT Negative 06/01/2017 01:13 PM    HEPBCORIGM Negative 09/06/2019 04:00 PM    HEPBSAG Negative 09/06/2019 04:00 PM     Lab Results   Component Value Date/Time    HEPCAB Negative 09/06/2019 04:00 PM     Lab Results   Component Value Date/Time    SODIUM 136 08/03/2020 11:23 AM    POTASSIUM 5.4 08/03/2020 11:23 AM    CHLORIDE 101 08/03/2020 11:23 AM    CO2 31 08/03/2020 11:23 AM    GLUCOSE 94 08/03/2020 11:23 AM    BUN 10 08/03/2020 11:23 AM    CREATININE 0.94 08/03/2020 11:23 AM    CREATININE 1.1 04/21/2009 03:50 PM    BUNCREATRAT 13 09/21/2016 09:21 AM      Lab Results   Component Value Date/Time    WBC 10.7 06/02/2020 07:22 AM    WBC 7.5 07/01/2011 10:30 AM    RBC 5.15 06/02/2020 07:22 AM    RBC 4.72 07/01/2011 10:30 AM    HEMOGLOBIN 15.4 06/02/2020 07:22 AM    HEMATOCRIT 46.4 06/02/2020 07:22 AM    MCV 90.1 06/02/2020 07:22 AM     (H) 07/01/2011 10:30 AM    MCH 29.9 06/02/2020 07:22 AM    MCH 36.0 (H) 07/01/2011 10:30 AM    MCHC 33.2 (L) 06/02/2020 07:22 AM    MPV 10.1 06/02/2020 07:22 AM    NEUTSPOLYS 60.50 06/02/2020 07:22 AM    LYMPHOCYTES 23.00 06/02/2020 07:22 AM     MONOCYTES 12.00 06/02/2020 07:22 AM    EOSINOPHILS 2.90 06/02/2020 07:22 AM    BASOPHILS 0.90 06/02/2020 07:22 AM    HYPOCHROMIA 1+ 03/05/2014 04:43 PM    ANISOCYTOSIS 1+ 01/02/2015 05:02 PM      Lab Results   Component Value Date/Time    CALCIUM 9.6 08/03/2020 11:23 AM    ASTSGOT 28 06/02/2020 07:25 AM    ALTSGPT 31 06/02/2020 07:25 AM    ALKPHOSPHAT 67 06/02/2020 07:25 AM    TBILIRUBIN 0.6 06/02/2020 07:25 AM    ALBUMIN 4.5 06/02/2020 07:25 AM    TOTPROTEIN 6.8 06/02/2020 07:25 AM     Lab Results   Component Value Date/Time    URICACID 4.5 02/19/2019 08:36 AM    RHEUMFACTN 10 02/19/2019 08:36 AM    CCPANTIBODY 2 02/19/2019 08:36 AM     Lab Results   Component Value Date/Time    CRYOGLOBULIN NEG 72Hour 08/04/2017 02:32 PM     Lab Results   Component Value Date/Time    SEDRATEWES 2 02/27/2020 02:16 PM     Lab Results   Component Value Date/Time    HBA1C 5.3 07/26/2018 11:19 AM     Lab Results   Component Value Date/Time    G6PD 12.9 06/18/2020 02:50 PM     Lab Results   Component Value Date/Time    CPKTOTAL 141 01/26/2018 07:47 AM     Lab Results   Component Value Date/Time    PTHINTACT 55 10/10/2008 10:42 AM     Results for orders placed during the hospital encounter of 06/12/20   DS-BONE DENSITY STUDY (DEXA)    Impression According to the World Health Organization classification, bone mineral density of this patient is osteopenia with increased fracture risk left forearm, normal density lumbar spine.        FRAX score not obtained for this patient.            INTERPRETING LOCATION:  [address] [facility], [place], [address]      Results for orders placed during the hospital encounter of 05/31/13   DX-PELVIS-1 OR 2 VIEWS    Impression Interval performance of a left hip arthroplasty.  Right hip arthroplasty is unchanged.              INTERPRETING LOCATION: [address], [place], [address]        Results for orders placed during the hospital encounter of 05/31/13   DX-PELVIS-1 OR 2 VIEWS    Impression Interval performance  of a left hip arthroplasty.  Right hip arthroplasty is unchanged.              INTERPRETING LOCATION: [address] [facility], [place], [address]     Results for orders placed during the hospital encounter of 01/30/09   DX-KNEE COMPLETE 4+    Impression IMPRESSION:     1. NEW FINDINGS CONSISTENT WITH OSTEOCHONDROSIS DISSECANS OF THE MEDIAL   FEMORAL CONDYLE.    2. NEW MODERATE JOINT SPACE NARROWING OF THE MEDIAL TIBIOFEMORAL   COMPARTMENT, CONSISTENT WITH CARTILAGE THINNING.    3. NEW MODERATE JOINT EFFUSION.      SST/llw      Read By BLANCA ORTIZ MD on Jan 30 2009  3:39PM  : JAYAW Transcription Date: Feb 2 2009  6:08AM  THIS DOCUMENT HAS BEEN ELECTRONICALLY SIGNED BY: BLANCA ORTIZ MD on   Feb  3 2009 11:43AM        Results for orders placed during the hospital encounter of 05/31/17   DX-HAND 3+ RIGHT    Impression 1.  Question of small erosions or subchondral cysts in the 2nd and 3rd metacarpal heads.     Results for orders placed during the hospital encounter of 12/12/18   DX-THORACIC SPINE-WITH SWIMMERS VIEW    Impression 1.  Moderate to severe multilevel degenerative change of thoracic spine.  2.  Multilevel anterior wedge compression deformities with associated thoracic kyphosis, similar to prior exam.  3.  No gross acute fracture or segmental malalignment.     Results for orders placed during the hospital encounter of 12/21/07   MR-KNEE-W/O    Impression IMPRESSION:    1. NON-DISPLACED OBLIQUE TEAR OF THE POSTERIOR HORN MEDIAL MENISCUS   COMMUNICATING WITH SUPERIOR AND INFERIOR ARTICULAR SURFACES WITHOUT   EVIDENCE OF PARAMENISCAL CYST.  UNDERLYING CHONDROMALACIA IS SEEN WITHIN   THE MEDIAL FEMORAL CONDYLE POSTERIOR WEIGHTBEARING SURFACE OF   NON-FULL-THICKNESS SEVERITY.        2. MENISCAL DEGENERATION AND PARTIAL PERIPHERAL EXTRUSION INVOLVING THE   MID-BODY OF THE MEDIAL MENISCUS.        GEK:sloan        Read By KEITH NETTLES MD on Dec 21 2007 12:14PM  : AJAY Transcription  Date: Dec 22 2007 10:16AM  THIS DOCUMENT HAS BEEN ELECTRONICALLY SIGNED BY: KEITH NETTLES MD on   Dec 24 2007 12:21PM     Results for orders placed during the hospital encounter of 02/14/05   MR-CERVICAL SPINE-W/O    Impression IMPRESSION:    1. DEGENERATIVE DISC DISEASE C5-6 AND C6-7 WITH SOME MINOR POSTERIOR   SPURRING BUT WITHOUT ANY REAL CANAL STENOSIS AND WITH NO EVIDENCE OF AN   ACUTE DISC EXTRUSION.  2. MILD TO MODERATE MIDCERVICAL NEURAL FORAMINAL NARROWING DUE TO   UNCINATE AND FACET HYPERTROPHY, WITH MULTILEVEL DISEASE PRESENT AND WITH   SOME MILD INTERVAL PROGRESSION COMPARED WITH THE PRIOR MRI.  THE AXIAL   IMAGES OVERESTIMATE THE DEGREE OF NEURAL FORAMINAL STENOSIS DUE TO   ARTIFACTS, WITH THE SAGITTAL IMAGES DEMONSTRATING MILD NEURAL FORAMINAL   STENOTIC CHANGES IN THE MIDCERVICAL LEVELS.    3. NO EVIDENCE OF ACUTE TRAUMA.         Read By KEITH NETTLES MD on Feb 14 2005  1:10PM  : SHRADDHA Transcription Date: Feb 15 2005 11:24AM  THIS DOCUMENT HAS BEEN ELECTRONICALLY SIGNED BY: KEITH NETTLES MD on   Feb 15 2005 12:06PM     Results for orders placed during the hospital encounter of 02/14/05   DX-CERVICAL SPINE-2 OR 3 VIEWS    Impression IMPRESSION:    MULTILEVEL CERVICAL SPINE DEGENERATIVE CHANGE, WITHOUT INTERVAL CHANGE.          Read By MIKKI RODRIGUEZ MD on Feb 14 2005  3:04PM  : EMMIE Transcription Date: Feb 14 2005  3:10PM  THIS DOCUMENT HAS BEEN ELECTRONICALLY SIGNED BY: MIKKI RODRIGUEZ MD on Feb 14 2005  3:17PM     Assessment and Plan:     1. Rheumatoid arthritis with positive rheumatoid factor, involving unspecified site (HCC)  Stable on Xeljanz at 5 mg p.o. twice daily subsidized by [employer] and Plaquenil 200 mg p.o. twice daily, we will continue with such  - CBC WITH DIFFERENTIAL; Future  - Comp Metabolic Panel; Future  - Sed Rate; Future    2. Long-term current use of tofacitinib  On Xeljanz 5 mg p.o. twice daily subsidized by [employer],   Screening labs  are up-to-date, next screening labs will be due September 2021, patient needs monitoring labs every 6 months, next labs will be due about March 2021, labs ordered for patient  We reviewed risks of biological medications with patient including hematological pathology, cancer risks, neurological and infection issues, patient states understanding.  - CBC WITH DIFFERENTIAL; Future  - Comp Metabolic Panel; Future  - Sed Rate; Future    3. Long-term use of Plaquenil  On Plaquenil 200 mg p.o. twice daily, of note G6PD levels are adequate, patient needs monitoring labs every 6 months next labs due March 2021, labs ordered for patient  Patient also needs ophthalmology evaluation every year, patient due for ophthalmology evaluation patient states that that is pending  - CBC WITH DIFFERENTIAL; Future  - Comp Metabolic Panel; Future  - Sed Rate; Future    4. Encounter for long-term (current) use of NSAIDs  On meloxicam 15 mg p.o. daily PRN, patient needs monitoring labs every 6 months, next labs due March 2021, labs ordered for patient  Of note we reviewed the risks of taking NSAIDs in combination with prednisone i.e. peptic ulcer disease and gastrointestinal bleeding, patient states he understands, understands the risks and wants to continue the use of NSAIDs i.e. meloxicam.  - CBC WITH DIFFERENTIAL; Future  - Comp Metabolic Panel; Future  - Sed Rate; Future    5. Osteopenia of multiple sites  Last DEXA June 2020 Next DEXA June 2022   continue calcium citrate 1200 mg by mouth daily and vitamin D about 2000 units by mouth daily and magnesium 200 mg by mouth daily    6. Adrenal insufficiency (HCC)  Followed by endocrinologist Dr. Juarez who has patient on prednisone at 1 mg p.o. daily    7. Essential hypertension  May impact the type of medications we can use for this patient's arthritis. We will have to keep this under advisement.    8. Coronary artery disease due to calcified coronary lesion: Mildly cardiac catheterization  in 2014  Followed by cardiology    Followup: Return in about 6 months (around 3/9/2021). or sooner peyman Sims  was seen 30 minutes face-to-face of which more than 50% of the time was spent counseling the patient (excluding time for procedures)  regarding  rheumatological condition and care. Therapy was discussed in detail.      Please note that this dictation was created using voice recognition software. I have made every reasonable attempt to correct obvious errors, but I expect that there are errors of grammar and possibly content that I did not discover before finalizing the note.

## 2020-09-09 NOTE — TELEPHONE ENCOUNTER
Elida:  Please call Edgardo, ask him what the name of the medication was.  I will try to come up with a reasonable substitute.  Regards, John Lao, DO

## 2020-09-09 NOTE — TELEPHONE ENCOUNTER
Called patient to discuss medication. Patient stated it was not the medication but actually the urine drug screen that you had originally ordered for him in regards to a controlled medication. Patient stated he went to the lab and was advised of the cost for this test. Patient stated he is fine with making an appointment to get his flu shot and leave a urine sample for this. Would you like me the get him on the schedule for this?

## 2020-09-09 NOTE — TELEPHONE ENCOUNTER
Elida:  Okay to schedule will complete urine drug screen in the office, do not understand why it would cost him over $500 to do it through the urologist!  Regards, John Lao, DO.

## 2020-09-09 NOTE — TELEPHONE ENCOUNTER
Patient called and left a  stating he went to his Urologist appointment and Medicare would not cover the medication that was suggested. For the patient, it would cost about $528 for the medication. Patient is not sure what to do and suggested he leave a urine sample at his next appointment which is in November.     Please advise.

## 2020-09-14 ENCOUNTER — NON-PROVIDER VISIT (OUTPATIENT)
Dept: MEDICAL GROUP | Facility: LAB | Age: 65
End: 2020-09-14

## 2020-09-14 DIAGNOSIS — Z79.891 CHRONIC PRESCRIPTION OPIATE USE: ICD-10-CM

## 2020-09-15 ENCOUNTER — OFFICE VISIT (OUTPATIENT)
Dept: ENDOCRINOLOGY | Facility: MEDICAL CENTER | Age: 65
End: 2020-09-15
Attending: INTERNAL MEDICINE
Payer: MEDICARE

## 2020-09-15 VITALS
HEIGHT: 70 IN | WEIGHT: 200 LBS | DIASTOLIC BLOOD PRESSURE: 80 MMHG | BODY MASS INDEX: 28.63 KG/M2 | SYSTOLIC BLOOD PRESSURE: 126 MMHG | HEART RATE: 84 BPM | OXYGEN SATURATION: 94 %

## 2020-09-15 DIAGNOSIS — E27.49 SECONDARY ADRENAL INSUFFICIENCY (HCC): ICD-10-CM

## 2020-09-15 DIAGNOSIS — Z79.899 HIGH RISK MEDICATION USE: ICD-10-CM

## 2020-09-15 DIAGNOSIS — E55.9 VITAMIN D DEFICIENCY: ICD-10-CM

## 2020-09-15 DIAGNOSIS — Z79.52 CURRENT CHRONIC USE OF SYSTEMIC STEROIDS: ICD-10-CM

## 2020-09-15 PROCEDURE — 99214 OFFICE O/P EST MOD 30 MIN: CPT | Performed by: INTERNAL MEDICINE

## 2020-09-15 PROCEDURE — 99211 OFF/OP EST MAY X REQ PHY/QHP: CPT | Performed by: INTERNAL MEDICINE

## 2020-09-15 ASSESSMENT — FIBROSIS 4 INDEX: FIB4 SCORE: 2.06

## 2020-09-15 NOTE — PROGRESS NOTES
Chief Complaint: Follow up for secondary adrenal insufficiency and current chronic use of systemic steroids      HPI:     Edgardo Sims is a 64 y.o. male here for follow up of the above medical issues    I initially saw him on October 2019 as a referral from Dr. Azul for evaluation of chronic secondary adrenal insufficiency secondary to chronic steroid use in the background of rheumatoid arthritis.  He was started on steroids by his previous rheumatologist over 20 years ago.    He has osteopenia managed by his primary care physician and also has hypogonadism managed by his primary care physician    His cosyntropin stim test on December 2019 was positive for adrenal insufficiency showing a stimulated cortisol 14.  I ordered a 21-hydroxylase antibodies but the lab did not draw the test.  His baseline aldosterone was normal at 9.2 and renin was normal at 0.9 which is not compatible with primary adrenal insufficiency    Since I last saw him I tapered his prednisone to 1 mg daily.  He reports that he did not have a flare of his rheumatoid arthritis despite the taper.  He had shoulder surgery and he did fine and did not have any issues with hypotension or dizziness and lightheadedness.    He is now on hydroxychloroquine plus he is also taking Xeljanz 5 mg twice a day along with the prednisone for his rheumatoid arthritis.  He denies interval falls and fractures and he is taking Caltrate and vitamin D 2000 units daily    He had a bone density recently on June 2020 which showed osteopenia with no evidence of bone loss when compared to his previous bone density.  The lowest T score was -1.2 for the left forearm.   He is currently not on any bisphosphonates      Patient's medications, allergies, and social histories were reviewed and updated as appropriate.      ROS:       CONS:     No fever, no chills   EYES:     No diplopia, no blurry vision   CV:           No chest pain, no palpitations   PULM:     No SOB, no  cough, no hemoptysis.   GI:            No nausea, no vomiting, no diarrhea, no constipation   ENDO:     No polyuria, no polydipsia, no heat intolerance, no cold intolerance     Past Medical History:  Problem List:  2020-08: Postoperative pain  2020-08: Difficult intubation  2020-08: Gastroesophageal reflux disease  2019-12: Secondary adrenal insufficiency (HCC)  2019-10: Current chronic use of systemic steroids  2019-10: High risk medication use  2019-10: History of adrenal insufficiency  2019-01: Hyponatremia  2019-01: Hematuria  2019-01: Complicated UTI (urinary tract infection)  2019-01: Neurogenic bladder  2019-01: Sepsis (HCC)  2018-08: Failed total knee, left, subsequent encounter  2018-05: Bladder outlet obstruction  2018-04: Acute pyelonephritis  2018-04: Leukocytosis  2018-04: Lactic acidosis  2018-04: Idiopathic acute pancreatitis  2017-09: Inadequate community resources  2017-08: Chronic use of opiate drug for therapeutic purpose  2016-09: Elevated CPK  2015-07: Depression  2015-03: Anxiety  2014-12: Coronary artery disease due to calcified coronary lesion:   Mildly cardiac catheterization in 2014  2014-10: Tachycardia  2014-01: Abnormal myocardial perfusion study  2013-05: Osteoarthrosis, unspecified whether generalized or localized,  pelvic region and thigh  2011-07: Dyslipidemia  2011-07: Aortic insufficiency  2011-07: Essential hypertension  2009-10: Need for pneumococcal vaccination  2009-05: Left knee pain  2009-05: Rheumatoid arthritis ([facility])  2009-05: Hypogonadism male      Past Surgical History:  Past Surgical History:   Procedure Laterality Date   • PB SHLDR ARTHROSCOP,PART ACROMIOPLAS Left 8/5/2020    Procedure: DECOMPRESSION, SHOULDER, ARTHROSCOPIC - SUBACROMIAL;  Surgeon: Emanuel Vance M.D.;  Location: SURGERY [facility];  Service: Orthopedics   • PB SHLDR ARTHROSCOP,SURG,W/ROTAT CUFF REPB Left 8/5/2020    Procedure: ARTHROSCOPY, SHOULDER, WITH ROTATOR CUFF REPAIR - AND DALAL;  Surgeon:  Emanuel Vance M.D.;  Location: [facility];  Service: Orthopedics   • CLAVICLE DISTAL EXCISION Left 8/5/2020    Procedure: EXCISION, CLAVICLE, DISTAL - WITH EXTENSIVE DEBRIDEMENT;  Surgeon: Emanuel Vance M.D.;  Location: [facility];  Service: Orthopedics   • PB TRANSURETHRAL ELEC-SURG PBOSTATECTOM  4/1/2019    Procedure: BIPOLAR TRANSURETHRAL RESECTION OF PROSTATE;  Surgeon: Jose Romo M.D.;  Location: [facility];  Service: Urology   • CYSTOSCOPY  4/1/2019    Procedure: CYSTOSCOPY;  Surgeon: Jose Romo M.D.;  Location: [facility];  Service: Urology   • URETHROTOMY INTERNAL  4/1/2019    Procedure: DIRECT VISION INTERNAL URETHROTOMY;  Surgeon: Jose Romo M.D.;  Location: [facility];  Service: Urology   • KNEE REVISION TOTAL Left 8/3/2018    Procedure: KNEE REVISION TOTAL;  Surgeon: Michael Rodriguez M.D.;  Location: [facility];  Service: Orthopedics   • CYSTOSCOPY  5/16/2018    Procedure: CYSTOSCOPY-CLOT EVAC;  Surgeon: Jose Romo M.D.;  Location: [facility];  Service: Urology   • TRANS URETHRAL RESECTION BLADDER  5/16/2018    Procedure: TRANS URETHRAL RESECTION BLADDER;  Surgeon: Jose Romo M.D.;  Location: [facility];  Service: Urology   • RECOVERY  2/3/2014    Performed by Cath-Recovery Surgery at SURGERY SAME DAY [facility]   • HIP ARTHROPLASTY TOTAL  5/31/2013    Performed by Burak Valles M.D. at [facility]   • ROTATOR CUFF REPAIR Right 2011    x 2   • KNEE ARTHROPLASTY TOTAL  6/1/2009    LEFT-Performed by YONATAN HINSON at [facility]   • VEIN LIGATION RADIO FREQUENCY  12/8/2008    LEFT leg-Performed by DEREJE MCCULLOUGH at SURGERY SAME DAY [facility]   • HIP ARTHROPLASTY TOTAL  6/20/08    Performed by YONATAN HINSON at [facility]   • LUMBAR LAMINECTOMY DISKECTOMY  2000   • TMJ RECONSTRUCTION  "BILATERAL  1994   • ANKLE ORIF Right    • KNEE ARTHROSCOPY Left    • VEIN STRIPPING Left         Allergies:  Crestor [rosuvastatin calcium]; Penicillins; and Rocephin [ceftriaxone]     Social History:  Social History     Tobacco Use   • Smoking status: Never Smoker   • Smokeless tobacco: Never Used   Substance Use Topics   • Alcohol use: Yes     Alcohol/week: 1.8 oz     Types: 3 Cans of beer per week     Frequency: 2-3 times a week     Drinks per session: 1 or 2     Comment: 3 per week   • Drug use: No        Family History:   family history includes Hypertension in his mother.      PHYSICAL EXAM:   Vital signs: /80 (BP Location: Left arm, Patient Position: Sitting, BP Cuff Size: Adult)   Pulse 84   Ht 1.778 m (5' 10\")   Wt 90.7 kg (200 lb)   SpO2 94%   BMI 28.70 kg/m²   GENERAL: Well-developed, well-nourished in no apparent distress.   EYE:  No ocular asymmetry, PERRLA  HENT: Pink, moist mucous membranes.    NECK: No thyromegaly.   CARDIOVASCULAR:  No murmurs  LUNGS: Clear breath sounds  ABDOMEN: Soft, nontender   EXTREMITIES: No clubbing, cyanosis, or edema.   NEUROLOGICAL: No gross focal motor abnormalities   LYMPH: No cervical adenopathy palpated.   SKIN: No rashes, lesions.       Labs:  Lab Results   Component Value Date/Time    WBC 10.7 06/02/2020 07:22 AM    WBC 7.5 07/01/2011 10:30 AM    RBC 5.15 06/02/2020 07:22 AM    RBC 4.72 07/01/2011 10:30 AM    HEMOGLOBIN 15.4 06/02/2020 07:22 AM    MCV 90.1 06/02/2020 07:22 AM     (H) 07/01/2011 10:30 AM    MCH 29.9 06/02/2020 07:22 AM    MCH 36.0 (H) 07/01/2011 10:30 AM    MCHC 33.2 (L) 06/02/2020 07:22 AM    RDW 59.3 (H) 06/02/2020 07:22 AM    RDW 14.0 07/01/2011 10:30 AM    MPV 10.1 06/02/2020 07:22 AM       Lab Results   Component Value Date/Time    SODIUM 136 08/03/2020 11:23 AM    POTASSIUM 5.4 08/03/2020 11:23 AM    CHLORIDE 101 08/03/2020 11:23 AM    CO2 31 08/03/2020 11:23 AM    ANION 4.0 (L) 08/03/2020 11:23 AM    GLUCOSE 94 08/03/2020 " 11:23 AM    BUN 10 08/03/2020 11:23 AM    CREATININE 0.94 08/03/2020 11:23 AM    CREATININE 1.1 04/21/2009 03:50 PM    CALCIUM 9.6 08/03/2020 11:23 AM    ASTSGOT 28 06/02/2020 07:25 AM    ALTSGPT 31 06/02/2020 07:25 AM    TBILIRUBIN 0.6 06/02/2020 07:25 AM    ALBUMIN 4.5 06/02/2020 07:25 AM    TOTPROTEIN 6.8 06/02/2020 07:25 AM    GLOBULIN 2.3 06/02/2020 07:25 AM    AGRATIO 2.0 06/02/2020 07:25 AM       No results found for: TSHULTRASEN  Lab Results   Component Value Date/Time    FREEDIR 1.11 12/01/2014 1513     No results found for: FREET3  No results found for: THYSTIMIG      Imaging:    ASSESSMENT/PLAN:     1. Secondary adrenal insufficiency (HCC)  He is tolerating the lowest dose of prednisone without any issues.  I am scheduling him for another cosyntropin stimulation test after I get the results I will take him off prednisone if his tests are good.  I will see him again in 6 months with a repeat of his ACTH and morning cortisol    2. Current chronic use of systemic steroids  Patient is on chronic steroids because of chronic adrenal insufficiency    3. High risk medication use  Patient is taking steroids which are high risk medication    4. Vitamin D deficiency  Unstable  I am going to check a calcium and 25-hydroxy vitamin D level with his next labs in 6 months      Return in about 6 months (around 3/15/2021).      Thank you kindly for allowing me to participate in the endocrine care plan for this patient.    Hector Cedillo MD, FACE, [facility]  12/13/19    CC:   John Lao D.O.

## 2020-09-15 NOTE — OP THERAPY DAILY TREATMENT
"  Outpatient Physical Therapy  DAILY TREATMENT     [facility] Physical [address].  [address]  [place] [address]  Phone:  [phone]  Fax:  [phone]    Date: 09/16/2020    Patient: Edgardo Sims  YOB: 1955  MRN: [id number]     Time Calculation    Start time: 1104  Stop time: 1150 Time Calculation (min): 46 minutes         Chief Complaint: Shoulder Problem    Visit #: 10    SUBJECTIVE:  I saw my surgeon and he thinks I'm where I should be. I have a new PT prescription; I'll bring it in next time. My shoulder hurts a little bit today 4/10; I think it's because I got cold last night.      OBJECTIVE:  Current objective measures:       115 deg GH flexion AAROM using pulleys   45 deg GH abduction AAROM with staff supine  30 deg GH ER AAROM with staff supine    Therapeutic Exercises (CPT 35804):     2. Pt education, Pt education to avoid guarded 'sling position\" once weaned off from sling    3. Pt education, Perform exercises with pillow posterior to shoulder     4. GH flexion AAROM with staff, x15, hep    5. GH abduction AAROM with staff, x15, hep    6. GH ER AAROM with staff, x15, hep; VC's to avoid elbow extension     7. Pulleys AAROM, x2min, able to attain 115 GH AAROM using pulleys without pain    8. Shoulder flexion walk back on ball, [id number]    9. Pt education, posture education x 2 min      Therapeutic Exercise Summary: MD protocol:   Sling for 5- 6 weeks.   PROM for 5 weeks then AAROM, then AROM; (Pulleys at 5-6 weeks)  Strengthening at 10-12 weeks     Pt educated to perform all above exercises to tolerance above without increase in pain.    Pt performed these exercises with instruction and SPV; pillow under L shoulder during all therex.  Provided handout with these exercises for daily HEP.      Therapeutic Treatments and Modalities:     1. E Stim Unattended (CPT 49464), IFC and mhp to L shoulder in sitting with 2 pillows to support L shoulder for comfort x 15 " min    Therapeutic Treatment and Modalities Summary:         Time-based treatments/modalities:    Physical Therapy Timed Treatment Charges  Therapeutic exercise minutes (CPT 87824): 31 minutes       Pain rating (1-10) before treatment: 4/10 L shoulder       ASSESSMENT:   Response to treatment: See progress note.        PLAN/RECOMMENDATIONS:   Plan for treatment: See progress note.

## 2020-09-16 ENCOUNTER — PHYSICAL THERAPY (OUTPATIENT)
Dept: PHYSICAL THERAPY | Facility: REHABILITATION | Age: 65
End: 2020-09-16
Attending: ORTHOPAEDIC SURGERY
Payer: MEDICARE

## 2020-09-16 DIAGNOSIS — S46.012A STRAIN OF MUSCLE(S) AND TENDON(S) OF THE ROTATOR CUFF OF LEFT SHOULDER, INITIAL ENCOUNTER: ICD-10-CM

## 2020-09-16 DIAGNOSIS — Z96.652 S/P REVISION OF TOTAL KNEE, LEFT: ICD-10-CM

## 2020-09-16 DIAGNOSIS — M19.012 PRIMARY OSTEOARTHRITIS, LEFT SHOULDER: ICD-10-CM

## 2020-09-16 DIAGNOSIS — M75.42 IMPINGEMENT SYNDROME OF LEFT SHOULDER: ICD-10-CM

## 2020-09-16 PROCEDURE — 97110 THERAPEUTIC EXERCISES: CPT

## 2020-09-16 PROCEDURE — 97014 ELECTRIC STIMULATION THERAPY: CPT

## 2020-09-16 NOTE — OP THERAPY PROGRESS SUMMARY
Outpatient Physical Therapy  PROGRESS SUMMARY NOTE      [facility] Physical Therapy [address].  [address]  [place] [address]  Phone:  [phone]  Fax:  [phone]    Date of Visit: 09/16/2020    Patient: Edgardo Sims  YOB: 1955  MRN: [id number]     Referring Provider: Emanuel Vance M.D.  [address] [place] [address]   Referring Diagnosis Primary osteoarthritis, left shoulder [M19.012];Impingement syndrome of left shoulder [M75.42];Strain of muscle(s) and tendon(s) of the rotator cuff of left shoulder, initial encounter [S46.012A]     Visit Diagnoses     ICD-10-CM   1. Primary osteoarthritis, left shoulder  M19.012   2. Strain of muscle(s) and tendon(s) of the rotator cuff of left shoulder, initial encounter  S46.012A   3. Impingement syndrome of left shoulder  M75.42   4. S/P revision of total knee, left  Z96.652       Rehab Potential: good    Progress Report Period: 8/13/20-9/16/20    Functional Assessment Used: N/A          Objective Findings and Assessment:   Patient progression towards goals:   Pt is 6 weeks s/p Left shoulder arthroscopy with rotator cuff repair (repair of L full thickness tear supraspinatus, partial tear subscapularis, L biceps tear and L SLAP lesion), biceps tenodesis, subacromial decompression, distal clavicle excision, with extensive debridement of glenohumeral Joint (8/5/20).     Pt progressing well overall with minimal pain post surgery. Initiated AAROM hep and pulleys today per MD protocol; pt attaining increasing AAROM elevations without pain (see obj). Continued poor postural awareness; anticipate may have residual GH elevation limitations due to significant kyphosis deformity. May benefit from scapulohumeral neuro re-ed in subsequent sessions.      Objective findings and assessment details: 115 deg GH flexion AAROM using pulleys   45 deg GH abduction AAROM with staff supine  30 deg GH ER AAROM with staff supine    Goals:   Short  Term Goals:   Goals:   Short Term Goals:   1. Pt will be independent with written HEP several reps per day. (Met)  2. Pt will be compliant and able to verbalize shoulder precautions. (Met)  3. Pt will demonstrate postural improvements with VC's in clinic. (Ongoing)               Short term goal time span:  4-6 weeks      Long Term Goals:    Long Term Goals:  (Ongoing goals)  1. Pt will be independent with written HEP.  2. Pt will have a  Sig improvement in Quickdash score  (eval: 61.36)  3. Pt will have L GH AROM within 10 deg of uninvolved shoulder and progress to strengthening protocol per MD recommendations/precuations.         Long term goal time span:  2-4 months    Plan:   Planned therapy interventions:  Manual Therapy (CPT 92584), Neuromuscular Re-education (CPT 36718), E Stim Unattended (CPT 66789), Therapeutic Activities (CPT 32605) and Therapeutic Exercise (CPT 72630)  Frequency:  2x week  Duration in weeks:  6  Plan details:  UPOC: 11/30/20      Referring provider co-signature:  I have reviewed this plan of care and my co-signature certifies the need for services.     Certification Period: 09/16/2020 to 11/30/20    Physician Signature: ________________________________ Date: ______________

## 2020-09-22 ENCOUNTER — PHYSICAL THERAPY (OUTPATIENT)
Dept: PHYSICAL THERAPY | Facility: REHABILITATION | Age: 65
End: 2020-09-22
Attending: ORTHOPAEDIC SURGERY
Payer: MEDICARE

## 2020-09-22 DIAGNOSIS — M75.42 IMPINGEMENT SYNDROME OF LEFT SHOULDER: ICD-10-CM

## 2020-09-22 DIAGNOSIS — M19.012 PRIMARY OSTEOARTHRITIS, LEFT SHOULDER: ICD-10-CM

## 2020-09-22 DIAGNOSIS — S46.012A STRAIN OF MUSCLE(S) AND TENDON(S) OF THE ROTATOR CUFF OF LEFT SHOULDER, INITIAL ENCOUNTER: ICD-10-CM

## 2020-09-22 PROCEDURE — 97110 THERAPEUTIC EXERCISES: CPT

## 2020-09-22 PROCEDURE — 97014 ELECTRIC STIMULATION THERAPY: CPT

## 2020-09-22 NOTE — OP THERAPY DAILY TREATMENT
"  Outpatient Physical Therapy  DAILY TREATMENT     [facility] Physical [address].  [address]  [place] [address]  Phone:  [phone]  Fax:  [phone]    Date: 09/22/2020    Patient: Edgardo Sims  YOB: 1955  MRN: [id number]     Time Calculation    Start time: 1530  Stop time: 1619 Time Calculation (min): 49 minutes         Chief Complaint: Shoulder Problem    Visit #: 11    SUBJECTIVE:  I'm doing okay today; the new exercises are definitely tiring. I sleep wearing the sling because I can't get comfortable.    Pt arriving in sling today      OBJECTIVE:  Current objective measures:        Sig t/s kyphosis deformity observed.    123 deg GH flexion AAROM using pulleys   72 deg GH abduction AAROM with staff standing  40 deg GH ER AAROM with staff supine    Therapeutic Exercises (CPT 72801):     2. Pt education, Pt edu: able to remove sling at this time and avoid guarded 'sling position\" once weaned off from sling, reviewed     3. Pt education, Pt edu: safe sleeping. Edu pt that he can sleep on wedge pillow for sleep if uncomfortable sleeping supine, additionally recommended if sleeping in R SL to sleep with pillow under L shoulder blade to avoid \"straining of supraspinatus.\" Pt verbalizing understanding    4. GH flexion AAROM with staff, x15, reviewed    5. GH abduction AAROM with staff, x15, reviewed; modified from supine to standing for ease of pt    6. GH ER AAROM with staff, x15, reviewed;; VC's to avoid elbow extension; used pillow in session     7. Pulleys AAROM, x2min, able to attain 115 GH AAROM using pulleys without pain    8. Shoulder flexion walk back on ball, [id number], edu not to push into pain ROM only no stretching; reviewed      Therapeutic Exercise Summary: MD protocol:   Sling for 5- 6 weeks.   PROM for 5 weeks then AAROM, then AROM; (Pulleys at 5-6 weeks)  Strengthening at 10-12 weeks     Pt educated to perform all above exercises to tolerance above " without increase in pain.    Pt performed these exercises with instruction and SPV; pillow under L shoulder during all therex.  Provided handout with these exercises for daily HEP.      Therapeutic Treatments and Modalities:     1. E Stim Unattended (CPT 51816), IFC and mhp to L shoulder in sitting with 2 pillows to support L shoulder for comfort x 15 min    2. Manual Therapy (CPT 65650), see below    Therapeutic Treatment and Modalities Summary: PA's to C7-T8 grade III and IV followed by STM to R c/s paraspinals, UT and lev scap and STM to R rhomboids    Pt reporting minor discomfort with PA's to t/s.     Time-based treatments/modalities:    Physical Therapy Timed Treatment Charges  Manual therapy minutes (CPT 04693): 7 minutes  Therapeutic exercise minutes (CPT 00186): 27 minutes       Pain rating (1-10) before treatment: 1/10 L shoulder       ASSESSMENT:   Response to treatment:   Pt is 7 weeks s/p Left shoulder arthroscopy with rotator cuff repair, biceps tenodesis, subacromial decompression, distal clavicle excision, with extensive debridement of glenohumeral Joint (8/5/20).     Pt presenting to therapy today in sling; sev min spent edu pt it is safe to remove sling and avoid sling position of arm. Additional edu re: sleeping positions (see above). Pt progressing well and increasing GH AAROM.       PLAN/RECOMMENDATIONS:   Plan for treatment:

## 2020-09-24 ENCOUNTER — PHYSICAL THERAPY (OUTPATIENT)
Dept: PHYSICAL THERAPY | Facility: REHABILITATION | Age: 65
End: 2020-09-24
Attending: ORTHOPAEDIC SURGERY
Payer: MEDICARE

## 2020-09-24 DIAGNOSIS — M19.012 PRIMARY OSTEOARTHRITIS, LEFT SHOULDER: ICD-10-CM

## 2020-09-24 DIAGNOSIS — M75.42 IMPINGEMENT SYNDROME OF LEFT SHOULDER: ICD-10-CM

## 2020-09-24 DIAGNOSIS — S46.012A STRAIN OF MUSCLE(S) AND TENDON(S) OF THE ROTATOR CUFF OF LEFT SHOULDER, INITIAL ENCOUNTER: ICD-10-CM

## 2020-09-24 PROCEDURE — 97014 ELECTRIC STIMULATION THERAPY: CPT

## 2020-09-24 PROCEDURE — 97110 THERAPEUTIC EXERCISES: CPT

## 2020-09-24 PROCEDURE — 97140 MANUAL THERAPY 1/> REGIONS: CPT

## 2020-09-24 NOTE — OP THERAPY DAILY TREATMENT
Outpatient Physical Therapy  DAILY TREATMENT     [facility] Physical [address].  [address]  [place] [address]  Phone:  [phone]  Fax:  [phone]    Date: 09/24/2020    Patient: Edgardo Sims  YOB: 1955  MRN: [id number]     Time Calculation    Start time: 1604  Stop time: 1651 Time Calculation (min): 47 minutes         Chief Complaint: Shoulder Problem    Visit #: 12    SUBJECTIVE:  I'm using the pillow under my shoulder and it really is making all of the difference.    OBJECTIVE:  Current objective measures:        Sig t/s kyphosis deformity observed.    140 deg GH flexion AAROM using pulleys     134 deg GH flexion AAROM with staff in hooklying   74 deg GH abduction AAROM with staff standing  45  deg GH ER AAROM with staff supine    Therapeutic Exercises (CPT 53482):     4. GH flexion AAROM with staff, 2x10, reviewed    5. GH abduction AAROM with staff, 2x10, good tolerance with hep in standing at home    6. GH ER AAROM with staff, 2x10, used pillow in session to prevent excessive GH extension     7. Pulleys AAROM, x2min    8. Shoulder flexion walk back on ball, verbal review      Therapeutic Exercise Summary: MD protocol:   Sling for 5- 6 weeks.   PROM for 5 weeks then AAROM, then AROM; (Pulleys at 5-6 weeks)  Strengthening at 10-12 weeks     Pt educated to perform all above exercises to tolerance above without increase in pain.    Pt performed these exercises with instruction and SPV; pillow under L shoulder during all therex.  Provided handout with these exercises for daily HEP.      Therapeutic Treatments and Modalities:     1. E Stim Unattended (CPT 74720), IFC and cold pack to L shoulder in sitting with 2 pillows to support L shoulder for comfort x 15 min, pt preferring ice vs. heat    2. Manual Therapy (CPT 46019), see below, x9 min    Therapeutic Treatment and Modalities Summary: STM to c/s paraspinals bilaterally followed by ischemic hold trigger  point release R UT muscle (able to decrease spasming this session)    Time-based treatments/modalities:    Physical Therapy Timed Treatment Charges  Manual therapy minutes (CPT 16544): 9 minutes  Therapeutic exercise minutes (CPT 39783): 23 minutes       Pain rating (1-10) before treatment: 1/10 L shoulder       ASSESSMENT:   Response to treatment:   Pt is 7 weeks s/p Left shoulder arthroscopy with rotator cuff repair, biceps tenodesis, subacromial decompression, distal clavicle excision, with extensive debridement of glenohumeral Joint (8/5/20).     Pt not wearing sling to therapy today; demonstrating steady improvements in GH AAROM with only mild discomfort. Pt reporting improvements with sleep since adding pillow to prevent excessive adduction. Pt indep with current hep and all questions answered. Follow up in two weeks to progress hep.      PLAN/RECOMMENDATIONS:   Plan for treatment:   Follow up in two weeks to progress hep due to good progress made in therapy. Continue 1x/wk. Pt instructed to call clinic if questions.

## 2020-09-25 DIAGNOSIS — Z79.891 CHRONIC PRESCRIPTION OPIATE USE: ICD-10-CM

## 2020-09-29 ENCOUNTER — OFFICE VISIT (OUTPATIENT)
Dept: CARDIOLOGY | Facility: MEDICAL CENTER | Age: 65
End: 2020-09-29
Payer: MEDICARE

## 2020-09-29 VITALS
SYSTOLIC BLOOD PRESSURE: 136 MMHG | BODY MASS INDEX: 29.09 KG/M2 | HEART RATE: 76 BPM | OXYGEN SATURATION: 98 % | HEIGHT: 70 IN | WEIGHT: 203.2 LBS | DIASTOLIC BLOOD PRESSURE: 70 MMHG

## 2020-09-29 DIAGNOSIS — E78.5 DYSLIPIDEMIA: ICD-10-CM

## 2020-09-29 DIAGNOSIS — I25.10 CORONARY ARTERY DISEASE DUE TO CALCIFIED CORONARY LESION: ICD-10-CM

## 2020-09-29 DIAGNOSIS — I10 ESSENTIAL HYPERTENSION: ICD-10-CM

## 2020-09-29 DIAGNOSIS — I35.1 NONRHEUMATIC AORTIC VALVE INSUFFICIENCY: ICD-10-CM

## 2020-09-29 DIAGNOSIS — Z86.39 HISTORY OF ADRENAL INSUFFICIENCY: ICD-10-CM

## 2020-09-29 DIAGNOSIS — Z79.899 HIGH RISK MEDICATION USE: ICD-10-CM

## 2020-09-29 DIAGNOSIS — Z79.891 CHRONIC USE OF OPIATE DRUG FOR THERAPEUTIC PURPOSE: ICD-10-CM

## 2020-09-29 DIAGNOSIS — R00.0 TACHYCARDIA: ICD-10-CM

## 2020-09-29 DIAGNOSIS — I25.84 CORONARY ARTERY DISEASE DUE TO CALCIFIED CORONARY LESION: ICD-10-CM

## 2020-09-29 PROCEDURE — 99214 OFFICE O/P EST MOD 30 MIN: CPT | Performed by: INTERNAL MEDICINE

## 2020-09-29 RX ORDER — PAROXETINE HYDROCHLORIDE 20 MG/1
20 TABLET, FILM COATED ORAL
Qty: 90 TAB | Refills: 3 | Status: SHIPPED | OUTPATIENT
Start: 2020-09-29 | End: 2021-10-12

## 2020-09-29 RX ORDER — EZETIMIBE 10 MG/1
10 TABLET ORAL EVERY EVENING
Qty: 90 TAB | Refills: 3 | Status: SHIPPED | OUTPATIENT
Start: 2020-09-29 | End: 2021-06-30

## 2020-09-29 RX ORDER — TESTOSTERONE CYPIONATE 200 MG/ML
INJECTION, SOLUTION INTRAMUSCULAR
COMMUNITY
Start: 2020-09-28 | End: 2020-10-29 | Stop reason: SDUPTHER

## 2020-09-29 RX ORDER — PRAVASTATIN SODIUM 80 MG/1
80 TABLET ORAL DAILY
Qty: 90 TAB | Refills: 3 | Status: SHIPPED | OUTPATIENT
Start: 2020-09-29 | End: 2021-10-13

## 2020-09-29 RX ORDER — LISINOPRIL 10 MG/1
10 TABLET ORAL
Qty: 90 TAB | Refills: 3 | Status: SHIPPED | OUTPATIENT
Start: 2020-09-29 | End: 2021-09-17

## 2020-09-29 RX ORDER — METOPROLOL TARTRATE 50 MG/1
TABLET, FILM COATED ORAL
Qty: 180 TAB | Refills: 3 | Status: SHIPPED | OUTPATIENT
Start: 2020-09-29 | End: 2021-10-13

## 2020-09-29 ASSESSMENT — ENCOUNTER SYMPTOMS
NEUROLOGICAL NEGATIVE: 1
CARDIOVASCULAR NEGATIVE: 1
DIZZINESS: 0
RESPIRATORY NEGATIVE: 1
CONSTITUTIONAL NEGATIVE: 1
SORE THROAT: 0
PALPITATIONS: 0
COUGH: 0
MUSCULOSKELETAL NEGATIVE: 1
WEAKNESS: 0
STRIDOR: 0
FEVER: 0
SPUTUM PRODUCTION: 0
ORTHOPNEA: 0
BRUISES/BLEEDS EASILY: 0
CLAUDICATION: 0
CHILLS: 0
PND: 0
WHEEZING: 0
LOSS OF CONSCIOUSNESS: 0
SHORTNESS OF BREATH: 0
EYES NEGATIVE: 1
HEMOPTYSIS: 0
GASTROINTESTINAL NEGATIVE: 1

## 2020-09-29 ASSESSMENT — FIBROSIS 4 INDEX: FIB4 SCORE: 2.06

## 2020-09-29 NOTE — PROGRESS NOTES
Chief Complaint   Patient presents with   • Follow-Up     Coronary Artery Disease        Subjective:   Edgardo Sims is a 64 y.o. male who presents today as a follow-up from a prior provider for history of nonischemic disease hypertension hyperlipidemia hyperlipidemia.  He underwent a cardiac catheterization showing no obstructive coronary disease after he abnormal nuclear stress test.  He has been on pravastatin and Zetia.  He did have a previous intolerance to Crestor.  He has blood pressure which is controlled on lisinopril metoprolol.  Otherwise he has no chest pain and is functionally active.  He is recovering from a left shoulder surgery.    Past Medical History:   Diagnosis Date   • Abnormal myocardial perfusion study 1/15/2014   • Aortic insufficiency 7/5/2011   • Arthritis     RA, and osteo   • Bronchitis    • CAD (coronary artery disease) mild plaque at cath in 2/14 12/5/2014   • Cancer (HCC)     skin   • Chronic use of opiate drugs therapeutic purposes 8/12/2017   • Dental disorder     Upper dentures.   • Dyslipidemia 7/12/2011   • Heart burn    • Heart murmur    • Hiatus hernia syndrome    • High cholesterol    • HTN (hypertension) 7/5/2011   • Hypertension    • Indigestion    • Infectious disease    • Pain     RA   • Pain     hands, feet and jaw   • Rheumatoid arthritis(714.0)     severe   • Tachycardia 10/20/2014     Past Surgical History:   Procedure Laterality Date   • PB SHLDR ARTHROSCOP,PART ACROMIOPLAS Left 8/5/2020    Procedure: DECOMPRESSION, SHOULDER, ARTHROSCOPIC - SUBACROMIAL;  Surgeon: Emanuel Vance M.D.;  Location: SURGERY [facility];  Service: Orthopedics   • PB SHLDR ARTHROSCOP,SURG,W/ROTAT CUFF REPB Left 8/5/2020    Procedure: ARTHROSCOPY, SHOULDER, WITH ROTATOR CUFF REPAIR - AND DALAL;  Surgeon: Emanuel Vance M.D.;  Location: SURGERY [facility];  Service: Orthopedics   • CLAVICLE DISTAL EXCISION Left 8/5/2020    Procedure: EXCISION, CLAVICLE, DISTAL - WITH  EXTENSIVE DEBRIDEMENT;  Surgeon: Emanuel Vance M.D.;  Location: [facility];  Service: Orthopedics   • PB TRANSURETHRAL ELEC-SURG PBOSTATECTOM  4/1/2019    Procedure: BIPOLAR TRANSURETHRAL RESECTION OF PROSTATE;  Surgeon: Jose Romo M.D.;  Location: [facility];  Service: Urology   • CYSTOSCOPY  4/1/2019    Procedure: CYSTOSCOPY;  Surgeon: Jose Romo M.D.;  Location: SURGERY [facility];  Service: Urology   • URETHROTOMY INTERNAL  4/1/2019    Procedure: DIRECT VISION INTERNAL URETHROTOMY;  Surgeon: Jose Romo M.D.;  Location: [facility];  Service: Urology   • KNEE REVISION TOTAL Left 8/3/2018    Procedure: KNEE REVISION TOTAL;  Surgeon: Michael Rodriguez M.D.;  Location: [facility];  Service: Orthopedics   • CYSTOSCOPY  5/16/2018    Procedure: CYSTOSCOPY-CLOT EVAC;  Surgeon: Jose Romo M.D.;  Location: [facility];  Service: Urology   • TRANS URETHRAL RESECTION BLADDER  5/16/2018    Procedure: TRANS URETHRAL RESECTION BLADDER;  Surgeon: Jose Romo M.D.;  Location: [facility];  Service: Urology   • RECOVERY  2/3/2014    Performed by Cath-Recovery Surgery at SURGERY SAME DAY [facility]   • HIP ARTHROPLASTY TOTAL  5/31/2013    Performed by Burak Valles M.D. at [facility]   • ROTATOR CUFF REPAIR Right 2011    x 2   • KNEE ARTHROPLASTY TOTAL  6/1/2009    LEFT-Performed by YONATAN HINSON at SURGERY [facility]   • VEIN LIGATION RADIO FREQUENCY  12/8/2008    LEFT leg-Performed by DEREJE MCCULLOUGH at SURGERY SAME DAY [facility]   • HIP ARTHROPLASTY TOTAL  6/20/08    Performed by YONATAN HINSON at [facility]   • LUMBAR LAMINECTOMY DISKECTOMY  2000   • TMJ RECONSTRUCTION BILATERAL  1994   • ANKLE ORIF Right    • KNEE ARTHROSCOPY Left    • VEIN STRIPPING Left      Family History   Problem Relation Age of Onset   • Hypertension Mother       Social History     Socioeconomic History   • Marital status:      Spouse name: Not on file   • Number of children: Not on file   • Years of education: Not on file   • Highest education level: Not on file   Occupational History   • Not on file   Social Needs   • Financial resource strain: Not on file   • Food insecurity     Worry: Not on file     Inability: Not on file   • Transportation needs     Medical: Not on file     Non-medical: Not on file   Tobacco Use   • Smoking status: Never Smoker   • Smokeless tobacco: Never Used   Substance and Sexual Activity   • Alcohol use: Yes     Alcohol/week: 1.8 oz     Types: 3 Cans of beer per week     Frequency: 2-3 times a week     Drinks per session: 1 or 2     Comment: 3 per week   • Drug use: No   • Sexual activity: Yes     Partners: Female   Lifestyle   • Physical activity     Days per week: Not on file     Minutes per session: Not on file   • Stress: Not on file   Relationships   • Social connections     Talks on phone: Not on file     Gets together: Not on file     Attends Church service: Not on file     Active member of club or organization: Not on file     Attends meetings of clubs or organizations: Not on file     Relationship status: Not on file   • Intimate partner violence     Fear of current or ex partner: Not on file     Emotionally abused: Not on file     Physically abused: Not on file     Forced sexual activity: Not on file   Other Topics Concern   •  Service No   • Blood Transfusions No   • Caffeine Concern No   • Occupational Exposure No   • Hobby Hazards Yes     Comment: rides motorcyle   • Sleep Concern No   • Stress Concern No   • Weight Concern Yes   • Special Diet Yes     Comment: does not eat red meat    • Back Care Yes   • Exercise Yes   • Bike Helmet Yes   • Seat Belt Yes   • Self-Exams Yes   Social History Narrative   • Not on file     Allergies   Allergen Reactions   • Crestor [Rosuvastatin Calcium] Myalgia   • Penicillins  Hives, Itching and Vomiting   • Rocephin [Ceftriaxone] Rash     Redness and flushing all over body     Outpatient Encounter Medications as of 9/29/2020   Medication Sig Dispense Refill   • testosterone cypionate (DEPO-TESTOSTERONE) 200 MG/ML Solution injection      • pravastatin (PRAVACHOL) 80 MG tablet Take 1 Tab by mouth every day. 90 Tab 3   • ezetimibe (ZETIA) 10 MG Tab Take 1 Tab by mouth every evening. 90 Tab 3   • lisinopril (PRINIVIL) 10 MG Tab Take 1 Tab by mouth every day. 90 Tab 3   • metoprolol (LOPRESSOR) 50 MG Tab TAKE 1 TABLET BY MOUTH TWICE A  Tab 3   • [START ON 10/30/2020] oxyCODONE-acetaminophen (PERCOCET-10)  MG Tab Take 1-2 Tabs by mouth every four hours as needed for Severe Pain for up to 30 days. 150 Tab 0   • meloxicam (MOBIC) 7.5 MG Tab TAKE 1 TABLET BY MOUTH DAILY AS NEEDED FOR SEVERE JOINT PAIN 90 Tab 1   • Tofacitinib Citrate (XELJANZ) 5 MG Tab Take 5 mg by mouth 2 Times a Day. 180 Tab 1   • hydroxychloroquine (PLAQUENIL) 200 MG Tab 1 tab po bid 180 Tab 1   • predniSONE (DELTASONE) 1 MG Tab TAKE 2 TABLET BY MOUTH EVERY  Tab 1   • cyclobenzaprine (FLEXERIL) 10 MG Tab TAKE ONE TABLET BY MOUTH THREE TIMES DAILY AS NEEDED FOR MILD PAIN 90 Tab 5   • vardenafil (LEVITRA) 20 MG tablet Take 20 mg by mouth as needed.     • PARoxetine (PAXIL) 20 MG Tab Take 1 Tab by mouth every day. 90 Tab 3   • CALCIUM-VITAMIN D PO Take 1 Tab by mouth 2 Times a Day.     • ascorbic acid (ASCORBIC ACID) 500 MG Tab Take 500 mg by mouth every day.     • Cyanocobalamin (VITAMIN B-12) 5000 MCG TABLET DISPERSIBLE Take 1 Tab by mouth every day.     • Zinc 50 MG Cap Take 50 mg by mouth every day.     • omeprazole (PRILOSEC) 20 MG CPDR Take 20 mg by mouth every day.     • [DISCONTINUED] metoprolol (LOPRESSOR) 50 MG Tab TAKE 1 TABLET BY MOUTH TWICE A DAY 60 Tab 0   • [DISCONTINUED] pravastatin (PRAVACHOL) 80 MG tablet Take 1 Tab by mouth every day. 90 Tab 2   • [DISCONTINUED] lisinopril (PRINIVIL) 10 MG  "Tab TAKE 1 TABLET BY MOUTH EVERY DAY 90 Tab 3   • [DISCONTINUED] ezetimibe (ZETIA) 10 MG Tab Take 10 mg by mouth every evening.     • [DISCONTINUED] niacin 500 MG Tab Take 500 mg by mouth every day.       No facility-administered encounter medications on file as of 9/29/2020.      Review of Systems   Constitutional: Negative.  Negative for chills, fever and malaise/fatigue.   HENT: Negative.  Negative for sore throat.    Eyes: Negative.    Respiratory: Negative.  Negative for cough, hemoptysis, sputum production, shortness of breath, wheezing and stridor.    Cardiovascular: Negative.  Negative for chest pain, palpitations, orthopnea, claudication, leg swelling and PND.   Gastrointestinal: Negative.    Genitourinary: Negative.    Musculoskeletal: Negative.    Skin: Negative.    Neurological: Negative.  Negative for dizziness, loss of consciousness and weakness.   Endo/Heme/Allergies: Negative.  Does not bruise/bleed easily.   All other systems reviewed and are negative.       Objective:   /70 (BP Location: Left arm, Patient Position: Sitting, BP Cuff Size: Adult)   Pulse 76   Ht 1.778 m (5' 10\")   Wt 92.2 kg (203 lb 3.2 oz)   SpO2 98%   BMI 29.16 kg/m²     Physical Exam   Constitutional: He appears well-developed and well-nourished. No distress.   HENT:   Head: Normocephalic and atraumatic.   Right Ear: External ear normal.   Left Ear: External ear normal.   Nose: Nose normal.   Mouth/Throat: No oropharyngeal exudate.   Eyes: Pupils are equal, round, and reactive to light. Conjunctivae and EOM are normal. Right eye exhibits no discharge. Left eye exhibits no discharge. No scleral icterus.   Neck: Neck supple. No JVD present.   Cardiovascular: Normal rate, regular rhythm and intact distal pulses. Exam reveals no gallop and no friction rub.   No murmur heard.  Pulmonary/Chest: Effort normal. No stridor. No respiratory distress. He has no wheezes. He has no rales. He exhibits no tenderness.   Abdominal: Soft. " He exhibits no distension. There is no guarding.   Musculoskeletal: Normal range of motion.         General: No tenderness, deformity or edema.   Neurological: He is alert. He has normal reflexes. He displays normal reflexes. No cranial nerve deficit. He exhibits normal muscle tone. Coordination normal.   Skin: Skin is warm and dry. No rash noted. He is not diaphoretic. No erythema. No pallor.   Psychiatric: He has a normal mood and affect. His behavior is normal. Judgment and thought content normal.   Nursing note and vitals reviewed.    Echocardiogram dated 11/12/2019 personally interpreted myself showing an EF of 65% otherwise normal.    Nuclear stress test: Dated 5/8/2013 personally reviewed her myself showing ischemia.    Lab Results   Component Value Date/Time    CHOLSTRLTOT 150 06/02/2020 07:25 AM    LDL 76 06/02/2020 07:25 AM    HDL 51 06/02/2020 07:25 AM    TRIGLYCERIDE 113 06/02/2020 07:25 AM       Lab Results   Component Value Date/Time    SODIUM 136 08/03/2020 11:23 AM    POTASSIUM 5.4 08/03/2020 11:23 AM    CHLORIDE 101 08/03/2020 11:23 AM    CO2 31 08/03/2020 11:23 AM    GLUCOSE 94 08/03/2020 11:23 AM    BUN 10 08/03/2020 11:23 AM    CREATININE 0.94 08/03/2020 11:23 AM    CREATININE 1.1 04/21/2009 03:50 PM    BUNCREATRAT 13 09/21/2016 09:21 AM     Lab Results   Component Value Date/Time    ALKPHOSPHAT 67 06/02/2020 07:25 AM    ASTSGOT 28 06/02/2020 07:25 AM    ALTSGPT 31 06/02/2020 07:25 AM    TBILIRUBIN 0.6 06/02/2020 07:25 AM        Assessment:     1. History of adrenal insufficiency     2. High risk medication use     3. Essential hypertension  ezetimibe (ZETIA) 10 MG Tab    lisinopril (PRINIVIL) 10 MG Tab    metoprolol (LOPRESSOR) 50 MG Tab   4. Dyslipidemia  pravastatin (PRAVACHOL) 80 MG tablet    ezetimibe (ZETIA) 10 MG Tab   5. Coronary artery disease due to calcified coronary lesion: Mildly cardiac catheterization in 2014     6. Chronic use of opiate drug for therapeutic purpose     7.  Nonrheumatic aortic valve insufficiency     8. Tachycardia         Medical Decision Making:  Today's Assessment / Status / Plan:     64-year-old male with a nonischemic nonobstructive coronary disease with hypertension hyperlipidemia.  For his hyperlipidemia we discussed switching from pravastatin to a moderate dose statin however given his LDL is at goal we will keep on pravastatin and Zetia.  I refilled this for 1 year.  Otherwise his blood pressure is at goal.  I refilled his lisinopril and metoprolol.  I reviewed his labs.  We can see him back in 1 year.

## 2020-10-01 DIAGNOSIS — F41.9 ANXIETY: ICD-10-CM

## 2020-10-01 RX ORDER — DIAZEPAM 5 MG/1
TABLET ORAL
Qty: 60 TAB | Refills: 1 | Status: SHIPPED | OUTPATIENT
Start: 2020-10-01 | End: 2021-02-19

## 2020-10-05 DIAGNOSIS — M05.9 RHEUMATOID ARTHRITIS WITH POSITIVE RHEUMATOID FACTOR, INVOLVING UNSPECIFIED SITE (HCC): ICD-10-CM

## 2020-10-05 DIAGNOSIS — Z79.899 ENCOUNTER FOR LONG-TERM (CURRENT) USE OF HIGH-RISK MEDICATION: ICD-10-CM

## 2020-10-05 RX ORDER — PREDNISONE 1 MG/1
TABLET ORAL
Qty: 90 TAB | Refills: 1 | Status: SHIPPED | OUTPATIENT
Start: 2020-10-05 | End: 2021-03-09

## 2020-10-05 RX ORDER — TOFACITINIB 5 MG/1
5 TABLET, FILM COATED ORAL 2 TIMES DAILY
Qty: 180 TAB | Refills: 0 | Status: SHIPPED | OUTPATIENT
Start: 2020-10-05 | End: 2020-11-09 | Stop reason: SDUPTHER

## 2020-10-05 NOTE — TELEPHONE ENCOUNTER
Received request via: Pharmacy  Aug labs  Was the patient seen in the last year in this department? Yes    Does the patient have an active prescription (recently filled or refills available) for medication(s) requested? No

## 2020-10-09 ENCOUNTER — APPOINTMENT (OUTPATIENT)
Dept: PHYSICAL THERAPY | Facility: REHABILITATION | Age: 65
End: 2020-10-09
Attending: ORTHOPAEDIC SURGERY
Payer: MEDICARE

## 2020-10-15 ENCOUNTER — PHYSICAL THERAPY (OUTPATIENT)
Dept: PHYSICAL THERAPY | Facility: REHABILITATION | Age: 65
End: 2020-10-15
Attending: ORTHOPAEDIC SURGERY
Payer: MEDICARE

## 2020-10-15 DIAGNOSIS — Z96.652 S/P REVISION OF TOTAL KNEE, LEFT: ICD-10-CM

## 2020-10-15 DIAGNOSIS — M75.42 IMPINGEMENT SYNDROME OF LEFT SHOULDER: ICD-10-CM

## 2020-10-15 DIAGNOSIS — M19.012 PRIMARY OSTEOARTHRITIS, LEFT SHOULDER: ICD-10-CM

## 2020-10-15 DIAGNOSIS — S46.012A STRAIN OF MUSCLE(S) AND TENDON(S) OF THE ROTATOR CUFF OF LEFT SHOULDER, INITIAL ENCOUNTER: ICD-10-CM

## 2020-10-15 PROCEDURE — 97110 THERAPEUTIC EXERCISES: CPT

## 2020-10-15 NOTE — OP THERAPY DAILY TREATMENT
Outpatient Physical Therapy  DAILY TREATMENT     [facility] Physical [address]  [address]  [place] [address]  Phone:  [phone]  Fax:  [phone]    Date: 10/15/2020    Patient: Edgardo Sims  YOB: 1955  MRN: [id number]     Time Calculation    Start time: 0802  Stop time: 0832 Time Calculation (min): 30 minutes         Chief Complaint: Shoulder Problem    Visit #: 13      SUBJECTIVE:  I'm doing pretty well. I have very minimal shoulder pain, maybe a 1 or 1.5 if that. It's getting a little colder so my shoulder is feeling pretty stiff.     OBJECTIVE:  Current objective measures:       Therapeutic Exercises (CPT 27107):     4. Rows white TB, x10 level 1 TB; 3 sec hold, added to hep    5. SA slides at wall, x10, added to hep    6. Pulleys GH flexion, x2 min, VC's to slow down movement    7. Scaption at wall to 90 deg, x10, added to hep    8.  ER with staff, x10 , reviewed     9. Pt education, continue mobilizing GH into ER    10. Pt education, no jerking and quick movements; no lifting >5 #      Therapeutic Exercise Summary: MD protocol:   Sling for 5- 6 weeks.   PROM for 5 weeks then AAROM, then AROM; (Pulleys at 5-6 weeks)  Strengthening at 10-12 weeks     Pt educated to perform all above exercises to tolerance above without increase in pain.    Pt performed these exercises with instruction and SPV; pillow under L shoulder during all therex.  Provided handout with these exercises for daily HEP.      Therapeutic Treatments and Modalities:     2. Manual Therapy (CPT 04234), see below, x7 min    Therapeutic Treatment and Modalities Summary: Post mobs gr III at  L with increasing elevation//no shoulder pain (set up towel roll at axilla and pillow post to GH for comfort)    Time-based treatments/modalities:    Physical Therapy Timed Treatment Charges  Manual therapy minutes (CPT 07850): 7 minutes  Therapeutic exercise minutes (CPT 29887): 23 minutes       Pain  rating (1-10) before treatment: 1 to 1.5/10 L shoulder       ASSESSMENT:   Response to treatment: See progress note.      PLAN/RECOMMENDATIONS:   Plan for treatment:   Follow up 1x/wk for strengthening progression for hep. See Progress note for details.

## 2020-10-15 NOTE — OP THERAPY PROGRESS SUMMARY
Outpatient Physical Therapy  PROGRESS SUMMARY NOTE      [facility] Physical Therapy [address]  [address]  [place] [address]  Phone:  [phone]  Fax:  [phone]    Date of Visit: 10/15/2020    Patient: Edgardo Sims  YOB: 1955  MRN: [id number]     Referring Provider: Emanuel Vance M.D.  [address] [place] [address]   Referring Diagnosis Primary osteoarthritis, left shoulder [M19.012];Impingement syndrome of left shoulder [M75.42];Strain of muscle(s) and tendon(s) of the rotator cuff of left shoulder, initial encounter [S46.012A]     Visit Diagnoses     ICD-10-CM   1. Primary osteoarthritis, left shoulder  M19.012   2. Strain of muscle(s) and tendon(s) of the rotator cuff of left shoulder, initial encounter  S46.012A   3. Impingement syndrome of left shoulder  M75.42   4. S/P revision of total knee, left  Z96.652       Rehab Potential: good    Progress Report Period: 8/13/20-10/15/20    Functional Assessment Used  Quickdash General Total Score: 31.82       Objective Findings and Assessment:   Patient progression towards goals: Pt is 10 weeks s/p Left shoulder arthroscopy with rotator cuff repair, biceps tenodesis, subacromial decompression, distal clavicle excision, with extensive debridement of glenohumeral Joint (8/5/20).     Pt has been seen for 13 visits of skilled therapy and has been progressing well. Pt has had minimal pain throughout rehab and demonstrated increasing GH AROM meeting rehab milestones. Pt has L GH AROM matching uninvolved UE except for some continued limitations in GH ER. Pt may benefit from continued sessions of skilled physical therapy at once a week to continue mobilizing and meeting GH ER goals and establishing strengthening program for independent maintenance.                         Objective findings and assessment details: Quickdash General Total Score: 31.82      Sig t/s kyphosis deformity observed.    Pre treatment:  GH  AROM:  130 deg L GH flexion   (132 deg R GH flexion)    115 deg L GH abd  (115 deg R GH abd)    44 deg L GH ER   (53 deg R GH ER)    GH IR BTB  L: T7  (R: T5)    Goals:   Short Term Goals:   Short Term Goals:   1. Pt will be independent with written HEP several reps per day. (Met)  2. Pt will be compliant and able to verbalize shoulder precautions. (Met)  3. Pt will demonstrate postural improvements with VC's in clinic. (Met)  4. Pt will be compliant with new precautions for shoulder strengthening for safe transition in rehab (NEW)     Short term goal time span:  1-2 weeks      Long Term Goals:    Long Term Goals:    1. Pt will be independent with written HEP. (Met)  2. Pt will have a  Sig improvement in Quickdash score  (eval: 61.36) (Met)  3. Pt will have L GH AROM within 10 deg of uninvolved shoulder and progress to strengthening protocol per MD recommendations/precuations. (Met)  4. Pt will attain GH ER within 5 deg of uninvolved R GH joint (NEW)  5. Pt will demonstrate appropriate shoulder blade positioning at rest to progress to phase III strengthening (NEW)  6. Pt will be indep with initiation of RC and progressive strengthening hep for maintenance (NEW)       Long term goal time span:  4-6 weeks    Plan:   Planned therapy interventions:  Neuromuscular Re-education (CPT 86959), E Stim Unattended (CPT 50413), Manual Therapy (CPT 64075), Therapeutic Exercise (CPT 01189) and Therapeutic Activities (CPT 22363)  Frequency:  1x week  Duration in weeks:  4  Duration in visits:  4  Plan details:  UPOC: 11/13/20      Referring provider co-signature:  I have reviewed this plan of care and my co-signature certifies the need for services.     Certification Period: 10/15/2020 to 11/13/20    Physician Signature: ________________________________ Date: ______________

## 2020-10-16 ENCOUNTER — OUTPATIENT INFUSION SERVICES (OUTPATIENT)
Dept: ONCOLOGY | Facility: MEDICAL CENTER | Age: 65
End: 2020-10-16
Attending: FAMILY MEDICINE
Payer: MEDICARE

## 2020-10-16 VITALS
SYSTOLIC BLOOD PRESSURE: 145 MMHG | OXYGEN SATURATION: 98 % | HEART RATE: 80 BPM | RESPIRATION RATE: 18 BRPM | HEIGHT: 69 IN | BODY MASS INDEX: 30.79 KG/M2 | WEIGHT: 207.89 LBS | TEMPERATURE: 97.3 F | DIASTOLIC BLOOD PRESSURE: 75 MMHG

## 2020-10-16 DIAGNOSIS — E27.40 INSUFFICIENCY, ADRENAL (HCC): ICD-10-CM

## 2020-10-16 LAB
CORTIS SERPL-MCNC: 15.1 UG/DL (ref 0–23)
CORTIS SERPL-MCNC: 19 UG/DL (ref 0–23)
CORTIS SERPL-MCNC: 8.4 UG/DL (ref 0–23)

## 2020-10-16 PROCEDURE — 306780 HCHG STAT FOR TRANSFUSION PER CASE

## 2020-10-16 PROCEDURE — 700111 HCHG RX REV CODE 636 W/ 250 OVERRIDE (IP): Performed by: INTERNAL MEDICINE

## 2020-10-16 PROCEDURE — 96374 THER/PROPH/DIAG INJ IV PUSH: CPT

## 2020-10-16 PROCEDURE — 82533 TOTAL CORTISOL: CPT

## 2020-10-16 PROCEDURE — 82024 ASSAY OF ACTH: CPT

## 2020-10-16 PROCEDURE — 36415 COLL VENOUS BLD VENIPUNCTURE: CPT

## 2020-10-16 RX ORDER — COSYNTROPIN 0.25 MG/ML
0.25 INJECTION, POWDER, FOR SOLUTION INTRAMUSCULAR; INTRAVENOUS ONCE
Status: COMPLETED | OUTPATIENT
Start: 2020-10-16 | End: 2020-10-16

## 2020-10-16 RX ADMIN — COSYNTROPIN 250 MCG: 0.25 INJECTION, POWDER, LYOPHILIZED, FOR SOLUTION INTRAVENOUS at 09:37

## 2020-10-16 ASSESSMENT — FIBROSIS 4 INDEX: FIB4 SCORE: 2.06

## 2020-10-16 NOTE — LETTER
Infusion Services   [address] [place] [address]  [place] [address]  Phone: [phone]  Fax: [phone]              Dear Dr. Cedillo,    Your patient, Edgardo Sims (: 1955), was scheduled at [facility].  Edgardo's encounter diagnosis is:  1. Insufficiency, adrenal (HCC)  ACTH    CORTISOL    ACTH    CORTISOL    cosyntropin (CORTROSYN) injection 250 mcg    CORTISOL    CORTISOL    CORTISOL    CORTISOL     He arrived for his appointment, and  the scheduled treatment was   given. These medications were administered to the patient: We administered cosyntropin..  Edgardo Sims  tolerated treatment. In addition, the following labs were drawn    Recent Results (from the past 24 hour(s))   CORTISOL    Collection Time: 10/16/20  9:15 AM   Result Value Ref Range    Cortisol 8.4 0.0 - 23.0 ug/dL   CORTISOL    Collection Time: 10/16/20 10:07 AM   Result Value Ref Range    Cortisol 15.1 0.0 - 23.0 ug/dL            His next appointment is did not reschedule; because he does not need any further appointments.    For more information, you may review the nurse's progress notes in chart review under the notes section.       Sincerely,  Marcie Thompson R.N.

## 2020-10-19 LAB — ACTH PLAS-MCNC: 17.3 PG/ML (ref 7.2–63.3)

## 2020-10-23 ENCOUNTER — PHYSICAL THERAPY (OUTPATIENT)
Dept: PHYSICAL THERAPY | Facility: REHABILITATION | Age: 65
End: 2020-10-23
Attending: ORTHOPAEDIC SURGERY
Payer: MEDICARE

## 2020-10-23 DIAGNOSIS — S46.012A STRAIN OF MUSCLE(S) AND TENDON(S) OF THE ROTATOR CUFF OF LEFT SHOULDER, INITIAL ENCOUNTER: ICD-10-CM

## 2020-10-23 DIAGNOSIS — M19.012 PRIMARY OSTEOARTHRITIS, LEFT SHOULDER: ICD-10-CM

## 2020-10-23 PROCEDURE — 97140 MANUAL THERAPY 1/> REGIONS: CPT

## 2020-10-23 PROCEDURE — 97110 THERAPEUTIC EXERCISES: CPT

## 2020-10-23 NOTE — OP THERAPY DAILY TREATMENT
Outpatient Physical Therapy  DAILY TREATMENT     [facility] Physical [address].  [address]  [place] [address]  Phone:  [phone]  Fax:  [phone]    Date: 10/23/2020    Patient: Edgardo Sims  YOB: 1955  MRN: [id number]     Time Calculation    Start time: 1100  Stop time: 1130 Time Calculation (min): 30 minutes         Chief Complaint: Shoulder Problem and Post-Op Pain    Visit #: 14    SUBJECTIVE:  R shoulder pops with AROM and HEP.  Not a painful pop.  Pt sees orthopedist next week, will ask about R shoulder and possible PT referral.    OBJECTIVE:        Therapeutic Exercises (CPT 50132):     1. Standing wand , chest press x10, circles x10 forward and backward, add to HEP    2. Shoulder depression into ball at table, x10l, add to HEP    3. Ball roll on table, flex/ext x10, circles x10 CW/CCW    4. Rows white TB, x10 level 1 TB; 3 sec hold, reviewed HEP    5. Ball walk up wall, x10, add to HEP    6. Pulleys GH flexion, x2 min, VC's to slow down movement    7. Scaption at wall to 90 deg, x15, reviewed HEP    8. B shoulder ER AROM at wall, x15    9. Pt education, continue mobilizing GH into ER    10. Pt education, no jerking and quick movements; no lifting >5 #    11. Sidelying shoulder ER, 15, add to HEP    12. Scap retraction against 1/2 FR at wall    13. Posterior scalene stretches     14. Sidelying shoulder abd, x15, add to HEP      Therapeutic Exercise Summary: MD protocol:   Sling for 5- 6 weeks.   PROM for 5 weeks then AAROM, then AROM; (Pulleys at 5-6 weeks)  Strengthening at 10-12 weeks     Pt educated to perform all above exercises to tolerance above without increase in pain.    Pt performed these exercises with instruction and SPV; pillow under L shoulder during all therex.  Provided handout with these exercises for daily HEP.      Therapeutic Treatments and Modalities:     2. Manual Therapy (CPT 61367)    Therapeutic Treatment and Modalities Summary:  Post and inf mobs gr III at GH L with increasing elevation//no shoulder pain (set up towel roll under elbow for patient comfort).  STM distal pec, teres and subscap.  Tack and stretch teres with passive shoulder scaption, manually resisted scap retraction, scap mobs.    Time-based treatments/modalities:    Physical Therapy Timed Treatment Charges  Manual therapy minutes (CPT 94643): 10 minutes  Therapeutic exercise minutes (CPT 23250): 20 minutes    ASSESSMENT:   Response to treatment: Good progress in AROM and therex gagandeep s/p L shoulder surgery.    PLAN/RECOMMENDATIONS:   Plan for treatment: therapy treatment to continue next visit.  Planned interventions for next visit: continue with current treatment.  Progress AROM and HEP as tolerated.

## 2020-10-29 ENCOUNTER — PHYSICAL THERAPY (OUTPATIENT)
Dept: PHYSICAL THERAPY | Facility: REHABILITATION | Age: 65
End: 2020-10-29
Attending: ORTHOPAEDIC SURGERY
Payer: MEDICARE

## 2020-10-29 DIAGNOSIS — E29.1 HYPOGONADISM MALE: ICD-10-CM

## 2020-10-29 DIAGNOSIS — Z96.652 S/P REVISION OF TOTAL KNEE, LEFT: ICD-10-CM

## 2020-10-29 DIAGNOSIS — M19.012 PRIMARY OSTEOARTHRITIS, LEFT SHOULDER: ICD-10-CM

## 2020-10-29 DIAGNOSIS — S46.012A STRAIN OF MUSCLE(S) AND TENDON(S) OF THE ROTATOR CUFF OF LEFT SHOULDER, INITIAL ENCOUNTER: ICD-10-CM

## 2020-10-29 DIAGNOSIS — M75.42 IMPINGEMENT SYNDROME OF LEFT SHOULDER: ICD-10-CM

## 2020-10-29 PROCEDURE — 97110 THERAPEUTIC EXERCISES: CPT

## 2020-10-29 PROCEDURE — 97014 ELECTRIC STIMULATION THERAPY: CPT

## 2020-10-29 RX ORDER — TESTOSTERONE CYPIONATE 200 MG/ML
200 INJECTION, SOLUTION INTRAMUSCULAR
Qty: 10 ML | Refills: 0 | Status: SHIPPED | OUTPATIENT
Start: 2020-10-29 | End: 2021-01-29

## 2020-10-29 NOTE — OP THERAPY DAILY TREATMENT
Outpatient Physical Therapy  DAILY TREATMENT     [facility] Physical [address]  [address]  [place] [address]  Phone:  [phone]  Fax:  [phone]    Date: 10/29/2020    Patient: Edgardo Sims  YOB: 1955  MRN: [id number]     Time Calculation    Start time: 0831  Stop time: 0913 Time Calculation (min): 42 minutes         Chief Complaint: Shoulder Problem    Visit #: 15    SUBJECTIVE:  Laying on my L shoulder caused numbness in my hand; that's never happened before. I felt pretty good after the last session. I see Dr. Vance's assistance next Tuesday.    OBJECTIVE:       GH AROM (Assessed in sitting):  41 deg L GH ER pre manual  44 deg L GH ER post manual      Therapeutic Exercises (CPT 81068):     1. Standing wand , chest press x10, circles x10 forward and backward, reviewed; VC's for no shoulder hiking    3. Ball roll on table, flex/ext x10, circles x10 CW/CCW    4. Rows white TB>level 1 TB, x10 level 1 TB; 3 sec hold, progressed hep    6. Pulleys GH flexion and GH IR , x2 min, VC's to slow down movement; warm up    7. Scaption at wall to 90 deg, x10 2# weight, progressed HEP    10. Pt education, no jerking and quick movements; reviewed lifting limits LUE, reviewed    11. Sidelying shoulder ER>add 1#, 15, fatiguing    14. Sidelying shoulder abd, x15, reviewed     15. GH ER walkout at wall, x5; 1# weight in pillow case, (maintaining GH ER at wall 1# weight in pillow case)//muscle juddering. Soreness after 5 reps    16. Pt education , utilizing can or water bottle at home in place of 1# weight      Therapeutic Exercise Summary: MD protocol:   Sling for 5- 6 weeks.   PROM for 5 weeks then AAROM, then AROM; (Pulleys at 5-6 weeks)  Strengthening at 10-12 weeks             Therapeutic Treatments and Modalities:     2. Manual Therapy (CPT 59973)    3. E Stim Unattended (CPT 06209), IFC to L shoulder with ice pack, pt req ice    Therapeutic Treatment and Modalities  Summary: Post mobs gr III at GH L with increasing elevation//no shoulder pain (set up towel roll under elbow for patient comfort).  STM distal pec, teres and subscap.  Tack and stretch teres with passive shoulder scaption, manually resisted scap retraction, scap mobs.    Time-based treatments/modalities:    Physical Therapy Timed Treatment Charges  Manual therapy minutes (CPT 08864): 6 minutes  Therapeutic exercise minutes (CPT 15485): 21 minutes    ASSESSMENT:   Response to treatment: No change noted in L GH ER from previous session. Increasing strength and good tolerance to progressed therex using 1-2# weight increases with minor soreness L shoulder at end of session. Progressed hep with edu to use can of soup/beans or water bottle for 1# resistance with current hep routine.    PLAN/RECOMMENDATIONS:   Plan for treatment: therapy treatment to continue next visit.  Planned interventions for next visit: continue with current treatment.  Progress strengthening as tolerated. Review visit with surgeon.

## 2020-11-05 ENCOUNTER — PHYSICAL THERAPY (OUTPATIENT)
Dept: PHYSICAL THERAPY | Facility: REHABILITATION | Age: 65
End: 2020-11-05
Attending: ORTHOPAEDIC SURGERY
Payer: MEDICARE

## 2020-11-05 DIAGNOSIS — M19.012 PRIMARY OSTEOARTHRITIS, LEFT SHOULDER: ICD-10-CM

## 2020-11-05 DIAGNOSIS — S46.012A STRAIN OF MUSCLE(S) AND TENDON(S) OF THE ROTATOR CUFF OF LEFT SHOULDER, INITIAL ENCOUNTER: ICD-10-CM

## 2020-11-05 DIAGNOSIS — Z96.652 S/P REVISION OF TOTAL KNEE, LEFT: ICD-10-CM

## 2020-11-05 DIAGNOSIS — M75.42 IMPINGEMENT SYNDROME OF LEFT SHOULDER: ICD-10-CM

## 2020-11-05 PROCEDURE — 97110 THERAPEUTIC EXERCISES: CPT

## 2020-11-05 NOTE — OP THERAPY DAILY TREATMENT
Outpatient Physical Therapy  DAILY TREATMENT     [facility] Physical [address]  [address]  [place] [address]  Phone:  [phone]  Fax:  [phone]    Date: 11/05/2020    Patient: Edgardo Sims  YOB: 1955  MRN: [id number]     Time Calculation    Start time: 0931  Stop time: 1002 Time Calculation (min): 31 minutes         Chief Complaint: Shoulder Problem    Visit #: 16    SUBJECTIVE:  I saw my doctor and he really likes my range of motion. He thinks I'll do well with 1x/wk to 1x/every other week. When can I start doing planks? It really helped my hips.      OBJECTIVE:     See progress note      Therapeutic Exercises (CPT 09009):     1. Verbal review of hep    4. Rows, x10 level 1 TB; 5 sec hold, progressed hep to level II TB; pt preforming with l/s extension with VC's for slight knee flexion and TrA brace with improvement     5. Pull downs, x10 level 1 TB; 3-5 sec hold, added to hep    6. Prayer at wall, x10 , hep; emphasis on reducing shoulder hiking    15. GH ER walkout at wall, x5; 1# weight in pillow case, fatigue; VC's to maintain GH at 90; added to hep    20. UPOC: 11/30/20      Therapeutic Exercise Summary: MD protocol:   Sling for 5- 6 weeks.   PROM for 5 weeks then AAROM, then AROM; (Pulleys at 5-6 weeks)  Strengthening at 10-12 weeks     Pt performed these exercises with instruction and SPV.  Provided handout with these exercises for daily HEP.                Time-based treatments/modalities:    Physical Therapy Timed Treatment Charges  Therapeutic exercise minutes (CPT 54654): 31 minutes     2/10 stiff/soreness pre treatment    0/10 stiff/soreness post treatment    ASSESSMENT:   Response to treatment:   See progress note.    PLAN/RECOMMENDATIONS:   Plan for treatment: therapy treatment to continue next visit.  Planned interventions for next visit: continue with current treatment.  Progress strengthening as tolerated with anticipation of DC next 3-4  sessions. Continue at 1x every other week

## 2020-11-05 NOTE — OP THERAPY PROGRESS SUMMARY
Outpatient Physical Therapy  PROGRESS SUMMARY NOTE      [facility] Physical Therapy [address]  [address]  [place] [address]  Phone:  [phone]  Fax:  [phone]    Date of Visit: 11/05/2020    Patient: Kwasi Sims  YOB: 1955  MRN: [id number]     Referring Provider: Emanuel Vance M.D.  [address] [place] [address]   Referring Diagnosis Primary osteoarthritis, left shoulder [M19.012];Impingement syndrome of left shoulder [M75.42];Strain of muscle(s) and tendon(s) of the rotator cuff of left shoulder, initial encounter [S46.012A]     Visit Diagnoses     ICD-10-CM   1. Primary osteoarthritis, left shoulder  M19.012   2. Strain of muscle(s) and tendon(s) of the rotator cuff of left shoulder, initial encounter  S46.012A   3. Impingement syndrome of left shoulder  M75.42   4. S/P revision of total knee, left  Z96.652       Rehab Potential: good    Progress Report Period: 10/15/20-11/5/20    Functional Assessment Used: NT          Objective Findings and Assessment:   Patient progression towards goals: Pt is 13 weeks s/p Left shoulder arthroscopy with rotator cuff repair, biceps tenodesis, subacromial decompression, distal clavicle excision, with extensive debridement of glenohumeral Joint (8/5/20).     Pt has been seen for 16 visits of skilled therapy and continuing to make improvements. Pt responding well to progressing periscapular strengthening program; reports improved stiffness with exercise and able to progress resistance. Continued L shoulder fatigue and compensations at using l/s extension and shoulder hiking for range but improves with VC's for correction. May continue to benefit from additional periscapular strengthening to avoid compensations with progression towareds RC strengthening as tolerated in subsequent sessions.     Pt may continue to benefit from sessions 1x every other week to update strengthening hep and assess progress with anticipation for DC  next 4 sessions.     Objective findings and assessment details: Shoulder hiking bilaterally and l/s extension compensations during banded exercises  From progress note 10/15/20:  GH AROM:  130 deg L GH flexion   (132 deg R GH flexion)     115 deg L GH abd  (115 deg R GH abd)     44 deg L GH ER   (53 deg R GH ER)     GH IR BTB  L: T7  (R: T5)      Goals:   Short Term Goals:   Short Term Goals:   Short Term Goals:   1. Pt will be independent with written HEP several reps per day. (Met)  2. Pt will be compliant and able to verbalize shoulder precautions. (Met)  3. Pt will demonstrate postural improvements with VC's in clinic. (Met)  4. Pt will be compliant with new precautions for shoulder strengthening for safe transition in rehab (Met)  5. Pt will demonstrate all therex without shoulder hiking 75% or greater improvement (NEW)         Short term goal time span:  2-4 weeks      Long Term Goals:    Long Term Goals:    Long Term Goals:    1. Pt will be independent with written HEP. (Met)  2. Pt will have a  Sig improvement in Quickdash score  (eval: 61.36) (Met)  3. Pt will have L GH AROM within 10 deg of uninvolved shoulder and progress to strengthening protocol per MD recommendations/precuations. (Met)  4. Pt will attain GH ER within 5 deg of uninvolved R GH joint (Ongoing)  5. Pt will demonstrate appropriate shoulder blade positioning at rest to progress to phase III strengthening (Ongoing)  6. Pt will be indep with initiation of RC and progressive strengthening hep for maintenance (Ongoing)    Long term goal time span:  6-8 weeks    Plan:   Planned therapy interventions:  Neuromuscular Re-education (CPT 26929), E Stim Unattended (CPT 68670), Manual Therapy (CPT 33453), Therapeutic Activities (CPT 32911) and Therapeutic Exercise (CPT 81223)  Frequency:  2x month  Duration in visits:  4  Plan details:  UPOC: 12/1/20      Referring provider co-signature:  I have reviewed this plan of care and my co-signature certifies  the need for services.     Certification Period: 11/05/2020 to 12/1/20    Physician Signature: ________________________________ Date: ______________

## 2020-11-09 DIAGNOSIS — Z79.899 ENCOUNTER FOR LONG-TERM (CURRENT) USE OF HIGH-RISK MEDICATION: ICD-10-CM

## 2020-11-09 DIAGNOSIS — M05.9 RHEUMATOID ARTHRITIS WITH POSITIVE RHEUMATOID FACTOR, INVOLVING UNSPECIFIED SITE (HCC): ICD-10-CM

## 2020-11-09 RX ORDER — TOFACITINIB 5 MG/1
5 TABLET, FILM COATED ORAL 2 TIMES DAILY
Qty: 180 TAB | Refills: 0 | Status: SHIPPED | OUTPATIENT
Start: 2020-11-09 | End: 2021-02-02 | Stop reason: SDUPTHER

## 2020-11-10 NOTE — TELEPHONE ENCOUNTER
Received request via: Pharmacy  [facility]  Was the patient seen in the last year in this department? Yes    Does the patient have an active prescription (recently filled or refills available) for medication(s) requested? No

## 2020-11-15 NOTE — PROGRESS NOTES
Edgardo for ACTH stim test. Edgardo oriented to [facility] and unit routine. PIV started and baseline cortisol level drawn. First sample hemolyzed, redrew prior to moving forward with therapy plan. Cosyntropin then given IV push. Cortisol levels then drawn at 30 minutes and 60 minutes after cosyntropin given. Last lab draw done via 23 g butterfly needle (attempted with 23 g butterfly needle at [facility], successful at [facility]).Edgardo tolerated procedure well. No further appointments needed at this time, Edgardo discharged home to self care.  
11-May-2020
0 = swallows foods/liquids without difficulty

## 2020-11-24 ENCOUNTER — PHYSICAL THERAPY (OUTPATIENT)
Dept: PHYSICAL THERAPY | Facility: REHABILITATION | Age: 65
End: 2020-11-24
Attending: ORTHOPAEDIC SURGERY
Payer: MEDICARE

## 2020-11-24 DIAGNOSIS — M19.012 PRIMARY OSTEOARTHRITIS, LEFT SHOULDER: ICD-10-CM

## 2020-11-24 DIAGNOSIS — S46.012A STRAIN OF MUSCLE(S) AND TENDON(S) OF THE ROTATOR CUFF OF LEFT SHOULDER, INITIAL ENCOUNTER: ICD-10-CM

## 2020-11-24 DIAGNOSIS — M75.42 IMPINGEMENT SYNDROME OF LEFT SHOULDER: ICD-10-CM

## 2020-11-24 PROCEDURE — 97110 THERAPEUTIC EXERCISES: CPT

## 2020-11-24 NOTE — OP THERAPY DAILY TREATMENT
Outpatient Physical Therapy  DAILY TREATMENT     [address]  [address]  [place] [address]  Phone:  [phone]  Fax:  [phone]    Date: 11/24/2020    Patient: Edgardo Sims  YOB: 1955  MRN: [id number]     Time Calculation    Start time: 0935  Stop time: 1000 Time Calculation (min): 25 minutes         Chief Complaint: Shoulder Problem    Visit #: 17    SUBJECTIVE:  I'm not really having any problems with the shoulder. I just don't do anything strenuous. I no longer have tingling/numbness.       OBJECTIVE:     See progress note      Therapeutic Exercises (CPT 35476):     1. Pulleys, x1 min, warm up ; GH flexion 3x15 and IR 1x15 ea    8. Wobble board, 3x30 sec ea; 45 deg GH abd     9. D2 pattern 1# weight, x10 to 90 deg GH elevation only, pain with GH elevation >90 and UT hiking; hep; edu pt to perform in front of mirror at home    10. [place], level 1 TB; 1x10 with , hep    11. Ball roll at wall , 90 deg flexion, 30 sec CW and CCW, hep    15. GH ER walkout at wall, x5; 1# weight in pillow case, fatigue; VC's to maintain GH at 90; added to hep    20. UPOC: 11/30/20      Therapeutic Exercise Summary: Pt performed these exercises with instruction and SPV.  Provided handout with these exercises for daily HEP.                Time-based treatments/modalities:    Physical Therapy Timed Treatment Charges  Therapeutic exercise minutes (CPT 51056): 25 minutes     2/10 stiff/soreness pre treatment    0/10 stiff/soreness post treatment    ASSESSMENT:   Response to treatment:   Pt is 16 weeks s/p Left shoulder arthroscopy with rotator cuff repair, biceps tenodesis, subacromial decompression, distal clavicle excision, with extensive debridement of glenohumeral Joint (8/5/20).   Demonstrating improving strength with no ADL complaints. Good transition to RC strengthening with fatigue following session but no increase in pain. Anticipate DC next few sessions  with john hep.    PLAN/RECOMMENDATIONS:   Plan for treatment: therapy treatment to continue next visit.  Planned interventions for next visit: continue with current treatment.  Progress strengthening as tolerated RC strengthening progressing to higher elevations as tolerated. Continue at 1x every other week. Anticipate DC next few sessions with john hep.

## 2020-11-30 ENCOUNTER — OFFICE VISIT (OUTPATIENT)
Dept: MEDICAL GROUP | Facility: LAB | Age: 65
End: 2020-11-30
Payer: MEDICARE

## 2020-11-30 ENCOUNTER — HOSPITAL ENCOUNTER (OUTPATIENT)
Dept: LAB | Facility: MEDICAL CENTER | Age: 65
End: 2020-11-30
Attending: INTERNAL MEDICINE
Payer: MEDICARE

## 2020-11-30 VITALS
HEART RATE: 64 BPM | WEIGHT: 202 LBS | HEIGHT: 70 IN | BODY MASS INDEX: 28.92 KG/M2 | TEMPERATURE: 96.8 F | DIASTOLIC BLOOD PRESSURE: 66 MMHG | RESPIRATION RATE: 12 BRPM | OXYGEN SATURATION: 98 % | SYSTOLIC BLOOD PRESSURE: 122 MMHG

## 2020-11-30 DIAGNOSIS — Z79.899 LONG-TERM USE OF PLAQUENIL: ICD-10-CM

## 2020-11-30 DIAGNOSIS — Z23 NEED FOR VACCINATION: ICD-10-CM

## 2020-11-30 DIAGNOSIS — M05.9 RHEUMATOID ARTHRITIS WITH POSITIVE RHEUMATOID FACTOR, INVOLVING UNSPECIFIED SITE (HCC): ICD-10-CM

## 2020-11-30 DIAGNOSIS — Z79.1 ENCOUNTER FOR LONG-TERM (CURRENT) USE OF NSAIDS: ICD-10-CM

## 2020-11-30 DIAGNOSIS — Z79.622 LONG-TERM CURRENT USE OF TOFACITINIB: ICD-10-CM

## 2020-11-30 LAB
ALBUMIN SERPL BCP-MCNC: 4.7 G/DL (ref 3.2–4.9)
ALBUMIN/GLOB SERPL: 2 G/DL
ALP SERPL-CCNC: 76 U/L (ref 30–99)
ALT SERPL-CCNC: 40 U/L (ref 2–50)
ANION GAP SERPL CALC-SCNC: 5 MMOL/L (ref 7–16)
AST SERPL-CCNC: 38 U/L (ref 12–45)
BASOPHILS # BLD AUTO: 0.7 % (ref 0–1.8)
BASOPHILS # BLD: 0.05 K/UL (ref 0–0.12)
BILIRUB SERPL-MCNC: 0.7 MG/DL (ref 0.1–1.5)
BUN SERPL-MCNC: 8 MG/DL (ref 8–22)
CALCIUM SERPL-MCNC: 10 MG/DL (ref 8.5–10.5)
CHLORIDE SERPL-SCNC: 100 MMOL/L (ref 96–112)
CO2 SERPL-SCNC: 31 MMOL/L (ref 20–33)
CREAT SERPL-MCNC: 0.89 MG/DL (ref 0.5–1.4)
EOSINOPHIL # BLD AUTO: 0.13 K/UL (ref 0–0.51)
EOSINOPHIL NFR BLD: 1.7 % (ref 0–6.9)
ERYTHROCYTE [DISTWIDTH] IN BLOOD BY AUTOMATED COUNT: 56.9 FL (ref 35.9–50)
ERYTHROCYTE [SEDIMENTATION RATE] IN BLOOD BY WESTERGREN METHOD: 1 MM/HOUR (ref 0–20)
GLOBULIN SER CALC-MCNC: 2.3 G/DL (ref 1.9–3.5)
GLUCOSE SERPL-MCNC: 75 MG/DL (ref 65–99)
HCT VFR BLD AUTO: 53.7 % (ref 42–52)
HGB BLD-MCNC: 18.2 G/DL (ref 14–18)
IMM GRANULOCYTES # BLD AUTO: 0.13 K/UL (ref 0–0.11)
IMM GRANULOCYTES NFR BLD AUTO: 1.7 % (ref 0–0.9)
LYMPHOCYTES # BLD AUTO: 2.4 K/UL (ref 1–4.8)
LYMPHOCYTES NFR BLD: 31.7 % (ref 22–41)
MCH RBC QN AUTO: 32.3 PG (ref 27–33)
MCHC RBC AUTO-ENTMCNC: 33.9 G/DL (ref 33.7–35.3)
MCV RBC AUTO: 95.2 FL (ref 81.4–97.8)
MONOCYTES # BLD AUTO: 1.29 K/UL (ref 0–0.85)
MONOCYTES NFR BLD AUTO: 17 % (ref 0–13.4)
NEUTROPHILS # BLD AUTO: 3.57 K/UL (ref 1.82–7.42)
NEUTROPHILS NFR BLD: 47.2 % (ref 44–72)
NRBC # BLD AUTO: 0 K/UL
NRBC BLD-RTO: 0 /100 WBC
PLATELET # BLD AUTO: 203 K/UL (ref 164–446)
PMV BLD AUTO: 10.8 FL (ref 9–12.9)
POTASSIUM SERPL-SCNC: 5.5 MMOL/L (ref 3.6–5.5)
PROT SERPL-MCNC: 7 G/DL (ref 6–8.2)
RBC # BLD AUTO: 5.64 M/UL (ref 4.7–6.1)
SODIUM SERPL-SCNC: 136 MMOL/L (ref 135–145)
WBC # BLD AUTO: 7.6 K/UL (ref 4.8–10.8)

## 2020-11-30 PROCEDURE — 85652 RBC SED RATE AUTOMATED: CPT

## 2020-11-30 PROCEDURE — G0008 ADMIN INFLUENZA VIRUS VAC: HCPCS | Performed by: INTERNAL MEDICINE

## 2020-11-30 PROCEDURE — 99213 OFFICE O/P EST LOW 20 MIN: CPT | Mod: 25 | Performed by: INTERNAL MEDICINE

## 2020-11-30 PROCEDURE — 85025 COMPLETE CBC W/AUTO DIFF WBC: CPT

## 2020-11-30 PROCEDURE — 90686 IIV4 VACC NO PRSV 0.5 ML IM: CPT | Performed by: INTERNAL MEDICINE

## 2020-11-30 PROCEDURE — 36415 COLL VENOUS BLD VENIPUNCTURE: CPT

## 2020-11-30 PROCEDURE — 80053 COMPREHEN METABOLIC PANEL: CPT

## 2020-11-30 RX ORDER — OXYCODONE AND ACETAMINOPHEN 10; 325 MG/1; MG/1
1-2 TABLET ORAL EVERY 4 HOURS PRN
Qty: 150 TAB | Refills: 0 | Status: SHIPPED | OUTPATIENT
Start: 2020-11-30 | End: 2020-11-30 | Stop reason: SDUPTHER

## 2020-11-30 RX ORDER — OXYCODONE AND ACETAMINOPHEN 10; 325 MG/1; MG/1
1-2 TABLET ORAL EVERY 4 HOURS PRN
Qty: 150 TAB | Refills: 0 | Status: SHIPPED | OUTPATIENT
Start: 2021-01-29 | End: 2021-03-02 | Stop reason: SDUPTHER

## 2020-11-30 RX ORDER — CYCLOBENZAPRINE HCL 10 MG
TABLET ORAL
Qty: 90 TAB | Refills: 5 | Status: SHIPPED | OUTPATIENT
Start: 2020-11-30 | End: 2021-08-13

## 2020-11-30 RX ORDER — OXYCODONE AND ACETAMINOPHEN 10; 325 MG/1; MG/1
1-2 TABLET ORAL EVERY 4 HOURS PRN
Qty: 150 TAB | Refills: 0 | Status: SHIPPED | OUTPATIENT
Start: 2020-12-30 | End: 2020-11-30 | Stop reason: SDUPTHER

## 2020-11-30 ASSESSMENT — FIBROSIS 4 INDEX: FIB4 SCORE: 2.09

## 2020-11-30 NOTE — PROGRESS NOTES
CC: Edgardo Sims is a 65 y.o. male is suffering from   Chief Complaint   Patient presents with   • Chronic Opiate Therapy     3 months follow up         SUBJECTIVE:  1. Rheumatoid arthritis with positive rheumatoid factor, involving unspecified site (HCC)  Edgardo is here for follow-up has a history of rheumatoid arthritis, is being followed by Dr. Tucker, states that he is doing well.  Patient is high-priority for COVID-19 vaccination.    2. Need for vaccination  Given in the office        Past social, family, history:   Social History     Tobacco Use   • Smoking status: Never Smoker   • Smokeless tobacco: Never Used   Substance Use Topics   • Alcohol use: Yes     Alcohol/week: 1.8 oz     Types: 3 Cans of beer per week     Frequency: 2-3 times a week     Drinks per session: 1 or 2     Comment: 3 per week   • Drug use: No         MEDICATIONS:    Current Outpatient Medications:   •  cyclobenzaprine (FLEXERIL) 10 mg Tab, TAKE ONE TABLET BY MOUTH THREE TIMES DAILY AS NEEDED FOR MILD PAIN, Disp: 90 Tab, Rfl: 5  •  [START ON 1/29/2021] oxyCODONE-acetaminophen (PERCOCET-10)  MG Tab, Take 1-2 Tabs by mouth every four hours as needed for Severe Pain (refill #3 of #3.) for up to 30 days., Disp: 150 Tab, Rfl: 0  •  Tofacitinib Citrate (XELJANZ) 5 MG Tab, Take 5 mg by mouth 2 Times a Day., Disp: 180 Tab, Rfl: 0  •  testosterone cypionate (DEPO-TESTOSTERONE) 200 MG/ML Solution injection, 1 mL by Intramuscular route every 14 days for 90 days., Disp: 10 mL, Rfl: 0  •  PARoxetine (PAXIL) 20 MG Tab, Take 1 Tab by mouth every day., Disp: 90 Tab, Rfl: 3  •  pravastatin (PRAVACHOL) 80 MG tablet, Take 1 Tab by mouth every day., Disp: 90 Tab, Rfl: 3  •  ezetimibe (ZETIA) 10 MG Tab, Take 1 Tab by mouth every evening., Disp: 90 Tab, Rfl: 3  •  lisinopril (PRINIVIL) 10 MG Tab, Take 1 Tab by mouth every day., Disp: 90 Tab, Rfl: 3  •  metoprolol (LOPRESSOR) 50 MG Tab, TAKE 1 TABLET BY MOUTH TWICE A DAY, Disp: 180 Tab,  Rfl: 3  •  meloxicam (MOBIC) 7.5 MG Tab, TAKE 1 TABLET BY MOUTH DAILY AS NEEDED FOR SEVERE JOINT PAIN, Disp: 90 Tab, Rfl: 1  •  hydroxychloroquine (PLAQUENIL) 200 MG Tab, 1 tab po bid, Disp: 180 Tab, Rfl: 1  •  vardenafil (LEVITRA) 20 MG tablet, Take 20 mg by mouth as needed., Disp: , Rfl:   •  CALCIUM-VITAMIN D PO, Take 1 Tab by mouth 2 Times a Day., Disp: , Rfl:   •  ascorbic acid (ASCORBIC ACID) 500 MG Tab, Take 500 mg by mouth every day., Disp: , Rfl:   •  Cyanocobalamin (VITAMIN B-12) 5000 MCG TABLET DISPERSIBLE, Take 1 Tab by mouth every day., Disp: , Rfl:   •  Zinc 50 MG Cap, Take 50 mg by mouth every day., Disp: , Rfl:   •  omeprazole (PRILOSEC) 20 MG CPDR, Take 20 mg by mouth every day., Disp: , Rfl:   •  predniSONE (DELTASONE) 1 MG Tab, TAKE 1 TABLET BY MOUTH EVERY DAY (Patient not taking: Reported on 11/30/2020), Disp: 90 Tab, Rfl: 1    PROBLEMS:  Patient Active Problem List    Diagnosis Date Noted   • Neurogenic bladder 01/26/2019     Priority: Medium   • Postoperative pain 08/05/2020   • Difficult intubation 08/05/2020   • Gastroesophageal reflux disease 08/05/2020   • Secondary adrenal insufficiency (HCC) 12/12/2019   • Current chronic use of systemic steroids 10/16/2019   • High risk medication use 10/16/2019   • History of adrenal insufficiency 10/16/2019   • Bladder outlet obstruction 05/01/2018   • Leukocytosis 04/30/2018   • Lactic acidosis 04/30/2018   • Idiopathic acute pancreatitis 04/30/2018   • Chronic use of opiate drug for therapeutic purpose 08/12/2017   • Depression 07/13/2015   • Anxiety 03/31/2015   • Coronary artery disease due to calcified coronary lesion: Mildly cardiac catheterization in 2014 12/05/2014   • Tachycardia 10/20/2014   • Abnormal myocardial perfusion study 01/15/2014   • Osteoarthrosis, unspecified whether generalized or localized, pelvic region and thigh 05/31/2013   • Dyslipidemia 07/12/2011   • Aortic insufficiency 07/05/2011   • Essential hypertension 07/05/2011   •  "Rheumatoid arthritis (HCC) 05/05/2009   • Hypogonadism male 05/05/2009       REVIEW OF SYSTEMS:  Gen.:  No Nausea, Vomiting, fever, Chills.  Heart: No chest pain.  Lungs:  No shortness of Breath.  Psychological: Kian unusual Anxiety depression     PHYSICAL EXAM   Constitutional: Alert, cooperative, not in acute distress.  Cardiovascular:  Rate Rhythm is regular without murmurs rubs clicks.     Thorax & Lungs: Clear to auscultation, no wheezing, rhonchi, or rales  HENT: Normocephalic, Atraumatic.  Eyes: PERRLA, EOMI, Conjunctiva normal.   Neck: Trachia is midline no swelling of the thyroid.   Lymphatic: No lymphadenopathy noted.   Musculoskeletal: No evidence of swelling at the metacarpal phalangeal joints interphalangeal joints wrists elbows  Neurologic: Alert & oriented x 3, cranial nerves II through XII are intact, Normal motor function, Normal sensory function, No focal deficits noted.   Psychiatric: Affect normal, Judgment normal, Mood normal.     VITAL SIGNS:/66   Pulse 64   Temp 36 °C (96.8 °F) (Temporal)   Resp 12   Ht 1.778 m (5' 10\")   Wt 91.6 kg (202 lb)   SpO2 98%   BMI 28.98 kg/m²     Labs: Reviewed    Assessment:                                                     Plan:    1. Rheumatoid arthritis with positive rheumatoid factor, involving unspecified site ([facility])  Positive rheumatoid arthritis continue Flexeril also oxycodone  - cyclobenzaprine (FLEXERIL) 10 mg Tab; TAKE ONE TABLET BY MOUTH THREE TIMES DAILY AS NEEDED FOR MILD PAIN  Dispense: 90 Tab; Refill: 5  - oxyCODONE-acetaminophen (PERCOCET-10)  MG Tab; Take 1-2 Tabs by mouth every four hours as needed for Severe Pain (refill #3 of #3.) for up to 30 days.  Dispense: 150 Tab; Refill: 0    2. Need for vaccination  Vaccination given  - Influenza Vaccine Quad Injection (PF)        "

## 2021-01-25 ENCOUNTER — PHYSICAL THERAPY (OUTPATIENT)
Dept: PHYSICAL THERAPY | Facility: REHABILITATION | Age: 66
End: 2021-01-25
Attending: ORTHOPAEDIC SURGERY
Payer: MEDICARE

## 2021-01-25 DIAGNOSIS — M19.012 PRIMARY OSTEOARTHRITIS, LEFT SHOULDER: ICD-10-CM

## 2021-01-25 DIAGNOSIS — S46.012A STRAIN OF MUSCLE(S) AND TENDON(S) OF THE ROTATOR CUFF OF LEFT SHOULDER, INITIAL ENCOUNTER: ICD-10-CM

## 2021-01-25 DIAGNOSIS — M75.42 IMPINGEMENT SYNDROME OF LEFT SHOULDER: ICD-10-CM

## 2021-01-25 PROCEDURE — 97110 THERAPEUTIC EXERCISES: CPT

## 2021-01-25 PROCEDURE — 97162 PT EVAL MOD COMPLEX 30 MIN: CPT

## 2021-01-25 ASSESSMENT — ENCOUNTER SYMPTOMS
PAIN SCALE: 0
PAIN SCALE AT LOWEST: 0
PAIN SCALE AT HIGHEST: 0

## 2021-01-25 NOTE — OP THERAPY EVALUATION
Outpatient Physical Therapy  INITIAL EVALUATION    [facility] [address]  [address]  [place] [address]  Phone:  [phone]  Fax:  [phone]    Date of Evaluation: 01/25/2021    Patient: Kwasi Sims  YOB: 1955  MRN: [id number]     Referring Provider: Emanuel Vance M.D.  [address] [place] [address]   Referring Diagnosis Strain of muscle(s) and tendon(s) of the rotator cuff of left shoulder, initial encounter [S46.012A];Primary osteoarthritis, left shoulder [M19.012];Impingement syndrome of left shoulder [M75.42]     Time Calculation    Start time: 1006  Stop time: 1058 Time Calculation (min): 52 minutes         Chief Complaint: Shoulder Problem    Visit Diagnoses     ICD-10-CM   1. Strain of muscle(s) and tendon(s) of the rotator cuff of left shoulder, initial encounter  S46.012A   2. Primary osteoarthritis, left shoulder  M19.012   3. Impingement syndrome of left shoulder  M75.42         Subjective:   History of Present Illness:     Date of surgery:  8/5/2020    Mechanism of injury:  Patient is a 65 year old male with a PMH including: Depression, neurogenic bladder, RA, aortic insufficicecy, HTN, OA pelvic region and thigh, tachycardia, CAD, anxiety, lactic acidosis, leukocytosis, bladder outlet obstruction, GERD. Has extensive surgical history; Patient's history is significant for previous right rotator cuff tears with repair x2. Pt reporting no changes in medical hx since last seen.    Pt attended 17 sessions of PT (8/13/20-11/24/20) s/p Left shoulder arthroscopy with rotator cuff repair, biceps tenodesis, subacromial decompression, distal clavicle excision, with extensive debridement of glenohumeral Joint (8/5/20) - he is currently about 25 weeks post surgery. Lapse in therapy secondary to holidays and COVID per pt report. He presents to therapy today with c/o difficulty with sleeping on L shoulder and has numbness in entire R hand which  "initiated after starting to sleep on L side about 3 weeks ago. Pt reports symptoms initiate after 2 min when sleeping on L. Symptoms resolve within minutes when switching positions. Pt reports is still completing his shoulder exercises; he would like to know how to progress hep at home. \"I want to get stronger but I'm scared to mess this up.\"          Sleep disturbance:  Interrupted sleep  Pain:     Current pain ratin    At best pain ratin    At worst pain ratin    Pain Comments::  No pain complaints  Hand dominance:  Right  Diagnostic Tests:     Diagnostic Tests Comments:  Shoulder MRI 20:  ROTATOR CUFF:     There is a fluid-filled gap in the expected position of the distal supraspinatus tendon representing a full-thickness tear. It is identified on T2 sagittal images 13-16 and coronal images 5-8. The tear with is 19 mm and retraction is 20 mm     The infraspinatus tendon is intact.     The subscapularis tendon is intact.     The muscles of the rotator cuff are normal in signal intensity and morphology.     IMPRESSION:     1.  Full-thickness left supraspinatus tendon tear measuring 19 mm in width and retracted 20 mm     2.  Normal appearance of the glenohumeral joint     3.  Moderate acromioclavicular joint osteoarthritis     4.  Fluid within the subacromial/subdeltoid bursa related to rotator cuff tear  Treatments:     Treatment Comments:  PT to address L shoulder post surgery (20-20)  Activities of Daily Living:     Patient reported ADL status: (copied from previous eval):    Patient's current daily routine includes:  ADL's: Indep with self care, dressing, preparing meals.   Work: Retired  Hobbies: Riding motorcycle, modifications on firearms  Exercise: Continues to report compliance to old hep.       Patient Goals:     Other patient goals:  \"I would like to know where to go from here.\"      Past Medical History:   Diagnosis Date   • Abnormal myocardial perfusion study 1/15/2014   • " Aortic insufficiency 7/5/2011   • Arthritis     RA, and osteo   • Bronchitis    • CAD (coronary artery disease) mild plaque at cath in 2/14 12/5/2014   • Cancer (HCC)     skin   • Chronic use of opiate drugs therapeutic purposes 8/12/2017   • Dental disorder     Upper dentures.   • Dyslipidemia 7/12/2011   • Heart burn    • Heart murmur    • Hiatus hernia syndrome    • High cholesterol    • HTN (hypertension) 7/5/2011   • Hypertension    • Indigestion    • Infectious disease    • Pain     RA   • Pain     hands, feet and jaw   • Rheumatoid arthritis(714.0)     severe   • Tachycardia 10/20/2014     Past Surgical History:   Procedure Laterality Date   • PB SHLDR ARTHROSCOP,PART ACROMIOPLAS Left 8/5/2020    Procedure: DECOMPRESSION, SHOULDER, ARTHROSCOPIC - SUBACROMIAL;  Surgeon: Emanuel Vance M.D.;  Location: [facility];  Service: Orthopedics   • PB SHLDR ARTHROSCOP,SURG,W/ROTAT CUFF REPB Left 8/5/2020    Procedure: ARTHROSCOPY, SHOULDER, WITH ROTATOR CUFF REPAIR - AND DALAL;  Surgeon: Emanuel Vance M.D.;  Location: [facility];  Service: Orthopedics   • CLAVICLE DISTAL EXCISION Left 8/5/2020    Procedure: EXCISION, CLAVICLE, DISTAL - WITH EXTENSIVE DEBRIDEMENT;  Surgeon: Emanuel Vance M.D.;  Location: [facility];  Service: Orthopedics   • PB TRANSURETHRAL ELEC-SURG PBOSTATECTOM  4/1/2019    Procedure: BIPOLAR TRANSURETHRAL RESECTION OF PROSTATE;  Surgeon: Jose Romo M.D.;  Location: [facility];  Service: Urology   • CYSTOSCOPY  4/1/2019    Procedure: CYSTOSCOPY;  Surgeon: Jose Romo M.D.;  Location: [facility];  Service: Urology   • URETHROTOMY INTERNAL  4/1/2019    Procedure: DIRECT VISION INTERNAL URETHROTOMY;  Surgeon: Jose Romo M.D.;  Location: [facility];  Service: Urology   • KNEE REVISION TOTAL Left 8/3/2018    Procedure: KNEE REVISION TOTAL;  Surgeon: Michael Rodriguez M.D.;  Location: [facility];  Service: Orthopedics   • CYSTOSCOPY  5/16/2018    Procedure: CYSTOSCOPY-CLOT EVAC;  Surgeon: Jose Romo M.D.;  Location: [facility];  Service: Urology   • TRANS URETHRAL RESECTION BLADDER  5/16/2018    Procedure: TRANS URETHRAL RESECTION BLADDER;  Surgeon: Jose Romo M.D.;  Location: SURGERY [facility];  Service: Urology   • RECOVERY  2/3/2014    Performed by Cath-Recovery Surgery at SURGERY SAME DAY [facility]   • HIP ARTHROPLASTY TOTAL  5/31/2013    Performed by Burak Valles M.D. at SURGERY [facility]   • ROTATOR CUFF REPAIR Right 2011    x 2   • KNEE ARTHROPLASTY TOTAL  6/1/2009    LEFT-Performed by YONATAN HINSON at SURGERY [facility]   • VEIN LIGATION RADIO FREQUENCY  12/8/2008    LEFT leg-Performed by DEREJE MCCULLOUGH at SURGERY SAME DAY [facility]   • HIP ARTHROPLASTY TOTAL  6/20/08    Performed by YONATAN HINSON at SURGERY [facility]   • LUMBAR LAMINECTOMY DISKECTOMY  2000   • TMJ RECONSTRUCTION BILATERAL  1994   • ANKLE ORIF Right    • KNEE ARTHROSCOPY Left    • VEIN STRIPPING Left      Social History     Tobacco Use   • Smoking status: Never Smoker   • Smokeless tobacco: Never Used   Substance Use Topics   • Alcohol use: Yes     Alcohol/week: 1.8 oz     Types: 3 Cans of beer per week     Frequency: 2-3 times a week     Drinks per session: 1 or 2     Comment: 3 per week     Family and Occupational History     Socioeconomic History   • Marital status:      Spouse name: Not on file   • Number of children: Not on file   • Years of education: Not on file   • Highest education level: Not on file   Occupational History   • Not on file       Objective     Postural Observations    Additional Postural Observation Details  Forward head and rounded shoulders  Fixed kyphosis deformity    Cervical Screen    Cervical range of motion within normal limits with the following exceptions: Neck flexion: to chest  Neck  extension: 30 deg  Side bending: R: 24 deg, L: 23 deg  Rotation: R: 59 deg, L: 62 deg    (no reproduction of numbness/tingling with cervical AROM)    Neurological Testing     Sensation     Shoulder   Left Shoulder   Intact: light touch    Right Shoulder   Intact: light touch    Reflexes   Left   Biceps (C5/C6): normal (2+)  Triceps (C7): normal (2+)    Right   Biceps (C5/C6): normal (2+)  Triceps (C7): trace (1+)    Palpation   Left   No palpable tenderness to the cervical paraspinals.     Right   No palpable tenderness to the cervical paraspinals.     Additional Palpation Details  Tender to palpation L pec minor and L coracoid process    Active Range of Motion   Left Shoulder   Flexion: 140 degrees   Abduction: 155 degrees     Right Shoulder   Flexion: 140 degrees   Abduction: 160 (UT hiking; crepitus) degrees     Additional Active Range of Motion Details  Painless with shoulder AROM    Scapular Mobility   Left Shoulder   Scapular mobility: fair    Right Shoulder   Scapular mobility: fair    Joint Play   Left Shoulder     Cervical spine: hypomobile    1st rib: hypomobile    Right Shoulder     Cervical spine: hypomobile    1st rib: hypomobile    Additional joint play details:  Pain with 1st rib depression L    Additional Joint Play Details  Pain with 1st rib depression L    Strength:      Additional Strength Details  GH isometrics: ( set up: 0 deg GH abd and elbow 90 deg)    R GH: all WFL    L GH: (arm at side)  Flexion: Strong   Extension: Strong  Abd: Strong   Add: Strong   IR: Strong   ER: Strong     L GH: (arm at 45 deg abd)  Flexion: Strong   Extension: Strong  Abd: Strong   Add: Strong   IR: Mod  ER: Mod    General Comments     Shoulder Comments   ULTT #2: negative L (difficulty assuming position with limited L wrist extension)        Therapeutic Exercises (CPT 16212):     1. Posture , 2 min, hep    2. Pec stretch at wall, x30 sec, hep    3. Bicep curls, 3# x10, hep    4. [place], progressed to purple TB,  hep    5. Ball roll +lift off, light ball; x10, hep    6. GH ER (GH 90 deg) at wall, x10, 3#, hep    7. Median nerve glide, x10, hep; edu for 10 reps daily only to avoid nerve [id number]    11. UPOC: 3/12/21      Therapeutic Exercise Summary: Pt performed these exercises with instruction and SPV.  Provided handout with these exercises for daily HEP.        Time-based treatments/modalities:    Physical Therapy Timed Treatment Charges  Therapeutic exercise minutes (CPT 12382): 15 minutes      Assessment, Response and Plan:   Impairments: activity intolerance, impaired physical strength and lacks appropriate home exercise program    Other Impairments:  Numbness/tingling intermittent L hand  Assessment details:  Pt is a pleasant and cooperative 65 year old male who previously attended 17 sessions of PT (8/13/20-11/24/20) s/p Left shoulder arthroscopy with rotator cuff repair, biceps tenodesis, subacromial decompression, distal clavicle excision, with extensive debridement of glenohumeral Joint (8/5/20) - he is currently about 25 weeks post-surgery. Lapse in therapy secondary to holidays and COVID per pt report. Pt presenting to therapy with request for further strengthening for shoulder. Additional complaints of intermittent L hand numbness with sleeping on L shoulder.    Pt continues to be compliant with hep and progressing well independently; exam remarkable for mod strength with increasing GH elevation, may benefit from further therapy targeting in this area. Pt has additionally not attempted biceps strengthening independently and may benefit from guidance. Pt reporting new onset of intermittent L hand numbness with L SL sleeping position that initiated 3 weeks ago with insidious onset, resolves within minutes of changing sleeping position. Unable to reproduce n/t in eval today; however pain with 1st rib depression, palpation to pec minor L; median nerve testing negative today. Provided pt with education on  sleeping positions today. Will continue to monitor symptoms in subsequent sessions as we are addressing L shoulder; if unchanging will recommend for further evaluation from MD. Pt may benefit from skilled physical therapy in order to address above impairments in order to improve QOL and return to reported ADL's.     Barriers to therapy:  Comorbidities  Prognosis: good    Goals:   Short Term Goals:   1. Pt will be independent with written HEP.  2. Pt will be able to progress 2/3 of hep with further strengthening.  3. Pt will report 25% or greater improvement in L hand numbness complaints.  4. Pt will ensure adequate sleeping position  Short term goal time span:  2-4 weeks      Long Term Goals:    1. Pt will be independent with written HEP.  2. Pt will have a QuickDash score >/=CAIO/MCID (eval:22.73 )  3. Pt will demonstrate 5/5 GH ER and IR MMT with increasing GH elevation  4. Pt will report 50% or greater improved sleeping hygiene secondary to symptoms  Long term goal time span:  6-8 weeks    Plan:   Therapy options:  Physical therapy treatment to continue  Planned therapy interventions:  Neuromuscular Re-education (CPT 74050), Manual Therapy (CPT 75652), E Stim Unattended (CPT 91013), Therapeutic Exercise (CPT 44720) and Therapeutic Activities (CPT 44657)  Frequency:  1x week (1-2x/wk)  Duration in weeks:  6  Discussed with:  Patient  Plan details:  UPOC: 3/12/21    *Pt may progress to 1x/wk as he progresses with hep/strength        Functional Assessment Used  Quickdash General Total Score: 22.73     Referring provider co-signature:  I have reviewed this plan of care and my co-signature certifies the need for services.    Certification Period: 01/25/2021 to  3/12/21    Physician Signature: ________________________________ Date: ______________

## 2021-01-27 ENCOUNTER — PHYSICAL THERAPY (OUTPATIENT)
Dept: PHYSICAL THERAPY | Facility: REHABILITATION | Age: 66
End: 2021-01-27
Attending: ORTHOPAEDIC SURGERY
Payer: MEDICARE

## 2021-01-27 DIAGNOSIS — S46.012A STRAIN OF MUSCLE(S) AND TENDON(S) OF THE ROTATOR CUFF OF LEFT SHOULDER, INITIAL ENCOUNTER: ICD-10-CM

## 2021-01-27 DIAGNOSIS — Z96.652 S/P REVISION OF TOTAL KNEE, LEFT: ICD-10-CM

## 2021-01-27 DIAGNOSIS — M75.42 IMPINGEMENT SYNDROME OF LEFT SHOULDER: ICD-10-CM

## 2021-01-27 DIAGNOSIS — M19.012 PRIMARY OSTEOARTHRITIS, LEFT SHOULDER: ICD-10-CM

## 2021-01-27 PROCEDURE — 97110 THERAPEUTIC EXERCISES: CPT

## 2021-01-27 NOTE — OP THERAPY DAILY TREATMENT
Outpatient Physical Therapy  DAILY TREATMENT     [facility] [address]  [address]  [place] [address]  Phone:  [phone]  Fax:  [phone]    Date: 01/27/2021    Patient: Kwasi Sims  YOB: 1955  MRN: [id number]     Time Calculation    Start time: 1531  Stop time: 1559 Time Calculation (min): 28 minutes         Chief Complaint: Shoulder Problem    Visit #: 19    SUBJECTIVE:  I thought my appointment was tomorrow and I just did all my exercises this morning. My pillows are okay and it still gets numb in the first few minutes.       OBJECTIVE:  Current objective measures:           Therapeutic Exercises (CPT 72887):     1. Review of hep , 2 min, hep    7. Median nerve glide, x10, reviewed     8. UBE , x3 min level 3 CW and CCW, warm up    11. Windsheild wipers up the wall, CW and CCW x10    12. FR slide up the wall with alt GH flexion, x10, VC's to avoid l/s extension; some limits with end range GH AROM secondary to posture    13. Modified box exercise supine, 2x10, hep; VC's to breathe; muscle juddering L>R     16. UPOC: 3/12/21      Therapeutic Exercise Summary: Pt performed these exercises with instruction and SPV.  Provided handout with these exercises for daily HEP.        Time-based treatments/modalities:    Physical Therapy Timed Treatment Charges  Therapeutic exercise minutes (CPT 51429): 28 minutes    Pain rating (1-10) before treatment:  0  Pain rating (1-10) after treatment:  0      ASSESSMENT:   Response to treatment:   Compensates with l/s extension secondary to fixed kyphosis limiting end range GH ROM; improves with VC's. Muscle juddering L>R with increasing elevation while performing modified box; will continue to benefit from strengthening with increasing GH ranges.    PLAN/RECOMMENDATIONS:   Plan for treatment: therapy treatment to continue next visit.  Planned interventions for next visit: continue with current treatment. Review modified  box, W's at wall, GH ER walk aways at wall

## 2021-01-28 DIAGNOSIS — E29.1 HYPOGONADISM MALE: ICD-10-CM

## 2021-01-28 NOTE — TELEPHONE ENCOUNTER
Received request via: Pharmacy    Was the patient seen in the last year in this department? Yes  11/30/2020  Does the patient have an active prescription (recently filled or refills available) for medication(s) requested? No

## 2021-01-29 RX ORDER — TESTOSTERONE CYPIONATE 200 MG/ML
INJECTION, SOLUTION INTRAMUSCULAR
Qty: 6 ML | Refills: 1 | Status: SHIPPED | OUTPATIENT
Start: 2021-01-29 | End: 2021-02-04

## 2021-02-02 ENCOUNTER — TELEPHONE (OUTPATIENT)
Dept: PHYSICAL THERAPY | Facility: REHABILITATION | Age: 66
End: 2021-02-02

## 2021-02-02 ENCOUNTER — PHYSICAL THERAPY (OUTPATIENT)
Dept: PHYSICAL THERAPY | Facility: REHABILITATION | Age: 66
End: 2021-02-02
Attending: ORTHOPAEDIC SURGERY
Payer: MEDICARE

## 2021-02-02 DIAGNOSIS — M19.012 PRIMARY OSTEOARTHRITIS, LEFT SHOULDER: ICD-10-CM

## 2021-02-02 DIAGNOSIS — M05.9 RHEUMATOID ARTHRITIS WITH POSITIVE RHEUMATOID FACTOR, INVOLVING UNSPECIFIED SITE (HCC): ICD-10-CM

## 2021-02-02 DIAGNOSIS — R20.0 NUMBNESS AND TINGLING IN LEFT ARM: ICD-10-CM

## 2021-02-02 DIAGNOSIS — Z79.899 ENCOUNTER FOR LONG-TERM (CURRENT) USE OF HIGH-RISK MEDICATION: ICD-10-CM

## 2021-02-02 DIAGNOSIS — R20.2 NUMBNESS AND TINGLING IN LEFT ARM: ICD-10-CM

## 2021-02-02 DIAGNOSIS — S46.012A STRAIN OF MUSCLE(S) AND TENDON(S) OF THE ROTATOR CUFF OF LEFT SHOULDER, INITIAL ENCOUNTER: ICD-10-CM

## 2021-02-02 DIAGNOSIS — Z96.652 S/P REVISION OF TOTAL KNEE, LEFT: ICD-10-CM

## 2021-02-02 DIAGNOSIS — M75.42 IMPINGEMENT SYNDROME OF LEFT SHOULDER: ICD-10-CM

## 2021-02-02 PROCEDURE — 97110 THERAPEUTIC EXERCISES: CPT

## 2021-02-02 RX ORDER — TOFACITINIB 5 MG/1
5 TABLET, FILM COATED ORAL 2 TIMES DAILY
Qty: 180 TAB | Refills: 0 | Status: SHIPPED
Start: 2021-02-02 | End: 2021-03-09

## 2021-02-02 NOTE — TELEPHONE ENCOUNTER
Received request via: Pharmacy  Nov labs  Was the patient seen in the last year in this department? Yes    Does the patient have an active prescription (recently filled or refills available) for medication(s) requested? No

## 2021-02-02 NOTE — OP THERAPY DAILY TREATMENT
Outpatient Physical Therapy  DAILY TREATMENT     [facility] [address]  [address]  [place] [address]  Phone:  [phone]  Fax:  [phone]    Date: 02/02/2021    Patient: Kwasi Sims  YOB: 1955  MRN: [id number]     Time Calculation    Start time: 1032  Stop time: 1059 Time Calculation (min): 27 minutes         Chief Complaint: Shoulder Problem    Visit #: 20    SUBJECTIVE:  By the time I get to the end of ten with the new homework my arms are shaking.     I feel like my R shoulder is actually the one giving me more trouble. My numbness/tingling is about the same. I see Dr. Lao sometime.      OBJECTIVE:  Current objective measures:        Decreased GH IR R GH vs. L GH    Therapeutic Exercises (CPT 41221):     1. Review of hep     2. Pulleys GH flexion and GH IR , x2 min (ea GH), warm up    7. Median nerve glide, verbal review    12. GH ER walk away at the wall 1#>no weight, 2x10, decreased due to dropping weight; hep     13. Modified box exercise supine, 1x15, reviewed hep; fatigue; new Hep HO provided      14. GH ER rotations standing, GH 0 deg flexion>45 deg>90 deg flexion 10x with 2# dumbbell    15. Walk away angels, 2x10 with level I TB , l/s extension with difficulty maintaining GH ER; modified with slight lunge stance and VC's for TrA engagement with improvement; fatigue; added to hep    17. UPOC: 3/12/21      Therapeutic Exercise Summary: Pt performed these exercises with instruction and SPV.  Provided handout with these exercises for daily HEP.        Time-based treatments/modalities:         Pain rating (1-10) before treatment:  0  Pain rating (1-10) after treatment:  0      ASSESSMENT:   Response to treatment:   Improving GH strength with continued difficulties in higher ranges with l/s extension compensation; responds well to lunge positioning     Compensates with l/s extension secondary to fixed kyphosis limiting end range GH ROM; improves  with VC's. Muscle juddering L>R with increasing elevation while performing modified box; will continue to benefit from strengthening with increasing GH ranges.    Secondary to continued c/o LUE numbness/tingling, will contact PCP to inform as this is a new complaint. Appt with PCP 3/2/21.    PLAN/RECOMMENDATIONS:   Plan for treatment: therapy treatment to continue next visit.  Planned interventions for next visit: continue with current treatment. Review modified box, W's at wall, GH ER walk aways at wall

## 2021-02-04 ENCOUNTER — APPOINTMENT (OUTPATIENT)
Dept: PHYSICAL THERAPY | Facility: REHABILITATION | Age: 66
End: 2021-02-04
Attending: ORTHOPAEDIC SURGERY
Payer: MEDICARE

## 2021-02-08 ENCOUNTER — PHYSICAL THERAPY (OUTPATIENT)
Dept: PHYSICAL THERAPY | Facility: REHABILITATION | Age: 66
End: 2021-02-08
Attending: ORTHOPAEDIC SURGERY
Payer: MEDICARE

## 2021-02-08 DIAGNOSIS — Z96.652 S/P REVISION OF TOTAL KNEE, LEFT: ICD-10-CM

## 2021-02-08 DIAGNOSIS — M19.012 PRIMARY OSTEOARTHRITIS, LEFT SHOULDER: ICD-10-CM

## 2021-02-08 DIAGNOSIS — M75.42 IMPINGEMENT SYNDROME OF LEFT SHOULDER: ICD-10-CM

## 2021-02-08 DIAGNOSIS — S46.012A STRAIN OF MUSCLE(S) AND TENDON(S) OF THE ROTATOR CUFF OF LEFT SHOULDER, INITIAL ENCOUNTER: ICD-10-CM

## 2021-02-08 PROCEDURE — 97110 THERAPEUTIC EXERCISES: CPT

## 2021-02-08 NOTE — OP THERAPY DAILY TREATMENT
Outpatient Physical Therapy  DAILY TREATMENT     [facility] [address].  [address]  [place] [address]  Phone:  [phone]  Fax:  [phone]    Date: 02/08/2021    Patient: Kwasi Sims  YOB: 1955  MRN: [id number]     Time Calculation    Start time: 1002              Chief Complaint: Shoulder Problem    Visit #: 21    SUBJECTIVE:  I have a little pain in the back of the shoulder when I do the exercises. I had a little bit of pain in the L shoulder exercises. My R arm fell asleep too yesterday.      OBJECTIVE:  Current objective measures:           Therapeutic Exercises (CPT 81195):     1. Review of hep     2. Pulleys GH flexion and GH IR , x2 min (ea GH), warm up    3. UBE, x4 min CW and CCW, level 3 resistance training    12. GH ER walk away at the wall no weight, increasing flexion 0 >45 deg flexion; 10x ea    13. Modified box exercise supine, 1x15, reviewed hep; fatigue; new Hep HO provided      15. Walk away angels, 2x10 with level I TB , reviewed; moved band down in door to dissuade UT hiking    16. Body blade, 30 sec arm at 45 deg angle     17. D2 flexion and extension, 5# 10x ea    18. D1 flexion and extension, 10# (ext), 5# flexion; 10x ea    19. Independent ball toss at wall, 20sec, fatigue    20. UPOC: 3/12/21      Therapeutic Exercise Summary: Pt performed these exercises with instruction and SPV.  Provided handout with these exercises for daily HEP.    *Additional few min spent with pt today no additional charge        Time-based treatments/modalities:         Pain rating (1-10) before treatment:  2/10 R shoulder;   Pain rating (1-10) after treatment:        ASSESSMENT:   Response to treatment:   Progressing well with increasing strength and endurance. Modified walk away angels hep due UT dominant hiking pattern. Continued difficulty with strength and endurance in greater elevations which may benefit from further practice.    Informed pt of  contact with PCP re: UE numbness/tingling; Appt with PCP 3/2/21.    PLAN/RECOMMENDATIONS:   Plan for treatment: therapy treatment to continue next visit.  Planned interventions for next visit: continue with current treatment.

## 2021-02-10 ENCOUNTER — APPOINTMENT (OUTPATIENT)
Dept: PHYSICAL THERAPY | Facility: REHABILITATION | Age: 66
End: 2021-02-10
Attending: ORTHOPAEDIC SURGERY
Payer: MEDICARE

## 2021-02-16 ENCOUNTER — PHYSICAL THERAPY (OUTPATIENT)
Dept: PHYSICAL THERAPY | Facility: REHABILITATION | Age: 66
End: 2021-02-16
Attending: ORTHOPAEDIC SURGERY
Payer: MEDICARE

## 2021-02-16 DIAGNOSIS — S46.012A STRAIN OF MUSCLE(S) AND TENDON(S) OF THE ROTATOR CUFF OF LEFT SHOULDER, INITIAL ENCOUNTER: ICD-10-CM

## 2021-02-16 DIAGNOSIS — M19.012 PRIMARY OSTEOARTHRITIS, LEFT SHOULDER: ICD-10-CM

## 2021-02-16 DIAGNOSIS — Z96.652 S/P REVISION OF TOTAL KNEE, LEFT: ICD-10-CM

## 2021-02-16 DIAGNOSIS — M75.42 IMPINGEMENT SYNDROME OF LEFT SHOULDER: ICD-10-CM

## 2021-02-16 PROCEDURE — 97140 MANUAL THERAPY 1/> REGIONS: CPT

## 2021-02-16 PROCEDURE — 97110 THERAPEUTIC EXERCISES: CPT

## 2021-02-16 NOTE — OP THERAPY DAILY TREATMENT
Outpatient Physical Therapy  DAILY TREATMENT     [facility] [address]  [address]  [place] [address]  Phone:  [phone]  Fax:  [phone]    Date: 02/16/2021    Patient: Kwasi Sims  YOB: 1955  MRN: [id number]     Time Calculation    Start time: 1002  Stop time: 1032 Time Calculation (min): 30 minutes         Chief Complaint: Shoulder Problem    Visit #: 22    SUBJECTIVE:  I'm a little stiff today. I also have some pain in both of my shoulders with the exercise at the wall (walk away GH ER at wall).      OBJECTIVE:  Current objective measures:       Shoulder strength (arm at side):  L   GH ER: 5/5  GH IR: 5/5  GH abd: 5/5  GH add: 5/5    R WFL      Shoulder strength (90 deg abd):  L:  GH ER: 4/5 (muscle juddering)  GH IR: 5/5    R: WFL      Therapeutic Exercises (CPT 56397):     1. Review of hep     2. Pulleys GH flexion and GH IR , x2 min (ea GH), warm up    3. Qped ball roll at wall, 30 sec CW and CCW    4. [place], 8#; 10x, mirror FB to avoid UT hiking    12. GH ER walk away at the wall no weight, temporarily discontinued due to c/o extended pain with completion    20. UPOC: 3/12/21      Therapeutic Exercise Summary:         Time-based treatments/modalities:    Physical Therapy Timed Treatment Charges  Manual therapy minutes (CPT 84105): 12 minutes  Therapeutic exercise minutes (CPT 83704): 18 minutes    Pain rating (1-10) before treatment: Stiff; B shoulders 2/10  Pain rating (1-10) after treatment:        ASSESSMENT:   Response to treatment:   Progressing in strength with some continued limitations GH ER in greater elevations. Pt requesting to progress hep with increased weights; advised pt to progress weights only if pain-free following ex and not compensating UT (advised to use mirror for FB). Discussed with pt re: continuing with indep hep and follow up in next month if needed; will DC if no contact in 30 days; pt verbalized agreement. Pt  currently not having any difficulties with ADL's.      Previously informed PCP re: UE numbness/tingling; now has s/s bilaterally. Has appt with PCP 3/2/21.    PLAN/RECOMMENDATIONS:   Plan for treatment: therapy treatment to continue next visit.  Planned interventions for next visit: continue with current treatment.   DC if no contact in 30 days

## 2021-02-19 DIAGNOSIS — F41.9 ANXIETY: ICD-10-CM

## 2021-02-19 RX ORDER — DIAZEPAM 5 MG/1
TABLET ORAL
Qty: 60 TABLET | Refills: 5 | Status: SHIPPED | OUTPATIENT
Start: 2021-02-19 | End: 2021-03-21

## 2021-02-19 NOTE — TELEPHONE ENCOUNTER
Received request via: Pharmacy    Was the patient seen in the last year in this department? Yes  11/30/20  Does the patient have an active prescription (recently filled or refills available) for medication(s) requested? No

## 2021-02-23 ENCOUNTER — APPOINTMENT (OUTPATIENT)
Dept: PHYSICAL THERAPY | Facility: REHABILITATION | Age: 66
End: 2021-02-23
Attending: ORTHOPAEDIC SURGERY
Payer: MEDICARE

## 2021-03-01 ENCOUNTER — TELEPHONE (OUTPATIENT)
Dept: MEDICAL GROUP | Facility: LAB | Age: 66
End: 2021-03-01

## 2021-03-01 NOTE — TELEPHONE ENCOUNTER
Left message for patient to call back regarding pre-visit planning. Please transfer call to [phone] regarding having Xray done ordered 2/2/2021

## 2021-03-01 NOTE — TELEPHONE ENCOUNTER
ESTABLISHED PATIENT PRE-VISIT PLANNING     Patient was NOT contacted to complete PVP.     Note: Patient will not be contacted if there is no indication to call.     1.  Reviewed notes from the last few office visits within the medical group: Yes    2.  If any orders were placed at last visit or intended to be done for this visit (i.e. 6 mos follow-up), do we have Results/Consult Notes?         •  Labs - Labs were not ordered at last office visit.  Note: If patient appointment is for lab review and patient did not complete labs, check with provider if OK to reschedule patient until labs completed.       •  Imaging - Imaging was not ordered at last office visit.       •  Referrals - No referrals were ordered at last office visit.    3. Is this appointment scheduled as a Hospital Follow-Up? No    4.  Immunizations were updated in Epic using Reconcile Outside Information activity? Yes    5.  Patient is due for the following Health Maintenance Topics:   Health Maintenance Due   Topic Date Due   • Annual Wellness Visit  07/13/2016           6.  AHA (Pulse8) form printed for Provider? N/A

## 2021-03-02 ENCOUNTER — OFFICE VISIT (OUTPATIENT)
Dept: MEDICAL GROUP | Facility: LAB | Age: 66
End: 2021-03-02
Payer: MEDICARE

## 2021-03-02 VITALS
SYSTOLIC BLOOD PRESSURE: 135 MMHG | WEIGHT: 199 LBS | OXYGEN SATURATION: 98 % | BODY MASS INDEX: 28.49 KG/M2 | DIASTOLIC BLOOD PRESSURE: 80 MMHG | TEMPERATURE: 97.5 F | RESPIRATION RATE: 12 BRPM | HEART RATE: 69 BPM | HEIGHT: 70 IN

## 2021-03-02 DIAGNOSIS — E55.9 VITAMIN D DEFICIENCY: ICD-10-CM

## 2021-03-02 DIAGNOSIS — R20.0 NUMBNESS: ICD-10-CM

## 2021-03-02 DIAGNOSIS — M05.9 RHEUMATOID ARTHRITIS WITH POSITIVE RHEUMATOID FACTOR, INVOLVING UNSPECIFIED SITE (HCC): ICD-10-CM

## 2021-03-02 PROCEDURE — 99214 OFFICE O/P EST MOD 30 MIN: CPT | Performed by: INTERNAL MEDICINE

## 2021-03-02 RX ORDER — OXYCODONE AND ACETAMINOPHEN 10; 325 MG/1; MG/1
1-2 TABLET ORAL EVERY 4 HOURS PRN
Qty: 150 TABLET | Refills: 0 | Status: SHIPPED | OUTPATIENT
Start: 2021-04-11 | End: 2021-03-02 | Stop reason: SDUPTHER

## 2021-03-02 RX ORDER — OXYCODONE AND ACETAMINOPHEN 10; 325 MG/1; MG/1
1-2 TABLET ORAL EVERY 4 HOURS PRN
Qty: 150 TABLET | Refills: 0 | Status: SHIPPED | OUTPATIENT
Start: 2021-03-12 | End: 2021-03-02 | Stop reason: SDUPTHER

## 2021-03-02 RX ORDER — OXYCODONE AND ACETAMINOPHEN 10; 325 MG/1; MG/1
1-2 TABLET ORAL EVERY 4 HOURS PRN
Qty: 150 TABLET | Refills: 0 | Status: SHIPPED | OUTPATIENT
Start: 2021-05-11 | End: 2021-06-10

## 2021-03-02 ASSESSMENT — PATIENT HEALTH QUESTIONNAIRE - PHQ9
7. TROUBLE CONCENTRATING ON THINGS, SUCH AS READING THE NEWSPAPER OR WATCHING TELEVISION: NOT AT ALL
9. THOUGHTS THAT YOU WOULD BE BETTER OFF DEAD, OR OF HURTING YOURSELF: NOT AT ALL
5. POOR APPETITE OR OVEREATING: NOT AT ALL
3. TROUBLE FALLING OR STAYING ASLEEP OR SLEEPING TOO MUCH: NOT AT ALL
4. FEELING TIRED OR HAVING LITTLE ENERGY: NOT AT ALL
2. FEELING DOWN, DEPRESSED, IRRITABLE, OR HOPELESS: NOT AT ALL
6. FEELING BAD ABOUT YOURSELF - OR THAT YOU ARE A FAILURE OR HAVE LET YOURSELF OR YOUR FAMILY DOWN: NOT AL ALL
1. LITTLE INTEREST OR PLEASURE IN DOING THINGS: NOT AT ALL
8. MOVING OR SPEAKING SO SLOWLY THAT OTHER PEOPLE COULD HAVE NOTICED. OR THE OPPOSITE, BEING SO FIGETY OR RESTLESS THAT YOU HAVE BEEN MOVING AROUND A LOT MORE THAN USUAL: NOT AT ALL
SUM OF ALL RESPONSES TO PHQ QUESTIONS 1-9: 0
SUM OF ALL RESPONSES TO PHQ9 QUESTIONS 1 AND 2: 0

## 2021-03-02 ASSESSMENT — FIBROSIS 4 INDEX: FIB4 SCORE: 1.89

## 2021-03-02 NOTE — PROGRESS NOTES
CC: Edgardo Sims is a 65 y.o. male is suffering from   Chief Complaint   Patient presents with   • Chronic Opiate Therapy     3 months follow up         SUBJECTIVE:  1. Rheumatoid arthritis with positive rheumatoid factor, involving unspecified site (HCC)  Edgardo is here for follow-up has a history of seropositive rheumatoid arthritis, is being followed by rheumatology.  We have discussed vaccination with COVID-19 vaccine.  At this juncture suspect there is no contraindications.    2. Vitamin D deficiency  Vitamin D deficiency recheck levels    3. Numbness  Patient with intermittent numbness associate with his right hand, patient is to complete x-rays of the cervical spine if possible.        Past social, family, history: , retired  Social History     Tobacco Use   • Smoking status: Never Smoker   • Smokeless tobacco: Never Used   Substance Use Topics   • Alcohol use: Yes     Alcohol/week: 1.8 oz     Types: 3 Cans of beer per week     Comment: 3 per week   • Drug use: No         MEDICATIONS:    Current Outpatient Medications:   •  [START ON 5/11/2021] oxyCODONE-acetaminophen (PERCOCET-10)  MG Tab, Take 1-2 Tablets by mouth every four hours as needed for Severe Pain (refill #3 of #3.) for up to 30 days., Disp: 150 tablet, Rfl: 0  •  diazePAM (VALIUM) 5 MG Tab, TAKE 1 TABLET BY MOUTH EVERY 12 HOURS AS NEEDED FOR UP TO 30 DAYS F41.9, Disp: 60 tablet, Rfl: 5  •  Tofacitinib Citrate (XELJANZ) 5 MG Tab, Take 5 mg by mouth 2 Times a Day., Disp: 180 Tab, Rfl: 0  •  meloxicam (MOBIC) 7.5 MG Tab, TAKE 1 TABLET BY MOUTH DAILY AS NEEDED FOR SEVERE JOINT PAIN, Disp: 90 Tab, Rfl: 0  •  hydroxychloroquine (PLAQUENIL) 200 MG Tab, TAKE 1 TABLET BY MOUTH TWICE A DAY, Disp: 180 Tab, Rfl: 0  •  cyclobenzaprine (FLEXERIL) 10 mg Tab, TAKE ONE TABLET BY MOUTH THREE TIMES DAILY AS NEEDED FOR MILD PAIN, Disp: 90 Tab, Rfl: 5  •  PARoxetine (PAXIL) 20 MG Tab, Take 1 Tab by mouth every day., Disp: 90 Tab, Rfl: 3  •   pravastatin (PRAVACHOL) 80 MG tablet, Take 1 Tab by mouth every day., Disp: 90 Tab, Rfl: 3  •  ezetimibe (ZETIA) 10 MG Tab, Take 1 Tab by mouth every evening., Disp: 90 Tab, Rfl: 3  •  lisinopril (PRINIVIL) 10 MG Tab, Take 1 Tab by mouth every day., Disp: 90 Tab, Rfl: 3  •  metoprolol (LOPRESSOR) 50 MG Tab, TAKE 1 TABLET BY MOUTH TWICE A DAY, Disp: 180 Tab, Rfl: 3  •  vardenafil (LEVITRA) 20 MG tablet, Take 20 mg by mouth as needed., Disp: , Rfl:   •  CALCIUM-VITAMIN D PO, Take 1 Tab by mouth 2 Times a Day., Disp: , Rfl:   •  ascorbic acid (ASCORBIC ACID) 500 MG Tab, Take 500 mg by mouth every day., Disp: , Rfl:   •  Cyanocobalamin (VITAMIN B-12) 5000 MCG TABLET DISPERSIBLE, Take 1 Tab by mouth every day., Disp: , Rfl:   •  Zinc 50 MG Cap, Take 50 mg by mouth every day., Disp: , Rfl:   •  omeprazole (PRILOSEC) 20 MG CPDR, Take 20 mg by mouth every day., Disp: , Rfl:   •  predniSONE (DELTASONE) 1 MG Tab, TAKE 1 TABLET BY MOUTH EVERY DAY (Patient not taking: Reported on 11/30/2020), Disp: 90 Tab, Rfl: 1    PROBLEMS:  Patient Active Problem List    Diagnosis Date Noted   • Neurogenic bladder 01/26/2019   • Postoperative pain 08/05/2020   • Difficult intubation 08/05/2020   • Gastroesophageal reflux disease 08/05/2020   • Secondary adrenal insufficiency (HCC) 12/12/2019   • Current chronic use of systemic steroids 10/16/2019   • High risk medication use 10/16/2019   • History of adrenal insufficiency 10/16/2019   • Bladder outlet obstruction 05/01/2018   • Leukocytosis 04/30/2018   • Lactic acidosis 04/30/2018   • Idiopathic acute pancreatitis 04/30/2018   • Chronic use of opiate drug for therapeutic purpose 08/12/2017   • Depression 07/13/2015   • Anxiety 03/31/2015   • Coronary artery disease due to calcified coronary lesion: Mildly cardiac catheterization in 2014 12/05/2014   • Tachycardia 10/20/2014   • Abnormal myocardial perfusion study 01/15/2014   • Osteoarthrosis, unspecified whether generalized or localized,  "pelvic region and thigh 05/31/2013   • Dyslipidemia 07/12/2011   • Aortic insufficiency 07/05/2011   • Essential hypertension 07/05/2011   • Rheumatoid arthritis (HCC) 05/05/2009   • Hypogonadism male 05/05/2009       REVIEW OF SYSTEMS:  Gen.:  No Nausea, Vomiting, fever, Chills.  Heart: No chest pain.  Lungs:  No shortness of Breath.  Psychological: Kian unusual Anxiety depression     PHYSICAL EXAM   Constitutional: Alert, cooperative, not in acute distress.  Cardiovascular:  Rate Rhythm is regular without murmurs rubs clicks.     Thorax & Lungs: Clear to auscultation, no wheezing, rhonchi, or rales  HENT: Normocephalic, Atraumatic.  Eyes: PERRLA, EOMI, Conjunctiva normal.   Neck: Trachia is midline no swelling of the thyroid.   Lymphatic: No lymphadenopathy noted.   Neurologic: Alert & oriented x 3, cranial nerves II through XII are intact, Normal motor function, Normal sensory function, No focal deficits noted.   Psychiatric: Affect normal, Judgment normal, Mood normal.     VITAL SIGNS:/80   Pulse 69   Temp 36.4 °C (97.5 °F) (Temporal)   Resp 12   Ht 1.778 m (5' 10\")   Wt 90.3 kg (199 lb)   SpO2 98%   BMI 28.55 kg/m²     Labs: Reviewed    Assessment:                                                     Plan:    1. Rheumatoid arthritis with positive rheumatoid factor, involving unspecified site (HCC)  No change in medical therapy continue Percocet state drug task force reviewed  - Comp Metabolic Panel; Future  - CBC WITH DIFFERENTIAL; Future  - oxyCODONE-acetaminophen (PERCOCET-10)  MG Tab; Take 1-2 Tablets by mouth every four hours as needed for Severe Pain (refill #3 of #3.) for up to 30 days.  Dispense: 150 tablet; Refill: 0    2. Vitamin D deficiency  Recheck vitamin D  - VITAMIN D,25 HYDROXY; Future    3. Numbness  Encourage patient to complete x-rays of the cervical spine      "

## 2021-03-09 ENCOUNTER — OFFICE VISIT (OUTPATIENT)
Dept: RHEUMATOLOGY | Facility: MEDICAL CENTER | Age: 66
End: 2021-03-09
Payer: MEDICARE

## 2021-03-09 VITALS
RESPIRATION RATE: 14 BRPM | SYSTOLIC BLOOD PRESSURE: 150 MMHG | BODY MASS INDEX: 29.13 KG/M2 | TEMPERATURE: 97.2 F | HEART RATE: 92 BPM | WEIGHT: 203 LBS | OXYGEN SATURATION: 98 % | DIASTOLIC BLOOD PRESSURE: 80 MMHG

## 2021-03-09 DIAGNOSIS — I25.84 CORONARY ARTERY DISEASE DUE TO CALCIFIED CORONARY LESION: ICD-10-CM

## 2021-03-09 DIAGNOSIS — Z79.899 LONG-TERM USE OF PLAQUENIL: ICD-10-CM

## 2021-03-09 DIAGNOSIS — M05.9 RHEUMATOID ARTHRITIS WITH POSITIVE RHEUMATOID FACTOR, INVOLVING UNSPECIFIED SITE (HCC): ICD-10-CM

## 2021-03-09 DIAGNOSIS — M85.89 OSTEOPENIA OF MULTIPLE SITES: ICD-10-CM

## 2021-03-09 DIAGNOSIS — Z79.52 LONG TERM CURRENT USE OF SYSTEMIC STEROIDS: ICD-10-CM

## 2021-03-09 DIAGNOSIS — I25.10 CORONARY ARTERY DISEASE DUE TO CALCIFIED CORONARY LESION: ICD-10-CM

## 2021-03-09 DIAGNOSIS — I10 ESSENTIAL HYPERTENSION: ICD-10-CM

## 2021-03-09 DIAGNOSIS — Z79.1 ENCOUNTER FOR LONG-TERM (CURRENT) USE OF NSAIDS: ICD-10-CM

## 2021-03-09 DIAGNOSIS — E27.40 ADRENAL INSUFFICIENCY (HCC): ICD-10-CM

## 2021-03-09 DIAGNOSIS — Z51.81 MEDICATION MONITORING ENCOUNTER: ICD-10-CM

## 2021-03-09 PROCEDURE — 99214 OFFICE O/P EST MOD 30 MIN: CPT | Performed by: INTERNAL MEDICINE

## 2021-03-09 ASSESSMENT — JOINT PAIN
TOTAL NUMBER OF TENDER JOINTS: 9
TOTAL NUMBER OF SWOLLEN JOINTS: 0

## 2021-03-09 ASSESSMENT — FIBROSIS 4 INDEX: FIB4 SCORE: 1.89

## 2021-03-09 NOTE — PROGRESS NOTES
Chief Complaint- joint pain     Subjective:   Edgardo Sims is a 65 y.o. male here today for follow up of rheumatological issues    This is a follow-up visit for this patient who we see in this clinic for serologically negative rheumatoid arthritis with hand and feet x-rays done 2017 indicating erosive arthritis.  Patient is currently on Xeljanz at 5 mg p.o. twice daily that is subsidized by his cell source and Plaquenil 200 mg p.o. twice daily.  Patient also takes meloxicam 15 mg p.o. daily as needed.  Patient also continues to be on low-dose prednisone at 1 mg a day because of adrenal insufficiency discovered by patient's endocrinologist Dr. Juarez December 2019.  Patient states that he thinks he is doing okay but does admit to having rheumatoid flares probably at least once every week or 2 that correlates with weather changes.  Patient is interested in possibly trying alternative medication options for his rheumatoid arthritis.   Patient denies any side effects from the medication, denies any unexplained weight loss, denies any fevers of unknown etiology, denies any GI upset, denies any rashes, denies any new joint swelling, denies recurrent infections.  Of note patient's last ophthalmology evaluation was over 1-1/2 years ago.  Of note last sedimentation rate equals 1 November 2020.    Of note we reviewed the risks of taking NSAIDs in combination with prednisone i.e. peptic ulcer disease and gastrointestinal bleeding, patient states he understands, understands the risks and wants to continue the use of NSAIDs i.e. meloxicam.      Additional comorbidities include ureteral blockage and BPH.  Patient also with a history of Dupuytren's contracture with release, also history of hypercholesterolemia.  Patient also with a history of osteopenia and aortic valve insufficiency followed periodically by echocardiograms.  Patient also with rotator cuff tears in left shoulder status post surgical  intervention.     Bilateral AUTUMN  Left TKA     S/p Remicade-ineffective  S/p Orencia-ineffective  S/p Enbrel-ineffective  S/p humira-ineffective  S/p MTX-oral ulcers  S/p arava-bad reaction but patient doesn't recall specifics  S/p rituximab-helped but patient stopped because of methotrexate side effects per patient report....    Addendum 6/1/2021  HBsAg/HBcAb neg 5/2021  HCV neg 5/2021  Quantiferon Gold neg 5/2021        G6PD 12.9 nl 6/2020   Uric acid 4.5 nl 2/2019   Cryoglobulin neg 8/2017  RF neg 8/2017, RF neg 2/2019  CCP neg 2/2019  HBsAg IgM/HBcAb neg 9/2019  HCV neg 9/2019  Quantiferon Gold neg 9/2019  DEXA 9/5/2017 T scores -0.2, -1.5  FRAX 9/5/2017 not done  DEXA 6/12/2020 T scores 0.3, -1.2  FRAX 6/12/2020 not done   Hand x-rays 5/2017-indicates erosive arthritis  Feet x-rays 5/2017-indicates erosive arthritis   Corticosteroid Therapy Informed Consent signed 2/21/2019-copy given to patient      Current medicines (including changes today)  Current Outpatient Medications   Medication Sig Dispense Refill   • Upadacitinib ER 15 MG TABLET SR 24 HR Take 15 mg by mouth every day. 90 tablet 1   • [START ON 5/11/2021] oxyCODONE-acetaminophen (PERCOCET-10)  MG Tab Take 1-2 Tablets by mouth every four hours as needed for Severe Pain (refill #3 of #3.) for up to 30 days. 150 tablet 0   • diazePAM (VALIUM) 5 MG Tab TAKE 1 TABLET BY MOUTH EVERY 12 HOURS AS NEEDED FOR UP TO 30 DAYS F41.9 60 tablet 5   • meloxicam (MOBIC) 7.5 MG Tab TAKE 1 TABLET BY MOUTH DAILY AS NEEDED FOR SEVERE JOINT PAIN 90 Tab 0   • hydroxychloroquine (PLAQUENIL) 200 MG Tab TAKE 1 TABLET BY MOUTH TWICE A  Tab 0   • cyclobenzaprine (FLEXERIL) 10 mg Tab TAKE ONE TABLET BY MOUTH THREE TIMES DAILY AS NEEDED FOR MILD PAIN 90 Tab 5   • PARoxetine (PAXIL) 20 MG Tab Take 1 Tab by mouth every day. 90 Tab 3   • pravastatin (PRAVACHOL) 80 MG tablet Take 1 Tab by mouth every day. 90 Tab 3   • ezetimibe (ZETIA) 10 MG Tab Take 1 Tab by mouth every  evening. 90 Tab 3   • lisinopril (PRINIVIL) 10 MG Tab Take 1 Tab by mouth every day. 90 Tab 3   • metoprolol (LOPRESSOR) 50 MG Tab TAKE 1 TABLET BY MOUTH TWICE A  Tab 3   • vardenafil (LEVITRA) 20 MG tablet Take 20 mg by mouth as needed.     • CALCIUM-VITAMIN D PO Take 1 Tab by mouth 2 Times a Day.     • ascorbic acid (ASCORBIC ACID) 500 MG Tab Take 500 mg by mouth every day.     • Cyanocobalamin (VITAMIN B-12) 5000 MCG TABLET DISPERSIBLE Take 1 Tab by mouth every day.     • Zinc 50 MG Cap Take 50 mg by mouth every day.     • omeprazole (PRILOSEC) 20 MG CPDR Take 20 mg by mouth every day.     • predniSONE (DELTASONE) 1 MG Tab TAKE 1 TABLET BY MOUTH EVERY DAY (Patient not taking: Reported on 3/9/2021) 90 Tab 1     No current facility-administered medications for this visit.     He  has a past medical history of Abnormal myocardial perfusion study (1/15/2014), Aortic insufficiency (7/5/2011), Arthritis, Bronchitis, CAD (coronary artery disease) mild plaque at cath in 2/14 (12/5/2014), Cancer (HCC), Chronic use of opiate drugs therapeutic purposes (8/12/2017), Dental disorder, Dyslipidemia (7/12/2011), Heart burn, Heart murmur, Hiatus hernia syndrome, High cholesterol, HTN (hypertension) (7/5/2011), Hypertension, Indigestion, Infectious disease, Pain, Pain, Rheumatoid arthritis(714.0), and Tachycardia (10/20/2014).    ROS   Other than what is mentioned in HPI or physical exam, there is no history of headaches, double vision or blurred vision. No temporal tenderness or jaw claudication. No trouble swallowing difficulties or sore throats.  No chest complaints including chest pain, cough or sputum production. No GI complaints including nausea, vomiting, change in bowel habits, or past peptic ulcer disease. No history of blood in the stools. No urinary complaints including dysuria or frequency. No history of alopecia, photosensitivity, oral ulcerations, Raynaud's phenomena.       Objective:     /80   Pulse 92    Temp 36.2 °C (97.2 °F) (Temporal)   Resp 14   Wt 92.1 kg (203 lb)   SpO2 98%  Body mass index is 29.13 kg/m².   Physical Exam:    Constitutional: Alert and oriented X3, patient is talkative with good eye contact.Skin: Warm, dry, good turgor, no rashes in visible areas.Eye: Equal, round and reactive, conjunctiva clear, lids normal EOM intactENMT: Lips without lesions, good dentition, no oropharyngeal ulcers, moist buccal mucosa, pinna without deformityNeck: Trachea midline, no masses, no thyromegaly.Lymph:  No cervical lymphadenopathy, no axillary lymphadenopathy, no supraclavicular lymphadenopathyRespiratory: Unlabored respiratory effort, lungs clear to auscultation, no wheezes, no ronchi.Cardiovascular: Normal S1, S2, no murmur, no edema.Abdomen: Soft, non-tender, no masses, no hepatosplenomegaly.Psych: Alert and oriented x3, normal affect and mood.Neuro: Cranial nerves 2-12 are grossly intact, no loss of sensation LEExt:no joint laxity noted in bilateral arms, no joint laxity noted in bilateral legs          Lab Results   Component Value Date/Time    QNTTBGOLD Negative 06/01/2017 01:13 PM     Lab Results   Component Value Date/Time    HEPBCORTOT Negative 06/01/2017 01:13 PM    HEPBCORIGM Negative 09/06/2019 04:00 PM    HEPBSAG Negative 09/06/2019 04:00 PM     Lab Results   Component Value Date/Time    HEPCAB Negative 09/06/2019 04:00 PM     Lab Results   Component Value Date/Time    SODIUM 136 11/30/2020 10:53 AM    POTASSIUM 5.5 11/30/2020 10:53 AM    CHLORIDE 100 11/30/2020 10:53 AM    CO2 31 11/30/2020 10:53 AM    GLUCOSE 75 11/30/2020 10:53 AM    BUN 8 11/30/2020 10:53 AM    CREATININE 0.89 11/30/2020 10:53 AM    CREATININE 1.1 04/21/2009 03:50 PM    BUNCREATRAT 13 09/21/2016 09:21 AM      Lab Results   Component Value Date/Time    WBC 7.6 11/30/2020 10:53 AM    WBC 7.5 07/01/2011 10:30 AM    RBC 5.64 11/30/2020 10:53 AM    RBC 4.72 07/01/2011 10:30 AM    HEMOGLOBIN 18.2 (H) 11/30/2020 10:53 AM     HEMATOCRIT 53.7 (H) 11/30/2020 10:53 AM    MCV 95.2 11/30/2020 10:53 AM     (H) 07/01/2011 10:30 AM    MCH 32.3 11/30/2020 10:53 AM    MCH 36.0 (H) 07/01/2011 10:30 AM    MCHC 33.9 11/30/2020 10:53 AM    MPV 10.8 11/30/2020 10:53 AM    NEUTSPOLYS 47.20 11/30/2020 10:53 AM    LYMPHOCYTES 31.70 11/30/2020 10:53 AM    MONOCYTES 17.00 (H) 11/30/2020 10:53 AM    EOSINOPHILS 1.70 11/30/2020 10:53 AM    BASOPHILS 0.70 11/30/2020 10:53 AM    HYPOCHROMIA 1+ 03/05/2014 04:43 PM    ANISOCYTOSIS 1+ 01/02/2015 05:02 PM      Lab Results   Component Value Date/Time    CALCIUM 10.0 11/30/2020 10:53 AM    ASTSGOT 38 11/30/2020 10:53 AM    ALTSGPT 40 11/30/2020 10:53 AM    ALKPHOSPHAT 76 11/30/2020 10:53 AM    TBILIRUBIN 0.7 11/30/2020 10:53 AM    ALBUMIN 4.7 11/30/2020 10:53 AM    TOTPROTEIN 7.0 11/30/2020 10:53 AM     Lab Results   Component Value Date/Time    URICACID 4.5 02/19/2019 08:36 AM    RHEUMFACTN 10 02/19/2019 08:36 AM    CCPANTIBODY 2 02/19/2019 08:36 AM     Lab Results   Component Value Date/Time    CRYOGLOBULIN NEG 72Hour 08/04/2017 02:32 PM     Lab Results   Component Value Date/Time    SEDRATEWES 1 11/30/2020 10:53 AM     Lab Results   Component Value Date/Time    HBA1C 5.3 07/26/2018 11:19 AM     Lab Results   Component Value Date/Time    G6PD 12.9 06/18/2020 02:50 PM     Lab Results   Component Value Date/Time    CPKTOTAL 141 01/26/2018 07:47 AM     Lab Results   Component Value Date/Time    PTHINTACT 55 10/10/2008 10:42 AM     Results for orders placed during the hospital encounter of 06/12/20   DS-BONE DENSITY STUDY (DEXA)    Impression According to the World Health Organization classification, bone mineral density of this patient is osteopenia with increased fracture risk left forearm, normal density lumbar spine.        FRAX score not obtained for this patient.            INTERPRETING LOCATION:  [address] [place], [place] [address], [address]     Results for orders placed during the hospital encounter of 05/31/13    DX-PELVIS-1 OR 2 VIEWS    Impression Interval performance of a left hip arthroplasty.  Right hip arthroplasty is unchanged.              INTERPRETING LOCATION: [address], [place], [address]     Results for orders placed during the hospital encounter of 05/31/13   DX-PELVIS-1 OR 2 VIEWS    Impression Interval performance of a left hip arthroplasty.  Right hip arthroplasty is unchanged.              INTERPRETING LOCATION: [address], [place], [address]     Results for orders placed during the hospital encounter of 01/30/09   DX-KNEE COMPLETE 4+    Impression IMPRESSION:     1. NEW FINDINGS CONSISTENT WITH OSTEOCHONDROSIS DISSECANS OF THE MEDIAL   FEMORAL CONDYLE.    2. NEW MODERATE JOINT SPACE NARROWING OF THE MEDIAL TIBIOFEMORAL   COMPARTMENT, CONSISTENT WITH CARTILAGE THINNING.    3. NEW MODERATE JOINT EFFUSION.      SST/llw      Read By BLANCA ORTIZ MD on Jan 30 2009  3:39PM  : JAXON Transcription Date: Feb 2 2009  6:08AM  THIS DOCUMENT HAS BEEN ELECTRONICALLY SIGNED BY: BLANCA ORTIZ MD on   Feb  3 2009 11:43AM        Results for orders placed during the hospital encounter of 05/31/17   DX-HAND 3+ RIGHT    Impression 1.  Question of small erosions or subchondral cysts in the 2nd and 3rd metacarpal heads.     Results for orders placed during the hospital encounter of 12/12/18   DX-THORACIC SPINE-WITH SWIMMERS VIEW    Impression 1.  Moderate to severe multilevel degenerative change of thoracic spine.  2.  Multilevel anterior wedge compression deformities with associated thoracic kyphosis, similar to prior exam.  3.  No gross acute fracture or segmental malalignment.     Results for orders placed during the hospital encounter of 12/21/07   MR-KNEE-W/O    Impression IMPRESSION:    1. NON-DISPLACED OBLIQUE TEAR OF THE POSTERIOR HORN MEDIAL MENISCUS   COMMUNICATING WITH SUPERIOR AND INFERIOR ARTICULAR SURFACES WITHOUT   EVIDENCE OF PARAMENISCAL CYST.  UNDERLYING CHONDROMALACIA IS SEEN  WITHIN   THE MEDIAL FEMORAL CONDYLE POSTERIOR WEIGHTBEARING SURFACE OF   NON-FULL-THICKNESS SEVERITY.        2. MENISCAL DEGENERATION AND PARTIAL PERIPHERAL EXTRUSION INVOLVING THE   MID-BODY OF THE MEDIAL MENISCUS.        GEK:ajay        Read By KEITH NETTLES MD on Dec 21 2007 12:14PM  : AJAY Transcription Date: Dec 22 2007 10:16AM  THIS DOCUMENT HAS BEEN ELECTRONICALLY SIGNED BY: KEITH NETTLES MD on   Dec 24 2007 12:21PM     Results for orders placed during the hospital encounter of 02/14/05   MR-CERVICAL SPINE-W/O    Impression IMPRESSION:    1. DEGENERATIVE DISC DISEASE C5-6 AND C6-7 WITH SOME MINOR POSTERIOR   SPURRING BUT WITHOUT ANY REAL CANAL STENOSIS AND WITH NO EVIDENCE OF AN   ACUTE DISC EXTRUSION.  2. MILD TO MODERATE MIDCERVICAL NEURAL FORAMINAL NARROWING DUE TO   UNCINATE AND FACET HYPERTROPHY, WITH MULTILEVEL DISEASE PRESENT AND WITH   SOME MILD INTERVAL PROGRESSION COMPARED WITH THE PRIOR MRI.  THE AXIAL   IMAGES OVERESTIMATE THE DEGREE OF NEURAL FORAMINAL STENOSIS DUE TO   ARTIFACTS, WITH THE SAGITTAL IMAGES DEMONSTRATING MILD NEURAL FORAMINAL   STENOTIC CHANGES IN THE MIDCERVICAL LEVELS.    3. NO EVIDENCE OF ACUTE TRAUMA.         Read By KEITH NETTLES MD on Feb 14 2005  1:10PM  : SHRADDHA Transcription Date: Feb 15 2005 11:24AM  THIS DOCUMENT HAS BEEN ELECTRONICALLY SIGNED BY: KEITH NETTLES MD on   Feb 15 2005 12:06PM     Results for orders placed during the hospital encounter of 02/14/05   DX-CERVICAL SPINE-2 OR 3 VIEWS    Impression IMPRESSION:    MULTILEVEL CERVICAL SPINE DEGENERATIVE CHANGE, WITHOUT INTERVAL CHANGE.          Read By MIKKI RODRIGUEZ MD on Feb 14 2005  3:04PM  : EMMIE Transcription Date: Feb 14 2005  3:10PM  THIS DOCUMENT HAS BEEN ELECTRONICALLY SIGNED BY: MIKKI RODRIGUEZ MD on Feb 14 2005  3:17PM     Assessment and Plan:     1. Rheumatoid arthritis with serologically negative unspecified site (HCC)  Long discussion with  patient regarding treatment options we opted to try switching Xeljanz to Rinvoq 15 mg p.o. daily, continue Plaquenil 200 mg p.o. twice daily and meloxicam 15 mg p.o. daily as needed  - REFERRAL TO OPHTHALMOLOGY  - Upadacitinib ER 15 MG TABLET SR 24 HR; Take 15 mg by mouth every day.  Dispense: 90 tablet; Refill: 1  - HEP B CORE AB IGM; Future  - HEP B SURFACE ANTIGEN; Future  - HEP C VIRUS ANTIBODY; Future  - Quantiferon Gold Plus TB (4 tube); Future  - Comp Metabolic Panel; Future  - CBC WITH DIFFERENTIAL; Future  - Sed Rate; Future    2. Medication monitoring encounter  Switching Xeljanz to Rinvoq 15 mg p.o. daily  Patient will be coming up due for screening labs, screening labs ordered for patient, patient also due for monitoring labs about May 2021, monitoring labs ordered for patient  Risks of Rinvoq reviewed with patient including potential PEs and DVTs, advised patient take a baby aspirin a day.  We reviewed risks of biological medications with patient including hematological pathology, cancer risks, neurological and infection issues especially in the Covid-19 pandemic environment, patient states understanding.  Patient denies any blood dyscrasias that would predispose to blood clots and denies any history of blood clots or DVTs or PEs.  - REFERRAL TO OPHTHALMOLOGY  - Upadacitinib ER 15 MG TABLET SR 24 HR; Take 15 mg by mouth every day.  Dispense: 90 tablet; Refill: 1  - HEP B CORE AB IGM; Future  - HEP B SURFACE ANTIGEN; Future  - HEP C VIRUS ANTIBODY; Future  - Quantiferon Gold Plus TB (4 tube); Future  - Comp Metabolic Panel; Future  - CBC WITH DIFFERENTIAL; Future  - Sed Rate; Future    3. Long-term use of Plaquenil  On Plaquenil 200 mg p.o. twice daily of note G6PD levels are adequate, patient needs monitoring labs every 6 months next labs due about May 2021, labs ordered for patient  Patient also needs ophthalmology evaluation every year, patient overdue for ophthalmology evaluation referral to  ophthalmology submitted today  - REFERRAL TO OPHTHALMOLOGY  - Upadacitinib ER 15 MG TABLET SR 24 HR; Take 15 mg by mouth every day.  Dispense: 90 tablet; Refill: 1  - HEP B CORE AB IGM; Future  - HEP B SURFACE ANTIGEN; Future  - HEP C VIRUS ANTIBODY; Future  - Quantiferon Gold Plus TB (4 tube); Future  - Comp Metabolic Panel; Future  - CBC WITH DIFFERENTIAL; Future  - Sed Rate; Future    4. Encounter for long-term (current) use of NSAIDs  On meloxicam as needed, patient needs monitoring labs every 6 months, next labs due May 2021, labs ordered for patient  - REFERRAL TO OPHTHALMOLOGY  - Upadacitinib ER 15 MG TABLET SR 24 HR; Take 15 mg by mouth every day.  Dispense: 90 tablet; Refill: 1  - HEP B CORE AB IGM; Future  - HEP B SURFACE ANTIGEN; Future  - HEP C VIRUS ANTIBODY; Future  - Quantiferon Gold Plus TB (4 tube); Future  - Comp Metabolic Panel; Future  - CBC WITH DIFFERENTIAL; Future  - Sed Rate; Future    5. Osteopenia of multiple sites  Last DEXA June 2020 Next DEXA June 2022   continue calcium citrate 1200 mg by mouth daily and vitamin D about 2000 units by mouth daily and magnesium 200 mg by mouth daily  - HEP B CORE AB IGM; Future  - HEP B SURFACE ANTIGEN; Future  - HEP C VIRUS ANTIBODY; Future  - Quantiferon Gold Plus TB (4 tube); Future  - Comp Metabolic Panel; Future  - CBC WITH DIFFERENTIAL; Future  - Sed Rate; Future    6. Adrenal insufficiency (HCC)  Followed by endocrinology Dr. Juarez who has patient on prednisone 1 mg a day    7. Long term current use of systemic steroids  Prescribed by patient's endocrinologist Dr. Juarez who is patient on prednisone 1 mg a day for adrenal insufficiency    8. Essential hypertension  May impact the type of medications we can use for this patient's arthritis. We will have to keep this under advisement.    9. Coronary artery disease due to calcified coronary lesion: Mildly cardiac catheterization in 2014  Managed and followed by cardiology    Followup: Return in  about 3 months (around 6/9/2021). or sooner peyman Sims  was seen 30 minutes face-to-face of which more than 50% of the time was spent counseling the patient (excluding time for procedures)  regarding  rheumatological condition and care. Therapy was discussed in detail.      Please note that this dictation was created using voice recognition software. I have made every reasonable attempt to correct obvious errors, but I expect that there are errors of grammar and possibly content that I did not discover before finalizing the note.

## 2021-03-11 ENCOUNTER — HOSPITAL ENCOUNTER (OUTPATIENT)
Dept: LAB | Facility: MEDICAL CENTER | Age: 66
End: 2021-03-11
Attending: INTERNAL MEDICINE
Payer: MEDICARE

## 2021-03-11 DIAGNOSIS — M05.9 RHEUMATOID ARTHRITIS WITH POSITIVE RHEUMATOID FACTOR, INVOLVING UNSPECIFIED SITE (HCC): ICD-10-CM

## 2021-03-11 DIAGNOSIS — Z79.52 CURRENT CHRONIC USE OF SYSTEMIC STEROIDS: ICD-10-CM

## 2021-03-11 DIAGNOSIS — Z79.899 HIGH RISK MEDICATION USE: ICD-10-CM

## 2021-03-11 DIAGNOSIS — E27.49 SECONDARY ADRENAL INSUFFICIENCY (HCC): ICD-10-CM

## 2021-03-11 DIAGNOSIS — E55.9 VITAMIN D DEFICIENCY: ICD-10-CM

## 2021-03-11 LAB
25(OH)D3 SERPL-MCNC: 41 NG/ML (ref 30–100)
ALBUMIN SERPL BCP-MCNC: 4.2 G/DL (ref 3.2–4.9)
ALBUMIN SERPL BCP-MCNC: 4.2 G/DL (ref 3.2–4.9)
ALBUMIN/GLOB SERPL: 1.8 G/DL
ALBUMIN/GLOB SERPL: 1.8 G/DL
ALP SERPL-CCNC: 64 U/L (ref 30–99)
ALP SERPL-CCNC: 64 U/L (ref 30–99)
ALT SERPL-CCNC: 29 U/L (ref 2–50)
ALT SERPL-CCNC: 31 U/L (ref 2–50)
ANION GAP SERPL CALC-SCNC: 11 MMOL/L (ref 7–16)
ANION GAP SERPL CALC-SCNC: 9 MMOL/L (ref 7–16)
AST SERPL-CCNC: 36 U/L (ref 12–45)
AST SERPL-CCNC: 38 U/L (ref 12–45)
BASOPHILS # BLD AUTO: 1.3 % (ref 0–1.8)
BASOPHILS # BLD: 0.11 K/UL (ref 0–0.12)
BILIRUB SERPL-MCNC: 0.5 MG/DL (ref 0.1–1.5)
BILIRUB SERPL-MCNC: 0.5 MG/DL (ref 0.1–1.5)
BUN SERPL-MCNC: 14 MG/DL (ref 8–22)
BUN SERPL-MCNC: 14 MG/DL (ref 8–22)
CALCIUM SERPL-MCNC: 9.5 MG/DL (ref 8.5–10.5)
CALCIUM SERPL-MCNC: 9.5 MG/DL (ref 8.5–10.5)
CHLORIDE SERPL-SCNC: 96 MMOL/L (ref 96–112)
CHLORIDE SERPL-SCNC: 98 MMOL/L (ref 96–112)
CO2 SERPL-SCNC: 28 MMOL/L (ref 20–33)
CO2 SERPL-SCNC: 28 MMOL/L (ref 20–33)
CORTIS SERPL-MCNC: 13 UG/DL (ref 0–23)
CREAT SERPL-MCNC: 0.8 MG/DL (ref 0.5–1.4)
CREAT SERPL-MCNC: 0.83 MG/DL (ref 0.5–1.4)
DHEA-S SERPL-MCNC: 25.7 UG/DL (ref 33.6–249)
EOSINOPHIL # BLD AUTO: 0.36 K/UL (ref 0–0.51)
EOSINOPHIL NFR BLD: 4.3 % (ref 0–6.9)
ERYTHROCYTE [DISTWIDTH] IN BLOOD BY AUTOMATED COUNT: 49.3 FL (ref 35.9–50)
GLOBULIN SER CALC-MCNC: 2.3 G/DL (ref 1.9–3.5)
GLOBULIN SER CALC-MCNC: 2.3 G/DL (ref 1.9–3.5)
GLUCOSE SERPL-MCNC: 98 MG/DL (ref 65–99)
GLUCOSE SERPL-MCNC: 99 MG/DL (ref 65–99)
HCT VFR BLD AUTO: 49 % (ref 42–52)
HGB BLD-MCNC: 17.2 G/DL (ref 14–18)
IMM GRANULOCYTES # BLD AUTO: 0.13 K/UL (ref 0–0.11)
IMM GRANULOCYTES NFR BLD AUTO: 1.6 % (ref 0–0.9)
LYMPHOCYTES # BLD AUTO: 1.93 K/UL (ref 1–4.8)
LYMPHOCYTES NFR BLD: 23.2 % (ref 22–41)
MCH RBC QN AUTO: 34.7 PG (ref 27–33)
MCHC RBC AUTO-ENTMCNC: 35.1 G/DL (ref 33.7–35.3)
MCV RBC AUTO: 98.8 FL (ref 81.4–97.8)
MONOCYTES # BLD AUTO: 1.36 K/UL (ref 0–0.85)
MONOCYTES NFR BLD AUTO: 16.3 % (ref 0–13.4)
NEUTROPHILS # BLD AUTO: 4.43 K/UL (ref 1.82–7.42)
NEUTROPHILS NFR BLD: 53.3 % (ref 44–72)
NRBC # BLD AUTO: 0 K/UL
NRBC BLD-RTO: 0 /100 WBC
PLATELET # BLD AUTO: 154 K/UL (ref 164–446)
PMV BLD AUTO: 10.6 FL (ref 9–12.9)
POTASSIUM SERPL-SCNC: 4.1 MMOL/L (ref 3.6–5.5)
POTASSIUM SERPL-SCNC: 4.3 MMOL/L (ref 3.6–5.5)
PROT SERPL-MCNC: 6.5 G/DL (ref 6–8.2)
PROT SERPL-MCNC: 6.5 G/DL (ref 6–8.2)
RBC # BLD AUTO: 4.96 M/UL (ref 4.7–6.1)
SODIUM SERPL-SCNC: 135 MMOL/L (ref 135–145)
SODIUM SERPL-SCNC: 135 MMOL/L (ref 135–145)
WBC # BLD AUTO: 8.3 K/UL (ref 4.8–10.8)

## 2021-03-11 PROCEDURE — 82024 ASSAY OF ACTH: CPT

## 2021-03-11 PROCEDURE — 80053 COMPREHEN METABOLIC PANEL: CPT | Mod: 91

## 2021-03-11 PROCEDURE — 83516 IMMUNOASSAY NONANTIBODY: CPT

## 2021-03-11 PROCEDURE — 36415 COLL VENOUS BLD VENIPUNCTURE: CPT

## 2021-03-11 PROCEDURE — 84244 ASSAY OF RENIN: CPT

## 2021-03-11 PROCEDURE — 82306 VITAMIN D 25 HYDROXY: CPT

## 2021-03-11 PROCEDURE — 82088 ASSAY OF ALDOSTERONE: CPT

## 2021-03-11 PROCEDURE — 82627 DEHYDROEPIANDROSTERONE: CPT

## 2021-03-11 PROCEDURE — 80053 COMPREHEN METABOLIC PANEL: CPT

## 2021-03-11 PROCEDURE — 82533 TOTAL CORTISOL: CPT

## 2021-03-11 PROCEDURE — 85025 COMPLETE CBC W/AUTO DIFF WBC: CPT

## 2021-03-13 LAB
ACTH PLAS-MCNC: 27.2 PG/ML (ref 7.2–63.3)
ALDOST SERPL-MCNC: 9.3 NG/DL

## 2021-03-15 ENCOUNTER — OFFICE VISIT (OUTPATIENT)
Dept: ENDOCRINOLOGY | Facility: MEDICAL CENTER | Age: 66
End: 2021-03-15
Attending: INTERNAL MEDICINE
Payer: MEDICARE

## 2021-03-15 VITALS
WEIGHT: 203.1 LBS | BODY MASS INDEX: 29.07 KG/M2 | DIASTOLIC BLOOD PRESSURE: 70 MMHG | HEIGHT: 70 IN | SYSTOLIC BLOOD PRESSURE: 120 MMHG | OXYGEN SATURATION: 95 % | HEART RATE: 75 BPM

## 2021-03-15 DIAGNOSIS — Z79.52 CURRENT CHRONIC USE OF SYSTEMIC STEROIDS: ICD-10-CM

## 2021-03-15 DIAGNOSIS — M85.80 OSTEOPENIA, UNSPECIFIED LOCATION: ICD-10-CM

## 2021-03-15 DIAGNOSIS — E27.49 SECONDARY ADRENAL INSUFFICIENCY (HCC): ICD-10-CM

## 2021-03-15 DIAGNOSIS — E55.9 VITAMIN D DEFICIENCY: ICD-10-CM

## 2021-03-15 DIAGNOSIS — Z23 NEED FOR VACCINATION: ICD-10-CM

## 2021-03-15 DIAGNOSIS — E29.1 HYPOGONADISM MALE: ICD-10-CM

## 2021-03-15 PROCEDURE — 99214 OFFICE O/P EST MOD 30 MIN: CPT | Performed by: INTERNAL MEDICINE

## 2021-03-15 PROCEDURE — 99211 OFF/OP EST MAY X REQ PHY/QHP: CPT | Performed by: INTERNAL MEDICINE

## 2021-03-15 RX ORDER — TESTOSTERONE CYPIONATE 200 MG/ML
INJECTION, SOLUTION INTRAMUSCULAR
COMMUNITY
End: 2022-04-04

## 2021-03-15 ASSESSMENT — FIBROSIS 4 INDEX: FIB4 SCORE: 2.78

## 2021-03-15 NOTE — PROGRESS NOTES
Chief Complaint: Follow up for secondary adrenal insufficiency and current chronic use of systemic steroids.    HPI:     Edgardo Sims is a 65 y.o. male here for follow up of secondary adrenal insufficiency.    -Chronic steroid use for rheumatoid arthritis, was tapered to prednisone 1 mg daily, and then it was stopped completely 9/2020  -Previous cosyntropin stim test was not consistent with adrenal insufficiency  - repeat cosyntropin stim test: cortisol 8.4, 15.1 and 19, after which prednisone was stopped  -Patient reports feeling well and denies any acute joint pain or swelling.  He is following with his rheumatologist for his rheumatoid arthritis    Hypogonadism   -On testosterone replacement therapy managed by PCP    Osteopenia:  He had a bone density recently on June 2020 which showed osteopenia with no evidence of bone loss when compared to his previous bone density.  The lowest T score was -1.2 for the left forearm.   He is currently not on any bisphosphonates  -Vitamin D is 41  - taking calcium and vitamin D    Patient's medications, allergies, and social histories were reviewed and updated as appropriate.      ROS:     CONS:     No fever, no chills   EYES:     No diplopia, no blurry vision   CV:           No chest pain, no palpitations   PULM:     No SOB, no cough, no hemoptysis.   GI:            No nausea, no vomiting, no diarrhea, no constipation   ENDO:     No polyuria, no polydipsia, no heat intolerance, no cold intolerance       Past Medical History:  Problem List:  2020-08: Postoperative pain  2020-08: Difficult intubation  2020-08: Gastroesophageal reflux disease  2019-12: Secondary adrenal insufficiency (HCC)  2019-10: Current chronic use of systemic steroids  2019-10: High risk medication use  2019-10: History of adrenal insufficiency  2019-01: Hyponatremia  2019-01: Hematuria  2019-01: Complicated UTI (urinary tract infection)  2019-01: Neurogenic bladder  2019-01: Sepsis (HCC)  2018-08:  Failed total knee, left, subsequent encounter  2018-05: Bladder outlet obstruction  2018-04: Acute pyelonephritis  2018-04: Leukocytosis  2018-04: Lactic acidosis  2018-04: Idiopathic acute pancreatitis  2017-09: Inadequate community resources  2017-08: Chronic use of opiate drug for therapeutic purpose  2016-09: Elevated CPK  2015-07: Depression  2015-03: Anxiety  2014-12: Coronary artery disease due to calcified coronary lesion:   Mildly cardiac catheterization in 2014  2014-10: Tachycardia  2014-01: Abnormal myocardial perfusion study  2013-05: Osteoarthrosis, unspecified whether generalized or localized,  pelvic region and thigh  2011-07: Dyslipidemia  2011-07: Aortic insufficiency  2011-07: Essential hypertension  2009-10: Need for pneumococcal vaccination  2009-05: Left knee pain  2009-05: Rheumatoid arthritis (HCC)  2009-05: Hypogonadism male      Past Surgical History:  Past Surgical History:   Procedure Laterality Date   • PB SHLDR ARTHROSCOP,PART ACROMIOPLAS Left 8/5/2020    Procedure: DECOMPRESSION, SHOULDER, ARTHROSCOPIC - SUBACROMIAL;  Surgeon: Emanule Vance M.D.;  Location: [facility];  Service: Orthopedics   • PB SHLDR ARTHROSCOP,SURG,W/ROTAT CUFF REPB Left 8/5/2020    Procedure: ARTHROSCOPY, SHOULDER, WITH ROTATOR CUFF REPAIR - AND DALAL;  Surgeon: Emanuel Vance M.D.;  Location: [facility];  Service: Orthopedics   • CLAVICLE DISTAL EXCISION Left 8/5/2020    Procedure: EXCISION, CLAVICLE, DISTAL - WITH EXTENSIVE DEBRIDEMENT;  Surgeon: Emanuel Vance M.D.;  Location: [facility];  Service: Orthopedics   • PB TRANSURETHRAL ELEC-SURG PBOSTATECTOM  4/1/2019    Procedure: BIPOLAR TRANSURETHRAL RESECTION OF PROSTATE;  Surgeon: Jose Romo M.D.;  Location: [facility];  Service: Urology   • CYSTOSCOPY  4/1/2019    Procedure: CYSTOSCOPY;  Surgeon: Jose Romo M.D.;  Location: [facility];  Service: Urology   •  URETHROTOMY INTERNAL  4/1/2019    Procedure: DIRECT VISION INTERNAL URETHROTOMY;  Surgeon: Jose Romo M.D.;  Location: SURGERY [facility];  Service: Urology   • KNEE REVISION TOTAL Left 8/3/2018    Procedure: KNEE REVISION TOTAL;  Surgeon: Michael Rodriguez M.D.;  Location: SURGERY [facility];  Service: Orthopedics   • CYSTOSCOPY  5/16/2018    Procedure: CYSTOSCOPY-CLOT EVAC;  Surgeon: Jose Romo M.D.;  Location: SURGERY [facility];  Service: Urology   • TRANS URETHRAL RESECTION BLADDER  5/16/2018    Procedure: TRANS URETHRAL RESECTION BLADDER;  Surgeon: Jose Romo M.D.;  Location: SURGERY [facility];  Service: Urology   • RECOVERY  2/3/2014    Performed by Cath-Recovery Surgery at SURGERY SAME DAY [facility]   • HIP ARTHROPLASTY TOTAL  5/31/2013    Performed by Burak Valles M.D. at SURGERY [facility]   • ROTATOR CUFF REPAIR Right 2011    x 2   • KNEE ARTHROPLASTY TOTAL  6/1/2009    LEFT-Performed by YONATAN HINSON at SURGERY [facility]   • VEIN LIGATION RADIO FREQUENCY  12/8/2008    LEFT leg-Performed by DEREJE MCCULLOUGH at SURGERY SAME DAY [facility]   • HIP ARTHROPLASTY TOTAL  6/20/08    Performed by YONATAN HINSON at SURGERY [facility]   • LUMBAR LAMINECTOMY DISKECTOMY  2000   • TMJ RECONSTRUCTION BILATERAL  1994   • ANKLE ORIF Right    • KNEE ARTHROSCOPY Left    • VEIN STRIPPING Left         Allergies:  Crestor [rosuvastatin calcium], Penicillins, and Rocephin [ceftriaxone]     Social History:  Social History     Tobacco Use   • Smoking status: Never Smoker   • Smokeless tobacco: Never Used   Substance Use Topics   • Alcohol use: Yes     Alcohol/week: 1.8 oz     Types: 3 Cans of beer per week     Comment: 3 per week   • Drug use: No        Family History:   family history includes Hypertension in his mother.      PHYSICAL EXAM:   Vital signs: /70 (BP Location: Left arm, Patient Position: Sitting, BP Cuff Size: Adult)    "Pulse 75   Ht 1.778 m (5' 10\")   Wt 92.1 kg (203 lb 1.6 oz)   SpO2 95%   BMI 29.14 kg/m²   GENERAL: Well-developed, well-nourished in no apparent distress.   EYE:  No ocular asymmetry, PERRLA  HENT: Pink, moist mucous membranes.    NECK: No thyromegaly.   CARDIOVASCULAR:  No murmurs  LUNGS: Clear breath sounds  ABDOMEN: Soft, nontender   EXTREMITIES: No clubbing, cyanosis, or edema.   NEUROLOGICAL: No gross focal motor abnormalities   LYMPH: No cervical adenopathy palpated.   SKIN: No rashes, lesions.       Labs:  Lab Results   Component Value Date/Time    SODIUM 135 03/11/2021 07:22 AM    POTASSIUM 4.3 03/11/2021 07:22 AM    CHLORIDE 98 03/11/2021 07:22 AM    CO2 28 03/11/2021 07:22 AM    ANION 9.0 03/11/2021 07:22 AM    GLUCOSE 98 03/11/2021 07:22 AM    BUN 14 03/11/2021 07:22 AM    CREATININE 0.83 03/11/2021 07:22 AM    CREATININE 1.1 04/21/2009 03:50 PM    CALCIUM 9.5 03/11/2021 07:22 AM    ASTSGOT 36 03/11/2021 07:22 AM    ALTSGPT 29 03/11/2021 07:22 AM    TBILIRUBIN 0.5 03/11/2021 07:22 AM    ALBUMIN 4.2 03/11/2021 07:22 AM    TOTPROTEIN 6.5 03/11/2021 07:22 AM    GLOBULIN 2.3 03/11/2021 07:22 AM    AGRATIO 1.8 03/11/2021 07:22 AM       Lab Results   Component Value Date/Time    SODIUM 135 03/11/2021 0722    POTASSIUM 4.3 03/11/2021 0722    CHLORIDE 98 03/11/2021 0722    CO2 28 03/11/2021 0722    GLUCOSE 98 03/11/2021 0722    BUN 14 03/11/2021 0722    CREATININE 0.83 03/11/2021 0722    CALCIUM 9.5 03/11/2021 0722    ANION 9.0 03/11/2021 0722       Lab Results   Component Value Date/Time    CHOLSTRLTOT 150 06/02/2020 0725    TRIGLYCERIDE 113 06/02/2020 0725    HDL 51 06/02/2020 0725    LDL 76 06/02/2020 0725    NONHDL 108 10/11/2013 0755       Lab Results   Component Value Date/Time    TSHULTRASEN 1.900 06/02/2020 0722     Lab Results   Component Value Date/Time    FREET4 1.07 06/02/2020 0722         Imaging:      ASSESSMENT/PLAN:     1. Secondary adrenal insufficiency (HCC)  -Cosyntropin stim test was " negative twice, cortisol levels remain normal on repeat  -Patient has been successfully weaned off of chronic prednisone and he does not show signs of adrenal insufficiency  -We recommend that the patient follow-up with his PCP and rheumatologist, unless he develops symptoms of adrenal insufficiency    2. Current chronic use of systemic steroids  -Prednisone was stopped 9/2020    3. Vitamin D deficiency  -Vitamin D level is 41  -Continue supplementation    Osteopenia:  He had a bone density recently on June 2020 which showed osteopenia with no evidence of bone loss when compared to his previous bone density.  The lowest T score was -1.2 for the left forearm.   He is currently not on any bisphosphonates  -Vitamin D is 41  - taking calcium and vitamin D    Hypogonadism   -On testosterone replacement therapy managed by PCP      Return if symptoms worsen or fail to improve.    This patient during there office visit today was started on a new medication.  Side effects of the new medication were discussed with the patient today in the office.     Thank you kindly for allowing me to participate in the thyroid care plan for this patient.    Hector Cedillo MD, [facility], [facility]  03/15/21    CC:   John Lao D.O.

## 2021-03-17 LAB — TEST NAME 95000: NORMAL

## 2021-03-18 LAB — RENIN PLAS-CCNC: 0.4 NG/ML/HR

## 2021-04-14 ENCOUNTER — TELEPHONE (OUTPATIENT)
Dept: MEDICAL GROUP | Facility: LAB | Age: 66
End: 2021-04-14

## 2021-04-14 DIAGNOSIS — M05.9 RHEUMATOID ARTHRITIS WITH POSITIVE RHEUMATOID FACTOR, INVOLVING UNSPECIFIED SITE (HCC): ICD-10-CM

## 2021-04-14 RX ORDER — OXYCODONE AND ACETAMINOPHEN 10; 325 MG/1; MG/1
1-2 TABLET ORAL EVERY 4 HOURS PRN
Qty: 150 TABLET | Refills: 0 | Status: SHIPPED | OUTPATIENT
Start: 2021-04-14 | End: 2021-04-14 | Stop reason: SDUPTHER

## 2021-04-14 RX ORDER — OXYCODONE AND ACETAMINOPHEN 10; 325 MG/1; MG/1
1-2 TABLET ORAL EVERY 4 HOURS PRN
Qty: 150 TABLET | Refills: 0 | Status: SHIPPED | OUTPATIENT
Start: 2021-05-14 | End: 2021-06-01 | Stop reason: SDUPTHER

## 2021-04-14 NOTE — TELEPHONE ENCOUNTER
Juanita:  Please call Kwasi, his prescriptions been sent to [facility].   Regards, John Lao, DO

## 2021-04-14 NOTE — TELEPHONE ENCOUNTER
Pt's pharmacy closed down and he still had an Oxycodone on file there.  They state that this cannot be transferred to the new pharmacy.  Does pt need an appt?  The new pharmacy is the [facility] on [address].

## 2021-05-24 ENCOUNTER — TELEPHONE (OUTPATIENT)
Dept: PHYSICAL THERAPY | Facility: MEDICAL CENTER | Age: 66
End: 2021-05-24

## 2021-05-24 NOTE — OP THERAPY DISCHARGE SUMMARY
Outpatient Physical Therapy  DISCHARGE SUMMARY NOTE      [facility] Outpatient Physical Therapy  [address]  [place] [address]  Phone:  [phone]  Fax:  [phone]    Date of Visit: 05/24/2021    Patient: Kwasi Sims  YOB: 1955  MRN: [id number]     Referring Provider: No referring provider defined for this encounter.   Referring Diagnosis No admission diagnoses are documented for this encounter.         Functional Assessment Used        Your patient is being discharged from Physical Therapy with the following comments:   · Patient has not been seen >30 days    Comments:   Patient has been see for 22 visits of skilled physical therapy for shoulder aftercare. Last seen on 2/16/21 with good progress made in therapy. Pt improving in strength with some limitations in [facility] ER in higher elevations at last visit seen. Discussed continuing with indep hep and DC if no contact in 30 days.   No contact >30 days, will DC pt    Limitations Remaining:  See last daily note on 2/16/21    Recommendations:  Pt has not been seen >30 days. Per policy, pt will need to be seen by PCP or acquire another referral prior to initiating skilled physical therapy. Pt is being discharged at this time.    Ran Ramirez, PT    Date: 5/24/2021

## 2021-05-28 ENCOUNTER — HOSPITAL ENCOUNTER (OUTPATIENT)
Dept: LAB | Facility: MEDICAL CENTER | Age: 66
End: 2021-05-28
Attending: INTERNAL MEDICINE
Payer: MEDICARE

## 2021-05-28 DIAGNOSIS — Z79.899 LONG-TERM USE OF PLAQUENIL: ICD-10-CM

## 2021-05-28 DIAGNOSIS — M85.89 OSTEOPENIA OF MULTIPLE SITES: ICD-10-CM

## 2021-05-28 DIAGNOSIS — M05.9 RHEUMATOID ARTHRITIS WITH POSITIVE RHEUMATOID FACTOR, INVOLVING UNSPECIFIED SITE (HCC): ICD-10-CM

## 2021-05-28 DIAGNOSIS — Z79.1 ENCOUNTER FOR LONG-TERM (CURRENT) USE OF NSAIDS: ICD-10-CM

## 2021-05-28 DIAGNOSIS — Z51.81 MEDICATION MONITORING ENCOUNTER: ICD-10-CM

## 2021-05-28 LAB
ALBUMIN SERPL BCP-MCNC: 4 G/DL (ref 3.2–4.9)
ALBUMIN/GLOB SERPL: 1.9 G/DL
ALP SERPL-CCNC: 59 U/L (ref 30–99)
ALT SERPL-CCNC: 34 U/L (ref 2–50)
ANION GAP SERPL CALC-SCNC: 8 MMOL/L (ref 7–16)
AST SERPL-CCNC: 35 U/L (ref 12–45)
BASOPHILS # BLD AUTO: 0.9 % (ref 0–1.8)
BASOPHILS # BLD: 0.07 K/UL (ref 0–0.12)
BILIRUB SERPL-MCNC: 0.6 MG/DL (ref 0.1–1.5)
BUN SERPL-MCNC: 21 MG/DL (ref 8–22)
CALCIUM SERPL-MCNC: 9.3 MG/DL (ref 8.5–10.5)
CHLORIDE SERPL-SCNC: 98 MMOL/L (ref 96–112)
CO2 SERPL-SCNC: 29 MMOL/L (ref 20–33)
CREAT SERPL-MCNC: 0.95 MG/DL (ref 0.5–1.4)
EOSINOPHIL # BLD AUTO: 0.19 K/UL (ref 0–0.51)
EOSINOPHIL NFR BLD: 2.4 % (ref 0–6.9)
ERYTHROCYTE [DISTWIDTH] IN BLOOD BY AUTOMATED COUNT: 52.4 FL (ref 35.9–50)
ERYTHROCYTE [SEDIMENTATION RATE] IN BLOOD BY WESTERGREN METHOD: 1 MM/HOUR (ref 0–20)
GLOBULIN SER CALC-MCNC: 2.1 G/DL (ref 1.9–3.5)
GLUCOSE SERPL-MCNC: 82 MG/DL (ref 65–99)
HCT VFR BLD AUTO: 49 % (ref 42–52)
HGB BLD-MCNC: 16.7 G/DL (ref 14–18)
IMM GRANULOCYTES # BLD AUTO: 0.13 K/UL (ref 0–0.11)
IMM GRANULOCYTES NFR BLD AUTO: 1.6 % (ref 0–0.9)
LYMPHOCYTES # BLD AUTO: 1.99 K/UL (ref 1–4.8)
LYMPHOCYTES NFR BLD: 24.8 % (ref 22–41)
MCH RBC QN AUTO: 34.5 PG (ref 27–33)
MCHC RBC AUTO-ENTMCNC: 34.1 G/DL (ref 33.7–35.3)
MCV RBC AUTO: 101.2 FL (ref 81.4–97.8)
MONOCYTES # BLD AUTO: 1.24 K/UL (ref 0–0.85)
MONOCYTES NFR BLD AUTO: 15.4 % (ref 0–13.4)
NEUTROPHILS # BLD AUTO: 4.41 K/UL (ref 1.82–7.42)
NEUTROPHILS NFR BLD: 54.9 % (ref 44–72)
NRBC # BLD AUTO: 0 K/UL
NRBC BLD-RTO: 0 /100 WBC
PLATELET # BLD AUTO: 199 K/UL (ref 164–446)
PMV BLD AUTO: 10.2 FL (ref 9–12.9)
POTASSIUM SERPL-SCNC: 4.9 MMOL/L (ref 3.6–5.5)
PROT SERPL-MCNC: 6.1 G/DL (ref 6–8.2)
RBC # BLD AUTO: 4.84 M/UL (ref 4.7–6.1)
SODIUM SERPL-SCNC: 135 MMOL/L (ref 135–145)
WBC # BLD AUTO: 8 K/UL (ref 4.8–10.8)

## 2021-05-28 PROCEDURE — 80053 COMPREHEN METABOLIC PANEL: CPT

## 2021-05-28 PROCEDURE — 86480 TB TEST CELL IMMUN MEASURE: CPT

## 2021-05-28 PROCEDURE — 86803 HEPATITIS C AB TEST: CPT

## 2021-05-28 PROCEDURE — 86705 HEP B CORE ANTIBODY IGM: CPT

## 2021-05-28 PROCEDURE — 85025 COMPLETE CBC W/AUTO DIFF WBC: CPT

## 2021-05-28 PROCEDURE — 36415 COLL VENOUS BLD VENIPUNCTURE: CPT

## 2021-05-28 PROCEDURE — 87340 HEPATITIS B SURFACE AG IA: CPT | Mod: GA

## 2021-05-28 PROCEDURE — 85652 RBC SED RATE AUTOMATED: CPT

## 2021-05-29 LAB
HBV CORE IGM SER QL: NORMAL
HBV SURFACE AG SER QL: NORMAL
HCV AB SER QL: NORMAL

## 2021-06-01 ENCOUNTER — OFFICE VISIT (OUTPATIENT)
Dept: MEDICAL GROUP | Facility: LAB | Age: 66
End: 2021-06-01
Payer: MEDICARE

## 2021-06-01 VITALS
WEIGHT: 203 LBS | OXYGEN SATURATION: 95 % | DIASTOLIC BLOOD PRESSURE: 68 MMHG | RESPIRATION RATE: 12 BRPM | BODY MASS INDEX: 29.06 KG/M2 | TEMPERATURE: 97.6 F | HEART RATE: 85 BPM | HEIGHT: 70 IN | SYSTOLIC BLOOD PRESSURE: 140 MMHG

## 2021-06-01 DIAGNOSIS — M05.9 RHEUMATOID ARTHRITIS WITH POSITIVE RHEUMATOID FACTOR, INVOLVING UNSPECIFIED SITE (HCC): ICD-10-CM

## 2021-06-01 DIAGNOSIS — E78.5 DYSLIPIDEMIA: ICD-10-CM

## 2021-06-01 PROCEDURE — 99213 OFFICE O/P EST LOW 20 MIN: CPT | Performed by: INTERNAL MEDICINE

## 2021-06-01 RX ORDER — OXYCODONE AND ACETAMINOPHEN 10; 325 MG/1; MG/1
1-2 TABLET ORAL EVERY 4 HOURS PRN
Qty: 150 TABLET | Refills: 0 | Status: SHIPPED | OUTPATIENT
Start: 2021-06-13 | End: 2021-06-01 | Stop reason: SDUPTHER

## 2021-06-01 RX ORDER — OXYCODONE AND ACETAMINOPHEN 10; 325 MG/1; MG/1
1-2 TABLET ORAL EVERY 4 HOURS PRN
Qty: 150 TABLET | Refills: 0 | Status: SHIPPED | OUTPATIENT
Start: 2021-07-13 | End: 2021-06-01 | Stop reason: SDUPTHER

## 2021-06-01 RX ORDER — TESTOSTERONE CYPIONATE 200 MG/ML
INJECTION, SOLUTION INTRAMUSCULAR
COMMUNITY
End: 2022-04-04

## 2021-06-01 RX ORDER — OXYCODONE AND ACETAMINOPHEN 10; 325 MG/1; MG/1
1-2 TABLET ORAL EVERY 4 HOURS PRN
Qty: 150 TABLET | Refills: 0 | Status: SHIPPED | OUTPATIENT
Start: 2021-08-12 | End: 2021-06-17 | Stop reason: SDUPTHER

## 2021-06-01 RX ORDER — DIAZEPAM 5 MG/1
TABLET ORAL
COMMUNITY
Start: 2021-05-29 | End: 2022-01-14

## 2021-06-01 RX ORDER — PREDNISONE 1 MG/1
TABLET ORAL
COMMUNITY
End: 2021-06-30

## 2021-06-01 ASSESSMENT — FIBROSIS 4 INDEX: FIB4 SCORE: 1.93

## 2021-06-01 NOTE — PROGRESS NOTES
CC: Edgardo Sims is a 65 y.o. male is suffering from   Chief Complaint   Patient presents with   • Chronic Opiate Therapy     3 months follow up         SUBJECTIVE:  1. Rheumatoid arthritis with positive rheumatoid factor, involving unspecified site (HCC)  Edgardo is here for follow-up has a history of rheumatoid arthritis, is to continue on chronic opiate therapy    2. Dyslipidemia  History of dyslipidemia, prior left heart catheterization reviewed.  2014        Past social, family, history:   Social History     Tobacco Use   • Smoking status: Never Smoker   • Smokeless tobacco: Never Used   Vaping Use   • Vaping Use: Never used   Substance Use Topics   • Alcohol use: Yes     Alcohol/week: 1.8 oz     Types: 3 Cans of beer per week     Comment: 3 per week   • Drug use: No         MEDICATIONS:    Current Outpatient Medications:   •  testosterone cypionate (DEPO-TESTOSTERONE) 200 MG/ML Solution injection, testosterone cypionate 200 mg/mL intramuscular oil  INJECT 1 ML BY INTRAMUSCULAR ROUTE EVERY 14 DAYS FOR 90 DAYS. E29.1, Disp: , Rfl:   •  predniSONE (DELTASONE) 1 MG Tab, prednisone 1 mg tablet  TAKE 1 TABLET BY MOUTH EVERY DAY, Disp: , Rfl:   •  diazePAM (VALIUM) 5 MG Tab, , Disp: , Rfl:   •  [START ON 8/12/2021] oxyCODONE-acetaminophen (PERCOCET-10)  MG Tab, Take 1-2 Tablets by mouth every four hours as needed for Severe Pain (refill #3 of #3.) for up to 30 days., Disp: 150 tablet, Rfl: 0  •  hydroxychloroquine (PLAQUENIL) 200 MG Tab, TAKE 1 TABLET BY MOUTH TWICE A DAY, Disp: 180 tablet, Rfl: 1  •  Upadacitinib ER 15 MG TABLET SR 24 HR, Take 15 mg by mouth every day., Disp: 90 tablet, Rfl: 1  •  meloxicam (MOBIC) 7.5 MG Tab, TAKE 1 TABLET BY MOUTH DAILY AS NEEDED FOR SEVERE JOINT PAIN, Disp: 90 Tab, Rfl: 0  •  cyclobenzaprine (FLEXERIL) 10 mg Tab, TAKE ONE TABLET BY MOUTH THREE TIMES DAILY AS NEEDED FOR MILD PAIN, Disp: 90 Tab, Rfl: 5  •  PARoxetine (PAXIL) 20 MG Tab, Take 1 Tab by mouth  every day., Disp: 90 Tab, Rfl: 3  •  pravastatin (PRAVACHOL) 80 MG tablet, Take 1 Tab by mouth every day., Disp: 90 Tab, Rfl: 3  •  ezetimibe (ZETIA) 10 MG Tab, Take 1 Tab by mouth every evening., Disp: 90 Tab, Rfl: 3  •  lisinopril (PRINIVIL) 10 MG Tab, Take 1 Tab by mouth every day., Disp: 90 Tab, Rfl: 3  •  metoprolol (LOPRESSOR) 50 MG Tab, TAKE 1 TABLET BY MOUTH TWICE A DAY, Disp: 180 Tab, Rfl: 3  •  vardenafil (LEVITRA) 20 MG tablet, Take 20 mg by mouth as needed., Disp: , Rfl:   •  CALCIUM-VITAMIN D PO, Take 1 Tab by mouth 2 Times a Day., Disp: , Rfl:   •  ascorbic acid (ASCORBIC ACID) 500 MG Tab, Take 500 mg by mouth every day., Disp: , Rfl:   •  Cyanocobalamin (VITAMIN B-12) 5000 MCG TABLET DISPERSIBLE, Take 1 Tab by mouth every day., Disp: , Rfl:   •  Zinc 50 MG Cap, Take 50 mg by mouth every day., Disp: , Rfl:   •  omeprazole (PRILOSEC) 20 MG CPDR, Take 20 mg by mouth every day., Disp: , Rfl:   •  testosterone cypionate (DEPO-TESTOSTERONE) 200 MG/ML Solution injection, testosterone cypionate 200 mg/mL intramuscular oil, Disp: , Rfl:   •  oxyCODONE-acetaminophen (PERCOCET-10)  MG Tab, Take 1-2 Tablets by mouth every four hours as needed for Severe Pain (refill #3 of #3.) for up to 30 days., Disp: 150 tablet, Rfl: 0    PROBLEMS:  Patient Active Problem List    Diagnosis Date Noted   • Osteopenia 03/15/2021   • Vitamin D deficiency 03/15/2021   • Postoperative pain 08/05/2020   • Difficult intubation 08/05/2020   • Gastroesophageal reflux disease 08/05/2020   • Secondary adrenal insufficiency (HCC) 12/12/2019   • Current chronic use of systemic steroids 10/16/2019   • High risk medication use 10/16/2019   • History of adrenal insufficiency 10/16/2019   • Neurogenic bladder 01/26/2019   • Bladder outlet obstruction 05/01/2018   • Leukocytosis 04/30/2018   • Lactic acidosis 04/30/2018   • Idiopathic acute pancreatitis 04/30/2018   • Chronic use of opiate drug for therapeutic purpose 08/12/2017   •  "Depression 07/13/2015   • Anxiety 03/31/2015   • Coronary artery disease due to calcified coronary lesion: Mildly cardiac catheterization in 2014 12/05/2014   • Tachycardia 10/20/2014   • Abnormal myocardial perfusion study 01/15/2014   • Osteoarthrosis, unspecified whether generalized or localized, pelvic region and thigh 05/31/2013   • Dyslipidemia 07/12/2011   • Aortic insufficiency 07/05/2011   • Essential hypertension 07/05/2011   • Rheumatoid arthritis (HCC) 05/05/2009   • Hypogonadism male 05/05/2009       REVIEW OF SYSTEMS:  Gen.:  No Nausea, Vomiting, fever, Chills.  Heart: No chest pain.  Lungs:  No shortness of Breath.  Psychological: Kian unusual Anxiety depression     PHYSICAL EXAM   Constitutional: Alert, cooperative, not in acute distress.  Cardiovascular:  Rate Rhythm is regular without murmurs rubs clicks.     Thorax & Lungs: Clear to auscultation, no wheezing, rhonchi, or rales  HENT: Normocephalic, Atraumatic.  Eyes: PERRLA, EOMI, Conjunctiva normal.   Neck: Trachia is midline no swelling of the thyroid.   Lymphatic: No lymphadenopathy noted.   Neurologic: Alert & oriented x 3, cranial nerves II through XII are intact, Normal motor function, Normal sensory function, No focal deficits noted.   Psychiatric: Affect normal, Judgment normal, Mood normal.     VITAL SIGNS:/68   Pulse 85   Temp 36.4 °C (97.6 °F) (Temporal)   Resp 12   Ht 1.778 m (5' 10\")   Wt 92.1 kg (203 lb)   SpO2 95%   BMI 29.13 kg/m²     Labs: Reviewed    Assessment:                                                     Plan:    1. Rheumatoid arthritis with positive rheumatoid factor, involving unspecified site (HCC)  Continue Percocet state drug task force reviewed  - oxyCODONE-acetaminophen (PERCOCET-10)  MG Tab; Take 1-2 Tablets by mouth every four hours as needed for Severe Pain (refill #3 of #3.) for up to 30 days.  Dispense: 150 tablet; Refill: 0  - Lipid Profile; Future  - Comp Metabolic Panel; Future  - CBC " WITH DIFFERENTIAL; Future    2. Dyslipidemia  Recheck lipid profile comprehensive metabolic panel CBC  - Lipid Profile; Future  - Comp Metabolic Panel; Future  - CBC WITH DIFFERENTIAL; Future

## 2021-06-02 ENCOUNTER — HOSPITAL ENCOUNTER (OUTPATIENT)
Dept: RADIOLOGY | Facility: MEDICAL CENTER | Age: 66
End: 2021-06-02
Attending: PHYSICIAN ASSISTANT
Payer: MEDICARE

## 2021-06-02 DIAGNOSIS — N31.9 NEUROMUSCULAR DYSFUNCTION OF BLADDER: ICD-10-CM

## 2021-06-02 PROCEDURE — 76775 US EXAM ABDO BACK WALL LIM: CPT

## 2021-06-10 ENCOUNTER — OFFICE VISIT (OUTPATIENT)
Dept: RHEUMATOLOGY | Facility: MEDICAL CENTER | Age: 66
End: 2021-06-10
Payer: MEDICARE

## 2021-06-10 ENCOUNTER — APPOINTMENT (OUTPATIENT)
Dept: RADIOLOGY | Facility: IMAGING CENTER | Age: 66
End: 2021-06-10
Attending: INTERNAL MEDICINE
Payer: MEDICARE

## 2021-06-10 VITALS
BODY MASS INDEX: 28.84 KG/M2 | TEMPERATURE: 96.6 F | RESPIRATION RATE: 12 BRPM | WEIGHT: 201 LBS | HEART RATE: 82 BPM | OXYGEN SATURATION: 97 % | DIASTOLIC BLOOD PRESSURE: 70 MMHG | SYSTOLIC BLOOD PRESSURE: 140 MMHG

## 2021-06-10 DIAGNOSIS — Z79.1 ENCOUNTER FOR LONG-TERM (CURRENT) USE OF NSAIDS: ICD-10-CM

## 2021-06-10 DIAGNOSIS — Z79.899 LONG-TERM USE OF PLAQUENIL: ICD-10-CM

## 2021-06-10 DIAGNOSIS — I10 ESSENTIAL HYPERTENSION: ICD-10-CM

## 2021-06-10 DIAGNOSIS — Z51.81 MEDICATION MONITORING ENCOUNTER: ICD-10-CM

## 2021-06-10 DIAGNOSIS — M77.9 ENTHESITIS: ICD-10-CM

## 2021-06-10 DIAGNOSIS — M79.671 FOOT PAIN, RIGHT: ICD-10-CM

## 2021-06-10 DIAGNOSIS — R71.8 ELEVATED MCV: ICD-10-CM

## 2021-06-10 DIAGNOSIS — M05.9 RHEUMATOID ARTHRITIS WITH POSITIVE RHEUMATOID FACTOR, INVOLVING UNSPECIFIED SITE (HCC): ICD-10-CM

## 2021-06-10 DIAGNOSIS — I25.84 CORONARY ARTERY DISEASE DUE TO CALCIFIED CORONARY LESION: ICD-10-CM

## 2021-06-10 DIAGNOSIS — I25.10 CORONARY ARTERY DISEASE DUE TO CALCIFIED CORONARY LESION: ICD-10-CM

## 2021-06-10 DIAGNOSIS — M85.89 OSTEOPENIA OF MULTIPLE SITES: ICD-10-CM

## 2021-06-10 PROCEDURE — 77077 JOINT SURVEY SINGLE VIEW: CPT | Mod: TC | Performed by: INTERNAL MEDICINE

## 2021-06-10 PROCEDURE — 99214 OFFICE O/P EST MOD 30 MIN: CPT | Performed by: INTERNAL MEDICINE

## 2021-06-10 RX ORDER — MELOXICAM 15 MG/1
TABLET ORAL
Qty: 90 TABLET | Refills: 1 | Status: SHIPPED | OUTPATIENT
Start: 2021-06-10 | End: 2021-12-24 | Stop reason: SDUPTHER

## 2021-06-10 ASSESSMENT — FIBROSIS 4 INDEX: FIB4 SCORE: 1.93

## 2021-06-10 NOTE — PROGRESS NOTES
Chief Complaint- joint pain     Subjective:   Edgardo Sims is a 65 y.o. male here today for follow up of rheumatological issues    This is a follow-up visit for this patient who we see in this clinic for serologically negative rheumatoid arthritis with hand and feet x-rays done 2017 indicating erosive arthritis.  Patient is currently on Rinvoq 15 mg p.o. daily for the last month, continues to be on Plaquenil 200 mg p.o. twice daily, patient also meloxicam 7.5 mg p.o. daily and asks to increase his meloxicam to 15 mg p.o. daily.   Patient denies any side effects from the medication, denies any unexplained weight loss, denies any fevers of unknown etiology, denies any GI upset, denies any rashes, denies any new joint swelling, denies recurrent infections.  Of note recent sedimentation rate equals 1 May 2021.  Patient's last ophthalmology evaluation was May 2021 with Dr Mcknight    Patient today is complaining of pain in his right foot and points to plantar fascia which looks like as calcified and or scarred, patient also has extreme inversion of his right ankle as well      Additional comorbidities include ureteral blockage and BPH.  Patient also with a history of Dupuytren's contracture with release, also history of hypercholesterolemia.  Patient also with a history of osteopenia and aortic valve insufficiency followed periodically by echocardiograms.  Patient also with rotator cuff tears in left shoulder status post surgical intervention.     Bilateral AUTUMN  Left TKA     S/p Remicade-ineffective  S/p Orencia-ineffective  S/p Enbrel-ineffective  S/p humira-ineffective  S/p MTX-oral ulcers  S/p arava-bad reaction but patient doesn't recall specifics  S/p rituximab-helped but patient stopped because of methotrexate side effects per patient report....       HBsAg/HBcAb neg 5/2021  HCV neg 5/2021  Quantiferon Gold neg 5/2021  G6PD 12.9 nl 6/2020   Uric acid 4.5 nl 2/2019   Cryoglobulin neg 8/2017  RF neg 8/2017, RF  neg 2/2019  CCP neg 2/2019  DEXA 9/5/2017 T scores -0.2, -1.5  FRAX 9/5/2017 not done  DEXA 6/12/2020 T scores 0.3, -1.2  FRAX 6/12/2020 not done     Hand x-rays 5/2017-indicates erosive arthritis  Hand x-rays 6/2021-IMPRESSION:  1.  There is been slight interval progression of arthropathy as discussed above predominantly involving the right hand 2nd and 3rd MCP joints and left hand 3rd MCP joint with lesser involvement of the IP joints and carpal bones which could be consistent with an inflammatory arthropathy such as rheumatoid arthritis.  2.  There is degenerative type change of osteoarthritis in the 1st CMC joints, left greater than right.    Feet x-rays 5/2017-indicates erosive arthritis   Feet x-rays 6/2021-IMPRESSION:  1.  There is no radiographic evidence of an erosive arthropathy.  2.  There is predominantly degenerative type change in the IP joints and 1st tarsometatarsal junctions, right greater than left.    Corticosteroid Therapy Informed Consent signed 2/21/2019-copy given to patient       Current Outpatient Medications   Medication Sig Dispense Refill   • meloxicam (MOBIC) 15 MG tablet 1 tab po qday with food prn joint pain 90 tablet 1   • testosterone cypionate (DEPO-TESTOSTERONE) 200 MG/ML Solution injection testosterone cypionate 200 mg/mL intramuscular oil   INJECT 1 ML BY INTRAMUSCULAR ROUTE EVERY 14 DAYS FOR 90 DAYS. E29.1     • diazePAM (VALIUM) 5 MG Tab      • [START ON 8/12/2021] oxyCODONE-acetaminophen (PERCOCET-10)  MG Tab Take 1-2 Tablets by mouth every four hours as needed for Severe Pain (refill #3 of #3.) for up to 30 days. 150 tablet 0   • hydroxychloroquine (PLAQUENIL) 200 MG Tab TAKE 1 TABLET BY MOUTH TWICE A  tablet 1   • testosterone cypionate (DEPO-TESTOSTERONE) 200 MG/ML Solution injection testosterone cypionate 200 mg/mL intramuscular oil     • Upadacitinib ER 15 MG TABLET SR 24 HR Take 15 mg by mouth every day. 90 tablet 1   • oxyCODONE-acetaminophen (PERCOCET-10)   MG Tab Take 1-2 Tablets by mouth every four hours as needed for Severe Pain (refill #3 of #3.) for up to 30 days. 150 tablet 0   • cyclobenzaprine (FLEXERIL) 10 mg Tab TAKE ONE TABLET BY MOUTH THREE TIMES DAILY AS NEEDED FOR MILD PAIN 90 Tab 5   • PARoxetine (PAXIL) 20 MG Tab Take 1 Tab by mouth every day. 90 Tab 3   • pravastatin (PRAVACHOL) 80 MG tablet Take 1 Tab by mouth every day. 90 Tab 3   • lisinopril (PRINIVIL) 10 MG Tab Take 1 Tab by mouth every day. 90 Tab 3   • metoprolol (LOPRESSOR) 50 MG Tab TAKE 1 TABLET BY MOUTH TWICE A  Tab 3   • vardenafil (LEVITRA) 20 MG tablet Take 20 mg by mouth as needed.     • CALCIUM-VITAMIN D PO Take 1 Tab by mouth 2 Times a Day.     • ascorbic acid (ASCORBIC ACID) 500 MG Tab Take 500 mg by mouth every day.     • Cyanocobalamin (VITAMIN B-12) 5000 MCG TABLET DISPERSIBLE Take 1 Tab by mouth every day.     • Zinc 50 MG Cap Take 50 mg by mouth every day.     • omeprazole (PRILOSEC) 20 MG CPDR Take 20 mg by mouth every day.     • predniSONE (DELTASONE) 1 MG Tab prednisone 1 mg tablet   TAKE 1 TABLET BY MOUTH EVERY DAY (Patient not taking: Reported on 6/10/2021)     • ezetimibe (ZETIA) 10 MG Tab Take 1 Tab by mouth every evening. (Patient not taking: Reported on 6/10/2021) 90 Tab 3     No current facility-administered medications for this visit.     He  has a past medical history of Abnormal myocardial perfusion study (1/15/2014), Aortic insufficiency (7/5/2011), Arthritis, Bronchitis, CAD (coronary artery disease) mild plaque at cath in 2/14 (12/5/2014), Cancer (HCC), Chronic use of opiate drugs therapeutic purposes (8/12/2017), Dental disorder, Dyslipidemia (7/12/2011), Heart burn, Heart murmur, Hiatus hernia syndrome, High cholesterol, HTN (hypertension) (7/5/2011), Hypertension, Indigestion, Infectious disease, Pain, Pain, Rheumatoid arthritis(714.0), and Tachycardia (10/20/2014).    ROS   Other than what is mentioned in HPI or physical exam, there is no history  of headaches, double vision or blurred vision. No temporal tenderness or jaw claudication. No trouble swallowing difficulties or sore throats.  No chest complaints including chest pain, cough or sputum production. No GI complaints including nausea, vomiting, change in bowel habits, or past peptic ulcer disease. No history of blood in the stools. No urinary complaints including dysuria or frequency. No history of alopecia, photosensitivity, oral ulcerations, Raynaud's phenomena.       Objective:     /70   Pulse 82   Temp 35.9 °C (96.6 °F) (Temporal)   Resp 12   Wt 91.2 kg (201 lb)   SpO2 97%  Body mass index is 28.84 kg/m².   Physical Exam:    Constitutional: Alert and oriented X3, patient is talkative with good eye contact.Skin: Warm, dry, good turgor, no rashes in visible areas.Eye: Equal, round and reactive, conjunctiva clear, lids normal EOM intactENMT: Lips without lesions, good dentition, no oropharyngeal ulcers, moist buccal mucosa, pinna without deformityNeck: Trachea midline, no masses, no thyromegaly.Lymph:  No cervical lymphadenopathy, no axillary lymphadenopathy, no supraclavicular lymphadenopathyRespiratory: Unlabored respiratory effort, lungs clear to auscultation, no wheezes, no ronchi.Cardiovascular: Normal S1, S2, no murmur, no edema.Abdomen: Soft, non-tender, no masses, no hepatosplenomegaly.Psych: Alert and oriented x3, normal affect and mood.Neuro: Cranial nerves 2-12 are grossly intact, no loss of sensation LEExt:no joint laxity noted in bilateral arms, no joint laxity noted in bilateral legs, scarred/calcified plantar fascia right foot, also inversion of the right foot, there is slight crossover toe of the right second toe over great toe but no excoriations or open wounds, shoulders full range of motion without limitations, hands without any swan-neck or boutonniere deformities no sausage digits no dactylitis, knees with crepitus but no synovitis,    Lab Results   Component Value  Date/Time    QNTTBGOLD Negative 06/01/2017 01:13 PM     Lab Results   Component Value Date/Time    HEPBCORTOT Negative 06/01/2017 01:13 PM    HEPBCORIGM Non-Reactive 05/28/2021 01:24 PM    HEPBSAG Non-Reactive 05/28/2021 01:24 PM     Lab Results   Component Value Date/Time    HEPCAB Non-Reactive 05/28/2021 01:24 PM     Lab Results   Component Value Date/Time    SODIUM 135 05/28/2021 01:24 PM    POTASSIUM 4.9 05/28/2021 01:24 PM    CHLORIDE 98 05/28/2021 01:24 PM    CO2 29 05/28/2021 01:24 PM    GLUCOSE 82 05/28/2021 01:24 PM    BUN 21 05/28/2021 01:24 PM    CREATININE 0.95 05/28/2021 01:24 PM    CREATININE 1.1 04/21/2009 03:50 PM    BUNCREATRAT 13 09/21/2016 09:21 AM      Lab Results   Component Value Date/Time    WBC 8.0 05/28/2021 01:24 PM    WBC 7.5 07/01/2011 10:30 AM    RBC 4.84 05/28/2021 01:24 PM    RBC 4.72 07/01/2011 10:30 AM    HEMOGLOBIN 16.7 05/28/2021 01:24 PM    HEMATOCRIT 49.0 05/28/2021 01:24 PM    .2 (H) 05/28/2021 01:24 PM     (H) 07/01/2011 10:30 AM    MCH 34.5 (H) 05/28/2021 01:24 PM    MCH 36.0 (H) 07/01/2011 10:30 AM    MCHC 34.1 05/28/2021 01:24 PM    MPV 10.2 05/28/2021 01:24 PM    NEUTSPOLYS 54.90 05/28/2021 01:24 PM    LYMPHOCYTES 24.80 05/28/2021 01:24 PM    MONOCYTES 15.40 (H) 05/28/2021 01:24 PM    EOSINOPHILS 2.40 05/28/2021 01:24 PM    BASOPHILS 0.90 05/28/2021 01:24 PM    HYPOCHROMIA 1+ 03/05/2014 04:43 PM    ANISOCYTOSIS 1+ 01/02/2015 05:02 PM      Lab Results   Component Value Date/Time    CALCIUM 9.3 05/28/2021 01:24 PM    ASTSGOT 35 05/28/2021 01:24 PM    ALTSGPT 34 05/28/2021 01:24 PM    ALKPHOSPHAT 59 05/28/2021 01:24 PM    TBILIRUBIN 0.6 05/28/2021 01:24 PM    ALBUMIN 4.0 05/28/2021 01:24 PM    TOTPROTEIN 6.1 05/28/2021 01:24 PM     Lab Results   Component Value Date/Time    URICACID 4.5 02/19/2019 08:36 AM    RHEUMFACTN 10 02/19/2019 08:36 AM    CCPANTIBODY 2 02/19/2019 08:36 AM     Lab Results   Component Value Date/Time    CRYOGLOBULIN NEG 72Hour 08/04/2017  02:32 PM     Lab Results   Component Value Date/Time    SEDRATEWES 1 05/28/2021 01:24 PM     Lab Results   Component Value Date/Time    HBA1C 5.3 07/26/2018 11:19 AM     Lab Results   Component Value Date/Time    G6PD 12.9 06/18/2020 02:50 PM     Lab Results   Component Value Date/Time    CPKTOTAL 141 01/26/2018 07:47 AM     Lab Results   Component Value Date/Time    PTHINTACT 55 10/10/2008 10:42 AM       Assessment and Plan:     1. Rheumatoid arthritis with positive rheumatoid factor, involving unspecified site (HCC)  Serologically negative, patient denies any psoriatic symptoms, today check HLA B27 to assure no spondylitic type inflammatory arthritis especially in light of patient's plantar fascia issues  Continue Rinvoq 15 mg p.o. daily for a full 3-month trial, continue Plaquenil 200 mg p.o. twice daily, we will increase meloxicam from 7.5 mg p.o. daily to 15 mg p.o. daily  - meloxicam (MOBIC) 15 MG tablet; 1 tab po qday with food prn joint pain  Dispense: 90 tablet; Refill: 1  - REFERRAL TO PODIATRY  - CBC WITH DIFFERENTIAL; Future  - DX-JOINT SURVEY-FEET SINGLE VIEW; Future  - DX-JOINT SURVEY-HANDS SINGLE VIEW; Future    2. Medication monitoring encounter  Continue Rinvoq 15 mg p.o. daily for total of 3-month trial  Screening labs are up-to-date, next screening labs due May 2023  Patient needs monitoring labs every 6 months, next labs due November 2021  We reviewed risks of biological medications with patient including hematological pathology, cancer risks, neurological and infection issues especially in the Covid-19 pandemic environment, patient states understanding.  Also discussed the risks of VTE with Rinvoq, patient states understanding currently on a baby aspirin a day  - meloxicam (MOBIC) 15 MG tablet; 1 tab po qday with food prn joint pain  Dispense: 90 tablet; Refill: 1  - REFERRAL TO PODIATRY  - DX-JOINT SURVEY-FEET SINGLE VIEW; Future  - DX-JOINT SURVEY-HANDS SINGLE VIEW; Future    3. Long-term use  of Plaquenil  On Plaquenil 200 mg p.o. twice daily of note G6PD levels are adequate, patient needs ophthalmology evaluation every year next ophthalmology evaluation May 2022  Patient needs monitoring labs every 6 months, next labs due November 2021  - meloxicam (MOBIC) 15 MG tablet; 1 tab po qday with food prn joint pain  Dispense: 90 tablet; Refill: 1  - REFERRAL TO PODIATRY  - DX-JOINT SURVEY-FEET SINGLE VIEW; Future  - DX-JOINT SURVEY-HANDS SINGLE VIEW; Future    4. Encounter for long-term (current) use of NSAIDs  Increase meloxicam from 7.5 to 15 mg p.o. daily,  Patient needs monitoring labs every 6 months next labs due November 2021  - meloxicam (MOBIC) 15 MG tablet; 1 tab po qday with food prn joint pain  Dispense: 90 tablet; Refill: 1  - REFERRAL TO PODIATRY  - DX-JOINT SURVEY-FEET SINGLE VIEW; Future  - DX-JOINT SURVEY-HANDS SINGLE VIEW; Future    5. Enthesitis  As suggested on foot x-rays from July 2016 of the right foot, today check HLA B2 7 for evaluation of possible spondylarthritis  - HLA-B27; Future    6. Elevated MCV  Patient does admit to alcohol use twice a week, recommend no alcohol and taking B complex recheck CBC in about 2 weeks.  - CBC WITH DIFFERENTIAL; Future    7. Foot pain, right  Secondary to calcified/scarred plantar fascia refer to podiatry for evaluation of possible intervention options  - REFERRAL TO PODIATRY  - CBC WITH DIFFERENTIAL; Future    8. Osteopenia of multiple sites  Last DEXA June 2020 Next DEXA June 2022   continue calcium citrate 1200 mg by mouth daily and vitamin D about 2000 units by mouth daily and magnesium 200 mg by mouth daily    9. Essential hypertension  May impact the type of medications we can use for this patient's arthritis. We will have to keep this under advisement.    10. Coronary artery disease due to calcified coronary lesion: Mildly cardiac catheterization in 2014  Managed by cardiology  - CBC WITH DIFFERENTIAL; Future    Followup: Return in about 2  months (around 8/10/2021). or sooner peyman Ocampoer  was seen 30 minutes face-to-face of which more than 50% of the time was spent counseling the patient (excluding time for procedures)  regarding  rheumatological condition and care. Therapy was discussed in detail.      Please note that this dictation was created using voice recognition software. I have made every reasonable attempt to correct obvious errors, but I expect that there are errors of grammar and possibly content that I did not discover before finalizing the note.

## 2021-06-17 ENCOUNTER — HOSPITAL ENCOUNTER (OUTPATIENT)
Dept: LAB | Facility: MEDICAL CENTER | Age: 66
End: 2021-06-17
Attending: PHYSICIAN ASSISTANT
Payer: MEDICARE

## 2021-06-17 ENCOUNTER — HOSPITAL ENCOUNTER (OUTPATIENT)
Dept: LAB | Facility: MEDICAL CENTER | Age: 66
End: 2021-06-17
Attending: INTERNAL MEDICINE
Payer: MEDICARE

## 2021-06-17 DIAGNOSIS — E78.5 DYSLIPIDEMIA: ICD-10-CM

## 2021-06-17 DIAGNOSIS — M05.9 RHEUMATOID ARTHRITIS WITH POSITIVE RHEUMATOID FACTOR, INVOLVING UNSPECIFIED SITE (HCC): ICD-10-CM

## 2021-06-17 LAB
ALBUMIN SERPL BCP-MCNC: 4.2 G/DL (ref 3.2–4.9)
ALBUMIN/GLOB SERPL: 1.7 G/DL
ALP SERPL-CCNC: 60 U/L (ref 30–99)
ALT SERPL-CCNC: 32 U/L (ref 2–50)
ANION GAP SERPL CALC-SCNC: 7 MMOL/L (ref 7–16)
AST SERPL-CCNC: 37 U/L (ref 12–45)
BASOPHILS # BLD AUTO: 0.8 % (ref 0–1.8)
BASOPHILS # BLD: 0.07 K/UL (ref 0–0.12)
BILIRUB SERPL-MCNC: 0.7 MG/DL (ref 0.1–1.5)
BUN SERPL-MCNC: 21 MG/DL (ref 8–22)
CALCIUM SERPL-MCNC: 9.5 MG/DL (ref 8.5–10.5)
CHLORIDE SERPL-SCNC: 99 MMOL/L (ref 96–112)
CHOLEST SERPL-MCNC: 146 MG/DL (ref 100–199)
CO2 SERPL-SCNC: 30 MMOL/L (ref 20–33)
CREAT SERPL-MCNC: 1.05 MG/DL (ref 0.5–1.4)
EOSINOPHIL # BLD AUTO: 0.21 K/UL (ref 0–0.51)
EOSINOPHIL NFR BLD: 2.5 % (ref 0–6.9)
ERYTHROCYTE [DISTWIDTH] IN BLOOD BY AUTOMATED COUNT: 54.1 FL (ref 35.9–50)
GLOBULIN SER CALC-MCNC: 2.5 G/DL (ref 1.9–3.5)
GLUCOSE SERPL-MCNC: 80 MG/DL (ref 65–99)
HCT VFR BLD AUTO: 52.7 % (ref 42–52)
HDLC SERPL-MCNC: 49 MG/DL
HGB BLD-MCNC: 17.8 G/DL (ref 14–18)
IMM GRANULOCYTES # BLD AUTO: 0.3 K/UL (ref 0–0.11)
IMM GRANULOCYTES NFR BLD AUTO: 3.5 % (ref 0–0.9)
LDLC SERPL CALC-MCNC: 70 MG/DL
LYMPHOCYTES # BLD AUTO: 1.57 K/UL (ref 1–4.8)
LYMPHOCYTES NFR BLD: 18.6 % (ref 22–41)
MCH RBC QN AUTO: 34.8 PG (ref 27–33)
MCHC RBC AUTO-ENTMCNC: 33.8 G/DL (ref 33.7–35.3)
MCV RBC AUTO: 102.9 FL (ref 81.4–97.8)
MONOCYTES # BLD AUTO: 1.23 K/UL (ref 0–0.85)
MONOCYTES NFR BLD AUTO: 14.5 % (ref 0–13.4)
NEUTROPHILS # BLD AUTO: 5.08 K/UL (ref 1.82–7.42)
NEUTROPHILS NFR BLD: 60.1 % (ref 44–72)
NRBC # BLD AUTO: 0 K/UL
NRBC BLD-RTO: 0 /100 WBC
PLATELET # BLD AUTO: 183 K/UL (ref 164–446)
PMV BLD AUTO: 11.1 FL (ref 9–12.9)
POTASSIUM SERPL-SCNC: 5.2 MMOL/L (ref 3.6–5.5)
PROT SERPL-MCNC: 6.7 G/DL (ref 6–8.2)
PSA SERPL-MCNC: 1.53 NG/ML (ref 0–4)
RBC # BLD AUTO: 5.12 M/UL (ref 4.7–6.1)
SODIUM SERPL-SCNC: 136 MMOL/L (ref 135–145)
TRIGL SERPL-MCNC: 137 MG/DL (ref 0–149)
WBC # BLD AUTO: 8.5 K/UL (ref 4.8–10.8)

## 2021-06-17 PROCEDURE — 80061 LIPID PANEL: CPT

## 2021-06-17 PROCEDURE — 80053 COMPREHEN METABOLIC PANEL: CPT

## 2021-06-17 PROCEDURE — 85025 COMPLETE CBC W/AUTO DIFF WBC: CPT

## 2021-06-17 PROCEDURE — 36415 COLL VENOUS BLD VENIPUNCTURE: CPT | Mod: GA

## 2021-06-17 PROCEDURE — 84153 ASSAY OF PSA TOTAL: CPT | Mod: GA

## 2021-06-17 NOTE — TELEPHONE ENCOUNTER
Medications sent to mail order that does not fill percocet, patient needs refills sent to [facility] on [facility]    Received request via: Patient    Was the patient seen in the last year in this department? Yes  6/1/2021  Does the patient have an active prescription (recently filled or refills available) for medication(s) requested? No

## 2021-06-18 RX ORDER — OXYCODONE AND ACETAMINOPHEN 10; 325 MG/1; MG/1
1-2 TABLET ORAL EVERY 4 HOURS PRN
Qty: 150 TABLET | Refills: 0 | Status: SHIPPED | OUTPATIENT
Start: 2021-07-13 | End: 2021-08-12

## 2021-06-18 RX ORDER — OXYCODONE AND ACETAMINOPHEN 10; 325 MG/1; MG/1
1-2 TABLET ORAL EVERY 4 HOURS PRN
Qty: 150 TABLET | Refills: 0 | Status: SHIPPED | OUTPATIENT
Start: 2021-06-18 | End: 2021-07-18

## 2021-06-18 RX ORDER — OXYCODONE AND ACETAMINOPHEN 10; 325 MG/1; MG/1
1-2 TABLET ORAL EVERY 4 HOURS PRN
Qty: 150 TABLET | Refills: 0 | Status: SHIPPED | OUTPATIENT
Start: 2021-08-12 | End: 2021-09-07 | Stop reason: SDUPTHER

## 2021-06-25 ENCOUNTER — HOSPITAL ENCOUNTER (OUTPATIENT)
Dept: LAB | Facility: MEDICAL CENTER | Age: 66
End: 2021-06-25
Attending: INTERNAL MEDICINE
Payer: MEDICARE

## 2021-06-25 DIAGNOSIS — R71.8 ELEVATED MCV: ICD-10-CM

## 2021-06-25 DIAGNOSIS — M77.9 ENTHESITIS: ICD-10-CM

## 2021-06-25 DIAGNOSIS — I25.84 CORONARY ARTERY DISEASE DUE TO CALCIFIED CORONARY LESION: ICD-10-CM

## 2021-06-25 DIAGNOSIS — I25.10 CORONARY ARTERY DISEASE DUE TO CALCIFIED CORONARY LESION: ICD-10-CM

## 2021-06-25 DIAGNOSIS — M79.671 FOOT PAIN, RIGHT: ICD-10-CM

## 2021-06-25 DIAGNOSIS — M05.9 RHEUMATOID ARTHRITIS WITH POSITIVE RHEUMATOID FACTOR, INVOLVING UNSPECIFIED SITE (HCC): ICD-10-CM

## 2021-06-25 LAB
BASOPHILS # BLD AUTO: 0.9 % (ref 0–1.8)
BASOPHILS # BLD: 0.1 K/UL (ref 0–0.12)
EOSINOPHIL # BLD AUTO: 0.14 K/UL (ref 0–0.51)
EOSINOPHIL NFR BLD: 1.2 % (ref 0–6.9)
ERYTHROCYTE [DISTWIDTH] IN BLOOD BY AUTOMATED COUNT: 52.2 FL (ref 35.9–50)
HCT VFR BLD AUTO: 53 % (ref 42–52)
HGB BLD-MCNC: 18.5 G/DL (ref 14–18)
IMM GRANULOCYTES # BLD AUTO: 0.29 K/UL (ref 0–0.11)
IMM GRANULOCYTES NFR BLD AUTO: 2.5 % (ref 0–0.9)
LYMPHOCYTES # BLD AUTO: 1.99 K/UL (ref 1–4.8)
LYMPHOCYTES NFR BLD: 17.4 % (ref 22–41)
MCH RBC QN AUTO: 35.2 PG (ref 27–33)
MCHC RBC AUTO-ENTMCNC: 34.9 G/DL (ref 33.7–35.3)
MCV RBC AUTO: 101 FL (ref 81.4–97.8)
MONOCYTES # BLD AUTO: 1.44 K/UL (ref 0–0.85)
MONOCYTES NFR BLD AUTO: 12.6 % (ref 0–13.4)
NEUTROPHILS # BLD AUTO: 7.45 K/UL (ref 1.82–7.42)
NEUTROPHILS NFR BLD: 65.4 % (ref 44–72)
NRBC # BLD AUTO: 0 K/UL
NRBC BLD-RTO: 0 /100 WBC
PLATELET # BLD AUTO: 246 K/UL (ref 164–446)
PMV BLD AUTO: 10.7 FL (ref 9–12.9)
RBC # BLD AUTO: 5.25 M/UL (ref 4.7–6.1)
WBC # BLD AUTO: 11.4 K/UL (ref 4.8–10.8)

## 2021-06-25 PROCEDURE — 85025 COMPLETE CBC W/AUTO DIFF WBC: CPT

## 2021-06-25 PROCEDURE — 36415 COLL VENOUS BLD VENIPUNCTURE: CPT

## 2021-06-30 ENCOUNTER — TELEPHONE (OUTPATIENT)
Dept: MEDICAL GROUP | Facility: LAB | Age: 66
End: 2021-06-30

## 2021-06-30 ENCOUNTER — OFFICE VISIT (OUTPATIENT)
Dept: MEDICAL GROUP | Facility: LAB | Age: 66
End: 2021-06-30
Payer: MEDICARE

## 2021-06-30 VITALS
WEIGHT: 195 LBS | DIASTOLIC BLOOD PRESSURE: 62 MMHG | BODY MASS INDEX: 27.92 KG/M2 | SYSTOLIC BLOOD PRESSURE: 140 MMHG | RESPIRATION RATE: 12 BRPM | TEMPERATURE: 97.7 F | HEART RATE: 70 BPM | HEIGHT: 70 IN | OXYGEN SATURATION: 97 %

## 2021-06-30 DIAGNOSIS — S76.302A LEFT HAMSTRING INJURY, INITIAL ENCOUNTER: ICD-10-CM

## 2021-06-30 PROCEDURE — 99213 OFFICE O/P EST LOW 20 MIN: CPT | Performed by: NURSE PRACTITIONER

## 2021-06-30 ASSESSMENT — FIBROSIS 4 INDEX: FIB4 SCORE: 1.7

## 2021-06-30 NOTE — TELEPHONE ENCOUNTER
Voicemail left regarding an injury in the hip/thigh area from stretching after a vigorous workout. Pt is coming in today to see Loyda Magdalena to be evaluated.

## 2021-06-30 NOTE — PATIENT INSTRUCTIONS
Hamstring Strain Rehab  Ask your health care provider which exercises are safe for you. Do exercises exactly as told by your health care provider and adjust them as directed. It is normal to feel mild stretching, pulling, tightness, or discomfort as you do these exercises. Stop right away if you feel sudden pain or your pain gets worse. Do not begin these exercises until told by your health care provider.  Stretching and range-of-motion exercises  These exercises warm up your muscles and joints and improve the movement and flexibility of your thighs. These exercises also help to relieve pain, numbness, and tingling. Talk to your health care provider about these restrictions.  Knee extension, seated    1. Sit with your left / right heel propped on a chair, a coffee table, or a footstool. Do not have anything under your knee to support it.  2. Allow your leg muscles to relax, letting gravity straighten out your knee (extension). You should feel a stretch behind your left / right knee.  3. If told by your health care provider, deepen the stretch by placing a __________ weight on your thigh, just above your kneecap.  4. Hold this position for __________ seconds.  Repeat __________ times. Complete this exercise __________ times a day.  Seated stretch  This exercise is sometimes called hamstrings and adductors stretch.  1. Sit on the floor with your legs stretched wide. Keep your knees straight during this exercise.  2. Keeping your head and back in a straight line, bend at your waist to reach for your left foot (position A). You should feel a stretch in your right inner thigh (adductors).  3. Hold this position for __________ seconds. Then slowly return to the upright position.  4. Keeping your head and back in a straight line, bend at your waist to reach forward (position B). You should feel a stretch behind both of your thighs or knees (hamstrings).  5. Hold this position for __________ seconds. Then slowly return to  the upright position.  6. Keeping your head and back in a straight line, bend at your waist to reach for your right foot (position C). You should feel a stretch in your left inner thigh (adductors).  7. Hold this position for __________ seconds. Then slowly return to the upright position.  Repeat __________ times. Complete this exercise __________ times a day.  Hamstrings stretch, supine    1. Lie on your back (supine position).  2. Loop a belt or towel over the ball of your left / right foot. The ball of your foot is on the walking surface, right under your toes.  3. Straighten your left / right knee and slowly pull on the belt or towel to raise your leg.  ? Do not let your left / right knee bend while you do this.  ? Keep your other leg flat on the floor.  ? Raise the left / right leg until you feel a gentle stretch behind your left / right knee or thigh (hamstrings).  4. Hold this position for __________ seconds.  5. Slowly return your leg to the starting position.  Repeat __________ times. Complete this exercise __________ times a day.  Strengthening exercises  These exercises build strength and endurance in your thighs. Endurance is the ability to use your muscles for a long time, even after they get tired.  Straight leg raises, prone  This exercise strengthens the muscles that move the hips (hip extensors).  1. Lie on your abdomen on a firm surface (prone position).  2. Tense the muscles in your buttocks and lift your left / right leg about 4 inches (10 cm). Keep your knee straight as you lift your leg. If you cannot lift your leg that high without arching your back, place a pillow under your hips.  3. Hold the position for __________ seconds.  4. Slowly lower your leg to the starting position.  5. Allow your muscles to relax completely before you start the next repetition.  Repeat __________ times. Complete this exercise __________ times a day.  Bridge  This exercise strengthens the muscles in your buttocks  and the back of your thighs (hip extensors).  1. Lie on your back on a firm surface with your knees bent and your feet flat on the floor.  2. Tighten your buttocks muscles and lift your bottom off the floor until the trunk of your body is level with your thighs.  ? You should feel the muscles working in your buttocks and the back of your thighs.  ? Do not arch your back.  3. Hold this position for __________ seconds.  4. Slowly lower your hips to the starting position.  5. Let your buttocks muscles relax completely between repetitions.  6. If told by your health care provider, keep your bottom lifted off the floor while you slowly walk your feet away from you as far as you can control. Hold for __________ seconds, then slowly walk your feet back toward you.  Repeat __________ times. Complete this exercise __________ times a day.  Lateral walking with band  This is an exercise in which you walk sideways (lateral), with tension provided by an exercise band. The exercise strengthens the muscles in your hip (hip abductors).  1.  a long hallway.  2. Wrap a loop of exercise band around your legs, just above your knees.  3. Bend your knees gently and drop your hips down and back so your weight is over your heels.  4. Step to the side to move down the length of the hallway, keeping your toes pointed ahead of you and keeping tension in the band.  5. Repeat, leading with your other leg.  Repeat __________ times. Complete this exercise __________ times a day.  Single leg stand with reaching  This exercise is also called eccentric hamstring stretch.  1. Stand on your left / right foot. Keep your big toe down on the floor and try to keep your arch lifted.  2. Slowly reach down toward the floor as far as you can while keeping your balance. Lowering your thigh under tension is called eccentric stretching.  3. Hold this position for __________ seconds.  Repeat __________ times. Complete this exercise __________ times a  day.  Plank, prone  This exercise strengthens muscles in your abdomen and core area.  1. Lie on your abdomen on the floor (prone position),and prop yourself up on your elbows. Your hands should be straight out in front of you, and your elbows should be below your shoulders. Position your feet similar to a push-up position so your toes are on the ground.  2. Tighten your abdominal muscles and lift your body off the floor.  ? Do not arch your back.  ? Do not hold your breath.  3. Hold this position for __________ seconds.  Repeat __________ times. Complete this exercise __________ times a day.  This information is not intended to replace advice given to you by your health care provider. Make sure you discuss any questions you have with your health care provider.  Document Released: 12/18/2006 Document Revised: 04/09/2020 Document Reviewed: 12/16/2019  Elsevier Patient Education © 2020 [employer]    Hamstring Strain    A hamstring strain happens when the muscles in the back of the thighs (hamstring muscles) are overstretched or torn. The hamstring muscles are used in straightening the hips, bending the knees, and pulling back the legs. This injury is often called a pulled hamstring muscle. The tissue that connects the muscle to a bone (tendon) may also be affected.  The severity of a hamstring strain may be rated in degrees or grades. First-degree (or grade 1) strains have the least amount of muscle tearing and pain. Second-degree and third-degree (grade 2 and 3) strains have increasingly more tearing and pain.  What are the causes?  This condition is caused by a sudden, violent force being placed on the hamstring muscles, stretching them too far. This often happens during activities that involve running, jumping, kicking, or weight lifting.  What increases the risk?  Hamstring strains are especially common in athletes. The following factors may also make you more likely to develop this condition:  · Having low  strength, endurance, or flexibility of the hamstring muscles.  · Doing high-impact physical activity or sports.  · Having poor physical fitness.  · Having a previous leg injury.  · Having tired (fatigued) muscles.  What are the signs or symptoms?  Symptoms of this condition include:  · Pain in the back of the thigh.  · Swelling.  · Bruising.  · Muscle spasms.  · Trouble moving the affected muscle because of pain.  For severe strains, you may feel popping or snapping in the back of your thigh when the injury occurs.  How is this diagnosed?  This condition is diagnosed based on your symptoms, your medical history, and a physical exam.  How is this treated?  Treatment for this condition usually involves:  · Protecting, resting, icing, applying compression, and elevating the injured area (PRICE therapy).  · Medicines. Your health care provider may recommend medicines to help reduce pain or inflammation.  · Doing exercises to regain strength and flexibility in the muscles. Your health care provider will tell you when it is okay to begin exercising.  Follow these instructions at home:  PRICE therapy  Use PRICE therapy to promote muscle healing during the first 2-3 days after your injury, or as told by your health care provider.  · Protect the muscle from being injured again.  · Rest your injury. This usually involves limiting your normal activities and not using the injured hamstring muscle. Talk with your health care provider about how you should limit your activities.  · Apply ice to the injured area:  ? Put ice in a plastic bag.  ? Place a towel between your skin and the bag.  ? Leave the ice on for 20 minutes, 2-3 times a day. After the third day, switch to applying heat as told.  · Put pressure (compression) on your injured hamstring by wrapping it with an elastic bandage. Be careful not to wrap it too tightly. That may interfere with blood circulation or may increase swelling.  · Raise (elevate) your injured  hamstring above the level of your heart as often as possible. When you are lying down, you can do this by putting a pillow under your thigh.    Activity  · Begin exercising or stretching only as told by your health care provider.  · Do not return to full activity level until your health care provider approves.  · To help prevent muscle strains in the future, always warm up before exercising and stretch afterward.  General instructions  · Take over-the-counter and prescription medicines only as told by your health care provider.  · If directed, apply heat to the affected area as often as told by your health care provider. Use the heat source that your health care provider recommends, such as a moist heat pack or a heating pad.  ? Place a towel between your skin and the heat source.  ? Leave the heat on for 20-30 minutes.  ? Remove the heat if your skin turns bright red. This is especially important if you are unable to feel pain, heat, or cold. You may have a greater risk of getting burned.  · Keep all follow-up visits as told by your health care provider. This is important.  Contact a health care provider if you have:  · Increasing pain or swelling in the injured area.  · Numbness, tingling, or a significant loss of strength in the injured area.  Get help right away if:  · Your foot or your toes become cold or turn blue.  Summary  · A hamstring strain happens when the muscles in the back of the thighs (hamstring muscles) are overstretched or torn.  · This injury can be caused by a sudden, violent force being placed on the hamstring muscles, causing them to stretch too far.  · Symptoms include pain, swelling, and muscle spasms in the injured area.  · Treatment includes what is called PRICE therapy: protecting, resting, icing, applying compression, and elevating the injured area.  This information is not intended to replace advice given to you by your health care provider. Make sure you discuss any questions you have  with your health care provider.  Document Released: 09/12/2002 Document Revised: 11/30/2018 Document Reviewed: 11/15/2018  Elsevier Patient Education © 2020 [employer]

## 2021-06-30 NOTE — PROGRESS NOTES
"Subjective:     CC: The encounter diagnosis was Left hamstring injury, initial encounter.    HPI:   Edgardo presents today with the following:    Left Hamstring Injury  Patient reports that he injured his left hamstring while working out about 2 weeks ago. He was doing toe touches and \"felt a pop\" and subsequent pain in his hamstring. He reports that he has 2 large bruises on his hamstring that are resolving slowly. He reports that he is concerned because he is still having pain. He denies loss of ROM or strength.     Current Outpatient Medications Ordered in Epic   Medication Sig Dispense Refill   • oxyCODONE-acetaminophen (PERCOCET-10)  MG Tab Take 1-2 Tablets by mouth every four hours as needed for Severe Pain (refill #1 of #3.) for up to 30 days. 150 tablet 0   • [START ON 7/13/2021] oxyCODONE-acetaminophen (PERCOCET-10)  MG Tab Take 1-2 Tablets by mouth every four hours as needed for Severe Pain (refill #2 of #3.) for up to 30 days. 150 tablet 0   • [START ON 8/12/2021] oxyCODONE-acetaminophen (PERCOCET-10)  MG Tab Take 1-2 Tablets by mouth every four hours as needed for Severe Pain (refill #3 of #3.) for up to 30 days. 150 tablet 0   • meloxicam (MOBIC) 15 MG tablet 1 tab po qday with food prn joint pain 90 tablet 1   • testosterone cypionate (DEPO-TESTOSTERONE) 200 MG/ML Solution injection testosterone cypionate 200 mg/mL intramuscular oil   INJECT 1 ML BY INTRAMUSCULAR ROUTE EVERY 14 DAYS FOR 90 DAYS. E29.1     • diazePAM (VALIUM) 5 MG Tab      • hydroxychloroquine (PLAQUENIL) 200 MG Tab TAKE 1 TABLET BY MOUTH TWICE A  tablet 1   • testosterone cypionate (DEPO-TESTOSTERONE) 200 MG/ML Solution injection testosterone cypionate 200 mg/mL intramuscular oil     • Upadacitinib ER 15 MG TABLET SR 24 HR Take 15 mg by mouth every day. 90 tablet 1   • cyclobenzaprine (FLEXERIL) 10 mg Tab TAKE ONE TABLET BY MOUTH THREE TIMES DAILY AS NEEDED FOR MILD PAIN 90 Tab 5   • PARoxetine (PAXIL) 20 MG " "Tab Take 1 Tab by mouth every day. 90 Tab 3   • pravastatin (PRAVACHOL) 80 MG tablet Take 1 Tab by mouth every day. 90 Tab 3   • lisinopril (PRINIVIL) 10 MG Tab Take 1 Tab by mouth every day. 90 Tab 3   • metoprolol (LOPRESSOR) 50 MG Tab TAKE 1 TABLET BY MOUTH TWICE A  Tab 3   • vardenafil (LEVITRA) 20 MG tablet Take 20 mg by mouth as needed.     • CALCIUM-VITAMIN D PO Take 1 Tab by mouth 2 Times a Day.     • ascorbic acid (ASCORBIC ACID) 500 MG Tab Take 500 mg by mouth every day.     • Zinc 50 MG Cap Take 50 mg by mouth every day.     • omeprazole (PRILOSEC) 20 MG CPDR Take 20 mg by mouth every day.       No current [facility]-ordered facility-administered medications on file.     ROS:   Gen: no fevers/chills, no changes in weight  Eyes: no changes in vision  ENT: no sore throat, no hearing loss, no bloody nose  Pulm: no sob, no cough  CV: no chest pain, no palpitations  GI: no nausea/vomiting, no diarrhea  : no dysuria  MSk: no myalgias  Skin: no rash  Neuro: no headaches, no numbness/tingling  Heme/Lymph: no easy bruising        - NOTE: All other systems reviewed and are negative, except as in HPI.    Objective:     Exam: /62 (BP Location: Right arm, Patient Position: Sitting, BP Cuff Size: Adult)   Pulse 70   Temp 36.5 °C (97.7 °F)   Resp 12   Ht 1.778 m (5' 10\")   Wt 88.5 kg (195 lb)   SpO2 97%  Body mass index is 27.98 kg/m².    Physical Exam  HENT:      Head: Normocephalic and atraumatic.      Right Ear: Hearing normal.      Left Ear: Hearing normal.   Eyes:      Pupils: Pupils are equal, round, and reactive to light.   Neck:      Thyroid: No thyromegaly.   Cardiovascular:      Rate and Rhythm: Normal rate and regular rhythm.      Heart sounds: Normal heart sounds.   Pulmonary:      Effort: Pulmonary effort is normal.      Breath sounds: Normal breath sounds.   Musculoskeletal:         General: Normal range of motion.      Cervical back: Normal range of motion and neck supple.      Left upper " leg: Tenderness present.        Legs:       Comments: Ecchymosis and tenderness   Skin:     General: Skin is warm and dry.   Neurological:      Mental Status: He is alert and oriented to person, place, and time.   Psychiatric:         Mood and Affect: Mood and affect normal.         Cognition and Memory: Memory normal.         Judgment: Judgment normal.       Assessment & Plan:     65 y.o. male with the following -     1. Left hamstring injury, initial encounter  The patient's injury appears industrial in nature and is consistent with the reported mechanism of injury. The strain would be expected to resolve in 2 weeks with proper treatment.  If symptoms persist, the patient would benefit from physical therapy and/or imaging. Ice to affected areas 15-20 minutes at a time every 2 hours. Ibuprofen 800 mg twice daily with food. Flexeril 5-10 mg twice daily as needed - no driving or operating machinery on this medication, not to be used during working hours. Written instructions provided to patient. Supportive care, differential diagnoses, and indications for immediate follow-up discussed with patient. Pathogenesis of diagnosis discussed including typical length and natural progression. Instructed to return to clinic for any change in condition, further concerns, or worsening of symptoms.    Return if symptoms worsen or fail to improve.    Please note that this dictation was created using voice recognition software. I have made every reasonable attempt to correct obvious errors, but I expect that there are errors of grammar and possibly content that I did not discover before finalizing the note.

## 2021-07-16 DIAGNOSIS — E29.1 HYPOGONADISM MALE: ICD-10-CM

## 2021-07-16 RX ORDER — TESTOSTERONE CYPIONATE 200 MG/ML
INJECTION, SOLUTION INTRAMUSCULAR
Qty: 10 ML | Refills: 3 | Status: SHIPPED | OUTPATIENT
Start: 2021-07-16 | End: 2022-04-04

## 2021-07-16 NOTE — TELEPHONE ENCOUNTER
Received request via: Pharmacy    Was the patient seen in the last year in this department? Yes  6/30/2021  Does the patient have an active prescription (recently filled or refills available) for medication(s) requested? No

## 2021-08-12 ENCOUNTER — OFFICE VISIT (OUTPATIENT)
Dept: RHEUMATOLOGY | Facility: MEDICAL CENTER | Age: 66
End: 2021-08-12
Payer: MEDICARE

## 2021-08-12 VITALS
OXYGEN SATURATION: 99 % | WEIGHT: 192 LBS | TEMPERATURE: 97.2 F | HEART RATE: 72 BPM | SYSTOLIC BLOOD PRESSURE: 120 MMHG | DIASTOLIC BLOOD PRESSURE: 58 MMHG | RESPIRATION RATE: 16 BRPM | BODY MASS INDEX: 27.55 KG/M2

## 2021-08-12 DIAGNOSIS — I25.10 CORONARY ARTERY DISEASE DUE TO CALCIFIED CORONARY LESION: ICD-10-CM

## 2021-08-12 DIAGNOSIS — R71.8 ELEVATED MCV: ICD-10-CM

## 2021-08-12 DIAGNOSIS — I25.84 CORONARY ARTERY DISEASE DUE TO CALCIFIED CORONARY LESION: ICD-10-CM

## 2021-08-12 DIAGNOSIS — Z51.81 MEDICATION MONITORING ENCOUNTER: ICD-10-CM

## 2021-08-12 DIAGNOSIS — M77.9 ENTHESITIS: ICD-10-CM

## 2021-08-12 DIAGNOSIS — I10 ESSENTIAL HYPERTENSION: ICD-10-CM

## 2021-08-12 DIAGNOSIS — D04.9 BASAL CELL CARCINOMA IN SITU OF SKIN: ICD-10-CM

## 2021-08-12 DIAGNOSIS — M05.9 RHEUMATOID ARTHRITIS WITH POSITIVE RHEUMATOID FACTOR, INVOLVING UNSPECIFIED SITE (HCC): ICD-10-CM

## 2021-08-12 DIAGNOSIS — Z79.1 ENCOUNTER FOR LONG-TERM (CURRENT) USE OF NSAIDS: ICD-10-CM

## 2021-08-12 DIAGNOSIS — M85.89 OSTEOPENIA OF MULTIPLE SITES: ICD-10-CM

## 2021-08-12 DIAGNOSIS — Z79.899 LONG-TERM USE OF PLAQUENIL: ICD-10-CM

## 2021-08-12 PROCEDURE — 99214 OFFICE O/P EST MOD 30 MIN: CPT | Performed by: INTERNAL MEDICINE

## 2021-08-12 ASSESSMENT — FIBROSIS 4 INDEX: FIB4 SCORE: 1.7

## 2021-08-12 NOTE — PROGRESS NOTES
Chief Complaint- joint pain     Subjective:   Edgardo Sims is a 65 y.o. male here today for follow up of rheumatological issues    This is a follow-up visit for this patient who we see in this clinic for serologically negative rheumatoid arthritis with hand and feet x-rays done 2017 indicating erosive arthritis.  Patient is currently on Rinvoq 15 mg p.o. daily and Plaquenil 200 mg p.o. twice daily with meloxicam 15 mg p.o. daily with benefit.  Of note last ophthalmology evaluation May 2021 with Dr. Hall.  Patient denies any side effects from the medication, denies any unexplained weight loss, denies any fevers of unknown etiology, denies any GI upset, denies any rashes, denies any new joint swelling, denies recurrent infections.     Patient continues to have problems with significant right second toe crossing over top of right great toe, status post evaluation by podiatry who feels that he cannot do much about it except amputation.  Patient is interested in a second opinion.    Patient also asking for dermatology referral for his yearly skin check, patient has a history of basal cell cancer    Patient also has slightly elevated MCV more recently to 101.0, patient does admit to drinking alcohol on a regular basis, at last visit we advise cutting down the alcohol intake and start taking a multivitamin which patient did.      Additional comorbidities include ureteral blockage and BPH.  Patient also with a history of Dupuytren's contracture with release, also history of hypercholesterolemia.  Patient also with a history of osteopenia and aortic valve insufficiency followed periodically by echocardiograms.  Patient also with rotator cuff tears in left shoulder status post surgical intervention.    Of note patient does take diazepam on a regular basis as this is considered a high risk medication we will be unable to prescribe any narcotic pain medications for this patient in this clinic.     Bilateral  AUTUMN  Left TKA     S/p Remicade-ineffective  S/p Orencia-ineffective  S/p Enbrel-ineffective  S/p humira-ineffective  S/p MTX-oral ulcers  S/p arava-bad reaction but patient doesn't recall specifics  S/p rituximab-helped but patient stopped because of methotrexate side effects per patient report....        HBsAg/HBcAb neg 5/2021  HCV neg 5/2021  Quantiferon Gold neg 5/2021  G6PD 12.9 nl 6/2020   Uric acid 4.5 nl 2/2019   Cryoglobulin neg 8/2017  RF neg 8/2017, RF neg 2/2019  CCP neg 2/2019  DEXA 9/5/2017 T scores -0.2, -1.5  FRAX 9/5/2017 not done  DEXA 6/12/2020 T scores 0.3, -1.2  FRAX 6/12/2020 not done      Hand x-rays 5/2017-indicates erosive arthritis  Hand x-rays 6/2021-IMPRESSION:  1.  There is been slight interval progression of arthropathy as discussed above predominantly involving the right hand 2nd and 3rd MCP joints and left hand 3rd MCP joint with lesser involvement of the IP joints and carpal bones which could be consistent with an inflammatory arthropathy such as rheumatoid arthritis.  2.  There is degenerative type change of osteoarthritis in the 1st CMC joints, left greater than right.     Feet x-rays 5/2017-indicates erosive arthritis   Feet x-rays 6/2021-IMPRESSION:  1.  There is no radiographic evidence of an erosive arthropathy.  2.  There is predominantly degenerative type change in the IP joints and 1st tarsometatarsal junctions, right greater than left.     Corticosteroid Therapy Informed Consent signed 2/21/2019-copy given to patient       Current Outpatient Medications   Medication Sig Dispense Refill   • oxyCODONE-acetaminophen (PERCOCET-10)  MG Tab Take 1-2 Tablets by mouth every four hours as needed for Severe Pain (refill #2 of #3.) for up to 30 days. 150 tablet 0   • oxyCODONE-acetaminophen (PERCOCET-10)  MG Tab Take 1-2 Tablets by mouth every four hours as needed for Severe Pain (refill #3 of #3.) for up to 30 days. 150 tablet 0   • meloxicam (MOBIC) 15 MG tablet 1 tab po  qday with food prn joint pain 90 tablet 1   • testosterone cypionate (DEPO-TESTOSTERONE) 200 MG/ML Solution injection testosterone cypionate 200 mg/mL intramuscular oil   INJECT 1 ML BY INTRAMUSCULAR ROUTE EVERY 14 DAYS FOR 90 DAYS. E29.1     • diazePAM (VALIUM) 5 MG Tab      • hydroxychloroquine (PLAQUENIL) 200 MG Tab TAKE 1 TABLET BY MOUTH TWICE A  tablet 1   • testosterone cypionate (DEPO-TESTOSTERONE) 200 MG/ML Solution injection testosterone cypionate 200 mg/mL intramuscular oil     • Upadacitinib ER 15 MG TABLET SR 24 HR Take 15 mg by mouth every day. 90 tablet 1   • cyclobenzaprine (FLEXERIL) 10 mg Tab TAKE ONE TABLET BY MOUTH THREE TIMES DAILY AS NEEDED FOR MILD PAIN 90 Tab 5   • PARoxetine (PAXIL) 20 MG Tab Take 1 Tab by mouth every day. 90 Tab 3   • pravastatin (PRAVACHOL) 80 MG tablet Take 1 Tab by mouth every day. 90 Tab 3   • lisinopril (PRINIVIL) 10 MG Tab Take 1 Tab by mouth every day. 90 Tab 3   • metoprolol (LOPRESSOR) 50 MG Tab TAKE 1 TABLET BY MOUTH TWICE A  Tab 3   • vardenafil (LEVITRA) 20 MG tablet Take 20 mg by mouth as needed.     • CALCIUM-VITAMIN D PO Take 1 Tab by mouth 2 Times a Day.     • ascorbic acid (ASCORBIC ACID) 500 MG Tab Take 500 mg by mouth every day.     • Zinc 50 MG Cap Take 50 mg by mouth every day.     • omeprazole (PRILOSEC) 20 MG CPDR Take 20 mg by mouth every day.     • testosterone cypionate (DEPO-TESTOSTERONE) 200 MG/ML Solution injection INJECT 1 ML BY INTRAMUSCULAR ROUTE EVERY 14 DAYS FOR 90 DAYS. E29.1 10 mL 3     No current facility-administered medications for this visit.     He  has a past medical history of Abnormal myocardial perfusion study (1/15/2014), Aortic insufficiency (7/5/2011), Arthritis, Bronchitis, CAD (coronary artery disease) mild plaque at cath in 2/14 (12/5/2014), Cancer (HCC), Chronic use of opiate drugs therapeutic purposes (8/12/2017), Dental disorder, Dyslipidemia (7/12/2011), Heart burn, Heart murmur, Hiatus hernia syndrome,  High cholesterol, HTN (hypertension) (7/5/2011), Hypertension, Indigestion, Infectious disease, Pain, Pain, Rheumatoid arthritis(714.0), and Tachycardia (10/20/2014).    ROS   Other than what is mentioned in HPI or physical exam, there is no history of headaches, double vision or blurred vision. No temporal tenderness or jaw claudication. No trouble swallowing difficulties or sore throats.  No chest complaints including chest pain, cough or sputum production. No GI complaints including nausea, vomiting, change in bowel habits, or past peptic ulcer disease. No history of blood in the stools. No urinary complaints including dysuria or frequency. No history of alopecia, photosensitivity, oral ulcerations, Raynaud's phenomena.       Objective:     /58   Pulse 72   Temp 36.2 °C (97.2 °F) (Temporal)   Resp 16   Wt 87.1 kg (192 lb)   SpO2 99%  Body mass index is 27.55 kg/m².   Physical Exam:    Constitutional: Alert and oriented X3, patient is talkative with good eye contact.Skin: Warm, dry, good turgor, no rashes in visible areas.Eye: Equal, round and reactive, conjunctiva clear, lids normal EOM intactENMT: Lips without lesions, good dentition, no oropharyngeal ulcers, moist buccal mucosa, pinna without deformityNeck: Trachea midline, no masses, no thyromegaly.Lymph:  No cervical lymphadenopathy, no axillary lymphadenopathy, no supraclavicular lymphadenopathyRespiratory: Unlabored respiratory effort, lungs clear to auscultation, no wheezes, no ronchi.Cardiovascular: Normal S1, S2, no murmur, no edema.Abdomen: Soft, non-tender, no masses, no hepatosplenomegaly.Psych: Alert and oriented x3, normal affect and mood.Neuro: Cranial nerves 2-12 are grossly intact, no loss of sensation LEExt:no joint laxity noted in bilateral arms, no joint laxity noted in bilateral legs, patient does have right second toe crossing over the top of the right great toe, no bunions, otherwise no splay toes, no dactylitis, knees with  crepitus but no synovitis, hands without any swan-neck or boutonniere deformities, no sausage digits    Lab Results   Component Value Date/Time    QNTTBGOLD Negative 06/01/2017 01:13 PM     Lab Results   Component Value Date/Time    HEPBCORTOT Negative 06/01/2017 01:13 PM    HEPBCORIGM Non-Reactive 05/28/2021 01:24 PM    HEPBSAG Non-Reactive 05/28/2021 01:24 PM     Lab Results   Component Value Date/Time    HEPCAB Non-Reactive 05/28/2021 01:24 PM     Lab Results   Component Value Date/Time    SODIUM 136 06/17/2021 06:16 AM    POTASSIUM 5.2 06/17/2021 06:16 AM    CHLORIDE 99 06/17/2021 06:16 AM    CO2 30 06/17/2021 06:16 AM    GLUCOSE 80 06/17/2021 06:16 AM    BUN 21 06/17/2021 06:16 AM    CREATININE 1.05 06/17/2021 06:16 AM    CREATININE 1.1 04/21/2009 03:50 PM    BUNCREATRAT 13 09/21/2016 09:21 AM      Lab Results   Component Value Date/Time    WBC 11.4 (H) 06/25/2021 02:36 PM    WBC 7.5 07/01/2011 10:30 AM    RBC 5.25 06/25/2021 02:36 PM    RBC 4.72 07/01/2011 10:30 AM    HEMOGLOBIN 18.5 (H) 06/25/2021 02:36 PM    HEMATOCRIT 53.0 (H) 06/25/2021 02:36 PM    .0 (H) 06/25/2021 02:36 PM     (H) 07/01/2011 10:30 AM    MCH 35.2 (H) 06/25/2021 02:36 PM    MCH 36.0 (H) 07/01/2011 10:30 AM    MCHC 34.9 06/25/2021 02:36 PM    MPV 10.7 06/25/2021 02:36 PM    NEUTSPOLYS 65.40 06/25/2021 02:36 PM    LYMPHOCYTES 17.40 (L) 06/25/2021 02:36 PM    MONOCYTES 12.60 06/25/2021 02:36 PM    EOSINOPHILS 1.20 06/25/2021 02:36 PM    BASOPHILS 0.90 06/25/2021 02:36 PM    HYPOCHROMIA 1+ 03/05/2014 04:43 PM    ANISOCYTOSIS 1+ 01/02/2015 05:02 PM      Lab Results   Component Value Date/Time    CALCIUM 9.5 06/17/2021 06:16 AM    ASTSGOT 37 06/17/2021 06:16 AM    ALTSGPT 32 06/17/2021 06:16 AM    ALKPHOSPHAT 60 06/17/2021 06:16 AM    TBILIRUBIN 0.7 06/17/2021 06:16 AM    ALBUMIN 4.2 06/17/2021 06:16 AM    TOTPROTEIN 6.7 06/17/2021 06:16 AM     Lab Results   Component Value Date/Time    URICACID 4.5 02/19/2019 08:36 AM     RHEUMFACTN 10 02/19/2019 08:36 AM    CCPANTIBODY 2 02/19/2019 08:36 AM     Lab Results   Component Value Date/Time    CRYOGLOBULIN NEG 72Hour 08/04/2017 02:32 PM     Lab Results   Component Value Date/Time    SEDRATEWES 1 05/28/2021 01:24 PM     Lab Results   Component Value Date/Time    HBA1C 5.3 07/26/2018 11:19 AM     Lab Results   Component Value Date/Time    G6PD 12.9 06/18/2020 02:50 PM     Lab Results   Component Value Date/Time    CPKTOTAL 141 01/26/2018 07:47 AM     Lab Results   Component Value Date/Time    PTHINTACT 55 10/10/2008 10:42 AM     Assessment and Plan:     1. Rheumatoid arthritis with positive rheumatoid factor, involving unspecified site (HCC)  Doing well on Rinvoq 15 mg p.o. daily and Plaquenil 200 mg p.o. twice daily, and meloxicam 15 mg p.o. daily as needed  Patient having complications with his right second toe crossing over his right great toe with shoe fitting, walking, pain status post evaluation by podiatry who could not help, will get a second opinion from orthopedics at [facility], referral submitted today  - REFERRAL TO ORTHOPEDICS  - REFERRAL TO DERMATOLOGY  - CBC WITH DIFFERENTIAL; Future  - Comp Metabolic Panel; Future  - Sed Rate; Future    2. Medication monitoring encounter  On Rinvoq 15 mg p.o. daily, screening labs are up-to-date, next screening labs due May 2023, patient needs monitoring labs every 6 months next labs due December 2021, labs ordered for patient  We reviewed risks of biological medications with patient including hematological pathology, cancer risks, neurological and infection issues especially in the Covid-19 pandemic environment, patient states understanding.  VTE risks also reviewed, patient takes baby aspirin a day as prophylaxis  - REFERRAL TO ORTHOPEDICS  - REFERRAL TO DERMATOLOGY  - CBC WITH DIFFERENTIAL; Future  - Comp Metabolic Panel; Future  - Sed Rate; Future    3. Long-term use of Plaquenil  On Plaquenil 200 mg p.o. twice daily of note G6PD levels are  adequate  Patient needs monitoring labs every 6 months next labs due December 2021, labs ordered for patient  Patient needs ophthalmology evaluation every year next ophthalmology evaluation May 2022  - REFERRAL TO ORTHOPEDICS  - REFERRAL TO DERMATOLOGY  - CBC WITH DIFFERENTIAL; Future  - Comp Metabolic Panel; Future  - Sed Rate; Future    4. Encounter for long-term (current) use of NSAIDs  Currently on meloxicam 15 mg p.o. daily, patient needs monitoring labs every 6 months next labs due December 2021, labs ordered for patient  - REFERRAL TO ORTHOPEDICS  - REFERRAL TO DERMATOLOGY  - CBC WITH DIFFERENTIAL; Future  - Comp Metabolic Panel; Future  - Sed Rate; Future    5. Enthesitis  Stable continue to monitor  - REFERRAL TO ORTHOPEDICS  - REFERRAL TO DERMATOLOGY  - CBC WITH DIFFERENTIAL; Future  - Comp Metabolic Panel; Future  - Sed Rate; Future    6. Elevated MCV  MCV decreased down to 1 a 1.0, patient to decrease alcohol intake and continue P vitamin complex intake  - REFERRAL TO ORTHOPEDICS  - REFERRAL TO DERMATOLOGY    7. Osteopenia of multiple sites  Last DEXA June 2020 Next DEXA June 2022   continue calcium citrate 1200 mg by mouth daily and vitamin D about 2000 units by mouth daily and magnesium 200 mg by mouth daily    8. Basal cell carcinoma in situ of skin  Patient has history of basal cell count schwannoma, refer to dermatology for yearly check  - REFERRAL TO ORTHOPEDICS  - REFERRAL TO DERMATOLOGY    9. Essential hypertension  May impact the type of medications we can use for this patient's arthritis. We will have to keep this under advisement.    10. Coronary artery disease due to calcified coronary lesion: Mildly cardiac catheterization in 2014  Followed by cardiology    Followup: Return in about 6 months (around 2/12/2022). or sooner peyman Sims  was seen 30 minutes face-to-face of which more than 50% of the time was spent counseling the patient (excluding time for procedures)  regarding   rheumatological condition and care. Therapy was discussed in detail.      Please note that this dictation was created using voice recognition software. I have made every reasonable attempt to correct obvious errors, but I expect that there are errors of grammar and possibly content that I did not discover before finalizing the note.

## 2021-08-13 DIAGNOSIS — M05.9 RHEUMATOID ARTHRITIS WITH POSITIVE RHEUMATOID FACTOR, INVOLVING UNSPECIFIED SITE (HCC): ICD-10-CM

## 2021-08-13 RX ORDER — CYCLOBENZAPRINE HCL 10 MG
TABLET ORAL
Qty: 90 TABLET | Refills: 1 | Status: SHIPPED | OUTPATIENT
Start: 2021-08-13 | End: 2021-12-16

## 2021-08-13 NOTE — TELEPHONE ENCOUNTER
Received request via: Pharmacy    Was the patient seen in the last year in this department? Yes  LOV:6/30/2021  Does the patient have an active prescription (recently filled or refills available) for medication(s) requested? No

## 2021-09-07 ENCOUNTER — OFFICE VISIT (OUTPATIENT)
Dept: MEDICAL GROUP | Facility: LAB | Age: 66
End: 2021-09-07
Payer: MEDICARE

## 2021-09-07 VITALS
TEMPERATURE: 96.3 F | HEIGHT: 70 IN | WEIGHT: 189.6 LBS | RESPIRATION RATE: 12 BRPM | DIASTOLIC BLOOD PRESSURE: 65 MMHG | BODY MASS INDEX: 27.14 KG/M2 | HEART RATE: 72 BPM | OXYGEN SATURATION: 98 % | SYSTOLIC BLOOD PRESSURE: 130 MMHG

## 2021-09-07 DIAGNOSIS — N31.9 NEUROGENIC BLADDER: ICD-10-CM

## 2021-09-07 DIAGNOSIS — M05.9 RHEUMATOID ARTHRITIS WITH POSITIVE RHEUMATOID FACTOR, INVOLVING UNSPECIFIED SITE (HCC): ICD-10-CM

## 2021-09-07 DIAGNOSIS — N32.0 BLADDER OUTLET OBSTRUCTION: ICD-10-CM

## 2021-09-07 PROCEDURE — 99214 OFFICE O/P EST MOD 30 MIN: CPT | Performed by: INTERNAL MEDICINE

## 2021-09-07 RX ORDER — NITROFURANTOIN 25; 75 MG/1; MG/1
100 CAPSULE ORAL 2 TIMES DAILY
Qty: 10 CAPSULE | Refills: 3 | Status: SHIPPED | OUTPATIENT
Start: 2021-09-07 | End: 2021-11-08

## 2021-09-07 RX ORDER — OXYCODONE AND ACETAMINOPHEN 10; 325 MG/1; MG/1
1-2 TABLET ORAL EVERY 4 HOURS PRN
Qty: 150 TABLET | Refills: 0 | Status: SHIPPED | OUTPATIENT
Start: 2021-11-10 | End: 2021-12-09 | Stop reason: SDUPTHER

## 2021-09-07 RX ORDER — OXYCODONE AND ACETAMINOPHEN 10; 325 MG/1; MG/1
1-2 TABLET ORAL EVERY 4 HOURS PRN
Qty: 150 TABLET | Refills: 0 | Status: SHIPPED | OUTPATIENT
Start: 2021-10-11 | End: 2021-09-07 | Stop reason: SDUPTHER

## 2021-09-07 RX ORDER — OXYCODONE AND ACETAMINOPHEN 10; 325 MG/1; MG/1
1-2 TABLET ORAL EVERY 4 HOURS PRN
Qty: 150 TABLET | Refills: 0 | Status: SHIPPED | OUTPATIENT
Start: 2021-09-11 | End: 2021-09-07 | Stop reason: SDUPTHER

## 2021-09-07 ASSESSMENT — FIBROSIS 4 INDEX: FIB4 SCORE: 1.7

## 2021-09-08 NOTE — PROGRESS NOTES
CC: Edgardo Sims is a 65 y.o. male is suffering from   Chief Complaint   Patient presents with   • Chronic Opiate Therapy     3 months follow up         SUBJECTIVE:  1. Rheumatoid arthritis with positive rheumatoid factor, involving unspecified site (HCC)  Ray is here for follow-up has rheumatoid arthritis, continues to be treated with immunosuppressive medication.  Is tolerating medication well    2. Bladder outlet obstruction  History of bladder outlet obstruction leading to a neurogenic bladder, continue self-catheterization    3. Neurogenic bladder  Patient is to have antibiotics at home, standing order written for urinalysis        Past social, family, history:   Social History     Tobacco Use   • Smoking status: Never Smoker   • Smokeless tobacco: Never Used   Vaping Use   • Vaping Use: Never used   Substance Use Topics   • Alcohol use: Yes     Alcohol/week: 1.8 oz     Types: 3 Cans of beer per week     Comment: 3 per week   • Drug use: No         MEDICATIONS:    Current Outpatient Medications:   •  [START ON 11/10/2021] oxyCODONE-acetaminophen (PERCOCET-10)  MG Tab, Take 1-2 Tablets by mouth every four hours as needed for Severe Pain (refill #3 of #3.) for up to 30 days., Disp: 150 Tablet, Rfl: 0  •  nitrofurantoin (MACROBID) 100 MG Cap, Take 1 Capsule by mouth 2 times a day., Disp: 10 Capsule, Rfl: 3  •  cyclobenzaprine (FLEXERIL) 10 mg Tab, TAKE ONE TABLET BY MOUTH THREE TIMES DAILY AS NEEDED FOR MILD PAIN, Disp: 90 Tablet, Rfl: 1  •  meloxicam (MOBIC) 15 MG tablet, 1 tab po qday with food prn joint pain, Disp: 90 tablet, Rfl: 1  •  diazePAM (VALIUM) 5 MG Tab, , Disp: , Rfl:   •  hydroxychloroquine (PLAQUENIL) 200 MG Tab, TAKE 1 TABLET BY MOUTH TWICE A DAY, Disp: 180 tablet, Rfl: 1  •  testosterone cypionate (DEPO-TESTOSTERONE) 200 MG/ML Solution injection, testosterone cypionate 200 mg/mL intramuscular oil, Disp: , Rfl:   •  Upadacitinib ER 15 MG TABLET SR 24 HR, Take 15 mg by mouth  every day., Disp: 90 tablet, Rfl: 1  •  PARoxetine (PAXIL) 20 MG Tab, Take 1 Tab by mouth every day., Disp: 90 Tab, Rfl: 3  •  pravastatin (PRAVACHOL) 80 MG tablet, Take 1 Tab by mouth every day., Disp: 90 Tab, Rfl: 3  •  lisinopril (PRINIVIL) 10 MG Tab, Take 1 Tab by mouth every day., Disp: 90 Tab, Rfl: 3  •  metoprolol (LOPRESSOR) 50 MG Tab, TAKE 1 TABLET BY MOUTH TWICE A DAY, Disp: 180 Tab, Rfl: 3  •  vardenafil (LEVITRA) 20 MG tablet, Take 20 mg by mouth as needed., Disp: , Rfl:   •  CALCIUM-VITAMIN D PO, Take 1 Tab by mouth 2 Times a Day., Disp: , Rfl:   •  ascorbic acid (ASCORBIC ACID) 500 MG Tab, Take 500 mg by mouth every day., Disp: , Rfl:   •  Zinc 50 MG Cap, Take 50 mg by mouth every day., Disp: , Rfl:   •  omeprazole (PRILOSEC) 20 MG CPDR, Take 20 mg by mouth every day., Disp: , Rfl:   •  testosterone cypionate (DEPO-TESTOSTERONE) 200 MG/ML Solution injection, INJECT 1 ML BY INTRAMUSCULAR ROUTE EVERY 14 DAYS FOR 90 DAYS. E29.1, Disp: 10 mL, Rfl: 3  •  testosterone cypionate (DEPO-TESTOSTERONE) 200 MG/ML Solution injection, testosterone cypionate 200 mg/mL intramuscular oil  INJECT 1 ML BY INTRAMUSCULAR ROUTE EVERY 14 DAYS FOR 90 DAYS. E29.1, Disp: , Rfl:     PROBLEMS:  Patient Active Problem List    Diagnosis Date Noted   • Osteopenia 03/15/2021   • Vitamin D deficiency 03/15/2021   • Postoperative pain 08/05/2020   • Difficult intubation 08/05/2020   • Gastroesophageal reflux disease 08/05/2020   • Secondary adrenal insufficiency (HCC) 12/12/2019   • Current chronic use of systemic steroids 10/16/2019   • High risk medication use 10/16/2019   • History of adrenal insufficiency 10/16/2019   • Neurogenic bladder 01/26/2019   • Bladder outlet obstruction 05/01/2018   • Leukocytosis 04/30/2018   • Lactic acidosis 04/30/2018   • Idiopathic acute pancreatitis 04/30/2018   • Chronic use of opiate drug for therapeutic purpose 08/12/2017   • Depression 07/13/2015   • Anxiety 03/31/2015   • Coronary artery  "disease due to calcified coronary lesion: Mildly cardiac catheterization in 2014 12/05/2014   • Tachycardia 10/20/2014   • Abnormal myocardial perfusion study 01/15/2014   • Osteoarthrosis, unspecified whether generalized or localized, pelvic region and thigh 05/31/2013   • Dyslipidemia 07/12/2011   • Aortic insufficiency 07/05/2011   • Essential hypertension 07/05/2011   • Rheumatoid arthritis (HCC) 05/05/2009   • Hypogonadism male 05/05/2009       REVIEW OF SYSTEMS:  Gen.:  No Nausea, Vomiting, fever, Chills.  Heart: No chest pain.  Lungs:  No shortness of Breath.  Psychological: Kian unusual Anxiety depression     PHYSICAL EXAM   Constitutional: Alert, cooperative, not in acute distress.  Cardiovascular:  Rate Rhythm is regular without murmurs rubs clicks.     Thorax & Lungs: Clear to auscultation, no wheezing, rhonchi, or rales  HENT: Normocephalic, Atraumatic.  Eyes: PERRLA, EOMI, Conjunctiva normal.   Neck: Trachia is midline no swelling of the thyroid.   Lymphatic: No lymphadenopathy noted.   Neurologic: Alert & oriented x 3, cranial nerves II through XII are intact, Normal motor function, Normal sensory function, No focal deficits noted.   Psychiatric: Affect normal, Judgment normal, Mood normal.     VITAL SIGNS:/65   Pulse 72   Temp (!) 35.7 °C (96.3 °F) (Temporal)   Resp 12   Ht 1.778 m (5' 10\")   Wt 86 kg (189 lb 9.6 oz)   SpO2 98%   BMI 27.20 kg/m²     Labs: Reviewed    Assessment:                                                     Plan:    1. Rheumatoid arthritis with positive rheumatoid factor, involving unspecified site (HCC)  Continue current medical therapy  - Controlled Substance Treatment Agreement  - oxyCODONE-acetaminophen (PERCOCET-10)  MG Tab; Take 1-2 Tablets by mouth every four hours as needed for Severe Pain (refill #3 of #3.) for up to 30 days.  Dispense: 150 Tablet; Refill: 0    2. Bladder outlet obstruction  Patient is to have antibiotics at home  - nitrofurantoin " (MACROBID) 100 MG Cap; Take 1 Capsule by mouth 2 times a day.  Dispense: 10 Capsule; Refill: 3  - URINALYSIS,CULTURE IF INDICATED; Standing    3. Neurogenic bladder  Continue self-catheterization  - nitrofurantoin (MACROBID) 100 MG Cap; Take 1 Capsule by mouth 2 times a day.  Dispense: 10 Capsule; Refill: 3  - URINALYSIS,CULTURE IF INDICATED; Standing

## 2021-10-12 DIAGNOSIS — I10 ESSENTIAL HYPERTENSION: ICD-10-CM

## 2021-10-12 DIAGNOSIS — F32.A DEPRESSION, UNSPECIFIED DEPRESSION TYPE: ICD-10-CM

## 2021-10-12 RX ORDER — PAROXETINE HYDROCHLORIDE 20 MG/1
TABLET, FILM COATED ORAL
Qty: 90 TABLET | Refills: 3 | Status: SHIPPED | OUTPATIENT
Start: 2021-10-12 | End: 2022-10-10

## 2021-10-13 DIAGNOSIS — E78.5 DYSLIPIDEMIA: ICD-10-CM

## 2021-10-13 RX ORDER — METOPROLOL TARTRATE 50 MG/1
50 TABLET, FILM COATED ORAL 2 TIMES DAILY
Qty: 180 TABLET | Refills: 3 | Status: SHIPPED | OUTPATIENT
Start: 2021-10-13 | End: 2021-11-08 | Stop reason: SDUPTHER

## 2021-10-13 RX ORDER — PRAVASTATIN SODIUM 80 MG/1
TABLET ORAL
Qty: 90 TABLET | Refills: 0 | Status: SHIPPED | OUTPATIENT
Start: 2021-10-13 | End: 2021-11-08 | Stop reason: SDUPTHER

## 2021-10-13 NOTE — TELEPHONE ENCOUNTER
Received request via: Pharmacy    Was the patient seen in the last year in this department? Yes  LOV: 9/7/2021  Does the patient have an active prescription (recently filled or refills available) for medication(s) requested? No

## 2021-10-15 ENCOUNTER — TELEPHONE (OUTPATIENT)
Dept: MEDICAL GROUP | Facility: LAB | Age: 66
End: 2021-10-15

## 2021-10-15 DIAGNOSIS — G89.29 CHRONIC PAIN OF RIGHT KNEE: ICD-10-CM

## 2021-10-15 DIAGNOSIS — M25.561 CHRONIC PAIN OF RIGHT KNEE: ICD-10-CM

## 2021-10-15 NOTE — TELEPHONE ENCOUNTER
1. Caller Name: Edgardo Sims                          Call Back Number:[phone]      How would the patient prefer to be contacted with a response: Phone call OK to leave a detailed message     pt requesting imaging for right knee. He has pain from the arthritis and believes he may need a knee replacement.

## 2021-10-21 ENCOUNTER — HOSPITAL ENCOUNTER (OUTPATIENT)
Dept: RADIOLOGY | Facility: MEDICAL CENTER | Age: 66
End: 2021-10-21
Attending: INTERNAL MEDICINE
Payer: MEDICARE

## 2021-10-21 DIAGNOSIS — G89.29 CHRONIC PAIN OF RIGHT KNEE: ICD-10-CM

## 2021-10-21 DIAGNOSIS — M25.561 CHRONIC PAIN OF RIGHT KNEE: ICD-10-CM

## 2021-10-21 PROCEDURE — 73562 X-RAY EXAM OF KNEE 3: CPT | Mod: RT

## 2021-10-27 ENCOUNTER — OFFICE VISIT (OUTPATIENT)
Dept: MEDICAL GROUP | Facility: LAB | Age: 66
End: 2021-10-27
Payer: MEDICARE

## 2021-10-27 ENCOUNTER — APPOINTMENT (OUTPATIENT)
Dept: MEDICAL GROUP | Facility: LAB | Age: 66
End: 2021-10-27
Payer: MEDICARE

## 2021-10-27 VITALS
RESPIRATION RATE: 14 BRPM | TEMPERATURE: 97.9 F | WEIGHT: 192.6 LBS | DIASTOLIC BLOOD PRESSURE: 66 MMHG | BODY MASS INDEX: 27.57 KG/M2 | HEIGHT: 70 IN | SYSTOLIC BLOOD PRESSURE: 116 MMHG | HEART RATE: 72 BPM | OXYGEN SATURATION: 98 %

## 2021-10-27 DIAGNOSIS — H66.002 NON-RECURRENT ACUTE SUPPURATIVE OTITIS MEDIA OF LEFT EAR WITHOUT SPONTANEOUS RUPTURE OF TYMPANIC MEMBRANE: ICD-10-CM

## 2021-10-27 PROCEDURE — 99213 OFFICE O/P EST LOW 20 MIN: CPT | Performed by: NURSE PRACTITIONER

## 2021-10-27 RX ORDER — DOXYCYCLINE HYCLATE 100 MG
100 TABLET ORAL 2 TIMES DAILY
Qty: 14 TABLET | Refills: 0 | Status: SHIPPED | OUTPATIENT
Start: 2021-10-27 | End: 2021-11-08

## 2021-10-27 ASSESSMENT — FIBROSIS 4 INDEX: FIB4 SCORE: 1.73

## 2021-10-27 NOTE — PROGRESS NOTES
Subjective:     CC: The encounter diagnosis was Non-recurrent acute suppurative otitis media of left ear without spontaneous rupture of tympanic membrane.    HPI:   Edgardo presents today with the following:    Otalgia  Left ear, onset 3 days ago. Patient reports that he was hearing muffled noises and tried to pop his ears and felt pain on the left side. His hearing has been muffled since then. His pain subsided after the first day or 2. Patient also reports some dizziness, nasal congestion, and sinus pressure. Denies discharge.     Current Outpatient Medications Ordered in Epic   Medication Sig Dispense Refill   • doxycycline (VIBRAMYCIN) 100 MG Tab Take 1 Tablet by mouth 2 times a day. 14 Tablet 0   • metoprolol tartrate (LOPRESSOR) 50 MG Tab Take 1 Tablet by mouth 2 times a day. 180 Tablet 3   • pravastatin (PRAVACHOL) 80 MG tablet TAKE 1 TABLET BY MOUTH EVERY DAY 90 Tablet 0   • PARoxetine (PAXIL) 20 MG Tab TAKE 1 TABLET BY MOUTH EVERY DAY 90 Tablet 3   • hydroxychloroquine (PLAQUENIL) 200 MG Tab TAKE 1 TABLET BY MOUTH TWICE A  Tablet 0   • lisinopril (PRINIVIL) 10 MG Tab Take 1 Tablet by mouth every day. Please call [phone] to schedule a follow up for future refills. Thank you 90 Tablet 0   • [START ON 11/10/2021] oxyCODONE-acetaminophen (PERCOCET-10)  MG Tab Take 1-2 Tablets by mouth every four hours as needed for Severe Pain (refill #3 of #3.) for up to 30 days. 150 Tablet 0   • nitrofurantoin (MACROBID) 100 MG Cap Take 1 Capsule by mouth 2 times a day. 10 Capsule 3   • cyclobenzaprine (FLEXERIL) 10 mg Tab TAKE ONE TABLET BY MOUTH THREE TIMES DAILY AS NEEDED FOR MILD PAIN 90 Tablet 1   • meloxicam (MOBIC) 15 MG tablet 1 tab po qday with food prn joint pain 90 tablet 1   • testosterone cypionate (DEPO-TESTOSTERONE) 200 MG/ML Solution injection testosterone cypionate 200 mg/mL intramuscular oil   INJECT 1 ML BY INTRAMUSCULAR ROUTE EVERY 14 DAYS FOR 90 DAYS. E29.1     • diazePAM (VALIUM) 5 MG  "Tab      • testosterone cypionate (DEPO-TESTOSTERONE) 200 MG/ML Solution injection testosterone cypionate 200 mg/mL intramuscular oil     • Upadacitinib ER 15 MG TABLET SR 24 HR Take 15 mg by mouth every day. 90 tablet 1   • vardenafil (LEVITRA) 20 MG tablet Take 20 mg by mouth as needed.     • CALCIUM-VITAMIN D PO Take 1 Tab by mouth 2 Times a Day.     • ascorbic acid (ASCORBIC ACID) 500 MG Tab Take 500 mg by mouth every day.     • Zinc 50 MG Cap Take 50 mg by mouth every day.     • omeprazole (PRILOSEC) 20 MG CPDR Take 20 mg by mouth every day.       No current [facility]-ordered facility-administered medications on file.     ROS:   Gen: no fevers/chills, no changes in weight  Eyes: no changes in vision  ENT: no sore throat, no bloody nose  Pulm: no sob, no cough  CV: no chest pain, no palpitations  GI: no nausea/vomiting, no diarrhea  : no dysuria  MSk: no myalgias  Skin: no rash  Neuro: no headaches, no numbness/tingling  Heme/Lymph: no easy bruising        - NOTE: All other systems reviewed and are negative, except as in HPI.    Objective:     Exam: /66   Pulse 72   Temp 36.6 °C (97.9 °F)   Resp 14   Ht 1.778 m (5' 10\")   Wt 87.4 kg (192 lb 9.6 oz)   SpO2 98%  Body mass index is 27.64 kg/m².    General: Normal appearing. No distress.  HEENT: Normocephalic. Eyes conjunctiva clear lids without ptosis, pupils equal and reactive to light accommodation, ears normal shape and contour, canals are clear bilaterally, left tympanic membranes bulging and erythematous, nasal mucosa benign, oropharynx is without erythema, edema or exudates.   Neck: Supple without JVD or bruit. Thyroid is not enlarged.  Pulmonary: Clear to ausculation.  Normal effort. No rales, ronchi, or wheezing.  Cardiovascular: Regular rate and rhythm without murmur. Carotid and radial pulses are intact and equal bilaterally.  Neurologic: Grossly nonfocal  Lymph: No cervical or supraclavicular lymph nodes are palpable  Skin: Warm and dry.  No " obvious lesions.  Musculoskeletal: Normal gait. No extremity cyanosis, clubbing, or edema.  Psych: Normal mood and affect. Alert and oriented x3. Judgment and insight is normal.    Assessment & Plan:     66 y.o. male with the following -     1. Non-recurrent acute suppurative otitis media of left ear without spontaneous rupture of tympanic membrane  Patient to take medication as prescribed. Side effects of medication prescribed today were discussed with the patient including how to take the medication and proper dosage. Discussed repercussions of not taking the medication as prescribed. Instructed to call the office should he have any negative side effects or problems with the medication. Supportive care, differential diagnoses, and indications for immediate follow-up discussed with patient. Pathogenesis of diagnosis discussed including typical length and natural progression. Instructed to return to clinic or nearest emergency department for any change in condition, further concerns, or worsening of symptoms.  - doxycycline (VIBRAMYCIN) 100 MG Tab; Take 1 Tablet by mouth 2 times a day.  Dispense: 14 Tablet; Refill: 0    Return if symptoms worsen or fail to improve.    Please note that this dictation was created using voice recognition software. I have made every reasonable attempt to correct obvious errors, but I expect that there are errors of grammar and possibly content that I did not discover before finalizing the note.

## 2021-10-27 NOTE — PROGRESS NOTES
Subjective:     CC: ear fullness    HPI:   Edgardo here today with ***    No problems updated.      ROS:  Gen: no fevers/chills,   Eyes: no changes in vision  ENT: no sore throat, no hearing loss,  Pulm: no sob, no cough  CV: no chest pain, no palpitations  GI: no nausea/vomiting, no diarrhea  Skin: no rash  Neuro: no headaches    Current Outpatient Medications Ordered in Epic   Medication Sig Dispense Refill   • metoprolol tartrate (LOPRESSOR) 50 MG Tab Take 1 Tablet by mouth 2 times a day. 180 Tablet 3   • pravastatin (PRAVACHOL) 80 MG tablet TAKE 1 TABLET BY MOUTH EVERY DAY 90 Tablet 0   • PARoxetine (PAXIL) 20 MG Tab TAKE 1 TABLET BY MOUTH EVERY DAY 90 Tablet 3   • hydroxychloroquine (PLAQUENIL) 200 MG Tab TAKE 1 TABLET BY MOUTH TWICE A  Tablet 0   • lisinopril (PRINIVIL) 10 MG Tab Take 1 Tablet by mouth every day. Please call [phone] to schedule a follow up for future refills. Thank you 90 Tablet 0   • [START ON 11/10/2021] oxyCODONE-acetaminophen (PERCOCET-10)  MG Tab Take 1-2 Tablets by mouth every four hours as needed for Severe Pain (refill #3 of #3.) for up to 30 days. 150 Tablet 0   • nitrofurantoin (MACROBID) 100 MG Cap Take 1 Capsule by mouth 2 times a day. 10 Capsule 3   • cyclobenzaprine (FLEXERIL) 10 mg Tab TAKE ONE TABLET BY MOUTH THREE TIMES DAILY AS NEEDED FOR MILD PAIN 90 Tablet 1   • meloxicam (MOBIC) 15 MG tablet 1 tab po qday with food prn joint pain 90 tablet 1   • testosterone cypionate (DEPO-TESTOSTERONE) 200 MG/ML Solution injection testosterone cypionate 200 mg/mL intramuscular oil   INJECT 1 ML BY INTRAMUSCULAR ROUTE EVERY 14 DAYS FOR 90 DAYS. E29.1     • diazePAM (VALIUM) 5 MG Tab      • testosterone cypionate (DEPO-TESTOSTERONE) 200 MG/ML Solution injection testosterone cypionate 200 mg/mL intramuscular oil     • Upadacitinib ER 15 MG TABLET SR 24 HR Take 15 mg by mouth every day. 90 tablet 1   • vardenafil (LEVITRA) 20 MG tablet Take 20 mg by mouth as needed.     •  CALCIUM-VITAMIN D PO Take 1 Tab by mouth 2 Times a Day.     • ascorbic acid (ASCORBIC ACID) 500 MG Tab Take 500 mg by mouth every day.     • Zinc 50 MG Cap Take 50 mg by mouth every day.     • omeprazole (PRILOSEC) 20 MG CPDR Take 20 mg by mouth every day.       No current [facility]-ordered facility-administered medications on file.         Objective:     Exam:  There were no vitals taken for this visit. There is no height or weight on file to calculate BMI.    Gen: Alert and oriented, No apparent distress.  HEENT: pupils PERRLA, The pinna, tragus, and ear canal are non-tender and without swelling. The ear canal is clear without discharge. The tympanic membrane is normal in appearance with a good cone of light. Oral mucosa is pink and moist with good dentition. Tongue normal in appearance without lesions and with good symmetrical movement. No buccal nodules or lesions are noted. The pharynx is normal in appearance without tonsillar swelling or exudates.   Neck: Neck is supple without lymphadenopathy.  Lungs: Normal effort, CTA bilaterally, no wheezes, rhonchi, or rales  CV: Regular rate and rhythm. No murmurs, rubs, or gallops.  Ext: No clubbing, cyanosis, edema.        Assessment & Plan:     66 y.o. male with the following -     Problem List Items Addressed This Visit     None          I spent a total of *** minutes with record review, exam, communication with the patient, communication with other providers, and documentation of this encounter.      No follow-ups on file.    Please note that this dictation was created using voice recognition software. I have made every reasonable attempt to correct obvious errors, but there may be errors of grammar and possibly content that I did not discover before finalizing the note.

## 2021-11-02 ENCOUNTER — TELEPHONE (OUTPATIENT)
Dept: MEDICAL GROUP | Facility: LAB | Age: 66
End: 2021-11-02

## 2021-11-02 DIAGNOSIS — H91.22 SUDDEN LEFT HEARING LOSS: ICD-10-CM

## 2021-11-02 RX ORDER — PREDNISONE 20 MG/1
40 TABLET ORAL DAILY
Qty: 15 TABLET | Refills: 0 | Status: SHIPPED | OUTPATIENT
Start: 2021-11-02 | End: 2021-11-08

## 2021-11-02 NOTE — TELEPHONE ENCOUNTER
1. Caller Name: Kwasi                        Call Back Number: [phone] (home)       How would the patient prefer to be contacted with a response: Phone call OK to leave a detailed message    Pt was seen last week for hearing loss in left ear.  He was treated for an ear inf but there is no change in his hearing still.  Does he go to a specialist now??

## 2021-11-03 ENCOUNTER — TELEPHONE (OUTPATIENT)
Dept: RHEUMATOLOGY | Facility: MEDICAL CENTER | Age: 66
End: 2021-11-03

## 2021-11-03 NOTE — TELEPHONE ENCOUNTER
Kwasi called stating that Oct 25 he woke up and had lost all hearing in his L ear.    He saw DR Lao and he wants Kwasi to start on 40mg of Prednisone daily for sudden onset hearing loss. He also placed an urgent referral to ENT.    Kwasi wants to know if you are ok with him stating the Prednisone at that high of a dose?     Please advise and thank you

## 2021-11-03 NOTE — TELEPHONE ENCOUNTER
Telephone call to the patient, 9-day history of sudden hearing loss left ear, previously treated after the hearing loss with doxycycline, looked up side effects there appears to be none associated with hearing.  Recommended urgent referral to ENT, patient is to start 40 mg of prednisone immediately.

## 2021-11-08 ENCOUNTER — OFFICE VISIT (OUTPATIENT)
Dept: CARDIOLOGY | Facility: MEDICAL CENTER | Age: 66
End: 2021-11-08
Payer: MEDICARE

## 2021-11-08 VITALS
OXYGEN SATURATION: 97 % | WEIGHT: 197 LBS | RESPIRATION RATE: 16 BRPM | BODY MASS INDEX: 28.2 KG/M2 | SYSTOLIC BLOOD PRESSURE: 142 MMHG | HEIGHT: 70 IN | DIASTOLIC BLOOD PRESSURE: 80 MMHG | HEART RATE: 76 BPM

## 2021-11-08 DIAGNOSIS — I35.1 NONRHEUMATIC AORTIC VALVE INSUFFICIENCY: ICD-10-CM

## 2021-11-08 DIAGNOSIS — Z79.899 HIGH RISK MEDICATION USE: ICD-10-CM

## 2021-11-08 DIAGNOSIS — I10 ESSENTIAL HYPERTENSION: ICD-10-CM

## 2021-11-08 DIAGNOSIS — E27.49 SECONDARY ADRENAL INSUFFICIENCY (HCC): ICD-10-CM

## 2021-11-08 DIAGNOSIS — I25.84 CORONARY ARTERY DISEASE DUE TO CALCIFIED CORONARY LESION: ICD-10-CM

## 2021-11-08 DIAGNOSIS — G89.18 POSTOPERATIVE PAIN: ICD-10-CM

## 2021-11-08 DIAGNOSIS — E78.5 DYSLIPIDEMIA: ICD-10-CM

## 2021-11-08 DIAGNOSIS — Z86.39 HISTORY OF ADRENAL INSUFFICIENCY: ICD-10-CM

## 2021-11-08 DIAGNOSIS — I25.10 CORONARY ARTERY DISEASE DUE TO CALCIFIED CORONARY LESION: ICD-10-CM

## 2021-11-08 DIAGNOSIS — Z79.52 CURRENT CHRONIC USE OF SYSTEMIC STEROIDS: ICD-10-CM

## 2021-11-08 PROCEDURE — 99214 OFFICE O/P EST MOD 30 MIN: CPT | Performed by: INTERNAL MEDICINE

## 2021-11-08 RX ORDER — EZETIMIBE 10 MG/1
10 TABLET ORAL DAILY
Qty: 90 TABLET | Refills: 3 | Status: SHIPPED | OUTPATIENT
Start: 2021-11-08 | End: 2021-12-29

## 2021-11-08 RX ORDER — PRAVASTATIN SODIUM 80 MG/1
80 TABLET ORAL
Qty: 90 TABLET | Refills: 0 | Status: SHIPPED | OUTPATIENT
Start: 2021-11-08 | End: 2022-12-22 | Stop reason: SDUPTHER

## 2021-11-08 RX ORDER — LISINOPRIL 10 MG/1
10 TABLET ORAL
Qty: 90 TABLET | Refills: 0 | Status: SHIPPED | OUTPATIENT
Start: 2021-11-08 | End: 2022-02-22

## 2021-11-08 RX ORDER — METOPROLOL TARTRATE 50 MG/1
50 TABLET, FILM COATED ORAL 2 TIMES DAILY
Qty: 180 TABLET | Refills: 3 | Status: SHIPPED | OUTPATIENT
Start: 2021-11-08 | End: 2022-12-22 | Stop reason: SDUPTHER

## 2021-11-08 ASSESSMENT — ENCOUNTER SYMPTOMS
CHILLS: 0
PND: 0
SORE THROAT: 0
SPUTUM PRODUCTION: 0
FEVER: 0
WHEEZING: 0
CONSTITUTIONAL NEGATIVE: 1
NEUROLOGICAL NEGATIVE: 1
WEAKNESS: 0
DIZZINESS: 0
RESPIRATORY NEGATIVE: 1
MUSCULOSKELETAL NEGATIVE: 1
CLAUDICATION: 0
GASTROINTESTINAL NEGATIVE: 1
EYES NEGATIVE: 1
CARDIOVASCULAR NEGATIVE: 1
LOSS OF CONSCIOUSNESS: 0
ORTHOPNEA: 0
COUGH: 0
BRUISES/BLEEDS EASILY: 0
STRIDOR: 0
PALPITATIONS: 0
SHORTNESS OF BREATH: 0
HEMOPTYSIS: 0

## 2021-11-08 ASSESSMENT — FIBROSIS 4 INDEX: FIB4 SCORE: 1.73

## 2021-11-08 NOTE — PROGRESS NOTES
Chief Complaint   Patient presents with   • Coronary Artery Disease   • Tachycardia       Subjective:   Edgardo Sims is a 64 y.o. male who presents today as a follow-up from a prior provider for history of nonischemic disease hypertension hyperlipidemia hyperlipidemia.    Since he was last seen he is doing well.  He brings in a list of his blood pressures which are all well controlled.  He did run out of some of his medications.  We gave him temporary 30-day supplies.    Past Medical History:   Diagnosis Date   • Abnormal myocardial perfusion study 1/15/2014   • Aortic insufficiency 7/5/2011   • Arthritis     RA, and osteo   • Bronchitis    • CAD (coronary artery disease) mild plaque at cath in 2/14 12/5/2014   • Cancer (HCC)     skin   • Chronic use of opiate drugs therapeutic purposes 8/12/2017   • Dental disorder     Upper dentures.   • Dyslipidemia 7/12/2011   • Heart burn    • Heart murmur    • Hiatus hernia syndrome    • High cholesterol    • HTN (hypertension) 7/5/2011   • Hypertension    • Indigestion    • Infectious disease    • Pain     RA   • Pain     hands, feet and jaw   • Rheumatoid arthritis(714.0)     severe   • Tachycardia 10/20/2014     Past Surgical History:   Procedure Laterality Date   • PB SHLDR ARTHROSCOP,PART ACROMIOPLAS Left 8/5/2020    Procedure: DECOMPRESSION, SHOULDER, ARTHROSCOPIC - SUBACROMIAL;  Surgeon: Emanuel Vance M.D.;  Location: [facility];  Service: Orthopedics   • PB SHLDR ARTHROSCOP,SURG,W/ROTAT CUFF REPB Left 8/5/2020    Procedure: ARTHROSCOPY, SHOULDER, WITH ROTATOR CUFF REPAIR - AND DALAL;  Surgeon: Emanuel Vance M.D.;  Location: SURGERY [facility];  Service: Orthopedics   • CLAVICLE DISTAL EXCISION Left 8/5/2020    Procedure: EXCISION, CLAVICLE, DISTAL - WITH EXTENSIVE DEBRIDEMENT;  Surgeon: Emanuel Vance M.D.;  Location: SURGERY [facility];  Service: Orthopedics   • PB TRANSURETHRAL ELEC-SURG PBOSTATECTOM  4/1/2019    Procedure:  BIPOLAR TRANSURETHRAL RESECTION OF PROSTATE;  Surgeon: Jose Romo M.D.;  Location: SURGERY [facility];  Service: Urology   • CYSTOSCOPY  4/1/2019    Procedure: CYSTOSCOPY;  Surgeon: Jose Romo M.D.;  Location: SURGERY [facility];  Service: Urology   • URETHROTOMY INTERNAL  4/1/2019    Procedure: DIRECT VISION INTERNAL URETHROTOMY;  Surgeon: Jose Romo M.D.;  Location: SURGERY [facility];  Service: Urology   • KNEE REVISION TOTAL Left 8/3/2018    Procedure: KNEE REVISION TOTAL;  Surgeon: Michael Rodriguez M.D.;  Location: SURGERY [facility];  Service: Orthopedics   • CYSTOSCOPY  5/16/2018    Procedure: CYSTOSCOPY-CLOT EVAC;  Surgeon: Jose Romo M.D.;  Location: SURGERY [facility];  Service: Urology   • TRANS URETHRAL RESECTION BLADDER  5/16/2018    Procedure: TRANS URETHRAL RESECTION BLADDER;  Surgeon: Jose Romo M.D.;  Location: SURGERY [facility];  Service: Urology   • RECOVERY  2/3/2014    Performed by Cath-Recovery Surgery at SURGERY SAME DAY [facility]   • HIP ARTHROPLASTY TOTAL  5/31/2013    Performed by Burak Valles M.D. at [facility]   • ROTATOR CUFF REPAIR Right 2011    x 2   • KNEE ARTHROPLASTY TOTAL  6/1/2009    LEFT-Performed by YONATAN HINSON at SURGERY [facility]   • VEIN LIGATION RADIO FREQUENCY  12/8/2008    LEFT leg-Performed by DEREJE MCCULLOUGH at SURGERY SAME DAY [facility]   • HIP ARTHROPLASTY TOTAL  6/20/08    Performed by YONATAN HINSON at SURGERY [facility]   • LUMBAR LAMINECTOMY DISKECTOMY  2000   • TMJ RECONSTRUCTION BILATERAL  1994   • ANKLE ORIF Right    • KNEE ARTHROSCOPY Left    • VEIN STRIPPING Left      Family History   Problem Relation Age of Onset   • Hypertension Mother      Social History     Socioeconomic History   • Marital status:      Spouse name: Not on file   • Number of children: Not on file   • Years of education: Not on file   • Highest  education level: Not on file   Occupational History   • Not on file   Tobacco Use   • Smoking status: Never Smoker   • Smokeless tobacco: Never Used   Vaping Use   • Vaping Use: Never used   Substance and Sexual Activity   • Alcohol use: Yes     Alcohol/week: 1.8 oz     Types: 3 Cans of beer per week     Comment: 3 per week   • Drug use: No   • Sexual activity: Yes     Partners: Female   Other Topics Concern   •  Service No   • Blood Transfusions No   • Caffeine Concern No   • Occupational Exposure No   • Hobby Hazards Yes     Comment: rides motorcyle   • Sleep Concern No   • Stress Concern No   • Weight Concern Yes   • Special Diet Yes     Comment: does not eat red meat    • Back Care Yes   • Exercise Yes   • Bike Helmet Yes   • Seat Belt Yes   • Self-Exams Yes   Social History Narrative   • Not on file     Social Determinants of Health     Financial Resource Strain:    • Difficulty of Paying Living Expenses: Not on file   Food Insecurity:    • Worried About Running Out of Food in the Last Year: Not on file   • Ran Out of Food in the Last Year: Not on file   Transportation Needs:    • Lack of Transportation (Medical): Not on file   • Lack of Transportation (Non-Medical): Not on file   Physical Activity:    • Days of Exercise per Week: Not on file   • Minutes of Exercise per Session: Not on file   Stress:    • Feeling of Stress : Not on file   Social Connections:    • Frequency of Communication with Friends and Family: Not on file   • Frequency of Social Gatherings with Friends and Family: Not on file   • Attends Caoda[employer] Services: Not on file   • Active Member of Clubs or Organizations: Not on file   • Attends Club or Organization Meetings: Not on file   • Marital Status: Not on file   Intimate Partner Violence:    • Fear of Current or Ex-Partner: Not on file   • Emotionally Abused: Not on file   • Physically Abused: Not on file   • Sexually Abused: Not on file   Housing Stability:    • Unable to Pay for  Housing in the Last Year: Not on file   • Number of Places Lived in the Last Year: Not on file   • Unstable Housing in the Last Year: Not on file     Allergies   Allergen Reactions   • Crestor [Rosuvastatin Calcium] Myalgia   • Penicillins Hives, Itching and Vomiting   • Rocephin [Ceftriaxone] Rash     Redness and flushing all over body     Outpatient Encounter Medications as of 11/8/2021   Medication Sig Dispense Refill   • lisinopril (PRINIVIL) 10 MG Tab Take 1 Tablet by mouth every day. 90 Tablet 0   • metoprolol tartrate (LOPRESSOR) 50 MG Tab Take 1 Tablet by mouth 2 times a day. 180 Tablet 3   • pravastatin (PRAVACHOL) 80 MG tablet Take 1 Tablet by mouth every day. 90 Tablet 0   • ezetimibe (ZETIA) 10 MG Tab Take 1 Tablet by mouth every day. 90 Tablet 3   • PARoxetine (PAXIL) 20 MG Tab TAKE 1 TABLET BY MOUTH EVERY DAY 90 Tablet 3   • hydroxychloroquine (PLAQUENIL) 200 MG Tab TAKE 1 TABLET BY MOUTH TWICE A  Tablet 0   • [START ON 11/10/2021] oxyCODONE-acetaminophen (PERCOCET-10)  MG Tab Take 1-2 Tablets by mouth every four hours as needed for Severe Pain (refill #3 of #3.) for up to 30 days. 150 Tablet 0   • cyclobenzaprine (FLEXERIL) 10 mg Tab TAKE ONE TABLET BY MOUTH THREE TIMES DAILY AS NEEDED FOR MILD PAIN 90 Tablet 1   • meloxicam (MOBIC) 15 MG tablet 1 tab po qday with food prn joint pain 90 tablet 1   • testosterone cypionate (DEPO-TESTOSTERONE) 200 MG/ML Solution injection testosterone cypionate 200 mg/mL intramuscular oil   INJECT 1 ML BY INTRAMUSCULAR ROUTE EVERY 14 DAYS FOR 90 DAYS. E29.1     • diazePAM (VALIUM) 5 MG Tab      • testosterone cypionate (DEPO-TESTOSTERONE) 200 MG/ML Solution injection testosterone cypionate 200 mg/mL intramuscular oil     • Upadacitinib ER 15 MG TABLET SR 24 HR Take 15 mg by mouth every day. 90 tablet 1   • vardenafil (LEVITRA) 20 MG tablet Take 20 mg by mouth as needed.     • CALCIUM-VITAMIN D PO Take 1 Tab by mouth 2 Times a Day.     • ascorbic acid  "(ASCORBIC ACID) 500 MG Tab Take 500 mg by mouth every day.     • Zinc 50 MG Cap Take 50 mg by mouth every day.     • omeprazole (PRILOSEC) 20 MG CPDR Take 20 mg by mouth every day.     • [DISCONTINUED] predniSONE (DELTASONE) 20 MG Tab Take 2 Tablets by mouth every day. (Patient not taking: Reported on 11/8/2021) 15 Tablet 0   • [DISCONTINUED] doxycycline (VIBRAMYCIN) 100 MG Tab Take 1 Tablet by mouth 2 times a day. (Patient not taking: Reported on 11/8/2021) 14 Tablet 0   • [DISCONTINUED] metoprolol tartrate (LOPRESSOR) 50 MG Tab Take 1 Tablet by mouth 2 times a day. 180 Tablet 3   • [DISCONTINUED] pravastatin (PRAVACHOL) 80 MG tablet TAKE 1 TABLET BY MOUTH EVERY DAY 90 Tablet 0   • [DISCONTINUED] lisinopril (PRINIVIL) 10 MG Tab Take 1 Tablet by mouth every day. Please call [phone] to schedule a follow up for future refills. Thank you 90 Tablet 0   • [DISCONTINUED] nitrofurantoin (MACROBID) 100 MG Cap Take 1 Capsule by mouth 2 times a day. (Patient not taking: Reported on 11/8/2021) 10 Capsule 3     No facility-administered encounter medications on file as of 11/8/2021.     Review of Systems   Constitutional: Negative.  Negative for chills, fever and malaise/fatigue.   HENT: Negative.  Negative for sore throat.    Eyes: Negative.    Respiratory: Negative.  Negative for cough, hemoptysis, sputum production, shortness of breath, wheezing and stridor.    Cardiovascular: Negative.  Negative for chest pain, palpitations, orthopnea, claudication, leg swelling and PND.   Gastrointestinal: Negative.    Genitourinary: Negative.    Musculoskeletal: Negative.    Skin: Negative.    Neurological: Negative.  Negative for dizziness, loss of consciousness and weakness.   Endo/Heme/Allergies: Negative.  Does not bruise/bleed easily.   All other systems reviewed and are negative.       Objective:   /80 (BP Location: Left arm, Patient Position: Sitting, BP Cuff Size: Adult)   Pulse 76   Resp 16   Ht 1.778 m (5' 10\")   " Wt 89.4 kg (197 lb)   SpO2 97%   BMI 28.27 kg/m²     Physical Exam  Vitals and nursing note reviewed.   Constitutional:       General: He is not in acute distress.     Appearance: He is well-developed. He is not diaphoretic.   HENT:      Head: Normocephalic and atraumatic.      Right Ear: External ear normal.      Left Ear: External ear normal.      Nose: Nose normal.      Mouth/Throat:      Pharynx: No oropharyngeal exudate.   Eyes:      General: No scleral icterus.        Right eye: No discharge.         Left eye: No discharge.      Conjunctiva/sclera: Conjunctivae normal.      Pupils: Pupils are equal, round, and reactive to light.   Neck:      Vascular: No JVD.   Cardiovascular:      Rate and Rhythm: Normal rate and regular rhythm.      Heart sounds: No murmur heard.  No friction rub. No gallop.    Pulmonary:      Effort: Pulmonary effort is normal. No respiratory distress.      Breath sounds: No stridor. No wheezing or rales.   Chest:      Chest wall: No tenderness.   Abdominal:      General: There is no distension.      Palpations: Abdomen is soft.      Tenderness: There is no guarding.   Musculoskeletal:         General: No tenderness or deformity. Normal range of motion.      Cervical back: Neck supple.   Skin:     General: Skin is warm and dry.      Coloration: Skin is not pale.      Findings: No erythema or rash.   Neurological:      Mental Status: He is alert.      Cranial Nerves: No cranial nerve deficit.      Motor: No abnormal muscle tone.      Coordination: Coordination normal.      Deep Tendon Reflexes: Reflexes are normal and symmetric. Reflexes normal.   Psychiatric:         Behavior: Behavior normal.         Thought Content: Thought content normal.         Judgment: Judgment normal.       Echocardiogram dated 11/12/2019 personally interpreted myself showing an EF of 65% otherwise normal.    Nuclear stress test: Dated 5/8/2013 personally reviewed her myself showing ischemia.    Lab Results    Component Value Date/Time    CHOLSTRLTOT 146 06/17/2021 06:16 AM    LDL 70 06/17/2021 06:16 AM    HDL 49 06/17/2021 06:16 AM    TRIGLYCERIDE 137 06/17/2021 06:16 AM       Lab Results   Component Value Date/Time    SODIUM 136 06/17/2021 06:16 AM    POTASSIUM 5.2 06/17/2021 06:16 AM    CHLORIDE 99 06/17/2021 06:16 AM    CO2 30 06/17/2021 06:16 AM    GLUCOSE 80 06/17/2021 06:16 AM    BUN 21 06/17/2021 06:16 AM    CREATININE 1.05 06/17/2021 06:16 AM    CREATININE 1.1 04/21/2009 03:50 PM    BUNCREATRAT 13 09/21/2016 09:21 AM     Lab Results   Component Value Date/Time    ALKPHOSPHAT 60 06/17/2021 06:16 AM    ASTSGOT 37 06/17/2021 06:16 AM    ALTSGPT 32 06/17/2021 06:16 AM    TBILIRUBIN 0.7 06/17/2021 06:16 AM        Assessment:     1. Nonrheumatic aortic valve insufficiency     2. Coronary artery disease due to calcified coronary lesion: Mildly cardiac catheterization in 2014     3. Current chronic use of systemic steroids  EC-ECHOCARDIOGRAM COMPLETE W/O CONT   4. Dyslipidemia  pravastatin (PRAVACHOL) 80 MG tablet    ezetimibe (ZETIA) 10 MG Tab    EC-ECHOCARDIOGRAM COMPLETE W/O CONT   5. Essential hypertension  lisinopril (PRINIVIL) 10 MG Tab    metoprolol tartrate (LOPRESSOR) 50 MG Tab    ezetimibe (ZETIA) 10 MG Tab    EC-ECHOCARDIOGRAM COMPLETE W/O CONT   6. High risk medication use     7. History of adrenal insufficiency     8. Secondary adrenal insufficiency (HCC)     9. Postoperative pain         Medical Decision Making:  Today's Assessment / Status / Plan:     66-year-old male with a nonischemic nonobstructive coronary disease with hypertension hyperlipidemia.  I refilled all his medications.  He is doing well his lipids are at goal.  We will see him back in 1 year.

## 2021-11-09 ENCOUNTER — TELEPHONE (OUTPATIENT)
Dept: MEDICAL GROUP | Facility: LAB | Age: 66
End: 2021-11-09

## 2021-11-09 NOTE — TELEPHONE ENCOUNTER
1. Caller Name: Kwasi                        Call Back Number: [phone] (home)       How would the patient prefer to be contacted with a response: Phone call OK to leave a detailed message    Pt is still having problems with his left ear and hearing.  He was not able to make the appt with the ENT.  He is asking if you saw any wax in his ear when you evaluated him.  Please advise.

## 2021-11-11 ENCOUNTER — OFFICE VISIT (OUTPATIENT)
Dept: MEDICAL GROUP | Facility: LAB | Age: 66
End: 2021-11-11
Payer: MEDICARE

## 2021-11-11 VITALS
DIASTOLIC BLOOD PRESSURE: 66 MMHG | HEART RATE: 96 BPM | TEMPERATURE: 98.2 F | RESPIRATION RATE: 12 BRPM | OXYGEN SATURATION: 96 % | HEIGHT: 70 IN | SYSTOLIC BLOOD PRESSURE: 134 MMHG | BODY MASS INDEX: 27.63 KG/M2 | WEIGHT: 193 LBS

## 2021-11-11 DIAGNOSIS — Z23 NEED FOR VACCINATION: ICD-10-CM

## 2021-11-11 DIAGNOSIS — H65.05 RECURRENT ACUTE SEROUS OTITIS MEDIA OF LEFT EAR: ICD-10-CM

## 2021-11-11 PROCEDURE — 90662 IIV NO PRSV INCREASED AG IM: CPT | Performed by: FAMILY MEDICINE

## 2021-11-11 PROCEDURE — G0008 ADMIN INFLUENZA VIRUS VAC: HCPCS | Performed by: FAMILY MEDICINE

## 2021-11-11 PROCEDURE — 99213 OFFICE O/P EST LOW 20 MIN: CPT | Performed by: FAMILY MEDICINE

## 2021-11-11 RX ORDER — SULFAMETHOXAZOLE AND TRIMETHOPRIM 800; 160 MG/1; MG/1
1 TABLET ORAL 2 TIMES DAILY
Qty: 14 TABLET | Refills: 0 | Status: SHIPPED | OUTPATIENT
Start: 2021-11-11 | End: 2021-11-18

## 2021-11-11 ASSESSMENT — FIBROSIS 4 INDEX: FIB4 SCORE: 1.73

## 2021-11-11 NOTE — PROGRESS NOTES
Subjective:     Chief Complaint   Patient presents with   • Otalgia     x 2 weeks         HPI:   Edgardo presents today with concern for possible ear infection. Has noticed muffled hearing lately, and was told this was not a wax issue. Was diagnosed with an ear infection on 10/27 and prescribed doxycyline for 7 days. Has been done with abx now for over a week. Continues to feel like there is something in his ear. Doesn't feel like it needs to pop. Little bit of ringing. Swallowing does allow popping.   No fevers. Does feel pressure on this too. Is now doing 40 mg prednisone for a week as well. Occasionally uses flonase.   No sinus issues that he is aware of.           Current Outpatient Medications Ordered in Epic   Medication Sig Dispense Refill   • lisinopril (PRINIVIL) 10 MG Tab Take 1 Tablet by mouth every day. 90 Tablet 0   • metoprolol tartrate (LOPRESSOR) 50 MG Tab Take 1 Tablet by mouth 2 times a day. 180 Tablet 3   • pravastatin (PRAVACHOL) 80 MG tablet Take 1 Tablet by mouth every day. 90 Tablet 0   • ezetimibe (ZETIA) 10 MG Tab Take 1 Tablet by mouth every day. 90 Tablet 3   • PARoxetine (PAXIL) 20 MG Tab TAKE 1 TABLET BY MOUTH EVERY DAY 90 Tablet 3   • hydroxychloroquine (PLAQUENIL) 200 MG Tab TAKE 1 TABLET BY MOUTH TWICE A  Tablet 0   • oxyCODONE-acetaminophen (PERCOCET-10)  MG Tab Take 1-2 Tablets by mouth every four hours as needed for Severe Pain (refill #3 of #3.) for up to 30 days. 150 Tablet 0   • cyclobenzaprine (FLEXERIL) 10 mg Tab TAKE ONE TABLET BY MOUTH THREE TIMES DAILY AS NEEDED FOR MILD PAIN 90 Tablet 1   • meloxicam (MOBIC) 15 MG tablet 1 tab po qday with food prn joint pain 90 tablet 1   • testosterone cypionate (DEPO-TESTOSTERONE) 200 MG/ML Solution injection testosterone cypionate 200 mg/mL intramuscular oil   INJECT 1 ML BY INTRAMUSCULAR ROUTE EVERY 14 DAYS FOR 90 DAYS. E29.1     • diazePAM (VALIUM) 5 MG Tab      • testosterone cypionate (DEPO-TESTOSTERONE) 200 MG/ML  "Solution injection testosterone cypionate 200 mg/mL intramuscular oil     • Upadacitinib ER 15 MG TABLET SR 24 HR Take 15 mg by mouth every day. 90 tablet 1   • vardenafil (LEVITRA) 20 MG tablet Take 20 mg by mouth as needed.     • CALCIUM-VITAMIN D PO Take 1 Tab by mouth 2 Times a Day.     • ascorbic acid (ASCORBIC ACID) 500 MG Tab Take 500 mg by mouth every day.     • Zinc 50 MG Cap Take 50 mg by mouth every day.     • omeprazole (PRILOSEC) 20 MG CPDR Take 20 mg by mouth every day.       No current [facility]-ordered facility-administered medications on file.         ROS:  Gen: no fevers/chills, no changes in weight  Eyes: no changes in vision  ENT: no sore throat, no hearing loss, no bloody nose  Pulm: no sob, no cough  CV: no chest pain, no palpitations  GI: no nausea/vomiting, no diarrhea  : no dysuria  MSk: no myalgias  Skin: no rash  Neuro: no headaches, no numbness/tingling  Heme/Lymph: no easy bruising      Objective:     Exam:  /66 (BP Location: Right arm, Patient Position: Sitting, BP Cuff Size: Adult)   Pulse 96   Temp 36.8 °C (98.2 °F)   Resp 12   Ht 1.778 m (5' 10\")   Wt 87.5 kg (193 lb)   SpO2 96%   BMI 27.69 kg/m²  Body mass index is 27.69 kg/m².    Gen: Alert and oriented, No apparent distress.  HEENT: NCAT, EOMI, oropharynx is mildly erythematous without exudates. Right TM is normal left TM is still fairly erythematous and retracted. Does not seem to be a lot of pain with manipulation of the auricle.  Neck: Neck is supple without lymphadenopathy.  Lungs: Normal effort, CTA bilaterally, no wheezes, rhonchi, or rales  CV: Regular rate and rhythm. No murmurs, rubs, or gallops.  Ext: No clubbing, cyanosis, edema.      Assessment & Plan:     66 y.o. male with the following -     1. Recurrent acute serous otitis media of left ear  We will try some Bactrim to see if this has better coverage. I would like him to get back on his Flonase fairly regularly over the next few weeks to see if this does " not help relieve some fluid and pressure. May have him referred to ENT if needed. Discussed gentle equalizing of the ears fairly regular as well. He will let us know if things are not getting better or if he has any reaction to the Bactrim.  - sulfamethoxazole-trimethoprim (BACTRIM DS) 800-160 MG tablet; Take 1 Tablet by mouth 2 times a day for 7 days.  Dispense: 14 Tablet; Refill: 0    2. Need for vaccination  We will get high-dose flu vaccine done today.  - INFLUENZA VACCINE, HIGH DOSE (65+ ONLY)            No follow-ups on file.    Please note that this dictation was created using voice recognition software. I have made every reasonable attempt to correct obvious errors, but I expect that there are errors of grammar and possibly content that I did not discover before finalizing the note.

## 2021-12-09 ENCOUNTER — OFFICE VISIT (OUTPATIENT)
Dept: MEDICAL GROUP | Facility: LAB | Age: 66
End: 2021-12-09
Payer: MEDICARE

## 2021-12-09 VITALS
DIASTOLIC BLOOD PRESSURE: 70 MMHG | WEIGHT: 195.6 LBS | TEMPERATURE: 96 F | HEART RATE: 74 BPM | HEIGHT: 70 IN | OXYGEN SATURATION: 97 % | BODY MASS INDEX: 28 KG/M2 | SYSTOLIC BLOOD PRESSURE: 148 MMHG

## 2021-12-09 DIAGNOSIS — M79.671 RIGHT FOOT PAIN: ICD-10-CM

## 2021-12-09 DIAGNOSIS — M05.9 RHEUMATOID ARTHRITIS WITH POSITIVE RHEUMATOID FACTOR, INVOLVING UNSPECIFIED SITE (HCC): ICD-10-CM

## 2021-12-09 PROCEDURE — 99213 OFFICE O/P EST LOW 20 MIN: CPT | Performed by: INTERNAL MEDICINE

## 2021-12-09 RX ORDER — OXYCODONE AND ACETAMINOPHEN 10; 325 MG/1; MG/1
1-2 TABLET ORAL EVERY 4 HOURS PRN
Qty: 150 TABLET | Refills: 0 | Status: SHIPPED | OUTPATIENT
Start: 2021-12-28 | End: 2021-12-09 | Stop reason: SDUPTHER

## 2021-12-09 RX ORDER — OXYCODONE AND ACETAMINOPHEN 10; 325 MG/1; MG/1
1-2 TABLET ORAL EVERY 4 HOURS PRN
Qty: 150 TABLET | Refills: 0 | Status: SHIPPED | OUTPATIENT
Start: 2022-01-27 | End: 2021-12-09 | Stop reason: SDUPTHER

## 2021-12-09 RX ORDER — OXYCODONE AND ACETAMINOPHEN 10; 325 MG/1; MG/1
1-2 TABLET ORAL EVERY 4 HOURS PRN
Qty: 150 TABLET | Refills: 0 | Status: SHIPPED | OUTPATIENT
Start: 2022-02-26 | End: 2021-12-09 | Stop reason: SDUPTHER

## 2021-12-09 RX ORDER — OXYCODONE AND ACETAMINOPHEN 10; 325 MG/1; MG/1
1-2 TABLET ORAL EVERY 4 HOURS PRN
Qty: 150 TABLET | Refills: 0 | Status: SHIPPED | OUTPATIENT
Start: 2022-02-26 | End: 2022-03-10 | Stop reason: SDUPTHER

## 2021-12-09 ASSESSMENT — FIBROSIS 4 INDEX: FIB4 SCORE: 1.73

## 2021-12-09 NOTE — PROGRESS NOTES
CC: Edgardo Sims is a 66 y.o. male is suffering from   Chief Complaint   Patient presents with   • Follow-Up     xray results for knee   • Medication Refill         SUBJECTIVE:  1. Right foot pain  Ray is here for follow-up is having problems with right foot pain has tendon rupture associated with the second digit, needs to be evaluated by orthopedic surgery. Patient additionally has what appears to be a fallen arch associate the right foot    2. Rheumatoid arthritis with positive rheumatoid factor, involving unspecified site (HCC)  Rheumatoid arthritis patient is immunosuppressed, has not been vaccinated for COVID-19 strongly recommended he be vaccinated with [place] as soon as possible. Patient understands he needs to be seen in emergency room setting should he contract COVID-19.        Past social, family, history: , wife's vaccinated  Social History     Tobacco Use   • Smoking status: Never Smoker   • Smokeless tobacco: Never Used   Vaping Use   • Vaping Use: Never used   Substance Use Topics   • Alcohol use: Yes     Alcohol/week: 1.8 oz     Types: 3 Cans of beer per week     Comment: 3 per week   • Drug use: No         MEDICATIONS:    Current Outpatient Medications:   •  [START ON 2/26/2022] oxyCODONE-acetaminophen (PERCOCET-10)  MG Tab, Take 1-2 Tablets by mouth every four hours as needed for Severe Pain (refill #3 of #3.) for up to 30 days., Disp: 150 Tablet, Rfl: 0  •  lisinopril (PRINIVIL) 10 MG Tab, Take 1 Tablet by mouth every day., Disp: 90 Tablet, Rfl: 0  •  metoprolol tartrate (LOPRESSOR) 50 MG Tab, Take 1 Tablet by mouth 2 times a day., Disp: 180 Tablet, Rfl: 3  •  pravastatin (PRAVACHOL) 80 MG tablet, Take 1 Tablet by mouth every day., Disp: 90 Tablet, Rfl: 0  •  ezetimibe (ZETIA) 10 MG Tab, Take 1 Tablet by mouth every day., Disp: 90 Tablet, Rfl: 3  •  PARoxetine (PAXIL) 20 MG Tab, TAKE 1 TABLET BY MOUTH EVERY DAY, Disp: 90 Tablet, Rfl: 3  •  hydroxychloroquine (PLAQUENIL) 200  MG Tab, TAKE 1 TABLET BY MOUTH TWICE A DAY, Disp: 180 Tablet, Rfl: 0  •  cyclobenzaprine (FLEXERIL) 10 mg Tab, TAKE ONE TABLET BY MOUTH THREE TIMES DAILY AS NEEDED FOR MILD PAIN, Disp: 90 Tablet, Rfl: 1  •  meloxicam (MOBIC) 15 MG tablet, 1 tab po qday with food prn joint pain, Disp: 90 tablet, Rfl: 1  •  testosterone cypionate (DEPO-TESTOSTERONE) 200 MG/ML Solution injection, testosterone cypionate 200 mg/mL intramuscular oil  INJECT 1 ML BY INTRAMUSCULAR ROUTE EVERY 14 DAYS FOR 90 DAYS. E29.1, Disp: , Rfl:   •  diazePAM (VALIUM) 5 MG Tab, , Disp: , Rfl:   •  testosterone cypionate (DEPO-TESTOSTERONE) 200 MG/ML Solution injection, testosterone cypionate 200 mg/mL intramuscular oil, Disp: , Rfl:   •  Upadacitinib ER 15 MG TABLET SR 24 HR, Take 15 mg by mouth every day., Disp: 90 tablet, Rfl: 1  •  vardenafil (LEVITRA) 20 MG tablet, Take 20 mg by mouth as needed., Disp: , Rfl:   •  CALCIUM-VITAMIN D PO, Take 1 Tab by mouth 2 Times a Day., Disp: , Rfl:   •  ascorbic acid (ASCORBIC ACID) 500 MG Tab, Take 500 mg by mouth every day., Disp: , Rfl:   •  Zinc 50 MG Cap, Take 50 mg by mouth every day., Disp: , Rfl:   •  omeprazole (PRILOSEC) 20 MG CPDR, Take 20 mg by mouth every day., Disp: , Rfl:     PROBLEMS:  Patient Active Problem List    Diagnosis Date Noted   • Osteopenia 03/15/2021   • Vitamin D deficiency 03/15/2021   • Postoperative pain 08/05/2020   • Difficult intubation 08/05/2020   • Gastroesophageal reflux disease 08/05/2020   • Secondary adrenal insufficiency (HCC) 12/12/2019   • Current chronic use of systemic steroids 10/16/2019   • High risk medication use 10/16/2019   • History of adrenal insufficiency 10/16/2019   • Neurogenic bladder 01/26/2019   • Bladder outlet obstruction 05/01/2018   • Leukocytosis 04/30/2018   • Lactic acidosis 04/30/2018   • Idiopathic acute pancreatitis 04/30/2018   • Chronic use of opiate drug for therapeutic purpose 08/12/2017   • Depression 07/13/2015   • Anxiety 03/31/2015   •  "Coronary artery disease due to calcified coronary lesion: Mildly cardiac catheterization in 2014 12/05/2014   • Tachycardia 10/20/2014   • Abnormal myocardial perfusion study 01/15/2014   • Osteoarthrosis, unspecified whether generalized or localized, pelvic region and thigh 05/31/2013   • Dyslipidemia 07/12/2011   • Aortic insufficiency 07/05/2011   • Essential hypertension 07/05/2011   • Rheumatoid arthritis (HCC) 05/05/2009   • Hypogonadism male 05/05/2009       REVIEW OF SYSTEMS:  Gen.:  No Nausea, Vomiting, fever, Chills.  Heart: No chest pain.  Lungs:  No shortness of Breath.  Psychological: Kian unusual Anxiety depression     PHYSICAL EXAM   Constitutional: Alert, cooperative, not in acute distress.  Cardiovascular:  Rate Rhythm is regular without murmurs rubs clicks.     Thorax & Lungs: Clear to auscultation, no wheezing, rhonchi, or rales  HENT: Normocephalic, Atraumatic.  Eyes: PERRLA, EOMI, Conjunctiva normal.   Neck: Trachia is midline no swelling of the thyroid.   Musculoskeletal: Obvious deformity associate with the right foot with second toe questionably with significant tendon rupture probable fallen arch significant pain discomfort associate with the ankle  Neurologic: Alert & oriented x 3, cranial nerves II through XII are intact, Normal motor function, Normal sensory function, No focal deficits noted.   Psychiatric: Affect normal, Judgment normal, Mood normal.     VITAL SIGNS:/70 (BP Location: Left arm, Patient Position: Sitting, BP Cuff Size: Adult)   Pulse 74   Temp (!) 35.6 °C (96 °F) (Temporal)   Ht 1.778 m (5' 10\")   Wt 88.7 kg (195 lb 9.6 oz)   SpO2 97%   BMI 28.07 kg/m²     Labs: Reviewed    Assessment:                                                     Plan:    1. Right foot pain  Referral written to orthopedics  - Referral to Orthopedics  - Pain Management Screen; Future    2. Rheumatoid arthritis with positive rheumatoid factor, involving unspecified site (HCC)  State drug " task force urine drug screen reviewed pain management agreement signed  - oxyCODONE-acetaminophen (PERCOCET-10)  MG Tab; Take 1-2 Tablets by mouth every four hours as needed for Severe Pain (refill #3 of #3.) for up to 30 days.  Dispense: 150 Tablet; Refill: 0  - Pain Management Screen; Future

## 2021-12-15 ENCOUNTER — HOSPITAL ENCOUNTER (OUTPATIENT)
Dept: LAB | Facility: MEDICAL CENTER | Age: 66
End: 2021-12-15
Attending: INTERNAL MEDICINE
Payer: MEDICARE

## 2021-12-15 DIAGNOSIS — M77.9 ENTHESITIS: ICD-10-CM

## 2021-12-15 DIAGNOSIS — Z51.81 MEDICATION MONITORING ENCOUNTER: ICD-10-CM

## 2021-12-15 DIAGNOSIS — Z79.899 LONG-TERM USE OF PLAQUENIL: ICD-10-CM

## 2021-12-15 DIAGNOSIS — M05.9 RHEUMATOID ARTHRITIS WITH POSITIVE RHEUMATOID FACTOR, INVOLVING UNSPECIFIED SITE (HCC): ICD-10-CM

## 2021-12-15 DIAGNOSIS — Z79.1 ENCOUNTER FOR LONG-TERM (CURRENT) USE OF NSAIDS: ICD-10-CM

## 2021-12-15 LAB
ALBUMIN SERPL BCP-MCNC: 4.5 G/DL (ref 3.2–4.9)
ALBUMIN/GLOB SERPL: 2.3 G/DL
ALP SERPL-CCNC: 58 U/L (ref 30–99)
ALT SERPL-CCNC: 43 U/L (ref 2–50)
ANION GAP SERPL CALC-SCNC: 8 MMOL/L (ref 7–16)
AST SERPL-CCNC: 40 U/L (ref 12–45)
BASOPHILS # BLD AUTO: 1.2 % (ref 0–1.8)
BASOPHILS # BLD: 0.11 K/UL (ref 0–0.12)
BILIRUB SERPL-MCNC: 0.5 MG/DL (ref 0.1–1.5)
BUN SERPL-MCNC: 13 MG/DL (ref 8–22)
CALCIUM SERPL-MCNC: 9.1 MG/DL (ref 8.5–10.5)
CHLORIDE SERPL-SCNC: 102 MMOL/L (ref 96–112)
CO2 SERPL-SCNC: 28 MMOL/L (ref 20–33)
CREAT SERPL-MCNC: 0.9 MG/DL (ref 0.5–1.4)
EOSINOPHIL # BLD AUTO: 0.22 K/UL (ref 0–0.51)
EOSINOPHIL NFR BLD: 2.4 % (ref 0–6.9)
ERYTHROCYTE [DISTWIDTH] IN BLOOD BY AUTOMATED COUNT: 51.8 FL (ref 35.9–50)
ERYTHROCYTE [SEDIMENTATION RATE] IN BLOOD BY WESTERGREN METHOD: 4 MM/HOUR (ref 0–20)
GLOBULIN SER CALC-MCNC: 2 G/DL (ref 1.9–3.5)
GLUCOSE SERPL-MCNC: 82 MG/DL (ref 65–99)
HCT VFR BLD AUTO: 46.2 % (ref 42–52)
HGB BLD-MCNC: 15.6 G/DL (ref 14–18)
IMM GRANULOCYTES # BLD AUTO: 0.26 K/UL (ref 0–0.11)
IMM GRANULOCYTES NFR BLD AUTO: 2.8 % (ref 0–0.9)
LYMPHOCYTES # BLD AUTO: 1.86 K/UL (ref 1–4.8)
LYMPHOCYTES NFR BLD: 19.9 % (ref 22–41)
MCH RBC QN AUTO: 34.6 PG (ref 27–33)
MCHC RBC AUTO-ENTMCNC: 33.8 G/DL (ref 33.7–35.3)
MCV RBC AUTO: 102.4 FL (ref 81.4–97.8)
MONOCYTES # BLD AUTO: 1.2 K/UL (ref 0–0.85)
MONOCYTES NFR BLD AUTO: 12.8 % (ref 0–13.4)
NEUTROPHILS # BLD AUTO: 5.7 K/UL (ref 1.82–7.42)
NEUTROPHILS NFR BLD: 60.9 % (ref 44–72)
NRBC # BLD AUTO: 0 K/UL
NRBC BLD-RTO: 0 /100 WBC
PLATELET # BLD AUTO: 203 K/UL (ref 164–446)
PMV BLD AUTO: 10.2 FL (ref 9–12.9)
POTASSIUM SERPL-SCNC: 5.3 MMOL/L (ref 3.6–5.5)
PROT SERPL-MCNC: 6.5 G/DL (ref 6–8.2)
RBC # BLD AUTO: 4.51 M/UL (ref 4.7–6.1)
SODIUM SERPL-SCNC: 138 MMOL/L (ref 135–145)
WBC # BLD AUTO: 9.4 K/UL (ref 4.8–10.8)

## 2021-12-15 PROCEDURE — 85025 COMPLETE CBC W/AUTO DIFF WBC: CPT

## 2021-12-15 PROCEDURE — 80053 COMPREHEN METABOLIC PANEL: CPT

## 2021-12-15 PROCEDURE — 85652 RBC SED RATE AUTOMATED: CPT

## 2021-12-15 PROCEDURE — 36415 COLL VENOUS BLD VENIPUNCTURE: CPT

## 2021-12-29 PROBLEM — M20.41 HAMMER TOE OF RIGHT FOOT: Status: ACTIVE | Noted: 2021-12-29

## 2022-01-07 ENCOUNTER — HOSPITAL ENCOUNTER (OUTPATIENT)
Facility: MEDICAL CENTER | Age: 67
End: 2022-01-07
Attending: ANESTHESIOLOGY
Payer: MEDICARE

## 2022-01-07 PROCEDURE — U0003 INFECTIOUS AGENT DETECTION BY NUCLEIC ACID (DNA OR RNA); SEVERE ACUTE RESPIRATORY SYNDROME CORONAVIRUS 2 (SARS-COV-2) (CORONAVIRUS DISEASE [COVID-19]), AMPLIFIED PROBE TECHNIQUE, MAKING USE OF HIGH THROUGHPUT TECHNOLOGIES AS DESCRIBED BY CMS-2020-01-R: HCPCS

## 2022-01-07 PROCEDURE — U0005 INFEC AGEN DETEC AMPLI PROBE: HCPCS

## 2022-01-08 LAB
COVID ORDER STATUS COVID19: NORMAL
SARS-COV-2 RNA RESP QL NAA+PROBE: NOTDETECTED
SPECIMEN SOURCE: NORMAL

## 2022-01-14 DIAGNOSIS — F41.9 ANXIETY: ICD-10-CM

## 2022-01-14 DIAGNOSIS — M05.9 RHEUMATOID ARTHRITIS WITH POSITIVE RHEUMATOID FACTOR, INVOLVING UNSPECIFIED SITE (HCC): ICD-10-CM

## 2022-01-14 RX ORDER — DIAZEPAM 5 MG/1
TABLET ORAL
Qty: 60 TABLET | Refills: 0 | Status: SHIPPED | OUTPATIENT
Start: 2022-01-14 | End: 2022-04-05

## 2022-01-14 NOTE — TELEPHONE ENCOUNTER
Received request via: Pharmacy    Was the patient seen in the last year in this department? Yes  12/9/21  Does the patient have an active prescription (recently filled or refills available) for medication(s) requested? No

## 2022-02-14 ENCOUNTER — OFFICE VISIT (OUTPATIENT)
Dept: RHEUMATOLOGY | Facility: MEDICAL CENTER | Age: 67
End: 2022-02-14
Payer: MEDICARE

## 2022-02-14 VITALS
SYSTOLIC BLOOD PRESSURE: 140 MMHG | BODY MASS INDEX: 28.84 KG/M2 | TEMPERATURE: 97.6 F | OXYGEN SATURATION: 97 % | WEIGHT: 201 LBS | RESPIRATION RATE: 14 BRPM | HEART RATE: 78 BPM | DIASTOLIC BLOOD PRESSURE: 70 MMHG

## 2022-02-14 DIAGNOSIS — M05.9 RHEUMATOID ARTHRITIS WITH POSITIVE RHEUMATOID FACTOR, INVOLVING UNSPECIFIED SITE (HCC): ICD-10-CM

## 2022-02-14 DIAGNOSIS — Z79.899 LONG-TERM USE OF PLAQUENIL: ICD-10-CM

## 2022-02-14 DIAGNOSIS — Z78.9 ALCOHOL USE: ICD-10-CM

## 2022-02-14 DIAGNOSIS — Z79.1 ENCOUNTER FOR LONG-TERM (CURRENT) USE OF NSAIDS: ICD-10-CM

## 2022-02-14 DIAGNOSIS — R71.8 ELEVATED MCV: ICD-10-CM

## 2022-02-14 DIAGNOSIS — I25.84 CORONARY ARTERY DISEASE DUE TO CALCIFIED CORONARY LESION: ICD-10-CM

## 2022-02-14 DIAGNOSIS — Z51.81 MEDICATION MONITORING ENCOUNTER: ICD-10-CM

## 2022-02-14 DIAGNOSIS — I25.10 CORONARY ARTERY DISEASE DUE TO CALCIFIED CORONARY LESION: ICD-10-CM

## 2022-02-14 DIAGNOSIS — I10 ESSENTIAL HYPERTENSION: ICD-10-CM

## 2022-02-14 PROCEDURE — 99214 OFFICE O/P EST MOD 30 MIN: CPT | Performed by: INTERNAL MEDICINE

## 2022-02-14 ASSESSMENT — FIBROSIS 4 INDEX: FIB4 SCORE: 1.95

## 2022-02-14 NOTE — PROGRESS NOTES
Chief Complaint- joint pain     Subjective:   Edgardo Sims is a 66 y.o. male here today for follow up of rheumatological issues      This is a follow-up visit for this patient who we see in this clinic for serologically negative rheumatoid arthritis with hand and feet x-rays done 2017 indicating erosive arthritis.  Patient is currently on Rinvoq 15 mg p.o. daily in combination with Plaquenil 200 mg p.o. twice daily with benefit.  Patient also uses meloxicam 15 mg p.o. daily as needed.  Patient denies any side effects from the medication, denies any unexplained weight loss, denies any fevers of unknown etiology, denies any GI upset, denies any rashes, denies any new joint swelling, denies recurrent infections.  Last sedimentation rate equals 4 December 2021. Of note last ophthalmology evaluation May 2021 with Dr. Hall.    Since last visit patient status post evaluation by orthopedics and status post surgery straightening right second toe which was crossing over the right great toe causing excoriations.  Patient now recovering doing quite well.    Patient also has slightly elevated MCV more recently to 102, patient does admit to drinking alcohol on a regular basis at least once a week, at last visit we advise cutting down the alcohol intake and start taking a multivitamin which patient did.      Additional comorbidities include ureteral blockage and BPH.  Patient also with a history of Dupuytren's contracture with release, also history of hypercholesterolemia.  Patient also with a history of osteopenia and aortic valve insufficiency followed periodically by echocardiograms.  Patient also with rotator cuff tears in left shoulder status post surgical intervention.     Of note patient does take diazepam on a regular basis as this is considered a high risk medication we will be unable to prescribe any narcotic pain medications for this patient in this clinic.     Bilateral AUTUMN  Left TKA     S/p  Remicade-ineffective  S/p Orencia-ineffective  S/p Enbrel-ineffective  S/p humira-ineffective  S/p MTX-oral ulcers  S/p arava-bad reaction but patient doesn't recall specifics  S/p rituximab-helped but patient stopped because of methotrexate side effects per patient report....        HBsAg/HBcAb neg 5/2021  HCV neg 5/2021  Quantiferon Gold neg 5/2021  G6PD 12.9 nl 6/2020   Uric acid 4.5 nl 2/2019   Cryoglobulin neg 8/2017  RF neg 8/2017, RF neg 2/2019  CCP neg 2/2019  DEXA 9/5/2017 T scores -0.2, -1.5  FRAX 9/5/2017 not done  DEXA 6/12/2020 T scores 0.3, -1.2  FRAX 6/12/2020 not done      Hand x-rays 5/2017-indicates erosive arthritis  Hand x-rays 6/2021-IMPRESSION:  1.  There is been slight interval progression of arthropathy as discussed above predominantly involving the right hand 2nd and 3rd MCP joints and left hand 3rd MCP joint with lesser involvement of the IP joints and carpal bones which could be consistent with an inflammatory arthropathy such as rheumatoid arthritis.  2.  There is degenerative type change of osteoarthritis in the 1st CMC joints, left greater than right.     Feet x-rays 5/2017-indicates erosive arthritis   Feet x-rays 6/2021-IMPRESSION:  1.  There is no radiographic evidence of an erosive arthropathy.  2.  There is predominantly degenerative type change in the IP joints and 1st tarsometatarsal junctions, right greater than left.     Corticosteroid Therapy Informed Consent signed 2/21/2019-copy given to patient       Current Outpatient Medications   Medication Sig Dispense Refill   • hydroxychloroquine (PLAQUENIL) 200 MG Tab TAKE 1 TABLET BY MOUTH TWICE A  Tablet 1   • meloxicam (MOBIC) 15 MG tablet 1 tab po qday with food prn joint pain 90 Tablet 1   • cyclobenzaprine (FLEXERIL) 10 mg Tab TAKE ONE TABLET BY MOUTH THREE TIMES DAILY AS NEEDED FOR MILD PAIN 90 Tablet 3   • [START ON 2/26/2022] oxyCODONE-acetaminophen (PERCOCET-10)  MG Tab Take 1-2 Tablets by mouth every four  hours as needed for Severe Pain (refill #3 of #3.) for up to 30 days. 150 Tablet 0   • lisinopril (PRINIVIL) 10 MG Tab Take 1 Tablet by mouth every day. 90 Tablet 0   • metoprolol tartrate (LOPRESSOR) 50 MG Tab Take 1 Tablet by mouth 2 times a day. 180 Tablet 3   • pravastatin (PRAVACHOL) 80 MG tablet Take 1 Tablet by mouth every day. 90 Tablet 0   • PARoxetine (PAXIL) 20 MG Tab TAKE 1 TABLET BY MOUTH EVERY DAY 90 Tablet 3   • testosterone cypionate (DEPO-TESTOSTERONE) 200 MG/ML Solution injection testosterone cypionate 200 mg/mL intramuscular oil   INJECT 1 ML BY INTRAMUSCULAR ROUTE EVERY 14 DAYS FOR 90 DAYS. E29.1     • testosterone cypionate (DEPO-TESTOSTERONE) 200 MG/ML Solution injection testosterone cypionate 200 mg/mL intramuscular oil     • Upadacitinib ER 15 MG TABLET SR 24 HR Take 15 mg by mouth every day. 90 tablet 1   • vardenafil (LEVITRA) 20 MG tablet Take 20 mg by mouth as needed.     • CALCIUM-VITAMIN D PO Take 1 Tab by mouth 2 Times a Day.     • ascorbic acid (ASCORBIC ACID) 500 MG Tab Take 500 mg by mouth every day.     • Zinc 50 MG Cap Take 50 mg by mouth every day.     • omeprazole (PRILOSEC) 20 MG CPDR Take 20 mg by mouth every day.     • gabapentin (NEURONTIN) 300 MG Cap 1 po q hs prn nerve pain (Patient not taking: Reported on 2/14/2022) 7 Capsule 0     No current facility-administered medications for this visit.     He  has a past medical history of Abnormal myocardial perfusion study (1/15/2014), Aortic insufficiency (7/5/2011), Arthritis, Bronchitis, CAD (coronary artery disease) mild plaque at cath in 2/14 (12/5/2014), Cancer (HCC), Chronic use of opiate drugs therapeutic purposes (8/12/2017), Dental disorder, Dyslipidemia (7/12/2011), Heart burn, Heart murmur, Hiatus hernia syndrome, High cholesterol, HTN (hypertension) (7/5/2011), Hypertension, Indigestion, Infectious disease, Pain, Pain, Rheumatoid arthritis(714.0), and Tachycardia (10/20/2014).    ROS   Other than what is mentioned in  HPI or physical exam, there is no history of headaches, double vision or blurred vision. No temporal tenderness or jaw claudication. No trouble swallowing difficulties or sore throats.  No chest complaints including chest pain, cough or sputum production. No GI complaints including nausea, vomiting, change in bowel habits, or past peptic ulcer disease. No history of blood in the stools. No urinary complaints including dysuria or frequency. No history of alopecia, photosensitivity, oral ulcerations, Raynaud's phenomena.       Objective:     /70   Pulse 78   Temp 36.4 °C (97.6 °F) (Temporal)   Resp 14   Wt 91.2 kg (201 lb)   SpO2 97%  Body mass index is 28.84 kg/m².   Physical Exam:    Constitutional: Alert and oriented X3, patient is talkative with good eye contact.Skin: Warm, dry, good turgor, no rashes in visible areas.Eye: Equal, round and reactive, conjunctiva clear, lids normal EOM intactENMT: Lips without lesions, good dentition, no oropharyngeal ulcers, moist buccal mucosa, pinna without deformityNeck: Trachea midline, no masses, no thyromegaly.Lymph:  No cervical lymphadenopathy, no axillary lymphadenopathy, no supraclavicular lymphadenopathyRespiratory: Unlabored respiratory effort, lungs clear to auscultation, no wheezes, no ronchi.Cardiovascular: Normal S1, S2, .Abdomen: Soft, non-tender, no masses, no hepatosplenomegaly.Psych: Alert and oriented x3, normal affect and mood.Neuro: Cranial nerves 2-12 are grossly intact, no loss of sensation LEExt:no joint laxity noted in bilateral arms, no joint laxity noted in bilateral legs, pin in right second toe straightened healing well, otherwise no splay toes, knees with crepitus but no synovitis, hands without any swan-neck or boutonniere deformities no sausage digits there is mild bony hypertrophy of the right second MCP joint, elbows without flexion contractures no nodules no tophi    Lab Results   Component Value Date/Time    QNTTBGOLD Negative  06/01/2017 01:13 PM     Lab Results   Component Value Date/Time    HEPBCORTOT Negative 06/01/2017 01:13 PM    HEPBCORIGM Non-Reactive 05/28/2021 01:24 PM    HEPBSAG Non-Reactive 05/28/2021 01:24 PM     Lab Results   Component Value Date/Time    HEPCAB Non-Reactive 05/28/2021 01:24 PM     Lab Results   Component Value Date/Time    SODIUM 138 12/15/2021 12:05 PM    POTASSIUM 5.3 12/15/2021 12:05 PM    CHLORIDE 102 12/15/2021 12:05 PM    CO2 28 12/15/2021 12:05 PM    GLUCOSE 82 12/15/2021 12:05 PM    BUN 13 12/15/2021 12:05 PM    CREATININE 0.90 12/15/2021 12:05 PM    CREATININE 1.1 04/21/2009 03:50 PM    BUNCREATRAT 13 09/21/2016 09:21 AM      Lab Results   Component Value Date/Time    WBC 9.4 12/15/2021 12:05 PM    WBC 7.5 07/01/2011 10:30 AM    RBC 4.51 (L) 12/15/2021 12:05 PM    RBC 4.72 07/01/2011 10:30 AM    HEMOGLOBIN 15.6 12/15/2021 12:05 PM    HEMATOCRIT 46.2 12/15/2021 12:05 PM    .4 (H) 12/15/2021 12:05 PM     (H) 07/01/2011 10:30 AM    MCH 34.6 (H) 12/15/2021 12:05 PM    MCH 36.0 (H) 07/01/2011 10:30 AM    MCHC 33.8 12/15/2021 12:05 PM    MPV 10.2 12/15/2021 12:05 PM    NEUTSPOLYS 60.90 12/15/2021 12:05 PM    LYMPHOCYTES 19.90 (L) 12/15/2021 12:05 PM    MONOCYTES 12.80 12/15/2021 12:05 PM    EOSINOPHILS 2.40 12/15/2021 12:05 PM    BASOPHILS 1.20 12/15/2021 12:05 PM    HYPOCHROMIA 1+ 03/05/2014 04:43 PM    ANISOCYTOSIS 1+ 01/02/2015 05:02 PM      Lab Results   Component Value Date/Time    CALCIUM 9.1 12/15/2021 12:05 PM    ASTSGOT 40 12/15/2021 12:05 PM    ALTSGPT 43 12/15/2021 12:05 PM    ALKPHOSPHAT 58 12/15/2021 12:05 PM    TBILIRUBIN 0.5 12/15/2021 12:05 PM    ALBUMIN 4.5 12/15/2021 12:05 PM    TOTPROTEIN 6.5 12/15/2021 12:05 PM     Lab Results   Component Value Date/Time    URICACID 4.5 02/19/2019 08:36 AM    RHEUMFACTN 10 02/19/2019 08:36 AM    CCPANTIBODY 2 02/19/2019 08:36 AM     Lab Results   Component Value Date/Time    CRYOGLOBULIN NEG 72Hour 08/04/2017 02:32 PM     Lab Results    Component Value Date/Time    SEDRATEWES 4 12/15/2021 12:05 PM     Lab Results   Component Value Date/Time    HBA1C 5.3 07/26/2018 11:19 AM     Lab Results   Component Value Date/Time    G6PD 12.9 06/18/2020 02:50 PM     Lab Results   Component Value Date/Time    CPKTOTAL 141 01/26/2018 07:47 AM     Lab Results   Component Value Date/Time    PTHINTACT 55 10/10/2008 10:42 AM       Assessment and Plan:     1. Rheumatoid arthritis with positive rheumatoid factor, involving unspecified site (HCC)  Doing quite well on Rinvoq 15 mg p.o. daily in combination with Plaquenil 200 mg p.o. twice daily, we will continue with such.  Patient also uses meloxicam 15 mg p.o. daily as needed  - VITAMIN B12; Future  - VITAMIN B1; Future  - FOLATE; Future  - CBC WITH DIFFERENTIAL; Future  - Comp Metabolic Panel; Future  - Sed Rate; Future    2. Medication monitoring encounter  Currently on Rinvoq 15 mg p.o. daily, screening labs are up-to-date, next screening labs due May 2023, patient needs monitoring labs every 6 months, next labs due June 2022, labs ordered for patient  We reviewed risks of biological medications with patient including hematological pathology, cancer risks, neurological and infection issues especially in the Covid-19 pandemic environment, patient states understanding.  Discussed with patient the enhanced black box warning of VTE's and MACE's FDA, patient states understanding but wants to continue the Rinvoq, recommend baby aspirin a day as prophylaxis patient states understanding states he will comply  Patient states he does not have a history of DVTs in the past  - CBC WITH DIFFERENTIAL; Future  - Comp Metabolic Panel; Future  - Sed Rate; Future    3. Long-term use of Plaquenil  On Plaquenil 200 mg p.o. twice daily, of note G6PD levels are adequate, patient needs monitoring labs every 6 months next labs due June 2022, labs ordered for patient  She needs ophthalmology evaluation every year, patient due for  ophthalmology evaluation May 2022 patient states he will schedule  - CBC WITH DIFFERENTIAL; Future  - Comp Metabolic Panel; Future  - Sed Rate; Future    4. Encounter for long-term (current) use of NSAIDs  Uses meloxicam as needed, patient needs monitoring labs every 6 months next labs due June 2022, labs ordered for patient  - CBC WITH DIFFERENTIAL; Future  - Comp Metabolic Panel; Future  - Sed Rate; Future    5. Elevated MCV  Query etiology, today check vitamin B1, B12 and folic acid levels, may also be exacerbated by weekly alcohol use, continue to monitor, query possible RANDLE exacerbating MCV values  - VITAMIN B12; Future  - VITAMIN B1; Future  - FOLATE; Future    6. Essential hypertension  May impact the type of medications we can use for this patient's arthritis. We will have to keep this under advisement.    7. Coronary artery disease due to calcified coronary lesion: Mildly cardiac catheterization in 2014  Followed by audiology    8. Alcohol use  Patient drinks weekly  May impact the type of medications we can use for this patient's arthritis. We will have to keep this under advisement.  - VITAMIN B12; Future  - VITAMIN B1; Future  - FOLATE; Future    Followup: Return in about 6 months (around 8/14/2022). or sooner peyman Sims  was seen 30 minutes face-to-face of which more than 50% of the time was spent counseling the patient (excluding time for procedures)  regarding  rheumatological condition and care. Therapy was discussed in detail.      Please note that this dictation was created using voice recognition software. I have made every reasonable attempt to correct obvious errors, but I expect that there are errors of grammar and possibly content that I did not discover before finalizing the note.

## 2022-02-20 DIAGNOSIS — I10 ESSENTIAL HYPERTENSION: ICD-10-CM

## 2022-02-22 RX ORDER — LISINOPRIL 10 MG/1
10 TABLET ORAL
Qty: 90 TABLET | Refills: 2 | Status: SHIPPED | OUTPATIENT
Start: 2022-02-22 | End: 2022-11-17

## 2022-03-10 ENCOUNTER — OFFICE VISIT (OUTPATIENT)
Dept: MEDICAL GROUP | Facility: LAB | Age: 67
End: 2022-03-10
Payer: MEDICARE

## 2022-03-10 VITALS
OXYGEN SATURATION: 97 % | BODY MASS INDEX: 28.85 KG/M2 | WEIGHT: 201.5 LBS | TEMPERATURE: 97.4 F | HEIGHT: 70 IN | HEART RATE: 71 BPM | SYSTOLIC BLOOD PRESSURE: 130 MMHG | DIASTOLIC BLOOD PRESSURE: 72 MMHG

## 2022-03-10 DIAGNOSIS — R01.1 HEART MURMUR: ICD-10-CM

## 2022-03-10 DIAGNOSIS — M05.9 RHEUMATOID ARTHRITIS WITH POSITIVE RHEUMATOID FACTOR, INVOLVING UNSPECIFIED SITE (HCC): ICD-10-CM

## 2022-03-10 DIAGNOSIS — E78.5 DYSLIPIDEMIA: ICD-10-CM

## 2022-03-10 PROCEDURE — 99214 OFFICE O/P EST MOD 30 MIN: CPT | Performed by: INTERNAL MEDICINE

## 2022-03-10 RX ORDER — OXYCODONE AND ACETAMINOPHEN 10; 325 MG/1; MG/1
1-2 TABLET ORAL EVERY 4 HOURS PRN
Qty: 150 TABLET | Refills: 0 | Status: SHIPPED | OUTPATIENT
Start: 2022-04-09 | End: 2022-05-09

## 2022-03-10 RX ORDER — OXYCODONE AND ACETAMINOPHEN 10; 325 MG/1; MG/1
1-2 TABLET ORAL EVERY 4 HOURS PRN
Qty: 150 TABLET | Refills: 0 | Status: SHIPPED | OUTPATIENT
Start: 2022-05-09 | End: 2022-06-10 | Stop reason: SDUPTHER

## 2022-03-10 RX ORDER — OXYCODONE AND ACETAMINOPHEN 10; 325 MG/1; MG/1
1-2 TABLET ORAL EVERY 4 HOURS PRN
Qty: 150 TABLET | Refills: 0 | Status: SHIPPED | OUTPATIENT
Start: 2022-06-08 | End: 2022-09-22 | Stop reason: SDUPTHER

## 2022-03-10 ASSESSMENT — FIBROSIS 4 INDEX: FIB4 SCORE: 1.95

## 2022-03-10 ASSESSMENT — PATIENT HEALTH QUESTIONNAIRE - PHQ9: CLINICAL INTERPRETATION OF PHQ2 SCORE: 0

## 2022-03-11 NOTE — PROGRESS NOTES
CC: Edgardo Sims is a 66 y.o. male is suffering from   Chief Complaint   Patient presents with   • Follow-Up         SUBJECTIVE:  1. Rheumatoid arthritis with positive rheumatoid factor, involving unspecified site (HCC)  Edgardo is here for follow-up suffers from rheumatoid arthritis, continues on narcotic analgesics.    2. Heart murmur  Patient has a history of heart murmur I have asked patient to continue to follow-up with cardiology echocardiogram written for to be reviewed by Dr. Conte    3. Dyslipidemia  History of dyslipidemia, recheck lipid profile        Past social, family, history:   Social History     Tobacco Use   • Smoking status: Never Smoker   • Smokeless tobacco: Never Used   Vaping Use   • Vaping Use: Never used   Substance Use Topics   • Alcohol use: Yes     Alcohol/week: 1.8 oz     Types: 3 Cans of beer per week     Comment: 3 per week   • Drug use: No         MEDICATIONS:    Current Outpatient Medications:   •  [START ON 4/9/2022] oxyCODONE-acetaminophen (PERCOCET-10)  MG Tab, Take 1-2 Tablets by mouth every four hours as needed for Severe Pain (refill #1 of #3.) for up to 30 days., Disp: 150 Tablet, Rfl: 0  •  [START ON 5/9/2022] oxyCODONE-acetaminophen (PERCOCET-10)  MG Tab, Take 1-2 Tablets by mouth every four hours as needed for Severe Pain (refill #2 of #3.) for up to 30 days., Disp: 150 Tablet, Rfl: 0  •  [START ON 6/8/2022] oxyCODONE-acetaminophen (PERCOCET-10)  MG Tab, Take 1-2 Tablets by mouth every four hours as needed for Severe Pain (refill #3 of #3.) for up to 30 days., Disp: 150 Tablet, Rfl: 0  •  lisinopril (PRINIVIL) 10 MG Tab, TAKE 1 TABLET BY MOUTH EVERY DAY, Disp: 90 Tablet, Rfl: 2  •  hydroxychloroquine (PLAQUENIL) 200 MG Tab, TAKE 1 TABLET BY MOUTH TWICE A DAY, Disp: 180 Tablet, Rfl: 1  •  meloxicam (MOBIC) 15 MG tablet, 1 tab po qday with food prn joint pain, Disp: 90 Tablet, Rfl: 1  •  cyclobenzaprine (FLEXERIL) 10 mg Tab, TAKE ONE TABLET  BY MOUTH THREE TIMES DAILY AS NEEDED FOR MILD PAIN, Disp: 90 Tablet, Rfl: 3  •  metoprolol tartrate (LOPRESSOR) 50 MG Tab, Take 1 Tablet by mouth 2 times a day., Disp: 180 Tablet, Rfl: 3  •  pravastatin (PRAVACHOL) 80 MG tablet, Take 1 Tablet by mouth every day., Disp: 90 Tablet, Rfl: 0  •  PARoxetine (PAXIL) 20 MG Tab, TAKE 1 TABLET BY MOUTH EVERY DAY, Disp: 90 Tablet, Rfl: 3  •  testosterone cypionate (DEPO-TESTOSTERONE) 200 MG/ML Solution injection, testosterone cypionate 200 mg/mL intramuscular oil  INJECT 1 ML BY INTRAMUSCULAR ROUTE EVERY 14 DAYS FOR 90 DAYS. E29.1, Disp: , Rfl:   •  Upadacitinib ER 15 MG TABLET SR 24 HR, Take 15 mg by mouth every day., Disp: 90 tablet, Rfl: 1  •  vardenafil (LEVITRA) 20 MG tablet, Take 20 mg by mouth as needed., Disp: , Rfl:   •  CALCIUM-VITAMIN D PO, Take 1 Tab by mouth 2 Times a Day., Disp: , Rfl:   •  ascorbic acid (ASCORBIC ACID) 500 MG Tab, Take 500 mg by mouth every day., Disp: , Rfl:   •  Zinc 50 MG Cap, Take 50 mg by mouth every day., Disp: , Rfl:   •  omeprazole (PRILOSEC) 20 MG CPDR, Take 20 mg by mouth every day., Disp: , Rfl:   •  gabapentin (NEURONTIN) 300 MG Cap, 1 po q hs prn nerve pain (Patient not taking: Reported on 2/14/2022), Disp: 7 Capsule, Rfl: 0  •  testosterone cypionate (DEPO-TESTOSTERONE) 200 MG/ML Solution injection, testosterone cypionate 200 mg/mL intramuscular oil, Disp: , Rfl:     PROBLEMS:  Patient Active Problem List    Diagnosis Date Noted   • Hammer toe of right foot 12/29/2021   • Osteopenia 03/15/2021   • Vitamin D deficiency 03/15/2021   • Postoperative pain 08/05/2020   • Difficult intubation 08/05/2020   • Gastroesophageal reflux disease 08/05/2020   • Secondary adrenal insufficiency (HCC) 12/12/2019   • Current chronic use of systemic steroids 10/16/2019   • High risk medication use 10/16/2019   • History of adrenal insufficiency 10/16/2019   • Neurogenic bladder 01/26/2019   • Bladder outlet obstruction 05/01/2018   • Leukocytosis  "04/30/2018   • Lactic acidosis 04/30/2018   • Idiopathic acute pancreatitis 04/30/2018   • Chronic use of opiate drug for therapeutic purpose 08/12/2017   • Depression 07/13/2015   • Anxiety 03/31/2015   • Coronary artery disease due to calcified coronary lesion: Mildly cardiac catheterization in 2014 12/05/2014   • Tachycardia 10/20/2014   • Abnormal myocardial perfusion study 01/15/2014   • Osteoarthrosis, unspecified whether generalized or localized, pelvic region and thigh 05/31/2013   • Dyslipidemia 07/12/2011   • Aortic insufficiency 07/05/2011   • Essential hypertension 07/05/2011   • Rheumatoid arthritis (HCC) 05/05/2009   • Hypogonadism male 05/05/2009       REVIEW OF SYSTEMS:  Gen.:  No Nausea, Vomiting, fever, Chills.  Heart: No chest pain.  Lungs:  No shortness of Breath.  Psychological: Kian unusual Anxiety depression     PHYSICAL EXAM   Constitutional: Alert, cooperative, not in acute distress.  Cardiovascular:  Rate Rhythm is regular with 3/6 systolic murmur no rubs clicks.     Thorax & Lungs: Clear to auscultation, no wheezing, rhonchi, or rales  HENT: Normocephalic, Atraumatic.  Eyes: PERRLA, EOMI, Conjunctiva normal.   Neck: Trachia is midline no swelling of the thyroid.   Lymphatic: No lymphadenopathy noted.   Neurologic: Alert & oriented x 3, cranial nerves II through XII are intact, Normal motor function, Normal sensory function, No focal deficits noted.   Psychiatric: Affect normal, Judgment normal, Mood normal.     VITAL SIGNS:/72 (BP Location: Left arm, Patient Position: Sitting, BP Cuff Size: Adult)   Pulse 71   Temp 36.3 °C (97.4 °F) (Temporal)   Ht 1.778 m (5' 10\")   Wt 91.4 kg (201 lb 8 oz)   SpO2 97%   BMI 28.91 kg/m²     Labs: Reviewed    Assessment:                                                     Plan:    1. Rheumatoid arthritis with positive rheumatoid factor, involving unspecified site (HCC)  Rheumatoid arthritis continue Percocet State drug task force reviewed  - " oxyCODONE-acetaminophen (PERCOCET-10)  MG Tab; Take 1-2 Tablets by mouth every four hours as needed for Severe Pain (refill #1 of #3.) for up to 30 days.  Dispense: 150 Tablet; Refill: 0  - oxyCODONE-acetaminophen (PERCOCET-10)  MG Tab; Take 1-2 Tablets by mouth every four hours as needed for Severe Pain (refill #2 of #3.) for up to 30 days.  Dispense: 150 Tablet; Refill: 0  - oxyCODONE-acetaminophen (PERCOCET-10)  MG Tab; Take 1-2 Tablets by mouth every four hours as needed for Severe Pain (refill #3 of #3.) for up to 30 days.  Dispense: 150 Tablet; Refill: 0  - EC-ECHOCARDIOGRAM COMPLETE W/O CONT; Future    2. Heart murmur  Heart murmur echocardiogram ordered Dr. Conte to review  - EC-ECHOCARDIOGRAM COMPLETE W/O CONT; Future    3. Dyslipidemia  Clinically stable  - EC-ECHOCARDIOGRAM COMPLETE W/O CONT; Future

## 2022-03-28 DIAGNOSIS — M05.9 RHEUMATOID ARTHRITIS WITH POSITIVE RHEUMATOID FACTOR, INVOLVING UNSPECIFIED SITE (HCC): ICD-10-CM

## 2022-03-28 DIAGNOSIS — Z79.1 ENCOUNTER FOR LONG-TERM (CURRENT) USE OF NSAIDS: ICD-10-CM

## 2022-03-28 DIAGNOSIS — Z79.899 LONG-TERM USE OF PLAQUENIL: ICD-10-CM

## 2022-03-28 DIAGNOSIS — Z51.81 MEDICATION MONITORING ENCOUNTER: ICD-10-CM

## 2022-03-28 NOTE — TELEPHONE ENCOUNTER
Received request via: Pharmacy  Dec labs    Was the patient seen in the last year in this department? Yes    Does the patient have an active prescription (recently filled or refills available) for medication(s) requested? No

## 2022-04-02 DIAGNOSIS — E29.1 HYPOGONADISM MALE: ICD-10-CM

## 2022-04-04 RX ORDER — TESTOSTERONE CYPIONATE 200 MG/ML
INJECTION, SOLUTION INTRAMUSCULAR
Qty: 10 ML | Refills: 0 | Status: SHIPPED | OUTPATIENT
Start: 2022-04-04 | End: 2022-04-05 | Stop reason: SDUPTHER

## 2022-04-04 NOTE — TELEPHONE ENCOUNTER
Received request via: Pharmacy    Was the patient seen in the last year in this department? Yes  LOV : 3/10/2022  Does the patient have an active prescription (recently filled or refills available) for medication(s) requested? No

## 2022-04-05 ENCOUNTER — TELEPHONE (OUTPATIENT)
Dept: MEDICAL GROUP | Facility: LAB | Age: 67
End: 2022-04-05
Payer: MEDICARE

## 2022-04-05 DIAGNOSIS — E29.1 HYPOGONADISM MALE: ICD-10-CM

## 2022-04-05 DIAGNOSIS — M05.9 RHEUMATOID ARTHRITIS WITH POSITIVE RHEUMATOID FACTOR, INVOLVING UNSPECIFIED SITE (HCC): ICD-10-CM

## 2022-04-05 DIAGNOSIS — F41.9 ANXIETY: ICD-10-CM

## 2022-04-05 RX ORDER — CYCLOBENZAPRINE HCL 10 MG
TABLET ORAL
Qty: 90 TABLET | Refills: 3 | Status: SHIPPED | OUTPATIENT
Start: 2022-04-05 | End: 2023-02-27

## 2022-04-05 RX ORDER — DIAZEPAM 5 MG/1
TABLET ORAL
Qty: 60 TABLET | Refills: 5 | Status: SHIPPED | OUTPATIENT
Start: 2022-04-05 | End: 2023-03-29

## 2022-04-05 RX ORDER — TESTOSTERONE CYPIONATE 200 MG/ML
200 INJECTION, SOLUTION INTRAMUSCULAR
Qty: 6 ML | Refills: 0 | Status: SHIPPED | OUTPATIENT
Start: 2022-04-05 | End: 2022-04-22 | Stop reason: SDUPTHER

## 2022-04-05 NOTE — TELEPHONE ENCOUNTER
----- Message from Edgardo Sims sent at 4/5/2022 12:42 PM PDT -----  Regarding: Refill request  request refill on DIAZEPAM  It's not in Medications.

## 2022-04-06 NOTE — PROGRESS NOTES
Telephone call to the patient, discussed his dosage had been notified by the pharmacy they will give no more than 6 mL of testosterone.  Will reorder PSA also testosterone level.

## 2022-04-21 ENCOUNTER — TELEPHONE (OUTPATIENT)
Dept: MEDICAL GROUP | Facility: LAB | Age: 67
End: 2022-04-21
Payer: MEDICARE

## 2022-04-21 DIAGNOSIS — E29.1 HYPOGONADISM MALE: ICD-10-CM

## 2022-04-21 NOTE — TELEPHONE ENCOUNTER
Kwasi called and said that the pharmacy would not fill his testosterone RX.  Please see previous notes you made about pharmacy contact.

## 2022-04-22 RX ORDER — TESTOSTERONE CYPIONATE 200 MG/ML
200 INJECTION, SOLUTION INTRAMUSCULAR
Qty: 6 ML | Refills: 0 | Status: SHIPPED | OUTPATIENT
Start: 2022-04-22 | End: 2022-11-03 | Stop reason: SDUPTHER

## 2022-04-22 NOTE — TELEPHONE ENCOUNTER
Telephone call returned to the patient, prescription for testosterone was sent to his mail order pharmacy not to his local pharmacy.  This was corrected today.  Patient is having problems with his right great toe, previous surgery, does have a screw that is starting to poke out the bottom of his toe has not gone through the skin yet.  Note sent to Dr. Pedro Restrepo.

## 2022-05-02 ENCOUNTER — HOSPITAL ENCOUNTER (OUTPATIENT)
Facility: MEDICAL CENTER | Age: 67
End: 2022-05-02
Attending: ANESTHESIOLOGY
Payer: MEDICARE

## 2022-05-02 LAB — COVID ORDER STATUS COVID19: NORMAL

## 2022-05-02 PROCEDURE — U0003 INFECTIOUS AGENT DETECTION BY NUCLEIC ACID (DNA OR RNA); SEVERE ACUTE RESPIRATORY SYNDROME CORONAVIRUS 2 (SARS-COV-2) (CORONAVIRUS DISEASE [COVID-19]), AMPLIFIED PROBE TECHNIQUE, MAKING USE OF HIGH THROUGHPUT TECHNOLOGIES AS DESCRIBED BY CMS-2020-01-R: HCPCS

## 2022-05-02 PROCEDURE — U0005 INFEC AGEN DETEC AMPLI PROBE: HCPCS

## 2022-05-03 LAB
SARS-COV-2 RNA RESP QL NAA+PROBE: NOTDETECTED
SPECIMEN SOURCE: NORMAL

## 2022-05-27 ENCOUNTER — HOSPITAL ENCOUNTER (OUTPATIENT)
Dept: LAB | Facility: MEDICAL CENTER | Age: 67
End: 2022-05-27
Attending: INTERNAL MEDICINE
Payer: MEDICARE

## 2022-05-27 DIAGNOSIS — E29.1 HYPOGONADISM MALE: ICD-10-CM

## 2022-05-27 LAB — PSA SERPL-MCNC: 1.79 NG/ML (ref 0–4)

## 2022-05-27 PROCEDURE — 84153 ASSAY OF PSA TOTAL: CPT | Mod: GA

## 2022-05-27 PROCEDURE — 36415 COLL VENOUS BLD VENIPUNCTURE: CPT | Mod: GA

## 2022-06-02 ENCOUNTER — APPOINTMENT (RX ONLY)
Dept: URBAN - METROPOLITAN AREA CLINIC 6 | Facility: CLINIC | Age: 67
Setting detail: DERMATOLOGY
End: 2022-06-02

## 2022-06-02 DIAGNOSIS — Z85.828 PERSONAL HISTORY OF OTHER MALIGNANT NEOPLASM OF SKIN: ICD-10-CM

## 2022-06-02 DIAGNOSIS — L82.1 OTHER SEBORRHEIC KERATOSIS: ICD-10-CM

## 2022-06-02 DIAGNOSIS — Z71.89 OTHER SPECIFIED COUNSELING: ICD-10-CM

## 2022-06-02 DIAGNOSIS — D22 MELANOCYTIC NEVI: ICD-10-CM

## 2022-06-02 DIAGNOSIS — D18.0 HEMANGIOMA: ICD-10-CM

## 2022-06-02 DIAGNOSIS — L57.0 ACTINIC KERATOSIS: ICD-10-CM

## 2022-06-02 DIAGNOSIS — L81.4 OTHER MELANIN HYPERPIGMENTATION: ICD-10-CM

## 2022-06-02 PROBLEM — D22.5 MELANOCYTIC NEVI OF TRUNK: Status: ACTIVE | Noted: 2022-06-02

## 2022-06-02 PROBLEM — D18.01 HEMANGIOMA OF SKIN AND SUBCUTANEOUS TISSUE: Status: ACTIVE | Noted: 2022-06-02

## 2022-06-02 PROCEDURE — 17003 DESTRUCT PREMALG LES 2-14: CPT

## 2022-06-02 PROCEDURE — 99203 OFFICE O/P NEW LOW 30 MIN: CPT | Mod: 25

## 2022-06-02 PROCEDURE — 17000 DESTRUCT PREMALG LESION: CPT

## 2022-06-02 PROCEDURE — ? LIQUID NITROGEN

## 2022-06-02 PROCEDURE — ? PRESCRIPTION

## 2022-06-02 PROCEDURE — ? DIAGNOSIS COMMENT

## 2022-06-02 PROCEDURE — ? COUNSELING

## 2022-06-02 RX ORDER — FLUOROURACIL 2 G/40G
CREAM TOPICAL BID
Qty: 40 | Refills: 1 | Status: ERX | COMMUNITY
Start: 2022-06-02

## 2022-06-02 RX ADMIN — FLUOROURACIL: 2 CREAM TOPICAL at 00:00

## 2022-06-02 ASSESSMENT — LOCATION DETAILED DESCRIPTION DERM
LOCATION DETAILED: LEFT ANTERIOR DISTAL THIGH
LOCATION DETAILED: RIGHT SUPERIOR MEDIAL UPPER BACK
LOCATION DETAILED: RIGHT ANTERIOR DISTAL THIGH
LOCATION DETAILED: LEFT CENTRAL TEMPLE
LOCATION DETAILED: RIGHT ANTERIOR PROXIMAL UPPER ARM
LOCATION DETAILED: NASAL DORSUM
LOCATION DETAILED: RIGHT MID-UPPER BACK
LOCATION DETAILED: LEFT ANTERIOR PROXIMAL UPPER ARM
LOCATION DETAILED: RIGHT INFERIOR MEDIAL MIDBACK
LOCATION DETAILED: LEFT MID-UPPER BACK
LOCATION DETAILED: UPPER STERNUM
LOCATION DETAILED: RIGHT VENTRAL PROXIMAL FOREARM
LOCATION DETAILED: LEFT CENTRAL ZYGOMA
LOCATION DETAILED: SUPERIOR LUMBAR SPINE
LOCATION DETAILED: LEFT VENTRAL PROXIMAL FOREARM
LOCATION DETAILED: EPIGASTRIC SKIN
LOCATION DETAILED: INFERIOR MID FOREHEAD
LOCATION DETAILED: LEFT INFERIOR FOREHEAD
LOCATION DETAILED: LEFT PROXIMAL DORSAL FOREARM
LOCATION DETAILED: RIGHT INFERIOR CENTRAL MALAR CHEEK
LOCATION DETAILED: LEFT INFERIOR MEDIAL MIDBACK
LOCATION DETAILED: RIGHT FOREHEAD
LOCATION DETAILED: RIGHT SUPERIOR UPPER BACK

## 2022-06-02 ASSESSMENT — LOCATION SIMPLE DESCRIPTION DERM
LOCATION SIMPLE: INFERIOR FOREHEAD
LOCATION SIMPLE: LEFT FOREHEAD
LOCATION SIMPLE: RIGHT UPPER BACK
LOCATION SIMPLE: RIGHT CHEEK
LOCATION SIMPLE: CHEST
LOCATION SIMPLE: LEFT TEMPLE
LOCATION SIMPLE: LEFT UPPER ARM
LOCATION SIMPLE: RIGHT LOWER BACK
LOCATION SIMPLE: LEFT ZYGOMA
LOCATION SIMPLE: LEFT THIGH
LOCATION SIMPLE: LEFT FOREARM
LOCATION SIMPLE: RIGHT UPPER ARM
LOCATION SIMPLE: LEFT UPPER BACK
LOCATION SIMPLE: ABDOMEN
LOCATION SIMPLE: LOWER BACK
LOCATION SIMPLE: LEFT LOWER BACK
LOCATION SIMPLE: NOSE
LOCATION SIMPLE: RIGHT THIGH
LOCATION SIMPLE: RIGHT FOREHEAD
LOCATION SIMPLE: RIGHT FOREARM

## 2022-06-02 ASSESSMENT — LOCATION ZONE DERM
LOCATION ZONE: FACE
LOCATION ZONE: LEG
LOCATION ZONE: TRUNK
LOCATION ZONE: ARM
LOCATION ZONE: NOSE

## 2022-06-02 NOTE — PROCEDURE: LIQUID NITROGEN
Show Applicator Variable?: Yes
Render Note In Bullet Format When Appropriate: No
Detail Level: Detailed
Duration Of Freeze Thaw-Cycle (Seconds): 5
Post-Care Instructions: I reviewed with the patient in detail post-care instructions. Patient is to wear sunprotection, and avoid picking at any of the treated lesions. Pt may apply Vaseline to crusted or scabbing areas.
Number Of Freeze-Thaw Cycles: 3 freeze-thaw cycles
Application Tool (Optional): Liquid Nitrogen Sprayer
Consent: The patient's consent was obtained including but not limited to risks of crusting, scabbing, blistering, scarring, darker or lighter pigmentary change, recurrence, incomplete removal and infection.

## 2022-06-10 ENCOUNTER — OFFICE VISIT (OUTPATIENT)
Dept: MEDICAL GROUP | Facility: LAB | Age: 67
End: 2022-06-10
Payer: MEDICARE

## 2022-06-10 VITALS
HEIGHT: 70 IN | OXYGEN SATURATION: 96 % | HEART RATE: 72 BPM | TEMPERATURE: 96.5 F | BODY MASS INDEX: 28.97 KG/M2 | WEIGHT: 202.38 LBS | DIASTOLIC BLOOD PRESSURE: 80 MMHG | SYSTOLIC BLOOD PRESSURE: 134 MMHG

## 2022-06-10 DIAGNOSIS — M79.671 RIGHT FOOT PAIN: ICD-10-CM

## 2022-06-10 DIAGNOSIS — D04.9 BASAL CELL CARCINOMA (BCC) IN SITU OF SKIN: ICD-10-CM

## 2022-06-10 DIAGNOSIS — M05.9 RHEUMATOID ARTHRITIS WITH POSITIVE RHEUMATOID FACTOR, INVOLVING UNSPECIFIED SITE (HCC): ICD-10-CM

## 2022-06-10 PROCEDURE — 99214 OFFICE O/P EST MOD 30 MIN: CPT | Performed by: INTERNAL MEDICINE

## 2022-06-10 RX ORDER — OXYCODONE AND ACETAMINOPHEN 10; 325 MG/1; MG/1
1-2 TABLET ORAL EVERY 4 HOURS PRN
Qty: 150 TABLET | Refills: 0 | Status: SHIPPED | OUTPATIENT
Start: 2022-07-15 | End: 2022-12-23 | Stop reason: SDUPTHER

## 2022-06-10 RX ORDER — OXYCODONE AND ACETAMINOPHEN 10; 325 MG/1; MG/1
1-2 TABLET ORAL EVERY 4 HOURS PRN
Qty: 150 TABLET | Refills: 0 | Status: SHIPPED | OUTPATIENT
Start: 2022-06-15 | End: 2022-09-22 | Stop reason: SDUPTHER

## 2022-06-10 RX ORDER — OXYCODONE AND ACETAMINOPHEN 10; 325 MG/1; MG/1
1-2 TABLET ORAL EVERY 4 HOURS PRN
Qty: 150 TABLET | Refills: 0 | Status: SHIPPED | OUTPATIENT
Start: 2022-08-14 | End: 2022-12-23 | Stop reason: SDUPTHER

## 2022-06-10 RX ORDER — FLUOROURACIL 50 MG/ML
0.25 SOLUTION TOPICAL 2 TIMES DAILY
Qty: 10 ML | Refills: 0
Start: 2022-06-10 | End: 2022-12-22

## 2022-06-10 ASSESSMENT — FIBROSIS 4 INDEX: FIB4 SCORE: 1.95

## 2022-06-10 NOTE — PROGRESS NOTES
CC: Edgardo Sims is a 66 y.o. male is suffering from   Chief Complaint   Patient presents with   • Follow-Up         SUBJECTIVE:  1. Rheumatoid arthritis with positive rheumatoid factor, involving unspecified site (HCC)  Edgardo is here for follow-up has a history of rheumatoid arthritis, is being followed by rheumatology.  We will continue patient on narcotic analgesics    2. Basal cell carcinoma (BCC) in situ of skin  History of basal cell carcinoma involving this nose of the skin, patient has previously undergone surgery, is on 5-fluorouracil by [facility]    3. Right foot pain  Right foot pain with significant improvement after surgery with Dr. Pedro Restrepo        Past social, family, history:   Social History     Tobacco Use   • Smoking status: Never Smoker   • Smokeless tobacco: Never Used   Vaping Use   • Vaping Use: Never used   Substance Use Topics   • Alcohol use: Yes     Alcohol/week: 1.8 oz     Types: 3 Cans of beer per week     Comment: 3 per week   • Drug use: No         MEDICATIONS:    Current Outpatient Medications:   •  [START ON 6/15/2022] oxyCODONE-acetaminophen (PERCOCET-10)  MG Tab, Take 1-2 Tablets by mouth every four hours as needed for Severe Pain (refill #1of #3.) for up to 30 days., Disp: 150 Tablet, Rfl: 0  •  [START ON 7/15/2022] oxyCODONE-acetaminophen (PERCOCET-10)  MG Tab, Take 1-2 Tablets by mouth every four hours as needed for Severe Pain (refill #2 of #3.) for up to 30 days., Disp: 150 Tablet, Rfl: 0  •  [START ON 8/14/2022] oxyCODONE-acetaminophen (PERCOCET-10)  MG Tab, Take 1-2 Tablets by mouth every four hours as needed for Severe Pain (refill #3 of #3.) for up to 30 days., Disp: 150 Tablet, Rfl: 0  •  Fluorouracil 5 % Solution, Apply 0.25 mL topically 2 times a day., Disp: 10 mL, Rfl: 0  •  gabapentin (NEURONTIN) 300 MG Cap, 1 po q hs prn nerve pain, Disp: 7 Capsule, Rfl: 0  •  testosterone cypionate  (DEPO-TESTOSTERONE) 200 MG/ML Solution injection, Inject 1 mL into the shoulder, thigh, or buttocks every 14 days for 84 days., Disp: 6 mL, Rfl: 0  •  cyclobenzaprine (FLEXERIL) 10 mg Tab, TAKE ONE TABLET BY MOUTH THREE TIMES DAILY AS NEEDED FOR MILD PAIN, Disp: 90 Tablet, Rfl: 3  •  Upadacitinib ER 15 MG TABLET SR 24 HR, Take 15 mg by mouth every day., Disp: 90 Tablet, Rfl: 0  •  oxyCODONE-acetaminophen (PERCOCET-10)  MG Tab, Take 1-2 Tablets by mouth every four hours as needed for Severe Pain (refill #3 of #3.) for up to 30 days., Disp: 150 Tablet, Rfl: 0  •  lisinopril (PRINIVIL) 10 MG Tab, TAKE 1 TABLET BY MOUTH EVERY DAY, Disp: 90 Tablet, Rfl: 2  •  hydroxychloroquine (PLAQUENIL) 200 MG Tab, TAKE 1 TABLET BY MOUTH TWICE A DAY, Disp: 180 Tablet, Rfl: 1  •  metoprolol tartrate (LOPRESSOR) 50 MG Tab, Take 1 Tablet by mouth 2 times a day., Disp: 180 Tablet, Rfl: 3  •  pravastatin (PRAVACHOL) 80 MG tablet, Take 1 Tablet by mouth every day., Disp: 90 Tablet, Rfl: 0  •  PARoxetine (PAXIL) 20 MG Tab, TAKE 1 TABLET BY MOUTH EVERY DAY, Disp: 90 Tablet, Rfl: 3  •  vardenafil (LEVITRA) 20 MG tablet, Take 20 mg by mouth as needed., Disp: , Rfl:   •  CALCIUM-VITAMIN D PO, Take 1 Tab by mouth 2 Times a Day., Disp: , Rfl:   •  ascorbic acid (ASCORBIC ACID) 500 MG Tab, Take 500 mg by mouth every day., Disp: , Rfl:   •  Zinc 50 MG Cap, Take 50 mg by mouth every day., Disp: , Rfl:   •  omeprazole (PRILOSEC) 20 MG CPDR, Take 20 mg by mouth every day., Disp: , Rfl:   •  gabapentin (NEURONTIN) 300 MG Cap, 1 po q hs prn nerve pain, Disp: 7 Capsule, Rfl: 0  •  meloxicam (MOBIC) 15 MG tablet, 1 tab po qday with food prn joint pain, Disp: 90 Tablet, Rfl: 1    PROBLEMS:  Patient Active Problem List    Diagnosis Date Noted   • Hammer toe of right foot 12/29/2021   • Osteopenia 03/15/2021   • Vitamin D deficiency 03/15/2021   • Postoperative pain 08/05/2020   • Difficult intubation 08/05/2020   • Gastroesophageal reflux disease  08/05/2020   • Secondary adrenal insufficiency (HCC) 12/12/2019   • Current chronic use of systemic steroids 10/16/2019   • High risk medication use 10/16/2019   • History of adrenal insufficiency 10/16/2019   • Neurogenic bladder 01/26/2019   • Bladder outlet obstruction 05/01/2018   • Leukocytosis 04/30/2018   • Lactic acidosis 04/30/2018   • Idiopathic acute pancreatitis 04/30/2018   • Chronic use of opiate drug for therapeutic purpose 08/12/2017   • Depression 07/13/2015   • Anxiety 03/31/2015   • Coronary artery disease due to calcified coronary lesion: Mildly cardiac catheterization in 2014 12/05/2014   • Tachycardia 10/20/2014   • Abnormal myocardial perfusion study 01/15/2014   • Osteoarthrosis, unspecified whether generalized or localized, pelvic region and thigh 05/31/2013   • Dyslipidemia 07/12/2011   • Aortic insufficiency 07/05/2011   • Essential hypertension 07/05/2011   • Rheumatoid arthritis (HCC) 05/05/2009   • Hypogonadism male 05/05/2009       REVIEW OF SYSTEMS:  Gen.:  No Nausea, Vomiting, fever, Chills.  Heart: No chest pain.  Lungs:  No shortness of Breath.  Psychological: Kian unusual Anxiety depression     PHYSICAL EXAM   Constitutional: Alert, cooperative, not in acute distress.  Cardiovascular:  Rate Rhythm is regular without murmurs rubs clicks.     Thorax & Lungs: Clear to auscultation, no wheezing, rhonchi, or rales  HENT: Normocephalic, Atraumatic.  Eyes: PERRLA, EOMI, Conjunctiva normal.   Neck: Trachia is midline no swelling of the thyroid.   Lymphatic: No lymphadenopathy noted.   Neurologic: Alert & oriented x 3, cranial nerves II through XII are intact, Normal motor function, Normal sensory function, No focal deficits noted.   Skin: Warm, Dry, No erythema, No rash.   Extremities: Atraumatic with symmetric distal pulses, No edema, No tenderness, No cyanosis, No clubbing.   Psychiatric: Affect normal, Judgment normal, Mood normal.     VITAL SIGNS:/80 (BP Location: Right arm,  "Patient Position: Sitting, BP Cuff Size: Adult)   Pulse 72   Temp 35.8 °C (96.5 °F) (Temporal)   Ht 1.778 m (5' 10\")   Wt 91.8 kg (202 lb 6.1 oz)   SpO2 96%   BMI 29.04 kg/m²     Labs: Reviewed    Assessment:                                                     Plan:    1. Rheumatoid arthritis with positive rheumatoid factor, involving unspecified site (HCC)  Date drug task force urine drug screen reviewed continue oxycodone  - oxyCODONE-acetaminophen (PERCOCET-10)  MG Tab; Take 1-2 Tablets by mouth every four hours as needed for Severe Pain (refill #1of #3.) for up to 30 days.  Dispense: 150 Tablet; Refill: 0  - oxyCODONE-acetaminophen (PERCOCET-10)  MG Tab; Take 1-2 Tablets by mouth every four hours as needed for Severe Pain (refill #2 of #3.) for up to 30 days.  Dispense: 150 Tablet; Refill: 0  - oxyCODONE-acetaminophen (PERCOCET-10)  MG Tab; Take 1-2 Tablets by mouth every four hours as needed for Severe Pain (refill #3 of #3.) for up to 30 days.  Dispense: 150 Tablet; Refill: 0    2. Basal cell carcinoma (BCC) in situ of skin  Continue 5-fluorouracil as prescribed by [facility]  - Fluorouracil 5 % Solution; Apply 0.25 mL topically 2 times a day.  Dispense: 10 mL; Refill: 0    3. Right foot pain  Significant improvement in pain per patient regarding his right foot        "

## 2022-06-30 ENCOUNTER — HOSPITAL ENCOUNTER (OUTPATIENT)
Dept: LAB | Facility: MEDICAL CENTER | Age: 67
End: 2022-06-30
Attending: INTERNAL MEDICINE
Payer: MEDICARE

## 2022-06-30 DIAGNOSIS — Z79.1 ENCOUNTER FOR LONG-TERM (CURRENT) USE OF NSAIDS: ICD-10-CM

## 2022-06-30 DIAGNOSIS — Z78.9 ALCOHOL USE: ICD-10-CM

## 2022-06-30 DIAGNOSIS — R71.8 ELEVATED MCV: ICD-10-CM

## 2022-06-30 DIAGNOSIS — Z79.899 LONG-TERM USE OF PLAQUENIL: ICD-10-CM

## 2022-06-30 DIAGNOSIS — M05.9 RHEUMATOID ARTHRITIS WITH POSITIVE RHEUMATOID FACTOR, INVOLVING UNSPECIFIED SITE (HCC): ICD-10-CM

## 2022-06-30 DIAGNOSIS — Z51.81 MEDICATION MONITORING ENCOUNTER: ICD-10-CM

## 2022-06-30 LAB
ALBUMIN SERPL BCP-MCNC: 4.3 G/DL (ref 3.2–4.9)
ALBUMIN/GLOB SERPL: 2.2 G/DL
ALP SERPL-CCNC: 73 U/L (ref 30–99)
ALT SERPL-CCNC: 21 U/L (ref 2–50)
ANION GAP SERPL CALC-SCNC: 10 MMOL/L (ref 7–16)
AST SERPL-CCNC: 34 U/L (ref 12–45)
BASOPHILS # BLD AUTO: 0.5 % (ref 0–1.8)
BASOPHILS # BLD: 0.07 K/UL (ref 0–0.12)
BILIRUB SERPL-MCNC: 0.4 MG/DL (ref 0.1–1.5)
BUN SERPL-MCNC: 9 MG/DL (ref 8–22)
CALCIUM SERPL-MCNC: 9 MG/DL (ref 8.5–10.5)
CHLORIDE SERPL-SCNC: 100 MMOL/L (ref 96–112)
CO2 SERPL-SCNC: 25 MMOL/L (ref 20–33)
CREAT SERPL-MCNC: 0.9 MG/DL (ref 0.5–1.4)
EOSINOPHIL # BLD AUTO: 1.38 K/UL (ref 0–0.51)
EOSINOPHIL NFR BLD: 9.8 % (ref 0–6.9)
ERYTHROCYTE [DISTWIDTH] IN BLOOD BY AUTOMATED COUNT: 49.5 FL (ref 35.9–50)
ERYTHROCYTE [SEDIMENTATION RATE] IN BLOOD BY WESTERGREN METHOD: 5 MM/HOUR (ref 0–20)
FOLATE SERPL-MCNC: 17.8 NG/ML
GFR SERPLBLD CREATININE-BSD FMLA CKD-EPI: 94 ML/MIN/1.73 M 2
GLOBULIN SER CALC-MCNC: 2 G/DL (ref 1.9–3.5)
GLUCOSE SERPL-MCNC: 90 MG/DL (ref 65–99)
HCT VFR BLD AUTO: 49.8 % (ref 42–52)
HGB BLD-MCNC: 16.9 G/DL (ref 14–18)
IMM GRANULOCYTES # BLD AUTO: 0.44 K/UL (ref 0–0.11)
IMM GRANULOCYTES NFR BLD AUTO: 3.1 % (ref 0–0.9)
LYMPHOCYTES # BLD AUTO: 1.68 K/UL (ref 1–4.8)
LYMPHOCYTES NFR BLD: 11.9 % (ref 22–41)
MCH RBC QN AUTO: 33.2 PG (ref 27–33)
MCHC RBC AUTO-ENTMCNC: 33.9 G/DL (ref 33.7–35.3)
MCV RBC AUTO: 97.8 FL (ref 81.4–97.8)
MONOCYTES # BLD AUTO: 1.73 K/UL (ref 0–0.85)
MONOCYTES NFR BLD AUTO: 12.3 % (ref 0–13.4)
NEUTROPHILS # BLD AUTO: 8.81 K/UL (ref 1.82–7.42)
NEUTROPHILS NFR BLD: 62.4 % (ref 44–72)
NRBC # BLD AUTO: 0 K/UL
NRBC BLD-RTO: 0 /100 WBC
PLATELET # BLD AUTO: 247 K/UL (ref 164–446)
PMV BLD AUTO: 10.3 FL (ref 9–12.9)
POTASSIUM SERPL-SCNC: 5.3 MMOL/L (ref 3.6–5.5)
PROT SERPL-MCNC: 6.3 G/DL (ref 6–8.2)
RBC # BLD AUTO: 5.09 M/UL (ref 4.7–6.1)
SODIUM SERPL-SCNC: 135 MMOL/L (ref 135–145)
VIT B12 SERPL-MCNC: 851 PG/ML (ref 211–911)
WBC # BLD AUTO: 14.1 K/UL (ref 4.8–10.8)

## 2022-06-30 PROCEDURE — 84425 ASSAY OF VITAMIN B-1: CPT

## 2022-06-30 PROCEDURE — 82607 VITAMIN B-12: CPT

## 2022-06-30 PROCEDURE — 85025 COMPLETE CBC W/AUTO DIFF WBC: CPT

## 2022-06-30 PROCEDURE — 85652 RBC SED RATE AUTOMATED: CPT

## 2022-06-30 PROCEDURE — 36415 COLL VENOUS BLD VENIPUNCTURE: CPT

## 2022-06-30 PROCEDURE — 80053 COMPREHEN METABOLIC PANEL: CPT

## 2022-06-30 PROCEDURE — 82746 ASSAY OF FOLIC ACID SERUM: CPT

## 2022-07-05 LAB — VIT B1 BLD-MCNC: 150 NMOL/L (ref 70–180)

## 2022-07-18 ENCOUNTER — HOSPITAL ENCOUNTER (OUTPATIENT)
Dept: CARDIOLOGY | Facility: MEDICAL CENTER | Age: 67
End: 2022-07-18
Attending: INTERNAL MEDICINE
Payer: MEDICARE

## 2022-07-18 DIAGNOSIS — M05.9 RHEUMATOID ARTHRITIS WITH POSITIVE RHEUMATOID FACTOR, INVOLVING UNSPECIFIED SITE (HCC): ICD-10-CM

## 2022-07-18 DIAGNOSIS — R01.1 HEART MURMUR: ICD-10-CM

## 2022-07-18 DIAGNOSIS — E78.5 DYSLIPIDEMIA: ICD-10-CM

## 2022-07-18 LAB
LV EJECT FRACT  99904: 70
LV EJECT FRACT MOD 2C 99903: 77.46
LV EJECT FRACT MOD 4C 99902: 57.49
LV EJECT FRACT MOD BP 99901: 68.86

## 2022-07-18 PROCEDURE — 93306 TTE W/DOPPLER COMPLETE: CPT

## 2022-07-18 PROCEDURE — 93306 TTE W/DOPPLER COMPLETE: CPT | Mod: 26 | Performed by: INTERNAL MEDICINE

## 2022-08-16 ENCOUNTER — TELEPHONE (OUTPATIENT)
Dept: RHEUMATOLOGY | Facility: MEDICAL CENTER | Age: 67
End: 2022-08-16

## 2022-08-16 ENCOUNTER — OFFICE VISIT (OUTPATIENT)
Dept: RHEUMATOLOGY | Facility: MEDICAL CENTER | Age: 67
End: 2022-08-16
Attending: INTERNAL MEDICINE
Payer: MEDICARE

## 2022-08-16 VITALS
HEART RATE: 82 BPM | WEIGHT: 190 LBS | BODY MASS INDEX: 27.26 KG/M2 | DIASTOLIC BLOOD PRESSURE: 60 MMHG | TEMPERATURE: 97.3 F | OXYGEN SATURATION: 98 % | RESPIRATION RATE: 14 BRPM | SYSTOLIC BLOOD PRESSURE: 122 MMHG

## 2022-08-16 DIAGNOSIS — I25.10 CORONARY ARTERY DISEASE DUE TO CALCIFIED CORONARY LESION: ICD-10-CM

## 2022-08-16 DIAGNOSIS — R21 RASH: ICD-10-CM

## 2022-08-16 DIAGNOSIS — Z79.899 LONG-TERM USE OF PLAQUENIL: ICD-10-CM

## 2022-08-16 DIAGNOSIS — Z78.9 ALCOHOL USE: ICD-10-CM

## 2022-08-16 DIAGNOSIS — I25.84 CORONARY ARTERY DISEASE DUE TO CALCIFIED CORONARY LESION: ICD-10-CM

## 2022-08-16 DIAGNOSIS — Z79.899 ENCOUNTER FOR LONG-TERM (CURRENT) USE OF HIGH-RISK MEDICATION: ICD-10-CM

## 2022-08-16 DIAGNOSIS — Z79.1 ENCOUNTER FOR LONG-TERM (CURRENT) USE OF NSAIDS: ICD-10-CM

## 2022-08-16 DIAGNOSIS — L40.50 PSORIATIC ARTHRITIS (HCC): ICD-10-CM

## 2022-08-16 DIAGNOSIS — I10 ESSENTIAL HYPERTENSION: ICD-10-CM

## 2022-08-16 PROCEDURE — 99212 OFFICE O/P EST SF 10 MIN: CPT | Performed by: INTERNAL MEDICINE

## 2022-08-16 PROCEDURE — 99214 OFFICE O/P EST MOD 30 MIN: CPT | Performed by: INTERNAL MEDICINE

## 2022-08-16 RX ORDER — CLOBETASOL PROPIONATE 0.5 MG/G
CREAM TOPICAL
Qty: 100 G | Refills: 1 | Status: SHIPPED | OUTPATIENT
Start: 2022-08-16 | End: 2022-08-18 | Stop reason: SDUPTHER

## 2022-08-16 RX ORDER — METHYLPREDNISOLONE 4 MG/1
TABLET ORAL
Qty: 21 TABLET | Refills: 0 | Status: SHIPPED | OUTPATIENT
Start: 2022-08-16 | End: 2022-12-22

## 2022-08-16 ASSESSMENT — FIBROSIS 4 INDEX: FIB4 SCORE: 1.95

## 2022-08-16 ASSESSMENT — JOINT PAIN
TOTAL NUMBER OF SWOLLEN JOINTS: 0
TOTAL NUMBER OF TENDER JOINTS: 8

## 2022-08-16 NOTE — TELEPHONE ENCOUNTER
Received request and beginning investigation for Tremfya.     PA required, submitted via [employer].   [employer] key:(Key: [id number])

## 2022-08-16 NOTE — PROGRESS NOTES
Chief Complaint- joint pain     Subjective:   Edgardo Sims is a 66 y.o. male here today for follow up of rheumatological issues    This is a follow-up visit for this patient who we see in this clinic for a diagnosis of serologically negative rheumatoid arthritis with hand and feet x-rays done in 2017 indicating erosive arthritis.  Patient comes in today with 3-month history of a rash on arms and chest, states he also has dandruff in his beard, wonders what the rash is about.  Patient denies any family history of psoriasis or eczema.  Patient states that he does have a dermatology appointment coming up in about 2 weeks.  Patient has tried over-the-counter corticosteroid ointment with some benefit but forgot to use it on his recent vacation and now the rash has returned. Of note, recent ESR =5 6/2022    Patient currently on Rinvoq 15 mg p.o. daily however patient states he is not sure Rinvoq is helping, we also rereviewed the risks of Rinvoq in terms of M ACE and VTE, patient is concerned about the MACEaspect and would like to switch off of the Rinvoq to an alternative treatment option.  Patient also continues Plaquenil at 200 mg p.o. twice daily. Of note last ophthalmology evaluation May 2022 with Dr. Hall.      Additional comorbidities include ureteral blockage and BPH.  Patient also with a history of Dupuytren's contracture with release, also history of hypercholesterolemia.  Patient also with a history of osteopenia and aortic valve insufficiency followed periodically by echocardiograms.  Patient also with rotator cuff tears in left shoulder status post surgical intervention.     Of note patient does take diazepam on a regular basis as this is considered a high risk medication we will be unable to prescribe any narcotic pain medications for this patient in this clinic.     Bilateral AUTUMN  Left TKA     S/p Rinvoq-stopped secondary to concern of exacerbation of CAD  S/p Remicade-ineffective  S/p  Orencia-ineffective  S/p Enbrel-ineffective  S/p humira-ineffective  S/p MTX-oral ulcers  S/p arava-bad reaction but patient doesn't recall specifics  S/p rituximab-helped but patient stopped because of methotrexate side effects per patient report....        HBsAg/HBcAb neg 5/2021  HCV neg 5/2021  Quantiferon Gold neg 5/2021  G6PD 12.9 nl 6/2020   Uric acid 4.5 nl 2/2019   Cryoglobulin neg 8/2017  RF neg 8/2017, RF neg 2/2019  CCP neg 2/2019  DEXA 9/5/2017 T scores -0.2, -1.5  FRAX 9/5/2017 not done  DEXA 6/12/2020 T scores 0.3, -1.2  FRAX 6/12/2020 not done      Hand x-rays 5/2017-indicates erosive arthritis  Hand x-rays 6/2021-IMPRESSION:  1.  There is been slight interval progression of arthropathy as discussed above predominantly involving the right hand 2nd and 3rd MCP joints and left hand 3rd MCP joint with lesser involvement of the IP joints and carpal bones which could be consistent with an inflammatory arthropathy such as rheumatoid arthritis.  2.  There is degenerative type change of osteoarthritis in the 1st CMC joints, left greater than right.     Feet x-rays 5/2017-indicates erosive arthritis   Feet x-rays 6/2021-IMPRESSION:  1.  There is no radiographic evidence of an erosive arthropathy.  2.  There is predominantly degenerative type change in the IP joints and 1st tarsometatarsal junctions, right greater than left.    Echocardiogram 7/2022-CONCLUSIONS  Compared to the images of the prior study 11/11/2019, there has been no   significant change.   Normal left ventricular systolic function.  The left ventricular ejection fraction is visually estimated to be 70%.  Normal diastolic function.  Normal right ventricular size and systolic function.  Moderate aortic insufficiency.     Corticosteroid Therapy Informed Consent signed 2/21/2019-copy given to patient       Current Outpatient Medications   Medication Sig Dispense Refill    clobetasol (TEMOVATE) 0.05 % Cream Apply thin layer cream to rash bid prn 100  g 1    Guselkumab 100 MG/ML Solution Pen-injector 100 mg SQ week 0, week 4 then every 8 weeks therafter 3 mL 1    methylPREDNISolone (MEDROL) 4 MG Tab 6 tabs po one day then 5 tabs po one day then 4 tabs po one day then 3 tabs po one day then 2 tabs po one day then 1 tab po for one day 21 Tablet 0    hydroxychloroquine (PLAQUENIL) 200 MG Tab TAKE 1 TABLET BY MOUTH TWICE A  Tablet 1    oxyCODONE-acetaminophen (PERCOCET-10)  MG Tab Take 1-2 Tablets by mouth every four hours as needed for Severe Pain (refill #3 of #3.) for up to 30 days. 150 Tablet 0    gabapentin (NEURONTIN) 300 MG Cap 1 po q hs prn nerve pain 7 Capsule 0    cyclobenzaprine (FLEXERIL) 10 mg Tab TAKE ONE TABLET BY MOUTH THREE TIMES DAILY AS NEEDED FOR MILD PAIN 90 Tablet 3    lisinopril (PRINIVIL) 10 MG Tab TAKE 1 TABLET BY MOUTH EVERY DAY 90 Tablet 2    metoprolol tartrate (LOPRESSOR) 50 MG Tab Take 1 Tablet by mouth 2 times a day. 180 Tablet 3    pravastatin (PRAVACHOL) 80 MG tablet Take 1 Tablet by mouth every day. 90 Tablet 0    PARoxetine (PAXIL) 20 MG Tab TAKE 1 TABLET BY MOUTH EVERY DAY 90 Tablet 3    vardenafil (LEVITRA) 20 MG tablet Take 20 mg by mouth as needed.      CALCIUM-VITAMIN D PO Take 1 Tab by mouth 2 Times a Day.      ascorbic acid (ASCORBIC ACID) 500 MG Tab Take 500 mg by mouth every day.      Zinc 50 MG Cap Take 50 mg by mouth every day.      omeprazole (PRILOSEC) 20 MG CPDR Take 20 mg by mouth every day.      Fluorouracil 5 % Solution Apply 0.25 mL topically 2 times a day. 10 mL 0    gabapentin (NEURONTIN) 300 MG Cap 1 po q hs prn nerve pain 7 Capsule 0    meloxicam (MOBIC) 15 MG tablet 1 tab po qday with food prn joint pain 90 Tablet 1     No current facility-administered medications for this visit.     He  has a past medical history of Abnormal myocardial perfusion study (1/15/2014), Aortic insufficiency (7/5/2011), Arthritis, Bronchitis, CAD (coronary artery disease) mild plaque at cath in 2/14 (12/5/2014), Cancer  (HCC), Chronic use of opiate drugs therapeutic purposes (8/12/2017), Dental disorder, Dyslipidemia (7/12/2011), Heart burn, Heart murmur, Hiatus hernia syndrome, High cholesterol, HTN (hypertension) (7/5/2011), Hypertension, Indigestion, Infectious disease, Pain, Pain, Rheumatoid arthritis(714.0), and Tachycardia (10/20/2014).    ROS   Other than what is mentioned in HPI or physical exam, there is no history of headaches, double vision or blurred vision. No temporal tenderness or jaw claudication. No trouble swallowing difficulties or sore throats.  No chest complaints including chest pain, cough or sputum production. No GI complaints including nausea, vomiting, change in bowel habits, or past peptic ulcer disease. No history of blood in the stools. No urinary complaints including dysuria or frequency. No history of alopecia, photosensitivity, oral ulcerations, Raynaud's phenomena.       Objective:     /60   Pulse 82   Temp 36.3 °C (97.3 °F) (Temporal)   Resp 14   Wt 86.2 kg (190 lb)   SpO2 98%  Body mass index is 27.26 kg/m².   Physical Exam:    Constitutional: Alert and oriented X3, patient is talkative with good eye contact.Skin: Warm, dry, good turgor, there is a eczematous flaky type rash without definitive pattern on bilateral upper extremities, back and chest, patient states there is dandruff in his beard as well no ulcerations, no blistering no papules no nodules.Eye: Equal, round and reactive, conjunctiva clear, lids normal EOM intactENMT: Lips without lesions, good dentition, no oropharyngeal ulcers, moist buccal mucosa, pinna without deformityNeck: Trachea midline, no masses, no thyromegaly.Lymph:  No cervical lymphadenopathy, no axillary lymphadenopathy, no supraclavicular lymphadenopathyRespiratory: Unlabored respiratory effort, lungs clear to auscultation, no wheezes, no ronchi.Cardiovascular: Normal S1, S2, positive 2 out of 6 systolic murmur heard at the right sternal border.Abdomen: Soft,  non-tender, no masses, no hepatosplenomegaly.Psych: Alert and oriented x3, normal affect and mood.Neuro: Cranial nerves 2-12 are grossly intact, no loss of sensation LEExt:no joint laxity noted in bilateral arms, no joint laxity noted in, bilateral legs, some tenderness of joints as indicated below but no lolita synovitis, patient still has a slight crossover toe of the right second toe over great toe, knees with crepitus but no synovitis, hands without any swan-neck or boutonniere deformities no sausage digits, shoulders full range of motion without limitations, elbows without flexion contractures no nodules no tophi      Lab Results   Component Value Date/Time    QNTTBGOLD Negative 06/01/2017 01:13 PM     Lab Results   Component Value Date/Time    HEPBCORTOT Negative 06/01/2017 01:13 PM    HEPBCORIGM Non-Reactive 05/28/2021 01:24 PM    HEPBSAG Non-Reactive 05/28/2021 01:24 PM     Lab Results   Component Value Date/Time    HEPCAB Non-Reactive 05/28/2021 01:24 PM     Lab Results   Component Value Date/Time    SODIUM 135 06/30/2022 12:02 PM    POTASSIUM 5.3 06/30/2022 12:02 PM    CHLORIDE 100 06/30/2022 12:02 PM    CO2 25 06/30/2022 12:02 PM    GLUCOSE 90 06/30/2022 12:02 PM    BUN 9 06/30/2022 12:02 PM    CREATININE 0.90 06/30/2022 12:02 PM    CREATININE 1.1 04/21/2009 03:50 PM    BUNCREATRAT 13 09/21/2016 09:21 AM      Lab Results   Component Value Date/Time    WBC 14.1 (H) 06/30/2022 12:02 PM    WBC 7.5 07/01/2011 10:30 AM    RBC 5.09 06/30/2022 12:02 PM    RBC 4.72 07/01/2011 10:30 AM    HEMOGLOBIN 16.9 06/30/2022 12:02 PM    HEMATOCRIT 49.8 06/30/2022 12:02 PM    MCV 97.8 06/30/2022 12:02 PM     (H) 07/01/2011 10:30 AM    MCH 33.2 (H) 06/30/2022 12:02 PM    MCH 36.0 (H) 07/01/2011 10:30 AM    MCHC 33.9 06/30/2022 12:02 PM    MPV 10.3 06/30/2022 12:02 PM    NEUTSPOLYS 62.40 06/30/2022 12:02 PM    LYMPHOCYTES 11.90 (L) 06/30/2022 12:02 PM    MONOCYTES 12.30 06/30/2022 12:02 PM    EOSINOPHILS 9.80 (H)  06/30/2022 12:02 PM    BASOPHILS 0.50 06/30/2022 12:02 PM    HYPOCHROMIA 1+ 03/05/2014 04:43 PM    ANISOCYTOSIS 1+ 01/02/2015 05:02 PM      Lab Results   Component Value Date/Time    CALCIUM 9.0 06/30/2022 12:02 PM    ASTSGOT 34 06/30/2022 12:02 PM    ALTSGPT 21 06/30/2022 12:02 PM    ALKPHOSPHAT 73 06/30/2022 12:02 PM    TBILIRUBIN 0.4 06/30/2022 12:02 PM    ALBUMIN 4.3 06/30/2022 12:02 PM    TOTPROTEIN 6.3 06/30/2022 12:02 PM     Lab Results   Component Value Date/Time    URICACID 4.5 02/19/2019 08:36 AM    RHEUMFACTN 10 02/19/2019 08:36 AM    CCPANTIBODY 2 02/19/2019 08:36 AM     Lab Results   Component Value Date/Time    CRYOGLOBULIN NEG 72Hour 08/04/2017 02:32 PM     Lab Results   Component Value Date/Time    SEDRATEWES 5 06/30/2022 12:02 PM     Lab Results   Component Value Date/Time    HBA1C 5.3 07/26/2018 11:19 AM     Lab Results   Component Value Date/Time    G6PD 12.9 06/18/2020 02:50 PM     Lab Results   Component Value Date/Time    CPKTOTAL 141 01/26/2018 07:47 AM     Lab Results   Component Value Date/Time    PTHINTACT 55 10/10/2008 10:42 AM         Assessment and Plan:     1. Psoriatic arthritis (HCC)  Patient is serologically negative, query now if patient has a full definition of psoriatic arthritis with element of this rash, patient does have a dermatology appointment in about 2 weeks.  Patient would like to switch off of Rinvoq because of M ACE concerns, long discussion with patient we opted to do a trial of Tremfya 100 mg subcu week 0, week 4 and then every 8 weeks thereafter.  As we are going to stop the Rinvoq, we will do a Medrol Dosepak as a bridging medication therapy until we can get approval for Tremfya  - Guselkumab 100 MG/ML Solution Pen-injector; 100 mg SQ week 0, week 4 then every 8 weeks therafter  Dispense: 3 mL; Refill: 1  - methylPREDNISolone (MEDROL) 4 MG Tab; 6 tabs po one day then 5 tabs po one day then 4 tabs po one day then 3 tabs po one day then 2 tabs po one day then 1 tab po  for one day  Dispense: 21 Tablet; Refill: 0    2. Rash  Patient has an appoint with dermatology in about 2 weeks, query if this might be a psoriasiform type rash, will do trial of clobetasol topically twice daily as needed  - clobetasol (TEMOVATE) 0.05 % Cream; Apply thin layer cream to rash bid prn  Dispense: 100 g; Refill: 1    3. Encounter for long-term (current) use of high-risk medication  Stop Rinvoq, start Tremfya 100 mg subcu weeks 0, 4 and then every 8 weeks thereafter, screening labs are up-to-date, next screening labs due May 2023, patient needs monitoring labs every 6 months, next labs due about December 2022 will order at next visit  Printed off information regarding Tremfya from up-to-date and given to patient to review today, We reviewed risks of biological medications with patient including hematological pathology, cancer risks, neurological and infection issues especially in the Covid-19 pandemic environment, patient states understanding.    4. Long-term use of Plaquenil  Currently on Plaquenil at 200 mg p.o. twice daily of note G6PD levels are adequate, patient does need monitoring labs every 6 months, next labs due December 2023, will order at next visit, patient also needs ophthalmology evaluation every year, next ophthalmology evaluation due about May 2023    5. Encounter for long-term (current) use of NSAIDs  Patient takes meloxicam as needed, however while on Medrol advised patient not to take NSAIDs as the combination can exacerbate stomach ulcers, patient states understanding, while on NSAIDs patient does need monitoring labs every 6 months, next labs due by December 2022, will order at next visit    6. Essential hypertension  May impact the type of medications we can use for this patient's arthritis. We will have to keep this under advisement.    7. Coronary artery disease due to calcified coronary lesion: Mildly cardiac catheterization in 2014  Followed by cardiology    8. Alcohol use  May  impact the type of medications we can use for this patient's arthritis. We will have to keep this under advisement.    Followup: Return in about 2 months (around 10/16/2022). or sooner peyman Sims  was seen 30 minutes face-to-face of which more than 50% of the time was spent counseling the patient (excluding time for procedures)  regarding  rheumatological condition and care. Therapy was discussed in detail.      Please note that this dictation was created using voice recognition software. I have made every reasonable attempt to correct obvious errors, but I expect that there are errors of grammar and possibly content that I did not discover before finalizing the note.

## 2022-08-17 NOTE — TELEPHONE ENCOUNTER
Requesting prior authorization for: Tremfya  PA Outcome: APPROVED  Quantity: #3/90  Insurance:  [employer] part D  Reference #?[id number]  Dates in effect: 08/16/2022 - further notice  Pharmacy and phone number:       Patient Copay:  $[id number] - sent patient myChart msg regarding copay & available FA options.     LVM to follow up.

## 2022-08-18 DIAGNOSIS — R21 RASH: ICD-10-CM

## 2022-08-18 RX ORDER — CLOBETASOL PROPIONATE 0.5 MG/G
CREAM TOPICAL
Qty: 60 G | Refills: 3 | Status: SHIPPED | OUTPATIENT
Start: 2022-08-18 | End: 2022-12-22

## 2022-08-18 NOTE — TELEPHONE ENCOUNTER
The 100 gr tube is going to be 300$    Please change to 60gr tube w extra refills and switch the pharmacy to [facility] so he can use the [employer] card and get it for approx 21.$      Thank you

## 2022-08-19 NOTE — TELEPHONE ENCOUNTER
No response from patient, released out to [facility] for processing.. [facility]. Will follow up as appropriate.

## 2022-08-31 ENCOUNTER — APPOINTMENT (RX ONLY)
Dept: URBAN - METROPOLITAN AREA CLINIC 6 | Facility: CLINIC | Age: 67
Setting detail: DERMATOLOGY
End: 2022-08-31

## 2022-08-31 DIAGNOSIS — R21 RASH AND OTHER NONSPECIFIC SKIN ERUPTION: ICD-10-CM

## 2022-08-31 PROCEDURE — ? COUNSELING

## 2022-08-31 PROCEDURE — 99214 OFFICE O/P EST MOD 30 MIN: CPT

## 2022-08-31 PROCEDURE — ? MEDICATION COUNSELING

## 2022-08-31 PROCEDURE — ? REFERRAL

## 2022-08-31 PROCEDURE — ? PRESCRIPTION

## 2022-08-31 PROCEDURE — ? ADDITIONAL NOTES

## 2022-08-31 PROCEDURE — ? DIAGNOSIS COMMENT

## 2022-08-31 RX ORDER — TRIAMCINOLONE ACETONIDE 1 MG/G
1 OINTMENT TOPICAL BID
Qty: 453.6 | Refills: 1 | Status: ERX | COMMUNITY
Start: 2022-08-31

## 2022-08-31 RX ORDER — TACROLIMUS 1 MG/G
1 OINTMENT TOPICAL BID
Qty: 60 | Refills: 2 | Status: ERX | COMMUNITY
Start: 2022-08-31

## 2022-08-31 RX ADMIN — TACROLIMUS 1: 1 OINTMENT TOPICAL at 00:00

## 2022-08-31 RX ADMIN — TRIAMCINOLONE ACETONIDE 1: 1 OINTMENT TOPICAL at 00:00

## 2022-08-31 ASSESSMENT — LOCATION ZONE DERM
LOCATION ZONE: LEG
LOCATION ZONE: TRUNK
LOCATION ZONE: ARM

## 2022-08-31 ASSESSMENT — LOCATION DETAILED DESCRIPTION DERM
LOCATION DETAILED: LEFT POSTERIOR SHOULDER
LOCATION DETAILED: RIGHT POSTERIOR SHOULDER
LOCATION DETAILED: STERNUM
LOCATION DETAILED: LEFT PROXIMAL CALF
LOCATION DETAILED: RIGHT DISTAL CALF
LOCATION DETAILED: LEFT MEDIAL UPPER BACK
LOCATION DETAILED: LEFT ANTERIOR PROXIMAL THIGH
LOCATION DETAILED: RIGHT ANTERIOR PROXIMAL THIGH
LOCATION DETAILED: RIGHT DISTAL POSTERIOR THIGH
LOCATION DETAILED: LEFT ANTECUBITAL SKIN
LOCATION DETAILED: LEFT DISTAL POSTERIOR THIGH
LOCATION DETAILED: RIGHT ANTECUBITAL SKIN

## 2022-08-31 ASSESSMENT — LOCATION SIMPLE DESCRIPTION DERM
LOCATION SIMPLE: LEFT UPPER BACK
LOCATION SIMPLE: CHEST
LOCATION SIMPLE: RIGHT POSTERIOR THIGH
LOCATION SIMPLE: LEFT POSTERIOR THIGH
LOCATION SIMPLE: LEFT ELBOW
LOCATION SIMPLE: LEFT SHOULDER
LOCATION SIMPLE: RIGHT ELBOW
LOCATION SIMPLE: LEFT CALF
LOCATION SIMPLE: LEFT THIGH
LOCATION SIMPLE: RIGHT SHOULDER
LOCATION SIMPLE: RIGHT CALF
LOCATION SIMPLE: RIGHT THIGH

## 2022-08-31 ASSESSMENT — BSA RASH: BSA RASH: 60

## 2022-08-31 NOTE — PROCEDURE: DIAGNOSIS COMMENT
Render Risk Assessment In Note?: no
Comment: Appears to hypersensitivity rxn. Hives
Detail Level: Simple

## 2022-08-31 NOTE — PROCEDURE: MEDICATION COUNSELING
Thalidomide Counseling: I discussed with the patient the risks of thalidomide including but not limited to birth defects, anxiety, weakness, chest pain, dizziness, cough and severe allergy.
Rinvoq Counseling: I discussed with the patient the risks of Rinvoq therapy including but not limited to upper respiratory tract infections, shingles, cold sores, bronchitis, nausea, cough, fever, acne, and headache. Live vaccines should be avoided.  This medication has been linked to serious infections; higher rate of mortality; malignancy and lymphoproliferative disorders; major adverse cardiovascular events; thrombosis; thrombocytopenia, anemia, and neutropenia; lipid elevations; liver enzyme elevations; and gastrointestinal perforations.
Acitretin Pregnancy And Lactation Text: This medication is Pregnancy Category X and should not be given to women who are pregnant or may become pregnant in the future. This medication is excreted in breast milk.
Mirvaso Counseling: Mirvaso is a topical medication which can decrease superficial blood flow where applied. Side effects are uncommon and include stinging, redness and allergic reactions.
Eucrisa Pregnancy And Lactation Text: This medication has not been assigned a Pregnancy Risk Category but animal studies failed to show danger with the topical medication. It is unknown if the medication is excreted in breast milk.
Griseofulvin Counseling:  I discussed with the patient the risks of griseofulvin including but not limited to photosensitivity, cytopenia, liver damage, nausea/vomiting and severe allergy.  The patient understands that this medication is best absorbed when taken with a fatty meal (e.g., ice cream or french fries).
Cyclosporine Pregnancy And Lactation Text: This medication is Pregnancy Category C and it isn't know if it is safe during pregnancy. This medication is excreted in breast milk.
Oxybutynin Pregnancy And Lactation Text: This medication is Pregnancy Category B and is considered safe during pregnancy. It is unknown if it is excreted in breast milk.
Cibinqo Counseling: I discussed with the patient the risks of Cibinqo therapy including but not limited to common cold, nausea, headache, cold sores, increased blood CPK levels, dizziness, UTIs, fatigue, acne, and vomitting. Live vaccines should be avoided.  This medication has been linked to serious infections; higher rate of mortality; malignancy and lymphoproliferative disorders; major adverse cardiovascular events; thrombosis; thrombocytopenia and lymphopenia; lipid elevations; and retinal detachment.
Skyrizi Pregnancy And Lactation Text: The risk during pregnancy and breastfeeding is uncertain with this medication.
Aklief Pregnancy And Lactation Text: It is unknown if this medication is safe to use during pregnancy.  It is unknown if this medication is excreted in breast milk.  Breastfeeding women should use the topical cream on the smallest area of the skin for the shortest time needed while breastfeeding.  Do not apply to nipple and areola.
Enbrel Pregnancy And Lactation Text: This medication is Pregnancy Category B and is considered safe during pregnancy. It is unknown if this medication is excreted in breast milk.
Dapsone Counseling: I discussed with the patient the risks of dapsone including but not limited to hemolytic anemia, agranulocytosis, rashes, methemoglobinemia, kidney failure, peripheral neuropathy, headaches, GI upset, and liver toxicity.  Patients who start dapsone require monitoring including baseline LFTs and weekly CBCs for the first month, then every month thereafter.  The patient verbalized understanding of the proper use and possible adverse effects of dapsone.  All of the patient's questions and concerns were addressed.
Nsaids Counseling: NSAID Counseling: I discussed with the patient that NSAIDs should be taken with food. Prolonged use of NSAIDs can result in the development of stomach ulcers.  Patient advised to stop taking NSAIDs if abdominal pain occurs.  The patient verbalized understanding of the proper use and possible adverse effects of NSAIDs.  All of the patient's questions and concerns were addressed.
Doxycycline Pregnancy And Lactation Text: This medication is Pregnancy Category D and not consider safe during pregnancy. It is also excreted in breast milk but is considered safe for shorter treatment courses.
Rhofade Counseling: Rhofade is a topical medication which can decrease superficial blood flow where applied. Side effects are uncommon and include stinging, redness and allergic reactions.
Gabapentin Pregnancy And Lactation Text: This medication is Pregnancy Category C and isn't considered safe during pregnancy. It is excreted in breast milk.
Azithromycin Counseling:  I discussed with the patient the risks of azithromycin including but not limited to GI upset, allergic reaction, drug rash, diarrhea, and yeast infections.
Rifampin Counseling: I discussed with the patient the risks of rifampin including but not limited to liver damage, kidney damage, red-orange body fluids, nausea/vomiting and severe allergy.
Mirvaso Pregnancy And Lactation Text: This medication has not been assigned a Pregnancy Risk Category. It is unknown if the medication is excreted in breast milk.
Xolair Pregnancy And Lactation Text: This medication is Pregnancy Category B and is considered safe during pregnancy. This medication is excreted in breast milk.
Hydroquinone Counseling:  Patient advised that medication may result in skin irritation, lightening (hypopigmentation), dryness, and burning.  In the event of skin irritation, the patient was advised to reduce the amount of the drug applied or use it less frequently.  Rarely, spots that are treated with hydroquinone can become darker (pseudoochronosis).  Should this occur, patient instructed to stop medication and call the office. The patient verbalized understanding of the proper use and possible adverse effects of hydroquinone.  All of the patient's questions and concerns were addressed.
Cantharidin Pregnancy And Lactation Text: The use of this medication during pregnancy or lactation is not recommended as there is insufficient data.
Stelara Counseling:  I discussed with the patient the risks of ustekinumab including but not limited to immunosuppression, malignancy, posterior leukoencephalopathy syndrome, and serious infections.  The patient understands that monitoring is required including a PPD at baseline and must alert us or the primary physician if symptoms of infection or other concerning signs are noted.
Azelaic Acid Counseling: Patient counseled that medicine may cause skin irritation and to avoid applying near the eyes.  In the event of skin irritation, the patient was advised to reduce the amount of the drug applied or use it less frequently.   The patient verbalized understanding of the proper use and possible adverse effects of azelaic acid.  All of the patient's questions and concerns were addressed.
Bexarotene Counseling:  I discussed with the patient the risks of bexarotene including but not limited to hair loss, dry lips/skin/eyes, liver abnormalities, hyperlipidemia, pancreatitis, depression/suicidal ideation, photosensitivity, drug rash/allergic reactions, hypothyroidism, anemia, leukopenia, infection, cataracts, and teratogenicity.  Patient understands that they will need regular blood tests to check lipid profile, liver function tests, white blood cell count, thyroid function tests and pregnancy test if applicable.
Thalidomide Pregnancy And Lactation Text: This medication is Pregnancy Category X and is absolutely contraindicated during pregnancy. It is unknown if it is excreted in breast milk.
Griseofulvin Pregnancy And Lactation Text: This medication is Pregnancy Category X and is known to cause serious birth defects. It is unknown if this medication is excreted in breast milk but breast feeding should be avoided.
Cibinqo Pregnancy And Lactation Text: It is unknown if this medication will adversely affect pregnancy or breast feeding.  You should not take this medication if you are currently pregnant or planning a pregnancy or while breastfeeding.
Humira Counseling:  I discussed with the patient the risks of adalimumab including but not limited to myelosuppression, immunosuppression, autoimmune hepatitis, demyelinating diseases, lymphoma, and serious infections.  The patient understands that monitoring is required including a PPD at baseline and must alert us or the primary physician if symptoms of infection or other concerning signs are noted.
Propranolol Counseling:  I discussed with the patient the risks of propranolol including but not limited to low heart rate, low blood pressure, low blood sugar, restlessness and increased cold sensitivity. They should call the office if they experience any of these side effects.
Opioid Counseling: I discussed with the patient the potential side effects of opioids including but not limited to addiction, altered mental status, and depression. I stressed avoiding alcohol, benzodiazepines, muscle relaxants and sleep aids unless specifically okayed by a physician. The patient verbalized understanding of the proper use and possible adverse effects of opioids. All of the patient's questions and concerns were addressed. They were instructed to flush the remaining pills down the toilet if they did not need them for pain.
Rinvoq Pregnancy And Lactation Text: Based on animal studies, Rinvoq may cause embryo-fetal harm when administered to pregnant women.  The medication should not be used in pregnancy.  Breastfeeding is not recommended during treatment and for 6 days after the last dose.
Albendazole Counseling:  I discussed with the patient the risks of albendazole including but not limited to cytopenia, kidney damage, nausea/vomiting and severe allergy.  The patient understands that this medication is being used in an off-label manner.
Rifampin Pregnancy And Lactation Text: This medication is Pregnancy Category C and it isn't know if it is safe during pregnancy. It is also excreted in breast milk and should not be used if you are breast feeding.
Glycopyrrolate Counseling:  I discussed with the patient the risks of glycopyrrolate including but not limited to skin rash, drowsiness, dry mouth, difficulty urinating, and blurred vision.
Methotrexate Counseling:  Patient counseled regarding adverse effects of methotrexate including but not limited to nausea, vomiting, abnormalities in liver function tests. Patients may develop mouth sores, rash, diarrhea, and abnormalities in blood counts. The patient understands that monitoring is required including LFT's and blood counts.  There is a rare possibility of scarring of the liver and lung problems that can occur when taking methotrexate. Persistent nausea, loss of appetite, pale stools, dark urine, cough, and shortness of breath should be reported immediately. Patient advised to discontinue methotrexate treatment at least three months before attempting to become pregnant.  I discussed the need for folate supplements while taking methotrexate.  These supplements can decrease side effects during methotrexate treatment. The patient verbalized understanding of the proper use and possible adverse effects of methotrexate.  All of the patient's questions and concerns were addressed.
Nsaids Pregnancy And Lactation Text: These medications are considered safe up to 30 weeks gestation. It is excreted in breast milk.
Itraconazole Counseling:  I discussed with the patient the risks of itraconazole including but not limited to liver damage, nausea/vomiting, neuropathy, and severe allergy.  The patient understands that this medication is best absorbed when taken with acidic beverages such as non-diet cola or ginger ale.  The patient understands that monitoring is required including baseline LFTs and repeat LFTs at intervals.  The patient understands that they are to contact us or the primary physician if concerning signs are noted.
Dapsone Pregnancy And Lactation Text: This medication is Pregnancy Category C and is not considered safe during pregnancy or breast feeding.
Erythromycin Counseling:  I discussed with the patient the risks of erythromycin including but not limited to GI upset, allergic reaction, drug rash, diarrhea, increase in liver enzymes, and yeast infections.
Opzelura Counseling:  I discussed with the patient the risks of Opzelura including but not limited to nasopharngitis, bronchitis, ear infection, eosinophila, hives, diarrhea, folliculitis, tonsillitis, and rhinorrhea.  Taken orally, this medication has been linked to serious infections; higher rate of mortality; malignancy and lymphoproliferative disorders; major adverse cardiovascular events; thrombosis; thrombocytopenia, anemia, and neutropenia; and lipid elevations.
Azithromycin Pregnancy And Lactation Text: This medication is considered safe during pregnancy and is also secreted in breast milk.
Tranexamic Acid Counseling:  Patient advised of the small risk of bleeding problems with tranexamic acid. They were also instructed to call if they developed any nausea, vomiting or diarrhea. All of the patient's questions and concerns were addressed.
Topical Ketoconazole Counseling: Patient counseled that this medication may cause skin irritation or allergic reactions.  In the event of skin irritation, the patient was advised to reduce the amount of the drug applied or use it less frequently.   The patient verbalized understanding of the proper use and possible adverse effects of ketoconazole.  All of the patient's questions and concerns were addressed.
5-Fu Counseling: 5-Fluorouracil Counseling:  I discussed with the patient the risks of 5-fluorouracil including but not limited to erythema, scaling, itching, weeping, crusting, and pain.
Azelaic Acid Pregnancy And Lactation Text: This medication is considered safe during pregnancy and breast feeding.
Rituxan Counseling:  I discussed with the patient the risks of Rituxan infusions. Side effects can include infusion reactions, severe drug rashes including mucocutaneous reactions, reactivation of latent hepatitis and other infections and rarely progressive multifocal leukoencephalopathy.  All of the patient's questions and concerns were addressed.
Bexarotene Pregnancy And Lactation Text: This medication is Pregnancy Category X and should not be given to women who are pregnant or may become pregnant. This medication should not be used if you are breast feeding.
Opioid Pregnancy And Lactation Text: These medications can lead to premature delivery and should be avoided during pregnancy. These medications are also present in breast milk in small amounts.
Albendazole Pregnancy And Lactation Text: This medication is Pregnancy Category C and it isn't known if it is safe during pregnancy. It is also excreted in breast milk.
Propranolol Pregnancy And Lactation Text: This medication is Pregnancy Category C and it isn't known if it is safe during pregnancy. It is excreted in breast milk.
Methotrexate Pregnancy And Lactation Text: This medication is Pregnancy Category X and is known to cause fetal harm. This medication is excreted in breast milk.
Azathioprine Counseling:  I discussed with the patient the risks of azathioprine including but not limited to myelosuppression, immunosuppression, hepatotoxicity, lymphoma, and infections.  The patient understands that monitoring is required including baseline LFTs, Creatinine, possible TPMP genotyping and weekly CBCs for the first month and then every 2 weeks thereafter.  The patient verbalized understanding of the proper use and possible adverse effects of azathioprine.  All of the patient's questions and concerns were addressed.
Sarecycline Counseling: Patient advised regarding possible photosensitivity and discoloration of the teeth, skin, lips, tongue and gums.  Patient instructed to avoid sunlight, if possible.  When exposed to sunlight, patients should wear protective clothing, sunglasses, and sunscreen.  The patient was instructed to call the office immediately if the following severe adverse effects occur:  hearing changes, easy bruising/bleeding, severe headache, or vision changes.  The patient verbalized understanding of the proper use and possible adverse effects of sarecycline.  All of the patient's questions and concerns were addressed.
Cimzia Counseling:  I discussed with the patient the risks of Cimzia including but not limited to immunosuppression, allergic reactions and infections.  The patient understands that monitoring is required including a PPD at baseline and must alert us or the primary physician if symptoms of infection or other concerning signs are noted.
Glycopyrrolate Pregnancy And Lactation Text: This medication is Pregnancy Category B and is considered safe during pregnancy. It is unknown if it is excreted breast milk.
Odomzo Counseling- I discussed with the patient the risks of Odomzo including but not limited to nausea, vomiting, diarrhea, constipation, weight loss, changes in the sense of taste, decreased appetite, muscle spasms, and hair loss.  The patient verbalized understanding of the proper use and possible adverse effects of Odomzo.  All of the patient's questions and concerns were addressed.
Bactrim Counseling:  I discussed with the patient the risks of sulfa antibiotics including but not limited to GI upset, allergic reaction, drug rash, diarrhea, dizziness, photosensitivity, and yeast infections.  Rarely, more serious reactions can occur including but not limited to aplastic anemia, agranulocytosis, methemoglobinemia, blood dyscrasias, liver or kidney failure, lung infiltrates or desquamative/blistering drug rashes.
Dutasteride Male Counseling: Dustasteride Counseling:  I discussed with the patient the risks of use of dutasteride including but not limited to decreased libido, decreased ejaculate volume, and gynecomastia. Women who can become pregnant should not handle medication.  All of the patient's questions and concerns were addressed.
Itraconazole Pregnancy And Lactation Text: This medication is Pregnancy Category C and it isn't know if it is safe during pregnancy. It is also excreted in breast milk.
Erythromycin Pregnancy And Lactation Text: This medication is Pregnancy Category B and is considered safe during pregnancy. It is also excreted in breast milk.
Opzelura Pregnancy And Lactation Text: There is insufficient data to evaluate drug-associated risk for major birth defects, miscarriage, or other adverse maternal or fetal outcomes.  There is a pregnancy registry that monitors pregnancy outcomes in pregnant persons exposed to the medication during pregnancy.  It is unknown if this medication is excreted in breast milk.  Do not breastfeed during treatment and for about 4 weeks after the last dose.
Tranexamic Acid Pregnancy And Lactation Text: It is unknown if this medication is safe during pregnancy or breast feeding.
Ilumya Counseling: I discussed with the patient the risks of tildrakizumab including but not limited to immunosuppression, malignancy, posterior leukoencephalopathy syndrome, and serious infections.  The patient understands that monitoring is required including a PPD at baseline and must alert us or the primary physician if symptoms of infection or other concerning signs are noted.
Imiquimod Counseling:  I discussed with the patient the risks of imiquimod including but not limited to erythema, scaling, itching, weeping, crusting, and pain.  Patient understands that the inflammatory response to imiquimod is variable from person to person and was educated regarded proper titration schedule.  If flu-like symptoms develop, patient knows to discontinue the medication and contact us.
Solaraze Counseling:  I discussed with the patient the risks of Solaraze including but not limited to erythema, scaling, itching, weeping, crusting, and pain.
5-Fu Pregnancy And Lactation Text: This medication is Pregnancy Category X and contraindicated in pregnancy and in women who may become pregnant. It is unknown if this medication is excreted in breast milk.
Taltz Counseling: I discussed with the patient the risks of ixekizumab including but not limited to immunosuppression, serious infections, worsening of inflammatory bowel disease and drug reactions.  The patient understands that monitoring is required including a PPD at baseline and must alert us or the primary physician if symptoms of infection or other concerning signs are noted.
Ivermectin Counseling:  Patient instructed to take medication on an empty stomach with a full glass of water.  Patient informed of potential adverse effects including but not limited to nausea, diarrhea, dizziness, itching, and swelling of the extremities or lymph nodes.  The patient verbalized understanding of the proper use and possible adverse effects of ivermectin.  All of the patient's questions and concerns were addressed.
Benzoyl Peroxide Counseling: Patient counseled that medicine may cause skin irritation and bleach clothing.  In the event of skin irritation, the patient was advised to reduce the amount of the drug applied or use it less frequently.   The patient verbalized understanding of the proper use and possible adverse effects of benzoyl peroxide.  All of the patient's questions and concerns were addressed.
Rituxan Pregnancy And Lactation Text: This medication is Pregnancy Category C and it isn't know if it is safe during pregnancy. It is unknown if this medication is excreted in breast milk but similar antibodies are known to be excreted.
Isotretinoin Counseling: Patient should get monthly blood tests, not donate blood, not drive at night if vision affected, not share medication, and not undergo elective surgery for 6 months after tx completed. Side effects reviewed, pt to contact office should one occur.
Cimzia Pregnancy And Lactation Text: This medication crosses the placenta but can be considered safe in certain situations. Cimzia may be excreted in breast milk.
Birth Control Pills Counseling: Birth Control Pill Counseling: I discussed with the patient the potential side effects of OCPs including but not limited to increased risk of stroke, heart attack, thrombophlebitis, deep venous thrombosis, hepatic adenomas, breast changes, GI upset, headaches, and depression.  The patient verbalized understanding of the proper use and possible adverse effects of OCPs. All of the patient's questions and concerns were addressed.
Azathioprine Pregnancy And Lactation Text: This medication is Pregnancy Category D and isn't considered safe during pregnancy. It is unknown if this medication is excreted in breast milk.
Metronidazole Counseling:  I discussed with the patient the risks of metronidazole including but not limited to seizures, nausea/vomiting, a metallic taste in the mouth, nausea/vomiting and severe allergy.
Dutasteride Pregnancy And Lactation Text: This medication is absolutely contraindicated in women, especially during pregnancy and breast feeding. Feminization of male fetuses is possible if taking while pregnant.
Hydroxychloroquine Counseling:  I discussed with the patient that a baseline ophthalmologic exam is needed at the start of therapy and every year thereafter while on therapy. A CBC may also be warranted for monitoring.  The side effects of this medication were discussed with the patient, including but not limited to agranulocytosis, aplastic anemia, seizures, rashes, retinopathy, and liver toxicity. Patient instructed to call the office should any adverse effect occur.  The patient verbalized understanding of the proper use and possible adverse effects of Plaquenil.  All the patient's questions and concerns were addressed.
Arava Counseling:  Patient counseled regarding adverse effects of Arava including but not limited to nausea, vomiting, abnormalities in liver function tests. Patients may develop mouth sores, rash, diarrhea, and abnormalities in blood counts. The patient understands that monitoring is required including LFTs and blood counts.  There is a rare possibility of scarring of the liver and lung problems that can occur when taking methotrexate. Persistent nausea, loss of appetite, pale stools, dark urine, cough, and shortness of breath should be reported immediately. Patient advised to discontinue Arava treatment and consult with a physician prior to attempting conception. The patient will have to undergo a treatment to eliminate Arava from the body prior to conception.
Bactrim Pregnancy And Lactation Text: This medication is Pregnancy Category D and is known to cause fetal risk.  It is also excreted in breast milk.
Solaraze Pregnancy And Lactation Text: This medication is Pregnancy Category B and is considered safe. There is some data to suggest avoiding during the third trimester. It is unknown if this medication is excreted in breast milk.
Wartpeel Counseling:  I discussed with the patient the risks of Wartpeel including but not limited to erythema, scaling, itching, weeping, crusting, and pain.
Topical Sulfur Applications Counseling: Topical Sulfur Counseling: Patient counseled that this medication may cause skin irritation or allergic reactions.  In the event of skin irritation, the patient was advised to reduce the amount of the drug applied or use it less frequently.   The patient verbalized understanding of the proper use and possible adverse effects of topical sulfur application.  All of the patient's questions and concerns were addressed.
Sarecycline Pregnancy And Lactation Text: This medication is Pregnancy Category D and not consider safe during pregnancy. It is also excreted in breast milk.
Ketoconazole Counseling:   Patient counseled regarding improving absorption with orange juice.  Adverse effects include but are not limited to breast enlargement, headache, diarrhea, nausea, upset stomach, liver function test abnormalities, taste disturbance, and stomach pain.  There is a rare possibility of liver failure that can occur when taking ketoconazole. The patient understands that monitoring of LFTs may be required, especially at baseline. The patient verbalized understanding of the proper use and possible adverse effects of ketoconazole.  All of the patient's questions and concerns were addressed.
Isotretinoin Pregnancy And Lactation Text: This medication is Pregnancy Category X and is considered extremely dangerous during pregnancy. It is unknown if it is excreted in breast milk.
Valtrex Counseling: I discussed with the patient the risks of valacyclovir including but not limited to kidney damage, nausea, vomiting and severe allergy.  The patient understands that if the infection seems to be worsening or is not improving, they are to call.
Siliq Counseling:  I discussed with the patient the risks of Siliq including but not limited to new or worsening depression, suicidal thoughts and behavior, immunosuppression, malignancy, posterior leukoencephalopathy syndrome, and serious infections.  The patient understands that monitoring is required including a PPD at baseline and must alert us or the primary physician if symptoms of infection or other concerning signs are noted. There is also a special program designed to monitor depression which is required with Siliq.
Imiquimod Pregnancy And Lactation Text: This medication is Pregnancy Category C. It is unknown if this medication is excreted in breast milk.
Picato Counseling:  I discussed with the patient the risks of Picato including but not limited to erythema, scaling, itching, weeping, crusting, and pain.
Birth Control Pills Pregnancy And Lactation Text: This medication should be avoided if pregnant and for the first 30 days post-partum.
Drysol Counseling:  I discussed with the patient the risks of drysol/aluminum chloride including but not limited to skin rash, itching, irritation, burning.
Cosentyx Counseling:  I discussed with the patient the risks of Cosentyx including but not limited to worsening of Crohn's disease, immunosuppression, allergic reactions and infections.  The patient understands that monitoring is required including a PPD at baseline and must alert us or the primary physician if symptoms of infection or other concerning signs are noted.
Benzoyl Peroxide Pregnancy And Lactation Text: This medication is Pregnancy Category C. It is unknown if benzoyl peroxide is excreted in breast milk.
Oral Minoxidil Counseling- I discussed with the patient the risks of oral minoxidil including but not limited to shortness of breath, swelling of the feet or ankles, dizziness, lightheadedness, unwanted hair growth and allergic reaction.  The patient verbalized understanding of the proper use and possible adverse effects of oral minoxidil.  All of the patient's questions and concerns were addressed.
Terbinafine Pregnancy And Lactation Text: This medication is Pregnancy Category B and is considered safe during pregnancy. It is also excreted in breast milk and breast feeding isn't recommended.
Cellcept Counseling:  I discussed with the patient the risks of mycophenolate mofetil including but not limited to infection/immunosuppression, GI upset, hypokalemia, hypercholesterolemia, bone marrow suppression, lymphoproliferative disorders, malignancy, GI ulceration/bleed/perforation, colitis, interstitial lung disease, kidney failure, progressive multifocal leukoencephalopathy, and birth defects.  The patient understands that monitoring is required including a baseline creatinine and regular CBC testing. In addition, patient must alert us immediately if symptoms of infection or other concerning signs are noted.
Erivedge Counseling- I discussed with the patient the risks of Erivedge including but not limited to nausea, vomiting, diarrhea, constipation, weight loss, changes in the sense of taste, decreased appetite, muscle spasms, and hair loss.  The patient verbalized understanding of the proper use and possible adverse effects of Erivedge.  All of the patient's questions and concerns were addressed.
Topical Retinoid counseling:  Patient advised to apply a pea-sized amount only at bedtime and wait 30 minutes after washing their face before applying.  If too drying, patient may add a non-comedogenic moisturizer. The patient verbalized understanding of the proper use and possible adverse effects of retinoids.  All of the patient's questions and concerns were addressed.
Hydroxychloroquine Pregnancy And Lactation Text: This medication has been shown to cause fetal harm but it isn't assigned a Pregnancy Risk Category. There are small amounts excreted in breast milk.
Metronidazole Pregnancy And Lactation Text: This medication is Pregnancy Category B and considered safe during pregnancy.  It is also excreted in breast milk.
Cephalexin Counseling: I counseled the patient regarding use of cephalexin as an antibiotic for prophylactic and/or therapeutic purposes. Cephalexin (commonly prescribed under brand name Keflex) is a cephalosporin antibiotic which is active against numerous classes of bacteria, including most skin bacteria. Side effects may include nausea, diarrhea, gastrointestinal upset, rash, hives, yeast infections, and in rare cases, hepatitis, kidney disease, seizures, fever, confusion, neurologic symptoms, and others. Patients with severe allergies to penicillin medications are cautioned that there is about a 10% incidence of cross-reactivity with cephalosporins. When possible, patients with penicillin allergies should use alternatives to cephalosporins for antibiotic therapy.
Tetracycline Counseling: Patient counseled regarding possible photosensitivity and increased risk for sunburn.  Patient instructed to avoid sunlight, if possible.  When exposed to sunlight, patients should wear protective clothing, sunglasses, and sunscreen.  The patient was instructed to call the office immediately if the following severe adverse effects occur:  hearing changes, easy bruising/bleeding, severe headache, or vision changes.  The patient verbalized understanding of the proper use and possible adverse effects of tetracycline.  All of the patient's questions and concerns were addressed. Patient understands to avoid pregnancy while on therapy due to potential birth defects.
Topical Sulfur Applications Pregnancy And Lactation Text: This medication is Pregnancy Category C and has an unknown safety profile during pregnancy. It is unknown if this topical medication is excreted in breast milk.
Carac Counseling:  I discussed with the patient the risks of Carac including but not limited to erythema, scaling, itching, weeping, crusting, and pain.
Tremfya Counseling: I discussed with the patient the risks of guselkumab including but not limited to immunosuppression, serious infections, and drug reactions.  The patient understands that monitoring is required including a PPD at baseline and must alert us or the primary physician if symptoms of infection or other concerning signs are noted.
Valtrex Pregnancy And Lactation Text: this medication is Pregnancy Category B and is considered safe during pregnancy. This medication is not directly found in breast milk but it's metabolite acyclovir is present.
Ketoconazole Pregnancy And Lactation Text: This medication is Pregnancy Category C and it isn't know if it is safe during pregnancy. It is also excreted in breast milk and breast feeding isn't recommended.
High Dose Vitamin A Counseling: Side effects reviewed, pt to contact office should one occur.
Infliximab Counseling:  I discussed with the patient the risks of infliximab including but not limited to myelosuppression, immunosuppression, autoimmune hepatitis, demyelinating diseases, lymphoma, and serious infections.  The patient understands that monitoring is required including a PPD at baseline and must alert us or the primary physician if symptoms of infection or other concerning signs are noted.
Klisyri Counseling:  I discussed with the patient the risks of Klisyri including but not limited to erythema, scaling, itching, weeping, crusting, and pain.
Spironolactone Counseling: Patient advised regarding risks of diarrhea, abdominal pain, hyperkalemia, birth defects (for female patients), liver toxicity and renal toxicity. The patient may need blood work to monitor liver and kidney function and potassium levels while on therapy. The patient verbalized understanding of the proper use and possible adverse effects of spironolactone.  All of the patient's questions and concerns were addressed.
Oral Minoxidil Pregnancy And Lactation Text: This medication should only be used when clearly needed if you are pregnant, attempting to become pregnant or breast feeding.
Libtayo Counseling- I discussed with the patient the risks of Libtayo including but not limited to nausea, vomiting, diarrhea, and bone or muscle pain.  The patient verbalized understanding of the proper use and possible adverse effects of Libtayo.  All of the patient's questions and concerns were addressed.
Detail Level: Simple
Prednisone Counseling:  I discussed with the patient the risks of prolonged use of prednisone including but not limited to weight gain, insomnia, osteoporosis, mood changes, diabetes, susceptibility to infection, glaucoma and high blood pressure.  In cases where prednisone use is prolonged, patients should be monitored with blood pressure checks, serum glucose levels and an eye exam.  Additionally, the patient may need to be placed on GI prophylaxis, PCP prophylaxis, and calcium and vitamin D supplementation and/or a bisphosphonate.  The patient verbalized understanding of the proper use and the possible adverse effects of prednisone.  All of the patient's questions and concerns were addressed.
Clofazimine Counseling:  I discussed with the patient the risks of clofazimine including but not limited to skin and eye pigmentation, liver damage, nausea/vomiting, gastrointestinal bleeding and allergy.
Winlevi Counseling:  I discussed with the patient the risks of topical clascoterone including but not limited to erythema, scaling, itching, and stinging. Patient voiced their understanding.
Terbinafine Counseling: Patient counseling regarding adverse effects of terbinafine including but not limited to headache, diarrhea, rash, upset stomach, liver function test abnormalities, itching, taste/smell disturbance, nausea, abdominal pain, and flatulence.  There is a rare possibility of liver failure that can occur when taking terbinafine.  The patient understands that a baseline LFT and kidney function test may be required. The patient verbalized understanding of the proper use and possible adverse effects of terbinafine.  All of the patient's questions and concerns were addressed.
Protopic Counseling: Patient may experience a mild burning sensation during topical application. Protopic is not approved in children less than 2 years of age. There have been case reports of hematologic and skin malignancies in patients using topical calcineurin inhibitors although causality is questionable.
Cephalexin Pregnancy And Lactation Text: This medication is Pregnancy Category B and considered safe during pregnancy.  It is also excreted in breast milk but can be used safely for shorter doses.
Use Enhanced Medication Counseling?: No
Minocycline Counseling: Patient advised regarding possible photosensitivity and discoloration of the teeth, skin, lips, tongue and gums.  Patient instructed to avoid sunlight, if possible.  When exposed to sunlight, patients should wear protective clothing, sunglasses, and sunscreen.  The patient was instructed to call the office immediately if the following severe adverse effects occur:  hearing changes, easy bruising/bleeding, severe headache, or vision changes.  The patient verbalized understanding of the proper use and possible adverse effects of minocycline.  All of the patient's questions and concerns were addressed.
Klisyri Pregnancy And Lactation Text: It is unknown if this medication can harm a developing fetus or if it is excreted in breast milk.
Elidel Counseling: Patient may experience a mild burning sensation during topical application. Elidel is not approved in children less than 2 years of age. There have been case reports of hematologic and skin malignancies in patients using topical calcineurin inhibitors although causality is questionable.
Cimetidine Counseling:  I discussed with the patient the risks of Cimetidine including but not limited to gynecomastia, headache, diarrhea, nausea, drowsiness, arrhythmias, pancreatitis, skin rashes, psychosis, bone marrow suppression and kidney toxicity.
Simponi Counseling:  I discussed with the patient the risks of golimumab including but not limited to myelosuppression, immunosuppression, autoimmune hepatitis, demyelinating diseases, lymphoma, and serious infections.  The patient understands that monitoring is required including a PPD at baseline and must alert us or the primary physician if symptoms of infection or other concerning signs are noted.
High Dose Vitamin A Pregnancy And Lactation Text: High dose vitamin A therapy is contraindicated during pregnancy and breast feeding.
Spironolactone Pregnancy And Lactation Text: This medication can cause feminization of the male fetus and should be avoided during pregnancy. The active metabolite is also found in breast milk.
Dupixent Counseling: I discussed with the patient the risks of dupilumab including but not limited to eye infection and irritation, cold sores, injection site reactions, worsening of asthma, allergic reactions and increased risk of parasitic infection.  Live vaccines should be avoided while taking dupilumab. Dupilumab will also interact with certain medications such as warfarin and cyclosporine. The patient understands that monitoring is required and they must alert us or the primary physician if symptoms of infection or other concerning signs are noted.
Otezla Counseling: The side effects of Otezla were discussed with the patient, including but not limited to worsening or new depression, weight loss, diarrhea, nausea, upper respiratory tract infection, and headache. Patient instructed to call the office should any adverse effect occur.  The patient verbalized understanding of the proper use and possible adverse effects of Otezla.  All the patient's questions and concerns were addressed.
Winlevi Pregnancy And Lactation Text: This medication is considered safe during pregnancy and breastfeeding.
Finasteride Male Counseling: Finasteride Counseling:  I discussed with the patient the risks of use of finasteride including but not limited to decreased libido, decreased ejaculate volume, gynecomastia, and depression. Women should not handle medication.  All of the patient's questions and concerns were addressed.
Cyclophosphamide Counseling:  I discussed with the patient the risks of cyclophosphamide including but not limited to hair loss, hormonal abnormalities, decreased fertility, abdominal pain, diarrhea, nausea and vomiting, bone marrow suppression and infection. The patient understands that monitoring is required while taking this medication.
Libtayo Pregnancy And Lactation Text: This medication is contraindicated in pregnancy and when breast feeding.
Fluconazole Counseling:  Patient counseled regarding adverse effects of fluconazole including but not limited to headache, diarrhea, nausea, upset stomach, liver function test abnormalities, taste disturbance, and stomach pain.  There is a rare possibility of liver failure that can occur when taking fluconazole.  The patient understands that monitoring of LFTs and kidney function test may be required, especially at baseline. The patient verbalized understanding of the proper use and possible adverse effects of fluconazole.  All of the patient's questions and concerns were addressed.
Clindamycin Counseling: I counseled the patient regarding use of clindamycin as an antibiotic for prophylactic and/or therapeutic purposes. Clindamycin is active against numerous classes of bacteria, including skin bacteria. Side effects may include nausea, diarrhea, gastrointestinal upset, rash, hives, yeast infections, and in rare cases, colitis.
Protopic Pregnancy And Lactation Text: This medication is Pregnancy Category C. It is unknown if this medication is excreted in breast milk when applied topically.
Minoxidil Counseling: Minoxidil is a topical medication which can increase blood flow where it is applied. It is uncertain how this medication increases hair growth. Side effects are uncommon and include stinging and allergic reactions.
Xeljanz Counseling: I discussed with the patient the risks of Xeljanz therapy including increased risk of infection, liver issues, headache, diarrhea, or cold symptoms. Live vaccines should be avoided. They were instructed to call if they have any problems.
Tazorac Counseling:  Patient advised that medication is irritating and drying.  Patient may need to apply sparingly and wash off after an hour before eventually leaving it on overnight.  The patient verbalized understanding of the proper use and possible adverse effects of tazorac.  All of the patient's questions and concerns were addressed.
Calcipotriene Counseling:  I discussed with the patient the risks of calcipotriene including but not limited to erythema, scaling, itching, and irritation.
Adbry Counseling: I discussed with the patient the risks of tralokinumab including but not limited to eye infection and irritation, cold sores, injection site reactions, worsening of asthma, allergic reactions and increased risk of parasitic infection.  Live vaccines should be avoided while taking tralokinumab. The patient understands that monitoring is required and they must alert us or the primary physician if symptoms of infection or other concerning signs are noted.
Olumiant Counseling: I discussed with the patient the risks of Olumiant therapy including but not limited to upper respiratory tract infections, shingles, cold sores, and nausea. Live vaccines should be avoided.  This medication has been linked to serious infections; higher rate of mortality; malignancy and lymphoproliferative disorders; major adverse cardiovascular events; thrombosis; gastrointestinal perforations; neutropenia; lymphopenia; anemia; liver enzyme elevations; and lipid elevations.
Dupixent Pregnancy And Lactation Text: This medication likely crosses the placenta but the risk for the fetus is uncertain. This medication is excreted in breast milk.
SSKI Counseling:  I discussed with the patient the risks of SSKI including but not limited to thyroid abnormalities, metallic taste, GI upset, fever, headache, acne, arthralgias, paraesthesias, lymphadenopathy, easy bleeding, arrhythmias, and allergic reaction.
Cyclophosphamide Pregnancy And Lactation Text: This medication is Pregnancy Category D and it isn't considered safe during pregnancy. This medication is excreted in breast milk.
Otezla Pregnancy And Lactation Text: This medication is Pregnancy Category C and it isn't known if it is safe during pregnancy. It is unknown if it is excreted in breast milk.
Quinolones Counseling:  I discussed with the patient the risks of fluoroquinolones including but not limited to GI upset, allergic reaction, drug rash, diarrhea, dizziness, photosensitivity, yeast infections, liver function test abnormalities, tendonitis/tendon rupture.
Niacinamide Counseling: I recommended taking niacin or niacinamide, also know as vitamin B3, twice daily. Recent evidence suggests that taking vitamin B3 (500 mg twice daily) can reduce the risk of actinic keratoses and non-melanoma skin cancers. Side effects of vitamin B3 include flushing and headache.
Finasteride Pregnancy And Lactation Text: This medication is absolutely contraindicated during pregnancy. It is unknown if it is excreted in breast milk.
Zyclara Counseling:  I discussed with the patient the risks of imiquimod including but not limited to erythema, scaling, itching, weeping, crusting, and pain.  Patient understands that the inflammatory response to imiquimod is variable from person to person and was educated regarded proper titration schedule.  If flu-like symptoms develop, patient knows to discontinue the medication and contact us.
Tazorac Pregnancy And Lactation Text: This medication is not safe during pregnancy. It is unknown if this medication is excreted in breast milk.
Colchicine Counseling:  Patient counseled regarding adverse effects including but not limited to stomach upset (nausea, vomiting, stomach pain, or diarrhea).  Patient instructed to limit alcohol consumption while taking this medication.  Colchicine may reduce blood counts especially with prolonged use.  The patient understands that monitoring of kidney function and blood counts may be required, especially at baseline. The patient verbalized understanding of the proper use and possible adverse effects of colchicine.  All of the patient's questions and concerns were addressed.
Xelherminioz Pregnancy And Lactation Text: This medication is Pregnancy Category D and is not considered safe during pregnancy.  The risk during breast feeding is also uncertain.
Hydroxyzine Counseling: Patient advised that the medication is sedating and not to drive a car after taking this medication.  Patient informed of potential adverse effects including but not limited to dry mouth, urinary retention, and blurry vision.  The patient verbalized understanding of the proper use and possible adverse effects of hydroxyzine.  All of the patient's questions and concerns were addressed.
Clindamycin Pregnancy And Lactation Text: This medication can be used in pregnancy if certain situations. Clindamycin is also present in breast milk.
Doxepin Counseling:  Patient advised that the medication is sedating and not to drive a car after taking this medication. Patient informed of potential adverse effects including but not limited to dry mouth, urinary retention, and blurry vision.  The patient verbalized understanding of the proper use and possible adverse effects of doxepin.  All of the patient's questions and concerns were addressed.
Sski Pregnancy And Lactation Text: This medication is Pregnancy Category D and isn't considered safe during pregnancy. It is excreted in breast milk.
Enbrel Counseling:  I discussed with the patient the risks of etanercept including but not limited to myelosuppression, immunosuppression, autoimmune hepatitis, demyelinating diseases, lymphoma, and infections.  The patient understands that monitoring is required including a PPD at baseline and must alert us or the primary physician if symptoms of infection or other concerning signs are noted.
Eucrisa Counseling: Patient may experience a mild burning sensation during topical application. Eucrisa is not approved in children less than 2 years of age.
Qbrexza Counseling:  I discussed with the patient the risks of Qbrexza including but not limited to headache, mydriasis, blurred vision, dry eyes, nasal dryness, dry mouth, dry throat, dry skin, urinary hesitation, and constipation.  Local skin reactions including erythema, burning, stinging, and itching can also occur.
Calcipotriene Pregnancy And Lactation Text: This medication has not been proven safe during pregnancy. It is unknown if this medication is excreted in breast milk.
Skyrizi Counseling: I discussed with the patient the risks of risankizumab-rzaa including but not limited to immunosuppression, and serious infections.  The patient understands that monitoring is required including a PPD at baseline and must alert us or the primary physician if symptoms of infection or other concerning signs are noted.
Olumiant Pregnancy And Lactation Text: Based on animal studies, Olumiant may cause embryo-fetal harm when administered to pregnant women.  The medication should not be used in pregnancy.  Breastfeeding is not recommended during treatment.
Aklief counseling:  Patient advised to apply a pea-sized amount only at bedtime and wait 30 minutes after washing their face before applying.  If too drying, patient may add a non-comedogenic moisturizer.  The most commonly reported side effects including irritation, redness, scaling, dryness, stinging, burning, itching, and increased risk of sunburn.  The patient verbalized understanding of the proper use and possible adverse effects of retinoids.  All of the patient's questions and concerns were addressed.
Acitretin Counseling:  I discussed with the patient the risks of acitretin including but not limited to hair loss, dry lips/skin/eyes, liver damage, hyperlipidemia, depression/suicidal ideation, photosensitivity.  Serious rare side effects can include but are not limited to pancreatitis, pseudotumor cerebri, bony changes, clot formation/stroke/heart attack.  Patient understands that alcohol is contraindicated since it can result in liver toxicity and significantly prolong the elimination of the drug by many years.
Adbry Pregnancy And Lactation Text: It is unknown if this medication will adversely affect pregnancy or breast feeding.
Oxybutynin Counseling:  I discussed with the patient the risks of oxybutynin including but not limited to skin rash, drowsiness, dry mouth, difficulty urinating, and blurred vision.
Cyclosporine Counseling:  I discussed with the patient the risks of cyclosporine including but not limited to hypertension, gingival hyperplasia,myelosuppression, immunosuppression, liver damage, kidney damage, neurotoxicity, lymphoma, and serious infections. The patient understands that monitoring is required including baseline blood pressure, CBC, CMP, lipid panel and uric acid, and then 1-2 times monthly CMP and blood pressure.
Niacinamide Pregnancy And Lactation Text: These medications are considered safe during pregnancy.
Gabapentin Counseling: I discussed with the patient the risks of gabapentin including but not limited to dizziness, somnolence, fatigue and ataxia.
Doxycycline Counseling:  Patient counseled regarding possible photosensitivity and increased risk for sunburn.  Patient instructed to avoid sunlight, if possible.  When exposed to sunlight, patients should wear protective clothing, sunglasses, and sunscreen.  The patient was instructed to call the office immediately if the following severe adverse effects occur:  hearing changes, easy bruising/bleeding, severe headache, or vision changes.  The patient verbalized understanding of the proper use and possible adverse effects of doxycycline.  All of the patient's questions and concerns were addressed.
Qbrexza Pregnancy And Lactation Text: There is no available data on Qbrexza use in pregnant women.  There is no available data on Qbrexza use in lactation.
Hydroxyzine Pregnancy And Lactation Text: This medication is not safe during pregnancy and should not be taken. It is also excreted in breast milk and breast feeding isn't recommended.
Topical Clindamycin Counseling: Patient counseled that this medication may cause skin irritation or allergic reactions.  In the event of skin irritation, the patient was advised to reduce the amount of the drug applied or use it less frequently.   The patient verbalized understanding of the proper use and possible adverse effects of clindamycin.  All of the patient's questions and concerns were addressed.
Doxepin Pregnancy And Lactation Text: This medication is Pregnancy Category C and it isn't known if it is safe during pregnancy. It is also excreted in breast milk and breast feeding isn't recommended.
Xolair Counseling:  Patient informed of potential adverse effects including but not limited to fever, muscle aches, rash and allergic reactions.  The patient verbalized understanding of the proper use and possible adverse effects of Xolair.  All of the patient's questions and concerns were addressed.

## 2022-08-31 NOTE — PROCEDURE: ADDITIONAL NOTES
Detail Level: Simple
Additional Notes: Advised to take Zyrtec 10 mg, #2 in the a.m. and #2 in the p.m. for total #4 QD for two weeks.consider adding Pepcid 20-40mg.advised to use TAC to raised area of rash.
Render Risk Assessment In Note?: no
complains of pain/discomfort

## 2022-09-20 ENCOUNTER — TELEPHONE (OUTPATIENT)
Dept: RHEUMATOLOGY | Facility: MEDICAL CENTER | Age: 67
End: 2022-09-20
Payer: MEDICARE

## 2022-09-20 NOTE — TELEPHONE ENCOUNTER
Kwasi called to just give you an update.      He has not started the Tremfya and has stopped all medications other than his BP medications due to some major skin issues. He saw his derm and they stated he is having hives due to some kind of reaction.. Derm is sending him to Allergy doctor for an eval. And he has an appt with Molly next week.     He just wanted to keep you in the loop and let you know he is NOT taking any medications currently. He will touch base with you when he has more information.     Thank you

## 2022-09-22 ENCOUNTER — OFFICE VISIT (OUTPATIENT)
Dept: MEDICAL GROUP | Facility: LAB | Age: 67
End: 2022-09-22
Payer: MEDICARE

## 2022-09-22 VITALS
WEIGHT: 191 LBS | HEART RATE: 64 BPM | HEIGHT: 70 IN | BODY MASS INDEX: 27.35 KG/M2 | RESPIRATION RATE: 12 BRPM | TEMPERATURE: 97.1 F | OXYGEN SATURATION: 98 % | SYSTOLIC BLOOD PRESSURE: 130 MMHG | DIASTOLIC BLOOD PRESSURE: 70 MMHG

## 2022-09-22 DIAGNOSIS — Z23 NEED FOR VACCINATION: ICD-10-CM

## 2022-09-22 DIAGNOSIS — R21 SKIN RASH: ICD-10-CM

## 2022-09-22 DIAGNOSIS — M05.9 RHEUMATOID ARTHRITIS WITH POSITIVE RHEUMATOID FACTOR, INVOLVING UNSPECIFIED SITE (HCC): ICD-10-CM

## 2022-09-22 PROCEDURE — 90662 IIV NO PRSV INCREASED AG IM: CPT | Performed by: INTERNAL MEDICINE

## 2022-09-22 PROCEDURE — 99214 OFFICE O/P EST MOD 30 MIN: CPT | Mod: 25 | Performed by: INTERNAL MEDICINE

## 2022-09-22 PROCEDURE — G0008 ADMIN INFLUENZA VIRUS VAC: HCPCS | Performed by: INTERNAL MEDICINE

## 2022-09-22 RX ORDER — OXYCODONE AND ACETAMINOPHEN 10; 325 MG/1; MG/1
1-2 TABLET ORAL EVERY 4 HOURS PRN
Qty: 150 TABLET | Refills: 0 | Status: SHIPPED | OUTPATIENT
Start: 2022-09-22 | End: 2022-10-22

## 2022-09-22 RX ORDER — OXYCODONE AND ACETAMINOPHEN 10; 325 MG/1; MG/1
1-2 TABLET ORAL EVERY 4 HOURS PRN
Qty: 150 TABLET | Refills: 0 | Status: SHIPPED | OUTPATIENT
Start: 2022-10-22 | End: 2022-11-21

## 2022-09-22 RX ORDER — CETIRIZINE HYDROCHLORIDE 10 MG/1
20 TABLET ORAL 2 TIMES DAILY
COMMUNITY
End: 2023-03-23

## 2022-09-22 RX ORDER — OXYCODONE AND ACETAMINOPHEN 10; 325 MG/1; MG/1
1-2 TABLET ORAL EVERY 4 HOURS PRN
Qty: 150 TABLET | Refills: 0 | Status: SHIPPED | OUTPATIENT
Start: 2022-11-21 | End: 2022-12-21

## 2022-09-22 ASSESSMENT — FIBROSIS 4 INDEX: FIB4 SCORE: 1.95

## 2022-09-23 NOTE — PROGRESS NOTES
CC: Edgardo Sims is a 66 y.o. male is suffering from   Chief Complaint   Patient presents with    Allergic Reaction         SUBJECTIVE:  1. Skin rash  Ray is here for follow-up, is suffering from a skin rash, etiology is uncertain, patient is taking Zyrtec 20 mg 2 times a day, referral is been written to allergist    2. Rheumatoid arthritis with positive rheumatoid factor, involving unspecified site (HCC)  Rheumatoid arthritis currently off of all disease modifying medications    3. Need for vaccination  Vaccination given        Past social, family, history:   Social History     Tobacco Use    Smoking status: Never    Smokeless tobacco: Never   Vaping Use    Vaping Use: Never used   Substance Use Topics    Alcohol use: Yes     Alcohol/week: 1.8 oz     Types: 3 Cans of beer per week     Comment: 3 per week    Drug use: No         MEDICATIONS:    Current Outpatient Medications:     cetirizine (ZYRTEC) 10 MG Tab, Take 20 mg by mouth 2 times a day., Disp: , Rfl:     oxyCODONE-acetaminophen (PERCOCET-10)  MG Tab, Take 1-2 Tablets by mouth every four hours as needed for Severe Pain (refill #1of #3.) for up to 30 days., Disp: 150 Tablet, Rfl: 0    [START ON 10/22/2022] oxyCODONE-acetaminophen (PERCOCET-10)  MG Tab, Take 1-2 Tablets by mouth every four hours as needed for Severe Pain (refill #2 of #3.) for up to 30 days., Disp: 150 Tablet, Rfl: 0    [START ON 11/21/2022] oxyCODONE-acetaminophen (PERCOCET-10)  MG Tab, Take 1-2 Tablets by mouth every four hours as needed for Severe Pain (refill #3 of #3.) for up to 30 days., Disp: 150 Tablet, Rfl: 0    clobetasol (TEMOVATE) 0.05 % Cream, Apply thin layer cream to rash bid prn, Disp: 60 g, Rfl: 3    hydroxychloroquine (PLAQUENIL) 200 MG Tab, TAKE 1 TABLET BY MOUTH TWICE A DAY, Disp: 180 Tablet, Rfl: 1    gabapentin (NEURONTIN) 300 MG Cap, 1 po q hs prn nerve pain, Disp: 7 Capsule, Rfl: 0    cyclobenzaprine (FLEXERIL) 10 mg Tab, TAKE ONE TABLET  BY MOUTH THREE TIMES DAILY AS NEEDED FOR MILD PAIN, Disp: 90 Tablet, Rfl: 3    lisinopril (PRINIVIL) 10 MG Tab, TAKE 1 TABLET BY MOUTH EVERY DAY, Disp: 90 Tablet, Rfl: 2    metoprolol tartrate (LOPRESSOR) 50 MG Tab, Take 1 Tablet by mouth 2 times a day., Disp: 180 Tablet, Rfl: 3    pravastatin (PRAVACHOL) 80 MG tablet, Take 1 Tablet by mouth every day., Disp: 90 Tablet, Rfl: 0    PARoxetine (PAXIL) 20 MG Tab, TAKE 1 TABLET BY MOUTH EVERY DAY, Disp: 90 Tablet, Rfl: 3    vardenafil (LEVITRA) 20 MG tablet, Take 20 mg by mouth as needed., Disp: , Rfl:     CALCIUM-VITAMIN D PO, Take 1 Tab by mouth 2 Times a Day., Disp: , Rfl:     ascorbic acid (ASCORBIC ACID) 500 MG Tab, Take 500 mg by mouth every day., Disp: , Rfl:     Zinc 50 MG Cap, Take 50 mg by mouth every day., Disp: , Rfl:     omeprazole (PRILOSEC) 20 MG CPDR, Take 20 mg by mouth every day., Disp: , Rfl:     Guselkumab 100 MG/ML Solution Pen-injector, 100 mg SQ week 0, week 4 then every 8 weeks therafter (Patient not taking: Reported on 9/22/2022), Disp: 3 mL, Rfl: 1    methylPREDNISolone (MEDROL) 4 MG Tab, 6 tabs po one day then 5 tabs po one day then 4 tabs po one day then 3 tabs po one day then 2 tabs po one day then 1 tab po for one day, Disp: 21 Tablet, Rfl: 0    Fluorouracil 5 % Solution, Apply 0.25 mL topically 2 times a day., Disp: 10 mL, Rfl: 0    gabapentin (NEURONTIN) 300 MG Cap, 1 po q hs prn nerve pain, Disp: 7 Capsule, Rfl: 0    meloxicam (MOBIC) 15 MG tablet, 1 tab po qday with food prn joint pain, Disp: 90 Tablet, Rfl: 1    PROBLEMS:  Patient Active Problem List    Diagnosis Date Noted    Hammer toe of right foot 12/29/2021    Osteopenia 03/15/2021    Vitamin D deficiency 03/15/2021    Postoperative pain 08/05/2020    Difficult intubation 08/05/2020    Gastroesophageal reflux disease 08/05/2020    Secondary adrenal insufficiency (HCC) 12/12/2019    Current chronic use of systemic steroids 10/16/2019    High risk medication use 10/16/2019    History  "of adrenal insufficiency 10/16/2019    Neurogenic bladder 01/26/2019    Bladder outlet obstruction 05/01/2018    Leukocytosis 04/30/2018    Lactic acidosis 04/30/2018    Idiopathic acute pancreatitis 04/30/2018    Chronic use of opiate drug for therapeutic purpose 08/12/2017    Depression 07/13/2015    Anxiety 03/31/2015    Coronary artery disease due to calcified coronary lesion: Mildly cardiac catheterization in 2014 12/05/2014    Tachycardia 10/20/2014    Abnormal myocardial perfusion study 01/15/2014    Osteoarthrosis, unspecified whether generalized or localized, pelvic region and thigh 05/31/2013    Dyslipidemia 07/12/2011    Aortic insufficiency 07/05/2011    Essential hypertension 07/05/2011    Rheumatoid arthritis (HCC) 05/05/2009    Hypogonadism male 05/05/2009       REVIEW OF SYSTEMS:  Gen.:  No Nausea, Vomiting, fever, Chills.  Heart: No chest pain.  Lungs:  No shortness of Breath.  Psychological: Kian unusual Anxiety depression     PHYSICAL EXAM   Constitutional: Alert, cooperative, not in acute distress.  Cardiovascular:  Rate Rhythm is regular without murmurs rubs clicks.     Thorax & Lungs: Clear to auscultation, no wheezing, rhonchi, or rales  HENT: Normocephalic, Atraumatic.  Eyes: PERRLA, EOMI, Conjunctiva normal.   Neck: Trachia is midline no swelling of the thyroid.   Neurologic: Alert & oriented x 3, cranial nerves II through XII are intact, Normal motor function, Normal sensory function, No focal deficits noted.   Psychiatric: Affect normal, Judgment normal, Mood normal.     VITAL SIGNS:/70   Pulse 64   Temp 36.2 °C (97.1 °F) (Temporal)   Resp 12   Ht 1.778 m (5' 10\")   Wt 86.6 kg (191 lb)   SpO2 98%   BMI 27.41 kg/m²     Labs: Reviewed    Assessment:                                                     Plan:    1. Skin rash  Referral to allergy written  - Referral to Allergy    2. Rheumatoid arthritis with positive rheumatoid factor, involving unspecified site (HCC)  Continue " Percocet state drug task force reviewed urine drug screen current  - oxyCODONE-acetaminophen (PERCOCET-10)  MG Tab; Take 1-2 Tablets by mouth every four hours as needed for Severe Pain (refill #1of #3.) for up to 30 days.  Dispense: 150 Tablet; Refill: 0  - oxyCODONE-acetaminophen (PERCOCET-10)  MG Tab; Take 1-2 Tablets by mouth every four hours as needed for Severe Pain (refill #2 of #3.) for up to 30 days.  Dispense: 150 Tablet; Refill: 0  - oxyCODONE-acetaminophen (PERCOCET-10)  MG Tab; Take 1-2 Tablets by mouth every four hours as needed for Severe Pain (refill #3 of #3.) for up to 30 days.  Dispense: 150 Tablet; Refill: 0    3. Need for vaccination  Influenza vaccination given  - Influenza Vaccine, High Dose (65+ Only)

## 2022-10-09 DIAGNOSIS — F32.A DEPRESSION, UNSPECIFIED DEPRESSION TYPE: ICD-10-CM

## 2022-10-10 RX ORDER — PAROXETINE HYDROCHLORIDE 20 MG/1
TABLET, FILM COATED ORAL
Qty: 90 TABLET | Refills: 3 | Status: SHIPPED | OUTPATIENT
Start: 2022-10-10 | End: 2023-06-07 | Stop reason: SDUPTHER

## 2022-10-12 ENCOUNTER — HOSPITAL ENCOUNTER (OUTPATIENT)
Dept: LAB | Facility: MEDICAL CENTER | Age: 67
End: 2022-10-12
Attending: INTERNAL MEDICINE
Payer: MEDICARE

## 2022-10-12 LAB
ALBUMIN SERPL BCP-MCNC: 4.3 G/DL (ref 3.2–4.9)
ALBUMIN/GLOB SERPL: 1.5 G/DL
ALP SERPL-CCNC: 99 U/L (ref 30–99)
ALT SERPL-CCNC: 19 U/L (ref 2–50)
ANION GAP SERPL CALC-SCNC: 9 MMOL/L (ref 7–16)
AST SERPL-CCNC: 25 U/L (ref 12–45)
BASOPHILS # BLD AUTO: 1 % (ref 0–1.8)
BASOPHILS # BLD: 0.11 K/UL (ref 0–0.12)
BILIRUB SERPL-MCNC: 0.4 MG/DL (ref 0.1–1.5)
BUN SERPL-MCNC: 15 MG/DL (ref 8–22)
CALCIUM SERPL-MCNC: 9.9 MG/DL (ref 8.5–10.5)
CHLORIDE SERPL-SCNC: 99 MMOL/L (ref 96–112)
CO2 SERPL-SCNC: 29 MMOL/L (ref 20–33)
CREAT SERPL-MCNC: 0.79 MG/DL (ref 0.5–1.4)
CRP SERPL HS-MCNC: 0.66 MG/DL (ref 0–0.75)
EOSINOPHIL # BLD AUTO: 1 K/UL (ref 0–0.51)
EOSINOPHIL NFR BLD: 9.5 % (ref 0–6.9)
ERYTHROCYTE [DISTWIDTH] IN BLOOD BY AUTOMATED COUNT: 51.3 FL (ref 35.9–50)
GFR SERPLBLD CREATININE-BSD FMLA CKD-EPI: 98 ML/MIN/1.73 M 2
GLOBULIN SER CALC-MCNC: 2.8 G/DL (ref 1.9–3.5)
GLUCOSE SERPL-MCNC: 82 MG/DL (ref 65–99)
HCT VFR BLD AUTO: 49.2 % (ref 42–52)
HGB BLD-MCNC: 15.8 G/DL (ref 14–18)
IMM GRANULOCYTES # BLD AUTO: 0.08 K/UL (ref 0–0.11)
IMM GRANULOCYTES NFR BLD AUTO: 0.8 % (ref 0–0.9)
LYMPHOCYTES # BLD AUTO: 1.2 K/UL (ref 1–4.8)
LYMPHOCYTES NFR BLD: 11.4 % (ref 22–41)
MCH RBC QN AUTO: 30.3 PG (ref 27–33)
MCHC RBC AUTO-ENTMCNC: 32.1 G/DL (ref 33.7–35.3)
MCV RBC AUTO: 94.3 FL (ref 81.4–97.8)
MONOCYTES # BLD AUTO: 1.38 K/UL (ref 0–0.85)
MONOCYTES NFR BLD AUTO: 13.1 % (ref 0–13.4)
NEUTROPHILS # BLD AUTO: 6.77 K/UL (ref 1.82–7.42)
NEUTROPHILS NFR BLD: 64.2 % (ref 44–72)
NRBC # BLD AUTO: 0 K/UL
NRBC BLD-RTO: 0 /100 WBC
PLATELET # BLD AUTO: 177 K/UL (ref 164–446)
PMV BLD AUTO: 10.6 FL (ref 9–12.9)
POTASSIUM SERPL-SCNC: 4.9 MMOL/L (ref 3.6–5.5)
PROT SERPL-MCNC: 7.1 G/DL (ref 6–8.2)
RBC # BLD AUTO: 5.22 M/UL (ref 4.7–6.1)
SODIUM SERPL-SCNC: 137 MMOL/L (ref 135–145)
THYROPEROXIDASE AB SERPL-ACNC: <9 IU/ML (ref 0–9)
WBC # BLD AUTO: 10.5 K/UL (ref 4.8–10.8)

## 2022-10-12 PROCEDURE — 36415 COLL VENOUS BLD VENIPUNCTURE: CPT

## 2022-10-12 PROCEDURE — 83520 IMMUNOASSAY QUANT NOS NONAB: CPT

## 2022-10-12 PROCEDURE — 82785 ASSAY OF IGE: CPT

## 2022-10-12 PROCEDURE — 80053 COMPREHEN METABOLIC PANEL: CPT

## 2022-10-12 PROCEDURE — 86800 THYROGLOBULIN ANTIBODY: CPT

## 2022-10-12 PROCEDURE — 85025 COMPLETE CBC W/AUTO DIFF WBC: CPT

## 2022-10-12 PROCEDURE — 85652 RBC SED RATE AUTOMATED: CPT

## 2022-10-12 PROCEDURE — 86376 MICROSOMAL ANTIBODY EACH: CPT

## 2022-10-12 PROCEDURE — 86140 C-REACTIVE PROTEIN: CPT

## 2022-10-12 PROCEDURE — 83088 ASSAY OF HISTAMINE: CPT

## 2022-10-13 LAB — ERYTHROCYTE [SEDIMENTATION RATE] IN BLOOD BY WESTERGREN METHOD: 5 MM/HOUR (ref 0–20)

## 2022-10-15 LAB
HISTAMINE SERPL-SCNC: <8 NMOL/L (ref 0–8)
THYROGLOB AB SERPL-ACNC: <0.9 IU/ML (ref 0–4)
TRYPTASE SERPL-MCNC: 7 UG/L

## 2022-10-16 LAB — IGE SERPL-ACNC: 5356 KU/L

## 2022-10-18 ENCOUNTER — OFFICE VISIT (OUTPATIENT)
Dept: RHEUMATOLOGY | Facility: MEDICAL CENTER | Age: 67
End: 2022-10-18
Attending: INTERNAL MEDICINE
Payer: MEDICARE

## 2022-10-18 VITALS
OXYGEN SATURATION: 96 % | DIASTOLIC BLOOD PRESSURE: 62 MMHG | RESPIRATION RATE: 12 BRPM | HEART RATE: 82 BPM | WEIGHT: 191 LBS | TEMPERATURE: 97.8 F | SYSTOLIC BLOOD PRESSURE: 130 MMHG | BODY MASS INDEX: 27.41 KG/M2

## 2022-10-18 DIAGNOSIS — L50.9 HIVES: ICD-10-CM

## 2022-10-18 DIAGNOSIS — I25.84 CORONARY ARTERY DISEASE DUE TO CALCIFIED CORONARY LESION: ICD-10-CM

## 2022-10-18 DIAGNOSIS — I25.10 CORONARY ARTERY DISEASE DUE TO CALCIFIED CORONARY LESION: ICD-10-CM

## 2022-10-18 DIAGNOSIS — Z79.52 LONG TERM CURRENT USE OF SYSTEMIC STEROIDS: ICD-10-CM

## 2022-10-18 DIAGNOSIS — L40.50 PSORIATIC ARTHRITIS (HCC): ICD-10-CM

## 2022-10-18 DIAGNOSIS — Z78.9 ALCOHOL USE: ICD-10-CM

## 2022-10-18 DIAGNOSIS — I10 ESSENTIAL HYPERTENSION: ICD-10-CM

## 2022-10-18 PROCEDURE — 99214 OFFICE O/P EST MOD 30 MIN: CPT | Performed by: INTERNAL MEDICINE

## 2022-10-18 PROCEDURE — 99212 OFFICE O/P EST SF 10 MIN: CPT | Performed by: INTERNAL MEDICINE

## 2022-10-18 PROCEDURE — 700111 HCHG RX REV CODE 636 W/ 250 OVERRIDE (IP): Performed by: INTERNAL MEDICINE

## 2022-10-18 RX ORDER — PREDNISONE 5 MG/1
TABLET ORAL
Qty: 60 TABLET | Refills: 1 | Status: SHIPPED | OUTPATIENT
Start: 2022-10-18 | End: 2022-12-20 | Stop reason: SDUPTHER

## 2022-10-18 RX ORDER — METHYLPREDNISOLONE ACETATE 40 MG/ML
20 INJECTION, SUSPENSION INTRA-ARTICULAR; INTRALESIONAL; INTRAMUSCULAR; SOFT TISSUE ONCE
Status: COMPLETED | OUTPATIENT
Start: 2022-10-18 | End: 2022-10-18

## 2022-10-18 RX ADMIN — METHYLPREDNISOLONE ACETATE 20 MG: 40 INJECTION, SUSPENSION INTRA-ARTICULAR; INTRALESIONAL; INTRAMUSCULAR; SOFT TISSUE at 09:18

## 2022-10-18 ASSESSMENT — PAIN SCALES - GENERAL: PAINLEVEL: 4=SLIGHT-MODERATE PAIN

## 2022-10-18 ASSESSMENT — JOINT PAIN
TOTAL NUMBER OF TENDER JOINTS: 8
TOTAL NUMBER OF SWOLLEN JOINTS: 8

## 2022-10-18 ASSESSMENT — FIBROSIS 4 INDEX: FIB4 SCORE: 2.11

## 2022-10-18 NOTE — PROGRESS NOTES
Chief Complaint- joint pain     Subjective:   Edgardo Sims is a 66 y.o. male here today for follow up of rheumatological issues    This is a follow-up visit for this patient who we see in this clinic a diagnosis of serologically negative rheumatoid arthritis with hand and feet x-rays done in 2017 and more recently in June 2021 indicating evidence of inflammatory arthritis on hand x-rays.  Since last visit patient has had problems with rash status post evaluation by dermatology who feels patient has hives and is now being evaluated by allergy and immunology.  Because of hives patient has stopped all of his arthritis medications thinking that maybe the arthritis medications may be triggering the hives.  Patient comes in today with pain and swelling in both hands and wonders about restarting a medication.      Additional comorbidities include ureteral blockage and BPH.  Patient also with a history of Dupuytren's contracture with release, also history of hypercholesterolemia.  Patient also with a history of osteopenia and aortic valve insufficiency followed periodically by echocardiograms.  Patient also with rotator cuff tears in left shoulder status post surgical intervention.     Of note patient does take diazepam on a regular basis as this is considered a high risk medication we will be unable to prescribe any narcotic pain medications for this patient in this clinic.     Bilateral AUTUMN  Left TKA     S/p plaquenil-stopped 10/2022 secondary to ophthalmology concern of macular change per patient report  S/p Rinvoq-stopped secondary to concern of exacerbation of CAD  S/p Remicade-ineffective  S/p Orencia-ineffective  S/p Enbrel-ineffective  S/p humira-ineffective  S/p MTX-oral ulcers  S/p arava-bad reaction but patient doesn't recall specifics  S/p rituximab-helped but patient stopped because of methotrexate side effects per patient report....        HBsAg/HBcAb neg 5/2021  HCV neg 5/2021  Quantiferon Gold neg  5/2021  G6PD 12.9 nl 6/2020   Uric acid 4.5 nl 2/2019   Cryoglobulin neg 8/2017  RF neg 8/2017, RF neg 2/2019  CCP neg 2/2019  DEXA 9/5/2017 T scores -0.2, -1.5  FRAX 9/5/2017 not done  DEXA 6/12/2020 T scores 0.3, -1.2  FRAX 6/12/2020 not done      Hand x-rays 5/2017-indicates erosive arthritis  Hand x-rays 6/2021-IMPRESSION:  1.  There is been slight interval progression of arthropathy as discussed above predominantly involving the right hand 2nd and 3rd MCP joints and left hand 3rd MCP joint with lesser involvement of the IP joints and carpal bones which could be consistent with an inflammatory arthropathy such as rheumatoid arthritis.  2.  There is degenerative type change of osteoarthritis in the 1st CMC joints, left greater than right.     Feet x-rays 5/2017-indicates erosive arthritis   Feet x-rays 6/2021-IMPRESSION:  1.  There is no radiographic evidence of an erosive arthropathy.  2.  There is predominantly degenerative type change in the IP joints and 1st tarsometatarsal junctions, right greater than left.     Echocardiogram 7/2022-CONCLUSIONS  Compared to the images of the prior study 11/11/2019, there has been no   significant change.   Normal left ventricular systolic function.  The left ventricular ejection fraction is visually estimated to be 70%.  Normal diastolic function.  Normal right ventricular size and systolic function.  Moderate aortic insufficiency.     Corticosteroid Therapy Informed Consent signed 2/21/2019-copy given to patient       Current Outpatient Medications   Medication Sig Dispense Refill    predniSONE (DELTASONE) 5 MG Tab 2 tabs po qday 60 Tablet 1    PARoxetine (PAXIL) 20 MG Tab TAKE 1 TABLET BY MOUTH EVERY DAY 90 Tablet 3    cetirizine (ZYRTEC) 10 MG Tab Take 20 mg by mouth 2 times a day.      oxyCODONE-acetaminophen (PERCOCET-10)  MG Tab Take 1-2 Tablets by mouth every four hours as needed for Severe Pain (refill #1of #3.) for up to 30 days. 150 Tablet 0    [START ON  10/22/2022] oxyCODONE-acetaminophen (PERCOCET-10)  MG Tab Take 1-2 Tablets by mouth every four hours as needed for Severe Pain (refill #2 of #3.) for up to 30 days. 150 Tablet 0    clobetasol (TEMOVATE) 0.05 % Cream Apply thin layer cream to rash bid prn 60 g 3    gabapentin (NEURONTIN) 300 MG Cap 1 po q hs prn nerve pain 7 Capsule 0    cyclobenzaprine (FLEXERIL) 10 mg Tab TAKE ONE TABLET BY MOUTH THREE TIMES DAILY AS NEEDED FOR MILD PAIN 90 Tablet 3    lisinopril (PRINIVIL) 10 MG Tab TAKE 1 TABLET BY MOUTH EVERY DAY 90 Tablet 2    metoprolol tartrate (LOPRESSOR) 50 MG Tab Take 1 Tablet by mouth 2 times a day. 180 Tablet 3    pravastatin (PRAVACHOL) 80 MG tablet Take 1 Tablet by mouth every day. 90 Tablet 0    vardenafil (LEVITRA) 20 MG tablet Take 20 mg by mouth as needed.      CALCIUM-VITAMIN D PO Take 1 Tab by mouth 2 Times a Day.      ascorbic acid (ASCORBIC ACID) 500 MG Tab Take 500 mg by mouth every day.      Zinc 50 MG Cap Take 50 mg by mouth every day.      omeprazole (PRILOSEC) 20 MG CPDR Take 20 mg by mouth every day.      [START ON 11/21/2022] oxyCODONE-acetaminophen (PERCOCET-10)  MG Tab Take 1-2 Tablets by mouth every four hours as needed for Severe Pain (refill #3 of #3.) for up to 30 days. 150 Tablet 0    methylPREDNISolone (MEDROL) 4 MG Tab 6 tabs po one day then 5 tabs po one day then 4 tabs po one day then 3 tabs po one day then 2 tabs po one day then 1 tab po for one day 21 Tablet 0    Fluorouracil 5 % Solution Apply 0.25 mL topically 2 times a day. 10 mL 0    gabapentin (NEURONTIN) 300 MG Cap 1 po q hs prn nerve pain 7 Capsule 0     Current Facility-Administered Medications   Medication Dose Route Frequency Provider Last Rate Last Admin    methylPREDNISolone acetate (DEPO-MEDROL) injection 20 mg  20 mg Intramuscular Once Ju Azul M.D.         He  has a past medical history of Abnormal myocardial perfusion study (1/15/2014), Aortic insufficiency (7/5/2011), Arthritis,  Bronchitis, CAD (coronary artery disease) mild plaque at cath in 2/14 (12/5/2014), Cancer (HCC), Chronic use of opiate drugs therapeutic purposes (8/12/2017), Dental disorder, Dyslipidemia (7/12/2011), Heart burn, Heart murmur, Hiatus hernia syndrome, High cholesterol, HTN (hypertension) (7/5/2011), Hypertension, Indigestion, Infectious disease, Pain, Pain, Rheumatoid arthritis(714.0), and Tachycardia (10/20/2014).    ROS   Other than what is mentioned in HPI or physical exam, there is no history of headaches, double vision or blurred vision. No temporal tenderness or jaw claudication. No trouble swallowing difficulties or sore throats.  No chest complaints including chest pain, cough or sputum production. No GI complaints including nausea, vomiting, change in bowel habits, or past peptic ulcer disease. No history of blood in the stools. No urinary complaints including dysuria or frequency. No history of alopecia, photosensitivity, oral ulcerations, Raynaud's phenomena.       Objective:     /62   Pulse 82   Temp 36.6 °C (97.8 °F) (Temporal)   Resp 12   Wt 86.6 kg (191 lb)   SpO2 96%  Body mass index is 27.41 kg/m².   Physical Exam:    Constitutional: Alert and oriented X3, patient is talkative with good eye contact.Skin: Warm, dry, good turgor, no rashes in visible areas, patient with eczematous flaky type rash without a definitive pattern around eyes, back and chest, patient states that has been dandruff in his beard as well.Eye: Equal, round and reactive, conjunctiva clear, lids normal EOM intactENMT: Lips without lesions, good dentition, no oropharyngeal ulcers, moist buccal mucosa, pinna without deformityNeck: Trachea midline, no masses, no thyromegaly.Lymph:  No cervical lymphadenopathy, no axillary lymphadenopathy, no supraclavicular lymphadenopathyRespiratory: Unlabored respiratory effort, lungs clear to auscultation, no wheezes, no ronchi.Cardiovascular: Normal S1, S2, Regular rate and rhythm, no  murmurs rubs or gallops  .Abdomen: Soft, non-tender, no masses, no hepatosplenomegaly.Psych: Alert and oriented x3, normal affect and mood.Neuro: Cranial nerves 2-12 are grossly intact, no loss of sensation LEExt:no joint laxity noted in bilateral arms, no joint laxity noted in bilateral legs      Lab Results   Component Value Date/Time    QNTTBGOLD Negative 06/01/2017 01:13 PM     Lab Results   Component Value Date/Time    HEPBCORTOT Negative 06/01/2017 01:13 PM    HEPBCORIGM Non-Reactive 05/28/2021 01:24 PM    HEPBSAG Non-Reactive 05/28/2021 01:24 PM     Lab Results   Component Value Date/Time    HEPCAB Non-Reactive 05/28/2021 01:24 PM     Lab Results   Component Value Date/Time    SODIUM 137 10/12/2022 02:49 PM    POTASSIUM 4.9 10/12/2022 02:49 PM    CHLORIDE 99 10/12/2022 02:49 PM    CO2 29 10/12/2022 02:49 PM    GLUCOSE 82 10/12/2022 02:49 PM    BUN 15 10/12/2022 02:49 PM    CREATININE 0.79 10/12/2022 02:49 PM    CREATININE 1.1 04/21/2009 03:50 PM    BUNCREATRAT 13 09/21/2016 09:21 AM      Lab Results   Component Value Date/Time    WBC 10.5 10/12/2022 02:49 PM    WBC 7.5 07/01/2011 10:30 AM    RBC 5.22 10/12/2022 02:49 PM    RBC 4.72 07/01/2011 10:30 AM    HEMOGLOBIN 15.8 10/12/2022 02:49 PM    HEMATOCRIT 49.2 10/12/2022 02:49 PM    MCV 94.3 10/12/2022 02:49 PM     (H) 07/01/2011 10:30 AM    MCH 30.3 10/12/2022 02:49 PM    MCH 36.0 (H) 07/01/2011 10:30 AM    MCHC 32.1 (L) 10/12/2022 02:49 PM    MPV 10.6 10/12/2022 02:49 PM    NEUTSPOLYS 64.20 10/12/2022 02:49 PM    LYMPHOCYTES 11.40 (L) 10/12/2022 02:49 PM    MONOCYTES 13.10 10/12/2022 02:49 PM    EOSINOPHILS 9.50 (H) 10/12/2022 02:49 PM    BASOPHILS 1.00 10/12/2022 02:49 PM    HYPOCHROMIA 1+ 03/05/2014 04:43 PM    ANISOCYTOSIS 1+ 01/02/2015 05:02 PM      Lab Results   Component Value Date/Time    CALCIUM 9.9 10/12/2022 02:49 PM    ASTSGOT 25 10/12/2022 02:49 PM    ALTSGPT 19 10/12/2022 02:49 PM    ALKPHOSPHAT 99 10/12/2022 02:49 PM    TBILIRUBIN 0.4  10/12/2022 02:49 PM    ALBUMIN 4.3 10/12/2022 02:49 PM    TOTPROTEIN 7.1 10/12/2022 02:49 PM     Lab Results   Component Value Date/Time    URICACID 4.5 02/19/2019 08:36 AM    RHEUMFACTN 10 02/19/2019 08:36 AM    CCPANTIBODY 2 02/19/2019 08:36 AM     Lab Results   Component Value Date/Time    CRYOGLOBULIN NEG 72Hour 08/04/2017 02:32 PM     Lab Results   Component Value Date/Time    SEDRATEWES 5 10/12/2022 02:49 PM     Lab Results   Component Value Date/Time    HBA1C 5.3 07/26/2018 11:19 AM     Lab Results   Component Value Date/Time    G6PD 12.9 06/18/2020 02:50 PM     Lab Results   Component Value Date/Time    CPKTOTAL 141 01/26/2018 07:47 AM     Lab Results   Component Value Date/Time    MICROSOMALA <9.0 10/12/2022 02:49 PM    ANTITHYROGL <0.9 10/12/2022 02:49 PM     Lab Results   Component Value Date/Time    PTHINTACT 55 10/10/2008 10:42 AM     Assessment and Plan:     1. Psoriatic arthritis (HCC)  Patient has a past diagnosis of serologically negative RA, patient has given history of what may be psoriatic type lesions especially dandruff in the beard our working diagnosis is psoriatic arthritis however patient currently off all medications because of development of hives currently being evaluated by immunology/allergy.  Patient having a flare long discussion with the patient we opted to new a low-dose prednisone for no longer than 2 months to give allergy immunology a chance to delineate the cause of hives without introducing new medications to patient's regiment.  - predniSONE (DELTASONE) 5 MG Tab; 2 tabs po qday  Dispense: 60 Tablet; Refill: 1  - methylPREDNISolone acetate (DEPO-MEDROL) injection 20 mg    2. Hives  Currently being evaluated by allergy and immunology no delineation as to the cause of hives as of yet    3. Long term current use of systemic steroids  Start prednisone 10 mg a day, we will do this as a bridging medication until patient's hives can be resolved or determined because, reevaluate in 1  to 2 months.    4. Essential hypertension  May impact the type of medications we can use for this patient's arthritis. We will have to keep this under advisement.  - predniSONE (DELTASONE) 5 MG Tab; 2 tabs po qday  Dispense: 60 Tablet; Refill: 1  - methylPREDNISolone acetate (DEPO-MEDROL) injection 20 mg    5. Coronary artery disease due to calcified coronary lesion: Mildly cardiac catheterization in 2014  Followed by cardiology  - predniSONE (DELTASONE) 5 MG Tab; 2 tabs po qday  Dispense: 60 Tablet; Refill: 1  - methylPREDNISolone acetate (DEPO-MEDROL) injection 20 mg    6. Alcohol use  May impact the type of medications we can use for this patient's arthritis. We will have to keep this under advisement.    Followup: Return in about 2 months (around 12/18/2022). or sooner peyman Sims  was seen 30 minutes face-to-face of which more than 50% of the time was spent counseling the patient (excluding time for procedures)  regarding  rheumatological condition and care. Therapy was discussed in detail.      Please note that this dictation was created using voice recognition software. I have made every reasonable attempt to correct obvious errors, but I expect that there are errors of grammar and possibly content that I did not discover before finalizing the note.

## 2022-11-03 DIAGNOSIS — E29.1 HYPOGONADISM MALE: ICD-10-CM

## 2022-11-03 RX ORDER — TESTOSTERONE CYPIONATE 200 MG/ML
200 INJECTION, SOLUTION INTRAMUSCULAR
Qty: 6 ML | Refills: 0 | Status: SHIPPED | OUTPATIENT
Start: 2022-11-03 | End: 2023-02-01

## 2022-11-03 NOTE — TELEPHONE ENCOUNTER
Received request via: Patient    Was the patient seen in the last year in this department? Yes  9/22/22  Does the patient have an active prescription (recently filled or refills available) for medication(s) requested? No

## 2022-11-08 NOTE — TELEPHONE ENCOUNTER
Change of pharmacy now [facility]       Was the patient seen in the last year in this department? Yes     Does patient have an active prescription for medications requested? Yes     Received Request Via: Patient  
until follow up

## 2022-11-10 ENCOUNTER — PATIENT MESSAGE (OUTPATIENT)
Dept: HEALTH INFORMATION MANAGEMENT | Facility: OTHER | Age: 67
End: 2022-11-10

## 2022-11-16 DIAGNOSIS — I10 ESSENTIAL HYPERTENSION: ICD-10-CM

## 2022-11-17 ENCOUNTER — HOSPITAL ENCOUNTER (OUTPATIENT)
Dept: LAB | Facility: MEDICAL CENTER | Age: 67
End: 2022-11-17
Attending: INTERNAL MEDICINE
Payer: MEDICARE

## 2022-11-17 PROCEDURE — 88184 FLOWCYTOMETRY/ TC 1 MARKER: CPT

## 2022-11-17 PROCEDURE — 82785 ASSAY OF IGE: CPT

## 2022-11-17 PROCEDURE — 36415 COLL VENOUS BLD VENIPUNCTURE: CPT

## 2022-11-17 PROCEDURE — 88185 FLOWCYTOMETRY/TC ADD-ON: CPT

## 2022-11-17 RX ORDER — LISINOPRIL 10 MG/1
10 TABLET ORAL
Qty: 90 TABLET | Refills: 0 | Status: SHIPPED | OUTPATIENT
Start: 2022-11-17 | End: 2022-12-22 | Stop reason: SDUPTHER

## 2022-11-17 NOTE — TELEPHONE ENCOUNTER
Is the patient due for a refill? Yes    Was the patient seen the past year? No    Date of last office visit: 11/8/21    Does the patient have an upcoming appointment?  Yes   If yes, When? 12/22/22    Provider to refill:RO    Does the patients insurance require a 100 day supply?  No

## 2022-11-22 LAB
IGE SERPL-ACNC: 4271 KU/L
URTIIND BASO ACT Q4770: 0 %

## 2022-12-20 ENCOUNTER — TELEMEDICINE (OUTPATIENT)
Dept: RHEUMATOLOGY | Facility: MEDICAL CENTER | Age: 67
End: 2022-12-20
Attending: INTERNAL MEDICINE
Payer: MEDICARE

## 2022-12-20 DIAGNOSIS — I25.84 CORONARY ARTERY DISEASE DUE TO CALCIFIED CORONARY LESION: ICD-10-CM

## 2022-12-20 DIAGNOSIS — I10 ESSENTIAL HYPERTENSION: ICD-10-CM

## 2022-12-20 DIAGNOSIS — L40.50 PSORIATIC ARTHRITIS (HCC): ICD-10-CM

## 2022-12-20 DIAGNOSIS — Z79.52 LONG TERM CURRENT USE OF SYSTEMIC STEROIDS: ICD-10-CM

## 2022-12-20 DIAGNOSIS — I25.10 CORONARY ARTERY DISEASE DUE TO CALCIFIED CORONARY LESION: ICD-10-CM

## 2022-12-20 DIAGNOSIS — Z78.9 ALCOHOL USE: ICD-10-CM

## 2022-12-20 PROCEDURE — 99212 OFFICE O/P EST SF 10 MIN: CPT | Mod: 95 | Performed by: INTERNAL MEDICINE

## 2022-12-20 PROCEDURE — 99214 OFFICE O/P EST MOD 30 MIN: CPT | Mod: 95 | Performed by: INTERNAL MEDICINE

## 2022-12-20 RX ORDER — PREDNISONE 5 MG/1
TABLET ORAL
Qty: 60 TABLET | Refills: 0 | Status: SHIPPED | OUTPATIENT
Start: 2022-12-20 | End: 2023-03-23

## 2022-12-20 ASSESSMENT — JOINT PAIN
TOTAL NUMBER OF TENDER JOINTS: 8
TOTAL NUMBER OF SWOLLEN JOINTS: 0

## 2022-12-20 NOTE — PROGRESS NOTES
Chief Complaint- joint pain     Subjective:   Edgardo Sims is a 67 y.o. male here today for follow up of rheumatological issues    This telemedicine encounter was conducted via Zoom   Verbal consent was obtained. Patient's identity was verified.  Interview was done in patients home in the state of [place].      This is a follow-up visit for this patient who we see in this clinic for psoriatic arthritis initially diagnosed of serologically negative rheumatoid arthritis with hand and feet x-rays done June 2021 indicating evidence of inflammatory arthritis on hand x-rays.  Since last visit patient has had problems with hives which have now subsequently resolved, patient currently on prednisone initially at 5 mg a day has self tapered down to 2.5 mg a day.  We had discussed the use of Tremfya patient is interested in starting Tremfya.    Complicating factors is that patient is now recovering from the flu.      Additional comorbidities include ureteral blockage and BPH.  Patient also with a history of Dupuytren's contracture with release, also history of hypercholesterolemia.  Patient also with a history of osteopenia and aortic valve insufficiency followed periodically by echocardiograms.  Patient also with rotator cuff tears in left shoulder status post surgical intervention.     Of note patient does take diazepam on a regular basis as this is considered a high risk medication we will be unable to prescribe any narcotic pain medications for this patient in this clinic.     Bilateral AUTUMN  Left TKA     S/p plaquenil-stopped 10/2022 secondary to ophthalmology concern of macular change per patient report  S/p Rinvoq-stopped secondary to concern of exacerbation of CAD  S/p Remicade-ineffective  S/p Orencia-ineffective  S/p Enbrel-ineffective  S/p humira-ineffective  S/p MTX-oral ulcers  S/p arava-bad reaction but patient doesn't recall specifics  S/p rituximab-helped but patient stopped because of methotrexate side  effects per patient report....        HBsAg/HBcAb neg 5/2021  HCV neg 5/2021  Quantiferon Gold neg 5/2021  G6PD 12.9 nl 6/2020   Uric acid 4.5 nl 2/2019   Cryoglobulin neg 8/2017  RF neg 8/2017, RF neg 2/2019  CCP neg 2/2019  DEXA 9/5/2017 T scores -0.2, -1.5  FRAX 9/5/2017 not done  DEXA 6/12/2020 T scores 0.3, -1.2  FRAX 6/12/2020 not done      Hand x-rays 5/2017-indicates erosive arthritis  Hand x-rays 6/2021-IMPRESSION:  1.  There is been slight interval progression of arthropathy as discussed above predominantly involving the right hand 2nd and 3rd MCP joints and left hand 3rd MCP joint with lesser involvement of the IP joints and carpal bones which could be consistent with an inflammatory arthropathy such as rheumatoid arthritis.  2.  There is degenerative type change of osteoarthritis in the 1st CMC joints, left greater than right.     Feet x-rays 5/2017-indicates erosive arthritis   Feet x-rays 6/2021-IMPRESSION:  1.  There is no radiographic evidence of an erosive arthropathy.  2.  There is predominantly degenerative type change in the IP joints and 1st tarsometatarsal junctions, right greater than left.     Echocardiogram 7/2022-CONCLUSIONS  Compared to the images of the prior study 11/11/2019, there has been no   significant change.   Normal left ventricular systolic function.  The left ventricular ejection fraction is visually estimated to be 70%.  Normal diastolic function.  Normal right ventricular size and systolic function.  Moderate aortic insufficiency.     Corticosteroid Therapy Informed Consent signed 2/21/2019-copy given to patient       Current Outpatient Medications   Medication Sig Dispense Refill    predniSONE (DELTASONE) 5 MG Tab 2 tabs po qday 60 Tablet 0    lisinopril (PRINIVIL) 10 MG Tab TAKE 1 TABLET BY MOUTH EVERY DAY 90 Tablet 0    testosterone cypionate (DEPO-TESTOSTERONE) 200 MG/ML Solution injection Inject 1 mL into the shoulder, thigh, or buttocks every 14 days for 84 days. 6 mL 0     PARoxetine (PAXIL) 20 MG Tab TAKE 1 TABLET BY MOUTH EVERY DAY 90 Tablet 3    cetirizine (ZYRTEC) 10 MG Tab Take 20 mg by mouth 2 times a day.      oxyCODONE-acetaminophen (PERCOCET-10)  MG Tab Take 1-2 Tablets by mouth every four hours as needed for Severe Pain (refill #3 of #3.) for up to 30 days. 150 Tablet 0    gabapentin (NEURONTIN) 300 MG Cap 1 po q hs prn nerve pain 7 Capsule 0    cyclobenzaprine (FLEXERIL) 10 mg Tab TAKE ONE TABLET BY MOUTH THREE TIMES DAILY AS NEEDED FOR MILD PAIN 90 Tablet 3    metoprolol tartrate (LOPRESSOR) 50 MG Tab Take 1 Tablet by mouth 2 times a day. 180 Tablet 3    pravastatin (PRAVACHOL) 80 MG tablet Take 1 Tablet by mouth every day. 90 Tablet 0    vardenafil (LEVITRA) 20 MG tablet Take 20 mg by mouth as needed.      CALCIUM-VITAMIN D PO Take 1 Tab by mouth 2 Times a Day.      ascorbic acid (ASCORBIC ACID) 500 MG Tab Take 500 mg by mouth every day.      Zinc 50 MG Cap Take 50 mg by mouth every day.      omeprazole (PRILOSEC) 20 MG CPDR Take 20 mg by mouth every day.      clobetasol (TEMOVATE) 0.05 % Cream Apply thin layer cream to rash bid prn 60 g 3    methylPREDNISolone (MEDROL) 4 MG Tab 6 tabs po one day then 5 tabs po one day then 4 tabs po one day then 3 tabs po one day then 2 tabs po one day then 1 tab po for one day 21 Tablet 0    Fluorouracil 5 % Solution Apply 0.25 mL topically 2 times a day. 10 mL 0    gabapentin (NEURONTIN) 300 MG Cap 1 po q hs prn nerve pain 7 Capsule 0     No current facility-administered medications for this visit.     He  has a past medical history of Abnormal myocardial perfusion study (1/15/2014), Aortic insufficiency (7/5/2011), Arthritis, Bronchitis, CAD (coronary artery disease) mild plaque at cath in 2/14 (12/5/2014), Cancer (HCC), Chronic use of opiate drugs therapeutic purposes (8/12/2017), Dental disorder, Dyslipidemia (7/12/2011), Heart burn, Heart murmur, Hiatus hernia syndrome, High cholesterol, HTN (hypertension) (7/5/2011),  Hypertension, Indigestion, Infectious disease, Pain, Pain, Rheumatoid arthritis(714.0), and Tachycardia (10/20/2014).    ROS   Other than what is mentioned in HPI or physical exam, there is no history of headaches, double vision or blurred vision. No temporal tenderness or jaw claudication. No trouble swallowing difficulties or sore throats.  No chest complaints including chest pain, cough or sputum production. No GI complaints including nausea, vomiting, change in bowel habits, or past peptic ulcer disease. No history of blood in the stools. No urinary complaints including dysuria or frequency. No history of alopecia, photosensitivity, oral ulcerations, Raynaud's phenomena.       Objective:     There were no vitals taken for this visit. There is no height or weight on file to calculate BMI.   Physical Exam:    Constitutional: Alert and oriented X3, patient is talkative with good eye contact.Skin: Warm, dry,  no rashes in visible areas.Eye: Equal, round and reactive, conjunctiva clear, lids normal EOM intactENMT: Lips without lesions, good dentition, no oropharyngeal ulcers, moist buccal mucosa, pinna without deformityNeck: Trachea midline,Respiratory: Unlabored respiratory effort, patient able to talk in full sentences without shortness of breath Abdomen:    Psych: Alert and oriented x3, normal affect and mood.Ext:no joint hyperextensibility noted in bilateral arms, no joint hyperextensibility noted in bilateral legs,         Lab Results   Component Value Date/Time    QNTTBGOLD Negative 06/01/2017 01:13 PM     Lab Results   Component Value Date/Time    HEPBCORTOT Negative 06/01/2017 01:13 PM    HEPBCORIGM Non-Reactive 05/28/2021 01:24 PM    HEPBSAG Non-Reactive 05/28/2021 01:24 PM     Lab Results   Component Value Date/Time    HEPCAB Non-Reactive 05/28/2021 01:24 PM     Lab Results   Component Value Date/Time    SODIUM 137 10/12/2022 02:49 PM    POTASSIUM 4.9 10/12/2022 02:49 PM    CHLORIDE 99 10/12/2022 02:49 PM     CO2 29 10/12/2022 02:49 PM    GLUCOSE 82 10/12/2022 02:49 PM    BUN 15 10/12/2022 02:49 PM    CREATININE 0.79 10/12/2022 02:49 PM    CREATININE 1.1 04/21/2009 03:50 PM    BUNCREATRAT 13 09/21/2016 09:21 AM      Lab Results   Component Value Date/Time    WBC 10.5 10/12/2022 02:49 PM    WBC 7.5 07/01/2011 10:30 AM    RBC 5.22 10/12/2022 02:49 PM    RBC 4.72 07/01/2011 10:30 AM    HEMOGLOBIN 15.8 10/12/2022 02:49 PM    HEMATOCRIT 49.2 10/12/2022 02:49 PM    MCV 94.3 10/12/2022 02:49 PM     (H) 07/01/2011 10:30 AM    MCH 30.3 10/12/2022 02:49 PM    MCH 36.0 (H) 07/01/2011 10:30 AM    MCHC 32.1 (L) 10/12/2022 02:49 PM    MPV 10.6 10/12/2022 02:49 PM    NEUTSPOLYS 64.20 10/12/2022 02:49 PM    LYMPHOCYTES 11.40 (L) 10/12/2022 02:49 PM    MONOCYTES 13.10 10/12/2022 02:49 PM    EOSINOPHILS 9.50 (H) 10/12/2022 02:49 PM    BASOPHILS 1.00 10/12/2022 02:49 PM    HYPOCHROMIA 1+ 03/05/2014 04:43 PM    ANISOCYTOSIS 1+ 01/02/2015 05:02 PM      Lab Results   Component Value Date/Time    CALCIUM 9.9 10/12/2022 02:49 PM    ASTSGOT 25 10/12/2022 02:49 PM    ALTSGPT 19 10/12/2022 02:49 PM    ALKPHOSPHAT 99 10/12/2022 02:49 PM    TBILIRUBIN 0.4 10/12/2022 02:49 PM    ALBUMIN 4.3 10/12/2022 02:49 PM    TOTPROTEIN 7.1 10/12/2022 02:49 PM     Lab Results   Component Value Date/Time    URICACID 4.5 02/19/2019 08:36 AM    RHEUMFACTN 10 02/19/2019 08:36 AM    CCPANTIBODY 2 02/19/2019 08:36 AM     Lab Results   Component Value Date/Time    CRYOGLOBULIN NEG 72Hour 08/04/2017 02:32 PM     Lab Results   Component Value Date/Time    SEDRATEWES 5 10/12/2022 02:49 PM     Lab Results   Component Value Date/Time    HBA1C 5.3 07/26/2018 11:19 AM     Lab Results   Component Value Date/Time    G6PD 12.9 06/18/2020 02:50 PM     Lab Results   Component Value Date/Time    CPKTOTAL 141 01/26/2018 07:47 AM     Lab Results   Component Value Date/Time    MICROSOMALA <9.0 10/12/2022 02:49 PM    ANTITHYROGL <0.9 10/12/2022 02:49 PM     Lab Results    Component Value Date/Time    PTHINTACT 55 10/10/2008 10:42 AM         Assessment and Plan:     1. Psoriatic arthritis (HCC)  Start Tremfya once patient is recovered from his flu 100 mg subcu weeks 0, 4 then every 8 weeks thereafter, increase prednisone back to 5 mg a day as a bridging medication.  Screening labs are up-to-date, next screening labs due May 2023 will order at next visit patient will also need monitoring labs every 6 months, next labs due about April 2023, will order at next visit  - predniSONE (DELTASONE) 5 MG Tab; 2 tabs po qday  Dispense: 60 Tablet; Refill: 0    2. Long term current use of systemic steroids  Currently on prednisone at 2.5 mg p.o. daily, we will reincrease back to 5 mg a day as a bridging medication until patient can start Tremfya.  Risks of prednisone reviewed with patient patient states understanding  - predniSONE (DELTASONE) 5 MG Tab; 2 tabs po qday  Dispense: 60 Tablet; Refill: 0    3. Essential hypertension  May impact the type of medications we can use for this patient's arthritis. We will have to keep this under advisement.  - predniSONE (DELTASONE) 5 MG Tab; 2 tabs po qday  Dispense: 60 Tablet; Refill: 0    4. Coronary artery disease due to calcified coronary lesion: Mildly cardiac catheterization in 2014  Followed by cardiology  - predniSONE (DELTASONE) 5 MG Tab; 2 tabs po qday  Dispense: 60 Tablet; Refill: 0    5. Alcohol use  May impact the type of medications we can use for this patient's arthritis. We will have to keep this under advisement.    Followup: Return in about 3 months (around 3/20/2023). or sooner peyman Sims  was seen 30 minutes face-to-face of which more than 50% of the time was spent counseling the patient (excluding time for procedures)  regarding  rheumatological condition and care. Therapy was discussed in detail.      Please note that this dictation was created using voice recognition software. I have made every reasonable attempt to  correct obvious errors, but I expect that there are errors of grammar and possibly content that I did not discover before finalizing the note.

## 2022-12-22 ENCOUNTER — OFFICE VISIT (OUTPATIENT)
Dept: CARDIOLOGY | Facility: MEDICAL CENTER | Age: 67
End: 2022-12-22
Payer: MEDICARE

## 2022-12-22 VITALS
SYSTOLIC BLOOD PRESSURE: 118 MMHG | BODY MASS INDEX: 28.95 KG/M2 | HEIGHT: 70 IN | DIASTOLIC BLOOD PRESSURE: 68 MMHG | WEIGHT: 202.2 LBS | OXYGEN SATURATION: 96 % | RESPIRATION RATE: 16 BRPM | HEART RATE: 77 BPM

## 2022-12-22 DIAGNOSIS — R00.0 TACHYCARDIA: ICD-10-CM

## 2022-12-22 DIAGNOSIS — I25.10 CORONARY ARTERY DISEASE DUE TO CALCIFIED CORONARY LESION: ICD-10-CM

## 2022-12-22 DIAGNOSIS — Z79.899 HIGH RISK MEDICATION USE: ICD-10-CM

## 2022-12-22 DIAGNOSIS — E27.49 SECONDARY ADRENAL INSUFFICIENCY (HCC): ICD-10-CM

## 2022-12-22 DIAGNOSIS — I25.84 CORONARY ARTERY DISEASE DUE TO CALCIFIED CORONARY LESION: ICD-10-CM

## 2022-12-22 DIAGNOSIS — E78.5 DYSLIPIDEMIA: ICD-10-CM

## 2022-12-22 DIAGNOSIS — I35.1 NONRHEUMATIC AORTIC VALVE INSUFFICIENCY: ICD-10-CM

## 2022-12-22 DIAGNOSIS — I10 ESSENTIAL HYPERTENSION: ICD-10-CM

## 2022-12-22 DIAGNOSIS — M05.9 RHEUMATOID ARTHRITIS WITH POSITIVE RHEUMATOID FACTOR, INVOLVING UNSPECIFIED SITE (HCC): ICD-10-CM

## 2022-12-22 PROCEDURE — 99214 OFFICE O/P EST MOD 30 MIN: CPT | Performed by: INTERNAL MEDICINE

## 2022-12-22 RX ORDER — METOPROLOL TARTRATE 50 MG/1
50 TABLET, FILM COATED ORAL 2 TIMES DAILY
Qty: 200 TABLET | Refills: 3 | Status: SHIPPED | OUTPATIENT
Start: 2022-12-22 | End: 2023-01-10

## 2022-12-22 RX ORDER — PRAVASTATIN SODIUM 80 MG/1
80 TABLET ORAL
Qty: 100 TABLET | Refills: 3 | Status: SHIPPED | OUTPATIENT
Start: 2022-12-22 | End: 2023-12-18 | Stop reason: SDUPTHER

## 2022-12-22 RX ORDER — LISINOPRIL 10 MG/1
10 TABLET ORAL
Qty: 100 TABLET | Refills: 3 | Status: SHIPPED | OUTPATIENT
Start: 2022-12-22 | End: 2023-02-21

## 2022-12-22 ASSESSMENT — ENCOUNTER SYMPTOMS
WEAKNESS: 0
CONSTITUTIONAL NEGATIVE: 1
EYES NEGATIVE: 1
COUGH: 0
PALPITATIONS: 0
FEVER: 0
SORE THROAT: 0
CHILLS: 0
ORTHOPNEA: 0
CARDIOVASCULAR NEGATIVE: 1
SPUTUM PRODUCTION: 0
WHEEZING: 0
MUSCULOSKELETAL NEGATIVE: 1
GASTROINTESTINAL NEGATIVE: 1
LOSS OF CONSCIOUSNESS: 0
PND: 0
RESPIRATORY NEGATIVE: 1
HEMOPTYSIS: 0
BRUISES/BLEEDS EASILY: 0
CLAUDICATION: 0
NEUROLOGICAL NEGATIVE: 1
STRIDOR: 0
DIZZINESS: 0
SHORTNESS OF BREATH: 0

## 2022-12-22 ASSESSMENT — FIBROSIS 4 INDEX: FIB4 SCORE: 2.14

## 2022-12-22 NOTE — PROGRESS NOTES
Chief Complaint   Patient presents with    Coronary Artery Disease     F/V DX: Coronary artery disease due to calcified coronary lesion: Mildly cardiac catheterization in 2014       Subjective:   Edgardo Sims is a 64 y.o. male who presents today as a follow-up from a prior provider for history of nonischemic disease hypertension hyperlipidemia hyperlipidemia.    Since he was last seen he had a repeat echocardiogram showing aortic regurgitation which is in the mild range.  His pressure half-time was barely into the moderate range.  He has no functional rotations.  His ejection fraction was 70.  He checks his blood pressure at home and is usually in the 120s to 1 teens.  He has no lower extremity edema and has been doing very well.    Past Medical History:   Diagnosis Date    Abnormal myocardial perfusion study 1/15/2014    Aortic insufficiency 7/5/2011    Arthritis     RA, and osteo    Bronchitis     CAD (coronary artery disease) mild plaque at cath in 2/14 12/5/2014    Cancer (HCC)     skin    Chronic use of opiate drugs therapeutic purposes 8/12/2017    Dental disorder     Upper dentures.    Dyslipidemia 7/12/2011    Heart burn     Heart murmur     Hiatus hernia syndrome     High cholesterol     HTN (hypertension) 7/5/2011    Hypertension     Indigestion     Infectious disease     Pain     RA    Pain     hands, feet and jaw    Rheumatoid arthritis(714.0)     severe    Tachycardia 10/20/2014     Past Surgical History:   Procedure Laterality Date    PB REMOVAL DEEP IMPLANT Right 5/6/2022    Procedure: RIGHT TOE REMOVAL OF HARDWARE, RIGHT SECOND HAMMERTOE PERCUTANEOUS TENOTOMY;  Surgeon: Pedro Restrepo M.D.;  Location: [facility] [place];  Service: Orthopedics    PB BUNIONECTOMY WWO SESAMOID/RESECT,ANY Right 1/13/2022    Procedure: RIGHT FOOT AKIN OSTEOTOMY, RIGHT DISTAL SOFT TISSUE PROCEDURE;  Surgeon: Pedro Restrepo M.D.;  Location: [facility] [place];  Service: Orthopedics     PB OSTEOTOMY METATARSALS,MULTIPLE Right 1/13/2022    Procedure: RIGHT SECOND AND THIRD DISTAL METATARSAL OSTEOTOMY;  Surgeon: Pedro Restrepo M.D.;  Location: [facility];  Service: Orthopedics    PB REPAIR OF HAMMERTOE,ONE Right 1/13/2022    Procedure: RIGHT SECOND AND THIRD HAMMER TOE CORRECTION;  Surgeon: Pedro Restrepo M.D.;  Location: [facility];  Service: Orthopedics    PB RECONSTRUC TOE DEFORM,SOFT TISSUE Right 1/13/2022    Procedure: RIGHT SECOND AND THIRD METATARSOPHALANGEAL JOINT RECONSTRUCTION;  Surgeon: Pedro Restrepo M.D.;  Location: [facility];  Service: Orthopedics    VT TENOTOMY PERC TOE SINGLE TENDON Right 1/13/2022    Procedure: RIGHT 3rd, 4th and 5th FLEXOR TENOTOMY;  Surgeon: Pedro Restrepo M.D.;  Location: [facility];  Service: Orthopedics    VT SHLDR ARTHROSCOP,PART ACROMIOPLAS Left 8/5/2020    Procedure: DECOMPRESSION, SHOULDER, ARTHROSCOPIC - SUBACROMIAL;  Surgeon: Emanuel Vance M.D.;  Location: [facility];  Service: Orthopedics    PB SHLDR ARTHROSCOP,SURG,W/ROTAT CUFF REPB Left 8/5/2020    Procedure: ARTHROSCOPY, SHOULDER, WITH ROTATOR CUFF REPAIR - AND DALAL;  Surgeon: Emanuel Vance M.D.;  Location: [facility];  Service: Orthopedics    CLAVICLE DISTAL EXCISION Left 8/5/2020    Procedure: EXCISION, CLAVICLE, DISTAL - WITH EXTENSIVE DEBRIDEMENT;  Surgeon: Emanuel Vance M.D.;  Location: [facility];  Service: Orthopedics    VT TRANSURETHRAL ELEC-SURG PROSTATECTOM  4/1/2019    Procedure: BIPOLAR TRANSURETHRAL RESECTION OF PROSTATE;  Surgeon: Jose Romo M.D.;  Location: [facility];  Service: Urology    CYSTOSCOPY  4/1/2019    Procedure: CYSTOSCOPY;  Surgeon: Jose Romo M.D.;  Location: [facility];  Service: Urology    URETHROTOMY INTERNAL  4/1/2019    Procedure: DIRECT VISION INTERNAL URETHROTOMY;  Surgeon:  Jose Romo M.D.;  Location: [facility];  Service: Urology    KNEE REVISION TOTAL Left 8/3/2018    Procedure: KNEE REVISION TOTAL;  Surgeon: Michael Rodriguez M.D.;  Location: [facility];  Service: Orthopedics    CYSTOSCOPY  5/16/2018    Procedure: CYSTOSCOPY-CLOT EVAC;  Surgeon: Jose Romo M.D.;  Location: SUR[facility]ERY [facility];  Service: Urology    TRANS URETHRAL RESECTION BLADDER  5/16/2018    Procedure: TRANS URETHRAL RESECTION BLADDER;  Surgeon: Jose Romo M.D.;  Location: SURGERY [facility];  Service: Urology    RECOVERY  2/3/2014    Performed by Cath-Recovery Surgery at SURGERY SAME DAY [facility]    HIP ARTHROPLASTY TOTAL  5/31/2013    Performed by Burak Valles M.D. at SURGERY [facility]    ROTATOR CUFF REPAIR Right 2011    x 2    KNEE ARTHROPLASTY TOTAL  6/1/2009    LEFT-Performed by YONATAN HINSON at SURGERY [facility]    VEIN LIGATION RADIO FREQUENCY  12/8/2008    LEFT leg-Performed by DEREJE MCCULLOUGH at SURGERY SAME DAY [facility]    HIP ARTHROPLASTY TOTAL  6/20/08    Performed by YONATAN HINSON at SURGERY [facility]    LUMBAR LAMINECTOMY DISKECTOMY  2000    TMJ RECONSTRUCTION BILATERAL  1994    KNEE ARTHROSCOPY Left     ORIF, ANKLE Right     VEIN STRIPPING Left      Family History   Problem Relation Age of Onset    Hypertension Mother      Social History     Socioeconomic History    Marital status:      Spouse name: Not on file    Number of children: Not on file    Years of education: Not on file    Highest education level: Not on file   Occupational History    Not on file   Tobacco Use    Smoking status: Never    Smokeless tobacco: Never   Vaping Use    Vaping Use: Never used   Substance and Sexual Activity    Alcohol use: Yes     Alcohol/week: 1.8 oz     Types: 3 Cans of beer per week     Comment: 3 per week    Drug use: No    Sexual activity: Yes     Partners: Female   Other Topics Concern      Service No    Blood Transfusions No    Caffeine Concern No    Occupational Exposure No    Hobby Hazards Yes     Comment: rides motorcyle    Sleep Concern No    Stress Concern No    Weight Concern Yes    Special Diet Yes     Comment: does not eat red meat     Back Care Yes    Exercise Yes    Bike Helmet Yes    Seat Belt Yes    Self-Exams Yes   Social History Narrative    Not on file     Social Determinants of Health     Financial Resource Strain: Not on file   Food Insecurity: Not on file   Transportation Needs: Not on file   Physical Activity: Not on file   Stress: Not on file   Social Connections: Not on file   Intimate Partner Violence: Not on file   Housing Stability: Not on file     Allergies   Allergen Reactions    Crestor [Rosuvastatin Calcium] Myalgia    Penicillins Hives, Itching and Vomiting    Rocephin [Ceftriaxone] Rash     Redness and flushing all over body     Outpatient Encounter Medications as of 12/22/2022   Medication Sig Dispense Refill    lisinopril (PRINIVIL) 10 MG Tab Take 1 Tablet by mouth every day. 100 Tablet 3    metoprolol tartrate (LOPRESSOR) 50 MG Tab Take 1 Tablet by mouth 2 times a day. 200 Tablet 3    pravastatin (PRAVACHOL) 80 MG tablet Take 1 Tablet by mouth every day. 100 Tablet 3    predniSONE (DELTASONE) 5 MG Tab 2 tabs po qday 60 Tablet 0    testosterone cypionate (DEPO-TESTOSTERONE) 200 MG/ML Solution injection Inject 1 mL into the shoulder, thigh, or buttocks every 14 days for 84 days. 6 mL 0    PARoxetine (PAXIL) 20 MG Tab TAKE 1 TABLET BY MOUTH EVERY DAY 90 Tablet 3    cetirizine (ZYRTEC) 10 MG Tab Take 20 mg by mouth 2 times a day.      cyclobenzaprine (FLEXERIL) 10 mg Tab TAKE ONE TABLET BY MOUTH THREE TIMES DAILY AS NEEDED FOR MILD PAIN 90 Tablet 3    vardenafil (LEVITRA) 20 MG tablet Take 20 mg by mouth as needed.      CALCIUM-VITAMIN D PO Take 1 Tab by mouth 2 Times a Day.      ascorbic acid (ASCORBIC ACID) 500 MG Tab Take 500 mg by mouth every day.      Zinc 50  "MG Cap Take 50 mg by mouth every day.      omeprazole (PRILOSEC) 20 MG CPDR Take 20 mg by mouth every day.      [DISCONTINUED] lisinopril (PRINIVIL) 10 MG Tab TAKE 1 TABLET BY MOUTH EVERY DAY 90 Tablet 0    [DISCONTINUED] clobetasol (TEMOVATE) 0.05 % Cream Apply thin layer cream to rash bid prn 60 g 3    [DISCONTINUED] methylPREDNISolone (MEDROL) 4 MG Tab 6 tabs po one day then 5 tabs po one day then 4 tabs po one day then 3 tabs po one day then 2 tabs po one day then 1 tab po for one day 21 Tablet 0    [DISCONTINUED] Fluorouracil 5 % Solution Apply 0.25 mL topically 2 times a day. 10 mL 0    [DISCONTINUED] gabapentin (NEURONTIN) 300 MG Cap 1 po q hs prn nerve pain 7 Capsule 0    [DISCONTINUED] gabapentin (NEURONTIN) 300 MG Cap 1 po q hs prn nerve pain 7 Capsule 0    [DISCONTINUED] metoprolol tartrate (LOPRESSOR) 50 MG Tab Take 1 Tablet by mouth 2 times a day. 180 Tablet 3    [DISCONTINUED] pravastatin (PRAVACHOL) 80 MG tablet Take 1 Tablet by mouth every day. 90 Tablet 0     No facility-administered encounter medications on file as of 12/22/2022.     Review of Systems   Constitutional: Negative.  Negative for chills, fever and malaise/fatigue.   HENT: Negative.  Negative for sore throat.    Eyes: Negative.    Respiratory: Negative.  Negative for cough, hemoptysis, sputum production, shortness of breath, wheezing and stridor.    Cardiovascular: Negative.  Negative for chest pain, palpitations, orthopnea, claudication, leg swelling and PND.   Gastrointestinal: Negative.    Genitourinary: Negative.    Musculoskeletal: Negative.    Skin: Negative.    Neurological: Negative.  Negative for dizziness, loss of consciousness and weakness.   Endo/Heme/Allergies: Negative.  Does not bruise/bleed easily.   All other systems reviewed and are negative.     Objective:   /68   Pulse 77   Resp 16   Ht 1.778 m (5' 10\")   Wt 91.7 kg (202 lb 3.2 oz)   SpO2 96%   BMI 29.01 kg/m²     Physical Exam  Vitals and nursing note " reviewed.   Constitutional:       General: He is not in acute distress.     Appearance: He is well-developed. He is not diaphoretic.   HENT:      Head: Normocephalic and atraumatic.      Right Ear: External ear normal.      Left Ear: External ear normal.      Nose: Nose normal.      Mouth/Throat:      Pharynx: No oropharyngeal exudate.   Eyes:      General: No scleral icterus.        Right eye: No discharge.         Left eye: No discharge.      Conjunctiva/sclera: Conjunctivae normal.      Pupils: Pupils are equal, round, and reactive to light.   Neck:      Vascular: No JVD.   Cardiovascular:      Rate and Rhythm: Normal rate and regular rhythm.      Heart sounds: No murmur heard.    No friction rub. No gallop.   Pulmonary:      Effort: Pulmonary effort is normal. No respiratory distress.      Breath sounds: No stridor. No wheezing or rales.   Chest:      Chest wall: No tenderness.   Abdominal:      General: There is no distension.      Palpations: Abdomen is soft.      Tenderness: There is no guarding.   Musculoskeletal:         General: No tenderness or deformity. Normal range of motion.      Cervical back: Neck supple.   Skin:     General: Skin is warm and dry.      Coloration: Skin is not pale.      Findings: No erythema or rash.   Neurological:      Mental Status: He is alert.      Cranial Nerves: No cranial nerve deficit.      Motor: No abnormal muscle tone.      Coordination: Coordination normal.      Deep Tendon Reflexes: Reflexes are normal and symmetric. Reflexes normal.   Psychiatric:         Behavior: Behavior normal.         Thought Content: Thought content normal.         Judgment: Judgment normal.     Echocardiogram dated 11/12/2019 personally interpreted myself showing an EF of 65% otherwise normal.    Nuclear stress test: Dated 5/8/2013 personally reviewed her myself showing ischemia.    Lab Results   Component Value Date/Time    CHOLSTRLTOT 146 06/17/2021 06:16 AM    LDL 70 06/17/2021 06:16 AM     HDL 49 06/17/2021 06:16 AM    TRIGLYCERIDE 137 06/17/2021 06:16 AM       Lab Results   Component Value Date/Time    SODIUM 137 10/12/2022 02:49 PM    POTASSIUM 4.9 10/12/2022 02:49 PM    CHLORIDE 99 10/12/2022 02:49 PM    CO2 29 10/12/2022 02:49 PM    GLUCOSE 82 10/12/2022 02:49 PM    BUN 15 10/12/2022 02:49 PM    CREATININE 0.79 10/12/2022 02:49 PM    CREATININE 1.1 04/21/2009 03:50 PM    BUNCREATRAT 13 09/21/2016 09:21 AM     Lab Results   Component Value Date/Time    ALKPHOSPHAT 99 10/12/2022 02:49 PM    ASTSGOT 25 10/12/2022 02:49 PM    ALTSGPT 19 10/12/2022 02:49 PM    TBILIRUBIN 0.4 10/12/2022 02:49 PM        Assessment:     1. Essential hypertension  lisinopril (PRINIVIL) 10 MG Tab    metoprolol tartrate (LOPRESSOR) 50 MG Tab      2. Dyslipidemia  pravastatin (PRAVACHOL) 80 MG tablet      3. Coronary artery disease due to calcified coronary lesion: Mildly cardiac catheterization in 2014        4. Tachycardia        5. Rheumatoid arthritis with positive rheumatoid factor, involving unspecified site (HCC)        6. Nonrheumatic aortic valve insufficiency        7. High risk medication use        8. Secondary adrenal insufficiency (HCC)            Medical Decision Making:  Today's Assessment / Status / Plan:     66-year-old male with a nonischemic nonobstructive coronary disease with hypertension hyperlipidemia.  He has more mild than moderate aortic regurgitation but is doing well.  I refilled all his medications.  I will see him back in 1 year.

## 2022-12-23 ENCOUNTER — OFFICE VISIT (OUTPATIENT)
Dept: MEDICAL GROUP | Facility: LAB | Age: 67
End: 2022-12-23
Payer: MEDICARE

## 2022-12-23 VITALS
DIASTOLIC BLOOD PRESSURE: 68 MMHG | SYSTOLIC BLOOD PRESSURE: 122 MMHG | WEIGHT: 196 LBS | TEMPERATURE: 97.9 F | HEIGHT: 70 IN | RESPIRATION RATE: 16 BRPM | OXYGEN SATURATION: 98 % | BODY MASS INDEX: 28.06 KG/M2 | HEART RATE: 77 BPM

## 2022-12-23 DIAGNOSIS — M05.9 RHEUMATOID ARTHRITIS WITH POSITIVE RHEUMATOID FACTOR, INVOLVING UNSPECIFIED SITE (HCC): ICD-10-CM

## 2022-12-23 DIAGNOSIS — E55.9 VITAMIN D DEFICIENCY: ICD-10-CM

## 2022-12-23 PROCEDURE — 99213 OFFICE O/P EST LOW 20 MIN: CPT | Performed by: INTERNAL MEDICINE

## 2022-12-23 RX ORDER — OXYCODONE AND ACETAMINOPHEN 10; 325 MG/1; MG/1
1-2 TABLET ORAL EVERY 4 HOURS PRN
Qty: 150 TABLET | Refills: 0 | Status: SHIPPED | OUTPATIENT
Start: 2023-02-26 | End: 2023-03-21 | Stop reason: SDUPTHER

## 2022-12-23 RX ORDER — OXYCODONE AND ACETAMINOPHEN 10; 325 MG/1; MG/1
1-2 TABLET ORAL EVERY 4 HOURS PRN
Qty: 150 TABLET | Refills: 0 | Status: SHIPPED | OUTPATIENT
Start: 2022-12-28 | End: 2023-03-21 | Stop reason: SDUPTHER

## 2022-12-23 RX ORDER — OXYCODONE AND ACETAMINOPHEN 10; 325 MG/1; MG/1
1-2 TABLET ORAL EVERY 4 HOURS PRN
Qty: 150 TABLET | Refills: 0 | Status: SHIPPED | OUTPATIENT
Start: 2023-01-27 | End: 2023-03-21 | Stop reason: SDUPTHER

## 2022-12-23 ASSESSMENT — FIBROSIS 4 INDEX: FIB4 SCORE: 2.14

## 2022-12-24 NOTE — PROGRESS NOTES
CC: Edgardo Sims is a 67 y.o. male is suffering from   Chief Complaint   Patient presents with    Chronic Opiate Therapy     3 months follow up         SUBJECTIVE:  1. Rheumatoid arthritis with positive rheumatoid factor, involving unspecified site (HCC)  Remains here for follow-up, continues to have rheumatoid arthritis, states that the pain is improving with his current medical therapy.    2. Vitamin D deficiency  Recheck vitamin D        Past social, family, history:   Social History     Tobacco Use    Smoking status: Never    Smokeless tobacco: Never   Vaping Use    Vaping Use: Never used   Substance Use Topics    Alcohol use: Yes     Alcohol/week: 1.8 oz     Types: 3 Cans of beer per week     Comment: 3 per week    Drug use: No         MEDICATIONS:    Current Outpatient Medications:     [START ON 12/28/2022] oxyCODONE-acetaminophen (PERCOCET-10)  MG Tab, Take 1-2 Tablets by mouth every four hours as needed for Severe Pain (refill #1of #3.) for up to 30 days., Disp: 150 Tablet, Rfl: 0    [START ON 1/27/2023] oxyCODONE-acetaminophen (PERCOCET-10)  MG Tab, Take 1-2 Tablets by mouth every four hours as needed for Severe Pain (refill #2 of #3.) for up to 30 days., Disp: 150 Tablet, Rfl: 0    [START ON 2/26/2023] oxyCODONE-acetaminophen (PERCOCET-10)  MG Tab, Take 1-2 Tablets by mouth every four hours as needed for Severe Pain (refill #3 of #3.) for up to 30 days., Disp: 150 Tablet, Rfl: 0    lisinopril (PRINIVIL) 10 MG Tab, Take 1 Tablet by mouth every day., Disp: 100 Tablet, Rfl: 3    metoprolol tartrate (LOPRESSOR) 50 MG Tab, Take 1 Tablet by mouth 2 times a day., Disp: 200 Tablet, Rfl: 3    pravastatin (PRAVACHOL) 80 MG tablet, Take 1 Tablet by mouth every day., Disp: 100 Tablet, Rfl: 3    predniSONE (DELTASONE) 5 MG Tab, 2 tabs po qday, Disp: 60 Tablet, Rfl: 0    testosterone cypionate (DEPO-TESTOSTERONE) 200 MG/ML Solution injection, Inject 1 mL into the shoulder, thigh, or  buttocks every 14 days for 84 days., Disp: 6 mL, Rfl: 0    PARoxetine (PAXIL) 20 MG Tab, TAKE 1 TABLET BY MOUTH EVERY DAY, Disp: 90 Tablet, Rfl: 3    cetirizine (ZYRTEC) 10 MG Tab, Take 20 mg by mouth 2 times a day., Disp: , Rfl:     cyclobenzaprine (FLEXERIL) 10 mg Tab, TAKE ONE TABLET BY MOUTH THREE TIMES DAILY AS NEEDED FOR MILD PAIN, Disp: 90 Tablet, Rfl: 3    vardenafil (LEVITRA) 20 MG tablet, Take 20 mg by mouth as needed., Disp: , Rfl:     CALCIUM-VITAMIN D PO, Take 1 Tab by mouth 2 Times a Day., Disp: , Rfl:     ascorbic acid (ASCORBIC ACID) 500 MG Tab, Take 500 mg by mouth every day., Disp: , Rfl:     Zinc 50 MG Cap, Take 50 mg by mouth every day., Disp: , Rfl:     omeprazole (PRILOSEC) 20 MG CPDR, Take 20 mg by mouth every day., Disp: , Rfl:     PROBLEMS:  Patient Active Problem List    Diagnosis Date Noted    Hammer toe of right foot 12/29/2021    Osteopenia 03/15/2021    Vitamin D deficiency 03/15/2021    Postoperative pain 08/05/2020    Difficult intubation 08/05/2020    Gastroesophageal reflux disease 08/05/2020    Secondary adrenal insufficiency (HCC) 12/12/2019    Current chronic use of systemic steroids 10/16/2019    High risk medication use 10/16/2019    History of adrenal insufficiency 10/16/2019    Neurogenic bladder 01/26/2019    Bladder outlet obstruction 05/01/2018    Leukocytosis 04/30/2018    Lactic acidosis 04/30/2018    Idiopathic acute pancreatitis 04/30/2018    Chronic use of opiate drug for therapeutic purpose 08/12/2017    Depression 07/13/2015    Anxiety 03/31/2015    Coronary artery disease due to calcified coronary lesion: Mildly cardiac catheterization in 2014 12/05/2014    Tachycardia 10/20/2014    Abnormal myocardial perfusion study 01/15/2014    Osteoarthrosis, unspecified whether generalized or localized, pelvic region and thigh 05/31/2013    Dyslipidemia 07/12/2011    Aortic insufficiency 07/05/2011    Essential hypertension 07/05/2011    Rheumatoid arthritis (HCC) 05/05/2009  "   Hypogonadism male 05/05/2009       REVIEW OF SYSTEMS:  Gen.:  No Nausea, Vomiting, fever, Chills.  Heart: No chest pain.  Lungs:  No shortness of Breath.  Psychological: Kian unusual Anxiety depression     PHYSICAL EXAM   Constitutional: Alert, cooperative, not in acute distress.  Cardiovascular:  Rate Rhythm is regular without murmurs rubs clicks.     Thorax & Lungs: Clear to auscultation, no wheezing, rhonchi, or rales  HENT: Normocephalic, Atraumatic.  Eyes: PERRLA, EOMI, Conjunctiva normal.   Neck: Trachia is midline no swelling of the thyroid.   Lymphatic: No lymphadenopathy noted.   Neurologic: Alert & oriented x 3, cranial nerves II through XII are intact, Normal motor function, Normal sensory function, No focal deficits noted.   Psychiatric: Affect normal, Judgment normal, Mood normal.     VITAL SIGNS:/68   Pulse 77   Temp 36.6 °C (97.9 °F) (Temporal)   Resp 16   Ht 1.778 m (5' 10\")   Wt 88.9 kg (196 lb)   SpO2 98%   BMI 28.12 kg/m²     Labs: Reviewed    Assessment:                                                     Plan:    1. Rheumatoid arthritis with positive rheumatoid factor, involving unspecified site (HCC)  State drug task force reviewed  - Controlled Substance Treatment Agreement  - Pain Management Screen, Urine; Future  - oxyCODONE-acetaminophen (PERCOCET-10)  MG Tab; Take 1-2 Tablets by mouth every four hours as needed for Severe Pain (refill #1of #3.) for up to 30 days.  Dispense: 150 Tablet; Refill: 0  - oxyCODONE-acetaminophen (PERCOCET-10)  MG Tab; Take 1-2 Tablets by mouth every four hours as needed for Severe Pain (refill #2 of #3.) for up to 30 days.  Dispense: 150 Tablet; Refill: 0  - oxyCODONE-acetaminophen (PERCOCET-10)  MG Tab; Take 1-2 Tablets by mouth every four hours as needed for Severe Pain (refill #3 of #3.) for up to 30 days.  Dispense: 150 Tablet; Refill: 0  - VITAMIN D,25 HYDROXY (DEFICIENCY); Future    2. Vitamin D deficiency  Recheck vitamin " D  - VITAMIN D,25 HYDROXY (DEFICIENCY); Future

## 2023-01-28 DIAGNOSIS — E29.1 HYPOGONADISM MALE: ICD-10-CM

## 2023-01-30 NOTE — TELEPHONE ENCOUNTER
Received request via: Pharmacy    Was the patient seen in the last year in this department? Yes  LOV : 12/23/2022   Does the patient have an active prescription (recently filled or refills available) for medication(s) requested? No    Does the patient have assisted Plus and need 100 day supply (blood pressure, diabetes and cholesterol meds only)? Patient does not have SCP

## 2023-02-01 RX ORDER — TESTOSTERONE CYPIONATE 200 MG/ML
INJECTION, SOLUTION INTRAMUSCULAR
Qty: 6 ML | Refills: 0 | Status: SHIPPED | OUTPATIENT
Start: 2023-02-01 | End: 2023-05-22

## 2023-02-03 ENCOUNTER — PATIENT MESSAGE (OUTPATIENT)
Dept: MEDICAL GROUP | Facility: LAB | Age: 68
End: 2023-02-03
Payer: MEDICARE

## 2023-02-07 ENCOUNTER — OFFICE VISIT (OUTPATIENT)
Dept: RHEUMATOLOGY | Facility: MEDICAL CENTER | Age: 68
End: 2023-02-07
Attending: INTERNAL MEDICINE
Payer: MEDICARE

## 2023-02-07 VITALS
BODY MASS INDEX: 29.13 KG/M2 | DIASTOLIC BLOOD PRESSURE: 88 MMHG | OXYGEN SATURATION: 98 % | WEIGHT: 203 LBS | HEART RATE: 88 BPM | TEMPERATURE: 96.9 F | RESPIRATION RATE: 14 BRPM | SYSTOLIC BLOOD PRESSURE: 180 MMHG

## 2023-02-07 DIAGNOSIS — I25.10 CORONARY ARTERY DISEASE DUE TO CALCIFIED CORONARY LESION: ICD-10-CM

## 2023-02-07 DIAGNOSIS — Z79.52 LONG TERM CURRENT USE OF SYSTEMIC STEROIDS: ICD-10-CM

## 2023-02-07 DIAGNOSIS — Z78.9 ALCOHOL USE: ICD-10-CM

## 2023-02-07 DIAGNOSIS — L40.50 PSORIATIC ARTHRITIS (HCC): ICD-10-CM

## 2023-02-07 DIAGNOSIS — Z51.81 MEDICATION MONITORING ENCOUNTER: ICD-10-CM

## 2023-02-07 DIAGNOSIS — I10 ESSENTIAL HYPERTENSION: ICD-10-CM

## 2023-02-07 DIAGNOSIS — I25.84 CORONARY ARTERY DISEASE DUE TO CALCIFIED CORONARY LESION: ICD-10-CM

## 2023-02-07 PROCEDURE — 99212 OFFICE O/P EST SF 10 MIN: CPT | Performed by: INTERNAL MEDICINE

## 2023-02-07 PROCEDURE — 99214 OFFICE O/P EST MOD 30 MIN: CPT | Performed by: INTERNAL MEDICINE

## 2023-02-07 ASSESSMENT — JOINT PAIN
TOTAL NUMBER OF TENDER JOINTS: 8
TOTAL NUMBER OF SWOLLEN JOINTS: 0

## 2023-02-07 ASSESSMENT — FIBROSIS 4 INDEX: FIB4 SCORE: 2.14

## 2023-02-07 NOTE — PROGRESS NOTES
Chief Complaint- joint pain     Subjective:   Edgardo Sims is a 67 y.o. male here today for follow up of rheumatological issues    Taltz a follow-up visit for this patient who we see in this clinic for psoriatic arthritis initially diagnosed with serologically negative rheumatoid arthritis of hand and feet x-rays done June 2021 indicating evidence of inflammatory arthritis.  This visit we want to start patient on Tremfya and patient received approval August 2022 however patient did not follow through continues to be on prednisone at 7.5 mg a day self increased from 5 mg a day.    Morbidities include a rash felt to be secondary to an allergy per allergist, patient's allergist wants patient off of prednisone before they can determine the source of the allergy and the rash.       Additional comorbidities include ureteral blockage and BPH.  Patient also with a history of Dupuytren's contracture with release, also history of hypercholesterolemia.  Patient also with a history of osteopenia and aortic valve insufficiency followed periodically by echocardiograms.  Patient also with rotator cuff tears in left shoulder status post surgical intervention.     Of note patient does take diazepam on a regular basis as this is considered a high risk medication we will be unable to prescribe any narcotic pain medications for this patient in this clinic.     Bilateral AUTUMN  Left TKA     S/p plaquenil-stopped 10/2022 secondary to ophthalmology concern of macular change per patient report  S/p Rinvoq-stopped secondary to concern of exacerbation of CAD  S/p Remicade-ineffective  S/p Orencia-ineffective  S/p Enbrel-ineffective  S/p humira-ineffective  S/p MTX-oral ulcers  S/p arava-bad reaction but patient doesn't recall specifics  S/p rituximab-helped but patient stopped because of methotrexate side effects per patient report....        Thyroglobulin ab neg 10/2022  TPO ab neg 10/2022  HBsAg/HBcAb neg 5/2021  HCV neg  5/2021  Quantiferon Gold neg 5/2021  G6PD 12.9 nl 6/2020   Uric acid 4.5 nl 2/2019   Cryoglobulin neg 8/2017  RF neg 8/2017, RF neg 2/2019  CCP neg 2/2019  DEXA 9/5/2017 T scores -0.2, -1.5  FRAX 9/5/2017 not done  DEXA 6/12/2020 T scores 0.3, -1.2  FRAX 6/12/2020 not done      Hand x-rays 5/2017-indicates erosive arthritis  Hand x-rays 6/2021-IMPRESSION:  1.  There is been slight interval progression of arthropathy as discussed above predominantly involving the right hand 2nd and 3rd MCP joints and left hand 3rd MCP joint with lesser involvement of the IP joints and carpal bones which could be consistent with an inflammatory arthropathy such as rheumatoid arthritis.  2.  There is degenerative type change of osteoarthritis in the 1st CMC joints, left greater than right.     Feet x-rays 5/2017-indicates erosive arthritis   Feet x-rays 6/2021-IMPRESSION:  1.  There is no radiographic evidence of an erosive arthropathy.  2.  There is predominantly degenerative type change in the IP joints and 1st tarsometatarsal junctions, right greater than left.     Echocardiogram 7/2022-CONCLUSIONS  Compared to the images of the prior study 11/11/2019, there has been no   significant change.   Normal left ventricular systolic function.  The left ventricular ejection fraction is visually estimated to be 70%.  Normal diastolic function.  Normal right ventricular size and systolic function.  Moderate aortic insufficiency.     Corticosteroid Therapy Informed Consent signed 2/21/2019-copy given to patient       Current Outpatient Medications   Medication Sig Dispense Refill    Guselkumab 100 MG/ML Solution Pen-injector 100 mg SQ week 0, 4 then q 8weeks therafter 4 mL 1    testosterone cypionate (DEPO-TESTOSTERONE) 200 MG/ML Solution injection INJECT ONE ML INTO THE SHOULDER THIGH OR BUTTOCKS EVERY 14 DAYS FOR 84 DAYS. 6 mL 0    metoprolol tartrate (LOPRESSOR) 50 MG Tab TAKE 1 TABLET BY MOUTH TWICE A  Tablet 3     oxyCODONE-acetaminophen (PERCOCET-10)  MG Tab Take 1-2 Tablets by mouth every four hours as needed for Severe Pain (refill #2 of #3.) for up to 30 days. 150 Tablet 0    [START ON 2/26/2023] oxyCODONE-acetaminophen (PERCOCET-10)  MG Tab Take 1-2 Tablets by mouth every four hours as needed for Severe Pain (refill #3 of #3.) for up to 30 days. 150 Tablet 0    lisinopril (PRINIVIL) 10 MG Tab Take 1 Tablet by mouth every day. 100 Tablet 3    pravastatin (PRAVACHOL) 80 MG tablet Take 1 Tablet by mouth every day. 100 Tablet 3    predniSONE (DELTASONE) 5 MG Tab 2 tabs po qday 60 Tablet 0    PARoxetine (PAXIL) 20 MG Tab TAKE 1 TABLET BY MOUTH EVERY DAY 90 Tablet 3    cyclobenzaprine (FLEXERIL) 10 mg Tab TAKE ONE TABLET BY MOUTH THREE TIMES DAILY AS NEEDED FOR MILD PAIN 90 Tablet 3    vardenafil (LEVITRA) 20 MG tablet Take 20 mg by mouth as needed.      CALCIUM-VITAMIN D PO Take 1 Tab by mouth 2 Times a Day.      ascorbic acid (ASCORBIC ACID) 500 MG Tab Take 500 mg by mouth every day.      Zinc 50 MG Cap Take 50 mg by mouth every day.      omeprazole (PRILOSEC) 20 MG CPDR Take 20 mg by mouth every day.      cetirizine (ZYRTEC) 10 MG Tab Take 20 mg by mouth 2 times a day. (Patient not taking: Reported on 2/7/2023)       No current facility-administered medications for this visit.     He  has a past medical history of Abnormal myocardial perfusion study (1/15/2014), Aortic insufficiency (7/5/2011), Arthritis, Bronchitis, CAD (coronary artery disease) mild plaque at cath in 2/14 (12/5/2014), Cancer (HCC), Chronic use of opiate drugs therapeutic purposes (8/12/2017), Dental disorder, Dyslipidemia (7/12/2011), Heart burn, Heart murmur, Hiatus hernia syndrome, High cholesterol, HTN (hypertension) (7/5/2011), Hypertension, Indigestion, Infectious disease, Pain, Pain, Rheumatoid arthritis(714.0), and Tachycardia (10/20/2014).    ROS   Other than what is mentioned in HPI or physical exam, there is no history of headaches,  double vision or blurred vision. No temporal tenderness or jaw claudication. No trouble swallowing difficulties or sore throats.  No chest complaints including chest pain, cough or sputum production. No GI complaints including nausea, vomiting, change in bowel habits, or past peptic ulcer disease. No history of blood in the stools. No urinary complaints including dysuria or frequency. No history of alopecia, photosensitivity, oral ulcerations, Raynaud's phenomena.       Objective:     BP (!) 180/88   Pulse 88   Temp 36.1 °C (96.9 °F) (Temporal)   Resp 14   Wt 92.1 kg (203 lb)   SpO2 98%  Body mass index is 29.13 kg/m².   Physical Exam:    Constitutional: Alert and oriented X3, patient is talkative with good eye contact.Skin: Warm, dry, large actinic keratoses on right anterior shin eye: Equal, round and reactive, conjunctiva clear, lids normal EOM intactENMT: Lips without lesions, good dentition, no oropharyngeal ulcers, moist buccal mucosa, pinna without deformityNeck: Trachea midline, no masses, no thyromegaly.Lymph:  No cervical lymphadenopathy, no axillary lymphadenopathy, no supraclavicular lymphadenopathyRespiratory: Unlabored respiratory effort, lungs clear to auscultation, no wheezes, no ronchi.Cardiovascular: Normal S1, S2, .Abdomen: Soft, non-tender, no masses, no hepatosplenomegaly.Psych: Alert and oriented x3, normal affect and mood.Neuro: Cranial nerves 2-12 are grossly intact, no loss of sensation LEExt:no joint laxity noted in bilateral arms, no joint laxity noted in bilateral legs, patient has right second crossover toe,      Lab Results   Component Value Date/Time    QNTTBGOLD Negative 06/01/2017 01:13 PM     Lab Results   Component Value Date/Time    HEPBCORTOT Negative 06/01/2017 01:13 PM    HEPBCORIGM Non-Reactive 05/28/2021 01:24 PM    HEPBSAG Non-Reactive 05/28/2021 01:24 PM     Lab Results   Component Value Date/Time    HEPCAB Non-Reactive 05/28/2021 01:24 PM     Lab Results   Component  Value Date/Time    SODIUM 137 10/12/2022 02:49 PM    POTASSIUM 4.9 10/12/2022 02:49 PM    CHLORIDE 99 10/12/2022 02:49 PM    CO2 29 10/12/2022 02:49 PM    GLUCOSE 82 10/12/2022 02:49 PM    BUN 15 10/12/2022 02:49 PM    CREATININE 0.79 10/12/2022 02:49 PM    CREATININE 1.1 04/21/2009 03:50 PM    BUNCREATRAT 13 09/21/2016 09:21 AM      Lab Results   Component Value Date/Time    WBC 10.5 10/12/2022 02:49 PM    WBC 7.5 07/01/2011 10:30 AM    RBC 5.22 10/12/2022 02:49 PM    RBC 4.72 07/01/2011 10:30 AM    HEMOGLOBIN 15.8 10/12/2022 02:49 PM    HEMATOCRIT 49.2 10/12/2022 02:49 PM    MCV 94.3 10/12/2022 02:49 PM     (H) 07/01/2011 10:30 AM    MCH 30.3 10/12/2022 02:49 PM    MCH 36.0 (H) 07/01/2011 10:30 AM    MCHC 32.1 (L) 10/12/2022 02:49 PM    MPV 10.6 10/12/2022 02:49 PM    NEUTSPOLYS 64.20 10/12/2022 02:49 PM    LYMPHOCYTES 11.40 (L) 10/12/2022 02:49 PM    MONOCYTES 13.10 10/12/2022 02:49 PM    EOSINOPHILS 9.50 (H) 10/12/2022 02:49 PM    BASOPHILS 1.00 10/12/2022 02:49 PM    HYPOCHROMIA 1+ 03/05/2014 04:43 PM    ANISOCYTOSIS 1+ 01/02/2015 05:02 PM      Lab Results   Component Value Date/Time    CALCIUM 9.9 10/12/2022 02:49 PM    ASTSGOT 25 10/12/2022 02:49 PM    ALTSGPT 19 10/12/2022 02:49 PM    ALKPHOSPHAT 99 10/12/2022 02:49 PM    TBILIRUBIN 0.4 10/12/2022 02:49 PM    ALBUMIN 4.3 10/12/2022 02:49 PM    TOTPROTEIN 7.1 10/12/2022 02:49 PM     Lab Results   Component Value Date/Time    URICACID 4.5 02/19/2019 08:36 AM    RHEUMFACTN 10 02/19/2019 08:36 AM    CCPANTIBODY 2 02/19/2019 08:36 AM     Lab Results   Component Value Date/Time    CRYOGLOBULIN NEG 72Hour 08/04/2017 02:32 PM     Lab Results   Component Value Date/Time    SEDRATEWES 5 10/12/2022 02:49 PM     Lab Results   Component Value Date/Time    HBA1C 5.3 07/26/2018 11:19 AM     Lab Results   Component Value Date/Time    G6PD 12.9 06/18/2020 02:50 PM     Lab Results   Component Value Date/Time    CPKTOTAL 141 01/26/2018 07:47 AM     Lab Results    Component Value Date/Time    MICROSOMALA <9.0 10/12/2022 02:49 PM    ANTITHYROGL <0.9 10/12/2022 02:49 PM     Lab Results   Component Value Date/Time    PTHINTACT 55 10/10/2008 10:42 AM     Assessment and Plan:     1. Psoriatic arthritis (HCC)  Start Tremfya 100 mg subcu weeks 0 then 4 and then every 8 weeks thereafter  Fortunately patient continues prednisone 7.5 mg p.o. daily for now as a bridging medication  - Guselkumab 100 MG/ML Solution Pen-injector; 100 mg SQ week 0, 4 then q 8weeks therafter  Dispense: 4 mL; Refill: 1    2. Medication monitoring encounter  Start Tremfya 100 mg subcu weeks 0 then 4 then every 8 weeks thereafter  Screening labs are up-to-date, next screening labs due May 2023 will order at next visit patient needs monitoring labs every 6 months, next labs due about April 2023 will order at next visit  We reviewed risks of biological medications with patient including hematological pathology, cancer risks, neurological and infection issues especially in the Covid-19 pandemic environment, patient states understanding.    3. Long term current use of systemic steroids  Currently on prednisone 7.5 mg p.o. daily  Risks of chronic prednisone reviewed with patient patient states understanding and is anxious to stop the prednisone.  - Guselkumab 100 MG/ML Solution Pen-injector; 100 mg SQ week 0, 4 then q 8weeks therafter  Dispense: 4 mL; Refill: 1    4. Essential hypertension  May impact the type of medications we can use for this patient's arthritis. We will have to keep this under advisement.  - Guselkumab 100 MG/ML Solution Pen-injector; 100 mg SQ week 0, 4 then q 8weeks therafter  Dispense: 4 mL; Refill: 1    5. Coronary artery disease due to calcified coronary lesion: Mildly cardiac catheterization in 2014  Followed by cardiology    6. Alcohol use  May impact the type of medications we can use for this patient's arthritis. We will have to keep this under advisement.    Followup: Return in about  3 months (around 5/7/2023). or sooner peyman Ocampoer  was seen 30 minutes face-to-face of which more than 50% of the time was spent counseling the patient (excluding time for procedures)  regarding  rheumatological condition and care. Therapy was discussed in detail.      Please note that this dictation was created using voice recognition software. I have made every reasonable attempt to correct obvious errors, but I expect that there are errors of grammar and possibly content that I did not discover before finalizing the note.

## 2023-02-27 DIAGNOSIS — M05.9 RHEUMATOID ARTHRITIS WITH POSITIVE RHEUMATOID FACTOR, INVOLVING UNSPECIFIED SITE (HCC): ICD-10-CM

## 2023-02-27 RX ORDER — CYCLOBENZAPRINE HCL 10 MG
TABLET ORAL
Qty: 90 TABLET | Refills: 3 | Status: SHIPPED | OUTPATIENT
Start: 2023-02-27 | End: 2023-07-10

## 2023-02-27 NOTE — TELEPHONE ENCOUNTER
Received request via: Pharmacy    Was the patient seen in the last year in this department? Yes  LOV : 12/23/2022   Does the patient have an active prescription (recently filled or refills available) for medication(s) requested? No    Does the patient have [facility] and need 100 day supply (blood pressure, diabetes and cholesterol meds only)? Patient does not have SCP

## 2023-03-16 ENCOUNTER — NON-PROVIDER VISIT (OUTPATIENT)
Dept: RHEUMATOLOGY | Facility: MEDICAL CENTER | Age: 68
End: 2023-03-16
Attending: INTERNAL MEDICINE
Payer: MEDICARE

## 2023-03-16 VITALS
BODY MASS INDEX: 27.98 KG/M2 | DIASTOLIC BLOOD PRESSURE: 70 MMHG | TEMPERATURE: 98.3 F | HEART RATE: 68 BPM | WEIGHT: 195 LBS | SYSTOLIC BLOOD PRESSURE: 120 MMHG | OXYGEN SATURATION: 98 % | RESPIRATION RATE: 12 BRPM

## 2023-03-16 ASSESSMENT — FIBROSIS 4 INDEX: FIB4 SCORE: 2.14

## 2023-03-16 NOTE — PROGRESS NOTES
Health Maintenance Summary     Topic Due On Due Status Completed On Postpone Until Reason    MAMMOGRAM - BREAST CANCER SCREENING Apr 25, 2019 Not Due Apr 25, 2017      Colorectal Cancer Screening - Colonoscopy Jan 1, 2021 Not Due Jan 1, 2011      Immunization - TDAP Pregnancy  Hidden        Sep 24, 1972 Postponed  Jun 20, 2018 Patient Refused    Medicare Wellness Visit Sep 24, 2018 Due Soon       IMMUNIZATION - DTaP/Tdap/Td Feb 28, 2026 Not Due Feb 29, 2016      Hepatitis C Screening Sep 24, 2004 Postponed  Jun 20, 2018 Patient Refused    Lung Cancer Screening Sep 24, 2008 Postponed  Jun 20, 2018 Patient Refused          Patient is due for topics as listed above, she wishes to decline at this time .             Veronica Sims is a 67 y.o. male here for a non-provider visit for Tremfya injection teaching.    Reason for injection: Rheumatoid Arthritis  Order in MAR?: No  Patient supplied?:Yes  Minimum interval has been met for this injection (per MAR order): Yes    Patient tolerated injection and no adverse effects were observed or reported: Yes    # of Administrations remaining in MAR: 0      Pt was taught how to inject the medication into the SQ tissue of the abdomin.   Possible side effect and allergic reactions were gone over..   I watched Veronica inject himself and all questions  were answered.     VERONICA WAITED IN THE OFFICE FOR 20 MIN AND NO INJECTION REACTIONS WERE NOTED.     Pt brought his own medication.      LOT# [id number]  EXP DATE: 07/24    NDC#5789-640-11

## 2023-03-21 ENCOUNTER — OFFICE VISIT (OUTPATIENT)
Dept: MEDICAL GROUP | Facility: LAB | Age: 68
End: 2023-03-21
Payer: MEDICARE

## 2023-03-21 VITALS
OXYGEN SATURATION: 98 % | BODY MASS INDEX: 28.63 KG/M2 | TEMPERATURE: 97 F | HEART RATE: 74 BPM | SYSTOLIC BLOOD PRESSURE: 140 MMHG | RESPIRATION RATE: 16 BRPM | WEIGHT: 200 LBS | HEIGHT: 70 IN | DIASTOLIC BLOOD PRESSURE: 72 MMHG

## 2023-03-21 DIAGNOSIS — Z23 NEED FOR DIPHTHERIA-TETANUS-PERTUSSIS (TDAP) VACCINE: ICD-10-CM

## 2023-03-21 DIAGNOSIS — Z23 NEED FOR PNEUMOCOCCAL VACCINATION: ICD-10-CM

## 2023-03-21 DIAGNOSIS — M05.9 RHEUMATOID ARTHRITIS WITH POSITIVE RHEUMATOID FACTOR, INVOLVING UNSPECIFIED SITE (HCC): ICD-10-CM

## 2023-03-21 PROCEDURE — G0009 ADMIN PNEUMOCOCCAL VACCINE: HCPCS | Performed by: INTERNAL MEDICINE

## 2023-03-21 PROCEDURE — 90715 TDAP VACCINE 7 YRS/> IM: CPT | Performed by: INTERNAL MEDICINE

## 2023-03-21 PROCEDURE — 99214 OFFICE O/P EST MOD 30 MIN: CPT | Mod: 25 | Performed by: INTERNAL MEDICINE

## 2023-03-21 PROCEDURE — 90472 IMMUNIZATION ADMIN EACH ADD: CPT | Performed by: INTERNAL MEDICINE

## 2023-03-21 PROCEDURE — 90677 PCV20 VACCINE IM: CPT | Performed by: INTERNAL MEDICINE

## 2023-03-21 RX ORDER — OXYCODONE AND ACETAMINOPHEN 10; 325 MG/1; MG/1
1-2 TABLET ORAL EVERY 4 HOURS PRN
Qty: 150 TABLET | Refills: 0 | Status: SHIPPED | OUTPATIENT
Start: 2023-05-11 | End: 2023-07-06 | Stop reason: SDUPTHER

## 2023-03-21 RX ORDER — OXYCODONE AND ACETAMINOPHEN 10; 325 MG/1; MG/1
1-2 TABLET ORAL EVERY 4 HOURS PRN
Qty: 150 TABLET | Refills: 0 | Status: SHIPPED | OUTPATIENT
Start: 2023-04-11 | End: 2023-07-06 | Stop reason: SDUPTHER

## 2023-03-21 RX ORDER — OXYCODONE AND ACETAMINOPHEN 10; 325 MG/1; MG/1
1-2 TABLET ORAL EVERY 4 HOURS PRN
Qty: 150 TABLET | Refills: 0 | Status: SHIPPED | OUTPATIENT
Start: 2023-06-10 | End: 2023-07-06 | Stop reason: SDUPTHER

## 2023-03-21 ASSESSMENT — FIBROSIS 4 INDEX: FIB4 SCORE: 2.14

## 2023-03-21 ASSESSMENT — PATIENT HEALTH QUESTIONNAIRE - PHQ9: CLINICAL INTERPRETATION OF PHQ2 SCORE: 0

## 2023-03-21 NOTE — PROGRESS NOTES
CC: Edgardo Sims is a 67 y.o. male is suffering from   Chief Complaint   Patient presents with    Chronic Opiate Therapy     3 months follow up         SUBJECTIVE:  1. Rheumatoid arthritis with positive rheumatoid factor, involving unspecified site (HCC)  Ray is here for follow-up, had to stop prednisone because of concerns regarding allergy testing.  Patient is in need of refills of his narcotic analgesics    2. Need for diphtheria-tetanus-pertussis (Tdap) vaccine  Vaccination given    3. Need for pneumococcal vaccination  Vaccination given        Past social, family, history: Very good, 2 daughters  Social History     Tobacco Use    Smoking status: Never    Smokeless tobacco: Never   Vaping Use    Vaping Use: Never used   Substance Use Topics    Alcohol use: Yes     Alcohol/week: 1.8 oz     Types: 3 Cans of beer per week     Comment: 3 per week    Drug use: No         MEDICATIONS:    Current Outpatient Medications:     [START ON 6/10/2023] oxyCODONE-acetaminophen (PERCOCET-10)  MG Tab, Take 1-2 Tablets by mouth every four hours as needed for Severe Pain (refill #3 of #3.) for up to 30 days., Disp: 150 Tablet, Rfl: 0    [START ON 5/11/2023] oxyCODONE-acetaminophen (PERCOCET-10)  MG Tab, Take 1-2 Tablets by mouth every four hours as needed for Severe Pain (refill #2 of #3.) for up to 30 days., Disp: 150 Tablet, Rfl: 0    [START ON 4/11/2023] oxyCODONE-acetaminophen (PERCOCET-10)  MG Tab, Take 1-2 Tablets by mouth every four hours as needed for Severe Pain (refill #1of #3.) for up to 30 days., Disp: 150 Tablet, Rfl: 0    cyclobenzaprine (FLEXERIL) 10 mg Tab, TAKE 1 TABLET BY MOUTH THREE TIMES DAILY AS NEEDED FOR MILD PAIN, Disp: 90 Tablet, Rfl: 3    lisinopril (PRINIVIL) 10 MG Tab, TAKE 1 TABLET BY MOUTH EVERY DAY, Disp: 90 Tablet, Rfl: 2    Guselkumab 100 MG/ML Solution Pen-injector, 100 mg SQ week 0, 4 then q 8weeks therafter, Disp: 4 mL, Rfl: 1    testosterone cypionate  (DEPO-TESTOSTERONE) 200 MG/ML Solution injection, INJECT ONE ML INTO THE SHOULDER THIGH OR BUTTOCKS EVERY 14 DAYS FOR 84 DAYS., Disp: 6 mL, Rfl: 0    metoprolol tartrate (LOPRESSOR) 50 MG Tab, TAKE 1 TABLET BY MOUTH TWICE A DAY, Disp: 180 Tablet, Rfl: 3    pravastatin (PRAVACHOL) 80 MG tablet, Take 1 Tablet by mouth every day., Disp: 100 Tablet, Rfl: 3    PARoxetine (PAXIL) 20 MG Tab, TAKE 1 TABLET BY MOUTH EVERY DAY, Disp: 90 Tablet, Rfl: 3    vardenafil (LEVITRA) 20 MG tablet, Take 20 mg by mouth as needed., Disp: , Rfl:     CALCIUM-VITAMIN D PO, Take 1 Tab by mouth 2 Times a Day., Disp: , Rfl:     ascorbic acid (ASCORBIC ACID) 500 MG Tab, Take 500 mg by mouth every day., Disp: , Rfl:     Zinc 50 MG Cap, Take 50 mg by mouth every day., Disp: , Rfl:     omeprazole (PRILOSEC) 20 MG CPDR, Take 20 mg by mouth every day., Disp: , Rfl:     predniSONE (DELTASONE) 5 MG Tab, 2 tabs po qday, Disp: 60 Tablet, Rfl: 0    cetirizine (ZYRTEC) 10 MG Tab, Take 20 mg by mouth 2 times a day. (Patient not taking: Reported on 2/7/2023), Disp: , Rfl:     PROBLEMS:  Patient Active Problem List    Diagnosis Date Noted    Hammer toe of right foot 12/29/2021    Osteopenia 03/15/2021    Vitamin D deficiency 03/15/2021    Postoperative pain 08/05/2020    Difficult intubation 08/05/2020    Gastroesophageal reflux disease 08/05/2020    Secondary adrenal insufficiency (HCC) 12/12/2019    Current chronic use of systemic steroids 10/16/2019    High risk medication use 10/16/2019    History of adrenal insufficiency 10/16/2019    Neurogenic bladder 01/26/2019    Bladder outlet obstruction 05/01/2018    Leukocytosis 04/30/2018    Lactic acidosis 04/30/2018    Idiopathic acute pancreatitis 04/30/2018    Chronic use of opiate drug for therapeutic purpose 08/12/2017    Depression 07/13/2015    Anxiety 03/31/2015    Coronary artery disease due to calcified coronary lesion: Mildly cardiac catheterization in 2014 12/05/2014    Tachycardia 10/20/2014     "Abnormal myocardial perfusion study 01/15/2014    Osteoarthrosis, unspecified whether generalized or localized, pelvic region and thigh 05/31/2013    Dyslipidemia 07/12/2011    Aortic insufficiency 07/05/2011    Essential hypertension 07/05/2011    Rheumatoid arthritis (HCC) 05/05/2009    Hypogonadism male 05/05/2009       REVIEW OF SYSTEMS:  Gen.:  No Nausea, Vomiting, fever, Chills.  Heart: No chest pain.  Lungs:  No shortness of Breath.  Psychological: Kian unusual Anxiety depression     PHYSICAL EXAM   Constitutional: Alert, cooperative, not in acute distress.  Cardiovascular:  Rate Rhythm is regular without murmurs rubs clicks.     Thorax & Lungs: Clear to auscultation, no wheezing, rhonchi, or rales  HENT: Normocephalic, Atraumatic.  Eyes: PERRLA, EOMI, Conjunctiva normal.   Neck: Trachia is midline no swelling of the thyroid.   Lymphatic: No lymphadenopathy noted.   Neurologic: Alert & oriented x 3, cranial nerves II through XII are intact, Normal motor function, Normal sensory function, No focal deficits noted.   Psychiatric: Affect normal, Judgment normal, Mood normal.     VITAL SIGNS:BP (!) 140/72   Pulse 74   Temp 36.1 °C (97 °F) (Temporal)   Resp 16   Ht 1.778 m (5' 10\")   Wt 90.7 kg (200 lb)   SpO2 98%   BMI 28.70 kg/m²     Labs: Reviewed    Assessment:                                                     Plan:    1. Rheumatoid arthritis with positive rheumatoid factor, involving unspecified site ([facility])  State drug task force reviewed  - oxyCODONE-acetaminophen (PERCOCET-10)  MG Tab; Take 1-2 Tablets by mouth every four hours as needed for Severe Pain (refill #3 of #3.) for up to 30 days.  Dispense: 150 Tablet; Refill: 0  - oxyCODONE-acetaminophen (PERCOCET-10)  MG Tab; Take 1-2 Tablets by mouth every four hours as needed for Severe Pain (refill #2 of #3.) for up to 30 days.  Dispense: 150 Tablet; Refill: 0  - oxyCODONE-acetaminophen (PERCOCET-10)  MG Tab; Take 1-2 Tablets by " mouth every four hours as needed for Severe Pain (refill #1of #3.) for up to 30 days.  Dispense: 150 Tablet; Refill: 0    2. Need for diphtheria-tetanus-pertussis (Tdap) vaccine  Vaccination given  - Tdap =>8yo IM    3. Need for pneumococcal vaccination  Vaccination given  - Pneumococcal Conjugate Vaccine 20-Valent (19 yrs+)

## 2023-03-23 ENCOUNTER — OFFICE VISIT (OUTPATIENT)
Dept: RHEUMATOLOGY | Facility: MEDICAL CENTER | Age: 68
End: 2023-03-23
Attending: INTERNAL MEDICINE
Payer: MEDICARE

## 2023-03-23 VITALS
DIASTOLIC BLOOD PRESSURE: 70 MMHG | HEART RATE: 80 BPM | OXYGEN SATURATION: 96 % | BODY MASS INDEX: 29.13 KG/M2 | RESPIRATION RATE: 14 BRPM | SYSTOLIC BLOOD PRESSURE: 162 MMHG | TEMPERATURE: 97.8 F | WEIGHT: 203 LBS

## 2023-03-23 DIAGNOSIS — Z51.81 MEDICATION MONITORING ENCOUNTER: ICD-10-CM

## 2023-03-23 DIAGNOSIS — L40.50 PSORIATIC ARTHRITIS (HCC): ICD-10-CM

## 2023-03-23 DIAGNOSIS — I25.10 CORONARY ARTERY DISEASE DUE TO CALCIFIED CORONARY LESION: ICD-10-CM

## 2023-03-23 DIAGNOSIS — I10 ESSENTIAL HYPERTENSION: ICD-10-CM

## 2023-03-23 DIAGNOSIS — Z78.9 ALCOHOL USE: ICD-10-CM

## 2023-03-23 DIAGNOSIS — I25.84 CORONARY ARTERY DISEASE DUE TO CALCIFIED CORONARY LESION: ICD-10-CM

## 2023-03-23 DIAGNOSIS — Z79.52 LONG TERM CURRENT USE OF SYSTEMIC STEROIDS: ICD-10-CM

## 2023-03-23 PROCEDURE — 99214 OFFICE O/P EST MOD 30 MIN: CPT | Performed by: INTERNAL MEDICINE

## 2023-03-23 PROCEDURE — 700111 HCHG RX REV CODE 636 W/ 250 OVERRIDE (IP): Performed by: INTERNAL MEDICINE

## 2023-03-23 PROCEDURE — 99212 OFFICE O/P EST SF 10 MIN: CPT | Performed by: INTERNAL MEDICINE

## 2023-03-23 RX ORDER — METHYLPREDNISOLONE ACETATE 40 MG/ML
20 INJECTION, SUSPENSION INTRA-ARTICULAR; INTRALESIONAL; INTRAMUSCULAR; SOFT TISSUE ONCE
Status: COMPLETED | OUTPATIENT
Start: 2023-03-23 | End: 2023-03-23

## 2023-03-23 RX ORDER — PREDNISONE 5 MG/1
TABLET ORAL
Qty: 40 TABLET | Refills: 0 | Status: SHIPPED | OUTPATIENT
Start: 2023-03-23 | End: 2023-05-04 | Stop reason: SDUPTHER

## 2023-03-23 RX ADMIN — METHYLPREDNISOLONE ACETATE 20 MG: 40 INJECTION, SUSPENSION INTRA-ARTICULAR; INTRALESIONAL; INTRAMUSCULAR; SOFT TISSUE at 10:12

## 2023-03-23 ASSESSMENT — JOINT PAIN
TOTAL NUMBER OF TENDER JOINTS: 9
TOTAL NUMBER OF TENDER JOINTS: 10
TOTAL NUMBER OF SWOLLEN JOINTS: 4

## 2023-03-23 ASSESSMENT — FIBROSIS 4 INDEX: FIB4 SCORE: 2.14

## 2023-03-23 NOTE — PROGRESS NOTES
Chief Complaint- joint pain     Subjective:   Edgardo Sims is a 67 y.o. male here today for follow up of rheumatological issues    Is a follow-up visit for this patient who we see in this clinic for psoriatic arthritis that was initially diagnosed serologically negative rheumatoid arthritis with hand and feet x-rays done June 2021 indicating inflammatory arthritis.  Patient's psoriasis and it is predominantly as dystrophic toenails and fingernails.  Patient at last visit was started on Tremfya 100 mg subcu weeks 04 and then every 8 weeks thereafter, patient has only had 1 injection so far.  Patient states he is still in quite a bit of pain, would like to have options for his swelling and pain until the Tremfya can take effect.     Morbidities include a rash felt to be secondary to an allergy per allergist, patient's allergist wants patient off of prednisone before they can determine the source of the allergy and the rash.       Additional comorbidities include ureteral blockage and BPH.  Patient also with a history of Dupuytren's contracture with release, also history of hypercholesterolemia.  Patient also with a history of osteopenia and aortic valve insufficiency followed periodically by echocardiograms.  Patient also with rotator cuff tears in left shoulder status post surgical intervention.     Of note patient does drink ETOH at least twice/week on a regular basis as this is considered a high risk behavior we will be unable to prescribe any narcotic pain medications for this patient in this clinic.     Bilateral AUTUMN  Left TKA     S/p plaquenil-stopped 10/2022 secondary to ophthalmology concern of macular change per patient report  S/p Rinvoq-stopped secondary to concern of exacerbation of CAD  S/p Remicade-ineffective  S/p Orencia-ineffective  S/p Enbrel-ineffective  S/p humira-ineffective  S/p MTX-oral ulcers  S/p arava-bad reaction but patient doesn't recall specifics  S/p rituximab-helped but patient  stopped because of methotrexate side effects per patient report....    Addendum 3/30/2023  HBsAg/HBcAb neg 3/2023  HCV neg 3/2023  Quantiferon Gold neg 3/2023        Thyroglobulin ab neg 10/2022  TPO ab neg 10/2022  HBsAg/HBcAb neg 5/2021  HCV neg 5/2021  Quantiferon Gold neg 5/2021  G6PD 12.9 nl 6/2020   Uric acid 4.5 nl 2/2019   Cryoglobulin neg 8/2017  RF neg 8/2017, RF neg 2/2019  CCP neg 2/2019  DEXA 9/5/2017 T scores -0.2, -1.5  FRAX 9/5/2017 not done  DEXA 6/12/2020 T scores 0.3, -1.2  FRAX 6/12/2020 not done      Hand x-rays 5/2017-indicates erosive arthritis  Hand x-rays 6/2021-IMPRESSION:  1.  There is been slight interval progression of arthropathy as discussed above predominantly involving the right hand 2nd and 3rd MCP joints and left hand 3rd MCP joint with lesser involvement of the IP joints and carpal bones which could be consistent with an inflammatory arthropathy such as rheumatoid arthritis.  2.  There is degenerative type change of osteoarthritis in the 1st CMC joints, left greater than right.     Feet x-rays 5/2017-indicates erosive arthritis   Feet x-rays 6/2021-IMPRESSION:  1.  There is no radiographic evidence of an erosive arthropathy.  2.  There is predominantly degenerative type change in the IP joints and 1st tarsometatarsal junctions, right greater than left.     Echocardiogram 7/2022-CONCLUSIONS  Compared to the images of the prior study 11/11/2019, there has been no   significant change.   Normal left ventricular systolic function.  The left ventricular ejection fraction is visually estimated to be 70%.  Normal diastolic function.  Normal right ventricular size and systolic function.  Moderate aortic insufficiency.     Corticosteroid Therapy Informed Consent signed 2/21/2019-copy given to patient       Current Outpatient Medications   Medication Sig Dispense Refill    predniSONE (DELTASONE) 5 MG Tab 4 tabs po qam for 4 days then 3 tabs po qam for 4 days then 2 tabs po qam for 4 days  then 1 tab po qam for 4 days then stop 40 Tablet 0    [START ON 6/10/2023] oxyCODONE-acetaminophen (PERCOCET-10)  MG Tab Take 1-2 Tablets by mouth every four hours as needed for Severe Pain (refill #3 of #3.) for up to 30 days. 150 Tablet 0    [START ON 5/11/2023] oxyCODONE-acetaminophen (PERCOCET-10)  MG Tab Take 1-2 Tablets by mouth every four hours as needed for Severe Pain (refill #2 of #3.) for up to 30 days. 150 Tablet 0    [START ON 4/11/2023] oxyCODONE-acetaminophen (PERCOCET-10)  MG Tab Take 1-2 Tablets by mouth every four hours as needed for Severe Pain (refill #1of #3.) for up to 30 days. 150 Tablet 0    cyclobenzaprine (FLEXERIL) 10 mg Tab TAKE 1 TABLET BY MOUTH THREE TIMES DAILY AS NEEDED FOR MILD PAIN 90 Tablet 3    lisinopril (PRINIVIL) 10 MG Tab TAKE 1 TABLET BY MOUTH EVERY DAY 90 Tablet 2    Guselkumab 100 MG/ML Solution Pen-injector 100 mg SQ week 0, 4 then q 8weeks therafter 4 mL 1    testosterone cypionate (DEPO-TESTOSTERONE) 200 MG/ML Solution injection INJECT ONE ML INTO THE SHOULDER THIGH OR BUTTOCKS EVERY 14 DAYS FOR 84 DAYS. 6 mL 0    metoprolol tartrate (LOPRESSOR) 50 MG Tab TAKE 1 TABLET BY MOUTH TWICE A  Tablet 3    pravastatin (PRAVACHOL) 80 MG tablet Take 1 Tablet by mouth every day. 100 Tablet 3    PARoxetine (PAXIL) 20 MG Tab TAKE 1 TABLET BY MOUTH EVERY DAY 90 Tablet 3    vardenafil (LEVITRA) 20 MG tablet Take 20 mg by mouth as needed.      CALCIUM-VITAMIN D PO Take 1 Tab by mouth 2 Times a Day.      ascorbic acid (ASCORBIC ACID) 500 MG Tab Take 500 mg by mouth every day.      Zinc 50 MG Cap Take 50 mg by mouth every day.      omeprazole (PRILOSEC) 20 MG CPDR Take 20 mg by mouth every day.       Current Facility-Administered Medications   Medication Dose Route Frequency Provider Last Rate Last Admin    methylPREDNISolone acetate (DEPO-MEDROL) injection 20 mg  20 mg Intramuscular Once Ju Azul M.D.         He  has a past medical history of Abnormal  myocardial perfusion study (1/15/2014), Aortic insufficiency (7/5/2011), Arthritis, Bronchitis, CAD (coronary artery disease) mild plaque at cath in 2/14 (12/5/2014), Cancer (HCC), Chronic use of opiate drugs therapeutic purposes (8/12/2017), Dental disorder, Dyslipidemia (7/12/2011), Heart burn, Heart murmur, Hiatus hernia syndrome, High cholesterol, HTN (hypertension) (7/5/2011), Hypertension, Indigestion, Infectious disease, Pain, Pain, Rheumatoid arthritis(714.0), and Tachycardia (10/20/2014).    ROS   Other than what is mentioned in HPI or physical exam, there is no history of headaches, double vision or blurred vision. No temporal tenderness or jaw claudication. No trouble swallowing difficulties or sore throats.  No chest complaints including chest pain, cough or sputum production. No GI complaints including nausea, vomiting, change in bowel habits, or past peptic ulcer disease. No history of blood in the stools. No urinary complaints including dysuria or frequency. No history of alopecia, photosensitivity, oral ulcerations, Raynaud's phenomena.       Objective:     BP (!) 162/70   Pulse 80   Temp 36.6 °C (97.8 °F) (Temporal)   Resp 14   Wt 92.1 kg (203 lb)   SpO2 96%  Body mass index is 29.13 kg/m².   Physical Exam:    Constitutional: Alert and oriented X3, patient is talkative with good eye contact.Skin: Warm, dry, good turgor, no rashes in visible areas, toenails looking better not quite as dystrophic.Eye: Equal, round and reactive, conjunctiva clear, lids normal EOM intactENMT: Lips without lesions, good dentition, no oropharyngeal ulcers, moist buccal mucosa, pinna without deformityNeck: Trachea midline, no masses, no thyromegaly.Lymph:  No cervical lymphadenopathy, no axillary lymphadenopathy, no supraclavicular lymphadenopathyRespiratory: Unlabored respiratory effort, lungs clear to auscultation, no wheezes, no ronchi.Cardiovascular: Normal S1, S2, .Regular rate and rhythm, no murmurs rubs or  gallops  Abdomen: Soft, non-tender, no masses, no hepatosplenomegaly,  positive central obesity  .Psych: Alert and oriented x3, normal affect and mood.Neuro: Cranial nerves 2-12 are grossly intact, no loss of sensation LEExt:no joint laxity noted in bilateral arms, no joint laxity noted in bilateral legs      Lab Results   Component Value Date/Time    QNTTBGOLD Negative 06/01/2017 01:13 PM     Lab Results   Component Value Date/Time    HEPBCORTOT Negative 06/01/2017 01:13 PM    HEPBCORIGM Non-Reactive 05/28/2021 01:24 PM    HEPBSAG Non-Reactive 05/28/2021 01:24 PM     Lab Results   Component Value Date/Time    HEPCAB Non-Reactive 05/28/2021 01:24 PM     Lab Results   Component Value Date/Time    SODIUM 137 10/12/2022 02:49 PM    POTASSIUM 4.9 10/12/2022 02:49 PM    CHLORIDE 99 10/12/2022 02:49 PM    CO2 29 10/12/2022 02:49 PM    GLUCOSE 82 10/12/2022 02:49 PM    BUN 15 10/12/2022 02:49 PM    CREATININE 0.79 10/12/2022 02:49 PM    CREATININE 1.1 04/21/2009 03:50 PM    BUNCREATRAT 13 09/21/2016 09:21 AM      Lab Results   Component Value Date/Time    WBC 10.5 10/12/2022 02:49 PM    WBC 7.5 07/01/2011 10:30 AM    RBC 5.22 10/12/2022 02:49 PM    RBC 4.72 07/01/2011 10:30 AM    HEMOGLOBIN 15.8 10/12/2022 02:49 PM    HEMATOCRIT 49.2 10/12/2022 02:49 PM    MCV 94.3 10/12/2022 02:49 PM     (H) 07/01/2011 10:30 AM    MCH 30.3 10/12/2022 02:49 PM    MCH 36.0 (H) 07/01/2011 10:30 AM    MCHC 32.1 (L) 10/12/2022 02:49 PM    MPV 10.6 10/12/2022 02:49 PM    NEUTSPOLYS 64.20 10/12/2022 02:49 PM    LYMPHOCYTES 11.40 (L) 10/12/2022 02:49 PM    MONOCYTES 13.10 10/12/2022 02:49 PM    EOSINOPHILS 9.50 (H) 10/12/2022 02:49 PM    BASOPHILS 1.00 10/12/2022 02:49 PM    HYPOCHROMIA 1+ 03/05/2014 04:43 PM    ANISOCYTOSIS 1+ 01/02/2015 05:02 PM      Lab Results   Component Value Date/Time    CALCIUM 9.9 10/12/2022 02:49 PM    ASTSGOT 25 10/12/2022 02:49 PM    ALTSGPT 19 10/12/2022 02:49 PM    ALKPHOSPHAT 99 10/12/2022 02:49 PM     TBILIRUBIN 0.4 10/12/2022 02:49 PM    ALBUMIN 4.3 10/12/2022 02:49 PM    TOTPROTEIN 7.1 10/12/2022 02:49 PM     Lab Results   Component Value Date/Time    URICACID 4.5 02/19/2019 08:36 AM    RHEUMFACTN 10 02/19/2019 08:36 AM    CCPANTIBODY 2 02/19/2019 08:36 AM     Lab Results   Component Value Date/Time    CRYOGLOBULIN NEG 72Hour 08/04/2017 02:32 PM     Lab Results   Component Value Date/Time    SEDRATEWES 5 10/12/2022 02:49 PM     Lab Results   Component Value Date/Time    HBA1C 5.3 07/26/2018 11:19 AM     Lab Results   Component Value Date/Time    G6PD 12.9 06/18/2020 02:50 PM     Lab Results   Component Value Date/Time    CPKTOTAL 141 01/26/2018 07:47 AM     Lab Results   Component Value Date/Time    MICROSOMALA <9.0 10/12/2022 02:49 PM    ANTITHYROGL <0.9 10/12/2022 02:49 PM     Lab Results   Component Value Date/Time    PTHINTACT 55 10/10/2008 10:42 AM     Assessment and Plan:     1. Psoriatic arthritis (HCC)  Continue Tremfya 100 mg week 0, 4 and then every 8 weeks thereafter.  Patient not doing well with swelling, will do tapering dose of prednisone over the course of 12 days starting at 20 mg and then tapering off.  - HEP B CORE AB IGM  - HEP B SURFACE ANTIGEN; Future  - HEP C VIRUS ANTIBODY; Future  - Quantiferon Gold Plus TB (4 tube); Future  - Comp Metabolic Panel; Future  - CBC WITH DIFFERENTIAL; Future  - Sed Rate; Future  - predniSONE (DELTASONE) 5 MG Tab; 4 tabs po qam for 4 days then 3 tabs po qam for 4 days then 2 tabs po qam for 4 days then 1 tab po qam for 4 days then stop  Dispense: 40 Tablet; Refill: 0  - methylPREDNISolone acetate (DEPO-MEDROL) injection 20 mg    2. Medication monitoring encounter  Currently on Tremfya 100 mg subcu per week, screening labs will be due May 2023, labs ordered for patient patient also needs monitoring labs every 6 months, next labs due now, labs ordered for patient  We reviewed risks of biological medications with patient including hematological pathology, cancer  risks, neurological and infection issues especially in the Covid-19 pandemic environment, patient states understanding.  - HEP B CORE AB IGM  - HEP B SURFACE ANTIGEN; Future  - HEP C VIRUS ANTIBODY; Future  - Quantiferon Gold Plus TB (4 tube); Future  - Comp Metabolic Panel; Future  - CBC WITH DIFFERENTIAL; Future  - Sed Rate; Future  - predniSONE (DELTASONE) 5 MG Tab; 4 tabs po qam for 4 days then 3 tabs po qam for 4 days then 2 tabs po qam for 4 days then 1 tab po qam for 4 days then stop  Dispense: 40 Tablet; Refill: 0  - methylPREDNISolone acetate (DEPO-MEDROL) injection 20 mg    3. Long term current use of systemic steroids  Do a trial of tapering steroids starting at 20 mg and tapering off over the course of 12 weeks as a bridging medication until patient can get Tremfya established in the system.  Risks reviewed with patient patient states understanding  Corticosteroid Therapy Informed Consent signed 2/21/2019-copy given to patient    - HEP B CORE AB IGM  - HEP B SURFACE ANTIGEN; Future  - HEP C VIRUS ANTIBODY; Future  - Quantiferon Gold Plus TB (4 tube); Future  - Comp Metabolic Panel; Future  - CBC WITH DIFFERENTIAL; Future  - Sed Rate; Future  - predniSONE (DELTASONE) 5 MG Tab; 4 tabs po qam for 4 days then 3 tabs po qam for 4 days then 2 tabs po qam for 4 days then 1 tab po qam for 4 days then stop  Dispense: 40 Tablet; Refill: 0  - methylPREDNISolone acetate (DEPO-MEDROL) injection 20 mg    4. Essential hypertension  May impact the type of medications we can use for this patient's arthritis. We will have to keep this under advisement.  - HEP B CORE AB IGM  - HEP B SURFACE ANTIGEN; Future  - HEP C VIRUS ANTIBODY; Future  - Quantiferon Gold Plus TB (4 tube); Future  - Comp Metabolic Panel; Future  - CBC WITH DIFFERENTIAL; Future  - Sed Rate; Future  - predniSONE (DELTASONE) 5 MG Tab; 4 tabs po qam for 4 days then 3 tabs po qam for 4 days then 2 tabs po qam for 4 days then 1 tab po qam for 4 days then stop   Dispense: 40 Tablet; Refill: 0  - methylPREDNISolone acetate (DEPO-MEDROL) injection 20 mg    5. Coronary artery disease due to calcified coronary lesion: Mildly cardiac catheterization in 2014  Followed by cardiology  - HEP B CORE AB IGM  - HEP B SURFACE ANTIGEN; Future  - HEP C VIRUS ANTIBODY; Future  - Quantiferon Gold Plus TB (4 tube); Future  - Comp Metabolic Panel; Future  - CBC WITH DIFFERENTIAL; Future  - Sed Rate; Future    6. Alcohol use  At least 2x/week    Followup: Return in about 3 months (around 6/23/2023). or sooner peyman Sims  was seen 30 minutes face-to-face of which more than 50% of the time was spent counseling the patient (excluding time for procedures)  regarding  rheumatological condition and care. Therapy was discussed in detail.      Please note that this dictation was created using voice recognition software. I have made every reasonable attempt to correct obvious errors, but I expect that there are errors of grammar and possibly content that I did not discover before finalizing the note.

## 2023-03-29 ENCOUNTER — HOSPITAL ENCOUNTER (OUTPATIENT)
Dept: LAB | Facility: MEDICAL CENTER | Age: 68
End: 2023-03-29
Attending: INTERNAL MEDICINE
Payer: MEDICARE

## 2023-03-29 DIAGNOSIS — Z51.81 MEDICATION MONITORING ENCOUNTER: ICD-10-CM

## 2023-03-29 DIAGNOSIS — I10 ESSENTIAL HYPERTENSION: ICD-10-CM

## 2023-03-29 DIAGNOSIS — L40.50 PSORIATIC ARTHRITIS (HCC): ICD-10-CM

## 2023-03-29 DIAGNOSIS — I25.10 CORONARY ARTERY DISEASE DUE TO CALCIFIED CORONARY LESION: ICD-10-CM

## 2023-03-29 DIAGNOSIS — Z79.52 LONG TERM CURRENT USE OF SYSTEMIC STEROIDS: ICD-10-CM

## 2023-03-29 DIAGNOSIS — I25.84 CORONARY ARTERY DISEASE DUE TO CALCIFIED CORONARY LESION: ICD-10-CM

## 2023-03-29 DIAGNOSIS — M05.9 RHEUMATOID ARTHRITIS WITH POSITIVE RHEUMATOID FACTOR, INVOLVING UNSPECIFIED SITE (HCC): ICD-10-CM

## 2023-03-29 DIAGNOSIS — E55.9 VITAMIN D DEFICIENCY: ICD-10-CM

## 2023-03-29 LAB
ALBUMIN SERPL BCP-MCNC: 4.1 G/DL (ref 3.2–4.9)
ALBUMIN/GLOB SERPL: 1.4 G/DL
ALP SERPL-CCNC: 85 U/L (ref 30–99)
ALT SERPL-CCNC: 22 U/L (ref 2–50)
ANION GAP SERPL CALC-SCNC: 14 MMOL/L (ref 7–16)
AST SERPL-CCNC: 23 U/L (ref 12–45)
BASOPHILS # BLD AUTO: 0.8 % (ref 0–1.8)
BASOPHILS # BLD: 0.12 K/UL (ref 0–0.12)
BILIRUB SERPL-MCNC: 0.6 MG/DL (ref 0.1–1.5)
BUN SERPL-MCNC: 13 MG/DL (ref 8–22)
CALCIUM ALBUM COR SERPL-MCNC: 9.1 MG/DL (ref 8.5–10.5)
CALCIUM SERPL-MCNC: 9.2 MG/DL (ref 8.5–10.5)
CHLORIDE SERPL-SCNC: 99 MMOL/L (ref 96–112)
CO2 SERPL-SCNC: 26 MMOL/L (ref 20–33)
CREAT SERPL-MCNC: 0.91 MG/DL (ref 0.5–1.4)
EOSINOPHIL # BLD AUTO: 0.22 K/UL (ref 0–0.51)
EOSINOPHIL NFR BLD: 1.6 % (ref 0–6.9)
ERYTHROCYTE [DISTWIDTH] IN BLOOD BY AUTOMATED COUNT: 51.4 FL (ref 35.9–50)
ERYTHROCYTE [SEDIMENTATION RATE] IN BLOOD BY WESTERGREN METHOD: 2 MM/HOUR (ref 0–20)
GFR SERPLBLD CREATININE-BSD FMLA CKD-EPI: 93 ML/MIN/1.73 M 2
GLOBULIN SER CALC-MCNC: 2.9 G/DL (ref 1.9–3.5)
GLUCOSE SERPL-MCNC: 141 MG/DL (ref 65–99)
HCT VFR BLD AUTO: 51.1 % (ref 42–52)
HGB BLD-MCNC: 17 G/DL (ref 14–18)
IMM GRANULOCYTES # BLD AUTO: 0.25 K/UL (ref 0–0.11)
IMM GRANULOCYTES NFR BLD AUTO: 1.8 % (ref 0–0.9)
LYMPHOCYTES # BLD AUTO: 1.19 K/UL (ref 1–4.8)
LYMPHOCYTES NFR BLD: 8.4 % (ref 22–41)
MCH RBC QN AUTO: 31 PG (ref 27–33)
MCHC RBC AUTO-ENTMCNC: 33.3 G/DL (ref 33.7–35.3)
MCV RBC AUTO: 93.1 FL (ref 81.4–97.8)
MONOCYTES # BLD AUTO: 1.34 K/UL (ref 0–0.85)
MONOCYTES NFR BLD AUTO: 9.4 % (ref 0–13.4)
NEUTROPHILS # BLD AUTO: 11.07 K/UL (ref 1.82–7.42)
NEUTROPHILS NFR BLD: 78 % (ref 44–72)
NRBC # BLD AUTO: 0 K/UL
NRBC BLD-RTO: 0 /100 WBC
PLATELET # BLD AUTO: 283 K/UL (ref 164–446)
PMV BLD AUTO: 9.8 FL (ref 9–12.9)
POTASSIUM SERPL-SCNC: 4.3 MMOL/L (ref 3.6–5.5)
PROT SERPL-MCNC: 7 G/DL (ref 6–8.2)
RBC # BLD AUTO: 5.49 M/UL (ref 4.7–6.1)
SODIUM SERPL-SCNC: 139 MMOL/L (ref 135–145)
WBC # BLD AUTO: 14.2 K/UL (ref 4.8–10.8)

## 2023-03-29 PROCEDURE — 86480 TB TEST CELL IMMUN MEASURE: CPT

## 2023-03-29 PROCEDURE — 86803 HEPATITIS C AB TEST: CPT

## 2023-03-29 PROCEDURE — 36415 COLL VENOUS BLD VENIPUNCTURE: CPT | Mod: GA

## 2023-03-29 PROCEDURE — 82306 VITAMIN D 25 HYDROXY: CPT

## 2023-03-29 PROCEDURE — 80053 COMPREHEN METABOLIC PANEL: CPT

## 2023-03-29 PROCEDURE — 85652 RBC SED RATE AUTOMATED: CPT

## 2023-03-29 PROCEDURE — 87340 HEPATITIS B SURFACE AG IA: CPT | Mod: GA

## 2023-03-29 PROCEDURE — 86705 HEP B CORE ANTIBODY IGM: CPT

## 2023-03-29 PROCEDURE — 85025 COMPLETE CBC W/AUTO DIFF WBC: CPT

## 2023-03-30 LAB
25(OH)D3 SERPL-MCNC: 31 NG/ML (ref 30–100)
GAMMA INTERFERON BACKGROUND BLD IA-ACNC: 0.03 IU/ML
HBV CORE IGM SER QL: NORMAL
HBV SURFACE AG SER QL: NORMAL
HCV AB SER QL: NORMAL
M TB IFN-G BLD-IMP: NEGATIVE
M TB IFN-G CD4+ BCKGRND COR BLD-ACNC: 0 IU/ML
MITOGEN IGNF BCKGRD COR BLD-ACNC: >10 IU/ML
QFT TB2 - NIL TBQ2: 0 IU/ML

## 2023-04-19 ENCOUNTER — TELEPHONE (OUTPATIENT)
Dept: HEALTH INFORMATION MANAGEMENT | Facility: OTHER | Age: 68
End: 2023-04-19

## 2023-05-04 ENCOUNTER — TELEPHONE (OUTPATIENT)
Dept: RHEUMATOLOGY | Facility: MEDICAL CENTER | Age: 68
End: 2023-05-04
Payer: MEDICARE

## 2023-05-04 DIAGNOSIS — Z79.52 LONG TERM CURRENT USE OF SYSTEMIC STEROIDS: ICD-10-CM

## 2023-05-04 DIAGNOSIS — L40.50 PSORIATIC ARTHRITIS (HCC): ICD-10-CM

## 2023-05-04 DIAGNOSIS — I10 ESSENTIAL HYPERTENSION: ICD-10-CM

## 2023-05-04 DIAGNOSIS — Z51.81 MEDICATION MONITORING ENCOUNTER: ICD-10-CM

## 2023-05-04 RX ORDER — PREDNISONE 5 MG/1
TABLET ORAL
Qty: 40 TABLET | Refills: 0 | Status: SHIPPED | OUTPATIENT
Start: 2023-05-04 | End: 2023-07-06

## 2023-05-04 NOTE — TELEPHONE ENCOUNTER
Ray called stating the Tremfya has not kicked in yet and he is in a lot of pain. He is flaring so bad he is pretty much staying in bed, he cant use his hands and the pain is so bad. He cant even grab or make a fist.     He is requesting Prednisone until this medication kicks in.     Please advise and thank you

## 2023-05-05 NOTE — TELEPHONE ENCOUNTER
Another steroid taper was sent to [facility] on [facility], if patient still has problems after this prednisone taper we should consider possibly switching the Tremfya to an alternative medication.

## 2023-05-09 ENCOUNTER — APPOINTMENT (OUTPATIENT)
Dept: RHEUMATOLOGY | Facility: MEDICAL CENTER | Age: 68
End: 2023-05-09
Attending: INTERNAL MEDICINE
Payer: MEDICARE

## 2023-05-18 DIAGNOSIS — E29.1 HYPOGONADISM MALE: ICD-10-CM

## 2023-05-18 PROCEDURE — 1170F FXNL STATUS ASSESSED: CPT | Performed by: INTERNAL MEDICINE

## 2023-05-19 NOTE — TELEPHONE ENCOUNTER
Received request via: Pharmacy    Was the patient seen in the last year in this department? Yes  3/21/23  Does the patient have an active prescription (recently filled or refills available) for medication(s) requested? No    Does the patient have [facility] Plus and need 100 day supply (blood pressure, diabetes and cholesterol meds only)? Medication is not for cholesterol, blood pressure or diabetes

## 2023-05-22 RX ORDER — TESTOSTERONE CYPIONATE 200 MG/ML
INJECTION, SOLUTION INTRAMUSCULAR
Qty: 6 ML | Refills: 0 | Status: SHIPPED | OUTPATIENT
Start: 2023-05-22 | End: 2023-07-29

## 2023-05-26 ENCOUNTER — HOSPITAL ENCOUNTER (OUTPATIENT)
Dept: LAB | Facility: MEDICAL CENTER | Age: 68
End: 2023-05-26
Attending: PHYSICIAN ASSISTANT
Payer: MEDICARE

## 2023-05-26 PROCEDURE — 36415 COLL VENOUS BLD VENIPUNCTURE: CPT | Mod: GA

## 2023-05-26 PROCEDURE — 84153 ASSAY OF PSA TOTAL: CPT | Mod: GA

## 2023-05-27 LAB — PSA SERPL-MCNC: 1.86 NG/ML (ref 0–4)

## 2023-06-07 DIAGNOSIS — F32.A DEPRESSION, UNSPECIFIED DEPRESSION TYPE: ICD-10-CM

## 2023-06-08 RX ORDER — PAROXETINE HYDROCHLORIDE 20 MG/1
20 TABLET, FILM COATED ORAL
Qty: 90 TABLET | Refills: 3 | Status: SHIPPED | OUTPATIENT
Start: 2023-06-08

## 2023-06-22 ENCOUNTER — OFFICE VISIT (OUTPATIENT)
Dept: RHEUMATOLOGY | Facility: MEDICAL CENTER | Age: 68
End: 2023-06-22
Attending: INTERNAL MEDICINE
Payer: MEDICARE

## 2023-06-22 VITALS
HEIGHT: 70 IN | RESPIRATION RATE: 18 BRPM | SYSTOLIC BLOOD PRESSURE: 134 MMHG | OXYGEN SATURATION: 99 % | DIASTOLIC BLOOD PRESSURE: 84 MMHG | WEIGHT: 199 LBS | TEMPERATURE: 97.1 F | HEART RATE: 67 BPM | BODY MASS INDEX: 28.49 KG/M2

## 2023-06-22 DIAGNOSIS — L40.50 PSORIATIC ARTHRITIS (HCC): ICD-10-CM

## 2023-06-22 DIAGNOSIS — I10 ESSENTIAL HYPERTENSION: ICD-10-CM

## 2023-06-22 DIAGNOSIS — I25.10 CORONARY ARTERY DISEASE DUE TO CALCIFIED CORONARY LESION: ICD-10-CM

## 2023-06-22 DIAGNOSIS — Z51.81 MEDICATION MONITORING ENCOUNTER: ICD-10-CM

## 2023-06-22 DIAGNOSIS — Z79.52 LONG TERM CURRENT USE OF SYSTEMIC STEROIDS: ICD-10-CM

## 2023-06-22 DIAGNOSIS — I25.84 CORONARY ARTERY DISEASE DUE TO CALCIFIED CORONARY LESION: ICD-10-CM

## 2023-06-22 PROCEDURE — 99214 OFFICE O/P EST MOD 30 MIN: CPT | Performed by: INTERNAL MEDICINE

## 2023-06-22 PROCEDURE — 99212 OFFICE O/P EST SF 10 MIN: CPT | Performed by: INTERNAL MEDICINE

## 2023-06-22 PROCEDURE — 1170F FXNL STATUS ASSESSED: CPT | Performed by: INTERNAL MEDICINE

## 2023-06-22 PROCEDURE — 3075F SYST BP GE 130 - 139MM HG: CPT | Performed by: INTERNAL MEDICINE

## 2023-06-22 PROCEDURE — 3079F DIAST BP 80-89 MM HG: CPT | Performed by: INTERNAL MEDICINE

## 2023-06-22 RX ORDER — PREDNISONE 5 MG/1
TABLET ORAL
Qty: 180 TABLET | Refills: 0 | Status: SHIPPED | OUTPATIENT
Start: 2023-06-22 | End: 2023-07-29

## 2023-06-22 RX ORDER — MV-MIN/FA/VIT K/LUTEIN/ZEAXANT 200MCG-5MG
1 CAPSULE ORAL 2 TIMES DAILY
COMMUNITY

## 2023-06-22 ASSESSMENT — JOINT PAIN
TOTAL NUMBER OF TENDER JOINTS: 10
TOTAL NUMBER OF SWOLLEN JOINTS: 10

## 2023-06-22 ASSESSMENT — FIBROSIS 4 INDEX: FIB4 SCORE: 1.14

## 2023-06-22 NOTE — PROGRESS NOTES
Chief Complaint- joint pain     Subjective:   Edgardo Sims is a 67 y.o. male here today for follow up of rheumatological issues    This is a follow-up visit for this patient who we see in this clinic for psoriatic arthritis with hand and feet x-rays done June 2021 indicating inflammatory arthritis.  Patient does have problems with dandruff especially in the beard manifesting as psoriasis also with dystrophic toenails and fingernails.  Patient is currently on Tremfya 100 mg subcu every 8 weeks, patient's been on this since at least March 2023 but patient continues to have problems with joint pain and flares.  Patient here to see about alternative treatment options.       Additional comorbidities include ureteral blockage and BPH.  Patient also with a history of Dupuytren's contracture with release, also history of hypercholesterolemia.  Patient also with a history of osteopenia and aortic valve insufficiency followed periodically by echocardiograms.  Patient also with rotator cuff tears in left shoulder status post surgical intervention.     Of note patient does drink ETOH at least twice/week on a regular basis as this is considered a high risk behavior we will be unable to prescribe any narcotic pain medications for this patient in this clinic.     Bilateral AUTUMN  Left TKA      S/p Tremfya-ineffective   S/p plaquenil-stopped 10/2022 secondary to ophthalmology concern of macular change per patient report  S/p Rinvoq-stopped secondary to concern of exacerbation of CAD  S/p Remicade-ineffective  S/p Orencia-ineffective  S/p Enbrel-ineffective  S/p humira-ineffective  S/p MTX-oral ulcers  S/p arava-bad reaction but patient doesn't recall specifics  S/p rituximab-helped but patient stopped because of methotrexate side effects per patient report....     HBsAg/HBcAb neg 3/2023  HCV neg 3/2023  Quantiferon Gold neg 3/2023   Thyroglobulin ab neg 10/2022  TPO ab neg 10/2022  G6PD 12.9 nl 6/2020   Uric acid 4.5 nl  2/2019   Cryoglobulin neg 8/2017  RF neg 8/2017, RF neg 2/2019  CCP neg 2/2019  DEXA 9/5/2017 T scores -0.2, -1.5  FRAX 9/5/2017 not done  DEXA 6/12/2020 T scores 0.3, -1.2  FRAX 6/12/2020 not done      Hand x-rays 5/2017-indicates erosive arthritis  Hand x-rays 6/2021-IMPRESSION:  1.  There is been slight interval progression of arthropathy as discussed above predominantly involving the right hand 2nd and 3rd MCP joints and left hand 3rd MCP joint with lesser involvement of the IP joints and carpal bones which could be consistent with an inflammatory arthropathy such as rheumatoid arthritis.  2.  There is degenerative type change of osteoarthritis in the 1st CMC joints, left greater than right.     Feet x-rays 5/2017-indicates erosive arthritis   Feet x-rays 6/2021-IMPRESSION:  1.  There is no radiographic evidence of an erosive arthropathy.  2.  There is predominantly degenerative type change in the IP joints and 1st tarsometatarsal junctions, right greater than left.     Echocardiogram 7/2022-CONCLUSIONS  Compared to the images of the prior study 11/11/2019, there has been no   significant change.   Normal left ventricular systolic function.  The left ventricular ejection fraction is visually estimated to be 70%.  Normal diastolic function.  Normal right ventricular size and systolic function.  Moderate aortic insufficiency.     Corticosteroid Therapy Informed Consent signed 2/21/2019-copy given to patient       Current Outpatient Medications   Medication Sig Dispense Refill    Multiple Vitamins-Minerals (PRESERVISION AREDS 2+MULTI VIT) Cap Take  by mouth 2 times a day.      Ixekizumab 80 MG/ML Solution Auto-injector 160 mg SQ for first injection, then 80 mg SQ every 4 weeks 4 mL 1    predniSONE (DELTASONE) 5 MG Tab 10 mg po qam 180 Tablet 0    PARoxetine (PAXIL) 20 MG Tab Take 1 Tablet by mouth every day. 90 Tablet 3    testosterone cypionate (DEPO-TESTOSTERONE) 200 MG/ML Solution injection INJECT ONE ML  INTRAMUSCULARLY EVERY 14 DAYS FOR 84 DAYS 6 mL 0    oxyCODONE-acetaminophen (PERCOCET-10)  MG Tab Take 1-2 Tablets by mouth every four hours as needed for Severe Pain (refill #3 of #3.) for up to 30 days. 150 Tablet 0    cyclobenzaprine (FLEXERIL) 10 mg Tab TAKE 1 TABLET BY MOUTH THREE TIMES DAILY AS NEEDED FOR MILD PAIN 90 Tablet 3    lisinopril (PRINIVIL) 10 MG Tab TAKE 1 TABLET BY MOUTH EVERY DAY 90 Tablet 2    Guselkumab 100 MG/ML Solution Pen-injector 100 mg SQ week 0, 4 then q 8weeks therafter 4 mL 1    metoprolol tartrate (LOPRESSOR) 50 MG Tab TAKE 1 TABLET BY MOUTH TWICE A  Tablet 3    pravastatin (PRAVACHOL) 80 MG tablet Take 1 Tablet by mouth every day. 100 Tablet 3    vardenafil (LEVITRA) 20 MG tablet Take 20 mg by mouth as needed.      CALCIUM-VITAMIN D PO Take 1 Tab by mouth 2 Times a Day.      ascorbic acid (ASCORBIC ACID) 500 MG Tab Take 500 mg by mouth every day.      Zinc 50 MG Cap Take 50 mg by mouth every day.      omeprazole (PRILOSEC) 20 MG CPDR Take 20 mg by mouth every day.      predniSONE (DELTASONE) 5 MG Tab 4 tabs po qam for 4 days then 3 tabs po qam for 4 days then 2 tabs po qam for 4 days then 1 tab po qam for 4 days then stop (Patient not taking: Reported on 6/22/2023) 40 Tablet 0     No current facility-administered medications for this visit.     He  has a past medical history of Abnormal myocardial perfusion study (1/15/2014), Aortic insufficiency (7/5/2011), Arthritis, Bronchitis, CAD (coronary artery disease) mild plaque at cath in 2/14 (12/5/2014), Cancer (HCC), Chronic use of opiate drugs therapeutic purposes (8/12/2017), Dental disorder, Dyslipidemia (7/12/2011), Heart burn, Heart murmur, Hiatus hernia syndrome, High cholesterol, HTN (hypertension) (7/5/2011), Hypertension, Indigestion, Infectious disease, Pain, Pain, Rheumatoid arthritis(714.0), and Tachycardia (10/20/2014).    ROS   Other than what is mentioned in HPI or physical exam, there is no history of  "headaches, double vision or blurred vision.  No trouble swallowing difficulties .  No chest complaints including chest pain, cough or sputum production. No GI complaints including nausea, vomiting, change in bowel habits, or past peptic ulcer disease. No history of blood in the stools. No urinary complaints including dysuria or frequency. No history of alopecia, photosensitivity     Objective:     /84 (BP Location: Right arm, Patient Position: Sitting, BP Cuff Size: Adult)   Pulse 67   Temp 36.2 °C (97.1 °F) (Temporal)   Resp 18   Ht 1.778 m (5' 10\")   Wt 90.3 kg (199 lb)   SpO2 99%  Body mass index is 28.55 kg/m².   Physical Exam:    Constitutional: Alert and oriented X3, patient is talkative with good eye contact.Skin: Warm, dry, good turgor, flaky skin in the beard.Eye: Equal, round and reactive, conjunctiva clear, lids normal EOM intactENMT: Lips without lesions,  pinna without deformityNeck: Trachea midline, no masses, no thyromegaly.Lymph:  No cervical lymphadenopathy, no axillary lymphadenopathy, no supraclavicular lymphadenopathyRespiratory: Unlabored respiratory effort, lungs clear to auscultation, no wheezes, no ronchi.Cardiovascular: Normal S1, S2, Regular rate and rhythm, no murmurs rubs or gallops  .Abdomen: Soft, non-distended.Psych: Alert and oriented x3, normal affect and mood.Neuro: Cranial nerves 2-12 are grossly intact Ext:no joint laxity noted in bilateral arms, no joint laxity noted in bilateral legs, positive synovitis on most MCP and PIP joints both hands, as well as bilateral wrists, there is also synovitis in the right elbow, shoulders good range of motion, knees with crepitus there is some synovitis in the right knee as well toes without sausage digits        Lab Results   Component Value Date/Time    QNTTBGOLD Negative 06/01/2017 01:13 PM     Lab Results   Component Value Date/Time    HEPBCORTOT Negative 06/01/2017 01:13 PM    HEPBCORIGM Non-Reactive 03/29/2023 04:53 PM    " HEPBSAG Non-Reactive 03/29/2023 04:53 PM     Lab Results   Component Value Date/Time    HEPCAB Non-Reactive 03/29/2023 04:53 PM     Lab Results   Component Value Date/Time    SODIUM 139 03/29/2023 04:53 PM    POTASSIUM 4.3 03/29/2023 04:53 PM    CHLORIDE 99 03/29/2023 04:53 PM    CO2 26 03/29/2023 04:53 PM    GLUCOSE 141 (H) 03/29/2023 04:53 PM    BUN 13 03/29/2023 04:53 PM    CREATININE 0.91 03/29/2023 04:53 PM    CREATININE 1.1 04/21/2009 03:50 PM    BUNCREATRAT 13 09/21/2016 09:21 AM      Lab Results   Component Value Date/Time    WBC 14.2 (H) 03/29/2023 04:53 PM    WBC 7.5 07/01/2011 10:30 AM    RBC 5.49 03/29/2023 04:53 PM    RBC 4.72 07/01/2011 10:30 AM    HEMOGLOBIN 17.0 03/29/2023 04:53 PM    HEMATOCRIT 51.1 03/29/2023 04:53 PM    MCV 93.1 03/29/2023 04:53 PM     (H) 07/01/2011 10:30 AM    MCH 31.0 03/29/2023 04:53 PM    MCH 36.0 (H) 07/01/2011 10:30 AM    MCHC 33.3 (L) 03/29/2023 04:53 PM    MPV 9.8 03/29/2023 04:53 PM    NEUTSPOLYS 78.00 (H) 03/29/2023 04:53 PM    LYMPHOCYTES 8.40 (L) 03/29/2023 04:53 PM    MONOCYTES 9.40 03/29/2023 04:53 PM    EOSINOPHILS 1.60 03/29/2023 04:53 PM    BASOPHILS 0.80 03/29/2023 04:53 PM    HYPOCHROMIA 1+ 03/05/2014 04:43 PM    ANISOCYTOSIS 1+ 01/02/2015 05:02 PM      Lab Results   Component Value Date/Time    CALCIUM 9.2 03/29/2023 04:53 PM    ASTSGOT 23 03/29/2023 04:53 PM    ALTSGPT 22 03/29/2023 04:53 PM    ALKPHOSPHAT 85 03/29/2023 04:53 PM    TBILIRUBIN 0.6 03/29/2023 04:53 PM    ALBUMIN 4.1 03/29/2023 04:53 PM    TOTPROTEIN 7.0 03/29/2023 04:53 PM     Lab Results   Component Value Date/Time    URICACID 4.5 02/19/2019 08:36 AM    RHEUMFACTN 10 02/19/2019 08:36 AM    CCPANTIBODY 2 02/19/2019 08:36 AM     Lab Results   Component Value Date/Time    CRYOGLOBULIN NEG 72Hour 08/04/2017 02:32 PM     Lab Results   Component Value Date/Time    SEDRATEWES 2 03/29/2023 04:53 PM     Lab Results   Component Value Date/Time    HBA1C 5.3 07/26/2018 11:19 AM     Lab Results    Component Value Date/Time    G6PD 12.9 06/18/2020 02:50 PM     Lab Results   Component Value Date/Time    CPKTOTAL 141 01/26/2018 07:47 AM     Lab Results   Component Value Date/Time    MICROSOMALA <9.0 10/12/2022 02:49 PM    ANTITHYROGL <0.9 10/12/2022 02:49 PM     Lab Results   Component Value Date/Time    PTHINTACT 55 10/10/2008 10:42 AM     Assessment and Plan:     1. Psoriatic arthritis (HCC)  Not doing well with Tremfya long discussion with patient about other treatment options, we opted to go to a different mechanism of action I L-17 inhibitors we will do a trial of Taltz 160 mg subcu for initial dose then 80 mg subcu every 4 weeks thereafter.  Patient mentions that he might have problems with affordability, if so other option may be switching to Skyrizi which is also IL 23 inhibitor but with good patient assistance.  - Ixekizumab 80 MG/ML Solution Auto-injector; 160 mg SQ for first injection, then 80 mg SQ every 4 weeks  Dispense: 4 mL; Refill: 1  - predniSONE (DELTASONE) 5 MG Tab; 10 mg po qam  Dispense: 180 Tablet; Refill: 0    2. Medication monitoring encounter  Do a trial of Taltz 160 mg subcu initial dose then 80 mg subcu every 4 weeks thereafter, if unaffordable then we will do a trial of Skyrizi  Of note screening labs up-to-date, next screening labs will be due March 2025, patient needs monitoring labs every 6 months, next labs due about September 2023 will order at next visit  Information regarding Taltz as well as Skyrizi printed out from up-to-date given to patient to review today  We reviewed risks of biological medications with patient including hematological pathology, cancer risks, neurological and infection issues especially in the Covid-19 pandemic environment, patient states understanding.  - Ixekizumab 80 MG/ML Solution Auto-injector; 160 mg SQ for first injection, then 80 mg SQ every 4 weeks  Dispense: 4 mL; Refill: 1  - predniSONE (DELTASONE) 5 MG Tab; 10 mg po qam  Dispense: 180  Tablet; Refill: 0    3. Long term current use of systemic steroids  We will do a trial of prednisone 10 mg a day as a bridging medication until we can establish patient on a functional biologic medication.  Risks of chronic prednisone reviewed with patient patient states understanding  Corticosteroid Therapy Informed Consent signed 2/21/2019-copy given to patient    - predniSONE (DELTASONE) 5 MG Tab; 10 mg po qam  Dispense: 180 Tablet; Refill: 0    4. Essential hypertension  May impact the type of medications we can use for this patient's arthritis. We will have to keep this under advisement.  - Ixekizumab 80 MG/ML Solution Auto-injector; 160 mg SQ for first injection, then 80 mg SQ every 4 weeks  Dispense: 4 mL; Refill: 1  - predniSONE (DELTASONE) 5 MG Tab; 10 mg po qam  Dispense: 180 Tablet; Refill: 0    5. Coronary artery disease due to calcified coronary lesion: Mildly cardiac catheterization in 2014  Followed by cardiology  - Ixekizumab 80 MG/ML Solution Auto-injector; 160 mg SQ for first injection, then 80 mg SQ every 4 weeks  Dispense: 4 mL; Refill: 1    Followup: Return in about 3 months (around 9/22/2023). or sooner peyman Sims  was seen 30 minutes face-to-face of which more than 50% of the time was spent counseling the patient (excluding time for procedures)  regarding  rheumatological condition and care. Therapy was discussed in detail.      Please note that this dictation was created using voice recognition software. I have made every reasonable attempt to correct obvious errors, but I expect that there are errors of grammar and possibly content that I did not discover before finalizing the note.

## 2023-07-06 ENCOUNTER — OFFICE VISIT (OUTPATIENT)
Dept: MEDICAL GROUP | Facility: LAB | Age: 68
End: 2023-07-06
Payer: MEDICARE

## 2023-07-06 VITALS
HEIGHT: 70 IN | BODY MASS INDEX: 28.06 KG/M2 | HEART RATE: 75 BPM | SYSTOLIC BLOOD PRESSURE: 132 MMHG | RESPIRATION RATE: 16 BRPM | DIASTOLIC BLOOD PRESSURE: 68 MMHG | WEIGHT: 196 LBS | OXYGEN SATURATION: 96 % | TEMPERATURE: 97.6 F

## 2023-07-06 DIAGNOSIS — E55.9 VITAMIN D DEFICIENCY: ICD-10-CM

## 2023-07-06 DIAGNOSIS — I10 ESSENTIAL HYPERTENSION: ICD-10-CM

## 2023-07-06 DIAGNOSIS — R79.89 ABNORMAL CBC: ICD-10-CM

## 2023-07-06 DIAGNOSIS — M05.9 RHEUMATOID ARTHRITIS WITH POSITIVE RHEUMATOID FACTOR, INVOLVING UNSPECIFIED SITE (HCC): ICD-10-CM

## 2023-07-06 PROCEDURE — 3075F SYST BP GE 130 - 139MM HG: CPT | Performed by: INTERNAL MEDICINE

## 2023-07-06 PROCEDURE — 99214 OFFICE O/P EST MOD 30 MIN: CPT | Performed by: INTERNAL MEDICINE

## 2023-07-06 PROCEDURE — 3078F DIAST BP <80 MM HG: CPT | Performed by: INTERNAL MEDICINE

## 2023-07-06 PROCEDURE — 1170F FXNL STATUS ASSESSED: CPT | Performed by: INTERNAL MEDICINE

## 2023-07-06 RX ORDER — OXYCODONE AND ACETAMINOPHEN 10; 325 MG/1; MG/1
1-2 TABLET ORAL EVERY 4 HOURS PRN
Qty: 150 TABLET | Refills: 0 | Status: SHIPPED | OUTPATIENT
Start: 2023-09-12 | End: 2023-10-06 | Stop reason: SDUPTHER

## 2023-07-06 RX ORDER — OXYCODONE AND ACETAMINOPHEN 10; 325 MG/1; MG/1
1-2 TABLET ORAL EVERY 4 HOURS PRN
Qty: 150 TABLET | Refills: 0 | Status: ON HOLD | OUTPATIENT
Start: 2023-07-14 | End: 2023-08-05

## 2023-07-06 RX ORDER — LISINOPRIL 20 MG/1
20 TABLET ORAL
Qty: 90 TABLET | Refills: 3 | Status: SHIPPED | OUTPATIENT
Start: 2023-07-06 | End: 2023-12-18 | Stop reason: SDUPTHER

## 2023-07-06 RX ORDER — OXYCODONE AND ACETAMINOPHEN 10; 325 MG/1; MG/1
1-2 TABLET ORAL EVERY 4 HOURS PRN
Qty: 150 TABLET | Refills: 0 | Status: ON HOLD | OUTPATIENT
Start: 2023-08-13 | End: 2023-08-05

## 2023-07-06 ASSESSMENT — FIBROSIS 4 INDEX: FIB4 SCORE: 1.14

## 2023-07-07 NOTE — PROGRESS NOTES
CC: Edgardo Sims is a 67 y.o. male is suffering from   Chief Complaint   Patient presents with    Chronic Opiate Therapy     3 months follow up         SUBJECTIVE:  1. Rheumatoid arthritis with positive rheumatoid factor, involving unspecified site (HCC)  Ray suffers from rheumatoid arthritis currently is having problems with controlling his symptoms.  Patient is back on prednisone we have discussed that prednisone can deplete vitamin D    2. Abnormal CBC  Patient with an abnormal CBC recheck CBC    3. Vitamin D deficiency  Vitamin D deficiency continue 2000 international units each day    4. Essential hypertension  Essential hypertension increase lisinopril from 10 to 20 mg        Past social, family, history:   Social History     Tobacco Use    Smoking status: Never    Smokeless tobacco: Never   Vaping Use    Vaping Use: Never used   Substance Use Topics    Alcohol use: Yes     Alcohol/week: 1.8 oz     Types: 3 Cans of beer per week     Comment: 3 per week    Drug use: No         MEDICATIONS:    Current Outpatient Medications:     [START ON 9/12/2023] oxyCODONE-acetaminophen (PERCOCET-10)  MG Tab, Take 1-2 Tablets by mouth every four hours as needed for Severe Pain (refill #3 of #3.) for up to 30 days., Disp: 150 Tablet, Rfl: 0    [START ON 8/13/2023] oxyCODONE-acetaminophen (PERCOCET-10)  MG Tab, Take 1-2 Tablets by mouth every four hours as needed for Severe Pain (refill #2 of #3.) for up to 30 days., Disp: 150 Tablet, Rfl: 0    [START ON 7/14/2023] oxyCODONE-acetaminophen (PERCOCET-10)  MG Tab, Take 1-2 Tablets by mouth every four hours as needed for Severe Pain (refill #1of #3.) for up to 30 days., Disp: 150 Tablet, Rfl: 0    lisinopril (PRINIVIL) 20 MG Tab, Take 1 Tablet by mouth every day., Disp: 90 Tablet, Rfl: 3    Multiple Vitamins-Minerals (PRESERVISION AREDS 2+MULTI VIT) Cap, Take  by mouth 2 times a day., Disp: , Rfl:     Ixekizumab 80 MG/ML Solution Auto-injector, 160  mg SQ for first injection, then 80 mg SQ every 4 weeks, Disp: 4 mL, Rfl: 1    predniSONE (DELTASONE) 5 MG Tab, 10 mg po qam, Disp: 180 Tablet, Rfl: 0    PARoxetine (PAXIL) 20 MG Tab, Take 1 Tablet by mouth every day., Disp: 90 Tablet, Rfl: 3    testosterone cypionate (DEPO-TESTOSTERONE) 200 MG/ML Solution injection, INJECT ONE ML INTRAMUSCULARLY EVERY 14 DAYS FOR 84 DAYS, Disp: 6 mL, Rfl: 0    cyclobenzaprine (FLEXERIL) 10 mg Tab, TAKE 1 TABLET BY MOUTH THREE TIMES DAILY AS NEEDED FOR MILD PAIN, Disp: 90 Tablet, Rfl: 3    metoprolol tartrate (LOPRESSOR) 50 MG Tab, TAKE 1 TABLET BY MOUTH TWICE A DAY, Disp: 180 Tablet, Rfl: 3    pravastatin (PRAVACHOL) 80 MG tablet, Take 1 Tablet by mouth every day., Disp: 100 Tablet, Rfl: 3    vardenafil (LEVITRA) 20 MG tablet, Take 20 mg by mouth as needed., Disp: , Rfl:     CALCIUM-VITAMIN D PO, Take 1 Tab by mouth 2 Times a Day., Disp: , Rfl:     ascorbic acid (ASCORBIC ACID) 500 MG Tab, Take 500 mg by mouth every day., Disp: , Rfl:     Zinc 50 MG Cap, Take 50 mg by mouth every day., Disp: , Rfl:     omeprazole (PRILOSEC) 20 MG CPDR, Take 20 mg by mouth every day., Disp: , Rfl:     PROBLEMS:  Patient Active Problem List    Diagnosis Date Noted    Hammer toe of right foot 12/29/2021    Osteopenia 03/15/2021    Vitamin D deficiency 03/15/2021    Postoperative pain 08/05/2020    Difficult intubation 08/05/2020    Gastroesophageal reflux disease 08/05/2020    Secondary adrenal insufficiency (HCC) 12/12/2019    Current chronic use of systemic steroids 10/16/2019    High risk medication use 10/16/2019    History of adrenal insufficiency 10/16/2019    Neurogenic bladder 01/26/2019    Bladder outlet obstruction 05/01/2018    Leukocytosis 04/30/2018    Lactic acidosis 04/30/2018    Idiopathic acute pancreatitis 04/30/2018    Chronic use of opiate drug for therapeutic purpose 08/12/2017    Depression 07/13/2015    Anxiety 03/31/2015    Coronary artery disease due to calcified coronary  "lesion: Mildly cardiac catheterization in 2014 12/05/2014    Tachycardia 10/20/2014    Abnormal myocardial perfusion study 01/15/2014    Osteoarthrosis, unspecified whether generalized or localized, pelvic region and thigh 05/31/2013    Dyslipidemia 07/12/2011    Aortic insufficiency 07/05/2011    Essential hypertension 07/05/2011    Rheumatoid arthritis (HCC) 05/05/2009    Hypogonadism male 05/05/2009       REVIEW OF SYSTEMS:  Gen.:  No Nausea, Vomiting, fever, Chills.  Heart: No chest pain.  Lungs:  No shortness of Breath.  Psychological: Kian unusual Anxiety depression     PHYSICAL EXAM   Constitutional: Alert, cooperative, not in acute distress.  Cardiovascular:  Rate Rhythm is regular without murmurs rubs clicks.     Thorax & Lungs: Clear to auscultation, no wheezing, rhonchi, or rales  HENT: Normocephalic, Atraumatic.  Eyes: PERRLA, EOMI, Conjunctiva normal.   Neck: Trachia is midline no swelling of the thyroid.   Lymphatic: No lymphadenopathy noted.   Neurologic: Alert & oriented x 3, cranial nerves II through XII are intact, Normal motor function, Normal sensory function, No focal deficits noted.   Psychiatric: Affect normal, Judgment normal, Mood normal.     VITAL SIGNS:/68   Pulse 75   Temp 36.4 °C (97.6 °F) (Temporal)   Resp 16   Ht 1.778 m (5' 10\")   Wt 88.9 kg (196 lb)   SpO2 96%   BMI 28.12 kg/m²     Labs: Reviewed    Assessment:                                                     Plan:    1. Rheumatoid arthritis with positive rheumatoid factor, involving unspecified site (HCC)  Continue Percocet state drug task force reviewed  - oxyCODONE-acetaminophen (PERCOCET-10)  MG Tab; Take 1-2 Tablets by mouth every four hours as needed for Severe Pain (refill #3 of #3.) for up to 30 days.  Dispense: 150 Tablet; Refill: 0  - oxyCODONE-acetaminophen (PERCOCET-10)  MG Tab; Take 1-2 Tablets by mouth every four hours as needed for Severe Pain (refill #2 of #3.) for up to 30 days.  " Dispense: 150 Tablet; Refill: 0  - oxyCODONE-acetaminophen (PERCOCET-10)  MG Tab; Take 1-2 Tablets by mouth every four hours as needed for Severe Pain (refill #1of #3.) for up to 30 days.  Dispense: 150 Tablet; Refill: 0  - Comp Metabolic Panel; Future    2. Abnormal CBC  Recheck CBC  - CBC WITH DIFFERENTIAL; Future    3. Vitamin D deficiency  Recheck vitamin D  - VITAMIN D,25 HYDROXY (DEFICIENCY); Future    4. Essential hypertension  Increase lisinopril from 10 to 20 mg  - lisinopril (PRINIVIL) 20 MG Tab; Take 1 Tablet by mouth every day.  Dispense: 90 Tablet; Refill: 3

## 2023-07-09 DIAGNOSIS — M05.9 RHEUMATOID ARTHRITIS WITH POSITIVE RHEUMATOID FACTOR, INVOLVING UNSPECIFIED SITE (HCC): ICD-10-CM

## 2023-07-10 RX ORDER — CYCLOBENZAPRINE HCL 10 MG
TABLET ORAL
Qty: 90 TABLET | Refills: 3 | Status: SHIPPED | OUTPATIENT
Start: 2023-07-10 | End: 2023-07-29

## 2023-07-20 ENCOUNTER — TELEPHONE (OUTPATIENT)
Dept: MEDICAL GROUP | Facility: LAB | Age: 68
End: 2023-07-20
Payer: MEDICARE

## 2023-07-20 NOTE — TELEPHONE ENCOUNTER
I just spoke to the [facility] on [facility].  They do have the med available!  I 'my chart' messaged him letting him know!

## 2023-07-29 ENCOUNTER — APPOINTMENT (OUTPATIENT)
Dept: RADIOLOGY | Facility: MEDICAL CENTER | Age: 68
DRG: 493 | End: 2023-07-29
Attending: EMERGENCY MEDICINE
Payer: MEDICARE

## 2023-07-29 ENCOUNTER — HOSPITAL ENCOUNTER (INPATIENT)
Facility: MEDICAL CENTER | Age: 68
LOS: 7 days | DRG: 493 | End: 2023-08-05
Attending: EMERGENCY MEDICINE | Admitting: SURGERY
Payer: MEDICARE

## 2023-07-29 ENCOUNTER — ANESTHESIA EVENT (OUTPATIENT)
Dept: SURGERY | Facility: MEDICAL CENTER | Age: 68
DRG: 493 | End: 2023-07-29
Payer: MEDICARE

## 2023-07-29 ENCOUNTER — APPOINTMENT (OUTPATIENT)
Dept: RADIOLOGY | Facility: MEDICAL CENTER | Age: 68
DRG: 493 | End: 2023-07-29
Payer: MEDICARE

## 2023-07-29 DIAGNOSIS — G89.29 OTHER CHRONIC PAIN: ICD-10-CM

## 2023-07-29 DIAGNOSIS — T14.90XA TRAUMA: ICD-10-CM

## 2023-07-29 DIAGNOSIS — S22.42XA CLOSED FRACTURE OF MULTIPLE RIBS OF LEFT SIDE, INITIAL ENCOUNTER: ICD-10-CM

## 2023-07-29 DIAGNOSIS — S82.892A CLOSED FRACTURE OF LEFT ANKLE, INITIAL ENCOUNTER: ICD-10-CM

## 2023-07-29 DIAGNOSIS — R33.9 URINARY RETENTION WITH INCOMPLETE BLADDER EMPTYING: ICD-10-CM

## 2023-07-29 PROBLEM — Z53.09 CONTRAINDICATION TO DEEP VEIN THROMBOSIS (DVT) PROPHYLAXIS: Status: ACTIVE | Noted: 2023-07-29

## 2023-07-29 PROBLEM — S27.321A CONTUSION OF LEFT LUNG: Status: ACTIVE | Noted: 2023-07-29

## 2023-07-29 LAB
ALBUMIN SERPL BCP-MCNC: 3.9 G/DL (ref 3.2–4.9)
ALBUMIN/GLOB SERPL: 1.7 G/DL
ALP SERPL-CCNC: 70 U/L (ref 30–99)
ALT SERPL-CCNC: 25 U/L (ref 2–50)
ANION GAP SERPL CALC-SCNC: 14 MMOL/L (ref 7–16)
APTT PPP: 23.3 SEC (ref 24.7–36)
AST SERPL-CCNC: 32 U/L (ref 12–45)
BILIRUB SERPL-MCNC: 0.5 MG/DL (ref 0.1–1.5)
BUN SERPL-MCNC: 17 MG/DL (ref 8–22)
CALCIUM ALBUM COR SERPL-MCNC: 8.7 MG/DL (ref 8.5–10.5)
CALCIUM SERPL-MCNC: 8.6 MG/DL (ref 8.5–10.5)
CHLORIDE SERPL-SCNC: 99 MMOL/L (ref 96–112)
CO2 SERPL-SCNC: 19 MMOL/L (ref 20–33)
CREAT SERPL-MCNC: 0.84 MG/DL (ref 0.5–1.4)
ERYTHROCYTE [DISTWIDTH] IN BLOOD BY AUTOMATED COUNT: 55.4 FL (ref 35.9–50)
ETHANOL BLD-MCNC: 79.6 MG/DL
GFR SERPLBLD CREATININE-BSD FMLA CKD-EPI: 96 ML/MIN/1.73 M 2
GLOBULIN SER CALC-MCNC: 2.3 G/DL (ref 1.9–3.5)
GLUCOSE BLD STRIP.AUTO-MCNC: 101 MG/DL (ref 65–99)
GLUCOSE BLD STRIP.AUTO-MCNC: 78 MG/DL (ref 65–99)
GLUCOSE SERPL-MCNC: 163 MG/DL (ref 65–99)
HCT VFR BLD AUTO: 46.3 % (ref 42–52)
HGB BLD-MCNC: 15.4 G/DL (ref 14–18)
INR PPP: 0.97 (ref 0.87–1.13)
MCH RBC QN AUTO: 30.7 PG (ref 27–33)
MCHC RBC AUTO-ENTMCNC: 33.3 G/DL (ref 32.3–36.5)
MCV RBC AUTO: 92.2 FL (ref 81.4–97.8)
PLATELET # BLD AUTO: 227 K/UL (ref 164–446)
PMV BLD AUTO: 10.2 FL (ref 9–12.9)
POTASSIUM SERPL-SCNC: 4.3 MMOL/L (ref 3.6–5.5)
PROT SERPL-MCNC: 6.2 G/DL (ref 6–8.2)
PROTHROMBIN TIME: 12.8 SEC (ref 12–14.6)
RBC # BLD AUTO: 5.02 M/UL (ref 4.7–6.1)
SODIUM SERPL-SCNC: 132 MMOL/L (ref 135–145)
WBC # BLD AUTO: 14.6 K/UL (ref 4.8–10.8)

## 2023-07-29 PROCEDURE — 0SSGXZZ REPOSITION LEFT ANKLE JOINT, EXTERNAL APPROACH: ICD-10-PCS | Performed by: EMERGENCY MEDICINE

## 2023-07-29 PROCEDURE — 99291 CRITICAL CARE FIRST HOUR: CPT

## 2023-07-29 PROCEDURE — 99223 1ST HOSP IP/OBS HIGH 75: CPT | Mod: AI | Performed by: SURGERY

## 2023-07-29 PROCEDURE — 85610 PROTHROMBIN TIME: CPT

## 2023-07-29 PROCEDURE — 36415 COLL VENOUS BLD VENIPUNCTURE: CPT

## 2023-07-29 PROCEDURE — 71045 X-RAY EXAM CHEST 1 VIEW: CPT

## 2023-07-29 PROCEDURE — 73600 X-RAY EXAM OF ANKLE: CPT | Mod: LT

## 2023-07-29 PROCEDURE — 96374 THER/PROPH/DIAG INJ IV PUSH: CPT

## 2023-07-29 PROCEDURE — 96375 TX/PRO/DX INJ NEW DRUG ADDON: CPT

## 2023-07-29 PROCEDURE — 700117 HCHG RX CONTRAST REV CODE 255: Performed by: EMERGENCY MEDICINE

## 2023-07-29 PROCEDURE — 700105 HCHG RX REV CODE 258: Performed by: EMERGENCY MEDICINE

## 2023-07-29 PROCEDURE — 73590 X-RAY EXAM OF LOWER LEG: CPT | Mod: LT

## 2023-07-29 PROCEDURE — 72170 X-RAY EXAM OF PELVIS: CPT

## 2023-07-29 PROCEDURE — A9270 NON-COVERED ITEM OR SERVICE: HCPCS | Performed by: SURGERY

## 2023-07-29 PROCEDURE — 82962 GLUCOSE BLOOD TEST: CPT

## 2023-07-29 PROCEDURE — 700102 HCHG RX REV CODE 250 W/ 637 OVERRIDE(OP): Performed by: SURGERY

## 2023-07-29 PROCEDURE — 94799 UNLISTED PULMONARY SVC/PX: CPT

## 2023-07-29 PROCEDURE — 72125 CT NECK SPINE W/O DYE: CPT

## 2023-07-29 PROCEDURE — 307740 HCHG GREEN TRAUMA TEAM SERVICES

## 2023-07-29 PROCEDURE — 700105 HCHG RX REV CODE 258: Mod: JZ | Performed by: SURGERY

## 2023-07-29 PROCEDURE — 72131 CT LUMBAR SPINE W/O DYE: CPT

## 2023-07-29 PROCEDURE — 770022 HCHG ROOM/CARE - ICU (200)

## 2023-07-29 PROCEDURE — 71260 CT THORAX DX C+: CPT

## 2023-07-29 PROCEDURE — 82077 ASSAY SPEC XCP UR&BREATH IA: CPT

## 2023-07-29 PROCEDURE — 70450 CT HEAD/BRAIN W/O DYE: CPT

## 2023-07-29 PROCEDURE — 85027 COMPLETE CBC AUTOMATED: CPT

## 2023-07-29 PROCEDURE — 72128 CT CHEST SPINE W/O DYE: CPT

## 2023-07-29 PROCEDURE — 93005 ELECTROCARDIOGRAM TRACING: CPT | Performed by: STUDENT IN AN ORGANIZED HEALTH CARE EDUCATION/TRAINING PROGRAM

## 2023-07-29 PROCEDURE — 80053 COMPREHEN METABOLIC PANEL: CPT

## 2023-07-29 PROCEDURE — 85730 THROMBOPLASTIN TIME PARTIAL: CPT

## 2023-07-29 PROCEDURE — 700111 HCHG RX REV CODE 636 W/ 250 OVERRIDE (IP): Performed by: EMERGENCY MEDICINE

## 2023-07-29 RX ORDER — TESTOSTERONE CYPIONATE 200 MG/ML
200 INJECTION, SOLUTION INTRAMUSCULAR
COMMUNITY
End: 2023-08-22

## 2023-07-29 RX ORDER — ENEMA 19; 7 G/133ML; G/133ML
1 ENEMA RECTAL
Status: DISCONTINUED | OUTPATIENT
Start: 2023-07-29 | End: 2023-08-05 | Stop reason: HOSPADM

## 2023-07-29 RX ORDER — POLYETHYLENE GLYCOL 3350 17 G/17G
1 POWDER, FOR SOLUTION ORAL 2 TIMES DAILY
Status: DISCONTINUED | OUTPATIENT
Start: 2023-07-30 | End: 2023-08-05 | Stop reason: HOSPADM

## 2023-07-29 RX ORDER — ACETAMINOPHEN 500 MG
1000 TABLET ORAL EVERY 6 HOURS PRN
Status: DISCONTINUED | OUTPATIENT
Start: 2023-08-03 | End: 2023-08-05 | Stop reason: HOSPADM

## 2023-07-29 RX ORDER — SODIUM CHLORIDE 9 MG/ML
1000 INJECTION, SOLUTION INTRAVENOUS ONCE
Status: COMPLETED | OUTPATIENT
Start: 2023-07-29 | End: 2023-07-29

## 2023-07-29 RX ORDER — CYCLOBENZAPRINE HCL 10 MG
10 TABLET ORAL 3 TIMES DAILY PRN
COMMUNITY
End: 2023-10-06 | Stop reason: SDUPTHER

## 2023-07-29 RX ORDER — GABAPENTIN 100 MG/1
100 CAPSULE ORAL EVERY 8 HOURS
Status: DISCONTINUED | OUTPATIENT
Start: 2023-07-29 | End: 2023-08-01

## 2023-07-29 RX ORDER — OMEPRAZOLE 20 MG/1
20 TABLET, DELAYED RELEASE ORAL EVERY MORNING
COMMUNITY

## 2023-07-29 RX ORDER — AMOXICILLIN 250 MG
1 CAPSULE ORAL NIGHTLY
Status: DISCONTINUED | OUTPATIENT
Start: 2023-07-29 | End: 2023-08-05 | Stop reason: HOSPADM

## 2023-07-29 RX ORDER — SODIUM CHLORIDE, SODIUM LACTATE, POTASSIUM CHLORIDE, CALCIUM CHLORIDE 600; 310; 30; 20 MG/100ML; MG/100ML; MG/100ML; MG/100ML
INJECTION, SOLUTION INTRAVENOUS CONTINUOUS
Status: DISCONTINUED | OUTPATIENT
Start: 2023-07-29 | End: 2023-07-31

## 2023-07-29 RX ORDER — MIDAZOLAM HYDROCHLORIDE 1 MG/ML
4 INJECTION INTRAMUSCULAR; INTRAVENOUS PRN
Status: DISCONTINUED | OUTPATIENT
Start: 2023-07-29 | End: 2023-07-31

## 2023-07-29 RX ORDER — ONDANSETRON 4 MG/1
4 TABLET, ORALLY DISINTEGRATING ORAL EVERY 4 HOURS PRN
Status: DISCONTINUED | OUTPATIENT
Start: 2023-07-29 | End: 2023-08-05 | Stop reason: HOSPADM

## 2023-07-29 RX ORDER — METOPROLOL TARTRATE 50 MG/1
50 TABLET, FILM COATED ORAL 2 TIMES DAILY
COMMUNITY
End: 2023-11-21 | Stop reason: SDUPTHER

## 2023-07-29 RX ORDER — OXYCODONE HYDROCHLORIDE 5 MG/1
5 TABLET ORAL
Status: DISCONTINUED | OUTPATIENT
Start: 2023-07-29 | End: 2023-08-05 | Stop reason: HOSPADM

## 2023-07-29 RX ORDER — HYDROMORPHONE HYDROCHLORIDE 1 MG/ML
0.5 INJECTION, SOLUTION INTRAMUSCULAR; INTRAVENOUS; SUBCUTANEOUS
Status: DISCONTINUED | OUTPATIENT
Start: 2023-07-29 | End: 2023-08-05 | Stop reason: HOSPADM

## 2023-07-29 RX ORDER — ACETAMINOPHEN 500 MG
1000 TABLET ORAL EVERY 6 HOURS
Status: DISPENSED | OUTPATIENT
Start: 2023-07-29 | End: 2023-08-03

## 2023-07-29 RX ORDER — ONDANSETRON 2 MG/ML
4 INJECTION INTRAMUSCULAR; INTRAVENOUS EVERY 4 HOURS PRN
Status: DISCONTINUED | OUTPATIENT
Start: 2023-07-29 | End: 2023-08-05 | Stop reason: HOSPADM

## 2023-07-29 RX ORDER — DEXTROSE MONOHYDRATE 25 G/50ML
25 INJECTION, SOLUTION INTRAVENOUS
Status: DISCONTINUED | OUTPATIENT
Start: 2023-07-29 | End: 2023-08-02

## 2023-07-29 RX ORDER — MIDAZOLAM HYDROCHLORIDE 1 MG/ML
INJECTION INTRAMUSCULAR; INTRAVENOUS
Status: COMPLETED | OUTPATIENT
Start: 2023-07-29 | End: 2023-07-29

## 2023-07-29 RX ORDER — ACETAMINOPHEN 160 MG
2000 TABLET,DISINTEGRATING ORAL EVERY MORNING
COMMUNITY

## 2023-07-29 RX ORDER — DOCUSATE SODIUM 100 MG/1
100 CAPSULE, LIQUID FILLED ORAL 2 TIMES DAILY
Status: DISCONTINUED | OUTPATIENT
Start: 2023-07-30 | End: 2023-08-05 | Stop reason: HOSPADM

## 2023-07-29 RX ORDER — AMOXICILLIN 250 MG
1 CAPSULE ORAL
Status: DISCONTINUED | OUTPATIENT
Start: 2023-07-29 | End: 2023-08-05 | Stop reason: HOSPADM

## 2023-07-29 RX ORDER — FAMOTIDINE 20 MG/1
20 TABLET, FILM COATED ORAL 2 TIMES DAILY
Status: DISCONTINUED | OUTPATIENT
Start: 2023-07-30 | End: 2023-07-31

## 2023-07-29 RX ORDER — LIDOCAINE 50 MG/G
2 PATCH TOPICAL EVERY 24 HOURS
Status: DISCONTINUED | OUTPATIENT
Start: 2023-07-30 | End: 2023-08-05 | Stop reason: HOSPADM

## 2023-07-29 RX ORDER — DIAZEPAM 5 MG/1
5 TABLET ORAL EVERY 12 HOURS PRN
COMMUNITY
End: 2024-01-19

## 2023-07-29 RX ORDER — HYDROMORPHONE HYDROCHLORIDE 1 MG/ML
1 INJECTION, SOLUTION INTRAMUSCULAR; INTRAVENOUS; SUBCUTANEOUS ONCE
Status: COMPLETED | OUTPATIENT
Start: 2023-07-29 | End: 2023-07-29

## 2023-07-29 RX ORDER — BISACODYL 10 MG
10 SUPPOSITORY, RECTAL RECTAL
Status: DISCONTINUED | OUTPATIENT
Start: 2023-07-29 | End: 2023-08-05 | Stop reason: HOSPADM

## 2023-07-29 RX ORDER — METAXALONE 800 MG/1
800 TABLET ORAL 3 TIMES DAILY
Status: DISCONTINUED | OUTPATIENT
Start: 2023-07-29 | End: 2023-08-01

## 2023-07-29 RX ORDER — OXYCODONE HYDROCHLORIDE 10 MG/1
10 TABLET ORAL
Status: DISCONTINUED | OUTPATIENT
Start: 2023-07-29 | End: 2023-08-05 | Stop reason: HOSPADM

## 2023-07-29 RX ORDER — PREDNISONE 5 MG/1
10 TABLET ORAL EVERY MORNING
COMMUNITY
End: 2023-10-09 | Stop reason: SDUPTHER

## 2023-07-29 RX ADMIN — HYDROMORPHONE HYDROCHLORIDE 1 MG: 1 INJECTION, SOLUTION INTRAMUSCULAR; INTRAVENOUS; SUBCUTANEOUS at 18:29

## 2023-07-29 RX ADMIN — ACETAMINOPHEN 1000 MG: 500 TABLET, FILM COATED ORAL at 20:25

## 2023-07-29 RX ADMIN — FENTANYL CITRATE 100 MCG: 50 INJECTION, SOLUTION INTRAMUSCULAR; INTRAVENOUS at 17:01

## 2023-07-29 RX ADMIN — GABAPENTIN 100 MG: 100 CAPSULE ORAL at 21:14

## 2023-07-29 RX ADMIN — SODIUM CHLORIDE, POTASSIUM CHLORIDE, SODIUM LACTATE AND CALCIUM CHLORIDE: 600; 310; 30; 20 INJECTION, SOLUTION INTRAVENOUS at 21:09

## 2023-07-29 RX ADMIN — SENNOSIDES AND DOCUSATE SODIUM 1 TABLET: 50; 8.6 TABLET ORAL at 20:25

## 2023-07-29 RX ADMIN — ACETAMINOPHEN 1000 MG: 500 TABLET, FILM COATED ORAL at 23:42

## 2023-07-29 RX ADMIN — SODIUM CHLORIDE 1000 ML: 9 INJECTION, SOLUTION INTRAVENOUS at 18:32

## 2023-07-29 RX ADMIN — METAXALONE 800 MG: 800 TABLET ORAL at 20:25

## 2023-07-29 RX ADMIN — IOHEXOL 100 ML: 350 INJECTION, SOLUTION INTRAVENOUS at 17:45

## 2023-07-29 RX ADMIN — MIDAZOLAM HYDROCHLORIDE 2.5 MG: 1 INJECTION, SOLUTION INTRAMUSCULAR; INTRAVENOUS at 18:48

## 2023-07-29 RX ADMIN — MIDAZOLAM HYDROCHLORIDE 2.5 MG: 1 INJECTION, SOLUTION INTRAMUSCULAR; INTRAVENOUS at 19:03

## 2023-07-29 RX ADMIN — OXYCODONE HYDROCHLORIDE 10 MG: 10 TABLET ORAL at 21:14

## 2023-07-29 ASSESSMENT — PAIN DESCRIPTION - PAIN TYPE
TYPE: ACUTE PAIN

## 2023-07-29 ASSESSMENT — FIBROSIS 4 INDEX: FIB4 SCORE: 1.14
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
## 2023-07-30 ENCOUNTER — APPOINTMENT (OUTPATIENT)
Dept: RADIOLOGY | Facility: MEDICAL CENTER | Age: 68
DRG: 493 | End: 2023-07-30
Attending: ORTHOPAEDIC SURGERY
Payer: MEDICARE

## 2023-07-30 ENCOUNTER — ANESTHESIA (OUTPATIENT)
Dept: SURGERY | Facility: MEDICAL CENTER | Age: 68
DRG: 493 | End: 2023-07-30
Payer: MEDICARE

## 2023-07-30 ENCOUNTER — APPOINTMENT (OUTPATIENT)
Dept: RADIOLOGY | Facility: MEDICAL CENTER | Age: 68
DRG: 493 | End: 2023-07-30
Attending: SURGERY
Payer: MEDICARE

## 2023-07-30 PROBLEM — G89.29 CHRONIC PAIN: Status: ACTIVE | Noted: 2023-07-30

## 2023-07-30 PROBLEM — Z78.9 NO CONTRAINDICATION TO DEEP VEIN THROMBOSIS (DVT) PROPHYLAXIS: Status: ACTIVE | Noted: 2023-07-29

## 2023-07-30 PROBLEM — R33.9 URINARY RETENTION WITH INCOMPLETE BLADDER EMPTYING: Status: ACTIVE | Noted: 2023-07-30

## 2023-07-30 LAB
ALBUMIN SERPL BCP-MCNC: 3.6 G/DL (ref 3.2–4.9)
ALBUMIN/GLOB SERPL: 1.6 G/DL
ALP SERPL-CCNC: 65 U/L (ref 30–99)
ALT SERPL-CCNC: 22 U/L (ref 2–50)
ANION GAP SERPL CALC-SCNC: 10 MMOL/L (ref 7–16)
AST SERPL-CCNC: 41 U/L (ref 12–45)
BASOPHILS # BLD AUTO: 0.5 % (ref 0–1.8)
BASOPHILS # BLD: 0.06 K/UL (ref 0–0.12)
BILIRUB SERPL-MCNC: 0.8 MG/DL (ref 0.1–1.5)
BUN SERPL-MCNC: 12 MG/DL (ref 8–22)
CALCIUM ALBUM COR SERPL-MCNC: 8.4 MG/DL (ref 8.5–10.5)
CALCIUM SERPL-MCNC: 8.1 MG/DL (ref 8.5–10.5)
CHLORIDE SERPL-SCNC: 103 MMOL/L (ref 96–112)
CO2 SERPL-SCNC: 23 MMOL/L (ref 20–33)
CREAT SERPL-MCNC: 0.64 MG/DL (ref 0.5–1.4)
EKG IMPRESSION: NORMAL
EOSINOPHIL # BLD AUTO: 0.07 K/UL (ref 0–0.51)
EOSINOPHIL NFR BLD: 0.6 % (ref 0–6.9)
ERYTHROCYTE [DISTWIDTH] IN BLOOD BY AUTOMATED COUNT: 53.3 FL (ref 35.9–50)
GFR SERPLBLD CREATININE-BSD FMLA CKD-EPI: 104 ML/MIN/1.73 M 2
GLOBULIN SER CALC-MCNC: 2.2 G/DL (ref 1.9–3.5)
GLUCOSE BLD STRIP.AUTO-MCNC: 117 MG/DL (ref 65–99)
GLUCOSE BLD STRIP.AUTO-MCNC: 194 MG/DL (ref 65–99)
GLUCOSE SERPL-MCNC: 78 MG/DL (ref 65–99)
HCT VFR BLD AUTO: 40.5 % (ref 42–52)
HGB BLD-MCNC: 13.5 G/DL (ref 14–18)
IMM GRANULOCYTES # BLD AUTO: 0.12 K/UL (ref 0–0.11)
IMM GRANULOCYTES NFR BLD AUTO: 1.1 % (ref 0–0.9)
LYMPHOCYTES # BLD AUTO: 1.28 K/UL (ref 1–4.8)
LYMPHOCYTES NFR BLD: 11.7 % (ref 22–41)
MCH RBC QN AUTO: 29.9 PG (ref 27–33)
MCHC RBC AUTO-ENTMCNC: 33.3 G/DL (ref 32.3–36.5)
MCV RBC AUTO: 89.6 FL (ref 81.4–97.8)
MONOCYTES # BLD AUTO: 1.26 K/UL (ref 0–0.85)
MONOCYTES NFR BLD AUTO: 11.5 % (ref 0–13.4)
NEUTROPHILS # BLD AUTO: 8.13 K/UL (ref 1.82–7.42)
NEUTROPHILS NFR BLD: 74.6 % (ref 44–72)
NRBC # BLD AUTO: 0 K/UL
NRBC BLD-RTO: 0 /100 WBC (ref 0–0.2)
PLATELET # BLD AUTO: 132 K/UL (ref 164–446)
PMV BLD AUTO: 9.5 FL (ref 9–12.9)
POTASSIUM SERPL-SCNC: 4.3 MMOL/L (ref 3.6–5.5)
PROT SERPL-MCNC: 5.8 G/DL (ref 6–8.2)
RBC # BLD AUTO: 4.52 M/UL (ref 4.7–6.1)
SODIUM SERPL-SCNC: 136 MMOL/L (ref 135–145)
WBC # BLD AUTO: 10.9 K/UL (ref 4.8–10.8)

## 2023-07-30 PROCEDURE — 700111 HCHG RX REV CODE 636 W/ 250 OVERRIDE (IP): Performed by: STUDENT IN AN ORGANIZED HEALTH CARE EDUCATION/TRAINING PROGRAM

## 2023-07-30 PROCEDURE — 160029 HCHG SURGERY MINUTES - 1ST 30 MINS LEVEL 4: Performed by: ORTHOPAEDIC SURGERY

## 2023-07-30 PROCEDURE — 700111 HCHG RX REV CODE 636 W/ 250 OVERRIDE (IP): Mod: JZ | Performed by: SURGERY

## 2023-07-30 PROCEDURE — A9270 NON-COVERED ITEM OR SERVICE: HCPCS | Performed by: PHYSICIAN ASSISTANT

## 2023-07-30 PROCEDURE — 700101 HCHG RX REV CODE 250: Performed by: SURGERY

## 2023-07-30 PROCEDURE — 93010 ELECTROCARDIOGRAM REPORT: CPT | Performed by: INTERNAL MEDICINE

## 2023-07-30 PROCEDURE — 01480 ANES OPEN PX LOWER L/A/F NOS: CPT | Performed by: STUDENT IN AN ORGANIZED HEALTH CARE EDUCATION/TRAINING PROGRAM

## 2023-07-30 PROCEDURE — 700105 HCHG RX REV CODE 258: Performed by: ORTHOPAEDIC SURGERY

## 2023-07-30 PROCEDURE — 700102 HCHG RX REV CODE 250 W/ 637 OVERRIDE(OP): Performed by: SURGERY

## 2023-07-30 PROCEDURE — 80053 COMPREHEN METABOLIC PANEL: CPT

## 2023-07-30 PROCEDURE — 160009 HCHG ANES TIME/MIN: Performed by: ORTHOPAEDIC SURGERY

## 2023-07-30 PROCEDURE — 64445 NJX AA&/STRD SCIATIC NRV IMG: CPT | Mod: LT,59 | Performed by: STUDENT IN AN ORGANIZED HEALTH CARE EDUCATION/TRAINING PROGRAM

## 2023-07-30 PROCEDURE — 64445 NJX AA&/STRD SCIATIC NRV IMG: CPT | Performed by: ORTHOPAEDIC SURGERY

## 2023-07-30 PROCEDURE — 700102 HCHG RX REV CODE 250 W/ 637 OVERRIDE(OP): Performed by: STUDENT IN AN ORGANIZED HEALTH CARE EDUCATION/TRAINING PROGRAM

## 2023-07-30 PROCEDURE — 700102 HCHG RX REV CODE 250 W/ 637 OVERRIDE(OP): Performed by: PHYSICIAN ASSISTANT

## 2023-07-30 PROCEDURE — 700111 HCHG RX REV CODE 636 W/ 250 OVERRIDE (IP): Performed by: PHYSICIAN ASSISTANT

## 2023-07-30 PROCEDURE — 0QSK04Z REPOSITION LEFT FIBULA WITH INTERNAL FIXATION DEVICE, OPEN APPROACH: ICD-10-PCS | Performed by: ORTHOPAEDIC SURGERY

## 2023-07-30 PROCEDURE — 160002 HCHG RECOVERY MINUTES (STAT): Performed by: ORTHOPAEDIC SURGERY

## 2023-07-30 PROCEDURE — 700111 HCHG RX REV CODE 636 W/ 250 OVERRIDE (IP): Mod: JZ | Performed by: STUDENT IN AN ORGANIZED HEALTH CARE EDUCATION/TRAINING PROGRAM

## 2023-07-30 PROCEDURE — 700105 HCHG RX REV CODE 258: Mod: JZ | Performed by: STUDENT IN AN ORGANIZED HEALTH CARE EDUCATION/TRAINING PROGRAM

## 2023-07-30 PROCEDURE — 3E0T3BZ INTRODUCTION OF ANESTHETIC AGENT INTO PERIPHERAL NERVES AND PLEXI, PERCUTANEOUS APPROACH: ICD-10-PCS | Performed by: STUDENT IN AN ORGANIZED HEALTH CARE EDUCATION/TRAINING PROGRAM

## 2023-07-30 PROCEDURE — 700105 HCHG RX REV CODE 258: Mod: JZ | Performed by: SURGERY

## 2023-07-30 PROCEDURE — 71045 X-RAY EXAM CHEST 1 VIEW: CPT

## 2023-07-30 PROCEDURE — 770001 HCHG ROOM/CARE - MED/SURG/GYN PRIV*

## 2023-07-30 PROCEDURE — 94669 MECHANICAL CHEST WALL OSCILL: CPT

## 2023-07-30 PROCEDURE — 85025 COMPLETE CBC W/AUTO DIFF WBC: CPT

## 2023-07-30 PROCEDURE — C1713 ANCHOR/SCREW BN/BN,TIS/BN: HCPCS | Performed by: ORTHOPAEDIC SURGERY

## 2023-07-30 PROCEDURE — 0QSH04Z REPOSITION LEFT TIBIA WITH INTERNAL FIXATION DEVICE, OPEN APPROACH: ICD-10-PCS | Performed by: ORTHOPAEDIC SURGERY

## 2023-07-30 PROCEDURE — A9270 NON-COVERED ITEM OR SERVICE: HCPCS | Performed by: STUDENT IN AN ORGANIZED HEALTH CARE EDUCATION/TRAINING PROGRAM

## 2023-07-30 PROCEDURE — 160035 HCHG PACU - 1ST 60 MINS PHASE I: Performed by: ORTHOPAEDIC SURGERY

## 2023-07-30 PROCEDURE — 700111 HCHG RX REV CODE 636 W/ 250 OVERRIDE (IP): Performed by: ORTHOPAEDIC SURGERY

## 2023-07-30 PROCEDURE — 82962 GLUCOSE BLOOD TEST: CPT

## 2023-07-30 PROCEDURE — A9270 NON-COVERED ITEM OR SERVICE: HCPCS | Performed by: SURGERY

## 2023-07-30 PROCEDURE — 27829 TREAT LOWER LEG JOINT: CPT | Mod: LT | Performed by: ORTHOPAEDIC SURGERY

## 2023-07-30 PROCEDURE — 27822 TREATMENT OF ANKLE FRACTURE: CPT | Mod: LT | Performed by: ORTHOPAEDIC SURGERY

## 2023-07-30 PROCEDURE — 700101 HCHG RX REV CODE 250: Performed by: STUDENT IN AN ORGANIZED HEALTH CARE EDUCATION/TRAINING PROGRAM

## 2023-07-30 PROCEDURE — 73610 X-RAY EXAM OF ANKLE: CPT | Mod: LT

## 2023-07-30 PROCEDURE — 99223 1ST HOSP IP/OBS HIGH 75: CPT | Mod: 57 | Performed by: ORTHOPAEDIC SURGERY

## 2023-07-30 PROCEDURE — 160048 HCHG OR STATISTICAL LEVEL 1-5: Performed by: ORTHOPAEDIC SURGERY

## 2023-07-30 PROCEDURE — 0SSG04Z REPOSITION LEFT ANKLE JOINT WITH INTERNAL FIXATION DEVICE, OPEN APPROACH: ICD-10-PCS | Performed by: ORTHOPAEDIC SURGERY

## 2023-07-30 PROCEDURE — 160041 HCHG SURGERY MINUTES - EA ADDL 1 MIN LEVEL 4: Performed by: ORTHOPAEDIC SURGERY

## 2023-07-30 DEVICE — SCREW CANN 4.0X38 LONG OIC (5TX2=10): Type: IMPLANTABLE DEVICE | Site: ANKLE | Status: FUNCTIONAL

## 2023-07-30 DEVICE — SCREW 3.5 MM LOCKING TI X 50MM LONG (6TX8=48): Type: IMPLANTABLE DEVICE | Site: ANKLE | Status: FUNCTIONAL

## 2023-07-30 DEVICE — SCREW 2.5 MM LOCKING TI X 12MM LONG (6TX8=48): Type: IMPLANTABLE DEVICE | Site: ANKLE | Status: FUNCTIONAL

## 2023-07-30 DEVICE — SCREW 3.5 MM NON-LOCKING TI X 14MM LONG (6TX8+2TX5=58): Type: IMPLANTABLE DEVICE | Site: ANKLE | Status: FUNCTIONAL

## 2023-07-30 DEVICE — SCREW 2.5 MM LOCKING TI X 14MM LONG (6TX8=48): Type: IMPLANTABLE DEVICE | Site: ANKLE | Status: FUNCTIONAL

## 2023-07-30 DEVICE — PLATE DISTAL FIBULA 5H (2TX2+2TX1=6): Type: IMPLANTABLE DEVICE | Site: ANKLE | Status: FUNCTIONAL

## 2023-07-30 DEVICE — SCREW 2.5 MM NON-LOCKING TI X 14MM LONG (6TX8=48): Type: IMPLANTABLE DEVICE | Site: ANKLE | Status: FUNCTIONAL

## 2023-07-30 DEVICE — SCREW 2.5 MM LOCKING TI X 10MM LONG (6TX8=48): Type: IMPLANTABLE DEVICE | Site: ANKLE | Status: FUNCTIONAL

## 2023-07-30 DEVICE — SCREW 3.5 MM NON-LOCKING TI X 16MM LONG (6TX8+2TX5=58): Type: IMPLANTABLE DEVICE | Site: ANKLE | Status: FUNCTIONAL

## 2023-07-30 DEVICE — SCREW 3.5 MM LOCKING TI X 14MM LONG (6TX8+2TX5=58): Type: IMPLANTABLE DEVICE | Site: ANKLE | Status: FUNCTIONAL

## 2023-07-30 RX ORDER — KETOROLAC TROMETHAMINE 30 MG/ML
INJECTION, SOLUTION INTRAMUSCULAR; INTRAVENOUS PRN
Status: DISCONTINUED | OUTPATIENT
Start: 2023-07-30 | End: 2023-07-30 | Stop reason: SURG

## 2023-07-30 RX ORDER — MIDAZOLAM HYDROCHLORIDE 1 MG/ML
INJECTION INTRAMUSCULAR; INTRAVENOUS PRN
Status: DISCONTINUED | OUTPATIENT
Start: 2023-07-30 | End: 2023-07-30 | Stop reason: SURG

## 2023-07-30 RX ORDER — ONDANSETRON 2 MG/ML
INJECTION INTRAMUSCULAR; INTRAVENOUS PRN
Status: DISCONTINUED | OUTPATIENT
Start: 2023-07-30 | End: 2023-07-30 | Stop reason: SURG

## 2023-07-30 RX ORDER — HYDROMORPHONE HYDROCHLORIDE 1 MG/ML
0.1 INJECTION, SOLUTION INTRAMUSCULAR; INTRAVENOUS; SUBCUTANEOUS
Status: DISCONTINUED | OUTPATIENT
Start: 2023-07-30 | End: 2023-07-30 | Stop reason: HOSPADM

## 2023-07-30 RX ORDER — EPHEDRINE SULFATE 50 MG/ML
INJECTION, SOLUTION INTRAVENOUS PRN
Status: DISCONTINUED | OUTPATIENT
Start: 2023-07-30 | End: 2023-07-30 | Stop reason: SURG

## 2023-07-30 RX ORDER — DIPHENHYDRAMINE HYDROCHLORIDE 50 MG/ML
12.5 INJECTION INTRAMUSCULAR; INTRAVENOUS
Status: DISCONTINUED | OUTPATIENT
Start: 2023-07-30 | End: 2023-07-30 | Stop reason: HOSPADM

## 2023-07-30 RX ORDER — SODIUM CHLORIDE, SODIUM LACTATE, POTASSIUM CHLORIDE, CALCIUM CHLORIDE 600; 310; 30; 20 MG/100ML; MG/100ML; MG/100ML; MG/100ML
INJECTION, SOLUTION INTRAVENOUS
Status: DISCONTINUED | OUTPATIENT
Start: 2023-07-30 | End: 2023-07-30 | Stop reason: SURG

## 2023-07-30 RX ORDER — ROPIVACAINE HYDROCHLORIDE 5 MG/ML
INJECTION, SOLUTION EPIDURAL; INFILTRATION; PERINEURAL
Status: COMPLETED | OUTPATIENT
Start: 2023-07-30 | End: 2023-07-30

## 2023-07-30 RX ORDER — HYDROMORPHONE HYDROCHLORIDE 1 MG/ML
0.2 INJECTION, SOLUTION INTRAMUSCULAR; INTRAVENOUS; SUBCUTANEOUS
Status: DISCONTINUED | OUTPATIENT
Start: 2023-07-30 | End: 2023-07-30 | Stop reason: HOSPADM

## 2023-07-30 RX ORDER — OXYCODONE HCL 5 MG/5 ML
5 SOLUTION, ORAL ORAL
Status: COMPLETED | OUTPATIENT
Start: 2023-07-30 | End: 2023-07-30

## 2023-07-30 RX ORDER — PRAVASTATIN SODIUM 20 MG
80 TABLET ORAL DAILY
Status: DISCONTINUED | OUTPATIENT
Start: 2023-07-30 | End: 2023-08-05 | Stop reason: HOSPADM

## 2023-07-30 RX ORDER — ENOXAPARIN SODIUM 100 MG/ML
30 INJECTION SUBCUTANEOUS EVERY 12 HOURS
Status: DISCONTINUED | OUTPATIENT
Start: 2023-07-30 | End: 2023-08-05 | Stop reason: HOSPADM

## 2023-07-30 RX ORDER — HALOPERIDOL 5 MG/ML
1 INJECTION INTRAMUSCULAR
Status: DISCONTINUED | OUTPATIENT
Start: 2023-07-30 | End: 2023-07-30 | Stop reason: HOSPADM

## 2023-07-30 RX ORDER — METOPROLOL TARTRATE 50 MG/1
50 TABLET, FILM COATED ORAL 2 TIMES DAILY
Status: DISCONTINUED | OUTPATIENT
Start: 2023-07-30 | End: 2023-08-05 | Stop reason: HOSPADM

## 2023-07-30 RX ORDER — PREDNISONE 10 MG/1
10 TABLET ORAL EVERY MORNING
Status: DISCONTINUED | OUTPATIENT
Start: 2023-07-30 | End: 2023-08-05 | Stop reason: HOSPADM

## 2023-07-30 RX ORDER — DEXAMETHASONE SODIUM PHOSPHATE 4 MG/ML
INJECTION, SOLUTION INTRA-ARTICULAR; INTRALESIONAL; INTRAMUSCULAR; INTRAVENOUS; SOFT TISSUE PRN
Status: DISCONTINUED | OUTPATIENT
Start: 2023-07-30 | End: 2023-07-30 | Stop reason: SURG

## 2023-07-30 RX ORDER — HYDROMORPHONE HYDROCHLORIDE 1 MG/ML
0.4 INJECTION, SOLUTION INTRAMUSCULAR; INTRAVENOUS; SUBCUTANEOUS
Status: DISCONTINUED | OUTPATIENT
Start: 2023-07-30 | End: 2023-07-30 | Stop reason: HOSPADM

## 2023-07-30 RX ORDER — OXYCODONE HCL 5 MG/5 ML
10 SOLUTION, ORAL ORAL
Status: COMPLETED | OUTPATIENT
Start: 2023-07-30 | End: 2023-07-30

## 2023-07-30 RX ORDER — LISINOPRIL 20 MG/1
20 TABLET ORAL DAILY
Status: DISCONTINUED | OUTPATIENT
Start: 2023-07-30 | End: 2023-08-05 | Stop reason: HOSPADM

## 2023-07-30 RX ORDER — CEFAZOLIN SODIUM 1 G/3ML
INJECTION, POWDER, FOR SOLUTION INTRAMUSCULAR; INTRAVENOUS PRN
Status: DISCONTINUED | OUTPATIENT
Start: 2023-07-30 | End: 2023-07-30 | Stop reason: SURG

## 2023-07-30 RX ORDER — PAROXETINE HYDROCHLORIDE 20 MG/1
20 TABLET, FILM COATED ORAL DAILY
Status: DISCONTINUED | OUTPATIENT
Start: 2023-07-30 | End: 2023-08-05 | Stop reason: HOSPADM

## 2023-07-30 RX ORDER — ONDANSETRON 2 MG/ML
4 INJECTION INTRAMUSCULAR; INTRAVENOUS
Status: DISCONTINUED | OUTPATIENT
Start: 2023-07-30 | End: 2023-07-30 | Stop reason: HOSPADM

## 2023-07-30 RX ORDER — OMEPRAZOLE 20 MG/1
20 CAPSULE, DELAYED RELEASE ORAL EVERY MORNING
Status: DISCONTINUED | OUTPATIENT
Start: 2023-07-30 | End: 2023-08-05 | Stop reason: HOSPADM

## 2023-07-30 RX ADMIN — INSULIN HUMAN 2 UNITS: 100 INJECTION, SOLUTION PARENTERAL at 18:44

## 2023-07-30 RX ADMIN — PROPOFOL 180 MG: 10 INJECTION, EMULSION INTRAVENOUS at 07:48

## 2023-07-30 RX ADMIN — OXYCODONE HYDROCHLORIDE 10 MG: 10 TABLET ORAL at 02:55

## 2023-07-30 RX ADMIN — OXYCODONE HYDROCHLORIDE 10 MG: 5 SOLUTION ORAL at 09:22

## 2023-07-30 RX ADMIN — METOPROLOL TARTRATE 50 MG: 50 TABLET, FILM COATED ORAL at 17:23

## 2023-07-30 RX ADMIN — HYDROMORPHONE HYDROCHLORIDE 0.5 MG: 1 INJECTION, SOLUTION INTRAMUSCULAR; INTRAVENOUS; SUBCUTANEOUS at 05:41

## 2023-07-30 RX ADMIN — ACETAMINOPHEN 1000 MG: 500 TABLET, FILM COATED ORAL at 17:22

## 2023-07-30 RX ADMIN — FENTANYL CITRATE 50 MCG: 50 INJECTION, SOLUTION INTRAMUSCULAR; INTRAVENOUS at 09:23

## 2023-07-30 RX ADMIN — SODIUM CHLORIDE, POTASSIUM CHLORIDE, SODIUM LACTATE AND CALCIUM CHLORIDE: 600; 310; 30; 20 INJECTION, SOLUTION INTRAVENOUS at 07:41

## 2023-07-30 RX ADMIN — METAXALONE 800 MG: 800 TABLET ORAL at 17:24

## 2023-07-30 RX ADMIN — FENTANYL CITRATE 100 MCG: 50 INJECTION, SOLUTION INTRAMUSCULAR; INTRAVENOUS at 07:48

## 2023-07-30 RX ADMIN — EPHEDRINE SULFATE 10 MG: 50 INJECTION, SOLUTION INTRAVENOUS at 08:34

## 2023-07-30 RX ADMIN — OXYCODONE HYDROCHLORIDE 10 MG: 10 TABLET ORAL at 12:56

## 2023-07-30 RX ADMIN — DOCUSATE SODIUM 100 MG: 100 CAPSULE, LIQUID FILLED ORAL at 05:40

## 2023-07-30 RX ADMIN — LISINOPRIL 20 MG: 20 TABLET ORAL at 12:56

## 2023-07-30 RX ADMIN — PREDNISONE 10 MG: 10 TABLET ORAL at 12:56

## 2023-07-30 RX ADMIN — POLYETHYLENE GLYCOL 3350 1 PACKET: 17 POWDER, FOR SOLUTION ORAL at 18:44

## 2023-07-30 RX ADMIN — CEFAZOLIN 2 G: 2 INJECTION, POWDER, FOR SOLUTION INTRAMUSCULAR; INTRAVENOUS at 12:13

## 2023-07-30 RX ADMIN — SODIUM CHLORIDE, POTASSIUM CHLORIDE, SODIUM LACTATE AND CALCIUM CHLORIDE: 600; 310; 30; 20 INJECTION, SOLUTION INTRAVENOUS at 18:58

## 2023-07-30 RX ADMIN — POLYETHYLENE GLYCOL 3350 1 PACKET: 17 POWDER, FOR SOLUTION ORAL at 05:40

## 2023-07-30 RX ADMIN — GABAPENTIN 100 MG: 100 CAPSULE ORAL at 20:01

## 2023-07-30 RX ADMIN — KETOROLAC TROMETHAMINE 15 MG: 30 INJECTION, SOLUTION INTRAMUSCULAR; INTRAVENOUS at 08:57

## 2023-07-30 RX ADMIN — FENTANYL CITRATE 25 MCG: 50 INJECTION, SOLUTION INTRAMUSCULAR; INTRAVENOUS at 09:30

## 2023-07-30 RX ADMIN — FAMOTIDINE 20 MG: 10 INJECTION INTRAVENOUS at 17:25

## 2023-07-30 RX ADMIN — METAXALONE 800 MG: 800 TABLET ORAL at 12:07

## 2023-07-30 RX ADMIN — METAXALONE 800 MG: 800 TABLET ORAL at 05:41

## 2023-07-30 RX ADMIN — HYDROMORPHONE HYDROCHLORIDE 0.5 MG: 1 INJECTION, SOLUTION INTRAMUSCULAR; INTRAVENOUS; SUBCUTANEOUS at 06:12

## 2023-07-30 RX ADMIN — ONDANSETRON 4 MG: 2 INJECTION INTRAMUSCULAR; INTRAVENOUS at 07:54

## 2023-07-30 RX ADMIN — DEXAMETHASONE SODIUM PHOSPHATE 4 MG: 4 INJECTION INTRA-ARTICULAR; INTRALESIONAL; INTRAMUSCULAR; INTRAVENOUS; SOFT TISSUE at 07:54

## 2023-07-30 RX ADMIN — PAROXETINE HYDROCHLORIDE 20 MG: 20 TABLET, FILM COATED ORAL at 12:57

## 2023-07-30 RX ADMIN — GABAPENTIN 100 MG: 100 CAPSULE ORAL at 14:37

## 2023-07-30 RX ADMIN — ROPIVACAINE HYDROCHLORIDE 30 ML: 5 INJECTION EPIDURAL; INFILTRATION; PERINEURAL at 07:36

## 2023-07-30 RX ADMIN — LIDOCAINE PATCH 5% 2 PATCH: 700 PATCH TOPICAL at 05:46

## 2023-07-30 RX ADMIN — ACETAMINOPHEN 1000 MG: 500 TABLET, FILM COATED ORAL at 12:07

## 2023-07-30 RX ADMIN — ENOXAPARIN SODIUM 30 MG: 100 INJECTION SUBCUTANEOUS at 17:24

## 2023-07-30 RX ADMIN — DOCUSATE SODIUM 100 MG: 100 CAPSULE, LIQUID FILLED ORAL at 17:24

## 2023-07-30 RX ADMIN — EPHEDRINE SULFATE 10 MG: 50 INJECTION, SOLUTION INTRAVENOUS at 08:37

## 2023-07-30 RX ADMIN — EPHEDRINE SULFATE 10 MG: 50 INJECTION, SOLUTION INTRAVENOUS at 08:43

## 2023-07-30 RX ADMIN — ACETAMINOPHEN 1000 MG: 500 TABLET, FILM COATED ORAL at 05:40

## 2023-07-30 RX ADMIN — MIDAZOLAM 2 MG: 1 INJECTION, SOLUTION INTRAMUSCULAR; INTRAVENOUS at 07:48

## 2023-07-30 RX ADMIN — OXYCODONE HYDROCHLORIDE 10 MG: 10 TABLET ORAL at 19:55

## 2023-07-30 RX ADMIN — OMEPRAZOLE 20 MG: 20 CAPSULE, DELAYED RELEASE ORAL at 12:56

## 2023-07-30 RX ADMIN — CEFAZOLIN 2 G: 1 INJECTION, POWDER, FOR SOLUTION INTRAMUSCULAR; INTRAVENOUS at 07:48

## 2023-07-30 RX ADMIN — FAMOTIDINE 20 MG: 20 TABLET, FILM COATED ORAL at 05:41

## 2023-07-30 RX ADMIN — FENTANYL CITRATE 25 MCG: 50 INJECTION, SOLUTION INTRAMUSCULAR; INTRAVENOUS at 09:35

## 2023-07-30 RX ADMIN — CEFAZOLIN 2 G: 2 INJECTION, POWDER, FOR SOLUTION INTRAMUSCULAR; INTRAVENOUS at 17:38

## 2023-07-30 RX ADMIN — PRAVASTATIN SODIUM 80 MG: 20 TABLET ORAL at 19:55

## 2023-07-30 RX ADMIN — GABAPENTIN 100 MG: 100 CAPSULE ORAL at 05:40

## 2023-07-30 ASSESSMENT — ENCOUNTER SYMPTOMS
SHORTNESS OF BREATH: 0
VOMITING: 0
TINGLING: 0
SENSORY CHANGE: 1
DOUBLE VISION: 0
ABDOMINAL PAIN: 0
COUGH: 0
BACK PAIN: 0
FEVER: 0
NECK PAIN: 1
MYALGIAS: 1
NAUSEA: 0
CHILLS: 0
BLURRED VISION: 0
FOCAL WEAKNESS: 0

## 2023-07-30 ASSESSMENT — PAIN DESCRIPTION - PAIN TYPE
TYPE: ACUTE PAIN
TYPE: ACUTE PAIN;SURGICAL PAIN
TYPE: ACUTE PAIN;SURGICAL PAIN
TYPE: ACUTE PAIN
TYPE: SURGICAL PAIN
TYPE: ACUTE PAIN
TYPE: SURGICAL PAIN

## 2023-07-30 ASSESSMENT — COPD QUESTIONNAIRES
HAVE YOU SMOKED AT LEAST 100 CIGARETTES IN YOUR ENTIRE LIFE: NO/DON'T KNOW
DO YOU EVER COUGH UP ANY MUCUS OR PHLEGM?: NO/ONLY WITH OCCASIONAL COLDS OR INFECTIONS
DURING THE PAST 4 WEEKS HOW MUCH DID YOU FEEL SHORT OF BREATH: NONE/LITTLE OF THE TIME
COPD SCREENING SCORE: 2

## 2023-07-30 ASSESSMENT — LIFESTYLE VARIABLES
CONSUMPTION TOTAL: NEGATIVE
HAVE PEOPLE ANNOYED YOU BY CRITICIZING YOUR DRINKING: NO
DOES PATIENT WANT TO STOP DRINKING: NO
HOW MANY TIMES IN THE PAST YEAR HAVE YOU HAD 5 OR MORE DRINKS IN A DAY: 0
ON A TYPICAL DAY WHEN YOU DRINK ALCOHOL HOW MANY DRINKS DO YOU HAVE: 1
EVER FELT BAD OR GUILTY ABOUT YOUR DRINKING: NO
TOTAL SCORE: 0
ALCOHOL_USE: NO
TOTAL SCORE: 0
EVER HAD A DRINK FIRST THING IN THE MORNING TO STEADY YOUR NERVES TO GET RID OF A HANGOVER: NO
AVERAGE NUMBER OF DAYS PER WEEK YOU HAVE A DRINK CONTAINING ALCOHOL: 0
TOTAL SCORE: 0
HAVE YOU EVER FELT YOU SHOULD CUT DOWN ON YOUR DRINKING: NO

## 2023-07-30 ASSESSMENT — FIBROSIS 4 INDEX
FIB4 SCORE: 4.37
FIB4 SCORE: 4.37
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
## 2023-07-30 NOTE — CARE PLAN
The patient is Watcher - Medium risk of patient condition declining or worsening    Shift Goals  Clinical Goals: pulmonary hygiene, pain management  Patient Goals: pain management  Family Goals: Updates    Progress made toward(s) clinical / shift goals:    Problem: Pain - Standard  Goal: Alleviation of pain or a reduction in pain to the patient’s comfort goal  Outcome: Progressing     Problem: Knowledge Deficit - Standard  Goal: Patient and family/care givers will demonstrate understanding of plan of care, disease process/condition, diagnostic tests and medications  Outcome: Progressing     Problem: Skin Integrity  Goal: Skin integrity is maintained or improved  Outcome: Progressing       Patient is not progressing towards the following goals:

## 2023-07-30 NOTE — ASSESSMENT & PLAN NOTE
VTE prophylaxis initially contraindicated secondary to elevated bleeding risk.  7/29 Trauma surveillance venous duplex ultrasonography ordered.  Interval Initiation of VTE Prophylaxis: 7/30 Prophylactic dose enoxaparin 30 mg BID initiated.

## 2023-07-30 NOTE — ASSESSMENT & PLAN NOTE
Tiny left lateral lower lobe pulmonary contusion  Aggressive pulmonary hygiene and serial chest radiography.
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
## 2023-07-30 NOTE — FLOWSHEET NOTE
07/29/23 1854   Events/Summary/Plan   Events/Summary/Plan Conscious sedation completed at this time for an ankle reduction.  Pt's vitals stable throughout procedure, ETCO2 staying in 30s.   Vital Signs   Pulse 74   Respiration 20   Pulse Oximetry 97 %   CO2 Monitoring   ETCO2 (mmHg) 32   Chest Exam   Work Of Breathing / Effort Within Normal Limits   Oxygen   O2 (LPM) 2  (for procedure)   O2 Delivery Device Nasal Cannula   Resuscitation Device at Bedside Self Inflating Bag

## 2023-07-30 NOTE — CARE PLAN
The patient is Stable - Low risk of patient condition declining or worsening    Shift Goals  Clinical Goals: pulmonary hygiene, ERAS, mobility, pain control  Patient Goals: pain control  Family Goals: pain control    Progress made toward(s) clinical / shift goals:  pulmonary hygiene, ERAS, mobility, pain management     Patient is not progressing towards the following goals: n/a      Problem: Pain - Standard  Goal: Alleviation of pain or a reduction in pain to the patient’s comfort goal  Outcome: Progressing     Problem: Knowledge Deficit - Standard  Goal: Patient and family/care givers will demonstrate understanding of plan of care, disease process/condition, diagnostic tests and medications  Outcome: Progressing

## 2023-07-30 NOTE — ANESTHESIA POSTPROCEDURE EVALUATION
Patient: Edgardo Sims    Procedure Summary     Date: 07/30/23 Room / Location: [place] [address] / SURGERY [facility]    Anesthesia Start: 0741 Anesthesia Stop: 0918    Procedure: ORIF, ANKLE (Left: Ankle) Diagnosis: (Left ankle fracture dislocation)    Surgeons: José Manuel Davies M.D. Responsible Provider: Vince Winn M.D.    Anesthesia Type: general, peripheral nerve block ASA Status: 3          Final Anesthesia Type: general, peripheral nerve block  Last vitals  BP   Blood Pressure : (!) 142/66    Temp   36.7 °C (98.1 °F)    Pulse   90   Resp   (!) 43    SpO2   97 %      Anesthesia Post Evaluation    Patient location during evaluation: PACU  Patient participation: complete - patient participated  Level of consciousness: awake and alert    Airway patency: patent  Anesthetic complications: no  Cardiovascular status: hemodynamically stable  Respiratory status: acceptable  Hydration status: euvolemic    PONV: none          No notable events documented.     Nurse Pain Score: 5 (NPRS)

## 2023-07-30 NOTE — PROGRESS NOTES
Patient arrived to T907   @ 2000    Temp:  HR: 68  BP:115/60  O2: RA  95%  Wgt:92.4kg       Skin:  Abrasion R knee  Bruise R shin  LLE splint and ace wrapped  Small lac R hand  Small lac pointer finger R   Scattered small abrasion L arm   L elbow redness blanching   R elbow WDL  Face WDL  Neck WDL  Ears WDL  Old surgical scars R sholuder  Scattered abrasions RLE   Chest ABD WDL  R flank small healing bruise  L flank bruise with small laceration in the middle  Back WDL  Sacrum WDL     Belongings:  Glasses

## 2023-07-30 NOTE — PROGRESS NOTES
I was contacted by Dr. Christian at 1836 to consult on this patient. I arrived at the patient's bedside at 1850.  - L closed ankle fx dislocation  - Lateral malleolus fx  - Closed reduction performed with assistance from Dr. Christian and subsequently splinted  - post reduction films show improvement of alignment  - NPO at mn tonight for OR tomorrow with Dr. Davies  - Discussed with Dr. Davies

## 2023-07-30 NOTE — ED NOTES
Pt transported with family and belongings to [facility]. Pt transported by RN with cardiac monitoring in place.

## 2023-07-30 NOTE — ASSESSMENT & PLAN NOTE
Chronic condition treated with lisinopril and metoprolol.  7/30 Resumed maintenance medication.    PRN antihypertensives.
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
## 2023-07-30 NOTE — ASSESSMENT & PLAN NOTE
Patient uses straight catheterization ~4 times per day.   8/3 Complaint of UTI.  Cipro initiated.  (allergies to penicillin and cephalosporins).  Takes lisinopril.  - Discussed with outpatient urology.  Recommending urine culture.   8/4 Rash noted after initiation of ciprofloxacin.  Antibiotic discontinued. Previous antibiotic therapies clarified by PCP).  Has taken Macrobid and Bactrim in the past.  Initiate Macrobid.

## 2023-07-30 NOTE — ASSESSMENT & PLAN NOTE
Chronic condition treated with 10 mg Percocet.  Holding maintenance medication during acute traumatic illness.   Multimodal pain medications and PRN oxycodone.

## 2023-07-30 NOTE — PROGRESS NOTES
4 Eyes Skin Assessment Completed by ABHISHEK Fermin and ABHISHEK Wilkerson.    Head WDL  Ears WDL  Nose WDL  Mouth WDL  Neck WDL  Breast/Chest WDL  Shoulder Blades WDL  Spine Bruising L flank  (R) Arm/Elbow/Hand Abrasion and Scab  (L) Arm/Elbow/Hand WDL  Abdomen WDL  Groin WDL  Scrotum/Coccyx/Buttocks WDL  (R) Leg Abrasion, scar  (L) Leg Scar  (R) Heel/Foot/Toe WDL  (L) Heel/Foot/Toe unable to fully assess d/t surgical dressing          Devices In Places ECG, Tele Box, Blood Pressure Cuff, Pulse Ox, and Oxy Mask, LLE dressing       Interventions In Place Sacral Mepilex, TAP System, Pillows, Q2 Turns, Low Air Loss Mattress, and Heels Loaded W/Pillows    Possible Skin Injury No    Pictures Uploaded Into Epic N/A  Wound Consult Placed N/A  RN Wound Prevention Protocol Ordered No

## 2023-07-30 NOTE — ASSESSMENT & PLAN NOTE
Extensive fracture dislocation of left ankle with diffuse swelling  Open treatment of left trimalleolar ankle fracture dislocation and ORIF left ankle syndesmosis.  Weight bearing status - Nonweightbearing LLE.  José Manuel Davies MD. Orthopedic Surgeon. [facility].
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
## 2023-07-30 NOTE — PROGRESS NOTES
Patient taken to operating room accompanied by this RN and transport staff, Jim. Placed in pre-op room 10 per pre-op staff. VSS at drop off. Primary RN gave telephone report on pt to ABHISHEK Caban.

## 2023-07-30 NOTE — PROGRESS NOTES
Patient arrived to unit. Oriented to the room. Discussed POC. Bed locked and in low position, Hourly rounding in place

## 2023-07-30 NOTE — ED NOTES
Med rec completed per patient with spouse at bedside.  Allergies reviewed with patient.  No outpatient antibiotics within the last 30 days.  Patient's preferred pharmacy: [facility] on [facility].    Patient states that he forgot to use his testosterone injection yesterday.
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
## 2023-07-30 NOTE — ANESTHESIA TIME REPORT
Anesthesia Start and Stop Event Times     Date Time Event    7/30/2023 0737 Ready for Procedure     0741 Anesthesia Start     0918 Anesthesia Stop        Responsible Staff  07/30/23    Name Role Begin End    Min HERBERTH Winn M.D. Anesth 0741 0918        Overtime Reason:  no overtime (within assigned shift)    Comments:

## 2023-07-30 NOTE — ANESTHESIA PROCEDURE NOTES
Peripheral Block    Date/Time: 7/30/2023 7:31 AM    Performed by: Vince Winn M.D.  Authorized by: Vince Winn M.D.    Patient Location:  Pre-op  Start Time:  7/30/2023 7:31 AM  End Time:  7/30/2023 7:36 AM  Reason for Block: at surgeon's request and post-op pain management ONLY    patient identified, IV checked, site marked, risks and benefits discussed, surgical consent, monitors and equipment checked, pre-op evaluation and timeout performed    Patient Position:  Supine  Prep: ChloraPrep    Monitoring:  Heart rate, continuous pulse ox and cardiac monitor  Block Region:  Lower Extremity  Lower Extremity - Block Type:  SCIATIC nerve block, lateral approach    Laterality:  Left  Procedures: ultrasound guided  Image captured, interpreted and electronically stored.  Local Infiltration:  Lidocaine  Strength:  1 %  Dose:  3 ml  Block Type:  Single-shot  Needle Length:  100mm  Needle Gauge:  21 G  Needle Localization:  Ultrasound guidance  Injection Assessment:  Negative aspiration for heme, no paresthesia on injection, incremental injection, local visualized surrounding nerve on ultrasound and paresthesia-transient/resolved  Evidence of intravascular injection: No     US Guided Sciatic Nerve Block   US probe placed several cm proximal to popliteal crease on posterior thigh and scanned caudad and cephalad until Sciatic Nerve (SN) identified superficial/lateral to popliteal artery.  Needle inserted lateral to probe in an in plane approach under direct visualization to a perineural position.  After negative aspiration LA injected with ease and visualized surrounding the SN.

## 2023-07-31 ENCOUNTER — TELEPHONE (OUTPATIENT)
Dept: MEDICAL GROUP | Facility: LAB | Age: 68
End: 2023-07-31
Payer: MEDICARE

## 2023-07-31 ENCOUNTER — APPOINTMENT (OUTPATIENT)
Dept: RADIOLOGY | Facility: MEDICAL CENTER | Age: 68
DRG: 493 | End: 2023-07-31
Attending: SURGERY
Payer: MEDICARE

## 2023-07-31 LAB
ALBUMIN SERPL BCP-MCNC: 3.5 G/DL (ref 3.2–4.9)
ALBUMIN/GLOB SERPL: 1.5 G/DL
ALP SERPL-CCNC: 64 U/L (ref 30–99)
ALT SERPL-CCNC: 16 U/L (ref 2–50)
ANION GAP SERPL CALC-SCNC: 10 MMOL/L (ref 7–16)
AST SERPL-CCNC: 28 U/L (ref 12–45)
BASOPHILS # BLD AUTO: 0.2 % (ref 0–1.8)
BASOPHILS # BLD: 0.03 K/UL (ref 0–0.12)
BILIRUB SERPL-MCNC: 0.7 MG/DL (ref 0.1–1.5)
BUN SERPL-MCNC: 11 MG/DL (ref 8–22)
CALCIUM ALBUM COR SERPL-MCNC: 9 MG/DL (ref 8.5–10.5)
CALCIUM SERPL-MCNC: 8.6 MG/DL (ref 8.5–10.5)
CHLORIDE SERPL-SCNC: 102 MMOL/L (ref 96–112)
CO2 SERPL-SCNC: 26 MMOL/L (ref 20–33)
CREAT SERPL-MCNC: 0.75 MG/DL (ref 0.5–1.4)
EOSINOPHIL # BLD AUTO: 0 K/UL (ref 0–0.51)
EOSINOPHIL NFR BLD: 0 % (ref 0–6.9)
ERYTHROCYTE [DISTWIDTH] IN BLOOD BY AUTOMATED COUNT: 56.8 FL (ref 35.9–50)
GFR SERPLBLD CREATININE-BSD FMLA CKD-EPI: 99 ML/MIN/1.73 M 2
GLOBULIN SER CALC-MCNC: 2.4 G/DL (ref 1.9–3.5)
GLUCOSE BLD STRIP.AUTO-MCNC: 106 MG/DL (ref 65–99)
GLUCOSE BLD STRIP.AUTO-MCNC: 119 MG/DL (ref 65–99)
GLUCOSE BLD STRIP.AUTO-MCNC: 157 MG/DL (ref 65–99)
GLUCOSE BLD STRIP.AUTO-MCNC: 84 MG/DL (ref 65–99)
GLUCOSE BLD STRIP.AUTO-MCNC: 84 MG/DL (ref 65–99)
GLUCOSE SERPL-MCNC: 98 MG/DL (ref 65–99)
HCT VFR BLD AUTO: 39 % (ref 42–52)
HGB BLD-MCNC: 12.4 G/DL (ref 14–18)
IMM GRANULOCYTES # BLD AUTO: 0.11 K/UL (ref 0–0.11)
IMM GRANULOCYTES NFR BLD AUTO: 0.8 % (ref 0–0.9)
LYMPHOCYTES # BLD AUTO: 0.92 K/UL (ref 1–4.8)
LYMPHOCYTES NFR BLD: 7 % (ref 22–41)
MAGNESIUM SERPL-MCNC: 2.2 MG/DL (ref 1.5–2.5)
MCH RBC QN AUTO: 29.7 PG (ref 27–33)
MCHC RBC AUTO-ENTMCNC: 31.8 G/DL (ref 32.3–36.5)
MCV RBC AUTO: 93.5 FL (ref 81.4–97.8)
MONOCYTES # BLD AUTO: 1.39 K/UL (ref 0–0.85)
MONOCYTES NFR BLD AUTO: 10.6 % (ref 0–13.4)
NEUTROPHILS # BLD AUTO: 10.67 K/UL (ref 1.82–7.42)
NEUTROPHILS NFR BLD: 81.4 % (ref 44–72)
NRBC # BLD AUTO: 0 K/UL
NRBC BLD-RTO: 0 /100 WBC (ref 0–0.2)
PHOSPHATE SERPL-MCNC: 3.2 MG/DL (ref 2.5–4.5)
PLATELET # BLD AUTO: 163 K/UL (ref 164–446)
PMV BLD AUTO: 9.5 FL (ref 9–12.9)
POTASSIUM SERPL-SCNC: 4.4 MMOL/L (ref 3.6–5.5)
PROT SERPL-MCNC: 5.9 G/DL (ref 6–8.2)
RBC # BLD AUTO: 4.17 M/UL (ref 4.7–6.1)
SODIUM SERPL-SCNC: 138 MMOL/L (ref 135–145)
WBC # BLD AUTO: 13.1 K/UL (ref 4.8–10.8)

## 2023-07-31 PROCEDURE — A9270 NON-COVERED ITEM OR SERVICE: HCPCS | Performed by: PHYSICIAN ASSISTANT

## 2023-07-31 PROCEDURE — 99232 SBSQ HOSP IP/OBS MODERATE 35: CPT | Performed by: NURSE PRACTITIONER

## 2023-07-31 PROCEDURE — 700101 HCHG RX REV CODE 250: Performed by: SURGERY

## 2023-07-31 PROCEDURE — 36415 COLL VENOUS BLD VENIPUNCTURE: CPT

## 2023-07-31 PROCEDURE — 700102 HCHG RX REV CODE 250 W/ 637 OVERRIDE(OP): Performed by: SURGERY

## 2023-07-31 PROCEDURE — 83735 ASSAY OF MAGNESIUM: CPT

## 2023-07-31 PROCEDURE — 80053 COMPREHEN METABOLIC PANEL: CPT

## 2023-07-31 PROCEDURE — 71045 X-RAY EXAM CHEST 1 VIEW: CPT

## 2023-07-31 PROCEDURE — A9270 NON-COVERED ITEM OR SERVICE: HCPCS | Performed by: SURGERY

## 2023-07-31 PROCEDURE — 84100 ASSAY OF PHOSPHORUS: CPT

## 2023-07-31 PROCEDURE — 700111 HCHG RX REV CODE 636 W/ 250 OVERRIDE (IP): Performed by: SURGERY

## 2023-07-31 PROCEDURE — 93970 EXTREMITY STUDY: CPT

## 2023-07-31 PROCEDURE — 770001 HCHG ROOM/CARE - MED/SURG/GYN PRIV*

## 2023-07-31 PROCEDURE — 85025 COMPLETE CBC W/AUTO DIFF WBC: CPT

## 2023-07-31 PROCEDURE — 700102 HCHG RX REV CODE 250 W/ 637 OVERRIDE(OP): Performed by: PHYSICIAN ASSISTANT

## 2023-07-31 PROCEDURE — 82962 GLUCOSE BLOOD TEST: CPT | Mod: 91

## 2023-07-31 PROCEDURE — 97162 PT EVAL MOD COMPLEX 30 MIN: CPT

## 2023-07-31 PROCEDURE — 700111 HCHG RX REV CODE 636 W/ 250 OVERRIDE (IP): Performed by: PHYSICIAN ASSISTANT

## 2023-07-31 PROCEDURE — 94669 MECHANICAL CHEST WALL OSCILL: CPT

## 2023-07-31 PROCEDURE — 700105 HCHG RX REV CODE 258: Mod: JZ | Performed by: SURGERY

## 2023-07-31 RX ADMIN — OXYCODONE HYDROCHLORIDE 10 MG: 10 TABLET ORAL at 20:08

## 2023-07-31 RX ADMIN — FAMOTIDINE 20 MG: 20 TABLET, FILM COATED ORAL at 04:51

## 2023-07-31 RX ADMIN — METAXALONE 800 MG: 800 TABLET ORAL at 04:50

## 2023-07-31 RX ADMIN — METOPROLOL TARTRATE 50 MG: 50 TABLET, FILM COATED ORAL at 17:24

## 2023-07-31 RX ADMIN — OXYCODONE HYDROCHLORIDE 10 MG: 10 TABLET ORAL at 23:11

## 2023-07-31 RX ADMIN — OXYCODONE HYDROCHLORIDE 10 MG: 10 TABLET ORAL at 13:06

## 2023-07-31 RX ADMIN — DOCUSATE SODIUM 100 MG: 100 CAPSULE, LIQUID FILLED ORAL at 04:52

## 2023-07-31 RX ADMIN — ACETAMINOPHEN 1000 MG: 500 TABLET, FILM COATED ORAL at 17:24

## 2023-07-31 RX ADMIN — DOCUSATE SODIUM 100 MG: 100 CAPSULE, LIQUID FILLED ORAL at 17:24

## 2023-07-31 RX ADMIN — OXYCODONE HYDROCHLORIDE 10 MG: 10 TABLET ORAL at 07:50

## 2023-07-31 RX ADMIN — GABAPENTIN 100 MG: 100 CAPSULE ORAL at 20:09

## 2023-07-31 RX ADMIN — OMEPRAZOLE 20 MG: 20 CAPSULE, DELAYED RELEASE ORAL at 04:51

## 2023-07-31 RX ADMIN — METOPROLOL TARTRATE 50 MG: 50 TABLET, FILM COATED ORAL at 04:50

## 2023-07-31 RX ADMIN — PRAVASTATIN SODIUM 80 MG: 20 TABLET ORAL at 17:23

## 2023-07-31 RX ADMIN — ENOXAPARIN SODIUM 30 MG: 100 INJECTION SUBCUTANEOUS at 17:24

## 2023-07-31 RX ADMIN — INSULIN HUMAN 1 UNITS: 100 INJECTION, SOLUTION PARENTERAL at 17:36

## 2023-07-31 RX ADMIN — OXYCODONE HYDROCHLORIDE 10 MG: 10 TABLET ORAL at 04:49

## 2023-07-31 RX ADMIN — LISINOPRIL 20 MG: 20 TABLET ORAL at 04:51

## 2023-07-31 RX ADMIN — HYDROMORPHONE HYDROCHLORIDE 0.5 MG: 1 INJECTION, SOLUTION INTRAMUSCULAR; INTRAVENOUS; SUBCUTANEOUS at 05:56

## 2023-07-31 RX ADMIN — LIDOCAINE PATCH 5% 2 PATCH: 700 PATCH TOPICAL at 04:47

## 2023-07-31 RX ADMIN — HYDROMORPHONE HYDROCHLORIDE 0.5 MG: 1 INJECTION, SOLUTION INTRAMUSCULAR; INTRAVENOUS; SUBCUTANEOUS at 21:15

## 2023-07-31 RX ADMIN — ACETAMINOPHEN 1000 MG: 500 TABLET, FILM COATED ORAL at 11:22

## 2023-07-31 RX ADMIN — POLYETHYLENE GLYCOL 3350 1 PACKET: 17 POWDER, FOR SOLUTION ORAL at 17:25

## 2023-07-31 RX ADMIN — SODIUM CHLORIDE, POTASSIUM CHLORIDE, SODIUM LACTATE AND CALCIUM CHLORIDE: 600; 310; 30; 20 INJECTION, SOLUTION INTRAVENOUS at 05:57

## 2023-07-31 RX ADMIN — PREDNISONE 10 MG: 10 TABLET ORAL at 06:00

## 2023-07-31 RX ADMIN — ACETAMINOPHEN 1000 MG: 500 TABLET, FILM COATED ORAL at 00:38

## 2023-07-31 RX ADMIN — GABAPENTIN 100 MG: 100 CAPSULE ORAL at 13:06

## 2023-07-31 RX ADMIN — METAXALONE 800 MG: 800 TABLET ORAL at 11:23

## 2023-07-31 RX ADMIN — ENOXAPARIN SODIUM 30 MG: 100 INJECTION SUBCUTANEOUS at 04:53

## 2023-07-31 RX ADMIN — METAXALONE 800 MG: 800 TABLET ORAL at 17:23

## 2023-07-31 RX ADMIN — ACETAMINOPHEN 1000 MG: 500 TABLET, FILM COATED ORAL at 04:52

## 2023-07-31 RX ADMIN — PAROXETINE HYDROCHLORIDE 20 MG: 20 TABLET, FILM COATED ORAL at 04:51

## 2023-07-31 RX ADMIN — GABAPENTIN 100 MG: 100 CAPSULE ORAL at 04:51

## 2023-07-31 RX ADMIN — OXYCODONE HYDROCHLORIDE 10 MG: 10 TABLET ORAL at 16:47

## 2023-07-31 ASSESSMENT — ENCOUNTER SYMPTOMS
SENSORY CHANGE: 0
COUGH: 0
TREMORS: 0
PALPITATIONS: 0
DOUBLE VISION: 0
BLURRED VISION: 0
FEVER: 0
MYALGIAS: 1
SPUTUM PRODUCTION: 0
WHEEZING: 0
NAUSEA: 0
SHORTNESS OF BREATH: 1
TINGLING: 0
ABDOMINAL PAIN: 0
DIZZINESS: 0
CHILLS: 0
SPEECH CHANGE: 0
HEADACHES: 0
VOMITING: 0

## 2023-07-31 ASSESSMENT — COGNITIVE AND FUNCTIONAL STATUS - GENERAL
CLIMB 3 TO 5 STEPS WITH RAILING: TOTAL
CLIMB 3 TO 5 STEPS WITH RAILING: A LOT
STANDING UP FROM CHAIR USING ARMS: A LOT
TOILETING: A LITTLE
TURNING FROM BACK TO SIDE WHILE IN FLAT BAD: A LITTLE
STANDING UP FROM CHAIR USING ARMS: A LOT
MOVING TO AND FROM BED TO CHAIR: A LITTLE
SUGGESTED CMS G CODE MODIFIER MOBILITY: CL
MOBILITY SCORE: 14
WALKING IN HOSPITAL ROOM: A LOT
TURNING FROM BACK TO SIDE WHILE IN FLAT BAD: A LITTLE
WALKING IN HOSPITAL ROOM: A LOT
DRESSING REGULAR UPPER BODY CLOTHING: A LITTLE
MOVING TO AND FROM BED TO CHAIR: A LITTLE
SUGGESTED CMS G CODE MODIFIER MOBILITY: CL
DAILY ACTIVITIY SCORE: 19
MOVING FROM LYING ON BACK TO SITTING ON SIDE OF FLAT BED: UNABLE
MOBILITY SCORE: 12
MOVING FROM LYING ON BACK TO SITTING ON SIDE OF FLAT BED: A LOT
DRESSING REGULAR LOWER BODY CLOTHING: A LOT
HELP NEEDED FOR BATHING: A LITTLE
SUGGESTED CMS G CODE MODIFIER DAILY ACTIVITY: CK

## 2023-07-31 ASSESSMENT — GAIT ASSESSMENTS: GAIT LEVEL OF ASSIST: UNABLE TO PARTICIPATE
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
## 2023-07-31 NOTE — DISCHARGE PLANNING
Case Management Discharge Planning    Admission Date: 7/29/2023  GMLOS: 4  ALOS: 2    6-Clicks ADL Score: 19  6-Clicks Mobility Score: 12  PT and/or OT Eval ordered: Yes  Post-acute Referrals Ordered: Yes  Post-acute Choice Obtained: Yes  Has referral(s) been sent to post-acute provider:  Yes      Anticipated Discharge Dispo: Discharge Disposition: Discharged to home/self care (01)  Discharge Address: [address] [place] [address] [place] [address]  Discharge Contact Phone Number: [phone]    DME Needed: No    Action(s) Taken: DC Assessment Complete (See below)    LSW met with patient at bedside to discuss dc plan. Patient verified demographic information on facesheet. Patient reported being independent with ADLs/IADLs prior to admission. Patient reported to ambulate independently without assistive devices prior to admission. Patient denied a hx of mental health or substance use. Patient reported that upon medical clearance, he can arrange transportation back to his residence.     LSW discussed dc recommendations home with home health. Patient refused and reported he does not need home health. LSW inquired if patient would need a walker upon discharge. Patient reported to have walkers, canes, and a raised toliet seat at home. Patient's spouse/Marshall to bring walker once medically cleared.     No other CM needs identified at this time.     Escalations Completed: None    Medically Clear: No    Next Steps: Pending medical clearance.     Barriers to Discharge: None    Is the patient up for discharge tomorrow: Potentially       Care Transition Team Assessment    Information Source  Orientation Level: Oriented X4  Information Given By: Patient  Who is responsible for making decisions for patient? : Patient    Readmission Evaluation  Is this a readmission?: No    Elopement Risk  Legal Hold: No  Ambulatory or Self Mobile in Wheelchair: No-Not an Elopement Risk  Elopement Risk: Not at Risk for Elopement    Interdisciplinary Discharge  Planning  Lives with - Patient's Self Care Capacity: Spouse, Adult Children  Patient or legal guardian wants to designate a caregiver: No  Support Systems: Children, Friends / Neighbors, Spouse / Significant Other  Housing / Facility: [address]  Durable Medical Equipment: Not Applicable    Discharge Preparedness  What is your plan after discharge?: Home with help  What are your discharge supports?: Spouse, Child  Prior Functional Level: Ambulatory, Drives Self, Independent with Activities of Daily Living, Independent with Medication Management  Difficulity with ADLs: None  Difficulity with IADLs: None    Functional Assesment  Prior Functional Level: Ambulatory, Drives Self, Independent with Activities of Daily Living, Independent with Medication Management    Finances  Financial Barriers to Discharge: No  Prescription Coverage: Yes    Vision / Hearing Impairment  Vision Impairment : Yes  Right Eye Vision: Impaired, Wears Glasses  Left Eye Vision: Impaired, Wears Glasses  Hearing Impairment : Yes  Does Pt Need Special Equipment for the Hearing Impaired?: No         Advance Directive  Advance Directive?: Living Will    Domestic Abuse  Have you ever been the victim of abuse or violence?: No  Physical Abuse or Sexual Abuse: No  Verbal Abuse or Emotional Abuse: No  Possible Abuse/Neglect Reported to:: Not Applicable    Psychological Assessment  History of Substance Abuse: None  History of Psychiatric Problems: No  Non-compliant with Treatment: No  Newly Diagnosed Illness: No    Discharge Risks or Barriers  Discharge risks or barriers?: No  Patient risk factors: Vulnerable adult    Anticipated Discharge Information  Discharge Disposition: Discharged to home/self care (01)  Discharge Address: [address] [place] [address] [place] [address]  Discharge Contact Phone Number: [phone]

## 2023-07-31 NOTE — TELEPHONE ENCOUNTER
1. Caller Name: Kwasi                        Call Back Number: [phone]      How would the patient prefer to be contacted with a response: Phone call OK to leave a detailed message    Kwasi is in the hospital in a lot of pain.  He said that they are mostly just giving him tylenol for pain.  He is not sure if you can do anything about this but he wanted to let you know.

## 2023-07-31 NOTE — CARE PLAN
Problem: Pain - Standard  Goal: Alleviation of pain or a reduction in pain to the patient’s comfort goal  Outcome: Not Progressing     Problem: Knowledge Deficit - Standard  Goal: Patient and family/care givers will demonstrate understanding of plan of care, disease process/condition, diagnostic tests and medications  Outcome: Progressing     Problem: Skin Integrity  Goal: Skin integrity is maintained or improved  Outcome: Progressing     Problem: Fall Risk  Goal: Patient will remain free from falls  Outcome: Progressing   The patient is Watcher - Medium risk of patient condition declining or worsening    Shift Goals  Clinical Goals: Pulmonary hygiene  Patient Goals: Pain  Family Goals: comfort    Progress made toward(s) clinical / shift goals:  mobility    Patient is not progressing towards the following goals:pain control       Problem: Pain - Standard  Goal: Alleviation of pain or a reduction in pain to the patient’s comfort goal  Outcome: Not Progressing

## 2023-07-31 NOTE — CARE PLAN
The patient is Stable - Low risk of patient condition declining or worsening    Shift Goals  Clinical Goals: Pulmonary hygiene  Patient Goals: Pain  Family Goals: comfort    Progress made toward(s) clinical / shift goals:    Problem: Pain - Standard  Goal: Alleviation of pain or a reduction in pain to the patient’s comfort goal  Outcome: Progressing     Problem: Knowledge Deficit - Standard  Goal: Patient and family/care givers will demonstrate understanding of plan of care, disease process/condition, diagnostic tests and medications  Outcome: Progressing     Problem: Skin Integrity  Goal: Skin integrity is maintained or improved  Outcome: Progressing     Problem: Fall Risk  Goal: Patient will remain free from falls  Outcome: Progressing       Patient is not progressing towards the following goals:
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
## 2023-07-31 NOTE — THERAPY
Occupational Therapy Contact Note    Patient Name: Edgardo Sims  Age:  67 y.o., Sex:  male  Medical Record #: [id number]  Today's Date: 7/31/2023 07/31/23 0959   Interdisciplinary Plan of Care Collaboration   Collaboration Comments OT consult received and attempted. However after speaking with pt, pt reported severe pain/spasms in rib cage area and declined OT evaluation. Pt noted to have difficulty reaching outside RU for items due to pain. Will reattempt OT eval as appropriate.

## 2023-08-01 ENCOUNTER — APPOINTMENT (OUTPATIENT)
Dept: RADIOLOGY | Facility: MEDICAL CENTER | Age: 68
DRG: 493 | End: 2023-08-01
Attending: SURGERY
Payer: MEDICARE

## 2023-08-01 LAB
ALBUMIN SERPL BCP-MCNC: 3.3 G/DL (ref 3.2–4.9)
ALBUMIN/GLOB SERPL: 1.4 G/DL
ALP SERPL-CCNC: 60 U/L (ref 30–99)
ALT SERPL-CCNC: 14 U/L (ref 2–50)
ANION GAP SERPL CALC-SCNC: 6 MMOL/L (ref 7–16)
AST SERPL-CCNC: 25 U/L (ref 12–45)
BASOPHILS # BLD AUTO: 0.4 % (ref 0–1.8)
BASOPHILS # BLD: 0.05 K/UL (ref 0–0.12)
BILIRUB SERPL-MCNC: 0.7 MG/DL (ref 0.1–1.5)
BUN SERPL-MCNC: 8 MG/DL (ref 8–22)
CALCIUM ALBUM COR SERPL-MCNC: 9.1 MG/DL (ref 8.5–10.5)
CALCIUM SERPL-MCNC: 8.5 MG/DL (ref 8.5–10.5)
CHLORIDE SERPL-SCNC: 104 MMOL/L (ref 96–112)
CO2 SERPL-SCNC: 29 MMOL/L (ref 20–33)
CREAT SERPL-MCNC: 0.77 MG/DL (ref 0.5–1.4)
EOSINOPHIL # BLD AUTO: 0.12 K/UL (ref 0–0.51)
EOSINOPHIL NFR BLD: 1 % (ref 0–6.9)
ERYTHROCYTE [DISTWIDTH] IN BLOOD BY AUTOMATED COUNT: 57.5 FL (ref 35.9–50)
GFR SERPLBLD CREATININE-BSD FMLA CKD-EPI: 98 ML/MIN/1.73 M 2
GLOBULIN SER CALC-MCNC: 2.4 G/DL (ref 1.9–3.5)
GLUCOSE BLD STRIP.AUTO-MCNC: 109 MG/DL (ref 65–99)
GLUCOSE BLD STRIP.AUTO-MCNC: 138 MG/DL (ref 65–99)
GLUCOSE BLD STRIP.AUTO-MCNC: 73 MG/DL (ref 65–99)
GLUCOSE SERPL-MCNC: 89 MG/DL (ref 65–99)
HCT VFR BLD AUTO: 37.2 % (ref 42–52)
HGB BLD-MCNC: 12 G/DL (ref 14–18)
IMM GRANULOCYTES # BLD AUTO: 0.13 K/UL (ref 0–0.11)
IMM GRANULOCYTES NFR BLD AUTO: 1.1 % (ref 0–0.9)
LYMPHOCYTES # BLD AUTO: 1.25 K/UL (ref 1–4.8)
LYMPHOCYTES NFR BLD: 10.6 % (ref 22–41)
MCH RBC QN AUTO: 30.4 PG (ref 27–33)
MCHC RBC AUTO-ENTMCNC: 32.3 G/DL (ref 32.3–36.5)
MCV RBC AUTO: 94.2 FL (ref 81.4–97.8)
MONOCYTES # BLD AUTO: 1.97 K/UL (ref 0–0.85)
MONOCYTES NFR BLD AUTO: 16.8 % (ref 0–13.4)
NEUTROPHILS # BLD AUTO: 8.24 K/UL (ref 1.82–7.42)
NEUTROPHILS NFR BLD: 70.1 % (ref 44–72)
NRBC # BLD AUTO: 0 K/UL
NRBC BLD-RTO: 0 /100 WBC (ref 0–0.2)
PLATELET # BLD AUTO: 159 K/UL (ref 164–446)
PMV BLD AUTO: 9.8 FL (ref 9–12.9)
POTASSIUM SERPL-SCNC: 4.8 MMOL/L (ref 3.6–5.5)
PROT SERPL-MCNC: 5.7 G/DL (ref 6–8.2)
RBC # BLD AUTO: 3.95 M/UL (ref 4.7–6.1)
SODIUM SERPL-SCNC: 139 MMOL/L (ref 135–145)
WBC # BLD AUTO: 11.8 K/UL (ref 4.8–10.8)

## 2023-08-01 PROCEDURE — 770001 HCHG ROOM/CARE - MED/SURG/GYN PRIV*

## 2023-08-01 PROCEDURE — A9270 NON-COVERED ITEM OR SERVICE: HCPCS | Performed by: PHYSICIAN ASSISTANT

## 2023-08-01 PROCEDURE — 71045 X-RAY EXAM CHEST 1 VIEW: CPT

## 2023-08-01 PROCEDURE — 700111 HCHG RX REV CODE 636 W/ 250 OVERRIDE (IP): Performed by: PHYSICIAN ASSISTANT

## 2023-08-01 PROCEDURE — A9270 NON-COVERED ITEM OR SERVICE: HCPCS

## 2023-08-01 PROCEDURE — A9270 NON-COVERED ITEM OR SERVICE: HCPCS | Performed by: SURGERY

## 2023-08-01 PROCEDURE — 85025 COMPLETE CBC W/AUTO DIFF WBC: CPT

## 2023-08-01 PROCEDURE — 700102 HCHG RX REV CODE 250 W/ 637 OVERRIDE(OP): Performed by: PHYSICIAN ASSISTANT

## 2023-08-01 PROCEDURE — 700102 HCHG RX REV CODE 250 W/ 637 OVERRIDE(OP): Performed by: SURGERY

## 2023-08-01 PROCEDURE — 80053 COMPREHEN METABOLIC PANEL: CPT

## 2023-08-01 PROCEDURE — 700102 HCHG RX REV CODE 250 W/ 637 OVERRIDE(OP): Performed by: NURSE PRACTITIONER

## 2023-08-01 PROCEDURE — 700102 HCHG RX REV CODE 250 W/ 637 OVERRIDE(OP)

## 2023-08-01 PROCEDURE — 36415 COLL VENOUS BLD VENIPUNCTURE: CPT

## 2023-08-01 PROCEDURE — 97530 THERAPEUTIC ACTIVITIES: CPT | Mod: CQ

## 2023-08-01 PROCEDURE — 99233 SBSQ HOSP IP/OBS HIGH 50: CPT | Performed by: NURSE PRACTITIONER

## 2023-08-01 PROCEDURE — 700101 HCHG RX REV CODE 250: Performed by: SURGERY

## 2023-08-01 PROCEDURE — A9270 NON-COVERED ITEM OR SERVICE: HCPCS | Performed by: NURSE PRACTITIONER

## 2023-08-01 PROCEDURE — 700111 HCHG RX REV CODE 636 W/ 250 OVERRIDE (IP): Performed by: SURGERY

## 2023-08-01 PROCEDURE — 82962 GLUCOSE BLOOD TEST: CPT

## 2023-08-01 PROCEDURE — 94669 MECHANICAL CHEST WALL OSCILL: CPT

## 2023-08-01 RX ORDER — GABAPENTIN 100 MG/1
200 CAPSULE ORAL EVERY 8 HOURS
Status: DISCONTINUED | OUTPATIENT
Start: 2023-08-01 | End: 2023-08-05 | Stop reason: HOSPADM

## 2023-08-01 RX ORDER — METHOCARBAMOL 500 MG/1
500 TABLET, FILM COATED ORAL EVERY 6 HOURS
Status: DISCONTINUED | OUTPATIENT
Start: 2023-08-01 | End: 2023-08-05 | Stop reason: HOSPADM

## 2023-08-01 RX ADMIN — GABAPENTIN 100 MG: 100 CAPSULE ORAL at 04:14

## 2023-08-01 RX ADMIN — ENOXAPARIN SODIUM 30 MG: 100 INJECTION SUBCUTANEOUS at 04:16

## 2023-08-01 RX ADMIN — HYDROMORPHONE HYDROCHLORIDE 0.5 MG: 1 INJECTION, SOLUTION INTRAMUSCULAR; INTRAVENOUS; SUBCUTANEOUS at 04:05

## 2023-08-01 RX ADMIN — METOPROLOL TARTRATE 50 MG: 50 TABLET, FILM COATED ORAL at 04:14

## 2023-08-01 RX ADMIN — PRAVASTATIN SODIUM 80 MG: 20 TABLET ORAL at 17:36

## 2023-08-01 RX ADMIN — LIDOCAINE PATCH 5% 2 PATCH: 700 PATCH TOPICAL at 04:16

## 2023-08-01 RX ADMIN — GABAPENTIN 200 MG: 100 CAPSULE ORAL at 21:00

## 2023-08-01 RX ADMIN — ENOXAPARIN SODIUM 30 MG: 100 INJECTION SUBCUTANEOUS at 17:32

## 2023-08-01 RX ADMIN — HYDROMORPHONE HYDROCHLORIDE 0.5 MG: 1 INJECTION, SOLUTION INTRAMUSCULAR; INTRAVENOUS; SUBCUTANEOUS at 00:29

## 2023-08-01 RX ADMIN — HYDROMORPHONE HYDROCHLORIDE 0.5 MG: 1 INJECTION, SOLUTION INTRAMUSCULAR; INTRAVENOUS; SUBCUTANEOUS at 12:48

## 2023-08-01 RX ADMIN — OXYCODONE HYDROCHLORIDE 10 MG: 10 TABLET ORAL at 10:57

## 2023-08-01 RX ADMIN — OXYCODONE HYDROCHLORIDE 10 MG: 10 TABLET ORAL at 13:48

## 2023-08-01 RX ADMIN — METAXALONE 800 MG: 800 TABLET ORAL at 04:14

## 2023-08-01 RX ADMIN — GABAPENTIN 200 MG: 100 CAPSULE ORAL at 13:48

## 2023-08-01 RX ADMIN — DOCUSATE SODIUM 100 MG: 100 CAPSULE, LIQUID FILLED ORAL at 17:32

## 2023-08-01 RX ADMIN — HYDROMORPHONE HYDROCHLORIDE 0.5 MG: 1 INJECTION, SOLUTION INTRAMUSCULAR; INTRAVENOUS; SUBCUTANEOUS at 04:27

## 2023-08-01 RX ADMIN — POLYETHYLENE GLYCOL 3350 1 PACKET: 17 POWDER, FOR SOLUTION ORAL at 17:32

## 2023-08-01 RX ADMIN — METOPROLOL TARTRATE 50 MG: 50 TABLET, FILM COATED ORAL at 17:32

## 2023-08-01 RX ADMIN — METHOCARBAMOL 500 MG: 500 TABLET ORAL at 12:50

## 2023-08-01 RX ADMIN — PAROXETINE HYDROCHLORIDE 20 MG: 20 TABLET, FILM COATED ORAL at 04:14

## 2023-08-01 RX ADMIN — ACETAMINOPHEN 1000 MG: 500 TABLET, FILM COATED ORAL at 17:32

## 2023-08-01 RX ADMIN — OXYCODONE HYDROCHLORIDE 10 MG: 10 TABLET ORAL at 02:57

## 2023-08-01 RX ADMIN — HYDROMORPHONE HYDROCHLORIDE 0.5 MG: 1 INJECTION, SOLUTION INTRAMUSCULAR; INTRAVENOUS; SUBCUTANEOUS at 21:00

## 2023-08-01 RX ADMIN — ACETAMINOPHEN 1000 MG: 500 TABLET, FILM COATED ORAL at 04:15

## 2023-08-01 RX ADMIN — ACETAMINOPHEN 1000 MG: 500 TABLET, FILM COATED ORAL at 12:50

## 2023-08-01 RX ADMIN — LISINOPRIL 20 MG: 20 TABLET ORAL at 04:15

## 2023-08-01 RX ADMIN — METHOCARBAMOL 500 MG: 500 TABLET ORAL at 17:32

## 2023-08-01 RX ADMIN — PREDNISONE 10 MG: 10 TABLET ORAL at 08:48

## 2023-08-01 RX ADMIN — OXYCODONE HYDROCHLORIDE 10 MG: 10 TABLET ORAL at 06:42

## 2023-08-01 RX ADMIN — DOCUSATE SODIUM 100 MG: 100 CAPSULE, LIQUID FILLED ORAL at 04:15

## 2023-08-01 RX ADMIN — OXYCODONE HYDROCHLORIDE 10 MG: 10 TABLET ORAL at 19:47

## 2023-08-01 RX ADMIN — HYDROMORPHONE HYDROCHLORIDE 0.5 MG: 1 INJECTION, SOLUTION INTRAMUSCULAR; INTRAVENOUS; SUBCUTANEOUS at 08:48

## 2023-08-01 RX ADMIN — OMEPRAZOLE 20 MG: 20 CAPSULE, DELAYED RELEASE ORAL at 04:15

## 2023-08-01 ASSESSMENT — ENCOUNTER SYMPTOMS
MYALGIAS: 1
HEADACHES: 0
FEVER: 0
SPEECH CHANGE: 0
TREMORS: 0
SPUTUM PRODUCTION: 0
NAUSEA: 0
VOMITING: 0
SENSORY CHANGE: 0
DOUBLE VISION: 0
WHEEZING: 0
SHORTNESS OF BREATH: 1
TINGLING: 0
PALPITATIONS: 0
COUGH: 0
BLURRED VISION: 0
CHILLS: 0
ABDOMINAL PAIN: 0
DIZZINESS: 0

## 2023-08-01 ASSESSMENT — GAIT ASSESSMENTS: GAIT LEVEL OF ASSIST: UNABLE TO PARTICIPATE

## 2023-08-01 ASSESSMENT — COGNITIVE AND FUNCTIONAL STATUS - GENERAL
CLIMB 3 TO 5 STEPS WITH RAILING: TOTAL
MOVING TO AND FROM BED TO CHAIR: A LITTLE
SUGGESTED CMS G CODE MODIFIER MOBILITY: CL
TURNING FROM BACK TO SIDE WHILE IN FLAT BAD: A LITTLE
MOBILITY SCORE: 12
STANDING UP FROM CHAIR USING ARMS: A LOT
WALKING IN HOSPITAL ROOM: A LOT
MOVING FROM LYING ON BACK TO SITTING ON SIDE OF FLAT BED: UNABLE

## 2023-08-01 ASSESSMENT — PAIN DESCRIPTION - PAIN TYPE
TYPE: SURGICAL PAIN

## 2023-08-01 NOTE — THERAPY
"Occupational Therapy Contact Note    Patient Name: Edgardo Sims  Age:  67 y.o., Sex:  male  Medical Record #: [id number]  Today's Date: 8/1/2023 08/01/23 0952   Initial Contact Note    Initial Contact Note Order Received and Verified, Occupational Therapy Evaluation in Progress with Full Report to Follow.   Interdisciplinary Plan of Care Collaboration   Collaboration Comments OT consult received and attempted x2. However, pt politely declined to participate in OT evaluation. Pt reports \"I cannot get out of bed. I usually recover quickly after my surgeries but my pain is unbearable. I cannot do anything because it's hard for me to bear weight through my left arm because of the rib fx's.The spasms haven't stopped and I didn't get much rest lastnight either. I'm sorry but I cannot do anything. My pain is 8/10. It feels like something is stabbing me\"   Session Information   Date / Session Number  8/1 attempted       "

## 2023-08-01 NOTE — DISCHARGE PLANNING
Received Choice form @: [id number]  Agency/Facility Name: [facility]   Referral sent per Choice form @: Not sent as there is no order nor F2F.

## 2023-08-01 NOTE — CARE PLAN
Problem: Hyperinflation  Goal: Prevent or improve atelectasis  Description: Target End Date:  3 to 4 days    1. Instruct incentive spirometry usage  2.  Perform hyperinflation therapy as indicated  Outcome: Progressing     PEP QID  IS: 2100

## 2023-08-01 NOTE — DISCHARGE PLANNING
Case Management Discharge Planning    Admission Date: 7/29/2023  GMLOS: 4  ALOS: 3    6-Clicks ADL Score: 19  6-Clicks Mobility Score: 12  PT and/or OT Eval ordered: Yes  Post-acute Referrals Ordered: Yes  Post-acute Choice Obtained: Yes  Has referral(s) been sent to post-acute provider:  Yes      Anticipated Discharge Dispo: Discharge Disposition: Discharged to home/self care (01)  Discharge Address: [address] [place] [address]  Discharge Contact Phone Number: [phone]    DME Needed: No    Action(s) Taken: Updated Provider/Nurse on Discharge Plan, Patient Conference, Choice obtained, and Referral(s) sent    Escalations Completed: None    Medically Clear: No    Next Steps: n/a    Barriers to Discharge: Medical clearance    Is the patient up for discharge tomorrow: No      CM met with the patient at the bedside to discuss discharge planning.   Per P.T. recommendations, the patient would benefit from home health  physical therapy.   Choice form obtained and faxed to the DPA.

## 2023-08-01 NOTE — CARE PLAN
Problem: Pain - Standard  Goal: Alleviation of pain or a reduction in pain to the patient’s comfort goal  Outcome: Progressing     Problem: Knowledge Deficit - Standard  Goal: Patient and family/care givers will demonstrate understanding of plan of care, disease process/condition, diagnostic tests and medications  Outcome: Progressing     Problem: Skin Integrity  Goal: Skin integrity is maintained or improved  Outcome: Progressing   The patient is Stable - Low risk of patient condition declining or worsening    Shift Goals  Clinical Goals: Pain Control  Patient Goals: Pain Control  Family Goals: N/A    Progress made toward(s) clinical / shift goals:  Pain Controlled per MAR, Educated patient on plan of care this shift     Patient is not progressing towards the following goals:

## 2023-08-01 NOTE — PROGRESS NOTES
Assumed care of patient at 0645. Bedside report received. Assessment complete.  AA&Ox4. Denies CP/SOB.  Reporting 8/10 pain. Medicated per MAR  Educated patient regarding pharmacologic and non pharmacologic modalities for pain management.  Skin per flowsheets  Tolerating Regular diet. Denies N/V.  + void. Last BM PTA   Pt ambulates LLE NWB.  All needs met at this time. Call light within reach. Pt calls appropriately. Bed low and locked, non skid socks in place. Hourly rounding in place.
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
## 2023-08-01 NOTE — THERAPY
"Physical Therapy   Daily Treatment     Patient Name: Edgardo Sims  Age:  67 y.o., Sex:  male  Medical Record #: [id number]  Today's Date: 8/1/2023     Precautions  Precautions: Fall Risk;Non Weight Bearing Left Lower Extremity  Comments: LLE NWB- Open treatment of left trimalleolar ankle fracture dislocation and ORIF left ankle syndesmosis. NWB x 4weeks. L ribs fxs/lung contusion.    Assessment    Pt greeted and seen for PT treatment. Pt was able to perform bed mobility w/o assist. Pt was able to tolerate sitting EOB, req'd VC for breathing due to high pain. Pt attempted STSx2 w/ FWW but declined standing and transferring to chair due to 8/10 pain. Discussed home DC, pt does have a stair in the home but does not need to perform to access bed/bath. Pt reported \"daughter is bringing a knee scooter to my house\". Pt currently limited by impaired balance, weakness and pain which negatively impacts functional mobility. Pt will continue to benefit from skilled PT to address deficits.       Plan    Treatment Plan Status: Continue Current Treatment Plan  Type of Treatment: Gait Training, Neuro Re-Education / Balance, Stair Training, Therapeutic Activities, Therapeutic Exercise  Treatment Frequency: 5 Times per Week  Treatment Duration: Until Therapy Goals Met    DC Equipment Recommendations: None  Discharge Recommendations: Recommend home health for continued physical therapy services (not appropriate for DC home today due to pain management)       08/01/23 0841   Cognition    Comments pleasant, cooperative   Balance   Sitting Balance (Static) Good   Sitting Balance (Dynamic) Fair +   Weight Shift Sitting Good   Skilled Intervention Verbal Cuing;Compensatory Strategies   Bed Mobility    Supine to Sit Standby Assist   Sit to Supine Standby Assist   Scooting Standby Assist   Skilled Intervention Verbal Cuing;Compensatory Strategies   Comments HOB flat   Gait Analysis   Gait Level Of Assist Unable to Participate " "  Comments discussed single step within home-pt will not have to perform step to reach basic needs in the home. Pt also reported that daughter has knee scooter and is going to bring to his home.   Functional Mobility   Sit to Stand Unable to Participate  (attempted, limited by pain)   Bed, Chair, Wheelchair Transfer Unable to Participate   Comments pt attempted STSx2 but was unable to complete stand due to high pain levels \"feels like there is a knife in my back\".   Short Term Goals    Short Term Goal # 1 transfers with FWW SBA in 6 visits   Goal Outcome # 1 goal not met   Short Term Goal # 2 amb 100ft with FWW SBA in 6 visits   Goal Outcome # 2 Goal not met   Short Term Goal # 3 up/down 1 step with FWW SBA in 6 visits   Goal Outcome # 3 Goal not met   Supervising Physical Therapist (PTA Treatments Only)   Supervising Physical Therapist Arleen Barber       "
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
## 2023-08-01 NOTE — CARE PLAN
The patient is Stable - Low risk of patient condition declining or worsening    Shift Goals  Clinical Goals: Pain control  Patient Goals: Pain  Family Goals: Not present    Progress made toward(s) clinical / shift goals:    Problem: Pain - Standard  Goal: Alleviation of pain or a reduction in pain to the patient’s comfort goal  Outcome: Progressing     Problem: Knowledge Deficit - Standard  Goal: Patient and family/care givers will demonstrate understanding of plan of care, disease process/condition, diagnostic tests and medications  Outcome: Progressing     Problem: Skin Integrity  Goal: Skin integrity is maintained or improved  Outcome: Progressing     Problem: Fall Risk  Goal: Patient will remain free from falls  Outcome: Progressing       Patient is not progressing towards the following goals:

## 2023-08-02 ENCOUNTER — APPOINTMENT (OUTPATIENT)
Dept: RADIOLOGY | Facility: MEDICAL CENTER | Age: 68
DRG: 493 | End: 2023-08-02
Attending: SURGERY
Payer: MEDICARE

## 2023-08-02 LAB
ALBUMIN SERPL BCP-MCNC: 3.4 G/DL (ref 3.2–4.9)
ALBUMIN/GLOB SERPL: 1.2 G/DL
ALP SERPL-CCNC: 67 U/L (ref 30–99)
ALT SERPL-CCNC: 14 U/L (ref 2–50)
ANION GAP SERPL CALC-SCNC: 9 MMOL/L (ref 7–16)
AST SERPL-CCNC: 26 U/L (ref 12–45)
BASOPHILS # BLD AUTO: 0.6 % (ref 0–1.8)
BASOPHILS # BLD: 0.08 K/UL (ref 0–0.12)
BILIRUB SERPL-MCNC: 1 MG/DL (ref 0.1–1.5)
BUN SERPL-MCNC: 7 MG/DL (ref 8–22)
CALCIUM ALBUM COR SERPL-MCNC: 9.4 MG/DL (ref 8.5–10.5)
CALCIUM SERPL-MCNC: 8.9 MG/DL (ref 8.5–10.5)
CHLORIDE SERPL-SCNC: 100 MMOL/L (ref 96–112)
CO2 SERPL-SCNC: 27 MMOL/L (ref 20–33)
CREAT SERPL-MCNC: 0.8 MG/DL (ref 0.5–1.4)
EOSINOPHIL # BLD AUTO: 0.22 K/UL (ref 0–0.51)
EOSINOPHIL NFR BLD: 1.6 % (ref 0–6.9)
ERYTHROCYTE [DISTWIDTH] IN BLOOD BY AUTOMATED COUNT: 55.8 FL (ref 35.9–50)
GFR SERPLBLD CREATININE-BSD FMLA CKD-EPI: 97 ML/MIN/1.73 M 2
GLOBULIN SER CALC-MCNC: 2.9 G/DL (ref 1.9–3.5)
GLUCOSE BLD STRIP.AUTO-MCNC: 109 MG/DL (ref 65–99)
GLUCOSE BLD STRIP.AUTO-MCNC: 116 MG/DL (ref 65–99)
GLUCOSE SERPL-MCNC: 124 MG/DL (ref 65–99)
HCT VFR BLD AUTO: 39.7 % (ref 42–52)
HGB BLD-MCNC: 12.9 G/DL (ref 14–18)
IMM GRANULOCYTES # BLD AUTO: 0.21 K/UL (ref 0–0.11)
IMM GRANULOCYTES NFR BLD AUTO: 1.6 % (ref 0–0.9)
LYMPHOCYTES # BLD AUTO: 1.33 K/UL (ref 1–4.8)
LYMPHOCYTES NFR BLD: 9.9 % (ref 22–41)
MCH RBC QN AUTO: 30.4 PG (ref 27–33)
MCHC RBC AUTO-ENTMCNC: 32.5 G/DL (ref 32.3–36.5)
MCV RBC AUTO: 93.6 FL (ref 81.4–97.8)
MONOCYTES # BLD AUTO: 1.59 K/UL (ref 0–0.85)
MONOCYTES NFR BLD AUTO: 11.9 % (ref 0–13.4)
NEUTROPHILS # BLD AUTO: 9.94 K/UL (ref 1.82–7.42)
NEUTROPHILS NFR BLD: 74.4 % (ref 44–72)
NRBC # BLD AUTO: 0 K/UL
NRBC BLD-RTO: 0 /100 WBC (ref 0–0.2)
PLATELET # BLD AUTO: 191 K/UL (ref 164–446)
PMV BLD AUTO: 10 FL (ref 9–12.9)
POTASSIUM SERPL-SCNC: 4.1 MMOL/L (ref 3.6–5.5)
PROT SERPL-MCNC: 6.3 G/DL (ref 6–8.2)
RBC # BLD AUTO: 4.24 M/UL (ref 4.7–6.1)
SODIUM SERPL-SCNC: 136 MMOL/L (ref 135–145)
WBC # BLD AUTO: 13.4 K/UL (ref 4.8–10.8)

## 2023-08-02 PROCEDURE — 97530 THERAPEUTIC ACTIVITIES: CPT | Mod: CQ

## 2023-08-02 PROCEDURE — 36415 COLL VENOUS BLD VENIPUNCTURE: CPT

## 2023-08-02 PROCEDURE — 94669 MECHANICAL CHEST WALL OSCILL: CPT

## 2023-08-02 PROCEDURE — 700111 HCHG RX REV CODE 636 W/ 250 OVERRIDE (IP): Performed by: PHYSICIAN ASSISTANT

## 2023-08-02 PROCEDURE — 700102 HCHG RX REV CODE 250 W/ 637 OVERRIDE(OP)

## 2023-08-02 PROCEDURE — 71045 X-RAY EXAM CHEST 1 VIEW: CPT

## 2023-08-02 PROCEDURE — 97535 SELF CARE MNGMENT TRAINING: CPT

## 2023-08-02 PROCEDURE — 85025 COMPLETE CBC W/AUTO DIFF WBC: CPT

## 2023-08-02 PROCEDURE — 700102 HCHG RX REV CODE 250 W/ 637 OVERRIDE(OP): Performed by: SURGERY

## 2023-08-02 PROCEDURE — A9270 NON-COVERED ITEM OR SERVICE: HCPCS

## 2023-08-02 PROCEDURE — 700101 HCHG RX REV CODE 250: Performed by: SURGERY

## 2023-08-02 PROCEDURE — 700111 HCHG RX REV CODE 636 W/ 250 OVERRIDE (IP): Performed by: SURGERY

## 2023-08-02 PROCEDURE — A9270 NON-COVERED ITEM OR SERVICE: HCPCS | Performed by: PHYSICIAN ASSISTANT

## 2023-08-02 PROCEDURE — 700102 HCHG RX REV CODE 250 W/ 637 OVERRIDE(OP): Performed by: PHYSICIAN ASSISTANT

## 2023-08-02 PROCEDURE — 97116 GAIT TRAINING THERAPY: CPT | Mod: CQ

## 2023-08-02 PROCEDURE — 80053 COMPREHEN METABOLIC PANEL: CPT

## 2023-08-02 PROCEDURE — 97165 OT EVAL LOW COMPLEX 30 MIN: CPT

## 2023-08-02 PROCEDURE — 99231 SBSQ HOSP IP/OBS SF/LOW 25: CPT | Performed by: NURSE PRACTITIONER

## 2023-08-02 PROCEDURE — 82962 GLUCOSE BLOOD TEST: CPT | Mod: 91

## 2023-08-02 PROCEDURE — A9270 NON-COVERED ITEM OR SERVICE: HCPCS | Performed by: NURSE PRACTITIONER

## 2023-08-02 PROCEDURE — 700102 HCHG RX REV CODE 250 W/ 637 OVERRIDE(OP): Performed by: NURSE PRACTITIONER

## 2023-08-02 PROCEDURE — A9270 NON-COVERED ITEM OR SERVICE: HCPCS | Performed by: SURGERY

## 2023-08-02 PROCEDURE — 770001 HCHG ROOM/CARE - MED/SURG/GYN PRIV*

## 2023-08-02 RX ADMIN — METHOCARBAMOL 500 MG: 500 TABLET ORAL at 12:13

## 2023-08-02 RX ADMIN — OXYCODONE HYDROCHLORIDE 10 MG: 10 TABLET ORAL at 08:47

## 2023-08-02 RX ADMIN — ENOXAPARIN SODIUM 30 MG: 100 INJECTION SUBCUTANEOUS at 17:04

## 2023-08-02 RX ADMIN — PRAVASTATIN SODIUM 80 MG: 20 TABLET ORAL at 17:04

## 2023-08-02 RX ADMIN — PAROXETINE HYDROCHLORIDE 20 MG: 20 TABLET, FILM COATED ORAL at 05:31

## 2023-08-02 RX ADMIN — GABAPENTIN 200 MG: 100 CAPSULE ORAL at 21:53

## 2023-08-02 RX ADMIN — OXYCODONE HYDROCHLORIDE 5 MG: 5 TABLET ORAL at 21:53

## 2023-08-02 RX ADMIN — OXYCODONE HYDROCHLORIDE 10 MG: 10 TABLET ORAL at 18:43

## 2023-08-02 RX ADMIN — DOCUSATE SODIUM 100 MG: 100 CAPSULE, LIQUID FILLED ORAL at 05:31

## 2023-08-02 RX ADMIN — PREDNISONE 10 MG: 10 TABLET ORAL at 08:48

## 2023-08-02 RX ADMIN — OXYCODONE HYDROCHLORIDE 5 MG: 5 TABLET ORAL at 00:08

## 2023-08-02 RX ADMIN — OXYCODONE HYDROCHLORIDE 10 MG: 10 TABLET ORAL at 12:13

## 2023-08-02 RX ADMIN — OXYCODONE HYDROCHLORIDE 10 MG: 10 TABLET ORAL at 15:37

## 2023-08-02 RX ADMIN — ACETAMINOPHEN 1000 MG: 500 TABLET, FILM COATED ORAL at 12:13

## 2023-08-02 RX ADMIN — METOPROLOL TARTRATE 50 MG: 50 TABLET, FILM COATED ORAL at 17:04

## 2023-08-02 RX ADMIN — METOPROLOL TARTRATE 50 MG: 50 TABLET, FILM COATED ORAL at 05:32

## 2023-08-02 RX ADMIN — OXYCODONE HYDROCHLORIDE 10 MG: 10 TABLET ORAL at 05:30

## 2023-08-02 RX ADMIN — GABAPENTIN 200 MG: 100 CAPSULE ORAL at 15:37

## 2023-08-02 RX ADMIN — METHOCARBAMOL 500 MG: 500 TABLET ORAL at 00:09

## 2023-08-02 RX ADMIN — HYDROMORPHONE HYDROCHLORIDE 0.5 MG: 1 INJECTION, SOLUTION INTRAMUSCULAR; INTRAVENOUS; SUBCUTANEOUS at 10:46

## 2023-08-02 RX ADMIN — ENOXAPARIN SODIUM 30 MG: 100 INJECTION SUBCUTANEOUS at 05:32

## 2023-08-02 RX ADMIN — METHOCARBAMOL 500 MG: 500 TABLET ORAL at 17:04

## 2023-08-02 RX ADMIN — LIDOCAINE PATCH 5% 2 PATCH: 700 PATCH TOPICAL at 09:01

## 2023-08-02 RX ADMIN — OMEPRAZOLE 20 MG: 20 CAPSULE, DELAYED RELEASE ORAL at 05:31

## 2023-08-02 RX ADMIN — ACETAMINOPHEN 1000 MG: 500 TABLET, FILM COATED ORAL at 05:29

## 2023-08-02 RX ADMIN — ACETAMINOPHEN 1000 MG: 500 TABLET, FILM COATED ORAL at 00:08

## 2023-08-02 RX ADMIN — ACETAMINOPHEN 1000 MG: 500 TABLET, FILM COATED ORAL at 17:04

## 2023-08-02 RX ADMIN — GABAPENTIN 200 MG: 100 CAPSULE ORAL at 05:31

## 2023-08-02 RX ADMIN — METHOCARBAMOL 500 MG: 500 TABLET ORAL at 05:32

## 2023-08-02 RX ADMIN — LISINOPRIL 20 MG: 20 TABLET ORAL at 05:32

## 2023-08-02 ASSESSMENT — COGNITIVE AND FUNCTIONAL STATUS - GENERAL
SUGGESTED CMS G CODE MODIFIER DAILY ACTIVITY: CJ
CLIMB 3 TO 5 STEPS WITH RAILING: TOTAL
DRESSING REGULAR LOWER BODY CLOTHING: A LITTLE
MOVING FROM LYING ON BACK TO SITTING ON SIDE OF FLAT BED: A LOT
HELP NEEDED FOR BATHING: A LITTLE
DAILY ACTIVITIY SCORE: 21
TOILETING: A LITTLE
MOVING TO AND FROM BED TO CHAIR: A LITTLE
SUGGESTED CMS G CODE MODIFIER MOBILITY: CL
WALKING IN HOSPITAL ROOM: A LOT
STANDING UP FROM CHAIR USING ARMS: A LITTLE
TURNING FROM BACK TO SIDE WHILE IN FLAT BAD: A LITTLE
MOBILITY SCORE: 14

## 2023-08-02 ASSESSMENT — ENCOUNTER SYMPTOMS
SENSORY CHANGE: 0
TREMORS: 0
PALPITATIONS: 0
VOMITING: 0
WHEEZING: 0
FEVER: 0
HEADACHES: 0
DIZZINESS: 0
SHORTNESS OF BREATH: 0
TINGLING: 0
CHILLS: 0
ABDOMINAL PAIN: 0
SPEECH CHANGE: 0
BLURRED VISION: 0
SPUTUM PRODUCTION: 0
NAUSEA: 0
MYALGIAS: 1
COUGH: 0
DOUBLE VISION: 0

## 2023-08-02 ASSESSMENT — ACTIVITIES OF DAILY LIVING (ADL): TOILETING: INDEPENDENT

## 2023-08-02 ASSESSMENT — PAIN DESCRIPTION - PAIN TYPE: TYPE: ACUTE PAIN

## 2023-08-02 NOTE — THERAPY
Physical Therapy   Daily Treatment     Patient Name: Edgardo Sims  Age:  67 y.o., Sex:  male  Medical Record #: [id number]  Today's Date: 8/2/2023     Precautions  Precautions: Fall Risk;Non Weight Bearing Left Lower Extremity  Comments: LLE NWB- Open treatment of left trimalleolar ankle fracture dislocation and ORIF left ankle syndesmosis. NWB x 4weeks. L ribs fxs/lung contusion.    Assessment    Pt greeted and seen for PT treatment. Pt was able to perform bed mobility w/o assist. Pt transferred up to knee scooter w/ SBA. Pt utilized knee scooter for 400ft in [facility] w/ CGA, no LOB. Pt currently limited by impaired balance and weakness which negatively impacts functional mobility. Pt will continue to benefit from skilled PT to address deficits.       Plan    Treatment Plan Status: Continue Current Treatment Plan  Type of Treatment: Gait Training, Neuro Re-Education / Balance, Stair Training, Therapeutic Activities, Therapeutic Exercise  Treatment Frequency: 5 Times per Week  Treatment Duration: Until Therapy Goals Met    DC Equipment Recommendations: None  Discharge Recommendations: Recommend home health for continued physical therapy services          08/02/23 1440   Cognition    Comments Pt is very motivated, pleasant, and cooperative   Balance   Sitting Balance (Static) Good   Sitting Balance (Dynamic) Fair +   Standing Balance (Static) Fair -   Standing Balance (Dynamic) Fair -   Weight Shift Sitting Fair   Weight Shift Standing Absent  (NWB L LE)   Skilled Intervention Verbal Cuing;Compensatory Strategies   Comments w/ knee scooter   Bed Mobility    Supine to Sit Supervised   Sit to Supine Supervised   Scooting Supervised   Gait Analysis   Comments not tested   Functional Mobility   Sit to Stand Standby Assist   Bed, Chair, Wheelchair Transfer Standby Assist   Mobility Pt utilized knee scooter w/ CGA for 400ft. Pt had no LOB and demo'd understanding of safety while transferring on/off knee scooter.    Skilled Intervention Verbal Cuing;Tactile Cuing;Sequencing;Compensatory Strategies   Comments pt's daughter has knee scooter for patient   Short Term Goals    Short Term Goal # 1 transfers with FWW SBA in 6 visits   Goal Outcome # 1 Goal met   Short Term Goal # 2 amb 100ft with FWW SBA in 6 visits   Goal Outcome # 2 Goal not met   Short Term Goal # 3 up/down 1 step with FWW SBA in 6 visits   Goal Outcome # 3 Goal not met   Supervising Physical Therapist (PTA Treatments Only)   Supervising Physical Therapist Kadeem Barber
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
## 2023-08-02 NOTE — CARE PLAN
The patient is Watcher - Medium risk of patient condition declining or worsening    Shift Goals  Clinical Goals: pain control, mobility  Patient Goals: pain control, rest  Family Goals: N/A    Progress made toward(s) clinical / shift goals:      Problem: Pain - Standard  Goal: Alleviation of pain or a reduction in pain to the patient’s comfort goal  Outcome: Progressing   Pt reports comfortable pain level using PRN oxycodone and dilaudid for break through pain.  Problem: Knowledge Deficit - Standard  Goal: Patient and family/care givers will demonstrate understanding of plan of care, disease process/condition, diagnostic tests and medications  Outcome: Progressing       Patient is not progressing towards the following goals:

## 2023-08-02 NOTE — THERAPY
"Occupational Therapy   Initial Evaluation     Patient Name: Edgardo Sims  Age:  67 y.o., Sex:  male  Medical Record #: [id number]  Today's Date: 8/2/2023     Precautions  Precautions: Fall Risk, Non Weight Bearing Left Lower Extremity  Comments: LLE NWB    Assessment  Patient is 67 y.o. male admitted to [facility] due to detention resulting in multiple rib fractures to left side and left trimalleolar ankle fracture dislocation s/p ORIF left ankle syndesmosis. PMHx of rheumatoid arthritis, depression, HTN, dysplipidemia, and GERD. During OT eval, pt participated in standing and seated ADLs(grooming, LBD, toilet txf). Pt presented with good postural control, core strength, AROM of BUE, activity tolerance, pain management, dynamic sitting balance, dynamic standing balance, and endurance while completing ADLs. Pt was educated on LLE NWB precautions, compensatory strategies for ADL/txfs, and importance of frequent OOB activity at home. Pt verbalized understanding. Patient will not be actively followed for occupational therapy services at this time, however may be seen if requested by physician for 1 more visit within 30 days to address any discharge or equipment needs    Plan  DC Equipment Recommendations: None  Discharge Recommendations: Anticipate that the patient will have no further occupational therapy needs after discharge from the hospital     Subjective  \"I'm feeling so much better today. I'm sorry I wasn't able to work with you the other times I just couldn't handle the pain.\"     Objective   08/02/23 0925   Initial Contact Note    Initial Contact Note Order Received and Verified, Occupational Therapy Evaluation in Progress with Full Report to Follow.   Prior Living Situation   Prior Services Home-Independent   Housing / Facility 1 Story House   Steps Into Home 0   Steps In Home 1   Bathroom Set up Walk In Shower;Shower Chair   Equipment Owned Raised Toilet Seat Without Arms;Hand Held Shower;Other (Comments)  (Pt " reports that he owns a walker but it does not have wheels and has not utilized it.)   Lives with - Patient's Self Care Capacity Spouse;Adult Children  (Pt reports he lives w/his wife is available to assist him upon d/c as needed)   Prior Level of ADL Function   Self Feeding Independent   Grooming / Hygiene Independent   Bathing Independent   Dressing Independent   Toileting Independent   Prior Level of IADL Function   Medication Management Independent   Laundry Independent   Kitchen Mobility Independent   Finances Independent   Home Management Independent   Shopping Independent   Prior Level Of Mobility Independent Without Device in Community;Independent Without Device in Home   Driving / Transportation Driving Independent   Occupation (Pre-Hospital Vocational) Retired Due To Age   History of Falls   History of Falls No   Precautions   Precautions Fall Risk;Non Weight Bearing Left Lower Extremity   Pain 0 - 10 Group   Therapist Pain Assessment Post Activity Pain Same as Prior to Activity  (Pt denies any report of pain. No numerical pain rating was assessed.)   Cognition    Cognition / Consciousness WDL   Level of Consciousness Alert   Comments Pt is very motivated, pleasant, and cooperative   Active ROM Upper Body   Active ROM Upper Body  WDL   Comments Pt reports that he has spasms on his L side due to the rib fractures but they are not limiting his participation in ADLs at this time.   Strength Upper Body   Upper Body Strength  WDL  (Pt demonstrated good upper body strength as he was able to bear weight through BUE to ambulate w/FWW and adhere to NWB LLE precautions.)   Neurological Concerns   Neurological Concerns No   Balance Assessment   Sitting Balance (Static) Good   Sitting Balance (Dynamic) Fair +   Standing Balance (Static) Fair   Standing Balance (Dynamic) Fair   Weight Shift Sitting Good   Weight Shift Standing Good   Comments   (w/FWW)   Bed Mobility    Supine to Sit Supervised  (HOB elevated. Pt was  able to walk BLE across bed to sit EOB.)   Scooting Supervised   ADL Assessment   Grooming Standing;Supervision  (brushed his teeth while standing sinkside and adhering to NWB LLE precautions w/FWW. Pt noted to lean on countertop for additional support to avoid bearing weight in LLE.)   Lower Body Dressing Supervision  (donned R sock while sitting in chair using figure 4 method)   Toileting   (Pt politely declined to participate in toileting as he did not have the urge to go.)   How much help from another person does the patient currently need...   Putting on and taking off regular lower body clothing? 3   Bathing (including washing, rinsing, and drying)? 3   Toileting, which includes using a toilet, bedpan, or urinal? 3   Putting on and taking off regular upper body clothing? 4   Taking care of personal grooming such as brushing teeth? 4   Eating meals? 4   6 Clicks Daily Activity Score 21   Functional Mobility   Sit to Stand Supervised  (Pt required min verbal cues for proper body mechanics and hand placement on FWW.)   Bed, Chair, Wheelchair Transfer Supervised   Toilet Transfers Supervised  (Pt utilized grab bars during descend to toilet as he will utilize nearby countertop at home once he is d/c.)   Transfer Method Stand Step  (while adhering to NWB LLE precautions w/FWW)   Mobility   (semi fowlers>EOB>standing sinkside>toilet>recliner)   Visual Perception   Comments   (wears glasses)   Activity Tolerance   Sitting in Chair +10 mins  (Pt left sitting in recliner post OT eval)   Standing +5 mins  (w/FWW)   Education Group   Education Provided Weight Bearing Precautions;Activities of Daily Living;Role of Occupational Therapist   Role of Occupational Therapist Patient Response Patient;Acceptance;Explanation;Verbal Demonstration   ADL Patient Response Patient;Acceptance;Explanation;Verbal Demonstration;Action Demonstration   Weight Bearing Precautions Patient Response Patient;Acceptance;Verbal Demonstration;Action  Demonstration;Explanation   Additional Comments Pt received education regarding compensatory strategies to utilize during completion of ADLs(LBD, bathing, grooming) while adhering to NWB LLE precautions. Pt verbalized understanding.   Anticipated Discharge Equipment and Recommendations   DC Equipment Recommendations None   Discharge Recommendations Anticipate that the patient will have no further occupational therapy needs after discharge from the hospital   Interdisciplinary Plan of Care Collaboration   IDT Collaboration with  Nursing   Patient Position at End of Therapy Seated;Tray Table within Reach;Call Light within Reach;Phone within Reach   Collaboration Comments RN updated regarding pt's functional status

## 2023-08-02 NOTE — THERAPY
"Occupational Therapy   Initial Evaluation     Patient Name: Edgardo Sims  Age:  67 y.o., Sex:  male  Medical Record #: [id number]  Today's Date: 8/2/2023     Precautions  Precautions: Fall Risk, Non Weight Bearing Left Lower Extremity  Comments: LLE NWB    Assessment  Patient is 67 y.o. male admitted to [facility] due to long term resulting in multiple rib fractures on left side and left trimalleolar ankle fracture dislocation s/p ORIF L ankle syndesmosis. During OT eval, pt participated in standing and seated ADLs (grooming, LBD). Pt presented with good postural control, core strength, AROM/strength of BUE, activity tolerance, pain management, dynamic sitting balance, dynamic standing balance, and endurance while completing ADLs. Pt was educated on compensatory strategies for ADL/txfs, weight bearing precautions, and importance of frequent OOB activity at home. Patient will not be actively followed for occupational therapy services at this time, however may be seen if requested by physician for 1 more visit within 30 days to address any discharge or equipment needs.      Plan  DC Equipment Recommendations: None  Discharge Recommendations: Anticipate that the patient will have no further occupational therapy needs after discharge from the hospital     Subjective  Pt stated, \"I feel so much better today. I'm not in any pain. I'm sorry about the last few times you've tried to come see me it was just unbearable.\"     Objective   08/02/23 0925   Initial Contact Note    Initial Contact Note Order Received and Verified, Occupational Therapy Evaluation in Progress with Full Report to Follow.   Prior Living Situation   Prior Services Home-Independent   Housing / Facility 1 Story House   Steps Into Home 0   Steps In Home 1   Bathroom Set up Walk In Shower;Shower Chair   Equipment Owned Raised Toilet Seat Without Arms;Hand Held Shower;Other (Comments)  (Pt reports that he owns a walker but it does not have wheels and has not " utilized it.)   Lives with - Patient's Self Care Capacity Spouse;Adult Children  (Pt reports he lives w/his wife is available to assist him upon d/c as needed)   Prior Level of ADL Function   Self Feeding Independent   Grooming / Hygiene Independent   Bathing Independent   Dressing Independent   Toileting Independent   Prior Level of IADL Function   Medication Management Independent   Laundry Independent   Kitchen Mobility Independent   Finances Independent   Home Management Independent   Shopping Independent   Prior Level Of Mobility Independent Without Device in Community;Independent Without Device in Home   Driving / Transportation Driving Independent   Occupation (Pre-Hospital Vocational) Retired Due To Age   History of Falls   History of Falls No   Precautions   Precautions Fall Risk;Non Weight Bearing Left Lower Extremity   Pain 0 - 10 Group   Therapist Pain Assessment Post Activity Pain Same as Prior to Activity  (Pt denies any report of pain. No numerical pain rating was assessed.)   Cognition    Cognition / Consciousness WDL   Level of Consciousness Alert   Comments Pt is very motivated, pleasant, and cooperative   Active ROM Upper Body   Active ROM Upper Body  WDL   Comments Pt reports that he has spasms on his L side due to the rib fractures but they are not limiting his participation in ADLs at this time.   Strength Upper Body   Upper Body Strength  WDL  (Pt demonstrated good upper body strength as he was able to bear weight through BUE to ambulate w/FWW and adhere to NWB LLE precautions.)   Neurological Concerns   Neurological Concerns No   Balance Assessment   Sitting Balance (Static) Good   Sitting Balance (Dynamic) Fair +   Standing Balance (Static) Fair   Standing Balance (Dynamic) Fair   Weight Shift Sitting Good   Weight Shift Standing Good   Comments   (w/FWW)   Bed Mobility    Supine to Sit Supervised  (HOB elevated. Pt was able to walk BLE across bed to sit EOB.)   Scooting Supervised   ADL  Assessment   Grooming Standing;Supervision  (brushed his teeth while standing sinkside and adhering to NWB LLE precautions w/FWW. Pt noted to lean on countertop for additional support to avoid bearing weight in LLE.)   Lower Body Dressing Supervision  (donned R sock while sitting in chair using figure 4 method)   Toileting   (Pt politely declined to participate in toileting as he did not have the urge to go.)   How much help from another person does the patient currently need...   Putting on and taking off regular lower body clothing? 3   Bathing (including washing, rinsing, and drying)? 3   Toileting, which includes using a toilet, bedpan, or urinal? 3   Putting on and taking off regular upper body clothing? 4   Taking care of personal grooming such as brushing teeth? 4   Eating meals? 4   6 Clicks Daily Activity Score 21   Functional Mobility   Sit to Stand Supervised  (Pt required min verbal cues for proper body mechanics and hand placement on FWW.)   Bed, Chair, Wheelchair Transfer Supervised   Toilet Transfers Supervised  (Pt utilized grab bars during descend to toilet as he will utilize nearby countertop at home once he is d/c.)   Transfer Method Stand Step  (while adhering to NWB LLE precautions w/FWW)   Mobility   (semi fowlers>EOB>standing sinkside>toilet>recliner)   Visual Perception   Comments   (wears glasses)   Activity Tolerance   Sitting in Chair +10 mins  (Pt left sitting in recliner post OT eval)   Standing +5 mins  (w/FWW)   Education Group   Education Provided Weight Bearing Precautions;Activities of Daily Living;Role of Occupational Therapist   Role of Occupational Therapist Patient Response Patient;Acceptance;Explanation;Verbal Demonstration   ADL Patient Response Patient;Acceptance;Explanation;Verbal Demonstration;Action Demonstration   Weight Bearing Precautions Patient Response Patient;Acceptance;Verbal Demonstration;Action Demonstration;Explanation   Additional Comments Pt received education  regarding compensatory strategies to utilize during completion of ADLs(LBD, bathing, grooming) while adhering to NWB LLE precautions. Pt verbalized understanding.   Anticipated Discharge Equipment and Recommendations   DC Equipment Recommendations None   Discharge Recommendations Anticipate that the patient will have no further occupational therapy needs after discharge from the hospital   Interdisciplinary Plan of Care Collaboration   IDT Collaboration with  Nursing   Patient Position at End of Therapy Seated;Tray Table within Reach;Call Light within Reach;Phone within Reach   Collaboration Comments RN updated regarding pt's functional status

## 2023-08-02 NOTE — THERAPY
"Occupational Therapy   Initial Evaluation     Patient Name: Edgardo Sims  Age:  67 y.o., Sex:  male  Medical Record #: [id number]  Today's Date: 8/2/2023     Precautions  Precautions: Fall Risk, Non Weight Bearing Left Lower Extremity  Comments: LLE NWB-  Assessment  Patient is 67 y.o. male admitted to [facility] due to long term resulting in multiple rib fractures to left side and left trimalleolar ankle fracture dislocation s/p ORIF left ankle syndesmosis. PMHx of rheumatoid arthritis, depression, HTN, dysplipidemia, and GERD. During OT eval, pt participated in standing and seated ADLs(grooming, LBD, toilet txf). Pt presented with good postural control, core strength, AROM of BUE, activity tolerance, pain management, dynamic sitting balance, dynamic standing balance, and endurance while completing ADLs. Pt was educated on LLE NWB precautions, compensatory strategies for ADL/txfs, and importance of frequent OOB activity at home. Pt verbalized understanding. Patient will not be actively followed for occupational therapy services at this time, however may be seen if requested by physician for 1 more visit within 30 days to address any discharge or equipment needs.     Plan  DC Equipment Recommendations: None  Discharge Recommendations: Anticipate that the patient will have no further occupational therapy needs after discharge from the hospital     Subjective  Pt stated, \"I feel so much better today. I am not in any pain and really can move. I'm sorry about the other times you tried to see me.\"     Objective   08/02/23 0925   Initial Contact Note    Initial Contact Note Order Received and Verified, Occupational Therapy Evaluation in Progress with Full Report to Follow.   Prior Living Situation   Prior Services Home-Independent   Housing / Facility 1 Story House   Steps Into Home 0   Steps In Home 1   Bathroom Set up Walk In Shower;Shower Chair   Equipment Owned Raised Toilet Seat Without Arms;Hand Held Shower;Other " (Comments)  (Pt reports that he owns a walker but it does not have wheels and has not utilized it.)   Lives with - Patient's Self Care Capacity Spouse;Adult Children  (Pt reports he lives w/his wife is available to assist him upon d/c as needed)   Prior Level of ADL Function   Self Feeding Independent   Grooming / Hygiene Independent   Bathing Independent   Dressing Independent   Toileting Independent   Prior Level of IADL Function   Medication Management Independent   Laundry Independent   Kitchen Mobility Independent   Finances Independent   Home Management Independent   Shopping Independent   Prior Level Of Mobility Independent Without Device in Community;Independent Without Device in Home   Driving / Transportation Driving Independent   Occupation (Pre-Hospital Vocational) Retired Due To Age   History of Falls   History of Falls No   Precautions   Precautions Fall Risk;Non Weight Bearing Left Lower Extremity   Pain 0 - 10 Group   Therapist Pain Assessment Post Activity Pain Same as Prior to Activity  (Pt denies any report of pain. No numerical pain rating was assessed.)   Cognition    Cognition / Consciousness WDL   Level of Consciousness Alert   Comments Pt is very motivated, pleasant, and cooperative   Active ROM Upper Body   Active ROM Upper Body  WDL   Comments Pt reports that he has spasms on his L side due to the rib fractures but they are not limiting his participation in ADLs at this time.   Strength Upper Body   Upper Body Strength  WDL  (Pt demonstrated good upper body strength as he was able to bear weight through BUE to ambulate w/FWW and adhere to NWB LLE precautions.)   Neurological Concerns   Neurological Concerns No   Balance Assessment   Sitting Balance (Static) Good   Sitting Balance (Dynamic) Fair +   Standing Balance (Static) Fair   Standing Balance (Dynamic) Fair   Weight Shift Sitting Good   Weight Shift Standing Good   Comments   (w/FWW)   Bed Mobility    Supine to Sit Supervised  (HOB  elevated. Pt was able to walk BLE across bed to sit EOB.)   Scooting Supervised   ADL Assessment   Grooming Standing;Supervision  (brushed his teeth while standing sinkside and adhering to NWB LLE precautions w/FWW. Pt noted to lean on countertop for additional support to avoid bearing weight in LLE.)   Lower Body Dressing Supervision  (donned R sock while sitting in chair using figure 4 method)   Toileting   (Pt politely declined to participate in toileting as he did not have the urge to go.)   How much help from another person does the patient currently need...   Putting on and taking off regular lower body clothing? 3   Bathing (including washing, rinsing, and drying)? 3   Toileting, which includes using a toilet, bedpan, or urinal? 3   Putting on and taking off regular upper body clothing? 4   Taking care of personal grooming such as brushing teeth? 4   Eating meals? 4   6 Clicks Daily Activity Score 21   Functional Mobility   Sit to Stand Supervised  (Pt required min verbal cues for proper body mechanics and hand placement on FWW.)   Bed, Chair, Wheelchair Transfer Supervised   Toilet Transfers Supervised  (Pt utilized grab bars during descend to toilet as he will utilize nearby countertop at home once he is d/c.)   Transfer Method Stand Step  (while adhering to NWB LLE precautions w/FWW)   Mobility   (semi fowlers>EOB>standing sinkside>toilet>recliner)   Visual Perception   Comments   (wears glasses)   Activity Tolerance   Sitting in Chair +10 mins  (Pt left sitting in recliner post OT eval)   Standing +5 mins  (w/FWW)   Anticipated Discharge Equipment and Recommendations   DC Equipment Recommendations None   Discharge Recommendations Anticipate that the patient will have no further occupational therapy needs after discharge from the hospital   Interdisciplinary Plan of Care Collaboration   IDT Collaboration with  Nursing   Patient Position at End of Therapy Seated;Tray Table within Reach;Call Light within  Reach;Phone within Reach   Collaboration Comments RN updated regarding pt's functional status
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
## 2023-08-03 ENCOUNTER — APPOINTMENT (OUTPATIENT)
Dept: RADIOLOGY | Facility: MEDICAL CENTER | Age: 68
DRG: 493 | End: 2023-08-03
Attending: SURGERY
Payer: MEDICARE

## 2023-08-03 LAB
ALBUMIN SERPL BCP-MCNC: 3.6 G/DL (ref 3.2–4.9)
ALBUMIN/GLOB SERPL: 1.2 G/DL
ALP SERPL-CCNC: 70 U/L (ref 30–99)
ALT SERPL-CCNC: 17 U/L (ref 2–50)
ANION GAP SERPL CALC-SCNC: 10 MMOL/L (ref 7–16)
APPEARANCE UR: CLEAR
AST SERPL-CCNC: 25 U/L (ref 12–45)
BACTERIA #/AREA URNS HPF: NEGATIVE /HPF
BASOPHILS # BLD AUTO: 0.5 % (ref 0–1.8)
BASOPHILS # BLD: 0.08 K/UL (ref 0–0.12)
BILIRUB SERPL-MCNC: 0.8 MG/DL (ref 0.1–1.5)
BILIRUB UR QL STRIP.AUTO: NEGATIVE
BUN SERPL-MCNC: 7 MG/DL (ref 8–22)
CALCIUM ALBUM COR SERPL-MCNC: 9.2 MG/DL (ref 8.5–10.5)
CALCIUM SERPL-MCNC: 8.9 MG/DL (ref 8.5–10.5)
CHLORIDE SERPL-SCNC: 101 MMOL/L (ref 96–112)
CO2 SERPL-SCNC: 26 MMOL/L (ref 20–33)
COLOR UR: YELLOW
CREAT SERPL-MCNC: 0.79 MG/DL (ref 0.5–1.4)
EOSINOPHIL # BLD AUTO: 0.27 K/UL (ref 0–0.51)
EOSINOPHIL NFR BLD: 1.6 % (ref 0–6.9)
EPI CELLS #/AREA URNS HPF: NEGATIVE /HPF
ERYTHROCYTE [DISTWIDTH] IN BLOOD BY AUTOMATED COUNT: 53.3 FL (ref 35.9–50)
GFR SERPLBLD CREATININE-BSD FMLA CKD-EPI: 98 ML/MIN/1.73 M 2
GLOBULIN SER CALC-MCNC: 3 G/DL (ref 1.9–3.5)
GLUCOSE SERPL-MCNC: 98 MG/DL (ref 65–99)
GLUCOSE UR STRIP.AUTO-MCNC: NEGATIVE MG/DL
HCT VFR BLD AUTO: 39.5 % (ref 42–52)
HGB BLD-MCNC: 13.3 G/DL (ref 14–18)
HYALINE CASTS #/AREA URNS LPF: ABNORMAL /LPF
IMM GRANULOCYTES # BLD AUTO: 0.26 K/UL (ref 0–0.11)
IMM GRANULOCYTES NFR BLD AUTO: 1.6 % (ref 0–0.9)
KETONES UR STRIP.AUTO-MCNC: ABNORMAL MG/DL
LEUKOCYTE ESTERASE UR QL STRIP.AUTO: ABNORMAL
LYMPHOCYTES # BLD AUTO: 1.24 K/UL (ref 1–4.8)
LYMPHOCYTES NFR BLD: 7.5 % (ref 22–41)
MAGNESIUM SERPL-MCNC: 1.9 MG/DL (ref 1.5–2.5)
MCH RBC QN AUTO: 30.7 PG (ref 27–33)
MCHC RBC AUTO-ENTMCNC: 33.7 G/DL (ref 32.3–36.5)
MCV RBC AUTO: 91.2 FL (ref 81.4–97.8)
MICRO URNS: ABNORMAL
MONOCYTES # BLD AUTO: 1.73 K/UL (ref 0–0.85)
MONOCYTES NFR BLD AUTO: 10.5 % (ref 0–13.4)
NEUTROPHILS # BLD AUTO: 12.88 K/UL (ref 1.82–7.42)
NEUTROPHILS NFR BLD: 78.3 % (ref 44–72)
NITRITE UR QL STRIP.AUTO: NEGATIVE
NRBC # BLD AUTO: 0 K/UL
NRBC BLD-RTO: 0 /100 WBC (ref 0–0.2)
PH UR STRIP.AUTO: 7 [PH] (ref 5–8)
PHOSPHATE SERPL-MCNC: 3 MG/DL (ref 2.5–4.5)
PLATELET # BLD AUTO: 213 K/UL (ref 164–446)
PMV BLD AUTO: 9.8 FL (ref 9–12.9)
POTASSIUM SERPL-SCNC: 3.9 MMOL/L (ref 3.6–5.5)
PROT SERPL-MCNC: 6.6 G/DL (ref 6–8.2)
PROT UR QL STRIP: NEGATIVE MG/DL
RBC # BLD AUTO: 4.33 M/UL (ref 4.7–6.1)
RBC # URNS HPF: ABNORMAL /HPF
RBC UR QL AUTO: ABNORMAL
SODIUM SERPL-SCNC: 137 MMOL/L (ref 135–145)
SP GR UR STRIP.AUTO: 1.01
UROBILINOGEN UR STRIP.AUTO-MCNC: 4 MG/DL
WBC # BLD AUTO: 16.5 K/UL (ref 4.8–10.8)
WBC #/AREA URNS HPF: ABNORMAL /HPF

## 2023-08-03 PROCEDURE — 84100 ASSAY OF PHOSPHORUS: CPT

## 2023-08-03 PROCEDURE — 700101 HCHG RX REV CODE 250: Performed by: SURGERY

## 2023-08-03 PROCEDURE — A9270 NON-COVERED ITEM OR SERVICE: HCPCS | Performed by: SURGERY

## 2023-08-03 PROCEDURE — 71045 X-RAY EXAM CHEST 1 VIEW: CPT

## 2023-08-03 PROCEDURE — 700102 HCHG RX REV CODE 250 W/ 637 OVERRIDE(OP): Performed by: NURSE PRACTITIONER

## 2023-08-03 PROCEDURE — 700102 HCHG RX REV CODE 250 W/ 637 OVERRIDE(OP): Performed by: SURGERY

## 2023-08-03 PROCEDURE — 83735 ASSAY OF MAGNESIUM: CPT

## 2023-08-03 PROCEDURE — 97116 GAIT TRAINING THERAPY: CPT | Mod: CQ

## 2023-08-03 PROCEDURE — 97530 THERAPEUTIC ACTIVITIES: CPT | Mod: CQ

## 2023-08-03 PROCEDURE — 87086 URINE CULTURE/COLONY COUNT: CPT

## 2023-08-03 PROCEDURE — 700102 HCHG RX REV CODE 250 W/ 637 OVERRIDE(OP): Performed by: PHYSICIAN ASSISTANT

## 2023-08-03 PROCEDURE — 700111 HCHG RX REV CODE 636 W/ 250 OVERRIDE (IP): Performed by: PHYSICIAN ASSISTANT

## 2023-08-03 PROCEDURE — 700102 HCHG RX REV CODE 250 W/ 637 OVERRIDE(OP)

## 2023-08-03 PROCEDURE — 700111 HCHG RX REV CODE 636 W/ 250 OVERRIDE (IP): Performed by: NURSE PRACTITIONER

## 2023-08-03 PROCEDURE — A9270 NON-COVERED ITEM OR SERVICE: HCPCS | Performed by: NURSE PRACTITIONER

## 2023-08-03 PROCEDURE — L4398 FOOT DROP SPLINT PRE OTS: HCPCS

## 2023-08-03 PROCEDURE — 85025 COMPLETE CBC W/AUTO DIFF WBC: CPT

## 2023-08-03 PROCEDURE — A9270 NON-COVERED ITEM OR SERVICE: HCPCS

## 2023-08-03 PROCEDURE — 81001 URINALYSIS AUTO W/SCOPE: CPT

## 2023-08-03 PROCEDURE — 770001 HCHG ROOM/CARE - MED/SURG/GYN PRIV*

## 2023-08-03 PROCEDURE — 80053 COMPREHEN METABOLIC PANEL: CPT

## 2023-08-03 PROCEDURE — 99232 SBSQ HOSP IP/OBS MODERATE 35: CPT | Performed by: NURSE PRACTITIONER

## 2023-08-03 PROCEDURE — A9270 NON-COVERED ITEM OR SERVICE: HCPCS | Performed by: PHYSICIAN ASSISTANT

## 2023-08-03 PROCEDURE — 36415 COLL VENOUS BLD VENIPUNCTURE: CPT

## 2023-08-03 RX ORDER — DIPHENHYDRAMINE HYDROCHLORIDE 50 MG/ML
25 INJECTION INTRAMUSCULAR; INTRAVENOUS ONCE
Status: COMPLETED | OUTPATIENT
Start: 2023-08-03 | End: 2023-08-03

## 2023-08-03 RX ORDER — CIPROFLOXACIN 500 MG/1
500 TABLET, FILM COATED ORAL EVERY 12 HOURS
Status: DISCONTINUED | OUTPATIENT
Start: 2023-08-03 | End: 2023-08-04

## 2023-08-03 RX ADMIN — GABAPENTIN 200 MG: 100 CAPSULE ORAL at 05:57

## 2023-08-03 RX ADMIN — OMEPRAZOLE 20 MG: 20 CAPSULE, DELAYED RELEASE ORAL at 05:57

## 2023-08-03 RX ADMIN — DIPHENHYDRAMINE HYDROCHLORIDE 25 MG: 50 INJECTION, SOLUTION INTRAMUSCULAR; INTRAVENOUS at 20:15

## 2023-08-03 RX ADMIN — OXYCODONE HYDROCHLORIDE 10 MG: 10 TABLET ORAL at 09:29

## 2023-08-03 RX ADMIN — METHOCARBAMOL 500 MG: 500 TABLET ORAL at 11:43

## 2023-08-03 RX ADMIN — METOPROLOL TARTRATE 50 MG: 50 TABLET, FILM COATED ORAL at 05:57

## 2023-08-03 RX ADMIN — PAROXETINE HYDROCHLORIDE 20 MG: 20 TABLET, FILM COATED ORAL at 05:57

## 2023-08-03 RX ADMIN — PRAVASTATIN SODIUM 80 MG: 20 TABLET ORAL at 17:09

## 2023-08-03 RX ADMIN — PREDNISONE 10 MG: 10 TABLET ORAL at 08:06

## 2023-08-03 RX ADMIN — LIDOCAINE PATCH 5% 2 PATCH: 700 PATCH TOPICAL at 05:59

## 2023-08-03 RX ADMIN — METOPROLOL TARTRATE 50 MG: 50 TABLET, FILM COATED ORAL at 17:09

## 2023-08-03 RX ADMIN — OXYCODONE HYDROCHLORIDE 10 MG: 10 TABLET ORAL at 21:07

## 2023-08-03 RX ADMIN — ENOXAPARIN SODIUM 30 MG: 100 INJECTION SUBCUTANEOUS at 17:09

## 2023-08-03 RX ADMIN — OXYCODONE HYDROCHLORIDE 5 MG: 5 TABLET ORAL at 05:56

## 2023-08-03 RX ADMIN — METHOCARBAMOL 500 MG: 500 TABLET ORAL at 23:05

## 2023-08-03 RX ADMIN — OXYCODONE HYDROCHLORIDE 10 MG: 10 TABLET ORAL at 18:39

## 2023-08-03 RX ADMIN — ACETAMINOPHEN 1000 MG: 500 TABLET, FILM COATED ORAL at 05:57

## 2023-08-03 RX ADMIN — ENOXAPARIN SODIUM 30 MG: 100 INJECTION SUBCUTANEOUS at 05:56

## 2023-08-03 RX ADMIN — CIPROFLOXACIN 500 MG: 500 TABLET, FILM COATED ORAL at 17:09

## 2023-08-03 RX ADMIN — METHOCARBAMOL 500 MG: 500 TABLET ORAL at 17:09

## 2023-08-03 RX ADMIN — ACETAMINOPHEN 1000 MG: 500 TABLET, FILM COATED ORAL at 22:42

## 2023-08-03 RX ADMIN — METHOCARBAMOL 500 MG: 500 TABLET ORAL at 05:57

## 2023-08-03 RX ADMIN — LISINOPRIL 20 MG: 20 TABLET ORAL at 05:57

## 2023-08-03 RX ADMIN — ACETAMINOPHEN 1000 MG: 500 TABLET, FILM COATED ORAL at 11:43

## 2023-08-03 RX ADMIN — ACETAMINOPHEN 1000 MG: 500 TABLET, FILM COATED ORAL at 00:15

## 2023-08-03 RX ADMIN — GABAPENTIN 200 MG: 100 CAPSULE ORAL at 21:07

## 2023-08-03 RX ADMIN — METHOCARBAMOL 500 MG: 500 TABLET ORAL at 00:15

## 2023-08-03 RX ADMIN — GABAPENTIN 200 MG: 100 CAPSULE ORAL at 13:37

## 2023-08-03 ASSESSMENT — COGNITIVE AND FUNCTIONAL STATUS - GENERAL
CLIMB 3 TO 5 STEPS WITH RAILING: A LITTLE
MOBILITY SCORE: 18
TURNING FROM BACK TO SIDE WHILE IN FLAT BAD: A LITTLE
MOVING TO AND FROM BED TO CHAIR: A LITTLE
STANDING UP FROM CHAIR USING ARMS: A LITTLE
SUGGESTED CMS G CODE MODIFIER MOBILITY: CK
MOVING FROM LYING ON BACK TO SITTING ON SIDE OF FLAT BED: A LITTLE
WALKING IN HOSPITAL ROOM: A LITTLE

## 2023-08-03 ASSESSMENT — ENCOUNTER SYMPTOMS
DIZZINESS: 0
SHORTNESS OF BREATH: 0
MYALGIAS: 1
COUGH: 0
NAUSEA: 0
FEVER: 0
TINGLING: 0
BLURRED VISION: 0
SENSORY CHANGE: 0
SPEECH CHANGE: 0
VOMITING: 0
WHEEZING: 0
TREMORS: 0
SPUTUM PRODUCTION: 0
ABDOMINAL PAIN: 0
PALPITATIONS: 0
CHILLS: 0
HEADACHES: 0
DOUBLE VISION: 0

## 2023-08-03 ASSESSMENT — PAIN DESCRIPTION - PAIN TYPE
TYPE: ACUTE PAIN

## 2023-08-03 ASSESSMENT — GAIT ASSESSMENTS
DISTANCE (FEET): 10
GAIT LEVEL OF ASSIST: STANDBY ASSIST
ASSISTIVE DEVICE: FRONT WHEEL WALKER

## 2023-08-03 NOTE — PROGRESS NOTES
Bedside report received, assessment completed    A&O x  4, pt calls appropriately  Mobility: Up with x1 assist and FWW  Fall Risk Assessment: High, bed alarm on, door notifications in use  Pain Assessment / Reassessment completed, medication provided per MAR  Diet: Regular  LDA:   IV Access: 20 L FA, CDI/ flushed/ SL      GI/: + void, + flatus, 8/2  BM  DVT Prophylaxis: Lovenox, SCD on  Skin: per flowsheets    Reviewed plan of care with patient, bed in lowest position and locked, pt resting comfortably now, call light within reach, all needs met at this time. Interventions will be executed per plan of care
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
## 2023-08-03 NOTE — THERAPY
Physical Therapy   Daily Treatment     Patient Name: Edgardo Sims  Age:  67 y.o., Sex:  male  Medical Record #: [id number]  Today's Date: 8/3/2023     Precautions  Precautions: Fall Risk;Non Weight Bearing Left Lower Extremity  Comments: LLE NWB- Open treatment of left trimalleolar ankle fracture dislocation and ORIF left ankle syndesmosis. NWB x 4weeks. L ribs fxs/lung contusion.    Assessment    Pt greeted and seen for PT treatment. Pt ambulates step hop w/ FWW SBA and w/ knee scooter CGA. Anticipate one more session to address stair goal. Pt currently limited by impaired balance which negatively impacts functional mobility. Pt will continue to benefit from skilled PT to address deficits.       Plan    Treatment Plan Status: Continue Current Treatment Plan  Type of Treatment: Gait Training, Neuro Re-Education / Balance, Stair Training, Therapeutic Activities, Therapeutic Exercise  Treatment Frequency: 5 Times per Week  Treatment Duration: Until Therapy Goals Met    DC Equipment Recommendations: None  Discharge Recommendations: Recommend home health for continued physical therapy services (if available, vs OPPT)       08/03/23 0950   Cognition    Comments Pt is very motivated, pleasant, and cooperative   Balance   Sitting Balance (Static) Good   Sitting Balance (Dynamic) Fair +   Standing Balance (Static) Fair   Standing Balance (Dynamic) Fair   Weight Shift Sitting Fair   Weight Shift Standing Absent  (NWB L LE)   Skilled Intervention Verbal Cuing   Comments w/ fww & scooter   Bed Mobility    Supine to Sit Supervised   Sit to Supine Supervised   Scooting Supervised   Rolling Supervised   Comments flat HOB   Gait Analysis   Gait Level Of Assist Standby Assist   Assistive Device Front Wheel Walker   Distance (Feet) 10   # of Times Distance was Traveled 1   Deviation   (step hop)   Skilled Intervention Verbal Cuing   Comments no LOB. pt also used knee scooter in gao for 300ft, SBA   Functional Mobility   Sit  to Stand Standby Assist   Bed, Chair, Wheelchair Transfer Supervised   Transfer Method Stand Step   Mobility bed mobility, hopping in room, knee scooter in hallway   Skilled Intervention Verbal Cuing   Comments knee scooter w/ SBA   Short Term Goals    Short Term Goal # 1 transfers with FWW SBA in 6 visits   Goal Outcome # 1 Goal met   Short Term Goal # 2 amb 100ft with FWW SBA in 6 visits   Goal Outcome # 2 Progressing as expected   Short Term Goal # 3 up/down 1 step with FWW SBA in 6 visits   Goal Outcome # 3 Goal not met

## 2023-08-04 ENCOUNTER — APPOINTMENT (OUTPATIENT)
Dept: RADIOLOGY | Facility: MEDICAL CENTER | Age: 68
DRG: 493 | End: 2023-08-04
Attending: SURGERY
Payer: MEDICARE

## 2023-08-04 LAB
ALBUMIN SERPL BCP-MCNC: 3.4 G/DL (ref 3.2–4.9)
ALBUMIN/GLOB SERPL: 1.1 G/DL
ALP SERPL-CCNC: 65 U/L (ref 30–99)
ALT SERPL-CCNC: 16 U/L (ref 2–50)
ANION GAP SERPL CALC-SCNC: 12 MMOL/L (ref 7–16)
AST SERPL-CCNC: 22 U/L (ref 12–45)
BASOPHILS # BLD AUTO: 0.6 % (ref 0–1.8)
BASOPHILS # BLD: 0.1 K/UL (ref 0–0.12)
BILIRUB SERPL-MCNC: 0.7 MG/DL (ref 0.1–1.5)
BUN SERPL-MCNC: 8 MG/DL (ref 8–22)
CALCIUM ALBUM COR SERPL-MCNC: 9.3 MG/DL (ref 8.5–10.5)
CALCIUM SERPL-MCNC: 8.8 MG/DL (ref 8.5–10.5)
CHLORIDE SERPL-SCNC: 101 MMOL/L (ref 96–112)
CO2 SERPL-SCNC: 24 MMOL/L (ref 20–33)
CREAT SERPL-MCNC: 0.83 MG/DL (ref 0.5–1.4)
EOSINOPHIL # BLD AUTO: 0.28 K/UL (ref 0–0.51)
EOSINOPHIL NFR BLD: 1.8 % (ref 0–6.9)
ERYTHROCYTE [DISTWIDTH] IN BLOOD BY AUTOMATED COUNT: 54.4 FL (ref 35.9–50)
GFR SERPLBLD CREATININE-BSD FMLA CKD-EPI: 96 ML/MIN/1.73 M 2
GLOBULIN SER CALC-MCNC: 3.1 G/DL (ref 1.9–3.5)
GLUCOSE SERPL-MCNC: 104 MG/DL (ref 65–99)
HCT VFR BLD AUTO: 37.9 % (ref 42–52)
HGB BLD-MCNC: 12.4 G/DL (ref 14–18)
IMM GRANULOCYTES # BLD AUTO: 0.3 K/UL (ref 0–0.11)
IMM GRANULOCYTES NFR BLD AUTO: 1.9 % (ref 0–0.9)
LYMPHOCYTES # BLD AUTO: 1.46 K/UL (ref 1–4.8)
LYMPHOCYTES NFR BLD: 9.4 % (ref 22–41)
MCH RBC QN AUTO: 30.2 PG (ref 27–33)
MCHC RBC AUTO-ENTMCNC: 32.7 G/DL (ref 32.3–36.5)
MCV RBC AUTO: 92.2 FL (ref 81.4–97.8)
MONOCYTES # BLD AUTO: 1.77 K/UL (ref 0–0.85)
MONOCYTES NFR BLD AUTO: 11.3 % (ref 0–13.4)
NEUTROPHILS # BLD AUTO: 11.7 K/UL (ref 1.82–7.42)
NEUTROPHILS NFR BLD: 75 % (ref 44–72)
NRBC # BLD AUTO: 0 K/UL
NRBC BLD-RTO: 0 /100 WBC (ref 0–0.2)
PLATELET # BLD AUTO: 217 K/UL (ref 164–446)
PMV BLD AUTO: 9.6 FL (ref 9–12.9)
POTASSIUM SERPL-SCNC: 4 MMOL/L (ref 3.6–5.5)
PROT SERPL-MCNC: 6.5 G/DL (ref 6–8.2)
RBC # BLD AUTO: 4.11 M/UL (ref 4.7–6.1)
SODIUM SERPL-SCNC: 137 MMOL/L (ref 135–145)
WBC # BLD AUTO: 15.6 K/UL (ref 4.8–10.8)

## 2023-08-04 PROCEDURE — 700102 HCHG RX REV CODE 250 W/ 637 OVERRIDE(OP): Performed by: NURSE PRACTITIONER

## 2023-08-04 PROCEDURE — A9270 NON-COVERED ITEM OR SERVICE: HCPCS | Performed by: SURGERY

## 2023-08-04 PROCEDURE — 770001 HCHG ROOM/CARE - MED/SURG/GYN PRIV*

## 2023-08-04 PROCEDURE — A9270 NON-COVERED ITEM OR SERVICE: HCPCS | Performed by: PHYSICIAN ASSISTANT

## 2023-08-04 PROCEDURE — 700102 HCHG RX REV CODE 250 W/ 637 OVERRIDE(OP): Performed by: SURGERY

## 2023-08-04 PROCEDURE — A9270 NON-COVERED ITEM OR SERVICE: HCPCS

## 2023-08-04 PROCEDURE — A9270 NON-COVERED ITEM OR SERVICE: HCPCS | Performed by: NURSE PRACTITIONER

## 2023-08-04 PROCEDURE — 99232 SBSQ HOSP IP/OBS MODERATE 35: CPT | Performed by: NURSE PRACTITIONER

## 2023-08-04 PROCEDURE — 700102 HCHG RX REV CODE 250 W/ 637 OVERRIDE(OP)

## 2023-08-04 PROCEDURE — 700111 HCHG RX REV CODE 636 W/ 250 OVERRIDE (IP): Performed by: PHYSICIAN ASSISTANT

## 2023-08-04 PROCEDURE — 80053 COMPREHEN METABOLIC PANEL: CPT

## 2023-08-04 PROCEDURE — 700101 HCHG RX REV CODE 250: Performed by: SURGERY

## 2023-08-04 PROCEDURE — 700102 HCHG RX REV CODE 250 W/ 637 OVERRIDE(OP): Performed by: PHYSICIAN ASSISTANT

## 2023-08-04 PROCEDURE — 85025 COMPLETE CBC W/AUTO DIFF WBC: CPT

## 2023-08-04 PROCEDURE — 71045 X-RAY EXAM CHEST 1 VIEW: CPT

## 2023-08-04 RX ORDER — NITROFURANTOIN 25; 75 MG/1; MG/1
100 CAPSULE ORAL 2 TIMES DAILY WITH MEALS
Status: DISCONTINUED | OUTPATIENT
Start: 2023-08-04 | End: 2023-08-05 | Stop reason: HOSPADM

## 2023-08-04 RX ADMIN — PAROXETINE HYDROCHLORIDE 20 MG: 20 TABLET, FILM COATED ORAL at 05:32

## 2023-08-04 RX ADMIN — LIDOCAINE PATCH 5% 2 PATCH: 700 PATCH TOPICAL at 05:34

## 2023-08-04 RX ADMIN — OXYCODONE HYDROCHLORIDE 10 MG: 10 TABLET ORAL at 08:42

## 2023-08-04 RX ADMIN — NITROFURANTOIN MONOHYDRATE/MACROCRYSTALLINE 100 MG: 25; 75 CAPSULE ORAL at 18:03

## 2023-08-04 RX ADMIN — LISINOPRIL 20 MG: 20 TABLET ORAL at 05:32

## 2023-08-04 RX ADMIN — OMEPRAZOLE 20 MG: 20 CAPSULE, DELAYED RELEASE ORAL at 05:32

## 2023-08-04 RX ADMIN — OXYCODONE HYDROCHLORIDE 10 MG: 10 TABLET ORAL at 18:42

## 2023-08-04 RX ADMIN — METOPROLOL TARTRATE 50 MG: 50 TABLET, FILM COATED ORAL at 16:30

## 2023-08-04 RX ADMIN — GABAPENTIN 200 MG: 100 CAPSULE ORAL at 05:32

## 2023-08-04 RX ADMIN — GABAPENTIN 200 MG: 100 CAPSULE ORAL at 21:40

## 2023-08-04 RX ADMIN — OXYCODONE HYDROCHLORIDE 10 MG: 10 TABLET ORAL at 05:37

## 2023-08-04 RX ADMIN — ENOXAPARIN SODIUM 30 MG: 100 INJECTION SUBCUTANEOUS at 05:32

## 2023-08-04 RX ADMIN — OXYCODONE HYDROCHLORIDE 10 MG: 10 TABLET ORAL at 11:46

## 2023-08-04 RX ADMIN — PREDNISONE 10 MG: 10 TABLET ORAL at 08:42

## 2023-08-04 RX ADMIN — METOPROLOL TARTRATE 50 MG: 50 TABLET, FILM COATED ORAL at 05:32

## 2023-08-04 RX ADMIN — PRAVASTATIN SODIUM 80 MG: 20 TABLET ORAL at 16:30

## 2023-08-04 RX ADMIN — METHOCARBAMOL 500 MG: 500 TABLET ORAL at 11:46

## 2023-08-04 RX ADMIN — NITROFURANTOIN MONOHYDRATE/MACROCRYSTALLINE 100 MG: 25; 75 CAPSULE ORAL at 12:32

## 2023-08-04 RX ADMIN — OXYCODONE HYDROCHLORIDE 10 MG: 10 TABLET ORAL at 14:57

## 2023-08-04 RX ADMIN — METHOCARBAMOL 500 MG: 500 TABLET ORAL at 16:30

## 2023-08-04 RX ADMIN — ENOXAPARIN SODIUM 30 MG: 100 INJECTION SUBCUTANEOUS at 16:30

## 2023-08-04 RX ADMIN — SENNOSIDES AND DOCUSATE SODIUM 1 TABLET: 50; 8.6 TABLET ORAL at 21:40

## 2023-08-04 RX ADMIN — METHOCARBAMOL 500 MG: 500 TABLET ORAL at 05:32

## 2023-08-04 RX ADMIN — GABAPENTIN 200 MG: 100 CAPSULE ORAL at 14:57

## 2023-08-04 RX ADMIN — OXYCODONE HYDROCHLORIDE 10 MG: 10 TABLET ORAL at 00:16

## 2023-08-04 RX ADMIN — OXYCODONE HYDROCHLORIDE 10 MG: 10 TABLET ORAL at 21:52

## 2023-08-04 ASSESSMENT — ENCOUNTER SYMPTOMS
NAUSEA: 0
SHORTNESS OF BREATH: 0
SPUTUM PRODUCTION: 0
BLURRED VISION: 0
MYALGIAS: 1
CHILLS: 0
WHEEZING: 0
DIZZINESS: 0
FEVER: 0
SENSORY CHANGE: 0
COUGH: 0
ABDOMINAL PAIN: 0
TINGLING: 0
SPEECH CHANGE: 0
PALPITATIONS: 0
TREMORS: 0
VOMITING: 0
DOUBLE VISION: 0
HEADACHES: 0

## 2023-08-04 ASSESSMENT — PAIN DESCRIPTION - PAIN TYPE
TYPE: ACUTE PAIN
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
## 2023-08-04 NOTE — CARE PLAN
The patient is Stable - Low risk of patient condition declining or worsening    Shift Goals  Clinical Goals: pain control  Patient Goals: comfort, discharge  Family Goals: support    Progress made toward(s) clinical / shift goals: Plan of care and medications discussed and reviewed over with pt. Pt reported of left flank/rib cage and left ankle pain. Medicated per MAR for pain. Pain currently tolerable with pain regimen. Monitoring pt's pain thru out shift.       Problem: Knowledge Deficit - Standard  Goal: Patient and family/care givers will demonstrate understanding of plan of care, disease process/condition, diagnostic tests and medications  Outcome: Progressing     Problem: Pain - Standard  Goal: Alleviation of pain or a reduction in pain to the patient’s comfort goal  Outcome: Progressing

## 2023-08-04 NOTE — CARE PLAN
The patient is Stable - Low risk of patient condition declining or worsening    Shift Goals  Clinical Goals: pain management, discharge  Patient Goals: comfort, discharge  Family Goals: not present    Progress made toward(s) clinical / shift goals:    Problem: Pain - Standard  Goal: Alleviation of pain or a reduction in pain to the patient’s comfort goal  Outcome: Progressing     Problem: Knowledge Deficit - Standard  Goal: Patient and family/care givers will demonstrate understanding of plan of care, disease process/condition, diagnostic tests and medications  Outcome: Progressing     Problem: Fall Risk  Goal: Patient will remain free from falls  Outcome: Progressing
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
## 2023-08-05 ENCOUNTER — PHARMACY VISIT (OUTPATIENT)
Dept: PHARMACY | Facility: MEDICAL CENTER | Age: 68
End: 2023-08-05
Payer: COMMERCIAL

## 2023-08-05 ENCOUNTER — APPOINTMENT (OUTPATIENT)
Dept: RADIOLOGY | Facility: MEDICAL CENTER | Age: 68
DRG: 493 | End: 2023-08-05
Attending: SURGERY
Payer: MEDICARE

## 2023-08-05 VITALS
HEIGHT: 70 IN | WEIGHT: 203 LBS | TEMPERATURE: 97.7 F | HEART RATE: 82 BPM | DIASTOLIC BLOOD PRESSURE: 77 MMHG | BODY MASS INDEX: 29.06 KG/M2 | SYSTOLIC BLOOD PRESSURE: 137 MMHG | RESPIRATION RATE: 16 BRPM | OXYGEN SATURATION: 99 %

## 2023-08-05 LAB
ALBUMIN SERPL BCP-MCNC: 3.5 G/DL (ref 3.2–4.9)
ALBUMIN/GLOB SERPL: 1.2 G/DL
ALP SERPL-CCNC: 73 U/L (ref 30–99)
ALT SERPL-CCNC: 16 U/L (ref 2–50)
ANION GAP SERPL CALC-SCNC: 11 MMOL/L (ref 7–16)
AST SERPL-CCNC: 25 U/L (ref 12–45)
BACTERIA UR CULT: NORMAL
BASOPHILS # BLD AUTO: 0.6 % (ref 0–1.8)
BASOPHILS # BLD: 0.08 K/UL (ref 0–0.12)
BILIRUB SERPL-MCNC: 0.8 MG/DL (ref 0.1–1.5)
BUN SERPL-MCNC: 7 MG/DL (ref 8–22)
CALCIUM ALBUM COR SERPL-MCNC: 9.1 MG/DL (ref 8.5–10.5)
CALCIUM SERPL-MCNC: 8.7 MG/DL (ref 8.5–10.5)
CHLORIDE SERPL-SCNC: 97 MMOL/L (ref 96–112)
CO2 SERPL-SCNC: 27 MMOL/L (ref 20–33)
CREAT SERPL-MCNC: 0.78 MG/DL (ref 0.5–1.4)
EOSINOPHIL # BLD AUTO: 0.16 K/UL (ref 0–0.51)
EOSINOPHIL NFR BLD: 1.3 % (ref 0–6.9)
ERYTHROCYTE [DISTWIDTH] IN BLOOD BY AUTOMATED COUNT: 54.1 FL (ref 35.9–50)
GFR SERPLBLD CREATININE-BSD FMLA CKD-EPI: 98 ML/MIN/1.73 M 2
GLOBULIN SER CALC-MCNC: 3 G/DL (ref 1.9–3.5)
GLUCOSE SERPL-MCNC: 101 MG/DL (ref 65–99)
HCT VFR BLD AUTO: 35.6 % (ref 42–52)
HGB BLD-MCNC: 11.7 G/DL (ref 14–18)
IMM GRANULOCYTES # BLD AUTO: 0.25 K/UL (ref 0–0.11)
IMM GRANULOCYTES NFR BLD AUTO: 2 % (ref 0–0.9)
LYMPHOCYTES # BLD AUTO: 1.09 K/UL (ref 1–4.8)
LYMPHOCYTES NFR BLD: 8.8 % (ref 22–41)
MCH RBC QN AUTO: 30.4 PG (ref 27–33)
MCHC RBC AUTO-ENTMCNC: 32.9 G/DL (ref 32.3–36.5)
MCV RBC AUTO: 92.5 FL (ref 81.4–97.8)
MONOCYTES # BLD AUTO: 1.82 K/UL (ref 0–0.85)
MONOCYTES NFR BLD AUTO: 14.8 % (ref 0–13.4)
NEUTROPHILS # BLD AUTO: 8.92 K/UL (ref 1.82–7.42)
NEUTROPHILS NFR BLD: 72.5 % (ref 44–72)
NRBC # BLD AUTO: 0 K/UL
NRBC BLD-RTO: 0 /100 WBC (ref 0–0.2)
PLATELET # BLD AUTO: 228 K/UL (ref 164–446)
PMV BLD AUTO: 9.6 FL (ref 9–12.9)
POTASSIUM SERPL-SCNC: 3.8 MMOL/L (ref 3.6–5.5)
PROT SERPL-MCNC: 6.5 G/DL (ref 6–8.2)
RBC # BLD AUTO: 3.85 M/UL (ref 4.7–6.1)
SIGNIFICANT IND 70042: NORMAL
SITE SITE: NORMAL
SODIUM SERPL-SCNC: 135 MMOL/L (ref 135–145)
SOURCE SOURCE: NORMAL
WBC # BLD AUTO: 12.3 K/UL (ref 4.8–10.8)

## 2023-08-05 PROCEDURE — 99239 HOSP IP/OBS DSCHRG MGMT >30: CPT | Performed by: NURSE PRACTITIONER

## 2023-08-05 PROCEDURE — RXMED WILLOW AMBULATORY MEDICATION CHARGE: Performed by: NURSE PRACTITIONER

## 2023-08-05 PROCEDURE — 700111 HCHG RX REV CODE 636 W/ 250 OVERRIDE (IP): Performed by: PHYSICIAN ASSISTANT

## 2023-08-05 PROCEDURE — 700102 HCHG RX REV CODE 250 W/ 637 OVERRIDE(OP): Performed by: PHYSICIAN ASSISTANT

## 2023-08-05 PROCEDURE — A9270 NON-COVERED ITEM OR SERVICE: HCPCS | Performed by: SURGERY

## 2023-08-05 PROCEDURE — A9270 NON-COVERED ITEM OR SERVICE: HCPCS | Performed by: NURSE PRACTITIONER

## 2023-08-05 PROCEDURE — 80053 COMPREHEN METABOLIC PANEL: CPT

## 2023-08-05 PROCEDURE — 700101 HCHG RX REV CODE 250: Performed by: SURGERY

## 2023-08-05 PROCEDURE — 85025 COMPLETE CBC W/AUTO DIFF WBC: CPT

## 2023-08-05 PROCEDURE — A9270 NON-COVERED ITEM OR SERVICE: HCPCS | Performed by: PHYSICIAN ASSISTANT

## 2023-08-05 PROCEDURE — 700102 HCHG RX REV CODE 250 W/ 637 OVERRIDE(OP): Performed by: NURSE PRACTITIONER

## 2023-08-05 PROCEDURE — 700102 HCHG RX REV CODE 250 W/ 637 OVERRIDE(OP): Performed by: SURGERY

## 2023-08-05 PROCEDURE — 700102 HCHG RX REV CODE 250 W/ 637 OVERRIDE(OP)

## 2023-08-05 PROCEDURE — 71045 X-RAY EXAM CHEST 1 VIEW: CPT

## 2023-08-05 PROCEDURE — A9270 NON-COVERED ITEM OR SERVICE: HCPCS

## 2023-08-05 RX ORDER — LIDOCAINE 50 MG/G
2 PATCH TOPICAL EVERY 24 HOURS
Qty: 10 PATCH | Refills: 0 | Status: SHIPPED | OUTPATIENT
Start: 2023-08-06 | End: 2023-08-11

## 2023-08-05 RX ORDER — NITROFURANTOIN 25; 75 MG/1; MG/1
100 CAPSULE ORAL 2 TIMES DAILY WITH MEALS
Qty: 12 CAPSULE | Refills: 0 | Status: ACTIVE | OUTPATIENT
Start: 2023-08-05 | End: 2023-08-11

## 2023-08-05 RX ORDER — GABAPENTIN 100 MG/1
200 CAPSULE ORAL EVERY 8 HOURS
Qty: 30 CAPSULE | Refills: 0 | Status: SHIPPED | OUTPATIENT
Start: 2023-08-05 | End: 2023-08-10

## 2023-08-05 RX ORDER — ASPIRIN 81 MG/1
81 TABLET ORAL 2 TIMES DAILY
Qty: 60 TABLET | Refills: 0 | Status: SHIPPED | OUTPATIENT
Start: 2023-08-05 | End: 2023-09-04

## 2023-08-05 RX ORDER — PSEUDOEPHEDRINE HCL 30 MG
100 TABLET ORAL 2 TIMES DAILY PRN
Qty: 10 CAPSULE | Refills: 0 | Status: SHIPPED | OUTPATIENT
Start: 2023-08-05 | End: 2023-08-10

## 2023-08-05 RX ADMIN — OXYCODONE HYDROCHLORIDE 10 MG: 10 TABLET ORAL at 11:08

## 2023-08-05 RX ADMIN — METHOCARBAMOL 500 MG: 500 TABLET ORAL at 05:11

## 2023-08-05 RX ADMIN — METHOCARBAMOL 500 MG: 500 TABLET ORAL at 11:07

## 2023-08-05 RX ADMIN — LISINOPRIL 20 MG: 20 TABLET ORAL at 05:11

## 2023-08-05 RX ADMIN — OXYCODONE HYDROCHLORIDE 10 MG: 10 TABLET ORAL at 07:57

## 2023-08-05 RX ADMIN — MAGNESIUM HYDROXIDE 30 ML: 1200 LIQUID ORAL at 05:10

## 2023-08-05 RX ADMIN — PAROXETINE HYDROCHLORIDE 20 MG: 20 TABLET, FILM COATED ORAL at 05:10

## 2023-08-05 RX ADMIN — PREDNISONE 10 MG: 10 TABLET ORAL at 07:57

## 2023-08-05 RX ADMIN — OXYCODONE HYDROCHLORIDE 10 MG: 10 TABLET ORAL at 02:50

## 2023-08-05 RX ADMIN — NITROFURANTOIN MONOHYDRATE/MACROCRYSTALLINE 100 MG: 25; 75 CAPSULE ORAL at 07:57

## 2023-08-05 RX ADMIN — LIDOCAINE PATCH 5% 2 PATCH: 700 PATCH TOPICAL at 05:10

## 2023-08-05 RX ADMIN — GABAPENTIN 200 MG: 100 CAPSULE ORAL at 05:10

## 2023-08-05 RX ADMIN — OMEPRAZOLE 20 MG: 20 CAPSULE, DELAYED RELEASE ORAL at 05:11

## 2023-08-05 RX ADMIN — METHOCARBAMOL 500 MG: 500 TABLET ORAL at 01:00

## 2023-08-05 RX ADMIN — METOPROLOL TARTRATE 50 MG: 50 TABLET, FILM COATED ORAL at 05:10

## 2023-08-05 RX ADMIN — ENOXAPARIN SODIUM 30 MG: 100 INJECTION SUBCUTANEOUS at 05:10
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
## 2023-08-05 NOTE — CARE PLAN
The patient is Stable - Low risk of patient condition declining or worsening    Shift Goals  Clinical Goals: Pain Control  Patient Goals: Comfort, Discharge  Family Goals: Support    Progress made toward(s) clinical / shift goals:    Problem: Pain - Standard  Goal: Alleviation of pain or a reduction in pain to the patient’s comfort goal  Outcome: Progressing  Note: Pt states pain is relieved with current pain medication regimen. Pt medicated per MAR with + results. Pt provided ice packs and pillows for comfort.      Problem: Knowledge Deficit - Standard  Goal: Patient and family/care givers will demonstrate understanding of plan of care, disease process/condition, diagnostic tests and medications  Outcome: Progressing  Note: Pt educated on POC, plan for monitoring WBC count with initiation of new ABX, pt verbalizes understanding, all questions answered.

## 2023-08-05 NOTE — CARE PLAN
The patient is Stable - Low risk of patient condition declining or worsening    Shift Goals  Clinical Goals: pain control  Patient Goals: comfort  Family Goals: lupillo    Progress made toward(s) clinical / shift goals:  yes    Patient is not progressing towards the following goals:      Problem: Pain - Standard  Goal: Alleviation of pain or a reduction in pain to the patient’s comfort goal  Outcome: Progressing     Problem: Knowledge Deficit - Standard  Goal: Patient and family/care givers will demonstrate understanding of plan of care, disease process/condition, diagnostic tests and medications  Outcome: Progressing     Problem: Skin Integrity  Goal: Skin integrity is maintained or improved  Outcome: Progressing     Problem: Fall Risk  Goal: Patient will remain free from falls  Outcome: Progressing

## 2023-08-05 NOTE — PROGRESS NOTES
Positive clinical response to antibiotic therapy for UTI.  IS 2250 cc. Room air. CXR stable.   Adequate pain control.  Disposition: discharge.

## 2023-08-05 NOTE — DISCHARGE INSTRUCTIONS
- Call or seek medical attention for questions or concerns  - Follow up with primary care provider within one weeks time  - Resume regular diet  - May take over the counter acetaminophen or ibuprofen as needed for pain  - Continue daily over the counter stool softener while on narcotics  - No operation of machinery or motorized vehicles while under the influence of narcotics  - No alcohol, marijuana or illicit drug use while under the influence of narcotics  - In the event of a narcotic overdose naloxone (Narcan) is available without a prescription from any [facility] or [facility]  - No swimming, hot tubs, baths or wound submersion until cleared by outpatient provider. May shower  - No contact sports, strenuous activities, or heavy lifting until cleared by outpatient provider.  - Non-weight bearing left lower extremity with fracture boot in place. Dressing to remain clean, dry and intact.   - Continue pharmacological DVT prophylaxis, Aspirin, until cleared to stop by orthopedic surgery.   - If respiratory decompensation, persistent or worsening pain, or signs or symptoms of infection occur seek medical attention

## 2023-08-07 ENCOUNTER — PATIENT OUTREACH (OUTPATIENT)
Dept: MEDICAL GROUP | Facility: CLINIC | Age: 68
End: 2023-08-07
Payer: MEDICARE
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
## 2023-08-14 ENCOUNTER — OFFICE VISIT (OUTPATIENT)
Dept: MEDICAL GROUP | Facility: LAB | Age: 68
End: 2023-08-14
Payer: MEDICARE

## 2023-08-14 ENCOUNTER — HOSPITAL ENCOUNTER (OUTPATIENT)
Dept: LAB | Facility: MEDICAL CENTER | Age: 68
End: 2023-08-14
Attending: FAMILY MEDICINE
Payer: MEDICARE

## 2023-08-14 VITALS
OXYGEN SATURATION: 97 % | HEIGHT: 70 IN | DIASTOLIC BLOOD PRESSURE: 66 MMHG | HEART RATE: 83 BPM | WEIGHT: 184 LBS | BODY MASS INDEX: 26.34 KG/M2 | SYSTOLIC BLOOD PRESSURE: 140 MMHG | TEMPERATURE: 98 F | RESPIRATION RATE: 16 BRPM

## 2023-08-14 DIAGNOSIS — S82.892D CLOSED FRACTURE OF LEFT ANKLE WITH ROUTINE HEALING, SUBSEQUENT ENCOUNTER: ICD-10-CM

## 2023-08-14 DIAGNOSIS — R79.89 ABNORMAL CBC: ICD-10-CM

## 2023-08-14 DIAGNOSIS — M05.9 RHEUMATOID ARTHRITIS WITH POSITIVE RHEUMATOID FACTOR, INVOLVING UNSPECIFIED SITE (HCC): ICD-10-CM

## 2023-08-14 DIAGNOSIS — Z79.52 CURRENT CHRONIC USE OF SYSTEMIC STEROIDS: ICD-10-CM

## 2023-08-14 LAB
BASOPHILS # BLD AUTO: 0.8 % (ref 0–1.8)
BASOPHILS # BLD: 0.12 K/UL (ref 0–0.12)
EOSINOPHIL # BLD AUTO: 0.42 K/UL (ref 0–0.51)
EOSINOPHIL NFR BLD: 2.7 % (ref 0–6.9)
ERYTHROCYTE [DISTWIDTH] IN BLOOD BY AUTOMATED COUNT: 56.1 FL (ref 35.9–50)
HCT VFR BLD AUTO: 42.2 % (ref 42–52)
HGB BLD-MCNC: 13.8 G/DL (ref 14–18)
IMM GRANULOCYTES # BLD AUTO: 0.45 K/UL (ref 0–0.11)
IMM GRANULOCYTES NFR BLD AUTO: 2.9 % (ref 0–0.9)
LYMPHOCYTES # BLD AUTO: 1.96 K/UL (ref 1–4.8)
LYMPHOCYTES NFR BLD: 12.8 % (ref 22–41)
MCH RBC QN AUTO: 30.8 PG (ref 27–33)
MCHC RBC AUTO-ENTMCNC: 32.7 G/DL (ref 32.3–36.5)
MCV RBC AUTO: 94.2 FL (ref 81.4–97.8)
MONOCYTES # BLD AUTO: 1.59 K/UL (ref 0–0.85)
MONOCYTES NFR BLD AUTO: 10.4 % (ref 0–13.4)
NEUTROPHILS # BLD AUTO: 10.76 K/UL (ref 1.82–7.42)
NEUTROPHILS NFR BLD: 70.4 % (ref 44–72)
NRBC # BLD AUTO: 0 K/UL
NRBC BLD-RTO: 0 /100 WBC (ref 0–0.2)
PLATELET # BLD AUTO: 446 K/UL (ref 164–446)
PMV BLD AUTO: 9.5 FL (ref 9–12.9)
RBC # BLD AUTO: 4.48 M/UL (ref 4.7–6.1)
WBC # BLD AUTO: 15.3 K/UL (ref 4.8–10.8)

## 2023-08-14 PROCEDURE — 85025 COMPLETE CBC W/AUTO DIFF WBC: CPT

## 2023-08-14 PROCEDURE — 36415 COLL VENOUS BLD VENIPUNCTURE: CPT

## 2023-08-14 PROCEDURE — 3077F SYST BP >= 140 MM HG: CPT | Performed by: FAMILY MEDICINE

## 2023-08-14 PROCEDURE — 99214 OFFICE O/P EST MOD 30 MIN: CPT | Performed by: FAMILY MEDICINE

## 2023-08-14 PROCEDURE — 3078F DIAST BP <80 MM HG: CPT | Performed by: FAMILY MEDICINE

## 2023-08-14 PROCEDURE — 1170F FXNL STATUS ASSESSED: CPT | Performed by: FAMILY MEDICINE

## 2023-08-14 RX ORDER — GABAPENTIN 300 MG/1
300 CAPSULE ORAL 3 TIMES DAILY
Qty: 90 CAPSULE | Refills: 0 | Status: SHIPPED | OUTPATIENT
Start: 2023-08-14 | End: 2023-09-13

## 2023-08-14 ASSESSMENT — FIBROSIS 4 INDEX: FIB4 SCORE: 1.81

## 2023-08-18 DIAGNOSIS — E29.1 HYPOGONADISM MALE: ICD-10-CM

## 2023-08-21 DIAGNOSIS — M05.9 RHEUMATOID ARTHRITIS WITH POSITIVE RHEUMATOID FACTOR, INVOLVING UNSPECIFIED SITE (HCC): ICD-10-CM

## 2023-08-21 NOTE — TELEPHONE ENCOUNTER
Received request via: Pharmacy    Was the patient seen in the last year in this department? Yes  LOV: 8/14/2023  Does the patient have an active prescription (recently filled or refills available) for medication(s) requested? No    Does the patient have retirement Plus and need 100 day supply (blood pressure, diabetes and cholesterol meds only)? Patient does not have SCP

## 2023-08-22 RX ORDER — OXYCODONE AND ACETAMINOPHEN 10; 325 MG/1; MG/1
1-2 TABLET ORAL EVERY 4 HOURS PRN
Qty: 150 TABLET | Refills: 0 | Status: SHIPPED | OUTPATIENT
Start: 2023-08-22 | End: 2023-09-21

## 2023-08-22 RX ORDER — TESTOSTERONE CYPIONATE 200 MG/ML
INJECTION, SOLUTION INTRAMUSCULAR
Qty: 6 ML | Refills: 0 | Status: SHIPPED | OUTPATIENT
Start: 2023-08-22 | End: 2023-11-21

## 2023-09-01 ENCOUNTER — PHYSICAL THERAPY (OUTPATIENT)
Dept: PHYSICAL THERAPY | Facility: REHABILITATION | Age: 68
End: 2023-09-01
Payer: MEDICARE

## 2023-09-01 DIAGNOSIS — S82.892D CLOSED FRACTURE OF LEFT ANKLE, WITH ROUTINE HEALING, SUBSEQUENT ENCOUNTER: ICD-10-CM

## 2023-09-01 PROCEDURE — 97161 PT EVAL LOW COMPLEX 20 MIN: CPT

## 2023-09-01 PROCEDURE — 97014 ELECTRIC STIMULATION THERAPY: CPT

## 2023-09-01 ASSESSMENT — ENCOUNTER SYMPTOMS
PAIN SCALE AT LOWEST: 3
PAIN SCALE: 3
PAIN SCALE AT HIGHEST: 7
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
## 2023-09-07 ENCOUNTER — PHYSICAL THERAPY (OUTPATIENT)
Dept: PHYSICAL THERAPY | Facility: REHABILITATION | Age: 68
End: 2023-09-07
Payer: MEDICARE

## 2023-09-07 DIAGNOSIS — S82.892D CLOSED FRACTURE OF LEFT ANKLE, WITH ROUTINE HEALING, SUBSEQUENT ENCOUNTER: ICD-10-CM

## 2023-09-07 PROCEDURE — 97014 ELECTRIC STIMULATION THERAPY: CPT

## 2023-09-07 PROCEDURE — 97140 MANUAL THERAPY 1/> REGIONS: CPT

## 2023-09-07 PROCEDURE — 97110 THERAPEUTIC EXERCISES: CPT

## 2023-09-07 NOTE — OP THERAPY DAILY TREATMENT
Outpatient Physical Therapy  DAILY TREATMENT     [facility] Physical Therapy [address].  [address]  [place] [address]  Phone:  [phone]  Fax:  [phone]    Date: 09/07/2023    Patient: Kwasi Sims  YOB: 1955  MRN: [id number]     Time Calculation    Start time: 0845  Stop time: 0945 Time Calculation (min): 60 minutes         Chief Complaint: No chief complaint on file.    Visit #: 2    SUBJECTIVE:  Redness did not increase--patient has decreaesd amount of time     OBJECTIVE:            Therapeutic Treatments and Modalities:     Therapeutic Treatment and Modalities Summary: Heel slides--reviewed  and instructed not to hool too ling at end range  Wind shield wiper--eviewed  #4 resisted gas pedal   IASTM: ant  tib. Ankle joint line , ant tib and FL  A/p tc mobs gd 2-3    Standing wall celena to wall DF active stretch with active EHL--hep  Progress wt bearing at home 5' at a time as tolerated with spc or  place with laeral wt. Shift as long and often as tolerated(cautioned strongly to progress slowly and not while performing other tasks)  Russian 5/5 ant tib/gastroc with contrast x 15  Isntructed in desensitization at home  // standing wall knee to wall :2 cm knee  from wall with tow on wall (CKC rom 10 deg)    Time-based treatments/modalities:    Physical Therapy Timed Treatment Charges  Manual therapy minutes (CPT 52921): 20 minutes  Therapeutic exercise minutes (CPT 61701): 20 minutes      Pain rating (1-10) before treatment:  3  Pain rating (1-10) after treatment:  0    ASSESSMENT:   Not open wounds with patient keeping incision covered and reporting 99% closure( improved skin integrity in area of blanchable erythema w/o progression of possible pressure ulcer) pt. Stopped using PRAFO after last visit.  Instructed to increase wt bearing as tolerated with mostly static standing.  Instructed to only progress walking with shoes and spc inside home only and to aoid doing  any other combined tasks    PLAN/RECOMMENDATIONS:   Progress ckc balance and wt. Shift, progress walking and functional squat with counter/TRX

## 2023-09-08 DIAGNOSIS — S82.892F TYPE III OPEN FRACTURE OF LEFT ANKLE WITH ROUTINE HEALING, SUBSEQUENT ENCOUNTER: ICD-10-CM

## 2023-09-12 ENCOUNTER — PHYSICAL THERAPY (OUTPATIENT)
Dept: PHYSICAL THERAPY | Facility: REHABILITATION | Age: 68
End: 2023-09-12
Payer: MEDICARE

## 2023-09-12 DIAGNOSIS — S82.892D CLOSED FRACTURE OF LEFT ANKLE, WITH ROUTINE HEALING, SUBSEQUENT ENCOUNTER: ICD-10-CM

## 2023-09-12 PROCEDURE — 97140 MANUAL THERAPY 1/> REGIONS: CPT

## 2023-09-12 PROCEDURE — 97110 THERAPEUTIC EXERCISES: CPT

## 2023-09-12 NOTE — OP THERAPY DAILY TREATMENT
Outpatient Physical Therapy  DAILY TREATMENT     [facility] Physical Therapy [address]  [address]  [place] [address]  Phone:  [phone]  Fax:  [phone]    Date: 09/12/2023    Patient: Kwasi Sims  YOB: 1955  MRN: [id number]     Time Calculation    Start time: 0115  Stop time: 0155 Time Calculation (min): 40 minutes         Chief Complaint: No chief complaint on file.    Visit #: 3    SUBJECTIVE:  Doing better with patient reporting that he is  w/o boot over 30 mintutes at a time w/o pain and still    OBJECTIVE:            Therapeutic Treatments and Modalities:     Therapeutic Treatment and Modalities Summary:   #4 resisted gas pedal   Cupping ant tib and FL  A/p--P/A and reverse drawer tc mobs gd 3-4:   Sls df and PF wedge with heel raises--instructed NO  heel lowering off of a stairs  Standing wall celena to wall DF active stretch with active EHL--hep      Isntructed in desensitization at home  // standing wall knee to wall :knee  to wall with tow on wall (CKC rom 15 deg)    Time-based treatments/modalities:    Physical Therapy Timed Treatment Charges  Manual therapy minutes (CPT 91618): 18 minutes  Therapeutic exercise minutes (CPT 67222): 20 minutes      Pain rating (1-10) before treatment:  3  Pain rating (1-10) after treatment:  0    ASSESSMENT:   Inreased functional arom  and reported decreed pain after treatment to TC noted gastroc soleus weakness-    PLAN/RECOMMENDATIONS:   Progress ckc balance and wt. Shift, progress walking and functional squat with counter/TRX

## 2023-09-21 ENCOUNTER — PHYSICAL THERAPY (OUTPATIENT)
Dept: PHYSICAL THERAPY | Facility: REHABILITATION | Age: 68
End: 2023-09-21
Payer: MEDICARE

## 2023-09-21 DIAGNOSIS — S82.892D CLOSED FRACTURE OF LEFT ANKLE, WITH ROUTINE HEALING, SUBSEQUENT ENCOUNTER: ICD-10-CM

## 2023-09-21 PROCEDURE — 97140 MANUAL THERAPY 1/> REGIONS: CPT

## 2023-09-21 PROCEDURE — 97110 THERAPEUTIC EXERCISES: CPT

## 2023-09-21 PROCEDURE — 97014 ELECTRIC STIMULATION THERAPY: CPT

## 2023-09-21 NOTE — OP THERAPY DAILY TREATMENT
"  Outpatient Physical Therapy  DAILY TREATMENT     [facility] Physical Therapy [address]  [address]  [place] [address]  Phone:  [phone]  Fax:  [phone]    Date: 09/21/2023    Patient: Kwasi Sims  YOB: 1955  MRN: [id number]     Time Calculation    Start time: 0930  Stop time: 1025 Time Calculation (min): 55 minutes         Chief Complaint: No chief complaint on file.    Visit #: 4    SUBJECTIVE:    Patient repoprts he Saw md 1 week ago and was relaeaed to WBAT --patient reports that he is feeling great until today and did a lot of standing /walking yestereday and woke up sore today    OBJECTIVE:  Ttp distal FDL medial malleolus          Therapeutic Treatments and Modalities:     Therapeutic Treatment and Modalities Summary: SCS FDL// almost no pain with walking  Stm ant tib, FDL, FL  Cupping ant tib, FL and FDL--walking and squats  Sls df and PF wedge --struggle with sls balance --hep  Standing wall celena to wall DF active stretch with active EHL--hep  Supine balloon inversion with band eversion 10/10 mhp x 15'        Time-based treatments/modalities:    Physical Therapy Timed Treatment Charges  Manual therapy minutes (CPT 62402): 20 minutes  Therapeutic exercise minutes (CPT 69297): 20 minutes      Pain rating (1-10) before treatment:  4/10 medial malleiolus with walk--2 at rest  Pain rating (1-10) after treatment:  0\" no pain    ASSESSMENT:   Noted somatic pain from increased walking activity yesterday w/ FDL,AT that abolished with manual treatment today--noted weakness and limtied control with ankle strategies--discussed scaling back on time with standing walking activity and not pushing it too much too fast    PLAN/RECOMMENDATIONS:   Progress ckc balance and wt. Shift, progress walking and functional squat with counter/TRX           "

## 2023-09-28 ENCOUNTER — PHYSICAL THERAPY (OUTPATIENT)
Dept: PHYSICAL THERAPY | Facility: REHABILITATION | Age: 68
End: 2023-09-28
Payer: MEDICARE

## 2023-09-28 DIAGNOSIS — S82.892D CLOSED FRACTURE OF LEFT ANKLE, WITH ROUTINE HEALING, SUBSEQUENT ENCOUNTER: ICD-10-CM

## 2023-09-28 PROCEDURE — 97014 ELECTRIC STIMULATION THERAPY: CPT

## 2023-09-28 NOTE — OP THERAPY DAILY TREATMENT
"  Outpatient Physical Therapy  DAILY TREATMENT     [facility] Physical Therapy [address]  [address]  [place] [address]  Phone:  [phone]  Fax:  [phone]    Date: 09/28/2023    Patient: Kwasi Sims  YOB: 1955  MRN: [id number]     Time Calculation    Start time: 0415  Stop time: 0505 Time Calculation (min): 50 minutes         Chief Complaint: No chief complaint on file.    Visit #: 5    SUBJECTIVE:  Doing well with walking at home without a/d but uses spc most of the time    OBJECTIVE:  Ttp distal FDL medial malleolus          Therapeutic Treatments and Modalities:     Therapeutic Treatment and Modalities Summary: Bilateral balance blck bosu x 3'  Blue bosu x 2'  A/p mobs prox. Fib head gd 2-3  Stm ant tib, FDL, FL  Cupping ant tib, FL and FDL--walking and squats// \" no more pain..weird\"  Sls df and PF wedge --struggle with sls balance --hep--reviewed    Supine balloon inversion with band eversion [place] to fl/ant tib, fib stephon. 10/10 mhp x 15'      HEP focus:  Sls as much as possible --hep  Inversion /eversion band/balloon ex. As frequent as possible to fatigue--hep  Heat to lower leg as much as possible  Increase standing/walking tolerance      Time-based treatments/modalities:    Physical Therapy Timed Treatment Charges  Manual therapy minutes (CPT 12518): 15 minutes  Therapeutic exercise minutes (CPT 94932): 15 minutes      Pain rating (1-10) before treatment:  2/10 ant,lat l malleiolus with walk--0 at rest  Pain rating (1-10) after treatment:  0\" no pain    ASSESSMENT:   Walking more with minimal pain and no cane in home but spc outside.  Abolished ant. Ankle pain with stm/cupping to ant tibialis, FL/FB mm    PLAN/RECOMMENDATIONS:   Progress ckc balance and wt. Shift, progress walking and functional squat with counter/TRX           "

## 2023-10-06 ENCOUNTER — OFFICE VISIT (OUTPATIENT)
Dept: MEDICAL GROUP | Facility: LAB | Age: 68
End: 2023-10-06
Payer: MEDICARE

## 2023-10-06 VITALS
TEMPERATURE: 98.2 F | RESPIRATION RATE: 12 BRPM | SYSTOLIC BLOOD PRESSURE: 122 MMHG | DIASTOLIC BLOOD PRESSURE: 56 MMHG | OXYGEN SATURATION: 98 % | HEART RATE: 78 BPM | BODY MASS INDEX: 28.2 KG/M2 | WEIGHT: 197 LBS | HEIGHT: 70 IN

## 2023-10-06 DIAGNOSIS — Z23 NEED FOR VACCINATION: ICD-10-CM

## 2023-10-06 DIAGNOSIS — R73.02 IGT (IMPAIRED GLUCOSE TOLERANCE): ICD-10-CM

## 2023-10-06 DIAGNOSIS — S82.892G CLOSED FRACTURE OF LEFT ANKLE WITH DELAYED HEALING, SUBSEQUENT ENCOUNTER: ICD-10-CM

## 2023-10-06 DIAGNOSIS — M05.9 RHEUMATOID ARTHRITIS WITH POSITIVE RHEUMATOID FACTOR, INVOLVING UNSPECIFIED SITE (HCC): ICD-10-CM

## 2023-10-06 DIAGNOSIS — R79.89 ABNORMAL CBC: ICD-10-CM

## 2023-10-06 PROCEDURE — 1170F FXNL STATUS ASSESSED: CPT | Performed by: INTERNAL MEDICINE

## 2023-10-06 PROCEDURE — 90662 IIV NO PRSV INCREASED AG IM: CPT | Performed by: INTERNAL MEDICINE

## 2023-10-06 PROCEDURE — G0008 ADMIN INFLUENZA VIRUS VAC: HCPCS | Performed by: INTERNAL MEDICINE

## 2023-10-06 PROCEDURE — 99214 OFFICE O/P EST MOD 30 MIN: CPT | Mod: 25 | Performed by: INTERNAL MEDICINE

## 2023-10-06 PROCEDURE — 3074F SYST BP LT 130 MM HG: CPT | Performed by: INTERNAL MEDICINE

## 2023-10-06 PROCEDURE — 3078F DIAST BP <80 MM HG: CPT | Performed by: INTERNAL MEDICINE

## 2023-10-06 RX ORDER — OXYCODONE AND ACETAMINOPHEN 10; 325 MG/1; MG/1
1-2 TABLET ORAL EVERY 4 HOURS PRN
Qty: 150 TABLET | Refills: 0 | Status: SHIPPED | OUTPATIENT
Start: 2023-11-20 | End: 2024-01-11 | Stop reason: SDUPTHER

## 2023-10-06 RX ORDER — OXYCODONE AND ACETAMINOPHEN 10; 325 MG/1; MG/1
1-2 TABLET ORAL EVERY 4 HOURS PRN
Qty: 150 TABLET | Refills: 0 | Status: SHIPPED | OUTPATIENT
Start: 2023-12-20 | End: 2024-01-11 | Stop reason: SDUPTHER

## 2023-10-06 RX ORDER — CYCLOBENZAPRINE HCL 10 MG
10 TABLET ORAL 3 TIMES DAILY PRN
Qty: 30 TABLET | Refills: 2 | Status: SHIPPED | OUTPATIENT
Start: 2023-10-06 | End: 2023-11-21 | Stop reason: SDUPTHER

## 2023-10-06 RX ORDER — OXYCODONE AND ACETAMINOPHEN 10; 325 MG/1; MG/1
1-2 TABLET ORAL EVERY 4 HOURS PRN
Qty: 150 TABLET | Refills: 0 | Status: SHIPPED | OUTPATIENT
Start: 2023-10-21 | End: 2024-01-11 | Stop reason: SDUPTHER

## 2023-10-06 ASSESSMENT — FIBROSIS 4 INDEX: FIB4 SCORE: 0.92
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
## 2023-10-09 ENCOUNTER — TELEPHONE (OUTPATIENT)
Dept: RHEUMATOLOGY | Facility: MEDICAL CENTER | Age: 68
End: 2023-10-09
Payer: MEDICARE

## 2023-10-09 ENCOUNTER — PATIENT MESSAGE (OUTPATIENT)
Dept: RHEUMATOLOGY | Facility: MEDICAL CENTER | Age: 68
End: 2023-10-09
Payer: MEDICARE

## 2023-10-09 DIAGNOSIS — L40.50 PSORIATIC ARTHRITIS (HCC): ICD-10-CM

## 2023-10-09 DIAGNOSIS — I10 ESSENTIAL HYPERTENSION: ICD-10-CM

## 2023-10-09 DIAGNOSIS — Z51.81 MEDICATION MONITORING ENCOUNTER: ICD-10-CM

## 2023-10-09 DIAGNOSIS — I25.10 CORONARY ARTERY DISEASE DUE TO CALCIFIED CORONARY LESION: ICD-10-CM

## 2023-10-09 DIAGNOSIS — I25.84 CORONARY ARTERY DISEASE DUE TO CALCIFIED CORONARY LESION: ICD-10-CM

## 2023-10-09 RX ORDER — PREDNISONE 5 MG/1
TABLET ORAL
Qty: 42 TABLET | Refills: 0 | Status: SHIPPED | OUTPATIENT
Start: 2023-10-09 | End: 2023-10-30

## 2023-10-09 NOTE — TELEPHONE ENCOUNTER
I have sent Taltz prescription to [facility] pharmacy, have also sent in prescription for prednisone but we will start tapering prednisone, patient will take 1-1/2 tablets a day for 2 weeks, then 1 tablet a day for 2 weeks and then a half a tablet a day for 2 weeks and then stop.

## 2023-10-09 NOTE — TELEPHONE ENCOUNTER
Prior Authorization for Taltz 80MG/ML auto-injectors has been approved for a quantity of 2ml , day supply 28    Prior Authorization reference number: [id number]    Insurance: [employer]  Effective dates: 10-9-2023 till further notice  Copay: $unknown at this time      Is patient eligible to fill with [facility]? Yes    Next Steps: The patient's copay exceeds $5.00. Proceed with contacting patient to offer financial assistance. Patient has been on PAP programs from other medications, Raghav will call me back to schedule time to meet in person with Osmel to sign application for [employer] - Taltz PAP

## 2023-10-09 NOTE — TELEPHONE ENCOUNTER
Received request via: Patient    Was the patient seen in the last year in this department? Yes    Does the patient have an active prescription (recently filled or refills available) for medication(s) requested? No    Does the patient have [facility] and need 100 day supply (blood pressure, diabetes and cholesterol meds only)? Medication is not for cholesterol, blood pressure or diabetes    PT IS HAVING A HAD TIME GETTING THE TALTZ, PLEASE SEND NEW SCRIPT TO [facility] AND HOPEFULLY FABIO CAN GET THIS FIGURED OUT. HE HAS YET TO START THE TALTZ AS HE BROKE HIS ANKLE 2 MONTHS AGO.

## 2023-10-09 NOTE — TELEPHONE ENCOUNTER
Prior Authorization for altz 80MG/ML auto-injectors (Quantity: 2ml, Days: 2) has been submitted via Cover My Meds: Key ([id number])    Insurance: [employer]    Will follow up in 24-48 business hours.

## 2023-10-09 NOTE — TELEPHONE ENCOUNTER
Patient Phone Number: [phone]  Medication: TALTZ 80MG/ML (Quantity 2 ml, Day Supply 28>> loading dose thereafter 1 ml per 28 days)  Copay: $2,817  39  Household size:2  Annual Household Adjusted Gross Income: $48-$50k  Additional information/consent to FA: Called and spoke with Kwasi at [phone]. Notified patient of their copay. Patient gave verbal consent to obtain FA through [employer] Patient assistance. Patient has Government sponsored insurance.  During our call [facility] [place] ([facility] called Kwasi to inform him : Taltz was approved he requested they verify copay , per Kwasi the agent on the phone could not find the Taltz in the formulary to verify [place]. Kwasi and I spoke discussing the likely copay is over $100 prescription is Tier 5 specialty medication per PA approval fax. Kwasi has been on other Pap programs and is familiar with the process. He consented to me filling out the application, Kwasi agreed to met in clinic liaison Osmel on 10/10 @ 1:30pm he will bring his 2022 tax return and his medical/prescription benefit cards as well. Kwasi thank me for my time and effort.

## 2023-10-13 ENCOUNTER — PHYSICAL THERAPY (OUTPATIENT)
Dept: PHYSICAL THERAPY | Facility: REHABILITATION | Age: 68
End: 2023-10-13
Payer: MEDICARE

## 2023-10-13 DIAGNOSIS — S82.892D CLOSED FRACTURE OF LEFT ANKLE, WITH ROUTINE HEALING, SUBSEQUENT ENCOUNTER: ICD-10-CM

## 2023-10-13 PROCEDURE — 97110 THERAPEUTIC EXERCISES: CPT

## 2023-10-13 PROCEDURE — 97140 MANUAL THERAPY 1/> REGIONS: CPT

## 2023-10-13 PROCEDURE — 97014 ELECTRIC STIMULATION THERAPY: CPT

## 2023-10-13 NOTE — OP THERAPY DAILY TREATMENT
"  Outpatient Physical Therapy  DAILY TREATMENT     [facility] Physical Therapy [address]  [address]  [place] [address]  Phone:  [phone]  Fax:  [phone]    Date: 10/13/2023    Patient: Kwasi Sims  YOB: 1955  MRN: [id number]     Time Calculation    Start time: 0245  Stop time: 0405 Time Calculation (min): 80 minutes         Chief Complaint: No chief complaint on file.    Visit #: 6    SUBJECTIVE:  Patient reports that he is primarily walking w/o an a/d.  Reports occasional, medial malleiolar pain --not always with walking    OBJECTIVE:  Ttp distal FDL medial malleolus          Therapeutic Treatments and Modalities:     Therapeutic Treatment and Modalities Summary: Bilateral stance and tandem 3\" foam  Blue bosu x 4'  Glayds disc  Iastm FHL/FDL        Supine band fpol/fdl 10?0 mhp fld/gastroc 10/10 mhp x 15'      HEP focus:  Sls as much as possible --hep  Inversion /eversion band/balloon ex. As frequent as possible to fatigue--hep  Heat to lower leg as much as possible  Increase standing/walking tolerance      Time-based treatments/modalities:    Physical Therapy Timed Treatment Charges  Manual therapy minutes (CPT 58763): 15 minutes  Therapeutic exercise minutes (CPT 13974): 30 minutes      Pain rating (1-10) before treatment:  2/10 ant,lat l malleiolus with walk--0 at rest  Pain rating (1-10) after treatment:  0\" no pain\"    ASSESSMENT:   No more use of cane with noted ttp FHL.FDL--abolished after treatment    PLAN/RECOMMENDATIONS:   Progress balance on dynba disc or pillow at home           "

## 2023-10-20 ENCOUNTER — PHYSICAL THERAPY (OUTPATIENT)
Dept: PHYSICAL THERAPY | Facility: REHABILITATION | Age: 68
End: 2023-10-20
Payer: MEDICARE

## 2023-10-20 DIAGNOSIS — S82.892D CLOSED FRACTURE OF LEFT ANKLE, WITH ROUTINE HEALING, SUBSEQUENT ENCOUNTER: ICD-10-CM

## 2023-10-20 PROCEDURE — 97014 ELECTRIC STIMULATION THERAPY: CPT

## 2023-10-20 PROCEDURE — 97110 THERAPEUTIC EXERCISES: CPT

## 2023-10-20 NOTE — OP THERAPY DAILY TREATMENT
Outpatient Physical Therapy  DAILY TREATMENT     [facility] Physical Therapy [address]  [address]  [place] [address]  Phone:  [phone]  Fax:  [phone]    Date: 10/20/2023    Patient: Kwasi Sims  YOB: 1955  MRN: [id number]     Time Calculation    Start time: 0200  Stop time: 0249 Time Calculation (min): 49 minutes         Chief Complaint: No chief complaint on file.    Visit #: 7    SUBJECTIVE:  Feeling good no limits--walking w/o a/d    OBJECTIVE:    Dorsiflexion  Knee to wall R: 8cm  Ckc df 20deg  PF: 55          Therapeutic Treatments and Modalities:     Therapeutic Treatment and Modalities Summary: Calf stretch with active EHL/ant. Tib--hep  Sls on dynadisc--hep  Heel lowering to fatigue--hep  Heel walk--hep  Toe walk--hep    Time-based treatments/modalities:    Physical Therapy Timed Treatment Charges  Therapeutic exercise minutes (CPT 55574): 30 minutes      Pain rating (1-10) before treatment:  0  Pain rating (1-10) after treatment:  0    ASSESSMENT:   Progressing well with noted signficant weakness L gastroc-soleus and limited sls balance    PLAN/RECOMMENDATIONS:   Progress hep and strength progression for gastroc complex--d/  1-2 visits

## 2023-10-26 ENCOUNTER — PHYSICAL THERAPY (OUTPATIENT)
Dept: PHYSICAL THERAPY | Facility: REHABILITATION | Age: 68
End: 2023-10-26
Payer: MEDICARE

## 2023-10-26 DIAGNOSIS — S82.892D CLOSED FRACTURE OF LEFT ANKLE, WITH ROUTINE HEALING, SUBSEQUENT ENCOUNTER: ICD-10-CM

## 2023-10-26 PROCEDURE — 97110 THERAPEUTIC EXERCISES: CPT

## 2023-10-26 PROCEDURE — 97014 ELECTRIC STIMULATION THERAPY: CPT

## 2023-10-26 PROCEDURE — 97140 MANUAL THERAPY 1/> REGIONS: CPT

## 2023-10-26 NOTE — OP THERAPY DAILY TREATMENT
Outpatient Physical Therapy  DAILY TREATMENT     [facility] Physical Therapy [address]  [address]  [place] [address]  Phone:  [phone]  Fax:  [phone]    Date: 10/26/2023    Patient: Kwasi Sims  YOB: 1955  MRN: [id number]     Time Calculation    Start time: 0205  Stop time: 0258 Time Calculation (min): 53 minutes         Chief Complaint: No chief complaint on file.    Visit #: 8    SUBJECTIVE:   About three days patient twisted quickly on L foot with mild pain with walking    OBJECTIVE:  Ttp post medial soleus  Dorsiflexion  Knee to wall R: 8cm  Ckc df 25 deg  PF: 55          Therapeutic Treatments and Modalities:     Therapeutic Treatment and Modalities Summary: Calf stretch with active EHL/ant. Tib--hep  Sls clocks--hep  Tasrsal mobs with arom gd3-4  Door handle squats with focus on ant. Tib translation--hep  Cupping to gastroc/soleus/post tib with arom  Russian 5/5 mhp x 15'  To post tib. Lateral gastroc    Time-based treatments/modalities:    Physical Therapy Timed Treatment Charges  Manual therapy minutes (CPT 19901): 20 minutes  Therapeutic exercise minutes (CPT 40818): 20 minutes  Pain rating (1-10) before treatment:  3-4  Pain rating (1-10) after treatment:  1    ASSESSMENT:   Despite recent tweak of ankle while going after jaiden patient presents with improved function--cont. To present with limit balance , DF and strength but imp[roving well    PLAN/RECOMMENDATIONS:   Progress hep and strength progression for gastroc complex--d/c  1-visits

## 2023-10-27 ENCOUNTER — APPOINTMENT (OUTPATIENT)
Dept: PHYSICAL THERAPY | Facility: REHABILITATION | Age: 68
End: 2023-10-27
Payer: MEDICARE

## 2023-11-02 ENCOUNTER — APPOINTMENT (OUTPATIENT)
Dept: PHYSICAL THERAPY | Facility: REHABILITATION | Age: 68
End: 2023-11-02
Payer: MEDICARE

## 2023-11-03 ENCOUNTER — APPOINTMENT (OUTPATIENT)
Dept: PHYSICAL THERAPY | Facility: REHABILITATION | Age: 68
End: 2023-11-03
Payer: MEDICARE

## 2023-11-07 ENCOUNTER — APPOINTMENT (OUTPATIENT)
Dept: PHYSICAL THERAPY | Facility: REHABILITATION | Age: 68
End: 2023-11-07
Payer: MEDICARE

## 2023-11-16 ENCOUNTER — PHYSICAL THERAPY (OUTPATIENT)
Dept: PHYSICAL THERAPY | Facility: REHABILITATION | Age: 68
End: 2023-11-16
Payer: MEDICARE

## 2023-11-16 DIAGNOSIS — S82.892D CLOSED FRACTURE OF LEFT ANKLE, WITH ROUTINE HEALING, SUBSEQUENT ENCOUNTER: ICD-10-CM

## 2023-11-16 PROCEDURE — 97110 THERAPEUTIC EXERCISES: CPT

## 2023-11-16 PROCEDURE — 97140 MANUAL THERAPY 1/> REGIONS: CPT

## 2023-11-16 NOTE — OP THERAPY DAILY TREATMENT
"  Outpatient Physical Therapy  DAILY TREATMENT     [facility] Physical Therapy [address].  [address]  [place] [address]  Phone:  [phone]  Fax:  [phone]    Date: 11/16/2023    Patient: Kwasi Sims  YOB: 1955  MRN: [id number]     Time Calculation    Start time: 0845  Stop time: 0936 Time Calculation (min): 51 minutes         Chief Complaint: No chief complaint on file.    Visit #: 9    SUBJECTIVE:   L ankle is great no limits but  woke up 10  days ago with neck pain that has not resolved--pt. Reported that he has lost strength inL arm that started about 3 days after pain started.  Pt. Reports occasional n/t l shoulder with turnig head to L    OBJECTIVE:  Cervical ROM:  Rot R:65 tight   L: 45 erp  Ext 65 deg  2\" off strenum --\" pull  L gh   fle:40   abd:  35  Er65    Infr 4-/5 --mmt  Drop arm (+)     R 98,98,95L: 85,88,86      Therapeutic Treatments and Modalities:     Therapeutic Treatment and Modalities Summary: Reviewed HEP and focus on balance and strength moving forward  Instrtucted median nerve glides and self aarom for flexion supine and sitting  Stm upper trap, infra and supra  Mechanical traction  20 deg pull 10/10 x 60/20 x 15' w/ mhp       Time-based treatments/modalities:    Physical Therapy Timed Treatment Charges  Manual therapy minutes (CPT 57997): 15 minutes  Therapeutic exercise minutes (CPT 71072): 10 minutes  Pain rating (1-10) before treatment:  5/10 L neck upper trap  Pain rating (1-10) after treatment:  1    ASSESSMENT:   Patient cont. To present with mild weakness L l.e. but improving and independent with his HEP.  Pt. New complaints  are suspicious for RTC tear however pt. Denies mods injury with reports of paresthesia into shoulder, (+) doorbell sign, decreased c5 DTRS ( infra 4-/5, supra 2/5, deltoids 2/5)    PLAN/RECOMMENDATIONS:   D/c form l.e. with patient having met goals and independent with his HEP.  Recommend referral to treat neck and " shoulder pain/weakness

## 2023-11-16 NOTE — OP THERAPY DISCHARGE SUMMARY
Outpatient Physical Therapy  DISCHARGE SUMMARY NOTE      [facility] Physical Therapy [place] [address]  [address]  [place] [address]  Phone:  [phone]  Fax:  [phone]    Date of Visit: 11/16/2023    Patient: Kwasi Sims  YOB: 1955  MRN: [id number]     Referring Provider: Nagi Coles P.A.-C.  [address] [place] [address]   Referring Diagnosis Other fracture of left lower leg, subsequent encounter for closed fracture with routine healing [M03.478U]         Functional Assessment Used LEFS 65/80        Your patient is being discharged from Physical Therapy with the following comments:   Goals met    SUBJECTIVE:   L ankle is great no limits but  woke up 10  days ago with neck pain that has not resolved--pt. Reported that he has lost strength inL arm that started about 3 days after pain started.  Pt. Reports occasional n/t l shoulder with turnig head to L    OBJECTIVE:         R 98,98,95L: 85,88,86        ASSESSMENT:   Patient cont. To present with mild weakness L l.e. but improving and independent with his HEP.    Pt. New complaints  are suspicious for RTC tear however pt. Denies mods injury with reports of paresthesia into shoulder, (+) doorbell sign, decreased c5 DTRS ( infra 4-/5, supra 2/5, deltois 2/5)    PLAN/RECOMMENDATIONS:   D/c form l.e. with patient having met goals and independent with his HEP.  Recommend referral to treat neck and shoulder pain/weakness    David Robins, PT, DPT, OCS    Date: 11/16/2023

## 2023-11-21 DIAGNOSIS — E29.1 HYPOGONADISM MALE: ICD-10-CM

## 2023-11-21 DIAGNOSIS — M05.9 RHEUMATOID ARTHRITIS WITH POSITIVE RHEUMATOID FACTOR, INVOLVING UNSPECIFIED SITE (HCC): ICD-10-CM

## 2023-11-21 DIAGNOSIS — R73.02 IGT (IMPAIRED GLUCOSE TOLERANCE): ICD-10-CM

## 2023-11-21 RX ORDER — CYCLOBENZAPRINE HCL 10 MG
10 TABLET ORAL 3 TIMES DAILY PRN
Qty: 30 TABLET | Refills: 2 | Status: SHIPPED | OUTPATIENT
Start: 2023-11-21 | End: 2024-01-19

## 2023-11-21 RX ORDER — METOPROLOL TARTRATE 50 MG/1
50 TABLET, FILM COATED ORAL 2 TIMES DAILY
Qty: 60 TABLET | Refills: 3 | Status: SHIPPED | OUTPATIENT
Start: 2023-11-21 | End: 2023-12-18 | Stop reason: SDUPTHER

## 2023-11-21 RX ORDER — TESTOSTERONE CYPIONATE 200 MG/ML
INJECTION, SOLUTION INTRAMUSCULAR
Qty: 6 ML | Refills: 0 | Status: SHIPPED | OUTPATIENT
Start: 2023-11-21 | End: 2024-02-26

## 2023-11-21 NOTE — TELEPHONE ENCOUNTER
Received request via: Patient    Was the patient seen in the last year in this department? Yes    Does the patient have an active prescription (recently filled or refills available) for medication(s) requested? No    Does the patient have [facility] and need 100 day supply (blood pressure, diabetes and cholesterol meds only)? Patient does not have SCP    Ray says his Cardiologist left so he will need a refill of the metoprolol from you in the meantime.

## 2023-12-18 ENCOUNTER — TELEPHONE (OUTPATIENT)
Dept: MEDICAL GROUP | Facility: LAB | Age: 68
End: 2023-12-18

## 2023-12-18 ENCOUNTER — OFFICE VISIT (OUTPATIENT)
Dept: CARDIOLOGY | Facility: MEDICAL CENTER | Age: 68
End: 2023-12-18
Attending: INTERNAL MEDICINE
Payer: MEDICARE

## 2023-12-18 VITALS
HEART RATE: 74 BPM | HEIGHT: 70 IN | DIASTOLIC BLOOD PRESSURE: 70 MMHG | BODY MASS INDEX: 29.06 KG/M2 | WEIGHT: 203 LBS | OXYGEN SATURATION: 98 % | RESPIRATION RATE: 18 BRPM | SYSTOLIC BLOOD PRESSURE: 126 MMHG

## 2023-12-18 DIAGNOSIS — I10 ESSENTIAL HYPERTENSION: ICD-10-CM

## 2023-12-18 DIAGNOSIS — M05.79 RHEUMATOID ARTHRITIS INVOLVING MULTIPLE SITES WITH POSITIVE RHEUMATOID FACTOR (HCC): ICD-10-CM

## 2023-12-18 DIAGNOSIS — M54.2 NECK PAIN: ICD-10-CM

## 2023-12-18 DIAGNOSIS — R29.898 LEFT ARM WEAKNESS: ICD-10-CM

## 2023-12-18 DIAGNOSIS — E78.5 DYSLIPIDEMIA: ICD-10-CM

## 2023-12-18 DIAGNOSIS — I35.1 NONRHEUMATIC AORTIC VALVE INSUFFICIENCY: ICD-10-CM

## 2023-12-18 PROCEDURE — 99213 OFFICE O/P EST LOW 20 MIN: CPT | Performed by: INTERNAL MEDICINE

## 2023-12-18 PROCEDURE — 99214 OFFICE O/P EST MOD 30 MIN: CPT | Performed by: INTERNAL MEDICINE

## 2023-12-18 PROCEDURE — 3078F DIAST BP <80 MM HG: CPT | Performed by: INTERNAL MEDICINE

## 2023-12-18 PROCEDURE — 3074F SYST BP LT 130 MM HG: CPT | Performed by: INTERNAL MEDICINE

## 2023-12-18 PROCEDURE — 1170F FXNL STATUS ASSESSED: CPT | Performed by: INTERNAL MEDICINE

## 2023-12-18 RX ORDER — PRAVASTATIN SODIUM 80 MG/1
80 TABLET ORAL
Qty: 100 TABLET | Refills: 3 | Status: SHIPPED | OUTPATIENT
Start: 2023-12-18

## 2023-12-18 RX ORDER — LISINOPRIL 20 MG/1
20 TABLET ORAL
Qty: 90 TABLET | Refills: 3 | Status: SHIPPED | OUTPATIENT
Start: 2023-12-18

## 2023-12-18 RX ORDER — METOPROLOL TARTRATE 50 MG/1
50 TABLET, FILM COATED ORAL 2 TIMES DAILY
Qty: 180 TABLET | Refills: 3 | Status: SHIPPED | OUTPATIENT
Start: 2023-12-18

## 2023-12-18 ASSESSMENT — FIBROSIS 4 INDEX: FIB4 SCORE: 0.94

## 2023-12-18 NOTE — TELEPHONE ENCOUNTER
Caller Name: Edgardo   Call Back Number: [phone] (home)      How would the patient prefer to be contacted with a response: Phone call OK to leave a detailed message    Patient calling regarding neck pain. Would like to speak with you.

## 2023-12-18 NOTE — PROGRESS NOTES
Interventional cardiology Follow-up Consultation Note        CC: Follow-up aortic regurgitation, dyslipidemia, hypertension    Patient ID/HPI:   68-year-old male patient here for follow-up for above issues.  He feels well denies chest pain, shortness of breath.  He is having significant neck issues, hoping to feel better.        Past Medical History:   Diagnosis Date    Abnormal myocardial perfusion study 1/15/2014    Aortic insufficiency 7/5/2011    Arthritis     RA, and osteo    Bronchitis     CAD (coronary artery disease) mild plaque at cath in 2/14 12/5/2014    Cancer (HCC)     skin    Chronic use of opiate drugs therapeutic purposes 8/12/2017    Dental disorder     Upper dentures.    Dyslipidemia 7/12/2011    Heart burn     Heart murmur     Hiatus hernia syndrome     High cholesterol     HTN (hypertension) 7/5/2011    Hypertension     Indigestion     Infectious disease     Pain     RA    Pain     hands, feet and jaw    Rheumatoid arthritis(714.0)     severe    Tachycardia 10/20/2014         Current Outpatient Medications   Medication Sig Dispense Refill    lisinopril (PRINIVIL) 20 MG Tab Take 1 Tablet by mouth every day. 90 Tablet 3    metoprolol tartrate (LOPRESSOR) 50 MG Tab Take 1 Tablet by mouth 2 times a day. 180 Tablet 3    pravastatin (PRAVACHOL) 80 MG tablet Take 1 Tablet by mouth every day. 100 Tablet 3    testosterone cypionate (DEPO-TESTOSTERONE) 200 MG/ML injection INJECT ONE ML INTRAMUSCULARLY EVERY 14 DAYS FOR 84 DAYS. 6 mL 0    cyclobenzaprine (FLEXERIL) 10 mg Tab Take 1 Tablet by mouth 3 times a day as needed for Mild Pain or Muscle Spasms for up to 90 days. 30 Tablet 2    Ixekizumab 80 MG/ML Solution Auto-injector 160 mg SQ for first injection, then 80 mg SQ every 4 weeks 4 mL 1    [START ON 12/20/2023] oxyCODONE-acetaminophen (PERCOCET-10)  MG Tab Take 1-2 Tablets by mouth every four hours as needed for Severe Pain (refill #3 of #3.) for up to 30 days. 150 Tablet 0     "oxyCODONE-acetaminophen (PERCOCET-10)  MG Tab Take 1-2 Tablets by mouth every four hours as needed for Severe Pain (refill #2 of #3.) for up to 30 days. 150 Tablet 0    Cholecalciferol (VITAMIN D3) 2000 UNIT Cap Take 2,000 Units by mouth every morning.      Calcium Carb-Cholecalciferol 600-12.5 MG-MCG Cap Take 1 Capsule by mouth 2 times a day.      omeprazole (PRILOSEC OTC) 20 MG tablet Take 20 mg by mouth every morning.      diazePAM (VALIUM) 5 MG Tab Take 5 mg by mouth every 12 hours as needed for Anxiety or Sleep.      Multiple Vitamins-Minerals (PRESERVISION AREDS 2+MULTI VIT) Cap Take 1 Capsule by mouth 2 times a day.      PARoxetine (PAXIL) 20 MG Tab Take 1 Tablet by mouth every day. 90 Tablet 3    Ascorbic Acid (VITAMIN C) 1000 MG Tab Take 1,000 mg by mouth every morning.      Zinc 50 MG Tab Take 50 mg by mouth every morning.       No current facility-administered medications for this visit.         Physical Exam:  Ambulatory Vitals  /70 (BP Location: Left arm, Patient Position: Sitting, BP Cuff Size: Adult)   Pulse 74   Resp 18   Ht 1.778 m (5' 10\")   Wt 92.1 kg (203 lb)   SpO2 98%    Oxygen Therapy:  Pulse Oximetry: 98 %  BP Readings from Last 4 Encounters:   12/18/23 126/70   10/06/23 122/56   08/14/23 (!) 140/66   08/05/23 137/77       Weight/BMI: Body mass index is 29.13 kg/m².  Wt Readings from Last 4 Encounters:   12/18/23 92.1 kg (203 lb)   12/14/23 90.7 kg (200 lb)   10/06/23 89.4 kg (197 lb)   08/14/23 83.5 kg (184 lb)       General: Well appearing and in no apparent distress  Neck: JVP absent, carotid bruits absent  Lungs: respiratory sounds  normal, additional breath sounds absent  Heart: Regular rhythm,   No palpable thrills on palpation, murmurs present, no rubs,   Lower extremity edema absent.       Echocardiogram July 2023 reviewed, independently interpreted shows moderate aortic regurgitation.    Medical Decision Making:  Problem List Items Addressed This Visit       Aortic " insufficiency    Relevant Medications    lisinopril (PRINIVIL) 20 MG Tab    metoprolol tartrate (LOPRESSOR) 50 MG Tab    pravastatin (PRAVACHOL) 80 MG tablet    Other Relevant Orders    EC-ECHOCARDIOGRAM COMPLETE W/O CONT    Essential hypertension    Relevant Medications    lisinopril (PRINIVIL) 20 MG Tab    metoprolol tartrate (LOPRESSOR) 50 MG Tab    pravastatin (PRAVACHOL) 80 MG tablet    Dyslipidemia    Relevant Medications    pravastatin (PRAVACHOL) 80 MG tablet     68-year-old male patient with dyslipidemia, hypertension, aortic insufficiency here for follow-up.  Blood pressure, lipids well-controlled, aortic regurgitation is stable.  At this time continue current medical therapy with lisinopril, metoprolol, pravastatin.  Repeat echocardiogram, follow-up in 1 year.            Kostas FUENTES  Interventional cardiologist  [facility], [address].  [address] [address]  Phone: [phone]  Fax: [phone]

## 2023-12-19 ENCOUNTER — PATIENT MESSAGE (OUTPATIENT)
Dept: MEDICAL GROUP | Facility: LAB | Age: 68
End: 2023-12-19
Payer: MEDICARE

## 2023-12-19 NOTE — TELEPHONE ENCOUNTER
Left voicemail message at Mom/caller phone number Ayesha Durham  [phone] to call back:    This is to let her know to review ( as she has already read the message ) Dr Rivera message from 10.4.23 at 4:57 pm    (this is the answer to her concerns)   Telephone call to the patient, is having difficulty moving his left arm, has undergone an MRI of his left shoulder apparently this is unremarkable.  Patient states he is having tingling that is worsened by flexing his head forward and backward patient is a rheumatological patient needs an x-ray of his cervical spine and MRI and also referral to Artur Berger at [facility]

## 2023-12-21 ENCOUNTER — APPOINTMENT (OUTPATIENT)
Dept: RADIOLOGY | Facility: MEDICAL CENTER | Age: 68
End: 2023-12-21
Attending: PHYSICIAN ASSISTANT
Payer: MEDICARE

## 2024-01-05 ENCOUNTER — PATIENT MESSAGE (OUTPATIENT)
Dept: MEDICAL GROUP | Facility: LAB | Age: 69
End: 2024-01-05
Payer: MEDICARE

## 2024-01-10 ENCOUNTER — HOSPITAL ENCOUNTER (OUTPATIENT)
Dept: LAB | Facility: MEDICAL CENTER | Age: 69
End: 2024-01-10
Attending: INTERNAL MEDICINE
Payer: MEDICARE

## 2024-01-10 DIAGNOSIS — R79.89 ABNORMAL CBC: ICD-10-CM

## 2024-01-10 DIAGNOSIS — R73.02 IGT (IMPAIRED GLUCOSE TOLERANCE): ICD-10-CM

## 2024-01-10 DIAGNOSIS — Z23 NEED FOR VACCINATION: ICD-10-CM

## 2024-01-10 DIAGNOSIS — E55.9 VITAMIN D DEFICIENCY: ICD-10-CM

## 2024-01-10 LAB
25(OH)D3 SERPL-MCNC: 42 NG/ML (ref 30–100)
ALBUMIN SERPL BCP-MCNC: 4.1 G/DL (ref 3.2–4.9)
ALBUMIN/GLOB SERPL: 1.7 G/DL
ALP SERPL-CCNC: 90 U/L (ref 30–99)
ALT SERPL-CCNC: 18 U/L (ref 2–50)
ANION GAP SERPL CALC-SCNC: 11 MMOL/L (ref 7–16)
AST SERPL-CCNC: 26 U/L (ref 12–45)
BASOPHILS # BLD AUTO: 1.2 % (ref 0–1.8)
BASOPHILS # BLD: 0.1 K/UL (ref 0–0.12)
BILIRUB SERPL-MCNC: 0.6 MG/DL (ref 0.1–1.5)
BUN SERPL-MCNC: 11 MG/DL (ref 8–22)
CALCIUM ALBUM COR SERPL-MCNC: 9.1 MG/DL (ref 8.5–10.5)
CALCIUM SERPL-MCNC: 9.2 MG/DL (ref 8.5–10.5)
CHLORIDE SERPL-SCNC: 101 MMOL/L (ref 96–112)
CO2 SERPL-SCNC: 27 MMOL/L (ref 20–33)
CREAT SERPL-MCNC: 0.89 MG/DL (ref 0.5–1.4)
EOSINOPHIL # BLD AUTO: 0.6 K/UL (ref 0–0.51)
EOSINOPHIL NFR BLD: 7.5 % (ref 0–6.9)
ERYTHROCYTE [DISTWIDTH] IN BLOOD BY AUTOMATED COUNT: 51.7 FL (ref 35.9–50)
EST. AVERAGE GLUCOSE BLD GHB EST-MCNC: 117 MG/DL
GFR SERPLBLD CREATININE-BSD FMLA CKD-EPI: 94 ML/MIN/1.73 M 2
GLOBULIN SER CALC-MCNC: 2.4 G/DL (ref 1.9–3.5)
GLUCOSE SERPL-MCNC: 92 MG/DL (ref 65–99)
HBA1C MFR BLD: 5.7 % (ref 4–5.6)
HCT VFR BLD AUTO: 46.7 % (ref 42–52)
HGB BLD-MCNC: 14.7 G/DL (ref 14–18)
IMM GRANULOCYTES # BLD AUTO: 0.07 K/UL (ref 0–0.11)
IMM GRANULOCYTES NFR BLD AUTO: 0.9 % (ref 0–0.9)
LYMPHOCYTES # BLD AUTO: 1.85 K/UL (ref 1–4.8)
LYMPHOCYTES NFR BLD: 23 % (ref 22–41)
MCH RBC QN AUTO: 26.9 PG (ref 27–33)
MCHC RBC AUTO-ENTMCNC: 31.5 G/DL (ref 32.3–36.5)
MCV RBC AUTO: 85.5 FL (ref 81.4–97.8)
MONOCYTES # BLD AUTO: 1.23 K/UL (ref 0–0.85)
MONOCYTES NFR BLD AUTO: 15.3 % (ref 0–13.4)
NEUTROPHILS # BLD AUTO: 4.2 K/UL (ref 1.82–7.42)
NEUTROPHILS NFR BLD: 52.1 % (ref 44–72)
NRBC # BLD AUTO: 0 K/UL
NRBC BLD-RTO: 0 /100 WBC (ref 0–0.2)
PLATELET # BLD AUTO: 199 K/UL (ref 164–446)
PMV BLD AUTO: 10.9 FL (ref 9–12.9)
POTASSIUM SERPL-SCNC: 4.7 MMOL/L (ref 3.6–5.5)
PROT SERPL-MCNC: 6.5 G/DL (ref 6–8.2)
RBC # BLD AUTO: 5.46 M/UL (ref 4.7–6.1)
SODIUM SERPL-SCNC: 139 MMOL/L (ref 135–145)
WBC # BLD AUTO: 8.1 K/UL (ref 4.8–10.8)

## 2024-01-10 PROCEDURE — 80053 COMPREHEN METABOLIC PANEL: CPT

## 2024-01-10 PROCEDURE — 82306 VITAMIN D 25 HYDROXY: CPT

## 2024-01-10 PROCEDURE — 83036 HEMOGLOBIN GLYCOSYLATED A1C: CPT | Mod: GA

## 2024-01-10 PROCEDURE — 85025 COMPLETE CBC W/AUTO DIFF WBC: CPT

## 2024-01-10 PROCEDURE — 36415 COLL VENOUS BLD VENIPUNCTURE: CPT | Mod: GA

## 2024-01-11 ENCOUNTER — OFFICE VISIT (OUTPATIENT)
Dept: MEDICAL GROUP | Facility: LAB | Age: 69
End: 2024-01-11
Payer: MEDICARE

## 2024-01-11 VITALS
HEART RATE: 79 BPM | BODY MASS INDEX: 28.92 KG/M2 | HEIGHT: 70 IN | RESPIRATION RATE: 16 BRPM | WEIGHT: 202 LBS | OXYGEN SATURATION: 97 % | TEMPERATURE: 98.6 F | DIASTOLIC BLOOD PRESSURE: 85 MMHG | SYSTOLIC BLOOD PRESSURE: 138 MMHG

## 2024-01-11 DIAGNOSIS — M05.9 RHEUMATOID ARTHRITIS WITH POSITIVE RHEUMATOID FACTOR, INVOLVING UNSPECIFIED SITE (HCC): ICD-10-CM

## 2024-01-11 DIAGNOSIS — R29.898 LEFT ARM WEAKNESS: ICD-10-CM

## 2024-01-11 PROCEDURE — 3075F SYST BP GE 130 - 139MM HG: CPT | Performed by: INTERNAL MEDICINE

## 2024-01-11 PROCEDURE — 99213 OFFICE O/P EST LOW 20 MIN: CPT | Performed by: INTERNAL MEDICINE

## 2024-01-11 PROCEDURE — 3079F DIAST BP 80-89 MM HG: CPT | Performed by: INTERNAL MEDICINE

## 2024-01-11 PROCEDURE — 1170F FXNL STATUS ASSESSED: CPT | Performed by: INTERNAL MEDICINE

## 2024-01-11 RX ORDER — OXYCODONE AND ACETAMINOPHEN 10; 325 MG/1; MG/1
1-2 TABLET ORAL EVERY 4 HOURS PRN
Qty: 150 TABLET | Refills: 0 | Status: SHIPPED | OUTPATIENT
Start: 2024-01-20 | End: 2024-03-28 | Stop reason: SDUPTHER

## 2024-01-11 RX ORDER — OXYCODONE AND ACETAMINOPHEN 10; 325 MG/1; MG/1
1-2 TABLET ORAL EVERY 4 HOURS PRN
Qty: 150 TABLET | Refills: 0 | Status: SHIPPED | OUTPATIENT
Start: 2024-02-19 | End: 2024-03-28

## 2024-01-11 RX ORDER — OXYCODONE AND ACETAMINOPHEN 10; 325 MG/1; MG/1
1-2 TABLET ORAL EVERY 4 HOURS PRN
Qty: 150 TABLET | Refills: 0 | Status: SHIPPED | OUTPATIENT
Start: 2024-03-20 | End: 2024-03-28 | Stop reason: SDUPTHER

## 2024-01-11 ASSESSMENT — FIBROSIS 4 INDEX: FIB4 SCORE: 2.06

## 2024-01-11 NOTE — PROGRESS NOTES
CC: Edgardo Sims is a 68 y.o. male is suffering from   Chief Complaint   Patient presents with    Follow-Up         SUBJECTIVE:  1. Rheumatoid arthritis with positive rheumatoid factor, involving unspecified site (HCC)  Ray is here for follow-up, continues to struggle with rheumatoid arthritis, is on oxycodone because of joint pain discomfort    2. Left arm weakness  Left arm weakness likely neurological entrapment followed by renal orthopedic doctor Pernell    Past social, family, history:   Social History     Tobacco Use    Smoking status: Never    Smokeless tobacco: Never   Vaping Use    Vaping Use: Never used   Substance Use Topics    Alcohol use: Yes     Alcohol/week: 1.8 oz     Types: 3 Cans of beer per week     Comment: 3 per week    Drug use: No         MEDICATIONS:    Current Outpatient Medications:     [START ON 3/20/2024] oxyCODONE-acetaminophen (PERCOCET-10)  MG Tab, Take 1-2 Tablets by mouth every four hours as needed for Severe Pain (refill #3 of #3.) for up to 30 days., Disp: 150 Tablet, Rfl: 0    [START ON 2/19/2024] oxyCODONE-acetaminophen (PERCOCET-10)  MG Tab, Take 1-2 Tablets by mouth every four hours as needed for Severe Pain (refill #2 of #3.) for up to 30 days., Disp: 150 Tablet, Rfl: 0    [START ON 1/20/2024] oxyCODONE-acetaminophen (PERCOCET-10)  MG Tab, Take 1-2 Tablets by mouth every four hours as needed for Severe Pain (refill #1 of #3.) for up to 30 days., Disp: 150 Tablet, Rfl: 0    methylPREDNISolone (MEDROL DOSEPAK) 4 MG Tablet Therapy Pack, Follow schedule on package instructions., Disp: 21 Tablet, Rfl: 0    tizanidine (ZANAFLEX) 4 MG Tab, Take 1 Tablet by mouth every 8 hours as needed (1 PO Q 8 HOURS PRN)., Disp: 30 Tablet, Rfl: 0    lisinopril (PRINIVIL) 20 MG Tab, Take 1 Tablet by mouth every day., Disp: 90 Tablet, Rfl: 3    metoprolol tartrate (LOPRESSOR) 50 MG Tab, Take 1 Tablet by mouth 2 times a day., Disp: 180 Tablet, Rfl: 3    pravastatin  (PRAVACHOL) 80 MG tablet, Take 1 Tablet by mouth every day., Disp: 100 Tablet, Rfl: 3    testosterone cypionate (DEPO-TESTOSTERONE) 200 MG/ML injection, INJECT ONE ML INTRAMUSCULARLY EVERY 14 DAYS FOR 84 DAYS., Disp: 6 mL, Rfl: 0    cyclobenzaprine (FLEXERIL) 10 mg Tab, Take 1 Tablet by mouth 3 times a day as needed for Mild Pain or Muscle Spasms for up to 90 days., Disp: 30 Tablet, Rfl: 2    Ixekizumab 80 MG/ML Solution Auto-injector, 160 mg SQ for first injection, then 80 mg SQ every 4 weeks, Disp: 4 mL, Rfl: 1    Cholecalciferol (VITAMIN D3) 2000 UNIT Cap, Take 2,000 Units by mouth every morning., Disp: , Rfl:     Calcium Carb-Cholecalciferol 600-12.5 MG-MCG Cap, Take 1 Capsule by mouth 2 times a day., Disp: , Rfl:     omeprazole (PRILOSEC OTC) 20 MG tablet, Take 20 mg by mouth every morning., Disp: , Rfl:     diazePAM (VALIUM) 5 MG Tab, Take 5 mg by mouth every 12 hours as needed for Anxiety or Sleep., Disp: , Rfl:     Multiple Vitamins-Minerals (PRESERVISION AREDS 2+MULTI VIT) Cap, Take 1 Capsule by mouth 2 times a day., Disp: , Rfl:     PARoxetine (PAXIL) 20 MG Tab, Take 1 Tablet by mouth every day., Disp: 90 Tablet, Rfl: 3    Ascorbic Acid (VITAMIN C) 1000 MG Tab, Take 1,000 mg by mouth every morning., Disp: , Rfl:     Zinc 50 MG Tab, Take 50 mg by mouth every morning., Disp: , Rfl:     PROBLEMS:  Patient Active Problem List    Diagnosis Date Noted    Urinary retention with incomplete bladder emptying 07/30/2023    Chronic pain 07/30/2023    Closed left ankle fracture 07/29/2023    Closed fracture of multiple ribs of left side 07/29/2023    Contusion of left lung 07/29/2023    Trauma 07/29/2023    No contraindication to deep vein thrombosis (DVT) prophylaxis 07/29/2023    Hammer toe of right foot 12/29/2021    Osteopenia 03/15/2021    Vitamin D deficiency 03/15/2021    Postoperative pain 08/05/2020    Difficult intubation 08/05/2020    Gastroesophageal reflux disease 08/05/2020    Secondary adrenal  "insufficiency (HCC) 12/12/2019    Current chronic use of systemic steroids 10/16/2019    High risk medication use 10/16/2019    History of adrenal insufficiency 10/16/2019    Neurogenic bladder 01/26/2019    Bladder outlet obstruction 05/01/2018    Leukocytosis 04/30/2018    Lactic acidosis 04/30/2018    Idiopathic acute pancreatitis 04/30/2018    Chronic use of opiate drug for therapeutic purpose 08/12/2017    Depression 07/13/2015    Anxiety 03/31/2015    Coronary artery disease due to calcified coronary lesion: Mildly cardiac catheterization in 2014 12/05/2014    Tachycardia 10/20/2014    Abnormal myocardial perfusion study 01/15/2014    Osteoarthrosis, unspecified whether generalized or localized, pelvic region and thigh 05/31/2013    Dyslipidemia 07/12/2011    Aortic insufficiency 07/05/2011    Essential hypertension 07/05/2011    Rheumatoid arthritis ([facility]) 05/05/2009    Hypogonadism male 05/05/2009       REVIEW OF SYSTEMS:  Gen.:  No Nausea, Vomiting, fever, Chills.  Heart: No chest pain.  Lungs:  No shortness of Breath.  Psychological: Kian unusual Anxiety depression     PHYSICAL EXAM   Constitutional: Alert, cooperative, not in acute distress.  Cardiovascular:  Rate Rhythm is regular without murmurs rubs clicks.     Thorax & Lungs: Clear to auscultation, no wheezing, rhonchi, or rales  HENT: Normocephalic, Atraumatic.  Eyes: PERRLA, EOMI, Conjunctiva normal.   Neck: Trachia is midline no swelling of the thyroid.   Lymphatic: No lymphadenopathy noted.   Musculoskeletal: Loss of adduction left arm  Neurologic: Alert & oriented x 3, cranial nerves II through XII are intact, Normal motor function, Normal sensory function, No focal deficits noted.   Psychiatric: Affect normal, Judgment normal, Mood normal.     VITAL SIGNS:/85   Pulse 79   Temp 37 °C (98.6 °F)   Resp 16   Ht 1.778 m (5' 10\")   Wt 91.6 kg (202 lb)   SpO2 97%   BMI 28.98 kg/m²     Labs: Reviewed    Assessment:                           "                           Plan:    1. Rheumatoid arthritis with positive rheumatoid factor, involving unspecified site (HCC)  Continue Percocet state drug task force reviewed,  - oxyCODONE-acetaminophen (PERCOCET-10)  MG Tab; Take 1-2 Tablets by mouth every four hours as needed for Severe Pain (refill #3 of #3.) for up to 30 days.  Dispense: 150 Tablet; Refill: 0  - oxyCODONE-acetaminophen (PERCOCET-10)  MG Tab; Take 1-2 Tablets by mouth every four hours as needed for Severe Pain (refill #2 of #3.) for up to 30 days.  Dispense: 150 Tablet; Refill: 0  - oxyCODONE-acetaminophen (PERCOCET-10)  MG Tab; Take 1-2 Tablets by mouth every four hours as needed for Severe Pain (refill #1 of #3.) for up to 30 days.  Dispense: 150 Tablet; Refill: 0    2. Left arm weakness  Instructed the patient in doing concentric circles, also walking the hand up the wall for all possible to avoid adhesive capsulitis with his left shoulder.  Follow-up with Dr. Gimenez.

## 2024-01-16 ENCOUNTER — HOSPITAL ENCOUNTER (OUTPATIENT)
Dept: RADIOLOGY | Facility: MEDICAL CENTER | Age: 69
End: 2024-01-16
Attending: STUDENT IN AN ORGANIZED HEALTH CARE EDUCATION/TRAINING PROGRAM
Payer: MEDICARE

## 2024-01-16 DIAGNOSIS — R29.898 ARM WEAKNESS: ICD-10-CM

## 2024-01-16 PROCEDURE — 700117 HCHG RX CONTRAST REV CODE 255: Performed by: STUDENT IN AN ORGANIZED HEALTH CARE EDUCATION/TRAINING PROGRAM

## 2024-01-16 PROCEDURE — 71260 CT THORAX DX C+: CPT

## 2024-01-16 RX ADMIN — IOHEXOL 75 ML: 350 INJECTION, SOLUTION INTRAVENOUS at 15:19

## 2024-01-19 DIAGNOSIS — R73.02 IGT (IMPAIRED GLUCOSE TOLERANCE): ICD-10-CM

## 2024-01-19 DIAGNOSIS — M05.9 RHEUMATOID ARTHRITIS WITH POSITIVE RHEUMATOID FACTOR, INVOLVING UNSPECIFIED SITE (HCC): ICD-10-CM

## 2024-01-19 DIAGNOSIS — M62.838 MUSCLE SPASTICITY: ICD-10-CM

## 2024-01-19 RX ORDER — DIAZEPAM 5 MG/1
TABLET ORAL
Qty: 60 TABLET | Refills: 2 | Status: SHIPPED | OUTPATIENT
Start: 2024-01-19 | End: 2024-03-19

## 2024-01-19 RX ORDER — CYCLOBENZAPRINE HCL 10 MG
10 TABLET ORAL 3 TIMES DAILY PRN
Qty: 30 TABLET | Refills: 2 | Status: SHIPPED | OUTPATIENT
Start: 2024-01-19 | End: 2024-01-23 | Stop reason: SDUPTHER

## 2024-01-23 ENCOUNTER — APPOINTMENT (OUTPATIENT)
Dept: ADMISSIONS | Facility: MEDICAL CENTER | Age: 69
End: 2024-01-23
Attending: STUDENT IN AN ORGANIZED HEALTH CARE EDUCATION/TRAINING PROGRAM
Payer: MEDICARE

## 2024-01-23 DIAGNOSIS — M05.9 RHEUMATOID ARTHRITIS WITH POSITIVE RHEUMATOID FACTOR, INVOLVING UNSPECIFIED SITE (HCC): ICD-10-CM

## 2024-01-23 DIAGNOSIS — R73.02 IGT (IMPAIRED GLUCOSE TOLERANCE): ICD-10-CM

## 2024-01-23 PROBLEM — M48.02 CERVICAL STENOSIS OF SPINE: Status: ACTIVE | Noted: 2024-01-18

## 2024-01-23 RX ORDER — CYCLOBENZAPRINE HCL 10 MG
10 TABLET ORAL 3 TIMES DAILY PRN
Qty: 90 TABLET | Refills: 2 | Status: SHIPPED | OUTPATIENT
Start: 2024-01-23 | End: 2024-04-22

## 2024-01-23 NOTE — TELEPHONE ENCOUNTER
Pharmacy follow up  Rx sent in was for QTY 30. Script reads take three times a day. Please send in correct amount.

## 2024-01-31 ENCOUNTER — PRE-ADMISSION TESTING (OUTPATIENT)
Dept: ADMISSIONS | Facility: MEDICAL CENTER | Age: 69
End: 2024-01-31
Attending: STUDENT IN AN ORGANIZED HEALTH CARE EDUCATION/TRAINING PROGRAM
Payer: MEDICARE

## 2024-02-08 ENCOUNTER — HOSPITAL ENCOUNTER (OUTPATIENT)
Dept: RADIOLOGY | Facility: MEDICAL CENTER | Age: 69
End: 2024-02-08
Attending: STUDENT IN AN ORGANIZED HEALTH CARE EDUCATION/TRAINING PROGRAM | Admitting: STUDENT IN AN ORGANIZED HEALTH CARE EDUCATION/TRAINING PROGRAM
Payer: MEDICARE

## 2024-02-08 ENCOUNTER — PRE-ADMISSION TESTING (OUTPATIENT)
Dept: ADMISSIONS | Facility: MEDICAL CENTER | Age: 69
End: 2024-02-08
Attending: STUDENT IN AN ORGANIZED HEALTH CARE EDUCATION/TRAINING PROGRAM
Payer: MEDICARE

## 2024-02-08 DIAGNOSIS — Z01.810 PRE-OPERATIVE CARDIOVASCULAR EXAMINATION: ICD-10-CM

## 2024-02-08 DIAGNOSIS — R29.898 ARM WEAKNESS: ICD-10-CM

## 2024-02-08 DIAGNOSIS — Z01.811 PRE-OPERATIVE RESPIRATORY EXAMINATION: ICD-10-CM

## 2024-02-08 DIAGNOSIS — Z01.812 PRE-OPERATIVE LABORATORY EXAMINATION: ICD-10-CM

## 2024-02-08 LAB
ANION GAP SERPL CALC-SCNC: 10 MMOL/L (ref 7–16)
APTT PPP: 30.8 SEC (ref 24.7–36)
BASOPHILS # BLD AUTO: 1.5 % (ref 0–1.8)
BASOPHILS # BLD: 0.11 K/UL (ref 0–0.12)
BUN SERPL-MCNC: 13 MG/DL (ref 8–22)
CALCIUM SERPL-MCNC: 8.9 MG/DL (ref 8.5–10.5)
CHLORIDE SERPL-SCNC: 100 MMOL/L (ref 96–112)
CO2 SERPL-SCNC: 27 MMOL/L (ref 20–33)
CREAT SERPL-MCNC: 0.87 MG/DL (ref 0.5–1.4)
EKG IMPRESSION: NORMAL
EOSINOPHIL # BLD AUTO: 0.58 K/UL (ref 0–0.51)
EOSINOPHIL NFR BLD: 7.9 % (ref 0–6.9)
ERYTHROCYTE [DISTWIDTH] IN BLOOD BY AUTOMATED COUNT: 52.3 FL (ref 35.9–50)
GFR SERPLBLD CREATININE-BSD FMLA CKD-EPI: 94 ML/MIN/1.73 M 2
GLUCOSE SERPL-MCNC: 117 MG/DL (ref 65–99)
HCT VFR BLD AUTO: 45.4 % (ref 42–52)
HGB BLD-MCNC: 14.3 G/DL (ref 14–18)
IMM GRANULOCYTES # BLD AUTO: 0.04 K/UL (ref 0–0.11)
IMM GRANULOCYTES NFR BLD AUTO: 0.5 % (ref 0–0.9)
INR PPP: 1.03 (ref 0.87–1.13)
LYMPHOCYTES # BLD AUTO: 1.51 K/UL (ref 1–4.8)
LYMPHOCYTES NFR BLD: 20.6 % (ref 22–41)
MCH RBC QN AUTO: 26.7 PG (ref 27–33)
MCHC RBC AUTO-ENTMCNC: 31.5 G/DL (ref 32.3–36.5)
MCV RBC AUTO: 84.7 FL (ref 81.4–97.8)
MONOCYTES # BLD AUTO: 0.95 K/UL (ref 0–0.85)
MONOCYTES NFR BLD AUTO: 13 % (ref 0–13.4)
NEUTROPHILS # BLD AUTO: 4.13 K/UL (ref 1.82–7.42)
NEUTROPHILS NFR BLD: 56.5 % (ref 44–72)
NRBC # BLD AUTO: 0 K/UL
NRBC BLD-RTO: 0 /100 WBC (ref 0–0.2)
PLATELET # BLD AUTO: 214 K/UL (ref 164–446)
PMV BLD AUTO: 9.3 FL (ref 9–12.9)
POTASSIUM SERPL-SCNC: 4.8 MMOL/L (ref 3.6–5.5)
PROTHROMBIN TIME: 13.6 SEC (ref 12–14.6)
RBC # BLD AUTO: 5.36 M/UL (ref 4.7–6.1)
SCCMEC + MECA PNL NOSE NAA+PROBE: NEGATIVE
SCCMEC + MECA PNL NOSE NAA+PROBE: POSITIVE
SODIUM SERPL-SCNC: 137 MMOL/L (ref 135–145)
WBC # BLD AUTO: 7.3 K/UL (ref 4.8–10.8)

## 2024-02-08 PROCEDURE — 85730 THROMBOPLASTIN TIME PARTIAL: CPT

## 2024-02-08 PROCEDURE — 80048 BASIC METABOLIC PNL TOTAL CA: CPT

## 2024-02-08 PROCEDURE — 93010 ELECTROCARDIOGRAM REPORT: CPT | Performed by: INTERNAL MEDICINE

## 2024-02-08 PROCEDURE — 87640 STAPH A DNA AMP PROBE: CPT | Mod: XU

## 2024-02-08 PROCEDURE — 85610 PROTHROMBIN TIME: CPT

## 2024-02-08 PROCEDURE — 87641 MR-STAPH DNA AMP PROBE: CPT

## 2024-02-08 PROCEDURE — 85025 COMPLETE CBC W/AUTO DIFF WBC: CPT

## 2024-02-08 PROCEDURE — 71046 X-RAY EXAM CHEST 2 VIEWS: CPT

## 2024-02-08 PROCEDURE — 93005 ELECTROCARDIOGRAM TRACING: CPT

## 2024-02-08 PROCEDURE — 36415 COLL VENOUS BLD VENIPUNCTURE: CPT

## 2024-02-16 ENCOUNTER — TELEPHONE (OUTPATIENT)
Dept: MEDICAL GROUP | Facility: LAB | Age: 69
End: 2024-02-16

## 2024-02-16 NOTE — TELEPHONE ENCOUNTER
Patient called checking on the status of his clearance for neck surgery.  Surgery is suppose to happen Weds. Feb. 21st.    Patient states surgeon's office has been trying to get clearance for the past 3 weeks and has contacted this office.    Please contact patient  # [phone]

## 2024-02-20 ENCOUNTER — TELEPHONE (OUTPATIENT)
Dept: MEDICAL GROUP | Facility: LAB | Age: 69
End: 2024-02-20
Payer: MEDICARE

## 2024-02-20 ENCOUNTER — PATIENT MESSAGE (OUTPATIENT)
Dept: MEDICAL GROUP | Facility: LAB | Age: 69
End: 2024-02-20
Payer: MEDICARE

## 2024-02-20 NOTE — TELEPHONE ENCOUNTER
Randall:  I have reviewed the patient's chart, cholesterol is acceptable, and he has a history of minimal plaque deposition in his coronary arteries is an acceptable surgical risk, release has been signed and will be faxed to orthopedics today.   Regards, John Lao, DO

## 2024-02-20 NOTE — TELEPHONE ENCOUNTER
Pt says he is scheduled for surgery tomorrow, 2/21/24 with Dr. Gimenez and needs clearance for surgery ASAP or they are going to cancel.

## 2024-02-21 ENCOUNTER — ANESTHESIA (OUTPATIENT)
Dept: SURGERY | Facility: MEDICAL CENTER | Age: 69
End: 2024-02-21
Payer: MEDICARE

## 2024-02-21 ENCOUNTER — ANESTHESIA EVENT (OUTPATIENT)
Dept: SURGERY | Facility: MEDICAL CENTER | Age: 69
End: 2024-02-21
Payer: MEDICARE

## 2024-02-21 ENCOUNTER — APPOINTMENT (OUTPATIENT)
Dept: RADIOLOGY | Facility: MEDICAL CENTER | Age: 69
End: 2024-02-21
Attending: STUDENT IN AN ORGANIZED HEALTH CARE EDUCATION/TRAINING PROGRAM
Payer: MEDICARE

## 2024-02-21 ENCOUNTER — HOSPITAL ENCOUNTER (OUTPATIENT)
Facility: MEDICAL CENTER | Age: 69
End: 2024-02-22
Attending: STUDENT IN AN ORGANIZED HEALTH CARE EDUCATION/TRAINING PROGRAM | Admitting: STUDENT IN AN ORGANIZED HEALTH CARE EDUCATION/TRAINING PROGRAM
Payer: MEDICARE

## 2024-02-21 DIAGNOSIS — Z98.1 S/P CERVICAL SPINAL FUSION: ICD-10-CM

## 2024-02-21 PROCEDURE — 22551 ARTHRD ANT NTRBDY CERVICAL: CPT | Performed by: STUDENT IN AN ORGANIZED HEALTH CARE EDUCATION/TRAINING PROGRAM

## 2024-02-21 PROCEDURE — C1713 ANCHOR/SCREW BN/BN,TIS/BN: HCPCS | Performed by: STUDENT IN AN ORGANIZED HEALTH CARE EDUCATION/TRAINING PROGRAM

## 2024-02-21 PROCEDURE — 22853 INSJ BIOMECHANICAL DEVICE: CPT | Performed by: STUDENT IN AN ORGANIZED HEALTH CARE EDUCATION/TRAINING PROGRAM

## 2024-02-21 PROCEDURE — 20930 SP BONE ALGRFT MORSEL ADD-ON: CPT | Performed by: STUDENT IN AN ORGANIZED HEALTH CARE EDUCATION/TRAINING PROGRAM

## 2024-02-21 PROCEDURE — 160002 HCHG RECOVERY MINUTES (STAT): Performed by: STUDENT IN AN ORGANIZED HEALTH CARE EDUCATION/TRAINING PROGRAM

## 2024-02-21 PROCEDURE — 160009 HCHG ANES TIME/MIN: Performed by: STUDENT IN AN ORGANIZED HEALTH CARE EDUCATION/TRAINING PROGRAM

## 2024-02-21 PROCEDURE — 160035 HCHG PACU - 1ST 60 MINS PHASE I: Performed by: STUDENT IN AN ORGANIZED HEALTH CARE EDUCATION/TRAINING PROGRAM

## 2024-02-21 PROCEDURE — 110454 HCHG SHELL REV 250: Performed by: STUDENT IN AN ORGANIZED HEALTH CARE EDUCATION/TRAINING PROGRAM

## 2024-02-21 PROCEDURE — C1729 CATH, DRAINAGE: HCPCS | Performed by: STUDENT IN AN ORGANIZED HEALTH CARE EDUCATION/TRAINING PROGRAM

## 2024-02-21 PROCEDURE — 22853 INSJ BIOMECHANICAL DEVICE: CPT | Mod: ASROC

## 2024-02-21 PROCEDURE — 700105 HCHG RX REV CODE 258: Performed by: ANESTHESIOLOGY

## 2024-02-21 PROCEDURE — 700111 HCHG RX REV CODE 636 W/ 250 OVERRIDE (IP): Performed by: ANESTHESIOLOGY

## 2024-02-21 PROCEDURE — 72040 X-RAY EXAM NECK SPINE 2-3 VW: CPT

## 2024-02-21 PROCEDURE — 8968 PR NO CHARGE - PROCEDURE: Mod: ASROC

## 2024-02-21 PROCEDURE — 700111 HCHG RX REV CODE 636 W/ 250 OVERRIDE (IP): Mod: JZ | Performed by: ANESTHESIOLOGY

## 2024-02-21 PROCEDURE — 700105 HCHG RX REV CODE 258: Performed by: STUDENT IN AN ORGANIZED HEALTH CARE EDUCATION/TRAINING PROGRAM

## 2024-02-21 PROCEDURE — 95937 NEUROMUSCULAR JUNCTION TEST: CPT | Performed by: STUDENT IN AN ORGANIZED HEALTH CARE EDUCATION/TRAINING PROGRAM

## 2024-02-21 PROCEDURE — 95938 SOMATOSENSORY TESTING: CPT | Performed by: STUDENT IN AN ORGANIZED HEALTH CARE EDUCATION/TRAINING PROGRAM

## 2024-02-21 PROCEDURE — 160029 HCHG SURGERY MINUTES - 1ST 30 MINS LEVEL 4: Performed by: STUDENT IN AN ORGANIZED HEALTH CARE EDUCATION/TRAINING PROGRAM

## 2024-02-21 PROCEDURE — 700101 HCHG RX REV CODE 250: Performed by: STUDENT IN AN ORGANIZED HEALTH CARE EDUCATION/TRAINING PROGRAM

## 2024-02-21 PROCEDURE — 96366 THER/PROPH/DIAG IV INF ADDON: CPT

## 2024-02-21 PROCEDURE — 95861 NEEDLE EMG 2 EXTREMITIES: CPT | Performed by: STUDENT IN AN ORGANIZED HEALTH CARE EDUCATION/TRAINING PROGRAM

## 2024-02-21 PROCEDURE — 160048 HCHG OR STATISTICAL LEVEL 1-5: Performed by: STUDENT IN AN ORGANIZED HEALTH CARE EDUCATION/TRAINING PROGRAM

## 2024-02-21 PROCEDURE — 96365 THER/PROPH/DIAG IV INF INIT: CPT

## 2024-02-21 PROCEDURE — 700111 HCHG RX REV CODE 636 W/ 250 OVERRIDE (IP): Mod: JZ

## 2024-02-21 PROCEDURE — 96376 TX/PRO/DX INJ SAME DRUG ADON: CPT

## 2024-02-21 PROCEDURE — 700102 HCHG RX REV CODE 250 W/ 637 OVERRIDE(OP): Performed by: ANESTHESIOLOGY

## 2024-02-21 PROCEDURE — 700101 HCHG RX REV CODE 250: Performed by: ANESTHESIOLOGY

## 2024-02-21 PROCEDURE — A9270 NON-COVERED ITEM OR SERVICE: HCPCS | Performed by: ANESTHESIOLOGY

## 2024-02-21 PROCEDURE — 22551 ARTHRD ANT NTRBDY CERVICAL: CPT | Mod: ASROC

## 2024-02-21 PROCEDURE — 95940 IONM IN OPERATNG ROOM 15 MIN: CPT | Performed by: STUDENT IN AN ORGANIZED HEALTH CARE EDUCATION/TRAINING PROGRAM

## 2024-02-21 PROCEDURE — 700101 HCHG RX REV CODE 250

## 2024-02-21 PROCEDURE — 22845 INSERT SPINE FIXATION DEVICE: CPT | Mod: ASROC,59

## 2024-02-21 PROCEDURE — 700102 HCHG RX REV CODE 250 W/ 637 OVERRIDE(OP)

## 2024-02-21 PROCEDURE — 22845 INSERT SPINE FIXATION DEVICE: CPT | Mod: 59 | Performed by: STUDENT IN AN ORGANIZED HEALTH CARE EDUCATION/TRAINING PROGRAM

## 2024-02-21 PROCEDURE — 502240 HCHG MISC OR SUPPLY RC 0272: Performed by: STUDENT IN AN ORGANIZED HEALTH CARE EDUCATION/TRAINING PROGRAM

## 2024-02-21 PROCEDURE — 160041 HCHG SURGERY MINUTES - EA ADDL 1 MIN LEVEL 4: Performed by: STUDENT IN AN ORGANIZED HEALTH CARE EDUCATION/TRAINING PROGRAM

## 2024-02-21 PROCEDURE — 20936 SP BONE AGRFT LOCAL ADD-ON: CPT | Performed by: STUDENT IN AN ORGANIZED HEALTH CARE EDUCATION/TRAINING PROGRAM

## 2024-02-21 PROCEDURE — 502000 HCHG MISC OR IMPLANTS RC 0278: Performed by: STUDENT IN AN ORGANIZED HEALTH CARE EDUCATION/TRAINING PROGRAM

## 2024-02-21 PROCEDURE — 700111 HCHG RX REV CODE 636 W/ 250 OVERRIDE (IP): Mod: JZ | Performed by: STUDENT IN AN ORGANIZED HEALTH CARE EDUCATION/TRAINING PROGRAM

## 2024-02-21 PROCEDURE — 22552 ARTHRD ANT NTRBD CERVICAL EA: CPT | Performed by: STUDENT IN AN ORGANIZED HEALTH CARE EDUCATION/TRAINING PROGRAM

## 2024-02-21 PROCEDURE — G0378 HOSPITAL OBSERVATION PER HR: HCPCS

## 2024-02-21 PROCEDURE — 96375 TX/PRO/DX INJ NEW DRUG ADDON: CPT

## 2024-02-21 PROCEDURE — 95939 C MOTOR EVOKED UPR&LWR LIMBS: CPT | Performed by: STUDENT IN AN ORGANIZED HEALTH CARE EDUCATION/TRAINING PROGRAM

## 2024-02-21 PROCEDURE — 22552 ARTHRD ANT NTRBD CERVICAL EA: CPT | Mod: ASROC

## 2024-02-21 PROCEDURE — A9270 NON-COVERED ITEM OR SERVICE: HCPCS

## 2024-02-21 DEVICE — GRAFT BONE STRIP 3DEMIN L50 MM X W10 MM: Type: IMPLANTABLE DEVICE | Site: SPINE CERVICAL | Status: FUNCTIONAL

## 2024-02-21 DEVICE — IMPLANTABLE DEVICE: Type: IMPLANTABLE DEVICE | Site: SPINE CERVICAL | Status: FUNCTIONAL

## 2024-02-21 RX ORDER — DEXAMETHASONE SODIUM PHOSPHATE 4 MG/ML
4 INJECTION, SOLUTION INTRA-ARTICULAR; INTRALESIONAL; INTRAMUSCULAR; INTRAVENOUS; SOFT TISSUE EVERY 6 HOURS
Status: COMPLETED | OUTPATIENT
Start: 2024-02-21 | End: 2024-02-21

## 2024-02-21 RX ORDER — DEXAMETHASONE SODIUM PHOSPHATE 4 MG/ML
INJECTION, SOLUTION INTRA-ARTICULAR; INTRALESIONAL; INTRAMUSCULAR; INTRAVENOUS; SOFT TISSUE PRN
Status: DISCONTINUED | OUTPATIENT
Start: 2024-02-21 | End: 2024-02-21 | Stop reason: SURG

## 2024-02-21 RX ORDER — SODIUM CHLORIDE, SODIUM LACTATE, POTASSIUM CHLORIDE, CALCIUM CHLORIDE 600; 310; 30; 20 MG/100ML; MG/100ML; MG/100ML; MG/100ML
INJECTION, SOLUTION INTRAVENOUS CONTINUOUS
Status: DISCONTINUED | OUTPATIENT
Start: 2024-02-21 | End: 2024-02-21 | Stop reason: HOSPADM

## 2024-02-21 RX ORDER — DIAZEPAM 5 MG/1
5 TABLET ORAL EVERY 12 HOURS PRN
Status: DISCONTINUED | OUTPATIENT
Start: 2024-02-21 | End: 2024-02-22 | Stop reason: HOSPADM

## 2024-02-21 RX ORDER — TRANEXAMIC ACID 100 MG/ML
1000 INJECTION, SOLUTION INTRAVENOUS ONCE
Status: DISCONTINUED | OUTPATIENT
Start: 2024-02-21 | End: 2024-02-21 | Stop reason: HOSPADM

## 2024-02-21 RX ORDER — OXYCODONE HYDROCHLORIDE 5 MG/1
5 TABLET ORAL
Status: DISCONTINUED | OUTPATIENT
Start: 2024-02-21 | End: 2024-02-22 | Stop reason: HOSPADM

## 2024-02-21 RX ORDER — HYDROMORPHONE HYDROCHLORIDE 1 MG/ML
0.25 INJECTION, SOLUTION INTRAMUSCULAR; INTRAVENOUS; SUBCUTANEOUS
Status: DISCONTINUED | OUTPATIENT
Start: 2024-02-21 | End: 2024-02-21

## 2024-02-21 RX ORDER — PRAVASTATIN SODIUM 20 MG
80 TABLET ORAL EVERY EVENING
Status: DISCONTINUED | OUTPATIENT
Start: 2024-02-21 | End: 2024-02-22 | Stop reason: HOSPADM

## 2024-02-21 RX ORDER — HYDROMORPHONE HYDROCHLORIDE 1 MG/ML
0.5 INJECTION, SOLUTION INTRAMUSCULAR; INTRAVENOUS; SUBCUTANEOUS
Status: DISCONTINUED | OUTPATIENT
Start: 2024-02-21 | End: 2024-02-21 | Stop reason: HOSPADM

## 2024-02-21 RX ORDER — OXYCODONE HCL 5 MG/5 ML
10 SOLUTION, ORAL ORAL
Status: COMPLETED | OUTPATIENT
Start: 2024-02-21 | End: 2024-02-21

## 2024-02-21 RX ORDER — OXYCODONE HYDROCHLORIDE 5 MG/1
2.5 TABLET ORAL
Status: DISCONTINUED | OUTPATIENT
Start: 2024-02-21 | End: 2024-02-21

## 2024-02-21 RX ORDER — ENEMA 19; 7 G/133ML; G/133ML
1 ENEMA RECTAL
Status: DISCONTINUED | OUTPATIENT
Start: 2024-02-21 | End: 2024-02-22 | Stop reason: HOSPADM

## 2024-02-21 RX ORDER — AMOXICILLIN 250 MG
1 CAPSULE ORAL NIGHTLY
Status: DISCONTINUED | OUTPATIENT
Start: 2024-02-21 | End: 2024-02-22 | Stop reason: HOSPADM

## 2024-02-21 RX ORDER — DIPHENHYDRAMINE HYDROCHLORIDE 50 MG/ML
25 INJECTION INTRAMUSCULAR; INTRAVENOUS EVERY 6 HOURS PRN
Status: DISCONTINUED | OUTPATIENT
Start: 2024-02-21 | End: 2024-02-22 | Stop reason: HOSPADM

## 2024-02-21 RX ORDER — CYCLOBENZAPRINE HCL 10 MG
10 TABLET ORAL EVERY 8 HOURS PRN
Status: DISCONTINUED | OUTPATIENT
Start: 2024-02-21 | End: 2024-02-22 | Stop reason: HOSPADM

## 2024-02-21 RX ORDER — ONDANSETRON 4 MG/1
4 TABLET, ORALLY DISINTEGRATING ORAL EVERY 4 HOURS PRN
Status: DISCONTINUED | OUTPATIENT
Start: 2024-02-21 | End: 2024-02-22 | Stop reason: HOSPADM

## 2024-02-21 RX ORDER — HYDRALAZINE HYDROCHLORIDE 20 MG/ML
10 INJECTION INTRAMUSCULAR; INTRAVENOUS
Status: DISCONTINUED | OUTPATIENT
Start: 2024-02-21 | End: 2024-02-22 | Stop reason: HOSPADM

## 2024-02-21 RX ORDER — DOCUSATE SODIUM 100 MG/1
100 CAPSULE, LIQUID FILLED ORAL 2 TIMES DAILY
Status: DISCONTINUED | OUTPATIENT
Start: 2024-02-21 | End: 2024-02-22 | Stop reason: HOSPADM

## 2024-02-21 RX ORDER — OXYCODONE HYDROCHLORIDE 5 MG/1
5 TABLET ORAL
Status: DISCONTINUED | OUTPATIENT
Start: 2024-02-21 | End: 2024-02-21

## 2024-02-21 RX ORDER — DIPHENHYDRAMINE HYDROCHLORIDE 50 MG/ML
12.5 INJECTION INTRAMUSCULAR; INTRAVENOUS
Status: DISCONTINUED | OUTPATIENT
Start: 2024-02-21 | End: 2024-02-21 | Stop reason: HOSPADM

## 2024-02-21 RX ORDER — MIDAZOLAM HYDROCHLORIDE 1 MG/ML
INJECTION INTRAMUSCULAR; INTRAVENOUS PRN
Status: DISCONTINUED | OUTPATIENT
Start: 2024-02-21 | End: 2024-02-21 | Stop reason: SURG

## 2024-02-21 RX ORDER — HYDROMORPHONE HYDROCHLORIDE 1 MG/ML
0.2 INJECTION, SOLUTION INTRAMUSCULAR; INTRAVENOUS; SUBCUTANEOUS
Status: DISCONTINUED | OUTPATIENT
Start: 2024-02-21 | End: 2024-02-21 | Stop reason: HOSPADM

## 2024-02-21 RX ORDER — HYDROMORPHONE HYDROCHLORIDE 1 MG/ML
0.4 INJECTION, SOLUTION INTRAMUSCULAR; INTRAVENOUS; SUBCUTANEOUS
Status: DISCONTINUED | OUTPATIENT
Start: 2024-02-21 | End: 2024-02-21 | Stop reason: HOSPADM

## 2024-02-21 RX ORDER — HYDROMORPHONE HYDROCHLORIDE 2 MG/ML
INJECTION, SOLUTION INTRAMUSCULAR; INTRAVENOUS; SUBCUTANEOUS PRN
Status: DISCONTINUED | OUTPATIENT
Start: 2024-02-21 | End: 2024-02-21 | Stop reason: SURG

## 2024-02-21 RX ORDER — OXYCODONE HYDROCHLORIDE 10 MG/1
10 TABLET ORAL
Status: DISCONTINUED | OUTPATIENT
Start: 2024-02-21 | End: 2024-02-22 | Stop reason: HOSPADM

## 2024-02-21 RX ORDER — AMOXICILLIN 250 MG
1 CAPSULE ORAL
Status: DISCONTINUED | OUTPATIENT
Start: 2024-02-21 | End: 2024-02-22 | Stop reason: HOSPADM

## 2024-02-21 RX ORDER — HALOPERIDOL 5 MG/ML
1 INJECTION INTRAMUSCULAR
Status: DISCONTINUED | OUTPATIENT
Start: 2024-02-21 | End: 2024-02-21 | Stop reason: HOSPADM

## 2024-02-21 RX ORDER — ONDANSETRON 2 MG/ML
INJECTION INTRAMUSCULAR; INTRAVENOUS PRN
Status: DISCONTINUED | OUTPATIENT
Start: 2024-02-21 | End: 2024-02-21 | Stop reason: SURG

## 2024-02-21 RX ORDER — MEPERIDINE HYDROCHLORIDE 25 MG/ML
12.5 INJECTION INTRAMUSCULAR; INTRAVENOUS; SUBCUTANEOUS
Status: DISCONTINUED | OUTPATIENT
Start: 2024-02-21 | End: 2024-02-21 | Stop reason: HOSPADM

## 2024-02-21 RX ORDER — CLINDAMYCIN PHOSPHATE 900 MG/50ML
900 INJECTION, SOLUTION INTRAVENOUS EVERY 8 HOURS
Qty: 100 ML | Refills: 0 | Status: COMPLETED | OUTPATIENT
Start: 2024-02-21 | End: 2024-02-21

## 2024-02-21 RX ORDER — REMIFENTANIL HYDROCHLORIDE 1 MG/ML
INJECTION, POWDER, LYOPHILIZED, FOR SOLUTION INTRAVENOUS
Status: DISCONTINUED | OUTPATIENT
Start: 2024-02-21 | End: 2024-02-21

## 2024-02-21 RX ORDER — TESTOSTERONE CYPIONATE 200 MG/ML
200 VIAL (ML) INTRAMUSCULAR
COMMUNITY

## 2024-02-21 RX ORDER — POLYETHYLENE GLYCOL 3350 17 G/17G
1 POWDER, FOR SOLUTION ORAL 2 TIMES DAILY PRN
Status: DISCONTINUED | OUTPATIENT
Start: 2024-02-21 | End: 2024-02-22 | Stop reason: HOSPADM

## 2024-02-21 RX ORDER — CALCIUM CARBONATE 500 MG/1
500 TABLET, CHEWABLE ORAL 2 TIMES DAILY
Status: DISCONTINUED | OUTPATIENT
Start: 2024-02-21 | End: 2024-02-22 | Stop reason: HOSPADM

## 2024-02-21 RX ORDER — SODIUM CHLORIDE, SODIUM LACTATE, POTASSIUM CHLORIDE, CALCIUM CHLORIDE 600; 310; 30; 20 MG/100ML; MG/100ML; MG/100ML; MG/100ML
INJECTION, SOLUTION INTRAVENOUS CONTINUOUS
Status: ACTIVE | OUTPATIENT
Start: 2024-02-21 | End: 2024-02-21

## 2024-02-21 RX ORDER — BISACODYL 10 MG
10 SUPPOSITORY, RECTAL RECTAL
Status: DISCONTINUED | OUTPATIENT
Start: 2024-02-21 | End: 2024-02-22 | Stop reason: HOSPADM

## 2024-02-21 RX ORDER — ACETAMINOPHEN 500 MG
1000 TABLET ORAL EVERY 6 HOURS PRN
Status: DISCONTINUED | OUTPATIENT
Start: 2024-02-26 | End: 2024-02-22 | Stop reason: HOSPADM

## 2024-02-21 RX ORDER — LABETALOL HYDROCHLORIDE 5 MG/ML
10 INJECTION, SOLUTION INTRAVENOUS
Status: DISCONTINUED | OUTPATIENT
Start: 2024-02-21 | End: 2024-02-22 | Stop reason: HOSPADM

## 2024-02-21 RX ORDER — PAROXETINE HYDROCHLORIDE 20 MG/1
20 TABLET, FILM COATED ORAL DAILY
Status: DISCONTINUED | OUTPATIENT
Start: 2024-02-21 | End: 2024-02-22 | Stop reason: HOSPADM

## 2024-02-21 RX ORDER — HYDRALAZINE HYDROCHLORIDE 20 MG/ML
5 INJECTION INTRAMUSCULAR; INTRAVENOUS
Status: DISCONTINUED | OUTPATIENT
Start: 2024-02-21 | End: 2024-02-21 | Stop reason: HOSPADM

## 2024-02-21 RX ORDER — EPHEDRINE SULFATE 50 MG/ML
5 INJECTION, SOLUTION INTRAVENOUS
Status: DISCONTINUED | OUTPATIENT
Start: 2024-02-21 | End: 2024-02-21 | Stop reason: HOSPADM

## 2024-02-21 RX ORDER — ALPRAZOLAM 0.25 MG/1
0.25 TABLET ORAL 2 TIMES DAILY PRN
Status: DISCONTINUED | OUTPATIENT
Start: 2024-02-21 | End: 2024-02-22 | Stop reason: HOSPADM

## 2024-02-21 RX ORDER — OMEPRAZOLE 20 MG/1
20 CAPSULE, DELAYED RELEASE ORAL EVERY MORNING
Status: DISCONTINUED | OUTPATIENT
Start: 2024-02-21 | End: 2024-02-22 | Stop reason: HOSPADM

## 2024-02-21 RX ORDER — CEFAZOLIN SODIUM 1 G/3ML
2 INJECTION, POWDER, FOR SOLUTION INTRAMUSCULAR; INTRAVENOUS ONCE
Status: DISCONTINUED | OUTPATIENT
Start: 2024-02-21 | End: 2024-02-21 | Stop reason: HOSPADM

## 2024-02-21 RX ORDER — DIPHENHYDRAMINE HCL 25 MG
25 TABLET ORAL EVERY 6 HOURS PRN
Status: DISCONTINUED | OUTPATIENT
Start: 2024-02-21 | End: 2024-02-22 | Stop reason: HOSPADM

## 2024-02-21 RX ORDER — METHYLPREDNISOLONE ACETATE 40 MG/ML
INJECTION, SUSPENSION INTRA-ARTICULAR; INTRALESIONAL; INTRAMUSCULAR; SOFT TISSUE
Status: DISCONTINUED | OUTPATIENT
Start: 2024-02-21 | End: 2024-02-21 | Stop reason: HOSPADM

## 2024-02-21 RX ORDER — ONDANSETRON 2 MG/ML
4 INJECTION INTRAMUSCULAR; INTRAVENOUS EVERY 4 HOURS PRN
Status: DISCONTINUED | OUTPATIENT
Start: 2024-02-21 | End: 2024-02-22 | Stop reason: HOSPADM

## 2024-02-21 RX ORDER — ACETAMINOPHEN 500 MG
1000 TABLET ORAL EVERY 6 HOURS
Qty: 40 TABLET | Refills: 0 | Status: DISCONTINUED | OUTPATIENT
Start: 2024-02-21 | End: 2024-02-22 | Stop reason: HOSPADM

## 2024-02-21 RX ORDER — HYDROMORPHONE HYDROCHLORIDE 1 MG/ML
0.5 INJECTION, SOLUTION INTRAMUSCULAR; INTRAVENOUS; SUBCUTANEOUS
Status: DISCONTINUED | OUTPATIENT
Start: 2024-02-21 | End: 2024-02-22 | Stop reason: HOSPADM

## 2024-02-21 RX ORDER — LIDOCAINE HYDROCHLORIDE 20 MG/ML
INJECTION, SOLUTION EPIDURAL; INFILTRATION; INTRACAUDAL; PERINEURAL PRN
Status: DISCONTINUED | OUTPATIENT
Start: 2024-02-21 | End: 2024-02-21 | Stop reason: SURG

## 2024-02-21 RX ORDER — CLONIDINE HYDROCHLORIDE 0.1 MG/1
0.1 TABLET ORAL EVERY 4 HOURS PRN
Status: DISCONTINUED | OUTPATIENT
Start: 2024-02-21 | End: 2024-02-22 | Stop reason: HOSPADM

## 2024-02-21 RX ORDER — LISINOPRIL 20 MG/1
20 TABLET ORAL EVERY EVENING
Status: DISCONTINUED | OUTPATIENT
Start: 2024-02-21 | End: 2024-02-22 | Stop reason: HOSPADM

## 2024-02-21 RX ORDER — OXYCODONE HCL 5 MG/5 ML
5 SOLUTION, ORAL ORAL
Status: COMPLETED | OUTPATIENT
Start: 2024-02-21 | End: 2024-02-21

## 2024-02-21 RX ADMIN — ACETAMINOPHEN 1000 MG: 500 TABLET ORAL at 23:53

## 2024-02-21 RX ADMIN — OXYCODONE HYDROCHLORIDE 10 MG: 10 TABLET ORAL at 19:59

## 2024-02-21 RX ADMIN — DEXAMETHASONE SODIUM PHOSPHATE 4 MG: 4 INJECTION INTRA-ARTICULAR; INTRALESIONAL; INTRAMUSCULAR; INTRAVENOUS; SOFT TISSUE at 13:18

## 2024-02-21 RX ADMIN — DOCUSATE SODIUM 50 MG AND SENNOSIDES 8.6 MG 1 TABLET: 8.6; 5 TABLET, FILM COATED ORAL at 22:41

## 2024-02-21 RX ADMIN — OXYCODONE HYDROCHLORIDE 10 MG: 5 SOLUTION ORAL at 09:53

## 2024-02-21 RX ADMIN — SODIUM CHLORIDE, POTASSIUM CHLORIDE, SODIUM LACTATE AND CALCIUM CHLORIDE: 600; 310; 30; 20 INJECTION, SOLUTION INTRAVENOUS at 07:00

## 2024-02-21 RX ADMIN — DEXAMETHASONE SODIUM PHOSPHATE 8 MG: 4 INJECTION INTRA-ARTICULAR; INTRALESIONAL; INTRAMUSCULAR; INTRAVENOUS; SOFT TISSUE at 07:10

## 2024-02-21 RX ADMIN — LIDOCAINE HYDROCHLORIDE 80 MG: 20 INJECTION, SOLUTION EPIDURAL; INFILTRATION; INTRACAUDAL at 07:04

## 2024-02-21 RX ADMIN — MIDAZOLAM HYDROCHLORIDE 2 MG: 1 INJECTION, SOLUTION INTRAMUSCULAR; INTRAVENOUS at 07:00

## 2024-02-21 RX ADMIN — FENTANYL CITRATE 50 MCG: 50 INJECTION, SOLUTION INTRAMUSCULAR; INTRAVENOUS at 10:04

## 2024-02-21 RX ADMIN — METOPROLOL TARTRATE 50 MG: 25 TABLET, FILM COATED ORAL at 17:40

## 2024-02-21 RX ADMIN — DOCUSATE SODIUM 100 MG: 100 CAPSULE, LIQUID FILLED ORAL at 17:41

## 2024-02-21 RX ADMIN — CLINDAMYCIN PHOSPHATE 900 MG: 150 INJECTION, SOLUTION INTRAMUSCULAR; INTRAVENOUS at 07:15

## 2024-02-21 RX ADMIN — PROPOFOL 150 MG: 10 INJECTION, EMULSION INTRAVENOUS at 07:04

## 2024-02-21 RX ADMIN — LISINOPRIL 20 MG: 20 TABLET ORAL at 17:42

## 2024-02-21 RX ADMIN — ANTACID TABLETS 500 MG: 500 TABLET, CHEWABLE ORAL at 17:41

## 2024-02-21 RX ADMIN — Medication 200 MCG/KG/HR: at 07:10

## 2024-02-21 RX ADMIN — PRAVASTATIN SODIUM 80 MG: 20 TABLET ORAL at 17:40

## 2024-02-21 RX ADMIN — DEXAMETHASONE SODIUM PHOSPHATE 4 MG: 4 INJECTION INTRA-ARTICULAR; INTRALESIONAL; INTRAMUSCULAR; INTRAVENOUS; SOFT TISSUE at 23:53

## 2024-02-21 RX ADMIN — DEXAMETHASONE SODIUM PHOSPHATE 4 MG: 4 INJECTION INTRA-ARTICULAR; INTRALESIONAL; INTRAMUSCULAR; INTRAVENOUS; SOFT TISSUE at 17:40

## 2024-02-21 RX ADMIN — CYCLOBENZAPRINE 10 MG: 10 TABLET, FILM COATED ORAL at 22:41

## 2024-02-21 RX ADMIN — PAROXETINE HYDROCHLORIDE 20 MG: 20 TABLET, FILM COATED ORAL at 13:18

## 2024-02-21 RX ADMIN — PROPOFOL 100 MCG/KG/MIN: 10 INJECTION, EMULSION INTRAVENOUS at 07:10

## 2024-02-21 RX ADMIN — HYDROMORPHONE HYDROCHLORIDE 0.2 MG: 1 INJECTION, SOLUTION INTRAMUSCULAR; INTRAVENOUS; SUBCUTANEOUS at 10:49

## 2024-02-21 RX ADMIN — OXYCODONE HYDROCHLORIDE 10 MG: 10 TABLET ORAL at 16:18

## 2024-02-21 RX ADMIN — SODIUM CHLORIDE, POTASSIUM CHLORIDE, SODIUM LACTATE AND CALCIUM CHLORIDE: 600; 310; 30; 20 INJECTION, SOLUTION INTRAVENOUS at 06:18

## 2024-02-21 RX ADMIN — HYDROMORPHONE HYDROCHLORIDE 2 MG: 2 INJECTION INTRAMUSCULAR; INTRAVENOUS; SUBCUTANEOUS at 07:05

## 2024-02-21 RX ADMIN — HYDROMORPHONE HYDROCHLORIDE 0.2 MG: 1 INJECTION, SOLUTION INTRAMUSCULAR; INTRAVENOUS; SUBCUTANEOUS at 10:42

## 2024-02-21 RX ADMIN — FENTANYL CITRATE 25 MCG: 50 INJECTION, SOLUTION INTRAMUSCULAR; INTRAVENOUS at 10:34

## 2024-02-21 RX ADMIN — OXYCODONE HYDROCHLORIDE 10 MG: 10 TABLET ORAL at 23:53

## 2024-02-21 RX ADMIN — CLINDAMYCIN IN 5 PERCENT DEXTROSE 900 MG: 18 INJECTION, SOLUTION INTRAVENOUS at 14:54

## 2024-02-21 RX ADMIN — OMEPRAZOLE 20 MG: 20 CAPSULE, DELAYED RELEASE ORAL at 13:19

## 2024-02-21 RX ADMIN — CLINDAMYCIN IN 5 PERCENT DEXTROSE 900 MG: 18 INJECTION, SOLUTION INTRAVENOUS at 22:43

## 2024-02-21 RX ADMIN — OXYCODONE HYDROCHLORIDE 10 MG: 10 TABLET ORAL at 13:18

## 2024-02-21 RX ADMIN — ACETAMINOPHEN 1000 MG: 500 TABLET ORAL at 17:41

## 2024-02-21 RX ADMIN — FENTANYL CITRATE 25 MCG: 50 INJECTION, SOLUTION INTRAMUSCULAR; INTRAVENOUS at 10:17

## 2024-02-21 RX ADMIN — CYCLOBENZAPRINE 10 MG: 10 TABLET, FILM COATED ORAL at 14:39

## 2024-02-21 RX ADMIN — REMIFENTANIL HYDROCHLORIDE 0.1 MCG/KG/MIN: 1 INJECTION, POWDER, LYOPHILIZED, FOR SOLUTION INTRAVENOUS at 07:11

## 2024-02-21 RX ADMIN — ONDANSETRON 4 MG: 2 INJECTION INTRAMUSCULAR; INTRAVENOUS at 09:12

## 2024-02-21 ASSESSMENT — PAIN DESCRIPTION - PAIN TYPE
TYPE: SURGICAL PAIN

## 2024-02-21 ASSESSMENT — FIBROSIS 4 INDEX
FIB4 SCORE: 1.92
FIB4 SCORE: 1.92

## 2024-02-21 NOTE — OP REPORT
PREOPERATIVE DIAGNOSIS(ES): Cervical radiculopathy     POSTOPERATIVE DIAGNOSIS(ES): Cervical radiculopathy     PROCEDURE: Anterior cervical diskectomy and instrumented fusion at C5-7, titanium interbody cage  x 2, local autograft, nonstructural allograft/DBM     ANESTHESIA: General endotracheal.     SURGEON(S): Jose Gimenez MD     ASSISTANT: Kitty Truong PA-C     ESTIMATED BLOOD LOSS: 50 cc.     TRANSFUSIONS: None.     IMPLANTS: Nuvasive ACP 2-level cervical plate x1, 15mm screws x6,  2x titanium interbody cage x 2, demineralized bone matrix.     DRAINS: penrose x1.     SPECIMENS: None.     COMPLICATIONS: None.     DISPOSITION: The patient was transferred to the recovery room at the end of the case in stable condition.     INDICATIONS: 68 y.o. male that presents for follow up of severe left upper extremity weakness.    MRI demonstrates stenosis at the C5-6 C6-7 level.  EMG of the left upper extremity performed at [facility] 1/9/2024 independently reviewed demonstrates mild median neuropathy. Left subacute C5 radiculopathy with active denervation.  Significant left upper extremity weakness on exam.  Due to the presence of neurologic deficits I discussed the surgery above.    .Prior to surgery, the patient was told the nature of the surgical procedure, the alternatives of the surgery, and the risks and benefits of the operation. The risks of the surgery include, but not limited to nerve root injury, spinal cord injury, cerebrospinal fluid leak, incomplete resolution of symptoms, iatrogenic destabilization, instrumentation failure, wound infection, nonunion, swallowing difficulties, vascular and visceral injury, and general medical and anesthetic complications. Despite the risks, the patient elected to proceed with surgery.     DESCRIPTION OF PROCEDURE: The patient was identified in the holding area by her name, medical record number, and date of birth. The surgical site was marked and preoperative  antibiotics were administered. The patient was transferred to the operating room where general anesthesia was induced. Sequential compression devices were placed and activated. The patient was placed in the supine position with their neck gently extended. The anterior neck was prepped and draped in the standard fashion. A surgical time-out was performed. A transverse incision was made on the right side at the C6 level. This incision was carried down through subcutaneous tissue exposing the underlying platysma. The platysma was split transversely in line with the skin incision, and small subplatysmal flaps were raised. The interval between the sternocleidomastoid and the strap muscles was identified and was used to dissect to the anterior aspect of the patient's spine. Lateral radiograph confirmed the surgical levels. The longus colli was elevated from C5-C7.       Self-retaining retractors were placed. Columbia pins were placed in the midbody of C5 and C7.   The C5-6 and C6-7 segments were distracted. There was no change in the patient's spinal cord function after distraction or at any other point during the operation. Next, the diskectomy was performed using curette and high-speed mis back to the level of the posterior longitudinal ligament at the top level. The posterior longitudinal ligament was breached using a nerve hook. A 2 mm Kerrison was used to decompress the central canal and the neural foramina bilaterally. Same was repeated at the bottom level.  Palpation using a nerve hook confirmed the adequacy of decompression bilaterally at both levels. Hemostasis was achieved using FloSeal and electrocautery. Serial trials  were used to select appropriate size grafts. Grafts were filled with autologous bone harvested from gentle decortication of the endplates and augmented with demineralized bone matrix. The grafts were malleted into place where each had a snug fit.      Autologous bone graft collected from the  vertebral bodies at time of endplate decortication was packed in the disc spaces around the graft.     A 2-level cervical plate was selected and applied to the anterior aspect of the patient's spine.  This was fixed with 2 screws in C5, 2 screws in C6, 2 screws and C7. All screws were tightented down. The anti-backout mechanism was engaged at all levels.     Lateral radiograph confirmed good position of the instrumentation and interbody grafts.   The wound was irrigated with a liter of crystalloid containing antibiotics. A subplatysmal drain was placed. The platysma was closed using interrupted 2-0 Vicryl sutures. The superficial subcutaneous tissue was closed using interrupted 3-0 Vicryl sutures. The skin was closed using running subcuticular 4-0 proline suture and steristrips.     A sterile bandage was placed. The patient was extubated without incident and transferred to the recovery room in stable condition. At the end of the operation, all sponge, needle, and instrument counts were correct. I was present for and performed all critical aspects of the surgery. There were no complications associated with the surgery.      Jose Gimenez M.D.

## 2024-02-21 NOTE — ANESTHESIA PROCEDURE NOTES
Airway    Date/Time: 2/21/2024 7:06 AM    Performed by: Gustabo Hart M.D.  Authorized by: Gustabo Hart M.D.    Location:  OR  Urgency:  Elective  Difficult Airway: No    Indications for Airway Management:  Anesthesia      Spontaneous Ventilation: absent    Sedation Level:  Deep  Preoxygenated: Yes    Patient Position:  Sniffing  Mask Difficulty Assessment:  0 - not attempted  Final Airway Type:  Endotracheal airway  Final Endotracheal Airway:  ETT  Cuffed: Yes    Technique Used for Successful ETT Placement:  Video laryngoscopy    Insertion Site:  Oral  Blade Type:  Glide  Laryngoscope Blade/Videolaryngoscope Blade Size:  3  ETT Size (mm):  7.5  Measured from:  Teeth  ETT to Teeth (cm):  23  Placement Verified by: auscultation and capnometry    Cormack-Lehane Classification:  Grade I - full view of glottis  Number of Attempts at Approach:  1

## 2024-02-21 NOTE — ANESTHESIA PREPROCEDURE EVALUATION
Case: [id number] Date/Time: 02/21/24 0645    Procedure: C5-7 anterior cervical discectomy and instrumented fusion    Diagnosis: Cervical stenosis of spine [M48.02]    Pre-op diagnosis: Cervical stenosis of spine [M48.02]    Location: [place] [address] / SURGERY [facility]    Surgeons: Jose Gimenez M.D.            Relevant Problems   ANESTHESIA   (positive) Difficult intubation      CARDIAC   (positive) Aortic insufficiency   (positive) Coronary artery disease due to calcified coronary lesion: Mildly cardiac catheterization in 2014   (positive) Essential hypertension      GI   (positive) Gastroesophageal reflux disease      Other   (positive) Osteoarthrosis, unspecified whether generalized or localized, pelvic region and thigh   (positive) Rheumatoid arthritis (HCC)     Anes H&P:  PAST MEDICAL HISTORY:   68 y.o. male who presents for Procedure(s):  C5-7 anterior cervical discectomy and instrumented fusion.  He has current and past medical problems significant for:    Past Medical History:   Diagnosis Date    Abnormal myocardial perfusion study 01/15/2014    Aortic insufficiency 07/05/2011    Arthritis     RA, and osteo    Bronchitis     CAD (coronary artery disease) mild plaque at cath in 2/14 12/05/2014    Cancer (HCC)     skin    Chronic use of opiate drugs therapeutic purposes 08/12/2017    Dental disorder     Upper dentures.    Dyslipidemia 07/12/2011    Hard to intubate     Heart burn     Heart murmur     Hiatus hernia syndrome     High cholesterol     HTN (hypertension) 07/05/2011    Hypertension     Indigestion     Infectious disease     Pain     RA    Pain     hands, feet and jaw    Rheumatoid arthritis(714.0)     severe    Tachycardia 10/20/2014       SMOKING/ALCOHOL/RECREATIONAL DRUG USE:  Social History     Tobacco Use    Smoking status: Never    Smokeless tobacco: Never   Vaping Use    Vaping Use: Never used   Substance Use Topics    Alcohol use: Not Currently     Comment: 3 per week    Drug use: No     Types:  Inhaled     Comment: marijuana, 4x/week     Social History     Substance and Sexual Activity   Drug Use No    Types: Inhaled    Comment: marijuana, 4x/week       PAST SURGICAL HISTORY:  Past Surgical History:   Procedure Laterality Date    ORIF, ANKLE Left 07/30/2023    Procedure: ORIF, ANKLE;  Surgeon: José Manuel Davies M.D.;  Location: SURGERY [facility];  Service: Orthopedics    PB REMOVAL DEEP IMPLANT Right 05/06/2022    Procedure: RIGHT TOE REMOVAL OF HARDWARE, RIGHT SECOND HAMMERTOE PERCUTANEOUS TENOTOMY;  Surgeon: Pedro Restrepo M.D.;  Location: [facility];  Service: Orthopedics    WI COR HLX VLGS RSC PRX PHLX BS Right 01/13/2022    Procedure: RIGHT FOOT AKIN OSTEOTOMY, RIGHT DISTAL SOFT TISSUE PROCEDURE;  Surgeon: Pedro Restrepo M.D.;  Location: [facility];  Service: Orthopedics    PB OSTEOTOMY METATARSALS,MULTIPLE Right 01/13/2022    Procedure: RIGHT SECOND AND THIRD DISTAL METATARSAL OSTEOTOMY;  Surgeon: Pedro Restrepo M.D.;  Location: [facility];  Service: Orthopedics    PB REPAIR OF HAMMERTOE,ONE Right 01/13/2022    Procedure: RIGHT SECOND AND THIRD HAMMER TOE CORRECTION;  Surgeon: Pedro Restrepo M.D.;  Location: [facility];  Service: Orthopedics    PB RECONSTRUC TOE DEFORM,SOFT TISSUE Right 01/13/2022    Procedure: RIGHT SECOND AND THIRD METATARSOPHALANGEAL JOINT RECONSTRUCTION;  Surgeon: Pedro Restrepo M.D.;  Location: [facility];  Service: Orthopedics    WI TENOTOMY PERC TOE SINGLE TENDON Right 01/13/2022    Procedure: RIGHT 3rd, 4th and 5th FLEXOR TENOTOMY;  Surgeon: Pedro Restrepo M.D.;  Location: [facility];  Service: Orthopedics    WI SHLDR ARTHROSCOP,PART ACROMIOPLAS Left 08/05/2020    Procedure: DECOMPRESSION, SHOULDER, ARTHROSCOPIC - SUBACROMIAL;  Surgeon: Emanuel Vance M.D.;  Location: SURGERY [facility];  Service: Orthopedics    PB SHLDR  ARTHROSCOP,SURG,W/ROTAT CUFF REPB Left 08/05/2020    Procedure: ARTHROSCOPY, SHOULDER, WITH ROTATOR CUFF REPAIR - AND DALAL;  Surgeon: Emanuel Vance M.D.;  Location: [facility];  Service: Orthopedics    CLAVICLE DISTAL EXCISION Left 08/05/2020    Procedure: EXCISION, CLAVICLE, DISTAL - WITH EXTENSIVE DEBRIDEMENT;  Surgeon: Emanuel Vance M.D.;  Location: [facility];  Service: Orthopedics    ME TRANSURETHRAL ELEC-SURG PROSTATECTOM  04/01/2019    Procedure: BIPOLAR TRANSURETHRAL RESECTION OF PROSTATE;  Surgeon: Jose Romo M.D.;  Location: [facility];  Service: Urology    CYSTOSCOPY  04/01/2019    Procedure: CYSTOSCOPY;  Surgeon: Jose Romo M.D.;  Location: [facility];  Service: Urology    URETHROTOMY INTERNAL  04/01/2019    Procedure: DIRECT VISION INTERNAL URETHROTOMY;  Surgeon: Jose Romo M.D.;  Location: [facility];  Service: Urology    KNEE REVISION TOTAL Left 08/03/2018    Procedure: KNEE REVISION TOTAL;  Surgeon: Michael Rodriguez M.D.;  Location: [facility];  Service: Orthopedics    CYSTOSCOPY  05/16/2018    Procedure: CYSTOSCOPY-CLOT EVAC;  Surgeon: Jose Romo M.D.;  Location: [facility];  Service: Urology    TRANS URETHRAL RESECTION BLADDER  05/16/2018    Procedure: TRANS URETHRAL RESECTION BLADDER;  Surgeon: Jose Romo M.D.;  Location: [facility];  Service: Urology    RECOVERY  02/03/2014    Performed by Cath-Recovery Surgery at SURGERY SAME DAY [facility]    HIP ARTHROPLASTY TOTAL  05/31/2013    Performed by Burak Valles M.D. at [facility]    ROTATOR CUFF REPAIR Right 2011    x 2    KNEE ARTHROPLASTY TOTAL  06/01/2009    LEFT-Performed by YONATAN HINSON at [facility]    VEIN LIGATION RADIO FREQUENCY  12/08/2008    LEFT leg-Performed by DEREJE MCCULLOUGH at SURGERY SAME DAY [facility]    HIP ARTHROPLASTY  TOTAL  06/20/2008    Performed by YONATAN HINSON at SURGERY [facility]    LUMBAR LAMINECTOMY DISKECTOMY  2000    TMJ RECONSTRUCTION BILATERAL  1994    KNEE ARTHROSCOPY Left     ORIF, ANKLE Right     VEIN STRIPPING Left        ALLERGIES:   Allergies   Allergen Reactions    Ceftriaxone Rash     Redness and flushing all over body  Other reaction(s): Not available    Ciprofloxacin Hives, Rash, Itching and Swelling     Pt developed rashes/hives on upper chest, shoulders, upper back, and flush in the cheeks, redness in the ears and swelling    Crestor [Rosuvastatin Calcium] Myalgia    Penicillins Hives, Itching and Vomiting     Other reaction(s): Not available       MEDICATIONS:  No current facility-administered medications on file prior to encounter.     Current Outpatient Medications on File Prior to Encounter   Medication Sig Dispense Refill    Testosterone Cypionate 200 MG/ML Solution Inject 200 mg as directed every 14 days.      diazePAM (VALIUM) 5 MG Tab TAKE 1 TABLET BY MOUTH EVERY 12 HOURS AS NEEDED FOR UP TO 30 DAYS F41.9 (Patient taking differently: Take 5 mg by mouth every 12 hours as needed for Sleep (spasm).) 60 Tablet 2    [START ON 3/20/2024] oxyCODONE-acetaminophen (PERCOCET-10)  MG Tab Take 1-2 Tablets by mouth every four hours as needed for Severe Pain (refill #3 of #3.) for up to 30 days. 150 Tablet 0    oxyCODONE-acetaminophen (PERCOCET-10)  MG Tab Take 1-2 Tablets by mouth every four hours as needed for Severe Pain (refill #2 of #3.) for up to 30 days. 150 Tablet 0    metoprolol tartrate (LOPRESSOR) 50 MG Tab Take 1 Tablet by mouth 2 times a day. 180 Tablet 3    pravastatin (PRAVACHOL) 80 MG tablet Take 1 Tablet by mouth every day. 100 Tablet 3    omeprazole (PRILOSEC OTC) 20 MG tablet Take 20 mg by mouth every morning.      PARoxetine (PAXIL) 20 MG Tab Take 1 Tablet by mouth every day. 90 Tablet 3    lisinopril (PRINIVIL) 20 MG Tab Take 1 Tablet by mouth every day. 90 Tablet 3     Cholecalciferol (VITAMIN D3) 2000 UNIT Cap Take 2,000 Units by mouth every morning.      Calcium Carb-Cholecalciferol 600-12.5 MG-MCG Cap Take 1 Capsule by mouth 2 times a day.      Multiple Vitamins-Minerals (PRESERVISION AREDS 2+MULTI VIT) Cap Take 1 Capsule by mouth 2 times a day.      Ascorbic Acid (VITAMIN C) 1000 MG Tab Take 1,000 mg by mouth every morning.      Zinc 50 MG Tab Take 50 mg by mouth every morning.         LABS:  Lab Results   Component Value Date/Time    HEMOGLOBIN 14.3 02/08/2024 0922    HEMATOCRIT 45.4 02/08/2024 0922    WBC 7.3 02/08/2024 0922     Lab Results   Component Value Date/Time    SODIUM 137 02/08/2024 0922    POTASSIUM 4.8 02/08/2024 0922    CHLORIDE 100 02/08/2024 0922    CO2 27 02/08/2024 0922    GLUCOSE 117 (H) 02/08/2024 0922    BUN 13 02/08/2024 0922    CALCIUM 8.9 02/08/2024 0922         PREVIOUS ANESTHETICS: See EMR  __________________________________________      Physical Exam    Airway   Mallampati: III  TM distance: >3 FB  Neck ROM: full       Cardiovascular - normal exam  Rhythm: regular  Rate: normal  (-) murmur     Dental - normal exam  (+) upper dentures, lower dentures        Facial Hair   Pulmonary - normal exam  Breath sounds clear to auscultation     Abdominal   (+) obese     Neurological - normal exam                   Anesthesia Plan    ASA 3   ASA physical status 3 criteria: CAD/stents (> 3 months)    Plan - general       Airway plan will be ETT          Induction: intravenous    Postoperative Plan: Postoperative administration of opioids is intended.    Pertinent diagnostic labs and testing reviewed    Informed Consent:    Anesthetic plan and risks discussed with patient.    Use of blood products discussed with: patient whom consented to blood products.

## 2024-02-21 NOTE — PROGRESS NOTES
Soft cervical collar has been delivered to [facility] [employer] tower OR control room for patients staff to apply and fit to patient when ready.

## 2024-02-21 NOTE — ANESTHESIA POSTPROCEDURE EVALUATION
Patient: Edgardo Sims    Procedure Summary       Date: 02/21/24 Room / Location: [place] [address] / SURGERY [facility]    Anesthesia Start: 0700 Anesthesia Stop: 0943    Procedure: C5-7 anterior cervical discectomy and instrumented fusion (Spine Cervical) Diagnosis:       Cervical stenosis of spine      (Cervical stenosis of spine [M48.02])    Surgeons: Jose Gimenez M.D. Responsible Provider: Gustabo Hart M.D.    Anesthesia Type: general ASA Status: 3            Final Anesthesia Type: general  Last vitals  BP   Blood Pressure : 136/73    Temp   37 °C (98.6 °F)    Pulse   88   Resp   (!) 21    SpO2   97 %      Anesthesia Post Evaluation    Patient location during evaluation: PACU  Patient participation: complete - patient participated  Level of consciousness: sleepy but conscious    Airway patency: patent  Anesthetic complications: no  Cardiovascular status: hemodynamically stable  Respiratory status: acceptable  Hydration status: balanced    PONV: none          No notable events documented.     Nurse Pain Score: 6 (NPRS)

## 2024-02-21 NOTE — OR NURSING
@6178 PT arrived to PACU. Report received from Anesthesia and OR RN. Orders released and followed.     @1100 Pts wife Marshall updated on patients status in recovery.    @1126 Report called to Randa WIESS.    @1145 PT transported to [facility]. Pts wife Marshall updated.

## 2024-02-21 NOTE — PROGRESS NOTES
4 Eyes Skin Assessment Completed by ABHISHEK Tolentino and ABHISHEK Kohler.    Head WDL  Ears WDL  Nose WDL  Mouth WDL  Neck Incision  Breast/Chest WDL  Shoulder Blades WDL  Spine WDL  (R) Arm/Elbow/Hand WDL  (L) Arm/Elbow/Hand WDL  Abdomen WDL  Groin WDL  Scrotum/Coccyx/Buttocks WDL  (R) Leg WDL  (L) Leg Bruising  (R) Heel/Foot/Toe WDL  (L) Heel/Foot/Toe WDL          Devices In Places Pulse Ox, Nasal Cannula, and Cervical Collar      Interventions In Place Gray Ear Foams, Pillows, Q2 Turns, and Pressure Redistribution Mattress    Possible Skin Injury No    Pictures Uploaded Into Epic N/A  Wound Consult Placed N/A  RN Wound Prevention Protocol Ordered No

## 2024-02-21 NOTE — PROGRESS NOTES
Medication history reviewed with PT at bedside    Med rec is complete per PT reporting    Allergies reviewed.     Patient denies any outpatient antibiotics in the last 30 days.     Patient is not taking anticoagulants.    Preferred pharmacy for this visit - [facility] ([phone])

## 2024-02-22 ENCOUNTER — PHARMACY VISIT (OUTPATIENT)
Dept: PHARMACY | Facility: MEDICAL CENTER | Age: 69
End: 2024-02-22
Payer: COMMERCIAL

## 2024-02-22 VITALS
DIASTOLIC BLOOD PRESSURE: 84 MMHG | RESPIRATION RATE: 18 BRPM | BODY MASS INDEX: 29.26 KG/M2 | HEIGHT: 70 IN | SYSTOLIC BLOOD PRESSURE: 154 MMHG | OXYGEN SATURATION: 95 % | WEIGHT: 204.37 LBS | TEMPERATURE: 98.8 F | HEART RATE: 81 BPM

## 2024-02-22 LAB
ANION GAP SERPL CALC-SCNC: 12 MMOL/L (ref 7–16)
BUN SERPL-MCNC: 10 MG/DL (ref 8–22)
CALCIUM SERPL-MCNC: 8.6 MG/DL (ref 8.5–10.5)
CHLORIDE SERPL-SCNC: 95 MMOL/L (ref 96–112)
CO2 SERPL-SCNC: 22 MMOL/L (ref 20–33)
CREAT SERPL-MCNC: 0.63 MG/DL (ref 0.5–1.4)
ERYTHROCYTE [DISTWIDTH] IN BLOOD BY AUTOMATED COUNT: 47 FL (ref 35.9–50)
GFR SERPLBLD CREATININE-BSD FMLA CKD-EPI: 104 ML/MIN/1.73 M 2
GLUCOSE SERPL-MCNC: 140 MG/DL (ref 65–99)
HCT VFR BLD AUTO: 39.4 % (ref 42–52)
HGB BLD-MCNC: 13.3 G/DL (ref 14–18)
MCH RBC QN AUTO: 26.8 PG (ref 27–33)
MCHC RBC AUTO-ENTMCNC: 33.8 G/DL (ref 32.3–36.5)
MCV RBC AUTO: 79.4 FL (ref 81.4–97.8)
PLATELET # BLD AUTO: 186 K/UL (ref 164–446)
PMV BLD AUTO: 9.4 FL (ref 9–12.9)
POTASSIUM SERPL-SCNC: 4.5 MMOL/L (ref 3.6–5.5)
RBC # BLD AUTO: 4.96 M/UL (ref 4.7–6.1)
SODIUM SERPL-SCNC: 129 MMOL/L (ref 135–145)
WBC # BLD AUTO: 12.5 K/UL (ref 4.8–10.8)

## 2024-02-22 PROCEDURE — 97161 PT EVAL LOW COMPLEX 20 MIN: CPT

## 2024-02-22 PROCEDURE — 80048 BASIC METABOLIC PNL TOTAL CA: CPT

## 2024-02-22 PROCEDURE — 97535 SELF CARE MNGMENT TRAINING: CPT

## 2024-02-22 PROCEDURE — G0378 HOSPITAL OBSERVATION PER HR: HCPCS

## 2024-02-22 PROCEDURE — 700102 HCHG RX REV CODE 250 W/ 637 OVERRIDE(OP)

## 2024-02-22 PROCEDURE — RXMED WILLOW AMBULATORY MEDICATION CHARGE

## 2024-02-22 PROCEDURE — A9270 NON-COVERED ITEM OR SERVICE: HCPCS

## 2024-02-22 PROCEDURE — 85027 COMPLETE CBC AUTOMATED: CPT

## 2024-02-22 PROCEDURE — 97165 OT EVAL LOW COMPLEX 30 MIN: CPT

## 2024-02-22 PROCEDURE — 99024 POSTOP FOLLOW-UP VISIT: CPT

## 2024-02-22 RX ORDER — ONDANSETRON 4 MG/1
4 TABLET, ORALLY DISINTEGRATING ORAL EVERY 6 HOURS PRN
Qty: 10 TABLET | Refills: 0 | Status: SHIPPED | OUTPATIENT
Start: 2024-02-22

## 2024-02-22 RX ADMIN — ANTACID TABLETS 500 MG: 500 TABLET, CHEWABLE ORAL at 04:57

## 2024-02-22 RX ADMIN — ACETAMINOPHEN 1000 MG: 500 TABLET ORAL at 04:58

## 2024-02-22 RX ADMIN — DOCUSATE SODIUM 100 MG: 100 CAPSULE, LIQUID FILLED ORAL at 04:58

## 2024-02-22 RX ADMIN — OXYCODONE 5 MG: 5 TABLET ORAL at 11:43

## 2024-02-22 RX ADMIN — PAROXETINE HYDROCHLORIDE 20 MG: 20 TABLET, FILM COATED ORAL at 04:57

## 2024-02-22 RX ADMIN — OXYCODONE 5 MG: 5 TABLET ORAL at 08:16

## 2024-02-22 RX ADMIN — METOPROLOL TARTRATE 50 MG: 25 TABLET, FILM COATED ORAL at 04:57

## 2024-02-22 RX ADMIN — ACETAMINOPHEN 1000 MG: 500 TABLET ORAL at 11:43

## 2024-02-22 RX ADMIN — OMEPRAZOLE 20 MG: 20 CAPSULE, DELAYED RELEASE ORAL at 04:57

## 2024-02-22 ASSESSMENT — COGNITIVE AND FUNCTIONAL STATUS - GENERAL
MOBILITY SCORE: 24
DAILY ACTIVITIY SCORE: 20
SUGGESTED CMS G CODE MODIFIER DAILY ACTIVITY: CJ
HELP NEEDED FOR BATHING: A LITTLE
TOILETING: A LITTLE
DRESSING REGULAR LOWER BODY CLOTHING: A LITTLE
DRESSING REGULAR UPPER BODY CLOTHING: A LITTLE
SUGGESTED CMS G CODE MODIFIER MOBILITY: CH

## 2024-02-22 ASSESSMENT — PAIN DESCRIPTION - PAIN TYPE
TYPE: ACUTE PAIN;SURGICAL PAIN
TYPE: ACUTE PAIN;SURGICAL PAIN

## 2024-02-22 ASSESSMENT — GAIT ASSESSMENTS
DEVIATION: OTHER (COMMENT)
GAIT LEVEL OF ASSIST: SUPERVISED
DISTANCE (FEET): 500

## 2024-02-22 ASSESSMENT — ACTIVITIES OF DAILY LIVING (ADL): TOILETING: INDEPENDENT

## 2024-02-22 NOTE — PROGRESS NOTES
"Spine Surgery Progress Note    68 y.o. male sp C5-C7 ACDF on 2/21/2024    S: Doing well overnight. SHRUTHI. Denies CP/SOB or abdominal discomfort. Passing flatus no issues    O:  BP (!) 156/84   Pulse 85   Temp 36.8 °C (98.3 °F) (Temporal)   Resp 18   Ht 1.778 m (5' 10\")   Wt 92.7 kg (204 lb 5.9 oz)   SpO2 96%   BMI 29.32 kg/m²     Gen: WNWD NAD  Breathing comfortably    Dressing CDI  Cervical    Deltoid  Bi  Tri  WE  Flex  Finger Flexion  Right      5  5   5   5     5   5  Left      2  5   5   5    5   5    SILT C5-T1 BUE               Lab Results   Component Value Date/Time    WBC 12.5 (H) 02/22/2024 12:13 AM    WBC 7.5 07/01/2011 10:30 AM    RBC 4.96 02/22/2024 12:13 AM    RBC 4.72 07/01/2011 10:30 AM    HEMOGLOBIN 13.3 (L) 02/22/2024 12:13 AM    HEMATOCRIT 39.4 (L) 02/22/2024 12:13 AM    MCV 79.4 (L) 02/22/2024 12:13 AM     (H) 07/01/2011 10:30 AM    MCH 26.8 (L) 02/22/2024 12:13 AM    MCH 36.0 (H) 07/01/2011 10:30 AM    MCHC 33.8 02/22/2024 12:13 AM    MPV 9.4 02/22/2024 12:13 AM    NEUTSPOLYS 56.50 02/08/2024 09:22 AM    LYMPHOCYTES 20.60 (L) 02/08/2024 09:22 AM    MONOCYTES 13.00 02/08/2024 09:22 AM    EOSINOPHILS 7.90 (H) 02/08/2024 09:22 AM    BASOPHILS 1.50 02/08/2024 09:22 AM    HYPOCHROMIA 1+ 03/05/2014 04:43 PM    ANISOCYTOSIS 1+ 01/02/2015 05:02 PM      Lab Results   Component Value Date/Time    SODIUM 129 (L) 02/22/2024 12:13 AM    POTASSIUM 4.5 02/22/2024 12:13 AM    CHLORIDE 95 (L) 02/22/2024 12:13 AM    CO2 22 02/22/2024 12:13 AM    GLUCOSE 140 (H) 02/22/2024 12:13 AM    BUN 10 02/22/2024 12:13 AM    CREATININE 0.63 02/22/2024 12:13 AM    CREATININE 1.1 04/21/2009 03:50 PM    BUNCREATRAT 13 09/21/2016 09:21 AM          AP: 68 y.o. male sp C5-C7 ACDF on 2/21/2024. Doing well. Pain is well controlled. Patient ready for discharge today once clears PT and OT.     Continue PT OT  ADAT to Reg Diet  Daily dressing change by nursing starting POD2  Continue pain control per orders   "

## 2024-02-22 NOTE — DISCHARGE SUMMARY
Discharge Summary    CHIEF COMPLAINT ON ADMISSION  No chief complaint on file.      Reason for Admission  S/P cervical spinal fusion     Admission Date  2/21/2024    CODE STATUS  Full Code    HPI & HOSPITAL COURSE  This is a 68 y.o. male here with severe left upper extremity weakness.    MRI demonstrates stenosis at the C5-6 C6-7 level.  EMG of the left upper extremity performed at [facility] and spine 1/9/2024 independently reviewed demonstrates mild median neuropathy. Left subacute C5 radiculopathy with active denervation.  Significant left upper extremity weakness on exam.  Patient underwent a C5-C7 ACDF on 2/21/2024.  Patient was admitted for pain control and observation.      No notes on file    Therefore, he is discharged in good and stable condition to home with close outpatient follow-up.    The patient recovered much more quickly than anticipated on admission.    Discharge Date  2/22/2024    FOLLOW UP ITEMS POST DISCHARGE  Wear soft cervical collar for 2 weeks  Daily dressing changes for 5 days  No lifting over 10 pounds or lifting above the head  Follow-up with Kitty Truong PA-C at first postoperative appointment    DISCHARGE DIAGNOSES  Principal Problem:    Cervical stenosis of spine (POA: Unknown)  Active Problems:    S/P cervical spinal fusion (POA: Yes)  Resolved Problems:    * No resolved hospital problems. *      FOLLOW UP  Future Appointments   Date Time Provider Department Center   4/12/2024  9:00 AM John Lao D.O. [facility]   12/18/2024 10:15 AM [facility] EXAM 10 [facility]   12/26/2024 12:40 PM VIRAL Mcbride None     No follow-up provider specified.    MEDICATIONS ON DISCHARGE     Medication List        START taking these medications        Instructions   ondansetron 4 MG Tbdp  Commonly known as: Zofran ODT   Take 1 Tablet by mouth every 6 hours as needed for Nausea/Vomiting.  Dose: 4 mg            CHANGE how you take these medications         Instructions   diazePAM 5 MG Tabs  What changed: See the new instructions.  Commonly known as: Valium   Doctor's comments: This request is for a new prescription for a controlled substance as required by Federal/State law.  TAKE 1 TABLET BY MOUTH EVERY 12 HOURS AS NEEDED FOR UP TO 30 DAYS F41.9            CONTINUE taking these medications        Instructions   Calcium Carb-Cholecalciferol 600-12.5 MG-MCG Caps   Take 1 Capsule by mouth 2 times a day.  Dose: 1 Capsule     cyclobenzaprine 10 mg Tabs  Commonly known as: Flexeril   Take 1 Tablet by mouth 3 times a day as needed for Mild Pain or Muscle Spasms for up to 90 days.  Dose: 10 mg     lisinopril 20 MG Tabs  Commonly known as: Prinivil   Take 1 Tablet by mouth every day.  Dose: 20 mg     metoprolol tartrate 50 MG Tabs  Commonly known as: Lopressor   Take 1 Tablet by mouth 2 times a day.  Dose: 50 mg     * oxyCODONE-acetaminophen  MG Tabs  Commonly known as: Percocet-10   Take 1-2 Tablets by mouth every four hours as needed for Severe Pain (refill #2 of #3.) for up to 30 days.  Dose: 1-2 Tablet     * oxyCODONE-acetaminophen  MG Tabs  Start taking on: March 20, 2024  Commonly known as: Percocet-10   Take 1-2 Tablets by mouth every four hours as needed for Severe Pain (refill #3 of #3.) for up to 30 days.  Dose: 1-2 Tablet     PARoxetine 20 MG Tabs  Commonly known as: Paxil   Take 1 Tablet by mouth every day.  Dose: 20 mg     pravastatin 80 MG tablet  Commonly known as: Pravachol   Take 1 Tablet by mouth every day.  Dose: 80 mg     PreserVision AREDS 2+Multi Vit Caps   Take 1 Capsule by mouth 2 times a day.  Dose: 1 Capsule     PriLOSEC OTC 20 MG tablet  Generic drug: omeprazole   Take 20 mg by mouth every morning.  Dose: 20 mg     Testosterone Cypionate 200 MG/ML Soln   Inject 200 mg as directed every 14 days.  Dose: 200 mg     Vitamin C 1000 MG Tabs   Take 1,000 mg by mouth every morning.  Dose: 1,000 mg     Vitamin D3 2000 UNIT Caps   Take 2,000 Units  by mouth every morning.  Dose: 2,000 Units     Zinc 50 MG Tabs   Take 50 mg by mouth every morning.  Dose: 50 mg           * This list has 2 medication(s) that are the same as other medications prescribed for you. Read the directions carefully, and ask your doctor or other care provider to review them with you.                STOP taking these medications      mupirocin 2 % Oint  Commonly known as: Bactroban              Allergies  Allergies   Allergen Reactions    Ceftriaxone Rash     Redness and flushing all over body  Other reaction(s): Not available    Ciprofloxacin Hives, Rash, Itching and Swelling     Pt developed rashes/hives on upper chest, shoulders, upper back, and flush in the cheeks, redness in the ears and swelling    Crestor [Rosuvastatin Calcium] Myalgia    Penicillins Hives, Itching and Vomiting     Other reaction(s): Not available       DIET  Orders Placed This Encounter   Procedures    Diet Order Diet: Regular     Standing Status:   Standing     Number of Occurrences:   1     Order Specific Question:   Diet:     Answer:   Regular [1]       ACTIVITY  As tolerated.  Weight bearing as tolerated    CONSULTATIONS  None    PROCEDURES  C5-C7 ACDF    LABORATORY  Lab Results   Component Value Date    SODIUM 129 (L) 02/22/2024    POTASSIUM 4.5 02/22/2024    CHLORIDE 95 (L) 02/22/2024    CO2 22 02/22/2024    GLUCOSE 140 (H) 02/22/2024    BUN 10 02/22/2024    CREATININE 0.63 02/22/2024    CREATININE 1.1 04/21/2009        Lab Results   Component Value Date    WBC 12.5 (H) 02/22/2024    WBC 7.5 07/01/2011    HEMOGLOBIN 13.3 (L) 02/22/2024    HEMATOCRIT 39.4 (L) 02/22/2024    PLATELETCT 186 02/22/2024        Total time of the discharge process exceeds 30 minutes.

## 2024-02-22 NOTE — PROGRESS NOTES
IV removed. Discharge paperwork discussed. All questions answered. Follow up appointment discussed. Patient discharged home via car with wife. Patient has all personal belongings and medications from the pharmacy.

## 2024-02-22 NOTE — THERAPY
Physical Therapy   Initial Evaluation     Patient Name: Edgardo Sims  Age:  68 y.o., Sex:  male  Medical Record #: [id number]  Today's Date: 2/22/2024     Precautions  Precautions: Spinal / Back Precautions ;Cervical Collar    Comments: soft c-collar off for showers and meals    Assessment  Patient is 68 y.o. male presenting s/p C5-7 ACDF on 2/21. Pt with PMH including CAD, HTN, GERD, rheumatoid arthritis, and multiple ortho surgeries on BLE with most recent on L ankle in July 2023 per pt report. Pt is independent with functional mobility at baseline using no AD. Lives with wife, YOMAIRA and her  who will be home to help as needed. During current session, pt presents near functional baseline requiring overall SPV for mobility as detailed below. Able to walk 500 ft with no AD, good pace and no LOB. Pt was receptive to cervical prx edu and demo'ed good log roll. Recommend d/c home with OPPT, no DME needs. Pt denies any mobility concerns with d/c'ing home. Patient will not be actively followed for physical therapy services at this time.    Plan    Physical Therapy Initial Treatment Plan   Duration: Evaluation only    DC Equipment Recommendations: None  Discharge Recommendations: Recommend outpatient physical therapy services to address higher level deficits       Subjective    Pt received sitting up in bed, agreeable to participate.      Objective       02/22/24 1019   Initial Contact Note    Initial Contact Note Order Received and Verified, Evaluation Only - Patient Does Not Require Further Acute Physical Therapy at this Time.  However, May Benefit from Post Acute Therapy for Higher Level Functional Deficits.   Precautions   Precautions Spinal / Back Precautions ;Cervical Collar     Comments soft c-collar off for showers and meals   Vitals   O2 Delivery Device None - Room Air   Pain 0 - 10 Group   Therapist Pain Assessment Post Activity Pain Same as Prior to Activity;Nurse Notified  (no c/o pain during  session)   Prior Living Situation   Prior Services Home-Independent   Housing / Facility 1 Story House   Steps Into Home 0   Steps In Home 1  (into living room)   Equipment Owned Front-Wheel Walker;Single Point Cane;Other (Comments)  (adjustable bed)   Lives with - Patient's Self Care Capacity Spouse;Sibling;Related Adult  (wife, YOMAIRA and her  ( is in a wc))   Comments Lives in [place]. Pt reports that someone is always home with him to help as needed. Dtr also lives 2 doors down   Prior Level of Functional Mobility   Bed Mobility Independent   Transfer Status Independent   Ambulation Independent   Ambulation Distance community   Assistive Devices Used None   Stairs Independent   Cognition    Cognition / Consciousness WDL   Level of Consciousness Alert   Comments pleasant and cooperative, receptive to edu   Passive ROM Lower Body   Passive ROM Lower Body WDL   Comments assessed functionally   Active ROM Lower Body    Active ROM Lower Body  WDL   Comments assessed functionally   Strength Lower Body   Lower Body Strength  WDL   Comments assessed functionally   Sensation Lower Body   Comments no c/o altered sensation BLE   Coordination Lower Body    Coordination Lower Body  WDL   Comments assessed functionally   Balance Assessment   Sitting Balance (Static) Good   Sitting Balance (Dynamic) Good   Standing Balance (Static) Good   Standing Balance (Dynamic) Fair +   Weight Shift Sitting Good   Weight Shift Standing Good   Comments no AD   Bed Mobility    Supine to Sit Supervised   Scooting Supervised   Rolling Supervised   Comments HOBE (pt endorsed adjustable bed at home), log roll, sitting EOB post   Gait Analysis   Gait Level Of Assist Supervised   Assistive Device None   Distance (Feet) 500   # of Times Distance was Traveled 1   Deviation Other (Comment)  (good pace, no LOB)   # of Stairs Climbed 0   Functional Mobility   Sit to Stand Supervised   Bed, Chair, Wheelchair Transfer Supervised   Transfer Method  Stand Step   Mobility bed>up around unit with no AD>sitting EOB   How much difficulty does the patient currently have...   Turning over in bed (including adjusting bedclothes, sheets and blankets)? 4   Sitting down on and standing up from a chair with arms (e.g., wheelchair, bedside commode, etc.) 4   Moving from lying on back to sitting on the side of the bed? 4   How much help from another person does the patient currently need...   Moving to and from a bed to a chair (including a wheelchair)? 4   Need to walk in a hospital room? 4   Climbing 3-5 steps with a railing? 4   6 clicks Mobility Score 24   Education Group   Education Provided Role of Physical Therapist;Cervical Precautions   Cervical Precautions Patient Response Patient;Acceptance;Explanation;Demonstration;Handout;Verbal Demonstration;Action Demonstration   Role of Physical Therapist Patient Response Patient;Acceptance;Explanation;Demonstration;Verbal Demonstration;Action Demonstration   Physical Therapy Initial Treatment Plan    Duration Evaluation only   Problem List    Problems None   Anticipated Discharge Equipment and Recommendations   DC Equipment Recommendations None   Discharge Recommendations Recommend outpatient physical therapy services to address higher level deficits   Interdisciplinary Plan of Care Collaboration   IDT Collaboration with  Nursing   Patient Position at End of Therapy Seated;Edge of Bed;Call Light within Reach;Tray Table within Reach;Phone within Reach   Collaboration Comments RN updated   Session Information   Date / Session Number  2/22- eval only

## 2024-02-22 NOTE — THERAPY
"Occupational Therapy   Initial Evaluation     Patient Name: Edgardo Sims  Age:  68 y.o., Sex:  male  Medical Record #: [id number]  Today's Date: 2/22/2024     Precautions  Precautions: (P) Fall Risk, Cervical Collar  , Spinal / Back Precautions   Comments: (P) soft c-collar    Assessment  Patient is a 68 y.o. male with a diagnosis of cervical stenosis with C5 radiculopathy and associated LUE weakness now POD #1 C5-7 ACDF. During OT eval, pt was receptive to all education and training provided for spinal precautions in the context of ADLs, transfers and related functional mobility; handout provided and reviewed. Pt demo'd ADLs and functional mobility with overall SPV and verbal cues for spinal precautions. Pt reports he has all needed DME at home and his spouse can assist as needed. No further acute OT needs at this time.      Plan    Occupational Therapy Initial Treatment Plan   Duration: (P) Evaluation only    DC Equipment Recommendations: (P) None  Discharge Recommendations: (P) Anticipate that the patient will have no further occupational therapy needs after discharge from the hospital     Subjective    \"I've had a lot of surgeries.\"     Objective     02/22/24 0838   Initial Contact Note    Initial Contact Note Order Received and Verified, Evaluation Only - Patient Does Not Require Further Acute Occupational Therapy at this Time.  However, May Benefit from Post Acute Therapy for Higher Level Functional Deficits.   Prior Living Situation   Prior Services Home-Independent   Housing / Facility 1 Story House   Steps Into Home 1   Steps In Home 1   Bathroom Set up Walk In Shower;Bathtub / Shower Combination;Shower Chair   Equipment Owned Front-Wheel Walker;Single Point Cane;Tub / Shower Seat;Hand Held Shower;Sock Aid;Reacher;Long Handled Shoehorn   Lives with - Patient's Self Care Capacity Spouse   Comments Pt reports his spouse is home with him and able to assist as needed   Prior Level of ADL Function   Self " Feeding Independent   Grooming / Hygiene Independent   Bathing Independent   Dressing Independent   Toileting Independent   Prior Level of IADL Function   Medication Management Independent   Laundry Independent   Kitchen Mobility Independent   Finances Independent   Home Management Independent   Shopping Independent   Prior Level Of Mobility Independent Without Device in Community   Driving / Transportation Driving Independent   Occupation (Pre-Hospital Vocational) Retired Due To Age   Precautions   Precautions Fall Risk;Cervical Collar  ;Spinal / Back Precautions    Comments soft c-collar   Pain   Intervention Declines   Non Verbal Descriptors   Non Verbal Scale  Calm   Cognition    Level of Consciousness Alert   Comments Pleasant and cooperative   Active ROM Upper Body   Active ROM Upper Body  WDL   Dominant Hand Right   Comments LUE WFL but bradykinetic due to weakness   Strength Upper Body   Upper Body Strength  X   Comments L shoulder 3+/5, RUE WNL   Sensation Upper Body   Upper Extremity Sensation  WDL   Comments Reports mild baseline numbness in L ulnar nerve distribution. Light touch sensation intact   Upper Body Muscle Tone   Upper Body Muscle Tone  WDL   Neurological Concerns   Neurological Concerns No   Coordination Upper Body   Coordination WDL   Balance Assessment   Sitting Balance (Static) Good   Sitting Balance (Dynamic) Good   Standing Balance (Static) Fair +   Standing Balance (Dynamic) Fair +   Weight Shift Sitting Good   Weight Shift Standing Good   Comments no AD   Bed Mobility    Supine to Sit Supervised   Sit to Supine   (in chair end of session)   Scooting Supervised   Rolling Supervised   Comments HOB elevated, reports he has an adjustable bed at home   ADL Assessment   Grooming Supervision;Standing   Upper Body Dressing Supervision  (gown)   Lower Body Dressing Supervision  (socks in tailor sit EOB)   Toileting   (declined need)   How much help from another person does the patient currently  need...   Putting on and taking off regular lower body clothing? 3   Bathing (including washing, rinsing, and drying)? 3   Toileting, which includes using a toilet, bedpan, or urinal? 3   Putting on and taking off regular upper body clothing? 3   Taking care of personal grooming such as brushing teeth? 4   Eating meals? 4   6 Clicks Daily Activity Score 20   Functional Mobility   Sit to Stand Supervised   Bed, Chair, Wheelchair Transfer Supervised   Toilet Transfers Supervised   Transfer Method Stand Step   Mobility in room with SPV and no AD   Activity Tolerance   Sitting in Chair post session   Sitting Edge of Bed 5 min   Standing 5 min   Education Group   Education Provided Brace Wear and Care;Spinal Precautions   Role of Occupational Therapist Patient Response Patient;Acceptance;Explanation;Verbal Demonstration   Spinal Precautions Patient Response Patient;Acceptance;Explanation;Demonstration;Handout;Verbal Demonstration;Action Demonstration   Brace Wear & Care Patient Response Patient;Acceptance;Explanation;Handout;Verbal Demonstration   Occupational Therapy Initial Treatment Plan    Duration Evaluation only   Problem List   Problem List Decreased Homemaking Skills;Decreased Functional Mobility;Decreased Upper Extremity Strength Left   Anticipated Discharge Equipment and Recommendations   DC Equipment Recommendations None   Discharge Recommendations Anticipate that the patient will have no further occupational therapy needs after discharge from the hospital   Interdisciplinary Plan of Care Collaboration   IDT Collaboration with  Nursing   Patient Position at End of Therapy Seated;Chair Alarm On;Call Light within Reach;Tray Table within Reach;Phone within Reach   Collaboration Comments RN updated   Session Information   Date / Session Number  2/22 eval only

## 2024-02-22 NOTE — CARE PLAN
The patient is Stable - Low risk of patient condition declining or worsening    Shift Goals  Clinical Goals: Stable Neuros  Patient Goals: Rest  Family Goals: DAVID    Progress made toward(s) clinical / shift goals:    Problem: Pain - Standard  Goal: Alleviation of pain or a reduction in pain to the patient’s comfort goal  Outcome: Progressing     Problem: Knowledge Deficit - Standard  Goal: Patient and family/care givers will demonstrate understanding of plan of care, disease process/condition, diagnostic tests and medications  Outcome: Progressing       Patient is not progressing towards the following goals:

## 2024-02-22 NOTE — PROGRESS NOTES
Patient had a positive MRSA swab 2/8/24  Addressed it with Kitty WHITEHEAD.  Per Kitty, no isolation needed,

## 2024-02-26 DIAGNOSIS — E29.1 HYPOGONADISM MALE: ICD-10-CM

## 2024-02-26 RX ORDER — TESTOSTERONE CYPIONATE 200 MG/ML
INJECTION, SOLUTION INTRAMUSCULAR
Qty: 6 ML | Refills: 0 | Status: SHIPPED | OUTPATIENT
Start: 2024-02-26 | End: 2024-05-20

## 2024-02-26 NOTE — TELEPHONE ENCOUNTER
Received request via: Pharmacy    Was the patient seen in the last year in this department? Yes    Does the patient have an active prescription (recently filled or refills available) for medication(s) requested? No    Pharmacy Name[facility]    Does the patient have jail Plus and need 100 day supply (blood pressure, diabetes and cholesterol meds only)? Patient does not have SCP

## 2024-03-28 ENCOUNTER — OFFICE VISIT (OUTPATIENT)
Dept: MEDICAL GROUP | Facility: LAB | Age: 69
End: 2024-03-28
Payer: MEDICARE

## 2024-03-28 VITALS
BODY MASS INDEX: 29.2 KG/M2 | WEIGHT: 204 LBS | HEIGHT: 70 IN | SYSTOLIC BLOOD PRESSURE: 120 MMHG | RESPIRATION RATE: 16 BRPM | HEART RATE: 71 BPM | TEMPERATURE: 98.1 F | DIASTOLIC BLOOD PRESSURE: 76 MMHG | OXYGEN SATURATION: 95 %

## 2024-03-28 DIAGNOSIS — M05.9 RHEUMATOID ARTHRITIS WITH POSITIVE RHEUMATOID FACTOR, INVOLVING UNSPECIFIED SITE (HCC): ICD-10-CM

## 2024-03-28 DIAGNOSIS — F33.42 MAJOR DEPRESSIVE DISORDER, RECURRENT, IN FULL REMISSION (HCC): ICD-10-CM

## 2024-03-28 PROCEDURE — 1170F FXNL STATUS ASSESSED: CPT | Performed by: INTERNAL MEDICINE

## 2024-03-28 PROCEDURE — 3074F SYST BP LT 130 MM HG: CPT | Performed by: INTERNAL MEDICINE

## 2024-03-28 PROCEDURE — 99213 OFFICE O/P EST LOW 20 MIN: CPT | Performed by: INTERNAL MEDICINE

## 2024-03-28 PROCEDURE — 3078F DIAST BP <80 MM HG: CPT | Performed by: INTERNAL MEDICINE

## 2024-03-28 RX ORDER — OXYCODONE AND ACETAMINOPHEN 10; 325 MG/1; MG/1
1-2 TABLET ORAL EVERY 4 HOURS PRN
Qty: 150 TABLET | Refills: 0 | Status: SHIPPED | OUTPATIENT
Start: 2024-04-23 | End: 2024-05-23

## 2024-03-28 RX ORDER — OXYCODONE AND ACETAMINOPHEN 10; 325 MG/1; MG/1
1-2 TABLET ORAL EVERY 4 HOURS PRN
Qty: 150 TABLET | Refills: 0 | Status: SHIPPED | OUTPATIENT
Start: 2024-05-23 | End: 2024-06-22

## 2024-03-28 RX ORDER — OXYCODONE AND ACETAMINOPHEN 10; 325 MG/1; MG/1
1-2 TABLET ORAL EVERY 4 HOURS PRN
Qty: 150 TABLET | Refills: 0 | Status: SHIPPED | OUTPATIENT
Start: 2024-06-22 | End: 2024-07-22

## 2024-03-28 ASSESSMENT — FIBROSIS 4 INDEX: FIB4 SCORE: 2.21

## 2024-03-28 NOTE — PROGRESS NOTES
CC: Edgardo Sims is a 68 y.o. male is suffering from   Chief Complaint   Patient presents with    Follow-Up     Post op neck surgery in Feb         SUBJECTIVE:  1. Rheumatoid arthritis with positive rheumatoid factor, involving unspecified site (HCC)  Ray is here for follow-up continues to have problems with rheumatoid arthritis is status post neck surgery with Dr. Gimenez patient continues to have pain but feels he is improved is able to raise his left arm over his head which is an improvement.        Past social, family, history:   Social History     Tobacco Use    Smoking status: Never    Smokeless tobacco: Never   Vaping Use    Vaping Use: Never used   Substance Use Topics    Alcohol use: Not Currently     Comment: 3 per week    Drug use: No     Types: Inhaled     Comment: marijuana, 4x/week         MEDICATIONS:    Current Outpatient Medications:     [START ON 6/22/2024] oxyCODONE-acetaminophen (PERCOCET-10)  MG Tab, Take 1-2 Tablets by mouth every four hours as needed for Severe Pain (refill #3 of #3.) for up to 30 days., Disp: 150 Tablet, Rfl: 0    [START ON 4/23/2024] oxyCODONE-acetaminophen (PERCOCET-10)  MG Tab, Take 1-2 Tablets by mouth every four hours as needed for Severe Pain (refill #1 of #3.) for up to 30 days., Disp: 150 Tablet, Rfl: 0    [START ON 5/23/2024] oxyCODONE-acetaminophen (PERCOCET-10)  MG Tab, Take 1-2 Tablets by mouth every four hours as needed for Severe Pain (refill #2 of #3.) for up to 30 days., Disp: 150 Tablet, Rfl: 0    testosterone cypionate (DEPO-TESTOSTERONE) 200 MG/ML injection, INJECT ONE ML INTRAMUSCULARLY EVERY 14 DAYS FOR 84 DAYS., Disp: 6 mL, Rfl: 0    ondansetron (ZOFRAN ODT) 4 MG TABLET DISPERSIBLE, Take 1 Tablet by mouth every 6 hours as needed for Nausea/Vomiting., Disp: 10 Tablet, Rfl: 0    Testosterone Cypionate 200 MG/ML Solution, Inject 200 mg as directed every 14 days., Disp: , Rfl:     cyclobenzaprine (FLEXERIL) 10 mg Tab, Take 1  Tablet by mouth 3 times a day as needed for Mild Pain or Muscle Spasms for up to 90 days., Disp: 90 Tablet, Rfl: 2    lisinopril (PRINIVIL) 20 MG Tab, Take 1 Tablet by mouth every day., Disp: 90 Tablet, Rfl: 3    metoprolol tartrate (LOPRESSOR) 50 MG Tab, Take 1 Tablet by mouth 2 times a day., Disp: 180 Tablet, Rfl: 3    pravastatin (PRAVACHOL) 80 MG tablet, Take 1 Tablet by mouth every day., Disp: 100 Tablet, Rfl: 3    Cholecalciferol (VITAMIN D3) 2000 UNIT Cap, Take 2,000 Units by mouth every morning., Disp: , Rfl:     Calcium Carb-Cholecalciferol 600-12.5 MG-MCG Cap, Take 1 Capsule by mouth 2 times a day., Disp: , Rfl:     omeprazole (PRILOSEC OTC) 20 MG tablet, Take 20 mg by mouth every morning., Disp: , Rfl:     Multiple Vitamins-Minerals (PRESERVISION AREDS 2+MULTI VIT) Cap, Take 1 Capsule by mouth 2 times a day., Disp: , Rfl:     PARoxetine (PAXIL) 20 MG Tab, Take 1 Tablet by mouth every day., Disp: 90 Tablet, Rfl: 3    Ascorbic Acid (VITAMIN C) 1000 MG Tab, Take 1,000 mg by mouth every morning., Disp: , Rfl:     Zinc 50 MG Tab, Take 50 mg by mouth every morning., Disp: , Rfl:     PROBLEMS:  Patient Active Problem List    Diagnosis Date Noted    S/P cervical spinal fusion 02/21/2024    Cervical stenosis of spine 01/18/2024    Urinary retention with incomplete bladder emptying 07/30/2023    Chronic pain 07/30/2023    Closed left ankle fracture 07/29/2023    Closed fracture of multiple ribs of left side 07/29/2023    Contusion of left lung 07/29/2023    Trauma 07/29/2023    No contraindication to deep vein thrombosis (DVT) prophylaxis 07/29/2023    Hammer toe of right foot 12/29/2021    Osteopenia 03/15/2021    Vitamin D deficiency 03/15/2021    Postoperative pain 08/05/2020    Difficult intubation 08/05/2020    Gastroesophageal reflux disease 08/05/2020    Current chronic use of systemic steroids 10/16/2019    High risk medication use 10/16/2019    History of adrenal insufficiency 10/16/2019    Neurogenic  "bladder 01/26/2019    Bladder outlet obstruction 05/01/2018    Leukocytosis 04/30/2018    Lactic acidosis 04/30/2018    Idiopathic acute pancreatitis 04/30/2018    Chronic use of opiate drug for therapeutic purpose 08/12/2017    Depression 07/13/2015    Anxiety 03/31/2015    Coronary artery disease due to calcified coronary lesion: Mildly cardiac catheterization in 2014 12/05/2014    Tachycardia 10/20/2014    Abnormal myocardial perfusion study 01/15/2014    Osteoarthrosis, unspecified whether generalized or localized, pelvic region and thigh 05/31/2013    Dyslipidemia 07/12/2011    Aortic insufficiency 07/05/2011    Essential hypertension 07/05/2011    Rheumatoid arthritis (HCC) 05/05/2009    Hypogonadism male 05/05/2009       REVIEW OF SYSTEMS:  Gen.:  No Nausea, Vomiting, fever, Chills.  Heart: No chest pain.  Lungs:  No shortness of Breath.  Psychological: Kian unusual Anxiety depression     PHYSICAL EXAM   Constitutional: Alert, cooperative, not in acute distress.  Cardiovascular:  Rate Rhythm is regular without murmurs rubs clicks.     Thorax & Lungs: Clear to auscultation, no wheezing, rhonchi, or rales  HENT: Normocephalic, Atraumatic.  Eyes: PERRLA, EOMI, Conjunctiva normal.   Neck: Trachia is midline no swelling of the thyroid.   Lymphatic: No lymphadenopathy noted.   Musculoskeletal: ACDF incision is clean and dry  Neurologic: Alert & oriented x 3, cranial nerves II through XII are intact, Normal motor function, Normal sensory function, No focal deficits noted.   Psychiatric: Affect normal, Judgment normal, Mood normal.     VITAL SIGNS:/76 (BP Location: Left arm, Patient Position: Sitting, BP Cuff Size: Large adult)   Pulse 71   Temp 36.7 °C (98.1 °F)   Resp 16   Ht 1.778 m (5' 10\")   Wt 92.5 kg (204 lb)   SpO2 95%   BMI 29.27 kg/m²     Labs: Reviewed    Assessment:                                                     Plan:    1. Rheumatoid arthritis with positive rheumatoid factor, involving " unspecified site (HCC)  Continue current pain medication  - oxyCODONE-acetaminophen (PERCOCET-10)  MG Tab; Take 1-2 Tablets by mouth every four hours as needed for Severe Pain (refill #3 of #3.) for up to 30 days.  Dispense: 150 Tablet; Refill: 0  - oxyCODONE-acetaminophen (PERCOCET-10)  MG Tab; Take 1-2 Tablets by mouth every four hours as needed for Severe Pain (refill #1 of #3.) for up to 30 days.  Dispense: 150 Tablet; Refill: 0  - oxyCODONE-acetaminophen (PERCOCET-10)  MG Tab; Take 1-2 Tablets by mouth every four hours as needed for Severe Pain (refill #2 of #3.) for up to 30 days.  Dispense: 150 Tablet; Refill: 0    2. Major depressive disorder, recurrent, in full remission (HCC)  Continue Paxil and testosterone

## 2024-04-25 DIAGNOSIS — M05.9 RHEUMATOID ARTHRITIS WITH POSITIVE RHEUMATOID FACTOR, INVOLVING UNSPECIFIED SITE (HCC): ICD-10-CM

## 2024-04-25 DIAGNOSIS — R73.02 IGT (IMPAIRED GLUCOSE TOLERANCE): ICD-10-CM

## 2024-04-26 RX ORDER — CYCLOBENZAPRINE HCL 10 MG
10 TABLET ORAL 3 TIMES DAILY PRN
Qty: 90 TABLET | Refills: 2 | Status: SHIPPED | OUTPATIENT
Start: 2024-04-26 | End: 2024-07-25

## 2024-04-26 NOTE — TELEPHONE ENCOUNTER
Received request via: Pharmacy    Was the patient seen in the last year in this department? Yes    Does the patient have an active prescription (recently filled or refills available) for medication(s) requested? No    Pharmacy Name: [facility]    Does the patient have half-way Plus and need 100 day supply (blood pressure, diabetes and cholesterol meds only)? Patient does not have SCP

## 2024-05-06 NOTE — OP THERAPY DISCHARGE SUMMARY
Outpatient Physical Therapy  DISCHARGE SUMMARY NOTE      [facility] Physical Therapy [address], [address]  [place] [address]  Phone:  [phone]    Date of Visit: 10/05/2018    Patient: Edgardo Sims  YOB: 1955  MRN: [id number]     Referring Provider: Michael Rodriguez M.D.  [address] [place] [address]   Referring Diagnosis Presence of left artificial knee joint [Z96.652]     Physical Therapy Occurrence Codes    Date of onset of impairment:  8/9/18   Date physical therapy care plan established or reviewed:  8/24/18   Date physical therapy treatment started:  8/24/18          Functional Limitation G-Codes and Severity Modifiers      Goal:     Discharge:         Your patient is being discharged from Physical Therapy with the following comments:   · Goals met    Comments:  S/P total knee revision. Self reporting that knee is 85% recovered.       Limitations Remaining:  Decreased standing tolerance throughout the day, with some warmth and edema if stands too long    Balance deficits, likely present prior to knee procedure    Lateral ankle stability issues on the right    Quad restriction in flexibility on left       Recommendations:  Discharged to home program      Kanu Pride, PT    Date: 10/5/2018   Rogelio Barboza MD;  Location: [facility]  CARDIAC CATH LAB;  Service: Cardiology    CARDIAC CATHETERIZATION N/A 10/27/2022    Procedure: Cardiac Coronary Angiogram;  Surgeon: Rogelio Barboza MD;  Location: [facility] CARDIAC CATH LAB;  [facility])      hydrALAZINE (APRESOLINE) 50 mg tablet Take 1 tablet (50 mg total) by mouth 3 (three) times a day  Qty: 90 tablet, Refills: 2    Associated Diagnoses: Primary hypertension      isosorbide mononitrate (IMDUR) 30 mg 24 hr tablet Take 0.5 tablets (15 mg total) by mouth daily  Qty: 30 tablet, Refills: 5    Associated Diagnoses: Chest pain, unspecified type      OneTouch Delica Lancets 33G MISC Check blood sugars twice daily. Please substitute with appropriate alternative as covered by patient's insurance. Dx: E11.65  Qty: 200 each, Refills: 3    Comments: Please substitute with appropriate alternative as covered by patient's insurance  Associated Diagnoses: New onset of type 2 diabetes mellitus in pediatric patient ([facility])      pantoprazole (PROTONIX) 40 mg tablet Take 40 mg by mouth daily      torsemide (DEMADEX) 20 mg tablet Take 1 tablet (20 mg total) by mouth daily  Qty: 90 tablet, Refills: 3    Associated Diagnoses: Acute exacerbation of CHF (congestive heart failure) ([facility])      magnesium (MAGTAB) 84 MG (7MEQ) TBCR Take 1 tablet (84 mg total) by mouth daily  Qty: 90 tablet, Refills: 1    Associated Diagnoses: Leg cramping      methocarbamol (ROBAXIN) 500 mg tablet Take 1 tablet (500 mg total) by mouth 4 (four) times a day  Qty: 90 tablet, Refills: 0    Associated Diagnoses: Myalgia      nitroglycerin (NITROSTAT) 0.4 mg SL tablet Place 1 tablet (0.4 mg total) under the tongue every 5 (five) minutes as needed for chest pain  Qty: 120 tablet, Refills: 1    Associated Diagnoses: Cardiomyopathy, unspecified type ([facility])             No discharge procedures on file.    PDMP Review       None            ED Provider  Electronically Signed by             Gab Ivey PA-C  05/06/24 [id number]

## 2024-05-07 ENCOUNTER — OFFICE VISIT (OUTPATIENT)
Dept: RHEUMATOLOGY | Facility: MEDICAL CENTER | Age: 69
End: 2024-05-07
Attending: INTERNAL MEDICINE
Payer: MEDICARE

## 2024-05-07 VITALS
SYSTOLIC BLOOD PRESSURE: 140 MMHG | HEART RATE: 71 BPM | RESPIRATION RATE: 14 BRPM | DIASTOLIC BLOOD PRESSURE: 70 MMHG | OXYGEN SATURATION: 97 % | WEIGHT: 208 LBS | BODY MASS INDEX: 29.84 KG/M2 | TEMPERATURE: 97.4 F

## 2024-05-07 DIAGNOSIS — I10 ESSENTIAL HYPERTENSION: ICD-10-CM

## 2024-05-07 DIAGNOSIS — I25.84 CORONARY ARTERY DISEASE DUE TO CALCIFIED CORONARY LESION: ICD-10-CM

## 2024-05-07 DIAGNOSIS — Z51.81 MEDICATION MONITORING ENCOUNTER: ICD-10-CM

## 2024-05-07 DIAGNOSIS — I25.10 CORONARY ARTERY DISEASE DUE TO CALCIFIED CORONARY LESION: ICD-10-CM

## 2024-05-07 DIAGNOSIS — Z79.631 METHOTREXATE, LONG TERM, CURRENT USE: ICD-10-CM

## 2024-05-07 DIAGNOSIS — Z79.52 LONG TERM CURRENT USE OF SYSTEMIC STEROIDS: ICD-10-CM

## 2024-05-07 DIAGNOSIS — L40.50 PSORIATIC ARTHRITIS (HCC): ICD-10-CM

## 2024-05-07 PROCEDURE — 1170F FXNL STATUS ASSESSED: CPT | Performed by: INTERNAL MEDICINE

## 2024-05-07 PROCEDURE — 3078F DIAST BP <80 MM HG: CPT | Performed by: INTERNAL MEDICINE

## 2024-05-07 PROCEDURE — 3077F SYST BP >= 140 MM HG: CPT | Performed by: INTERNAL MEDICINE

## 2024-05-07 PROCEDURE — 99214 OFFICE O/P EST MOD 30 MIN: CPT | Performed by: INTERNAL MEDICINE

## 2024-05-07 RX ORDER — METHOTREXATE 2.5 MG/1
TABLET ORAL
Qty: 36 TABLET | Refills: 0 | Status: SHIPPED | OUTPATIENT
Start: 2024-05-07

## 2024-05-07 RX ORDER — FOLIC ACID 1 MG/1
TABLET ORAL
Qty: 90 TABLET | Refills: 3 | Status: SHIPPED | OUTPATIENT
Start: 2024-05-07

## 2024-05-07 ASSESSMENT — JOINT PAIN
TOTAL NUMBER OF SWOLLEN JOINTS: 0
TOTAL NUMBER OF TENDER JOINTS: 6

## 2024-05-07 ASSESSMENT — FIBROSIS 4 INDEX: FIB4 SCORE: 2.21

## 2024-05-07 NOTE — PROGRESS NOTES
Chief Complaint- joint pain     Subjective:   Edgardo Sims is a 68 y.o. male here today for follow up of rheumatological issues    This is a follow-up visit for this patient who we see in this clinic for psoriatic arthritis with hand and feet x-rays done June 2021 indicating inflammatory arthritis.  Patient also carries psoriasis in his scalp and dystrophic toenails and being her nails.  Patient is currently on Taltz 80 mg subcu every 4 weeks with great benefit in dystrophic toenails and fingernails.  Patient however states he still having some achiness in his knuckles bilateral hands.    Since last visit patient status post C-spine fusion for significant spinal DJD, patient currently undergoing rehabilitation for such.      Additional comorbidities include ureteral blockage and BPH.  Patient also with a history of Dupuytren's contracture with release, also history of hypercholesterolemia.  Patient also with a history of osteopenia and aortic valve insufficiency followed periodically by echocardiograms.  Patient also with rotator cuff tears in left shoulder status post surgical intervention.      Bilateral AUTUMN  Left TKA        S/p Tremfya-ineffective   S/p plaquenil-stopped 10/2022 secondary to ophthalmology concern of macular change per patient report  S/p Rinvoq-stopped secondary to concern of exacerbation of CAD  S/p Remicade-ineffective  S/p Orencia-ineffective  S/p Enbrel-ineffective  S/p humira-ineffective  S/p MTX-oral ulcers  S/p arava-bad reaction but patient doesn't recall specifics  S/p rituximab-helped but patient stopped because of methotrexate side effects per patient report....     HBsAg/HBcAb neg 3/2023  HCV neg 3/2023  Quantiferon Gold neg 3/2023   Thyroglobulin ab neg 10/2022  TPO ab neg 10/2022  G6PD 12.9 nl 6/2020   Uric acid 4.5 nl 2/2019   Cryoglobulin neg 8/2017  RF neg 8/2017, RF neg 2/2019  CCP neg 2/2019  DEXA 9/5/2017 T scores -0.2, -1.5  FRAX 9/5/2017 not done  DEXA 6/12/2020 T  scores 0.3, -1.2  FRAX 6/12/2020 not done      Hand x-rays 5/2017-indicates erosive arthritis  Hand x-rays 6/2021-IMPRESSION:  1.  There is been slight interval progression of arthropathy as discussed above predominantly involving the right hand 2nd and 3rd MCP joints and left hand 3rd MCP joint with lesser involvement of the IP joints and carpal bones which could be consistent with an inflammatory arthropathy such as rheumatoid arthritis.  2.  There is degenerative type change of osteoarthritis in the 1st CMC joints, left greater than right.     Feet x-rays 5/2017-indicates erosive arthritis   Feet x-rays 6/2021-IMPRESSION:  1.  There is no radiographic evidence of an erosive arthropathy.  2.  There is predominantly degenerative type change in the IP joints and 1st tarsometatarsal junctions, right greater than left.     Echocardiogram 7/2022-CONCLUSIONS  Compared to the images of the prior study 11/11/2019, there has been no   significant change.   Normal left ventricular systolic function.  The left ventricular ejection fraction is visually estimated to be 70%.  Normal diastolic function.  Normal right ventricular size and systolic function.  Moderate aortic insufficiency.     Corticosteroid Therapy Informed Consent signed 2/21/2019-copy given to patient            Current Outpatient Medications   Medication Sig Dispense Refill    Ixekizumab (TALTZ SC) Inject  under the skin.      methotrexate 2.5 MG tablet 3 tabs po qweek 36 Tablet 0    folic acid (FOLVITE) 1 MG Tab 1 tab po qday 90 Tablet 3    cyclobenzaprine (FLEXERIL) 10 mg Tab TAKE 1 TABLET BY MOUTH 3 TIMES A DAY AS NEEDED FOR MILD PAIN OR MUSCLE SPASMS FOR UP TO 90 DAYS. 90 Tablet 2    [START ON 6/22/2024] oxyCODONE-acetaminophen (PERCOCET-10)  MG Tab Take 1-2 Tablets by mouth every four hours as needed for Severe Pain (refill #3 of #3.) for up to 30 days. 150 Tablet 0    oxyCODONE-acetaminophen (PERCOCET-10)  MG Tab Take 1-2 Tablets by mouth  every four hours as needed for Severe Pain (refill #1 of #3.) for up to 30 days. 150 Tablet 0    [START ON 5/23/2024] oxyCODONE-acetaminophen (PERCOCET-10)  MG Tab Take 1-2 Tablets by mouth every four hours as needed for Severe Pain (refill #2 of #3.) for up to 30 days. 150 Tablet 0    testosterone cypionate (DEPO-TESTOSTERONE) 200 MG/ML injection INJECT ONE ML INTRAMUSCULARLY EVERY 14 DAYS FOR 84 DAYS. 6 mL 0    Testosterone Cypionate 200 MG/ML Solution Inject 200 mg as directed every 14 days.      lisinopril (PRINIVIL) 20 MG Tab Take 1 Tablet by mouth every day. 90 Tablet 3    metoprolol tartrate (LOPRESSOR) 50 MG Tab Take 1 Tablet by mouth 2 times a day. 180 Tablet 3    pravastatin (PRAVACHOL) 80 MG tablet Take 1 Tablet by mouth every day. 100 Tablet 3    Cholecalciferol (VITAMIN D3) 2000 UNIT Cap Take 2,000 Units by mouth every morning.      Calcium Carb-Cholecalciferol 600-12.5 MG-MCG Cap Take 1 Capsule by mouth 2 times a day.      omeprazole (PRILOSEC OTC) 20 MG tablet Take 20 mg by mouth every morning.      Multiple Vitamins-Minerals (PRESERVISION AREDS 2+MULTI VIT) Cap Take 1 Capsule by mouth 2 times a day.      PARoxetine (PAXIL) 20 MG Tab Take 1 Tablet by mouth every day. 90 Tablet 3    Ascorbic Acid (VITAMIN C) 1000 MG Tab Take 1,000 mg by mouth every morning.      Zinc 50 MG Tab Take 50 mg by mouth every morning.      ondansetron (ZOFRAN ODT) 4 MG TABLET DISPERSIBLE Take 1 Tablet by mouth every 6 hours as needed for Nausea/Vomiting. 10 Tablet 0     No current facility-administered medications for this visit.     He  has a past medical history of Abnormal myocardial perfusion study (01/15/2014), Aortic insufficiency (07/05/2011), Arthritis, Bronchitis, CAD (coronary artery disease) mild plaque at cath in 2/14 (12/05/2014), Cancer (HCC), Chronic use of opiate drugs therapeutic purposes (08/12/2017), Dental disorder, Dyslipidemia (07/12/2011), Hard to intubate, Heart burn, Heart murmur, Hiatus hernia  syndrome, High cholesterol, HTN (hypertension) (07/05/2011), Hypertension, Indigestion, Infectious disease, Pain, Pain, Rheumatoid arthritis(714.0), and Tachycardia (10/20/2014).    He has no past medical history of Anesthesia.    ROS   Other than what is mentioned in HPI or physical exam, there is no history of headaches, double vision or blurred vision.  No trouble swallowing difficulties .  No chest complaints including chest pain, cough or sputum production. No GI complaints including nausea, vomiting, change in bowel habits, or past peptic ulcer disease. No history of blood in the stools. No urinary complaints including dysuria or frequency. No history of alopecia, photosensitivity     Objective:     BP (!) 140/70   Pulse 71   Temp 36.3 °C (97.4 °F) (Temporal)   Resp 14   Wt 94.3 kg (208 lb)   SpO2 97%  Body mass index is 29.84 kg/m².   Physical Exam:    Constitutional: Alert and oriented X3, patient is talkative with good eye contact.Skin: Warm, dry, good turgor, mild flaky skin in the scalp, fingernails look great without dystrophy.Eye: Equal, round and reactive, conjunctiva clear, lids normal EOM intactENMT: Lips without lesions,  pinna without deformityNeck: Trachea midline, no masses, no thyromegaly.Lymph:  No cervical lymphadenopathy, no axillary lymphadenopathy, no supraclavicular lymphadenopathyRespiratory: Unlabored respiratory effort, lungs clear to auscultation, no wheezes, no ronchi.Cardiovascular: Normal S1, S2,Regular rate and rhythm, no murmurs rubs or gallops   .Abdomen: Soft, non-distended, overweight.Psych: Alert and oriented x3, normal affect and mood.Neuro: Cranial nerves 2-12 are grossly intact Ext:no joint laxity noted in bilateral arms, no joint laxity noted in bilateral legs      Lab Results   Component Value Date/Time    QNTTBGOLD Negative 06/01/2017 01:13 PM     Lab Results   Component Value Date/Time    HEPBCORTOT Negative 06/01/2017 01:13 PM    HEPBCORIGM Non-Reactive 03/29/2023  04:53 PM    HEPBSAG Non-Reactive 03/29/2023 04:53 PM     Lab Results   Component Value Date/Time    HEPCAB Non-Reactive 03/29/2023 04:53 PM     Lab Results   Component Value Date/Time    SODIUM 129 (L) 02/22/2024 12:13 AM    POTASSIUM 4.5 02/22/2024 12:13 AM    CHLORIDE 95 (L) 02/22/2024 12:13 AM    CO2 22 02/22/2024 12:13 AM    GLUCOSE 140 (H) 02/22/2024 12:13 AM    BUN 10 02/22/2024 12:13 AM    CREATININE 0.63 02/22/2024 12:13 AM    CREATININE 1.1 04/21/2009 03:50 PM    BUNCREATRAT 13 09/21/2016 09:21 AM      Lab Results   Component Value Date/Time    WBC 12.5 (H) 02/22/2024 12:13 AM    WBC 7.5 07/01/2011 10:30 AM    RBC 4.96 02/22/2024 12:13 AM    RBC 4.72 07/01/2011 10:30 AM    HEMOGLOBIN 13.3 (L) 02/22/2024 12:13 AM    HEMATOCRIT 39.4 (L) 02/22/2024 12:13 AM    MCV 79.4 (L) 02/22/2024 12:13 AM     (H) 07/01/2011 10:30 AM    MCH 26.8 (L) 02/22/2024 12:13 AM    MCH 36.0 (H) 07/01/2011 10:30 AM    MCHC 33.8 02/22/2024 12:13 AM    MPV 9.4 02/22/2024 12:13 AM    NEUTSPOLYS 56.50 02/08/2024 09:22 AM    LYMPHOCYTES 20.60 (L) 02/08/2024 09:22 AM    MONOCYTES 13.00 02/08/2024 09:22 AM    EOSINOPHILS 7.90 (H) 02/08/2024 09:22 AM    BASOPHILS 1.50 02/08/2024 09:22 AM    HYPOCHROMIA 1+ 03/05/2014 04:43 PM    ANISOCYTOSIS 1+ 01/02/2015 05:02 PM      Lab Results   Component Value Date/Time    CALCIUM 8.6 02/22/2024 12:13 AM    ASTSGOT 26 01/10/2024 07:13 AM    ALTSGPT 18 01/10/2024 07:13 AM    ALKPHOSPHAT 90 01/10/2024 07:13 AM    TBILIRUBIN 0.6 01/10/2024 07:13 AM    ALBUMIN 4.1 01/10/2024 07:13 AM    TOTPROTEIN 6.5 01/10/2024 07:13 AM     Lab Results   Component Value Date/Time    URICACID 4.5 02/19/2019 08:36 AM    RHEUMFACTN 10 02/19/2019 08:36 AM    CCPANTIBODY 2 02/19/2019 08:36 AM     Lab Results   Component Value Date/Time    CRYOGLOBULIN NEG 72Hour 08/04/2017 02:32 PM     Lab Results   Component Value Date/Time    SEDRATEWES 2 03/29/2023 04:53 PM     Lab Results   Component Value Date/Time    HBA1C 5.7 (H)  01/10/2024 07:13 AM     Lab Results   Component Value Date/Time    G6PD 12.9 06/18/2020 02:50 PM     Lab Results   Component Value Date/Time    CPKTOTAL 141 01/26/2018 07:47 AM     Lab Results   Component Value Date/Time    MICROSOMALA <9.0 10/12/2022 02:49 PM    ANTITHYROGL <0.9 10/12/2022 02:49 PM     Lab Results   Component Value Date/Time    PTHINTACT 55 10/10/2008 10:42 AM     Assessment and Plan:     1. Psoriatic arthritis (HCC)  Currently on Taltz 80 mg subcu every 4 weeks with benefit however patient still having symptoms especially in the knuckles of bilateral hands.  Long discussion with patient regarding additional or alternative treatment options, we opted to stay with Taltz 80 mg subcu every 4 weeks and add low-dose methotrexate 7.5 mg p.o. q. weekly folic acid 1 mg a day.  We also discussed the possible use of apremilast, printed information from up-to-date regarding apremilast and given to patient to review today.  - methotrexate 2.5 MG tablet; 3 tabs po qweek  Dispense: 36 Tablet; Refill: 0  - folic acid (FOLVITE) 1 MG Tab; 1 tab po qday  Dispense: 90 Tablet; Refill: 3  - CBC WITH DIFFERENTIAL; Standing  - Comp Metabolic Panel; Standing  - Sed Rate; Standing    2. Medication monitoring encounter  Currently on Taltz 80 mg subcu every 4 weeks, screening labs are up-to-date, neck screening labs due March 2025, patient needs monitoring labs every 6 months, next labs due about August 2024, standing orders written for patient.  We reviewed risks of biological medications with patient including hematological pathology, cancer risks, neurological and infection issues, patient states understanding.  - methotrexate 2.5 MG tablet; 3 tabs po qweek  Dispense: 36 Tablet; Refill: 0  - folic acid (FOLVITE) 1 MG Tab; 1 tab po qday  Dispense: 90 Tablet; Refill: 3  - CBC WITH DIFFERENTIAL; Standing  - Comp Metabolic Panel; Standing  - Sed Rate; Standing    3. Methotrexate, long term, current use  Do a trial of  methotrexate 7.5 mg p.o. weekly with folic acid 1 mg a day, patient does need monitoring labs every 3 months, standing orders written for patient.  - CBC WITH DIFFERENTIAL; Standing  - Comp Metabolic Panel; Standing  - Sed Rate; Standing    4. Essential hypertension  May impact the type of medications we can use for this patient's arthritis. We will have to keep this under advisement.  - methotrexate 2.5 MG tablet; 3 tabs po qweek  Dispense: 36 Tablet; Refill: 0  - folic acid (FOLVITE) 1 MG Tab; 1 tab po qday  Dispense: 90 Tablet; Refill: 3    5. Coronary artery disease due to calcified coronary lesion: Mildly cardiac catheterization in 2014  Followed by cardiology    Followup: Return in about 3 months (around 8/7/2024). or sooner prn      Please note that this dictation was created using voice recognition software. I have made every reasonable attempt to correct obvious errors, but I expect that there are errors of grammar and possibly content that I did not discover before finalizing the note.

## 2024-05-24 ENCOUNTER — HOSPITAL ENCOUNTER (OUTPATIENT)
Dept: LAB | Facility: MEDICAL CENTER | Age: 69
End: 2024-05-24
Attending: PHYSICIAN ASSISTANT
Payer: MEDICARE

## 2024-05-25 LAB
ANION GAP SERPL CALC-SCNC: 13 MMOL/L (ref 7–16)
BUN SERPL-MCNC: 19 MG/DL (ref 8–22)
CALCIUM SERPL-MCNC: 9.4 MG/DL (ref 8.5–10.5)
CHLORIDE SERPL-SCNC: 98 MMOL/L (ref 96–112)
CO2 SERPL-SCNC: 26 MMOL/L (ref 20–33)
CREAT SERPL-MCNC: 0.82 MG/DL (ref 0.5–1.4)
GFR SERPLBLD CREATININE-BSD FMLA CKD-EPI: 96 ML/MIN/1.73 M 2
GLUCOSE SERPL-MCNC: 77 MG/DL (ref 65–99)
POTASSIUM SERPL-SCNC: 5.1 MMOL/L (ref 3.6–5.5)
PSA SERPL-MCNC: 1.12 NG/ML (ref 0–4)
SODIUM SERPL-SCNC: 137 MMOL/L (ref 135–145)

## 2024-05-31 DIAGNOSIS — E29.1 HYPOGONADISM MALE: ICD-10-CM

## 2024-06-03 RX ORDER — TESTOSTERONE CYPIONATE 200 MG/ML
INJECTION, SOLUTION INTRAMUSCULAR
Qty: 6 ML | Refills: 0 | Status: SHIPPED | OUTPATIENT
Start: 2024-06-03 | End: 2024-08-26

## 2024-06-27 ENCOUNTER — APPOINTMENT (OUTPATIENT)
Dept: MEDICAL GROUP | Facility: LAB | Age: 69
End: 2024-06-27
Payer: MEDICARE

## 2024-07-09 ENCOUNTER — APPOINTMENT (OUTPATIENT)
Dept: MEDICAL GROUP | Facility: LAB | Age: 69
End: 2024-07-09
Payer: MEDICARE

## 2024-07-09 VITALS
HEART RATE: 71 BPM | BODY MASS INDEX: 29.35 KG/M2 | TEMPERATURE: 98 F | RESPIRATION RATE: 18 BRPM | HEIGHT: 70 IN | OXYGEN SATURATION: 96 % | DIASTOLIC BLOOD PRESSURE: 70 MMHG | SYSTOLIC BLOOD PRESSURE: 126 MMHG | WEIGHT: 205 LBS

## 2024-07-09 DIAGNOSIS — M05.9 RHEUMATOID ARTHRITIS WITH POSITIVE RHEUMATOID FACTOR, INVOLVING UNSPECIFIED SITE (HCC): ICD-10-CM

## 2024-07-09 PROCEDURE — 3074F SYST BP LT 130 MM HG: CPT | Performed by: INTERNAL MEDICINE

## 2024-07-09 PROCEDURE — 3078F DIAST BP <80 MM HG: CPT | Performed by: INTERNAL MEDICINE

## 2024-07-09 PROCEDURE — 99213 OFFICE O/P EST LOW 20 MIN: CPT | Performed by: INTERNAL MEDICINE

## 2024-07-09 PROCEDURE — 1170F FXNL STATUS ASSESSED: CPT | Performed by: INTERNAL MEDICINE

## 2024-07-09 RX ORDER — OXYCODONE AND ACETAMINOPHEN 10; 325 MG/1; MG/1
1-2 TABLET ORAL EVERY 4 HOURS PRN
Qty: 150 TABLET | Refills: 0 | Status: SHIPPED | OUTPATIENT
Start: 2024-07-24 | End: 2024-08-23

## 2024-07-09 RX ORDER — OXYCODONE AND ACETAMINOPHEN 10; 325 MG/1; MG/1
1-2 TABLET ORAL EVERY 4 HOURS PRN
Qty: 150 TABLET | Refills: 0 | Status: SHIPPED | OUTPATIENT
Start: 2024-09-22 | End: 2024-10-22

## 2024-07-09 RX ORDER — OXYCODONE AND ACETAMINOPHEN 10; 325 MG/1; MG/1
1-2 TABLET ORAL EVERY 4 HOURS PRN
Qty: 150 TABLET | Refills: 0 | Status: SHIPPED | OUTPATIENT
Start: 2024-08-23 | End: 2024-09-22

## 2024-07-09 ASSESSMENT — FIBROSIS 4 INDEX: FIB4 SCORE: 2.21

## 2024-07-17 DIAGNOSIS — M05.9 RHEUMATOID ARTHRITIS WITH POSITIVE RHEUMATOID FACTOR, INVOLVING UNSPECIFIED SITE (HCC): ICD-10-CM

## 2024-07-17 DIAGNOSIS — F41.9 ANXIETY: ICD-10-CM

## 2024-07-17 RX ORDER — DIAZEPAM 5 MG/1
TABLET ORAL
Qty: 60 TABLET | Refills: 2 | Status: SHIPPED | OUTPATIENT
Start: 2024-07-17 | End: 2024-10-15

## 2024-08-02 ENCOUNTER — HOSPITAL ENCOUNTER (OUTPATIENT)
Dept: LAB | Facility: MEDICAL CENTER | Age: 69
End: 2024-08-02
Attending: INTERNAL MEDICINE
Payer: MEDICARE

## 2024-08-02 DIAGNOSIS — Z79.52 LONG TERM CURRENT USE OF SYSTEMIC STEROIDS: ICD-10-CM

## 2024-08-02 DIAGNOSIS — Z51.81 MEDICATION MONITORING ENCOUNTER: ICD-10-CM

## 2024-08-02 DIAGNOSIS — L40.50 PSORIATIC ARTHRITIS (HCC): ICD-10-CM

## 2024-08-02 DIAGNOSIS — Z79.631 METHOTREXATE, LONG TERM, CURRENT USE: ICD-10-CM

## 2024-08-02 LAB
ALBUMIN SERPL BCP-MCNC: 4.2 G/DL (ref 3.2–4.9)
ALBUMIN/GLOB SERPL: 1.6 G/DL
ALP SERPL-CCNC: 114 U/L (ref 30–99)
ALT SERPL-CCNC: 24 U/L (ref 2–50)
ANION GAP SERPL CALC-SCNC: 12 MMOL/L (ref 7–16)
AST SERPL-CCNC: 47 U/L (ref 12–45)
BASOPHILS # BLD AUTO: 1.2 % (ref 0–1.8)
BASOPHILS # BLD: 0.11 K/UL (ref 0–0.12)
BILIRUB SERPL-MCNC: 0.6 MG/DL (ref 0.1–1.5)
BUN SERPL-MCNC: 11 MG/DL (ref 8–22)
CALCIUM ALBUM COR SERPL-MCNC: 9.5 MG/DL (ref 8.5–10.5)
CALCIUM SERPL-MCNC: 9.7 MG/DL (ref 8.5–10.5)
CHLORIDE SERPL-SCNC: 105 MMOL/L (ref 96–112)
CO2 SERPL-SCNC: 25 MMOL/L (ref 20–33)
CREAT SERPL-MCNC: 1.05 MG/DL (ref 0.5–1.4)
EOSINOPHIL # BLD AUTO: 0.4 K/UL (ref 0–0.51)
EOSINOPHIL NFR BLD: 4.2 % (ref 0–6.9)
ERYTHROCYTE [DISTWIDTH] IN BLOOD BY AUTOMATED COUNT: 56.7 FL (ref 35.9–50)
ERYTHROCYTE [SEDIMENTATION RATE] IN BLOOD BY WESTERGREN METHOD: 1 MM/HOUR (ref 0–20)
GFR SERPLBLD CREATININE-BSD FMLA CKD-EPI: 77 ML/MIN/1.73 M 2
GLOBULIN SER CALC-MCNC: 2.6 G/DL (ref 1.9–3.5)
GLUCOSE SERPL-MCNC: 109 MG/DL (ref 65–99)
HCT VFR BLD AUTO: 47.9 % (ref 42–52)
HGB BLD-MCNC: 15.1 G/DL (ref 14–18)
IMM GRANULOCYTES # BLD AUTO: 0.08 K/UL (ref 0–0.11)
IMM GRANULOCYTES NFR BLD AUTO: 0.8 % (ref 0–0.9)
LYMPHOCYTES # BLD AUTO: 1.69 K/UL (ref 1–4.8)
LYMPHOCYTES NFR BLD: 17.8 % (ref 22–41)
MCH RBC QN AUTO: 26.3 PG (ref 27–33)
MCHC RBC AUTO-ENTMCNC: 31.5 G/DL (ref 32.3–36.5)
MCV RBC AUTO: 83.3 FL (ref 81.4–97.8)
MONOCYTES # BLD AUTO: 1.15 K/UL (ref 0–0.85)
MONOCYTES NFR BLD AUTO: 12.1 % (ref 0–13.4)
NEUTROPHILS # BLD AUTO: 6.06 K/UL (ref 1.82–7.42)
NEUTROPHILS NFR BLD: 63.9 % (ref 44–72)
NRBC # BLD AUTO: 0 K/UL
NRBC BLD-RTO: 0 /100 WBC (ref 0–0.2)
PLATELET # BLD AUTO: 230 K/UL (ref 164–446)
PMV BLD AUTO: 10.6 FL (ref 9–12.9)
POTASSIUM SERPL-SCNC: 5.5 MMOL/L (ref 3.6–5.5)
PROT SERPL-MCNC: 6.8 G/DL (ref 6–8.2)
RBC # BLD AUTO: 5.75 M/UL (ref 4.7–6.1)
SODIUM SERPL-SCNC: 142 MMOL/L (ref 135–145)
WBC # BLD AUTO: 9.5 K/UL (ref 4.8–10.8)

## 2024-08-02 PROCEDURE — 85025 COMPLETE CBC W/AUTO DIFF WBC: CPT

## 2024-08-02 PROCEDURE — 80053 COMPREHEN METABOLIC PANEL: CPT

## 2024-08-02 PROCEDURE — 85652 RBC SED RATE AUTOMATED: CPT

## 2024-08-02 PROCEDURE — 36415 COLL VENOUS BLD VENIPUNCTURE: CPT

## 2024-08-05 DIAGNOSIS — M05.9 RHEUMATOID ARTHRITIS WITH POSITIVE RHEUMATOID FACTOR, INVOLVING UNSPECIFIED SITE (HCC): ICD-10-CM

## 2024-08-05 DIAGNOSIS — R73.02 IGT (IMPAIRED GLUCOSE TOLERANCE): ICD-10-CM

## 2024-08-06 RX ORDER — CYCLOBENZAPRINE HCL 10 MG
10 TABLET ORAL 3 TIMES DAILY PRN
Qty: 90 TABLET | Refills: 2 | Status: SHIPPED | OUTPATIENT
Start: 2024-08-06 | End: 2024-11-04

## 2024-08-06 NOTE — TELEPHONE ENCOUNTER
Received request via: Pharmacy    Was the patient seen in the last year in this department? Yes    Does the patient have an active prescription (recently filled or refills available) for medication(s) requested? No    Ph[facility] Name: [facility]    Does the patient have [facility] and need 100 day supply (blood pressure, diabetes and cholesterol meds only)? Medication is not for cholesterol, blood pressure or diabetes

## 2024-08-13 ENCOUNTER — OFFICE VISIT (OUTPATIENT)
Dept: RHEUMATOLOGY | Facility: MEDICAL CENTER | Age: 69
End: 2024-08-13
Attending: INTERNAL MEDICINE
Payer: MEDICARE

## 2024-08-13 VITALS
RESPIRATION RATE: 16 BRPM | TEMPERATURE: 96.8 F | OXYGEN SATURATION: 98 % | WEIGHT: 202 LBS | BODY MASS INDEX: 28.98 KG/M2 | HEART RATE: 69 BPM | DIASTOLIC BLOOD PRESSURE: 62 MMHG | SYSTOLIC BLOOD PRESSURE: 130 MMHG

## 2024-08-13 DIAGNOSIS — L40.50 PSORIATIC ARTHRITIS (HCC): ICD-10-CM

## 2024-08-13 DIAGNOSIS — I25.84 CORONARY ARTERY DISEASE DUE TO CALCIFIED CORONARY LESION: ICD-10-CM

## 2024-08-13 DIAGNOSIS — Z51.81 MEDICATION MONITORING ENCOUNTER: ICD-10-CM

## 2024-08-13 DIAGNOSIS — I25.10 CORONARY ARTERY DISEASE DUE TO CALCIFIED CORONARY LESION: ICD-10-CM

## 2024-08-13 DIAGNOSIS — I10 ESSENTIAL HYPERTENSION: ICD-10-CM

## 2024-08-13 PROCEDURE — 3075F SYST BP GE 130 - 139MM HG: CPT | Performed by: INTERNAL MEDICINE

## 2024-08-13 PROCEDURE — 1170F FXNL STATUS ASSESSED: CPT | Performed by: INTERNAL MEDICINE

## 2024-08-13 PROCEDURE — 99214 OFFICE O/P EST MOD 30 MIN: CPT | Performed by: INTERNAL MEDICINE

## 2024-08-13 PROCEDURE — 99212 OFFICE O/P EST SF 10 MIN: CPT | Performed by: INTERNAL MEDICINE

## 2024-08-13 PROCEDURE — 3078F DIAST BP <80 MM HG: CPT | Performed by: INTERNAL MEDICINE

## 2024-08-13 ASSESSMENT — FIBROSIS 4 INDEX: FIB4 SCORE: 2.79

## 2024-08-13 NOTE — PROGRESS NOTES
Chief Complaint- joint pain     Subjective:   Edgardo Sims is a 68 y.o. male here today for follow up of rheumatological issues    This is a follow-up visit for this patient who we see in this clinic for psoriatic arthritis when hand and feet x-rays indicating inflammatory arthritis June 2021.  Patient also has psoriasis in his scalp and dystrophic toenails compatible with psoriatic nails.  Patient is currently on Taltz 80 mg subcu every 4 weeks with great benefit.  On last visit we had started low-dose methotrexate for continued polyarthralgias however patient states that he has not really been taking the methotrexate and feels that the Taltz monotherapy is working well for him.  Of note recent sedimentation rate equals 1 August 2024     Since last visit patient status post C-spine fusion for significant spinal DJD, patient currently undergoing rehabilitation for such.      Additional comorbidities include ureteral blockage and BPH.  Patient also with a history of Dupuytren's contracture with release, also history of hypercholesterolemia.  Patient also with a history of osteopenia and aortic valve insufficiency followed periodically by echocardiograms.  Patient also with rotator cuff tears in left shoulder status post surgical intervention.      Bilateral AUTUMN  Left TKA        S/p Tremfya-ineffective   S/p plaquenil-stopped 10/2022 secondary to ophthalmology concern of macular change per patient report  S/p Rinvoq-stopped secondary to concern of exacerbation of CAD  S/p Remicade-ineffective  S/p Orencia-ineffective  S/p Enbrel-ineffective  S/p humira-ineffective  S/p MTX-oral ulcers  S/p arava-bad reaction but patient doesn't recall specifics  S/p rituximab-helped but patient stopped because of methotrexate side effects per patient report....     HBsAg/HBcAb neg 3/2023  HCV neg 3/2023  Quantiferon Gold neg 3/2023   Thyroglobulin ab neg 10/2022  TPO ab neg 10/2022  G6PD 12.9 nl 6/2020   Uric acid 4.5 nl  2/2019   Cryoglobulin neg 8/2017  RF neg 8/2017, RF neg 2/2019  CCP neg 2/2019  DEXA 9/5/2017 T scores -0.2, -1.5  FRAX 9/5/2017 not done  DEXA 6/12/2020 T scores 0.3, -1.2  FRAX 6/12/2020 not done      Hand x-rays 5/2017-indicates erosive arthritis  Hand x-rays 6/2021-IMPRESSION:  1.  There is been slight interval progression of arthropathy as discussed above predominantly involving the right hand 2nd and 3rd MCP joints and left hand 3rd MCP joint with lesser involvement of the IP joints and carpal bones which could be consistent with an inflammatory arthropathy such as rheumatoid arthritis.  2.  There is degenerative type change of osteoarthritis in the 1st CMC joints, left greater than right.     Feet x-rays 5/2017-indicates erosive arthritis   Feet x-rays 6/2021-IMPRESSION:  1.  There is no radiographic evidence of an erosive arthropathy.  2.  There is predominantly degenerative type change in the IP joints and 1st tarsometatarsal junctions, right greater than left.     Echocardiogram 7/2022-CONCLUSIONS  Compared to the images of the prior study 11/11/2019, there has been no   significant change.   Normal left ventricular systolic function.  The left ventricular ejection fraction is visually estimated to be 70%.  Normal diastolic function.  Normal right ventricular size and systolic function.  Moderate aortic insufficiency.     Corticosteroid Therapy Informed Consent signed 2/21/2019-copy given to patient             Current Outpatient Medications   Medication Sig Dispense Refill    cyclobenzaprine (FLEXERIL) 10 mg Tab TAKE 1 TABLET BY MOUTH 3 TIMES A DAY AS NEEDED FOR MILD PAIN OR MUSCLE SPASMS FOR UP TO 90 DAYS. 90 Tablet 2    diazePAM (VALIUM) 5 MG Tab TAKE 1 TABLET BY MOUTH EVERY 12 HOURS AS NEEDED FOR UP TO 30 DAYS F41.9 60 Tablet 2    oxyCODONE-acetaminophen (PERCOCET-10)  MG Tab Take 1-2 Tablets by mouth every four hours as needed for Severe Pain (refill #1 of #3.) for up to 30 days. 150 Tablet 0     [START ON 8/23/2024] oxyCODONE-acetaminophen (PERCOCET-10)  MG Tab Take 1-2 Tablets by mouth every four hours as needed for Severe Pain (refill #2 of #3.) for up to 30 days. 150 Tablet 0    [START ON 9/22/2024] oxyCODONE-acetaminophen (PERCOCET-10)  MG Tab Take 1-2 Tablets by mouth every four hours as needed for Severe Pain (refill #3 of #3.) for up to 30 days. 150 Tablet 0    lisinopril (PRINIVIL) 20 MG Tab Take 1 Tablet by mouth every day.      pravastatin (PRAVACHOL) 80 MG tablet Take 1 Tablet by mouth every day.      testosterone cypionate (DEPO-TESTOSTERONE) 200 MG/ML injection INJECT ONE ML INTRAMUSCULARLY EVERY 14 DAYS FOR 84 DAYS. 6 mL 0    Ixekizumab (TALTZ SC) Inject  under the skin.      Testosterone Cypionate 200 MG/ML Solution Inject 200 mg as directed every 14 days.      lisinopril (PRINIVIL) 20 MG Tab Take 1 Tablet by mouth every day. 90 Tablet 3    metoprolol tartrate (LOPRESSOR) 50 MG Tab Take 1 Tablet by mouth 2 times a day. 180 Tablet 3    pravastatin (PRAVACHOL) 80 MG tablet Take 1 Tablet by mouth every day. 100 Tablet 3    Cholecalciferol (VITAMIN D3) 2000 UNIT Cap Take 2,000 Units by mouth every morning.      Calcium Carb-Cholecalciferol 600-12.5 MG-MCG Cap Take 1 Capsule by mouth 2 times a day.      omeprazole (PRILOSEC OTC) 20 MG tablet Take 20 mg by mouth every morning.      Multiple Vitamins-Minerals (PRESERVISION AREDS 2+MULTI VIT) Cap Take 1 Capsule by mouth 2 times a day.      PARoxetine (PAXIL) 20 MG Tab Take 1 Tablet by mouth every day. 90 Tablet 3    Ascorbic Acid (VITAMIN C) 1000 MG Tab Take 1,000 mg by mouth every morning.      Zinc 50 MG Tab Take 50 mg by mouth every morning.      gabapentin (NEURONTIN) 100 MG Cap       gabapentin (NEURONTIN) 300 MG Cap Take 1 Capsule by mouth 3 times a day.      nitrofurantoin (MACROBID) 100 MG Cap Take 1 Capsule by mouth 2 times a day.      tizanidine (ZANAFLEX) 4 MG Tab Take 1 Tablet by mouth every 8 hours as needed.      ondansetron  (ZOFRAN ODT) 4 MG TABLET DISPERSIBLE Take 1 Tablet by mouth every 6 hours as needed for Nausea/Vomiting. 10 Tablet 0     No current facility-administered medications for this visit.     He  has a past medical history of Abnormal myocardial perfusion study (01/15/2014), Aortic insufficiency (07/05/2011), Arthritis, Bronchitis, CAD (coronary artery disease) mild plaque at cath in 2/14 (12/05/2014), Cancer (HCC), Chronic use of opiate drugs therapeutic purposes (08/12/2017), Dental disorder, Dyslipidemia (07/12/2011), Hard to intubate, Heart burn, Heart murmur, Hiatus hernia syndrome, High cholesterol, HTN (hypertension) (07/05/2011), Hypertension, Indigestion, Infectious disease, Pain, Pain, Rheumatoid arthritis(714.0), and Tachycardia (10/20/2014).    He has no past medical history of Anesthesia.    ROS   Other than what is mentioned in HPI or physical exam, there is no history of headaches, double vision or blurred vision.  No trouble swallowing difficulties .  No chest complaints including chest pain, cough or sputum production. No GI complaints including nausea, vomiting, change in bowel habits, or past peptic ulcer disease. No history of blood in the stools. No urinary complaints including dysuria or frequency. No history of alopecia, photosensitivity     Objective:     /62   Pulse 69   Temp 36 °C (96.8 °F) (Temporal)   Resp 16   Wt 91.6 kg (202 lb)   SpO2 98%  Body mass index is 28.98 kg/m².   Physical Exam:    Constitutional: Alert and oriented X3, patient is talkative with good eye contact.Skin: Warm, dry, good turgor, no rashes in visible areas, patient does have actinic keratoses on the lateral aspect of the right head, no lolita psoriatic plaques appreciated.Eye: Equal, round and reactive, conjunctiva clear, lids normal EOM intactENMT: Lips without lesions,  pinna without deformityNeck: Trachea midline, no masses, no thyromegaly.Lymph:  No cervical lymphadenopathy, no axillary lymphadenopathy, no  supraclavicular lymphadenopathyRespiratory: Unlabored respiratory effort, lungs clear to auscultation, no wheezes, no ronchi.Cardiovascular: Normal S1, S2, Regular rate and rhythm, no murmurs rubs or gallops   .Abdomen: Soft, non-distended.Psych: Alert and oriented x3, normal affect and mood.Neuro: Cranial nerves 2-12 are grossly intact Ext:no joint laxity noted in bilateral arms, no joint laxity noted in bilateral legs, joints look good, patient does have a slight crossover toe of the right second toe over great toe    Lab Results   Component Value Date/Time    QNTTBGOLD Negative 06/01/2017 01:13 PM     Lab Results   Component Value Date/Time    HEPBCORTOT Negative 06/01/2017 01:13 PM    HEPBCORIGM Non-Reactive 03/29/2023 04:53 PM    HEPBSAG Non-Reactive 03/29/2023 04:53 PM     Lab Results   Component Value Date/Time    HEPCAB Non-Reactive 03/29/2023 04:53 PM     Lab Results   Component Value Date/Time    SODIUM 142 08/02/2024 08:50 AM    POTASSIUM 5.5 08/02/2024 08:50 AM    CHLORIDE 105 08/02/2024 08:50 AM    CO2 25 08/02/2024 08:50 AM    GLUCOSE 109 (H) 08/02/2024 08:50 AM    BUN 11 08/02/2024 08:50 AM    CREATININE 1.05 08/02/2024 08:50 AM    CREATININE 1.1 04/21/2009 03:50 PM    BUNCREATRAT 13 09/21/2016 09:21 AM      Lab Results   Component Value Date/Time    WBC 9.5 08/02/2024 08:50 AM    WBC 7.5 07/01/2011 10:30 AM    RBC 5.75 08/02/2024 08:50 AM    RBC 4.72 07/01/2011 10:30 AM    HEMOGLOBIN 15.1 08/02/2024 08:50 AM    HEMATOCRIT 47.9 08/02/2024 08:50 AM    MCV 83.3 08/02/2024 08:50 AM     (H) 07/01/2011 10:30 AM    MCH 26.3 (L) 08/02/2024 08:50 AM    MCH 36.0 (H) 07/01/2011 10:30 AM    MCHC 31.5 (L) 08/02/2024 08:50 AM    MPV 10.6 08/02/2024 08:50 AM    NEUTSPOLYS 63.90 08/02/2024 08:50 AM    LYMPHOCYTES 17.80 (L) 08/02/2024 08:50 AM    MONOCYTES 12.10 08/02/2024 08:50 AM    EOSINOPHILS 4.20 08/02/2024 08:50 AM    BASOPHILS 1.20 08/02/2024 08:50 AM    HYPOCHROMIA 1+ 03/05/2014 04:43 PM     ANISOCYTOSIS 1+ 01/02/2015 05:02 PM      Lab Results   Component Value Date/Time    CALCIUM 9.7 08/02/2024 08:50 AM    ASTSGOT 47 (H) 08/02/2024 08:50 AM    ALTSGPT 24 08/02/2024 08:50 AM    ALKPHOSPHAT 114 (H) 08/02/2024 08:50 AM    TBILIRUBIN 0.6 08/02/2024 08:50 AM    ALBUMIN 4.2 08/02/2024 08:50 AM    TOTPROTEIN 6.8 08/02/2024 08:50 AM     Lab Results   Component Value Date/Time    URICACID 4.5 02/19/2019 08:36 AM    RHEUMFACTN 10 02/19/2019 08:36 AM    CCPANTIBODY 2 02/19/2019 08:36 AM     Lab Results   Component Value Date/Time    CRYOGLOBULIN NEG 72Hour 08/04/2017 02:32 PM     Lab Results   Component Value Date/Time    SEDRATEWES 1 08/02/2024 08:50 AM     Lab Results   Component Value Date/Time    HBA1C 5.7 (H) 01/10/2024 07:13 AM     Lab Results   Component Value Date/Time    G6PD 12.9 06/18/2020 02:50 PM     Lab Results   Component Value Date/Time    CPKTOTAL 141 01/26/2018 07:47 AM     Lab Results   Component Value Date/Time    MICROSOMALA <9.0 10/12/2022 02:49 PM    ANTITHYROGL <0.9 10/12/2022 02:49 PM     Lab Results   Component Value Date/Time    PTHINTACT 55 10/10/2008 10:42 AM     Assessment and Plan:     1. Psoriatic arthritis (HCC)  Continue Taltz 80 mg subcu every month  - Referral to Dermatology  - CBC WITH DIFFERENTIAL; Future  - Comp Metabolic Panel; Future  - Sed Rate; Future    2. Medication monitoring encounter  Currently on Taltz 80 mg subcu every month, screening labs are up-to-date, neck screening labs due March 2025, patient needs monitoring labs every 6 months, next labs due by February 2025, labs ordered for patient  We reviewed risks of biological medications with patient including hematological pathology, cancer risks, neurological and infection issues, patient states understanding.  - Referral to Dermatology  - CBC WITH DIFFERENTIAL; Future  - Comp Metabolic Panel; Future  - Sed Rate; Future    3. Essential hypertension  May impact the type of medications we can use for this  patient's arthritis. We will have to keep this under advisement.  - Referral to Dermatology  - CBC WITH DIFFERENTIAL; Future  - Comp Metabolic Panel; Future  - Sed Rate; Future    4. Coronary artery disease due to calcified coronary lesion: Mildly cardiac catheterization in 2014  Followed by cardiology  - CBC WITH DIFFERENTIAL; Future  - Comp Metabolic Panel; Future  - Sed Rate; Future    5. Hyperglycemia  Currently followed by patient's PCP    Followup: Return in about 6 months (around 2/13/2025). or sooner prn      Please note that this dictation was created using voice recognition software. I have made every reasonable attempt to correct obvious errors, but I expect that there are errors of grammar and possibly content that I did not discover before finalizing the note.

## 2024-08-21 DIAGNOSIS — E29.1 HYPOGONADISM MALE: ICD-10-CM

## 2024-08-22 RX ORDER — TESTOSTERONE CYPIONATE 200 MG/ML
INJECTION, SOLUTION INTRAMUSCULAR
Qty: 10 ML | Refills: 0 | Status: SHIPPED | OUTPATIENT
Start: 2024-08-22 | End: 2024-08-27

## 2024-08-22 NOTE — TELEPHONE ENCOUNTER
Received request via: Pharmacy    Was the patient seen in the last year in this department? Yes    Does the patient have an active prescription (recently filled or refills available) for medication(s) requested? No    Pharm[facility] Name: [facility]    Does the patient have USP Plus and need 100-day supply? (This applies to ALL medications) Patient does not have SCP

## 2024-08-26 DIAGNOSIS — E29.1 HYPOGONADISM MALE: ICD-10-CM

## 2024-08-27 RX ORDER — TESTOSTERONE CYPIONATE 200 MG/ML
INJECTION, SOLUTION INTRAMUSCULAR
Qty: 6 ML | Refills: 0 | Status: SHIPPED | OUTPATIENT
Start: 2024-08-27 | End: 2025-01-14

## 2024-08-27 NOTE — TELEPHONE ENCOUNTER
Received request via: Pharmacy    Was the patient seen in the last year in this department? Yes  LOV : 7/9/2024  Does the patient have an active prescription (recently filled or refills available) for medication(s) requested? No    Pharmacy Name: [facility]     Does the patient have [facility] Plus and need 100-day supply? (This applies to ALL medications) Patient does not have SCP

## 2024-08-30 DIAGNOSIS — F32.A DEPRESSION, UNSPECIFIED DEPRESSION TYPE: ICD-10-CM

## 2024-08-30 RX ORDER — PAROXETINE 20 MG/1
20 TABLET, FILM COATED ORAL
Qty: 90 TABLET | Refills: 3 | Status: SHIPPED | OUTPATIENT
Start: 2024-08-30

## 2024-08-30 NOTE — TELEPHONE ENCOUNTER
Received request via: Pharmacy    Was the patient seen in the last year in this department? Yes    Does the patient have an active prescription (recently filled or refills available) for medication(s) requested? No    [facility]    Does the patient have skilled nursing Plus and need 100-day supply? (This applies to ALL medications) Patient does not have SCP

## 2024-09-06 ENCOUNTER — TELEPHONE (OUTPATIENT)
Dept: MEDICAL GROUP | Facility: LAB | Age: 69
End: 2024-09-06
Payer: MEDICARE

## 2024-09-06 DIAGNOSIS — E29.1 HYPOGONADISM MALE: ICD-10-CM

## 2024-09-06 RX ORDER — TESTOSTERONE CYPIONATE 200 MG/ML
INJECTION, SOLUTION INTRAMUSCULAR
Qty: 6 ML | Refills: 0 | Status: SHIPPED | OUTPATIENT
Start: 2024-09-06 | End: 2024-11-29

## 2024-09-06 NOTE — TELEPHONE ENCOUNTER
Phone Number Called: [phone] (home)      Call outcome: Spoke to patient regarding message below.    Message: Let patient know we resent his Testerone

## 2024-09-06 NOTE — TELEPHONE ENCOUNTER
VOICEMAIL  1. Caller Name: Edgardo                      Call Back Number: [phone] (home)      2. Message: LVM stating he can't  his testosterone that is was wrote wrong     3. Patient approves office to leave a detailed voicemail/MyChart message: N\A

## 2024-09-16 ENCOUNTER — APPOINTMENT (RX ONLY)
Dept: URBAN - METROPOLITAN AREA CLINIC 6 | Facility: CLINIC | Age: 69
Setting detail: DERMATOLOGY
End: 2024-09-16

## 2024-09-16 DIAGNOSIS — D485 NEOPLASM OF UNCERTAIN BEHAVIOR OF SKIN: ICD-10-CM

## 2024-09-16 DIAGNOSIS — Z71.89 OTHER SPECIFIED COUNSELING: ICD-10-CM

## 2024-09-16 DIAGNOSIS — Z85.828 PERSONAL HISTORY OF OTHER MALIGNANT NEOPLASM OF SKIN: ICD-10-CM

## 2024-09-16 DIAGNOSIS — D18.0 HEMANGIOMA: ICD-10-CM

## 2024-09-16 DIAGNOSIS — L57.0 ACTINIC KERATOSIS: ICD-10-CM

## 2024-09-16 DIAGNOSIS — L82.1 OTHER SEBORRHEIC KERATOSIS: ICD-10-CM

## 2024-09-16 DIAGNOSIS — L81.4 OTHER MELANIN HYPERPIGMENTATION: ICD-10-CM

## 2024-09-16 DIAGNOSIS — D22 MELANOCYTIC NEVI: ICD-10-CM

## 2024-09-16 PROBLEM — D22.5 MELANOCYTIC NEVI OF TRUNK: Status: ACTIVE | Noted: 2024-09-16

## 2024-09-16 PROBLEM — D48.5 NEOPLASM OF UNCERTAIN BEHAVIOR OF SKIN: Status: ACTIVE | Noted: 2024-09-16

## 2024-09-16 PROBLEM — D18.01 HEMANGIOMA OF SKIN AND SUBCUTANEOUS TISSUE: Status: ACTIVE | Noted: 2024-09-16

## 2024-09-16 PROCEDURE — 11103 TANGNTL BX SKIN EA SEP/ADDL: CPT

## 2024-09-16 PROCEDURE — 17000 DESTRUCT PREMALG LESION: CPT | Mod: 59

## 2024-09-16 PROCEDURE — 99213 OFFICE O/P EST LOW 20 MIN: CPT | Mod: 25

## 2024-09-16 PROCEDURE — ? BIOPSY BY SHAVE METHOD

## 2024-09-16 PROCEDURE — ? COUNSELING

## 2024-09-16 PROCEDURE — ? LIQUID NITROGEN

## 2024-09-16 PROCEDURE — 11102 TANGNTL BX SKIN SINGLE LES: CPT

## 2024-09-16 PROCEDURE — ? SUNSCREEN RECOMMENDATIONS

## 2024-09-16 ASSESSMENT — LOCATION DETAILED DESCRIPTION DERM
LOCATION DETAILED: STERNUM
LOCATION DETAILED: NASAL DORSUM
LOCATION DETAILED: EPIGASTRIC SKIN
LOCATION DETAILED: RIGHT FOREHEAD
LOCATION DETAILED: LEFT ULNAR DORSAL HAND
LOCATION DETAILED: RIGHT RADIAL DORSAL HAND
LOCATION DETAILED: NASAL SUPRATIP
LOCATION DETAILED: RIGHT SUPERIOR FOREHEAD
LOCATION DETAILED: LEFT MEDIAL INFERIOR CHEST
LOCATION DETAILED: LEFT INFERIOR CENTRAL MALAR CHEEK
LOCATION DETAILED: PERIUMBILICAL SKIN

## 2024-09-16 ASSESSMENT — LOCATION SIMPLE DESCRIPTION DERM
LOCATION SIMPLE: LEFT HAND
LOCATION SIMPLE: RIGHT HAND
LOCATION SIMPLE: ABDOMEN
LOCATION SIMPLE: RIGHT FOREHEAD
LOCATION SIMPLE: NOSE
LOCATION SIMPLE: LEFT CHEEK
LOCATION SIMPLE: CHEST

## 2024-09-16 ASSESSMENT — LOCATION ZONE DERM
LOCATION ZONE: HAND
LOCATION ZONE: NOSE
LOCATION ZONE: FACE
LOCATION ZONE: TRUNK

## 2024-09-16 NOTE — PROCEDURE: LIQUID NITROGEN
Post-Care Instructions: I reviewed with the patient in detail post-care instructions. Patient is to wear sunprotection, and avoid picking at any of the treated lesions. Pt may apply Vaseline to crusted or scabbing areas.
Detail Level: Detailed
Render Post-Care Instructions In Note?: no
Number Of Freeze-Thaw Cycles: 3 freeze-thaw cycles
Show Aperture Variable?: Yes
Duration Of Freeze Thaw-Cycle (Seconds): 3
Consent: The patient's consent was obtained including but not limited to risks of crusting, scabbing, blistering, scarring, darker or lighter pigmentary change, recurrence, incomplete removal and infection.

## 2024-09-16 NOTE — PROCEDURE: BIOPSY BY SHAVE METHOD
Detail Level: Detailed
Depth Of Biopsy: dermis
Was A Bandage Applied: Yes
Size Of Lesion In Cm: 0.6
X Size Of Lesion In Cm: 0
Biopsy Type: H and E
Biopsy Method: Dermablade
Anesthesia Type: 0.5% lidocaine without epinephrine
Anesthesia Volume In Cc: 0.5
Hemostasis: Drysol
Wound Care: Petrolatum
Dressing: bandage
Destruction After The Procedure: No
Type Of Destruction Used: Curettage
Curettage Text: The wound bed was treated with curettage after the biopsy was performed.
Cryotherapy Text: The wound bed was treated with cryotherapy after the biopsy was performed.
Electrodesiccation Text: The wound bed was treated with electrodesiccation after the biopsy was performed.
Electrodesiccation And Curettage Text: The wound bed was treated with electrodesiccation and curettage after the biopsy was performed.
Silver Nitrate Text: The wound bed was treated with silver nitrate after the biopsy was performed.
Lab: [id number]
Lab Facility: 
Consent: Written consent was obtained and risks were reviewed including but not limited to scarring, infection, bleeding, scabbing, incomplete removal, nerve damage and allergy to anesthesia.
Post-Care Instructions: I reviewed with the patient in detail post-care instructions. Patient is to keep the biopsy site dry overnight, and then apply bacitracin twice daily until healed. Patient may apply hydrogen peroxide soaks to remove any crusting.
Notification Instructions: Patient will be notified of biopsy results. However, patient instructed to call the office if not contacted within 2 weeks.
Billing Type: Third-Party Bill
Information: Selecting Yes will display possible errors in your note based on the variables you have selected. This validation is only offered as a suggestion for you. PLEASE NOTE THAT THE VALIDATION TEXT WILL BE REMOVED WHEN YOU FINALIZE YOUR NOTE. IF YOU WANT TO FAX A PRELIMINARY NOTE YOU WILL NEED TO TOGGLE THIS TO 'NO' IF YOU DO NOT WANT IT IN YOUR FAXED NOTE.
Size Of Lesion In Cm: 0.3

## 2024-09-16 NOTE — HPI: FULL BODY SKIN EXAMINATION
How Severe Are Your Spot(S)?: moderate
What Type Of Note Output Would You Prefer (Optional)?: Standard Output
What Is The Reason For Today's Visit?: Skin Lesions
What Is The Reason For Today's Visit? (Being Monitored For X): concerning skin lesions on a periodic basis

## 2024-09-19 PROBLEM — M16.9 OSTEOARTHRITIS OF HIP: Status: ACTIVE | Noted: 2024-09-19

## 2024-10-03 ENCOUNTER — OFFICE VISIT (OUTPATIENT)
Dept: MEDICAL GROUP | Facility: LAB | Age: 69
End: 2024-10-03
Payer: MEDICARE

## 2024-10-03 VITALS
HEIGHT: 70 IN | SYSTOLIC BLOOD PRESSURE: 134 MMHG | TEMPERATURE: 97.2 F | HEART RATE: 72 BPM | OXYGEN SATURATION: 95 % | RESPIRATION RATE: 16 BRPM | WEIGHT: 198 LBS | DIASTOLIC BLOOD PRESSURE: 68 MMHG | BODY MASS INDEX: 28.35 KG/M2

## 2024-10-03 DIAGNOSIS — Z23 NEED FOR VACCINATION: ICD-10-CM

## 2024-10-03 DIAGNOSIS — M54.2 NECK PAIN: ICD-10-CM

## 2024-10-03 DIAGNOSIS — M05.9 RHEUMATOID ARTHRITIS WITH POSITIVE RHEUMATOID FACTOR, INVOLVING UNSPECIFIED SITE (HCC): ICD-10-CM

## 2024-10-03 PROCEDURE — 3078F DIAST BP <80 MM HG: CPT | Performed by: INTERNAL MEDICINE

## 2024-10-03 PROCEDURE — 99213 OFFICE O/P EST LOW 20 MIN: CPT | Mod: 25 | Performed by: INTERNAL MEDICINE

## 2024-10-03 PROCEDURE — 3075F SYST BP GE 130 - 139MM HG: CPT | Performed by: INTERNAL MEDICINE

## 2024-10-03 PROCEDURE — G0008 ADMIN INFLUENZA VIRUS VAC: HCPCS | Performed by: INTERNAL MEDICINE

## 2024-10-03 PROCEDURE — 90662 IIV NO PRSV INCREASED AG IM: CPT | Performed by: INTERNAL MEDICINE

## 2024-10-03 PROCEDURE — 1170F FXNL STATUS ASSESSED: CPT | Performed by: INTERNAL MEDICINE

## 2024-10-03 RX ORDER — OXYCODONE AND ACETAMINOPHEN 10; 325 MG/1; MG/1
1-2 TABLET ORAL EVERY 4 HOURS PRN
Qty: 150 TABLET | Refills: 0 | Status: SHIPPED | OUTPATIENT
Start: 2024-12-24 | End: 2025-01-23

## 2024-10-03 RX ORDER — OXYCODONE AND ACETAMINOPHEN 10; 325 MG/1; MG/1
1-2 TABLET ORAL EVERY 4 HOURS PRN
Qty: 150 TABLET | Refills: 0 | Status: SHIPPED | OUTPATIENT
Start: 2024-11-25 | End: 2024-12-25

## 2024-10-03 RX ORDER — OXYCODONE AND ACETAMINOPHEN 10; 325 MG/1; MG/1
1-2 TABLET ORAL EVERY 4 HOURS PRN
Qty: 150 TABLET | Refills: 0 | Status: SHIPPED | OUTPATIENT
Start: 2024-10-26 | End: 2024-11-25

## 2024-10-03 ASSESSMENT — PATIENT HEALTH QUESTIONNAIRE - PHQ9
6. FEELING BAD ABOUT YOURSELF - OR THAT YOU ARE A FAILURE OR HAVE LET YOURSELF OR YOUR FAMILY DOWN: NOT AL ALL
9. THOUGHTS THAT YOU WOULD BE BETTER OFF DEAD, OR OF HURTING YOURSELF: NOT AT ALL
5. POOR APPETITE OR OVEREATING: NOT AT ALL
3. TROUBLE FALLING OR STAYING ASLEEP OR SLEEPING TOO MUCH: NOT AT ALL
SUM OF ALL RESPONSES TO PHQ QUESTIONS 1-9: 0
4. FEELING TIRED OR HAVING LITTLE ENERGY: NOT AT ALL
SUM OF ALL RESPONSES TO PHQ9 QUESTIONS 1 AND 2: 0
7. TROUBLE CONCENTRATING ON THINGS, SUCH AS READING THE NEWSPAPER OR WATCHING TELEVISION: NOT AT ALL
8. MOVING OR SPEAKING SO SLOWLY THAT OTHER PEOPLE COULD HAVE NOTICED. OR THE OPPOSITE, BEING SO FIGETY OR RESTLESS THAT YOU HAVE BEEN MOVING AROUND A LOT MORE THAN USUAL: NOT AT ALL
2. FEELING DOWN, DEPRESSED, IRRITABLE, OR HOPELESS: NOT AT ALL
1. LITTLE INTEREST OR PLEASURE IN DOING THINGS: NOT AT ALL

## 2024-10-03 ASSESSMENT — FIBROSIS 4 INDEX: FIB4 SCORE: 2.79

## 2024-10-10 ENCOUNTER — RX ONLY (OUTPATIENT)
Age: 69
Setting detail: RX ONLY
End: 2024-10-10

## 2024-10-10 ENCOUNTER — APPOINTMENT (RX ONLY)
Dept: URBAN - METROPOLITAN AREA CLINIC 36 | Facility: CLINIC | Age: 69
Setting detail: DERMATOLOGY
End: 2024-10-10

## 2024-10-10 DIAGNOSIS — L21.8 OTHER SEBORRHEIC DERMATITIS: ICD-10-CM | Status: INADEQUATELY CONTROLLED

## 2024-10-10 PROBLEM — M16.11 PRIMARY OSTEOARTHRITIS OF RIGHT HIP: Status: ACTIVE | Noted: 2024-09-19

## 2024-10-10 PROBLEM — C44.329 SQUAMOUS CELL CARCINOMA OF SKIN OF OTHER PARTS OF FACE: Status: ACTIVE | Noted: 2024-10-10

## 2024-10-10 PROCEDURE — 13132 CMPLX RPR F/C/C/M/N/AX/G/H/F: CPT

## 2024-10-10 PROCEDURE — ? REFERRAL CORRESPONDENCE

## 2024-10-10 PROCEDURE — 17311 MOHS 1 STAGE H/N/HF/G: CPT

## 2024-10-10 PROCEDURE — ? DIAGNOSIS COMMENT

## 2024-10-10 PROCEDURE — ? MOHS SURGERY

## 2024-10-10 PROCEDURE — ? PRESCRIPTION

## 2024-10-10 PROCEDURE — ? COUNSELING

## 2024-10-10 PROCEDURE — 99214 OFFICE O/P EST MOD 30 MIN: CPT | Mod: 25

## 2024-10-10 RX ORDER — KETOCONAZOLE 20 MG/G
CREAM TOPICAL BID
Qty: 30 | Refills: 3 | Status: ERX | COMMUNITY
Start: 2024-10-10

## 2024-10-10 RX ORDER — KETOCONAZOLE 20 MG/G
CREAM TOPICAL
Qty: 30 | Refills: 3 | Status: ERX | COMMUNITY
Start: 2024-10-10

## 2024-10-10 RX ADMIN — KETOCONAZOLE: 20 CREAM TOPICAL at 00:00

## 2024-10-10 ASSESSMENT — SEVERITY ASSESSMENT: HOW SEVERE IS THIS PATIENT'S CONDITION?: MODERATE

## 2024-10-10 NOTE — PROCEDURE: MOHS SURGERY
Surgeon Performing Repair (Optional): Carolina Joseph MD
Unna Boot Text: An Unna boot was placed to help immobilize the limb and facilitate more rapid healing.
Post-Care Instructions: I reviewed with the patient in detail post-care instructions. Patient is not to engage in any heavy lifting, exercise, or swimming for the next 14 days. Should the patient develop any fevers, chills, bleeding, severe pain patient will contact the office immediately.
Quadrant Reporting?: no
Dorsal Nasal Flap Text: The defect edges were debeveled with a #15 scalpel blade.  Given the location of the defect and the proximity to free margins a dorsal nasal flap was deemed most appropriate.  Using a sterile surgical marker, an appropriate dorsal nasal flap was drawn around the defect.    The area thus outlined was incised deep to adipose tissue with a #15 scalpel blade.  The skin margins were undermined to an appropriate distance in all directions utilizing iris scissors.
M-Plasty Complex Repair Preamble Text (Leave Blank If You Do Not Want): Extensive wide undermining was performed.
Special Stains Stage 1 - Results: Base On Clearance Noted Above
Secondary Defect Length In Cm (Required For Flaps): 0
Hemigard Postcare Instructions: The HEMIGARD strips are to remain completely dry for at least 5-7 days.
Rhombic Flap Text: The defect edges were debeveled with a #15 scalpel blade.  Given the location of the defect and the proximity to free margins a rhombic flap was deemed most appropriate.  Using a sterile surgical marker, an appropriate rhombic flap was drawn incorporating the defect.    The area thus outlined was incised deep to adipose tissue with a #15 scalpel blade.  The skin margins were undermined to an appropriate distance in all directions utilizing iris scissors.
Epidermal Closure Graft Donor Site (Optional): running
Complex Repair And Graft Additional Text (Will Appearing After The Standard Complex Repair Text): The complex repair was not sufficient to completely close the primary defect. The remaining additional defect was repaired with the graft mentioned below.
Island Pedicle Flap Text: The defect edges were debeveled with a #15 scalpel blade.  Given the location of the defect, shape of the defect and the proximity to free margins an island pedicle advancement flap was deemed most appropriate.  Using a sterile surgical marker, an appropriate advancement flap was drawn incorporating the defect, outlining the appropriate donor tissue and placing the expected incisions within the relaxed skin tension lines where possible.    The area thus outlined was incised deep to adipose tissue with a #15 scalpel blade.  The skin margins were undermined to an appropriate distance in all directions around the primary defect and laterally outward around the island pedicle utilizing iris scissors.  There was minimal undermining beneath the pedicle flap.
Otolaryngologist Procedure Text (D): After obtaining clear surgical margins the patient was sent to otolaryngology for surgical repair.  The patient understands they will receive post-surgical care and follow-up from the referring physician's office.
Display The Frozen Section And Histology As A Separate Paragraph: Yes
Retention Suture Bite Size: 1 mm
Bilateral Rotation Flap Text: The defect edges were debeveled with a #15 scalpel blade. Given the location of the defect, shape of the defect and the proximity to free margins a bilateral rotation flap was deemed most appropriate. Using a sterile surgical marker, an appropriate rotation flap was drawn incorporating the defect and placing the expected incisions within the relaxed skin tension lines where possible. The area thus outlined was incised deep to adipose tissue with a #15 scalpel blade. The skin margins were undermined to an appropriate distance in all directions utilizing iris scissors. Following this, the designed flap was carried over into the primary defect and sutured into place.
Consent (Temporal Branch)/Introductory Paragraph: The rationale for Mohs was explained to the patient and consent was obtained. The risks, benefits and alternatives to therapy were discussed in detail. Specifically, the risks of damage to the temporal branch of the facial nerve, infection, scarring, bleeding, prolonged wound healing, incomplete removal, allergy to anesthesia, and recurrence were addressed. Prior to the procedure, the treatment site was clearly identified and confirmed by the patient. All components of Universal Protocol/PAUSE Rule completed.
Graft Donor Site Bandage (Optional-Leave Blank If You Don't Want In Note): A xeroform bolster was fitted to the graft site and sewn into place. A pressure bandage were applied to the donor site and over the bolster.
Bilobed Flap Text: The defect edges were debeveled with a #15 scalpel blade.  Given the location of the defect and the proximity to free margins a bilobe flap was deemed most appropriate.  Using a sterile surgical marker, an appropriate bilobe flap drawn around the defect.    The area thus outlined was incised deep to adipose tissue with a #15 scalpel blade.  The skin margins were undermined to an appropriate distance in all directions utilizing iris scissors.
Why Was The Change Made?: Please Select the Appropriate Response
Advancement-Rotation Flap Text: The defect edges were debeveled with a #15 scalpel blade.  Given the location of the defect, shape of the defect and the proximity to free margins an advancement-rotation flap was deemed most appropriate.  Using a sterile surgical marker, an appropriate flap was drawn incorporating the defect and placing the expected incisions within the relaxed skin tension lines where possible. The area thus outlined was incised deep to adipose tissue with a #15 scalpel blade.  The skin margins were undermined to an appropriate distance in all directions utilizing iris scissors.
Double Island Pedicle Flap Text: The defect edges were debeveled with a #15 scalpel blade.  Given the location of the defect, shape of the defect and the proximity to free margins a double island pedicle advancement flap was deemed most appropriate.  Using a sterile surgical marker, an appropriate advancement flap was drawn incorporating the defect, outlining the appropriate donor tissue and placing the expected incisions within the relaxed skin tension lines where possible.    The area thus outlined was incised deep to adipose tissue with a #15 scalpel blade.  The skin margins were undermined to an appropriate distance in all directions around the primary defect and laterally outward around the island pedicle utilizing iris scissors.  There was minimal undermining beneath the pedicle flap.
Oculoplastic Surgeon Procedure Text (D): After obtaining clear surgical margins the patient was sent to oculoplastics for surgical repair.  The patient understands they will receive post-surgical care and follow-up from the referring physician's office.
Cheek-To-Nose Interpolation Flap Text: A decision was made to reconstruct the defect utilizing an interpolation axial flap and a staged reconstruction.  A telfa template was made of the defect.  This telfa template was then used to outline the Cheek-To-Nose Interpolation flap.  The donor area for the pedicle flap was then injected with anesthesia.  The flap was excised through the skin and subcutaneous tissue down to the layer of the underlying musculature.  The interpolation flap was carefully excised within this deep plane to maintain its blood supply.  The edges of the donor site were undermined.   The donor site was closed in a primary fashion.  The pedicle was then rotated into position and sutured.  Once the tube was sutured into place, adequate blood supply was confirmed with blanching and refill.  The pedicle was then wrapped with xeroform gauze and dressed appropriately with a telfa and gauze bandage to ensure continued blood supply and protect the attached pedicle.
Which Instrument Did You Use For Dermabrasion?: Wire Brush
Burow's Advancement Flap Text: The defect edges were debeveled with a #15 scalpel blade.  Given the location of the defect and the proximity to free margins a Burow's advancement flap was deemed most appropriate.  Using a sterile surgical marker, the appropriate advancement flap was drawn incorporating the defect and placing the expected incisions within the relaxed skin tension lines where possible.    The area thus outlined was incised deep to adipose tissue with a #15 scalpel blade.  The skin margins were undermined to an appropriate distance in all directions utilizing iris scissors.
Mohs Rapid Report Verbiage: The area of clinically evident tumor was marked with skin marking ink and appropriately hatched.  The initial incision was made following the Mohs approach through the skin.  The specimen was taken to the lab, divided into the necessary number of pieces, chromacoded and processed according to the Mohs protocol.  This was repeated in successive stages until a tumor free defect was achieved.
Estlander Flap (Upper To Lower Lip) Text: The defect of the lower lip was assessed and measured.  Given the location and size of the defect, an Estlander flap was deemed most appropriate.  Using a sterile surgical marker, an appropriate Estlander flap was measured and drawn on the upper lip. Local anesthesia was then infiltrated. A scalpel was then used to incise the lateral aspect of the flap, through skin, muscle and mucosa, leaving the flap pedicled medially.  The flap was then rotated and positioned to fill the lower lip defect.  The flap was then sutured into place with a three layer technique, closing the orbicularis oris muscle layer with subcutaneous buried sutures, followed by a mucosal layer and an epidermal layer.
Wound Care: Vaseline
Non-Graft Cartilage Fenestration Text: The cartilage was fenestrated with a 2mm punch biopsy to help facilitate healing.
Provider Procedure Text (B): After obtaining clear surgical margins the defect was repaired by another provider.
Tumor Depth: Less than 6mm from granular layer and no invasion beyond the subcutaneous fat
Consent Type: Consent 1 (Standard)
Primary Defect Width In Cm (Final Defect Size - Required For Flaps/Grafts): 1.6
Repair Hemostasis (Optional): Pinpoint electrocautery
Helical Rim Text: The closure involved the helical rim.
Brow Lift Text: A midfrontal incision was made medially to the defect to allow access to the tissues just superior to the left eyebrow. Following careful dissection inferiorly in a supraperiosteal plane to the level of the left eyebrow, several 3-0 monocryl sutures were used to resuspend the eyebrow orbicularis oculi muscular unit to the superior frontal bone periosteum. This resulted in an appropriate reapproximation of static eyebrow symmetry and correction of the left brow ptosis.
Mid-Level Procedure Text (F): After obtaining clear surgical margins the patient was sent to a mid-level provider for surgical repair.  The patient understands they will receive post-surgical care and follow-up from the mid-level provider.
Mohs Method Verbiage: An incision at a 45 degree angle following the standard Mohs approach was done and the specimen was harvested as a microscopic controlled layer.
Cheek Interpolation Flap Text: A decision was made to reconstruct the defect utilizing an interpolation axial flap and a staged reconstruction.  A telfa template was made of the defect.  This telfa template was then used to outline the Cheek Interpolation flap.  The donor area for the pedicle flap was then injected with anesthesia.  The flap was excised through the skin and subcutaneous tissue down to the layer of the underlying musculature.  The interpolation flap was carefully excised within this deep plane to maintain its blood supply.  The edges of the donor site were undermined.   The donor site was closed in a primary fashion.  The pedicle was then rotated into position and sutured.  Once the tube was sutured into place, adequate blood supply was confirmed with blanching and refill.  The pedicle was then wrapped with xeroform gauze and dressed appropriately with a telfa and gauze bandage to ensure continued blood supply and protect the attached pedicle.
Bi-Rhombic Flap Text: The defect edges were debeveled with a #15 scalpel blade.  Given the location of the defect and the proximity to free margins a bi-rhombic flap was deemed most appropriate.  Using a sterile surgical marker, an appropriate rhombic flap was drawn incorporating the defect. The area thus outlined was incised deep to adipose tissue with a #15 scalpel blade.  The skin margins were undermined to an appropriate distance in all directions utilizing iris scissors.
Island Pedicle Flap With Canthal Suspension Text: The defect edges were debeveled with a #15 scalpel blade.  Given the location of the defect, shape of the defect and the proximity to free margins an island pedicle advancement flap was deemed most appropriate.  Using a sterile surgical marker, an appropriate advancement flap was drawn incorporating the defect, outlining the appropriate donor tissue and placing the expected incisions within the relaxed skin tension lines where possible. The area thus outlined was incised deep to adipose tissue with a #15 scalpel blade.  The skin margins were undermined to an appropriate distance in all directions around the primary defect and laterally outward around the island pedicle utilizing iris scissors.  There was minimal undermining beneath the pedicle flap. A suspension suture was placed in the canthal tendon to prevent tension and prevent ectropion.
Nasalis Myocutaneous Flap Text: Using a #15 blade, an incision was made around the donor flap to the level of the nasalis muscle. Wide lateral undermining was then performed in both the subcutaneous plane above the nasalis muscle, and in a submuscular plane just above periosteum. This allowed the formation of a free nasalis muscle axial pedicle which was still attached to the actual cutaneous flap, increasing its mobility and vascular viability. Hemostasis was obtained with pinpoint electrocoagulation. The flap was mobilized into position and the pivotal anchor points positioned and stabilized with buried interrupted sutures. Subcutaneous and dermal tissues were closed in a multilayered fashion with sutures. Tissue redundancies were excised, and the epidermal edges were apposed without significant tension and sutured with sutures.
Double M-Plasty Intermediate Repair Preamble Text (Leave Blank If You Do Not Want): Undermining was performed with blunt dissection.
Mart-1 - Negative Histology Text: MART-1 staining demonstrates a normal density and pattern of melanocytes along the dermal-epidermal junction. The surgical margins are negative for tumor cells.
Bilobed Transposition Flap Text: The defect edges were debeveled with a #15 scalpel blade.  Given the location of the defect and the proximity to free margins a bilobed transposition flap was deemed most appropriate.  Using a sterile surgical marker, an appropriate bilobe flap drawn around the defect.    The area thus outlined was incised deep to adipose tissue with a #15 scalpel blade.  The skin margins were undermined to an appropriate distance in all directions utilizing iris scissors.
Plastic Surgeon Procedure Text (A): After obtaining clear surgical margins the patient was sent to plastics for surgical repair.  The patient understands they will receive post-surgical care and follow-up from the referring physician's office.
Stage 10: Additional Anesthesia Type: 1% lidocaine with epinephrine
Vermilion Border Text: The closure involved the vermilion border.
X Size Of Lesion In Cm (Optional): 1.2
Eyelid Full Thickness Repair - 77467: The eyelid defect was full thickness which required a wedge repair of the eyelid. Special care was taken to ensure that the eyelid margin was realligned when placing sutures.
Suture Removal: 7 days
Bcc Histology Text: There were numerous aggregates of basaloid cells.
Nasolabial Transposition Flap Text: The defect edges were debeveled with a #15 scalpel blade.  Given the size, depth and location of the defect and the proximity to free margins a nasolabial transposition flap was deemed most appropriate. Using a sterile surgical marker, an appropriate flap was drawn incorporating the defect. The area thus outlined was incised with a #15 scalpel blade. The skin margins were undermined to an appropriate distance in all directions utilizing iris scissors. Following this, the designed flap was carried into the primary defect and sutured into place.
Consent 1/Introductory Paragraph: The rationale for Mohs was explained to the patient and consent was obtained. The risks, benefits and alternatives to therapy were discussed in detail. Specifically, the risks of infection, scarring, bleeding, prolonged wound healing, incomplete removal, allergy to anesthesia, nerve injury and recurrence were addressed. Prior to the procedure, the treatment site was clearly identified and confirmed by the patient. All components of Universal Protocol/PAUSE Rule completed.
Mustarde Flap Text: The defect edges were debeveled with a #15 scalpel blade.  Given the size, depth and location of the defect and the proximity to free margins a Mustarde flap was deemed most appropriate.  Using a sterile surgical marker, an appropriate flap was drawn incorporating the defect. The area thus outlined was incised with a #15 scalpel blade.  The skin margins were undermined to an appropriate distance in all directions utilizing iris scissors.
Primary Defect Length In Cm (Final Defect Size - Required For Flaps/Grafts): 2
Consent (Ear)/Introductory Paragraph: The rationale for Mohs was explained to the patient and consent was obtained. The risks, benefits and alternatives to therapy were discussed in detail. Specifically, the risks of ear deformity, infection, scarring, bleeding, prolonged wound healing, incomplete removal, allergy to anesthesia, nerve injury and recurrence were addressed. Prior to the procedure, the treatment site was clearly identified and confirmed by the patient. All components of Universal Protocol/PAUSE Rule completed.
Where Do You Want The Question To Include Opioid Counseling Located?: Case Summary Tab
Mucosal Advancement Flap Text: Given the location of the defect, shape of the defect and the proximity to free margins a mucosal advancement flap was deemed most appropriate. Incisions were made with a 15 blade scalpel in the appropriate fashion along the cutaneous vermilion border and the mucosal lip. The remaining actinically damaged mucosal tissue was excised.  The mucosal advancement flap was then elevated to the gingival sulcus with care taken to preserve the neurovascular structures and advanced into the primary defect. Care was taken to ensure that precise realignment of the vermilion border was achieved.
H Plasty Text: Given the location of the defect, shape of the defect and the proximity to free margins a H-plasty was deemed most appropriate for repair.  Using a sterile surgical marker, the appropriate advancement arms of the H-plasty were drawn incorporating the defect and placing the expected incisions within the relaxed skin tension lines where possible. The area thus outlined was incised deep to adipose tissue with a #15 scalpel blade. The skin margins were undermined to an appropriate distance in all directions utilizing iris scissors.  The opposing advancement arms were then advanced into place in opposite direction and anchored with interrupted buried subcutaneous sutures.
Full Thickness Lip Wedge Repair (Flap) Text: Given the location of the defect and the proximity to free margins a full thickness wedge repair was deemed most appropriate.  Using a sterile surgical marker, the appropriate repair was drawn incorporating the defect and placing the expected incisions perpendicular to the vermilion border.  The vermilion border was also meticulously outlined to ensure appropriate reapproximation during the repair.  The area thus outlined was incised through and through with a #15 scalpel blade.  The muscularis and dermis were reaproximated with deep sutures following hemostasis. Care was taken to realign the vermilion border before proceeding with the superficial closure.  Once the vermilion was realigned the superfical and mucosal closure was finished.
Chonodrocutaneous Helical Advancement Flap Text: The defect edges were debeveled with a #15 scalpel blade.  Given the location of the defect and the proximity to free margins a chondrocutaneous helical advancement flap was deemed most appropriate.  Using a sterile surgical marker, the appropriate advancement flap was drawn incorporating the defect and placing the expected incisions within the relaxed skin tension lines where possible.    The area thus outlined was incised deep to adipose tissue with a #15 scalpel blade.  The skin margins were undermined to an appropriate distance in all directions utilizing iris scissors.
Consent (Spinal Accessory)/Introductory Paragraph: The rationale for Mohs was explained to the patient and consent was obtained. The risks, benefits and alternatives to therapy were discussed in detail. Specifically, the risks of damage to the spinal accessory nerve, infection, scarring, bleeding, prolonged wound healing, incomplete removal, allergy to anesthesia, and recurrence were addressed. Prior to the procedure, the treatment site was clearly identified and confirmed by the patient. All components of Universal Protocol/PAUSE Rule completed.
Debridement Text: The wound edges were debrided prior to proceeding with the closure to facilitate wound healing.
Asc Procedure Text (D): After obtaining clear surgical margins the patient was sent to an ASC for surgical repair.  The patient understands they will receive post-surgical care and follow-up from the ASC physician.
Alternatives Discussed Intro (Do Not Add Period): I discussed alternative treatments to Mohs surgery and specifically discussed the risks and benefits of
Peng Advancement Flap Text: The defect edges were debeveled with a #15 scalpel blade.  Given the location of the defect, shape of the defect and the proximity to free margins a Peng advancement flap was deemed most appropriate.  Using a sterile surgical marker, an appropriate advancement flap was drawn incorporating the defect and placing the expected incisions within the relaxed skin tension lines where possible. The area thus outlined was incised deep to adipose tissue with a #15 scalpel blade.  The skin margins were undermined to an appropriate distance in all directions utilizing iris scissors.
Ftsg Text: The defect edges were debeveled with a #15 scalpel blade.  Given the location of the defect, shape of the defect and the proximity to free margins a full thickness skin graft was deemed most appropriate.  Using a sterile surgical marker, the primary defect shape was transferred to the donor site. The area thus outlined was incised deep to adipose tissue with a #15 scalpel blade.  The harvested graft was then trimmed of adipose tissue until only dermis and epidermis was left.  The skin margins of the secondary defect were undermined to an appropriate distance in all directions utilizing iris scissors.  The secondary defect was closed with interrupted buried subcutaneous sutures.  The skin edges were then re-apposed with running  sutures.  The skin graft was then placed in the primary defect and oriented appropriately.
Consent (Scalp)/Introductory Paragraph: The rationale for Mohs was explained to the patient and consent was obtained. The risks, benefits and alternatives to therapy were discussed in detail. Specifically, the risks of changes in hair growth pattern secondary to repair, infection, scarring, bleeding, prolonged wound healing, incomplete removal, allergy to anesthesia, nerve injury and recurrence were addressed. Prior to the procedure, the treatment site was clearly identified and confirmed by the patient. All components of Universal Protocol/PAUSE Rule completed.
Cheiloplasty (Less Than 50%) Text: A decision was made to reconstruct the defect with a  cheiloplasty.  The defect was undermined extensively.  Additional obicularis oris muscle was excised with a 15 blade scalpel.  The defect was converted into a full thickness wedge, of less than 50% of the vertical height of the lip, to facilite a better cosmetic result.  Small vessels were then tied off with 5-0 monocyrl. The obicularis oris, superficial fascia, adipose and dermis were then reapproximated.  After the deeper layers were approximated the epidermis was reapproximated with particular care given to realign the vermilion border.
Rhomboid Transposition Flap Text: The defect edges were debeveled with a #15 scalpel blade.  Given the location of the defect and the proximity to free margins a rhomboid transposition flap was deemed most appropriate.  Using a sterile surgical marker, an appropriate rhomboid flap was drawn incorporating the defect.    The area thus outlined was incised deep to adipose tissue with a #15 scalpel blade.  The skin margins were undermined to an appropriate distance in all directions utilizing iris scissors.
Suturegard Body: The suture ends were repeatedly re-tightened and re-clamped to achieve the desired tissue expansion.
Paramedian Forehead Flap Text: A decision was made to reconstruct the defect utilizing an interpolation axial flap and a staged reconstruction.  A telfa template was made of the defect.  This telfa template was then used to outline the paramedian forehead pedicle flap.  The donor area for the pedicle flap was then injected with anesthesia.  The flap was excised through the skin and subcutaneous tissue down to the layer of the underlying musculature.  The pedicle flap was carefully excised within this deep plane to maintain its blood supply.  The edges of the donor site were undermined.   The donor site was closed in a primary fashion.  The pedicle was then rotated into position and sutured.  Once the tube was sutured into place, adequate blood supply was confirmed with blanching and refill.  The pedicle was then wrapped with xeroform gauze and dressed appropriately with a telfa and gauze bandage to ensure continued blood supply and protect the attached pedicle.
Same Histology In Subsequent Stages Text: The pattern and morphology of the tumor is as described in the first stage.
Consent 3/Introductory Paragraph: I gave the patient a chance to ask questions they had about the procedure.  Following this I explained the Mohs procedure and consent was obtained. The risks, benefits and alternatives to therapy were discussed in detail. Specifically, the risks of infection, scarring, bleeding, prolonged wound healing, incomplete removal, allergy to anesthesia, nerve injury and recurrence were addressed. Prior to the procedure, the treatment site was clearly identified and confirmed by the patient. All components of Universal Protocol/PAUSE Rule completed.
Postop Diagnosis: same
Bilateral Helical Rim Advancement Flap Text: The defect edges were debeveled with a #15 blade scalpel.  Given the location of the defect and the proximity to free margins (helical rim) a bilateral helical rim advancement flap was deemed most appropriate.  Using a sterile surgical marker, the appropriate advancement flaps were drawn incorporating the defect and placing the expected incisions between the helical rim and antihelix where possible.  The area thus outlined was incised through and through with a #15 scalpel blade.  With a skin hook and iris scissors, the flaps were gently and sharply undermined and freed up.
Area L Indication Text: Tumors in this location are included in Area L (trunk and extremities).  Mohs surgery is indicated for larger tumors, or tumors with aggressive histologic features, in these anatomic locations.
Ear Wedge Repair Text: A wedge excision was completed by carrying down an excision through the full thickness of the ear and cartilage with an inward facing Burow's triangle. The wound was then closed in a layered fashion.
Double O-Z Plasty Text: The defect edges were debeveled with a #15 scalpel blade.  Given the location of the defect, shape of the defect and the proximity to free margins a Double O-Z plasty (double transposition flap) was deemed most appropriate.  Using a sterile surgical marker, the appropriate transposition flaps were drawn incorporating the defect and placing the expected incisions within the relaxed skin tension lines where possible. The area thus outlined was incised deep to adipose tissue with a #15 scalpel blade.  The skin margins were undermined to an appropriate distance in all directions utilizing iris scissors.  Hemostasis was achieved with electrocautery.  The flaps were then transposed into place, one clockwise and the other counterclockwise, and anchored with interrupted buried subcutaneous sutures.
Information: Selecting Yes will display possible errors in your note based on the variables you have selected. This validation is only offered as a suggestion for you. PLEASE NOTE THAT THE VALIDATION TEXT WILL BE REMOVED WHEN YOU FINALIZE YOUR NOTE. IF YOU WANT TO FAX A PRELIMINARY NOTE YOU WILL NEED TO TOGGLE THIS TO 'NO' IF YOU DO NOT WANT IT IN YOUR FAXED NOTE.
Suturegard Retention Suture: 0-0 Nylon
O-T Plasty Text: The defect edges were debeveled with a #15 scalpel blade.  Given the location of the defect, shape of the defect and the proximity to free margins an O-T plasty was deemed most appropriate.  Using a sterile surgical marker, an appropriate O-T plasty was drawn incorporating the defect and placing the expected incisions within the relaxed skin tension lines where possible.    The area thus outlined was incised deep to adipose tissue with a #15 scalpel blade.  The skin margins were undermined to an appropriate distance in all directions utilizing iris scissors.
Suturegard Intro: Intraoperative tissue expansion was performed, utilizing the SUTUREGARD device, in order to reduce wound tension.
O-Z Plasty Text: The defect edges were debeveled with a #15 scalpel blade.  Given the location of the defect, shape of the defect and the proximity to free margins an O-Z plasty (double transposition flap) was deemed most appropriate.  Using a sterile surgical marker, the appropriate transposition flaps were drawn incorporating the defect and placing the expected incisions within the relaxed skin tension lines where possible.    The area thus outlined was incised deep to adipose tissue with a #15 scalpel blade.  The skin margins were undermined to an appropriate distance in all directions utilizing iris scissors.  Hemostasis was achieved with electrocautery.  The flaps were then transposed into place, one clockwise and the other counterclockwise, and anchored with interrupted buried subcutaneous sutures.
Tissue Cultured Epidermal Autograft Text: The defect edges were debeveled with a #15 scalpel blade.  Given the location of the defect, shape of the defect and the proximity to free margins a tissue cultured epidermal autograft was deemed most appropriate.  The graft was then trimmed to fit the size of the defect.  The graft was then placed in the primary defect and oriented appropriately.
Tumor Debulked?: curette
Intermediate Repair And Graft Additional Text (Will Appearing After The Standard Complex Repair Text): The intermediate repair was not sufficient to completely close the primary defect. The remaining additional defect was repaired with the graft mentioned below.
Staged Advancement Flap Text: The defect edges were debeveled with a #15 scalpel blade.  Given the location of the defect, shape of the defect and the proximity to free margins a staged advancement flap was deemed most appropriate.  Using a sterile surgical marker, an appropriate advancement flap was drawn incorporating the defect and placing the expected incisions within the relaxed skin tension lines where possible. The area thus outlined was incised deep to adipose tissue with a #15 scalpel blade.  The skin margins were undermined to an appropriate distance in all directions utilizing iris scissors.
Anesthesia Type: 1% lidocaine with epinephrine and a 1:10 solution of 8.4% sodium bicarbonate
Abbe Flap (Lower To Upper Lip) Text: The defect of the upper lip was assessed and measured.  Given the location and size of the defect, an Abbe flap was deemed most appropriate.  Using a sterile surgical marker, an appropriate Abbe flap was measured and drawn on the lower lip. Local anesthesia was then infiltrated. A scalpel was then used to incise the upper lip through and through the skin, vermilion, muscle and mucosa, leaving the flap pedicled on the opposite side.  The flap was then rotated and transferred to the lower lip defect.  The flap was then sutured into place with a three layer technique, closing the orbicularis oris muscle layer with subcutaneous buried sutures, followed by a mucosal layer and an epidermal layer.
Secondary Intention Text (Leave Blank If You Do Not Want): The defect will heal with secondary intention.
Mohs Case Number: [id number]
Interpolation Flap Text: A decision was made to reconstruct the defect utilizing an interpolation axial flap and a staged reconstruction.  A telfa template was made of the defect.  This telfa template was then used to outline the interpolation flap.  The donor area for the pedicle flap was then injected with anesthesia.  The flap was excised through the skin and subcutaneous tissue down to the layer of the underlying musculature.  The interpolation flap was carefully excised within this deep plane to maintain its blood supply.  The edges of the donor site were undermined.   The donor site was closed in a primary fashion.  The pedicle was then rotated into position and sutured.  Once the tube was sutured into place, adequate blood supply was confirmed with blanching and refill.  The pedicle was then wrapped with xeroform gauze and dressed appropriately with a telfa and gauze bandage to ensure continued blood supply and protect the attached pedicle.
Presence Of Scar Tissue (For Histology): present
Perineural Invasion (For Histology - Be Specific If Possible): absent
Split-Thickness Skin Graft Text: The defect edges were debeveled with a #15 scalpel blade.  Given the location of the defect, shape of the defect and the proximity to free margins a split thickness skin graft was deemed most appropriate.  Using a sterile surgical marker, the primary defect shape was transferred to the donor site. The split thickness graft was then harvested.  The skin graft was then placed in the primary defect and oriented appropriately.
Simple / Intermediate / Complex Repair - Final Wound Length In Cm: 4
Closure 4 Information: This tab is for additional flaps and grafts above and beyond our usual structured repairs.  Please note if you enter information here it will not currently bill and you will need to add the billing information manually.
Epidermal Sutures: 6-0 Prolene
Dressing: pressure dressing with telfa
Advancement Flap (Single) Text: The defect edges were debeveled with a #15 scalpel blade.  Given the location of the defect and the proximity to free margins a single advancement flap was deemed most appropriate.  Using a sterile surgical marker, an appropriate advancement flap was drawn incorporating the defect and placing the expected incisions within the relaxed skin tension lines where possible.    The area thus outlined was incised deep to adipose tissue with a #15 scalpel blade.  The skin margins were undermined to an appropriate distance in all directions utilizing iris scissors.
Anesthesia Volume In Cc: 1
Muscle Hinge Flap Text: The defect edges were debeveled with a #15 scalpel blade.  Given the size, depth and location of the defect and the proximity to free margins a muscle hinge flap was deemed most appropriate.  Using a sterile surgical marker, an appropriate hinge flap was drawn incorporating the defect. The area thus outlined was incised with a #15 scalpel blade.  The skin margins were undermined to an appropriate distance in all directions utilizing iris scissors.
Graft Donor Site Epidermal Sutures (Optional): 5-0 Prolene
Estimated Blood Loss (Cc): minimal
Deep Sutures: 5-0 Monocryl
Banner Transposition Flap Text: The defect edges were debeveled with a #15 scalpel blade.  Given the location of the defect and the proximity to free margins a Banner transposition flap was deemed most appropriate.  Using a sterile surgical marker, an appropriate flap drawn around the defect. The area thus outlined was incised deep to adipose tissue with a #15 scalpel blade.  The skin margins were undermined to an appropriate distance in all directions utilizing iris scissors.
Composite Graft Text: The defect edges were debeveled with a #15 scalpel blade.  Given the location of the defect, shape of the defect, the proximity to free margins and the fact the defect was full thickness a composite graft was deemed most appropriate.  The defect was outline and then transferred to the donor site.  A full thickness graft was then excised from the donor site. The graft was then placed in the primary defect, oriented appropriately and then sutured into place.  The secondary defect was then repaired using a primary closure.
Consent (Lip)/Introductory Paragraph: The rationale for Mohs was explained to the patient and consent was obtained. The risks, benefits and alternatives to therapy were discussed in detail. Specifically, the risks of lip deformity, changes in the oral aperture, infection, scarring, bleeding, prolonged wound healing, incomplete removal, allergy to anesthesia, nerve injury and recurrence were addressed. Prior to the procedure, the treatment site was clearly identified and confirmed by the patient. All components of Universal Protocol/PAUSE Rule completed.
Island Pedicle Flap-Requiring Vessel Identification Text: The defect edges were debeveled with a #15 scalpel blade.  Given the location of the defect, shape of the defect and the proximity to free margins an island pedicle advancement flap was deemed most appropriate.  Using a sterile surgical marker, an appropriate advancement flap was drawn, based on the axial vessel mentioned above, incorporating the defect, outlining the appropriate donor tissue and placing the expected incisions within the relaxed skin tension lines where possible.    The area thus outlined was incised deep to adipose tissue with a #15 scalpel blade.  The skin margins were undermined to an appropriate distance in all directions around the primary defect and laterally outward around the island pedicle utilizing iris scissors.  There was minimal undermining beneath the pedicle flap.
Zygomaticofacial Flap Text: Given the location of the defect, shape of the defect and the proximity to free margins a zygomaticofacial flap was deemed most appropriate for repair.  Using a sterile surgical marker, the appropriate flap was drawn incorporating the defect and placing the expected incisions within the relaxed skin tension lines where possible. The area thus outlined was incised deep to adipose tissue with a #15 scalpel blade with preservation of a vascular pedicle.  The skin margins were undermined to an appropriate distance in all directions utilizing iris scissors.  The flap was then placed into the defect and anchored with interrupted buried subcutaneous sutures.
Trilobed Flap Text: The defect edges were debeveled with a #15 scalpel blade.  Given the location of the defect and the proximity to free margins a trilobed flap was deemed most appropriate.  Using a sterile surgical marker, an appropriate trilobed flap drawn around the defect.    The area thus outlined was incised deep to adipose tissue with a #15 scalpel blade.  The skin margins were undermined to an appropriate distance in all directions utilizing iris scissors.
Bcc Infiltrative Histology Text: There were numerous aggregates of basaloid cells demonstrating an infiltrative pattern.
Posterior Auricular Interpolation Flap Text: A decision was made to reconstruct the defect utilizing an interpolation axial flap and a staged reconstruction.  A telfa template was made of the defect.  This telfa template was then used to outline the posterior auricular interpolation flap.  The donor area for the pedicle flap was then injected with anesthesia.  The flap was excised through the skin and subcutaneous tissue down to the layer of the underlying musculature.  The pedicle flap was carefully excised within this deep plane to maintain its blood supply.  The edges of the donor site were undermined.   The donor site was closed in a primary fashion.  The pedicle was then rotated into position and sutured.  Once the tube was sutured into place, adequate blood supply was confirmed with blanching and refill.  The pedicle was then wrapped with xeroform gauze and dressed appropriately with a telfa and gauze bandage to ensure continued blood supply and protect the attached pedicle.
Mauc Instructions: By selecting yes to the question below the MAUC number will be added into the note.  This will be calculated automatically based on the diagnosis chosen, the size entered, the body zone selected (H,M,L) and the specific indications you chose. You will also have the option to override the Mohs AUC if you disagree with the automatically calculated number and this option is found in the Case Summary tab.
Mercedes Flap Text: The defect edges were debeveled with a #15 scalpel blade.  Given the location of the defect, shape of the defect and the proximity to free margins a Mercedes flap was deemed most appropriate.  Using a sterile surgical marker, an appropriate advancement flap was drawn incorporating the defect and placing the expected incisions within the relaxed skin tension lines where possible. The area thus outlined was incised deep to adipose tissue with a #15 scalpel blade.  The skin margins were undermined to an appropriate distance in all directions utilizing iris scissors.
O-T Advancement Flap Text: The defect edges were debeveled with a #15 scalpel blade.  Given the location of the defect, shape of the defect and the proximity to free margins an O-T advancement flap was deemed most appropriate.  Using a sterile surgical marker, an appropriate advancement flap was drawn incorporating the defect and placing the expected incisions within the relaxed skin tension lines where possible.    The area thus outlined was incised deep to adipose tissue with a #15 scalpel blade.  The skin margins were undermined to an appropriate distance in all directions utilizing iris scissors.
Nasal Turnover Hinge Flap Text: The defect edges were debeveled with a #15 scalpel blade.  Given the size, depth, location of the defect and the defect being full thickness a nasal turnover hinge flap was deemed most appropriate.  Using a sterile surgical marker, an appropriate hinge flap was drawn incorporating the defect. The area thus outlined was incised with a #15 scalpel blade. The flap was designed to recreate the nasal mucosal lining and the alar rim. The skin margins were undermined to an appropriate distance in all directions utilizing iris scissors.
Double Z Plasty Text: The lesion was extirpated to the level of the fat with a #15 scalpel blade. Given the location of the defect, shape of the defect and the proximity to free margins a double Z-plasty was deemed most appropriate for repair. Using a sterile surgical marker, the appropriate transposition arms of the double Z-plasty were drawn incorporating the defect and placing the expected incisions within the relaxed skin tension lines where possible. The area thus outlined was incised deep to adipose tissue with a #15 scalpel blade. The skin margins were undermined to an appropriate distance in all directions utilizing iris scissors. The opposing transposition arms were then transposed and carried over into place in opposite direction and anchored with interrupted buried subcutaneous sutures.
Localized Dermabrasion With 15 Blade Text: The patient was draped in routine manner.  Localized dermabrasion using a 15 blade was performed in routine manner to papillary dermis. This spot dermabrasion is being performed to complete skin cancer reconstruction. It also will eliminate the other sun damaged precancerous cells that are known to be part of the regional effect of a lifetime's worth of sun exposure. This localized dermabrasion is therapeutic and should not be considered cosmetic in any regard.
Melolabial Interpolation Flap Text: A decision was made to reconstruct the defect utilizing an interpolation axial flap and a staged reconstruction.  A telfa template was made of the defect.  This telfa template was then used to outline the melolabial interpolation flap.  The donor area for the pedicle flap was then injected with anesthesia.  The flap was excised through the skin and subcutaneous tissue down to the layer of the underlying musculature.  The pedicle flap was carefully excised within this deep plane to maintain its blood supply.  The edges of the donor site were undermined.   The donor site was closed in a primary fashion.  The pedicle was then rotated into position and sutured.  Once the tube was sutured into place, adequate blood supply was confirmed with blanching and refill.  The pedicle was then wrapped with xeroform gauze and dressed appropriately with a telfa and gauze bandage to ensure continued blood supply and protect the attached pedicle.
Intermediate Repair And Flap Additional Text (Will Appearing After The Standard Complex Repair Text): The intermediate repair was not sufficient to completely close the primary defect. The remaining additional defect was repaired with the flap mentioned below.
Medical Necessity Statement: Based on my medical judgement, Mohs surgery is the most appropriate treatment for this cancer compared to other treatments.
Wound Care (No Sutures): Petrolatum
Closure 2 Information: This tab is for additional flaps and grafts, including complex repair and grafts and complex repair and flaps. You can also specify a different location for the additional defect, if the location is the same you do not need to select a new one. We will insert the automated text for the repair you select below just as we do for solitary flaps and grafts. Please note that at this time if you select a location with a different insurance zone you will need to override the ICD10 and CPT if appropriate.
Undermining Type: One Wound Edge
Eye Protection Verbiage: Before proceeding with the stage, a plastic scleral shield was inserted. The globe was anesthetized with a few drops of 1% lidocaine with 1:100,000 epinephrine. Then, an appropriate sized scleral shield was chosen and coated with lacrilube ointment. The shield was gently inserted and left in place for the duration of each stage. After the stage was completed, the shield was gently removed.
Distance Of Undermining In Cm (Required): 2.5
Inflammation Suggestive Of Cancer Camouflage Histology Text: There was a dense lymphocytic infiltrate which prevented adequate histologic evaluation of adjacent structures.
Consent (Marginal Mandibular)/Introductory Paragraph: The rationale for Mohs was explained to the patient and consent was obtained. The risks, benefits and alternatives to therapy were discussed in detail. Specifically, the risks of damage to the marginal mandibular branch of the facial nerve, infection, scarring, bleeding, prolonged wound healing, incomplete removal, allergy to anesthesia, and recurrence were addressed. Prior to the procedure, the treatment site was clearly identified and confirmed by the patient. All components of Universal Protocol/PAUSE Rule completed.
Cheiloplasty (Complex) Text: A decision was made to reconstruct the defect with a  cheiloplasty.  The defect was undermined extensively.  Additional obicularis oris muscle was excised with a 15 blade scalpel.  The defect was converted into a full thickness wedge to facilite a better cosmetic result.  Small vessels were then tied off with 5-0 monocyrl. The obicularis oris, superficial fascia, adipose and dermis were then reapproximated.  After the deeper layers were approximated the epidermis was reapproximated with particular care given to realign the vermilion border.
Rectangular Flap Text: The defect edges were debeveled with a #15 scalpel blade. Given the location of the defect and the proximity to free margins a rectangular flap was deemed most appropriate. Using a sterile surgical marker, an appropriate rectangular flap was drawn incorporating the defect. The area thus outlined was incised deep to adipose tissue with a #15 scalpel blade. The skin margins were undermined to an appropriate distance in all directions utilizing iris scissors. Following this, the designed flap was carried over into the primary defect and sutured into place.
Localized Dermabrasion With Sand Papertext: The patient was draped in routine manner.  Localized dermabrasion using sterile sand paper was performed in routine manner to papillary dermis. This spot dermabrasion is being performed to complete skin cancer reconstruction. It also will eliminate the other sun damaged precancerous cells that are known to be part of the regional effect of a lifetime's worth of sun exposure. This localized dermabrasion is therapeutic and should not be considered cosmetic in any regard.
A-T Advancement Flap Text: The defect edges were debeveled with a #15 scalpel blade.  Given the location of the defect, shape of the defect and the proximity to free margins an A-T advancement flap was deemed most appropriate.  Using a sterile surgical marker, an appropriate advancement flap was drawn incorporating the defect and placing the expected incisions within the relaxed skin tension lines where possible.    The area thus outlined was incised deep to adipose tissue with a #15 scalpel blade.  The skin margins were undermined to an appropriate distance in all directions utilizing iris scissors.
Star Wedge Flap Text: The defect edges were debeveled with a #15 scalpel blade.  Given the location of the defect, shape of the defect and the proximity to free margins a star wedge flap was deemed most appropriate.  Using a sterile surgical marker, an appropriate rotation flap was drawn incorporating the defect and placing the expected incisions within the relaxed skin tension lines where possible. The area thus outlined was incised deep to adipose tissue with a #15 scalpel blade.  The skin margins were undermined to an appropriate distance in all directions utilizing iris scissors.
Depth Of Tumor Invasion (For Histology): tumor not visualized (deep and peripheral margins are clear of tumor)
Purse String (Intermediate) Text: Given the location of the defect and the characteristics of the surrounding skin a purse string intermediate closure was deemed most appropriate.  Undermining was performed circumfirentially around the surgical defect.  A purse string suture was then placed and tightened.
Graft Cartilage Fenestration Text: The cartilage was fenestrated with a 2mm punch biopsy to help facilitate graft survival and healing.
Helical Rim Advancement Flap Text: The defect edges were debeveled with a #15 blade scalpel.  Given the location of the defect and the proximity to free margins (helical rim) a double helical rim advancement flap was deemed most appropriate.  Using a sterile surgical marker, the appropriate advancement flaps were drawn incorporating the defect and placing the expected incisions between the helical rim and antihelix where possible.  The area thus outlined was incised through and through with a #15 scalpel blade.  With a skin hook and iris scissors, the flaps were gently and sharply undermined and freed up.
No Repair - Repaired With Adjacent Surgical Defect Text (Leave Blank If You Do Not Want): After obtaining clear surgical margins the defect was repaired concurrently with another surgical defect which was in close approximation.
Crescentic Advancement Flap Text: The defect edges were debeveled with a #15 scalpel blade.  Given the location of the defect and the proximity to free margins a crescentic advancement flap was deemed most appropriate.  Using a sterile surgical marker, the appropriate advancement flap was drawn incorporating the defect and placing the expected incisions within the relaxed skin tension lines where possible.    The area thus outlined was incised deep to adipose tissue with a #15 scalpel blade.  The skin margins were undermined to an appropriate distance in all directions utilizing iris scissors.
V-Y Plasty Text: The defect edges were debeveled with a #15 scalpel blade.  Given the location of the defect, shape of the defect and the proximity to free margins an V-Y advancement flap was deemed most appropriate.  Using a sterile surgical marker, an appropriate advancement flap was drawn incorporating the defect and placing the expected incisions within the relaxed skin tension lines where possible.    The area thus outlined was incised deep to adipose tissue with a #15 scalpel blade.  The skin margins were undermined to an appropriate distance in all directions utilizing iris scissors.
Spiral Flap Text: The defect edges were debeveled with a #15 scalpel blade.  Given the location of the defect, shape of the defect and the proximity to free margins a spiral flap was deemed most appropriate.  Using a sterile surgical marker, an appropriate rotation flap was drawn incorporating the defect and placing the expected incisions within the relaxed skin tension lines where possible. The area thus outlined was incised deep to adipose tissue with a #15 scalpel blade.  The skin margins were undermined to an appropriate distance in all directions utilizing iris scissors.
Graft Donor Site Dermal Sutures (Optional): 5-0 Maxon
Hemigard Intro: Due to skin fragility and wound tension, it was decided to use HEMIGARD adhesive retention suture devices to permit a linear closure. The skin was cleaned and dried for a 6cm distance away from the wound. Excessive hair, if present, was removed to allow for adhesion.
Bill 59 Modifier?: No - Continue to Bill 79 Modifier
Mart-1 - Positive Histology Text: MART-1 staining demonstrates areas of higher density and clustering of melanocytes with Pagetoid spread upwards within the epidermis. The surgical margins are positive for tumor cells.
Partial Purse String (Intermediate) Text: Given the location of the defect and the characteristics of the surrounding skin an intermediate purse string closure was deemed most appropriate.  Undermining was performed circumfirentially around the surgical defect.  A purse string suture was then placed and tightened. Wound tension only allowed a partial closure of the circular defect.
Tarsorrhaphy Text: A tarsorrhaphy was performed using Frost sutures.
Advancement Flap (Double) Text: The defect edges were debeveled with a #15 scalpel blade.  Given the location of the defect and the proximity to free margins a double advancement flap was deemed most appropriate.  Using a sterile surgical marker, the appropriate advancement flaps were drawn incorporating the defect and placing the expected incisions within the relaxed skin tension lines where possible.    The area thus outlined was incised deep to adipose tissue with a #15 scalpel blade.  The skin margins were undermined to an appropriate distance in all directions utilizing iris scissors.
Consent (Near Eyelid Margin)/Introductory Paragraph: The rationale for Mohs was explained to the patient and consent was obtained. The risks, benefits and alternatives to therapy were discussed in detail. Specifically, the risks of ectropion or eyelid deformity, infection, scarring, bleeding, prolonged wound healing, incomplete removal, allergy to anesthesia, nerve injury and recurrence were addressed. Prior to the procedure, the treatment site was clearly identified and confirmed by the patient. All components of Universal Protocol/PAUSE Rule completed.
Keystone Flap Text: The defect edges were debeveled with a #15 scalpel blade.  Given the location of the defect, shape of the defect a keystone flap was deemed most appropriate.  Using a sterile surgical marker, an appropriate keystone flap was drawn incorporating the defect, outlining the appropriate donor tissue and placing the expected incisions within the relaxed skin tension lines where possible. The area thus outlined was incised deep to adipose tissue with a #15 scalpel blade.  The skin margins were undermined to an appropriate distance in all directions around the primary defect and laterally outward around the flap utilizing iris scissors.
Which Eyelid Repair Cpt Are You Using?: 25392
Donor Site Anesthesia Type: same as repair anesthesia
V-Y Flap Text: The defect edges were debeveled with a #15 scalpel blade.  Given the location of the defect, shape of the defect and the proximity to free margins a V-Y flap was deemed most appropriate.  Using a sterile surgical marker, an appropriate advancement flap was drawn incorporating the defect and placing the expected incisions within the relaxed skin tension lines where possible.    The area thus outlined was incised deep to adipose tissue with a #15 scalpel blade.  The skin margins were undermined to an appropriate distance in all directions utilizing iris scissors.
Modified Advancement Flap Text: The defect edges were debeveled with a #15 scalpel blade.  Given the location of the defect, shape of the defect and the proximity to free margins a modified advancement flap was deemed most appropriate.  Using a sterile surgical marker, an appropriate advancement flap was drawn incorporating the defect and placing the expected incisions within the relaxed skin tension lines where possible.    The area thus outlined was incised deep to adipose tissue with a #15 scalpel blade.  The skin margins were undermined to an appropriate distance in all directions utilizing iris scissors.
Orbicularis Oris Muscle Flap Text: The defect edges were debeveled with a #15 scalpel blade.  Given that the defect affected the competency of the oral sphincter an orbicularis oris muscle flap was deemed most appropriate to restore this competency and normal muscle function.  Using a sterile surgical marker, an appropriate flap was drawn incorporating the defect. The area thus outlined was incised with a #15 scalpel blade.
Repair Anesthesia Method: local infiltration
O-Z Flap Text: The defect edges were debeveled with a #15 scalpel blade.  Given the location of the defect, shape of the defect and the proximity to free margins an O-Z flap was deemed most appropriate.  Using a sterile surgical marker, an appropriate transposition flap was drawn incorporating the defect and placing the expected incisions within the relaxed skin tension lines where possible. The area thus outlined was incised deep to adipose tissue with a #15 scalpel blade.  The skin margins were undermined to an appropriate distance in all directions utilizing iris scissors.
Xenograft Text: The defect edges were debeveled with a #15 scalpel blade.  Given the location of the defect, shape of the defect and the proximity to free margins a xenograft was deemed most appropriate.  The graft was then trimmed to fit the size of the defect.  The graft was then placed in the primary defect and oriented appropriately.
Subsequent Stages Histo Method Verbiage: Using a similar technique to that described above, a thin layer of tissue was removed from all areas where tumor was visible on the previous stage.  The tissue was again oriented, mapped, dyed, and processed as above.
Adjacent Tissue Transfer Text: The defect edges were debeveled with a #15 scalpel blade.  Given the location of the defect and the proximity to free margins an adjacent tissue transfer was deemed most appropriate.  Using a sterile surgical marker, an appropriate flap was drawn incorporating the defect and placing the expected incisions within the relaxed skin tension lines where possible.    The area thus outlined was incised deep to adipose tissue with a #15 scalpel blade.  The skin margins were undermined to an appropriate distance in all directions utilizing iris scissors.
Repair Type: Complex Repair
Hemostasis: Electrocautery
Previous Accession (Optional): [id number]
Alar Island Pedicle Flap Text: The defect edges were debeveled with a #15 scalpel blade.  Given the location of the defect, shape of the defect and the proximity to the alar rim an island pedicle advancement flap was deemed most appropriate.  Using a sterile surgical marker, an appropriate advancement flap was drawn incorporating the defect, outlining the appropriate donor tissue and placing the expected incisions within the nasal ala running parallel to the alar rim. The area thus outlined was incised with a #15 scalpel blade.  The skin margins were undermined minimally to an appropriate distance in all directions around the primary defect and laterally outward around the island pedicle utilizing iris scissors.  There was minimal undermining beneath the pedicle flap.
Area M Indication Text: Tumors in this location are included in Area M (cheek, forehead, scalp, neck, jawline and pretibial skin).  Mohs surgery is indicated for tumors in these anatomic locations.
Abbe Flap (Upper To Lower Lip) Text: The defect of the lower lip was assessed and measured.  Given the location and size of the defect, an Abbe flap was deemed most appropriate.  Using a sterile surgical marker, an appropriate Abbe flap was measured and drawn on the upper lip. Local anesthesia was then infiltrated.  A scalpel was then used to incise the upper lip through and through the skin, vermilion, muscle and mucosa, leaving the flap pedicled on the opposite side.  The flap was then rotated and transferred to the lower lip defect.  The flap was then sutured into place with a three layer technique, closing the orbicularis oris muscle layer with subcutaneous buried sutures, followed by a mucosal layer and an epidermal layer.
Z Plasty Text: The lesion was extirpated to the level of the fat with a #15 scalpel blade.  Given the location of the defect, shape of the defect and the proximity to free margins a Z-plasty was deemed most appropriate for repair.  Using a sterile surgical marker, the appropriate transposition arms of the Z-plasty were drawn incorporating the defect and placing the expected incisions within the relaxed skin tension lines where possible.    The area thus outlined was incised deep to adipose tissue with a #15 scalpel blade.  The skin margins were undermined to an appropriate distance in all directions utilizing iris scissors.  The opposing transposition arms were then transposed into place in opposite direction and anchored with interrupted buried subcutaneous sutures.
Burow's Graft Text: The defect edges were debeveled with a #15 scalpel blade.  Given the location of the defect, shape of the defect, the proximity to free margins and the presence of a standing cone deformity a Burow's skin graft was deemed most appropriate. The standing cone was removed and this tissue was then trimmed to the shape of the primary defect. The adipose tissue was also removed until only dermis and epidermis were left.  The skin margins of the secondary defect were undermined to an appropriate distance in all directions utilizing iris scissors.  The secondary defect was closed with interrupted buried subcutaneous sutures.  The skin edges were then re-apposed with running  sutures.  The skin graft was then placed in the primary defect and oriented appropriately.
Anesthesia Volume In Cc: 0.9
Consent 2/Introductory Paragraph: Mohs surgery was explained to the patient and consent was obtained. The risks, benefits and alternatives to therapy were discussed in detail. Specifically, the risks of infection, scarring, bleeding, prolonged wound healing, incomplete removal, allergy to anesthesia, nerve injury and recurrence were addressed. Prior to the procedure, the treatment site was clearly identified and confirmed by the patient. All components of Universal Protocol/PAUSE Rule completed.
No Residual Tumor Seen Histology Text: There were no malignant cells seen in the sections examined.
Pinch Graft Text: The defect edges were debeveled with a #15 scalpel blade. Given the location of the defect, shape of the defect and the proximity to free margins a pinch graft was deemed most appropriate. Using a sterile surgical marker, the primary defect shape was transferred to the donor site. The area thus outlined was incised deep to adipose tissue with a #15 scalpel blade.  The harvested graft was then trimmed of adipose tissue until only dermis and epidermis was left. The skin margins of the secondary defect were undermined to an appropriate distance in all directions utilizing iris scissors.  The secondary defect was closed with interrupted buried subcutaneous sutures.  The skin edges were then re-apposed with running  sutures.  The skin graft was then placed in the primary defect and oriented appropriately.
Rotation Flap Text: The defect edges were debeveled with a #15 scalpel blade.  Given the location of the defect, shape of the defect and the proximity to free margins a rotation flap was deemed most appropriate.  Using a sterile surgical marker, an appropriate rotation flap was drawn incorporating the defect and placing the expected incisions within the relaxed skin tension lines where possible.    The area thus outlined was incised deep to adipose tissue with a #15 scalpel blade.  The skin margins were undermined to an appropriate distance in all directions utilizing iris scissors.
W Plasty Text: The lesion was extirpated to the level of the fat with a #15 scalpel blade.  Given the location of the defect, shape of the defect and the proximity to free margins a W-plasty was deemed most appropriate for repair.  Using a sterile surgical marker, the appropriate transposition arms of the W-plasty were drawn incorporating the defect and placing the expected incisions within the relaxed skin tension lines where possible.    The area thus outlined was incised deep to adipose tissue with a #15 scalpel blade.  The skin margins were undermined to an appropriate distance in all directions utilizing iris scissors.  The opposing transposition arms were then transposed into place in opposite direction and anchored with interrupted buried subcutaneous sutures.
Nostril Rim Text: The closure involved the nostril rim.
Mohs Histo Method Verbiage: Each section was then chromacoded and processed in the Mohs lab using the Mohs protocol and submitted for frozen section.
Detail Level: Detailed
Undermining Location (Optional): in the superficial subcutaneous fat
Mastoid Interpolation Flap Text: A decision was made to reconstruct the defect utilizing an interpolation axial flap and a staged reconstruction.  A telfa template was made of the defect.  This telfa template was then used to outline the mastoid interpolation flap.  The donor area for the pedicle flap was then injected with anesthesia.  The flap was excised through the skin and subcutaneous tissue down to the layer of the underlying musculature.  The pedicle flap was carefully excised within this deep plane to maintain its blood supply.  The edges of the donor site were undermined.   The donor site was closed in a primary fashion.  The pedicle was then rotated into position and sutured.  Once the tube was sutured into place, adequate blood supply was confirmed with blanching and refill.  The pedicle was then wrapped with xeroform gauze and dressed appropriately with a telfa and gauze bandage to ensure continued blood supply and protect the attached pedicle.
Nasalis-Muscle-Based Myocutaneous Island Pedicle Flap Text: Using a #15 blade, an incision was made around the donor flap to the level of the nasalis muscle. Wide lateral undermining was then performed in both the subcutaneous plane above the nasalis muscle, and in a submuscular plane just above periosteum. This allowed the formation of a free nasalis muscle axial pedicle (based on the angular artery) which was still attached to the actual cutaneous flap, increasing its mobility and vascular viability. Hemostasis was obtained with pinpoint electrocoagulation. The flap was mobilized into position and the pivotal anchor points positioned and stabilized with buried interrupted sutures. Subcutaneous and dermal tissues were closed in a multilayered fashion with sutures. Tissue redundancies were excised, and the epidermal edges were apposed without significant tension and sutured with sutures.
Eyelid Partial Thickness Repair - 92671: The eyelid defect was partial thickness which required a wedge repair of the eyelid. Special care was taken to ensure that the eyelid margin was realligned when placing sutures.
Partial Purse String (Simple) Text: Given the location of the defect and the characteristics of the surrounding skin a simple purse string closure was deemed most appropriate.  Undermining was performed circumfirentially around the surgical defect.  A purse string suture was then placed and tightened. Wound tension only allowed a partial closure of the circular defect.
Area H Indication Text: Tumors in this location are included in Area H (eyelids, eyebrows, nose, lips, chin, ear, pre-auricular, post-auricular, temple, genitalia, hands, feet, ankles and areola).  Tissue conservation is critical in these anatomic locations.
Melolabial Transposition Flap Text: The defect edges were debeveled with a #15 scalpel blade.  Given the location of the defect and the proximity to free margins a melolabial flap was deemed most appropriate.  Using a sterile surgical marker, an appropriate melolabial transposition flap was drawn incorporating the defect.    The area thus outlined was incised deep to adipose tissue with a #15 scalpel blade.  The skin margins were undermined to an appropriate distance in all directions utilizing iris scissors.
O-L Flap Text: The defect edges were debeveled with a #15 scalpel blade.  Given the location of the defect, shape of the defect and the proximity to free margins an O-L flap was deemed most appropriate.  Using a sterile surgical marker, an appropriate advancement flap was drawn incorporating the defect and placing the expected incisions within the relaxed skin tension lines where possible.    The area thus outlined was incised deep to adipose tissue with a #15 scalpel blade.  The skin margins were undermined to an appropriate distance in all directions utilizing iris scissors.
Hatchet Flap Text: The defect edges were debeveled with a #15 scalpel blade.  Given the location of the defect, shape of the defect and the proximity to free margins a hatchet flap was deemed most appropriate.  Using a sterile surgical marker, an appropriate hatchet flap was drawn incorporating the defect and placing the expected incisions within the relaxed skin tension lines where possible.    The area thus outlined was incised deep to adipose tissue with a #15 scalpel blade.  The skin margins were undermined to an appropriate distance in all directions utilizing iris scissors.
Double O-Z Flap Text: The defect edges were debeveled with a #15 scalpel blade.  Given the location of the defect, shape of the defect and the proximity to free margins a Double O-Z flap was deemed most appropriate.  Using a sterile surgical marker, an appropriate transposition flap was drawn incorporating the defect and placing the expected incisions within the relaxed skin tension lines where possible. The area thus outlined was incised deep to adipose tissue with a #15 scalpel blade.  The skin margins were undermined to an appropriate distance in all directions utilizing iris scissors.
Manual Repair Warning Statement: We plan on removing the manually selected variable below in favor of our much easier automatic structured text blocks found in the previous tab. We decided to do this to help make the flow better and give you the full power of structured data. Manual selection is never going to be ideal in our platform and I would encourage you to avoid using manual selection from this point on, especially since I will be sunsetting this feature. It is important that you do one of two things with the customized text below. First, you can save all of the text in a word file so you can have it for future reference. Second, transfer the text to the appropriate area in the Library tab. Lastly, if there is a flap or graft type which we do not have you need to let us know right away so I can add it in before the variable is hidden. No need to panic, we plan to give you roughly 6 months to make the change.
Cartilage Graft Text: The defect edges were debeveled with a #15 scalpel blade.  Given the location of the defect, shape of the defect, the fact the defect involved a full thickness cartilage defect a cartilage graft was deemed most appropriate.  An appropriate donor site was identified, cleansed, and anesthetized. The cartilage graft was then harvested and transferred to the recipient site, oriented appropriately and then sutured into place.  The secondary defect was then repaired using a primary closure.
Complex Repair And Flap Additional Text (Will Appearing After The Standard Complex Repair Text): The complex repair was not sufficient to completely close the primary defect. The remaining additional defect was repaired with the flap mentioned below.
Referring Physician (Optional): Keke KING
Epidermal Autograft Text: The defect edges were debeveled with a #15 scalpel blade.  Given the location of the defect, shape of the defect and the proximity to free margins an epidermal autograft was deemed most appropriate.  Using a sterile surgical marker, the primary defect shape was transferred to the donor site. The epidermal graft was then harvested.  The skin graft was then placed in the primary defect and oriented appropriately.
Pain Refusal Text: I offered to prescribe pain medication but the patient refused to take this medication.
Dermal Autograft Text: The defect edges were debeveled with a #15 scalpel blade.  Given the location of the defect, shape of the defect and the proximity to free margins a dermal autograft was deemed most appropriate.  Using a sterile surgical marker, the primary defect shape was transferred to the donor site. The area thus outlined was incised deep to adipose tissue with a #15 scalpel blade.  The harvested graft was then trimmed of adipose and epidermal tissue until only dermis was left.  The skin graft was then placed in the primary defect and oriented appropriately.
Ear Star Wedge Flap Text: The defect edges were debeveled with a #15 blade scalpel.  Given the location of the defect and the proximity to free margins (helical rim) an ear star wedge flap was deemed most appropriate.  Using a sterile surgical marker, the appropriate flap was drawn incorporating the defect and placing the expected incisions between the helical rim and antihelix where possible.  The area thus outlined was incised through and through with a #15 scalpel blade.
Staging Info: By selecting yes to the question above you will include information on AJCC 8 tumor staging in your Mohs note. Information on tumor staging will be automatically added for SCCs on the head and neck. AJCC 8 includes tumor size, tumor depth, perineural involvement and bone invasion.
Surgeon/Pathologist Verbiage (Will Incorporate Name Of Surgeon From Intro If Not Blank): operated in two distinct and integrated capacities as the surgeon and pathologist.
Transposition Flap Text: The defect edges were debeveled with a #15 scalpel blade.  Given the location of the defect and the proximity to free margins a transposition flap was deemed most appropriate.  Using a sterile surgical marker, an appropriate transposition flap was drawn incorporating the defect.    The area thus outlined was incised deep to adipose tissue with a #15 scalpel blade.  The skin margins were undermined to an appropriate distance in all directions utilizing iris scissors.
Purse String (Simple) Text: Given the location of the defect and the characteristics of the surrounding skin a purse string closure was deemed most appropriate.  Undermining was performed circumfirentially around the surgical defect.  A purse string suture was then placed and tightened.
Home Suture Removal Text: Patient was provided instructions on removing sutures and will remove their sutures at home.  If they have any questions or difficulties they will call the office.
Retention Suture Text: Retention sutures were placed to support the closure and prevent dehiscence.
Consent (Nose)/Introductory Paragraph: The rationale for Mohs was explained to the patient and consent was obtained. The risks, benefits and alternatives to therapy were discussed in detail. Specifically, the risks of nasal deformity, changes in the flow of air through the nose, infection, scarring, bleeding, prolonged wound healing, incomplete removal, allergy to anesthesia, nerve injury and recurrence were addressed. Prior to the procedure, the treatment site was clearly identified and confirmed by the patient. All components of Universal Protocol/PAUSE Rule completed.
Skin Substitute Text: The defect edges were debeveled with a #15 scalpel blade.  Given the location of the defect, shape of the defect and the proximity to free margins a skin substitute graft was deemed most appropriate.  The graft material was trimmed to fit the size of the defect. The graft was then placed in the primary defect and oriented appropriately.
Localized Dermabrasion With Wire Brush Text: The patient was draped in routine manner.  Localized dermabrasion using 3 x 17 mm wire brush was performed in routine manner to papillary dermis. This spot dermabrasion is being performed to complete skin cancer reconstruction. It also will eliminate the other sun damaged precancerous cells that are known to be part of the regional effect of a lifetime's worth of sun exposure. This localized dermabrasion is therapeutic and should not be considered cosmetic in any regard.
Flip-Flop Flap Text: The defect edges were debeveled with a #15 blade scalpel.  Given the location of the defect and the proximity to free margins a flip-flop flap was deemed most appropriate. Using a sterile surgical marker, the appropriate flap was drawn incorporating the defect and placing the expected incisions between the helical rim and antihelix where possible.  The area thus outlined was incised through and through with a #15 scalpel blade. Following this, the designed flap was carried over into the primary defect and sutured into place.

## 2024-10-10 NOTE — PROCEDURE: DIAGNOSIS COMMENT
Comment: Offered flap repair to decrease movement of eyebrow; pt declined and opted for linear closure; he is comfortable with slight elevation of the affected forehead/brow so he can have a simpler surgery.
Render Risk Assessment In Note?: no
Detail Level: Zone

## 2024-10-14 PROBLEM — Z96.649 FAILED TOTAL HIP ARTHROPLASTY (HCC): Status: ACTIVE | Noted: 2024-10-14

## 2024-10-14 PROBLEM — T84.018A FAILED TOTAL HIP ARTHROPLASTY (HCC): Status: ACTIVE | Noted: 2024-10-14

## 2024-10-17 ENCOUNTER — APPOINTMENT (RX ONLY)
Dept: URBAN - METROPOLITAN AREA CLINIC 36 | Facility: CLINIC | Age: 69
Setting detail: DERMATOLOGY
End: 2024-10-17

## 2024-10-17 DIAGNOSIS — Z48.02 ENCOUNTER FOR REMOVAL OF SUTURES: ICD-10-CM

## 2024-10-17 PROCEDURE — ? REFERRAL CORRESPONDENCE

## 2024-10-17 PROCEDURE — ? SUTURE REMOVAL (GLOBAL PERIOD)

## 2024-10-17 ASSESSMENT — LOCATION SIMPLE DESCRIPTION DERM: LOCATION SIMPLE: RIGHT FOREHEAD

## 2024-10-17 ASSESSMENT — LOCATION DETAILED DESCRIPTION DERM: LOCATION DETAILED: RIGHT SUPERIOR FOREHEAD

## 2024-10-17 ASSESSMENT — LOCATION ZONE DERM: LOCATION ZONE: FACE

## 2024-10-17 NOTE — PROCEDURE: SUTURE REMOVAL (GLOBAL PERIOD)
Body Location Override (Optional - Billing Will Still Be Based On Selected Body Map Location If Applicable): right forehead
Detail Level: Detailed
Add 30037 Cpt? (Important Note: In 2017 The Use Of 56247 Is Being Tracked By Cms To Determine Future Global Period Reimbursement For Global Periods): no

## 2024-10-31 ENCOUNTER — APPOINTMENT (RX ONLY)
Dept: URBAN - METROPOLITAN AREA CLINIC 36 | Facility: CLINIC | Age: 69
Setting detail: DERMATOLOGY
End: 2024-10-31

## 2024-10-31 PROBLEM — C44.311 BASAL CELL CARCINOMA OF SKIN OF NOSE: Status: ACTIVE | Noted: 2024-10-31

## 2024-10-31 PROCEDURE — 17312 MOHS ADDL STAGE: CPT

## 2024-10-31 PROCEDURE — 17311 MOHS 1 STAGE H/N/HF/G: CPT

## 2024-10-31 PROCEDURE — 15275 SKIN SUB GRAFT FACE/NK/HF/G: CPT

## 2024-10-31 PROCEDURE — ? MOHS SURGERY

## 2024-10-31 NOTE — PROCEDURE: MOHS SURGERY
Special Stains Stage 5 - Results: Base On Clearance Noted Above
Secondary Intention Text (Leave Blank If You Do Not Want): The defect will heal with secondary intention.
Consent (Marginal Mandibular)/Introductory Paragraph: The rationale for Mohs was explained to the patient and consent was obtained. The risks, benefits and alternatives to therapy were discussed in detail. Specifically, the risks of damage to the marginal mandibular branch of the facial nerve, infection, scarring, bleeding, prolonged wound healing, incomplete removal, allergy to anesthesia, and recurrence were addressed. Prior to the procedure, the treatment site was clearly identified and confirmed by the patient. All components of Universal Protocol/PAUSE Rule completed.
Otolaryngologist Procedure Text (C): After obtaining clear surgical margins the patient was sent to otolaryngology for surgical repair.  The patient understands they will receive post-surgical care and follow-up from the referring physician's office.
Postop Diagnosis: same
Simple / Intermediate / Complex Repair - Final Wound Length In Cm: 0
Brow Lift Text: A midfrontal incision was made medially to the defect to allow access to the tissues just superior to the left eyebrow. Following careful dissection inferiorly in a supraperiosteal plane to the level of the left eyebrow, several 3-0 monocryl sutures were used to resuspend the eyebrow orbicularis oculi muscular unit to the superior frontal bone periosteum. This resulted in an appropriate reapproximation of static eyebrow symmetry and correction of the left brow ptosis.
Show Repair Surgeon Variable: Yes
Retention Suture Text: Retention sutures were placed to support the closure and prevent dehiscence.
Which Instrument Did You Use For Dermabrasion?: Wire Brush
Graft Donor Site Will Heal By Secondary Intention: No
Mohs Case Number: [id number]
Plastic Surgeon Procedure Text (B): After obtaining clear surgical margins the patient was sent to plastics for surgical repair.  The patient understands they will receive post-surgical care and follow-up from the referring physician's office.
Detail Level: Detailed
Asc Procedure Text (C): After obtaining clear surgical margins the patient was sent to an ASC for surgical repair.  The patient understands they will receive post-surgical care and follow-up from the ASC physician.
Bilobed Transposition Flap Text: The defect edges were debeveled with a #15 scalpel blade.  Given the location of the defect and the proximity to free margins a bilobed transposition flap was deemed most appropriate.  Using a sterile surgical marker, an appropriate bilobe flap drawn around the defect.    The area thus outlined was incised deep to adipose tissue with a #15 scalpel blade.  The skin margins were undermined to an appropriate distance in all directions utilizing iris scissors.
Tumor Debulked?: curette
Mohs Rapid Report Verbiage: The area of clinically evident tumor was marked with skin marking ink and appropriately hatched.  The initial incision was made following the Mohs approach through the skin.  The specimen was taken to the lab, divided into the necessary number of pieces, chromacoded and processed according to the Mohs protocol.  This was repeated in successive stages until a tumor free defect was achieved.
Staging Info: By selecting yes to the question above you will include information on AJCC 8 tumor staging in your Mohs note. Information on tumor staging will be automatically added for SCCs on the head and neck. AJCC 8 includes tumor size, tumor depth, perineural involvement and bone invasion.
Lazy S Complex Repair Preamble Text (Leave Blank If You Do Not Want): Extensive wide undermining was performed.
Island Pedicle Flap With Canthal Suspension Text: The defect edges were debeveled with a #15 scalpel blade.  Given the location of the defect, shape of the defect and the proximity to free margins an island pedicle advancement flap was deemed most appropriate.  Using a sterile surgical marker, an appropriate advancement flap was drawn incorporating the defect, outlining the appropriate donor tissue and placing the expected incisions within the relaxed skin tension lines where possible. The area thus outlined was incised deep to adipose tissue with a #15 scalpel blade.  The skin margins were undermined to an appropriate distance in all directions around the primary defect and laterally outward around the island pedicle utilizing iris scissors.  There was minimal undermining beneath the pedicle flap. A suspension suture was placed in the canthal tendon to prevent tension and prevent ectropion.
Oculoplastic Surgeon Procedure Text (F): After obtaining clear surgical margins the patient was sent to oculoplastics for surgical repair.  The patient understands they will receive post-surgical care and follow-up from the referring physician's office.
Island Pedicle Flap Text: The defect edges were debeveled with a #15 scalpel blade.  Given the location of the defect, shape of the defect and the proximity to free margins an island pedicle advancement flap was deemed most appropriate.  Using a sterile surgical marker, an appropriate advancement flap was drawn incorporating the defect, outlining the appropriate donor tissue and placing the expected incisions within the relaxed skin tension lines where possible.    The area thus outlined was incised deep to adipose tissue with a #15 scalpel blade.  The skin margins were undermined to an appropriate distance in all directions around the primary defect and laterally outward around the island pedicle utilizing iris scissors.  There was minimal undermining beneath the pedicle flap.
M-Plasty Intermediate Repair Preamble Text (Leave Blank If You Do Not Want): Undermining was performed with blunt dissection.
V-Y Plasty Text: The defect edges were debeveled with a #15 scalpel blade.  Given the location of the defect, shape of the defect and the proximity to free margins an V-Y advancement flap was deemed most appropriate.  Using a sterile surgical marker, an appropriate advancement flap was drawn incorporating the defect and placing the expected incisions within the relaxed skin tension lines where possible.    The area thus outlined was incised deep to adipose tissue with a #15 scalpel blade.  The skin margins were undermined to an appropriate distance in all directions utilizing iris scissors.
Consent (Near Eyelid Margin)/Introductory Paragraph: The rationale for Mohs was explained to the patient and consent was obtained. The risks, benefits and alternatives to therapy were discussed in detail. Specifically, the risks of ectropion or eyelid deformity, infection, scarring, bleeding, prolonged wound healing, incomplete removal, allergy to anesthesia, nerve injury and recurrence were addressed. Prior to the procedure, the treatment site was clearly identified and confirmed by the patient. All components of Universal Protocol/PAUSE Rule completed.
Melolabial Interpolation Flap Text: A decision was made to reconstruct the defect utilizing an interpolation axial flap and a staged reconstruction.  A telfa template was made of the defect.  This telfa template was then used to outline the melolabial interpolation flap.  The donor area for the pedicle flap was then injected with anesthesia.  The flap was excised through the skin and subcutaneous tissue down to the layer of the underlying musculature.  The pedicle flap was carefully excised within this deep plane to maintain its blood supply.  The edges of the donor site were undermined.   The donor site was closed in a primary fashion.  The pedicle was then rotated into position and sutured.  Once the tube was sutured into place, adequate blood supply was confirmed with blanching and refill.  The pedicle was then wrapped with xeroform gauze and dressed appropriately with a telfa and gauze bandage to ensure continued blood supply and protect the attached pedicle.
Rectangular Flap Text: The defect edges were debeveled with a #15 scalpel blade. Given the location of the defect and the proximity to free margins a rectangular flap was deemed most appropriate. Using a sterile surgical marker, an appropriate rectangular flap was drawn incorporating the defect. The area thus outlined was incised deep to adipose tissue with a #15 scalpel blade. The skin margins were undermined to an appropriate distance in all directions utilizing iris scissors. Following this, the designed flap was carried over into the primary defect and sutured into place.
Stage 3: Additional Anesthesia Type: 1% lidocaine with epinephrine
Same Histology In Subsequent Stages Text: The pattern and morphology of the tumor is as described in the first stage.
Is There Documentation In The Chart Showing Discussion Of Changes With Another Physician?: Please Select the Appropriate Response
Consent (Ear)/Introductory Paragraph: The rationale for Mohs was explained to the patient and consent was obtained. The risks, benefits and alternatives to therapy were discussed in detail. Specifically, the risks of ear deformity, infection, scarring, bleeding, prolonged wound healing, incomplete removal, allergy to anesthesia, nerve injury and recurrence were addressed. Prior to the procedure, the treatment site was clearly identified and confirmed by the patient. All components of Universal Protocol/PAUSE Rule completed.
Bi-Rhombic Flap Text: The defect edges were debeveled with a #15 scalpel blade.  Given the location of the defect and the proximity to free margins a bi-rhombic flap was deemed most appropriate.  Using a sterile surgical marker, an appropriate rhombic flap was drawn incorporating the defect. The area thus outlined was incised deep to adipose tissue with a #15 scalpel blade.  The skin margins were undermined to an appropriate distance in all directions utilizing iris scissors.
Hemigard Retention Suture: 0-0 Nylon
Double Z Plasty Text: The lesion was extirpated to the level of the fat with a #15 scalpel blade. Given the location of the defect, shape of the defect and the proximity to free margins a double Z-plasty was deemed most appropriate for repair. Using a sterile surgical marker, the appropriate transposition arms of the double Z-plasty were drawn incorporating the defect and placing the expected incisions within the relaxed skin tension lines where possible. The area thus outlined was incised deep to adipose tissue with a #15 scalpel blade. The skin margins were undermined to an appropriate distance in all directions utilizing iris scissors. The opposing transposition arms were then transposed and carried over into place in opposite direction and anchored with interrupted buried subcutaneous sutures.
A-T Advancement Flap Text: The defect edges were debeveled with a #15 scalpel blade.  Given the location of the defect, shape of the defect and the proximity to free margins an A-T advancement flap was deemed most appropriate.  Using a sterile surgical marker, an appropriate advancement flap was drawn incorporating the defect and placing the expected incisions within the relaxed skin tension lines where possible.    The area thus outlined was incised deep to adipose tissue with a #15 scalpel blade.  The skin margins were undermined to an appropriate distance in all directions utilizing iris scissors.
Advancement-Rotation Flap Text: The defect edges were debeveled with a #15 scalpel blade.  Given the location of the defect, shape of the defect and the proximity to free margins an advancement-rotation flap was deemed most appropriate.  Using a sterile surgical marker, an appropriate flap was drawn incorporating the defect and placing the expected incisions within the relaxed skin tension lines where possible. The area thus outlined was incised deep to adipose tissue with a #15 scalpel blade.  The skin margins were undermined to an appropriate distance in all directions utilizing iris scissors.
Skin Substitute Text: Given the location of the defect, shape/depth of the defect and the underlying tissue, a skin substitute graft was deemed most appropriate.  The graft was placed in the primary defect and oriented appropriately.
Area H Indication Text: Tumors in this location are included in Area H (eyelids, eyebrows, nose, lips, chin, ear, pre-auricular, post-auricular, temple, genitalia, hands, feet, ankles and areola).  Tissue conservation is critical in these anatomic locations.
Eye Protection Verbiage: Before proceeding with the stage, a plastic scleral shield was inserted. The globe was anesthetized with a few drops of 1% lidocaine with 1:100,000 epinephrine. Then, an appropriate sized scleral shield was chosen and coated with lacrilube ointment. The shield was gently inserted and left in place for the duration of each stage. After the stage was completed, the shield was gently removed.
Medical Necessity Statement: Based on my medical judgement, Mohs surgery is the most appropriate treatment for this cancer compared to other treatments.
Information: Selecting Yes will display possible errors in your note based on the variables you have selected. This validation is only offered as a suggestion for you. PLEASE NOTE THAT THE VALIDATION TEXT WILL BE REMOVED WHEN YOU FINALIZE YOUR NOTE. IF YOU WANT TO FAX A PRELIMINARY NOTE YOU WILL NEED TO TOGGLE THIS TO 'NO' IF YOU DO NOT WANT IT IN YOUR FAXED NOTE.
W Plasty Text: The lesion was extirpated to the level of the fat with a #15 scalpel blade.  Given the location of the defect, shape of the defect and the proximity to free margins a W-plasty was deemed most appropriate for repair.  Using a sterile surgical marker, the appropriate transposition arms of the W-plasty were drawn incorporating the defect and placing the expected incisions within the relaxed skin tension lines where possible.    The area thus outlined was incised deep to adipose tissue with a #15 scalpel blade.  The skin margins were undermined to an appropriate distance in all directions utilizing iris scissors.  The opposing transposition arms were then transposed into place in opposite direction and anchored with interrupted buried subcutaneous sutures.
Orbicularis Oris Muscle Flap Text: The defect edges were debeveled with a #15 scalpel blade.  Given that the defect affected the competency of the oral sphincter an orbicularis oris muscle flap was deemed most appropriate to restore this competency and normal muscle function.  Using a sterile surgical marker, an appropriate flap was drawn incorporating the defect. The area thus outlined was incised with a #15 scalpel blade.
Partial Purse String (Intermediate) Text: Given the location of the defect and the characteristics of the surrounding skin an intermediate purse string closure was deemed most appropriate.  Undermining was performed circumfirentially around the surgical defect.  A purse string suture was then placed and tightened. Wound tension only allowed a partial closure of the circular defect.
Modified Advancement Flap Text: The defect edges were debeveled with a #15 scalpel blade.  Given the location of the defect, shape of the defect and the proximity to free margins a modified advancement flap was deemed most appropriate.  Using a sterile surgical marker, an appropriate advancement flap was drawn incorporating the defect and placing the expected incisions within the relaxed skin tension lines where possible.    The area thus outlined was incised deep to adipose tissue with a #15 scalpel blade.  The skin margins were undermined to an appropriate distance in all directions utilizing iris scissors.
Depth Of Tumor Invasion (For Histology): dermis
Graft Donor Site Bandage (Optional-Leave Blank If You Don't Want In Note): A bolster was fitted to the graft site and sewn into place. A pressure bandage were applied to the donor site and over the bolster.
Mid-Level Procedure Text (D): After obtaining clear surgical margins the patient was sent to a mid-level provider for surgical repair.  The patient understands they will receive post-surgical care and follow-up from the mid-level provider.
Wound Care: Vaseline
Spiral Flap Text: The defect edges were debeveled with a #15 scalpel blade.  Given the location of the defect, shape of the defect and the proximity to free margins a spiral flap was deemed most appropriate.  Using a sterile surgical marker, an appropriate rotation flap was drawn incorporating the defect and placing the expected incisions within the relaxed skin tension lines where possible. The area thus outlined was incised deep to adipose tissue with a #15 scalpel blade.  The skin margins were undermined to an appropriate distance in all directions utilizing iris scissors.
Bilateral Rotation Flap Text: The defect edges were debeveled with a #15 scalpel blade. Given the location of the defect, shape of the defect and the proximity to free margins a bilateral rotation flap was deemed most appropriate. Using a sterile surgical marker, an appropriate rotation flap was drawn incorporating the defect and placing the expected incisions within the relaxed skin tension lines where possible. The area thus outlined was incised deep to adipose tissue with a #15 scalpel blade. The skin margins were undermined to an appropriate distance in all directions utilizing iris scissors. Following this, the designed flap was carried over into the primary defect and sutured into place.
Stage 4: Number Of Blocks?: 1
Eyelid Partial Thickness Repair - 64447: The eyelid defect was partial thickness which required a wedge repair of the eyelid. Special care was taken to ensure that the eyelid margin was realligned when placing sutures.
Vermilion Border Text: The closure involved the vermilion border.
Previous Accession (Optional): [id number]
Surgeon: Carolina Joseph MD
Primary Defect Width In Cm (Final Defect Size - Required For Flaps/Grafts): 2
Closure 4 Information: This tab is for additional flaps and grafts above and beyond our usual structured repairs.  Please note if you enter information here it will not currently bill and you will need to add the billing information manually.
Eyelid Full Thickness Repair - 69740: The eyelid defect was full thickness which required a wedge repair of the eyelid. Special care was taken to ensure that the eyelid margin was realligned when placing sutures.
Consent (Scalp)/Introductory Paragraph: The rationale for Mohs was explained to the patient and consent was obtained. The risks, benefits and alternatives to therapy were discussed in detail. Specifically, the risks of changes in hair growth pattern secondary to repair, infection, scarring, bleeding, prolonged wound healing, incomplete removal, allergy to anesthesia, nerve injury and recurrence were addressed. Prior to the procedure, the treatment site was clearly identified and confirmed by the patient. All components of Universal Protocol/PAUSE Rule completed.
O-Z Plasty Text: The defect edges were debeveled with a #15 scalpel blade.  Given the location of the defect, shape of the defect and the proximity to free margins an O-Z plasty (double transposition flap) was deemed most appropriate.  Using a sterile surgical marker, the appropriate transposition flaps were drawn incorporating the defect and placing the expected incisions within the relaxed skin tension lines where possible.    The area thus outlined was incised deep to adipose tissue with a #15 scalpel blade.  The skin margins were undermined to an appropriate distance in all directions utilizing iris scissors.  Hemostasis was achieved with electrocautery.  The flaps were then transposed into place, one clockwise and the other counterclockwise, and anchored with interrupted buried subcutaneous sutures.
Provider Procedure Text (D): After obtaining clear surgical margins the defect was repaired by another provider.
H Plasty Text: Given the location of the defect, shape of the defect and the proximity to free margins a H-plasty was deemed most appropriate for repair.  Using a sterile surgical marker, the appropriate advancement arms of the H-plasty were drawn incorporating the defect and placing the expected incisions within the relaxed skin tension lines where possible. The area thus outlined was incised deep to adipose tissue with a #15 scalpel blade. The skin margins were undermined to an appropriate distance in all directions utilizing iris scissors.  The opposing advancement arms were then advanced into place in opposite direction and anchored with interrupted buried subcutaneous sutures.
Pain Refusal Text: I offered to prescribe pain medication but the patient refused to take this medication.
Area L Indication Text: Tumors in this location are included in Area L (trunk and extremities).  Mohs surgery is indicated for larger tumors, or tumors with aggressive histologic features, in these anatomic locations.
Complex Repair And Flap Additional Text (Will Appearing After The Standard Complex Repair Text): The complex repair was not sufficient to completely close the primary defect. The remaining additional defect was repaired with the flap mentioned below.
Split-Thickness Skin Graft Text: The defect edges were debeveled with a #15 scalpel blade.  Given the location of the defect, shape of the defect and the proximity to free margins a split thickness skin graft was deemed most appropriate.  Using a sterile surgical marker, the primary defect shape was transferred to the donor site. The split thickness graft was then harvested.  The skin graft was then placed in the primary defect and oriented appropriately.
Presence Of Scar Tissue (For Histology): present
Nasolabial Transposition Flap Text: The defect edges were debeveled with a #15 scalpel blade.  Given the size, depth and location of the defect and the proximity to free margins a nasolabial transposition flap was deemed most appropriate. Using a sterile surgical marker, an appropriate flap was drawn incorporating the defect. The area thus outlined was incised with a #15 scalpel blade. The skin margins were undermined to an appropriate distance in all directions utilizing iris scissors. Following this, the designed flap was carried into the primary defect and sutured into place.
Consent (Spinal Accessory)/Introductory Paragraph: The rationale for Mohs was explained to the patient and consent was obtained. The risks, benefits and alternatives to therapy were discussed in detail. Specifically, the risks of damage to the spinal accessory nerve, infection, scarring, bleeding, prolonged wound healing, incomplete removal, allergy to anesthesia, and recurrence were addressed. Prior to the procedure, the treatment site was clearly identified and confirmed by the patient. All components of Universal Protocol/PAUSE Rule completed.
Mohs Histo Method Verbiage: Each section was then chromacoded and processed in the Mohs lab using the Mohs protocol and submitted for frozen section.
Estimated Blood Loss (Cc): minimal
Nasalis-Muscle-Based Myocutaneous Island Pedicle Flap Text: Using a #15 blade, an incision was made around the donor flap to the level of the nasalis muscle. Wide lateral undermining was then performed in both the subcutaneous plane above the nasalis muscle, and in a submuscular plane just above periosteum. This allowed the formation of a free nasalis muscle axial pedicle (based on the angular artery) which was still attached to the actual cutaneous flap, increasing its mobility and vascular viability. Hemostasis was obtained with pinpoint electrocoagulation. The flap was mobilized into position and the pivotal anchor points positioned and stabilized with buried interrupted sutures. Subcutaneous and dermal tissues were closed in a multilayered fashion with sutures. Tissue redundancies were excised, and the epidermal edges were apposed without significant tension and sutured with sutures.
Cheiloplasty (Less Than 50%) Text: A decision was made to reconstruct the defect with a  cheiloplasty.  The defect was undermined extensively.  Additional obicularis oris muscle was excised with a 15 blade scalpel.  The defect was converted into a full thickness wedge, of less than 50% of the vertical height of the lip, to facilite a better cosmetic result.  Small vessels were then tied off with 5-0 monocyrl. The obicularis oris, superficial fascia, adipose and dermis were then reapproximated.  After the deeper layers were approximated the epidermis was reapproximated with particular care given to realign the vermilion border.
Unna Boot Text: An Unna boot was placed to help immobilize the limb and facilitate more rapid healing.
Epidermal Autograft Text: The defect edges were debeveled with a #15 scalpel blade.  Given the location of the defect, shape of the defect and the proximity to free margins an epidermal autograft was deemed most appropriate.  Using a sterile surgical marker, the primary defect shape was transferred to the donor site. The epidermal graft was then harvested.  The skin graft was then placed in the primary defect and oriented appropriately.
Tissue Cultured Epidermal Autograft Text: The defect edges were debeveled with a #15 scalpel blade.  Given the location of the defect, shape of the defect and the proximity to free margins a tissue cultured epidermal autograft was deemed most appropriate.  The graft was then trimmed to fit the size of the defect.  The graft was then placed in the primary defect and oriented appropriately.
Rotation Flap Text: The defect edges were debeveled with a #15 scalpel blade.  Given the location of the defect, shape of the defect and the proximity to free margins a rotation flap was deemed most appropriate.  Using a sterile surgical marker, an appropriate rotation flap was drawn incorporating the defect and placing the expected incisions within the relaxed skin tension lines where possible.    The area thus outlined was incised deep to adipose tissue with a #15 scalpel blade.  The skin margins were undermined to an appropriate distance in all directions utilizing iris scissors.
Bilobed Flap Text: The defect edges were debeveled with a #15 scalpel blade.  Given the location of the defect and the proximity to free margins a bilobe flap was deemed most appropriate.  Using a sterile surgical marker, an appropriate bilobe flap drawn around the defect.    The area thus outlined was incised deep to adipose tissue with a #15 scalpel blade.  The skin margins were undermined to an appropriate distance in all directions utilizing iris scissors.
Trilobed Flap Text: The defect edges were debeveled with a #15 scalpel blade.  Given the location of the defect and the proximity to free margins a trilobed flap was deemed most appropriate.  Using a sterile surgical marker, an appropriate trilobed flap drawn around the defect.    The area thus outlined was incised deep to adipose tissue with a #15 scalpel blade.  The skin margins were undermined to an appropriate distance in all directions utilizing iris scissors.
Alar Island Pedicle Flap Text: The defect edges were debeveled with a #15 scalpel blade.  Given the location of the defect, shape of the defect and the proximity to the alar rim an island pedicle advancement flap was deemed most appropriate.  Using a sterile surgical marker, an appropriate advancement flap was drawn incorporating the defect, outlining the appropriate donor tissue and placing the expected incisions within the nasal ala running parallel to the alar rim. The area thus outlined was incised with a #15 scalpel blade.  The skin margins were undermined minimally to an appropriate distance in all directions around the primary defect and laterally outward around the island pedicle utilizing iris scissors.  There was minimal undermining beneath the pedicle flap.
Z Plasty Text: The lesion was extirpated to the level of the fat with a #15 scalpel blade.  Given the location of the defect, shape of the defect and the proximity to free margins a Z-plasty was deemed most appropriate for repair.  Using a sterile surgical marker, the appropriate transposition arms of the Z-plasty were drawn incorporating the defect and placing the expected incisions within the relaxed skin tension lines where possible.    The area thus outlined was incised deep to adipose tissue with a #15 scalpel blade.  The skin margins were undermined to an appropriate distance in all directions utilizing iris scissors.  The opposing transposition arms were then transposed into place in opposite direction and anchored with interrupted buried subcutaneous sutures.
Retention Suture Bite Size: 1 mm
Non-Graft Cartilage Fenestration Text: The cartilage was fenestrated with a 2mm punch biopsy to help facilitate healing.
Consent (Lip)/Introductory Paragraph: The rationale for Mohs was explained to the patient and consent was obtained. The risks, benefits and alternatives to therapy were discussed in detail. Specifically, the risks of lip deformity, changes in the oral aperture, infection, scarring, bleeding, prolonged wound healing, incomplete removal, allergy to anesthesia, nerve injury and recurrence were addressed. Prior to the procedure, the treatment site was clearly identified and confirmed by the patient. All components of Universal Protocol/PAUSE Rule completed.
Rhombic Flap Text: The defect edges were debeveled with a #15 scalpel blade.  Given the location of the defect and the proximity to free margins a rhombic flap was deemed most appropriate.  Using a sterile surgical marker, an appropriate rhombic flap was drawn incorporating the defect.    The area thus outlined was incised deep to adipose tissue with a #15 scalpel blade.  The skin margins were undermined to an appropriate distance in all directions utilizing iris scissors.
Dressing: pressure dressing with telfa
Consent 3/Introductory Paragraph: I gave the patient a chance to ask questions they had about the procedure.  Following this I explained the Mohs procedure and consent was obtained. The risks, benefits and alternatives to therapy were discussed in detail. Specifically, the risks of infection, scarring, bleeding, prolonged wound healing, incomplete removal, allergy to anesthesia, nerve injury and recurrence were addressed. Prior to the procedure, the treatment site was clearly identified and confirmed by the patient. All components of Universal Protocol/PAUSE Rule completed.
Advancement Flap (Double) Text: The defect edges were debeveled with a #15 scalpel blade.  Given the location of the defect and the proximity to free margins a double advancement flap was deemed most appropriate.  Using a sterile surgical marker, the appropriate advancement flaps were drawn incorporating the defect and placing the expected incisions within the relaxed skin tension lines where possible.    The area thus outlined was incised deep to adipose tissue with a #15 scalpel blade.  The skin margins were undermined to an appropriate distance in all directions utilizing iris scissors.
Repair Hemostasis (Optional): Pinpoint electrocautery
Skin Substitute Expiration Date (Optional): 2029-05-01
Bcc Infiltrative Histology Text: There were numerous aggregates of basaloid cells demonstrating an infiltrative pattern.
Surgeon/Pathologist Verbiage (Will Incorporate Name Of Surgeon From Intro If Not Blank): operated in two distinct and integrated capacities as the surgeon and pathologist.
Nostril Rim Text: The closure involved the nostril rim.
Consent (Temporal Branch)/Introductory Paragraph: The rationale for Mohs was explained to the patient and consent was obtained. The risks, benefits and alternatives to therapy were discussed in detail. Specifically, the risks of damage to the temporal branch of the facial nerve, infection, scarring, bleeding, prolonged wound healing, incomplete removal, allergy to anesthesia, and recurrence were addressed. Prior to the procedure, the treatment site was clearly identified and confirmed by the patient. All components of Universal Protocol/PAUSE Rule completed.
Inflammation Suggestive Of Cancer Camouflage Histology Text: There was a dense lymphocytic infiltrate which prevented adequate histologic evaluation of adjacent structures.
Undermining Location (Optional): in the superficial subcutaneous fat
Rhomboid Transposition Flap Text: The defect edges were debeveled with a #15 scalpel blade.  Given the location of the defect and the proximity to free margins a rhomboid transposition flap was deemed most appropriate.  Using a sterile surgical marker, an appropriate rhomboid flap was drawn incorporating the defect.    The area thus outlined was incised deep to adipose tissue with a #15 scalpel blade.  The skin margins were undermined to an appropriate distance in all directions utilizing iris scissors.
Burow's Advancement Flap Text: The defect edges were debeveled with a #15 scalpel blade.  Given the location of the defect and the proximity to free margins a Burow's advancement flap was deemed most appropriate.  Using a sterile surgical marker, the appropriate advancement flap was drawn incorporating the defect and placing the expected incisions within the relaxed skin tension lines where possible.    The area thus outlined was incised deep to adipose tissue with a #15 scalpel blade.  The skin margins were undermined to an appropriate distance in all directions utilizing iris scissors.
Closure 2 Information: This tab is for additional flaps and grafts, including complex repair and grafts and complex repair and flaps. You can also specify a different location for the additional defect, if the location is the same you do not need to select a new one. We will insert the automated text for the repair you select below just as we do for solitary flaps and grafts. Please note that at this time if you select a location with a different insurance zone you will need to override the ICD10 and CPT if appropriate.
Where Do You Want The Question To Include Opioid Counseling Located?: Case Summary Tab
Ear Star Wedge Flap Text: The defect edges were debeveled with a #15 blade scalpel.  Given the location of the defect and the proximity to free margins (helical rim) an ear star wedge flap was deemed most appropriate.  Using a sterile surgical marker, the appropriate flap was drawn incorporating the defect and placing the expected incisions between the helical rim and antihelix where possible.  The area thus outlined was incised through and through with a #15 scalpel blade.
Cheek-To-Nose Interpolation Flap Text: A decision was made to reconstruct the defect utilizing an interpolation axial flap and a staged reconstruction.  A telfa template was made of the defect.  This telfa template was then used to outline the Cheek-To-Nose Interpolation flap.  The donor area for the pedicle flap was then injected with anesthesia.  The flap was excised through the skin and subcutaneous tissue down to the layer of the underlying musculature.  The interpolation flap was carefully excised within this deep plane to maintain its blood supply.  The edges of the donor site were undermined.   The donor site was closed in a primary fashion.  The pedicle was then rotated into position and sutured.  Once the tube was sutured into place, adequate blood supply was confirmed with blanching and refill.  The pedicle was then wrapped with xeroform gauze and dressed appropriately with a telfa and gauze bandage to ensure continued blood supply and protect the attached pedicle.
Donor Site Anesthesia Type: same as repair anesthesia
Wound Care (No Sutures): Petrolatum
Mart-1 - Negative Histology Text: MART-1 staining demonstrates a normal density and pattern of melanocytes along the dermal-epidermal junction. The surgical margins are negative for tumor cells.
Bill 59 Modifier?: No - Continue to Bill 79 Modifier
Mohs Method Verbiage: An incision at a 45 degree angle following the standard Mohs approach was done and the specimen was harvested as a microscopic controlled layer.
Purse String (Intermediate) Text: Given the location of the defect and the characteristics of the surrounding skin a purse string intermediate closure was deemed most appropriate.  Undermining was performed circumfirentially around the surgical defect.  A purse string suture was then placed and tightened.
Tarsorrhaphy Text: A tarsorrhaphy was performed using Frost sutures.
Melolabial Transposition Flap Text: The defect edges were debeveled with a #15 scalpel blade.  Given the location of the defect and the proximity to free margins a melolabial flap was deemed most appropriate.  Using a sterile surgical marker, an appropriate melolabial transposition flap was drawn incorporating the defect.    The area thus outlined was incised deep to adipose tissue with a #15 scalpel blade.  The skin margins were undermined to an appropriate distance in all directions utilizing iris scissors.
Abbe Flap (Upper To Lower Lip) Text: The defect of the lower lip was assessed and measured.  Given the location and size of the defect, an Abbe flap was deemed most appropriate.  Using a sterile surgical marker, an appropriate Abbe flap was measured and drawn on the upper lip. Local anesthesia was then infiltrated.  A scalpel was then used to incise the upper lip through and through the skin, vermilion, muscle and mucosa, leaving the flap pedicled on the opposite side.  The flap was then rotated and transferred to the lower lip defect.  The flap was then sutured into place with a three layer technique, closing the orbicularis oris muscle layer with subcutaneous buried sutures, followed by a mucosal layer and an epidermal layer.
Abbe Flap (Lower To Upper Lip) Text: The defect of the upper lip was assessed and measured.  Given the location and size of the defect, an Abbe flap was deemed most appropriate.  Using a sterile surgical marker, an appropriate Abbe flap was measured and drawn on the lower lip. Local anesthesia was then infiltrated. A scalpel was then used to incise the upper lip through and through the skin, vermilion, muscle and mucosa, leaving the flap pedicled on the opposite side.  The flap was then rotated and transferred to the lower lip defect.  The flap was then sutured into place with a three layer technique, closing the orbicularis oris muscle layer with subcutaneous buried sutures, followed by a mucosal layer and an epidermal layer.
Post-Care Instructions: I reviewed with the patient in detail post-care instructions. Patient is not to engage in any heavy lifting, exercise, or swimming for the next 14 days. Should the patient develop any fevers, chills, bleeding, severe pain patient will contact the office immediately.
Chonodrocutaneous Helical Advancement Flap Text: The defect edges were debeveled with a #15 scalpel blade.  Given the location of the defect and the proximity to free margins a chondrocutaneous helical advancement flap was deemed most appropriate.  Using a sterile surgical marker, the appropriate advancement flap was drawn incorporating the defect and placing the expected incisions within the relaxed skin tension lines where possible.    The area thus outlined was incised deep to adipose tissue with a #15 scalpel blade.  The skin margins were undermined to an appropriate distance in all directions utilizing iris scissors.
Debridement Text: The wound edges were debrided prior to proceeding with the closure to facilitate wound healing.
Full Thickness Lip Wedge Repair (Flap) Text: Given the location of the defect and the proximity to free margins a full thickness wedge repair was deemed most appropriate.  Using a sterile surgical marker, the appropriate repair was drawn incorporating the defect and placing the expected incisions perpendicular to the vermilion border.  The vermilion border was also meticulously outlined to ensure appropriate reapproximation during the repair.  The area thus outlined was incised through and through with a #15 scalpel blade.  The muscularis and dermis were reaproximated with deep sutures following hemostasis. Care was taken to realign the vermilion border before proceeding with the superficial closure.  Once the vermilion was realigned the superfical and mucosal closure was finished.
Dorsal Nasal Flap Text: The defect edges were debeveled with a #15 scalpel blade.  Given the location of the defect and the proximity to free margins a dorsal nasal flap was deemed most appropriate.  Using a sterile surgical marker, an appropriate dorsal nasal flap was drawn around the defect.    The area thus outlined was incised deep to adipose tissue with a #15 scalpel blade.  The skin margins were undermined to an appropriate distance in all directions utilizing iris scissors.
Perineural Invasion (For Histology - Be Specific If Possible): absent
Mauc Instructions: By selecting yes to the question below the MAUC number will be added into the note.  This will be calculated automatically based on the diagnosis chosen, the size entered, the body zone selected (H,M,L) and the specific indications you chose. You will also have the option to override the Mohs AUC if you disagree with the automatically calculated number and this option is found in the Case Summary tab.
Mercedes Flap Text: The defect edges were debeveled with a #15 scalpel blade.  Given the location of the defect, shape of the defect and the proximity to free margins a Mercedes flap was deemed most appropriate.  Using a sterile surgical marker, an appropriate advancement flap was drawn incorporating the defect and placing the expected incisions within the relaxed skin tension lines where possible. The area thus outlined was incised deep to adipose tissue with a #15 scalpel blade.  The skin margins were undermined to an appropriate distance in all directions utilizing iris scissors.
Island Pedicle Flap-Requiring Vessel Identification Text: The defect edges were debeveled with a #15 scalpel blade.  Given the location of the defect, shape of the defect and the proximity to free margins an island pedicle advancement flap was deemed most appropriate.  Using a sterile surgical marker, an appropriate advancement flap was drawn, based on the axial vessel mentioned above, incorporating the defect, outlining the appropriate donor tissue and placing the expected incisions within the relaxed skin tension lines where possible.    The area thus outlined was incised deep to adipose tissue with a #15 scalpel blade.  The skin margins were undermined to an appropriate distance in all directions around the primary defect and laterally outward around the island pedicle utilizing iris scissors.  There was minimal undermining beneath the pedicle flap.
Consent (Nose)/Introductory Paragraph: The rationale for Mohs was explained to the patient and consent was obtained. The risks, benefits and alternatives to therapy were discussed in detail. Specifically, the risks of nasal deformity, changes in the flow of air through the nose, infection, scarring, bleeding, prolonged wound healing, incomplete removal, allergy to anesthesia, nerve injury and recurrence were addressed. Prior to the procedure, the treatment site was clearly identified and confirmed by the patient. All components of Universal Protocol/PAUSE Rule completed.
Anesthesia Volume In Cc: 2.4
Suturegard Intro: Intraoperative tissue expansion was performed, utilizing the SUTUREGARD device, in order to reduce wound tension.
Initial Size Of Lesion: 1.1
Intermediate Repair And Graft Additional Text (Will Appearing After The Standard Complex Repair Text): The intermediate repair was not sufficient to completely close the primary defect. The remaining additional defect was repaired with the graft mentioned below.
Home Suture Removal Text: Patient was provided instructions on removing sutures and will remove their sutures at home.  If they have any questions or difficulties they will call the office.
Interpolation Flap Text: A decision was made to reconstruct the defect utilizing an interpolation axial flap and a staged reconstruction.  A telfa template was made of the defect.  This telfa template was then used to outline the interpolation flap.  The donor area for the pedicle flap was then injected with anesthesia.  The flap was excised through the skin and subcutaneous tissue down to the layer of the underlying musculature.  The interpolation flap was carefully excised within this deep plane to maintain its blood supply.  The edges of the donor site were undermined.   The donor site was closed in a primary fashion.  The pedicle was then rotated into position and sutured.  Once the tube was sutured into place, adequate blood supply was confirmed with blanching and refill.  The pedicle was then wrapped with xeroform gauze and dressed appropriately with a telfa and gauze bandage to ensure continued blood supply and protect the attached pedicle.
Double Island Pedicle Flap Text: The defect edges were debeveled with a #15 scalpel blade.  Given the location of the defect, shape of the defect and the proximity to free margins a double island pedicle advancement flap was deemed most appropriate.  Using a sterile surgical marker, an appropriate advancement flap was drawn incorporating the defect, outlining the appropriate donor tissue and placing the expected incisions within the relaxed skin tension lines where possible.    The area thus outlined was incised deep to adipose tissue with a #15 scalpel blade.  The skin margins were undermined to an appropriate distance in all directions around the primary defect and laterally outward around the island pedicle utilizing iris scissors.  There was minimal undermining beneath the pedicle flap.
Transposition Flap Text: The defect edges were debeveled with a #15 scalpel blade.  Given the location of the defect and the proximity to free margins a transposition flap was deemed most appropriate.  Using a sterile surgical marker, an appropriate transposition flap was drawn incorporating the defect.    The area thus outlined was incised deep to adipose tissue with a #15 scalpel blade.  The skin margins were undermined to an appropriate distance in all directions utilizing iris scissors.
Suturegard Body: The suture ends were repeatedly re-tightened and re-clamped to achieve the desired tissue expansion.
Cheiloplasty (Complex) Text: A decision was made to reconstruct the defect with a  cheiloplasty.  The defect was undermined extensively.  Additional obicularis oris muscle was excised with a 15 blade scalpel.  The defect was converted into a full thickness wedge to facilite a better cosmetic result.  Small vessels were then tied off with 5-0 monocyrl. The obicularis oris, superficial fascia, adipose and dermis were then reapproximated.  After the deeper layers were approximated the epidermis was reapproximated with particular care given to realign the vermilion border.
Ftsg Text: The defect edges were debeveled with a #15 scalpel blade.  Given the location of the defect, shape of the defect and the proximity to free margins a full thickness skin graft was deemed most appropriate.  Using a sterile surgical marker, the primary defect shape was transferred to the donor site. The area thus outlined was incised deep to adipose tissue with a #15 scalpel blade.  The harvested graft was then trimmed of adipose tissue until only dermis and epidermis was left.  The skin margins of the secondary defect were undermined to an appropriate distance in all directions utilizing iris scissors.  The secondary defect was closed with interrupted buried subcutaneous sutures.  The skin edges were then re-apposed with running  sutures.  The skin graft was then placed in the primary defect and oriented appropriately.
O-T Plasty Text: The defect edges were debeveled with a #15 scalpel blade.  Given the location of the defect, shape of the defect and the proximity to free margins an O-T plasty was deemed most appropriate.  Using a sterile surgical marker, an appropriate O-T plasty was drawn incorporating the defect and placing the expected incisions within the relaxed skin tension lines where possible.    The area thus outlined was incised deep to adipose tissue with a #15 scalpel blade.  The skin margins were undermined to an appropriate distance in all directions utilizing iris scissors.
Complex Repair And Graft Additional Text (Will Appearing After The Standard Complex Repair Text): The complex repair was not sufficient to completely close the primary defect. The remaining additional defect was repaired with the graft mentioned below.
Localized Dermabrasion With 15 Blade Text: The patient was draped in routine manner.  Localized dermabrasion using a 15 blade was performed in routine manner to papillary dermis. This spot dermabrasion is being performed to complete skin cancer reconstruction. It also will eliminate the other sun damaged precancerous cells that are known to be part of the regional effect of a lifetime's worth of sun exposure. This localized dermabrasion is therapeutic and should not be considered cosmetic in any regard.
Hemostasis: Electrocautery
Stage 1 Add-On Histology Text: superficial & nodular BCC
Consent Type: Consent 1 (Standard)
Double O-Z Flap Text: The defect edges were debeveled with a #15 scalpel blade.  Given the location of the defect, shape of the defect and the proximity to free margins a Double O-Z flap was deemed most appropriate.  Using a sterile surgical marker, an appropriate transposition flap was drawn incorporating the defect and placing the expected incisions within the relaxed skin tension lines where possible. The area thus outlined was incised deep to adipose tissue with a #15 scalpel blade.  The skin margins were undermined to an appropriate distance in all directions utilizing iris scissors.
Alternatives Discussed Intro (Do Not Add Period): I discussed alternative treatments to Mohs surgery and specifically discussed the risks and benefits of
O-T Advancement Flap Text: The defect edges were debeveled with a #15 scalpel blade.  Given the location of the defect, shape of the defect and the proximity to free margins an O-T advancement flap was deemed most appropriate.  Using a sterile surgical marker, an appropriate advancement flap was drawn incorporating the defect and placing the expected incisions within the relaxed skin tension lines where possible.    The area thus outlined was incised deep to adipose tissue with a #15 scalpel blade.  The skin margins were undermined to an appropriate distance in all directions utilizing iris scissors.
Anesthesia Type: 1% lidocaine with epinephrine and a 1:10 solution of 8.4% sodium bicarbonate
Purse String (Simple) Text: Given the location of the defect and the characteristics of the surrounding skin a purse string closure was deemed most appropriate.  Undermining was performed circumfirentially around the surgical defect.  A purse string suture was then placed and tightened.
Graft Donor Site Epidermal Sutures (Optional): Dermabond
Banner Transposition Flap Text: The defect edges were debeveled with a #15 scalpel blade.  Given the location of the defect and the proximity to free margins a Banner transposition flap was deemed most appropriate.  Using a sterile surgical marker, an appropriate flap drawn around the defect. The area thus outlined was incised deep to adipose tissue with a #15 scalpel blade.  The skin margins were undermined to an appropriate distance in all directions utilizing iris scissors.
Mastoid Interpolation Flap Text: A decision was made to reconstruct the defect utilizing an interpolation axial flap and a staged reconstruction.  A telfa template was made of the defect.  This telfa template was then used to outline the mastoid interpolation flap.  The donor area for the pedicle flap was then injected with anesthesia.  The flap was excised through the skin and subcutaneous tissue down to the layer of the underlying musculature.  The pedicle flap was carefully excised within this deep plane to maintain its blood supply.  The edges of the donor site were undermined.   The donor site was closed in a primary fashion.  The pedicle was then rotated into position and sutured.  Once the tube was sutured into place, adequate blood supply was confirmed with blanching and refill.  The pedicle was then wrapped with xeroform gauze and dressed appropriately with a telfa and gauze bandage to ensure continued blood supply and protect the attached pedicle.
Hatchet Flap Text: The defect edges were debeveled with a #15 scalpel blade.  Given the location of the defect, shape of the defect and the proximity to free margins a hatchet flap was deemed most appropriate.  Using a sterile surgical marker, an appropriate hatchet flap was drawn incorporating the defect and placing the expected incisions within the relaxed skin tension lines where possible.    The area thus outlined was incised deep to adipose tissue with a #15 scalpel blade.  The skin margins were undermined to an appropriate distance in all directions utilizing iris scissors.
Skin Substitute Tissue Id (Optional): [id number]
Partial Purse String (Simple) Text: Given the location of the defect and the characteristics of the surrounding skin a simple purse string closure was deemed most appropriate.  Undermining was performed circumfirentially around the surgical defect.  A purse string suture was then placed and tightened. Wound tension only allowed a partial closure of the circular defect.
Helical Rim Advancement Flap Text: The defect edges were debeveled with a #15 blade scalpel.  Given the location of the defect and the proximity to free margins (helical rim) a double helical rim advancement flap was deemed most appropriate.  Using a sterile surgical marker, the appropriate advancement flaps were drawn incorporating the defect and placing the expected incisions between the helical rim and antihelix where possible.  The area thus outlined was incised through and through with a #15 scalpel blade.  With a skin hook and iris scissors, the flaps were gently and sharply undermined and freed up.
Manual Repair Warning Statement: We plan on removing the manually selected variable below in favor of our much easier automatic structured text blocks found in the previous tab. We decided to do this to help make the flow better and give you the full power of structured data. Manual selection is never going to be ideal in our platform and I would encourage you to avoid using manual selection from this point on, especially since I will be sunsetting this feature. It is important that you do one of two things with the customized text below. First, you can save all of the text in a word file so you can have it for future reference. Second, transfer the text to the appropriate area in the Library tab. Lastly, if there is a flap or graft type which we do not have you need to let us know right away so I can add it in before the variable is hidden. No need to panic, we plan to give you roughly 6 months to make the change.
Dermal Autograft Text: The defect edges were debeveled with a #15 scalpel blade.  Given the location of the defect, shape of the defect and the proximity to free margins a dermal autograft was deemed most appropriate.  Using a sterile surgical marker, the primary defect shape was transferred to the donor site. The area thus outlined was incised deep to adipose tissue with a #15 scalpel blade.  The harvested graft was then trimmed of adipose and epidermal tissue until only dermis was left.  The skin graft was then placed in the primary defect and oriented appropriately.
Repair Anesthesia Method: local infiltration
Graft Donor Site Dermal Sutures (Optional): 5-0 Monocryl
No Residual Tumor Seen Histology Text: There were no malignant cells seen in the sections examined.
Hemigard Postcare Instructions: The HEMIGARD strips are to remain completely dry for at least 5-7 days.
Subsequent Stages Histo Method Verbiage: Using a similar technique to that described above, a thin layer of tissue was removed from all areas where tumor was visible on the previous stage.  The tissue was again oriented, mapped, dyed, and processed as above.
O-Z Flap Text: The defect edges were debeveled with a #15 scalpel blade.  Given the location of the defect, shape of the defect and the proximity to free margins an O-Z flap was deemed most appropriate.  Using a sterile surgical marker, an appropriate transposition flap was drawn incorporating the defect and placing the expected incisions within the relaxed skin tension lines where possible. The area thus outlined was incised deep to adipose tissue with a #15 scalpel blade.  The skin margins were undermined to an appropriate distance in all directions utilizing iris scissors.
Tumor Depth: Less than 6mm from granular layer and no invasion beyond the subcutaneous fat
Which Eyelid Repair Cpt Are You Using?: 67158
Number Of Stages: 4
Area M Indication Text: Tumors in this location are included in Area M (cheek, forehead, scalp, neck, jawline and pretibial skin).  Mohs surgery is indicated for tumors in these anatomic locations.
Adjacent Tissue Transfer Text: The defect edges were debeveled with a #15 scalpel blade.  Given the location of the defect and the proximity to free margins an adjacent tissue transfer was deemed most appropriate.  Using a sterile surgical marker, an appropriate flap was drawn incorporating the defect and placing the expected incisions within the relaxed skin tension lines where possible.    The area thus outlined was incised deep to adipose tissue with a #15 scalpel blade.  The skin margins were undermined to an appropriate distance in all directions utilizing iris scissors.
Keystone Flap Text: The defect edges were debeveled with a #15 scalpel blade.  Given the location of the defect, shape of the defect a keystone flap was deemed most appropriate.  Using a sterile surgical marker, an appropriate keystone flap was drawn incorporating the defect, outlining the appropriate donor tissue and placing the expected incisions within the relaxed skin tension lines where possible. The area thus outlined was incised deep to adipose tissue with a #15 scalpel blade.  The skin margins were undermined to an appropriate distance in all directions around the primary defect and laterally outward around the flap utilizing iris scissors.
Bilateral Helical Rim Advancement Flap Text: The defect edges were debeveled with a #15 blade scalpel.  Given the location of the defect and the proximity to free margins (helical rim) a bilateral helical rim advancement flap was deemed most appropriate.  Using a sterile surgical marker, the appropriate advancement flaps were drawn incorporating the defect and placing the expected incisions between the helical rim and antihelix where possible.  The area thus outlined was incised through and through with a #15 scalpel blade.  With a skin hook and iris scissors, the flaps were gently and sharply undermined and freed up.
Flip-Flop Flap Text: The defect edges were debeveled with a #15 blade scalpel.  Given the location of the defect and the proximity to free margins a flip-flop flap was deemed most appropriate. Using a sterile surgical marker, the appropriate flap was drawn incorporating the defect and placing the expected incisions between the helical rim and antihelix where possible.  The area thus outlined was incised through and through with a #15 scalpel blade. Following this, the designed flap was carried over into the primary defect and sutured into place.
Ear Wedge Repair Text: A wedge excision was completed by carrying down an excision through the full thickness of the ear and cartilage with an inward facing Burow's triangle. The wound was then closed in a layered fashion.
Burow's Graft Text: The defect edges were debeveled with a #15 scalpel blade.  Given the location of the defect, shape of the defect, the proximity to free margins and the presence of a standing cone deformity a Burow's skin graft was deemed most appropriate. The standing cone was removed and this tissue was then trimmed to the shape of the primary defect. The adipose tissue was also removed until only dermis and epidermis were left.  The skin margins of the secondary defect were undermined to an appropriate distance in all directions utilizing iris scissors.  The secondary defect was closed with interrupted buried subcutaneous sutures.  The skin edges were then re-apposed with running  sutures.  The skin graft was then placed in the primary defect and oriented appropriately.
Paramedian Forehead Flap Text: A decision was made to reconstruct the defect utilizing an interpolation axial flap and a staged reconstruction.  A telfa template was made of the defect.  This telfa template was then used to outline the paramedian forehead pedicle flap.  The donor area for the pedicle flap was then injected with anesthesia.  The flap was excised through the skin and subcutaneous tissue down to the layer of the underlying musculature.  The pedicle flap was carefully excised within this deep plane to maintain its blood supply.  The edges of the donor site were undermined.   The donor site was closed in a primary fashion.  The pedicle was then rotated into position and sutured.  Once the tube was sutured into place, adequate blood supply was confirmed with blanching and refill.  The pedicle was then wrapped with xeroform gauze and dressed appropriately with a telfa and gauze bandage to ensure continued blood supply and protect the attached pedicle.
Mart-1 - Positive Histology Text: MART-1 staining demonstrates areas of higher density and clustering of melanocytes with Pagetoid spread upwards within the epidermis. The surgical margins are positive for tumor cells.
Skin Substitute: EpiFix
V-Y Flap Text: The defect edges were debeveled with a #15 scalpel blade.  Given the location of the defect, shape of the defect and the proximity to free margins a V-Y flap was deemed most appropriate.  Using a sterile surgical marker, an appropriate advancement flap was drawn incorporating the defect and placing the expected incisions within the relaxed skin tension lines where possible.    The area thus outlined was incised deep to adipose tissue with a #15 scalpel blade.  The skin margins were undermined to an appropriate distance in all directions utilizing iris scissors.
Xenograft Text: The defect edges were debeveled with a #15 scalpel blade.  Given the location of the defect, shape of the defect and the proximity to free margins a xenograft was deemed most appropriate.  The graft was then trimmed to fit the size of the defect.  The graft was then placed in the primary defect and oriented appropriately.
Nasalis Myocutaneous Flap Text: Using a #15 blade, an incision was made around the donor flap to the level of the nasalis muscle. Wide lateral undermining was then performed in both the subcutaneous plane above the nasalis muscle, and in a submuscular plane just above periosteum. This allowed the formation of a free nasalis muscle axial pedicle which was still attached to the actual cutaneous flap, increasing its mobility and vascular viability. Hemostasis was obtained with pinpoint electrocoagulation. The flap was mobilized into position and the pivotal anchor points positioned and stabilized with buried interrupted sutures. Subcutaneous and dermal tissues were closed in a multilayered fashion with sutures. Tissue redundancies were excised, and the epidermal edges were apposed without significant tension and sutured with sutures.
Posterior Auricular Interpolation Flap Text: A decision was made to reconstruct the defect utilizing an interpolation axial flap and a staged reconstruction.  A telfa template was made of the defect.  This telfa template was then used to outline the posterior auricular interpolation flap.  The donor area for the pedicle flap was then injected with anesthesia.  The flap was excised through the skin and subcutaneous tissue down to the layer of the underlying musculature.  The pedicle flap was carefully excised within this deep plane to maintain its blood supply.  The edges of the donor site were undermined.   The donor site was closed in a primary fashion.  The pedicle was then rotated into position and sutured.  Once the tube was sutured into place, adequate blood supply was confirmed with blanching and refill.  The pedicle was then wrapped with xeroform gauze and dressed appropriately with a telfa and gauze bandage to ensure continued blood supply and protect the attached pedicle.
Bcc Histology Text: There were numerous aggregates of basaloid cells.
Muscle Hinge Flap Text: The defect edges were debeveled with a #15 scalpel blade.  Given the size, depth and location of the defect and the proximity to free margins a muscle hinge flap was deemed most appropriate.  Using a sterile surgical marker, an appropriate hinge flap was drawn incorporating the defect. The area thus outlined was incised with a #15 scalpel blade.  The skin margins were undermined to an appropriate distance in all directions utilizing iris scissors.
Star Wedge Flap Text: The defect edges were debeveled with a #15 scalpel blade.  Given the location of the defect, shape of the defect and the proximity to free margins a star wedge flap was deemed most appropriate.  Using a sterile surgical marker, an appropriate rotation flap was drawn incorporating the defect and placing the expected incisions within the relaxed skin tension lines where possible. The area thus outlined was incised deep to adipose tissue with a #15 scalpel blade.  The skin margins were undermined to an appropriate distance in all directions utilizing iris scissors.
Referring Physician (Optional): Keke KING
O-L Flap Text: The defect edges were debeveled with a #15 scalpel blade.  Given the location of the defect, shape of the defect and the proximity to free margins an O-L flap was deemed most appropriate.  Using a sterile surgical marker, an appropriate advancement flap was drawn incorporating the defect and placing the expected incisions within the relaxed skin tension lines where possible.    The area thus outlined was incised deep to adipose tissue with a #15 scalpel blade.  The skin margins were undermined to an appropriate distance in all directions utilizing iris scissors.
No Repair - Repaired With Adjacent Surgical Defect Text (Leave Blank If You Do Not Want): After obtaining clear surgical margins the defect was repaired concurrently with another surgical defect which was in close approximation.
Crescentic Advancement Flap Text: The defect edges were debeveled with a #15 scalpel blade.  Given the location of the defect and the proximity to free margins a crescentic advancement flap was deemed most appropriate.  Using a sterile surgical marker, the appropriate advancement flap was drawn incorporating the defect and placing the expected incisions within the relaxed skin tension lines where possible.    The area thus outlined was incised deep to adipose tissue with a #15 scalpel blade.  The skin margins were undermined to an appropriate distance in all directions utilizing iris scissors.
Hemigard Intro: Due to skin fragility and wound tension, it was decided to use HEMIGARD adhesive retention suture devices to permit a linear closure. The skin was cleaned and dried for a 6cm distance away from the wound. Excessive hair, if present, was removed to allow for adhesion.
Graft Cartilage Fenestration Text: The cartilage was fenestrated with a 2mm punch biopsy to help facilitate graft survival and healing.
Peng Advancement Flap Text: The defect edges were debeveled with a #15 scalpel blade.  Given the location of the defect, shape of the defect and the proximity to free margins a Peng advancement flap was deemed most appropriate.  Using a sterile surgical marker, an appropriate advancement flap was drawn incorporating the defect and placing the expected incisions within the relaxed skin tension lines where possible. The area thus outlined was incised deep to adipose tissue with a #15 scalpel blade.  The skin margins were undermined to an appropriate distance in all directions utilizing iris scissors.
Localized Dermabrasion With Sand Papertext: The patient was draped in routine manner.  Localized dermabrasion using sterile sand paper was performed in routine manner to papillary dermis. This spot dermabrasion is being performed to complete skin cancer reconstruction. It also will eliminate the other sun damaged precancerous cells that are known to be part of the regional effect of a lifetime's worth of sun exposure. This localized dermabrasion is therapeutic and should not be considered cosmetic in any regard.
Mustarde Flap Text: The defect edges were debeveled with a #15 scalpel blade.  Given the size, depth and location of the defect and the proximity to free margins a Mustarde flap was deemed most appropriate.  Using a sterile surgical marker, an appropriate flap was drawn incorporating the defect. The area thus outlined was incised with a #15 scalpel blade.  The skin margins were undermined to an appropriate distance in all directions utilizing iris scissors.
Undermining Type: Entire Wound
Zygomaticofacial Flap Text: Given the location of the defect, shape of the defect and the proximity to free margins a zygomaticofacial flap was deemed most appropriate for repair.  Using a sterile surgical marker, the appropriate flap was drawn incorporating the defect and placing the expected incisions within the relaxed skin tension lines where possible. The area thus outlined was incised deep to adipose tissue with a #15 scalpel blade with preservation of a vascular pedicle.  The skin margins were undermined to an appropriate distance in all directions utilizing iris scissors.  The flap was then placed into the defect and anchored with interrupted buried subcutaneous sutures.
Consent 2/Introductory Paragraph: Mohs surgery was explained to the patient and consent was obtained. The risks, benefits and alternatives to therapy were discussed in detail. Specifically, the risks of infection, scarring, bleeding, prolonged wound healing, incomplete removal, allergy to anesthesia, nerve injury and recurrence were addressed. Prior to the procedure, the treatment site was clearly identified and confirmed by the patient. All components of Universal Protocol/PAUSE Rule completed.
Consent 1/Introductory Paragraph: The rationale for Mohs was explained to the patient and consent was obtained. The risks, benefits and alternatives to therapy were discussed in detail. Specifically, the risks of infection, scarring, bleeding, prolonged wound healing, incomplete removal, allergy to anesthesia, nerve injury and recurrence were addressed. Prior to the procedure, the treatment site was clearly identified and confirmed by the patient. All components of Universal Protocol/PAUSE Rule completed.
Repair Type: Graft
Nasal Turnover Hinge Flap Text: The defect edges were debeveled with a #15 scalpel blade.  Given the size, depth, location of the defect and the defect being full thickness a nasal turnover hinge flap was deemed most appropriate.  Using a sterile surgical marker, an appropriate hinge flap was drawn incorporating the defect. The area thus outlined was incised with a #15 scalpel blade. The flap was designed to recreate the nasal mucosal lining and the alar rim. The skin margins were undermined to an appropriate distance in all directions utilizing iris scissors.
Helical Rim Text: The closure involved the helical rim.
Cartilage Graft Text: The defect edges were debeveled with a #15 scalpel blade.  Given the location of the defect, shape of the defect, the fact the defect involved a full thickness cartilage defect a cartilage graft was deemed most appropriate.  An appropriate donor site was identified, cleansed, and anesthetized. The cartilage graft was then harvested and transferred to the recipient site, oriented appropriately and then sutured into place.  The secondary defect was then repaired using a primary closure.
Graft Type: Skin Substitute
Cheek Interpolation Flap Text: A decision was made to reconstruct the defect utilizing an interpolation axial flap and a staged reconstruction.  A telfa template was made of the defect.  This telfa template was then used to outline the Cheek Interpolation flap.  The donor area for the pedicle flap was then injected with anesthesia.  The flap was excised through the skin and subcutaneous tissue down to the layer of the underlying musculature.  The interpolation flap was carefully excised within this deep plane to maintain its blood supply.  The edges of the donor site were undermined.   The donor site was closed in a primary fashion.  The pedicle was then rotated into position and sutured.  Once the tube was sutured into place, adequate blood supply was confirmed with blanching and refill.  The pedicle was then wrapped with xeroform gauze and dressed appropriately with a telfa and gauze bandage to ensure continued blood supply and protect the attached pedicle.
Estlander Flap (Lower To Upper Lip) Text: The defect of the lower lip was assessed and measured.  Given the location and size of the defect, an Estlander flap was deemed most appropriate.  Using a sterile surgical marker, an appropriate Estlander flap was measured and drawn on the upper lip. Local anesthesia was then infiltrated. A scalpel was then used to incise the lateral aspect of the flap, through skin, muscle and mucosa, leaving the flap pedicled medially.  The flap was then rotated and positioned to fill the lower lip defect.  The flap was then sutured into place with a three layer technique, closing the orbicularis oris muscle layer with subcutaneous buried sutures, followed by a mucosal layer and an epidermal layer.
Double O-Z Plasty Text: The defect edges were debeveled with a #15 scalpel blade.  Given the location of the defect, shape of the defect and the proximity to free margins a Double O-Z plasty (double transposition flap) was deemed most appropriate.  Using a sterile surgical marker, the appropriate transposition flaps were drawn incorporating the defect and placing the expected incisions within the relaxed skin tension lines where possible. The area thus outlined was incised deep to adipose tissue with a #15 scalpel blade.  The skin margins were undermined to an appropriate distance in all directions utilizing iris scissors.  Hemostasis was achieved with electrocautery.  The flaps were then transposed into place, one clockwise and the other counterclockwise, and anchored with interrupted buried subcutaneous sutures.
Composite Graft Text: The defect edges were debeveled with a #15 scalpel blade.  Given the location of the defect, shape of the defect, the proximity to free margins and the fact the defect was full thickness a composite graft was deemed most appropriate.  The defect was outline and then transferred to the donor site.  A full thickness graft was then excised from the donor site. The graft was then placed in the primary defect, oriented appropriately and then sutured into place.  The secondary defect was then repaired using a primary closure.
Wound Check: 7 days
Staged Advancement Flap Text: The defect edges were debeveled with a #15 scalpel blade.  Given the location of the defect, shape of the defect and the proximity to free margins a staged advancement flap was deemed most appropriate.  Using a sterile surgical marker, an appropriate advancement flap was drawn incorporating the defect and placing the expected incisions within the relaxed skin tension lines where possible. The area thus outlined was incised deep to adipose tissue with a #15 scalpel blade.  The skin margins were undermined to an appropriate distance in all directions utilizing iris scissors.
Intermediate Repair And Flap Additional Text (Will Appearing After The Standard Complex Repair Text): The intermediate repair was not sufficient to completely close the primary defect. The remaining additional defect was repaired with the flap mentioned below.
Pinch Graft Text: The defect edges were debeveled with a #15 scalpel blade. Given the location of the defect, shape of the defect and the proximity to free margins a pinch graft was deemed most appropriate. Using a sterile surgical marker, the primary defect shape was transferred to the donor site. The area thus outlined was incised deep to adipose tissue with a #15 scalpel blade.  The harvested graft was then trimmed of adipose tissue until only dermis and epidermis was left. The skin margins of the secondary defect were undermined to an appropriate distance in all directions utilizing iris scissors.  The secondary defect was closed with interrupted buried subcutaneous sutures.  The skin edges were then re-apposed with running  sutures.  The skin graft was then placed in the primary defect and oriented appropriately.
Skin Substitute Units (Will Override Primary Defect Units If Greater Than 0): 5
Advancement Flap (Single) Text: The defect edges were debeveled with a #15 scalpel blade.  Given the location of the defect and the proximity to free margins a single advancement flap was deemed most appropriate.  Using a sterile surgical marker, an appropriate advancement flap was drawn incorporating the defect and placing the expected incisions within the relaxed skin tension lines where possible.    The area thus outlined was incised deep to adipose tissue with a #15 scalpel blade.  The skin margins were undermined to an appropriate distance in all directions utilizing iris scissors.
Localized Dermabrasion With Wire Brush Text: The patient was draped in routine manner.  Localized dermabrasion using 3 x 17 mm wire brush was performed in routine manner to papillary dermis. This spot dermabrasion is being performed to complete skin cancer reconstruction. It also will eliminate the other sun damaged precancerous cells that are known to be part of the regional effect of a lifetime's worth of sun exposure. This localized dermabrasion is therapeutic and should not be considered cosmetic in any regard.
Mucosal Advancement Flap Text: Given the location of the defect, shape of the defect and the proximity to free margins a mucosal advancement flap was deemed most appropriate. Incisions were made with a 15 blade scalpel in the appropriate fashion along the cutaneous vermilion border and the mucosal lip. The remaining actinically damaged mucosal tissue was excised.  The mucosal advancement flap was then elevated to the gingival sulcus with care taken to preserve the neurovascular structures and advanced into the primary defect. Care was taken to ensure that precise realignment of the vermilion border was achieved.

## 2024-11-07 ENCOUNTER — APPOINTMENT (RX ONLY)
Dept: URBAN - METROPOLITAN AREA CLINIC 36 | Facility: CLINIC | Age: 69
Setting detail: DERMATOLOGY
End: 2024-11-07

## 2024-11-07 PROBLEM — C44.311 BASAL CELL CARCINOMA OF SKIN OF NOSE: Status: ACTIVE | Noted: 2024-11-07

## 2024-11-07 PROCEDURE — ? REPAIR NOTE

## 2024-11-07 PROCEDURE — ? MEDICATION COUNSELING

## 2024-11-07 PROCEDURE — 15260 FTH/GFT FR N/E/E/L 20 SQCM/<: CPT

## 2024-11-07 PROCEDURE — ? PRESCRIPTION

## 2024-11-07 RX ORDER — DOXYCYCLINE HYCLATE 100 MG/1
CAPSULE, GELATIN COATED ORAL
Qty: 14 | Refills: 0 | Status: ERX | COMMUNITY
Start: 2024-11-07

## 2024-11-07 RX ADMIN — DOXYCYCLINE HYCLATE: 100 CAPSULE, GELATIN COATED ORAL at 00:00

## 2024-11-07 NOTE — PROCEDURE: MEDICATION COUNSELING
Sarecycline Counseling: Patient advised regarding possible photosensitivity and discoloration of the teeth, skin, lips, tongue and gums.  Patient instructed to avoid sunlight, if possible.  When exposed to sunlight, patients should wear protective clothing, sunglasses, and sunscreen.  The patient was instructed to call the office immediately if the following severe adverse effects occur:  hearing changes, easy bruising/bleeding, severe headache, or vision changes.  The patient verbalized understanding of the proper use and possible adverse effects of sarecycline.  All of the patient's questions and concerns were addressed.
Thalidomide Pregnancy And Lactation Text: This medication is Pregnancy Category X and is absolutely contraindicated during pregnancy. It is unknown if it is excreted in breast milk.
Winlevi Counseling:  I discussed with the patient the risks of topical clascoterone including but not limited to erythema, scaling, itching, and stinging. Patient voiced their understanding.
Infliximab Pregnancy And Lactation Text: This medication is Pregnancy Category B and is considered safe during pregnancy. It is unknown if this medication is excreted in breast milk.
Cantharidin Pregnancy And Lactation Text: This medication has not been proven safe during pregnancy. It is unknown if this medication is excreted in breast milk.
Olumiant Counseling: I discussed with the patient the risks of Olumiant therapy including but not limited to upper respiratory tract infections, shingles, cold sores, and nausea. Live vaccines should be avoided.  This medication has been linked to serious infections; higher rate of mortality; malignancy and lymphoproliferative disorders; major adverse cardiovascular events; thrombosis; gastrointestinal perforations; neutropenia; lymphopenia; anemia; liver enzyme elevations; and lipid elevations.
Imiquimod Pregnancy And Lactation Text: This medication is Pregnancy Category C. It is unknown if this medication is excreted in breast milk.
Topical Ketoconazole Pregnancy And Lactation Text: This medication is Pregnancy Category B and is considered safe during pregnancy. It is unknown if it is excreted in breast milk.
Drysol Counseling:  I discussed with the patient the risks of drysol/aluminum chloride including but not limited to skin rash, itching, irritation, burning.
Enbrel Counseling:  I discussed with the patient the risks of etanercept including but not limited to myelosuppression, immunosuppression, autoimmune hepatitis, demyelinating diseases, lymphoma, and infections.  The patient understands that monitoring is required including a PPD at baseline and must alert us or the primary physician if symptoms of infection or other concerning signs are noted.
Rifampin Counseling: I discussed with the patient the risks of rifampin including but not limited to liver damage, kidney damage, red-orange body fluids, nausea/vomiting and severe allergy.
Itraconazole Pregnancy And Lactation Text: This medication is Pregnancy Category C and it isn't know if it is safe during pregnancy. It is also excreted in breast milk.
Opioid Pregnancy And Lactation Text: These medications can lead to premature delivery and should be avoided during pregnancy. These medications are also present in breast milk in small amounts.
Acitretin Pregnancy And Lactation Text: This medication is Pregnancy Category X and should not be given to women who are pregnant or may become pregnant in the future. This medication is excreted in breast milk.
Otezla Counseling: The side effects of Otezla were discussed with the patient, including but not limited to worsening or new depression, weight loss, diarrhea, nausea, upper respiratory tract infection, and headache. Patient instructed to call the office should any adverse effect occur.  The patient verbalized understanding of the proper use and possible adverse effects of Otezla.  All the patient's questions and concerns were addressed.
Bimzelx Counseling:  I discussed with the patient the risks of Bimzelx including but not limited to depression, immunosuppression, allergic reactions and infections.  The patient understands that monitoring is required including a PPD at baseline and must alert us or the primary physician if symptoms of infection or other concerning signs are noted.
Soolantra Counseling: I discussed with the patients the risks of topial Soolantra. This is a medicine which decreases the number of mites and inflammation in the skin. You experience burning, stinging, eye irritation or allergic reactions.  Please call our office if you develop any problems from using this medication.
Low Dose Naltrexone Pregnancy And Lactation Text: Naltrexone is pregnancy category C.  There have been no adequate and well-controlled studies in pregnant women.  It should be used in pregnancy only if the potential benefit justifies the potential risk to the fetus.   Limited data indicates that naltrexone is minimally excreted into breastmilk.
Dutasteride Male Counseling: Dustasteride Counseling:  I discussed with the patient the risks of use of dutasteride including but not limited to decreased libido, decreased ejaculate volume, and gynecomastia. Women who can become pregnant should not handle medication.  All of the patient's questions and concerns were addressed.
Hydroxyzine Counseling: Patient advised that the medication is sedating and not to drive a car after taking this medication.  Patient informed of potential adverse effects including but not limited to dry mouth, urinary retention, and blurry vision.  The patient verbalized understanding of the proper use and possible adverse effects of hydroxyzine.  All of the patient's questions and concerns were addressed.
Azithromycin Counseling:  I discussed with the patient the risks of azithromycin including but not limited to GI upset, allergic reaction, drug rash, diarrhea, and yeast infections.
Azelaic Acid Counseling: Patient counseled that medicine may cause skin irritation and to avoid applying near the eyes.  In the event of skin irritation, the patient was advised to reduce the amount of the drug applied or use it less frequently.   The patient verbalized understanding of the proper use and possible adverse effects of azelaic acid.  All of the patient's questions and concerns were addressed.
Spironolactone Pregnancy And Lactation Text: This medication can cause feminization of the male fetus and should be avoided during pregnancy. The active metabolite is also found in breast milk.
5-Fu Counseling: 5-Fluorouracil Counseling:  I discussed with the patient the risks of 5-fluorouracil including but not limited to erythema, scaling, itching, weeping, crusting, and pain.
Doxycycline Pregnancy And Lactation Text: This medication is Pregnancy Category D and not consider safe during pregnancy. It is also excreted in breast milk but is considered safe for shorter treatment courses.
Dapsone Counseling: I discussed with the patient the risks of dapsone including but not limited to hemolytic anemia, agranulocytosis, rashes, methemoglobinemia, kidney failure, peripheral neuropathy, headaches, GI upset, and liver toxicity.  Patients who start dapsone require monitoring including baseline LFTs and weekly CBCs for the first month, then every month thereafter.  The patient verbalized understanding of the proper use and possible adverse effects of dapsone.  All of the patient's questions and concerns were addressed.
Spevigo Pregnancy And Lactation Text: The risk during pregnancy and breastfeeding is uncertain with this medication. This medication does cross the placenta. It is unknown if this medication is found in breast milk.
Methotrexate Counseling:  Patient counseled regarding adverse effects of methotrexate including but not limited to nausea, vomiting, abnormalities in liver function tests. Patients may develop mouth sores, rash, diarrhea, and abnormalities in blood counts. The patient understands that monitoring is required including LFT's and blood counts.  There is a rare possibility of scarring of the liver and lung problems that can occur when taking methotrexate. Persistent nausea, loss of appetite, pale stools, dark urine, cough, and shortness of breath should be reported immediately. Patient advised to discontinue methotrexate treatment at least three months before attempting to become pregnant.  I discussed the need for folate supplements while taking methotrexate.  These supplements can decrease side effects during methotrexate treatment. The patient verbalized understanding of the proper use and possible adverse effects of methotrexate.  All of the patient's questions and concerns were addressed.
Rituxan Counseling:  I discussed with the patient the risks of Rituxan infusions. Side effects can include infusion reactions, severe drug rashes including mucocutaneous reactions, reactivation of latent hepatitis and other infections and rarely progressive multifocal leukoencephalopathy.  All of the patient's questions and concerns were addressed.
Klisyri Counseling:  I discussed with the patient the risks of Klisyri including but not limited to erythema, scaling, itching, weeping, crusting, and pain.
Sarecycline Pregnancy And Lactation Text: This medication is Pregnancy Category D and not consider safe during pregnancy. It is also excreted in breast milk.
Winlevi Pregnancy And Lactation Text: This medication is considered safe during pregnancy and breastfeeding.
Tranexamic Acid Counseling:  Patient advised of the small risk of bleeding problems with tranexamic acid. They were also instructed to call if they developed any nausea, vomiting or diarrhea. All of the patient's questions and concerns were addressed.
Topical Metronidazole Counseling: Metronidazole is a topical antibiotic medication. You may experience burning, stinging, redness, or allergic reactions.  Please call our office if you develop any problems from using this medication.
Drysol Pregnancy And Lactation Text: This medication is considered safe during pregnancy and breast feeding.
Olumiant Pregnancy And Lactation Text: Based on animal studies, Olumiant may cause embryo-fetal harm when administered to pregnant women.  The medication should not be used in pregnancy.  Breastfeeding is not recommended during treatment.
Ketoconazole Counseling:   Patient counseled regarding improving absorption with orange juice.  Adverse effects include but are not limited to breast enlargement, headache, diarrhea, nausea, upset stomach, liver function test abnormalities, taste disturbance, and stomach pain.  There is a rare possibility of liver failure that can occur when taking ketoconazole. The patient understands that monitoring of LFTs may be required, especially at baseline. The patient verbalized understanding of the proper use and possible adverse effects of ketoconazole.  All of the patient's questions and concerns were addressed.
Erythromycin Counseling:  I discussed with the patient the risks of erythromycin including but not limited to GI upset, allergic reaction, drug rash, diarrhea, increase in liver enzymes, and yeast infections.
Bimzelx Pregnancy And Lactation Text: This medication crosses the placenta and the safety is uncertain during pregnancy. It is unknown if this medication is present in breast milk.
Niacinamide Counseling: I recommended taking niacin or niacinamide, also know as vitamin B3, twice daily. Recent evidence suggests that taking vitamin B3 (500 mg twice daily) can reduce the risk of actinic keratoses and non-melanoma skin cancers. Side effects of vitamin B3 include flushing and headache.
Dutasteride Female Counseling: Dutasteride Counseling:  I discussed with the patient the risks of use of dutasteride including but not limited to decreased libido and sexual dysfunction. Explained the teratogenic nature of the medication and stressed the importance of not getting pregnant during treatment. All of the patient's questions and concerns were addressed.
Bexarotene Counseling:  I discussed with the patient the risks of bexarotene including but not limited to hair loss, dry lips/skin/eyes, liver abnormalities, hyperlipidemia, pancreatitis, depression/suicidal ideation, photosensitivity, drug rash/allergic reactions, hypothyroidism, anemia, leukopenia, infection, cataracts, and teratogenicity.  Patient understands that they will need regular blood tests to check lipid profile, liver function tests, white blood cell count, thyroid function tests and pregnancy test if applicable.
Otezla Pregnancy And Lactation Text: This medication is Pregnancy Category C and it isn't known if it is safe during pregnancy. It is unknown if it is excreted in breast milk.
Hydroxyzine Pregnancy And Lactation Text: This medication is not safe during pregnancy and should not be taken. It is also excreted in breast milk and breast feeding isn't recommended.
Soolantra Pregnancy And Lactation Text: This medication is Pregnancy Category C. This medication is considered safe during breast feeding.
Stelara Counseling:  I discussed with the patient the risks of ustekinumab including but not limited to immunosuppression, malignancy, posterior leukoencephalopathy syndrome, and serious infections.  The patient understands that monitoring is required including a PPD at baseline and must alert us or the primary physician if symptoms of infection or other concerning signs are noted.
Arava Counseling:  Patient counseled regarding adverse effects of Arava including but not limited to nausea, vomiting, abnormalities in liver function tests. Patients may develop mouth sores, rash, diarrhea, and abnormalities in blood counts. The patient understands that monitoring is required including LFTs and blood counts.  There is a rare possibility of scarring of the liver and lung problems that can occur when taking methotrexate. Persistent nausea, loss of appetite, pale stools, dark urine, cough, and shortness of breath should be reported immediately. Patient advised to discontinue Arava treatment and consult with a physician prior to attempting conception. The patient will have to undergo a treatment to eliminate Arava from the body prior to conception.
Protopic Counseling: Patient may experience a mild burning sensation during topical application. Protopic is not approved in children less than 2 years of age. There have been case reports of hematologic and skin malignancies in patients using topical calcineurin inhibitors although causality is questionable.
Klisyri Pregnancy And Lactation Text: It is unknown if this medication can harm a developing fetus or if it is excreted in breast milk.
VTAMA Counseling: I discussed with the patient that VTAMA is not for use in the eyes, mouth or mouth. They should call the office if they develop any signs of allergic reactions to VTAMA. The patient verbalized understanding of the proper use and possible adverse effects of VTAMA.  All of the patient's questions and concerns were addressed.
Rituxan Pregnancy And Lactation Text: This medication is Pregnancy Category C and it isn't know if it is safe during pregnancy. It is unknown if this medication is excreted in breast milk but similar antibodies are known to be excreted.
Methotrexate Pregnancy And Lactation Text: This medication is Pregnancy Category X and is known to cause fetal harm. This medication is excreted in breast milk.
Dapsone Pregnancy And Lactation Text: This medication is Pregnancy Category C and is not considered safe during pregnancy or breast feeding.
Xolair Pregnancy And Lactation Text: This medication is Pregnancy Category B and is considered safe during pregnancy. This medication is excreted in breast milk.
Elidel Counseling: Patient may experience a mild burning sensation during topical application. Elidel is not approved in children less than 2 years of age. There have been case reports of hematologic and skin malignancies in patients using topical calcineurin inhibitors although causality is questionable.
Tranexamic Acid Pregnancy And Lactation Text: It is unknown if this medication is safe during pregnancy or breast feeding.
Azithromycin Pregnancy And Lactation Text: This medication is considered safe during pregnancy and is also secreted in breast milk.
Tetracycline Counseling: Patient counseled regarding possible photosensitivity and increased risk for sunburn.  Patient instructed to avoid sunlight, if possible.  When exposed to sunlight, patients should wear protective clothing, sunglasses, and sunscreen.  The patient was instructed to call the office immediately if the following severe adverse effects occur:  hearing changes, easy bruising/bleeding, severe headache, or vision changes.  The patient verbalized understanding of the proper use and possible adverse effects of tetracycline.  All of the patient's questions and concerns were addressed. Patient understands to avoid pregnancy while on therapy due to potential birth defects.
Azathioprine Counseling:  I discussed with the patient the risks of azathioprine including but not limited to myelosuppression, immunosuppression, hepatotoxicity, lymphoma, and infections.  The patient understands that monitoring is required including baseline LFTs, Creatinine, possible TPMP genotyping and weekly CBCs for the first month and then every 2 weeks thereafter.  The patient verbalized understanding of the proper use and possible adverse effects of azathioprine.  All of the patient's questions and concerns were addressed.
Rinvoq Counseling: I discussed with the patient the risks of Rinvoq therapy including but not limited to upper respiratory tract infections, shingles, cold sores, bronchitis, nausea, cough, fever, acne, and headache. Live vaccines should be avoided.  This medication has been linked to serious infections; higher rate of mortality; malignancy and lymphoproliferative disorders; major adverse cardiovascular events; thrombosis; thrombocytopenia, anemia, and neutropenia; lipid elevations; liver enzyme elevations; and gastrointestinal perforations.
Humira Counseling:  I discussed with the patient the risks of adalimumab including but not limited to myelosuppression, immunosuppression, autoimmune hepatitis, demyelinating diseases, lymphoma, and serious infections.  The patient understands that monitoring is required including a PPD at baseline and must alert us or the primary physician if symptoms of infection or other concerning signs are noted.
Oxybutynin Counseling:  I discussed with the patient the risks of oxybutynin including but not limited to skin rash, drowsiness, dry mouth, difficulty urinating, and blurred vision.
Topical Metronidazole Pregnancy And Lactation Text: This medication is Pregnancy Category B and considered safe during pregnancy.  It is also considered safe to use while breastfeeding.
Cimzia Counseling:  I discussed with the patient the risks of Cimzia including but not limited to immunosuppression, allergic reactions and infections.  The patient understands that monitoring is required including a PPD at baseline and must alert us or the primary physician if symptoms of infection or other concerning signs are noted.
Topical Retinoid counseling:  Patient advised to apply a pea-sized amount only at bedtime and wait 30 minutes after washing their face before applying.  If too drying, patient may add a non-comedogenic moisturizer. The patient verbalized understanding of the proper use and possible adverse effects of retinoids.  All of the patient's questions and concerns were addressed.
Ketoconazole Pregnancy And Lactation Text: This medication is Pregnancy Category C and it isn't know if it is safe during pregnancy. It is also excreted in breast milk and breast feeding isn't recommended.
Bexarotene Pregnancy And Lactation Text: This medication is Pregnancy Category X and should not be given to women who are pregnant or may become pregnant. This medication should not be used if you are breast feeding.
Prednisone Counseling:  I discussed with the patient the risks of prolonged use of prednisone including but not limited to weight gain, insomnia, osteoporosis, mood changes, diabetes, susceptibility to infection, glaucoma and high blood pressure.  In cases where prednisone use is prolonged, patients should be monitored with blood pressure checks, serum glucose levels and an eye exam.  Additionally, the patient may need to be placed on GI prophylaxis, PCP prophylaxis, and calcium and vitamin D supplementation and/or a bisphosphonate.  The patient verbalized understanding of the proper use and the possible adverse effects of prednisone.  All of the patient's questions and concerns were addressed.
Erythromycin Pregnancy And Lactation Text: This medication is Pregnancy Category B and is considered safe during pregnancy. It is also excreted in breast milk.
Niacinamide Pregnancy And Lactation Text: These medications are considered safe during pregnancy.
Protopic Pregnancy And Lactation Text: This medication is Pregnancy Category C. It is unknown if this medication is excreted in breast milk when applied topically.
Benzoyl Peroxide Counseling: Patient counseled that medicine may cause skin irritation and bleach clothing.  In the event of skin irritation, the patient was advised to reduce the amount of the drug applied or use it less frequently.   The patient verbalized understanding of the proper use and possible adverse effects of benzoyl peroxide.  All of the patient's questions and concerns were addressed.
Rinvoq Pregnancy And Lactation Text: Based on animal studies, Rinvoq may cause embryo-fetal harm when administered to pregnant women.  The medication should not be used in pregnancy.  Breastfeeding is not recommended during treatment and for 6 days after the last dose.
Vtama Pregnancy And Lactation Text: It is unknown if this medication can cause problems during pregnancy and breastfeeding.
Minoxidil Counseling: Minoxidil is a topical medication which can increase blood flow where it is applied. It is uncertain how this medication increases hair growth. Side effects are uncommon and include stinging and allergic reactions.
Dutasteride Pregnancy And Lactation Text: This medication is absolutely contraindicated in women, especially during pregnancy and breast feeding. Feminization of male fetuses is possible if taking while pregnant.
Gabapentin Counseling: I discussed with the patient the risks of gabapentin including but not limited to dizziness, somnolence, fatigue and ataxia.
Bactrim Counseling:  I discussed with the patient the risks of sulfa antibiotics including but not limited to GI upset, allergic reaction, drug rash, diarrhea, dizziness, photosensitivity, and yeast infections.  Rarely, more serious reactions can occur including but not limited to aplastic anemia, agranulocytosis, methemoglobinemia, blood dyscrasias, liver or kidney failure, lung infiltrates or desquamative/blistering drug rashes.
Albendazole Counseling:  I discussed with the patient the risks of albendazole including but not limited to cytopenia, kidney damage, nausea/vomiting and severe allergy.  The patient understands that this medication is being used in an off-label manner.
Isotretinoin Counseling: Patient should get monthly blood tests, not donate blood, not drive at night if vision affected, not share medication, and not undergo elective surgery for 6 months after tx completed. Side effects reviewed, pt to contact office should one occur.
Siliq Counseling:  I discussed with the patient the risks of Siliq including but not limited to new or worsening depression, suicidal thoughts and behavior, immunosuppression, malignancy, posterior leukoencephalopathy syndrome, and serious infections.  The patient understands that monitoring is required including a PPD at baseline and must alert us or the primary physician if symptoms of infection or other concerning signs are noted. There is also a special program designed to monitor depression which is required with Siliq.
Azathioprine Pregnancy And Lactation Text: This medication is Pregnancy Category D and isn't considered safe during pregnancy. It is unknown if this medication is excreted in breast milk.
Valtrex Counseling: I discussed with the patient the risks of valacyclovir including but not limited to kidney damage, nausea, vomiting and severe allergy.  The patient understands that if the infection seems to be worsening or is not improving, they are to call.
Cimzia Pregnancy And Lactation Text: This medication crosses the placenta but can be considered safe in certain situations. Cimzia may be excreted in breast milk.
Topical Steroids Counseling: I discussed with the patient that prolonged use of topical steroids can result in the increased appearance of superficial blood vessels (telangiectasias), lightening (hypopigmentation) and thinning of the skin (atrophy).  Patient understands to avoid using high potency steroids in skin folds, the groin or the face.  The patient verbalized understanding of the proper use and possible adverse effects of topical steroids.  All of the patient's questions and concerns were addressed.
Nsaids Counseling: NSAID Counseling: I discussed with the patient that NSAIDs should be taken with food. Prolonged use of NSAIDs can result in the development of stomach ulcers.  Patient advised to stop taking NSAIDs if abdominal pain occurs.  The patient verbalized understanding of the proper use and possible adverse effects of NSAIDs.  All of the patient's questions and concerns were addressed.
Erivedge Counseling- I discussed with the patient the risks of Erivedge including but not limited to nausea, vomiting, diarrhea, constipation, weight loss, changes in the sense of taste, decreased appetite, muscle spasms, and hair loss.  The patient verbalized understanding of the proper use and possible adverse effects of Erivedge.  All of the patient's questions and concerns were addressed.
Terbinafine Counseling: Patient counseling regarding adverse effects of terbinafine including but not limited to headache, diarrhea, rash, upset stomach, liver function test abnormalities, itching, taste/smell disturbance, nausea, abdominal pain, and flatulence.  There is a rare possibility of liver failure that can occur when taking terbinafine.  The patient understands that a baseline LFT and kidney function test may be required. The patient verbalized understanding of the proper use and possible adverse effects of terbinafine.  All of the patient's questions and concerns were addressed.
Benzoyl Peroxide Pregnancy And Lactation Text: This medication is Pregnancy Category C. It is unknown if benzoyl peroxide is excreted in breast milk.
Qbrexza Counseling:  I discussed with the patient the risks of Qbrexza including but not limited to headache, mydriasis, blurred vision, dry eyes, nasal dryness, dry mouth, dry throat, dry skin, urinary hesitation, and constipation.  Local skin reactions including erythema, burning, stinging, and itching can also occur.
Taltz Counseling: I discussed with the patient the risks of ixekizumab including but not limited to immunosuppression, serious infections, worsening of inflammatory bowel disease and drug reactions.  The patient understands that monitoring is required including a PPD at baseline and must alert us or the primary physician if symptoms of infection or other concerning signs are noted.
Clofazimine Counseling:  I discussed with the patient the risks of clofazimine including but not limited to skin and eye pigmentation, liver damage, nausea/vomiting, gastrointestinal bleeding and allergy.
Bactrim Pregnancy And Lactation Text: This medication is Pregnancy Category D and is known to cause fetal risk.  It is also excreted in breast milk.
Prednisone Pregnancy And Lactation Text: This medication is Pregnancy Category C and it isn't know if it is safe during pregnancy. This medication is excreted in breast milk.
Gabapentin Pregnancy And Lactation Text: This medication is Pregnancy Category C and isn't considered safe during pregnancy. It is excreted in breast milk.
Metronidazole Counseling:  I discussed with the patient the risks of metronidazole including but not limited to seizures, nausea/vomiting, a metallic taste in the mouth, nausea/vomiting and severe allergy.
Finasteride Male Counseling: Finasteride Counseling:  I discussed with the patient the risks of use of finasteride including but not limited to decreased libido, decreased ejaculate volume, gynecomastia, and depression. Women should not handle medication.  All of the patient's questions and concerns were addressed.
Valtrex Pregnancy And Lactation Text: this medication is Pregnancy Category B and is considered safe during pregnancy. This medication is not directly found in breast milk but it's metabolite acyclovir is present.
Siliq Pregnancy And Lactation Text: The risk during pregnancy and breastfeeding is uncertain with this medication.
Minoxidil Pregnancy And Lactation Text: This medication has not been assigned a Pregnancy Risk Category but animal studies failed to show danger with the topical medication. It is unknown if the medication is excreted in breast milk.
Zoryve Counseling:  I discussed with the patient that Zoryve is not for use in the eyes, mouth or vagina. The most commonly reported side effects include diarrhea, headache, insomnia, application site pain, upper respiratory tract infections, and urinary tract infections.  All of the patient's questions and concerns were addressed.
Topical Steroids Applications Pregnancy And Lactation Text: Most topical steroids are considered safe to use during pregnancy and lactation.  Any topical steroid applied to the breast or nipple should be washed off before breastfeeding.
Albendazole Pregnancy And Lactation Text: This medication is Pregnancy Category C and it isn't known if it is safe during pregnancy. It is also excreted in breast milk.
Sotyktu Counseling:  I discussed the most common side effects of Sotyktu including: common cold, sore throat, sinus infections, cold sores, canker sores, folliculitis, and acne.  I also discussed more serious side effects of Sotyktu including but not limited to: serious allergic reactions; increased risk for infections such as TB; cancers such as lymphomas; rhabdomyolysis and elevated CPK; and elevated triglycerides and liver enzymes. 
Eucrisa Counseling: Patient may experience a mild burning sensation during topical application. Eucrisa is not approved in children less than 3 months of age.
Cellcept Counseling:  I discussed with the patient the risks of mycophenolate mofetil including but not limited to infection/immunosuppression, GI upset, hypokalemia, hypercholesterolemia, bone marrow suppression, lymphoproliferative disorders, malignancy, GI ulceration/bleed/perforation, colitis, interstitial lung disease, kidney failure, progressive multifocal leukoencephalopathy, and birth defects.  The patient understands that monitoring is required including a baseline creatinine and regular CBC testing. In addition, patient must alert us immediately if symptoms of infection or other concerning signs are noted.
Terbinafine Pregnancy And Lactation Text: This medication is Pregnancy Category B and is considered safe during pregnancy. It is also excreted in breast milk and breast feeding isn't recommended.
Isotretinoin Pregnancy And Lactation Text: This medication is Pregnancy Category X and is considered extremely dangerous during pregnancy. It is unknown if it is excreted in breast milk.
Hyrimoz Counseling:  I discussed with the patient the risks of adalimumab including but not limited to myelosuppression, immunosuppression, autoimmune hepatitis, demyelinating diseases, lymphoma, and serious infections.  The patient understands that monitoring is required including a PPD at baseline and must alert us or the primary physician if symptoms of infection or other concerning signs are noted.
Propranolol Counseling:  I discussed with the patient the risks of propranolol including but not limited to low heart rate, low blood pressure, low blood sugar, restlessness and increased cold sensitivity. They should call the office if they experience any of these side effects.
Tazorac Counseling:  Patient advised that medication is irritating and drying.  Patient may need to apply sparingly and wash off after an hour before eventually leaving it on overnight.  The patient verbalized understanding of the proper use and possible adverse effects of tazorac.  All of the patient's questions and concerns were addressed.
Nsaids Pregnancy And Lactation Text: These medications are considered safe up to 30 weeks gestation. It is excreted in breast milk.
Finasteride Female Counseling: Finasteride Counseling:  I discussed with the patient the risks of use of finasteride including but not limited to decreased libido and sexual dysfunction. Explained the teratogenic nature of the medication and stressed the importance of not getting pregnant during treatment. All of the patient's questions and concerns were addressed.
Fluconazole Counseling:  Patient counseled regarding adverse effects of fluconazole including but not limited to headache, diarrhea, nausea, upset stomach, liver function test abnormalities, taste disturbance, and stomach pain.  There is a rare possibility of liver failure that can occur when taking fluconazole.  The patient understands that monitoring of LFTs and kidney function test may be required, especially at baseline. The patient verbalized understanding of the proper use and possible adverse effects of fluconazole.  All of the patient's questions and concerns were addressed.
Carac Counseling:  I discussed with the patient the risks of Carac including but not limited to erythema, scaling, itching, weeping, crusting, and pain.
Qbrexza Pregnancy And Lactation Text: There is no available data on Qbrexza use in pregnant women.  There is no available data on Qbrexza use in lactation.
Metronidazole Pregnancy And Lactation Text: This medication is Pregnancy Category B and considered safe during pregnancy.  It is also excreted in breast milk.
Glycopyrrolate Counseling:  I discussed with the patient the risks of glycopyrrolate including but not limited to skin rash, drowsiness, dry mouth, difficulty urinating, and blurred vision.
Use Enhanced Medication Counseling?: No
Cephalexin Counseling: I counseled the patient regarding use of cephalexin as an antibiotic for prophylactic and/or therapeutic purposes. Cephalexin (commonly prescribed under brand name Keflex) is a cephalosporin antibiotic which is active against numerous classes of bacteria, including most skin bacteria. Side effects may include nausea, diarrhea, gastrointestinal upset, rash, hives, yeast infections, and in rare cases, hepatitis, kidney disease, seizures, fever, confusion, neurologic symptoms, and others. Patients with severe allergies to penicillin medications are cautioned that there is about a 10% incidence of cross-reactivity with cephalosporins. When possible, patients with penicillin allergies should use alternatives to cephalosporins for antibiotic therapy.
Mirvaso Counseling: Mirvaso is a topical medication which can decrease superficial blood flow where applied. Side effects are uncommon and include stinging, redness and allergic reactions.
Simponi Counseling:  I discussed with the patient the risks of golimumab including but not limited to myelosuppression, immunosuppression, autoimmune hepatitis, demyelinating diseases, lymphoma, and serious infections.  The patient understands that monitoring is required including a PPD at baseline and must alert us or the primary physician if symptoms of infection or other concerning signs are noted.
Ivermectin Counseling:  Patient instructed to take medication on an empty stomach with a full glass of water.  Patient informed of potential adverse effects including but not limited to nausea, diarrhea, dizziness, itching, and swelling of the extremities or lymph nodes.  The patient verbalized understanding of the proper use and possible adverse effects of ivermectin.  All of the patient's questions and concerns were addressed.
Cibinqo Counseling: I discussed with the patient the risks of Cibinqo therapy including but not limited to common cold, nausea, headache, cold sores, increased blood CPK levels, dizziness, UTIs, fatigue, acne, and vomitting. Live vaccines should be avoided.  This medication has been linked to serious infections; higher rate of mortality; malignancy and lymphoproliferative disorders; major adverse cardiovascular events; thrombosis; thrombocytopenia and lymphopenia; lipid elevations; and retinal detachment.
Topical Sulfur Applications Counseling: Topical Sulfur Counseling: Patient counseled that this medication may cause skin irritation or allergic reactions.  In the event of skin irritation, the patient was advised to reduce the amount of the drug applied or use it less frequently.   The patient verbalized understanding of the proper use and possible adverse effects of topical sulfur application.  All of the patient's questions and concerns were addressed.
Libtayo Counseling- I discussed with the patient the risks of Libtayo including but not limited to nausea, vomiting, diarrhea, and bone or muscle pain.  The patient verbalized understanding of the proper use and possible adverse effects of Libtayo.  All of the patient's questions and concerns were addressed.
Sotyktu Pregnancy And Lactation Text: There is insufficient data to evaluate whether or not Sotyktu is safe to use during pregnancy.   It is not known if Sotyktu passes into breast milk and whether or not it is safe to use when breastfeeding.  
Tazorac Pregnancy And Lactation Text: This medication is not safe during pregnancy. It is unknown if this medication is excreted in breast milk.
Minocycline Counseling: Patient advised regarding possible photosensitivity and discoloration of the teeth, skin, lips, tongue and gums.  Patient instructed to avoid sunlight, if possible.  When exposed to sunlight, patients should wear protective clothing, sunglasses, and sunscreen.  The patient was instructed to call the office immediately if the following severe adverse effects occur:  hearing changes, easy bruising/bleeding, severe headache, or vision changes.  The patient verbalized understanding of the proper use and possible adverse effects of minocycline.  All of the patient's questions and concerns were addressed.
Olanzapine Counseling- I discussed with the patient the common side effects of olanzapine including but are not limited to: lack of energy, dry mouth, increased appetite, sleepiness, tremor, constipation, dizziness, changes in behavior, or restlessness.  Explained that teenagers are more likely to experience headaches, abdominal pain, pain in the arms or legs, tiredness, and sleepiness.  Serious side effects include but are not limited: increased risk of death in elderly patients who are confused, have memory loss, or dementia-related psychosis; hyperglycemia; increased cholesterol and triglycerides; and weight gain.
High Dose Vitamin A Counseling: Side effects reviewed, pt to contact office should one occur.
Propranolol Pregnancy And Lactation Text: This medication is Pregnancy Category C and it isn't known if it is safe during pregnancy. It is excreted in breast milk.
Detail Level: Simple
Cosentyx Counseling:  I discussed with the patient the risks of Cosentyx including but not limited to worsening of Crohn's disease, immunosuppression, allergic reactions and infections.  The patient understands that monitoring is required including a PPD at baseline and must alert us or the primary physician if symptoms of infection or other concerning signs are noted.
Glycopyrrolate Pregnancy And Lactation Text: This medication is Pregnancy Category B and is considered safe during pregnancy. It is unknown if it is excreted breast milk.
Tremfya Counseling: I discussed with the patient the risks of guselkumab including but not limited to immunosuppression, serious infections, and drug reactions.  The patient understands that monitoring is required including a PPD at baseline and must alert us or the primary physician if symptoms of infection or other concerning signs are noted.
Rhofade Counseling: Rhofade is a topical medication which can decrease superficial blood flow where applied. Side effects are uncommon and include stinging, redness and allergic reactions.
Cimetidine Counseling:  I discussed with the patient the risks of Cimetidine including but not limited to gynecomastia, headache, diarrhea, nausea, drowsiness, arrhythmias, pancreatitis, skin rashes, psychosis, bone marrow suppression and kidney toxicity.
Colchicine Counseling:  Patient counseled regarding adverse effects including but not limited to stomach upset (nausea, vomiting, stomach pain, or diarrhea).  Patient instructed to limit alcohol consumption while taking this medication.  Colchicine may reduce blood counts especially with prolonged use.  The patient understands that monitoring of kidney function and blood counts may be required, especially at baseline. The patient verbalized understanding of the proper use and possible adverse effects of colchicine.  All of the patient's questions and concerns were addressed.
Finasteride Pregnancy And Lactation Text: This medication is absolutely contraindicated during pregnancy. It is unknown if it is excreted in breast milk.
Carac Pregnancy And Lactation Text: This medication is Pregnancy Category X and contraindicated in pregnancy and in women who may become pregnant. It is unknown if this medication is excreted in breast milk.
Zyclara Counseling:  I discussed with the patient the risks of imiquimod including but not limited to erythema, scaling, itching, weeping, crusting, and pain.  Patient understands that the inflammatory response to imiquimod is variable from person to person and was educated regarded proper titration schedule.  If flu-like symptoms develop, patient knows to discontinue the medication and contact us.
Mirvaso Pregnancy And Lactation Text: This medication has not been assigned a Pregnancy Risk Category. It is unknown if the medication is excreted in breast milk.
Cephalexin Pregnancy And Lactation Text: This medication is Pregnancy Category B and considered safe during pregnancy.  It is also excreted in breast milk but can be used safely for shorter doses.
Cyclophosphamide Counseling:  I discussed with the patient the risks of cyclophosphamide including but not limited to hair loss, hormonal abnormalities, decreased fertility, abdominal pain, diarrhea, nausea and vomiting, bone marrow suppression and infection. The patient understands that monitoring is required while taking this medication.
Hydroquinone Counseling:  Patient advised that medication may result in skin irritation, lightening (hypopigmentation), dryness, and burning.  In the event of skin irritation, the patient was advised to reduce the amount of the drug applied or use it less frequently.  Rarely, spots that are treated with hydroquinone can become darker (pseudoochronosis).  Should this occur, patient instructed to stop medication and call the office. The patient verbalized understanding of the proper use and possible adverse effects of hydroquinone.  All of the patient's questions and concerns were addressed.
Topical Sulfur Applications Pregnancy And Lactation Text: This medication is considered safe during pregnancy and breast feeding secondary to limited systemic absorption.
Xeljanz Counseling: I discussed with the patient the risks of Xeljanz therapy including increased risk of infection, liver issues, headache, diarrhea, or cold symptoms. Live vaccines should be avoided. They were instructed to call if they have any problems.
Ilumya Counseling: I discussed with the patient the risks of tildrakizumab including but not limited to immunosuppression, malignancy, posterior leukoencephalopathy syndrome, and serious infections.  The patient understands that monitoring is required including a PPD at baseline and must alert us or the primary physician if symptoms of infection or other concerning signs are noted.
SSKI Counseling:  I discussed with the patient the risks of SSKI including but not limited to thyroid abnormalities, metallic taste, GI upset, fever, headache, acne, arthralgias, paraesthesias, lymphadenopathy, easy bleeding, arrhythmias, and allergic reaction.
Cibinqo Pregnancy And Lactation Text: It is unknown if this medication will adversely affect pregnancy or breast feeding.  You should not take this medication if you are currently pregnant or planning a pregnancy or while breastfeeding.
Topical Clindamycin Counseling: Patient counseled that this medication may cause skin irritation or allergic reactions.  In the event of skin irritation, the patient was advised to reduce the amount of the drug applied or use it less frequently.   The patient verbalized understanding of the proper use and possible adverse effects of clindamycin.  All of the patient's questions and concerns were addressed.
High Dose Vitamin A Pregnancy And Lactation Text: High dose vitamin A therapy is contraindicated during pregnancy and breast feeding.
Calcipotriene Counseling:  I discussed with the patient the risks of calcipotriene including but not limited to erythema, scaling, itching, and irritation.
Griseofulvin Counseling:  I discussed with the patient the risks of griseofulvin including but not limited to photosensitivity, cytopenia, liver damage, nausea/vomiting and severe allergy.  The patient understands that this medication is best absorbed when taken with a fatty meal (e.g., ice cream or french fries).
Hydroxychloroquine Counseling:  I discussed with the patient that a baseline ophthalmologic exam is needed at the start of therapy and every year thereafter while on therapy. A CBC may also be warranted for monitoring.  The side effects of this medication were discussed with the patient, including but not limited to agranulocytosis, aplastic anemia, seizures, rashes, retinopathy, and liver toxicity. Patient instructed to call the office should any adverse effect occur.  The patient verbalized understanding of the proper use and possible adverse effects of Plaquenil.  All the patient's questions and concerns were addressed.
Libtayo Pregnancy And Lactation Text: This medication is contraindicated in pregnancy and when breast feeding.
Olanzapine Pregnancy And Lactation Text: This medication is pregnancy category C.   There are no adequate and well controlled trials with olanzapine in pregnant females.  Olanzapine should be used during pregnancy only if the potential benefit justifies the potential risk to the fetus.   In a study in lactating healthy women, olanzapine was excreted in breast milk.  It is recommended that women taking olanzapine should not breast feed.
Birth Control Pills Counseling: Birth Control Pill Counseling: I discussed with the patient the potential side effects of OCPs including but not limited to increased risk of stroke, heart attack, thrombophlebitis, deep venous thrombosis, hepatic adenomas, breast changes, GI upset, headaches, and depression.  The patient verbalized understanding of the proper use and possible adverse effects of OCPs. All of the patient's questions and concerns were addressed.
Skyrizi Counseling: I discussed with the patient the risks of risankizumab-rzaa including but not limited to immunosuppression, and serious infections.  The patient understands that monitoring is required including a PPD at baseline and must alert us or the primary physician if symptoms of infection or other concerning signs are noted.
Xelherminioz Pregnancy And Lactation Text: This medication is Pregnancy Category D and is not considered safe during pregnancy.  The risk during breast feeding is also uncertain.
Opzelura Counseling:  I discussed with the patient the risks of Opzelura including but not limited to nasopharngitis, bronchitis, ear infection, eosinophila, hives, diarrhea, folliculitis, tonsillitis, and rhinorrhea.  Taken orally, this medication has been linked to serious infections; higher rate of mortality; malignancy and lymphoproliferative disorders; major adverse cardiovascular events; thrombosis; thrombocytopenia, anemia, and neutropenia; and lipid elevations.
Wartpeel Counseling:  I discussed with the patient the risks of Wartpeel including but not limited to erythema, scaling, itching, weeping, crusting, and pain.
Cyclophosphamide Pregnancy And Lactation Text: This medication is Pregnancy Category D and it isn't considered safe during pregnancy. This medication is excreted in breast milk.
Clindamycin Counseling: I counseled the patient regarding use of clindamycin as an antibiotic for prophylactic and/or therapeutic purposes. Clindamycin is active against numerous classes of bacteria, including skin bacteria. Side effects may include nausea, diarrhea, gastrointestinal upset, rash, hives, yeast infections, and in rare cases, colitis.
Sski Pregnancy And Lactation Text: This medication is Pregnancy Category D and isn't considered safe during pregnancy. It is excreted in breast milk.
Litfulo Counseling: I discussed with the patient the risks of Litfulo therapy including but not limited to upper respiratory tract infections, shingles, cold sores, and nausea. Live vaccines should be avoided.  This medication has been linked to serious infections; higher rate of mortality; malignancy and lymphoproliferative disorders; major adverse cardiovascular events; thrombosis; gastrointestinal perforations; neutropenia; lymphopenia; anemia; liver enzyme elevations; and lipid elevations.
Oral Minoxidil Counseling- I discussed with the patient the risks of oral minoxidil including but not limited to shortness of breath, swelling of the feet or ankles, dizziness, lightheadedness, unwanted hair growth and allergic reaction.  The patient verbalized understanding of the proper use and possible adverse effects of oral minoxidil.  All of the patient's questions and concerns were addressed.
Odomzo Counseling- I discussed with the patient the risks of Odomzo including but not limited to nausea, vomiting, diarrhea, constipation, weight loss, changes in the sense of taste, decreased appetite, muscle spasms, and hair loss.  The patient verbalized understanding of the proper use and possible adverse effects of Odomzo.  All of the patient's questions and concerns were addressed.
Dupixent Counseling: I discussed with the patient the risks of dupilumab including but not limited to eye inflammation and irritation, cold sores, injection site reactions, allergic reactions and increased risk of parasitic infection. The patient understands that monitoring is required and they must alert us or the primary physician if symptoms of infection or other concerning signs are noted.
Calcipotriene Pregnancy And Lactation Text: The use of this medication during pregnancy or lactation is not recommended as there is insufficient data.
Griseofulvin Pregnancy And Lactation Text: This medication is Pregnancy Category X and is known to cause serious birth defects. It is unknown if this medication is excreted in breast milk but breast feeding should be avoided.
Solaraze Counseling:  I discussed with the patient the risks of Solaraze including but not limited to erythema, scaling, itching, weeping, crusting, and pain.
Quinolones Counseling:  I discussed with the patient the risks of fluoroquinolones including but not limited to GI upset, allergic reaction, drug rash, diarrhea, dizziness, photosensitivity, yeast infections, liver function test abnormalities, tendonitis/tendon rupture.
Clindamycin Pregnancy And Lactation Text: This medication can be used in pregnancy if certain situations. Clindamycin is also present in breast milk.
Adbry Counseling: I discussed with the patient the risks of tralokinumab including but not limited to eye infection and irritation, cold sores, injection site reactions, worsening of asthma, allergic reactions and increased risk of parasitic infection.  Live vaccines should be avoided while taking tralokinumab. The patient understands that monitoring is required and they must alert us or the primary physician if symptoms of infection or other concerning signs are noted.
Xolair Counseling:  Patient informed of potential adverse effects including but not limited to fever, muscle aches, rash and allergic reactions.  The patient verbalized understanding of the proper use and possible adverse effects of Xolair.  All of the patient's questions and concerns were addressed.
Doxepin Counseling:  Patient advised that the medication is sedating and not to drive a car after taking this medication. Patient informed of potential adverse effects including but not limited to dry mouth, urinary retention, and blurry vision.  The patient verbalized understanding of the proper use and possible adverse effects of doxepin.  All of the patient's questions and concerns were addressed.
Hydroxychloroquine Pregnancy And Lactation Text: This medication has been shown to cause fetal harm but it isn't assigned a Pregnancy Risk Category. There are small amounts excreted in breast milk.
Aklief counseling:  Patient advised to apply a pea-sized amount only at bedtime and wait 30 minutes after washing their face before applying.  If too drying, patient may add a non-comedogenic moisturizer.  The most commonly reported side effects including irritation, redness, scaling, dryness, stinging, burning, itching, and increased risk of sunburn.  The patient verbalized understanding of the proper use and possible adverse effects of retinoids.  All of the patient's questions and concerns were addressed.
Opzelura Pregnancy And Lactation Text: There is insufficient data to evaluate drug-associated risk for major birth defects, miscarriage, or other adverse maternal or fetal outcomes.  There is a pregnancy registry that monitors pregnancy outcomes in pregnant persons exposed to the medication during pregnancy.  It is unknown if this medication is excreted in breast milk.  Do not breastfeed during treatment and for about 4 weeks after the last dose.
Birth Control Pills Pregnancy And Lactation Text: This medication should be avoided if pregnant and for the first 30 days post-partum.
Litfulo Pregnancy And Lactation Text: Based on animal studies, Lifulo may cause embryo-fetal harm when administered to pregnant women.  The medication should not be used in pregnancy.  Breastfeeding is not recommended during treatment.
Imiquimod Counseling:  I discussed with the patient the risks of imiquimod including but not limited to erythema, scaling, itching, weeping, crusting, and pain.  Patient understands that the inflammatory response to imiquimod is variable from person to person and was educated regarded proper titration schedule.  If flu-like symptoms develop, patient knows to discontinue the medication and contact us.
Cyclosporine Counseling:  I discussed with the patient the risks of cyclosporine including but not limited to hypertension, gingival hyperplasia,myelosuppression, immunosuppression, liver damage, kidney damage, neurotoxicity, lymphoma, and serious infections. The patient understands that monitoring is required including baseline blood pressure, CBC, CMP, lipid panel and uric acid, and then 1-2 times monthly CMP and blood pressure.
Rifampin Pregnancy And Lactation Text: This medication is Pregnancy Category C and it isn't know if it is safe during pregnancy. It is also excreted in breast milk and should not be used if you are breast feeding.
Oral Minoxidil Pregnancy And Lactation Text: This medication should only be used when clearly needed if you are pregnant, attempting to become pregnant or breast feeding.
Infliximab Counseling:  I discussed with the patient the risks of infliximab including but not limited to myelosuppression, immunosuppression, autoimmune hepatitis, demyelinating diseases, lymphoma, and serious infections.  The patient understands that monitoring is required including a PPD at baseline and must alert us or the primary physician if symptoms of infection or other concerning signs are noted.
Opioid Counseling: I discussed with the patient the potential side effects of opioids including but not limited to addiction, altered mental status, and depression. I stressed avoiding alcohol, benzodiazepines, muscle relaxants and sleep aids unless specifically okayed by a physician. The patient verbalized understanding of the proper use and possible adverse effects of opioids. All of the patient's questions and concerns were addressed. They were instructed to flush the remaining pills down the toilet if they did not need them for pain.
Thalidomide Counseling: I discussed with the patient the risks of thalidomide including but not limited to birth defects, anxiety, weakness, chest pain, dizziness, cough and severe allergy.
Dupixent Pregnancy And Lactation Text: This medication likely crosses the placenta but the risk for the fetus is uncertain. This medication is excreted in breast milk.
Topical Ketoconazole Counseling: Patient counseled that this medication may cause skin irritation or allergic reactions.  In the event of skin irritation, the patient was advised to reduce the amount of the drug applied or use it less frequently.   The patient verbalized understanding of the proper use and possible adverse effects of ketoconazole.  All of the patient's questions and concerns were addressed.
Acitretin Counseling:  I discussed with the patient the risks of acitretin including but not limited to hair loss, dry lips/skin/eyes, liver damage, hyperlipidemia, depression/suicidal ideation, photosensitivity.  Serious rare side effects can include but are not limited to pancreatitis, pseudotumor cerebri, bony changes, clot formation/stroke/heart attack.  Patient understands that alcohol is contraindicated since it can result in liver toxicity and significantly prolong the elimination of the drug by many years.
Adbry Pregnancy And Lactation Text: It is unknown if this medication will adversely affect pregnancy or breast feeding.
Spironolactone Counseling: Patient advised regarding risks of diarrhea, abdominal pain, hyperkalemia, birth defects (for female patients), liver toxicity and renal toxicity. The patient may need blood work to monitor liver and kidney function and potassium levels while on therapy. The patient verbalized understanding of the proper use and possible adverse effects of spironolactone.  All of the patient's questions and concerns were addressed.
Itraconazole Counseling:  I discussed with the patient the risks of itraconazole including but not limited to liver damage, nausea/vomiting, neuropathy, and severe allergy.  The patient understands that this medication is best absorbed when taken with acidic beverages such as non-diet cola or ginger ale.  The patient understands that monitoring is required including baseline LFTs and repeat LFTs at intervals.  The patient understands that they are to contact us or the primary physician if concerning signs are noted.
Solaraze Pregnancy And Lactation Text: This medication is Pregnancy Category B and is considered safe. There is some data to suggest avoiding during the third trimester. It is unknown if this medication is excreted in breast milk.
Cantharidin Counseling:  I discussed with the patient the risks of Cantharidin including but not limited to pain, redness, burning, itching, and blistering.
Aklief Pregnancy And Lactation Text: It is unknown if this medication is safe to use during pregnancy.  It is unknown if this medication is excreted in breast milk.  Breastfeeding women should use the topical cream on the smallest area of the skin for the shortest time needed while breastfeeding.  Do not apply to nipple and areola.
Doxepin Pregnancy And Lactation Text: This medication is Pregnancy Category C and it isn't known if it is safe during pregnancy. It is also excreted in breast milk and breast feeding isn't recommended.
Doxycycline Counseling:  Patient counseled regarding possible photosensitivity and increased risk for sunburn.  Patient instructed to avoid sunlight, if possible.  When exposed to sunlight, patients should wear protective clothing, sunglasses, and sunscreen.  The patient was instructed to call the office immediately if the following severe adverse effects occur:  hearing changes, easy bruising/bleeding, severe headache, or vision changes.  The patient verbalized understanding of the proper use and possible adverse effects of doxycycline.  All of the patient's questions and concerns were addressed.
Low Dose Naltrexone Counseling- I discussed with the patient the potential risks and side effects of low dose naltrexone including but not limited to: more vivid dreams, headaches, nausea, vomiting, abdominal pain, fatigue, dizziness, and anxiety.
Spevigo Counseling: I discussed with the patient the risks of Spevigo including but not limited to fatigue, nasuea, vomiting, headache, pruritus, urinary tract infection, an infusion related reactions.  The patient understands that monitoring is required including screening for tuberculosis at baseline and yearly screening thereafter while continuing Spevigo therapy. They should contact us if symptoms of infection or other concerning signs are noted.
Picato Counseling:  I discussed with the patient the risks of Picato including but not limited to erythema, scaling, itching, weeping, crusting, and pain.

## 2024-11-07 NOTE — PROCEDURE: REPAIR NOTE
Referring Provider (Optional): Keke Hernandez APRN
Anesthesia Volume In Cc: 4
Did You Provide Opioid Counseling: No
Repair Type: Graft
Which Eyelid Repair Cpt Are You Using?: 59442
Suturegard Retention Suture: 2-0 Nylon
Retention Suture Bite Size: 3 mm
Length To Time In Minutes Device Was In Place: 10
Number Of Hemigard Strips Per Side: 1
Simple / Intermediate / Complex Repair - Final Wound Length In Cm: 0
Undermining Type: Entire Wound
Debridement Text: The wound edges were debrided prior to proceeding with the closure to facilitate wound healing.
Helical Rim Text: The closure involved the helical rim.
Vermilion Border Text: The closure involved the vermilion border.
Nostril Rim Text: The closure involved the nostril rim.
Retention Suture Text: Retention sutures were placed to support the closure and prevent dehiscence.
Graft Type: Full Thickness Skin Graft
Graft Donor Site: left postauricular graft
Primary Defect Length (In Cm): 2
Skin Substitute: EpiFix Micronized
Suture Removal: 7 days
Type Of Previous Surgery (Optional- Ie Mohs Surgery): Mohs Surgery
Date Of Previous Surgery (Optional): 10/31/24
Mohs Case Number (Optional): [id number]
Detail Level: Simple
Anesthesia Type: 1% lidocaine with epinephrine and a 1:10 solution of 8.4% sodium bicarbonate
Hemostasis: Electrocautery
Estimated Blood Loss (Cc): minimal
Brow Lift Text: A midfrontal incision was made medially to the defect to allow access to the tissues just superior to the left eyebrow. Following careful dissection inferiorly in a supraperiosteal plane to the level of the left eyebrow, several 3-0 monocryl sutures were used to resuspend the eyebrow orbicularis oculi muscular unit to the superior frontal bone periosteum. This resulted in an appropriate reapproximation of static eyebrow symmetry and correction of the left brow ptosis.
Epidermal Sutures: 6-0 Prolene
Epidermal Closure: running
Additional Epidermal Closure (Optional): bolstering
Wound Care: Petrolatum
Dressing: pressure dressing with telfa
Unna Boot Text: An Unna boot was placed to help immobilize the limb and facilitate more rapid healing.
Suturegard Intro: Intraoperative tissue expansion was performed, utilizing the SUTUREGARD device, in order to reduce wound tension.
Suturegard Body: The suture ends were repeatedly re-tightened and re-clamped to achieve the desired tissue expansion.
Hemigard Intro: Due to skin fragility and wound tension, it was decided to use HEMIGARD adhesive retention suture devices to permit a linear closure. The skin was cleaned and dried for a 6cm distance away from the wound. Excessive hair, if present, was removed to allow for adhesion.
Hemigard Postcare Instructions: The HEMIGARD strips are to remain completely dry for at least 5-7 days.
Graft Donor Site Dermal Sutures (Optional): 4-0 Monocryl
Graft Donor Site Epidermal Sutures (Optional): Dermabond
Graft Donor Site Bandage (Optional-Leave Blank If You Don't Want In Note): Steri-strips and a pressure bandage were applied to the donor site.
Closure 2 Information: This tab is for additional flaps and grafts, including complex repair and grafts and complex repair and flaps. You can also specify a different location for the additional defect, if the location is the same you do not need to select a new one. We will insert the automated text for the repair you select below just as we do for solitary flaps and grafts. Please note that at this time if you select a location with a different insurance zone you will need to override the ICD10 and CPT if appropriate.
Closure 3 Information: This tab is for additional flaps and grafts above and beyond our usual structured repairs.  Please note if you enter information here it will not currently bill and you will need to add the billing information manually.
Complex Repair Preamble Text (Leave Blank If You Do Not Want): Extensive wide undermining was performed.
Intermediate Repair Preamble Text (Leave Blank If You Do Not Want): Undermining was performed with blunt dissection.
Flap Thinning Complex Repair Preamble Text (Leave Blank If You Do Not Want): An incision was made along the previous flap suture line. Undermining was performed beneath the flap and redundant tissue was removed to restore the normal contour of the skin.
Non-Graft Cartilage Fenestration Text: The cartilage was fenestrated with a 2mm punch biopsy to help facilitate healing.
Graft Cartilage Fenestration Text: The cartilage was fenestrated with a 2mm punch biopsy to help facilitate graft survival and healing.
Preparation Of Recipient Site - Flap: The eschar was removed surgically with sharp dissection to facilitate appropriate wound healing of the following adjacent tissue rearrangement.
Preparation Of Recipient Site - Graft: The eschar was removed surgically with sharp dissection to facilitate appropriate survival of the following graft.
Preparation Of Recipient Site - Flap Takedown: The eschar and granulation tissue was removed surgically with sharp dissection to facilitate appropriate healing after division and inset of the proximal and distal interpolation flap.
Secondary Intention Text (Leave Blank If You Do Not Want): The defect will heal with secondary intention.
No Repair - Repaired With Adjacent Surgical Defect Text (Leave Blank If You Do Not Want): After obtaining clear surgical margins the defect was repaired concurrently with another surgical defect which was in close approximation.
Referred To Oculoplastics For Closure Text (Leave Blank If You Do Not Want): After obtaining clear surgical margins the patient was sent to oculoplastics for surgical repair.  The patient understands they will receive post-surgical care and follow-up from the referring physician's office.
Referred To Otolaryngology For Closure Text (Leave Blank If You Do Not Want): After obtaining clear surgical margins the patient was sent to otolaryngology for surgical repair.  The patient understands they will receive post-surgical care and follow-up from the referring physician's office.
Referred To Plastics For Closure Text (Leave Blank If You Do Not Want): After obtaining clear surgical margins the patient was sent to plastics for surgical repair.  The patient understands they will receive post-surgical care and follow-up from the referring physician's office.
Referred To Asc For Closure Text (Leave Blank If You Do Not Want): After obtaining clear surgical margins the patient was sent to an ASC for surgical repair.  The patient understands they will receive post-surgical care and follow-up from the ASC physician.
Referred To Mid-Level For Closure Text (Leave Blank If You Do Not Want): After obtaining clear surgical margins the patient was sent to a mid-level provider for surgical repair.  The patient understands they will receive post-surgical care and follow-up from the mid-level provider.
Repair Performed By Another Provider Text (Leave Blank If You Do Not Want): After obtaining clear surgical margins the defect was repaired by another provider.
Adjacent Tissue Transfer Text: The defect edges were debeveled with a #15 scalpel blade. Given the location of the defect and the proximity to free margins an adjacent tissue transfer was deemed most appropriate. Using a sterile surgical marker, an appropriate flap was drawn incorporating the defect and placing the expected incisions within the relaxed skin tension lines where possible. The area thus outlined was incised deep to adipose tissue with a #15 scalpel blade. The skin margins were undermined to an appropriate distance in all directions utilizing iris scissors and carried over to close the primary defect.
Advancement Flap (Single) Text: The defect edges were debeveled with a #15 scalpel blade. Given the location of the defect and the proximity to free margins a single advancement flap was deemed most appropriate. Using a sterile surgical marker, an appropriate advancement flap was drawn incorporating the defect and placing the expected incisions within the relaxed skin tension lines where possible. The area thus outlined was incised deep to adipose tissue with a #15 scalpel blade. The skin margins were undermined to an appropriate distance in all directions utilizing iris scissors. Following this, the designed flap was advanced and carried over into the primary defect and sutured into place.
Advancement Flap (Double) Text: The defect edges were debeveled with a #15 scalpel blade. Given the location of the defect and the proximity to free margins a double advancement flap was deemed most appropriate. Using a sterile surgical marker, the appropriate advancement flaps were drawn incorporating the defect and placing the expected incisions within the relaxed skin tension lines where possible. The area thus outlined was incised deep to adipose tissue with a #15 scalpel blade. The skin margins were undermined to an appropriate distance in all directions utilizing iris scissors. Following this, the designed flaps were advanced and carried over into the primary defect and sutured into place.
Advancement-Rotation Flap Text: The defect edges were debeveled with a #15 scalpel blade. Given the location of the defect, shape of the defect and the proximity to free margins an advancement-rotation flap was deemed most appropriate. Using a sterile surgical marker, an appropriate flap was drawn incorporating the defect and placing the expected incisions within the relaxed skin tension lines where possible. The area thus outlined was incised deep to adipose tissue with a #15 scalpel blade. The skin margins were undermined to an appropriate distance in all directions utilizing iris scissors. Following this, the designed flap was carried over into the primary defect and sutured into place.
Alar Island Pedicle Flap Text: The defect edges were debeveled with a #15 scalpel blade. Given the location of the defect, shape of the defect and the proximity to the alar rim an island pedicle advancement flap was deemed most appropriate. Using a sterile surgical marker, an appropriate advancement flap was drawn incorporating the defect, outlining the appropriate donor tissue and placing the expected incisions within the nasal ala running parallel to the alar rim. The area thus outlined was incised with a #15 scalpel blade. The skin margins were undermined minimally to an appropriate distance in all directions around the primary defect and laterally outward around the island pedicle utilizing iris scissors.  There was minimal undermining beneath the pedicle flap. Following this, the designed flap was carried over into the primary defect and sutured into place.
A-T Advancement Flap Text: The defect edges were debeveled with a #15 scalpel blade. Given the location of the defect, shape of the defect and the proximity to free margins an A-T advancement flap was deemed most appropriate. Using a sterile surgical marker, an appropriate advancement flap was drawn incorporating the defect and placing the expected incisions within the relaxed skin tension lines where possible. The area thus outlined was incised deep to adipose tissue with a #15 scalpel blade. The skin margins were undermined to an appropriate distance in all directions utilizing iris scissors. Following this, the designed flap was advanced and carried over into the primary defect and sutured into place.
Banner Transposition Flap Text: The defect edges were debeveled with a #15 scalpel blade. Given the location of the defect and the proximity to free margins a Banner transposition flap was deemed most appropriate. Using a sterile surgical marker, an appropriate flap was drawn around the defect. The area thus outlined was incised deep to adipose tissue with a #15 scalpel blade. The skin margins were undermined to an appropriate distance in all directions utilizing iris scissors. Following this, the designed flap was carried into the primary defect and sutured into place.
Bilateral Helical Rim Advancement Flap Text: The defect edges were debeveled with a #15 blade scalpel.  Given the location of the defect and the proximity to free margins (helical rim) a bilateral helical rim advancement flap was deemed most appropriate. Using a sterile surgical marker, the appropriate advancement flaps were drawn incorporating the defect and placing the expected incisions between the helical rim and antihelix where possible.  The area thus outlined was incised through and through with a #15 scalpel blade.  With a skin hook and iris scissors, the flaps were gently and sharply undermined and freed up. Following this, the designed flaps were placed into the primary defect and sutured into place.
Bilateral Rotation Flap Text: The defect edges were debeveled with a #15 scalpel blade. Given the location of the defect, shape of the defect and the proximity to free margins a bilateral rotation flap was deemed most appropriate. Using a sterile surgical marker, an appropriate rotation flap was drawn incorporating the defect and placing the expected incisions within the relaxed skin tension lines where possible. The area thus outlined was incised deep to adipose tissue with a #15 scalpel blade. The skin margins were undermined to an appropriate distance in all directions utilizing iris scissors. Following this, the designed flap was carried over into the primary defect and sutured into place.
Bilobed Flap Text: The defect edges were debeveled with a #15 scalpel blade. Given the location of the defect and the proximity to free margins a bilobe flap was deemed most appropriate. Using a sterile surgical marker, an appropriate bilobe flap drawn around the defect. The area thus outlined was incised deep to adipose tissue with a #15 scalpel blade. The skin margins were undermined to an appropriate distance in all directions utilizing iris scissors. Following this, the designed flap was carried over into the primary defect and sutured into place.
Bilobed Transposition Flap Text: The defect edges were debeveled with a #15 scalpel blade. Given the location of the defect and the proximity to free margins a bilobed transposition flap was deemed most appropriate. Using a sterile surgical marker, an appropriate bilobe flap drawn around the defect. The area thus outlined was incised deep to adipose tissue with a #15 scalpel blade. The skin margins were undermined to an appropriate distance in all directions utilizing iris scissors. Following this, the designed flap was carried over into the primary defect and sutured into place.
Bi-Rhombic Flap Text: The defect edges were debeveled with a #15 scalpel blade. Given the location of the defect and the proximity to free margins a bi-rhombic flap was deemed most appropriate. Using a sterile surgical marker, an appropriate rhombic flap was drawn incorporating the defect. The area thus outlined was incised deep to adipose tissue with a #15 scalpel blade. The skin margins were undermined to an appropriate distance in all directions utilizing iris scissors. Following this, the designed flap was carried over into the primary defect and sutured into place.
Burow's Advancement Flap Text: The defect edges were debeveled with a #15 scalpel blade. Given the location of the defect and the proximity to free margins a Burow's advancement flap was deemed most appropriate. Using a sterile surgical marker, the appropriate advancement flap was drawn incorporating the defect and placing the expected incisions within the relaxed skin tension lines where possible. The area thus outlined was incised deep to adipose tissue with a #15 scalpel blade. The skin margins were undermined to an appropriate distance in all directions utilizing iris scissors. Following this, the designed flap was advanced and carried over into the primary defect and sutured into place.
Chonodrocutaneous Helical Advancement Flap Text: The defect edges were debeveled with a #15 scalpel blade. Given the location of the defect and the proximity to free margins a chondrocutaneous helical advancement flap was deemed most appropriate. Using a sterile surgical marker, the appropriate advancement flap was drawn incorporating the defect and placing the expected incisions within the relaxed skin tension lines where possible. The area thus outlined was incised deep to adipose tissue with a #15 scalpel blade. The skin margins were undermined to an appropriate distance in all directions utilizing iris scissors. Following this, the designed flap was advanced and carried over into the primary defect and sutured into place.
Crescentic Advancement Flap Text: The defect edges were debeveled with a #15 scalpel blade. Given the location of the defect and the proximity to free margins a crescentic advancement flap was deemed most appropriate. Using a sterile surgical marker, the appropriate advancement flap was drawn incorporating the defect and placing the expected incisions within the relaxed skin tension lines where possible. The area thus outlined was incised deep to adipose tissue with a #15 scalpel blade. The skin margins were undermined to an appropriate distance in all directions utilizing iris scissors. Following this, the designed flap was advanced and carried over into the primary defect and sutured into place.
Dorsal Nasal Flap Text: The defect edges were debeveled with a #15 scalpel blade. Given the location of the defect and the proximity to free margins a dorsal nasal flap was deemed most appropriate. Using a sterile surgical marker, an appropriate dorsal nasal flap was drawn around the defect. The area thus outlined was incised deep to adipose tissue with a #15 scalpel blade. The skin margins were undermined to an appropriate distance in all directions utilizing iris scissors. Following this, the designed flap was carried into the primary defect and sutured into place.
Double Island Pedicle Flap Text: The defect edges were debeveled with a #15 scalpel blade. Given the location of the defect, shape of the defect and the proximity to free margins a double island pedicle advancement flap was deemed most appropriate. Using a sterile surgical marker, an appropriate advancement flap was drawn incorporating the defect, outlining the appropriate donor tissue and placing the expected incisions within the relaxed skin tension lines where possible. The area thus outlined was incised deep to adipose tissue with a #15 scalpel blade. The skin margins were undermined to an appropriate distance in all directions around the primary defect and laterally outward around the island pedicle utilizing iris scissors.  There was minimal undermining beneath the pedicle flap. Following this, the flap was carried over into the primary defect and sutured into place.
Double O-Z Flap Text: The defect edges were debeveled with a #15 scalpel blade. Given the location of the defect, shape of the defect and the proximity to free margins a Double O-Z flap was deemed most appropriate. Using a sterile surgical marker, an appropriate transposition flap was drawn incorporating the defect and placing the expected incisions within the relaxed skin tension lines where possible. The area thus outlined was incised deep to adipose tissue with a #15 scalpel blade. The skin margins were undermined to an appropriate distance in all directions utilizing iris scissors. Following this, the designed flap was carried over into the primary defect and sutured into place.
Double O-Z Plasty Text: The defect edges were debeveled with a #15 scalpel blade. Given the location of the defect, shape of the defect and the proximity to free margins a Double O-Z plasty (double transposition flap) was deemed most appropriate. Using a sterile surgical marker, the appropriate transposition flaps were drawn incorporating the defect and placing the expected incisions within the relaxed skin tension lines where possible. The area thus outlined was incised deep to adipose tissue with a #15 scalpel blade. The skin margins were undermined to an appropriate distance in all directions utilizing iris scissors. Hemostasis was achieved with electrocautery. The flaps were then transposed and carried over into place, one clockwise and the other counterclockwise, and anchored with interrupted buried subcutaneous sutures.
Double Z Plasty Text: The lesion was extirpated to the level of the fat with a #15 scalpel blade. Given the location of the defect, shape of the defect and the proximity to free margins a double Z-plasty was deemed most appropriate for repair. Using a sterile surgical marker, the appropriate transposition arms of the double Z-plasty were drawn incorporating the defect and placing the expected incisions within the relaxed skin tension lines where possible. The area thus outlined was incised deep to adipose tissue with a #15 scalpel blade. The skin margins were undermined to an appropriate distance in all directions utilizing iris scissors. The opposing transposition arms were then transposed and carried over into place in opposite direction and anchored with interrupted buried subcutaneous sutures.
Ear Star Wedge Flap Text: The defect edges were debeveled with a #15 blade scalpel.  Given the location of the defect and the proximity to free margins (helical rim) an ear star wedge flap was deemed most appropriate. Using a sterile surgical marker, the appropriate flap was drawn incorporating the defect and placing the expected incisions between the helical rim and antihelix where possible.  The area thus outlined was incised through and through with a #15 scalpel blade. Following this, the designed flap was carried over into the primary defect and sutured into place.
Flip-Flop Flap Text: The defect edges were debeveled with a #15 blade scalpel.  Given the location of the defect and the proximity to free margins a flip-flop flap was deemed most appropriate. Using a sterile surgical marker, the appropriate flap was drawn incorporating the defect and placing the expected incisions between the helical rim and antihelix where possible.  The area thus outlined was incised through and through with a #15 scalpel blade. Following this, the designed flap was carried over into the primary defect and sutured into place.
Hatchet Flap Text: The defect edges were debeveled with a #15 scalpel blade. Given the location of the defect, shape of the defect and the proximity to free margins a hatchet flap was deemed most appropriate. Using a sterile surgical marker, an appropriate hatchet flap was drawn incorporating the defect and placing the expected incisions within the relaxed skin tension lines where possible. The area thus outlined was incised deep to adipose tissue with a #15 scalpel blade. The skin margins were undermined to an appropriate distance in all directions utilizing iris scissors. Following this, the designed flap was carried over into the primary defect and sutured into place.
Helical Rim Advancement Flap Text: The defect edges were debeveled with a #15 blade scalpel.  Given the location of the defect and the proximity to free margins (helical rim) a double helical rim advancement flap was deemed most appropriate. Using a sterile surgical marker, the appropriate advancement flaps were drawn incorporating the defect and placing the expected incisions between the helical rim and antihelix where possible.  The area thus outlined was incised through and through with a #15 scalpel blade.  With a skin hook and iris scissors, the flaps were gently and sharply undermined and freed up. Folllowing this, the designed flaps were carried over into the primary defect and sutured into place.
H Plasty Text: Given the location of the defect, shape of the defect and the proximity to free margins a H-plasty was deemed most appropriate for repair. Using a sterile surgical marker, the appropriate advancement arms of the H-plasty were drawn incorporating the defect and placing the expected incisions within the relaxed skin tension lines where possible. The area thus outlined was incised deep to adipose tissue with a #15 scalpel blade. The skin margins were undermined to an appropriate distance in all directions utilizing iris scissors.  The opposing advancement arms were then advanced and carried over into place in opposite direction and anchored with interrupted buried subcutaneous sutures.
Island Pedicle Flap Text: The defect edges were debeveled with a #15 scalpel blade. Given the location of the defect, shape of the defect and the proximity to free margins an island pedicle advancement flap was deemed most appropriate. Using a sterile surgical marker, an appropriate advancement flap was drawn incorporating the defect, outlining the appropriate donor tissue and placing the expected incisions within the relaxed skin tension lines where possible. The area thus outlined was incised deep to adipose tissue with a #15 scalpel blade. The skin margins were undermined to an appropriate distance in all directions around the primary defect and laterally outward around the island pedicle utilizing iris scissors.  There was minimal undermining beneath the pedicle flap. Following this, the flap was carried over into the primary defect and sutured into place.
Island Pedicle Flap With Canthal Suspension Text: The defect edges were debeveled with a #15 scalpel blade. Given the location of the defect, shape of the defect and the proximity to free margins an island pedicle advancement flap was deemed most appropriate. Using a sterile surgical marker, an appropriate advancement flap was drawn incorporating the defect, outlining the appropriate donor tissue and placing the expected incisions within the relaxed skin tension lines where possible. The area thus outlined was incised deep to adipose tissue with a #15 scalpel blade. The skin margins were undermined to an appropriate distance in all directions around the primary defect and laterally outward around the island pedicle utilizing iris scissors.  There was minimal undermining beneath the pedicle flap. A suspension suture was placed in the canthal tendon to prevent tension and prevent ectropion. Following this, the designed flap was placed into the primary defect and sutured into place.
Island Pedicle Flap-Requiring Vessel Identification Text: The defect edges were debeveled with a #15 scalpel blade. Given the location of the defect, shape of the defect and the proximity to free margins an island pedicle advancement flap was deemed most appropriate. Using a sterile surgical marker, an appropriate advancement flap was drawn, based on the axial vessel mentioned above, incorporating the defect, outlining the appropriate donor tissue and placing the expected incisions within the relaxed skin tension lines where possible. The area thus outlined was incised deep to adipose tissue with a #15 scalpel blade. The skin margins were undermined to an appropriate distance in all directions around the primary defect and laterally outward around the island pedicle utilizing iris scissors.  There was minimal undermining beneath the pedicle flap. Following this, the designed flap was carried over into the primary defect and sutured into place.
Keystone Flap Text: The defect edges were debeveled with a #15 scalpel blade. Given the location of the defect, shape of the defect a keystone flap was deemed most appropriate. Using a sterile surgical marker, an appropriate keystone flap was drawn incorporating the defect, outlining the appropriate donor tissue and placing the expected incisions within the relaxed skin tension lines where possible. The area thus outlined was incised deep to adipose tissue with a #15 scalpel blade. The skin margins were undermined to an appropriate distance in all directions around the primary defect and laterally outward around the flap utilizing iris scissors. Following this, the designed flap was carried into the primary defect and sutured into place.
Melolabial Transposition Flap Text: The defect edges were debeveled with a #15 scalpel blade. Given the location of the defect and the proximity to free margins a melolabial flap was deemed most appropriate. Using a sterile surgical marker, an appropriate melolabial transposition flap was drawn incorporating the defect. The area thus outlined was incised deep to adipose tissue with a #15 scalpel blade. The skin margins were undermined to an appropriate distance in all directions utilizing iris scissors. Following this, the designed flap was carried over into the primary defect and sutured into place.
Mercedes Flap Text: The defect edges were debeveled with a #15 scalpel blade. Given the location of the defect, shape of the defect and the proximity to free margins a Mercedes flap was deemed most appropriate. Using a sterile surgical marker, an appropriate advancement flap was drawn incorporating the defect and placing the expected incisions within the relaxed skin tension lines where possible. The area thus outlined was incised deep to adipose tissue with a #15 scalpel blade. The skin margins were undermined to an appropriate distance in all directions utilizing iris scissors. Following this, the designed flap was advanced and carried over into the primary defect and sutured into place.
Modified Advancement Flap Text: The defect edges were debeveled with a #15 scalpel blade. Given the location of the defect, shape of the defect and the proximity to free margins a modified advancement flap was deemed most appropriate. Using a sterile surgical marker, an appropriate advancement flap was drawn incorporating the defect and placing the expected incisions within the relaxed skin tension lines where possible. The area thus outlined was incised deep to adipose tissue with a #15 scalpel blade. The skin margins were undermined to an appropriate distance in all directions utilizing iris scissors. Following this, the designed flap was advanced and carried over into the primary defect and sutured into place.
Mucosal Advancement Flap Text: Given the location of the defect, shape of the defect and the proximity to free margins a mucosal advancement flap was deemed most appropriate. Incisions were made with a 15 blade scalpel in the appropriate fashion along the cutaneous vermilion border and the mucosal lip. The remaining actinically damaged mucosal tissue was excised.  The mucosal advancement flap was then elevated to the gingival sulcus with care taken to preserve the neurovascular structures and advanced into the primary defect. Care was taken to ensure that precise realignment of the vermilion border was achieved.
Muscle Hinge Flap Text: The defect edges were debeveled with a #15 scalpel blade.  Given the size, depth and location of the defect and the proximity to free margins a muscle hinge flap was deemed most appropriate. Using a sterile surgical marker, an appropriate hinge flap was drawn incorporating the defect. The area thus outlined was incised with a #15 scalpel blade. The skin margins were undermined to an appropriate distance in all directions utilizing iris scissors. Following this, the designed flap was carried into the primary defect and sutured into place.
Mustarde Flap Text: The defect edges were debeveled with a #15 scalpel blade.  Given the size, depth and location of the defect and the proximity to free margins a Mustarde flap was deemed most appropriate. Using a sterile surgical marker, an appropriate flap was drawn incorporating the defect. The area thus outlined was incised with a #15 scalpel blade. The skin margins were undermined to an appropriate distance in all directions utilizing iris scissors. Following this, the designed flap was carried into the primary defect and sutured into place.
Nasal Turnover Hinge Flap Text: The defect edges were debeveled with a #15 scalpel blade.  Given the size, depth, location of the defect and the defect being full thickness a nasal turnover hinge flap was deemed most appropriate. Using a sterile surgical marker, an appropriate hinge flap was drawn incorporating the defect. The area thus outlined was incised with a #15 scalpel blade. The flap was designed to recreate the nasal mucosal lining and the alar rim. The skin margins were undermined to an appropriate distance in all directions utilizing iris scissors. Following this, the designed flap was carried over into the primary defect and sutured into place
Nasalis-Muscle-Based Myocutaneous Island Pedicle Flap Text: Using a #15 blade, an incision was made around the donor flap to the level of the nasalis muscle. Wide lateral undermining was then performed in both the subcutaneous plane above the nasalis muscle, and in a submuscular plane just above periosteum. This allowed the formation of a free nasalis muscle axial pedicle (based on the angular artery) which was still attached to the actual cutaneous flap, increasing its mobility and vascular viability. Hemostasis was obtained with pinpoint electrocoagulation. The flap was mobilized into position and the pivotal anchor points positioned and stabilized with buried interrupted sutures. Subcutaneous and dermal tissues were closed in a multilayered fashion with sutures. Tissue redundancies were excised, and the epidermal edges were apposed without significant tension and sutured with sutures.
Nasalis Myocutaneous Flap Text: Using a #15 blade, an incision was made around the donor flap to the level of the nasalis muscle. Wide lateral undermining was then performed in both the subcutaneous plane above the nasalis muscle, and in a submuscular plane just above periosteum. This allowed the formation of a free nasalis muscle axial pedicle which was still attached to the actual cutaneous flap, increasing its mobility and vascular viability. Hemostasis was obtained with pinpoint electrocoagulation. The flap was mobilized into position and the pivotal anchor points positioned and stabilized with buried interrupted sutures. Subcutaneous and dermal tissues were closed in a multilayered fashion with sutures. Tissue redundancies were excised, and the epidermal edges were apposed without significant tension and sutured with sutures.
Nasolabial Transposition Flap Text: The defect edges were debeveled with a #15 scalpel blade.  Given the size, depth and location of the defect and the proximity to free margins a nasolabial transposition flap was deemed most appropriate. Using a sterile surgical marker, an appropriate flap was drawn incorporating the defect. The area thus outlined was incised with a #15 scalpel blade. The skin margins were undermined to an appropriate distance in all directions utilizing iris scissors. Following this, the designed flap was carried into the primary defect and sutured into place.
Orbicularis Oris Muscle Flap Text: The defect edges were debeveled with a #15 scalpel blade.  Given that the defect affected the competency of the oral sphincter an orbicularis oris muscle flap was deemed most appropriate to restore this competency and normal muscle function.  Using a sterile surgical marker, an appropriate flap was drawn incorporating the defect. The area thus outlined was incised with a #15 scalpel blade. Following this, the designed flap was carried over into the primary defect and sutured into place.
O-T Advancement Flap Text: The defect edges were debeveled with a #15 scalpel blade. Given the location of the defect, shape of the defect and the proximity to free margins an O-T advancement flap was deemed most appropriate. Using a sterile surgical marker, an appropriate advancement flap was drawn incorporating the defect and placing the expected incisions within the relaxed skin tension lines where possible. The area thus outlined was incised deep to adipose tissue with a #15 scalpel blade. The skin margins were undermined to an appropriate distance in all directions utilizing iris scissors. Following this, the designed flap was advanced and carried over into the primary defect and sutured into place.
O-T Plasty Text: The defect edges were debeveled with a #15 scalpel blade. Given the location of the defect, shape of the defect and the proximity to free margins an O-T plasty was deemed most appropriate. Using a sterile surgical marker, an appropriate O-T plasty was drawn incorporating the defect and placing the expected incisions within the relaxed skin tension lines where possible. The area thus outlined was incised deep to adipose tissue with a #15 scalpel blade. The skin margins were undermined to an appropriate distance in all directions utilizing iris scissors. Following this, the designed flap was carried over into the primary defect and sutured into place.
O-L Flap Text: The defect edges were debeveled with a #15 scalpel blade. Given the location of the defect, shape of the defect and the proximity to free margins an O-L flap was deemed most appropriate. Using a sterile surgical marker, an appropriate advancement flap was drawn incorporating the defect and placing the expected incisions within the relaxed skin tension lines where possible. The area thus outlined was incised deep to adipose tissue with a #15 scalpel blade. The skin margins were undermined to an appropriate distance in all directions utilizing iris scissors. Following this, the designed flap was advanced and carried over into the primary defect and sutured into place.
O-Z Flap Text: The defect edges were debeveled with a #15 scalpel blade. Given the location of the defect, shape of the defect and the proximity to free margins an O-Z flap was deemed most appropriate. Using a sterile surgical marker, an appropriate transposition flap was drawn incorporating the defect and placing the expected incisions within the relaxed skin tension lines where possible. The area thus outlined was incised deep to adipose tissue with a #15 scalpel blade. The skin margins were undermined to an appropriate distance in all directions utilizing iris scissors. Following this, the designed flap was carried over into the primary defect and sutured into place.
O-Z Plasty Text: The defect edges were debeveled with a #15 scalpel blade. Given the location of the defect, shape of the defect and the proximity to free margins an O-Z plasty (double transposition flap) was deemed most appropriate. Using a sterile surgical marker, the appropriate transposition flaps were drawn incorporating the defect and placing the expected incisions within the relaxed skin tension lines where possible. The area thus outlined was incised deep to adipose tissue with a #15 scalpel blade. The skin margins were undermined to an appropriate distance in all directions utilizing iris scissors. Hemostasis was achieved with electrocautery. The flaps were then transposed and carried over into place, one clockwise and the other counterclockwise, and anchored with interrupted buried subcutaneous sutures.
Peng Advancement Flap Text: The defect edges were debeveled with a #15 scalpel blade. Given the location of the defect, shape of the defect and the proximity to free margins a Peng advancement flap was deemed most appropriate. Using a sterile surgical marker, an appropriate advancement flap was drawn incorporating the defect and placing the expected incisions within the relaxed skin tension lines where possible. The area thus outlined was incised deep to adipose tissue with a #15 scalpel blade. The skin margins were undermined to an appropriate distance in all directions utilizing iris scissors. Following this, the designed flap was advanced and carried over into the primary defect and sutured into place.
Rectangular Flap Text: The defect edges were debeveled with a #15 scalpel blade. Given the location of the defect and the proximity to free margins a rectangular flap was deemed most appropriate. Using a sterile surgical marker, an appropriate rectangular flap was drawn incorporating the defect. The area thus outlined was incised deep to adipose tissue with a #15 scalpel blade. The skin margins were undermined to an appropriate distance in all directions utilizing iris scissors. Following this, the designed flap was carried over into the primary defect and sutured into place.
Rhombic Flap Text: The defect edges were debeveled with a #15 scalpel blade. Given the location of the defect and the proximity to free margins a rhombic flap was deemed most appropriate. Using a sterile surgical marker, an appropriate rhombic flap was drawn incorporating the defect. The area thus outlined was incised deep to adipose tissue with a #15 scalpel blade. The skin margins were undermined to an appropriate distance in all directions utilizing iris scissors. Following this, the designed flap was carried over into the primary defect and sutured into place.
Rhomboid Transposition Flap Text: The defect edges were debeveled with a #15 scalpel blade. Given the location of the defect and the proximity to free margins a rhomboid transposition flap was deemed most appropriate. Using a sterile surgical marker, an appropriate rhomboid flap was drawn incorporating the defect. The area thus outlined was incised deep to adipose tissue with a #15 scalpel blade. The skin margins were undermined to an appropriate distance in all directions utilizing iris scissors. Following this, the designed flap was carried over into the primary defect and sutured into place.
Rotation Flap Text: The defect edges were debeveled with a #15 scalpel blade. Given the location of the defect, shape of the defect and the proximity to free margins a rotation flap was deemed most appropriate. Using a sterile surgical marker, an appropriate rotation flap was drawn incorporating the defect and placing the expected incisions within the relaxed skin tension lines where possible. The area thus outlined was incised deep to adipose tissue with a #15 scalpel blade. The skin margins were undermined to an appropriate distance in all directions utilizing iris scissors. Following this, the designed flap was carried over into the primary defect and sutured into place.
Spiral Flap Text: The defect edges were debeveled with a #15 scalpel blade. Given the location of the defect, shape of the defect and the proximity to free margins a spiral flap was deemed most appropriate. Using a sterile surgical marker, an appropriate rotation flap was drawn incorporating the defect and placing the expected incisions within the relaxed skin tension lines where possible. The area thus outlined was incised deep to adipose tissue with a #15 scalpel blade. The skin margins were undermined to an appropriate distance in all directions utilizing iris scissors. Following this, the designed flap was carried over into the primary defect and sutured into place.
Staged Advancement Flap Text: The defect edges were debeveled with a #15 scalpel blade. Given the location of the defect, shape of the defect and the proximity to free margins a staged advancement flap was deemed most appropriate. Using a sterile surgical marker, an appropriate advancement flap was drawn incorporating the defect and placing the expected incisions within the relaxed skin tension lines where possible. The area thus outlined was incised deep to adipose tissue with a #15 scalpel blade. The skin margins were undermined to an appropriate distance in all directions utilizing iris scissors. Following this, the designed flap was carried over into the primary defect and sutured into place.
Star Wedge Flap Text: The defect edges were debeveled with a #15 scalpel blade. Given the location of the defect, shape of the defect and the proximity to free margins a star wedge flap was deemed most appropriate. Using a sterile surgical marker, an appropriate rotation flap was drawn incorporating the defect and placing the expected incisions within the relaxed skin tension lines where possible. The area thus outlined was incised deep to adipose tissue with a #15 scalpel blade. The skin margins were undermined to an appropriate distance in all directions utilizing iris scissors. Following this, the designed flap was carried over into the primary defect and sutured into place.
Transposition Flap Text: The defect edges were debeveled with a #15 scalpel blade. Given the location of the defect and the proximity to free margins a transposition flap was deemed most appropriate. Using a sterile surgical marker, an appropriate transposition flap was drawn incorporating the defect. The area thus outlined was incised deep to adipose tissue with a #15 scalpel blade. The skin margins were undermined to an appropriate distance in all directions utilizing iris scissors. Following this, the designed flap was carried over into the primary defect and sutured into place.
Trilobed Flap Text: The defect edges were debeveled with a #15 scalpel blade. Given the location of the defect and the proximity to free margins a trilobed flap was deemed most appropriate. Using a sterile surgical marker, an appropriate trilobed flap was drawn around the defect. The area thus outlined was incised deep to adipose tissue with a #15 scalpel blade. The skin margins were undermined to an appropriate distance in all directions utilizing iris scissors. Following this, the designed flap was carried into the primary defect and sutured into place.
V-Y Flap Text: The defect edges were debeveled with a #15 scalpel blade. Given the location of the defect, shape of the defect and the proximity to free margins a V-Y flap was deemed most appropriate. Using a sterile surgical marker, an appropriate advancement flap was drawn incorporating the defect and placing the expected incisions within the relaxed skin tension lines where possible. The area thus outlined was incised deep to adipose tissue with a #15 scalpel blade. The skin margins were undermined to an appropriate distance in all directions utilizing iris scissors. Following this, the designed flap was advanced and carried over into the primary defect and sutured into place.
V-Y Plasty Text: The defect edges were debeveled with a #15 scalpel blade. Given the location of the defect, shape of the defect and the proximity to free margins an V-Y advancement flap was deemed most appropriate. Using a sterile surgical marker, an appropriate advancement flap was drawn incorporating the defect and placing the expected incisions within the relaxed skin tension lines where possible. The area thus outlined was incised deep to adipose tissue with a #15 scalpel blade. The skin margins were undermined to an appropriate distance in all directions utilizing iris scissors. Following this, the designed flap was advanced and carried over into the primary defect and sutured into place.
W Plasty Text: The lesion was extirpated to the level of the fat with a #15 scalpel blade. Given the location of the defect, shape of the defect and the proximity to free margins a W-plasty was deemed most appropriate for repair. Using a sterile surgical marker, the appropriate transposition arms of the W-plasty were drawn incorporating the defect and placing the expected incisions within the relaxed skin tension lines where possible. The area thus outlined was incised deep to adipose tissue with a #15 scalpel blade. The skin margins were undermined to an appropriate distance in all directions utilizing iris scissors. The opposing transposition arms were then transposed and carried over into place in opposite direction and anchored with interrupted buried subcutaneous sutures.
Z Plasty Text: The lesion was extirpated to the level of the fat with a #15 scalpel blade. Given the location of the defect, shape of the defect and the proximity to free margins a Z-plasty was deemed most appropriate for repair. Using a sterile surgical marker, the appropriate transposition arms of the Z-plasty were drawn incorporating the defect and placing the expected incisions within the relaxed skin tension lines where possible. The area thus outlined was incised deep to adipose tissue with a #15 scalpel blade. The skin margins were undermined to an appropriate distance in all directions utilizing iris scissors. The opposing transposition arms were then transposed and carried over into place in opposite direction and anchored with interrupted buried subcutaneous sutures.
Zygomaticofacial Flap Text: Given the location of the defect, shape of the defect and the proximity to free margins a zygomaticofacial flap was deemed most appropriate for repair. Using a sterile surgical marker, the appropriate flap was drawn incorporating the defect and placing the expected incisions within the relaxed skin tension lines where possible. The area thus outlined was incised deep to adipose tissue with a #15 scalpel blade with preservation of a vascular pedicle.  The skin margins were undermined to an appropriate distance in all directions utilizing iris scissors. The flap was then carried over into the defect and anchored with interrupted buried subcutaneous sutures.
Abbe Flap (Lower To Upper Lip) Text: The defect of the upper lip was assessed and measured.  Given the location and size of the defect, an Abbe flap was deemed most appropriate. Using a sterile surgical marker, an appropriate Abbe flap was measured and drawn on the lower lip. Local anesthesia was then infiltrated. A scalpel was then used to incise the upper lip through and through the skin, vermilion, muscle and mucosa, leaving the flap pedicled on the opposite side.  The flap was then rotated and transferred to the lower lip defect.  The flap was then sutured into place with a three layer technique, closing the orbicularis oris muscle layer with subcutaneous buried sutures, followed by a mucosal layer and an epidermal layer.
Abbe Flap (Upper To Lower Lip) Text: The defect of the lower lip was assessed and measured.  Given the location and size of the defect, an Abbe flap was deemed most appropriate. Using a sterile surgical marker, an appropriate Abbe flap was measured and drawn on the upper lip. Local anesthesia was then infiltrated.  A scalpel was then used to incise the upper lip through and through the skin, vermilion, muscle and mucosa, leaving the flap pedicled on the opposite side.  The flap was then rotated and transferred to the lower lip defect.  The flap was then sutured into place with a three layer technique, closing the orbicularis oris muscle layer with subcutaneous buried sutures, followed by a mucosal layer and an epidermal layer.
Cheek Interpolation Flap Text: A decision was made to reconstruct the defect utilizing an interpolation axial flap and a staged reconstruction.  A telfa template was made of the defect.  This telfa template was then used to outline the Cheek Interpolation flap.  The donor area for the pedicle flap was then injected with anesthesia.  The flap was excised through the skin and subcutaneous tissue down to the layer of the underlying musculature.  The interpolation flap was carefully excised within this deep plane to maintain its blood supply.  The edges of the donor site were undermined.   The donor site was closed in a primary fashion.  The pedicle was then rotated into position and sutured.  Once the tube was sutured into place, adequate blood supply was confirmed with blanching and refill.  The pedicle was then wrapped with xeroform gauze and dressed appropriately with a telfa and gauze bandage to ensure continued blood supply and protect the attached pedicle.
Cheek-To-Nose Interpolation Flap Text: A decision was made to reconstruct the defect utilizing an interpolation axial flap and a staged reconstruction.  A telfa template was made of the defect.  This telfa template was then used to outline the Cheek-To-Nose Interpolation flap.  The donor area for the pedicle flap was then injected with anesthesia.  The flap was excised through the skin and subcutaneous tissue down to the layer of the underlying musculature.  The interpolation flap was carefully excised within this deep plane to maintain its blood supply.  The edges of the donor site were undermined.   The donor site was closed in a primary fashion.  The pedicle was then rotated into position and sutured.  Once the tube was sutured into place, adequate blood supply was confirmed with blanching and refill.  The pedicle was then wrapped with xeroform gauze and dressed appropriately with a telfa and gauze bandage to ensure continued blood supply and protect the attached pedicle.
Estlander Flap (Lower To Upper Lip) Text: The defect of the lower lip was assessed and measured.  Given the location and size of the defect, an Estlander flap was deemed most appropriate. Using a sterile surgical marker, an appropriate Estlander flap was measured and drawn on the upper lip. Local anesthesia was then infiltrated. A scalpel was then used to incise the lateral aspect of the flap, through skin, muscle and mucosa, leaving the flap pedicled medially.  The flap was then rotated and positioned to fill the lower lip defect.  The flap was then sutured into place with a three layer technique, closing the orbicularis oris muscle layer with subcutaneous buried sutures, followed by a mucosal layer and an epidermal layer.
Interpolation Flap Text: A decision was made to reconstruct the defect utilizing an interpolation axial flap and a staged reconstruction.  A telfa template was made of the defect.  This telfa template was then used to outline the interpolation flap.  The donor area for the pedicle flap was then injected with anesthesia.  The flap was excised through the skin and subcutaneous tissue down to the layer of the underlying musculature.  The interpolation flap was carefully excised within this deep plane to maintain its blood supply.  The edges of the donor site were undermined.   The donor site was closed in a primary fashion.  The pedicle was then rotated into position and sutured.  Once the tube was sutured into place, adequate blood supply was confirmed with blanching and refill.  The pedicle was then wrapped with xeroform gauze and dressed appropriately with a telfa and gauze bandage to ensure continued blood supply and protect the attached pedicle.
Melolabial Interpolation Flap Text: A decision was made to reconstruct the defect utilizing an interpolation axial flap and a staged reconstruction.  A telfa template was made of the defect.  This telfa template was then used to outline the melolabial interpolation flap.  The donor area for the pedicle flap was then injected with anesthesia.  The flap was excised through the skin and subcutaneous tissue down to the layer of the underlying musculature.  The pedicle flap was carefully excised within this deep plane to maintain its blood supply.  The edges of the donor site were undermined.   The donor site was closed in a primary fashion.  The pedicle was then rotated into position and sutured.  Once the tube was sutured into place, adequate blood supply was confirmed with blanching and refill.  The pedicle was then wrapped with xeroform gauze and dressed appropriately with a telfa and gauze bandage to ensure continued blood supply and protect the attached pedicle.
Mastoid Interpolation Flap Text: A decision was made to reconstruct the defect utilizing an interpolation axial flap and a staged reconstruction.  A telfa template was made of the defect.  This telfa template was then used to outline the mastoid interpolation flap.  The donor area for the pedicle flap was then injected with anesthesia.  The flap was excised through the skin and subcutaneous tissue down to the layer of the underlying musculature.  The pedicle flap was carefully excised within this deep plane to maintain its blood supply.  The edges of the donor site were undermined.   The donor site was closed in a primary fashion.  The pedicle was then rotated into position and sutured.  Once the tube was sutured into place, adequate blood supply was confirmed with blanching and refill.  The pedicle was then wrapped with xeroform gauze and dressed appropriately with a telfa and gauze bandage to ensure continued blood supply and protect the attached pedicle.
Paramedian Forehead Flap Text: A decision was made to reconstruct the defect utilizing an interpolation axial flap and a staged reconstruction.  A telfa template was made of the defect.  This telfa template was then used to outline the paramedian forehead pedicle flap.  The donor area for the pedicle flap was then injected with anesthesia.  The flap was excised through the skin and subcutaneous tissue down to the layer of the underlying musculature.  The pedicle flap was carefully excised within this deep plane to maintain its blood supply.  The edges of the donor site were undermined.   The donor site was closed in a primary fashion.  The pedicle was then rotated into position and sutured.  Once the tube was sutured into place, adequate blood supply was confirmed with blanching and refill.  The pedicle was then wrapped with xeroform gauze and dressed appropriately with a telfa and gauze bandage to ensure continued blood supply and protect the attached pedicle.
Posterior Auricular Interpolation Flap Text: A decision was made to reconstruct the defect utilizing an interpolation axial flap and a staged reconstruction.  A telfa template was made of the defect.  This telfa template was then used to outline the posterior auricular interpolation flap.  The donor area for the pedicle flap was then injected with anesthesia.  The flap was excised through the skin and subcutaneous tissue down to the layer of the underlying musculature.  The pedicle flap was carefully excised within this deep plane to maintain its blood supply.  The edges of the donor site were undermined.   The donor site was closed in a primary fashion.  The pedicle was then rotated into position and sutured.  Once the tube was sutured into place, adequate blood supply was confirmed with blanching and refill.  The pedicle was then wrapped with xeroform gauze and dressed appropriately with a telfa and gauze bandage to ensure continued blood supply and protect the attached pedicle.
Cheiloplasty (Less Than 50%) Text: A decision was made to reconstruct the defect with a  cheiloplasty.  The defect was undermined extensively.  Additional orbicularis oris muscle was excised with a 15 blade scalpel.  The defect was converted into a full thickness wedge, of less than 50% of the vertical height of the lip, to facilite a better cosmetic result.  Small vessels were then tied off with 5-0 monocyrl. The orbicularis oris, superficial fascia, adipose and dermis were then reapproximated.  After the deeper layers were approximated the epidermis was reapproximated with particular care given to realign the vermilion border.
Cheiloplasty (Complex) Text: A decision was made to reconstruct the defect with a  cheiloplasty.  The defect was undermined extensively.  Additional orbicularis oris muscle was excised with a 15 blade scalpel.  The defect was converted into a full thickness wedge to facilite a better cosmetic result.  Small vessels were then tied off with 5-0 monocyrl. The orbicularis oris, superficial fascia, adipose and dermis were then reapproximated.  After the deeper layers were approximated the epidermis was reapproximated with particular care given to realign the vermilion border.
Ear Wedge Repair Text: A wedge excision was completed by carrying down an excision through the full thickness of the ear and cartilage with an inward facing Burow's triangle. The wound was then closed in a layered fashion.
Full Thickness Lip Wedge Repair (Flap) Text: Given the location of the defect and the proximity to free margins a full thickness wedge repair was deemed most appropriate. Using a sterile surgical marker, the appropriate repair was drawn incorporating the defect and placing the expected incisions perpendicular to the vermilion border.  The vermilion border was also meticulously outlined to ensure appropriate reapproximation during the repair.  The area thus outlined was incised through and through with a #15 scalpel blade.  The muscularis and dermis were reaproximated with deep sutures following hemostasis. Care was taken to realign the vermilion border before proceeding with the superficial closure.  Once the vermilion was realigned the superfical and mucosal closure was finished.
Ftsg Text: The defect edges were debeveled with a #15 scalpel blade. Given the location of the defect, shape of the defect and the proximity to free margins a full thickness skin graft was deemed most appropriate. Using a sterile surgical marker, the primary defect shape was transferred to the donor site. The area thus outlined was incised deep to adipose tissue with a #15 scalpel blade.  The harvested graft was then trimmed of adipose tissue until only dermis and epidermis was left.  The skin margins of the secondary defect were undermined to an appropriate distance in all directions utilizing iris scissors.  The secondary defect was closed with interrupted buried subcutaneous sutures.  The skin edges were then re-apposed with running  sutures.  The skin graft was then placed in the primary defect and oriented appropriately.
Split-Thickness Skin Graft Text: The defect edges were debeveled with a #15 scalpel blade. Given the location of the defect, shape of the defect and the proximity to free margins a split thickness skin graft was deemed most appropriate. Using a sterile surgical marker, the primary defect shape was transferred to the donor site. The split thickness graft was then harvested.  The skin graft was then placed in the primary defect and oriented appropriately.
Pinch Graft Text: The defect edges were debeveled with a #15 scalpel blade. Given the location of the defect, shape of the defect and the proximity to free margins a pinch graft was deemed most appropriate. Using a sterile surgical marker, the primary defect shape was transferred to the donor site. The area thus outlined was incised deep to adipose tissue with a #15 scalpel blade.  The harvested graft was then trimmed of adipose tissue until only dermis and epidermis was left. The skin margins of the secondary defect were undermined to an appropriate distance in all directions utilizing iris scissors.  The secondary defect was closed with interrupted buried subcutaneous sutures.  The skin edges were then re-apposed with running  sutures.  The skin graft was then placed in the primary defect and oriented appropriately.
Burow's Graft Text: The defect edges were debeveled with a #15 scalpel blade. Given the location of the defect, shape of the defect, the proximity to free margins and the presence of a standing cone deformity a Burow's skin graft was deemed most appropriate. The standing cone was removed and this tissue was then trimmed to the shape of the primary defect. The adipose tissue was also removed until only dermis and epidermis were left.  The skin margins of the secondary defect were undermined to an appropriate distance in all directions utilizing iris scissors.  The secondary defect was closed with interrupted buried subcutaneous sutures.  The skin edges were then re-apposed with running  sutures.  The skin graft was then placed in the primary defect and oriented appropriately.
Cartilage Graft Text: The defect edges were debeveled with a #15 scalpel blade. Given the location of the defect, shape of the defect, the fact the defect involved a full thickness cartilage defect a cartilage graft was deemed most appropriate.  An appropriate donor site was identified, cleansed, and anesthetized. The cartilage graft was then harvested and transferred to the recipient site, oriented appropriately and then sutured into place.  The secondary defect was then repaired using a primary closure.
Composite Graft Text: The defect edges were debeveled with a #15 scalpel blade. Given the location of the defect, shape of the defect, the proximity to free margins and the fact the defect was full thickness a composite graft was deemed most appropriate.  The defect was outline and then transferred to the donor site.  A full thickness graft was then excised from the donor site. The graft was then placed in the primary defect, oriented appropriately and then sutured into place.  The secondary defect was then repaired using a primary closure.
Epidermal Autograft Text: The defect edges were debeveled with a #15 scalpel blade. Given the location of the defect, shape of the defect and the proximity to free margins an epidermal autograft was deemed most appropriate. Using a sterile surgical marker, the primary defect shape was transferred to the donor site. The epidermal graft was then harvested.  The skin graft was then placed in the primary defect and oriented appropriately.
Dermal Autograft Text: The defect edges were debeveled with a #15 scalpel blade. Given the location of the defect, shape of the defect and the proximity to free margins a dermal autograft was deemed most appropriate. Using a sterile surgical marker, the primary defect shape was transferred to the donor site. The area thus outlined was incised deep to adipose tissue with a #15 scalpel blade.  The harvested graft was then trimmed of adipose and epidermal tissue until only dermis was left.  The skin graft was then placed in the primary defect and oriented appropriately.
Skin Substitute Text: The defect edges were debeveled with a #15 scalpel blade. Given the location of the defect, shape of the defect and the proximity to free margins a skin substitute graft was deemed most appropriate.  The graft material was trimmed to fit the size of the defect. The graft was then placed in the primary defect and oriented appropriately.
Skin Substitute Paste Text: The defect edges were debeveled with a #15 scalpel blade. Given the location of the defect, shape of the defect and the proximity to free margins a skin substitute micronized graft was deemed most appropriate.  The entire vial contents were admixed with 0.5ccs of sterile saline, formed into a paste and then evenly spread over the entire wound bed.
Skin Substitute Injection Text: The defect edges were debeveled with a #15 scalpel blade. Given the location of the defect, shape of the defect and the proximity to free margins a skin substitute micronized graft was deemed most appropriate.  The entire vial contents were admixed with 3.0ccs of sterile saline and then injected subcutaneously throughout the entire wound bed.
Tissue Cultured Epidermal Autograft Text: The defect edges were debeveled with a #15 scalpel blade. Given the location of the defect, shape of the defect and the proximity to free margins a tissue cultured epidermal autograft was deemed most appropriate.  The graft was then trimmed to fit the size of the defect.  The graft was then placed in the primary defect and oriented appropriately.
Xenograft Text: The defect edges were debeveled with a #15 scalpel blade. Given the location of the defect, shape of the defect and the proximity to free margins a xenograft was deemed most appropriate.  The graft was then trimmed to fit the size of the defect.  The graft was then placed in the primary defect and oriented appropriately.
Purse String (Simple) Text: Given the location of the defect and the characteristics of the surrounding skin a purse string closure was deemed most appropriate.  Undermining was performed circumferentially around the surgical defect.  A purse string suture was then placed and tightened.
Purse String (Intermediate) Text: Given the location of the defect and the characteristics of the surrounding skin a purse string intermediate closure was deemed most appropriate.  Undermining was performed circumferentially around the surgical defect.  A purse string suture was then placed and tightened.
Partial Purse String (Simple) Text: Given the location of the defect and the characteristics of the surrounding skin a simple purse string closure was deemed most appropriate.  Undermining was performed circumferentially around the surgical defect.  A purse string suture was then placed and tightened. Wound tension only allowed a partial closure of the circular defect.
Partial Purse String (Intermediate) Text: Given the location of the defect and the characteristics of the surrounding skin an intermediate purse string closure was deemed most appropriate.  Undermining was performed circumferentially around the surgical defect.  A purse string suture was then placed and tightened. Wound tension only allowed a partial closure of the circular defect.
Localized Dermabrasion With Wire Brush Text: The patient was draped in routine manner.  Localized dermabrasion using 3 x 17 mm wire brush was performed in routine manner to papillary dermis. This spot dermabrasion is being performed to complete skin cancer reconstruction. It also will eliminate the other sun damaged precancerous cells that are known to be part of the regional effect of a lifetime's worth of sun exposure. This localized dermabrasion is therapeutic and should not be considered cosmetic in any regard.
Localized Dermabrasion With Sand Papertext: The patient was draped in routine manner.  Localized dermabrasion using sterile sand paper was performed in routine manner to papillary dermis. This spot dermabrasion is being performed to complete skin cancer reconstruction. It also will eliminate the other sun damaged precancerous cells that are known to be part of the regional effect of a lifetime's worth of sun exposure. This localized dermabrasion is therapeutic and should not be considered cosmetic in any regard.
Localized Dermabrasion With 15 Blade Text: The patient was draped in routine manner.  Localized dermabrasion using a 15 blade was performed in routine manner to papillary dermis. This spot dermabrasion is being performed to complete skin cancer reconstruction. It also will eliminate the other sun damaged precancerous cells that are known to be part of the regional effect of a lifetime's worth of sun exposure. This localized dermabrasion is therapeutic and should not be considered cosmetic in any regard.
Tarsorrhaphy Text: A tarsorrhaphy was performed using Frost sutures.
Intermediate Repair And Flap Additional Text (Will Appearing After The Standard Complex Repair Text): The intermediate repair was not sufficient to completely close the primary defect. The remaining additional defect was repaired with the flap mentioned below.
Intermediate Repair And Graft Additional Text (Will Appearing After The Standard Complex Repair Text): The intermediate repair was not sufficient to completely close the primary defect. The remaining additional defect was repaired with the graft mentioned below.
Complex Repair And Flap Additional Text (Will Appearing After The Standard Complex Repair Text): The complex repair was not sufficient to completely close the primary defect. The remaining additional defect was repaired with the flap mentioned below.
Complex Repair And Graft Additional Text (Will Appearing After The Standard Complex Repair Text): The complex repair was not sufficient to completely close the primary defect. The remaining additional defect was repaired with the graft mentioned below.
Eyelid Full Thickness Repair - 20513: The eyelid defect was full thickness which required a wedge repair of the eyelid. Special care was taken to ensure that the eyelid margin was realligned when placing sutures.
Eyelid Partial Thickness Repair - 87860: The eyelid defect was partial thickness which required a wedge repair of the eyelid. Special care was taken to ensure that the eyelid margin was realligned when placing sutures.
Cheek Interpolation Flap Division And Inset Text: Division and inset of the cheek interpolation flap was performed to achieve optimal aesthetic result, restore normal anatomic appearance and avoid distortion of normal anatomy, expedite and facilitate wound healing, achieve optimal functional result and because linear closure either not possible or would produce suboptimal result. The patient was prepped and draped in the usual manner. The pedicle was infiltrated with local anesthesia. The pedicle was sectioned with a #15 blade. The pedicle was de-bulked and trimmed to match the shape of the defect. Hemostasis was achieved. The flap donor site and free margin of the flap were secured with deep buried sutures and the wound edges were re-approximated.
Cheek To Nose Interpolation Flap Division And Inset Text: Division and inset of the cheek to nose interpolation flap was performed to achieve optimal aesthetic result, restore normal anatomic appearance and avoid distortion of normal anatomy, expedite and facilitate wound healing, achieve optimal functional result and because linear closure either not possible or would produce suboptimal result. The patient was prepped and draped in the usual manner. The pedicle was infiltrated with local anesthesia. The pedicle was sectioned with a #15 blade. The pedicle was de-bulked and trimmed to match the shape of the defect. Hemostasis was achieved. The flap donor site and free margin of the flap were secured with deep buried sutures and the wound edges were re-approximated.
Melolabial Interpolation Flap Division And Inset Text: Division and inset of the melolabial interpolation flap was performed to achieve optimal aesthetic result, restore normal anatomic appearance and avoid distortion of normal anatomy, expedite and facilitate wound healing, achieve optimal functional result and because linear closure either not possible or would produce suboptimal result. The patient was prepped and draped in the usual manner. The pedicle was infiltrated with local anesthesia. The pedicle was sectioned with a #15 blade. The pedicle was de-bulked and trimmed to match the shape of the defect. Hemostasis was achieved. The flap donor site and free margin of the flap were secured with deep buried sutures and the wound edges were re-approximated.
Mastoid Interpolation Flap Division And Inset Text: Division and inset of the mastoid interpolation flap was performed to achieve optimal aesthetic result, restore normal anatomic appearance and avoid distortion of normal anatomy, expedite and facilitate wound healing, achieve optimal functional result and because linear closure either not possible or would produce suboptimal result. The patient was prepped and draped in the usual manner. The pedicle was infiltrated with local anesthesia. The pedicle was sectioned with a #15 blade. The pedicle was de-bulked and trimmed to match the shape of the defect. Hemostasis was achieved. The flap donor site and free margin of the flap were secured with deep buried sutures and the wound edges were re-approximated.
Paramedian Forehead Flap Division And Inset Text: Division and inset of the paramedian forehead flap was performed to achieve optimal aesthetic result, restore normal anatomic appearance and avoid distortion of normal anatomy, expedite and facilitate wound healing, achieve optimal functional result and because linear closure either not possible or would produce suboptimal result. The patient was prepped and draped in the usual manner. The pedicle was infiltrated with local anesthesia. The pedicle was sectioned with a #15 blade. The pedicle was de-bulked and trimmed to match the shape of the defect. Hemostasis was achieved. The flap donor site and free margin of the flap were secured with deep buried sutures and the wound edges were re-approximated.
Posterior Auricular Interpolation Flap Division And Inset Text: Division and inset of the posterior auricular interpolation flap was performed to achieve optimal aesthetic result, restore normal anatomic appearance and avoid distortion of normal anatomy, expedite and facilitate wound healing, achieve optimal functional result and because linear closure either not possible or would produce suboptimal result. The patient was prepped and draped in the usual manner. The pedicle was infiltrated with local anesthesia. The pedicle was sectioned with a #15 blade. The pedicle was de-bulked and trimmed to match the shape of the defect. Hemostasis was achieved. The flap donor site and free margin of the flap were secured with deep buried sutures and the wound edges were re-approximated.
Manual Repair Warning Statement: We plan on removing the manually selected variable below in favor of our much easier automatic structured text blocks found in the previous tab. We decided to do this to help make the flow better and give you the full power of structured data. Manual selection is never going to be ideal in our platform and I would encourage you to avoid using manual selection from this point on, especially since I will be sunsetting this feature. It is important that you do one of two things with the customized text below. First, you can save all of the text in a word file so you can have it for future reference. Second, transfer the text to the appropriate area in the Library tab. Lastly, if there is a flap or graft type which we do not have you need to let us know right away so I can add it in before the variable is hidden. No need to panic, we plan to give you roughly 6 months to make the change.
Consent: The rationale for the repair was explained to the patient and consent was obtained. The risks, benefits and alternatives to therapy were discussed in detail. Specifically, the risks of infection, scarring, bleeding, prolonged wound healing, incomplete removal, allergy to anesthesia, nerve injury and recurrence were addressed. Prior to the procedure, the treatment site was clearly identified and confirmed by the patient. All components of Universal Protocol/PAUSE Rule completed.
Post-Care Instructions: I reviewed with the patient in detail post-care instructions. Patient is not to engage in any heavy lifting, exercise, or swimming for the next 14 days. Should the patient develop any fevers, chills, bleeding, severe pain patient will contact the office immediately.
Pain Refusal Text: I offered to prescribe pain medication but the patient refused to take this medication.
Show Asc Variables: Yes
Where Do You Want The Question To Include Opioid Counseling Located?: Case Summary Tab
Information: Selecting Yes will display possible errors in your note based on the variables you have selected. This validation is only offered as a suggestion for you. PLEASE NOTE THAT THE VALIDATION TEXT WILL BE REMOVED WHEN YOU FINALIZE YOUR NOTE. IF YOU WANT TO FAX A PRELIMINARY NOTE YOU WILL NEED TO TOGGLE THIS TO 'NO' IF YOU DO NOT WANT IT IN YOUR FAXED NOTE.

## 2024-11-14 ENCOUNTER — APPOINTMENT (RX ONLY)
Dept: URBAN - METROPOLITAN AREA CLINIC 36 | Facility: CLINIC | Age: 69
Setting detail: DERMATOLOGY
End: 2024-11-14

## 2024-11-14 DIAGNOSIS — Z48.817 ENCOUNTER FOR SURGICAL AFTERCARE FOLLOWING SURGERY ON THE SKIN AND SUBCUTANEOUS TISSUE: ICD-10-CM

## 2024-11-14 PROCEDURE — ? POST-OP WOUND CHECK

## 2024-11-14 ASSESSMENT — LOCATION SIMPLE DESCRIPTION DERM: LOCATION SIMPLE: NOSE

## 2024-11-14 ASSESSMENT — LOCATION DETAILED DESCRIPTION DERM: LOCATION DETAILED: NASAL DORSUM

## 2024-11-14 ASSESSMENT — LOCATION ZONE DERM: LOCATION ZONE: NOSE

## 2024-11-14 NOTE — PROCEDURE: POST-OP WOUND CHECK
Detail Level: Detailed
Add 74240 Cpt? (Important Note: In 2017 The Use Of 83019 Is Being Tracked By Cms To Determine Future Global Period Reimbursement For Global Periods): no

## 2024-11-20 ENCOUNTER — APPOINTMENT (RX ONLY)
Dept: URBAN - METROPOLITAN AREA CLINIC 36 | Facility: CLINIC | Age: 69
Setting detail: DERMATOLOGY
End: 2024-11-20

## 2024-11-20 DIAGNOSIS — Z48.02 ENCOUNTER FOR REMOVAL OF SUTURES: ICD-10-CM

## 2024-11-20 PROCEDURE — ? SUTURE REMOVAL (GLOBAL PERIOD)

## 2024-11-20 ASSESSMENT — LOCATION DETAILED DESCRIPTION DERM: LOCATION DETAILED: NASAL DORSUM

## 2024-11-20 ASSESSMENT — LOCATION ZONE DERM: LOCATION ZONE: NOSE

## 2024-11-20 ASSESSMENT — LOCATION SIMPLE DESCRIPTION DERM: LOCATION SIMPLE: NOSE

## 2024-11-20 NOTE — PROCEDURE: SUTURE REMOVAL (GLOBAL PERIOD)
Detail Level: Detailed
Add 03515 Cpt? (Important Note: In 2017 The Use Of 67587 Is Being Tracked By Cms To Determine Future Global Period Reimbursement For Global Periods): no

## 2024-12-05 ENCOUNTER — APPOINTMENT (OUTPATIENT)
Dept: URBAN - METROPOLITAN AREA CLINIC 36 | Facility: CLINIC | Age: 69
Setting detail: DERMATOLOGY
End: 2024-12-05

## 2024-12-05 DIAGNOSIS — Z48.817 ENCOUNTER FOR SURGICAL AFTERCARE FOLLOWING SURGERY ON THE SKIN AND SUBCUTANEOUS TISSUE: ICD-10-CM

## 2024-12-05 DIAGNOSIS — Z41.9 ENCOUNTER FOR PROCEDURE FOR PURPOSES OTHER THAN REMEDYING HEALTH STATE, UNSPECIFIED: ICD-10-CM

## 2024-12-05 PROCEDURE — ? POST-OP WOUND CHECK

## 2024-12-05 PROCEDURE — ? CO2RE

## 2024-12-05 ASSESSMENT — LOCATION DETAILED DESCRIPTION DERM: LOCATION DETAILED: NASAL DORSUM

## 2024-12-05 ASSESSMENT — LOCATION SIMPLE DESCRIPTION DERM: LOCATION SIMPLE: NOSE

## 2024-12-05 ASSESSMENT — LOCATION ZONE DERM: LOCATION ZONE: NOSE

## 2024-12-05 NOTE — PROCEDURE: CO2RE
Anesthesia Type: 1% lidocaine with epinephrine
Post-Care Instructions: I reviewed with the patient in detail post-care instructions. Patient should avoid sun until area fully healed. Pt should apply vaseline to treated areas, and remove crusts gently with water-vinegar soaks.
Laser Mode: Light
Core Energy (Mj): 80
Was Eye Protection Used?: No
Treatment Number: 1
Energy (Mj): 70
Other Location: left distal pretibial region
Detail Level: Simple
Eye Protection Text: A corneal shield was inserted after appropriate application of topical anesthesia.
Laser Mode: Deep
External Cooling Fan Speed: 5
Price (Use Numbers Only, No Special Characters Or $): 0
Consent: Written consent obtained, risks reviewed including but not limited to crusting, scabbing, blistering, scarring, darker or lighter pigmentary change, incomplete improvement of dyschromia, wrinkles, prolonged erythema and facial swelling, infection and bleeding.

## 2024-12-05 NOTE — PROCEDURE: POST-OP WOUND CHECK
Detail Level: Detailed
Add 95566 Cpt? (Important Note: In 2017 The Use Of 83967 Is Being Tracked By Cms To Determine Future Global Period Reimbursement For Global Periods): no
Wound Evaluated By: S.E.

## 2024-12-06 DIAGNOSIS — E29.1 HYPOGONADISM MALE: ICD-10-CM

## 2024-12-09 RX ORDER — TESTOSTERONE CYPIONATE 200 MG/ML
200 INJECTION, SOLUTION INTRAMUSCULAR
Qty: 6 ML | Refills: 0 | Status: SHIPPED | OUTPATIENT
Start: 2024-12-09 | End: 2024-12-31

## 2024-12-14 DIAGNOSIS — E78.5 DYSLIPIDEMIA: ICD-10-CM

## 2024-12-14 DIAGNOSIS — R00.0 TACHYCARDIA: ICD-10-CM

## 2024-12-14 DIAGNOSIS — I25.84 CORONARY ARTERY DISEASE DUE TO CALCIFIED CORONARY LESION: ICD-10-CM

## 2024-12-14 DIAGNOSIS — I10 ESSENTIAL HYPERTENSION: ICD-10-CM

## 2024-12-14 DIAGNOSIS — I25.10 CORONARY ARTERY DISEASE DUE TO CALCIFIED CORONARY LESION: ICD-10-CM

## 2024-12-16 RX ORDER — LISINOPRIL 20 MG/1
TABLET ORAL
Qty: 90 TABLET | Refills: 0 | Status: SHIPPED | OUTPATIENT
Start: 2024-12-16

## 2024-12-16 RX ORDER — PRAVASTATIN SODIUM 80 MG/1
TABLET ORAL
Qty: 90 TABLET | Refills: 0 | Status: SHIPPED | OUTPATIENT
Start: 2024-12-16

## 2024-12-16 RX ORDER — METOPROLOL TARTRATE 50 MG
50 TABLET ORAL 2 TIMES DAILY
Qty: 180 TABLET | Refills: 0 | Status: SHIPPED | OUTPATIENT
Start: 2024-12-16

## 2024-12-18 ENCOUNTER — HOSPITAL ENCOUNTER (OUTPATIENT)
Dept: CARDIOLOGY | Facility: MEDICAL CENTER | Age: 69
End: 2024-12-18
Attending: INTERNAL MEDICINE
Payer: MEDICARE

## 2024-12-18 DIAGNOSIS — I35.1 NONRHEUMATIC AORTIC VALVE INSUFFICIENCY: ICD-10-CM

## 2024-12-18 LAB
LV EJECT FRACT  99904: 65
LV EJECT FRACT MOD 2C 99903: 67.25
LV EJECT FRACT MOD 4C 99902: 65.67
LV EJECT FRACT MOD BP 99901: 66.43

## 2024-12-18 PROCEDURE — 93306 TTE W/DOPPLER COMPLETE: CPT

## 2024-12-18 PROCEDURE — 93306 TTE W/DOPPLER COMPLETE: CPT | Mod: 26 | Performed by: INTERNAL MEDICINE

## 2024-12-23 ENCOUNTER — TELEPHONE (OUTPATIENT)
Dept: CARDIOLOGY | Facility: MEDICAL CENTER | Age: 69
End: 2024-12-23
Payer: MEDICARE

## 2024-12-23 NOTE — TELEPHONE ENCOUNTER
----- Message from Physician Kostas León M.D. sent at 12/21/2024  4:51 PM PST -----  I will talk to her than  ----- Message -----  From: Carola Bray R.N.  Sent: 12/20/2024   4:34 PM PST  To: Kostas León M.D.; #    To AK, please advise. Pt has FV with you 1/8 ~thank you     To structural for moderate aortic regurgitation

## 2024-12-31 DIAGNOSIS — E29.1 HYPOGONADISM MALE: ICD-10-CM

## 2024-12-31 RX ORDER — TESTOSTERONE CYPIONATE 200 MG/ML
INJECTION, SOLUTION INTRAMUSCULAR
Qty: 6 ML | Refills: 0 | Status: SHIPPED | OUTPATIENT
Start: 2024-12-31 | End: 2025-03-31

## 2025-01-02 ENCOUNTER — APPOINTMENT (OUTPATIENT)
Dept: URBAN - METROPOLITAN AREA CLINIC 4 | Facility: CLINIC | Age: 70
Setting detail: DERMATOLOGY
End: 2025-01-02

## 2025-01-03 DIAGNOSIS — M05.9 RHEUMATOID ARTHRITIS WITH POSITIVE RHEUMATOID FACTOR, INVOLVING UNSPECIFIED SITE (HCC): ICD-10-CM

## 2025-01-03 RX ORDER — DIAZEPAM 5 MG/1
TABLET ORAL
Qty: 60 TABLET | Refills: 0 | Status: SHIPPED | OUTPATIENT
Start: 2025-01-03 | End: 2025-02-02

## 2025-01-08 ENCOUNTER — OFFICE VISIT (OUTPATIENT)
Dept: CARDIOLOGY | Facility: MEDICAL CENTER | Age: 70
End: 2025-01-08
Attending: INTERNAL MEDICINE
Payer: MEDICARE

## 2025-01-08 VITALS
HEART RATE: 81 BPM | SYSTOLIC BLOOD PRESSURE: 114 MMHG | DIASTOLIC BLOOD PRESSURE: 50 MMHG | RESPIRATION RATE: 16 BRPM | OXYGEN SATURATION: 97 % | WEIGHT: 200 LBS | BODY MASS INDEX: 28.63 KG/M2 | HEIGHT: 70 IN

## 2025-01-08 DIAGNOSIS — E78.5 DYSLIPIDEMIA: ICD-10-CM

## 2025-01-08 DIAGNOSIS — I25.84 CORONARY ARTERY DISEASE DUE TO CALCIFIED CORONARY LESION: ICD-10-CM

## 2025-01-08 DIAGNOSIS — I10 ESSENTIAL HYPERTENSION: ICD-10-CM

## 2025-01-08 DIAGNOSIS — I35.1 NONRHEUMATIC AORTIC VALVE INSUFFICIENCY: ICD-10-CM

## 2025-01-08 DIAGNOSIS — R00.0 TACHYCARDIA: ICD-10-CM

## 2025-01-08 DIAGNOSIS — I25.10 CORONARY ARTERY DISEASE DUE TO CALCIFIED CORONARY LESION: ICD-10-CM

## 2025-01-08 PROCEDURE — 3078F DIAST BP <80 MM HG: CPT | Performed by: INTERNAL MEDICINE

## 2025-01-08 PROCEDURE — 3074F SYST BP LT 130 MM HG: CPT | Performed by: INTERNAL MEDICINE

## 2025-01-08 PROCEDURE — 99214 OFFICE O/P EST MOD 30 MIN: CPT | Performed by: INTERNAL MEDICINE

## 2025-01-08 PROCEDURE — 99213 OFFICE O/P EST LOW 20 MIN: CPT | Performed by: INTERNAL MEDICINE

## 2025-01-08 PROCEDURE — 1170F FXNL STATUS ASSESSED: CPT | Performed by: INTERNAL MEDICINE

## 2025-01-08 RX ORDER — METOPROLOL TARTRATE 50 MG
50 TABLET ORAL 2 TIMES DAILY
Qty: 180 TABLET | Refills: 3 | Status: SHIPPED | OUTPATIENT
Start: 2025-01-08

## 2025-01-08 RX ORDER — PRAVASTATIN SODIUM 80 MG/1
80 TABLET ORAL
Qty: 90 TABLET | Refills: 3 | Status: SHIPPED | OUTPATIENT
Start: 2025-01-08

## 2025-01-08 RX ORDER — LISINOPRIL 20 MG/1
20 TABLET ORAL
Qty: 90 TABLET | Refills: 3 | Status: SHIPPED | OUTPATIENT
Start: 2025-01-08

## 2025-01-08 ASSESSMENT — FIBROSIS 4 INDEX: FIB4 SCORE: 2.84

## 2025-01-08 NOTE — PROGRESS NOTES
Interventional cardiology Follow-up Consultation Note        CC: Follow-up aortic regurgitation, dyslipidemia, hypertension    Patient ID/HPI:   69-year-old male patient here for follow-up for above issues.  No complaints today.  He feels well.  No significant changes since last evaluated by me.      Past Medical History:   Diagnosis Date    Abnormal myocardial perfusion study 01/15/2014    Aortic insufficiency 07/05/2011    Arthritis     RA, and osteo    Bronchitis     CAD (coronary artery disease) mild plaque at cath in 2/14 12/05/2014    Cancer (HCC)     skin    Chronic use of opiate drugs therapeutic purposes 08/12/2017    Dental disorder     Upper dentures.    Dyslipidemia 07/12/2011    Hard to intubate     Heart burn     Heart murmur     Hiatus hernia syndrome     High cholesterol     HTN (hypertension) 07/05/2011    Hypertension     Indigestion     Infectious disease     Pain     RA    Pain     hands, feet and jaw    Rheumatoid arthritis(714.0)     severe    Tachycardia 10/20/2014         Current Outpatient Medications   Medication Sig Dispense Refill    lisinopril (PRINIVIL) 20 MG Tab Take 1 Tablet by mouth every day. 90 Tablet 3    metoprolol tartrate (LOPRESSOR) 50 MG Tab Take 1 Tablet by mouth 2 times a day. 180 Tablet 3    pravastatin (PRAVACHOL) 80 MG tablet Take 1 Tablet by mouth every day. 90 Tablet 3    diazePAM (VALIUM) 5 MG Tab TAKE 1 TABLET BY MOUTH EVERY 12 HOURS AS NEEDED FOR UP TO 30 DAYS F41.9 60 Tablet 0    testosterone cypionate (DEPO-TESTOSTERONE) 200 MG/ML injection INJECT 1 ML IN THE MUSCLE EVERY 14 DAYS, 6ML, 84 DAYS, 0 REFILLS INDICATIONS: DEFICIENT ACTIVITY OF THE TESTIS 6 mL 0    doxycycline (VIBRAMYCIN) 100 MG Tab TAKE 1 TABLET BY MOUTH 2 TIMES A DAY 60 Tablet 0    cyclobenzaprine (FLEXERIL) 10 mg Tab 10 mg.      oxyCODONE-acetaminophen (PERCOCET-10)  MG Tab Take 1-2 Tablets by mouth every four hours as needed for Severe Pain (refill #3 of #3.) for up to 30 days. 150  "Tablet 0    PARoxetine (PAXIL) 20 MG Tab TAKE 1 TABLET BY MOUTH EVERY DAY 90 Tablet 3    Ixekizumab (TALTZ SC) Inject  under the skin.      Cholecalciferol (VITAMIN D3) 2000 UNIT Cap Take 2,000 Units by mouth every morning.      Calcium Carb-Cholecalciferol 600-12.5 MG-MCG Cap Take 1 Capsule by mouth 2 times a day.      omeprazole (PRILOSEC OTC) 20 MG tablet Take 20 mg by mouth every morning.      Multiple Vitamins-Minerals (PRESERVISION AREDS 2+MULTI VIT) Cap Take 1 Capsule by mouth 2 times a day.      Ascorbic Acid (VITAMIN C) 1000 MG Tab Take 1,000 mg by mouth every morning.      Zinc 50 MG Tab Take 50 mg by mouth every morning.      meloxicam (MOBIC) 7.5 MG Tab TAKE 1 TABLET BY MOUTH EVERY DAY 30 Tablet 0    Guselkumab (TREMFYA) 100 MG/ML Solution Auto-injector INJECT 100MG UNDER THE SKIN AT WEEK 0, WEEK 4, AND EVERY 8 WEEKS THEREAFTER      ketoconazole (NIZORAL) 2 % Cream APPLY A THIN LAYER (1GRAM OF CREAM) TO RED/FLAKY AREA(S) ON NOSE/CHEEKS TWICE DAILY. (Patient not taking: Reported on 1/8/2025)      oxyCODONE immediate release (ROXICODONE) 10 MG immediate release tablet 10 mg. (Patient not taking: Reported on 1/8/2025)      Talquetamab-tgvs (TALVEY SC) SubCutaneous, Once, 2 months ago, 0 Refill(s), Maintenance, 90.718      tizanidine (ZANAFLEX) 4 MG Tab Take 1 Tablet by mouth every 8 hours as needed.      ondansetron (ZOFRAN ODT) 4 MG TABLET DISPERSIBLE Take 1 Tablet by mouth every 6 hours as needed for Nausea/Vomiting. 10 Tablet 0     No current facility-administered medications for this visit.         Physical Exam:  Ambulatory Vitals  /50 (BP Location: Left arm, Patient Position: Sitting, BP Cuff Size: Adult)   Pulse 81   Resp 16   Ht 1.778 m (5' 10\")   Wt 90.7 kg (200 lb)   SpO2 97%    Oxygen Therapy:  Pulse Oximetry: 97 %  BP Readings from Last 4 Encounters:   01/08/25 114/50   10/03/24 134/68   10/02/24 (!) 154/77   08/13/24 130/62       Weight/BMI: Body mass index is 28.7 kg/m².  Wt " Readings from Last 4 Encounters:   01/08/25 90.7 kg (200 lb)   10/03/24 89.8 kg (198 lb)   10/02/24 90.6 kg (199 lb 11.8 oz)   08/20/24 91.6 kg (202 lb)       General: Well appearing and in no apparent distress  Neck: JVP absent, carotid bruits absent  Lungs: respiratory sounds  normal, additional breath sounds absent  Heart: Regular rhythm,   No palpable thrills on palpation, murmurs present, no rubs,   Lower extremity edema absent.       Echocardiogram July 2023 reviewed, independently interpreted shows moderate aortic regurgitation.    Echocardiogram December 2024 reviewed, aortic regurgitation is moderate, no significant changes compared to before.    Medical Decision Making:  Problem List Items Addressed This Visit       Aortic insufficiency    Relevant Medications    lisinopril (PRINIVIL) 20 MG Tab    metoprolol tartrate (LOPRESSOR) 50 MG Tab    pravastatin (PRAVACHOL) 80 MG tablet    Other Relevant Orders    EC-ECHOCARDIOGRAM COMPLETE W/O CONT    Essential hypertension    Relevant Medications    lisinopril (PRINIVIL) 20 MG Tab    metoprolol tartrate (LOPRESSOR) 50 MG Tab    pravastatin (PRAVACHOL) 80 MG tablet    Dyslipidemia    Relevant Medications    pravastatin (PRAVACHOL) 80 MG tablet    Other Relevant Orders    Lipid Profile    Tachycardia    Relevant Medications    metoprolol tartrate (LOPRESSOR) 50 MG Tab    Coronary artery disease due to calcified coronary lesion: Mildly cardiac catheterization in 2014    Relevant Medications    lisinopril (PRINIVIL) 20 MG Tab    metoprolol tartrate (LOPRESSOR) 50 MG Tab    pravastatin (PRAVACHOL) 80 MG tablet       His aortic regurgitation is stable, we will continue to monitor.  Clinically doing well.  Continue pravastatin 80 mg daily, he did not tolerate atorvastatin, Crestor before.  Will obtain a lipid panel.  Blood pressure well-controlled, continue metoprolol, lisinopril.    Repeat echocardiogram, follow-up in 1 year.    Kostas FUENTES  Interventional  cardiologist  [facility], [address]  [address] [place] [id number]  Phone: [phone]  Fax: [phone]

## 2025-01-15 ENCOUNTER — OFFICE VISIT (OUTPATIENT)
Dept: MEDICAL GROUP | Facility: LAB | Age: 70
End: 2025-01-15
Payer: MEDICARE

## 2025-01-15 VITALS
TEMPERATURE: 97 F | OXYGEN SATURATION: 94 % | RESPIRATION RATE: 16 BRPM | WEIGHT: 197 LBS | DIASTOLIC BLOOD PRESSURE: 70 MMHG | SYSTOLIC BLOOD PRESSURE: 130 MMHG | BODY MASS INDEX: 28.2 KG/M2 | HEIGHT: 70 IN | HEART RATE: 78 BPM

## 2025-01-15 DIAGNOSIS — M05.9 RHEUMATOID ARTHRITIS WITH POSITIVE RHEUMATOID FACTOR, INVOLVING UNSPECIFIED SITE (HCC): ICD-10-CM

## 2025-01-15 PROCEDURE — 3078F DIAST BP <80 MM HG: CPT | Performed by: INTERNAL MEDICINE

## 2025-01-15 PROCEDURE — 1170F FXNL STATUS ASSESSED: CPT | Performed by: INTERNAL MEDICINE

## 2025-01-15 PROCEDURE — 99213 OFFICE O/P EST LOW 20 MIN: CPT | Performed by: INTERNAL MEDICINE

## 2025-01-15 PROCEDURE — 3075F SYST BP GE 130 - 139MM HG: CPT | Performed by: INTERNAL MEDICINE

## 2025-01-15 RX ORDER — OXYCODONE AND ACETAMINOPHEN 10; 325 MG/1; MG/1
1-2 TABLET ORAL EVERY 4 HOURS PRN
Qty: 150 TABLET | Refills: 0 | Status: SHIPPED | OUTPATIENT
Start: 2025-01-27 | End: 2025-01-30

## 2025-01-15 RX ORDER — OXYCODONE AND ACETAMINOPHEN 10; 325 MG/1; MG/1
1-2 TABLET ORAL EVERY 4 HOURS PRN
Qty: 150 TABLET | Refills: 0 | Status: SHIPPED | OUTPATIENT
Start: 2025-02-26 | End: 2025-01-30

## 2025-01-15 RX ORDER — OXYCODONE AND ACETAMINOPHEN 10; 325 MG/1; MG/1
1-2 TABLET ORAL EVERY 4 HOURS PRN
Qty: 150 TABLET | Refills: 0 | Status: SHIPPED | OUTPATIENT
Start: 2025-03-28 | End: 2025-01-30

## 2025-01-15 ASSESSMENT — PATIENT HEALTH QUESTIONNAIRE - PHQ9: CLINICAL INTERPRETATION OF PHQ2 SCORE: 0

## 2025-01-15 ASSESSMENT — FIBROSIS 4 INDEX: FIB4 SCORE: 2.84

## 2025-01-15 NOTE — PROGRESS NOTES
CC: Edgardo Sims is a 69 y.o. male is suffering from   Chief Complaint   Patient presents with    Follow-Up     Surgery update     Medication Refill         SUBJECTIVE:  1. Rheumatoid arthritis with positive rheumatoid factor, involving unspecified site (HCC)  Ray is here for follow-up has a history of rheumatoid arthritis, there is now a question whether he may also have psoriatic arthritis.  Patient continues to have significant joint pain discomfort        Past social, family, history:   Social History     Tobacco Use    Smoking status: Never     Passive exposure: Never    Smokeless tobacco: Never   Vaping Use    Vaping status: Never Used   Substance Use Topics    Alcohol use: Yes     Comment: occ    Drug use: Yes     Types: Inhaled     Comment: marijuana, 4x/week         MEDICATIONS:    Current Outpatient Medications:     [START ON 3/28/2025] oxyCODONE-acetaminophen (PERCOCET-10)  MG Tab, Take 1-2 Tablets by mouth every four hours as needed for Severe Pain (refill #3 of #3.) for up to 30 days., Disp: 150 Tablet, Rfl: 0    [START ON 2/26/2025] oxyCODONE-acetaminophen (PERCOCET-10)  MG Tab, Take 1-2 Tablets by mouth every four hours as needed for Severe Pain (refill #2 of #3.) for up to 30 days., Disp: 150 Tablet, Rfl: 0    [START ON 1/27/2025] oxyCODONE-acetaminophen (PERCOCET-10)  MG Tab, Take 1-2 Tablets by mouth every four hours as needed for Severe Pain (refill #1 of #3.) for up to 30 days., Disp: 150 Tablet, Rfl: 0    Aspirin 81 MG Cap, 81 mg., Disp: , Rfl:     doxycycline (VIBRAMYCIN) 100 MG Cap, TAKE 1 CAP WITH FOOD TWICE DAILY. DO NOT LIE FLAT FOR 60 MINS AFTER TAKING., Disp: , Rfl:     lisinopril (PRINIVIL) 20 MG Tab, 20 mg., Disp: , Rfl:     Omeprazole Magnesium (PRILOSEC PO), Oral, Daily, 0 Refill(s), Maintenance, 90.718, Disp: , Rfl:     PARoxetine (PAXIL) 20 MG Tab, 20 mg., Disp: , Rfl:     pravastatin (PRAVACHOL) 80 MG tablet, 80 mg., Disp: , Rfl:     lisinopril  (PRINIVIL) 20 MG Tab, Take 1 Tablet by mouth every day., Disp: 90 Tablet, Rfl: 3    metoprolol tartrate (LOPRESSOR) 50 MG Tab, Take 1 Tablet by mouth 2 times a day., Disp: 180 Tablet, Rfl: 3    pravastatin (PRAVACHOL) 80 MG tablet, Take 1 Tablet by mouth every day., Disp: 90 Tablet, Rfl: 3    diazePAM (VALIUM) 5 MG Tab, TAKE 1 TABLET BY MOUTH EVERY 12 HOURS AS NEEDED FOR UP TO 30 DAYS F41.9, Disp: 60 Tablet, Rfl: 0    testosterone cypionate (DEPO-TESTOSTERONE) 200 MG/ML injection, INJECT 1 ML IN THE MUSCLE EVERY 14 DAYS, 6ML, 84 DAYS, 0 REFILLS INDICATIONS: DEFICIENT ACTIVITY OF THE TESTIS, Disp: 6 mL, Rfl: 0    doxycycline (VIBRAMYCIN) 100 MG Tab, TAKE 1 TABLET BY MOUTH 2 TIMES A DAY, Disp: 60 Tablet, Rfl: 0    meloxicam (MOBIC) 7.5 MG Tab, TAKE 1 TABLET BY MOUTH EVERY DAY, Disp: 30 Tablet, Rfl: 0    cyclobenzaprine (FLEXERIL) 10 mg Tab, 10 mg., Disp: , Rfl:     Guselkumab (TREMFYA) 100 MG/ML Solution Auto-injector, INJECT 100MG UNDER THE SKIN AT WEEK 0, WEEK 4, AND EVERY 8 WEEKS THEREAFTER, Disp: , Rfl:     ketoconazole (NIZORAL) 2 % Cream, , Disp: , Rfl:     oxyCODONE immediate release (ROXICODONE) 10 MG immediate release tablet, 10 mg., Disp: , Rfl:     Talquetamab-tgvs (TALVEY SC), SubCutaneous, Once, 2 months ago, 0 Refill(s), Maintenance, 90.718, Disp: , Rfl:     PARoxetine (PAXIL) 20 MG Tab, TAKE 1 TABLET BY MOUTH EVERY DAY, Disp: 90 Tablet, Rfl: 3    tizanidine (ZANAFLEX) 4 MG Tab, Take 1 Tablet by mouth every 8 hours as needed., Disp: , Rfl:     Ixekizumab (TALTZ SC), Inject  under the skin., Disp: , Rfl:     ondansetron (ZOFRAN ODT) 4 MG TABLET DISPERSIBLE, Take 1 Tablet by mouth every 6 hours as needed for Nausea/Vomiting., Disp: 10 Tablet, Rfl: 0    Cholecalciferol (VITAMIN D3) 2000 UNIT Cap, Take 2,000 Units by mouth every morning., Disp: , Rfl:     Calcium Carb-Cholecalciferol 600-12.5 MG-MCG Cap, Take 1 Capsule by mouth 2 times a day., Disp: , Rfl:     omeprazole (PRILOSEC OTC) 20 MG tablet, Take 20  mg by mouth every morning., Disp: , Rfl:     Multiple Vitamins-Minerals (PRESERVISION AREDS 2+MULTI VIT) Cap, Take 1 Capsule by mouth 2 times a day., Disp: , Rfl:     Ascorbic Acid (VITAMIN C) 1000 MG Tab, Take 1,000 mg by mouth every morning., Disp: , Rfl:     Zinc 50 MG Tab, Take 50 mg by mouth every morning., Disp: , Rfl:     PROBLEMS:  Patient Active Problem List    Diagnosis Date Noted    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/14/2024    Osteoarthritis of hip 09/19/2024    Primary osteoarthritis of right hip 09/19/2024    Major depressive disorder, recurrent, in full remission ([facility]) 03/28/2024    S/P cervical spinal fusion 02/21/2024    Cervical stenosis of spine 01/18/2024    Urinary retention with incomplete bladder emptying 07/30/2023    Chronic pain 07/30/2023    Closed left ankle fracture 07/29/2023    Closed fracture of multiple ribs of left side 07/29/2023    Contusion of left lung 07/29/2023    Trauma 07/29/2023    No contraindication to deep vein thrombosis (DVT) prophylaxis 07/29/2023    Hammer toe of right foot 12/29/2021    Osteopenia 03/15/2021    Vitamin D deficiency 03/15/2021    Postoperative pain 08/05/2020    Difficult intubation 08/05/2020    Gastroesophageal reflux disease 08/05/2020    Current chronic use of systemic steroids 10/16/2019    High risk medication use 10/16/2019    History of adrenal insufficiency 10/16/2019    Neurogenic bladder 01/26/2019    Bladder outlet obstruction 05/01/2018    Leukocytosis 04/30/2018    Lactic acidosis 04/30/2018    Idiopathic acute pancreatitis 04/30/2018    Chronic use of opiate drug for therapeutic purpose 08/12/2017    Depression 07/13/2015    Anxiety 03/31/2015    Coronary artery disease due to calcified coronary lesion: Mildly cardiac catheterization in 2014 12/05/2014    Tachycardia 10/20/2014    Abnormal myocardial perfusion study 01/15/2014    Osteoarthrosis, unspecified whether generalized or localized, pelvic region and thigh 05/31/2013     "Dyslipidemia 07/12/2011    Aortic insufficiency 07/05/2011    Essential hypertension 07/05/2011    Rheumatoid arthritis (HCC) 05/05/2009    Hypogonadism male 05/05/2009       REVIEW OF SYSTEMS:  Gen.:  No Nausea, Vomiting, fever, Chills.  Heart: No chest pain.  Lungs:  No shortness of Breath.  Psychological: Kian unusual Anxiety depression     PHYSICAL EXAM      Constitutional: Alert, cooperative, not in acute distress.  Cardiovascular:  Rate Rhythm is regular without murmurs rubs clicks.     Thorax & Lungs: Clear to auscultation, no wheezing, rhonchi, or rales  HENT: Normocephalic, Atraumatic.  Eyes: PERRLA, EOMI, Conjunctiva normal.   Neck: Trachia is midline no swelling of the thyroid.   Lymphatic: No lymphadenopathy noted.   Neurologic: Alert & oriented x 3, cranial nerves II through XII are intact, Normal motor function, Normal sensory function, No focal deficits noted.   Psychiatric: Affect normal, Judgment normal, Mood normal.     VITAL SIGNS:/70   Pulse 78   Temp 36.1 °C (97 °F) (Temporal)   Resp 16   Ht 1.778 m (5' 10\")   Wt 89.4 kg (197 lb)   SpO2 94%   BMI 28.27 kg/m²     Labs: Reviewed    Assessment:                                                     Plan:     1. Rheumatoid arthritis with positive rheumatoid factor, involving unspecified site (HCC)  Continue Percocet state drug task force reviewed urine drug screen ordered  - oxyCODONE-acetaminophen (PERCOCET-10)  MG Tab; Take 1-2 Tablets by mouth every four hours as needed for Severe Pain (refill #3 of #3.) for up to 30 days.  Dispense: 150 Tablet; Refill: 0  - oxyCODONE-acetaminophen (PERCOCET-10)  MG Tab; Take 1-2 Tablets by mouth every four hours as needed for Severe Pain (refill #2 of #3.) for up to 30 days.  Dispense: 150 Tablet; Refill: 0  - oxyCODONE-acetaminophen (PERCOCET-10)  MG Tab; Take 1-2 Tablets by mouth every four hours as needed for Severe Pain (refill #1 of #3.) for up to 30 days.  Dispense: 150 Tablet; " Refill: 0  - URINE DRUG SCREEN; Future

## 2025-01-30 ENCOUNTER — TELEPHONE (OUTPATIENT)
Dept: MEDICAL GROUP | Facility: LAB | Age: 70
End: 2025-01-30
Payer: MEDICARE

## 2025-01-30 ENCOUNTER — APPOINTMENT (OUTPATIENT)
Dept: URBAN - METROPOLITAN AREA CLINIC 36 | Facility: CLINIC | Age: 70
Setting detail: DERMATOLOGY
End: 2025-01-30

## 2025-01-30 DIAGNOSIS — M05.9 RHEUMATOID ARTHRITIS WITH POSITIVE RHEUMATOID FACTOR, INVOLVING UNSPECIFIED SITE (HCC): ICD-10-CM

## 2025-01-30 DIAGNOSIS — Z48.817 ENCOUNTER FOR SURGICAL AFTERCARE FOLLOWING SURGERY ON THE SKIN AND SUBCUTANEOUS TISSUE: ICD-10-CM

## 2025-01-30 DIAGNOSIS — Z41.9 ENCOUNTER FOR PROCEDURE FOR PURPOSES OTHER THAN REMEDYING HEALTH STATE, UNSPECIFIED: ICD-10-CM

## 2025-01-30 PROCEDURE — ? CO2RE

## 2025-01-30 PROCEDURE — ? POST-OP WOUND CHECK

## 2025-01-30 RX ORDER — OXYCODONE AND ACETAMINOPHEN 10; 325 MG/1; MG/1
1-2 TABLET ORAL EVERY 4 HOURS PRN
Qty: 150 TABLET | Refills: 0 | Status: SHIPPED | OUTPATIENT
Start: 2025-03-01 | End: 2025-03-31

## 2025-01-30 RX ORDER — OXYCODONE AND ACETAMINOPHEN 10; 325 MG/1; MG/1
1-2 TABLET ORAL EVERY 4 HOURS PRN
Qty: 150 TABLET | Refills: 0 | Status: SHIPPED | OUTPATIENT
Start: 2025-01-30 | End: 2025-03-01

## 2025-01-30 RX ORDER — OXYCODONE AND ACETAMINOPHEN 10; 325 MG/1; MG/1
1-2 TABLET ORAL EVERY 4 HOURS PRN
Qty: 150 TABLET | Refills: 0 | Status: SHIPPED | OUTPATIENT
Start: 2025-03-31 | End: 2025-04-30

## 2025-01-30 ASSESSMENT — LOCATION ZONE DERM: LOCATION ZONE: NOSE

## 2025-01-30 ASSESSMENT — LOCATION DETAILED DESCRIPTION DERM: LOCATION DETAILED: NASAL DORSUM

## 2025-01-30 ASSESSMENT — LOCATION SIMPLE DESCRIPTION DERM: LOCATION SIMPLE: NOSE

## 2025-01-30 NOTE — PROCEDURE: POST-OP WOUND CHECK
Detail Level: Detailed
Add 08769 Cpt? (Important Note: In 2017 The Use Of 76989 Is Being Tracked By Cms To Determine Future Global Period Reimbursement For Global Periods): no

## 2025-01-30 NOTE — TELEPHONE ENCOUNTER
Kwasi called because when he went to  his oxycodone his copay was twice what it normally is and it cost him $100 instead of $54. He tried using a discount coupon but [facility] would not accept it. He is asking that we cancel all future refills and send them to a different pharmacy.     Medication pended to go to the [facility] pharmacy he has listed.

## 2025-01-30 NOTE — PROCEDURE: CO2RE
Anesthesia Type: 1% lidocaine with epinephrine
Post-Care Instructions: I reviewed with the patient in detail post-care instructions. Patient should avoid sun until area fully healed. Pt should apply vaseline to treated areas, and remove crusts gently with water-vinegar soaks.
Laser Mode: Light
Core Energy (Mj): 01-84
Was Eye Protection Used?: No
Treatment Number: 1
Energy (Mj): 70
Other Location: left distal pretibial region
Detail Level: Simple
Eye Protection Text: A corneal shield was inserted after appropriate application of topical anesthesia.
Treatment Number: 2
Laser Mode: Deep
External Cooling Fan Speed: 5
Price (Use Numbers Only, No Special Characters Or $): 0
Consent: Written consent obtained, risks reviewed including but not limited to crusting, scabbing, blistering, scarring, darker or lighter pigmentary change, incomplete improvement of dyschromia, wrinkles, prolonged erythema and facial swelling, infection and bleeding.
Energy (Mj): 80

## 2025-02-06 ENCOUNTER — HOSPITAL ENCOUNTER (OUTPATIENT)
Dept: LAB | Facility: MEDICAL CENTER | Age: 70
End: 2025-02-06
Attending: INTERNAL MEDICINE
Payer: MEDICARE

## 2025-02-06 DIAGNOSIS — L40.50 PSORIATIC ARTHRITIS (HCC): ICD-10-CM

## 2025-02-06 DIAGNOSIS — M05.9 RHEUMATOID ARTHRITIS WITH POSITIVE RHEUMATOID FACTOR, INVOLVING UNSPECIFIED SITE (HCC): ICD-10-CM

## 2025-02-06 DIAGNOSIS — Z79.631 METHOTREXATE, LONG TERM, CURRENT USE: ICD-10-CM

## 2025-02-06 DIAGNOSIS — Z79.52 LONG TERM CURRENT USE OF SYSTEMIC STEROIDS: ICD-10-CM

## 2025-02-06 DIAGNOSIS — Z51.81 MEDICATION MONITORING ENCOUNTER: ICD-10-CM

## 2025-02-06 LAB
BASOPHILS # BLD AUTO: 0.6 % (ref 0–1.8)
BASOPHILS # BLD: 0.08 K/UL (ref 0–0.12)
EOSINOPHIL # BLD AUTO: 0.44 K/UL (ref 0–0.51)
EOSINOPHIL NFR BLD: 3.5 % (ref 0–6.9)
ERYTHROCYTE [DISTWIDTH] IN BLOOD BY AUTOMATED COUNT: 50.7 FL (ref 35.9–50)
ERYTHROCYTE [SEDIMENTATION RATE] IN BLOOD BY WESTERGREN METHOD: 4 MM/HOUR (ref 0–20)
HCT VFR BLD AUTO: 40.3 % (ref 42–52)
HGB BLD-MCNC: 12.6 G/DL (ref 14–18)
IMM GRANULOCYTES # BLD AUTO: 0.07 K/UL (ref 0–0.11)
IMM GRANULOCYTES NFR BLD AUTO: 0.6 % (ref 0–0.9)
LYMPHOCYTES # BLD AUTO: 2.22 K/UL (ref 1–4.8)
LYMPHOCYTES NFR BLD: 17.6 % (ref 22–41)
MCH RBC QN AUTO: 25.3 PG (ref 27–33)
MCHC RBC AUTO-ENTMCNC: 31.3 G/DL (ref 32.3–36.5)
MCV RBC AUTO: 80.9 FL (ref 81.4–97.8)
MONOCYTES # BLD AUTO: 1.47 K/UL (ref 0–0.85)
MONOCYTES NFR BLD AUTO: 11.7 % (ref 0–13.4)
NEUTROPHILS # BLD AUTO: 8.32 K/UL (ref 1.82–7.42)
NEUTROPHILS NFR BLD: 66 % (ref 44–72)
NRBC # BLD AUTO: 0 K/UL
NRBC BLD-RTO: 0 /100 WBC (ref 0–0.2)
PLATELET # BLD AUTO: 245 K/UL (ref 164–446)
PMV BLD AUTO: 10 FL (ref 9–12.9)
RBC # BLD AUTO: 4.98 M/UL (ref 4.7–6.1)
WBC # BLD AUTO: 12.6 K/UL (ref 4.8–10.8)

## 2025-02-06 PROCEDURE — 80053 COMPREHEN METABOLIC PANEL: CPT

## 2025-02-06 PROCEDURE — 85025 COMPLETE CBC W/AUTO DIFF WBC: CPT

## 2025-02-06 PROCEDURE — 85652 RBC SED RATE AUTOMATED: CPT

## 2025-02-06 PROCEDURE — 36415 COLL VENOUS BLD VENIPUNCTURE: CPT

## 2025-02-07 LAB
ALBUMIN SERPL BCP-MCNC: 4.2 G/DL (ref 3.2–4.9)
ALBUMIN/GLOB SERPL: 1.6 G/DL
ALP SERPL-CCNC: 108 U/L (ref 30–99)
ALT SERPL-CCNC: 17 U/L (ref 2–50)
ANION GAP SERPL CALC-SCNC: 10 MMOL/L (ref 7–16)
AST SERPL-CCNC: 27 U/L (ref 12–45)
BILIRUB SERPL-MCNC: 0.4 MG/DL (ref 0.1–1.5)
BUN SERPL-MCNC: 11 MG/DL (ref 8–22)
CALCIUM ALBUM COR SERPL-MCNC: 9 MG/DL (ref 8.5–10.5)
CALCIUM SERPL-MCNC: 9.2 MG/DL (ref 8.5–10.5)
CHLORIDE SERPL-SCNC: 99 MMOL/L (ref 96–112)
CO2 SERPL-SCNC: 25 MMOL/L (ref 20–33)
CREAT SERPL-MCNC: 0.89 MG/DL (ref 0.5–1.4)
GFR SERPLBLD CREATININE-BSD FMLA CKD-EPI: 93 ML/MIN/1.73 M 2
GLOBULIN SER CALC-MCNC: 2.6 G/DL (ref 1.9–3.5)
GLUCOSE SERPL-MCNC: 83 MG/DL (ref 65–99)
POTASSIUM SERPL-SCNC: 5 MMOL/L (ref 3.6–5.5)
PROT SERPL-MCNC: 6.8 G/DL (ref 6–8.2)
SODIUM SERPL-SCNC: 134 MMOL/L (ref 135–145)

## 2025-02-10 ENCOUNTER — TELEPHONE (OUTPATIENT)
Dept: MEDICAL GROUP | Facility: LAB | Age: 70
End: 2025-02-10
Payer: MEDICARE

## 2025-02-10 ENCOUNTER — TELEPHONE (OUTPATIENT)
Dept: RHEUMATOLOGY | Facility: MEDICAL CENTER | Age: 70
End: 2025-02-10
Payer: MEDICARE

## 2025-02-10 DIAGNOSIS — L40.50 PSORIATIC ARTHRITIS (HCC): ICD-10-CM

## 2025-02-10 DIAGNOSIS — Z79.891 CHRONIC USE OF OPIATE DRUG FOR THERAPEUTIC PURPOSE: ICD-10-CM

## 2025-02-10 DIAGNOSIS — M05.9 RHEUMATOID ARTHRITIS WITH POSITIVE RHEUMATOID FACTOR, INVOLVING UNSPECIFIED SITE (HCC): ICD-10-CM

## 2025-02-10 RX ORDER — IXEKIZUMAB 80 MG/ML
80 INJECTION, SOLUTION SUBCUTANEOUS
Qty: 1 ML | Refills: 5 | Status: SHIPPED | OUTPATIENT
Start: 2025-02-10

## 2025-02-10 NOTE — TELEPHONE ENCOUNTER
Edgardo called because when he went to do his urine drug screen at the lab they told him it was $400, he is asking if he can come into the clinic to do it instead.

## 2025-02-10 NOTE — TELEPHONE ENCOUNTER
Prior Authorization for Taltz 80MG/ML auto-injectors (Quantity: 1, Days: 28) has been submitted via Cover My Meds: Key ([id number])    Insurance: [employer]    Will follow up in 24-48 business hours.

## 2025-02-10 NOTE — TELEPHONE ENCOUNTER
Marisela:  Please call Ray yes he can do the urine drug screen in the office!  I have rewritten the urine drug screen for him.  Regards, John Lao, DO

## 2025-02-11 ENCOUNTER — OFFICE VISIT (OUTPATIENT)
Dept: RHEUMATOLOGY | Facility: MEDICAL CENTER | Age: 70
End: 2025-02-11
Attending: STUDENT IN AN ORGANIZED HEALTH CARE EDUCATION/TRAINING PROGRAM
Payer: MEDICARE

## 2025-02-11 VITALS
DIASTOLIC BLOOD PRESSURE: 68 MMHG | RESPIRATION RATE: 16 BRPM | TEMPERATURE: 97.3 F | BODY MASS INDEX: 28.35 KG/M2 | SYSTOLIC BLOOD PRESSURE: 124 MMHG | OXYGEN SATURATION: 97 % | WEIGHT: 198 LBS | HEIGHT: 70 IN | HEART RATE: 72 BPM

## 2025-02-11 DIAGNOSIS — D84.9 IMMUNOSUPPRESSED STATUS (HCC): ICD-10-CM

## 2025-02-11 DIAGNOSIS — M25.50 ARTHRALGIA OF MULTIPLE JOINTS: ICD-10-CM

## 2025-02-11 DIAGNOSIS — M47.819 UNDIFFERENTIATED SPONDYLOARTHROPATHY: ICD-10-CM

## 2025-02-11 PROCEDURE — 99214 OFFICE O/P EST MOD 30 MIN: CPT | Performed by: STUDENT IN AN ORGANIZED HEALTH CARE EDUCATION/TRAINING PROGRAM

## 2025-02-11 PROCEDURE — 3074F SYST BP LT 130 MM HG: CPT | Performed by: STUDENT IN AN ORGANIZED HEALTH CARE EDUCATION/TRAINING PROGRAM

## 2025-02-11 PROCEDURE — 99212 OFFICE O/P EST SF 10 MIN: CPT | Performed by: STUDENT IN AN ORGANIZED HEALTH CARE EDUCATION/TRAINING PROGRAM

## 2025-02-11 PROCEDURE — 1170F FXNL STATUS ASSESSED: CPT | Performed by: STUDENT IN AN ORGANIZED HEALTH CARE EDUCATION/TRAINING PROGRAM

## 2025-02-11 PROCEDURE — 3078F DIAST BP <80 MM HG: CPT | Performed by: STUDENT IN AN ORGANIZED HEALTH CARE EDUCATION/TRAINING PROGRAM

## 2025-02-11 ASSESSMENT — FIBROSIS 4 INDEX: FIB4 SCORE: 1.82

## 2025-02-11 NOTE — PATIENT INSTRUCTIONS
Thank you for visiting our clinic today.     Summary of your visit:      Follow up in 5 months.     [facility] RHEUMATOLOGY AFTER VISIT GUIDE  Below are important guidelines to help you navigate your health care needs and assist us in caring for you safely and  effectively. We encourage you to carefully read and understand this information and adhere to them accordingly.    Jibe Messaging and Phone Calls:   Diagnosis and Treatment - For a detailed explanation of your condition and treatment plan from today’s visit, refer  to the visit note on Jibe via the following steps:  o Log in to Jibe and click on “Visits” at the top.  o Scroll down to “Past Visits” under Appointments.  o Click on “View Notes” under the appropriate visit date.   Questions or Concerns - [employer] messaging is for non-urgent matters that do not require immediate attention and  should be brief with no more than two questions or concerns. If you have multiple questions or concerns, we ask that  you schedule an appointment to have them properly addressed.   Response to Messages - Jibe messages are addressed throughout the week depending on clinical availability,  so we ask that you allow up to one week for a response.   Phone Calls and Voicemails - Phone calls and voicemail messages are reserved for time-sensitive matters that  cannot wait to be addressed via Jibe. We ask that you refrain from calling the office multiple times or leaving  multiple voicemails regarding the same issue as doing so may lead to delays in response time.   Urgent Issues - For urgent medical matters or medical emergencies that cannot wait, you are advised to go to your  nearest Urgent Care or Emergency Department for immediate attention.    Laboratory Tests and Imaging Studies:   Future Lab and Imaging Orders - We ask that you get your lab tests and imaging studies done no later than one  week before your follow-up visit unless instructed otherwise.   Results  Communication - You may see some test results marked as “abnormal” that are not necessarily significant  or concerning. If there are significant abnormalities on your test results that warrant further action, you will be notified  via MyChart or phone call, otherwise they will be addressed at your follow-up visit.    Prescriptions and Refill Requests:   General Prescriptions (e.g. prednisone, hydroxychloroquine, leflunomide, methotrexate, etc.) - These are sent  to Retail Pharmacies, so all refill requests of these medications should be directed to your local pharmacy.   Specialty Prescriptions (e.g. Enbrel, Humira, Cosentyx, Xeljanz, etc.) - These are sent to Specialty Pharmacies,  so all refill requests of these medications should be directed to your designated specialty pharmacy.   Infusion Prescriptions (e.g. Remicade, Simponi Aria, Rituxan, Saphnelo, etc.) - These are sent to Outpatient  Infusion Centers, so all scheduling requests of these medications should be directed to your local infusion center.    Medication Risks and Adverse Effects:   Immunosuppressed Status - Steroids and antirheumatic drugs are immunosuppressants, so they increase the risk  of infections and can have side effects on various organ systems in your body, though most of them are uncommon.   Potential Side Effects - Be sure to read the drug package inserts to learn about the potential side effects of your  medications before you start taking them and take them exactly as prescribed unless instructed otherwise.   In Case of Side Effects - If you experience any significant side effects, stop taking the medication immediately and  promptly notify the prescriber. Depending on the severity of the side effects, consider going to an Urgent Care or  Emergency Department for immediate attention.    Immunizations and Health Screening:   Vaccinations - If you are on immunosuppressive therapy, it is important that you are up to date on  age-appropriate  immunizations, particularly shingles and pneumonia vaccines, which you can request from your primary care provider  or from us at your next appointment.   Screening Tests - It is also important that you are up to date on age-appropriate screening tests, such as pap  smear, mammography, and colonoscopy, which you can request from your primary care provider.    Educational and Supportive Resources:   [facility] ([email or web address]) - Visit our website to learn more about  your condition and other rheumatic diseases, and gain access to many helpful resources for them.   Disposal of Old Medications (www.[place].gov/everyday-takeback-day) - Visit the Drug Enforcement Administration  website to find a nearby location where you can properly dispose of old medications you no longer need.   Disposal of Used [place] (www.safeneedledisposal.org) - Visit the Safe Needle Disposal Organization website to  find a nearby location where you can properly dispose of used needles from your injectable medications.  Revised 6/14/2024

## 2025-02-11 NOTE — PROGRESS NOTES
[facility] RHEUMATOLOGY  [address], [address], [place] [address]  Phone: [phone] ? Fax: [phone]  [facility]/Health-Services/Rheumatology    NEW PATIENT VISIT NOTE      DATE OF SERVICE: 02/11/2025         Subjective     REFERRING PRACTITIONER:  No referring provider defined for this encounter.    PATIENT IDENTIFICATION:  Edgardo Sims  [address] [place] [address]    YOB: 1955    MEDICAL RECORD NUMBER: [id number]         CHIEF COMPLAINT:   Chief Complaint   Patient presents with    New Patient     Psoriatic arthritis (HCC)       HISTORY OF PRESENT ILLNESS:  Edgardo Sims is a 69 y.o. male with pertinent history notable for C-spine DJD s/p fusion, hip OA s/p bilateral arthroplasties (R hip revised in 2024), L knee TKA and 2007 (revised in 2014), dyslipidemia, neurogenic bladder, HTN, anxiety and depression, who presents for to establish care for management of psoriatic arthritis.    Diagnosed with rheumatoid arthritis late 1999 though seronegative (RF and anti-CCP).  He was previously under the care of Dr. Saez and was on rituximab infusion as far back as 2009, reported that the medication was marginally effective as he still had morning stiffness up to 1 hour daily, foot pain when he got up, and joint pains along the MCPs.  Other therapy history includes gold injections, hydroxychloroquine, leflunomide, methotrexate, azathioprine, etanercept, infliximab, Tremfya, and upadacitinib. He recalled polyarthralgias involving the elbows, hands (especially bilateral 2/3 MTP joints), wrist, in addition to hand swelling.  He was later diagnosed with psoriatic arthritis and started on ixekizumab in 10/2023.  He still has pain in the fingers especially when scrolling and his knees are more bothersome when going up and down the stairs.  Currently with no joint swelling.  Reports morning stiffness up to 1 hour in the neck, knees, ankles and feet, improves with activity.  No history  of psoriasis but he had pruritus at the elbows and ankles.  There is documentation that he had psoriasis of the scalp and dystrophic toenails in previous rheumatology notes.  He has been evaluated by dermatology in the past with no official diagnosis of psoriasis.  Reports history of plantar fasciitis diagnosed 20 years ago.  No history of inflammatory low back pain, alternating buttock pain, exacerbation of back pain with rest, episodes of red painful photophobic eyes, history of dactylitis, enthesopathy along the Achilles, or IBD (normal colonoscopies in the past).  He held ixekizumab for neck surgery in 11/2024 and resumed 1/2025.  No history of Raynaud's, DVT/PE, stroke, sicca symptoms, mucosal ulcerations, or pleurisy.    He has had quite a bit of surgical intervention: Bilateral hip arthroplasty, left knee arthroplasty, right ankle fracture in 1980, ruptured disc at L5-S1 noted in 2000, s/p discectomy and laminectomy, rotator cuff with anterior labral repair in 2001, basal cell carcinoma in 2005 (from the nose), meniscus tear of left knee (2008 with arthroscopic repair), arthroscopy for bicep tendon repair as well as right rotator cuff surgery (x 2), L RTC repair.  He also has Dupuytren's contracture of right fifth digit, right foot, and varicose veins s/p radiofrequency ligation. Hx of basal cell and squamous cell of the face.    He is a never smoker and occasionally drinks alcohol.    No history of demyelinating disease, lymphoma/leukemia, or personal history of melanoma, heart failure or diverticulitis.     Reports other aspects of symptoms and medical history as noted on the questionnaire below or scanned under media tab.    Pertinent treatments:  Gold injections (bad reactions)  Hydroxychloroquine: Intolerant, discontinued in 10/2022  Leflunomide: Intolerant  Methotrexate: Developed stomatitis, ineffective  Azathioprine  Etanercept: Ineffective  Infliximab: Ineffective  Rituximab infusions: Marginally  effective, had increased stiffness last infusion was in 3/2017  Tremfya: Ineffective  Upadacitinib: Discontinued due to concern for CAD  Pertinent positive labs: 2016, CK (748)  Pertinent negative labs: 3/2023 hep B/C/QFN gold, 10/2022, anti-TG, anti-TPO, 2019, RF/anti-CCP; 2019, anti-CCP; 2017, cryoglobulin, RF; 2016 ([facility]), anti-SSA/SSB, RF/anti-CCP, HLA-B27, CRP  Pertinent imagin/2023 C-spine MRI: Degenerative changes most pronounced at C6/C7 with bilateral neuroforaminal narrowing, L worse than R with possible impingement of the exiting L C7 nerve root.  2023 left shoulder MRI: Evidence of prior rotator cuff repair with significantly attenuated fibers of the supraspinatus tendon.  With concern for high-grade partial-thickness bursal surface tear.  Moderate atrophy of the muscle belly.  Moderate GH joint degenerative changes.  Evidence of superior labral debridement.  2023 CT lumbar spine: Degenerative change at the L4-5 and L5-S1 levels  2023 CT thoracic spine: Multilevel degenerative change with chronic loss of height at several mid and lower thoracic vertebral bodies  2021 bilateral hand XR: Slight interval progression of arthropathy predominantly involving the R hand 2/3 MCP joints and L hand third MCP joint with lesser involvement of IP joint and carpal bones which could be consistent with an inflammatory arthropathy such as RA.  There is degenerative type change of osteoarthritis in the first CMC joint L >R  2021 feet XR: No evidence of erosive arthropathy.  Degenerative change in the IP joints and first tarsometatarsal joints, R >L  2017 bilateral hand XR: R with question of small erosions or subchondral cysts in the second/third metatarsal head.  L with multiple subchondral lucencies in the L third metacarpal head which could be combination of erosions and subchondral cyst.  2016 right foot XR: Focal erosion in the 2nd metatarsal head suggest the possibility of  inflammatory arthritis.  Degenerative changes in the tarsometatarsal joints.  Small calcaneal spur.  4/2008 MRI right hip: Progression of marrow signal abnormality in the R acetabular roof and peripheral acetabulum as well as the opposing R femoral head.  Joint space narrowing consistent with progressive osteoarthritic changes.  Right hip joint with small effusion.  8/2006 hip MRI: Osteoarthritic changes particularly of the R hip with cartilage loss, edema, and cystic changes in the acetabular roof and probably degenerative change of the acetabular labrum.  Mild OA changes of left hip.    REVIEW OF SYSTEMS:  Except as noted in the history above, relevant review of systems with emphasis on autoimmune rheumatic diseases was otherwise negative.      ACTIVE PROBLEM LIST:  Patient Active Problem List    Diagnosis Date Noted    Failed total hip arthroplasty ([facility]) 10/14/2024    Osteoarthritis of hip 09/19/2024    Primary osteoarthritis of right hip 09/19/2024    Major depressive disorder, recurrent, in full remission ([facility]) 03/28/2024    S/P cervical spinal fusion 02/21/2024    Cervical stenosis of spine 01/18/2024    Urinary retention with incomplete bladder emptying 07/30/2023    Chronic pain 07/30/2023    Closed left ankle fracture 07/29/2023    Closed fracture of multiple ribs of left side 07/29/2023    Contusion of left lung 07/29/2023    Trauma 07/29/2023    No contraindication to deep vein thrombosis (DVT) prophylaxis 07/29/2023    Hammer toe of right foot 12/29/2021    Osteopenia 03/15/2021    Vitamin D deficiency 03/15/2021    Postoperative pain 08/05/2020    Difficult intubation 08/05/2020    Gastroesophageal reflux disease 08/05/2020    Current chronic use of systemic steroids 10/16/2019    High risk medication use 10/16/2019    History of adrenal insufficiency 10/16/2019    Neurogenic bladder 01/26/2019    Bladder outlet obstruction 05/01/2018    Leukocytosis 04/30/2018    Lactic acidosis 04/30/2018     Idiopathic acute pancreatitis 04/30/2018    Chronic use of opiate drug for therapeutic purpose 08/12/2017    Depression 07/13/2015    Anxiety 03/31/2015    Coronary artery disease due to calcified coronary lesion: Mildly cardiac catheterization in 2014 12/05/2014    Tachycardia 10/20/2014    Abnormal myocardial perfusion study 01/15/2014    Osteoarthrosis, unspecified whether generalized or localized, pelvic region and thigh 05/31/2013    Dyslipidemia 07/12/2011    Aortic insufficiency 07/05/2011    Essential hypertension 07/05/2011    Hypogonadism male 05/05/2009       PAST MEDICAL HISTORY:  Past Medical History:   Diagnosis Date    Abnormal myocardial perfusion study 01/15/2014    Aortic insufficiency 07/05/2011    Arthritis     RA, and osteo    Bronchitis     CAD (coronary artery disease) mild plaque at cath in 2/14 12/05/2014    Cancer (HCC)     skin    Chronic use of opiate drugs therapeutic purposes 08/12/2017    Dental disorder     Upper dentures.    Dyslipidemia 07/12/2011    Hard to intubate     Heart burn     Heart murmur     Hiatus hernia syndrome     High cholesterol     HTN (hypertension) 07/05/2011    Hypertension     Indigestion     Infectious disease     Pain     RA    Pain     hands, feet and jaw    Rheumatoid arthritis(714.0)     severe    Tachycardia 10/20/2014       PAST SURGICAL HISTORY:  Past Surgical History:   Procedure Laterality Date    PB REVISE TOTAL HIP REPLACEMENT Right 11/15/2024    Procedure: RIGHT POSTERIOR TOTAL HIP ARTHROPLASTY REVISION;  Surgeon: Michael Rodriguez M.D.;  Location: [facility];  Service: Orthopedics    VT ARTHRODESIS ANT INTERBODY INC DISCECTOMY, CERVICAL BELOW C2 N/A 2/21/2024    Procedure: C5-7 anterior cervical discectomy and instrumented fusion;  Surgeon: Jose Gimenez M.D.;  Location: [facility];  Service: Orthopedics    ORIF, ANKLE Left 07/30/2023    Procedure: ORIF, ANKLE;  Surgeon: José Manuel Davies M.D.;  Location: [facility]  [place];  Service: Orthopedics    PB REMOVAL DEEP IMPLANT Right 05/06/2022    Procedure: RIGHT TOE REMOVAL OF HARDWARE, RIGHT SECOND HAMMERTOE PERCUTANEOUS TENOTOMY;  Surgeon: Pedro Restrepo M.D.;  Location: [facility];  Service: Orthopedics    TN COR HLX VLGS RSC PRX PHLX BS Right 01/13/2022    Procedure: RIGHT FOOT AKIN OSTEOTOMY, RIGHT DISTAL SOFT TISSUE PROCEDURE;  Surgeon: Pedro Restrepo M.D.;  Location: [facility];  Service: Orthopedics    PB OSTEOTOMY METATARSALS,MULTIPLE Right 01/13/2022    Procedure: RIGHT SECOND AND THIRD DISTAL METATARSAL OSTEOTOMY;  Surgeon: Pedro Restrepo M.D.;  Location: [facility];  Service: Orthopedics    PB REPAIR OF HAMMERTOE,ONE Right 01/13/2022    Procedure: RIGHT SECOND AND THIRD HAMMER TOE CORRECTION;  Surgeon: Pedro Restrepo M.D.;  Location: [facility];  Service: Orthopedics    PB RECONSTRUC TOE DEFORM,SOFT TISSUE Right 01/13/2022    Procedure: RIGHT SECOND AND THIRD METATARSOPHALANGEAL JOINT RECONSTRUCTION;  Surgeon: Pedro Restrepo M.D.;  Location: [facility];  Service: Orthopedics    TN TENOTOMY PERC TOE SINGLE TENDON Right 01/13/2022    Procedure: RIGHT 3rd, 4th and 5th FLEXOR TENOTOMY;  Surgeon: Pedro Restrepo M.D.;  Location: [facility];  Service: Orthopedics    TN SHLDR ARTHROSCOP,PART ACROMIOPLAS Left 08/05/2020    Procedure: DECOMPRESSION, SHOULDER, ARTHROSCOPIC - SUBACROMIAL;  Surgeon: Emanuel Vance M.D.;  Location: [facility];  Service: Orthopedics    PB SHLDR ARTHROSCOP,SURG,W/ROTAT CUFF REPB Left 08/05/2020    Procedure: ARTHROSCOPY, SHOULDER, WITH ROTATOR CUFF REPAIR - AND DALAL;  Surgeon: Emanuel Vance M.D.;  Location: [facility];  Service: Orthopedics    CLAVICLE DISTAL EXCISION Left 08/05/2020    Procedure: EXCISION, CLAVICLE, DISTAL - WITH EXTENSIVE DEBRIDEMENT;  Surgeon: Emanuel Vance M.D.;   Location: SURGERY [facility];  Service: Orthopedics    AL TRANSURETHRAL ELEC-SURG PROSTATECTOM  04/01/2019    Procedure: BIPOLAR TRANSURETHRAL RESECTION OF PROSTATE;  Surgeon: Jose Romo M.D.;  Location: SURGERY [facility];  Service: Urology    CYSTOSCOPY  04/01/2019    Procedure: CYSTOSCOPY;  Surgeon: Jose Romo M.D.;  Location: SURGERY [facility];  Service: Urology    URETHROTOMY INTERNAL  04/01/2019    Procedure: DIRECT VISION INTERNAL URETHROTOMY;  Surgeon: Jose Romo M.D.;  Location: SURGERY [facility];  Service: Urology    KNEE REVISION TOTAL Left 08/03/2018    Procedure: KNEE REVISION TOTAL;  Surgeon: Michael Rodriguez M.D.;  Location: [facility];  Service: Orthopedics    CYSTOSCOPY  05/16/2018    Procedure: CYSTOSCOPY-CLOT EVAC;  Surgeon: Jose Romo M.D.;  Location: SURGERY [facility];  Service: Urology    TRANS URETHRAL RESECTION BLADDER  05/16/2018    Procedure: TRANS URETHRAL RESECTION BLADDER;  Surgeon: Jose Romo M.D.;  Location: SURGERY [facility];  Service: Urology    RECOVERY  02/03/2014    Performed by Cath-Recovery Surgery at SURGERY SAME DAY [facility]    HIP ARTHROPLASTY TOTAL  05/31/2013    Performed by Burak Valles M.D. at [facility]    ROTATOR CUFF REPAIR Right 2011    x 2    KNEE ARTHROPLASTY TOTAL  06/01/2009    LEFT-Performed by YONATAN HINSON at [facility]    VEIN LIGATION RADIO FREQUENCY  12/08/2008    LEFT leg-Performed by DEREJE MCCULLOUGH at SURGERY SAME DAY [facility]    HIP ARTHROPLASTY TOTAL  06/20/2008    Performed by YONATAN HINSON at [facility]    LUMBAR LAMINECTOMY DISKECTOMY  2000    TMJ RECONSTRUCTION BILATERAL  1994    KNEE ARTHROSCOPY Left     ORIF, ANKLE Right     VEIN STRIPPING Left        MEDICATIONS:  Current Outpatient Medications   Medication Sig    Ixekizumab (TALTZ) 80 MG/ML Solution Auto-injector Inject 80 mg  under the skin every 4 weeks.    [START ON 3/31/2025] oxyCODONE-acetaminophen (PERCOCET-10)  MG Tab Take 1-2 Tablets by mouth every four hours as needed for Severe Pain (refill #3 of #3.) for up to 30 days.    [START ON 3/1/2025] oxyCODONE-acetaminophen (PERCOCET-10)  MG Tab Take 1-2 Tablets by mouth every four hours as needed for Severe Pain (refill #2 of #3.) for up to 30 days.    oxyCODONE-acetaminophen (PERCOCET-10)  MG Tab Take 1-2 Tablets by mouth every four hours as needed for Severe Pain (refill #1 of #3.) for up to 30 days.    Aspirin 81 MG Cap 81 mg.    lisinopril (PRINIVIL) 20 MG Tab 20 mg.    Omeprazole Magnesium (PRILOSEC PO) Oral, Daily, 0 Refill(s), Maintenance, 90.718    PARoxetine (PAXIL) 20 MG Tab 20 mg.    pravastatin (PRAVACHOL) 80 MG tablet 80 mg.    lisinopril (PRINIVIL) 20 MG Tab Take 1 Tablet by mouth every day.    metoprolol tartrate (LOPRESSOR) 50 MG Tab Take 1 Tablet by mouth 2 times a day.    pravastatin (PRAVACHOL) 80 MG tablet Take 1 Tablet by mouth every day.    testosterone cypionate (DEPO-TESTOSTERONE) 200 MG/ML injection INJECT 1 ML IN THE MUSCLE EVERY 14 DAYS, 6ML, 84 DAYS, 0 REFILLS INDICATIONS: DEFICIENT ACTIVITY OF THE TESTIS    cyclobenzaprine (FLEXERIL) 10 mg Tab 10 mg.    PARoxetine (PAXIL) 20 MG Tab TAKE 1 TABLET BY MOUTH EVERY DAY    Cholecalciferol (VITAMIN D3) 2000 UNIT Cap Take 2,000 Units by mouth every morning.    Calcium Carb-Cholecalciferol 600-12.5 MG-MCG Cap Take 1 Capsule by mouth 2 times a day.    omeprazole (PRILOSEC OTC) 20 MG tablet Take 20 mg by mouth every morning.    Multiple Vitamins-Minerals (PRESERVISION AREDS 2+MULTI VIT) Cap Take 1 Capsule by mouth 2 times a day.    Ascorbic Acid (VITAMIN C) 1000 MG Tab Take 1,000 mg by mouth every morning.    Zinc 50 MG Tab Take 50 mg by mouth every morning.    doxycycline (VIBRAMYCIN) 100 MG Cap TAKE 1 CAP WITH FOOD TWICE DAILY. DO NOT LIE FLAT FOR 60 MINS AFTER TAKING. (Patient not taking: Reported  on 2/11/2025)    doxycycline (VIBRAMYCIN) 100 MG Tab TAKE 1 TABLET BY MOUTH 2 TIMES A DAY (Patient not taking: Reported on 2/11/2025)    meloxicam (MOBIC) 7.5 MG Tab TAKE 1 TABLET BY MOUTH EVERY DAY    ketoconazole (NIZORAL) 2 % Cream  (Patient not taking: Reported on 2/11/2025)    oxyCODONE immediate release (ROXICODONE) 10 MG immediate release tablet 10 mg. (Patient not taking: Reported on 2/11/2025)    Talquetamab-tgvs (TALVEY SC) SubCutaneous, Once, 2 months ago, 0 Refill(s), Maintenance, 90.718    tizanidine (ZANAFLEX) 4 MG Tab Take 1 Tablet by mouth every 8 hours as needed.    ondansetron (ZOFRAN ODT) 4 MG TABLET DISPERSIBLE Take 1 Tablet by mouth every 6 hours as needed for Nausea/Vomiting.       ALLERGIES:   Allergies   Allergen Reactions    Atorvastatin Myalgia    Ceftriaxone Rash     Redness and flushing all over body  Other reaction(s): Not available    Ciprofloxacin Hives, Rash, Itching and Swelling     Pt developed rashes/hives on upper chest, shoulders, upper back, and flush in the cheeks, redness in the ears and swelling    Crestor [Rosuvastatin Calcium] Myalgia    Penicillins Hives, Itching and Vomiting     Other reaction(s): Not available       IMMUNIZATIONS:   Immunization History   Administered Date(s) Administered    INFLUENZA TIV (IM) 10/10/2012, 11/04/2013    Influenza Vaccine Adult HD 11/11/2021, 09/22/2022, 10/06/2023    Influenza Vaccine Quad Inj (Pf) 10/11/2016, 09/11/2017, 09/13/2018, 10/09/2019, 11/30/2020    Influenza Vaccine Quad Inj (Preserved) 03/10/2015    Influenza high-dose trivalent (PF) 10/03/2024    Pneumococcal Conjugate Vaccine (PCV20) 03/21/2023    Pneumococcal Conjugate Vaccine (Prevnar/PCV-13) 10/11/2016    Pneumococcal polysaccharide vaccine (PPSV-23) 10/15/2009, 11/04/2013    Tdap Vaccine 01/28/2013, 03/21/2023       SOCIAL HISTORY:   Social History     Socioeconomic History    Marital status:    Tobacco Use    Smoking status: Never     Passive exposure: Never     "Smokeless tobacco: Never   Vaping Use    Vaping status: Never Used   Substance and Sexual Activity    Alcohol use: Yes     Comment: occ    Drug use: Yes     Types: Inhaled     Comment: marijuana, 4x/week    Sexual activity: Yes     Partners: Female   Other Topics Concern     Service No    Blood Transfusions No    Caffeine Concern No    Occupational Exposure No    Hobby Hazards Yes     Comment: rides motorcyle    Sleep Concern No    Stress Concern No    Weight Concern Yes    Special Diet Yes     Comment: does not eat red meat     Back Care Yes    Exercise Yes    Bike Helmet Yes    Seat Belt Yes    Self-Exams Yes       FAMILY HISTORY:  Family History   Problem Relation Age of Onset    Hypertension Mother             Objective     Vital Signs: /68   Pulse 72   Temp 36.3 °C (97.3 °F) (Temporal)   Resp 16   Ht 1.778 m (5' 10\")   Wt 89.8 kg (198 lb)   SpO2 97% Body mass index is 28.41 kg/m².    General: Appears well and comfortable  Eyes: No scleral or conjunctival lesions  ENT: No apparent oral or nasal lesions  Head/Neck: No apparent scalp or neck lesions  Cardiovascular: Regular rate and rhythm; no pericardial rubs  Respiratory: Breathing quiet and unlabored; no rales or pleural rubs  Gastrointestinal: No apparent organomegaly or abdominal masses  Integumentary: No significant cutaneous lesions such as psoriasis, nail dystrophy, or nail pitting  Musculoskeletal:   R hand: Mild tenderness at 2nd and 4th MCP joints, no swelling, warmth, or significant restrictions in ROM  L hand: Mild tenderness at 3rd/4th MCP, and 2-5 PIP joints, no swelling, warmth or significant restrictions in ROM Bilateral heel with mild tenderness  L foot: Mild tenderness at 1st MTP joint, no swelling, warmth or significant restrictions in ROM  No significant swelling, tenderness, warmth or limitations of motion at other joints examined  Neurologic: No focal sensory or motor deficits  Psychiatric: Mood and affect " appropriate      LABORATORY RESULTS REVIEWED AND INTERPRETED BY ME:  Lab Results   Component Value Date/Time    CREACTPROT 0.66 10/12/2022 02:49 PM    SEDRATEWES 4 02/06/2025 02:28 PM    URICACID 4.5 02/19/2019 08:36 AM     Lab Results   Component Value Date/Time    CRYOGLOBULIN NEG 72Hour 08/04/2017 02:32 PM     Lab Results   Component Value Date/Time    RHEUMFACTN 10 02/19/2019 08:36 AM    CCPANTIBODY 2 02/19/2019 08:36 AM     Lab Results   Component Value Date/Time    MICROSOMALA <9.0 10/12/2022 02:49 PM    ANTITHYROGL <0.9 10/12/2022 02:49 PM    TSHULTRASEN 1.900 06/02/2020 07:22 AM    FREEDIR 1.11 12/01/2014 03:13 PM    FREET4 1.07 06/02/2020 07:22 AM     Lab Results   Component Value Date/Time    CPKTOTAL 141 01/26/2018 07:47 AM     Lab Results   Component Value Date/Time    25HYDROXY 42 01/10/2024 07:13 AM    PTHINTACT 55 10/10/2008 10:42 AM     Lab Results   Component Value Date/Time    IRON 47 (L) 05/27/2009 11:30 AM    FOLATE 17.8 06/30/2022 12:02 PM    G6PD 12.9 06/18/2020 02:50 PM    PROTHROMBTM 13.6 02/08/2024 09:22 AM    INR 1.03 02/08/2024 09:22 AM     Lab Results   Component Value Date/Time    WBC 12.6 (H) 02/06/2025 02:28 PM    WBC 7.5 07/01/2011 10:30 AM    RBC 4.98 02/06/2025 02:28 PM    RBC 4.72 07/01/2011 10:30 AM    HEMOGLOBIN 12.6 (L) 02/06/2025 02:28 PM    HEMATOCRIT 40.3 (L) 02/06/2025 02:28 PM    MCV 80.9 (L) 02/06/2025 02:28 PM     (H) 07/01/2011 10:30 AM    MCH 25.3 (L) 02/06/2025 02:28 PM    MCH 36.0 (H) 07/01/2011 10:30 AM    MCHC 31.3 (L) 02/06/2025 02:28 PM    RDW 50.7 (H) 02/06/2025 02:28 PM    RDW 14.0 07/01/2011 10:30 AM    PLATELETCT 245 02/06/2025 02:28 PM    MPV 10.0 02/06/2025 02:28 PM    NEUTS 8.32 (H) 02/06/2025 02:28 PM    NEUTS 5.3 08/26/2016 11:19 AM    LYMPHOCYTES 17.60 (L) 02/06/2025 02:28 PM    MONOCYTES 11.70 02/06/2025 02:28 PM    EOSINOPHILS 3.50 02/06/2025 02:28 PM    BASOPHILS 0.60 02/06/2025 02:28 PM    HYPOCHROMIA 1+ 03/05/2014 04:43 PM    ANISOCYTOSIS 1+  01/02/2015 05:02 PM     Lab Results   Component Value Date/Time    ASTSGOT 27 02/06/2025 02:28 PM    ALTSGPT 17 02/06/2025 02:28 PM    ALKPHOSPHAT 108 (H) 02/06/2025 02:28 PM    TBILIRUBIN 0.4 02/06/2025 02:28 PM    TOTPROTEIN 6.8 02/06/2025 02:28 PM    ALBUMIN 4.2 02/06/2025 02:28 PM     Lab Results   Component Value Date/Time    SODIUM 134 (L) 02/06/2025 02:28 PM    POTASSIUM 5.0 02/06/2025 02:28 PM    CHLORIDE 99 02/06/2025 02:28 PM    CO2 25 02/06/2025 02:28 PM    GLUCOSE 83 02/06/2025 02:28 PM    BUN 11 02/06/2025 02:28 PM    CREATININE 0.89 02/06/2025 02:28 PM    CREATININE 1.1 04/21/2009 03:50 PM    BUNCREATRAT 13 09/21/2016 09:21 AM    CALCIUM 9.2 02/06/2025 02:28 PM    MAGNESIUM 1.9 08/03/2023 03:12 AM     Lab Results   Component Value Date/Time    TOTALVOLUME 2,750 08/18/2015 07:30 AM    TOTALVOLUME 2,750 08/18/2015 07:30 AM    CREATININEU 935 08/18/2015 07:30 AM    CREATININEU 935 08/18/2015 07:30 AM     Lab Results   Component Value Date/Time    COLORURINE Yellow 08/03/2023 11:26 AM    SPECGRAVITY 1.011 08/03/2023 11:26 AM    PHURINE 7.0 08/03/2023 11:26 AM    GLUCOSEUR Negative 08/03/2023 11:26 AM    KETONES Trace (A) 08/03/2023 11:26 AM    PROTEINURIN Negative 08/03/2023 11:26 AM     Lab Results   Component Value Date/Time    QNTTBGOLD Negative 06/01/2017 01:13 PM     Lab Results   Component Value Date/Time    HEPBSAG Non-Reactive 03/29/2023 04:53 PM    HEPBCORIGM Non-Reactive 03/29/2023 04:53 PM    HEPBCORTOT Negative 06/01/2017 01:13 PM    HEPCAB Non-Reactive 03/29/2023 04:53 PM     Lab Results   Component Value Date/Time    CHOLSTRLTOT 146 06/17/2021 06:16 AM    LDL 70 06/17/2021 06:16 AM    HDL 49 06/17/2021 06:16 AM    TRIGLYCERIDE 137 06/17/2021 06:16 AM    HBA1C 5.7 (H) 01/10/2024 07:13 AM       RADIOLOGY RESULTS REVIEWED AND INTERPRETED BY ME: See above    All relevant laboratory and imaging results reported on this note were reviewed and interpreted by me.         Assessment & Plan     Edgardo  Rnady Sims is a 69 y.o. male with history as noted above whose presentation merits the following clinical impressions and recommendations:    1. Undifferentiated spondyloarthropathy  Previously diagnosed with rheumatoid arthritis (negative serologies) in 1999 when he presented with polyarthritis and has been on several biologic therapies including rituximab infusion which was ineffective.  He was later diagnosed with psoriatic arthritis however, no confirmed diagnosis of psoriasis.  He finds that ixekizumab is not effective for his joints.  He did have questionable erosions on feet x-rays that were not present on repeat imaging studies.  His hand x-rays show findings consistent some form of inflammatory arthropathy.  At this time, differential remains rheumatoid arthritis, spondyloarthropathy spectrum diseases, secondary causes of MCP OA such as hemochromatosis and hyperparathyroidism.  Given stable disease clinically, we will continue ixekizumab.  Reasonable to obtain studies below for exclusionary, confirmatory, and risk stratification purposes  - Continue ixekizumab 80 mg SQ every 4 weeks  - Miscellaneous Test; Future  - RHEUMATOID ARTHRITIS PANEL; Future  - DX-HAND 2- RIGHT; Future  - DX-FOOT-2- RIGHT; Future  - DX-HAND 2- LEFT; Future  - DX-FOOT-2- LEFT; Future  - PTH INTACT (PTH ONLY); Future  - Hemochromatosis Genotype; Future  - MR-PELVIS W/O; Future    2. Immunosuppressed status (HCC)  Presently with no history, physical, or laboratory evidence to suggest significant adverse drug effects or opportunistic infections, but need routine monitoring per guidelines.  - Need to ascertain age-appropriate vaccines in addition to the ones listed above under immunizations    3. Arthralgia of multiple joints  Presentation is compatible with biomechanical arthralgia secondary to osteoarthritis (history of arthroplasties of multiple joints) but the possibility of concomitant evolving low-grade inflammatory arthritis  cannot be entirely excluded.   - Plan as above    The above assessment and plan were discussed with the patient who acknowledged understanding of the plan.    FOLLOW-UP: Return in about 5 months (around 7/11/2025).         Thank you for the opportunity to participate in the care of Edgardo Sims.    Antonieta Rodríguez D.O.  Rheumatologist

## 2025-02-11 NOTE — TELEPHONE ENCOUNTER
Prior Authorization for Taltz 80MG/ML auto-injectors  has been approved for a quantity of 1 , day supply 28    Prior Authorization reference number: [id number]  Insurance: [employer]  Effective dates: 01/27/2025 to 01/27/2026  Copay: $1874.18     Is patient eligible to fill with [facility]? Yes    Next Steps: The patient's copay exceeds $5.00. Proceed with contacting patient to offer financial assistance.

## 2025-02-12 RX ORDER — CYCLOBENZAPRINE HCL 10 MG
TABLET ORAL
Qty: 90 TABLET | Refills: 2 | Status: SHIPPED | OUTPATIENT
Start: 2025-02-12

## 2025-02-12 NOTE — TELEPHONE ENCOUNTER
Received request via: Pharmacy    Was the patient seen in the last year in this department? Yes    Does the patient have an active prescription (recently filled or refills available) for medication(s) requested? No    [facility]    Does the patient have [facility] and need 100-day supply? (This applies to ALL medications) Patient does not have SCP

## 2025-02-13 ENCOUNTER — HOSPITAL ENCOUNTER (OUTPATIENT)
Dept: LAB | Facility: MEDICAL CENTER | Age: 70
End: 2025-02-13
Attending: STUDENT IN AN ORGANIZED HEALTH CARE EDUCATION/TRAINING PROGRAM
Payer: MEDICARE

## 2025-02-13 ENCOUNTER — HOSPITAL ENCOUNTER (OUTPATIENT)
Dept: RADIOLOGY | Facility: MEDICAL CENTER | Age: 70
End: 2025-02-13
Attending: STUDENT IN AN ORGANIZED HEALTH CARE EDUCATION/TRAINING PROGRAM
Payer: MEDICARE

## 2025-02-13 DIAGNOSIS — M47.819 UNDIFFERENTIATED SPONDYLOARTHROPATHY: ICD-10-CM

## 2025-02-13 LAB — PTH-INTACT SERPL-MCNC: 35.2 PG/ML (ref 14–72)

## 2025-02-13 PROCEDURE — 81256 HFE GENE: CPT

## 2025-02-13 PROCEDURE — 86431 RHEUMATOID FACTOR QUANT: CPT

## 2025-02-13 PROCEDURE — 83520 IMMUNOASSAY QUANT NOS NONAB: CPT

## 2025-02-13 PROCEDURE — 36415 COLL VENOUS BLD VENIPUNCTURE: CPT

## 2025-02-13 PROCEDURE — 86200 CCP ANTIBODY: CPT

## 2025-02-13 PROCEDURE — 73120 X-RAY EXAM OF HAND: CPT | Mod: LT

## 2025-02-13 PROCEDURE — 73620 X-RAY EXAM OF FOOT: CPT | Mod: LT

## 2025-02-13 PROCEDURE — 83516 IMMUNOASSAY NONANTIBODY: CPT

## 2025-02-13 PROCEDURE — 83970 ASSAY OF PARATHORMONE: CPT

## 2025-02-17 LAB
CARBAMYLATED PROTEIN ANTIBODY, IGG Q6043: 2 UNITS (ref 0–19)
CCP IGA+IGG SERPL IA-ACNC: 2 UNITS (ref 0–19)
RHEUMATOID FACT SER NEPH-ACNC: 13 IU/ML (ref 0–14)

## 2025-02-18 ENCOUNTER — HOSPITAL ENCOUNTER (OUTPATIENT)
Dept: RADIOLOGY | Facility: MEDICAL CENTER | Age: 70
End: 2025-02-18
Attending: STUDENT IN AN ORGANIZED HEALTH CARE EDUCATION/TRAINING PROGRAM
Payer: MEDICARE

## 2025-02-18 ENCOUNTER — HOSPITAL ENCOUNTER (OUTPATIENT)
Dept: LAB | Facility: MEDICAL CENTER | Age: 70
End: 2025-02-18
Attending: INTERNAL MEDICINE
Payer: MEDICARE

## 2025-02-18 DIAGNOSIS — Z79.891 CHRONIC USE OF OPIATE DRUG FOR THERAPEUTIC PURPOSE: ICD-10-CM

## 2025-02-18 DIAGNOSIS — M05.9 RHEUMATOID ARTHRITIS WITH POSITIVE RHEUMATOID FACTOR, INVOLVING UNSPECIFIED SITE (HCC): ICD-10-CM

## 2025-02-18 DIAGNOSIS — M47.819 UNDIFFERENTIATED SPONDYLOARTHROPATHY: ICD-10-CM

## 2025-02-18 PROCEDURE — 80307 DRUG TEST PRSMV CHEM ANLYZR: CPT

## 2025-02-18 PROCEDURE — G0480 DRUG TEST DEF 1-7 CLASSES: HCPCS

## 2025-02-18 PROCEDURE — 72195 MRI PELVIS W/O DYE: CPT

## 2025-02-19 LAB
HFE GENE MUT ANL BLD/T: NORMAL
HFE P.C282Y BLD/T QL: NEGATIVE
HFE P.H63D BLD/T QL: NORMAL
HFE P.S65C BLD/T QL: NEGATIVE

## 2025-02-20 LAB
AMPHET CTO UR CFM-MCNC: NEGATIVE NG/ML
BARBITURATES CTO UR CFM-MCNC: NEGATIVE NG/ML
BENZODIAZ CTO UR CFM-MCNC: NORMAL NG/ML
CANNABINOIDS CTO UR CFM-MCNC: NORMAL NG/ML
COCAINE CTO UR CFM-MCNC: NEGATIVE NG/ML
CREAT UR-MCNC: 361.8 MG/DL (ref 20–400)
DRUG COMMENT 753798: NORMAL
METHADONE CTO UR CFM-MCNC: NEGATIVE NG/ML
OPIATES CTO UR CFM-MCNC: NORMAL NG/ML
PCP CTO UR CFM-MCNC: NEGATIVE NG/ML
PROPOXYPH CTO UR CFM-MCNC: NEGATIVE NG/ML

## 2025-02-21 LAB — MISCELLANEOUS LAB RESULT MISCLAB: NORMAL

## 2025-02-22 LAB
1OH-MIDAZOLAM UR CFM-MCNC: <20 NG/ML
6MAM UR CFM-MCNC: <10 NG/ML
7AMINOCLONAZEPAM UR CFM-MCNC: <5 NG/ML
A-OH ALPRAZ UR CFM-MCNC: <5 NG/ML
ALPRAZ UR CFM-MCNC: <5 NG/ML
CHLORDIAZEP UR CFM-MCNC: <20 NG/ML
CLONAZEPAM UR CFM-MCNC: <5 NG/ML
CODEINE UR CFM-MCNC: <20 NG/ML
DIAZEPAM UR CFM-MCNC: <20 NG/ML
HYDROCODONE UR CFM-MCNC: <20 NG/ML
HYDROMORPHONE UR CFM-MCNC: <20 NG/ML
LORAZEPAM UR CFM-MCNC: <20 NG/ML
MIDAZOLAM UR CFM-MCNC: <20 NG/ML
MORPHINE UR CFM-MCNC: <20 NG/ML
NORDIAZEPAM UR CFM-MCNC: 969 NG/ML
NORHYDROCODONE UR CFM-MCNC: 22 NG/ML
NOROXYCODONE UR CFM-MCNC: >4000 NG/ML
OPIATES UR NOROXYM Q0836: 138 NG/ML
OXAZEPAM UR CFM-MCNC: 2175 NG/ML
OXYCODONE UR CFM-MCNC: >4000 NG/ML
OXYMORPHONE UR CFM-MCNC: 29 NG/ML
TEMAZEPAM UR CFM-MCNC: 2166 NG/ML

## 2025-02-22 PROCEDURE — G0480 DRUG TEST DEF 1-7 CLASSES: HCPCS

## 2025-02-23 LAB — THC UR CFM-MCNC: >500 NG/ML

## 2025-02-27 ENCOUNTER — APPOINTMENT (OUTPATIENT)
Dept: URBAN - METROPOLITAN AREA CLINIC 36 | Facility: CLINIC | Age: 70
Setting detail: DERMATOLOGY
End: 2025-02-27

## 2025-02-27 DIAGNOSIS — Z48.817 ENCOUNTER FOR SURGICAL AFTERCARE FOLLOWING SURGERY ON THE SKIN AND SUBCUTANEOUS TISSUE: ICD-10-CM

## 2025-02-27 DIAGNOSIS — Z41.9 ENCOUNTER FOR PROCEDURE FOR PURPOSES OTHER THAN REMEDYING HEALTH STATE, UNSPECIFIED: ICD-10-CM

## 2025-02-27 PROCEDURE — ? CO2RE

## 2025-02-27 PROCEDURE — ? POST-OP WOUND CHECK

## 2025-02-27 ASSESSMENT — LOCATION SIMPLE DESCRIPTION DERM: LOCATION SIMPLE: NOSE

## 2025-02-27 ASSESSMENT — LOCATION ZONE DERM: LOCATION ZONE: NOSE

## 2025-02-27 ASSESSMENT — LOCATION DETAILED DESCRIPTION DERM: LOCATION DETAILED: NASAL DORSUM

## 2025-02-27 NOTE — PROCEDURE: CO2RE
Add Another Laser Setting?: no
Anesthesia Type: 1% lidocaine with epinephrine
Treatment Number: 1
Post-Care Instructions: I reviewed with the patient in detail post-care instructions. Patient should avoid sun until area fully healed. Pt should apply vaseline to treated areas, and remove crusts gently with water-vinegar soaks.
Eye Protection Text: A corneal shield was inserted after appropriate application of topical anesthesia.
External Cooling Fan Speed: 5
Detail Level: Simple
[employer] Energy (Mj): 80
Other Location: right nasal dorsum
Laser Mode: Deep
Consent: Written consent obtained, risks reviewed including but not limited to crusting, scabbing, blistering, scarring, darker or lighter pigmentary change, incomplete improvement of dyschromia, wrinkles, prolonged erythema and facial swelling, infection and bleeding.

## 2025-02-27 NOTE — PROCEDURE: POST-OP WOUND CHECK
Detail Level: Detailed
Add 81995 Cpt? (Important Note: In 2017 The Use Of 02180 Is Being Tracked By Cms To Determine Future Global Period Reimbursement For Global Periods): no
Wound Evaluated By: Monik TELLEZ RN, Dr. Joseph

## 2025-02-28 DIAGNOSIS — M05.9 RHEUMATOID ARTHRITIS WITH POSITIVE RHEUMATOID FACTOR, INVOLVING UNSPECIFIED SITE (HCC): ICD-10-CM

## 2025-02-28 NOTE — TELEPHONE ENCOUNTER
Received request via: Patient    Was the patient seen in the last year in this department? Yes    Does the patient have an active prescription (recently filled or refills available) for medication(s) requested? No    Pharmacy Name: [facility]     Does the patient have [facility] and need 100-day supply? (This applies to ALL medications) Patient does not have SCP

## 2025-03-01 RX ORDER — OXYCODONE AND ACETAMINOPHEN 10; 325 MG/1; MG/1
1-2 TABLET ORAL EVERY 4 HOURS PRN
Qty: 150 TABLET | Refills: 0 | Status: SHIPPED | OUTPATIENT
Start: 2025-03-01 | End: 2025-03-31

## 2025-03-06 DIAGNOSIS — L40.50 PSORIATIC ARTHRITIS (HCC): ICD-10-CM

## 2025-03-06 RX ORDER — IXEKIZUMAB 80 MG/ML
80 INJECTION, SOLUTION SUBCUTANEOUS
Qty: 1 ML | Refills: 5 | Status: SHIPPED | OUTPATIENT
Start: 2025-03-06

## 2025-03-21 DIAGNOSIS — I25.10 CORONARY ARTERY DISEASE DUE TO CALCIFIED CORONARY LESION: ICD-10-CM

## 2025-03-21 DIAGNOSIS — I25.84 CORONARY ARTERY DISEASE DUE TO CALCIFIED CORONARY LESION: ICD-10-CM

## 2025-03-28 DIAGNOSIS — E29.1 HYPOGONADISM MALE: ICD-10-CM

## 2025-03-30 RX ORDER — TESTOSTERONE CYPIONATE 200 MG/ML
INJECTION, SOLUTION INTRAMUSCULAR
Qty: 6 ML | Refills: 0 | Status: SHIPPED | OUTPATIENT
Start: 2025-03-30 | End: 2025-04-29

## 2025-03-31 PROCEDURE — RXMED WILLOW AMBULATORY MEDICATION CHARGE: Performed by: INTERNAL MEDICINE

## 2025-04-01 ENCOUNTER — PHARMACY VISIT (OUTPATIENT)
Dept: PHARMACY | Facility: MEDICAL CENTER | Age: 70
End: 2025-04-01
Payer: COMMERCIAL

## 2025-04-09 DIAGNOSIS — M05.9 RHEUMATOID ARTHRITIS WITH POSITIVE RHEUMATOID FACTOR, INVOLVING UNSPECIFIED SITE (HCC): ICD-10-CM

## 2025-04-09 RX ORDER — DIAZEPAM 5 MG/1
TABLET ORAL
Qty: 60 TABLET | Refills: 5 | Status: SHIPPED | OUTPATIENT
Start: 2025-04-09 | End: 2025-05-09

## 2025-04-09 NOTE — TELEPHONE ENCOUNTER
Received request via: Pharmacy    Was the patient seen in the last year in this department? Yes    Does the patient have an active prescription (recently filled or refills available) for medication(s) requested? No    Pharmacy Name: [facility]/pharmacy #[id number] - [place], [place] - [address]     Does the patient have [facility] and need 100-day supply? (This applies to ALL medications) Patient does not have SCP

## 2025-04-15 ENCOUNTER — OFFICE VISIT (OUTPATIENT)
Dept: MEDICAL GROUP | Facility: LAB | Age: 70
End: 2025-04-15
Payer: MEDICARE

## 2025-04-15 VITALS
HEART RATE: 84 BPM | SYSTOLIC BLOOD PRESSURE: 132 MMHG | WEIGHT: 196 LBS | DIASTOLIC BLOOD PRESSURE: 60 MMHG | BODY MASS INDEX: 28.06 KG/M2 | RESPIRATION RATE: 16 BRPM | OXYGEN SATURATION: 95 % | HEIGHT: 70 IN | TEMPERATURE: 98.6 F

## 2025-04-15 DIAGNOSIS — M05.9 RHEUMATOID ARTHRITIS WITH POSITIVE RHEUMATOID FACTOR, INVOLVING UNSPECIFIED SITE (HCC): ICD-10-CM

## 2025-04-15 DIAGNOSIS — R73.02 IGT (IMPAIRED GLUCOSE TOLERANCE): ICD-10-CM

## 2025-04-15 DIAGNOSIS — E78.5 DYSLIPIDEMIA: ICD-10-CM

## 2025-04-15 PROCEDURE — 99214 OFFICE O/P EST MOD 30 MIN: CPT | Performed by: FAMILY MEDICINE

## 2025-04-15 PROCEDURE — 3078F DIAST BP <80 MM HG: CPT | Performed by: FAMILY MEDICINE

## 2025-04-15 PROCEDURE — 3075F SYST BP GE 130 - 139MM HG: CPT | Performed by: FAMILY MEDICINE

## 2025-04-15 PROCEDURE — 1170F FXNL STATUS ASSESSED: CPT | Performed by: FAMILY MEDICINE

## 2025-04-15 RX ORDER — OXYCODONE AND ACETAMINOPHEN 10; 325 MG/1; MG/1
1-2 TABLET ORAL EVERY 4 HOURS PRN
Qty: 150 TABLET | Refills: 0 | Status: SHIPPED | OUTPATIENT
Start: 2025-05-01 | End: 2025-05-31

## 2025-04-15 RX ORDER — OXYCODONE AND ACETAMINOPHEN 10; 325 MG/1; MG/1
1 TABLET ORAL EVERY 4 HOURS PRN
Qty: 150 TABLET | Refills: 0 | Status: SHIPPED | OUTPATIENT
Start: 2025-05-31 | End: 2025-06-30

## 2025-04-15 RX ORDER — OXYCODONE AND ACETAMINOPHEN 10; 325 MG/1; MG/1
1 TABLET ORAL EVERY 4 HOURS PRN
Qty: 150 TABLET | Refills: 0 | Status: SHIPPED | OUTPATIENT
Start: 2025-06-30 | End: 2025-07-30

## 2025-04-15 ASSESSMENT — FIBROSIS 4 INDEX: FIB4 SCORE: 1.82

## 2025-04-15 NOTE — PROGRESS NOTES
Subjective:     Chief Complaint   Patient presents with    Medication Refill         HPI:   Edgardo presents today for pain med refills.   Follows with Dr Lao for these. I am seeing hi in his absence.   Recent drug screen appropriate other than positive THC.   Following with Rheum for RA, undifferentiated spondyloarthropathy, psoriatic arthritis. Currently on Taltz. Has bene on for 6-7 months now. Itching is better. No patchy skin issues at all. But this is better. Joints do feel a bit better as well.   Using Percocet, and valium.   PDMP reviewed.     He is using a topical antiinflammatory, which helps a bit.   Wanting to move meds to [facility].     No concerns with constipation, or opiate side effects.  Dosing stable for quite some time.      Current Outpatient Medications Ordered in Epic   Medication Sig Dispense Refill    Ixekizumab (TALTZ) 80 MG/ML Solution Auto-injector Inject 80 mg under the skin every 4 weeks. 1 mL 5    diazePAM (VALIUM) 5 MG Tab TAKE 1 TABLET BY MOUTH EVERY 12 HOURS AS NEEDED FOR UP TO 30 DAYS F41.9 60 Tablet 5    testosterone cypionate (DEPO-TESTOSTERONE) 200 MG/ML injection INJECT 1 ML IN THE MUSCLE EVERY 14 DAYS, 6ML, 84 DAYS DEFICIENT ACTIVITY OF THE TESTIS 6 mL 0    cyclobenzaprine (FLEXERIL) 10 mg Tab TAKE 1 TABLET BY MOUTH 3 TIMES A DAY AS NEEDED FOR MILD PAIN OR MUSCLE SPASMS FOR UP TO 90 DAYS. 90 Tablet 2    oxyCODONE-acetaminophen (PERCOCET-10)  MG Tab Take 1-2 Tablets by mouth every four hours as needed for Severe Pain (refill #3 of #3.) for up to 30 days. 150 Tablet 0    Aspirin 81 MG Cap 81 mg.      lisinopril (PRINIVIL) 20 MG Tab 20 mg.      Omeprazole Magnesium (PRILOSEC PO) Oral, Daily, 0 Refill(s), Maintenance, 90.718      PARoxetine (PAXIL) 20 MG Tab 20 mg.      pravastatin (PRAVACHOL) 80 MG tablet 80 mg.      lisinopril (PRINIVIL) 20 MG Tab Take 1 Tablet by mouth every day. 90 Tablet 3    metoprolol tartrate (LOPRESSOR) 50 MG Tab Take 1 Tablet by mouth 2  "times a day. 180 Tablet 3    pravastatin (PRAVACHOL) 80 MG tablet Take 1 Tablet by mouth every day. 90 Tablet 3    PARoxetine (PAXIL) 20 MG Tab TAKE 1 TABLET BY MOUTH EVERY DAY 90 Tablet 3    Cholecalciferol (VITAMIN D3) 2000 UNIT Cap Take 2,000 Units by mouth every morning.      Calcium Carb-Cholecalciferol 600-12.5 MG-MCG Cap Take 1 Capsule by mouth 2 times a day.      omeprazole (PRILOSEC OTC) 20 MG tablet Take 20 mg by mouth every morning.      Multiple Vitamins-Minerals (PRESERVISION AREDS 2+MULTI VIT) Cap Take 1 Capsule by mouth 2 times a day.      Ascorbic Acid (VITAMIN C) 1000 MG Tab Take 1,000 mg by mouth every morning.      Zinc 50 MG Tab Take 50 mg by mouth every morning.       No current [facility]-ordered facility-administered medications on file.         ROS:  Gen: no fevers/chills, no changes in weight  Eyes: no changes in vision  ENT: no sore throat, no hearing loss, no bloody nose  Pulm: no sob, no cough  CV: no chest pain, no palpitations  GI: no nausea/vomiting, no diarrhea  : no dysuria  MSk: no myalgias  Skin: no rash  Neuro: no headaches, no numbness/tingling  Heme/Lymph: no easy bruising      Objective:     Exam:  /60   Pulse 84   Temp 37 °C (98.6 °F)   Resp 16   Ht 1.778 m (5' 10\")   Wt 88.9 kg (196 lb)   SpO2 95%   BMI 28.12 kg/m²  Body mass index is 28.12 kg/m².  Gen: AAOx3, NAD, well appearing  HEENT: NCAT, EOMI, Nares patent, Mucosa moist  Resp: Normal chest wall rise and fall, not SOB, no tachypnea  Skin: no rash or abnormality of visible skin.   Psych: normal speech, not slurred, good insight, affect full  MSK: Moves all four limbs equally and normally, gait normal        Assessment & Plan:     69 y.o. male with the following -     1. Rheumatoid arthritis with positive rheumatoid factor, involving unspecified site (HCC)  Reviewed some recent hand x-ray showing a mix of inflammatory arthropathy as well as osteoarthritis.  Discussed from the procedural standpoint there is really " not a lot to be done with regard to surgery or replacing the joints.  We did discuss that sometimes we do fusions but this is really just a salvage procedure and helps with pain not motion.  Will continue his current medications.  PDMP reviewed.  No concerns for misuse or abuse.  - oxyCODONE-acetaminophen (PERCOCET-10)  MG Tab; Take 1-2 Tablets by mouth every four hours as needed for Severe Pain for up to 30 days.  Dispense: 150 Tablet; Refill: 0  - oxyCODONE-acetaminophen (PERCOCET-10)  MG Tab; Take 1 Tablet by mouth every four hours as needed for Severe Pain for up to 30 days.  Dispense: 150 Tablet; Refill: 0  - oxyCODONE-acetaminophen (PERCOCET-10)  MG Tab; Take 1 Tablet by mouth every four hours as needed for Severe Pain for up to 30 days.  Dispense: 150 Tablet; Refill: 0    2. IGT (impaired glucose tolerance)  Discussed history of elevated glucose.  Will get this rechecked as it has been almost a year since he had this done.  - Basic Metabolic Panel; Future  - HEMOGLOBIN A1C; Future    3. Dyslipidemia    - Lipid Profile; Future            No follow-ups on file.    Please note that this dictation was created using voice recognition software. I have made every reasonable attempt to correct obvious errors, but I expect that there are errors of grammar and possibly content that I did not discover before finalizing the note.

## 2025-05-02 PROCEDURE — RXMED WILLOW AMBULATORY MEDICATION CHARGE: Performed by: FAMILY MEDICINE

## 2025-05-03 ENCOUNTER — PHARMACY VISIT (OUTPATIENT)
Dept: PHARMACY | Facility: MEDICAL CENTER | Age: 70
End: 2025-05-03
Payer: COMMERCIAL

## 2025-05-23 ENCOUNTER — HOSPITAL ENCOUNTER (OUTPATIENT)
Dept: LAB | Facility: MEDICAL CENTER | Age: 70
End: 2025-05-23
Attending: INTERNAL MEDICINE
Payer: MEDICARE

## 2025-05-23 ENCOUNTER — HOSPITAL ENCOUNTER (OUTPATIENT)
Dept: LAB | Facility: MEDICAL CENTER | Age: 70
End: 2025-05-23
Attending: FAMILY MEDICINE
Payer: MEDICARE

## 2025-05-23 ENCOUNTER — HOSPITAL ENCOUNTER (OUTPATIENT)
Dept: LAB | Facility: MEDICAL CENTER | Age: 70
End: 2025-05-23
Attending: PHYSICIAN ASSISTANT
Payer: MEDICARE

## 2025-05-23 DIAGNOSIS — E78.5 DYSLIPIDEMIA: ICD-10-CM

## 2025-05-23 DIAGNOSIS — R73.02 IGT (IMPAIRED GLUCOSE TOLERANCE): ICD-10-CM

## 2025-05-23 LAB
ANION GAP SERPL CALC-SCNC: 11 MMOL/L (ref 7–16)
BUN SERPL-MCNC: 13 MG/DL (ref 8–22)
CALCIUM SERPL-MCNC: 9.8 MG/DL (ref 8.5–10.5)
CHLORIDE SERPL-SCNC: 99 MMOL/L (ref 96–112)
CHOLEST SERPL-MCNC: 124 MG/DL (ref 100–199)
CHOLEST SERPL-MCNC: 125 MG/DL (ref 100–199)
CO2 SERPL-SCNC: 26 MMOL/L (ref 20–33)
CREAT SERPL-MCNC: 0.94 MG/DL (ref 0.5–1.4)
EST. AVERAGE GLUCOSE BLD GHB EST-MCNC: 103 MG/DL
GFR SERPLBLD CREATININE-BSD FMLA CKD-EPI: 88 ML/MIN/1.73 M 2
GLUCOSE SERPL-MCNC: 104 MG/DL (ref 65–99)
HBA1C MFR BLD: 5.2 % (ref 4–5.6)
HDLC SERPL-MCNC: 36 MG/DL
HDLC SERPL-MCNC: 37 MG/DL
LDLC SERPL CALC-MCNC: 68 MG/DL
LDLC SERPL CALC-MCNC: 71 MG/DL
POTASSIUM SERPL-SCNC: 5.8 MMOL/L (ref 3.6–5.5)
PSA SERPL DL<=0.01 NG/ML-MCNC: 2.73 NG/ML (ref 0–4)
SODIUM SERPL-SCNC: 136 MMOL/L (ref 135–145)
TRIGL SERPL-MCNC: 90 MG/DL (ref 0–149)
TRIGL SERPL-MCNC: 93 MG/DL (ref 0–149)

## 2025-05-23 PROCEDURE — 80048 BASIC METABOLIC PNL TOTAL CA: CPT

## 2025-05-23 PROCEDURE — 80061 LIPID PANEL: CPT

## 2025-05-23 PROCEDURE — 80061 LIPID PANEL: CPT | Mod: 91

## 2025-05-23 PROCEDURE — 84153 ASSAY OF PSA TOTAL: CPT | Mod: GA

## 2025-05-23 PROCEDURE — 83036 HEMOGLOBIN GLYCOSYLATED A1C: CPT | Mod: GA

## 2025-05-23 PROCEDURE — 36415 COLL VENOUS BLD VENIPUNCTURE: CPT

## 2025-05-25 DIAGNOSIS — F32.A DEPRESSION, UNSPECIFIED DEPRESSION TYPE: ICD-10-CM

## 2025-05-26 ENCOUNTER — RESULTS FOLLOW-UP (OUTPATIENT)
Dept: MEDICAL GROUP | Facility: LAB | Age: 70
End: 2025-05-26
Payer: MEDICARE

## 2025-05-27 ENCOUNTER — RESULTS FOLLOW-UP (OUTPATIENT)
Dept: CARDIOLOGY | Facility: MEDICAL CENTER | Age: 70
End: 2025-05-27

## 2025-05-27 RX ORDER — PAROXETINE 20 MG/1
20 TABLET, FILM COATED ORAL
Qty: 90 TABLET | Refills: 3 | Status: SHIPPED | OUTPATIENT
Start: 2025-05-27

## 2025-06-02 PROCEDURE — RXMED WILLOW AMBULATORY MEDICATION CHARGE: Performed by: FAMILY MEDICINE

## 2025-06-03 ENCOUNTER — PHARMACY VISIT (OUTPATIENT)
Dept: PHARMACY | Facility: MEDICAL CENTER | Age: 70
End: 2025-06-03
Payer: COMMERCIAL

## 2025-06-25 DIAGNOSIS — E29.1 HYPOGONADISM MALE: ICD-10-CM

## 2025-06-26 RX ORDER — TESTOSTERONE CYPIONATE 200 MG/ML
INJECTION, SOLUTION INTRAMUSCULAR
Qty: 6 ML | Refills: 0 | Status: SHIPPED | OUTPATIENT
Start: 2025-06-26 | End: 2025-07-26

## 2025-06-30 RX ORDER — CYCLOBENZAPRINE HCL 10 MG
TABLET ORAL
Qty: 90 TABLET | Refills: 2 | Status: SHIPPED | OUTPATIENT
Start: 2025-06-30

## 2025-06-30 NOTE — TELEPHONE ENCOUNTER
Received request via: Pharmacy    Was the patient seen in the last year in this department? Yes    Does the patient have an active prescription (recently filled or refills available) for medication(s) requested? No    Pharmacy Name: [facility]/pharmacy #[id number] - [place], [place] - [address]     Does the patient have [facility] and need 100-day supply? (This applies to ALL medications) Patient does not have SCP

## 2025-07-02 PROCEDURE — RXMED WILLOW AMBULATORY MEDICATION CHARGE: Performed by: FAMILY MEDICINE

## 2025-07-08 ENCOUNTER — PHARMACY VISIT (OUTPATIENT)
Dept: PHARMACY | Facility: MEDICAL CENTER | Age: 70
End: 2025-07-08
Payer: COMMERCIAL

## 2025-07-16 ENCOUNTER — OFFICE VISIT (OUTPATIENT)
Dept: MEDICAL GROUP | Facility: LAB | Age: 70
End: 2025-07-16
Payer: MEDICARE

## 2025-07-16 VITALS
HEART RATE: 74 BPM | SYSTOLIC BLOOD PRESSURE: 136 MMHG | TEMPERATURE: 98 F | HEIGHT: 70 IN | RESPIRATION RATE: 18 BRPM | BODY MASS INDEX: 27.43 KG/M2 | WEIGHT: 191.6 LBS | DIASTOLIC BLOOD PRESSURE: 62 MMHG | OXYGEN SATURATION: 96 %

## 2025-07-16 DIAGNOSIS — E55.9 VITAMIN D DEFICIENCY: Primary | ICD-10-CM

## 2025-07-16 DIAGNOSIS — E87.5 HYPERKALEMIA: ICD-10-CM

## 2025-07-16 DIAGNOSIS — M05.9 RHEUMATOID ARTHRITIS WITH POSITIVE RHEUMATOID FACTOR, INVOLVING UNSPECIFIED SITE (HCC): ICD-10-CM

## 2025-07-16 PROCEDURE — 1170F FXNL STATUS ASSESSED: CPT | Performed by: INTERNAL MEDICINE

## 2025-07-16 PROCEDURE — 3078F DIAST BP <80 MM HG: CPT | Performed by: INTERNAL MEDICINE

## 2025-07-16 PROCEDURE — 99214 OFFICE O/P EST MOD 30 MIN: CPT | Performed by: INTERNAL MEDICINE

## 2025-07-16 PROCEDURE — 3075F SYST BP GE 130 - 139MM HG: CPT | Performed by: INTERNAL MEDICINE

## 2025-07-16 RX ORDER — OXYCODONE AND ACETAMINOPHEN 10; 325 MG/1; MG/1
1 TABLET ORAL EVERY 4 HOURS PRN
Qty: 150 TABLET | Refills: 0 | Status: SHIPPED | OUTPATIENT
Start: 2025-09-01 | End: 2025-10-01

## 2025-07-16 RX ORDER — OXYCODONE AND ACETAMINOPHEN 10; 325 MG/1; MG/1
1 TABLET ORAL EVERY 4 HOURS PRN
Qty: 150 TABLET | Refills: 0 | Status: SHIPPED | OUTPATIENT
Start: 2025-08-02 | End: 2025-09-01

## 2025-07-16 RX ORDER — OXYCODONE AND ACETAMINOPHEN 10; 325 MG/1; MG/1
1-2 TABLET ORAL EVERY 4 HOURS PRN
Qty: 150 TABLET | Refills: 0 | Status: SHIPPED | OUTPATIENT
Start: 2025-10-01 | End: 2025-10-31

## 2025-07-16 ASSESSMENT — FIBROSIS 4 INDEX: FIB4 SCORE: 1.82

## 2025-07-16 NOTE — PROGRESS NOTES
CC: Edgardo Sims is a 69 y.o. male is suffering from   Chief Complaint   Patient presents with    Medication Refill         SUBJECTIVE:  1. Rheumatoid arthritis with positive rheumatoid factor, involving unspecified site (HCC)  Abner is here for follow-up, continues to have problems with rheumatoid arthritis is immunocompromise.  We reviewed his x-rays of his hand which shows degenerative changes in his right middle finger and osteoarthritis of the right base of the thumb    2. Vitamin D deficiency  Recheck vitamin D    3. 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    Osteoarthritis of hip 09/19/2024    Primary osteoarthritis of right hip 09/19/2024    Major depressive disorder, recurrent, in full remission ([facility]) 03/28/2024    S/P cervical spinal fusion 02/21/2024    Cervical stenosis of spine 01/18/2024    Urinary retention with incomplete bladder emptying 07/30/2023    Chronic pain 07/30/2023    Closed left ankle fracture 07/29/2023    Closed fracture of multiple ribs of left side 07/29/2023    Contusion of left lung 07/29/2023    Trauma 07/29/2023    No contraindication to deep vein thrombosis (DVT) prophylaxis 07/29/2023    Hammer toe of right foot 12/29/2021    Osteopenia 03/15/2021    Vitamin D deficiency 03/15/2021    Postoperative pain 08/05/2020    Difficult intubation 08/05/2020    Gastroesophageal reflux disease 08/05/2020    Current chronic use of systemic steroids 10/16/2019    High risk medication use 10/16/2019    History of adrenal insufficiency 10/16/2019    Neurogenic bladder 01/26/2019    Bladder outlet obstruction 05/01/2018    Leukocytosis 04/30/2018    Lactic acidosis 04/30/2018     "Idiopathic acute pancreatitis 04/30/2018    Chronic use of opiate drug for therapeutic purpose 08/12/2017    Depression 07/13/2015    Anxiety 03/31/2015    Coronary artery disease due to calcified coronary lesion: Mildly cardiac catheterization in 2014 12/05/2014    Tachycardia 10/20/2014    Abnormal myocardial perfusion study 01/15/2014    Osteoarthrosis, unspecified whether generalized or localized, pelvic region and thigh 05/31/2013    Dyslipidemia 07/12/2011    Aortic insufficiency 07/05/2011    Essential hypertension 07/05/2011    Hypogonadism male 05/05/2009       REVIEW OF SYSTEMS:  Gen.:  No Nausea, Vomiting, fever, Chills.  Heart: No chest pain.  Lungs:  No shortness of Breath.  Psychological: Kian unusual Anxiety depression     PHYSICAL EXAM   Physical Exam             Constitutional: Alert, cooperative, not in acute distress.  Cardiovascular:  Rate Rhythm is regular without murmurs rubs clicks.     Thorax & Lungs: Clear to auscultation, no wheezing, rhonchi, or rales  HENT: Normocephalic, Atraumatic.  Eyes: PERRLA, EOMI, Conjunctiva normal.   Neck: Trachia is midline no swelling of the thyroid.   Lymphatic: No lymphadenopathy noted.   Neurologic: Alert & oriented x 3, cranial nerves II through XII are intact, Normal motor function, Normal sensory function, No focal deficits noted.   Psychiatric: Affect normal, Judgment normal, Mood normal.     VITAL SIGNS:/62 (BP Location: Left arm, Patient Position: Sitting, BP Cuff Size: Adult)   Pulse 74   Temp 36.7 °C (98 °F) (Temporal)   Resp 18   Ht 1.778 m (5' 10\")   Wt 86.9 kg (191 lb 9.6 oz)   SpO2 96%   BMI 27.49 kg/m²     Labs: Reviewed    Assessment:                                                     Plan:  [unfilled]             1. Rheumatoid arthritis with positive rheumatoid factor, involving unspecified site (HCC)  State drug task force reviewed continue Percocet  - oxyCODONE-acetaminophen (PERCOCET-10)  MG Tab; Take 1 Tablet by " mouth every four hours as needed for Severe Pain (refill #1 of #3) for up to 30 days.  Dispense: 150 Tablet; Refill: 0  - oxyCODONE-acetaminophen (PERCOCET-10)  MG Tab; Take 1 Tablet by mouth every four hours as needed for Severe Pain (refill #2 of #3) for up to 30 days.  Dispense: 150 Tablet; Refill: 0  - oxyCODONE-acetaminophen (PERCOCET-10)  MG Tab; Take 1-2 Tablets by mouth every four hours as needed for Severe Pain (refill #3 of #3) for up to 30 days.  Dispense: 150 Tablet; Refill: 0    2. Vitamin D deficiency (Primary)  Recheck vitamin D  - VITAMIN D 25-HYDROXY    3. Hyperkalemia  Recheck potassium, additional lab work ordered  - Comp Metabolic Panel; Future  - CBC WITH DIFFERENTIAL; Future             [1]   Social History  Tobacco Use    Smoking status: Never     Passive exposure: Never    Smokeless tobacco: Never   Vaping Use    Vaping status: Never Used   Substance Use Topics    Alcohol use: Yes     Comment: occ    Drug use: Yes     Types: Inhaled     Comment: marijuana, 4x/week   [2]   Current Outpatient Medications:     [START ON 8/2/2025] oxyCODONE-acetaminophen (PERCOCET-10)  MG Tab, Take 1 Tablet by mouth every four hours as needed for Severe Pain (refill #1 of #3) for up to 30 days., Disp: 150 Tablet, Rfl: 0    [START ON 9/1/2025] oxyCODONE-acetaminophen (PERCOCET-10)  MG Tab, Take 1 Tablet by mouth every four hours as needed for Severe Pain (refill #2 of #3) for up to 30 days., Disp: 150 Tablet, Rfl: 0    [START ON 10/1/2025] oxyCODONE-acetaminophen (PERCOCET-10)  MG Tab, Take 1-2 Tablets by mouth every four hours as needed for Severe Pain (refill #3 of #3) for up to 30 days., Disp: 150 Tablet, Rfl: 0    cyclobenzaprine (FLEXERIL) 10 MG Tab, TAKE 1 TABLET BY MOUTH 3 TIMES A DAY AS NEEDED FOR MILD PAIN OR MUSCLE SPASMS FOR UP TO 90 DAYS., Disp: 90 Tablet, Rfl: 2    testosterone cypionate (DEPO-TESTOSTERONE) 200 MG/ML injection, INJECT 1 ML IN THE MUSCLE EVERY 14 DAYS,  6ML, 84 DAYS DEFICIENT ACTIVITY OF THE TESTIS, Disp: 6 mL, Rfl: 0    PARoxetine (PAXIL) 20 MG Tab, TAKE 1 TABLET BY MOUTH EVERY DAY, Disp: 90 Tablet, Rfl: 3    Ixekizumab (TALTZ) 80 MG/ML Solution Auto-injector, Inject 80 mg under the skin every 4 weeks., Disp: 1 mL, Rfl: 5    Aspirin 81 MG Cap, 81 mg., Disp: , Rfl:     Omeprazole Magnesium (PRILOSEC PO), Oral, Daily, 0 Refill(s), Maintenance, 90.718, Disp: , Rfl:     lisinopril (PRINIVIL) 20 MG Tab, Take 1 Tablet by mouth every day., Disp: 90 Tablet, Rfl: 3    metoprolol tartrate (LOPRESSOR) 50 MG Tab, Take 1 Tablet by mouth 2 times a day., Disp: 180 Tablet, Rfl: 3    pravastatin (PRAVACHOL) 80 MG tablet, Take 1 Tablet by mouth every day., Disp: 90 Tablet, Rfl: 3    Cholecalciferol (VITAMIN D3) 2000 UNIT Cap, Take 2,000 Units by mouth every morning., Disp: , Rfl:     Calcium Carb-Cholecalciferol 600-12.5 MG-MCG Cap, Take 1 Capsule by mouth 2 times a day., Disp: , Rfl:     omeprazole (PRILOSEC OTC) 20 MG tablet, Take 20 mg by mouth every morning., Disp: , Rfl:     Multiple Vitamins-Minerals (PRESERVISION AREDS 2+MULTI VIT) Cap, Take 1 Capsule by mouth 2 times a day., Disp: , Rfl:     Ascorbic Acid (VITAMIN C) 1000 MG Tab, Take 1,000 mg by mouth every morning., Disp: , Rfl:     Zinc 50 MG Tab, Take 50 mg by mouth every morning., Disp: , Rfl:

## 2025-07-25 ENCOUNTER — HOSPITAL ENCOUNTER (OUTPATIENT)
Dept: LAB | Facility: MEDICAL CENTER | Age: 70
End: 2025-07-25
Attending: INTERNAL MEDICINE
Payer: MEDICARE

## 2025-07-25 DIAGNOSIS — E87.5 HYPERKALEMIA: ICD-10-CM

## 2025-07-25 LAB
25(OH)D3 SERPL-MCNC: 41 NG/ML (ref 30–100)
ALBUMIN SERPL BCP-MCNC: 4.3 G/DL (ref 3.2–4.9)
ALBUMIN/GLOB SERPL: 1.6 G/DL
ALP SERPL-CCNC: 91 U/L (ref 30–99)
ALT SERPL-CCNC: 19 U/L (ref 2–50)
ANION GAP SERPL CALC-SCNC: 14 MMOL/L (ref 7–16)
AST SERPL-CCNC: 33 U/L (ref 12–45)
BASOPHILS # BLD AUTO: 1.4 % (ref 0–1.8)
BASOPHILS # BLD: 0.13 K/UL (ref 0–0.12)
BILIRUB SERPL-MCNC: 0.4 MG/DL (ref 0.1–1.5)
BUN SERPL-MCNC: 10 MG/DL (ref 8–22)
CALCIUM ALBUM COR SERPL-MCNC: 9.4 MG/DL (ref 8.5–10.5)
CALCIUM SERPL-MCNC: 9.6 MG/DL (ref 8.5–10.5)
CHLORIDE SERPL-SCNC: 101 MMOL/L (ref 96–112)
CO2 SERPL-SCNC: 21 MMOL/L (ref 20–33)
CREAT SERPL-MCNC: 0.98 MG/DL (ref 0.5–1.4)
EOSINOPHIL # BLD AUTO: 0.56 K/UL (ref 0–0.51)
EOSINOPHIL NFR BLD: 6.1 % (ref 0–6.9)
ERYTHROCYTE [DISTWIDTH] IN BLOOD BY AUTOMATED COUNT: 50 FL (ref 35.9–50)
GFR SERPLBLD CREATININE-BSD FMLA CKD-EPI: 84 ML/MIN/1.73 M 2
GLOBULIN SER CALC-MCNC: 2.7 G/DL (ref 1.9–3.5)
GLUCOSE SERPL-MCNC: 96 MG/DL (ref 65–99)
HCT VFR BLD AUTO: 38.9 % (ref 42–52)
HGB BLD-MCNC: 12.1 G/DL (ref 14–18)
IMM GRANULOCYTES # BLD AUTO: 0.06 K/UL (ref 0–0.11)
IMM GRANULOCYTES NFR BLD AUTO: 0.7 % (ref 0–0.9)
LYMPHOCYTES # BLD AUTO: 2.12 K/UL (ref 1–4.8)
LYMPHOCYTES NFR BLD: 23.3 % (ref 22–41)
MCH RBC QN AUTO: 24.5 PG (ref 27–33)
MCHC RBC AUTO-ENTMCNC: 31.1 G/DL (ref 32.3–36.5)
MCV RBC AUTO: 78.7 FL (ref 81.4–97.8)
MONOCYTES # BLD AUTO: 1.36 K/UL (ref 0–0.85)
MONOCYTES NFR BLD AUTO: 14.9 % (ref 0–13.4)
NEUTROPHILS # BLD AUTO: 4.88 K/UL (ref 1.82–7.42)
NEUTROPHILS NFR BLD: 53.6 % (ref 44–72)
NRBC # BLD AUTO: 0 K/UL
NRBC BLD-RTO: 0 /100 WBC (ref 0–0.2)
PLATELET # BLD AUTO: 245 K/UL (ref 164–446)
PMV BLD AUTO: 10.3 FL (ref 9–12.9)
POTASSIUM SERPL-SCNC: 5 MMOL/L (ref 3.6–5.5)
PROT SERPL-MCNC: 7 G/DL (ref 6–8.2)
RBC # BLD AUTO: 4.94 M/UL (ref 4.7–6.1)
SODIUM SERPL-SCNC: 136 MMOL/L (ref 135–145)
WBC # BLD AUTO: 9.1 K/UL (ref 4.8–10.8)

## 2025-07-25 PROCEDURE — 85025 COMPLETE CBC W/AUTO DIFF WBC: CPT

## 2025-07-25 PROCEDURE — 36415 COLL VENOUS BLD VENIPUNCTURE: CPT

## 2025-07-25 PROCEDURE — 82306 VITAMIN D 25 HYDROXY: CPT

## 2025-07-25 PROCEDURE — 80053 COMPREHEN METABOLIC PANEL: CPT

## 2025-07-28 LAB — FASTING STATUS PATIENT QL REPORTED: NORMAL

## 2025-08-06 PROCEDURE — RXMED WILLOW AMBULATORY MEDICATION CHARGE: Performed by: INTERNAL MEDICINE

## 2025-08-07 ENCOUNTER — PHARMACY VISIT (OUTPATIENT)
Dept: PHARMACY | Facility: MEDICAL CENTER | Age: 70
End: 2025-08-07
Payer: COMMERCIAL

## 2025-08-07 ENCOUNTER — OFFICE VISIT (OUTPATIENT)
Dept: RHEUMATOLOGY | Facility: MEDICAL CENTER | Age: 70
End: 2025-08-07
Attending: STUDENT IN AN ORGANIZED HEALTH CARE EDUCATION/TRAINING PROGRAM
Payer: MEDICARE

## 2025-08-07 VITALS
HEART RATE: 68 BPM | DIASTOLIC BLOOD PRESSURE: 56 MMHG | SYSTOLIC BLOOD PRESSURE: 120 MMHG | TEMPERATURE: 96.8 F | OXYGEN SATURATION: 96 % | WEIGHT: 192.8 LBS | BODY MASS INDEX: 27.6 KG/M2 | HEIGHT: 70 IN | RESPIRATION RATE: 20 BRPM

## 2025-08-07 DIAGNOSIS — D50.9 MICROCYTIC ANEMIA: ICD-10-CM

## 2025-08-07 DIAGNOSIS — E83.118 ARTHROPATHY DUE TO HEMOCHROMATOSIS: Primary | ICD-10-CM

## 2025-08-07 DIAGNOSIS — D84.9 IMMUNOSUPPRESSED STATUS (HCC): ICD-10-CM

## 2025-08-07 DIAGNOSIS — M19.042 OSTEOARTHRITIS OF FINGER OF LEFT HAND: ICD-10-CM

## 2025-08-07 DIAGNOSIS — M14.80 ARTHROPATHY DUE TO HEMOCHROMATOSIS: Primary | ICD-10-CM

## 2025-08-07 PROCEDURE — 700111 HCHG RX REV CODE 636 W/ 250 OVERRIDE (IP): Mod: JZ,TB | Performed by: STUDENT IN AN ORGANIZED HEALTH CARE EDUCATION/TRAINING PROGRAM

## 2025-08-07 PROCEDURE — 20600 DRAIN/INJ JOINT/BURSA W/O US: CPT | Mod: LT | Performed by: STUDENT IN AN ORGANIZED HEALTH CARE EDUCATION/TRAINING PROGRAM

## 2025-08-07 PROCEDURE — 20600 DRAIN/INJ JOINT/BURSA W/O US: CPT | Performed by: STUDENT IN AN ORGANIZED HEALTH CARE EDUCATION/TRAINING PROGRAM

## 2025-08-07 PROCEDURE — 3078F DIAST BP <80 MM HG: CPT | Performed by: STUDENT IN AN ORGANIZED HEALTH CARE EDUCATION/TRAINING PROGRAM

## 2025-08-07 PROCEDURE — 99213 OFFICE O/P EST LOW 20 MIN: CPT | Performed by: STUDENT IN AN ORGANIZED HEALTH CARE EDUCATION/TRAINING PROGRAM

## 2025-08-07 PROCEDURE — 700101 HCHG RX REV CODE 250: Performed by: STUDENT IN AN ORGANIZED HEALTH CARE EDUCATION/TRAINING PROGRAM

## 2025-08-07 PROCEDURE — 3074F SYST BP LT 130 MM HG: CPT | Performed by: STUDENT IN AN ORGANIZED HEALTH CARE EDUCATION/TRAINING PROGRAM

## 2025-08-07 PROCEDURE — 99214 OFFICE O/P EST MOD 30 MIN: CPT | Mod: 25 | Performed by: STUDENT IN AN ORGANIZED HEALTH CARE EDUCATION/TRAINING PROGRAM

## 2025-08-07 PROCEDURE — 1170F FXNL STATUS ASSESSED: CPT | Performed by: STUDENT IN AN ORGANIZED HEALTH CARE EDUCATION/TRAINING PROGRAM

## 2025-08-07 RX ORDER — DIAZEPAM 5 MG/1
TABLET ORAL
COMMUNITY
Start: 2025-06-28

## 2025-08-07 RX ORDER — METHYLPREDNISOLONE ACETATE 40 MG/ML
40 INJECTION, SUSPENSION INTRA-ARTICULAR; INTRALESIONAL; INTRAMUSCULAR; SOFT TISSUE ONCE
Status: COMPLETED | OUTPATIENT
Start: 2025-08-07 | End: 2025-08-07

## 2025-08-07 RX ORDER — TESTOSTERONE CYPIONATE 200 MG/ML
INJECTION, SOLUTION INTRAMUSCULAR
COMMUNITY
Start: 2025-06-26

## 2025-08-07 RX ADMIN — LIDOCAINE HYDROCHLORIDE 2 ML: 10 INJECTION, SOLUTION EPIDURAL; INFILTRATION; INTRACAUDAL; PERINEURAL at 17:50

## 2025-08-07 RX ADMIN — METHYLPREDNISOLONE ACETATE 40 MG: 40 INJECTION, SUSPENSION INTRA-ARTICULAR; INTRALESIONAL; INTRAMUSCULAR; SOFT TISSUE at 17:52

## 2025-08-07 ASSESSMENT — FIBROSIS 4 INDEX: FIB4 SCORE: 2.1

## 2025-08-14 DIAGNOSIS — R00.0 TACHYCARDIA: ICD-10-CM

## 2025-08-14 RX ORDER — METOPROLOL TARTRATE 50 MG
50 TABLET ORAL 2 TIMES DAILY
Qty: 180 TABLET | Refills: 3 | OUTPATIENT
Start: 2025-08-14

## 2025-08-27 ENCOUNTER — HOSPITAL ENCOUNTER (OUTPATIENT)
Dept: LAB | Facility: MEDICAL CENTER | Age: 70
End: 2025-08-27
Attending: STUDENT IN AN ORGANIZED HEALTH CARE EDUCATION/TRAINING PROGRAM
Payer: MEDICARE

## 2025-08-27 DIAGNOSIS — D50.9 MICROCYTIC ANEMIA: ICD-10-CM

## 2025-08-27 LAB
FERRITIN SERPL-MCNC: 17.5 NG/ML (ref 22–322)
IRON SATN MFR SERPL: 4 % (ref 15–55)
IRON SERPL-MCNC: 18 UG/DL (ref 50–180)
TIBC SERPL-MCNC: 430 UG/DL (ref 250–450)
UIBC SERPL-MCNC: 412 UG/DL (ref 110–370)

## 2025-08-27 PROCEDURE — 83516 IMMUNOASSAY NONANTIBODY: CPT

## 2025-08-27 PROCEDURE — 82728 ASSAY OF FERRITIN: CPT

## 2025-08-27 PROCEDURE — 83550 IRON BINDING TEST: CPT

## 2025-08-27 PROCEDURE — 83540 ASSAY OF IRON: CPT

## 2025-08-27 PROCEDURE — 36415 COLL VENOUS BLD VENIPUNCTURE: CPT

## (undated) DEVICE — DRAIN PENROSE STERILE 1/4 X - 18 IN  (25EA/BX)

## (undated) DEVICE — CHLORAPREP 26 ML APPLICATOR - ORANGE TINT(25/CA)

## (undated) DEVICE — SPONGE PEANUT - (5/PK 50PK/CA)

## (undated) DEVICE — SUTURE 1 PDS-2 PLUS CTX - (24/BX)

## (undated) DEVICE — KIT ANESTHESIA W/CIRCUIT & 3/LT BAG W/FILTER (20EA/CA)

## (undated) DEVICE — WATER IRRIG. STER. 1500 ML - (9/CA)

## (undated) DEVICE — BAG DRAINAGE ANTIREFLUX TOWER SLIDE TAP 4000 ML (20EA/CA)

## (undated) DEVICE — SENSOR SPO2 NEO LNCS ADHESIVE (20/BX) SEE USER NOTES

## (undated) DEVICE — BLADE SURGICAL #10 - (50/BX)

## (undated) DEVICE — CANISTER SUCTION 3000ML MECHANICAL FILTER AUTO SHUTOFF MEDI-VAC NONSTERILE LF DISP  (40EA/CA)

## (undated) DEVICE — TUBE CONNECT SUCTION CLEAR 120 X 1/4" (50EA/CA)"

## (undated) DEVICE — HANDPIECE 10FT INTPLS SCT PLS IRRIGATION HAND CONTROL SET (6/PK)

## (undated) DEVICE — WATER IRRIGATION STERILE 1000ML (12EA/CA)

## (undated) DEVICE — COVER LIGHT HANDLE ALC PLUS DISP (18EA/BX)

## (undated) DEVICE — SPONGE GAUZESTER 4 X 4 4PLY - (128PK/CA)

## (undated) DEVICE — TOWELS CLOTH SURGICAL - (4/PK 20PK/CA)

## (undated) DEVICE — TUBING CLEARLINK DUO-VENT - C-FLO (48EA/CA)

## (undated) DEVICE — COVER MAYO STAND X-LG - (22EA/CA)

## (undated) DEVICE — LACTATED RINGERS INJ 1000 ML - (14EA/CA 60CA/PF)

## (undated) DEVICE — DRAPETIBURON SHOULDER W/POUCH - (5EA/CA)

## (undated) DEVICE — JELLY, KY 2 0Z STERILE

## (undated) DEVICE — SUCTION INSTRUMENT YANKAUER OPEN TIP W/O VENT (50EA/CA)

## (undated) DEVICE — SYRINGE 3 CC 21 GA X 1-1/2 - NDL SAFETY (50/BX 8BX/CA)

## (undated) DEVICE — GOWN WARMING STANDARD FLEX - (30/CA)

## (undated) DEVICE — SUCTION INSTRUMENT YANKAUER BULBOUS TIP W/O VENT (50EA/CA)

## (undated) DEVICE — TOURNIQUET CUFF 34 X 4 ONE PORT DISP - STERILE (10/BX)

## (undated) DEVICE — PAD LAP STERILE 18 X 18 - (5/PK 40PK/CA)

## (undated) DEVICE — SPIDER SHOULDER HOLDER (12EA/BX)

## (undated) DEVICE — ELECTRODE DUAL RETURN W/ CORD - (50/PK)

## (undated) DEVICE — NEEDLE SAFETY 18 GA X 1 1/2 IN (100EA/BX)

## (undated) DEVICE — NEPTUNE 4 PORT MANIFOLD - (20/PK)

## (undated) DEVICE — HEAD HOLDER JUNIOR/ADULT

## (undated) DEVICE — CATHETER HEYMAN FOLLOWER STR 16FR (10/CA)

## (undated) DEVICE — TOOL MR8 14CM MATCH HD SYM-TRI 3MM DIAMETER (1/EA)

## (undated) DEVICE — SLEEVE, VASO, THIGH, MED

## (undated) DEVICE — PACK TOTAL KNEE  (1/CA)

## (undated) DEVICE — KIT  I.V. START (100EA/CA)

## (undated) DEVICE — MASK ANESTHESIA ADULT  - (100/CA)

## (undated) DEVICE — ELECTRODE 850 FOAM ADHESIVE - HYDROGEL RADIOTRNSPRNT (50/PK)

## (undated) DEVICE — SHAVER4.0 AGGRESSIVE + FORMLA (5EA/BX)

## (undated) DEVICE — SHEET PEDIATRIC LAPAROTOMY - (10/CA)

## (undated) DEVICE — SET EXTENSION WITH 2 PORTS (48EA/CA) ***PART #2C8610 IS A SUBSTITUTE*****

## (undated) DEVICE — Device

## (undated) DEVICE — SUTURE 3-0 ETHILON FSLX 30 (36PK/BX)"

## (undated) DEVICE — SODIUM CHL. IRRIGATION 0.9% 3000ML (4EA/CA 65CA/PF)

## (undated) DEVICE — LACTATED RINGERS INJ. 500 ML - (24EA/CA)

## (undated) DEVICE — BLADE SURGICAL CLIPPER - (50EA/CA)

## (undated) DEVICE — GLOVE BIOGEL INDICATOR SZ 8 SURGICAL PF LTX - (50/BX 4BX/CA)

## (undated) DEVICE — PACK SINGLE BASIN - (6/CA)

## (undated) DEVICE — PROTECTOR ULNA NERVE - (36PR/CA)

## (undated) DEVICE — MASK AIRWAY SIZE 4 UNIQUE SILICON (10EA/BX)

## (undated) DEVICE — SET LEADWIRE 5 LEAD BEDSIDE DISPOSABLE ECG (1SET OF 5/EA)

## (undated) DEVICE — DRAPE 36X28IN RAD CARM BND BG - (25/CA) O

## (undated) DEVICE — GLOVE BIOGEL PI ORTHO SZ 6 1/2 SURGICAL PF LF (40PR/BX)

## (undated) DEVICE — DRAPE STRLE REG TOWEL 18X24 - (10/BX 4BX/CA)"

## (undated) DEVICE — PACK CYSTOSCOPY - (14/CA)

## (undated) DEVICE — ELECTRODE BIPOLAR SMALL CUTTING LOOP URO 24/26 FR (6EA/PK)

## (undated) DEVICE — CATHETER IV 18 GA X 1-1/4 - SAFETY BRAUN (50/BX)

## (undated) DEVICE — SODIUM CHL IRRIGATION 0.9% 1000ML (12EA/CA)

## (undated) DEVICE — SUTURE GENERAL

## (undated) DEVICE — CATHETER HEYMAN FOLLOWER STR 12FR (10/CA)

## (undated) DEVICE — RESTRAINTS LIMB DISP. - (12/BX 4BX/CA)

## (undated) DEVICE — SUTURE 3-0 MONOCRYL PLUS PS-1 - 27 INCH (36/BX)

## (undated) DEVICE — CATHETER HEYMAN FOLLOWER STR 10FR (10/CA)

## (undated) DEVICE — KIT ROOM DECONTAMINATION

## (undated) DEVICE — BAG SPONGE COUNT 10.25 X 32 - BLUE (250/CA)

## (undated) DEVICE — GVL 3 STAT DISPOSABLE - (10/BX)

## (undated) DEVICE — BOVIE BLADE COATED &INSULATED - 25/PK

## (undated) DEVICE — NEEDLE SPINAL NON-SAFETY 18 GA X 3 IN (25EA/BX)

## (undated) DEVICE — PACK SHOULDER ARTHROSCOPY SM - (2EA/CA)

## (undated) DEVICE — GLOVE BIOGEL SZ 7.5 SURGICAL PF LTX - (50PR/BX 4BX/CA)

## (undated) DEVICE — SUTURE 2-0 VICRYL PLUS CT-1 36 (36PK/BX)"

## (undated) DEVICE — SUTURE 2-0 VICRYL PLUS CT-1 - 8 X 18 INCH(12/BX)

## (undated) DEVICE — CATHETER URETHRAL FOLEY SILICONE 20 FR 30 ML 3-WAY

## (undated) DEVICE — BOVIE BLADE COATED - (50/PK)

## (undated) DEVICE — BLADE SURGICAL #15 - (50/BX 3BX/CA)

## (undated) DEVICE — GLOVE BIOGEL PI ORTHO SZ 7.5 PF LF (40PR/BX)

## (undated) DEVICE — EVACUATOR BLADDER ELLIK - (10/BX)

## (undated) DEVICE — GLOVE BIOGEL PI INDICATOR SZ 6.5 SURGICAL PF LF - (50/BX 4BX/CA)

## (undated) DEVICE — GOWN SURGEONS X-LARGE - DISP. (30/CA)

## (undated) DEVICE — SYRINGE TOOMEY (50EA/CA)

## (undated) DEVICE — SCREW DISTRACTION 14MM YELLOW - STERILE (10EA/BX) (5TX4=20)

## (undated) DEVICE — MIDAS LUBRICATOR DIFFUSER PACK (4EA/CA)

## (undated) DEVICE — DERMABOND ADVANCED - (12EA/BX)

## (undated) DEVICE — DRAPE LARGE 3 QUARTER - (20/CA)

## (undated) DEVICE — CUFF BP ADULT MEDIUM DISPOSABLE (20EA/CA)

## (undated) DEVICE — STERI STRIP COMPOUND BENZOIN - TINCTURE 0.6ML WITH APPLICATOR (40EA/BX)

## (undated) DEVICE — SYRINGE 30 ML LL (56/BX)

## (undated) DEVICE — PLATE LOCKING 1/3 TUBULAR 7H (6TX2=12): Type: IMPLANTABLE DEVICE | Site: ANKLE | Status: NON-FUNCTIONAL

## (undated) DEVICE — PAD PREP 24 X 48 CUFFED - (100/CA)

## (undated) DEVICE — KNIFE COLD SACHSE STRAIGHT STERILE (6EA/PK)

## (undated) DEVICE — TUBING C&T SET FLYING LEADS DRAIN TUBING (10EA/BX)

## (undated) DEVICE — GLOVE, LITE (PAIR)

## (undated) DEVICE — MASK AIRWAY SZ 2.5 UNIQUE SILICON (10EA/BX)

## (undated) DEVICE — DRESSING TRANSPARENT FILM TEGADERM 4 X 4.75" (50EA/BX)"

## (undated) DEVICE — ARTHROWAND TURBOVAC 3.5/90 SCT

## (undated) DEVICE — PADDING CAST 6 IN STERILE - 6 X 4 YDS (24/CA)

## (undated) DEVICE — SUTURE 4-0 PROLENE PS-2 18 (36PK/BX)"

## (undated) DEVICE — TUBE CONNECTING SUCTION - CLEAR PLASTIC STERILE 72 IN (50EA/CA)

## (undated) DEVICE — GLOVE SZ 7.5 BIOGEL PI MICRO - PF LF (50PR/BX)

## (undated) DEVICE — GLOVE BIOGEL PI INDICATOR SZ 8.0 SURGICAL PF LF -(50/BX 4BX/CA)

## (undated) DEVICE — BANDAGE ELASTIC STERILE VELCRO 6 X 5 YDS (25EA/CA)

## (undated) DEVICE — HUMID-VENT HEAT AND MOISTURE EXCHANGE- (50/BX)

## (undated) DEVICE — GLOVE BIOGEL PI INDICATOR SZ 7.5 SURGICAL PF LF -(50/BX 4BX/CA)

## (undated) DEVICE — SENSOR OXIMETER ADULT SPO2 RD SET (20EA/BX)

## (undated) DEVICE — DRESSING AQUACEL AG ADVANTAGE 3.5 X 10" (10EA/BX)"

## (undated) DEVICE — WATER IRRIG. STER 3000 ML - (4/CA)

## (undated) DEVICE — BLOCK

## (undated) DEVICE — NEEDLE W/FACET TIP DULL VERSION W/STIMULATION CABLE SONOPLEX 21G X 4 (10/EA)"

## (undated) DEVICE — DRAPE C ARMOR (12EA/CA)

## (undated) DEVICE — TUBING PUMP WITH CONNECTOR REDEUCE (1EA)

## (undated) DEVICE — PLUG CATHETER DRAIN TUBE PROTECTOR CAP

## (undated) DEVICE — MASK, LARYNGEAL AIRWAY #4

## (undated) DEVICE — PACK NEURO - (2EA/CA)

## (undated) DEVICE — NEEDLE NON SAFETY 25 GA X 1 1/2 IN HYPO (100EA/BX)

## (undated) DEVICE — CONTAINER SPECIMEN BAG OR - STERILE 4 OZ W/LID (100EA/CA)

## (undated) DEVICE — CANISTER SUCTION RIGID RED 1500CC (40EA/CA)

## (undated) DEVICE — SUTURE 3-0 VICRYL PLUS SH - 27 INCH (36/BX)

## (undated) DEVICE — BAG URODRAIN WITH TUBING - (20/CA)

## (undated) DEVICE — KIT SURGIFLO W/OUT THROMBIN - (6EA/CA)

## (undated) DEVICE — SUTURE 3-0 PROLENE PS-1 (12PK/BX)

## (undated) DEVICE — PACK LOWER EXTREMITY - (2/CA)

## (undated) DEVICE — GLOVE 7.0 LF PF PROTEXIS (50PR/BX)

## (undated) DEVICE — TUBING PATIENT W/CONNECTOR REDEUCE (1EA)

## (undated) DEVICE — NEEDLE EXPRESSEW III (5EA/PK)

## (undated) DEVICE — GLOVE BIOGEL PI ORTHO SZ 8 PF LF (40PR/BX)

## (undated) DEVICE — TIP INTPLS HFLO ML ORFC BTRY - (12/CS)  FOR SURGILAV

## (undated) DEVICE — PAD UNIVERSAL MULTI USE (1/EA)

## (undated) DEVICE — DRESSING NON-ADHERING 8 X 3 - (50/BX)

## (undated) DEVICE — TUBE E-T HI-LO CUFF 7.0MM (10EA/PK)

## (undated) DEVICE — SLEEVE VASO CALF MED - (10PR/CA)

## (undated) DEVICE — BLADE SAGITTAL SAW DUAL CUT 25.0 X 90.0 X 1.27MM (1/EA)

## (undated) DEVICE — SET IRRIGATION CYSTOSCOPY Y-TYPE L81 IN (20EA/CA)

## (undated) DEVICE — GOWN SURGICAL X-LARGE ULTRA - FILM-REINFORCED (20/CA)

## (undated) DEVICE — DETERGENT RENUZYME PLUS 10 OZ PACKET (50/BX)

## (undated) DEVICE — COVER FOOT UNIVERSAL DISP. - (25EA/CA)

## (undated) DEVICE — SUTURE 3-0 VICRYL PLUS SH - 8X 18 INCH (12/BX)

## (undated) DEVICE — CABLE LIGHT MAXCESS ANGLED TIP

## (undated) DEVICE — SUTURE 0 VICRYL PLUS CT-1 - 36 INCH (36/BX)

## (undated) DEVICE — CATHETER HEYMAN FOLLOWER STR 14FR (10EA/CA)

## (undated) DEVICE — BANDAGE ELASTIC STERILE MATRIX 6 X 10 (20EA/CA)

## (undated) DEVICE — WIRE JAG .038/260 - 2/BX

## (undated) DEVICE — MEDICINE CUP STERILE 2 OZ - (100/CA)

## (undated) DEVICE — GLOVE BIOGEL PI ULTRATOUCH SZ 7.5 SURGICAL PF LF -(50/BX 4BX/CA)

## (undated) DEVICE — SPLINT PLASTER 5 IN X 30 IN - (50EA/BX 6BX/CA)

## (undated) DEVICE — CLOSURE SKIN STRIP 1/2 X 4 IN - (STERI STRIP) (50/BX 4BX/CA)

## (undated) DEVICE — CATHETER FOLEY 22 FR 30 CC (12EA/CA)

## (undated) DEVICE — GLOVE BIOGEL SZ 8 SURGICAL PF LTX - (50PR/BX 4BX/CA)

## (undated) DEVICE — CONNECTOR HOSE NEPTUNE FOR CYSTO ROOM

## (undated) DEVICE — TOWEL STOP TIMEOUT SAFETY FLAG (40EA/CA)

## (undated) DEVICE — BLADE 90X18X1.27MM SAW SAGITTAL

## (undated) DEVICE — INTRAOP NEURO IN OR 1:1 PER 15 MIN

## (undated) DEVICE — CANNULA GEMINI PASSPORT 20MM - (5/BX)